# Patient Record
Sex: MALE | Race: OTHER | HISPANIC OR LATINO | URBAN - METROPOLITAN AREA
[De-identification: names, ages, dates, MRNs, and addresses within clinical notes are randomized per-mention and may not be internally consistent; named-entity substitution may affect disease eponyms.]

---

## 2023-12-21 ENCOUNTER — INPATIENT (INPATIENT)
Facility: HOSPITAL | Age: 83
LOS: 14 days | Discharge: NURSING, REHAB OR LONG-TERM CARE | DRG: 444 | End: 2024-01-05
Attending: STUDENT IN AN ORGANIZED HEALTH CARE EDUCATION/TRAINING PROGRAM | Admitting: SURGERY
Payer: COMMERCIAL

## 2023-12-21 VITALS
OXYGEN SATURATION: 94 % | TEMPERATURE: 99 F | HEART RATE: 101 BPM | DIASTOLIC BLOOD PRESSURE: 76 MMHG | SYSTOLIC BLOOD PRESSURE: 146 MMHG | RESPIRATION RATE: 20 BRPM

## 2023-12-21 DIAGNOSIS — K81.9 CHOLECYSTITIS, UNSPECIFIED: ICD-10-CM

## 2023-12-21 LAB
ALBUMIN SERPL ELPH-MCNC: 3.8 G/DL — SIGNIFICANT CHANGE UP (ref 3.3–5.2)
ALBUMIN SERPL ELPH-MCNC: 3.8 G/DL — SIGNIFICANT CHANGE UP (ref 3.3–5.2)
ALP SERPL-CCNC: 120 U/L — SIGNIFICANT CHANGE UP (ref 40–120)
ALP SERPL-CCNC: 120 U/L — SIGNIFICANT CHANGE UP (ref 40–120)
ALT FLD-CCNC: 17 U/L — SIGNIFICANT CHANGE UP
ALT FLD-CCNC: 17 U/L — SIGNIFICANT CHANGE UP
ANION GAP SERPL CALC-SCNC: 13 MMOL/L — SIGNIFICANT CHANGE UP (ref 5–17)
ANION GAP SERPL CALC-SCNC: 13 MMOL/L — SIGNIFICANT CHANGE UP (ref 5–17)
APPEARANCE UR: CLEAR — SIGNIFICANT CHANGE UP
APPEARANCE UR: CLEAR — SIGNIFICANT CHANGE UP
APTT BLD: 29.8 SEC — SIGNIFICANT CHANGE UP (ref 24.5–35.6)
APTT BLD: 29.8 SEC — SIGNIFICANT CHANGE UP (ref 24.5–35.6)
AST SERPL-CCNC: 19 U/L — SIGNIFICANT CHANGE UP
AST SERPL-CCNC: 19 U/L — SIGNIFICANT CHANGE UP
BACTERIA # UR AUTO: NEGATIVE /HPF — SIGNIFICANT CHANGE UP
BACTERIA # UR AUTO: NEGATIVE /HPF — SIGNIFICANT CHANGE UP
BASE EXCESS BLDV CALC-SCNC: 3 MMOL/L — SIGNIFICANT CHANGE UP (ref -2–3)
BASE EXCESS BLDV CALC-SCNC: 3 MMOL/L — SIGNIFICANT CHANGE UP (ref -2–3)
BASOPHILS # BLD AUTO: 0.03 K/UL — SIGNIFICANT CHANGE UP (ref 0–0.2)
BASOPHILS # BLD AUTO: 0.03 K/UL — SIGNIFICANT CHANGE UP (ref 0–0.2)
BASOPHILS NFR BLD AUTO: 0.2 % — SIGNIFICANT CHANGE UP (ref 0–2)
BASOPHILS NFR BLD AUTO: 0.2 % — SIGNIFICANT CHANGE UP (ref 0–2)
BILIRUB SERPL-MCNC: 0.8 MG/DL — SIGNIFICANT CHANGE UP (ref 0.4–2)
BILIRUB SERPL-MCNC: 0.8 MG/DL — SIGNIFICANT CHANGE UP (ref 0.4–2)
BILIRUB UR-MCNC: NEGATIVE — SIGNIFICANT CHANGE UP
BILIRUB UR-MCNC: NEGATIVE — SIGNIFICANT CHANGE UP
BUN SERPL-MCNC: 26.1 MG/DL — HIGH (ref 8–20)
BUN SERPL-MCNC: 26.1 MG/DL — HIGH (ref 8–20)
CA-I SERPL-SCNC: 1.07 MMOL/L — LOW (ref 1.15–1.33)
CA-I SERPL-SCNC: 1.07 MMOL/L — LOW (ref 1.15–1.33)
CALCIUM SERPL-MCNC: 8.8 MG/DL — SIGNIFICANT CHANGE UP (ref 8.4–10.5)
CALCIUM SERPL-MCNC: 8.8 MG/DL — SIGNIFICANT CHANGE UP (ref 8.4–10.5)
CAST: 1 /LPF — SIGNIFICANT CHANGE UP (ref 0–4)
CAST: 1 /LPF — SIGNIFICANT CHANGE UP (ref 0–4)
CHLORIDE BLDV-SCNC: 102 MMOL/L — SIGNIFICANT CHANGE UP (ref 96–108)
CHLORIDE BLDV-SCNC: 102 MMOL/L — SIGNIFICANT CHANGE UP (ref 96–108)
CHLORIDE SERPL-SCNC: 102 MMOL/L — SIGNIFICANT CHANGE UP (ref 96–108)
CHLORIDE SERPL-SCNC: 102 MMOL/L — SIGNIFICANT CHANGE UP (ref 96–108)
CO2 SERPL-SCNC: 24 MMOL/L — SIGNIFICANT CHANGE UP (ref 22–29)
CO2 SERPL-SCNC: 24 MMOL/L — SIGNIFICANT CHANGE UP (ref 22–29)
COLOR SPEC: YELLOW — SIGNIFICANT CHANGE UP
COLOR SPEC: YELLOW — SIGNIFICANT CHANGE UP
CREAT SERPL-MCNC: 2.04 MG/DL — HIGH (ref 0.5–1.3)
CREAT SERPL-MCNC: 2.04 MG/DL — HIGH (ref 0.5–1.3)
DIFF PNL FLD: ABNORMAL
DIFF PNL FLD: ABNORMAL
EGFR: 32 ML/MIN/1.73M2 — LOW
EGFR: 32 ML/MIN/1.73M2 — LOW
EOSINOPHIL # BLD AUTO: 0.01 K/UL — SIGNIFICANT CHANGE UP (ref 0–0.5)
EOSINOPHIL # BLD AUTO: 0.01 K/UL — SIGNIFICANT CHANGE UP (ref 0–0.5)
EOSINOPHIL NFR BLD AUTO: 0.1 % — SIGNIFICANT CHANGE UP (ref 0–6)
EOSINOPHIL NFR BLD AUTO: 0.1 % — SIGNIFICANT CHANGE UP (ref 0–6)
GAS PNL BLDV: 134 MMOL/L — LOW (ref 136–145)
GAS PNL BLDV: 134 MMOL/L — LOW (ref 136–145)
GAS PNL BLDV: SIGNIFICANT CHANGE UP
GLUCOSE BLDC GLUCOMTR-MCNC: 139 MG/DL — HIGH (ref 70–99)
GLUCOSE BLDC GLUCOMTR-MCNC: 139 MG/DL — HIGH (ref 70–99)
GLUCOSE BLDC GLUCOMTR-MCNC: 164 MG/DL — HIGH (ref 70–99)
GLUCOSE BLDC GLUCOMTR-MCNC: 164 MG/DL — HIGH (ref 70–99)
GLUCOSE BLDC GLUCOMTR-MCNC: 166 MG/DL — HIGH (ref 70–99)
GLUCOSE BLDC GLUCOMTR-MCNC: 166 MG/DL — HIGH (ref 70–99)
GLUCOSE BLDV-MCNC: 332 MG/DL — HIGH (ref 70–99)
GLUCOSE BLDV-MCNC: 332 MG/DL — HIGH (ref 70–99)
GLUCOSE SERPL-MCNC: 288 MG/DL — HIGH (ref 70–99)
GLUCOSE SERPL-MCNC: 288 MG/DL — HIGH (ref 70–99)
GLUCOSE UR QL: >=1000 MG/DL
GLUCOSE UR QL: >=1000 MG/DL
HCO3 BLDV-SCNC: 28 MMOL/L — SIGNIFICANT CHANGE UP (ref 22–29)
HCO3 BLDV-SCNC: 28 MMOL/L — SIGNIFICANT CHANGE UP (ref 22–29)
HCT VFR BLD CALC: 39 % — SIGNIFICANT CHANGE UP (ref 39–50)
HCT VFR BLD CALC: 39 % — SIGNIFICANT CHANGE UP (ref 39–50)
HCT VFR BLDA CALC: 38 % — SIGNIFICANT CHANGE UP
HCT VFR BLDA CALC: 38 % — SIGNIFICANT CHANGE UP
HGB BLD CALC-MCNC: 12.7 G/DL — SIGNIFICANT CHANGE UP (ref 12.6–17.4)
HGB BLD CALC-MCNC: 12.7 G/DL — SIGNIFICANT CHANGE UP (ref 12.6–17.4)
HGB BLD-MCNC: 12.8 G/DL — LOW (ref 13–17)
HGB BLD-MCNC: 12.8 G/DL — LOW (ref 13–17)
IMM GRANULOCYTES NFR BLD AUTO: 0.5 % — SIGNIFICANT CHANGE UP (ref 0–0.9)
IMM GRANULOCYTES NFR BLD AUTO: 0.5 % — SIGNIFICANT CHANGE UP (ref 0–0.9)
INR BLD: 1.1 RATIO — SIGNIFICANT CHANGE UP (ref 0.85–1.18)
INR BLD: 1.1 RATIO — SIGNIFICANT CHANGE UP (ref 0.85–1.18)
KETONES UR-MCNC: NEGATIVE MG/DL — SIGNIFICANT CHANGE UP
KETONES UR-MCNC: NEGATIVE MG/DL — SIGNIFICANT CHANGE UP
LACTATE BLDV-MCNC: 1.5 MMOL/L — SIGNIFICANT CHANGE UP (ref 0.5–2)
LACTATE BLDV-MCNC: 1.5 MMOL/L — SIGNIFICANT CHANGE UP (ref 0.5–2)
LACTATE BLDV-MCNC: 2.5 MMOL/L — HIGH (ref 0.5–2)
LACTATE BLDV-MCNC: 2.5 MMOL/L — HIGH (ref 0.5–2)
LEUKOCYTE ESTERASE UR-ACNC: NEGATIVE — SIGNIFICANT CHANGE UP
LEUKOCYTE ESTERASE UR-ACNC: NEGATIVE — SIGNIFICANT CHANGE UP
LIDOCAIN IGE QN: 17 U/L — LOW (ref 22–51)
LIDOCAIN IGE QN: 17 U/L — LOW (ref 22–51)
LYMPHOCYTES # BLD AUTO: 0.81 K/UL — LOW (ref 1–3.3)
LYMPHOCYTES # BLD AUTO: 0.81 K/UL — LOW (ref 1–3.3)
LYMPHOCYTES # BLD AUTO: 4.4 % — LOW (ref 13–44)
LYMPHOCYTES # BLD AUTO: 4.4 % — LOW (ref 13–44)
MCHC RBC-ENTMCNC: 30.5 PG — SIGNIFICANT CHANGE UP (ref 27–34)
MCHC RBC-ENTMCNC: 30.5 PG — SIGNIFICANT CHANGE UP (ref 27–34)
MCHC RBC-ENTMCNC: 32.8 GM/DL — SIGNIFICANT CHANGE UP (ref 32–36)
MCHC RBC-ENTMCNC: 32.8 GM/DL — SIGNIFICANT CHANGE UP (ref 32–36)
MCV RBC AUTO: 92.9 FL — SIGNIFICANT CHANGE UP (ref 80–100)
MCV RBC AUTO: 92.9 FL — SIGNIFICANT CHANGE UP (ref 80–100)
MONOCYTES # BLD AUTO: 0.94 K/UL — HIGH (ref 0–0.9)
MONOCYTES # BLD AUTO: 0.94 K/UL — HIGH (ref 0–0.9)
MONOCYTES NFR BLD AUTO: 5.1 % — SIGNIFICANT CHANGE UP (ref 2–14)
MONOCYTES NFR BLD AUTO: 5.1 % — SIGNIFICANT CHANGE UP (ref 2–14)
NEUTROPHILS # BLD AUTO: 16.43 K/UL — HIGH (ref 1.8–7.4)
NEUTROPHILS # BLD AUTO: 16.43 K/UL — HIGH (ref 1.8–7.4)
NEUTROPHILS NFR BLD AUTO: 89.7 % — HIGH (ref 43–77)
NEUTROPHILS NFR BLD AUTO: 89.7 % — HIGH (ref 43–77)
NITRITE UR-MCNC: NEGATIVE — SIGNIFICANT CHANGE UP
NITRITE UR-MCNC: NEGATIVE — SIGNIFICANT CHANGE UP
PCO2 BLDV: 49 MMHG — SIGNIFICANT CHANGE UP (ref 42–55)
PCO2 BLDV: 49 MMHG — SIGNIFICANT CHANGE UP (ref 42–55)
PH BLDV: 7.37 — SIGNIFICANT CHANGE UP (ref 7.32–7.43)
PH BLDV: 7.37 — SIGNIFICANT CHANGE UP (ref 7.32–7.43)
PH UR: 6.5 — SIGNIFICANT CHANGE UP (ref 5–8)
PH UR: 6.5 — SIGNIFICANT CHANGE UP (ref 5–8)
PLATELET # BLD AUTO: 289 K/UL — SIGNIFICANT CHANGE UP (ref 150–400)
PLATELET # BLD AUTO: 289 K/UL — SIGNIFICANT CHANGE UP (ref 150–400)
PO2 BLDV: 50 MMHG — HIGH (ref 25–45)
PO2 BLDV: 50 MMHG — HIGH (ref 25–45)
POTASSIUM BLDV-SCNC: 4.6 MMOL/L — SIGNIFICANT CHANGE UP (ref 3.5–5.1)
POTASSIUM BLDV-SCNC: 4.6 MMOL/L — SIGNIFICANT CHANGE UP (ref 3.5–5.1)
POTASSIUM SERPL-MCNC: 4.3 MMOL/L — SIGNIFICANT CHANGE UP (ref 3.5–5.3)
POTASSIUM SERPL-MCNC: 4.3 MMOL/L — SIGNIFICANT CHANGE UP (ref 3.5–5.3)
POTASSIUM SERPL-SCNC: 4.3 MMOL/L — SIGNIFICANT CHANGE UP (ref 3.5–5.3)
POTASSIUM SERPL-SCNC: 4.3 MMOL/L — SIGNIFICANT CHANGE UP (ref 3.5–5.3)
PROT SERPL-MCNC: 7.6 G/DL — SIGNIFICANT CHANGE UP (ref 6.6–8.7)
PROT SERPL-MCNC: 7.6 G/DL — SIGNIFICANT CHANGE UP (ref 6.6–8.7)
PROT UR-MCNC: 300 MG/DL
PROT UR-MCNC: 300 MG/DL
PROTHROM AB SERPL-ACNC: 12.2 SEC — SIGNIFICANT CHANGE UP (ref 9.5–13)
PROTHROM AB SERPL-ACNC: 12.2 SEC — SIGNIFICANT CHANGE UP (ref 9.5–13)
RAPID RVP RESULT: SIGNIFICANT CHANGE UP
RAPID RVP RESULT: SIGNIFICANT CHANGE UP
RBC # BLD: 4.2 M/UL — SIGNIFICANT CHANGE UP (ref 4.2–5.8)
RBC # BLD: 4.2 M/UL — SIGNIFICANT CHANGE UP (ref 4.2–5.8)
RBC # FLD: 13 % — SIGNIFICANT CHANGE UP (ref 10.3–14.5)
RBC # FLD: 13 % — SIGNIFICANT CHANGE UP (ref 10.3–14.5)
RBC CASTS # UR COMP ASSIST: 2 /HPF — SIGNIFICANT CHANGE UP (ref 0–4)
RBC CASTS # UR COMP ASSIST: 2 /HPF — SIGNIFICANT CHANGE UP (ref 0–4)
SAO2 % BLDV: 79.7 % — SIGNIFICANT CHANGE UP
SAO2 % BLDV: 79.7 % — SIGNIFICANT CHANGE UP
SARS-COV-2 RNA SPEC QL NAA+PROBE: SIGNIFICANT CHANGE UP
SARS-COV-2 RNA SPEC QL NAA+PROBE: SIGNIFICANT CHANGE UP
SODIUM SERPL-SCNC: 139 MMOL/L — SIGNIFICANT CHANGE UP (ref 135–145)
SODIUM SERPL-SCNC: 139 MMOL/L — SIGNIFICANT CHANGE UP (ref 135–145)
SP GR SPEC: 1.02 — SIGNIFICANT CHANGE UP (ref 1–1.03)
SP GR SPEC: 1.02 — SIGNIFICANT CHANGE UP (ref 1–1.03)
SQUAMOUS # UR AUTO: 0 /HPF — SIGNIFICANT CHANGE UP (ref 0–5)
SQUAMOUS # UR AUTO: 0 /HPF — SIGNIFICANT CHANGE UP (ref 0–5)
TROPONIN T, HIGH SENSITIVITY RESULT: 38 NG/L — SIGNIFICANT CHANGE UP (ref 0–51)
TROPONIN T, HIGH SENSITIVITY RESULT: 38 NG/L — SIGNIFICANT CHANGE UP (ref 0–51)
UROBILINOGEN FLD QL: 1 MG/DL — SIGNIFICANT CHANGE UP (ref 0.2–1)
UROBILINOGEN FLD QL: 1 MG/DL — SIGNIFICANT CHANGE UP (ref 0.2–1)
WBC # BLD: 18.31 K/UL — HIGH (ref 3.8–10.5)
WBC # BLD: 18.31 K/UL — HIGH (ref 3.8–10.5)
WBC # FLD AUTO: 18.31 K/UL — HIGH (ref 3.8–10.5)
WBC # FLD AUTO: 18.31 K/UL — HIGH (ref 3.8–10.5)
WBC UR QL: 0 /HPF — SIGNIFICANT CHANGE UP (ref 0–5)
WBC UR QL: 0 /HPF — SIGNIFICANT CHANGE UP (ref 0–5)

## 2023-12-21 PROCEDURE — 71250 CT THORAX DX C-: CPT | Mod: 26,MA

## 2023-12-21 PROCEDURE — 76705 ECHO EXAM OF ABDOMEN: CPT | Mod: 26

## 2023-12-21 PROCEDURE — 71045 X-RAY EXAM CHEST 1 VIEW: CPT | Mod: 26

## 2023-12-21 PROCEDURE — 71045 X-RAY EXAM CHEST 1 VIEW: CPT | Mod: 26,77

## 2023-12-21 PROCEDURE — 74176 CT ABD & PELVIS W/O CONTRAST: CPT | Mod: 26,ME

## 2023-12-21 PROCEDURE — G1004: CPT

## 2023-12-21 PROCEDURE — 99285 EMERGENCY DEPT VISIT HI MDM: CPT

## 2023-12-21 RX ORDER — SODIUM CHLORIDE 9 MG/ML
1000 INJECTION, SOLUTION INTRAVENOUS
Refills: 0 | Status: DISCONTINUED | OUTPATIENT
Start: 2023-12-21 | End: 2024-01-05

## 2023-12-21 RX ORDER — ALBUMIN HUMAN 25 %
250 VIAL (ML) INTRAVENOUS ONCE
Refills: 0 | Status: COMPLETED | OUTPATIENT
Start: 2023-12-21 | End: 2023-12-22

## 2023-12-21 RX ORDER — TAMSULOSIN HYDROCHLORIDE 0.4 MG/1
0.4 CAPSULE ORAL AT BEDTIME
Refills: 0 | Status: DISCONTINUED | OUTPATIENT
Start: 2023-12-21 | End: 2024-01-05

## 2023-12-21 RX ORDER — SIMVASTATIN 20 MG/1
20 TABLET, FILM COATED ORAL AT BEDTIME
Refills: 0 | Status: DISCONTINUED | OUTPATIENT
Start: 2023-12-21 | End: 2023-12-25

## 2023-12-21 RX ORDER — INSULIN LISPRO 100/ML
VIAL (ML) SUBCUTANEOUS AT BEDTIME
Refills: 0 | Status: DISCONTINUED | OUTPATIENT
Start: 2023-12-21 | End: 2024-01-05

## 2023-12-21 RX ORDER — SODIUM CHLORIDE 9 MG/ML
1000 INJECTION, SOLUTION INTRAVENOUS
Refills: 0 | Status: DISCONTINUED | OUTPATIENT
Start: 2023-12-21 | End: 2023-12-22

## 2023-12-21 RX ORDER — ONDANSETRON 8 MG/1
4 TABLET, FILM COATED ORAL EVERY 6 HOURS
Refills: 0 | Status: DISCONTINUED | OUTPATIENT
Start: 2023-12-21 | End: 2024-01-05

## 2023-12-21 RX ORDER — MORPHINE SULFATE 50 MG/1
2 CAPSULE, EXTENDED RELEASE ORAL EVERY 4 HOURS
Refills: 0 | Status: DISCONTINUED | OUTPATIENT
Start: 2023-12-21 | End: 2023-12-22

## 2023-12-21 RX ORDER — ACETAMINOPHEN 500 MG
650 TABLET ORAL EVERY 6 HOURS
Refills: 0 | Status: DISCONTINUED | OUTPATIENT
Start: 2023-12-21 | End: 2023-12-22

## 2023-12-21 RX ORDER — GLUCAGON INJECTION, SOLUTION 0.5 MG/.1ML
1 INJECTION, SOLUTION SUBCUTANEOUS ONCE
Refills: 0 | Status: DISCONTINUED | OUTPATIENT
Start: 2023-12-21 | End: 2024-01-05

## 2023-12-21 RX ORDER — PIPERACILLIN AND TAZOBACTAM 4; .5 G/20ML; G/20ML
3.38 INJECTION, POWDER, LYOPHILIZED, FOR SOLUTION INTRAVENOUS ONCE
Refills: 0 | Status: COMPLETED | OUTPATIENT
Start: 2023-12-22 | End: 2023-12-22

## 2023-12-21 RX ORDER — INSULIN LISPRO 100/ML
VIAL (ML) SUBCUTANEOUS
Refills: 0 | Status: DISCONTINUED | OUTPATIENT
Start: 2023-12-21 | End: 2024-01-05

## 2023-12-21 RX ORDER — GABAPENTIN 400 MG/1
300 CAPSULE ORAL AT BEDTIME
Refills: 0 | Status: DISCONTINUED | OUTPATIENT
Start: 2023-12-21 | End: 2024-01-05

## 2023-12-21 RX ORDER — DEXTROSE 50 % IN WATER 50 %
15 SYRINGE (ML) INTRAVENOUS ONCE
Refills: 0 | Status: DISCONTINUED | OUTPATIENT
Start: 2023-12-21 | End: 2024-01-05

## 2023-12-21 RX ORDER — SODIUM CHLORIDE 9 MG/ML
1700 INJECTION INTRAMUSCULAR; INTRAVENOUS; SUBCUTANEOUS ONCE
Refills: 0 | Status: COMPLETED | OUTPATIENT
Start: 2023-12-21 | End: 2023-12-21

## 2023-12-21 RX ORDER — SODIUM CHLORIDE 9 MG/ML
1000 INJECTION, SOLUTION INTRAVENOUS ONCE
Refills: 0 | Status: COMPLETED | OUTPATIENT
Start: 2023-12-21 | End: 2023-12-21

## 2023-12-21 RX ORDER — MORPHINE SULFATE 50 MG/1
4 CAPSULE, EXTENDED RELEASE ORAL EVERY 4 HOURS
Refills: 0 | Status: DISCONTINUED | OUTPATIENT
Start: 2023-12-21 | End: 2023-12-22

## 2023-12-21 RX ORDER — DEXTROSE 50 % IN WATER 50 %
25 SYRINGE (ML) INTRAVENOUS ONCE
Refills: 0 | Status: DISCONTINUED | OUTPATIENT
Start: 2023-12-21 | End: 2024-01-05

## 2023-12-21 RX ORDER — ACETAMINOPHEN 500 MG
975 TABLET ORAL ONCE
Refills: 0 | Status: COMPLETED | OUTPATIENT
Start: 2023-12-21 | End: 2023-12-21

## 2023-12-21 RX ORDER — PIPERACILLIN AND TAZOBACTAM 4; .5 G/20ML; G/20ML
3.38 INJECTION, POWDER, LYOPHILIZED, FOR SOLUTION INTRAVENOUS ONCE
Refills: 0 | Status: COMPLETED | OUTPATIENT
Start: 2023-12-21 | End: 2023-12-21

## 2023-12-21 RX ORDER — ASPIRIN/CALCIUM CARB/MAGNESIUM 324 MG
81 TABLET ORAL DAILY
Refills: 0 | Status: DISCONTINUED | OUTPATIENT
Start: 2023-12-21 | End: 2023-12-22

## 2023-12-21 RX ORDER — HEPARIN SODIUM 5000 [USP'U]/ML
5000 INJECTION INTRAVENOUS; SUBCUTANEOUS EVERY 8 HOURS
Refills: 0 | Status: DISCONTINUED | OUTPATIENT
Start: 2023-12-21 | End: 2023-12-22

## 2023-12-21 RX ORDER — MIDODRINE HYDROCHLORIDE 2.5 MG/1
5 TABLET ORAL THREE TIMES A DAY
Refills: 0 | Status: DISCONTINUED | OUTPATIENT
Start: 2023-12-21 | End: 2024-01-05

## 2023-12-21 RX ORDER — ACETAMINOPHEN 500 MG
1000 TABLET ORAL ONCE
Refills: 0 | Status: COMPLETED | OUTPATIENT
Start: 2023-12-21 | End: 2023-12-21

## 2023-12-21 RX ORDER — DEXTROSE 50 % IN WATER 50 %
12.5 SYRINGE (ML) INTRAVENOUS ONCE
Refills: 0 | Status: DISCONTINUED | OUTPATIENT
Start: 2023-12-21 | End: 2024-01-05

## 2023-12-21 RX ORDER — PIPERACILLIN AND TAZOBACTAM 4; .5 G/20ML; G/20ML
3.38 INJECTION, POWDER, LYOPHILIZED, FOR SOLUTION INTRAVENOUS EVERY 8 HOURS
Refills: 0 | Status: DISCONTINUED | OUTPATIENT
Start: 2023-12-21 | End: 2023-12-21

## 2023-12-21 RX ORDER — PANTOPRAZOLE SODIUM 20 MG/1
40 TABLET, DELAYED RELEASE ORAL
Refills: 0 | Status: DISCONTINUED | OUTPATIENT
Start: 2023-12-21 | End: 2024-01-05

## 2023-12-21 RX ORDER — SODIUM CHLORIDE 9 MG/ML
1000 INJECTION INTRAMUSCULAR; INTRAVENOUS; SUBCUTANEOUS ONCE
Refills: 0 | Status: COMPLETED | OUTPATIENT
Start: 2023-12-21 | End: 2023-12-21

## 2023-12-21 RX ORDER — PIPERACILLIN AND TAZOBACTAM 4; .5 G/20ML; G/20ML
3.38 INJECTION, POWDER, LYOPHILIZED, FOR SOLUTION INTRAVENOUS EVERY 8 HOURS
Refills: 0 | Status: DISCONTINUED | OUTPATIENT
Start: 2023-12-22 | End: 2023-12-27

## 2023-12-21 RX ADMIN — SODIUM CHLORIDE 100 MILLILITER(S): 9 INJECTION, SOLUTION INTRAVENOUS at 21:52

## 2023-12-21 RX ADMIN — ONDANSETRON 4 MILLIGRAM(S): 8 TABLET, FILM COATED ORAL at 19:06

## 2023-12-21 RX ADMIN — PIPERACILLIN AND TAZOBACTAM 200 GRAM(S): 4; .5 INJECTION, POWDER, LYOPHILIZED, FOR SOLUTION INTRAVENOUS at 05:57

## 2023-12-21 RX ADMIN — GABAPENTIN 300 MILLIGRAM(S): 400 CAPSULE ORAL at 22:34

## 2023-12-21 RX ADMIN — SIMVASTATIN 20 MILLIGRAM(S): 20 TABLET, FILM COATED ORAL at 23:34

## 2023-12-21 RX ADMIN — Medication 975 MILLIGRAM(S): at 04:27

## 2023-12-21 RX ADMIN — SODIUM CHLORIDE 1700 MILLILITER(S): 9 INJECTION INTRAMUSCULAR; INTRAVENOUS; SUBCUTANEOUS at 04:27

## 2023-12-21 RX ADMIN — TAMSULOSIN HYDROCHLORIDE 0.4 MILLIGRAM(S): 0.4 CAPSULE ORAL at 23:33

## 2023-12-21 RX ADMIN — PIPERACILLIN AND TAZOBACTAM 200 GRAM(S): 4; .5 INJECTION, POWDER, LYOPHILIZED, FOR SOLUTION INTRAVENOUS at 23:32

## 2023-12-21 RX ADMIN — Medication 1000 MILLIGRAM(S): at 23:00

## 2023-12-21 RX ADMIN — SODIUM CHLORIDE 100 MILLILITER(S): 9 INJECTION, SOLUTION INTRAVENOUS at 13:04

## 2023-12-21 RX ADMIN — Medication 400 MILLIGRAM(S): at 22:00

## 2023-12-21 RX ADMIN — HEPARIN SODIUM 5000 UNIT(S): 5000 INJECTION INTRAVENOUS; SUBCUTANEOUS at 23:33

## 2023-12-21 RX ADMIN — MORPHINE SULFATE 2 MILLIGRAM(S): 50 CAPSULE, EXTENDED RELEASE ORAL at 19:05

## 2023-12-21 RX ADMIN — Medication 2: at 19:08

## 2023-12-21 RX ADMIN — SODIUM CHLORIDE 2000 MILLILITER(S): 9 INJECTION, SOLUTION INTRAVENOUS at 22:20

## 2023-12-21 NOTE — H&P ADULT - NSHPLABSRESULTS_GEN_ALL_CORE
LABS                 12.8   18.31  )----------(  289       ( 21 Dec 2023 04:30 )               39.0      139    |  102    |  26.1   ----------------------------<  288        ( 21 Dec 2023 04:30 )  4.3     |  24.0   |  2.04     Ca    8.8        ( 21 Dec 2023 04:30 )    TPro  7.6    /  Alb  3.8    /  TBili  0.8    /  DBili  x      /  AST  19     /  ALT  17     /  AlkPhos  120    ( 21 Dec 2023 04:30 )    LIVER FUNCTIONS - ( 21 Dec 2023 04:30 )  Alb: 3.8 g/dL / Pro: 7.6 g/dL / ALK PHOS: 120 U/L / ALT: 17 U/L / AST: 19 U/L / GGT: x           PT/INR -  12.2 sec / 1.10 ratio   ( 21 Dec 2023 04:30 )       PTT -  29.8 sec   ( 21 Dec 2023 04:30 )  CAPILLARY BLOOD GLUCOSE        Urinalysis Basic - ( 21 Dec 2023 04:45 )    Color: Yellow / Appearance: Clear / S.019 / pH: x  Gluc: x / Ketone: Negative mg/dL  / Bili: Negative / Urobili: 1.0 mg/dL   Blood: x / Protein: 300 mg/dL / Nitrite: Negative   Leuk Esterase: Negative / RBC: 2 /HPF / WBC 0 /HPF   Sq Epi: x / Non Sq Epi: 0 /HPF / Bacteria: Negative /HPF        05:50 - VBG - pH:       | pCO2:       | pO2:       | Lactate: 1.50   04:30 - VBG - pH: 7.370 | pCO2: 49    | pO2: 50    | Lactate: 2.50     --------------------------------------------------------------------------------------------  IMAGING  < from: CT Abdomen and Pelvis No Cont (. @ 08:54) >        < end of copied text >

## 2023-12-21 NOTE — ED PROVIDER NOTE - NSICDXPASTMEDICALHX_GEN_ALL_CORE_FT
PAST MEDICAL HISTORY:  Anxiety     Diabetes     High cholesterol     Hypertension     Prostate disorder     Ulcer

## 2023-12-21 NOTE — H&P ADULT - NSHPPHYSICALEXAM_GEN_ALL_CORE
--------------------------------------------------------------------------------------------    PHYSICAL EXAM:   General: in bed  Neuro: A+Ox2  but per daughter this is baseline  HEENT: EOMI, PERRLA,  Cardio: ok perfused  Resp: Non labored breathing on RA  GI/Abd: Soft, Muprphy positive, mild RUQ tenderness    --------------------------------------------------------------------------------------------

## 2023-12-21 NOTE — ED PROVIDER NOTE - ATTENDING CONTRIBUTION TO CARE
83y M w/ hx DM, HTN, HLD, BPH; presents for chills. Per family, pt started complaining of generalized abdominal/flank/chest discomfort tonight and appeared to be shaky. No known fever. No cough, vomiting, diarrhea, urinary complaints. +Multiple sick contacts at home. On exam, pt appears 83y M w/ hx DM, HTN, HLD, BPH; presents for chills. Per family, pt started complaining of generalized abdominal/flank/chest discomfort tonight and appeared to be shaky. No known fever. No cough, vomiting, diarrhea, urinary complaints. +Multiple sick contacts at home. On exam, pt appears to be rigoring but in no acute distress. Heart tachycardic, lungs CTAB, abd soft with mild diffuse tenderness greatest in periumbilical area. Rectally febrile in the ED. Sepsis workup initiated. Treated with fluids, empiric antibiotics. Check labs, cultures, RVP, urine, CXR, CT abd/pelvis.

## 2023-12-21 NOTE — ED PROVIDER NOTE - PHYSICAL EXAMINATION
General: ill appearing in no acute distress, alert and cooperative  Head: Normocephalic, atraumatic  Eyes: PERRLA, no conjunctival injection, no scleral icterus, EOMI  ENMT: Atraumatic external nose and ears  Neck: Soft and supple, full ROM without pain  Cardiac: tachycardic rate and regular rhythm, no murmurs, peripheral pulses 2+ and symmetric in all extremities, no LE edema.  Resp: Unlabored respiratory effort, lungs CTAB  Abd: Soft, non-tender, non-distended  MSK: Spine midline and non-tender   Skin: Warm and dry  Neuro: AO x 3, moves all extremities symmetrically, Motor strength and sensation grossly intact

## 2023-12-21 NOTE — ED PROVIDER NOTE - CLINICAL SUMMARY MEDICAL DECISION MAKING FREE TEXT BOX
\83-year-old male with bodyaches, chills, abdominal and flank pain.  Afebrile, hemodynamically stable, tachycardic, abdomen soft with generalized tenderness.  Differential includes electrolyte derangement, viral illness, UTI.

## 2023-12-21 NOTE — CHART NOTE - NSCHARTNOTEFT_GEN_A_CORE
Called by [facility] resident that patient was febrile to 103, tachycardic to 100 and teens.  Normotensive.  Antibiotics were continued, a full panel of labs and cultures were sent including a lactate for septic work up.  The patient remained alert and orientated to person and place.  His daughter was at bedside and noted he had issues with remembering the year.  Medicine was contacted to co-manage and risk stratify patient.      Abd: Soft, NT, ND, RUQ tenderness improved from an earlier exam by the consulting team     Plan:     Continue abx, zosyn   F/U septic work up   F/U medicine evaluation   Preop patient in preparation for planned laparoscopic cholecystectomy   Rest of plan pending continued work up   Monitor hemodynamics

## 2023-12-21 NOTE — ED PROVIDER NOTE - OBJECTIVE STATEMENT
83-year-old male with history of HTN, DM presenting with bilateral flank pain, abdominal pain, chest pain with palpitations, shortness of breath with generalized chills and body aches  Since 8 PM. Denies fevers, headache, cough, nausea, vomiting, diarrhea, hematuria, dysuria, dark stools, focal neurologic symptoms.

## 2023-12-21 NOTE — ED PROVIDER NOTE - CARE PLAN
Principal Discharge DX:	Cholecystitis, unspecified  Secondary Diagnosis:	RON (acute kidney injury)   1

## 2023-12-21 NOTE — ED ADULT TRIAGE NOTE - CHIEF COMPLAINT QUOTE
patient states 8pm he began having bilateral flank pain, now radiating to abdomen and chest. denies palpitations, SOB, difficulty breathing. no fevers,  symptoms noted.

## 2023-12-21 NOTE — ED PROVIDER NOTE - PROGRESS NOTE DETAILS
[place]: Patient reassessed after CT scan result - family member at bedside. Patient with RUQ TTP and guarding - shared CT results and US ordered. Surgery consulted.

## 2023-12-21 NOTE — H&P ADULT - ASSESSMENT
ASSESSMENT: Patient is a 83y old male with abdominal pain, acute cholecystitis, however uncontrolled DM, will need medical optimization already on ABx, will plan fo surgery tenttively tomorrow    PLAN:    - Admit to [facility] Dr. Colbert  - NPO/IVF  - pain control  - HOme meds  - ISS  - Fluids  - Medical consul  - DVT ppx  - OOB/ambulate  - strict I/Os  - Plan discussed with Attending, Dr. Colbert

## 2023-12-22 LAB
A1C WITH ESTIMATED AVERAGE GLUCOSE RESULT: 8.2 % — HIGH (ref 4–5.6)
A1C WITH ESTIMATED AVERAGE GLUCOSE RESULT: 8.2 % — HIGH (ref 4–5.6)
ALBUMIN SERPL ELPH-MCNC: 2.5 G/DL — LOW (ref 3.3–5.2)
ALBUMIN SERPL ELPH-MCNC: 2.5 G/DL — LOW (ref 3.3–5.2)
ALBUMIN SERPL ELPH-MCNC: 2.6 G/DL — LOW (ref 3.3–5.2)
ALBUMIN SERPL ELPH-MCNC: 2.6 G/DL — LOW (ref 3.3–5.2)
ALBUMIN SERPL ELPH-MCNC: 3.1 G/DL — LOW (ref 3.3–5.2)
ALBUMIN SERPL ELPH-MCNC: 3.1 G/DL — LOW (ref 3.3–5.2)
ALP SERPL-CCNC: 61 U/L — SIGNIFICANT CHANGE UP (ref 40–120)
ALP SERPL-CCNC: 70 U/L — SIGNIFICANT CHANGE UP (ref 40–120)
ALP SERPL-CCNC: 70 U/L — SIGNIFICANT CHANGE UP (ref 40–120)
ALT FLD-CCNC: 22 U/L — SIGNIFICANT CHANGE UP
ALT FLD-CCNC: 22 U/L — SIGNIFICANT CHANGE UP
ALT FLD-CCNC: 23 U/L — SIGNIFICANT CHANGE UP
ALT FLD-CCNC: 23 U/L — SIGNIFICANT CHANGE UP
ALT FLD-CCNC: 29 U/L — SIGNIFICANT CHANGE UP
ALT FLD-CCNC: 29 U/L — SIGNIFICANT CHANGE UP
ANION GAP SERPL CALC-SCNC: 11 MMOL/L — SIGNIFICANT CHANGE UP (ref 5–17)
ANION GAP SERPL CALC-SCNC: 11 MMOL/L — SIGNIFICANT CHANGE UP (ref 5–17)
ANION GAP SERPL CALC-SCNC: 12 MMOL/L — SIGNIFICANT CHANGE UP (ref 5–17)
ANION GAP SERPL CALC-SCNC: 12 MMOL/L — SIGNIFICANT CHANGE UP (ref 5–17)
ANION GAP SERPL CALC-SCNC: 14 MMOL/L — SIGNIFICANT CHANGE UP (ref 5–17)
ANION GAP SERPL CALC-SCNC: 14 MMOL/L — SIGNIFICANT CHANGE UP (ref 5–17)
APPEARANCE UR: CLEAR — SIGNIFICANT CHANGE UP
APPEARANCE UR: CLEAR — SIGNIFICANT CHANGE UP
AST SERPL-CCNC: 36 U/L — SIGNIFICANT CHANGE UP
AST SERPL-CCNC: 36 U/L — SIGNIFICANT CHANGE UP
AST SERPL-CCNC: 37 U/L — SIGNIFICANT CHANGE UP
AST SERPL-CCNC: 37 U/L — SIGNIFICANT CHANGE UP
AST SERPL-CCNC: 47 U/L — HIGH
AST SERPL-CCNC: 47 U/L — HIGH
BACTERIA # UR AUTO: NEGATIVE /HPF — SIGNIFICANT CHANGE UP
BACTERIA # UR AUTO: NEGATIVE /HPF — SIGNIFICANT CHANGE UP
BASE EXCESS BLDA CALC-SCNC: -4.5 MMOL/L — LOW (ref -2–3)
BASE EXCESS BLDA CALC-SCNC: -4.5 MMOL/L — LOW (ref -2–3)
BASE EXCESS BLDV CALC-SCNC: -2.4 MMOL/L — LOW (ref -2–3)
BASE EXCESS BLDV CALC-SCNC: -2.4 MMOL/L — LOW (ref -2–3)
BASOPHILS # BLD AUTO: 0.04 K/UL — SIGNIFICANT CHANGE UP (ref 0–0.2)
BASOPHILS # BLD AUTO: 0.04 K/UL — SIGNIFICANT CHANGE UP (ref 0–0.2)
BASOPHILS # BLD AUTO: 0.05 K/UL — SIGNIFICANT CHANGE UP (ref 0–0.2)
BASOPHILS # BLD AUTO: 0.05 K/UL — SIGNIFICANT CHANGE UP (ref 0–0.2)
BASOPHILS NFR BLD AUTO: 0.2 % — SIGNIFICANT CHANGE UP (ref 0–2)
BILIRUB DIRECT SERPL-MCNC: 0.4 MG/DL — HIGH (ref 0–0.3)
BILIRUB DIRECT SERPL-MCNC: 0.4 MG/DL — HIGH (ref 0–0.3)
BILIRUB INDIRECT FLD-MCNC: 0.9 MG/DL — SIGNIFICANT CHANGE UP (ref 0.2–1)
BILIRUB INDIRECT FLD-MCNC: 0.9 MG/DL — SIGNIFICANT CHANGE UP (ref 0.2–1)
BILIRUB SERPL-MCNC: 1.2 MG/DL — SIGNIFICANT CHANGE UP (ref 0.4–2)
BILIRUB SERPL-MCNC: 1.2 MG/DL — SIGNIFICANT CHANGE UP (ref 0.4–2)
BILIRUB SERPL-MCNC: 1.4 MG/DL — SIGNIFICANT CHANGE UP (ref 0.4–2)
BILIRUB SERPL-MCNC: 1.4 MG/DL — SIGNIFICANT CHANGE UP (ref 0.4–2)
BILIRUB SERPL-MCNC: 1.6 MG/DL — SIGNIFICANT CHANGE UP (ref 0.4–2)
BILIRUB SERPL-MCNC: 1.6 MG/DL — SIGNIFICANT CHANGE UP (ref 0.4–2)
BILIRUB UR-MCNC: NEGATIVE — SIGNIFICANT CHANGE UP
BILIRUB UR-MCNC: NEGATIVE — SIGNIFICANT CHANGE UP
BLOOD GAS COMMENTS ARTERIAL: SIGNIFICANT CHANGE UP
BLOOD GAS COMMENTS ARTERIAL: SIGNIFICANT CHANGE UP
BUN SERPL-MCNC: 33 MG/DL — HIGH (ref 8–20)
BUN SERPL-MCNC: 33 MG/DL — HIGH (ref 8–20)
BUN SERPL-MCNC: 33.5 MG/DL — HIGH (ref 8–20)
BUN SERPL-MCNC: 33.5 MG/DL — HIGH (ref 8–20)
BUN SERPL-MCNC: 35.1 MG/DL — HIGH (ref 8–20)
BUN SERPL-MCNC: 35.1 MG/DL — HIGH (ref 8–20)
CA-I SERPL-SCNC: 1.12 MMOL/L — LOW (ref 1.15–1.33)
CA-I SERPL-SCNC: 1.12 MMOL/L — LOW (ref 1.15–1.33)
CALCIUM SERPL-MCNC: 7.5 MG/DL — LOW (ref 8.4–10.5)
CALCIUM SERPL-MCNC: 7.5 MG/DL — LOW (ref 8.4–10.5)
CALCIUM SERPL-MCNC: 7.8 MG/DL — LOW (ref 8.4–10.5)
CALCIUM SERPL-MCNC: 7.8 MG/DL — LOW (ref 8.4–10.5)
CALCIUM SERPL-MCNC: 8.5 MG/DL — SIGNIFICANT CHANGE UP (ref 8.4–10.5)
CALCIUM SERPL-MCNC: 8.5 MG/DL — SIGNIFICANT CHANGE UP (ref 8.4–10.5)
CHLORIDE BLDV-SCNC: 105 MMOL/L — SIGNIFICANT CHANGE UP (ref 96–108)
CHLORIDE BLDV-SCNC: 105 MMOL/L — SIGNIFICANT CHANGE UP (ref 96–108)
CHLORIDE SERPL-SCNC: 103 MMOL/L — SIGNIFICANT CHANGE UP (ref 96–108)
CHLORIDE SERPL-SCNC: 103 MMOL/L — SIGNIFICANT CHANGE UP (ref 96–108)
CHLORIDE SERPL-SCNC: 107 MMOL/L — SIGNIFICANT CHANGE UP (ref 96–108)
CO2 SERPL-SCNC: 21 MMOL/L — LOW (ref 22–29)
CO2 SERPL-SCNC: 21 MMOL/L — LOW (ref 22–29)
CO2 SERPL-SCNC: 22 MMOL/L — SIGNIFICANT CHANGE UP (ref 22–29)
CO2 SERPL-SCNC: 22 MMOL/L — SIGNIFICANT CHANGE UP (ref 22–29)
CO2 SERPL-SCNC: 24 MMOL/L — SIGNIFICANT CHANGE UP (ref 22–29)
CO2 SERPL-SCNC: 24 MMOL/L — SIGNIFICANT CHANGE UP (ref 22–29)
COLOR SPEC: YELLOW — SIGNIFICANT CHANGE UP
COLOR SPEC: YELLOW — SIGNIFICANT CHANGE UP
CREAT ?TM UR-MCNC: 149 MG/DL — SIGNIFICANT CHANGE UP
CREAT ?TM UR-MCNC: 149 MG/DL — SIGNIFICANT CHANGE UP
CREAT SERPL-MCNC: 2.72 MG/DL — HIGH (ref 0.5–1.3)
CREAT SERPL-MCNC: 2.72 MG/DL — HIGH (ref 0.5–1.3)
CREAT SERPL-MCNC: 2.77 MG/DL — HIGH (ref 0.5–1.3)
CREAT SERPL-MCNC: 2.77 MG/DL — HIGH (ref 0.5–1.3)
CREAT SERPL-MCNC: 2.81 MG/DL — HIGH (ref 0.5–1.3)
CREAT SERPL-MCNC: 2.81 MG/DL — HIGH (ref 0.5–1.3)
CULTURE RESULTS: SIGNIFICANT CHANGE UP
CULTURE RESULTS: SIGNIFICANT CHANGE UP
DIFF PNL FLD: ABNORMAL
DIFF PNL FLD: ABNORMAL
EGFR: 21 ML/MIN/1.73M2 — LOW
EGFR: 21 ML/MIN/1.73M2 — LOW
EGFR: 22 ML/MIN/1.73M2 — LOW
EOSINOPHIL # BLD AUTO: 0.01 K/UL — SIGNIFICANT CHANGE UP (ref 0–0.5)
EOSINOPHIL # BLD AUTO: 0.01 K/UL — SIGNIFICANT CHANGE UP (ref 0–0.5)
EOSINOPHIL # BLD AUTO: 0.02 K/UL — SIGNIFICANT CHANGE UP (ref 0–0.5)
EOSINOPHIL # BLD AUTO: 0.02 K/UL — SIGNIFICANT CHANGE UP (ref 0–0.5)
EOSINOPHIL NFR BLD AUTO: 0 % — SIGNIFICANT CHANGE UP (ref 0–6)
EOSINOPHIL NFR BLD AUTO: 0 % — SIGNIFICANT CHANGE UP (ref 0–6)
EOSINOPHIL NFR BLD AUTO: 0.1 % — SIGNIFICANT CHANGE UP (ref 0–6)
EOSINOPHIL NFR BLD AUTO: 0.1 % — SIGNIFICANT CHANGE UP (ref 0–6)
ESTIMATED AVERAGE GLUCOSE: 189 MG/DL — HIGH (ref 68–114)
ESTIMATED AVERAGE GLUCOSE: 189 MG/DL — HIGH (ref 68–114)
GAS PNL BLDA: SIGNIFICANT CHANGE UP
GAS PNL BLDV: 137 MMOL/L — SIGNIFICANT CHANGE UP (ref 136–145)
GAS PNL BLDV: 137 MMOL/L — SIGNIFICANT CHANGE UP (ref 136–145)
GAS PNL BLDV: SIGNIFICANT CHANGE UP
GLUCOSE BLDC GLUCOMTR-MCNC: 101 MG/DL — HIGH (ref 70–99)
GLUCOSE BLDC GLUCOMTR-MCNC: 101 MG/DL — HIGH (ref 70–99)
GLUCOSE BLDC GLUCOMTR-MCNC: 113 MG/DL — HIGH (ref 70–99)
GLUCOSE BLDC GLUCOMTR-MCNC: 113 MG/DL — HIGH (ref 70–99)
GLUCOSE BLDC GLUCOMTR-MCNC: 127 MG/DL — HIGH (ref 70–99)
GLUCOSE BLDC GLUCOMTR-MCNC: 127 MG/DL — HIGH (ref 70–99)
GLUCOSE BLDC GLUCOMTR-MCNC: 146 MG/DL — HIGH (ref 70–99)
GLUCOSE BLDC GLUCOMTR-MCNC: 146 MG/DL — HIGH (ref 70–99)
GLUCOSE BLDV-MCNC: 127 MG/DL — HIGH (ref 70–99)
GLUCOSE BLDV-MCNC: 127 MG/DL — HIGH (ref 70–99)
GLUCOSE SERPL-MCNC: 120 MG/DL — HIGH (ref 70–99)
GLUCOSE SERPL-MCNC: 120 MG/DL — HIGH (ref 70–99)
GLUCOSE SERPL-MCNC: 129 MG/DL — HIGH (ref 70–99)
GLUCOSE SERPL-MCNC: 129 MG/DL — HIGH (ref 70–99)
GLUCOSE SERPL-MCNC: 135 MG/DL — HIGH (ref 70–99)
GLUCOSE SERPL-MCNC: 135 MG/DL — HIGH (ref 70–99)
GLUCOSE UR QL: 500 MG/DL
GLUCOSE UR QL: 500 MG/DL
HCO3 BLDA-SCNC: 22 MMOL/L — SIGNIFICANT CHANGE UP (ref 21–28)
HCO3 BLDA-SCNC: 22 MMOL/L — SIGNIFICANT CHANGE UP (ref 21–28)
HCO3 BLDV-SCNC: 25 MMOL/L — SIGNIFICANT CHANGE UP (ref 22–29)
HCO3 BLDV-SCNC: 25 MMOL/L — SIGNIFICANT CHANGE UP (ref 22–29)
HCT VFR BLD CALC: 30.8 % — LOW (ref 39–50)
HCT VFR BLD CALC: 30.8 % — LOW (ref 39–50)
HCT VFR BLD CALC: 31.3 % — LOW (ref 39–50)
HCT VFR BLD CALC: 31.3 % — LOW (ref 39–50)
HCT VFR BLD CALC: 37.3 % — LOW (ref 39–50)
HCT VFR BLD CALC: 37.3 % — LOW (ref 39–50)
HCT VFR BLDA CALC: 34 % — SIGNIFICANT CHANGE UP
HCT VFR BLDA CALC: 34 % — SIGNIFICANT CHANGE UP
HGB BLD CALC-MCNC: 11.2 G/DL — LOW (ref 12.6–17.4)
HGB BLD CALC-MCNC: 11.2 G/DL — LOW (ref 12.6–17.4)
HGB BLD-MCNC: 10.1 G/DL — LOW (ref 13–17)
HGB BLD-MCNC: 10.1 G/DL — LOW (ref 13–17)
HGB BLD-MCNC: 12.2 G/DL — LOW (ref 13–17)
HGB BLD-MCNC: 12.2 G/DL — LOW (ref 13–17)
HGB BLD-MCNC: 9.9 G/DL — LOW (ref 13–17)
HGB BLD-MCNC: 9.9 G/DL — LOW (ref 13–17)
IMM GRANULOCYTES NFR BLD AUTO: 1.5 % — HIGH (ref 0–0.9)
IMM GRANULOCYTES NFR BLD AUTO: 1.5 % — HIGH (ref 0–0.9)
IMM GRANULOCYTES NFR BLD AUTO: 1.7 % — HIGH (ref 0–0.9)
IMM GRANULOCYTES NFR BLD AUTO: 1.7 % — HIGH (ref 0–0.9)
KETONES UR-MCNC: NEGATIVE MG/DL — SIGNIFICANT CHANGE UP
KETONES UR-MCNC: NEGATIVE MG/DL — SIGNIFICANT CHANGE UP
LACTATE BLDV-MCNC: 1.6 MMOL/L — SIGNIFICANT CHANGE UP (ref 0.5–2)
LACTATE BLDV-MCNC: 1.6 MMOL/L — SIGNIFICANT CHANGE UP (ref 0.5–2)
LACTATE SERPL-SCNC: 1.7 MMOL/L — SIGNIFICANT CHANGE UP (ref 0.5–2)
LACTATE SERPL-SCNC: 1.7 MMOL/L — SIGNIFICANT CHANGE UP (ref 0.5–2)
LACTATE SERPL-SCNC: 3.3 MMOL/L — HIGH (ref 0.5–2)
LACTATE SERPL-SCNC: 3.3 MMOL/L — HIGH (ref 0.5–2)
LEUKOCYTE ESTERASE UR-ACNC: NEGATIVE — SIGNIFICANT CHANGE UP
LEUKOCYTE ESTERASE UR-ACNC: NEGATIVE — SIGNIFICANT CHANGE UP
LYMPHOCYTES # BLD AUTO: 1.32 K/UL — SIGNIFICANT CHANGE UP (ref 1–3.3)
LYMPHOCYTES # BLD AUTO: 1.32 K/UL — SIGNIFICANT CHANGE UP (ref 1–3.3)
LYMPHOCYTES # BLD AUTO: 1.34 K/UL — SIGNIFICANT CHANGE UP (ref 1–3.3)
LYMPHOCYTES # BLD AUTO: 1.34 K/UL — SIGNIFICANT CHANGE UP (ref 1–3.3)
LYMPHOCYTES # BLD AUTO: 5 % — LOW (ref 13–44)
LYMPHOCYTES # BLD AUTO: 5 % — LOW (ref 13–44)
LYMPHOCYTES # BLD AUTO: 5.8 % — LOW (ref 13–44)
LYMPHOCYTES # BLD AUTO: 5.8 % — LOW (ref 13–44)
MAGNESIUM SERPL-MCNC: 1.2 MG/DL — LOW (ref 1.6–2.6)
MAGNESIUM SERPL-MCNC: 1.2 MG/DL — LOW (ref 1.6–2.6)
MAGNESIUM SERPL-MCNC: 1.7 MG/DL — SIGNIFICANT CHANGE UP (ref 1.6–2.6)
MAGNESIUM SERPL-MCNC: 1.7 MG/DL — SIGNIFICANT CHANGE UP (ref 1.6–2.6)
MCHC RBC-ENTMCNC: 30.4 PG — SIGNIFICANT CHANGE UP (ref 27–34)
MCHC RBC-ENTMCNC: 30.4 PG — SIGNIFICANT CHANGE UP (ref 27–34)
MCHC RBC-ENTMCNC: 31 PG — SIGNIFICANT CHANGE UP (ref 27–34)
MCHC RBC-ENTMCNC: 31 PG — SIGNIFICANT CHANGE UP (ref 27–34)
MCHC RBC-ENTMCNC: 31.2 PG — SIGNIFICANT CHANGE UP (ref 27–34)
MCHC RBC-ENTMCNC: 31.2 PG — SIGNIFICANT CHANGE UP (ref 27–34)
MCHC RBC-ENTMCNC: 31.6 GM/DL — LOW (ref 32–36)
MCHC RBC-ENTMCNC: 31.6 GM/DL — LOW (ref 32–36)
MCHC RBC-ENTMCNC: 32.7 GM/DL — SIGNIFICANT CHANGE UP (ref 32–36)
MCHC RBC-ENTMCNC: 32.7 GM/DL — SIGNIFICANT CHANGE UP (ref 32–36)
MCHC RBC-ENTMCNC: 32.8 GM/DL — SIGNIFICANT CHANGE UP (ref 32–36)
MCHC RBC-ENTMCNC: 32.8 GM/DL — SIGNIFICANT CHANGE UP (ref 32–36)
MCV RBC AUTO: 94.7 FL — SIGNIFICANT CHANGE UP (ref 80–100)
MCV RBC AUTO: 94.7 FL — SIGNIFICANT CHANGE UP (ref 80–100)
MCV RBC AUTO: 95.1 FL — SIGNIFICANT CHANGE UP (ref 80–100)
MCV RBC AUTO: 95.1 FL — SIGNIFICANT CHANGE UP (ref 80–100)
MCV RBC AUTO: 96 FL — SIGNIFICANT CHANGE UP (ref 80–100)
MCV RBC AUTO: 96 FL — SIGNIFICANT CHANGE UP (ref 80–100)
MONOCYTES # BLD AUTO: 0.23 K/UL — SIGNIFICANT CHANGE UP (ref 0–0.9)
MONOCYTES # BLD AUTO: 0.23 K/UL — SIGNIFICANT CHANGE UP (ref 0–0.9)
MONOCYTES # BLD AUTO: 1.74 K/UL — HIGH (ref 0–0.9)
MONOCYTES # BLD AUTO: 1.74 K/UL — HIGH (ref 0–0.9)
MONOCYTES NFR BLD AUTO: 1 % — LOW (ref 2–14)
MONOCYTES NFR BLD AUTO: 1 % — LOW (ref 2–14)
MONOCYTES NFR BLD AUTO: 6.6 % — SIGNIFICANT CHANGE UP (ref 2–14)
MONOCYTES NFR BLD AUTO: 6.6 % — SIGNIFICANT CHANGE UP (ref 2–14)
NEUTROPHILS # BLD AUTO: 21.28 K/UL — HIGH (ref 1.8–7.4)
NEUTROPHILS # BLD AUTO: 21.28 K/UL — HIGH (ref 1.8–7.4)
NEUTROPHILS # BLD AUTO: 22.97 K/UL — HIGH (ref 1.8–7.4)
NEUTROPHILS # BLD AUTO: 22.97 K/UL — HIGH (ref 1.8–7.4)
NEUTROPHILS NFR BLD AUTO: 86.5 % — HIGH (ref 43–77)
NEUTROPHILS NFR BLD AUTO: 86.5 % — HIGH (ref 43–77)
NEUTROPHILS NFR BLD AUTO: 91.4 % — HIGH (ref 43–77)
NEUTROPHILS NFR BLD AUTO: 91.4 % — HIGH (ref 43–77)
NITRITE UR-MCNC: NEGATIVE — SIGNIFICANT CHANGE UP
NITRITE UR-MCNC: NEGATIVE — SIGNIFICANT CHANGE UP
PCO2 BLDA: 42 MMHG — SIGNIFICANT CHANGE UP (ref 35–48)
PCO2 BLDA: 42 MMHG — SIGNIFICANT CHANGE UP (ref 35–48)
PCO2 BLDV: 55 MMHG — SIGNIFICANT CHANGE UP (ref 42–55)
PCO2 BLDV: 55 MMHG — SIGNIFICANT CHANGE UP (ref 42–55)
PH BLDA: 7.32 — LOW (ref 7.35–7.45)
PH BLDA: 7.32 — LOW (ref 7.35–7.45)
PH BLDV: 7.26 — LOW (ref 7.32–7.43)
PH BLDV: 7.26 — LOW (ref 7.32–7.43)
PH UR: 5.5 — SIGNIFICANT CHANGE UP (ref 5–8)
PH UR: 5.5 — SIGNIFICANT CHANGE UP (ref 5–8)
PHOSPHATE SERPL-MCNC: 2.9 MG/DL — SIGNIFICANT CHANGE UP (ref 2.4–4.7)
PHOSPHATE SERPL-MCNC: 2.9 MG/DL — SIGNIFICANT CHANGE UP (ref 2.4–4.7)
PHOSPHATE SERPL-MCNC: 3.1 MG/DL — SIGNIFICANT CHANGE UP (ref 2.4–4.7)
PHOSPHATE SERPL-MCNC: 3.1 MG/DL — SIGNIFICANT CHANGE UP (ref 2.4–4.7)
PLATELET # BLD AUTO: 202 K/UL — SIGNIFICANT CHANGE UP (ref 150–400)
PLATELET # BLD AUTO: 202 K/UL — SIGNIFICANT CHANGE UP (ref 150–400)
PLATELET # BLD AUTO: 217 K/UL — SIGNIFICANT CHANGE UP (ref 150–400)
PLATELET # BLD AUTO: 217 K/UL — SIGNIFICANT CHANGE UP (ref 150–400)
PLATELET # BLD AUTO: 221 K/UL — SIGNIFICANT CHANGE UP (ref 150–400)
PLATELET # BLD AUTO: 221 K/UL — SIGNIFICANT CHANGE UP (ref 150–400)
PO2 BLDA: 80 MMHG — LOW (ref 83–108)
PO2 BLDA: 80 MMHG — LOW (ref 83–108)
PO2 BLDV: 95 MMHG — HIGH (ref 25–45)
PO2 BLDV: 95 MMHG — HIGH (ref 25–45)
POTASSIUM BLDV-SCNC: 5 MMOL/L — SIGNIFICANT CHANGE UP (ref 3.5–5.1)
POTASSIUM BLDV-SCNC: 5 MMOL/L — SIGNIFICANT CHANGE UP (ref 3.5–5.1)
POTASSIUM SERPL-MCNC: 4.4 MMOL/L — SIGNIFICANT CHANGE UP (ref 3.5–5.3)
POTASSIUM SERPL-MCNC: 4.4 MMOL/L — SIGNIFICANT CHANGE UP (ref 3.5–5.3)
POTASSIUM SERPL-MCNC: 4.7 MMOL/L — SIGNIFICANT CHANGE UP (ref 3.5–5.3)
POTASSIUM SERPL-MCNC: 4.7 MMOL/L — SIGNIFICANT CHANGE UP (ref 3.5–5.3)
POTASSIUM SERPL-MCNC: 4.9 MMOL/L — SIGNIFICANT CHANGE UP (ref 3.5–5.3)
POTASSIUM SERPL-MCNC: 4.9 MMOL/L — SIGNIFICANT CHANGE UP (ref 3.5–5.3)
POTASSIUM SERPL-SCNC: 4.4 MMOL/L — SIGNIFICANT CHANGE UP (ref 3.5–5.3)
POTASSIUM SERPL-SCNC: 4.4 MMOL/L — SIGNIFICANT CHANGE UP (ref 3.5–5.3)
POTASSIUM SERPL-SCNC: 4.7 MMOL/L — SIGNIFICANT CHANGE UP (ref 3.5–5.3)
POTASSIUM SERPL-SCNC: 4.7 MMOL/L — SIGNIFICANT CHANGE UP (ref 3.5–5.3)
POTASSIUM SERPL-SCNC: 4.9 MMOL/L — SIGNIFICANT CHANGE UP (ref 3.5–5.3)
POTASSIUM SERPL-SCNC: 4.9 MMOL/L — SIGNIFICANT CHANGE UP (ref 3.5–5.3)
PROT SERPL-MCNC: 5.4 G/DL — LOW (ref 6.6–8.7)
PROT SERPL-MCNC: 5.4 G/DL — LOW (ref 6.6–8.7)
PROT SERPL-MCNC: 5.6 G/DL — LOW (ref 6.6–8.7)
PROT SERPL-MCNC: 5.6 G/DL — LOW (ref 6.6–8.7)
PROT SERPL-MCNC: 7 G/DL — SIGNIFICANT CHANGE UP (ref 6.6–8.7)
PROT SERPL-MCNC: 7 G/DL — SIGNIFICANT CHANGE UP (ref 6.6–8.7)
PROT UR-MCNC: 300 MG/DL
PROT UR-MCNC: 300 MG/DL
RAPID RVP RESULT: DETECTED
RAPID RVP RESULT: DETECTED
RBC # BLD: 3.24 M/UL — LOW (ref 4.2–5.8)
RBC # BLD: 3.24 M/UL — LOW (ref 4.2–5.8)
RBC # BLD: 3.26 M/UL — LOW (ref 4.2–5.8)
RBC # BLD: 3.26 M/UL — LOW (ref 4.2–5.8)
RBC # BLD: 3.94 M/UL — LOW (ref 4.2–5.8)
RBC # BLD: 3.94 M/UL — LOW (ref 4.2–5.8)
RBC # FLD: 13.3 % — SIGNIFICANT CHANGE UP (ref 10.3–14.5)
RBC # FLD: 13.3 % — SIGNIFICANT CHANGE UP (ref 10.3–14.5)
RBC # FLD: 13.4 % — SIGNIFICANT CHANGE UP (ref 10.3–14.5)
RBC CASTS # UR COMP ASSIST: 0 /HPF — SIGNIFICANT CHANGE UP (ref 0–4)
RBC CASTS # UR COMP ASSIST: 0 /HPF — SIGNIFICANT CHANGE UP (ref 0–4)
SAO2 % BLDA: 95.8 % — SIGNIFICANT CHANGE UP (ref 94–98)
SAO2 % BLDA: 95.8 % — SIGNIFICANT CHANGE UP (ref 94–98)
SAO2 % BLDV: 96.1 % — SIGNIFICANT CHANGE UP
SAO2 % BLDV: 96.1 % — SIGNIFICANT CHANGE UP
SARS-COV-2 RNA SPEC QL NAA+PROBE: DETECTED
SARS-COV-2 RNA SPEC QL NAA+PROBE: DETECTED
SODIUM SERPL-SCNC: 138 MMOL/L — SIGNIFICANT CHANGE UP (ref 135–145)
SODIUM SERPL-SCNC: 138 MMOL/L — SIGNIFICANT CHANGE UP (ref 135–145)
SODIUM SERPL-SCNC: 141 MMOL/L — SIGNIFICANT CHANGE UP (ref 135–145)
SODIUM SERPL-SCNC: 141 MMOL/L — SIGNIFICANT CHANGE UP (ref 135–145)
SODIUM SERPL-SCNC: 142 MMOL/L — SIGNIFICANT CHANGE UP (ref 135–145)
SODIUM SERPL-SCNC: 142 MMOL/L — SIGNIFICANT CHANGE UP (ref 135–145)
SODIUM UR-SCNC: 45 MMOL/L — SIGNIFICANT CHANGE UP
SODIUM UR-SCNC: 45 MMOL/L — SIGNIFICANT CHANGE UP
SP GR SPEC: 1.02 — SIGNIFICANT CHANGE UP (ref 1–1.03)
SP GR SPEC: 1.02 — SIGNIFICANT CHANGE UP (ref 1–1.03)
SPECIMEN SOURCE: SIGNIFICANT CHANGE UP
SPECIMEN SOURCE: SIGNIFICANT CHANGE UP
SQUAMOUS # UR AUTO: 4 /HPF — SIGNIFICANT CHANGE UP (ref 0–5)
SQUAMOUS # UR AUTO: 4 /HPF — SIGNIFICANT CHANGE UP (ref 0–5)
UROBILINOGEN FLD QL: 1 MG/DL — SIGNIFICANT CHANGE UP (ref 0.2–1)
UROBILINOGEN FLD QL: 1 MG/DL — SIGNIFICANT CHANGE UP (ref 0.2–1)
WBC # BLD: 23.22 K/UL — HIGH (ref 3.8–10.5)
WBC # BLD: 23.22 K/UL — HIGH (ref 3.8–10.5)
WBC # BLD: 23.26 K/UL — HIGH (ref 3.8–10.5)
WBC # BLD: 23.26 K/UL — HIGH (ref 3.8–10.5)
WBC # BLD: 26.54 K/UL — HIGH (ref 3.8–10.5)
WBC # BLD: 26.54 K/UL — HIGH (ref 3.8–10.5)
WBC # FLD AUTO: 23.22 K/UL — HIGH (ref 3.8–10.5)
WBC # FLD AUTO: 23.22 K/UL — HIGH (ref 3.8–10.5)
WBC # FLD AUTO: 23.26 K/UL — HIGH (ref 3.8–10.5)
WBC # FLD AUTO: 23.26 K/UL — HIGH (ref 3.8–10.5)
WBC # FLD AUTO: 26.54 K/UL — HIGH (ref 3.8–10.5)
WBC # FLD AUTO: 26.54 K/UL — HIGH (ref 3.8–10.5)
WBC UR QL: 1 /HPF — SIGNIFICANT CHANGE UP (ref 0–5)
WBC UR QL: 1 /HPF — SIGNIFICANT CHANGE UP (ref 0–5)

## 2023-12-22 PROCEDURE — 93970 EXTREMITY STUDY: CPT | Mod: 26

## 2023-12-22 PROCEDURE — 99223 1ST HOSP IP/OBS HIGH 75: CPT | Mod: GC

## 2023-12-22 PROCEDURE — 93010 ELECTROCARDIOGRAM REPORT: CPT

## 2023-12-22 PROCEDURE — 71045 X-RAY EXAM CHEST 1 VIEW: CPT | Mod: 26

## 2023-12-22 PROCEDURE — 99233 SBSQ HOSP IP/OBS HIGH 50: CPT

## 2023-12-22 PROCEDURE — 93306 TTE W/DOPPLER COMPLETE: CPT | Mod: 26

## 2023-12-22 PROCEDURE — 47490 INCISION OF GALLBLADDER: CPT

## 2023-12-22 RX ORDER — MAGNESIUM SULFATE 500 MG/ML
4 VIAL (ML) INJECTION ONCE
Refills: 0 | Status: COMPLETED | OUTPATIENT
Start: 2023-12-22 | End: 2023-12-22

## 2023-12-22 RX ORDER — REMDESIVIR 5 MG/ML
INJECTION INTRAVENOUS
Refills: 0 | Status: DISCONTINUED | OUTPATIENT
Start: 2023-12-22 | End: 2023-12-23

## 2023-12-22 RX ORDER — DEXAMETHASONE 0.5 MG/5ML
6 ELIXIR ORAL EVERY 24 HOURS
Refills: 0 | Status: COMPLETED | OUTPATIENT
Start: 2023-12-22 | End: 2023-12-26

## 2023-12-22 RX ORDER — ACETAMINOPHEN 500 MG
1000 TABLET ORAL ONCE
Refills: 0 | Status: DISCONTINUED | OUTPATIENT
Start: 2023-12-22 | End: 2024-01-04

## 2023-12-22 RX ORDER — ACETAMINOPHEN 500 MG
1000 TABLET ORAL ONCE
Refills: 0 | Status: COMPLETED | OUTPATIENT
Start: 2023-12-22 | End: 2023-12-22

## 2023-12-22 RX ORDER — MAGNESIUM SULFATE 500 MG/ML
2 VIAL (ML) INJECTION ONCE
Refills: 0 | Status: COMPLETED | OUTPATIENT
Start: 2023-12-22 | End: 2023-12-22

## 2023-12-22 RX ORDER — SODIUM CHLORIDE 9 MG/ML
1000 INJECTION, SOLUTION INTRAVENOUS ONCE
Refills: 0 | Status: COMPLETED | OUTPATIENT
Start: 2023-12-22 | End: 2023-12-22

## 2023-12-22 RX ORDER — HEPARIN SODIUM 5000 [USP'U]/ML
5000 INJECTION INTRAVENOUS; SUBCUTANEOUS EVERY 8 HOURS
Refills: 0 | Status: DISCONTINUED | OUTPATIENT
Start: 2023-12-22 | End: 2023-12-23

## 2023-12-22 RX ORDER — CHLORHEXIDINE GLUCONATE 213 G/1000ML
1 SOLUTION TOPICAL
Refills: 0 | Status: DISCONTINUED | OUTPATIENT
Start: 2023-12-22 | End: 2024-01-05

## 2023-12-22 RX ADMIN — PIPERACILLIN AND TAZOBACTAM 25 GRAM(S): 4; .5 INJECTION, POWDER, LYOPHILIZED, FOR SOLUTION INTRAVENOUS at 21:31

## 2023-12-22 RX ADMIN — PIPERACILLIN AND TAZOBACTAM 25 GRAM(S): 4; .5 INJECTION, POWDER, LYOPHILIZED, FOR SOLUTION INTRAVENOUS at 03:36

## 2023-12-22 RX ADMIN — MIDODRINE HYDROCHLORIDE 5 MILLIGRAM(S): 2.5 TABLET ORAL at 06:23

## 2023-12-22 RX ADMIN — SODIUM CHLORIDE 2000 MILLILITER(S): 9 INJECTION, SOLUTION INTRAVENOUS at 03:08

## 2023-12-22 RX ADMIN — Medication 25 GRAM(S): at 09:21

## 2023-12-22 RX ADMIN — CHLORHEXIDINE GLUCONATE 1 APPLICATION(S): 213 SOLUTION TOPICAL at 21:47

## 2023-12-22 RX ADMIN — PIPERACILLIN AND TAZOBACTAM 25 GRAM(S): 4; .5 INJECTION, POWDER, LYOPHILIZED, FOR SOLUTION INTRAVENOUS at 10:00

## 2023-12-22 RX ADMIN — Medication 400 MILLIGRAM(S): at 11:35

## 2023-12-22 RX ADMIN — SODIUM CHLORIDE 1000 MILLILITER(S): 9 INJECTION, SOLUTION INTRAVENOUS at 15:00

## 2023-12-22 RX ADMIN — PANTOPRAZOLE SODIUM 40 MILLIGRAM(S): 20 TABLET, DELAYED RELEASE ORAL at 06:23

## 2023-12-22 RX ADMIN — HEPARIN SODIUM 5000 UNIT(S): 5000 INJECTION INTRAVENOUS; SUBCUTANEOUS at 21:47

## 2023-12-22 RX ADMIN — Medication 6 MILLIGRAM(S): at 21:30

## 2023-12-22 RX ADMIN — Medication 125 MILLILITER(S): at 06:36

## 2023-12-22 NOTE — PROCEDURE NOTE - PROCEDURE FINDINGS AND DETAILS
emergent bedside choley tube placed   cx sent  do not remove.    outpatient tube check 6 weeks.  f/u with surgeon

## 2023-12-22 NOTE — CONSULT NOTE ADULT - ASSESSMENT
83-year-old male with acute cholecystitis, febrile, tachycardic, and hypoxic on MICU assessment.   patient meeting SIRS criteria likely secondary to acute cholecystitis. Now hypoxic with increased work of breathing.    #Acute Hypoxic Respiratory Failure  - Given new oxygen requirements, decision made to place patient on BiPAP.  Despite status patient is able to protect his airway at this time.  Improvement to 95 to 96% O2 sat at 80% FiO2 on BiPAP.  Will keep patient on BiPAP and reassess.    - Low threshold for intubation if hypoxia and/or mental status worsened to the point where patient can no longer protect his airway.  - Not hypercapnic on ABG, improved pH from 7.32 to 7.38 after BiPap  - CXR does not show interstitial opacities or consolidation to suggest pneumonia  - CT Chest non-con ordered as pt's creatinine is elevated at 2.8.  - U/S DVT r/o ordered to assess for PE risk    #Acute Cholecystitis  - Pt to receive ultrasound-guided drain placement by IR at bedside in MICU  - Febrile   83-year-old male with acute cholecystitis, febrile, tachycardic, and hypoxic on MICU assessment.  Patient meeting SIRS criteria likely secondary to acute cholecystitis. Now hypoxic with increased work of breathing.    #Acute Hypoxic Respiratory Failure  - Given new oxygen requirements, decision made to place patient on BiPAP.  Despite status patient is able to protect his airway at this time.  Improvement to 95 to 96% O2 sat at 80% FiO2 on BiPAP.  Will keep patient on BiPAP and reassess.    - Low threshold for intubation if hypoxia and/or mental status worsened to the point where patient can no longer protect his airway.  - Not hypercapnic on ABG, improved pH from 7.32 to 7.38 after BiPap  - CXR does not show interstitial opacities or consolidation to suggest pneumonia  - CT Chest non-con ordered as pt's creatinine is elevated at 2.8.  - U/S DVT r/o ordered to assess for PE risk    #Acute Cholecystitis  - Pt to receive ultrasound-guided drain placement by IR at bedside in MICU  - On Zosyn antibiotics  - Will require f/u with surgery team in six weeks.    #RON on CKD  - Will monitor lytes and BUN/Cr  - Will consult Nephrology pending trends

## 2023-12-22 NOTE — PROGRESS NOTE ADULT - SUBJECTIVE AND OBJECTIVE BOX
Patient seen and examined at bedside. Patient septic overnight, received multiple fluid boluses, blood cultures sent. Patient mentions some RUQ pain but otherwise no complains    Vitals:  Vital Signs Last 24 Hrs  T(C): 39.1 (22 Dec 2023 11:26), Max: 39.5 (21 Dec 2023 21:45)  T(F): 102.3 (22 Dec 2023 11:26), Max: 103.1 (21 Dec 2023 21:45)  HR: 126 (22 Dec 2023 12:23) (92 - 143)  BP: 142/98 (22 Dec 2023 11:26) (116/71 - 154/98)  BP(mean): --  RR: 24 (22 Dec 2023 11:26) (18 - 24)  SpO2: 94% (22 Dec 2023 12:23) (92% - 96%)    Parameters below as of 22 Dec 2023 11:26  Patient On (Oxygen Delivery Method): mask, nonrebreather        Labs:  12-22    138  |  103  |  33.0<H>  ----------------------------<  120<H>  4.9   |  21.0<L>  |  2.77<H>    Ca    8.5      22 Dec 2023 11:23  Phos  3.1     12-22  Mg     1.7     12-22    TPro  7.0  /  Alb  3.1<L>  /  TBili  1.6  /  DBili  x   /  AST  47<H>  /  ALT  29  /  AlkPhos  70  12-22                            12.2   23.26 )-----------( 202      ( 22 Dec 2023 11:23 )             37.3       Exam:  Gen: pt lying in bed, alert, in NAD  Resp: unlabored  CVS: RRR  Abd: soft, NT, ND  Ext: moving all extremities spontaneously, sensation intact, pulses 2+

## 2023-12-22 NOTE — CONSULT NOTE ADULT - SUBJECTIVE AND OBJECTIVE BOX
Patient is a 83y old  Male who presents with a chief complaint of Cholecystitis (22 Dec 2023 14:29)    83-year-old male with past medical history of hypertension, hyperlipidemia, stage III CKD BPH admitted to the hospital under the surgical service for cholecystitis.  Patient has been having chronic right upper quadrant abdominal pain with decreased p.o. intake and generalized weakness.  Initial plan for lap sky, given worsening leukocytosis and renal function plan changed to IR percutaneous cholecystectomy.  Earlier this afternoon rapid response was called for increased work of breathing hypoxia to 85% while on 3 L nasal cannula.  Patient does not have any oxygen requirements at home.  When assessed at bedside patient was awake and protecting his airway, however not responding to voice or answering questions.  Saturations improved to 95% on nonrebreather, temp of 102.3, tachycardic to the 130s to 140s.  EKG showing sinus tachycardia.  MICU consulted for increased work of breathing and hypoxia.      PAST MEDICAL & SURGICAL HISTORY:  Diabetes      Hypertension      Prostate disorder      Anxiety      High cholesterol      Ulcer      No significant past surgical history        Allergies    No Known Allergies    Intolerances      FAMILY HISTORY:  Family history of stomach cancer  Father    Family history of emphysema  Mother        Family history otherwise noncontributory.    Unable to get ROS due to acute illness    ALL OTHER REVIEW OF SYSTEMS EXCEPT PER HPI NEGATIVE.      Medications:  piperacillin/tazobactam IVPB.. 3.375 Gram(s) IV Intermittent every 8 hours    midodrine. 5 milliGRAM(s) Oral three times a day      acetaminophen   IVPB .. 1000 milliGRAM(s) IV Intermittent once  gabapentin 300 milliGRAM(s) Oral at bedtime  morphine  - Injectable 4 milliGRAM(s) IV Push every 4 hours PRN  morphine  - Injectable 2 milliGRAM(s) IV Push every 4 hours PRN  ondansetron Injectable 4 milliGRAM(s) IV Push every 6 hours PRN        pantoprazole    Tablet 40 milliGRAM(s) Oral before breakfast    tamsulosin 0.4 milliGRAM(s) Oral at bedtime    dextrose 50% Injectable 12.5 Gram(s) IV Push once  dextrose 50% Injectable 25 Gram(s) IV Push once  dextrose 50% Injectable 25 Gram(s) IV Push once  dextrose Oral Gel 15 Gram(s) Oral once PRN  glucagon  Injectable 1 milliGRAM(s) IntraMuscular once  insulin lispro (ADMELOG) corrective regimen sliding scale   SubCutaneous three times a day before meals  insulin lispro (ADMELOG) corrective regimen sliding scale   SubCutaneous at bedtime  simvastatin 20 milliGRAM(s) Oral at bedtime    dextrose 5%. 1000 milliLiter(s) IV Continuous <Continuous>  dextrose 5%. 1000 milliLiter(s) IV Continuous <Continuous>  lactated ringers Bolus 1000 milliLiter(s) IV Bolus once  lactated ringers. 1000 milliLiter(s) IV Continuous <Continuous>      chlorhexidine 2% Cloths 1 Application(s) Topical <User Schedule>          ICU Vital Signs Last 24 Hrs  T(C): 37.7 (22 Dec 2023 15:00), Max: 39.5 (21 Dec 2023 21:45)  T(F): 99.9 (22 Dec 2023 15:00), Max: 103.1 (21 Dec 2023 21:45)  HR: 91 (22 Dec 2023 15:00) (91 - 143)  BP: 102/53 (22 Dec 2023 15:00) (102/53 - 154/98)  BP(mean): 67 (22 Dec 2023 15:00) (67 - 79)  ABP: --  ABP(mean): --  RR: 18 (22 Dec 2023 15:00) (18 - 24)  SpO2: 97% (22 Dec 2023 15:00) (92% - 98%)    O2 Parameters below as of 22 Dec 2023 13:00  Patient On (Oxygen Delivery Method): BiPAP/CPAP    O2 Concentration (%): 60      Vital Signs Last 24 Hrs  T(C): 37.7 (22 Dec 2023 15:00), Max: 39.5 (21 Dec 2023 21:45)  T(F): 99.9 (22 Dec 2023 15:00), Max: 103.1 (21 Dec 2023 21:45)  HR: 91 (22 Dec 2023 15:00) (91 - 143)  BP: 102/53 (22 Dec 2023 15:00) (102/53 - 154/98)  BP(mean): 67 (22 Dec 2023 15:00) (67 - 79)  RR: 18 (22 Dec 2023 15:00) (18 - 24)  SpO2: 97% (22 Dec 2023 15:00) (92% - 98%)    Parameters below as of 22 Dec 2023 13:00  Patient On (Oxygen Delivery Method): BiPAP/CPAP    O2 Concentration (%): 60    ABG - ( 22 Dec 2023 13:43 )  pH, Arterial: 7.380 pH, Blood: x     /  pCO2: 39    /  pO2: 117   / HCO3: 23    / Base Excess: -2.0  /  SaO2: 98.3                I&O's Detail    22 Dec 2023 07:01  -  22 Dec 2023 15:18  --------------------------------------------------------  IN:  Total IN: 0 mL    OUT:    Intermittent Catheterization - Urethral (mL): 650 mL  Total OUT: 650 mL    Total NET: -650 mL            LABS:                        12.2   23.26 )-----------( 202      ( 22 Dec 2023 11:23 )             37.3     12-22    138  |  103  |  33.0<H>  ----------------------------<  120<H>  4.9   |  21.0<L>  |  2.77<H>    Ca    8.5      22 Dec 2023 11:23  Phos  3.1     12-22  Mg     1.7     12-22    TPro  7.0  /  Alb  3.1<L>  /  TBili  1.6  /  DBili  x   /  AST  47<H>  /  ALT  29  /  AlkPhos  70  12-22          CAPILLARY BLOOD GLUCOSE      POCT Blood Glucose.: 127 mg/dL (22 Dec 2023 11:16)    PT/INR - ( 21 Dec 2023 04:30 )   PT: 12.2 sec;   INR: 1.10 ratio         PTT - ( 21 Dec 2023 04:30 )  PTT:29.8 sec  Urinalysis Basic - ( 22 Dec 2023 11:23 )    Color: x / Appearance: x / SG: x / pH: x  Gluc: 120 mg/dL / Ketone: x  / Bili: x / Urobili: x   Blood: x / Protein: x / Nitrite: x   Leuk Esterase: x / RBC: x / WBC x   Sq Epi: x / Non Sq Epi: x / Bacteria: x      CULTURES:  Culture Results:   <10,000 CFU/mL Normal Urogenital Allyson (12-21 @ 04:45)  Culture Results:   No growth at 24 hours (12-21 @ 04:30)  Culture Results:   No growth at 24 hours (12-21 @ 04:20)      General: pale, tremulous, increased work of breathing  Head: NC, AT  EENT: EOMI, no scleral icterus  Cardiac: tachycardic but regular, no apparent murmurs, no lower extremity edema  Respiratory: CTABL, accessory muscle usage, tachypneic, protecting airway  Abdomen: soft, ND, NT, nonperitonitic  MSK/Vascular: full ROM, soft compartments, warm extremities  Neuro: awake but not answering questions, sensation to light touch intact

## 2023-12-22 NOTE — CONSULT NOTE ADULT - SUBJECTIVE AND OBJECTIVE BOX
83yoM w/ PMHx of HTN, HLD, DM, BPH, anxiety presents to the ED for epigastric pain. Found to have cholecystitis. Admitted under surgery for surgery in the am.     Upon presentation to the ED, patient HDS. Labs remarkable for wbc 26, hgb 10, BUN/Cr 35/2.81, A1c 8.2, LFTs wnl.  83yoM w/ PMHx of HTN, HLD, DM, BPH, ?CKD presents to the ED for epigastric pain. Found to have cholecystitis. Admitted under XGS for surgery in the am. However, primary team reporting that given worsening leukocytosis overnight, worsening renal function, opting for perc sky w/ IR. Patient seen and examined at bedside. Reports ongoing abd pain, decreased energy, decreased appetite. Patient denies any hx of MI. Currently denies any SOB, CP, no recent respiratory infections however wife just tested positive for covid yesterday, pt tested negative.     Upon presentation to the ED, patient HDS. Labs remarkable for wbc 26, hgb 10, BUN/Cr 35/2.81, A1c 8.2, LFTs wnl.     MEDICATIONS  (STANDING):  dextrose 5%. 1000 milliLiter(s) (100 mL/Hr) IV Continuous <Continuous>  dextrose 5%. 1000 milliLiter(s) (50 mL/Hr) IV Continuous <Continuous>  dextrose 50% Injectable 12.5 Gram(s) IV Push once  dextrose 50% Injectable 25 Gram(s) IV Push once  dextrose 50% Injectable 25 Gram(s) IV Push once  gabapentin 300 milliGRAM(s) Oral at bedtime  glucagon  Injectable 1 milliGRAM(s) IntraMuscular once  insulin lispro (ADMELOG) corrective regimen sliding scale   SubCutaneous three times a day before meals  insulin lispro (ADMELOG) corrective regimen sliding scale   SubCutaneous at bedtime  lactated ringers. 1000 milliLiter(s) (100 mL/Hr) IV Continuous <Continuous>  magnesium sulfate  IVPB 4 Gram(s) IV Intermittent once  midodrine. 5 milliGRAM(s) Oral three times a day  pantoprazole    Tablet 40 milliGRAM(s) Oral before breakfast  piperacillin/tazobactam IVPB.- 3.375 Gram(s) IV Intermittent once  piperacillin/tazobactam IVPB.. 3.375 Gram(s) IV Intermittent every 8 hours  simvastatin 20 milliGRAM(s) Oral at bedtime  tamsulosin 0.4 milliGRAM(s) Oral at bedtime    MEDICATIONS  (PRN):  acetaminophen     Tablet .. 650 milliGRAM(s) Oral every 6 hours PRN Mild Pain (1 - 3)  dextrose Oral Gel 15 Gram(s) Oral once PRN Blood Glucose LESS THAN 70 milliGRAM(s)/deciliter  morphine  - Injectable 4 milliGRAM(s) IV Push every 4 hours PRN Severe Pain (7 - 10)  morphine  - Injectable 2 milliGRAM(s) IV Push every 4 hours PRN Moderate Pain (4 - 6)  ondansetron Injectable 4 milliGRAM(s) IV Push every 6 hours PRN Nausea        I&O's Summary      PHYSICAL EXAM:  Vital Signs Last 24 Hrs  T(C): 37.2 (22 Dec 2023 08:26), Max: 39.5 (21 Dec 2023 21:45)  T(F): 99 (22 Dec 2023 08:26), Max: 103.1 (21 Dec 2023 21:45)  HR: 101 (22 Dec 2023 08:26) (92 - 130)  BP: 143/83 (22 Dec 2023 08:26) (116/71 - 154/98)  BP(mean): --  RR: 18 (22 Dec 2023 08:26) (18 - 22)  SpO2: 92% (22 Dec 2023 08:26) (92% - 96%)    Parameters below as of 22 Dec 2023 08:26  Patient On (Oxygen Delivery Method): room air          CONSTITUTIONAL: mild distress 2/2 pain  HEENT: Normocephalic, Atraumatic. Trachea midline.  RESPIRATORY: L sided crackles   CARDIOVASCULAR: Regular rate and rhythm, No lower extremity edema; Peripheral pulses are 2+ bilaterally  ABDOMEN: Soft, non-distended, nontender to palpation, +BS  MUSCLOSKELETAL: moving all 4 extremities spontaneously  PSYCH: thoughts linear  NEUROLOGY: Alert, Oriented x3    LABS:                        10.1   26.54 )-----------( 221      ( 22 Dec 2023 02:30 )             30.8     12-    142  |  107  |  35.1<H>  ----------------------------<  129<H>  4.7   |  24.0  |  2.81<H>    Ca    7.8<L>      22 Dec 2023 02:30  Phos  2.9     12-  Mg     1.2     12-    TPro  5.6<L>  /  Alb  2.6<L>  /  TBili  1.4  /  DBili  x   /  AST  37  /  ALT  23  /  AlkPhos  61  12    PT/INR - ( 21 Dec 2023 04:30 )   PT: 12.2 sec;   INR: 1.10 ratio         PTT - ( 21 Dec 2023 04:30 )  PTT:29.8 sec      Urinalysis Basic - ( 22 Dec 2023 02:30 )    Color: Yellow / Appearance: Clear / S.021 / pH: x  Gluc: 129 mg/dL / Ketone: Negative mg/dL  / Bili: Negative / Urobili: 1.0 mg/dL   Blood: x / Protein: 300 mg/dL / Nitrite: Negative   Leuk Esterase: Negative / RBC: 0 /HPF / WBC 1 /HPF   Sq Epi: x / Non Sq Epi: 4 /HPF / Bacteria: Negative /HPF        Culture - Urine (collected 21 Dec 2023 04:45)  Source: Clean Catch Clean Catch (Midstream)  Final Report (22 Dec 2023 08:51):    <10,000 CFU/mL Normal Urogenital Allyson    Culture - Blood (collected 21 Dec 2023 04:30)  Source: .Blood Blood-Peripheral  Preliminary Report (22 Dec 2023 09:01):    No growth at 24 hours    Culture - Blood (collected 21 Dec 2023 04:20)  Source: .Blood Blood-Peripheral  Preliminary Report (22 Dec 2023 09:02):    No growth at 24 hours      CAPILLARY BLOOD GLUCOSE      POCT Blood Glucose.: 113 mg/dL (22 Dec 2023 06:42)  POCT Blood Glucose.: 139 mg/dL (21 Dec 2023 23:43)  POCT Blood Glucose.: 166 mg/dL (21 Dec 2023 18:51)  POCT Blood Glucose.: 164 mg/dL (21 Dec 2023 17:42)        RADIOLOGY & ADDITIONAL TESTS:  CT abd   IMPRESSION:  Cholelithiasis and pericholecystic fat stranding suspicious for acute   cholecystitis. Hepatobiliary scan can be performed to better evaluate as   warranted.    RUQ U/S  IMPRESSION:  Borderline gallbladder wall thickening. No stones seen on the ultrasound   study. Recommend further evaluation with nuclear medicine hepatobiliary   imaging.    CXR  Impression:    No acute pulmonary disease.

## 2023-12-22 NOTE — CHART NOTE - NSCHARTNOTEFT_GEN_A_CORE
FeNa calculated at 0.6% consistent with pre-renal azotemia, IVC variability difficulty to assess at bedside.  Will continue fluid resuscitation, monitor urine output closely and hemodynamics.     Will follow up AM labs, awaiting medicine evaluation.

## 2023-12-22 NOTE — RAPID RESPONSE TEAM SUMMARY - NSADDTLFINDINGSRRT_GEN_ALL_CORE
On initial assessment, temp 102.3, tachycardic to 130s-140s, BP 140s/90s on manual, saturations 95%+ on nonrebreather. POC glucose 127. Patient septic appearing, full set of labs sent, ABG, and CXR ordered. Ordered for tylenol, EKG (snus tachycardia) and 1L bolus of LR given. Patient hypomagnesemic on early AM labs, had completed 4g of Mg from earlier, noted to have PVCs on EKG and ordered further magnesium. MICU contacted and accepted the patient, IR contacted and planning for bedside arie swanson in MICU for source control. UA from overnight reviewed, blood cultures from overnight pending results

## 2023-12-22 NOTE — PATIENT PROFILE ADULT - FALL HARM RISK - HARM RISK INTERVENTIONS
Assistance with ambulation/Assistance OOB with selected safe patient handling equipment/Communicate Risk of Fall with Harm to all staff/Reinforce activity limits and safety measures with patient and family/Tailored Fall Risk Interventions/Visual Cue: Yellow wristband and red socks/Bed in lowest position, wheels locked, appropriate side rails in place/Call bell, personal items and telephone in reach/Instruct patient to call for assistance before getting out of bed or chair/Non-slip footwear when patient is out of bed/[place] to call system/Physically safe environment - no spills, clutter or unnecessary equipment/Purposeful Proactive Rounding/Room/bathroom lighting operational, light cord in reach Assistance with ambulation/Assistance OOB with selected safe patient handling equipment/Communicate Risk of Fall with Harm to all staff/Reinforce activity limits and safety measures with patient and family/Tailored Fall Risk Interventions/Visual Cue: Yellow wristband and red socks/Bed in lowest position, wheels locked, appropriate side rails in place/Call bell, personal items and telephone in reach/Instruct patient to call for assistance before getting out of bed or chair/Non-slip footwear when patient is out of bed/[place] to call system/Physically safe environment - no spills, clutter or unnecessary equipment/Purposeful Proactive Rounding/Room/bathroom lighting operational, light cord in reach

## 2023-12-22 NOTE — PROGRESS NOTE ADULT - ASSESSMENT
Patient is a 83y old male with admitted with acute cholecystitis. Patient septic overnight, now planning for IR placement of perc sky tube.    PLAN:    -f/u IR for perc sky  -f/u medicine reccs  -spoke with PMD Dr. Vides [phone], patient with baseline CKD stage 3  -NPO/IVF  -continue antibiotics  -f/u blood cultures  -pain control  -strict Is/Os  -continue home meds  -trend labs, replete electrolytes as needed  -encourage OOB  -incentive spirometry  -DVT ppx: SCDs, HSQ Patient is a 83y old male with admitted with acute cholecystitis. Patient septic overnight, now planning for IR placement of perc sky tube.    PLAN:    -f/u IR for perc sky  -f/u medicine reccs  -spoke with PMD Dr. Vides [phone], patient with baseline CKD stage 3  -NPO/IVF  -continue antibiotics  -f/u blood cultures  -pain control  -strict Is/Os  -continue home meds  -trend labs, replete electrolytes as needed  -encourage OOB  -incentive spirometry  -DVT ppx: SCDs, HSQ

## 2023-12-22 NOTE — CHART NOTE - NSCHARTNOTEFT_GEN_A_CORE
Spoke with pharmacy on renally dosing zosyn due to worsening RON.  They advised that his creatinine clearance did not require renal dosing, will continue to hydrate, nephrology consult in AM to evaluate.  FeNA will be calculated to rule out pre-renal, intra or post-renal Spoke with pharmacy on renally dosing zosyn due to worsening RON.  They advised that his creatinine clearance did not require renal dosing, will continue to hydrate, nephrology consult in AM to evaluate.  FeNA will be calculated to rule out pre-renal, intra or post-renal.  Horta will be placed for strict I and O's, monitoring resuscitation. Spoke with pharmacy on renally dosing zosyn due to worsening RON.  They advised that his creatinine clearance did not require renal dosing, will continue to hydrate, nephrology consult in AM to evaluate.  FeNA will be calculated to rule out pre-renal, intra or post-renal azotemia.  Horta will be placed for strict I and O's, monitoring resuscitation.

## 2023-12-22 NOTE — CONSULT NOTE ADULT - ASSESSMENT
83yoM w/ PMHx of HTN, HLD, DM, BPH, anxiety presents to the ED for epigastric pain. Found to have cholecystitis. Admitted under surgery for surgery in the am.     #cholecystitis   - RCRI 2 points Class III risk 10.1% 30day risk of death, MI, or cardiac arrest  - ARISCAT 39 points intermediate risk 13.3% risk of in-hospital post op pulmonary complications   - Patient is moderate risk for moderate risk procedure     #DM  - A1c 9  - would c/w home glargine w/ sliding scale, change to NPO sliding scale when NPO for surgery  -  hold home hypoglycemic agents  - continue to monitor sugars      #RON  - unclear baseline but Cr worsening to 2.81  - Fena 0.6% pre-renal  - likely in the setting of poor po intake, abd pain  - s/p albumin 1 dose and LR 1L started at 100cc/hr  - recommend rechecking BMP after fluids   - recommend getting RUQ ultrasound, if possible obtain baseline Cr,  u/a with protein   - nephro consult  83yoM w/ PMHx of HTN, HLD, DM, BPH, anxiety presents to the ED for epigastric pain. Found to have cholecystitis. Admitted under surgery for surgery in the am.     #cholecystitis   - RCRI 2 points Class III risk 10.1% 30day risk of death, MI, or cardiac arrest  - ARISCAT 39 points intermediate risk 13.3% risk of in-hospital post op pulmonary complications   - Patient is moderate risk for moderate risk procedure   - recommend optimizing pt sugars (see below) and renal function (see below)  - monitor on telemetry after surgery   - pain control post op per primary team     #DM  - A1c 9  - would c/w home glargine w/ sliding scale, change to NPO sliding scale when NPO for surgery  - hold home hypoglycemic agents  - continue to monitor sugars      #RON  - unclear baseline but Cr worsening to 2.81  - Fena 0.6% pre-renal  - likely in the setting of poor po intake, abd pain  - s/p albumin 1 dose and LR 1L started at 100cc/hr  - recommend rechecking BMP after fluids   - recommend getting RUQ ultrasound, if possible obtain baseline Cr,  u/a with protein   - nephro consult     Medicine will continue to follow. Thanks you for the consult.  83yoM w/ PMHx of HTN, HLD, DM, BPH, anxiety presents to the ED for epigastric pain. Found to have cholecystitis. Admitted under surgery for surgery in the am.     #cholecystitis   - RCRI 2 points Class III risk 10.1% 30day risk of death, MI, or cardiac arrest  - ARISCAT 39 points intermediate risk 13.3% risk of in-hospital post op pulmonary complications   - Patient is moderate risk for moderate risk procedure   - recommend optimizing pt sugars (see below) and renal function (see below)  - monitor on telemetry after surgery   - pain control post op per primary team     #DM  - A1c 9  - would c/w home glargine w/ sliding scale, change to NPO sliding scale when NPO for surgery  - hold home hypoglycemic agents  - continue to monitor sugars      #RON  - unclear baseline but Cr worsening to 2.81  - Fena 0.6% pre-renal  - likely in the setting of poor po intake, abd pain  - s/p albumin 1 dose and LR 1L started at 100cc/hr  - recommend rechecking BMP after fluids   - recommend getting RUQ ultrasound, if possible obtain baseline Cr,  u/a with protein   - wills to monitor UOP    Medicine will continue to follow. Thanks you for the consult.  83yoM w/ PMHx of HTN, HLD, DM, BPH, anxiety presents to the ED for epigastric pain. Found to have cholecystitis. Admitted under surgery for surgery in the am.     #cholecystitis   - EKG showing LAFB, currently CP free, no SOB  - RCRI 2 points Class III risk 10.1% 30day risk of death, MI, or cardiac arrest  - ARISCAT 39 points intermediate risk 13.3% risk of in-hospital post op pulmonary complications   - Patient is moderate risk for moderate risk procedure   - recommend optimizing pt sugars (see below) and renal function (see below)  - monitor on telemetry after surgery   - pain control post op per primary team   - antibiotics per primary team     #DM  - A1c 9  - would c/w home glargine w/ sliding scale, change to NPO sliding scale when NPO for surgery  - hold home hypoglycemic agents  - continue to monitor sugars      #RON v. CKD  - unclear baseline but Cr 2.81, if possible obtain baseline Cr,  u/a with protein so likely some element of CKD from DM   - Fena 0.6% pre-renal  - likely in the setting of poor po intake, abd pain  - s/p albumin 1 dose and LR 1L started at 100cc/hr  - recommend rechecking BMP after fluids   - recommend getting RUQ ultrasound  - wills to monitor UOP    Medicine will continue to follow. Thanks you for the consult.  83yoM w/ PMHx of HTN, HLD, DM, BPH, anxiety presents to the ED for epigastric pain. Found to have cholecystitis. Admitted under surgery for surgery in the am.     #cholecystitis   - EKG showing LAFB, currently CP free, no SOB  - RCRI 2 points Class III risk 10.1% 30day risk of death, MI, or cardiac arrest  - ARISCAT 39 points intermediate risk 13.3% risk of in-hospital post op pulmonary complications   - Patient is moderate risk for moderate risk procedure   - recommend optimizing pt sugars (see below) and renal function (see below)  - monitor on telemetry after surgery/IR  - pain control per primary team   - antibiotics per primary team      #DM  - A1c 9  - would start home glargine (can be half dose while NPO) w/ sliding scale, change to NPO sliding scale when NPO for surgery  - hold home hypoglycemic agents  - continue to monitor sugars    - can c/w gabapentin for neuropathy     #RON v. CKD  - unclear baseline; per wife, pt was supposed to f/u with nephro but has not been able to out outpt nephro; Cr during admission 2.81, if possible please obtain baseline Cr,  u/a with protein so likely some element of CKD from DM   - Fena 0.6% pre-renal  - likely in the setting of poor po intake, abd pain  - s/p albumin 1 dose and LR 1L; would hold off on fluids given L sided crackles on exam   - recommend rechecking BMP after fluids   - recommend getting RUQ ultrasound  - wills to monitor UOP    #HLD  - can c/w statin    #?hypotension  - pt on midodrine bid, would clarify with PCP regarding use  - c/w midodrine bid for now w/ holding parameters    #GERD  - c/w ppi 40mg qd    #BPH  - c/w tamsulosin     Medicine will continue to follow. Thanks you for the consult.

## 2023-12-22 NOTE — PATIENT PROFILE ADULT - FUNCTIONAL ASSESSMENT - BASIC MOBILITY 6.
4-calculated by average/Not able to assess (calculate score using [facility] averaging method)  4-calculated by average/Not able to assess (calculate score using [facility] averaging method)

## 2023-12-23 DIAGNOSIS — I48.91 UNSPECIFIED ATRIAL FIBRILLATION: ICD-10-CM

## 2023-12-23 DIAGNOSIS — K81.0 ACUTE CHOLECYSTITIS: ICD-10-CM

## 2023-12-23 LAB
ALBUMIN SERPL ELPH-MCNC: 2.7 G/DL — LOW (ref 3.3–5.2)
ALBUMIN SERPL ELPH-MCNC: 2.7 G/DL — LOW (ref 3.3–5.2)
ALBUMIN SERPL ELPH-MCNC: 2.9 G/DL — LOW (ref 3.3–5.2)
ALBUMIN SERPL ELPH-MCNC: 2.9 G/DL — LOW (ref 3.3–5.2)
ALP SERPL-CCNC: 53 U/L — SIGNIFICANT CHANGE UP (ref 40–120)
ALP SERPL-CCNC: 53 U/L — SIGNIFICANT CHANGE UP (ref 40–120)
ALP SERPL-CCNC: 59 U/L — SIGNIFICANT CHANGE UP (ref 40–120)
ALP SERPL-CCNC: 59 U/L — SIGNIFICANT CHANGE UP (ref 40–120)
ALT FLD-CCNC: 24 U/L — SIGNIFICANT CHANGE UP
ALT FLD-CCNC: 24 U/L — SIGNIFICANT CHANGE UP
ALT FLD-CCNC: 27 U/L — SIGNIFICANT CHANGE UP
ALT FLD-CCNC: 27 U/L — SIGNIFICANT CHANGE UP
ANION GAP SERPL CALC-SCNC: 11 MMOL/L — SIGNIFICANT CHANGE UP (ref 5–17)
ANION GAP SERPL CALC-SCNC: 11 MMOL/L — SIGNIFICANT CHANGE UP (ref 5–17)
ANION GAP SERPL CALC-SCNC: 16 MMOL/L — SIGNIFICANT CHANGE UP (ref 5–17)
ANION GAP SERPL CALC-SCNC: 16 MMOL/L — SIGNIFICANT CHANGE UP (ref 5–17)
APTT BLD: 33.1 SEC — SIGNIFICANT CHANGE UP (ref 24.5–35.6)
APTT BLD: 33.1 SEC — SIGNIFICANT CHANGE UP (ref 24.5–35.6)
AST SERPL-CCNC: 38 U/L — SIGNIFICANT CHANGE UP
AST SERPL-CCNC: 38 U/L — SIGNIFICANT CHANGE UP
AST SERPL-CCNC: 39 U/L — SIGNIFICANT CHANGE UP
AST SERPL-CCNC: 39 U/L — SIGNIFICANT CHANGE UP
BASOPHILS # BLD AUTO: 0.03 K/UL — SIGNIFICANT CHANGE UP (ref 0–0.2)
BASOPHILS # BLD AUTO: 0.03 K/UL — SIGNIFICANT CHANGE UP (ref 0–0.2)
BASOPHILS NFR BLD AUTO: 0 % — SIGNIFICANT CHANGE UP (ref 0–2)
BASOPHILS NFR BLD AUTO: 0 % — SIGNIFICANT CHANGE UP (ref 0–2)
BILIRUB DIRECT SERPL-MCNC: 0.4 MG/DL — HIGH (ref 0–0.3)
BILIRUB DIRECT SERPL-MCNC: 0.4 MG/DL — HIGH (ref 0–0.3)
BILIRUB INDIRECT FLD-MCNC: 0.5 MG/DL — SIGNIFICANT CHANGE UP (ref 0.2–1)
BILIRUB INDIRECT FLD-MCNC: 0.5 MG/DL — SIGNIFICANT CHANGE UP (ref 0.2–1)
BILIRUB SERPL-MCNC: 0.6 MG/DL — SIGNIFICANT CHANGE UP (ref 0.4–2)
BILIRUB SERPL-MCNC: 0.6 MG/DL — SIGNIFICANT CHANGE UP (ref 0.4–2)
BILIRUB SERPL-MCNC: 0.9 MG/DL — SIGNIFICANT CHANGE UP (ref 0.4–2)
BILIRUB SERPL-MCNC: 0.9 MG/DL — SIGNIFICANT CHANGE UP (ref 0.4–2)
BUN SERPL-MCNC: 33.3 MG/DL — HIGH (ref 8–20)
BUN SERPL-MCNC: 33.3 MG/DL — HIGH (ref 8–20)
BUN SERPL-MCNC: 39.8 MG/DL — HIGH (ref 8–20)
BUN SERPL-MCNC: 39.8 MG/DL — HIGH (ref 8–20)
CALCIUM SERPL-MCNC: 8.3 MG/DL — LOW (ref 8.4–10.5)
CALCIUM SERPL-MCNC: 8.3 MG/DL — LOW (ref 8.4–10.5)
CALCIUM SERPL-MCNC: 8.7 MG/DL — SIGNIFICANT CHANGE UP (ref 8.4–10.5)
CALCIUM SERPL-MCNC: 8.7 MG/DL — SIGNIFICANT CHANGE UP (ref 8.4–10.5)
CHLORIDE SERPL-SCNC: 102 MMOL/L — SIGNIFICANT CHANGE UP (ref 96–108)
CHLORIDE SERPL-SCNC: 102 MMOL/L — SIGNIFICANT CHANGE UP (ref 96–108)
CHLORIDE SERPL-SCNC: 106 MMOL/L — SIGNIFICANT CHANGE UP (ref 96–108)
CHLORIDE SERPL-SCNC: 106 MMOL/L — SIGNIFICANT CHANGE UP (ref 96–108)
CO2 SERPL-SCNC: 20 MMOL/L — LOW (ref 22–29)
CO2 SERPL-SCNC: 20 MMOL/L — LOW (ref 22–29)
CO2 SERPL-SCNC: 23 MMOL/L — SIGNIFICANT CHANGE UP (ref 22–29)
CO2 SERPL-SCNC: 23 MMOL/L — SIGNIFICANT CHANGE UP (ref 22–29)
CREAT SERPL-MCNC: 2.91 MG/DL — HIGH (ref 0.5–1.3)
CRP SERPL-MCNC: 362 MG/L — HIGH
CRP SERPL-MCNC: 362 MG/L — HIGH
CRP SERPL-MCNC: 386 MG/L — HIGH
CRP SERPL-MCNC: 386 MG/L — HIGH
D DIMER BLD IA.RAPID-MCNC: 1041 NG/ML DDU — HIGH
D DIMER BLD IA.RAPID-MCNC: 1041 NG/ML DDU — HIGH
EGFR: 21 ML/MIN/1.73M2 — LOW
EOSINOPHIL # BLD AUTO: 0.01 K/UL — SIGNIFICANT CHANGE UP (ref 0–0.5)
EOSINOPHIL # BLD AUTO: 0.01 K/UL — SIGNIFICANT CHANGE UP (ref 0–0.5)
EOSINOPHIL NFR BLD AUTO: 0 % — SIGNIFICANT CHANGE UP (ref 0–6)
EOSINOPHIL NFR BLD AUTO: 0 % — SIGNIFICANT CHANGE UP (ref 0–6)
GAS PNL BLDA: SIGNIFICANT CHANGE UP
GAS PNL BLDA: SIGNIFICANT CHANGE UP
GLUCOSE BLDC GLUCOMTR-MCNC: 158 MG/DL — HIGH (ref 70–99)
GLUCOSE BLDC GLUCOMTR-MCNC: 158 MG/DL — HIGH (ref 70–99)
GLUCOSE BLDC GLUCOMTR-MCNC: 168 MG/DL — HIGH (ref 70–99)
GLUCOSE BLDC GLUCOMTR-MCNC: 168 MG/DL — HIGH (ref 70–99)
GLUCOSE BLDC GLUCOMTR-MCNC: 171 MG/DL — HIGH (ref 70–99)
GLUCOSE BLDC GLUCOMTR-MCNC: 171 MG/DL — HIGH (ref 70–99)
GLUCOSE BLDC GLUCOMTR-MCNC: 199 MG/DL — HIGH (ref 70–99)
GLUCOSE BLDC GLUCOMTR-MCNC: 199 MG/DL — HIGH (ref 70–99)
GLUCOSE SERPL-MCNC: 172 MG/DL — HIGH (ref 70–99)
GLUCOSE SERPL-MCNC: 172 MG/DL — HIGH (ref 70–99)
GLUCOSE SERPL-MCNC: 189 MG/DL — HIGH (ref 70–99)
GLUCOSE SERPL-MCNC: 189 MG/DL — HIGH (ref 70–99)
GRAM STN FLD: ABNORMAL
GRAM STN FLD: ABNORMAL
HCT VFR BLD CALC: 28.2 % — LOW (ref 39–50)
HCT VFR BLD CALC: 28.2 % — LOW (ref 39–50)
HCT VFR BLD CALC: 32.2 % — LOW (ref 39–50)
HCT VFR BLD CALC: 32.2 % — LOW (ref 39–50)
HCT VFR BLD CALC: 32.8 % — LOW (ref 39–50)
HCT VFR BLD CALC: 32.8 % — LOW (ref 39–50)
HGB BLD-MCNC: 10.8 G/DL — LOW (ref 13–17)
HGB BLD-MCNC: 10.8 G/DL — LOW (ref 13–17)
HGB BLD-MCNC: 9 G/DL — LOW (ref 13–17)
HGB BLD-MCNC: 9 G/DL — LOW (ref 13–17)
HGB BLD-MCNC: 9.9 G/DL — LOW (ref 13–17)
HGB BLD-MCNC: 9.9 G/DL — LOW (ref 13–17)
LACTATE SERPL-SCNC: 2 MMOL/L — SIGNIFICANT CHANGE UP (ref 0.5–2)
LACTATE SERPL-SCNC: 2 MMOL/L — SIGNIFICANT CHANGE UP (ref 0.5–2)
LYMPHOCYTES # BLD AUTO: 0.36 K/UL — LOW (ref 1–3.3)
LYMPHOCYTES # BLD AUTO: 0.36 K/UL — LOW (ref 1–3.3)
LYMPHOCYTES # BLD AUTO: 1.7 % — LOW (ref 13–44)
LYMPHOCYTES # BLD AUTO: 1.7 % — LOW (ref 13–44)
MAGNESIUM SERPL-MCNC: 2.3 MG/DL — SIGNIFICANT CHANGE UP (ref 1.8–2.6)
MAGNESIUM SERPL-MCNC: 2.3 MG/DL — SIGNIFICANT CHANGE UP (ref 1.8–2.6)
MAGNESIUM SERPL-MCNC: 2.4 MG/DL — SIGNIFICANT CHANGE UP (ref 1.6–2.6)
MAGNESIUM SERPL-MCNC: 2.4 MG/DL — SIGNIFICANT CHANGE UP (ref 1.6–2.6)
MCHC RBC-ENTMCNC: 29.1 PG — SIGNIFICANT CHANGE UP (ref 27–34)
MCHC RBC-ENTMCNC: 30.7 GM/DL — LOW (ref 32–36)
MCHC RBC-ENTMCNC: 30.7 GM/DL — LOW (ref 32–36)
MCHC RBC-ENTMCNC: 30.9 PG — SIGNIFICANT CHANGE UP (ref 27–34)
MCHC RBC-ENTMCNC: 30.9 PG — SIGNIFICANT CHANGE UP (ref 27–34)
MCHC RBC-ENTMCNC: 31.9 GM/DL — LOW (ref 32–36)
MCHC RBC-ENTMCNC: 31.9 GM/DL — LOW (ref 32–36)
MCHC RBC-ENTMCNC: 32.9 GM/DL — SIGNIFICANT CHANGE UP (ref 32–36)
MCHC RBC-ENTMCNC: 32.9 GM/DL — SIGNIFICANT CHANGE UP (ref 32–36)
MCV RBC AUTO: 91.3 FL — SIGNIFICANT CHANGE UP (ref 80–100)
MCV RBC AUTO: 91.3 FL — SIGNIFICANT CHANGE UP (ref 80–100)
MCV RBC AUTO: 93.7 FL — SIGNIFICANT CHANGE UP (ref 80–100)
MCV RBC AUTO: 93.7 FL — SIGNIFICANT CHANGE UP (ref 80–100)
MCV RBC AUTO: 94.7 FL — SIGNIFICANT CHANGE UP (ref 80–100)
MCV RBC AUTO: 94.7 FL — SIGNIFICANT CHANGE UP (ref 80–100)
MONOCYTES # BLD AUTO: 0.32 K/UL — SIGNIFICANT CHANGE UP (ref 0–0.9)
MONOCYTES # BLD AUTO: 0.32 K/UL — SIGNIFICANT CHANGE UP (ref 0–0.9)
MONOCYTES NFR BLD AUTO: 2.6 % — SIGNIFICANT CHANGE UP (ref 2–14)
MONOCYTES NFR BLD AUTO: 2.6 % — SIGNIFICANT CHANGE UP (ref 2–14)
MRSA PCR RESULT.: SIGNIFICANT CHANGE UP
MRSA PCR RESULT.: SIGNIFICANT CHANGE UP
NEUTROPHILS # BLD AUTO: 21.23 K/UL — HIGH (ref 1.8–7.4)
NEUTROPHILS # BLD AUTO: 21.23 K/UL — HIGH (ref 1.8–7.4)
NEUTROPHILS NFR BLD AUTO: 88.7 % — HIGH (ref 43–77)
NEUTROPHILS NFR BLD AUTO: 88.7 % — HIGH (ref 43–77)
NT-PROBNP SERPL-SCNC: 1500 PG/ML — HIGH (ref 0–300)
NT-PROBNP SERPL-SCNC: 1500 PG/ML — HIGH (ref 0–300)
PHOSPHATE SERPL-MCNC: 3.5 MG/DL — SIGNIFICANT CHANGE UP (ref 2.4–4.7)
PHOSPHATE SERPL-MCNC: 3.5 MG/DL — SIGNIFICANT CHANGE UP (ref 2.4–4.7)
PHOSPHATE SERPL-MCNC: 3.8 MG/DL — SIGNIFICANT CHANGE UP (ref 2.4–4.7)
PHOSPHATE SERPL-MCNC: 3.8 MG/DL — SIGNIFICANT CHANGE UP (ref 2.4–4.7)
PLATELET # BLD AUTO: 154 K/UL — SIGNIFICANT CHANGE UP (ref 150–400)
PLATELET # BLD AUTO: 169 K/UL — SIGNIFICANT CHANGE UP (ref 150–400)
PLATELET # BLD AUTO: 169 K/UL — SIGNIFICANT CHANGE UP (ref 150–400)
POTASSIUM SERPL-MCNC: 4.7 MMOL/L — SIGNIFICANT CHANGE UP (ref 3.5–5.3)
POTASSIUM SERPL-MCNC: 4.7 MMOL/L — SIGNIFICANT CHANGE UP (ref 3.5–5.3)
POTASSIUM SERPL-MCNC: 5 MMOL/L — SIGNIFICANT CHANGE UP (ref 3.5–5.3)
POTASSIUM SERPL-MCNC: 5 MMOL/L — SIGNIFICANT CHANGE UP (ref 3.5–5.3)
POTASSIUM SERPL-SCNC: 4.7 MMOL/L — SIGNIFICANT CHANGE UP (ref 3.5–5.3)
POTASSIUM SERPL-SCNC: 4.7 MMOL/L — SIGNIFICANT CHANGE UP (ref 3.5–5.3)
POTASSIUM SERPL-SCNC: 5 MMOL/L — SIGNIFICANT CHANGE UP (ref 3.5–5.3)
POTASSIUM SERPL-SCNC: 5 MMOL/L — SIGNIFICANT CHANGE UP (ref 3.5–5.3)
PROCALCITONIN SERPL-MCNC: 29.56 NG/ML — HIGH (ref 0.02–0.1)
PROCALCITONIN SERPL-MCNC: 29.56 NG/ML — HIGH (ref 0.02–0.1)
PROT SERPL-MCNC: 6 G/DL — LOW (ref 6.6–8.7)
PROT SERPL-MCNC: 6 G/DL — LOW (ref 6.6–8.7)
PROT SERPL-MCNC: 6.4 G/DL — LOW (ref 6.6–8.7)
PROT SERPL-MCNC: 6.4 G/DL — LOW (ref 6.6–8.7)
RBC # BLD: 3.09 M/UL — LOW (ref 4.2–5.8)
RBC # BLD: 3.09 M/UL — LOW (ref 4.2–5.8)
RBC # BLD: 3.4 M/UL — LOW (ref 4.2–5.8)
RBC # BLD: 3.4 M/UL — LOW (ref 4.2–5.8)
RBC # BLD: 3.5 M/UL — LOW (ref 4.2–5.8)
RBC # BLD: 3.5 M/UL — LOW (ref 4.2–5.8)
RBC # FLD: 13.6 % — SIGNIFICANT CHANGE UP (ref 10.3–14.5)
RBC # FLD: 13.7 % — SIGNIFICANT CHANGE UP (ref 10.3–14.5)
RBC # FLD: 13.7 % — SIGNIFICANT CHANGE UP (ref 10.3–14.5)
S AUREUS DNA NOSE QL NAA+PROBE: SIGNIFICANT CHANGE UP
S AUREUS DNA NOSE QL NAA+PROBE: SIGNIFICANT CHANGE UP
SODIUM SERPL-SCNC: 138 MMOL/L — SIGNIFICANT CHANGE UP (ref 135–145)
SODIUM SERPL-SCNC: 138 MMOL/L — SIGNIFICANT CHANGE UP (ref 135–145)
SODIUM SERPL-SCNC: 140 MMOL/L — SIGNIFICANT CHANGE UP (ref 135–145)
SODIUM SERPL-SCNC: 140 MMOL/L — SIGNIFICANT CHANGE UP (ref 135–145)
SPECIMEN SOURCE: SIGNIFICANT CHANGE UP
SPECIMEN SOURCE: SIGNIFICANT CHANGE UP
TROPONIN T, HIGH SENSITIVITY RESULT: 60 NG/L — HIGH (ref 0–51)
TROPONIN T, HIGH SENSITIVITY RESULT: 60 NG/L — HIGH (ref 0–51)
TSH SERPL-MCNC: 1.25 UIU/ML — SIGNIFICANT CHANGE UP (ref 0.27–4.2)
TSH SERPL-MCNC: 1.25 UIU/ML — SIGNIFICANT CHANGE UP (ref 0.27–4.2)
WBC # BLD: 18.97 K/UL — HIGH (ref 3.8–10.5)
WBC # BLD: 18.97 K/UL — HIGH (ref 3.8–10.5)
WBC # BLD: 19.3 K/UL — HIGH (ref 3.8–10.5)
WBC # BLD: 19.3 K/UL — HIGH (ref 3.8–10.5)
WBC # BLD: 22.96 K/UL — HIGH (ref 3.8–10.5)
WBC # BLD: 22.96 K/UL — HIGH (ref 3.8–10.5)
WBC # FLD AUTO: 18.97 K/UL — HIGH (ref 3.8–10.5)
WBC # FLD AUTO: 18.97 K/UL — HIGH (ref 3.8–10.5)
WBC # FLD AUTO: 19.3 K/UL — HIGH (ref 3.8–10.5)
WBC # FLD AUTO: 19.3 K/UL — HIGH (ref 3.8–10.5)
WBC # FLD AUTO: 22.96 K/UL — HIGH (ref 3.8–10.5)
WBC # FLD AUTO: 22.96 K/UL — HIGH (ref 3.8–10.5)

## 2023-12-23 PROCEDURE — 71250 CT THORAX DX C-: CPT | Mod: 26

## 2023-12-23 PROCEDURE — 99233 SBSQ HOSP IP/OBS HIGH 50: CPT | Mod: GC

## 2023-12-23 PROCEDURE — 99233 SBSQ HOSP IP/OBS HIGH 50: CPT

## 2023-12-23 PROCEDURE — 70450 CT HEAD/BRAIN W/O DYE: CPT | Mod: 26

## 2023-12-23 PROCEDURE — 93010 ELECTROCARDIOGRAM REPORT: CPT | Mod: 77

## 2023-12-23 PROCEDURE — 99223 1ST HOSP IP/OBS HIGH 75: CPT

## 2023-12-23 PROCEDURE — 93010 ELECTROCARDIOGRAM REPORT: CPT

## 2023-12-23 PROCEDURE — 71045 X-RAY EXAM CHEST 1 VIEW: CPT | Mod: 26

## 2023-12-23 PROCEDURE — 99222 1ST HOSP IP/OBS MODERATE 55: CPT

## 2023-12-23 RX ORDER — DILTIAZEM HCL 120 MG
10 CAPSULE, EXT RELEASE 24 HR ORAL ONCE
Refills: 0 | Status: COMPLETED | OUTPATIENT
Start: 2023-12-23 | End: 2023-12-23

## 2023-12-23 RX ORDER — METOPROLOL TARTRATE 50 MG
25 TABLET ORAL EVERY 8 HOURS
Refills: 0 | Status: DISCONTINUED | OUTPATIENT
Start: 2023-12-23 | End: 2023-12-23

## 2023-12-23 RX ORDER — ALBUMIN HUMAN 25 %
100 VIAL (ML) INTRAVENOUS ONCE
Refills: 0 | Status: COMPLETED | OUTPATIENT
Start: 2023-12-23 | End: 2023-12-23

## 2023-12-23 RX ORDER — METOPROLOL TARTRATE 50 MG
25 TABLET ORAL THREE TIMES A DAY
Refills: 0 | Status: DISCONTINUED | OUTPATIENT
Start: 2023-12-23 | End: 2023-12-23

## 2023-12-23 RX ORDER — DILTIAZEM HCL 120 MG
30 CAPSULE, EXT RELEASE 24 HR ORAL EVERY 8 HOURS
Refills: 0 | Status: DISCONTINUED | OUTPATIENT
Start: 2023-12-23 | End: 2023-12-23

## 2023-12-23 RX ORDER — HEPARIN SODIUM 5000 [USP'U]/ML
3000 INJECTION INTRAVENOUS; SUBCUTANEOUS EVERY 6 HOURS
Refills: 0 | Status: DISCONTINUED | OUTPATIENT
Start: 2023-12-23 | End: 2023-12-25

## 2023-12-23 RX ORDER — REMDESIVIR 5 MG/ML
100 INJECTION INTRAVENOUS EVERY 24 HOURS
Refills: 0 | Status: DISCONTINUED | OUTPATIENT
Start: 2023-12-24 | End: 2023-12-25

## 2023-12-23 RX ORDER — METOPROLOL TARTRATE 50 MG
37.5 TABLET ORAL EVERY 8 HOURS
Refills: 0 | Status: DISCONTINUED | OUTPATIENT
Start: 2023-12-23 | End: 2023-12-25

## 2023-12-23 RX ORDER — REMDESIVIR 5 MG/ML
INJECTION INTRAVENOUS
Refills: 0 | Status: DISCONTINUED | OUTPATIENT
Start: 2023-12-23 | End: 2023-12-25

## 2023-12-23 RX ORDER — HEPARIN SODIUM 5000 [USP'U]/ML
6500 INJECTION INTRAVENOUS; SUBCUTANEOUS EVERY 6 HOURS
Refills: 0 | Status: DISCONTINUED | OUTPATIENT
Start: 2023-12-23 | End: 2023-12-25

## 2023-12-23 RX ORDER — METOPROLOL TARTRATE 50 MG
5 TABLET ORAL ONCE
Refills: 0 | Status: COMPLETED | OUTPATIENT
Start: 2023-12-23 | End: 2023-12-23

## 2023-12-23 RX ORDER — SODIUM CHLORIDE 9 MG/ML
500 INJECTION, SOLUTION INTRAVENOUS ONCE
Refills: 0 | Status: COMPLETED | OUTPATIENT
Start: 2023-12-23 | End: 2023-12-23

## 2023-12-23 RX ORDER — HEPARIN SODIUM 5000 [USP'U]/ML
INJECTION INTRAVENOUS; SUBCUTANEOUS
Qty: 25000 | Refills: 0 | Status: DISCONTINUED | OUTPATIENT
Start: 2023-12-23 | End: 2023-12-25

## 2023-12-23 RX ORDER — REMDESIVIR 5 MG/ML
200 INJECTION INTRAVENOUS EVERY 24 HOURS
Refills: 0 | Status: COMPLETED | OUTPATIENT
Start: 2023-12-23 | End: 2023-12-23

## 2023-12-23 RX ADMIN — REMDESIVIR 200 MILLIGRAM(S): 5 INJECTION INTRAVENOUS at 03:04

## 2023-12-23 RX ADMIN — Medication 37.5 MILLIGRAM(S): at 19:29

## 2023-12-23 RX ADMIN — TAMSULOSIN HYDROCHLORIDE 0.4 MILLIGRAM(S): 0.4 CAPSULE ORAL at 22:24

## 2023-12-23 RX ADMIN — Medication 50 MILLILITER(S): at 02:39

## 2023-12-23 RX ADMIN — Medication 2: at 17:34

## 2023-12-23 RX ADMIN — HEPARIN SODIUM 1400 UNIT(S)/HR: 5000 INJECTION INTRAVENOUS; SUBCUTANEOUS at 17:22

## 2023-12-23 RX ADMIN — Medication 30 MILLIGRAM(S): at 06:44

## 2023-12-23 RX ADMIN — PIPERACILLIN AND TAZOBACTAM 25 GRAM(S): 4; .5 INJECTION, POWDER, LYOPHILIZED, FOR SOLUTION INTRAVENOUS at 17:11

## 2023-12-23 RX ADMIN — Medication 6 MILLIGRAM(S): at 21:21

## 2023-12-23 RX ADMIN — MIDODRINE HYDROCHLORIDE 5 MILLIGRAM(S): 2.5 TABLET ORAL at 12:14

## 2023-12-23 RX ADMIN — MIDODRINE HYDROCHLORIDE 5 MILLIGRAM(S): 2.5 TABLET ORAL at 05:19

## 2023-12-23 RX ADMIN — PIPERACILLIN AND TAZOBACTAM 25 GRAM(S): 4; .5 INJECTION, POWDER, LYOPHILIZED, FOR SOLUTION INTRAVENOUS at 05:18

## 2023-12-23 RX ADMIN — GABAPENTIN 300 MILLIGRAM(S): 400 CAPSULE ORAL at 22:22

## 2023-12-23 RX ADMIN — Medication 2: at 08:08

## 2023-12-23 RX ADMIN — REMDESIVIR 200 MILLIGRAM(S): 5 INJECTION INTRAVENOUS at 23:28

## 2023-12-23 RX ADMIN — MIDODRINE HYDROCHLORIDE 5 MILLIGRAM(S): 2.5 TABLET ORAL at 17:21

## 2023-12-23 RX ADMIN — Medication 2: at 12:36

## 2023-12-23 RX ADMIN — SIMVASTATIN 20 MILLIGRAM(S): 20 TABLET, FILM COATED ORAL at 22:24

## 2023-12-23 RX ADMIN — SODIUM CHLORIDE 250 MILLILITER(S): 9 INJECTION, SOLUTION INTRAVENOUS at 03:06

## 2023-12-23 RX ADMIN — Medication 5 MILLIGRAM(S): at 01:36

## 2023-12-23 RX ADMIN — PIPERACILLIN AND TAZOBACTAM 25 GRAM(S): 4; .5 INJECTION, POWDER, LYOPHILIZED, FOR SOLUTION INTRAVENOUS at 21:21

## 2023-12-23 RX ADMIN — PANTOPRAZOLE SODIUM 40 MILLIGRAM(S): 20 TABLET, DELAYED RELEASE ORAL at 06:44

## 2023-12-23 RX ADMIN — Medication 10 MILLIGRAM(S): at 05:18

## 2023-12-23 RX ADMIN — CHLORHEXIDINE GLUCONATE 1 APPLICATION(S): 213 SOLUTION TOPICAL at 05:23

## 2023-12-23 RX ADMIN — HEPARIN SODIUM 5000 UNIT(S): 5000 INJECTION INTRAVENOUS; SUBCUTANEOUS at 05:18

## 2023-12-23 NOTE — DIETITIAN INITIAL EVALUATION ADULT - PERTINENT MEDS FT
MEDICATIONS  (STANDING):  dexAMETHasone  Injectable 6 milliGRAM(s) IV Push every 24 hours  dextrose 5%. 1000 milliLiter(s) (100 mL/Hr) IV Continuous <Continuous>  glucagon  Injectable 1 milliGRAM(s) IntraMuscular once  insulin lispro (ADMELOG) corrective regimen sliding scale   SubCutaneous three times a day before meals  pantoprazole    Tablet 40 milliGRAM(s) Oral before breakfast  remdesivir  IVPB   IV Intermittent   simvastatin 20 milliGRAM(s) Oral at bedtime    MEDICATIONS  (PRN):  dextrose Oral Gel 15 Gram(s) Oral once PRN Blood Glucose LESS THAN 70 milliGRAM(s)/deciliter  ondansetron Injectable 4 milliGRAM(s) IV Push every 6 hours PRN Nausea

## 2023-12-23 NOTE — PROGRESS NOTE ADULT - SUBJECTIVE AND OBJECTIVE BOX
BRIEF HOSPITAL COURSE  83-year-old male with past medical history of hypertension, hyperlipidemia, stage III CKD BPH admitted to the hospital under the surgical service for cholecystitis.  Patient has been having chronic right upper quadrant abdominal pain with decreased p.o. intake and generalized weakness.  Initial plan for lap sky, given worsening leukocytosis and renal function plan changed to IR percutaneous cholecystectomy.  Earlier this afternoon rapid response was called for increased work of breathing hypoxia to 85% while on 3 L nasal cannula.  Patient does not have any oxygen requirements at home.  When assessed at bedside patient was awake and protecting his airway, however not responding to voice or answering questions.  Saturations improved to 95% on nonrebreather, temp of 102.3, tachycardic to the 130s to 140s.  EKG showing sinus tachycardia.  MICU consulted for increased work of breathing and hypoxia. Pt was started on NIPPV. COVID + on RVP, started on IV Remdesivir  and IV Decadron. WOB improved, pt weaned of NIPPV to HFNC. Emergent bedside sky tube placed by IR, surgery sogned off stating pt is not appropriate surgical candidate for  cholecystectomy    INTERVAL HPI/OVERNIGHT EVENTS: COVID +ve, pt weaned of NIPPV to HFNC. Sky tube placed through IR on 12/22, surgery signed off stating pt is not surgical candidate. Clear liquid diet reinstated. CT head without acute bleed, started on full dose AC. CT chest with mild pulmonary edema.    MEDICATIONS  (STANDING):  acetaminophen   IVPB .. 1000 milliGRAM(s) IV Intermittent once  chlorhexidine 2% Cloths 1 Application(s) Topical <User Schedule>  dexAMETHasone  Injectable 6 milliGRAM(s) IV Push every 24 hours  dextrose 5%. 1000 milliLiter(s) (100 mL/Hr) IV Continuous <Continuous>  dextrose 5%. 1000 milliLiter(s) (50 mL/Hr) IV Continuous <Continuous>  dextrose 50% Injectable 12.5 Gram(s) IV Push once  dextrose 50% Injectable 25 Gram(s) IV Push once  dextrose 50% Injectable 25 Gram(s) IV Push once  diltiazem    Tablet 30 milliGRAM(s) Oral every 8 hours  gabapentin 300 milliGRAM(s) Oral at bedtime  glucagon  Injectable 1 milliGRAM(s) IntraMuscular once  heparin  Infusion.  Unit(s)/Hr (14 mL/Hr) IV Continuous <Continuous>  insulin lispro (ADMELOG) corrective regimen sliding scale   SubCutaneous three times a day before meals  insulin lispro (ADMELOG) corrective regimen sliding scale   SubCutaneous at bedtime  midodrine. 5 milliGRAM(s) Oral three times a day  pantoprazole    Tablet 40 milliGRAM(s) Oral before breakfast  piperacillin/tazobactam IVPB.. 3.375 Gram(s) IV Intermittent every 8 hours  remdesivir  IVPB   IV Intermittent   simvastatin 20 milliGRAM(s) Oral at bedtime  tamsulosin 0.4 milliGRAM(s) Oral at bedtime    MEDICATIONS  (PRN):  dextrose Oral Gel 15 Gram(s) Oral once PRN Blood Glucose LESS THAN 70 milliGRAM(s)/deciliter  heparin   Injectable 3000 Unit(s) IV Push every 6 hours PRN For aPTT between 40 - 57  heparin   Injectable 6500 Unit(s) IV Push every 6 hours PRN For aPTT less than 40  ondansetron Injectable 4 milliGRAM(s) IV Push every 6 hours PRN Nausea      Allergies    No Known Allergies    Intolerances    REVIEW OF SYSTEMS  CONSTITUTIONAL: No fever, weight loss, or fatigue  RESPIRATORY: No cough, wheezing, chills or hemoptysis; No shortness of breath  CARDIOVASCULAR: No chest pain, palpitations, dizziness, or leg swelling  GASTROINTESTINAL: No abdominal or epigastric pain. No nausea, vomiting, or hematemesis; No diarrhea or constipation. No melena or hematochezia.  NEUROLOGICAL: No headaches, memory loss, loss of strength, numbness, or tremors  MUSCULOSKELETAL: No joint pain or swelling; No muscle, back, or extremity pain      Vital Signs Last 24 Hrs  T(C): 36.6 (23 Dec 2023 11:00), Max: 37.7 (22 Dec 2023 15:00)  T(F): 97.9 (23 Dec 2023 11:00), Max: 99.9 (22 Dec 2023 15:00)  HR: 99 (23 Dec 2023 11:00) (79 - 145)  BP: 127/106 (23 Dec 2023 11:00) (91/66 - 127/106)  BP(mean): 114 (23 Dec 2023 11:00) (65 - 114)  RR: 20 (23 Dec 2023 11:00) (15 - 23)  SpO2: 91% (23 Dec 2023 10:00) (89% - 98%)    Parameters below as of 23 Dec 2023 07:00  Patient On (Oxygen Delivery Method): nasal cannula, high flow  O2 Flow (L/min): 30  O2 Concentration (%): 30    PHYSICAL EXAM:  GENERAL: NAD  HEAD:  Atraumatic, Normocephalic  EYES: EOMI, PERRLA, conjunctiva and sclera clear  NECK: Supple, No JVD, Normal thyroid  NERVOUS SYSTEM:  Alert & Oriented X3, No gross focal deficits  CHEST/LUNG: Clear to auscultation bilaterally; No rales, rhonchi, wheezing, or rubs  HEART: Regular rate and rhythm; No murmurs, rubs, or gallops  ABDOMEN: Soft, Nontender, Nondistended; Bowel sounds present  EXTREMITIES:  No clubbing, cyanosis, or edema  SKIN: No rashes or lesions    LABS:                        10.8   19.30 )-----------( 154      ( 23 Dec 2023 12:30 )             32.8     12-23    138  |  102  |  39.8<H>  ----------------------------<  189<H>  4.7   |  20.0<L>  |  2.91<H>    Ca    8.7      23 Dec 2023 12:30  Phos  3.8     12-23  Mg     2.4     12-23    TPro  6.4<L>  /  Alb  2.9<L>  /  TBili  0.6  /  DBili  x   /  AST  38  /  ALT  27  /  AlkPhos  53  12-23      Urinalysis Basic - ( 23 Dec 2023 12:30 )    Color: x / Appearance: x / SG: x / pH: x  Gluc: 189 mg/dL / Ketone: x  / Bili: x / Urobili: x   Blood: x / Protein: x / Nitrite: x   Leuk Esterase: x / RBC: x / WBC x   Sq Epi: x / Non Sq Epi: x / Bacteria: x      CAPILLARY BLOOD GLUCOSE      POCT Blood Glucose.: 171 mg/dL (23 Dec 2023 12:17)  POCT Blood Glucose.: 199 mg/dL (23 Dec 2023 07:50)  POCT Blood Glucose.: 146 mg/dL (22 Dec 2023 21:28)  POCT Blood Glucose.: 101 mg/dL (22 Dec 2023 17:09)   BRIEF HOSPITAL COURSE  83-year-old male with past medical history of hypertension, hyperlipidemia, stage III CKD BPH admitted to the hospital under the surgical service for cholecystitis.  Patient has been having chronic right upper quadrant abdominal pain with decreased p.o. intake and generalized weakness.  Initial plan for lap sky, given worsening leukocytosis and renal function plan changed to IR percutaneous cholecystectomy.  Earlier this afternoon rapid response was called for increased work of breathing hypoxia to 85% while on 3 L nasal cannula.  Patient does not have any oxygen requirements at home.  When assessed at bedside patient was awake and protecting his airway, however not responding to voice or answering questions.  Saturations improved to 95% on nonrebreather, temp of 102.3, tachycardic to the 130s to 140s.  EKG showing sinus tachycardia.  MICU consulted for increased work of breathing and hypoxia. Pt was started on NIPPV. COVID + on RVP, started on IV Remdesivir  and IV Decadron. WOB improved, pt weaned of NIPPV to HFNC. Emergent bedside sky tube placed by IR, surgery sogned off stating pt is not appropriate surgical candidate for  cholecystectomy    INTERVAL HPI/OVERNIGHT EVENTS: COVID +ve, pt weaned of NIPPV to HFNC. Sky tube placed through IR on 12/22, surgery signed off stating pt is not surgical candidate. Clear liquid diet reinstated. CT head without acute bleed, started on full dose AC with heparin. CT chest with mild pulmonary edema.    MEDICATIONS  (STANDING):  acetaminophen   IVPB .. 1000 milliGRAM(s) IV Intermittent once  chlorhexidine 2% Cloths 1 Application(s) Topical <User Schedule>  dexAMETHasone  Injectable 6 milliGRAM(s) IV Push every 24 hours  dextrose 5%. 1000 milliLiter(s) (100 mL/Hr) IV Continuous <Continuous>  dextrose 5%. 1000 milliLiter(s) (50 mL/Hr) IV Continuous <Continuous>  dextrose 50% Injectable 12.5 Gram(s) IV Push once  dextrose 50% Injectable 25 Gram(s) IV Push once  dextrose 50% Injectable 25 Gram(s) IV Push once  diltiazem    Tablet 30 milliGRAM(s) Oral every 8 hours  gabapentin 300 milliGRAM(s) Oral at bedtime  glucagon  Injectable 1 milliGRAM(s) IntraMuscular once  heparin  Infusion.  Unit(s)/Hr (14 mL/Hr) IV Continuous <Continuous>  insulin lispro (ADMELOG) corrective regimen sliding scale   SubCutaneous three times a day before meals  insulin lispro (ADMELOG) corrective regimen sliding scale   SubCutaneous at bedtime  midodrine. 5 milliGRAM(s) Oral three times a day  pantoprazole    Tablet 40 milliGRAM(s) Oral before breakfast  piperacillin/tazobactam IVPB.. 3.375 Gram(s) IV Intermittent every 8 hours  remdesivir  IVPB   IV Intermittent   simvastatin 20 milliGRAM(s) Oral at bedtime  tamsulosin 0.4 milliGRAM(s) Oral at bedtime    MEDICATIONS  (PRN):  dextrose Oral Gel 15 Gram(s) Oral once PRN Blood Glucose LESS THAN 70 milliGRAM(s)/deciliter  heparin   Injectable 3000 Unit(s) IV Push every 6 hours PRN For aPTT between 40 - 57  heparin   Injectable 6500 Unit(s) IV Push every 6 hours PRN For aPTT less than 40  ondansetron Injectable 4 milliGRAM(s) IV Push every 6 hours PRN Nausea      Allergies    No Known Allergies    Intolerances    REVIEW OF SYSTEMS  CONSTITUTIONAL: No fever or chills  RESPIRATORY: No cough, wheezing, or hemoptysis; No shortness of breath  CARDIOVASCULAR: No chest pain, palpitations, dizziness, or leg swelling  GASTROINTESTINAL: No abdominal or epigastric pain. No nausea, vomiting, or hematemesis; No diarrhea or constipation. No melena or hematochezia.  NEUROLOGICAL: No headaches, memory loss, loss of strength, + resting tremors of R. hand  MUSCULOSKELETAL: No joint pain or swelling; No muscle, back, or extremity pain      Vital Signs Last 24 Hrs  T(C): 36.6 (23 Dec 2023 11:00), Max: 37.7 (22 Dec 2023 15:00)  T(F): 97.9 (23 Dec 2023 11:00), Max: 99.9 (22 Dec 2023 15:00)  HR: 99 (23 Dec 2023 11:00) (79 - 145)  BP: 127/106 (23 Dec 2023 11:00) (91/66 - 127/106)  BP(mean): 114 (23 Dec 2023 11:00) (65 - 114)  RR: 20 (23 Dec 2023 11:00) (15 - 23)  SpO2: 91% (23 Dec 2023 10:00) (89% - 98%)    Parameters below as of 23 Dec 2023 07:00  Patient On (Oxygen Delivery Method): nasal cannula, high flow  O2 Flow (L/min): 30  O2 Concentration (%): 30    PHYSICAL EXAM  GENERAL: Comfortable at rest on HFNC  HEAD:  Atraumatic, Normocephalic  EYES: Conjunctiva and sclera clear  NECK: Supple  NERVOUS SYSTEM:  Alert & Oriented X3, No gross focal deficits. R hand resting tremors  CHEST/LUNG: Dullness in B/L lung fields at base. Adequate air movement  HEART: Regular rate and rhythm  ABDOMEN: Soft, Nontender, Nondistended  EXTREMITIES:  No edema  SKIN: No rashes or lesions    LABS:                        10.8   19.30 )-----------( 154      ( 23 Dec 2023 12:30 )             32.8     12-23    138  |  102  |  39.8<H>  ----------------------------<  189<H>  4.7   |  20.0<L>  |  2.91<H>    Ca    8.7      23 Dec 2023 12:30  Phos  3.8     12-23  Mg     2.4     12-23    TPro  6.4<L>  /  Alb  2.9<L>  /  TBili  0.6  /  DBili  x   /  AST  38  /  ALT  27  /  AlkPhos  53  12-23      Urinalysis Basic - ( 23 Dec 2023 12:30 )    Color: x / Appearance: x / SG: x / pH: x  Gluc: 189 mg/dL / Ketone: x  / Bili: x / Urobili: x   Blood: x / Protein: x / Nitrite: x   Leuk Esterase: x / RBC: x / WBC x   Sq Epi: x / Non Sq Epi: x / Bacteria: x      CAPILLARY BLOOD GLUCOSE      POCT Blood Glucose.: 171 mg/dL (23 Dec 2023 12:17)  POCT Blood Glucose.: 199 mg/dL (23 Dec 2023 07:50)  POCT Blood Glucose.: 146 mg/dL (22 Dec 2023 21:28)  POCT Blood Glucose.: 101 mg/dL (22 Dec 2023 17:09)   BRIEF HOSPITAL COURSE  83-year-old male with past medical history of hypertension, hyperlipidemia, stage III CKD BPH admitted to the hospital under the surgical service for cholecystitis.  Patient has been having chronic right upper quadrant abdominal pain with decreased p.o. intake and generalized weakness.  Initial plan for lap sky, given worsening leukocytosis and renal function plan changed to IR percutaneous cholecystectomy.  On 12/22, rapid response was called for increased work of breathing hypoxia to 85% while on 3 L nasal cannula.  Patient does not have any oxygen requirements at home.  When assessed at bedside patient was awake and protecting his airway, however not responding to voice or answering questions.  Saturations improved to 95% on nonrebreather, temp of 102.3, tachycardic to the 130s to 140s.  EKG showing sinus tachycardia.  MICU consulted for increased work of breathing and hypoxia. Pt was started on NIPPV. COVID + on RVP, started on IV Remdesivir  and IV Decadron. WOB improved, pt weaned of NIPPV to HFNC. Emergent bedside sky tube placed by IR, surgery sogned off stating pt is not appropriate surgical candidate for  cholecystectomy    INTERVAL HPI/OVERNIGHT EVENTS: COVID +ve, pt weaned of NIPPV to HFNC. Sky tube placed through IR on 12/22, surgery signed off stating pt is not surgical candidate. Clear liquid diet reinstated. CT head without acute bleed, started on full dose AC with heparin. CT chest with mild pulmonary edema.    MEDICATIONS  (STANDING):  acetaminophen   IVPB .. 1000 milliGRAM(s) IV Intermittent once  chlorhexidine 2% Cloths 1 Application(s) Topical <User Schedule>  dexAMETHasone  Injectable 6 milliGRAM(s) IV Push every 24 hours  dextrose 5%. 1000 milliLiter(s) (100 mL/Hr) IV Continuous <Continuous>  dextrose 5%. 1000 milliLiter(s) (50 mL/Hr) IV Continuous <Continuous>  dextrose 50% Injectable 12.5 Gram(s) IV Push once  dextrose 50% Injectable 25 Gram(s) IV Push once  dextrose 50% Injectable 25 Gram(s) IV Push once  diltiazem    Tablet 30 milliGRAM(s) Oral every 8 hours  gabapentin 300 milliGRAM(s) Oral at bedtime  glucagon  Injectable 1 milliGRAM(s) IntraMuscular once  heparin  Infusion.  Unit(s)/Hr (14 mL/Hr) IV Continuous <Continuous>  insulin lispro (ADMELOG) corrective regimen sliding scale   SubCutaneous three times a day before meals  insulin lispro (ADMELOG) corrective regimen sliding scale   SubCutaneous at bedtime  midodrine. 5 milliGRAM(s) Oral three times a day  pantoprazole    Tablet 40 milliGRAM(s) Oral before breakfast  piperacillin/tazobactam IVPB.. 3.375 Gram(s) IV Intermittent every 8 hours  remdesivir  IVPB   IV Intermittent   simvastatin 20 milliGRAM(s) Oral at bedtime  tamsulosin 0.4 milliGRAM(s) Oral at bedtime    MEDICATIONS  (PRN):  dextrose Oral Gel 15 Gram(s) Oral once PRN Blood Glucose LESS THAN 70 milliGRAM(s)/deciliter  heparin   Injectable 3000 Unit(s) IV Push every 6 hours PRN For aPTT between 40 - 57  heparin   Injectable 6500 Unit(s) IV Push every 6 hours PRN For aPTT less than 40  ondansetron Injectable 4 milliGRAM(s) IV Push every 6 hours PRN Nausea      Allergies    No Known Allergies    Intolerances    REVIEW OF SYSTEMS  CONSTITUTIONAL: No fever or chills  RESPIRATORY: No cough, wheezing, or hemoptysis; No shortness of breath  CARDIOVASCULAR: No chest pain, palpitations, dizziness, or leg swelling  GASTROINTESTINAL: No abdominal or epigastric pain. No nausea, vomiting, or hematemesis; No diarrhea or constipation. No melena or hematochezia.  NEUROLOGICAL: No headaches, memory loss, loss of strength, + resting tremors of R. hand  MUSCULOSKELETAL: No joint pain or swelling; No muscle, back, or extremity pain      Vital Signs Last 24 Hrs  T(C): 36.6 (23 Dec 2023 11:00), Max: 37.7 (22 Dec 2023 15:00)  T(F): 97.9 (23 Dec 2023 11:00), Max: 99.9 (22 Dec 2023 15:00)  HR: 99 (23 Dec 2023 11:00) (79 - 145)  BP: 127/106 (23 Dec 2023 11:00) (91/66 - 127/106)  BP(mean): 114 (23 Dec 2023 11:00) (65 - 114)  RR: 20 (23 Dec 2023 11:00) (15 - 23)  SpO2: 91% (23 Dec 2023 10:00) (89% - 98%)    Parameters below as of 23 Dec 2023 07:00  Patient On (Oxygen Delivery Method): nasal cannula, high flow  O2 Flow (L/min): 30  O2 Concentration (%): 30    PHYSICAL EXAM  GENERAL: Comfortable at rest on HFNC  HEAD:  Atraumatic, Normocephalic  EYES: Conjunctiva and sclera clear  NECK: Supple  NERVOUS SYSTEM:  Alert & Oriented X3, No gross focal deficits. R hand resting tremors  CHEST/LUNG: Dullness in B/L lung fields at base. Adequate air movement  HEART: Regular rate and rhythm  ABDOMEN: Soft, Nontender, Nondistended  EXTREMITIES:  No edema  SKIN: No rashes or lesions    LABS:                        10.8   19.30 )-----------( 154      ( 23 Dec 2023 12:30 )             32.8     12-23    138  |  102  |  39.8<H>  ----------------------------<  189<H>  4.7   |  20.0<L>  |  2.91<H>    Ca    8.7      23 Dec 2023 12:30  Phos  3.8     12-23  Mg     2.4     12-23    TPro  6.4<L>  /  Alb  2.9<L>  /  TBili  0.6  /  DBili  x   /  AST  38  /  ALT  27  /  AlkPhos  53  12-23      Urinalysis Basic - ( 23 Dec 2023 12:30 )    Color: x / Appearance: x / SG: x / pH: x  Gluc: 189 mg/dL / Ketone: x  / Bili: x / Urobili: x   Blood: x / Protein: x / Nitrite: x   Leuk Esterase: x / RBC: x / WBC x   Sq Epi: x / Non Sq Epi: x / Bacteria: x      CAPILLARY BLOOD GLUCOSE      POCT Blood Glucose.: 171 mg/dL (23 Dec 2023 12:17)  POCT Blood Glucose.: 199 mg/dL (23 Dec 2023 07:50)  POCT Blood Glucose.: 146 mg/dL (22 Dec 2023 21:28)  POCT Blood Glucose.: 101 mg/dL (22 Dec 2023 17:09)

## 2023-12-23 NOTE — DIETITIAN INITIAL EVALUATION ADULT - ORAL INTAKE PTA/DIET HISTORY
Pt in ICU covid+ requiring bipap, NPO this AM. Decreased PO intake, abdominal pain and generalized weakness noted on admission. Pt at risk for acute malnutrition, RD to follow up at more feasible time for nutrition interview and appropriate diet education when advanced.

## 2023-12-23 NOTE — PROGRESS NOTE ADULT - SUBJECTIVE AND OBJECTIVE BOX
Wanda Louie M.D.    Patient is a 83y old  Male who presents with a chief complaint of Cholecystitis (23 Dec 2023 14:54)      83-year-old male with past medical history of hypertension, hyperlipidemia, DM, stage III CKD, BPH admitted to the hospital under the surgical service for cholecystitis (fever, abd pain, neasue), s/p IR percutaneous cholecystectomy. Seen by surgery and GI, started on empiric Zosyn. Course c/b acute hypoxic resp failure, +COVID, was admitted for MICU for BIPAP , now weaned to HFNC, also started on Remdesivir and Decadrone. Course further c/b new Afib RVR. Started on heparin drip and metoprolol 25mg TID. Now downgraded to medicine.     At the time of examination, patient repotrs some abd discomfort with heavy cough. No other concerns. Tolerated diet without issues.     MEDICATIONS  (STANDING):  acetaminophen   IVPB .. 1000 milliGRAM(s) IV Intermittent once  chlorhexidine 2% Cloths 1 Application(s) Topical <User Schedule>  dexAMETHasone  Injectable 6 milliGRAM(s) IV Push every 24 hours  dextrose 5%. 1000 milliLiter(s) (100 mL/Hr) IV Continuous <Continuous>  dextrose 5%. 1000 milliLiter(s) (50 mL/Hr) IV Continuous <Continuous>  dextrose 50% Injectable 12.5 Gram(s) IV Push once  dextrose 50% Injectable 25 Gram(s) IV Push once  dextrose 50% Injectable 25 Gram(s) IV Push once  gabapentin 300 milliGRAM(s) Oral at bedtime  glucagon  Injectable 1 milliGRAM(s) IntraMuscular once  heparin  Infusion.  Unit(s)/Hr (14 mL/Hr) IV Continuous <Continuous>  insulin lispro (ADMELOG) corrective regimen sliding scale   SubCutaneous three times a day before meals  insulin lispro (ADMELOG) corrective regimen sliding scale   SubCutaneous at bedtime  metoprolol tartrate 37.5 milliGRAM(s) Oral every 8 hours  midodrine. 5 milliGRAM(s) Oral three times a day  pantoprazole    Tablet 40 milliGRAM(s) Oral before breakfast  piperacillin/tazobactam IVPB.. 3.375 Gram(s) IV Intermittent every 8 hours  remdesivir  IVPB   IV Intermittent   simvastatin 20 milliGRAM(s) Oral at bedtime  tamsulosin 0.4 milliGRAM(s) Oral at bedtime    MEDICATIONS  (PRN):  dextrose Oral Gel 15 Gram(s) Oral once PRN Blood Glucose LESS THAN 70 milliGRAM(s)/deciliter  heparin   Injectable 6500 Unit(s) IV Push every 6 hours PRN For aPTT less than 40  heparin   Injectable 3000 Unit(s) IV Push every 6 hours PRN For aPTT between 40 - 57  ondansetron Injectable 4 milliGRAM(s) IV Push every 6 hours PRN Nausea      I&O's Summary    22 Dec 2023 07:01  -  23 Dec 2023 07:00  --------------------------------------------------------  IN: 1000 mL / OUT: 1210 mL / NET: -210 mL    23 Dec 2023 07:01  -  23 Dec 2023 17:30  --------------------------------------------------------  IN: 110 mL / OUT: 570 mL / NET: -460 mL        PHYSICAL EXAM:  Vital Signs Last 24 Hrs  T(C): 36.8 (23 Dec 2023 17:00), Max: 36.8 (22 Dec 2023 20:00)  T(F): 98.2 (23 Dec 2023 17:00), Max: 98.2 (22 Dec 2023 20:00)  HR: 106 (23 Dec 2023 17:00) (79 - 145)  BP: 141/81 (23 Dec 2023 17:00) (91/66 - 141/81)  BP(mean): 99 (23 Dec 2023 17:00) (72 - 114)  RR: 15 (23 Dec 2023 17:00) (15 - 23)  SpO2: 96% (23 Dec 2023 17:00) (89% - 98%)    Parameters below as of 23 Dec 2023 07:00  Patient On (Oxygen Delivery Method): nasal cannula, high flow  O2 Flow (L/min): 30  O2 Concentration (%): 30    CONSTITUTIONAL: NAD, well-groomed, on HFNC  RESPIRATORY: Normal respiratory effort; Course bs b/l  CARDIOVASCULAR: Tachycardic, irregular irregular, no LE edema  ABDOMEN: Nontender to palpation, hypoactive bowel sounds. +Perc drain draining bile  PSYCH: A+O x3; affect appropriate  NEUROLOGY: CN 2-12 are intact and symmetric; no gross sensory deficits;   SKIN: No rashes; no palpable lesions    LABS:                        10.8   19.30 )-----------( 154      ( 23 Dec 2023 12:30 )             32.8     12-23    138  |  102  |  39.8<H>  ----------------------------<  189<H>  4.7   |  20.0<L>  |  2.91<H>    Ca    8.7      23 Dec 2023 12:30  Phos  3.8     12-23  Mg     2.4     12-23    TPro  6.4<L>  /  Alb  2.9<L>  /  TBili  0.6  /  DBili  x   /  AST  38  /  ALT  27  /  AlkPhos  53  12-23    PTT - ( 23 Dec 2023 13:41 )  PTT:33.1 sec      Urinalysis Basic - ( 23 Dec 2023 12:30 )    Color: x / Appearance: x / SG: x / pH: x  Gluc: 189 mg/dL / Ketone: x  / Bili: x / Urobili: x   Blood: x / Protein: x / Nitrite: x   Leuk Esterase: x / RBC: x / WBC x   Sq Epi: x / Non Sq Epi: x / Bacteria: x        Culture - Body Fluid with Gram Stain (collected 22 Dec 2023 14:40)  Source: Bile Bile Fluid  Gram Stain (23 Dec 2023 01:41):    No polymorphonuclear leukocytes seen    Gram Negative Rods seen    Gram positive cocci in pairs seen    by cytocentrifuge    Culture - Blood (collected 21 Dec 2023 23:05)  Source: .Blood Blood  Preliminary Report (23 Dec 2023 07:02):    No growth at 24 hours    Culture - Blood (collected 21 Dec 2023 22:55)  Source: .Blood Blood  Preliminary Report (23 Dec 2023 07:02):    No growth at 24 hours    Culture - Urine (collected 21 Dec 2023 04:45)  Source: Clean Catch Clean Catch (Midstream)  Final Report (22 Dec 2023 08:51):    <10,000 CFU/mL Normal Urogenital Allyson    Culture - Blood (collected 21 Dec 2023 04:30)  Source: .Blood Blood-Peripheral  Preliminary Report (23 Dec 2023 09:01):    No growth at 48 Hours    Culture - Blood (collected 21 Dec 2023 04:20)  Source: .Blood Blood-Peripheral  Preliminary Report (23 Dec 2023 09:01):    No growth at 48 Hours      CAPILLARY BLOOD GLUCOSE      POCT Blood Glucose.: 168 mg/dL (23 Dec 2023 17:23)  POCT Blood Glucose.: 171 mg/dL (23 Dec 2023 12:17)  POCT Blood Glucose.: 199 mg/dL (23 Dec 2023 07:50)  POCT Blood Glucose.: 146 mg/dL (22 Dec 2023 21:28)      RADIOLOGY & ADDITIONAL TESTS:  Results Reviewed:   Imaging Personally Reviewed:  Electrocardiogram Personally Reviewed:

## 2023-12-23 NOTE — CONSULT NOTE ADULT - NS ATTEND AMEND GEN_ALL_CORE FT
seen with above,    83M Cambodian-speaking history significant for HTN, HLD, CKD3-4, BPH, imbalance with prior falls lives at home with his wife, admitted with abdominal pain found acute cholecystitis course complicated by sepsis, fever with RRT for acute hypoxic respiratory failure, +COVID s/p cholecystostomy tube as not surgical candidate, found with new onset Afib RVR 150s in MICU on HFNC  -currently -110s on low dose diltiazem 30mg but also on midodrine, continue to wean off  -switch to metoprolol 25mg q8hrs for lesser BP effect  -CHADVasc 3- on heparin gtt, but need to assess long term safety of AC use given h/o repeated falls, need PT eval.   -not candidate for rhythm control with STAS/CV as requiring HFNC and likely reactive due to sepsis/infection  -discussed with patient's daughter at the bedside        Markus Mejia DO, [facility]  Faculty Non-Invasive Cardiologist  [phone] seen with above,    83M [place]ian-speaking history significant for HTN, HLD, CKD3-4, BPH, imbalance with prior falls lives at home with his wife, admitted with abdominal pain found acute cholecystitis course complicated by sepsis, fever with RRT for acute hypoxic respiratory failure, +COVID s/p cholecystostomy tube as not surgical candidate, found with new onset Afib RVR 150s in MICU on HFNC  -currently -110s on low dose diltiazem 30mg but also on midodrine, continue to wean off  -switch to metoprolol 25mg q8hrs for lesser BP effect  -CHADVasc 3- on heparin gtt, but need to assess long term safety of AC use given h/o repeated falls, need PT eval.   -not candidate for rhythm control with STAS/CV as requiring HFNC and likely reactive due to sepsis/infection  -discussed with patient's daughter at the bedside        Markus Mejia DO, [facility]  Faculty Non-Invasive Cardiologist  [phone]

## 2023-12-23 NOTE — PROGRESS NOTE ADULT - ASSESSMENT
83-year-old male with acute cholecystitis, febrile, tachycardic, and hypoxic on MICU assessment.  Patient meeting SIRS criteria likely secondary to acute cholecystitis. Hypoxic with increased WOB    #Acute Hypoxic Respiratory Failure  - RVP- COVID +ve  - Started on IV Remdesivir and IV decadron   - Weaned off Bipap to Hiflo 40L  - CT chest non-con 12/22 mild pulmonary edema with B/L pleural effusion  - U/S DVT without DVT  - Low threshold for intubation if hypoxia and/or mental status worsened to the point where patient can no longer protect his airway.  - ABG on 12/23 no acidosis or Co2 retention  - CXR does not show interstitial opacities or consolidation to suggest pneumonia    #New onset Afib RVR  -S/p IV Lopressor 5mg, IV Cardizem 10mg  -Started on PO Cardizem 30mg q8  -Head CT without acute hemorrhage, full AC started  -No ST/T changes on EKG  -TSH, electrolytes WNL  -Monitor BMP, keep Mg>2, K>4  -Cardiology consulted    #Acute Cholecystitis  - Pt received ultrasound-guided cholecystostomy tube placement by IR at bedside on 12/23  - Bile fluid culture with gram negative rods  - On IV Zosyn D3  - Surgery signed off as not appropriate surgical candidate  - Will require f/u with IR in six weeks  - GI signed off, no active management recommendations at this point  - CT abdomen and USG abdomen with acute cholecystitis   - WBC downtrending, LFT unremarkable  - To trend WBC, LFTs    #RON on CKD  - Will monitor lytes and BUN/Cr  - Will consult Nephrology pending trends  83-year-old male with acute cholecystitis, febrile, tachycardic, and hypoxic on MICU assessment.  Patient meeting SIRS criteria likely secondary to acute cholecystitis. Hypoxic with increased WOB    #Acute Hypoxic Respiratory Failure  - RVP- COVID +ve  - Started on IV Remdesivir and IV decadron   - Weaned off Bipap to Hiflo 40L  - CT chest non-con 12/22 mild pulmonary edema with B/L pleural effusion  - U/S DVT without DVT  - Low threshold for intubation if hypoxia and/or mental status worsened to the point where patient can no longer protect his airway.  - ABG on 12/23 no acidosis or Co2 retention  - CXR does not show interstitial opacities or consolidation to suggest pneumonia    #New onset Afib RVR  -S/p IV Lopressor 5mg, IV Cardizem 10mg  -Started on PO Cardizem 30mg q8  -Head CT without acute hemorrhage, full AC with Heaprin started  -Falls 1/2 times a month, candidacy for OP AC deferred to cardiology  -No ST/T changes on EKG  -TSH, electrolytes WNL  -Monitor BMP, keep Mg>2, K>4  -Cardiology consulted    #Acute Cholecystitis  - Pt received ultrasound-guided cholecystostomy tube placement by IR at bedside on 12/23  - Bile fluid culture with gram negative rods  - On IV Zosyn D3  - Surgery signed off as not appropriate surgical candidate  - Will require f/u with IR in six weeks  - GI signed off, no active management recommendations at this point  - CT abdomen and USG abdomen with acute cholecystitis   - WBC downtrending, LFT unremarkable  - To trend WBC, LFTs    #RON on CKD  - Urine output >30cc/hr  - Will monitor lytes and BUN/Cr  - Will consult Nephrology pending trends    Dispo: Downgrade pending Heparin monitoring  83-year-old male with acute cholecystitis, febrile, tachycardic, and hypoxic on MICU assessment.  Patient meeting SIRS criteria likely secondary to acute cholecystitis. Hypoxic with increased WOB    #Acute Hypoxic Respiratory Failure  - RVP- COVID +ve  - Started on IV Remdesivir and IV decadron   - Weaned off Bipap to Hiflo 40L  - CT chest non-con 12/22 mild pulmonary edema with B/L pleural effusion  - U/S DVT without DVT  - Low threshold for intubation if hypoxia and/or mental status worsened to the point where patient can no longer protect his airway.  - ABG on 12/23 no acidosis or Co2 retention  - CXR does not show interstitial opacities or consolidation to suggest pneumonia    #New onset Afib RVR  -S/p IV Lopressor 5mg, IV Cardizem 10mg  -Started on PO Cardizem 30mg q8  -Head CT without acute hemorrhage, full AC with Heaprin started  -Falls 1/2 times a month, candidacy for OP AC deferred to cardiology  -No ST/T changes on EKG  -TSH, electrolytes WNL  -Monitor BMP, keep Mg>2, K>4  -Cardiology consulted    #Acute Cholecystitis  - Pt received ultrasound-guided cholecystostomy tube placement by IR at bedside on 12/23  - Bile fluid culture with gram negative rods  - On IV Zosyn D3  - Surgery signed off as not appropriate surgical candidate  - Will require f/u with IR in six weeks  - GI consulted, reccs appreciated  - CT abdomen and USG abdomen with acute cholecystitis   - WBC downtrending, LFT unremarkable  - To trend WBC, LFTs    #RON on CKD  - Urine output >30cc/hr  - Will monitor lytes and BUN/Cr  - Will consult Nephrology pending trends    Dispo: Downgrade pending Heparin monitoring  83-year-old male with acute cholecystitis, febrile, tachycardic, and hypoxic on MICU assessment.  Patient meeting SIRS criteria likely secondary to acute cholecystitis. Hypoxic with increased WOB    #Acute Hypoxic Respiratory Failure  - RVP- COVID +ve  - Started on IV Remdesivir and IV decadron   - Weaned off Bipap to Hiflo 40L  - CT chest non-con 12/22 mild pulmonary edema with B/L pleural effusion  - U/S DVT without DVT  - Low threshold for intubation if hypoxia and/or mental status worsened to the point where patient can no longer protect his airway.  - ABG on 12/23 no acidosis or Co2 retention  - CXR does not show interstitial opacities or consolidation to suggest pneumonia    #New onset Afib RVR  -S/p IV Lopressor 5mg, IV Cardizem 10mg  -Started on PO Cardizem 30mg q8  -PO Cardizem switched to PO Metoprolol 25mg q8 as per cardiology reccs  -Head CT without acute hemorrhage, full AC with Heaprin started  -Falls 1/2 times a month, candidacy for OP AC deferred to cardiology  -No ST/T changes on EKG  -TSH, electrolytes WNL  -Monitor BMP, keep Mg>2, K>4  -Cardiology consulted, reccs appreciated    #Acute Cholecystitis  - Pt received ultrasound-guided cholecystostomy tube placement by IR at bedside on 12/23  - Bile fluid culture with gram negative rods  - On IV Zosyn D3  - Surgery signed off as not appropriate surgical candidate  - Will require f/u with IR in six weeks  - GI consulted, reccs appreciated  - CT abdomen and USG abdomen with acute cholecystitis   - WBC downtrending, LFT unremarkable  - To trend WBC, LFTs    #RON on CKD  - Urine output >30cc/hr  - Will monitor lytes and BUN/Cr  - Will consult Nephrology pending trends    Dispo: Downgrade to step down  PT eval when off HFNC

## 2023-12-23 NOTE — PROGRESS NOTE ADULT - TIME BILLING
greater than 50% of time spent reviewing labs, notes, orders and radiographs, coordinating care  discussed with nursing, MICU resident, consultants

## 2023-12-23 NOTE — CONSULT NOTE ADULT - ASSESSMENT
82 y/o M with PMH HTN, HLD, CKD3, BPH who originally presented to [facility] ED with abdominal pain and found with acute cholecystitis. Course c/b hypoxia, tachycardia  and TMax 102.3, RRT called and found to be COVID+ on RBO.  Cardiology consulted due to new onset of Afib with RVR. TTE with normal EF and no valvulopathy. Currently rate controlled. Plan to initiate AC however per daughter, he had some balance problems, is supposed to use a walking aide and refuses and has had falls recently. Will need to further discuss risk vs benefit of long term AC.  82 y/o M with PMH HTN, HLD, CKD3, BPH who originally presented to [facility] ED with abdominal pain and found with acute cholecystitis. Course c/b hypoxia, tachycardia  and TMax 102.3, RRT called and found to be COVID+ on RBO.  Cardiology consulted due to new onset of Afib with RVR. TTE with normal EF and no valvulopathy. Currently rate controlled. Plan to initiate AC however per daughter, he had some balance problems, is supposed to use a walking aide and refuses and has had falls recently. Will need to further discuss risk vs benefit of long term AC.

## 2023-12-23 NOTE — DIETITIAN INITIAL EVALUATION ADULT - PERTINENT LABORATORY DATA
chest pain
12-23    140  |  106  |  33.3<H>  ----------------------------<  172<H>  5.0   |  23.0  |  2.91<H>    Ca    8.3<L>      23 Dec 2023 02:07  Phos  3.5     12-23  Mg     2.3     12-23    TPro  6.0<L>  /  Alb  2.7<L>  /  TBili  0.9  /  DBili  0.4<H>  /  AST  39  /  ALT  24  /  AlkPhos  59  12-23  POCT Blood Glucose.: 199 mg/dL (12-23-23 @ 07:50)  A1C with Estimated Average Glucose Result: 8.2 % (12-22-23 @ 02:55)

## 2023-12-23 NOTE — CONSULT NOTE ADULT - SUBJECTIVE AND OBJECTIVE BOX
HISTORY OF PRESENT ILLNESS: This is a 83y old man with a past medical history significant for HTN and CKD who presented with acute cholecystitis. His hospital course was complicated by acute hypoxic respiratory failure requiring ICU care and Hi Flow.   He denies fever, chills, nausea, vomiting or abdominal pain. He is tolerating clear liquids.     Family at bedside.     PAST MEDICAL/SURGICAL HISTORY:  Diabetes    Hypertension    Prostate disorder    Anxiety    High cholesterol    Ulcer    No significant past surgical history      SOCIAL HISTORY:  - TOBACCO: Denies  - ALCOHOL: Denies  - ILLICIT DRUG USE: Denies    FAMILY HISTORY:  No known history of gastrointestinal or liver disease;  Family history of stomach cancer  Father    Family history of emphysema  Mother        HOME MEDICATIONS:  aspirin: 81 milligram(s) orally once a day (18 Mar 2016 14:50)  Basaglar KwikPen 100 units/mL subcutaneous solution: 30 unit(s) subcutaneous once a day (at bedtime) (21 Dec 2023 12:37)  gabapentin 300 mg oral capsule: 1 cap(s) orally once a day (at bedtime) (19 Mar 2016 11:54)  midodrine 5 mg oral tablet: 2 tab(s) orally 3 times a day (21 Dec 2023 12:37)  omeprazole 40 mg oral delayed release capsule: 1 cap(s) orally once a day (19 Mar 2016 11:54)  simvastatin 20 mg oral tablet: 1 tab(s) orally once a day (at bedtime) (19 Mar 2016 11:54)  tamsulosin: 0.4 milligram(s) orally once a day (at bedtime) (18 Mar 2016 14:50)    INPATIENT MEDICATIONS:  MEDICATIONS  (STANDING):  acetaminophen   IVPB .. 1000 milliGRAM(s) IV Intermittent once  chlorhexidine 2% Cloths 1 Application(s) Topical <User Schedule>  dexAMETHasone  Injectable 6 milliGRAM(s) IV Push every 24 hours  dextrose 5%. 1000 milliLiter(s) (100 mL/Hr) IV Continuous <Continuous>  dextrose 5%. 1000 milliLiter(s) (50 mL/Hr) IV Continuous <Continuous>  dextrose 50% Injectable 12.5 Gram(s) IV Push once  dextrose 50% Injectable 25 Gram(s) IV Push once  dextrose 50% Injectable 25 Gram(s) IV Push once  diltiazem    Tablet 30 milliGRAM(s) Oral every 8 hours  gabapentin 300 milliGRAM(s) Oral at bedtime  glucagon  Injectable 1 milliGRAM(s) IntraMuscular once  heparin  Infusion.  Unit(s)/Hr (14 mL/Hr) IV Continuous <Continuous>  insulin lispro (ADMELOG) corrective regimen sliding scale   SubCutaneous three times a day before meals  insulin lispro (ADMELOG) corrective regimen sliding scale   SubCutaneous at bedtime  midodrine. 5 milliGRAM(s) Oral three times a day  pantoprazole    Tablet 40 milliGRAM(s) Oral before breakfast  piperacillin/tazobactam IVPB.. 3.375 Gram(s) IV Intermittent every 8 hours  remdesivir  IVPB   IV Intermittent   simvastatin 20 milliGRAM(s) Oral at bedtime  tamsulosin 0.4 milliGRAM(s) Oral at bedtime    MEDICATIONS  (PRN):  dextrose Oral Gel 15 Gram(s) Oral once PRN Blood Glucose LESS THAN 70 milliGRAM(s)/deciliter  heparin   Injectable 3000 Unit(s) IV Push every 6 hours PRN For aPTT between 40 - 57  heparin   Injectable 6500 Unit(s) IV Push every 6 hours PRN For aPTT less than 40  ondansetron Injectable 4 milliGRAM(s) IV Push every 6 hours PRN Nausea    ALLERGIES:  No Known Allergies    T(C): 36.6 (12-23-23 @ 11:00), Max: 39 (12-22-23 @ 13:00)  HR: 99 (12-23-23 @ 11:00) (79 - 145)  BP: 127/106 (12-23-23 @ 11:00) (91/66 - 127/106)  RR: 20 (12-23-23 @ 11:00) (15 - 23)  SpO2: 91% (12-23-23 @ 10:00) (89% - 98%)    12-22-23 @ 07:01  -  12-23-23 @ 07:00  --------------------------------------------------------  IN: 1000 mL / OUT: 1210 mL / NET: -210 mL    12-23-23 @ 07:01  -  12-23-23 @ 12:30  --------------------------------------------------------  IN: 0 mL / OUT: 240 mL / NET: -240 mL        PHYSICAL EXAM:  Constitutional:  high flow  Eyes: Sclerae anicteric, conjunctivae normal  ENMT: Mucus membranes moist, no oropharyngeal thrush noted  Respiratory: Breathing nonlabored; clear to auscultation  Cardiovascular: tachycardic  Gastrointestinal: Soft, nontender, nondistended, normoactive bowel sounds; RUQ drain in place no rebound tenderness or involuntary guarding  Extremities: No edema  Neurological: Alert and oriented to person, place   Skin: No jaundice  Musculoskeletal: No significant peripheral atrophy  Psychiatric: Affect and mood appropriate      LABS:             9.9    22.96 )-----------( 169      ( 12-23 @ 02:07 )             32.2                12.2   23.26 )-----------( 202      ( 12-22 @ 11:23 )             37.3                10.1   26.54 )-----------( 221      ( 12-22 @ 02:30 )             30.8                9.9    23.22 )-----------( 217      ( 12-21 @ 22:55 )             31.3                12.8   18.31 )-----------( 289      ( 12-21 @ 04:30 )             39.0         12-23    140  |  106  |  33.3<H>  ----------------------------<  172<H>  5.0   |  23.0  |  2.91<H>    Ca    8.3<L>      23 Dec 2023 02:07  Phos  3.5     12-23  Mg     2.3     12-23    TPro  6.0<L>  /  Alb  2.7<L>  /  TBili  0.9  /  DBili  0.4<H>  /  AST  39  /  ALT  24  /  AlkPhos  59  12-23    LIVER FUNCTIONS - ( 23 Dec 2023 02:07 )  Alb: 2.7 g/dL / Pro: 6.0 g/dL / ALK PHOS: 59 U/L / ALT: 24 U/L / AST: 39 U/L / GGT: x             Lipase: 17 U/L (12-21-23 @ 04:30)        Urinalysis Basic - ( 23 Dec 2023 02:07 )    Color: x / Appearance: x / SG: x / pH: x  Gluc: 172 mg/dL / Ketone: x  / Bili: x / Urobili: x   Blood: x / Protein: x / Nitrite: x   Leuk Esterase: x / RBC: x / WBC x   Sq Epi: x / Non Sq Epi: x / Bacteria: x        Culture - Body Fluid with Gram Stain (collected 22 Dec 2023 14:40)  Source: Bile Bile Fluid  Gram Stain (23 Dec 2023 01:41):    No polymorphonuclear leukocytes seen    Gram Negative Rods seen    Gram positive cocci in pairs seen    by cytocentrifuge    Culture - Blood (collected 21 Dec 2023 23:05)  Source: .Blood Blood  Preliminary Report (23 Dec 2023 07:02):    No growth at 24 hours    Culture - Blood (collected 21 Dec 2023 22:55)  Source: .Blood Blood  Preliminary Report (23 Dec 2023 07:02):    No growth at 24 hours    Culture - Urine (collected 21 Dec 2023 04:45)  Source: Clean Catch Clean Catch (Midstream)  Final Report (22 Dec 2023 08:51):    <10,000 CFU/mL Normal Urogenital Allyson    Culture - Blood (collected 21 Dec 2023 04:30)  Source: .Blood Blood-Peripheral  Preliminary Report (23 Dec 2023 09:01):    No growth at 48 Hours    Culture - Blood (collected 21 Dec 2023 04:20)  Source: .Blood Blood-Peripheral  Preliminary Report (23 Dec 2023 09:01):    No growth at 48 Hours      IMAGING: I personally reviewed the CTAP and I agree with the radiologist's interpretation as described below: normal bile ducts, acute sky

## 2023-12-23 NOTE — PROGRESS NOTE ADULT - ASSESSMENT
83-year-old male with past medical history of hypertension, hyperlipidemia, DM, stage III CKD, BPH, admitted for acute sky s/p IR drain placement. Course c/b hypoxic resp failure 2/2 COVID as well as new Afib RVR.     #Acute cholecystitis  CT A/P and RUQ result noted  s/p IR perc drain placemnet  bile culture with GNR and GPC in pairs  c/w Zosyn  BCx NGTD  pain regimen  diet advancement as tolerated  GI following, herlinda recs  trend CMP  Outpatient surgery evaluation for interval cholecystectomy   on home midodrine, unclear etiology    #Acute resp failure with hypoxia suspect 2/2 COVID  Possible component of aspiration, on Zoysn as above  s/p COVID vaccines  started on Remdeivir and decadrone, plan for 5 days  s/p BIPAP, now on HFNC, wean as tolerated  CT chest noted, mild pulm edema noted, PRN lasix   DVT studies negative    #Afib RVR  Tele  TSH normal  heparin drip - per cards, will need further d/w families about AC given pts high fall risk  metoprolol 37.5mg TID, up titrate as needed    #DM  A1C 9  ISS, accu checks, monitor glucose closely while on steroid   can c/w gabapentin for neuropathy     #RON on CKD stage 3  #BPH  SCr up to 2.9, appears to be plateauing   Fena 0.6% pre-renal  likely in the setting of poor po intake, abd pain  hold off additional fluids given edema on CT chest  renally dose meds  avoud nephrotoxic medications  c/w Tamsulosin  Horta for strict in/out, TOV once OOB    #HLD  c/w statin    #GERD  c/w ppi 40mg qd    #Anemia  Hg stable 9-10 range  f/u iron studies, b12 and folate  trend CBC while admitted    DVT ppx: Heparin drip  Diet: CC Full liquid    PT eval

## 2023-12-23 NOTE — PROGRESS NOTE ADULT - SUBJECTIVE AND OBJECTIVE BOX
Patient seen and examined at bedside.     No acute events overnight. Patient states that he has some SOB but doing well on BiPap. Denies fever, chills, nausea, abdominal pain.     Vitals:  Vital Signs Last 24 Hrs  T(C): 36.7 (23 Dec 2023 00:00), Max: 39.1 (22 Dec 2023 11:26)  T(F): 98.1 (23 Dec 2023 00:00), Max: 102.3 (22 Dec 2023 11:26)  HR: 86 (23 Dec 2023 00:00) (79 - 143)  BP: 127/60 (23 Dec 2023 00:00) (99/52 - 143/83)  BP(mean): 79 (23 Dec 2023 00:00) (65 - 87)  RR: 17 (23 Dec 2023 00:00) (15 - 24)  SpO2: 94% (23 Dec 2023 00:00) (92% - 98%)    Parameters below as of 22 Dec 2023 20:00  Patient On (Oxygen Delivery Method): BiPAP/CPAP    O2 Concentration (%): 40    Labs:  12-23    140  |  106  |  33.3<H>  ----------------------------<  172<H>  5.0   |  23.0  |  2.91<H>    Ca    8.3<L>      23 Dec 2023 02:07  Phos  3.5     12-23  Mg     2.3     12-23    TPro  6.0<L>  /  Alb  2.7<L>  /  TBili  0.9  /  DBili  0.4<H>  /  AST  39  /  ALT  24  /  AlkPhos  59  12-23                        9.9    22.96 )-----------( 169      ( 23 Dec 2023 02:07 )             32.2       Exam:  Gen: pt lying in bed, alert, in NAD  Resp: unlabored on BIpap  CVS: RRR  Abd: soft, appropriately tender by drain site, nondistended. Drain showed bilious output.   Ext: moving all extremities spontaneously, sensation intact, pulses 2+

## 2023-12-23 NOTE — CONSULT NOTE ADULT - PROBLEM SELECTOR RECOMMENDATION 9
.  - [id number] 3  - Afib with RVR to 150s, now rate controlled  - rates to 120s acceptable in the setting of sepsis due to choleycystitis/COVID19  - TTE ef 55-60%, no valvulopathy, fibrocalcific AV without stenosis. normal PA pressures  - started on diltiazem, would d/c   - change to metoprolol 25mg PO Q8H with hold parameters, titrate up as BP tolerates  - would d/c midodrine, fluid resuscitate as needed  - has hx of falls and imbalances per daughter. also has noted an essential tremor over past year. Patient is supposed to use mobility aides but refuses to do so. Had fall with rib fractures earlier this year. Will need to further discuss risk vs benefit of DOAC with patient and family.   - heparin GTT to start, eventual transition to Eliquis 2.5 mg PO BID due to age and creatinine .  - [id number] 3  - Afib with RVR to 150s, now rate controlled  - rates to 120s acceptable in the setting of sepsis due to choleycystitis/COVID19  - TTE ef 55-60%, no valvulopathy, fibrocalcific AV without stenosis. normal PA pressures  - started on diltiazem, would d/c   - change to metoprolol 25mg PO Q8H with hold parameters, titrate up as BP tolerates  - would d/c midodrine, fluid resuscitate as needed  - has hx of falls and imbalances per daughter. also has noted an essential tremor over past year. Patient is supposed to use mobility aides but refuses to do so. Had fall with rib fractures earlier this year. Will need to further discuss risk vs benefit of DOAC with patient and family.   - heparin GTT to start, eventual transition to Eliquis 2.5 mg PO BID due to age and creatinine

## 2023-12-23 NOTE — DIETITIAN INITIAL EVALUATION ADULT - OTHER INFO
83-year-old male with acute cholecystitis, febrile, tachycardic, and hypoxic on MICU assessment. Patient meeting SIRS criteria likely secondary to acute cholecystitis. Now hypoxic with increased work of breathing, placed on BiPAP. S/p bedside sky tube 12/22.

## 2023-12-23 NOTE — CONSULT NOTE ADULT - ASSESSMENT
83 year old male with acute cholecystitis and acute respiratory failure with COVID.     Acute Cholecystitis  s/p perc sky bile cultures positive  Continue antibiotics  No role for endoscopic intervention at this time  LAES are normal and imaging does not have choledocholithiasis  Outpatient surgery evaluation for interval cholecystectomy   Discussed plan with daughter at bedside  83 year old male with acute cholecystitis and acute respiratory failure with COVID.     Acute Cholecystitis  s/p perc sky bile cultures positive  Continue antibiotics  No role for endoscopic intervention at this time  LAES are normal and imaging does not have choledocholithiasis  Outpatient surgery evaluation for interval cholecystectomy   Discussed plan with daughter at bedside and ICU team

## 2023-12-23 NOTE — PROGRESS NOTE ADULT - ASSESSMENT
84 y/o male s/p bedside per sky tube by IR on 12/22 was afebrile and hemodynamically stable this morning. Patient was found to be Covid + and was placed on remdesivir.     PLAN  -Continue care per MICU team  -Continue Zosyn and trend leukocytosis.   -pain control  -strict Is/Os, monitor drain output.   -continue home meds  -trend labs, replete electrolytes as needed  -encourage OOB  -incentive spirometry  -DVT ppx: SCDs,  HSQ   82 y/o male s/p bedside per sky tube by IR on 12/22 was afebrile and hemodynamically stable this morning. Patient was found to be Covid + and was placed on remdesivir.     PLAN  -Continue care per MICU team  -Continue Zosyn and trend leukocytosis.   -pain control  -strict Is/Os, monitor drain output.   -continue home meds  -trend labs, replete electrolytes as needed  -encourage OOB  -incentive spirometry  -DVT ppx: SCDs,  HSQ   84 y/o male s/p bedside per sky tube by IR on 12/22 was afebrile and hemodynamically stable this morning. Patient was found to be Covid + and was placed on remdesivir. Drain output overnight was 60.     PLAN  -Continue care per MICU team  -Continue Zosyn and trend leukocytosis.   -pain control  -strict Is/Os, monitor drain output.   -continue home meds  -trend labs, replete electrolytes as needed  -encourage OOB  -incentive spirometry  -DVT ppx: SCDs,  HSQ

## 2023-12-23 NOTE — DIETITIAN INITIAL EVALUATION ADULT - ADD RECOMMEND
Diet advancement when medically feasible (recommend Consistent CHO, DASH/TLC)  Encourage PO intake and assist with meals prn  Monitor BG levels, correct as needed  Diet education when medically feasible

## 2023-12-23 NOTE — PROGRESS NOTE ADULT - ATTENDING COMMENTS
Acute cholecysitis s/p perc drain 12/22, bile culture positive for Gram neg yessenia and gram positive cocci  Acute hypoxic respiratory failure  COVID-19 pneumonia, cannot rule out superimposed bacterial pneumonia  afib rvr   frequent fall, baseline tremors    patient tolerates high flow at 40%/40L   ct chest shows possible pulm edema, however cannot rule out pneumonia   COVID was negative on admission, and possitive yesterday, could potentially explain ct finding  started remdesivir and decadron   if patient has worsening respiratory status, may consider diuretic.  Echo on 12/22 shows normal EF and normal right side pressure  afib rvr, appreciate card consult, started metoprolol.    will start heparin drip also, however will need further GOC/risk benefit discussion regarding continuing AC   continue antibiotics  appreciate surgery, no intervention  appreciate GI, continue current management  will need perc drain flush pre shift or twice daily   PT, OOB to chair, aspiraiton precaution   patient stable to be downgrade to the floor

## 2023-12-23 NOTE — CONSULT NOTE ADULT - SUBJECTIVE AND OBJECTIVE BOX
[facility]                                              CARDIOLOGY AT [address], [place] [address]                                             Telephone: [phone]. Fax:[phone]                                                       CARDIOLOGY CONSULTATION NOTE                                                                                             History obtained by: Patient and medical record  Community Cardiologist: none, PCP Dr. Vides   obtained: Yes [ x] No [  ]  Reason for Consultation: new Afib with RVR  Available out pt records reviewed: Yes [  ] No [  ]    Chief complaint:    Patient is a 83y old  Male who presents with a chief complaint of Cholecystitis (23 Dec 2023 13:30)      HPI:  82 y/o M with PMH HTN, HLD, CKD3, BPH who originally presented to [facility] ED with abdominal pain and found with acute cholecystitis. Course c/b hypoxia, tachycardia  and TMax 102.3, RRT called and found to be COVID+ on RBO. He was started on NIPPV, started on Remdesivir and IV Decadron and now on HFNC. Emergent bedside sky tube placed by IR as patient is not a surgical candidate per surgery team. Cardiology consulted due to new onset of Afib with RVR. TTE with normal EF and no valvulopathy. Currently rate controlled. Patient has never seen a cardiologist before and denies any previous cardiac history. Plan to initiate AC however per daughter, he had some balnce problems, is supposed to use a walking aide and refuses and has had falls recently.  Denies chest pain, back pain, headache, dizziness, SOB, SARKAR, diaphoresis, syncope or N/V.    CARDIAC TESTING   ECHO:  < from: TTE W or WO Ultrasound Enhancing Agent (12.22.23 @ 19:49) >    < from: TTE W or WO Ultrasound Enhancing Agent (12.22.23 @ 19:49) >  TRANSTHORACIC ECHOCARDIOGRAM REPORT  ________________________________________________________________________________                                      _______       Pt. Name:       BERNADINE MONICA Study Date:    12/22/2023  MRN:            [id number]         YOB: 1940  Accession #:    [id number]         Age:           83 years  Account#:       [id number]        Gender:        M  Heart Rate:                       Height:        62.00 in (157.48 cm)  Rhythm:     Weight:        174.00 lb (78.93 kg)  Blood Pressure: 102/53 mmHg       BSA/BMI:       1.80 m² / 31.83 kg/m²  ________________________________________________________________________________________  Referring Physician:    [id number] Janes Valverde  Interpreting Physician: George Ortiz MD  Primary Sonographer:    Sushma Driscoll    CPT:               ECHO TTE WO CON COMP W DOPP - 01530.m  Indication(s):     Dyspnea, unspecified - R06.00  Procedure:         Transthoracic echocardiogram with 2-D, M-mode and complete                     spectral and color flow Doppler.  Ordering Location: [facility]  Study Information: Study done bedside in [facility]. Pt. is + COVID.    _______________________________________________________________________________________     CONCLUSIONS:      1. Left ventricular cavity is normal. Left ventricular systolic function is normal with an ejection fraction visually estimated at 55 to 60 %.   2. Normal left ventricular diastolic function.   3. Normal right ventricular cavity size and systolic function.   4. The left atrium is normal.   5. The right atrium is normal in size.   6. Fibrocalcific aortic valve sclerosis without stenosis.   7. Estimated pulmonary artery systolic pressure is 34 mmHg, normal pulmonary artery pressure.    ________________________________________________________________________________________  FINDINGS:     Left Ventricle:  The left ventricular cavity is normal. Left ventricular systolic function is normal with an ejection fraction visually estimated at 55 to 60%. There is normal left ventricular diastolic function. No left ventricular hypertrophy.     Right Ventricle:  The right ventricular cavity is normal in size and normal systolic function. Tricuspid annular tissue Doppler S' is9.0 cm/s (normal >10 cm/s).     Left Atrium:  The left atrium is normal with an indexed volume of 18.77 ml/m². The right upper pulmonary vein appears to drain into the left atrium. The pulmonary venous flow pattern is normal.     Right Atrium:  The right atrium is normal in size with an indexed volume of 13.38 ml/m² and an indexed area of 6.42 cm²/m².     Interatrial Septum:  The interatrial septum appears intact.     Aortic Valve:  The aortic valve appears trileaflet with normal systolic excursion. There is fibrocalcific aortic valve sclerosis without stenosis. There is no evidence of aortic regurgitation.     Mitral Valve:  Structurally normal mitral valve with normal leaflet excursion. The mitral valve was not well visualized. There is trace mitral regurgitation.     Tricuspid Valve:  Structurally normal tricuspid valve with normal leaflet excursion. The tricuspid valve was not well visualized. There is mild tricuspid regurgitation. Estimated pulmonary artery systolic pressure is 34 mmHg, consistent with normal pulmonary artery pressure.     Pulmonic Valve:  The pulmonic valve was not well visualized. Structurally normal pulmonic valve with normal leaflet excursion. There is trace pulmonic regurgitation.     Pulmonary Artery:  The main pulmonary artery is normal in size, origin, and position.     Aorta:  The aortic root appears normal in size. The aortic root at the sinuses of Valsalva is normal in size, measuring 3.38 cm (indexed 1.88 cm/m²). The ascending aorta diameter is normal in size, measuring 3.21 cm (indexed 1.78 cm/m²).     Pericardium:  No pericardial effusion seen. Pericardial fat pad is seen anterior to the right ventricle.     Systemic Veins:  The inferior vena cava is normal in size measuring 1.30 cm in diameter, (normal <2.1cm) with normal inspiratory collapse (normal >50%) consistent with normal right atrial pressure (~3, range 0-5mmHg).  ____________________________________________________________________    < end of copied text >  STRESS:    CATH:     ELECTROPHYSIOLOGY:     PAST MEDICAL HISTORY  Diabetes  Hypertension  Prostate disorder  Anxiety  High cholesterol  Ulcer    PAST SURGICAL HISTORY  No significant past surgical history    SOCIAL HISTORY:  Denies smoking/alcohol/drugs  CIGARETTES:     ALCOHOL:  DRUGS:    FAMILY HISTORY:  Family history of stomach cancer  Father    Family history of emphysema  Mother      Family History of Cardiovascular Disease:  Yes [  ] No [ x ]  Coronary Artery Disease in first degree relative: Yes [  ] No [ x ]  Sudden Cardiac Death in First degree relative: Yes [  ] No [ x ]    HOME MEDICATIONS:  aspirin: 81 milligram(s) orally once a day (18 Mar 2016 14:50)  Basaglar KwikPen 100 units/mL subcutaneous solution: 30 unit(s) subcutaneous once a day (at bedtime) (21 Dec 2023 12:37)  gabapentin 300 mg oral capsule: 1 cap(s) orally once a day (at bedtime) (19 Mar 2016 11:54)  midodrine 5 mg oral tablet: 2 tab(s) orally 3 times a day (21 Dec 2023 12:37)  omeprazole 40 mg oral delayed release capsule: 1 cap(s) orally once a day (19 Mar 2016 11:54)  simvastatin 20 mg oral tablet: 1 tab(s) orally once a day (at bedtime) (19 Mar 2016 11:54)  tamsulosin: 0.4 milligram(s) orally once a day (at bedtime) (18 Mar 2016 14:50)      CURRENT CARDIAC MEDICATIONS:  diltiazem    Tablet 30 milliGRAM(s) Oral every 8 hours  midodrine. 5 milliGRAM(s) Oral three times a day      CURRENT OTHER MEDICATIONS:  acetaminophen   IVPB .. 1000 milliGRAM(s) IV Intermittent once, Stop order after: 1 Doses  gabapentin 300 milliGRAM(s) Oral at bedtime  ondansetron Injectable 4 milliGRAM(s) IV Push every 6 hours PRN Nausea  pantoprazole    Tablet 40 milliGRAM(s) Oral before breakfast  chlorhexidine 2% Cloths 1 Application(s) Topical <User Schedule>  dexAMETHasone  Injectable 6 milliGRAM(s) IV Push every 24 hours  dextrose 5%. 1000 milliLiter(s) (100 mL/Hr) IV Continuous <Continuous>  dextrose 5%. 1000 milliLiter(s) (50 mL/Hr) IV Continuous <Continuous>  dextrose 50% Injectable 12.5 Gram(s) IV Push once, Stop order after: 1 Doses  dextrose 50% Injectable 25 Gram(s) IV Push once, Stop order after: 1 Doses  dextrose 50% Injectable 25 Gram(s) IV Push once, Stop order after: 1 Doses  dextrose Oral Gel 15 Gram(s) Oral once, Stop order after: 1 Doses PRN Blood Glucose LESS THAN 70 milliGRAM(s)/deciliter  glucagon  Injectable 1 milliGRAM(s) IntraMuscular once, Stop order after: 1 Doses  heparin   Injectable 6500 Unit(s) IV Push every 6 hours PRN For aPTT less than 40  heparin   Injectable 3000 Unit(s) IV Push every 6 hours PRN For aPTT between 40 - 57  heparin  Infusion.  Unit(s)/Hr (14 mL/Hr) IV Continuous <Continuous>  insulin lispro (ADMELOG) corrective regimen sliding scale   SubCutaneous three times a day before meals  insulin lispro (ADMELOG) corrective regimen sliding scale   SubCutaneous at bedtime  piperacillin/tazobactam IVPB.. 3.375 Gram(s) IV Intermittent every 8 hours, Stop order after: 7 Days  remdesivir  IVPB   IV Intermittent   simvastatin 20 milliGRAM(s) Oral at bedtime  tamsulosin 0.4 milliGRAM(s) Oral at bedtime      ALLERGIES:   No Known Allergies      REVIEW OF SYMPTOMS:   CONSTITUTIONAL: No fever, no chills, no weight loss, no weight gain, no fatigue   ENMT:  No vertigo; No sinus or throat pain  NECK: No pain or stiffness  CARDIOVASCULAR: No chest pain, no dyspnea, no syncope/presyncope, no palpitations, no dizziness, no Orthopnea, no Paroxsymal nocturnal dyspnea  RESPIRATORY: +Shortness of breath, + cough, no wheezing  : No dysuria, no hematuria   GI: No dark color stool, no nausea, no diarrhea, no constipation, no abdominal pain, +choley tube  NEURO: No headache, no slurred speech   MUSCULOSKELETAL: No joint pain or swelling; No muscle, back, or extremity pain  PSYCH: No agitation, no anxiety.    ALL OTHER REVIEW OF SYSTEMS ARE NEGATIVE.    VITAL SIGNS:  T(C): 36.6 (12-23-23 @ 13:00), Max: 37.7 (12-22-23 @ 15:00)  T(F): 97.9 (12-23-23 @ 13:00), Max: 99.9 (12-22-23 @ 15:00)  HR: 112 (12-23-23 @ 13:00) (79 - 145)  BP: 123/96 (12-23-23 @ 13:00) (91/66 - 132/85)  RR: 18 (12-23-23 @ 13:00) (15 - 23)  SpO2: 91% (12-23-23 @ 10:00) (89% - 98%)    INTAKE AND OUTPUT:     12-22 @ 07:01  -  12-23 @ 07:00  --------------------------------------------------------  IN: 1000 mL / OUT: 1210 mL / NET: -210 mL    12-23 @ 07:01  -  12-23 @ 14:55  --------------------------------------------------------  IN: 60 mL / OUT: 415 mL / NET: -355 mL        PHYSICAL EXAM:  Constitutional: Comfortable . No acute distress.   HEENT: Atraumatic and normocephalic , neck is supple . no JVD. No carotid bruit.  CNS: A&Ox3. No focal deficits.   Respiratory: CTAB, unlabored   Cardiovascular: RRR normal s1 s2. No murmur. No rubs or gallop.  Gastrointestinal: Soft, non-tender. +Bowel sounds.   Extremities: 2+ Peripheral Pulses, No clubbing, cyanosis, or edema  Psychiatric: Calm . no agitation.   Skin: Warm and dry, no ulcers on extremities     LABS:                            10.8   19.30 )-----------( 154      ( 23 Dec 2023 12:30 )             32.8     12-23    138  |  102  |  39.8<H>  ----------------------------<  189<H>  4.7   |  20.0<L>  |  2.91<H>    Ca    8.7      23 Dec 2023 12:30  Phos  3.8     12-23  Mg     2.4     12-23    TPro  6.4<L>  /  Alb  2.9<L>  /  TBili  0.6  /  DBili  x   /  AST  38  /  ALT  27  /  AlkPhos  53  12-23    PTT - ( 23 Dec 2023 13:41 )  PTT:33.1 sec  Urinalysis Basic - ( 23 Dec 2023 12:30 )    Color: x / Appearance: x / SG: x / pH: x  Gluc: 189 mg/dL / Ketone: x  / Bili: x / Urobili: x   Blood: x / Protein: x / Nitrite: x   Leuk Esterase: x / RBC: x / WBC x   Sq Epi: x / Non Sq Epi: x / Bacteria: x          Thyroid Stimulating Hormone, Serum: 1.25 uIU/mL (12-23-23 @ 03:27)      INTERPRETATION OF TELEMETRY:     ECG: Afib rate controlled  Prior ECG: Yes [  ] No [  ]    RADIOLOGY & ADDITIONAL STUDIES:    X-ray:    CT scan:   < from: CT Chest No Cont (12.23.23 @ 10:49) >  IMPRESSION:    Mild pulmonary edema and small pleural effusions, new since CT chest 2   days prior.    --- End of Report ---    < end of copied text >  MRI:   US:  < from: US Duplex Venous Lower Ext Complete, Bilateral (12.22.23 @ 15:00) >  IMPRESSION:    No acute DVT of the lower extremities.    --- End of Report ---    < end of copied text >                                                [facility]                                              CARDIOLOGY AT [address], [place] [address]                                             Telephone: [phone]. Fax:[phone]                                                       CARDIOLOGY CONSULTATION NOTE                                                                                             History obtained by: Patient and medical record  Community Cardiologist: none, PCP Dr. Vides   obtained: Yes [ x] No [  ]  Reason for Consultation: new Afib with RVR  Available out pt records reviewed: Yes [  ] No [  ]    Chief complaint:    Patient is a 83y old  Male who presents with a chief complaint of Cholecystitis (23 Dec 2023 13:30)      HPI:  84 y/o M with PMH HTN, HLD, CKD3, BPH who originally presented to [facility] ED with abdominal pain and found with acute cholecystitis. Course c/b hypoxia, tachycardia  and TMax 102.3, RRT called and found to be COVID+ on RBO. He was started on NIPPV, started on Remdesivir and IV Decadron and now on HFNC. Emergent bedside sky tube placed by IR as patient is not a surgical candidate per surgery team. Cardiology consulted due to new onset of Afib with RVR. TTE with normal EF and no valvulopathy. Currently rate controlled. Patient has never seen a cardiologist before and denies any previous cardiac history. Plan to initiate AC however per daughter, he had some balnce problems, is supposed to use a walking aide and refuses and has had falls recently.  Denies chest pain, back pain, headache, dizziness, SOB, SARKAR, diaphoresis, syncope or N/V.    CARDIAC TESTING   ECHO:  < from: TTE W or WO Ultrasound Enhancing Agent (12.22.23 @ 19:49) >    < from: TTE W or WO Ultrasound Enhancing Agent (12.22.23 @ 19:49) >  TRANSTHORACIC ECHOCARDIOGRAM REPORT  ________________________________________________________________________________                                      _______       Pt. Name:       BERNADINE MONICA Study Date:    12/22/2023  MRN:            [id number]         YOB: 1940  Accession #:    [id number]         Age:           83 years  Account#:       [id number]        Gender:        M  Heart Rate:                       Height:        62.00 in (157.48 cm)  Rhythm:     Weight:        174.00 lb (78.93 kg)  Blood Pressure: 102/53 mmHg       BSA/BMI:       1.80 m² / 31.83 kg/m²  ________________________________________________________________________________________  Referring Physician:    [id number] Janes Valverde  Interpreting Physician: George Ortiz MD  Primary Sonographer:    Sushma Driscoll    CPT:               ECHO TTE WO CON COMP W DOPP - 14482.m  Indication(s):     Dyspnea, unspecified - R06.00  Procedure:         Transthoracic echocardiogram with 2-D, M-mode and complete                     spectral and color flow Doppler.  Ordering Location: [facility]  Study Information: Study done bedside in [facility]. Pt. is + COVID.    _______________________________________________________________________________________     CONCLUSIONS:      1. Left ventricular cavity is normal. Left ventricular systolic function is normal with an ejection fraction visually estimated at 55 to 60 %.   2. Normal left ventricular diastolic function.   3. Normal right ventricular cavity size and systolic function.   4. The left atrium is normal.   5. The right atrium is normal in size.   6. Fibrocalcific aortic valve sclerosis without stenosis.   7. Estimated pulmonary artery systolic pressure is 34 mmHg, normal pulmonary artery pressure.    ________________________________________________________________________________________  FINDINGS:     Left Ventricle:  The left ventricular cavity is normal. Left ventricular systolic function is normal with an ejection fraction visually estimated at 55 to 60%. There is normal left ventricular diastolic function. No left ventricular hypertrophy.     Right Ventricle:  The right ventricular cavity is normal in size and normal systolic function. Tricuspid annular tissue Doppler S' is9.0 cm/s (normal >10 cm/s).     Left Atrium:  The left atrium is normal with an indexed volume of 18.77 ml/m². The right upper pulmonary vein appears to drain into the left atrium. The pulmonary venous flow pattern is normal.     Right Atrium:  The right atrium is normal in size with an indexed volume of 13.38 ml/m² and an indexed area of 6.42 cm²/m².     Interatrial Septum:  The interatrial septum appears intact.     Aortic Valve:  The aortic valve appears trileaflet with normal systolic excursion. There is fibrocalcific aortic valve sclerosis without stenosis. There is no evidence of aortic regurgitation.     Mitral Valve:  Structurally normal mitral valve with normal leaflet excursion. The mitral valve was not well visualized. There is trace mitral regurgitation.     Tricuspid Valve:  Structurally normal tricuspid valve with normal leaflet excursion. The tricuspid valve was not well visualized. There is mild tricuspid regurgitation. Estimated pulmonary artery systolic pressure is 34 mmHg, consistent with normal pulmonary artery pressure.     Pulmonic Valve:  The pulmonic valve was not well visualized. Structurally normal pulmonic valve with normal leaflet excursion. There is trace pulmonic regurgitation.     Pulmonary Artery:  The main pulmonary artery is normal in size, origin, and position.     Aorta:  The aortic root appears normal in size. The aortic root at the sinuses of Valsalva is normal in size, measuring 3.38 cm (indexed 1.88 cm/m²). The ascending aorta diameter is normal in size, measuring 3.21 cm (indexed 1.78 cm/m²).     Pericardium:  No pericardial effusion seen. Pericardial fat pad is seen anterior to the right ventricle.     Systemic Veins:  The inferior vena cava is normal in size measuring 1.30 cm in diameter, (normal <2.1cm) with normal inspiratory collapse (normal >50%) consistent with normal right atrial pressure (~3, range 0-5mmHg).  ____________________________________________________________________    < end of copied text >  STRESS:    CATH:     ELECTROPHYSIOLOGY:     PAST MEDICAL HISTORY  Diabetes  Hypertension  Prostate disorder  Anxiety  High cholesterol  Ulcer    PAST SURGICAL HISTORY  No significant past surgical history    SOCIAL HISTORY:  Denies smoking/alcohol/drugs  CIGARETTES:     ALCOHOL:  DRUGS:    FAMILY HISTORY:  Family history of stomach cancer  Father    Family history of emphysema  Mother      Family History of Cardiovascular Disease:  Yes [  ] No [ x ]  Coronary Artery Disease in first degree relative: Yes [  ] No [ x ]  Sudden Cardiac Death in First degree relative: Yes [  ] No [ x ]    HOME MEDICATIONS:  aspirin: 81 milligram(s) orally once a day (18 Mar 2016 14:50)  Basaglar KwikPen 100 units/mL subcutaneous solution: 30 unit(s) subcutaneous once a day (at bedtime) (21 Dec 2023 12:37)  gabapentin 300 mg oral capsule: 1 cap(s) orally once a day (at bedtime) (19 Mar 2016 11:54)  midodrine 5 mg oral tablet: 2 tab(s) orally 3 times a day (21 Dec 2023 12:37)  omeprazole 40 mg oral delayed release capsule: 1 cap(s) orally once a day (19 Mar 2016 11:54)  simvastatin 20 mg oral tablet: 1 tab(s) orally once a day (at bedtime) (19 Mar 2016 11:54)  tamsulosin: 0.4 milligram(s) orally once a day (at bedtime) (18 Mar 2016 14:50)      CURRENT CARDIAC MEDICATIONS:  diltiazem    Tablet 30 milliGRAM(s) Oral every 8 hours  midodrine. 5 milliGRAM(s) Oral three times a day      CURRENT OTHER MEDICATIONS:  acetaminophen   IVPB .. 1000 milliGRAM(s) IV Intermittent once, Stop order after: 1 Doses  gabapentin 300 milliGRAM(s) Oral at bedtime  ondansetron Injectable 4 milliGRAM(s) IV Push every 6 hours PRN Nausea  pantoprazole    Tablet 40 milliGRAM(s) Oral before breakfast  chlorhexidine 2% Cloths 1 Application(s) Topical <User Schedule>  dexAMETHasone  Injectable 6 milliGRAM(s) IV Push every 24 hours  dextrose 5%. 1000 milliLiter(s) (100 mL/Hr) IV Continuous <Continuous>  dextrose 5%. 1000 milliLiter(s) (50 mL/Hr) IV Continuous <Continuous>  dextrose 50% Injectable 12.5 Gram(s) IV Push once, Stop order after: 1 Doses  dextrose 50% Injectable 25 Gram(s) IV Push once, Stop order after: 1 Doses  dextrose 50% Injectable 25 Gram(s) IV Push once, Stop order after: 1 Doses  dextrose Oral Gel 15 Gram(s) Oral once, Stop order after: 1 Doses PRN Blood Glucose LESS THAN 70 milliGRAM(s)/deciliter  glucagon  Injectable 1 milliGRAM(s) IntraMuscular once, Stop order after: 1 Doses  heparin   Injectable 6500 Unit(s) IV Push every 6 hours PRN For aPTT less than 40  heparin   Injectable 3000 Unit(s) IV Push every 6 hours PRN For aPTT between 40 - 57  heparin  Infusion.  Unit(s)/Hr (14 mL/Hr) IV Continuous <Continuous>  insulin lispro (ADMELOG) corrective regimen sliding scale   SubCutaneous three times a day before meals  insulin lispro (ADMELOG) corrective regimen sliding scale   SubCutaneous at bedtime  piperacillin/tazobactam IVPB.. 3.375 Gram(s) IV Intermittent every 8 hours, Stop order after: 7 Days  remdesivir  IVPB   IV Intermittent   simvastatin 20 milliGRAM(s) Oral at bedtime  tamsulosin 0.4 milliGRAM(s) Oral at bedtime      ALLERGIES:   No Known Allergies      REVIEW OF SYMPTOMS:   CONSTITUTIONAL: No fever, no chills, no weight loss, no weight gain, no fatigue   ENMT:  No vertigo; No sinus or throat pain  NECK: No pain or stiffness  CARDIOVASCULAR: No chest pain, no dyspnea, no syncope/presyncope, no palpitations, no dizziness, no Orthopnea, no Paroxsymal nocturnal dyspnea  RESPIRATORY: +Shortness of breath, + cough, no wheezing  : No dysuria, no hematuria   GI: No dark color stool, no nausea, no diarrhea, no constipation, no abdominal pain, +choley tube  NEURO: No headache, no slurred speech   MUSCULOSKELETAL: No joint pain or swelling; No muscle, back, or extremity pain  PSYCH: No agitation, no anxiety.    ALL OTHER REVIEW OF SYSTEMS ARE NEGATIVE.    VITAL SIGNS:  T(C): 36.6 (12-23-23 @ 13:00), Max: 37.7 (12-22-23 @ 15:00)  T(F): 97.9 (12-23-23 @ 13:00), Max: 99.9 (12-22-23 @ 15:00)  HR: 112 (12-23-23 @ 13:00) (79 - 145)  BP: 123/96 (12-23-23 @ 13:00) (91/66 - 132/85)  RR: 18 (12-23-23 @ 13:00) (15 - 23)  SpO2: 91% (12-23-23 @ 10:00) (89% - 98%)    INTAKE AND OUTPUT:     12-22 @ 07:01  -  12-23 @ 07:00  --------------------------------------------------------  IN: 1000 mL / OUT: 1210 mL / NET: -210 mL    12-23 @ 07:01  -  12-23 @ 14:55  --------------------------------------------------------  IN: 60 mL / OUT: 415 mL / NET: -355 mL        PHYSICAL EXAM:  Constitutional: Comfortable . No acute distress.   HEENT: Atraumatic and normocephalic , neck is supple . no JVD. No carotid bruit.  CNS: A&Ox3. No focal deficits.   Respiratory: CTAB, unlabored   Cardiovascular: RRR normal s1 s2. No murmur. No rubs or gallop.  Gastrointestinal: Soft, non-tender. +Bowel sounds.   Extremities: 2+ Peripheral Pulses, No clubbing, cyanosis, or edema  Psychiatric: Calm . no agitation.   Skin: Warm and dry, no ulcers on extremities     LABS:                            10.8   19.30 )-----------( 154      ( 23 Dec 2023 12:30 )             32.8     12-23    138  |  102  |  39.8<H>  ----------------------------<  189<H>  4.7   |  20.0<L>  |  2.91<H>    Ca    8.7      23 Dec 2023 12:30  Phos  3.8     12-23  Mg     2.4     12-23    TPro  6.4<L>  /  Alb  2.9<L>  /  TBili  0.6  /  DBili  x   /  AST  38  /  ALT  27  /  AlkPhos  53  12-23    PTT - ( 23 Dec 2023 13:41 )  PTT:33.1 sec  Urinalysis Basic - ( 23 Dec 2023 12:30 )    Color: x / Appearance: x / SG: x / pH: x  Gluc: 189 mg/dL / Ketone: x  / Bili: x / Urobili: x   Blood: x / Protein: x / Nitrite: x   Leuk Esterase: x / RBC: x / WBC x   Sq Epi: x / Non Sq Epi: x / Bacteria: x          Thyroid Stimulating Hormone, Serum: 1.25 uIU/mL (12-23-23 @ 03:27)      INTERPRETATION OF TELEMETRY:     ECG: Afib rate controlled  Prior ECG: Yes [  ] No [  ]    RADIOLOGY & ADDITIONAL STUDIES:    X-ray:    CT scan:   < from: CT Chest No Cont (12.23.23 @ 10:49) >  IMPRESSION:    Mild pulmonary edema and small pleural effusions, new since CT chest 2   days prior.    --- End of Report ---    < end of copied text >  MRI:   US:  < from: US Duplex Venous Lower Ext Complete, Bilateral (12.22.23 @ 15:00) >  IMPRESSION:    No acute DVT of the lower extremities.    --- End of Report ---    < end of copied text >

## 2023-12-24 LAB
-  AMOXICILLIN/CLAVULANIC ACID: SIGNIFICANT CHANGE UP
-  AMOXICILLIN/CLAVULANIC ACID: SIGNIFICANT CHANGE UP
-  AMPICILLIN/SULBACTAM: SIGNIFICANT CHANGE UP
-  AMPICILLIN/SULBACTAM: SIGNIFICANT CHANGE UP
-  AMPICILLIN: SIGNIFICANT CHANGE UP
-  AMPICILLIN: SIGNIFICANT CHANGE UP
-  AZTREONAM: SIGNIFICANT CHANGE UP
-  AZTREONAM: SIGNIFICANT CHANGE UP
-  CEFAZOLIN: SIGNIFICANT CHANGE UP
-  CEFAZOLIN: SIGNIFICANT CHANGE UP
-  CEFEPIME: SIGNIFICANT CHANGE UP
-  CEFEPIME: SIGNIFICANT CHANGE UP
-  CEFOXITIN: SIGNIFICANT CHANGE UP
-  CEFOXITIN: SIGNIFICANT CHANGE UP
-  CEFTRIAXONE: SIGNIFICANT CHANGE UP
-  CEFTRIAXONE: SIGNIFICANT CHANGE UP
-  CIPROFLOXACIN: SIGNIFICANT CHANGE UP
-  CIPROFLOXACIN: SIGNIFICANT CHANGE UP
-  ERTAPENEM: SIGNIFICANT CHANGE UP
-  ERTAPENEM: SIGNIFICANT CHANGE UP
-  GENTAMICIN: SIGNIFICANT CHANGE UP
-  GENTAMICIN: SIGNIFICANT CHANGE UP
-  IMIPENEM: SIGNIFICANT CHANGE UP
-  IMIPENEM: SIGNIFICANT CHANGE UP
-  LEVOFLOXACIN: SIGNIFICANT CHANGE UP
-  LEVOFLOXACIN: SIGNIFICANT CHANGE UP
-  MEROPENEM: SIGNIFICANT CHANGE UP
-  MEROPENEM: SIGNIFICANT CHANGE UP
-  PIPERACILLIN/TAZOBACTAM: SIGNIFICANT CHANGE UP
-  PIPERACILLIN/TAZOBACTAM: SIGNIFICANT CHANGE UP
-  TOBRAMYCIN: SIGNIFICANT CHANGE UP
-  TOBRAMYCIN: SIGNIFICANT CHANGE UP
-  TRIMETHOPRIM/SULFAMETHOXAZOLE: SIGNIFICANT CHANGE UP
-  TRIMETHOPRIM/SULFAMETHOXAZOLE: SIGNIFICANT CHANGE UP
ALBUMIN SERPL ELPH-MCNC: 2.5 G/DL — LOW (ref 3.3–5.2)
ALBUMIN SERPL ELPH-MCNC: 2.5 G/DL — LOW (ref 3.3–5.2)
ALP SERPL-CCNC: 51 U/L — SIGNIFICANT CHANGE UP (ref 40–120)
ALP SERPL-CCNC: 51 U/L — SIGNIFICANT CHANGE UP (ref 40–120)
ALT FLD-CCNC: 22 U/L — SIGNIFICANT CHANGE UP
ALT FLD-CCNC: 22 U/L — SIGNIFICANT CHANGE UP
ANION GAP SERPL CALC-SCNC: 15 MMOL/L — SIGNIFICANT CHANGE UP (ref 5–17)
ANION GAP SERPL CALC-SCNC: 15 MMOL/L — SIGNIFICANT CHANGE UP (ref 5–17)
APTT BLD: 138.2 SEC — CRITICAL HIGH (ref 24.5–35.6)
APTT BLD: 138.2 SEC — CRITICAL HIGH (ref 24.5–35.6)
APTT BLD: 30.4 SEC — SIGNIFICANT CHANGE UP (ref 24.5–35.6)
APTT BLD: 30.4 SEC — SIGNIFICANT CHANGE UP (ref 24.5–35.6)
APTT BLD: >200 SEC — CRITICAL HIGH (ref 24.5–35.6)
APTT BLD: >200 SEC — CRITICAL HIGH (ref 24.5–35.6)
AST SERPL-CCNC: 29 U/L — SIGNIFICANT CHANGE UP
AST SERPL-CCNC: 29 U/L — SIGNIFICANT CHANGE UP
BASOPHILS # BLD AUTO: 0.02 K/UL — SIGNIFICANT CHANGE UP (ref 0–0.2)
BASOPHILS # BLD AUTO: 0.02 K/UL — SIGNIFICANT CHANGE UP (ref 0–0.2)
BASOPHILS NFR BLD AUTO: 0.1 % — SIGNIFICANT CHANGE UP (ref 0–2)
BASOPHILS NFR BLD AUTO: 0.1 % — SIGNIFICANT CHANGE UP (ref 0–2)
BILIRUB SERPL-MCNC: 0.5 MG/DL — SIGNIFICANT CHANGE UP (ref 0.4–2)
BILIRUB SERPL-MCNC: 0.5 MG/DL — SIGNIFICANT CHANGE UP (ref 0.4–2)
BUN SERPL-MCNC: 42.8 MG/DL — HIGH (ref 8–20)
BUN SERPL-MCNC: 42.8 MG/DL — HIGH (ref 8–20)
CALCIUM SERPL-MCNC: 7.8 MG/DL — LOW (ref 8.4–10.5)
CALCIUM SERPL-MCNC: 7.8 MG/DL — LOW (ref 8.4–10.5)
CHLORIDE SERPL-SCNC: 106 MMOL/L — SIGNIFICANT CHANGE UP (ref 96–108)
CHLORIDE SERPL-SCNC: 106 MMOL/L — SIGNIFICANT CHANGE UP (ref 96–108)
CO2 SERPL-SCNC: 19 MMOL/L — LOW (ref 22–29)
CO2 SERPL-SCNC: 19 MMOL/L — LOW (ref 22–29)
CREAT SERPL-MCNC: 2.7 MG/DL — HIGH (ref 0.5–1.3)
CREAT SERPL-MCNC: 2.7 MG/DL — HIGH (ref 0.5–1.3)
CRP SERPL-MCNC: 282 MG/L — HIGH
CRP SERPL-MCNC: 282 MG/L — HIGH
EGFR: 23 ML/MIN/1.73M2 — LOW
EGFR: 23 ML/MIN/1.73M2 — LOW
EOSINOPHIL # BLD AUTO: 0 K/UL — SIGNIFICANT CHANGE UP (ref 0–0.5)
EOSINOPHIL # BLD AUTO: 0 K/UL — SIGNIFICANT CHANGE UP (ref 0–0.5)
EOSINOPHIL NFR BLD AUTO: 0 % — SIGNIFICANT CHANGE UP (ref 0–6)
EOSINOPHIL NFR BLD AUTO: 0 % — SIGNIFICANT CHANGE UP (ref 0–6)
FERRITIN SERPL-MCNC: 680 NG/ML — HIGH (ref 30–400)
FERRITIN SERPL-MCNC: 680 NG/ML — HIGH (ref 30–400)
FOLATE SERPL-MCNC: 9.7 NG/ML — SIGNIFICANT CHANGE UP
FOLATE SERPL-MCNC: 9.7 NG/ML — SIGNIFICANT CHANGE UP
GLUCOSE BLDC GLUCOMTR-MCNC: 173 MG/DL — HIGH (ref 70–99)
GLUCOSE BLDC GLUCOMTR-MCNC: 173 MG/DL — HIGH (ref 70–99)
GLUCOSE BLDC GLUCOMTR-MCNC: 185 MG/DL — HIGH (ref 70–99)
GLUCOSE BLDC GLUCOMTR-MCNC: 185 MG/DL — HIGH (ref 70–99)
GLUCOSE BLDC GLUCOMTR-MCNC: 186 MG/DL — HIGH (ref 70–99)
GLUCOSE BLDC GLUCOMTR-MCNC: 186 MG/DL — HIGH (ref 70–99)
GLUCOSE BLDC GLUCOMTR-MCNC: 247 MG/DL — HIGH (ref 70–99)
GLUCOSE BLDC GLUCOMTR-MCNC: 247 MG/DL — HIGH (ref 70–99)
GLUCOSE SERPL-MCNC: 179 MG/DL — HIGH (ref 70–99)
GLUCOSE SERPL-MCNC: 179 MG/DL — HIGH (ref 70–99)
HCT VFR BLD CALC: 31.5 % — LOW (ref 39–50)
HCT VFR BLD CALC: 31.5 % — LOW (ref 39–50)
HGB BLD-MCNC: 10.3 G/DL — LOW (ref 13–17)
HGB BLD-MCNC: 10.3 G/DL — LOW (ref 13–17)
IMM GRANULOCYTES NFR BLD AUTO: 0.6 % — SIGNIFICANT CHANGE UP (ref 0–0.9)
IMM GRANULOCYTES NFR BLD AUTO: 0.6 % — SIGNIFICANT CHANGE UP (ref 0–0.9)
IRON SATN MFR SERPL: 25 % — SIGNIFICANT CHANGE UP (ref 16–55)
IRON SATN MFR SERPL: 25 % — SIGNIFICANT CHANGE UP (ref 16–55)
IRON SATN MFR SERPL: 33 UG/DL — LOW (ref 59–158)
IRON SATN MFR SERPL: 33 UG/DL — LOW (ref 59–158)
LYMPHOCYTES # BLD AUTO: 0.73 K/UL — LOW (ref 1–3.3)
LYMPHOCYTES # BLD AUTO: 0.73 K/UL — LOW (ref 1–3.3)
LYMPHOCYTES # BLD AUTO: 3.8 % — LOW (ref 13–44)
LYMPHOCYTES # BLD AUTO: 3.8 % — LOW (ref 13–44)
MAGNESIUM SERPL-MCNC: 2.1 MG/DL — SIGNIFICANT CHANGE UP (ref 1.6–2.6)
MAGNESIUM SERPL-MCNC: 2.1 MG/DL — SIGNIFICANT CHANGE UP (ref 1.6–2.6)
MCHC RBC-ENTMCNC: 30.1 PG — SIGNIFICANT CHANGE UP (ref 27–34)
MCHC RBC-ENTMCNC: 30.1 PG — SIGNIFICANT CHANGE UP (ref 27–34)
MCHC RBC-ENTMCNC: 32.7 GM/DL — SIGNIFICANT CHANGE UP (ref 32–36)
MCHC RBC-ENTMCNC: 32.7 GM/DL — SIGNIFICANT CHANGE UP (ref 32–36)
MCV RBC AUTO: 92.1 FL — SIGNIFICANT CHANGE UP (ref 80–100)
MCV RBC AUTO: 92.1 FL — SIGNIFICANT CHANGE UP (ref 80–100)
METHOD TYPE: SIGNIFICANT CHANGE UP
METHOD TYPE: SIGNIFICANT CHANGE UP
MONOCYTES # BLD AUTO: 0.66 K/UL — SIGNIFICANT CHANGE UP (ref 0–0.9)
MONOCYTES # BLD AUTO: 0.66 K/UL — SIGNIFICANT CHANGE UP (ref 0–0.9)
MONOCYTES NFR BLD AUTO: 3.5 % — SIGNIFICANT CHANGE UP (ref 2–14)
MONOCYTES NFR BLD AUTO: 3.5 % — SIGNIFICANT CHANGE UP (ref 2–14)
NEUTROPHILS # BLD AUTO: 17.47 K/UL — HIGH (ref 1.8–7.4)
NEUTROPHILS # BLD AUTO: 17.47 K/UL — HIGH (ref 1.8–7.4)
NEUTROPHILS NFR BLD AUTO: 92 % — HIGH (ref 43–77)
NEUTROPHILS NFR BLD AUTO: 92 % — HIGH (ref 43–77)
PHOSPHATE SERPL-MCNC: 2.7 MG/DL — SIGNIFICANT CHANGE UP (ref 2.4–4.7)
PHOSPHATE SERPL-MCNC: 2.7 MG/DL — SIGNIFICANT CHANGE UP (ref 2.4–4.7)
PLATELET # BLD AUTO: 163 K/UL — SIGNIFICANT CHANGE UP (ref 150–400)
PLATELET # BLD AUTO: 163 K/UL — SIGNIFICANT CHANGE UP (ref 150–400)
POTASSIUM SERPL-MCNC: 4.2 MMOL/L — SIGNIFICANT CHANGE UP (ref 3.5–5.3)
POTASSIUM SERPL-MCNC: 4.2 MMOL/L — SIGNIFICANT CHANGE UP (ref 3.5–5.3)
POTASSIUM SERPL-SCNC: 4.2 MMOL/L — SIGNIFICANT CHANGE UP (ref 3.5–5.3)
POTASSIUM SERPL-SCNC: 4.2 MMOL/L — SIGNIFICANT CHANGE UP (ref 3.5–5.3)
PROT SERPL-MCNC: 6.1 G/DL — LOW (ref 6.6–8.7)
PROT SERPL-MCNC: 6.1 G/DL — LOW (ref 6.6–8.7)
RBC # BLD: 3.42 M/UL — LOW (ref 4.2–5.8)
RBC # BLD: 3.42 M/UL — LOW (ref 4.2–5.8)
RBC # FLD: 13.7 % — SIGNIFICANT CHANGE UP (ref 10.3–14.5)
RBC # FLD: 13.7 % — SIGNIFICANT CHANGE UP (ref 10.3–14.5)
SODIUM SERPL-SCNC: 140 MMOL/L — SIGNIFICANT CHANGE UP (ref 135–145)
SODIUM SERPL-SCNC: 140 MMOL/L — SIGNIFICANT CHANGE UP (ref 135–145)
TIBC SERPL-MCNC: 134 UG/DL — LOW (ref 220–430)
TIBC SERPL-MCNC: 134 UG/DL — LOW (ref 220–430)
TRANSFERRIN SERPL-MCNC: 94 MG/DL — LOW (ref 180–329)
TRANSFERRIN SERPL-MCNC: 94 MG/DL — LOW (ref 180–329)
VIT B12 SERPL-MCNC: 525 PG/ML — SIGNIFICANT CHANGE UP (ref 232–1245)
VIT B12 SERPL-MCNC: 525 PG/ML — SIGNIFICANT CHANGE UP (ref 232–1245)
WBC # BLD: 19 K/UL — HIGH (ref 3.8–10.5)
WBC # BLD: 19 K/UL — HIGH (ref 3.8–10.5)
WBC # FLD AUTO: 19 K/UL — HIGH (ref 3.8–10.5)
WBC # FLD AUTO: 19 K/UL — HIGH (ref 3.8–10.5)

## 2023-12-24 PROCEDURE — 99232 SBSQ HOSP IP/OBS MODERATE 35: CPT

## 2023-12-24 PROCEDURE — 99233 SBSQ HOSP IP/OBS HIGH 50: CPT

## 2023-12-24 RX ADMIN — Medication 2: at 06:32

## 2023-12-24 RX ADMIN — PIPERACILLIN AND TAZOBACTAM 25 GRAM(S): 4; .5 INJECTION, POWDER, LYOPHILIZED, FOR SOLUTION INTRAVENOUS at 13:13

## 2023-12-24 RX ADMIN — SIMVASTATIN 20 MILLIGRAM(S): 20 TABLET, FILM COATED ORAL at 22:14

## 2023-12-24 RX ADMIN — PIPERACILLIN AND TAZOBACTAM 25 GRAM(S): 4; .5 INJECTION, POWDER, LYOPHILIZED, FOR SOLUTION INTRAVENOUS at 22:56

## 2023-12-24 RX ADMIN — Medication 37.5 MILLIGRAM(S): at 13:12

## 2023-12-24 RX ADMIN — Medication 37.5 MILLIGRAM(S): at 22:15

## 2023-12-24 RX ADMIN — MIDODRINE HYDROCHLORIDE 5 MILLIGRAM(S): 2.5 TABLET ORAL at 05:42

## 2023-12-24 RX ADMIN — CHLORHEXIDINE GLUCONATE 1 APPLICATION(S): 213 SOLUTION TOPICAL at 06:32

## 2023-12-24 RX ADMIN — MIDODRINE HYDROCHLORIDE 5 MILLIGRAM(S): 2.5 TABLET ORAL at 17:01

## 2023-12-24 RX ADMIN — PANTOPRAZOLE SODIUM 40 MILLIGRAM(S): 20 TABLET, DELAYED RELEASE ORAL at 05:43

## 2023-12-24 RX ADMIN — TAMSULOSIN HYDROCHLORIDE 0.4 MILLIGRAM(S): 0.4 CAPSULE ORAL at 22:15

## 2023-12-24 RX ADMIN — HEPARIN SODIUM 0 UNIT(S)/HR: 5000 INJECTION INTRAVENOUS; SUBCUTANEOUS at 10:19

## 2023-12-24 RX ADMIN — GABAPENTIN 300 MILLIGRAM(S): 400 CAPSULE ORAL at 22:15

## 2023-12-24 RX ADMIN — HEPARIN SODIUM 1700 UNIT(S)/HR: 5000 INJECTION INTRAVENOUS; SUBCUTANEOUS at 02:58

## 2023-12-24 RX ADMIN — Medication 6 MILLIGRAM(S): at 22:14

## 2023-12-24 RX ADMIN — PIPERACILLIN AND TAZOBACTAM 25 GRAM(S): 4; .5 INJECTION, POWDER, LYOPHILIZED, FOR SOLUTION INTRAVENOUS at 05:42

## 2023-12-24 RX ADMIN — HEPARIN SODIUM 1400 UNIT(S)/HR: 5000 INJECTION INTRAVENOUS; SUBCUTANEOUS at 11:24

## 2023-12-24 RX ADMIN — Medication 4: at 17:00

## 2023-12-24 RX ADMIN — HEPARIN SODIUM 0 UNIT(S)/HR: 5000 INJECTION INTRAVENOUS; SUBCUTANEOUS at 20:18

## 2023-12-24 RX ADMIN — Medication 2: at 11:41

## 2023-12-24 RX ADMIN — Medication 37.5 MILLIGRAM(S): at 05:42

## 2023-12-24 RX ADMIN — HEPARIN SODIUM 1700 UNIT(S)/HR: 5000 INJECTION INTRAVENOUS; SUBCUTANEOUS at 07:37

## 2023-12-24 RX ADMIN — MIDODRINE HYDROCHLORIDE 5 MILLIGRAM(S): 2.5 TABLET ORAL at 11:41

## 2023-12-24 RX ADMIN — HEPARIN SODIUM 1100 UNIT(S)/HR: 5000 INJECTION INTRAVENOUS; SUBCUTANEOUS at 21:36

## 2023-12-24 RX ADMIN — HEPARIN SODIUM 1400 UNIT(S)/HR: 5000 INJECTION INTRAVENOUS; SUBCUTANEOUS at 19:26

## 2023-12-24 RX ADMIN — HEPARIN SODIUM 6500 UNIT(S): 5000 INJECTION INTRAVENOUS; SUBCUTANEOUS at 03:02

## 2023-12-24 NOTE — PROGRESS NOTE ADULT - NS ATTEND AMEND GEN_ALL_CORE FT
seen with above,    83M Turkish-speaking history significant for HTN, HLD, CKD3-4, BPH, imbalance with prior falls lives at home with his wife, admitted with abdominal pain found acute cholecystitis course complicated by sepsis, fever with RRT for acute hypoxic respiratory failure, +COVID s/p cholecystostomy tube as not surgical candidate, found with new onset Afib RVR 150s in MICU on HFNC  -unclear reason on chronic midodrine from home, need to check orthostatic on metoprolol  -continue metoprolol for HR control  -CHADVasc 3- on heparin gtt, but need to assess long term safety of AC use given h/o repeated falls, need further discussion and PT eval.   -not candidate for rhythm control with STAS/CV as requiring HFNC and likely reactive due to sepsis/infection  -discussed with patient's daughter at the bedside yesterday        Markus Mejia DO, [facility]  Faculty Non-Invasive Cardiologist  [phone] seen with above,    83M [place]-speaking history significant for HTN, HLD, CKD3-4, BPH, imbalance with prior falls lives at home with his wife, admitted with abdominal pain found acute cholecystitis course complicated by sepsis, fever with RRT for acute hypoxic respiratory failure, +COVID s/p cholecystostomy tube as not surgical candidate, found with new onset Afib RVR 150s in MICU on HFNC  -unclear reason on chronic midodrine from home, need to check orthostatic on metoprolol  -continue metoprolol for HR control  -CHADVasc 3- on heparin gtt, but need to assess long term safety of AC use given h/o repeated falls, need further discussion and PT eval.   -not candidate for rhythm control with STAS/CV as requiring HFNC and likely reactive due to sepsis/infection  -discussed with patient's daughter at the bedside yesterday        Markus Mejia DO, [facility]  Faculty Non-Invasive Cardiologist  [phone]

## 2023-12-24 NOTE — PROGRESS NOTE ADULT - PROBLEM SELECTOR PLAN 1
.  - [id number] 3  - Afib now mostly rate controlled  - rates to 120s acceptable in the setting of sepsis due to choleycystitis/COVID19  - TTE ef 55-60%, no valvulopathy, fibrocalcific AV without stenosis. normal PA pressures  - started on diltiazem, would d/c   - change to metoprolol 37.5mg PO Q8H with hold parameters, titrate up as BP tolerates  - on midodrine as OP, unclear reason why. can continue   - check orthostatic BP x 1 and chart in vitals  - has hx of falls and imbalances per daughter. Patient is supposed to use mobility aides but refuses to do so. Had fall with rib fractures earlier this year. Will need to further discuss risk vs benefit of DOAC with patient and family. Patient verbalizes understanding of risks of bleed while on DOAC but likely risk greater than benefit as he is noncompliant with mobility aides.   - heparin GTT started. .  - [id number] 3  - Afib now mostly rate controlled  - rates to 120s acceptable in the setting of sepsis due to choleycystitis/COVID19  - TTE ef 55-60%, no valvulopathy, fibrocalcific AV without stenosis. normal PA pressures  - started on diltiazem, would d/c   - change to metoprolol 37.5mg PO Q8H with hold parameters, titrate up as BP tolerates  - on midodrine as OP, unclear reason why. can continue   - check orthostatic BP x 1 and chart in vitals  - has hx of falls and imbalances per daughter. Patient is supposed to use mobility aides but refuses to do so. Had fall with rib fractures earlier this year. Will need to further discuss risk vs benefit of DOAC with patient and family. Patient verbalizes understanding of risks of bleed while on DOAC but likely risk greater than benefit as he is noncompliant with mobility aides.   - heparin GTT started.

## 2023-12-24 NOTE — PROGRESS NOTE ADULT - ASSESSMENT
84 y/o M with PMH HTN, HLD, CKD3, BPH who originally presented to [facility] ED with abdominal pain and found with acute cholecystitis. Course c/b hypoxia, tachycardia  and TMax 102.3, RRT called and found to be COVID+ on RBO.  Cardiology consulted due to new onset of Afib with RVR. TTE with normal EF and no valvulopathy. Currently rate controlled. Plan to initiate AC however per daughter, he had some balance problems, is supposed to use a walking aide and refuses and has had falls recently. Will need to further discuss risk vs benefit of long term AC.  82 y/o M with PMH HTN, HLD, CKD3, BPH who originally presented to [facility] ED with abdominal pain and found with acute cholecystitis. Course c/b hypoxia, tachycardia  and TMax 102.3, RRT called and found to be COVID+ on RBO.  Cardiology consulted due to new onset of Afib with RVR. TTE with normal EF and no valvulopathy. Currently rate controlled. Plan to initiate AC however per daughter, he had some balance problems, is supposed to use a walking aide and refuses and has had falls recently. Will need to further discuss risk vs benefit of long term AC.

## 2023-12-24 NOTE — PROGRESS NOTE ADULT - SUBJECTIVE AND OBJECTIVE BOX
Wanda Louie M.D.    Patient is a 83y old  Male who presents with a chief complaint of Cholecystitis (23 Dec 2023 17:30)      SUBJECTIVE / OVERNIGHT EVENTS: No concerns. Tolerated diet without issues.     Patient denies chest pain, SOB, abd pain, N/V, fever, chills, dysuria or any other complaints. All remainder ROS negative.     MEDICATIONS  (STANDING):  acetaminophen   IVPB .. 1000 milliGRAM(s) IV Intermittent once  chlorhexidine 2% Cloths 1 Application(s) Topical <User Schedule>  dexAMETHasone  Injectable 6 milliGRAM(s) IV Push every 24 hours  dextrose 5%. 1000 milliLiter(s) (100 mL/Hr) IV Continuous <Continuous>  dextrose 5%. 1000 milliLiter(s) (50 mL/Hr) IV Continuous <Continuous>  dextrose 50% Injectable 12.5 Gram(s) IV Push once  dextrose 50% Injectable 25 Gram(s) IV Push once  dextrose 50% Injectable 25 Gram(s) IV Push once  gabapentin 300 milliGRAM(s) Oral at bedtime  glucagon  Injectable 1 milliGRAM(s) IntraMuscular once  heparin  Infusion.  Unit(s)/Hr (14 mL/Hr) IV Continuous <Continuous>  insulin lispro (ADMELOG) corrective regimen sliding scale   SubCutaneous three times a day before meals  insulin lispro (ADMELOG) corrective regimen sliding scale   SubCutaneous at bedtime  metoprolol tartrate 37.5 milliGRAM(s) Oral every 8 hours  midodrine. 5 milliGRAM(s) Oral three times a day  pantoprazole    Tablet 40 milliGRAM(s) Oral before breakfast  piperacillin/tazobactam IVPB.. 3.375 Gram(s) IV Intermittent every 8 hours  remdesivir  IVPB   IV Intermittent   remdesivir  IVPB 100 milliGRAM(s) IV Intermittent every 24 hours  simvastatin 20 milliGRAM(s) Oral at bedtime  tamsulosin 0.4 milliGRAM(s) Oral at bedtime    MEDICATIONS  (PRN):  dextrose Oral Gel 15 Gram(s) Oral once PRN Blood Glucose LESS THAN 70 milliGRAM(s)/deciliter  heparin   Injectable 6500 Unit(s) IV Push every 6 hours PRN For aPTT less than 40  heparin   Injectable 3000 Unit(s) IV Push every 6 hours PRN For aPTT between 40 - 57  ondansetron Injectable 4 milliGRAM(s) IV Push every 6 hours PRN Nausea      I&O's Summary    23 Dec 2023 07:01  -  24 Dec 2023 07:00  --------------------------------------------------------  IN: 432 mL / OUT: 1506 mL / NET: -1074 mL    24 Dec 2023 07:01  -  24 Dec 2023 09:32  --------------------------------------------------------  IN: 17 mL / OUT: 125 mL / NET: -108 mL        PHYSICAL EXAM:  Vital Signs Last 24 Hrs  T(C): 36.4 (24 Dec 2023 08:00), Max: 36.9 (23 Dec 2023 18:00)  T(F): 97.5 (24 Dec 2023 08:00), Max: 98.4 (23 Dec 2023 18:00)  HR: 111 (24 Dec 2023 08:57) (85 - 119)  BP: 138/85 (24 Dec 2023 08:00) (118/91 - 141/81)  BP(mean): 98 (24 Dec 2023 08:00) (83 - 116)  RR: 19 (24 Dec 2023 08:00) (15 - 22)  SpO2: 95% (24 Dec 2023 08:57) (88% - 96%)    Parameters below as of 24 Dec 2023 08:00  Patient On (Oxygen Delivery Method): nasal cannula, high flow  O2 Flow (L/min): 40  O2 Concentration (%): 40    CONSTITUTIONAL: NAD, well-groomed, on HFNC  RESPIRATORY: Normal respiratory effort; Course bs b/l  CARDIOVASCULAR: irregular irregular, no LE edema  ABDOMEN: Nontender to palpation, hypoactive bowel sounds. +Perc drain draining bile  PSYCH: A+O x3; affect appropriate  NEUROLOGY: CN 2-12 are intact and symmetric; no gross sensory deficits;   SKIN: No rashes; no palpable lesions  LABS:                        10.3   19.00 )-----------( 163      ( 24 Dec 2023 01:15 )             31.5     12-24    140  |  106  |  42.8<H>  ----------------------------<  179<H>  4.2   |  19.0<L>  |  2.70<H>    Ca    7.8<L>      24 Dec 2023 01:15  Phos  2.7     12-24  Mg     2.1     12-24    TPro  6.1<L>  /  Alb  2.5<L>  /  TBili  0.5  /  DBili  x   /  AST  29  /  ALT  22  /  AlkPhos  51  12-24    PTT - ( 24 Dec 2023 01:04 )  PTT:30.4 sec      Urinalysis Basic - ( 24 Dec 2023 01:15 )    Color: x / Appearance: x / SG: x / pH: x  Gluc: 179 mg/dL / Ketone: x  / Bili: x / Urobili: x   Blood: x / Protein: x / Nitrite: x   Leuk Esterase: x / RBC: x / WBC x   Sq Epi: x / Non Sq Epi: x / Bacteria: x        Culture - Body Fluid with Gram Stain (collected 22 Dec 2023 14:40)  Source: Bile Bile Fluid  Gram Stain (23 Dec 2023 01:41):    No polymorphonuclear leukocytes seen    Gram Negative Rods seen    Gram positive cocci in pairs seen    by cytocentrifuge  Preliminary Report (23 Dec 2023 19:31):    Moderate Escherichia coli    Few Streptococcus gallolyticus "Susceptibilities not performed"    Culture - Blood (collected 21 Dec 2023 23:05)  Source: .Blood Blood  Preliminary Report (24 Dec 2023 07:02):    No growth at 48 Hours    Culture - Blood (collected 21 Dec 2023 22:55)  Source: .Blood Blood  Preliminary Report (24 Dec 2023 07:02):    No growth at 48 Hours      CAPILLARY BLOOD GLUCOSE      POCT Blood Glucose.: 186 mg/dL (24 Dec 2023 06:13)  POCT Blood Glucose.: 158 mg/dL (23 Dec 2023 21:23)  POCT Blood Glucose.: 168 mg/dL (23 Dec 2023 17:23)  POCT Blood Glucose.: 171 mg/dL (23 Dec 2023 12:17)      RADIOLOGY & ADDITIONAL TESTS:  Results Reviewed:   Imaging Personally Reviewed:  Electrocardiogram Personally Reviewed:

## 2023-12-24 NOTE — PROGRESS NOTE ADULT - SUBJECTIVE AND OBJECTIVE BOX
[facility]                                                         CARDIOLOGY AT [facility]                                                                  [address] [address]                                                         Telephone: [phone]. Fax:[phone]                                                                             PROGRESS NOTE    Reason for follow up: Afib with RVR  Update: remains on HFNC, afib with rates occasionally to 120s      Review of symptoms:   Cardiac:  No chest pain. No dyspnea. No palpitations.  Respiratory: + cough. No dyspnea  Gastrointestinal: No diarrhea. No abdominal pain. No bleeding.   Neuro: No focal neuro complaints.    Vitals:  T(C): 36.4 (12-24-23 @ 08:00), Max: 36.9 (12-23-23 @ 18:00)  HR: 111 (12-24-23 @ 08:57) (85 - 119)  BP: 138/85 (12-24-23 @ 08:00) (118/91 - 141/81)  RR: 19 (12-24-23 @ 08:00) (15 - 22)  SpO2: 95% (12-24-23 @ 08:57) (88% - 96%)  Wt(kg): --  I&O's Summary    23 Dec 2023 07:01  -  24 Dec 2023 07:00  --------------------------------------------------------  IN: 432 mL / OUT: 1506 mL / NET: -1074 mL    24 Dec 2023 07:01  -  24 Dec 2023 09:40  --------------------------------------------------------  IN: 17 mL / OUT: 125 mL / NET: -108 mL      Weight (kg): 79.2 (12-22 @ 05:10)    PHYSICAL EXAM:  Appearance: Comfortable. No acute distress  HEENT:  Atraumatic. Normocephalic.  Normal oral mucosa  Neurologic: A & O x 3, no gross focal deficits.  Cardiovascular: irreg irregular S1 S2, No murmur, no rubs/gallops. No JVD  Respiratory: Lungs clear to auscultation, unlabored   Gastrointestinal:  Soft, Non-tender, + BS  Lower Extremities: 2+ Peripheral Pulses, No clubbing, cyanosis, or edema  Psychiatry: Patient is calm. No agitation.   Skin: warm and dry.    CURRENT CARDIAC MEDICATIONS:  metoprolol tartrate 37.5 milliGRAM(s) Oral every 8 hours  midodrine. 5 milliGRAM(s) Oral three times a day      CURRENT OTHER MEDICATIONS:  piperacillin/tazobactam IVPB.. 3.375 Gram(s) IV Intermittent every 8 hours  remdesivir  IVPB   IV Intermittent   remdesivir  IVPB 100 milliGRAM(s) IV Intermittent every 24 hours  acetaminophen   IVPB .. 1000 milliGRAM(s) IV Intermittent once  gabapentin 300 milliGRAM(s) Oral at bedtime  ondansetron Injectable 4 milliGRAM(s) IV Push every 6 hours PRN Nausea  pantoprazole    Tablet 40 milliGRAM(s) Oral before breakfast  dexAMETHasone  Injectable 6 milliGRAM(s) IV Push every 24 hours  dextrose 50% Injectable 12.5 Gram(s) IV Push once, Stop order after: 1 Doses  dextrose 50% Injectable 25 Gram(s) IV Push once, Stop order after: 1 Doses  dextrose 50% Injectable 25 Gram(s) IV Push once, Stop order after: 1 Doses  dextrose Oral Gel 15 Gram(s) Oral once, Stop order after: 1 Doses PRN Blood Glucose LESS THAN 70 milliGRAM(s)/deciliter  glucagon  Injectable 1 milliGRAM(s) IntraMuscular once, Stop order after: 1 Doses  insulin lispro (ADMELOG) corrective regimen sliding scale   SubCutaneous three times a day before meals  insulin lispro (ADMELOG) corrective regimen sliding scale   SubCutaneous at bedtime  simvastatin 20 milliGRAM(s) Oral at bedtime  chlorhexidine 2% Cloths 1 Application(s) Topical <User Schedule>  dextrose 5%. 1000 milliLiter(s) (100 mL/Hr) IV Continuous <Continuous>  dextrose 5%. 1000 milliLiter(s) (50 mL/Hr) IV Continuous <Continuous>  heparin   Injectable 6500 Unit(s) IV Push every 6 hours PRN For aPTT less than 40  heparin   Injectable 3000 Unit(s) IV Push every 6 hours PRN For aPTT between 40 - 57  heparin  Infusion.  Unit(s)/Hr (14 mL/Hr) IV Continuous <Continuous>  tamsulosin 0.4 milliGRAM(s) Oral at bedtime      LABS:	 	                            10.3   19.00 )-----------( 163      ( 24 Dec 2023 01:15 )             31.5     12-24    140  |  106  |  42.8<H>  ----------------------------<  179<H>  4.2   |  19.0<L>  |  2.70<H>    Ca    7.8<L>      24 Dec 2023 01:15  Phos  2.7     12-24  Mg     2.1     12-24    TPro  6.1<L>  /  Alb  2.5<L>  /  TBili  0.5  /  DBili  x   /  AST  29  /  ALT  22  /  AlkPhos  51  12-24    PT/INR/PTT ( 24 Dec 2023 01:04 )                       :                       :      X            :       30.4                  .        .                   .              .           .       X           .                                       Lipid Profile:   HgA1c:   TSH: Thyroid Stimulating Hormone, Serum: 1.25 uIU/mL      TELEMETRY: Afib 110  ECG:    DIAGNOSTIC TESTING:  [ ] Echocardiogram: < from: TTE W or WO Ultrasound Enhancing Agent (12.22.23 @ 19:49) >  TRANSTHORACIC ECHOCARDIOGRAM REPORT  ________________________________________________________________________________                                      _______       Pt. Name:       BERNADINE ANDERSON Study Date:    12/22/2023  MRN:            [id number]         YOB: 1940  Accession #:    [id number]         Age:           83 years  Account#:       [id number]        Gender:        M  Heart Rate:                       Height:        62.00 in (157.48 cm)  Rhythm:     Weight:        174.00 lb (78.93 kg)  Blood Pressure: 102/53 mmHg       BSA/BMI:       1.80 m² / 31.83 kg/m²  ________________________________________________________________________________________  Referring Physician:    [id number] Janes Valverde  Interpreting Physician: George Ortiz MD  Primary Sonographer:    Sushma Driscoll    CPT:               ECHO TTE WO CON COMP W DOPP - 85669.m  Indication(s):     Dyspnea, unspecified - R06.00  Procedure:         Transthoracic echocardiogram with 2-D, M-mode and complete                     spectral and color flow Doppler.  Ordering Location: [facility]  Study Information: Study done bedside in [facility]. Pt. is + COVID.    _______________________________________________________________________________________     CONCLUSIONS:      1. Left ventricular cavity is normal. Left ventricular systolic function is normal with an ejection fraction visually estimated at 55 to 60 %.   2. Normal left ventricular diastolic function.   3. Normal right ventricular cavity size and systolic function.   4. The left atrium is normal.   5. The right atrium is normal in size.   6. Fibrocalcific aortic valve sclerosis without stenosis.   7. Estimated pulmonary artery systolic pressure is 34 mmHg, normal pulmonary artery pressure.    ________________________________________________________________________________________  FINDINGS:     Left Ventricle:  The left ventricular cavity is normal. Left ventricular systolic function is normal with an ejection fraction visually estimated at 55 to 60%. There is normal left ventricular diastolic function. No left ventricular hypertrophy.     Right Ventricle:  The right ventricular cavity is normal in size and normal systolic function. Tricuspid annular tissue Doppler S' is9.0 cm/s (normal >10 cm/s).     Left Atrium:  The left atrium is normal with an indexed volume of 18.77 ml/m². The right upper pulmonary vein appears to drain into the left atrium. The pulmonary venous flow pattern is normal.     Right Atrium:  The right atrium is normal in size with an indexed volume of 13.38 ml/m² and an indexed area of 6.42 cm²/m².     Interatrial Septum:  The interatrial septum appears intact.     Aortic Valve:  The aortic valve appears trileaflet with normal systolic excursion. There is fibrocalcific aortic valve sclerosis without stenosis. There is no evidence of aortic regurgitation.     Mitral Valve:  Structurally normal mitral valve with normal leaflet excursion. The mitral valve was not well visualized. There is trace mitral regurgitation.     Tricuspid Valve:  Structurally normal tricuspid valve with normal leaflet excursion. The tricuspid valve was not well visualized. There is mild tricuspid regurgitation. Estimated pulmonary artery systolic pressure is 34 mmHg, consistent with normal pulmonary artery pressure.     Pulmonic Valve:  The pulmonic valve was not well visualized. Structurally normal pulmonic valve with normal leaflet excursion. There is trace pulmonic regurgitation.     Pulmonary Artery:  The main pulmonary artery is normal in size, origin, and position.     Aorta:  The aortic root appears normal in size. The aortic root at the sinuses of Valsalva is normal in size, measuring 3.38 cm (indexed 1.88 cm/m²). The ascending aorta diameter is normal in size, measuring 3.21 cm (indexed 1.78 cm/m²).     Pericardium:  No pericardial effusion seen. Pericardial fat pad is seen anterior to the right ventricle.     Systemic Veins:  The inferior vena cava is normal in size measuring 1.30 cm in diameter, (normal <2.1cm) with normal inspiratory collapse (normal >50%) consistent with normal right atrial pressure (~3, range 0-5mmHg).  ____________________________________________________________________    < end of copied text >    [ ]  Catheterization:  [ ] Stress Test:    OTHER: 	                                                                [facility]                                                         CARDIOLOGY AT [facility]                                                                  [address] [address]                                                         Telephone: [phone]. Fax:[phone]                                                                             PROGRESS NOTE    Reason for follow up: Afib with RVR  Update: remains on HFNC, afib with rates occasionally to 120s      Review of symptoms:   Cardiac:  No chest pain. No dyspnea. No palpitations.  Respiratory: + cough. No dyspnea  Gastrointestinal: No diarrhea. No abdominal pain. No bleeding.   Neuro: No focal neuro complaints.    Vitals:  T(C): 36.4 (12-24-23 @ 08:00), Max: 36.9 (12-23-23 @ 18:00)  HR: 111 (12-24-23 @ 08:57) (85 - 119)  BP: 138/85 (12-24-23 @ 08:00) (118/91 - 141/81)  RR: 19 (12-24-23 @ 08:00) (15 - 22)  SpO2: 95% (12-24-23 @ 08:57) (88% - 96%)  Wt(kg): --  I&O's Summary    23 Dec 2023 07:01  -  24 Dec 2023 07:00  --------------------------------------------------------  IN: 432 mL / OUT: 1506 mL / NET: -1074 mL    24 Dec 2023 07:01  -  24 Dec 2023 09:40  --------------------------------------------------------  IN: 17 mL / OUT: 125 mL / NET: -108 mL      Weight (kg): 79.2 (12-22 @ 05:10)    PHYSICAL EXAM:  Appearance: Comfortable. No acute distress  HEENT:  Atraumatic. Normocephalic.  Normal oral mucosa  Neurologic: A & O x 3, no gross focal deficits.  Cardiovascular: irreg irregular S1 S2, No murmur, no rubs/gallops. No JVD  Respiratory: Lungs clear to auscultation, unlabored   Gastrointestinal:  Soft, Non-tender, + BS  Lower Extremities: 2+ Peripheral Pulses, No clubbing, cyanosis, or edema  Psychiatry: Patient is calm. No agitation.   Skin: warm and dry.    CURRENT CARDIAC MEDICATIONS:  metoprolol tartrate 37.5 milliGRAM(s) Oral every 8 hours  midodrine. 5 milliGRAM(s) Oral three times a day      CURRENT OTHER MEDICATIONS:  piperacillin/tazobactam IVPB.. 3.375 Gram(s) IV Intermittent every 8 hours  remdesivir  IVPB   IV Intermittent   remdesivir  IVPB 100 milliGRAM(s) IV Intermittent every 24 hours  acetaminophen   IVPB .. 1000 milliGRAM(s) IV Intermittent once  gabapentin 300 milliGRAM(s) Oral at bedtime  ondansetron Injectable 4 milliGRAM(s) IV Push every 6 hours PRN Nausea  pantoprazole    Tablet 40 milliGRAM(s) Oral before breakfast  dexAMETHasone  Injectable 6 milliGRAM(s) IV Push every 24 hours  dextrose 50% Injectable 12.5 Gram(s) IV Push once, Stop order after: 1 Doses  dextrose 50% Injectable 25 Gram(s) IV Push once, Stop order after: 1 Doses  dextrose 50% Injectable 25 Gram(s) IV Push once, Stop order after: 1 Doses  dextrose Oral Gel 15 Gram(s) Oral once, Stop order after: 1 Doses PRN Blood Glucose LESS THAN 70 milliGRAM(s)/deciliter  glucagon  Injectable 1 milliGRAM(s) IntraMuscular once, Stop order after: 1 Doses  insulin lispro (ADMELOG) corrective regimen sliding scale   SubCutaneous three times a day before meals  insulin lispro (ADMELOG) corrective regimen sliding scale   SubCutaneous at bedtime  simvastatin 20 milliGRAM(s) Oral at bedtime  chlorhexidine 2% Cloths 1 Application(s) Topical <User Schedule>  dextrose 5%. 1000 milliLiter(s) (100 mL/Hr) IV Continuous <Continuous>  dextrose 5%. 1000 milliLiter(s) (50 mL/Hr) IV Continuous <Continuous>  heparin   Injectable 6500 Unit(s) IV Push every 6 hours PRN For aPTT less than 40  heparin   Injectable 3000 Unit(s) IV Push every 6 hours PRN For aPTT between 40 - 57  heparin  Infusion.  Unit(s)/Hr (14 mL/Hr) IV Continuous <Continuous>  tamsulosin 0.4 milliGRAM(s) Oral at bedtime      LABS:	 	                            10.3   19.00 )-----------( 163      ( 24 Dec 2023 01:15 )             31.5     12-24    140  |  106  |  42.8<H>  ----------------------------<  179<H>  4.2   |  19.0<L>  |  2.70<H>    Ca    7.8<L>      24 Dec 2023 01:15  Phos  2.7     12-24  Mg     2.1     12-24    TPro  6.1<L>  /  Alb  2.5<L>  /  TBili  0.5  /  DBili  x   /  AST  29  /  ALT  22  /  AlkPhos  51  12-24    PT/INR/PTT ( 24 Dec 2023 01:04 )                       :                       :      X            :       30.4                  .        .                   .              .           .       X           .                                       Lipid Profile:   HgA1c:   TSH: Thyroid Stimulating Hormone, Serum: 1.25 uIU/mL      TELEMETRY: Afib 110  ECG:    DIAGNOSTIC TESTING:  [ ] Echocardiogram: < from: TTE W or WO Ultrasound Enhancing Agent (12.22.23 @ 19:49) >  TRANSTHORACIC ECHOCARDIOGRAM REPORT  ________________________________________________________________________________                                      _______       Pt. Name:       BERNADINE ANDERSON Study Date:    12/22/2023  MRN:            [id number]         YOB: 1940  Accession #:    [id number]         Age:           83 years  Account#:       [id number]        Gender:        M  Heart Rate:                       Height:        62.00 in (157.48 cm)  Rhythm:     Weight:        174.00 lb (78.93 kg)  Blood Pressure: 102/53 mmHg       BSA/BMI:       1.80 m² / 31.83 kg/m²  ________________________________________________________________________________________  Referring Physician:    [id number] Janes Valverde  Interpreting Physician: George Ortiz MD  Primary Sonographer:    Sushma Driscoll    CPT:               ECHO TTE WO CON COMP W DOPP - 57440.m  Indication(s):     Dyspnea, unspecified - R06.00  Procedure:         Transthoracic echocardiogram with 2-D, M-mode and complete                     spectral and color flow Doppler.  Ordering Location: [facility]  Study Information: Study done bedside in [facility]. Pt. is + COVID.    _______________________________________________________________________________________     CONCLUSIONS:      1. Left ventricular cavity is normal. Left ventricular systolic function is normal with an ejection fraction visually estimated at 55 to 60 %.   2. Normal left ventricular diastolic function.   3. Normal right ventricular cavity size and systolic function.   4. The left atrium is normal.   5. The right atrium is normal in size.   6. Fibrocalcific aortic valve sclerosis without stenosis.   7. Estimated pulmonary artery systolic pressure is 34 mmHg, normal pulmonary artery pressure.    ________________________________________________________________________________________  FINDINGS:     Left Ventricle:  The left ventricular cavity is normal. Left ventricular systolic function is normal with an ejection fraction visually estimated at 55 to 60%. There is normal left ventricular diastolic function. No left ventricular hypertrophy.     Right Ventricle:  The right ventricular cavity is normal in size and normal systolic function. Tricuspid annular tissue Doppler S' is9.0 cm/s (normal >10 cm/s).     Left Atrium:  The left atrium is normal with an indexed volume of 18.77 ml/m². The right upper pulmonary vein appears to drain into the left atrium. The pulmonary venous flow pattern is normal.     Right Atrium:  The right atrium is normal in size with an indexed volume of 13.38 ml/m² and an indexed area of 6.42 cm²/m².     Interatrial Septum:  The interatrial septum appears intact.     Aortic Valve:  The aortic valve appears trileaflet with normal systolic excursion. There is fibrocalcific aortic valve sclerosis without stenosis. There is no evidence of aortic regurgitation.     Mitral Valve:  Structurally normal mitral valve with normal leaflet excursion. The mitral valve was not well visualized. There is trace mitral regurgitation.     Tricuspid Valve:  Structurally normal tricuspid valve with normal leaflet excursion. The tricuspid valve was not well visualized. There is mild tricuspid regurgitation. Estimated pulmonary artery systolic pressure is 34 mmHg, consistent with normal pulmonary artery pressure.     Pulmonic Valve:  The pulmonic valve was not well visualized. Structurally normal pulmonic valve with normal leaflet excursion. There is trace pulmonic regurgitation.     Pulmonary Artery:  The main pulmonary artery is normal in size, origin, and position.     Aorta:  The aortic root appears normal in size. The aortic root at the sinuses of Valsalva is normal in size, measuring 3.38 cm (indexed 1.88 cm/m²). The ascending aorta diameter is normal in size, measuring 3.21 cm (indexed 1.78 cm/m²).     Pericardium:  No pericardial effusion seen. Pericardial fat pad is seen anterior to the right ventricle.     Systemic Veins:  The inferior vena cava is normal in size measuring 1.30 cm in diameter, (normal <2.1cm) with normal inspiratory collapse (normal >50%) consistent with normal right atrial pressure (~3, range 0-5mmHg).  ____________________________________________________________________    < end of copied text >    [ ]  Catheterization:  [ ] Stress Test:    OTHER:

## 2023-12-24 NOTE — PROGRESS NOTE ADULT - ASSESSMENT
83-year-old male with past medical history of hypertension, hyperlipidemia, DM, stage III CKD, BPH, admitted for acute sky s/p IR drain placement. Course c/b hypoxic resp failure 2/2 COVID as well as new Afib RVR.     #Acute cholecystitis  CT A/P and RUQ result noted  s/p IR perc drain placement, monitor output  bile culture with GNR and GPC in pairs  c/w Zosyn  BCx NGTD  pain regimen  diet advancement as tolerated  GI following, herlinda recs  trend CMP  Outpatient surgery evaluation for interval cholecystectomy   on home midodrine, unclear etiology    #Acute resp failure with hypoxia suspect 2/2 COVID  Possible component of aspiration, on Zoysn as above  s/p COVID vaccines  started on Remdeivir and decadron plan for 5 days  s/p BIPAP, now on HFNC, wean as tolerated  CT chest noted, mild pulm edema noted, PRN lasix   DVT studies negative    #Afib RVR  Tele  TSH normal  heparin drip - per cards, they will d/w families about AC given pts high fall risk  metoprolol 37.5mg TID, up titrate as needed    #DM  A1C 9  ISS, accu checks, monitor glucose closely while on steroid   can c/w gabapentin for neuropathy     #RON on CKD stage 3  #BPH  SCr up to 2.9, appears to be plateauing   Fena 0.6% pre-renal  likely in the setting of poor po intake, abd pain  hold off additional fluids given edema on CT chest  renally dose meds  avoud nephrotoxic medications  c/w Tamsulosin  Horta for strict in/out, TOV once OOB    #HLD  c/w statin    #GERD  c/w ppi 40mg qd    #Anemia  Hg stable 9-10 range  iron studies consistent with chronic dx  f/u b12 and folate  trend CBC while admitted    DVT ppx: Heparin drip  Diet: CC     PT eval

## 2023-12-25 LAB
ALBUMIN SERPL ELPH-MCNC: 2.4 G/DL — LOW (ref 3.3–5.2)
ALP SERPL-CCNC: 60 U/L — SIGNIFICANT CHANGE UP (ref 40–120)
ALP SERPL-CCNC: 60 U/L — SIGNIFICANT CHANGE UP (ref 40–120)
ALP SERPL-CCNC: 62 U/L — SIGNIFICANT CHANGE UP (ref 40–120)
ALP SERPL-CCNC: 62 U/L — SIGNIFICANT CHANGE UP (ref 40–120)
ALT FLD-CCNC: 21 U/L — SIGNIFICANT CHANGE UP
ALT FLD-CCNC: 21 U/L — SIGNIFICANT CHANGE UP
ALT FLD-CCNC: 23 U/L — SIGNIFICANT CHANGE UP
ALT FLD-CCNC: 23 U/L — SIGNIFICANT CHANGE UP
ANION GAP SERPL CALC-SCNC: 11 MMOL/L — SIGNIFICANT CHANGE UP (ref 5–17)
ANION GAP SERPL CALC-SCNC: 11 MMOL/L — SIGNIFICANT CHANGE UP (ref 5–17)
ANION GAP SERPL CALC-SCNC: 13 MMOL/L — SIGNIFICANT CHANGE UP (ref 5–17)
ANION GAP SERPL CALC-SCNC: 13 MMOL/L — SIGNIFICANT CHANGE UP (ref 5–17)
ANION GAP SERPL CALC-SCNC: 14 MMOL/L — SIGNIFICANT CHANGE UP (ref 5–17)
ANION GAP SERPL CALC-SCNC: 14 MMOL/L — SIGNIFICANT CHANGE UP (ref 5–17)
APTT BLD: 197.7 SEC — CRITICAL HIGH (ref 24.5–35.6)
APTT BLD: 197.7 SEC — CRITICAL HIGH (ref 24.5–35.6)
AST SERPL-CCNC: 20 U/L — SIGNIFICANT CHANGE UP
AST SERPL-CCNC: 20 U/L — SIGNIFICANT CHANGE UP
AST SERPL-CCNC: 21 U/L — SIGNIFICANT CHANGE UP
AST SERPL-CCNC: 21 U/L — SIGNIFICANT CHANGE UP
BILIRUB SERPL-MCNC: 0.4 MG/DL — SIGNIFICANT CHANGE UP (ref 0.4–2)
BILIRUB SERPL-MCNC: 0.4 MG/DL — SIGNIFICANT CHANGE UP (ref 0.4–2)
BILIRUB SERPL-MCNC: 0.5 MG/DL — SIGNIFICANT CHANGE UP (ref 0.4–2)
BILIRUB SERPL-MCNC: 0.5 MG/DL — SIGNIFICANT CHANGE UP (ref 0.4–2)
BUN SERPL-MCNC: 51 MG/DL — HIGH (ref 8–20)
BUN SERPL-MCNC: 51 MG/DL — HIGH (ref 8–20)
BUN SERPL-MCNC: 63.6 MG/DL — HIGH (ref 8–20)
BUN SERPL-MCNC: 63.6 MG/DL — HIGH (ref 8–20)
BUN SERPL-MCNC: 67.6 MG/DL — HIGH (ref 8–20)
BUN SERPL-MCNC: 67.6 MG/DL — HIGH (ref 8–20)
CALCIUM SERPL-MCNC: 5.5 MG/DL — CRITICAL LOW (ref 8.4–10.5)
CALCIUM SERPL-MCNC: 5.5 MG/DL — CRITICAL LOW (ref 8.4–10.5)
CALCIUM SERPL-MCNC: 7.9 MG/DL — LOW (ref 8.4–10.5)
CALCIUM SERPL-MCNC: 7.9 MG/DL — LOW (ref 8.4–10.5)
CALCIUM SERPL-MCNC: 8 MG/DL — LOW (ref 8.4–10.5)
CALCIUM SERPL-MCNC: 8 MG/DL — LOW (ref 8.4–10.5)
CHLORIDE SERPL-SCNC: 100 MMOL/L — SIGNIFICANT CHANGE UP (ref 96–108)
CHLORIDE SERPL-SCNC: 100 MMOL/L — SIGNIFICANT CHANGE UP (ref 96–108)
CHLORIDE SERPL-SCNC: 102 MMOL/L — SIGNIFICANT CHANGE UP (ref 96–108)
CHLORIDE SERPL-SCNC: 102 MMOL/L — SIGNIFICANT CHANGE UP (ref 96–108)
CHLORIDE SERPL-SCNC: 112 MMOL/L — HIGH (ref 96–108)
CHLORIDE SERPL-SCNC: 112 MMOL/L — HIGH (ref 96–108)
CO2 SERPL-SCNC: 13 MMOL/L — LOW (ref 22–29)
CO2 SERPL-SCNC: 13 MMOL/L — LOW (ref 22–29)
CO2 SERPL-SCNC: 19 MMOL/L — LOW (ref 22–29)
CREAT SERPL-MCNC: 2.47 MG/DL — HIGH (ref 0.5–1.3)
CREAT SERPL-MCNC: 2.47 MG/DL — HIGH (ref 0.5–1.3)
CREAT SERPL-MCNC: 3.56 MG/DL — HIGH (ref 0.5–1.3)
CREAT SERPL-MCNC: 3.56 MG/DL — HIGH (ref 0.5–1.3)
CREAT SERPL-MCNC: 3.7 MG/DL — HIGH (ref 0.5–1.3)
CREAT SERPL-MCNC: 3.7 MG/DL — HIGH (ref 0.5–1.3)
CRP SERPL-MCNC: 138 MG/L — HIGH
CRP SERPL-MCNC: 138 MG/L — HIGH
EGFR: 15 ML/MIN/1.73M2 — LOW
EGFR: 15 ML/MIN/1.73M2 — LOW
EGFR: 16 ML/MIN/1.73M2 — LOW
EGFR: 16 ML/MIN/1.73M2 — LOW
EGFR: 25 ML/MIN/1.73M2 — LOW
EGFR: 25 ML/MIN/1.73M2 — LOW
GAS PNL BLDA: SIGNIFICANT CHANGE UP
GAS PNL BLDA: SIGNIFICANT CHANGE UP
GLUCOSE BLDC GLUCOMTR-MCNC: 282 MG/DL — HIGH (ref 70–99)
GLUCOSE BLDC GLUCOMTR-MCNC: 282 MG/DL — HIGH (ref 70–99)
GLUCOSE BLDC GLUCOMTR-MCNC: 291 MG/DL — HIGH (ref 70–99)
GLUCOSE BLDC GLUCOMTR-MCNC: 291 MG/DL — HIGH (ref 70–99)
GLUCOSE BLDC GLUCOMTR-MCNC: 303 MG/DL — HIGH (ref 70–99)
GLUCOSE BLDC GLUCOMTR-MCNC: 303 MG/DL — HIGH (ref 70–99)
GLUCOSE BLDC GLUCOMTR-MCNC: 309 MG/DL — HIGH (ref 70–99)
GLUCOSE BLDC GLUCOMTR-MCNC: 309 MG/DL — HIGH (ref 70–99)
GLUCOSE SERPL-MCNC: 246 MG/DL — HIGH (ref 70–99)
GLUCOSE SERPL-MCNC: 246 MG/DL — HIGH (ref 70–99)
GLUCOSE SERPL-MCNC: 294 MG/DL — HIGH (ref 70–99)
GLUCOSE SERPL-MCNC: 294 MG/DL — HIGH (ref 70–99)
GLUCOSE SERPL-MCNC: 336 MG/DL — HIGH (ref 70–99)
GLUCOSE SERPL-MCNC: 336 MG/DL — HIGH (ref 70–99)
HCT VFR BLD CALC: 31.8 % — LOW (ref 39–50)
HCT VFR BLD CALC: 31.8 % — LOW (ref 39–50)
HGB BLD-MCNC: 10.2 G/DL — LOW (ref 13–17)
HGB BLD-MCNC: 10.2 G/DL — LOW (ref 13–17)
MCHC RBC-ENTMCNC: 28.9 PG — SIGNIFICANT CHANGE UP (ref 27–34)
MCHC RBC-ENTMCNC: 28.9 PG — SIGNIFICANT CHANGE UP (ref 27–34)
MCHC RBC-ENTMCNC: 32.1 GM/DL — SIGNIFICANT CHANGE UP (ref 32–36)
MCHC RBC-ENTMCNC: 32.1 GM/DL — SIGNIFICANT CHANGE UP (ref 32–36)
MCV RBC AUTO: 90.1 FL — SIGNIFICANT CHANGE UP (ref 80–100)
MCV RBC AUTO: 90.1 FL — SIGNIFICANT CHANGE UP (ref 80–100)
PLATELET # BLD AUTO: 169 K/UL — SIGNIFICANT CHANGE UP (ref 150–400)
PLATELET # BLD AUTO: 169 K/UL — SIGNIFICANT CHANGE UP (ref 150–400)
POTASSIUM SERPL-MCNC: 3.9 MMOL/L — SIGNIFICANT CHANGE UP (ref 3.5–5.3)
POTASSIUM SERPL-MCNC: 3.9 MMOL/L — SIGNIFICANT CHANGE UP (ref 3.5–5.3)
POTASSIUM SERPL-MCNC: 4.8 MMOL/L — SIGNIFICANT CHANGE UP (ref 3.5–5.3)
POTASSIUM SERPL-MCNC: 4.8 MMOL/L — SIGNIFICANT CHANGE UP (ref 3.5–5.3)
POTASSIUM SERPL-MCNC: 4.9 MMOL/L — SIGNIFICANT CHANGE UP (ref 3.5–5.3)
POTASSIUM SERPL-MCNC: 4.9 MMOL/L — SIGNIFICANT CHANGE UP (ref 3.5–5.3)
POTASSIUM SERPL-SCNC: 3.9 MMOL/L — SIGNIFICANT CHANGE UP (ref 3.5–5.3)
POTASSIUM SERPL-SCNC: 3.9 MMOL/L — SIGNIFICANT CHANGE UP (ref 3.5–5.3)
POTASSIUM SERPL-SCNC: 4.8 MMOL/L — SIGNIFICANT CHANGE UP (ref 3.5–5.3)
POTASSIUM SERPL-SCNC: 4.8 MMOL/L — SIGNIFICANT CHANGE UP (ref 3.5–5.3)
POTASSIUM SERPL-SCNC: 4.9 MMOL/L — SIGNIFICANT CHANGE UP (ref 3.5–5.3)
POTASSIUM SERPL-SCNC: 4.9 MMOL/L — SIGNIFICANT CHANGE UP (ref 3.5–5.3)
PROT SERPL-MCNC: 5.8 G/DL — LOW (ref 6.6–8.7)
PROT SERPL-MCNC: 5.8 G/DL — LOW (ref 6.6–8.7)
PROT SERPL-MCNC: 5.9 G/DL — LOW (ref 6.6–8.7)
PROT SERPL-MCNC: 5.9 G/DL — LOW (ref 6.6–8.7)
RBC # BLD: 3.53 M/UL — LOW (ref 4.2–5.8)
RBC # BLD: 3.53 M/UL — LOW (ref 4.2–5.8)
RBC # FLD: 13.8 % — SIGNIFICANT CHANGE UP (ref 10.3–14.5)
RBC # FLD: 13.8 % — SIGNIFICANT CHANGE UP (ref 10.3–14.5)
SODIUM SERPL-SCNC: 133 MMOL/L — LOW (ref 135–145)
SODIUM SERPL-SCNC: 133 MMOL/L — LOW (ref 135–145)
SODIUM SERPL-SCNC: 134 MMOL/L — LOW (ref 135–145)
SODIUM SERPL-SCNC: 134 MMOL/L — LOW (ref 135–145)
SODIUM SERPL-SCNC: 136 MMOL/L — SIGNIFICANT CHANGE UP (ref 135–145)
SODIUM SERPL-SCNC: 136 MMOL/L — SIGNIFICANT CHANGE UP (ref 135–145)
WBC # BLD: 15.29 K/UL — HIGH (ref 3.8–10.5)
WBC # BLD: 15.29 K/UL — HIGH (ref 3.8–10.5)
WBC # FLD AUTO: 15.29 K/UL — HIGH (ref 3.8–10.5)
WBC # FLD AUTO: 15.29 K/UL — HIGH (ref 3.8–10.5)

## 2023-12-25 PROCEDURE — 99233 SBSQ HOSP IP/OBS HIGH 50: CPT

## 2023-12-25 PROCEDURE — 36000 PLACE NEEDLE IN VEIN: CPT | Mod: RT

## 2023-12-25 PROCEDURE — 99234 HOSP IP/OBS SM DT SF/LOW 45: CPT

## 2023-12-25 RX ORDER — METOPROLOL TARTRATE 50 MG
25 TABLET ORAL
Refills: 0 | Status: DISCONTINUED | OUTPATIENT
Start: 2023-12-25 | End: 2024-01-05

## 2023-12-25 RX ORDER — INSULIN LISPRO 100/ML
2 VIAL (ML) SUBCUTANEOUS
Refills: 0 | Status: DISCONTINUED | OUTPATIENT
Start: 2023-12-25 | End: 2023-12-27

## 2023-12-25 RX ORDER — METOPROLOL TARTRATE 50 MG
5 TABLET ORAL ONCE
Refills: 0 | Status: COMPLETED | OUTPATIENT
Start: 2023-12-25 | End: 2023-12-25

## 2023-12-25 RX ORDER — METOPROLOL TARTRATE 50 MG
50 TABLET ORAL THREE TIMES A DAY
Refills: 0 | Status: DISCONTINUED | OUTPATIENT
Start: 2023-12-25 | End: 2023-12-25

## 2023-12-25 RX ORDER — HEPARIN SODIUM 5000 [USP'U]/ML
5000 INJECTION INTRAVENOUS; SUBCUTANEOUS EVERY 8 HOURS
Refills: 0 | Status: DISCONTINUED | OUTPATIENT
Start: 2023-12-25 | End: 2024-01-05

## 2023-12-25 RX ORDER — OLANZAPINE 15 MG/1
5 TABLET, FILM COATED ORAL ONCE
Refills: 0 | Status: DISCONTINUED | OUTPATIENT
Start: 2023-12-25 | End: 2024-01-05

## 2023-12-25 RX ORDER — DRONEDARONE 400 MG/1
400 TABLET, FILM COATED ORAL
Refills: 0 | Status: DISCONTINUED | OUTPATIENT
Start: 2023-12-25 | End: 2024-01-05

## 2023-12-25 RX ORDER — INSULIN GLARGINE 100 [IU]/ML
8 INJECTION, SOLUTION SUBCUTANEOUS AT BEDTIME
Refills: 0 | Status: DISCONTINUED | OUTPATIENT
Start: 2023-12-25 | End: 2023-12-27

## 2023-12-25 RX ADMIN — INSULIN GLARGINE 8 UNIT(S): 100 INJECTION, SOLUTION SUBCUTANEOUS at 23:13

## 2023-12-25 RX ADMIN — TAMSULOSIN HYDROCHLORIDE 0.4 MILLIGRAM(S): 0.4 CAPSULE ORAL at 23:07

## 2023-12-25 RX ADMIN — PIPERACILLIN AND TAZOBACTAM 25 GRAM(S): 4; .5 INJECTION, POWDER, LYOPHILIZED, FOR SOLUTION INTRAVENOUS at 18:58

## 2023-12-25 RX ADMIN — Medication 8: at 06:47

## 2023-12-25 RX ADMIN — GABAPENTIN 300 MILLIGRAM(S): 400 CAPSULE ORAL at 23:07

## 2023-12-25 RX ADMIN — HEPARIN SODIUM 5000 UNIT(S): 5000 INJECTION INTRAVENOUS; SUBCUTANEOUS at 23:06

## 2023-12-25 RX ADMIN — PIPERACILLIN AND TAZOBACTAM 25 GRAM(S): 4; .5 INJECTION, POWDER, LYOPHILIZED, FOR SOLUTION INTRAVENOUS at 10:54

## 2023-12-25 RX ADMIN — MIDODRINE HYDROCHLORIDE 5 MILLIGRAM(S): 2.5 TABLET ORAL at 06:47

## 2023-12-25 RX ADMIN — Medication 25 MILLIGRAM(S): at 17:07

## 2023-12-25 RX ADMIN — DRONEDARONE 400 MILLIGRAM(S): 400 TABLET, FILM COATED ORAL at 17:07

## 2023-12-25 RX ADMIN — MIDODRINE HYDROCHLORIDE 5 MILLIGRAM(S): 2.5 TABLET ORAL at 11:51

## 2023-12-25 RX ADMIN — CHLORHEXIDINE GLUCONATE 1 APPLICATION(S): 213 SOLUTION TOPICAL at 06:00

## 2023-12-25 RX ADMIN — Medication 5 MILLIGRAM(S): at 01:52

## 2023-12-25 RX ADMIN — MIDODRINE HYDROCHLORIDE 5 MILLIGRAM(S): 2.5 TABLET ORAL at 16:29

## 2023-12-25 RX ADMIN — Medication 8: at 11:58

## 2023-12-25 RX ADMIN — Medication 37.5 MILLIGRAM(S): at 06:47

## 2023-12-25 RX ADMIN — Medication 2: at 23:02

## 2023-12-25 RX ADMIN — REMDESIVIR 200 MILLIGRAM(S): 5 INJECTION INTRAVENOUS at 03:07

## 2023-12-25 RX ADMIN — HEPARIN SODIUM 0 UNIT(S)/HR: 5000 INJECTION INTRAVENOUS; SUBCUTANEOUS at 06:48

## 2023-12-25 RX ADMIN — Medication 2 UNIT(S): at 16:32

## 2023-12-25 RX ADMIN — Medication 6 MILLIGRAM(S): at 22:59

## 2023-12-25 RX ADMIN — PANTOPRAZOLE SODIUM 40 MILLIGRAM(S): 20 TABLET, DELAYED RELEASE ORAL at 06:47

## 2023-12-25 RX ADMIN — HEPARIN SODIUM 5000 UNIT(S): 5000 INJECTION INTRAVENOUS; SUBCUTANEOUS at 13:37

## 2023-12-25 RX ADMIN — Medication 2 UNIT(S): at 12:41

## 2023-12-25 RX ADMIN — Medication 6: at 16:28

## 2023-12-25 NOTE — CHART NOTE - NSCHARTNOTEFT_GEN_A_CORE
Contacted by RN due to patient agitatted overnight, likely hospital acquired delirium. Patient admitted on 12/21, he is an 83-year-old male with PMHx of HTN, HLD, DM, stage III CKD, BPH, admitted for acute sky s/p IR drain placement. Course c/b hypoxic resp failure 2/2 COVID as well as new Afib RVR.    RN reports patient ripped out IV's overnight, he also ripped out wills. He continues to try and get up out of bed on his own which is a concern for safety. Patient is agitated and needing redirection.    VS: T 97.9, HR 71, /89, RR 21, SpO2 96% on 5L NC    -zyprexa 5mg IM x1  -Replace wills  -1:1 for safety; d/c when applicable    RN to call with questions/concerns  Will endorse to day team to follow up

## 2023-12-25 NOTE — PROGRESS NOTE ADULT - ASSESSMENT
84 y/o M with PMH HTN, HLD, CKD3, BPH who originally presented to [facility] ED with abdominal pain and found with acute cholecystitis. Course c/b hypoxia, tachycardia  and TMax 102.3, RRT called and found to be COVID+ on RBO.  Cardiology consulted due to new onset of Afib with RVR. TTE with normal EF and no valvulopathy. Currently rate controlled. Plan to initiate AC however per daughter, he had some balance problems, is supposed to use a walking aide and refuses and has had falls recently. Will need to further discuss risk vs benefit of long term AC.  82 y/o M with PMH HTN, HLD, CKD3, BPH who originally presented to [facility] ED with abdominal pain and found with acute cholecystitis. Course c/b hypoxia, tachycardia  and TMax 102.3, RRT called and found to be COVID+ on RBO.  Cardiology consulted due to new onset of Afib with RVR. TTE with normal EF and no valvulopathy. Currently rate controlled. Plan to initiate AC however per daughter, he had some balance problems, is supposed to use a walking aide and refuses and has had falls recently. Will need to further discuss risk vs benefit of long term AC.

## 2023-12-25 NOTE — PROGRESS NOTE ADULT - NS ATTEND AMEND GEN_ALL_CORE FT
seen with above,    83M Liberian-speaking history significant for HTN, HLD, CKD3-4, BPH, imbalance with prior falls lives at home with his wife, admitted with abdominal pain found acute cholecystitis course complicated by sepsis, fever with RRT for acute hypoxic respiratory failure, +COVID s/p cholecystostomy tube as not surgical candidate, found with new onset Afib RVR 150s in MICU was on HFNC    -unclear reason on chronic midodrine from home, need to check orthostatic on metoprolol  -continue metoprolol for HR control  -CHADVasc 3- was on heparin gtt, but given agitation/delirium he is not candidate for long term safety of AC use with also history of repeated falls, discussed with his wife/daughter over the phone today, need to accept risk of stroke, self converted to sinus today, will start Multaq to maintain sinus, avoid Amiodarone given COVID pneumonia/hypoxia  -obtain EKG tomorrow         Markus Mejia DO, [facility]  Faculty Non-Invasive Cardiologist  [phone] seen with above,    83M Georgian-speaking history significant for HTN, HLD, CKD3-4, BPH, imbalance with prior falls lives at home with his wife, admitted with abdominal pain found acute cholecystitis course complicated by sepsis, fever with RRT for acute hypoxic respiratory failure, +COVID s/p cholecystostomy tube as not surgical candidate, found with new onset Afib RVR 150s in MICU was on HFNC    -unclear reason on chronic midodrine from home, need to check orthostatic on metoprolol  -continue metoprolol for HR control  -CHADVasc 3- was on heparin gtt, but given agitation/delirium he is not candidate for long term safety of AC use with also history of repeated falls, discussed with his wife/daughter over the phone today, need to accept risk of stroke, self converted to sinus today, will start Multaq to maintain sinus, avoid Amiodarone given COVID pneumonia/hypoxia  -obtain EKG tomorrow         Markus Mejia DO, [facility]  Faculty Non-Invasive Cardiologist  [phone]

## 2023-12-25 NOTE — PROGRESS NOTE ADULT - PROBLEM SELECTOR PLAN 1
- [id number] 3  - afib RVR overnight converted to NSR this AM.   - start multaq 400mg BID stop simvastatin as there is drug-drug interaction, monitor QTC with EKG q12h   - TTE ef 55-60%, no valvulopathy, fibrocalcific AV without stenosis. normal PA pressures  - continue metoprolol 25mg PO BID   - on midodrine as OP, unclear reason why. can continue   - not orthostatic   - has hx of falls and imbalances per daughter. Patient is supposed to use mobility aides but refuses to do so. Had fall with rib fractures earlier this year. Discussed risk vs benefit of DOAC with patient and family. Patient verbalizes understanding of risks of bleed while on DOAC but likely risk greater than benefit as he is noncompliant with mobility aides.   - As per family and pt, no further anticoagulation - [id number] 3  - afib RVR overnight converted to NSR this AM.   - start multaq 400mg BID stop simvastatin as there is drug-drug interaction, monitor QTC with EKG q12h   - TTE ef 55-60%, no valvulopathy, fibrocalcific AV without stenosis. normal PA pressures  - continue metoprolol 25mg PO BID   - on midodrine as OP, unclear reason why. can continue   - not orthostatic   - has hx of falls and imbalances per daughter. Patient is supposed to use mobility aides but refuses to do so. Had fall with rib fractures earlier this year. Discussed risk vs benefit of DOAC with patient and family. Patient verbalizes understanding of risks of bleed while on DOAC but likely risk greater than benefit as he is noncompliant with mobility aides.   - As per family and pt, no further anticoagulation

## 2023-12-25 NOTE — PROGRESS NOTE ADULT - SUBJECTIVE AND OBJECTIVE BOX
Wanda Louie M.D.    Patient is a 83y old  Male who presents with a chief complaint of Cholecystitis (24 Dec 2023 09:40)      SUBJECTIVE / OVERNIGHT EVENTS: agitated overnight, s/p zyprexa and now on 1:1. ROS unable to be obtained.     MEDICATIONS  (STANDING):  acetaminophen   IVPB .. 1000 milliGRAM(s) IV Intermittent once  chlorhexidine 2% Cloths 1 Application(s) Topical <User Schedule>  dexAMETHasone  Injectable 6 milliGRAM(s) IV Push every 24 hours  dextrose 5%. 1000 milliLiter(s) (100 mL/Hr) IV Continuous <Continuous>  dextrose 5%. 1000 milliLiter(s) (50 mL/Hr) IV Continuous <Continuous>  dextrose 50% Injectable 12.5 Gram(s) IV Push once  dextrose 50% Injectable 25 Gram(s) IV Push once  dextrose 50% Injectable 25 Gram(s) IV Push once  dronedarone 400 milliGRAM(s) Oral two times a day  gabapentin 300 milliGRAM(s) Oral at bedtime  glucagon  Injectable 1 milliGRAM(s) IntraMuscular once  heparin   Injectable 5000 Unit(s) SubCutaneous every 8 hours  insulin lispro (ADMELOG) corrective regimen sliding scale   SubCutaneous three times a day before meals  insulin lispro (ADMELOG) corrective regimen sliding scale   SubCutaneous at bedtime  metoprolol tartrate 25 milliGRAM(s) Oral two times a day  midodrine. 5 milliGRAM(s) Oral three times a day  OLANZapine Injectable 5 milliGRAM(s) IntraMuscular once  pantoprazole    Tablet 40 milliGRAM(s) Oral before breakfast  piperacillin/tazobactam IVPB.. 3.375 Gram(s) IV Intermittent every 8 hours  remdesivir  IVPB 100 milliGRAM(s) IV Intermittent every 24 hours  remdesivir  IVPB   IV Intermittent   simvastatin 20 milliGRAM(s) Oral at bedtime  tamsulosin 0.4 milliGRAM(s) Oral at bedtime    MEDICATIONS  (PRN):  dextrose Oral Gel 15 Gram(s) Oral once PRN Blood Glucose LESS THAN 70 milliGRAM(s)/deciliter  ondansetron Injectable 4 milliGRAM(s) IV Push every 6 hours PRN Nausea      I&O's Summary    24 Dec 2023 07:01  -  25 Dec 2023 07:00  --------------------------------------------------------  IN: 168 mL / OUT: 615 mL / NET: -447 mL    25 Dec 2023 07:01  -  25 Dec 2023 10:43  --------------------------------------------------------  IN: 16 mL / OUT: 0 mL / NET: 16 mL        PHYSICAL EXAM:  Vital Signs Last 24 Hrs  T(C): 36.6 (25 Dec 2023 04:00), Max: 37.1 (24 Dec 2023 16:00)  T(F): 97.9 (25 Dec 2023 04:00), Max: 98.8 (24 Dec 2023 16:00)  HR: 66 (25 Dec 2023 08:00) (66 - 147)  BP: 131/52 (25 Dec 2023 08:00) (83/55 - 146/77)  BP(mean): 77 (25 Dec 2023 08:00) (55 - 97)  RR: 17 (25 Dec 2023 08:00) (17 - 31)  SpO2: 98% (25 Dec 2023 08:00) (88% - 99%)    Parameters below as of 25 Dec 2023 08:00  Patient On (Oxygen Delivery Method): nasal cannula  O2 Flow (L/min): 2    CONSTITUTIONAL: NAD, well-groomed, on NC  RESPIRATORY: Normal respiratory effort; Course bs b/l  CARDIOVASCULAR: irregular irregular, no LE edema  ABDOMEN: Nontender to palpation, hypoactive bowel sounds. +Perc drain draining bile  PSYCH: A+O x1; pleasantly confused  NEUROLOGY: CN 2-12 are intact and symmetric; no gross sensory deficits;   SKIN: No rashes; no palpable lesions    LABS:                        10.2   15.29 )-----------( 169      ( 25 Dec 2023 03:18 )             31.8     12-25    134<L>  |  102  |  63.6<H>  ----------------------------<  294<H>  4.9   |  19.0<L>  |  3.56<H>    Ca    7.9<L>      25 Dec 2023 03:18  Phos  2.7     12-24  Mg     2.1     12-24    TPro  5.9<L>  /  Alb  2.4<L>  /  TBili  0.5  /  DBili  x   /  AST  21  /  ALT  23  /  AlkPhos  60  12-25    PTT - ( 25 Dec 2023 03:20 )  PTT:197.7 sec      Urinalysis Basic - ( 25 Dec 2023 03:18 )    Color: x / Appearance: x / SG: x / pH: x  Gluc: 294 mg/dL / Ketone: x  / Bili: x / Urobili: x   Blood: x / Protein: x / Nitrite: x   Leuk Esterase: x / RBC: x / WBC x   Sq Epi: x / Non Sq Epi: x / Bacteria: x        Culture - Body Fluid with Gram Stain (collected 22 Dec 2023 14:40)  Source: Bile Bile Fluid  Gram Stain (23 Dec 2023 01:41):    No polymorphonuclear leukocytes seen    Gram Negative Rods seen    Gram positive cocci in pairs seen    by cytocentrifuge  Preliminary Report (23 Dec 2023 19:31):    Moderate Escherichia coli    Few Streptococcus gallolyticus "Susceptibilities not performed"  Organism: Escherichia coli (24 Dec 2023 16:06)  Organism: Escherichia coli (24 Dec 2023 16:06)      CAPILLARY BLOOD GLUCOSE      POCT Blood Glucose.: 303 mg/dL (25 Dec 2023 06:04)  POCT Blood Glucose.: 185 mg/dL (24 Dec 2023 21:38)  POCT Blood Glucose.: 247 mg/dL (24 Dec 2023 16:40)  POCT Blood Glucose.: 173 mg/dL (24 Dec 2023 11:34)      RADIOLOGY & ADDITIONAL TESTS:  Results Reviewed:   Imaging Personally Reviewed:  Electrocardiogram Personally Reviewed:

## 2023-12-25 NOTE — PROCEDURE NOTE - NSPROCDETAILS_GEN_ALL_CORE
blood seen on insertion/dressing applied/flushes easily/secured in place
flushes easily/secured in place

## 2023-12-25 NOTE — PROGRESS NOTE ADULT - SUBJECTIVE AND OBJECTIVE BOX
[facility]                                                         CARDIOLOGY AT [place] [address]                                                         Telephone: [phone]. Fax:[phone]                                                                             PROGRESS NOTE     Reason for follow up: Afib, Covid, ARF   Overnight Events: converted from Afib RVR to NSR. Family now refusing AC     Review of symptoms:   Cardiac:  No chest pain. No dyspnea. No palpitations.  Respiratory: no cough. No dyspnea  Gastrointestinal: No diarrhea. No abdominal pain. No bleeding.   Neuro: No focal neuro complaints.    Vitals:  T(C): 36.6 (12-25-23 @ 04:00), Max: 37.1 (12-24-23 @ 16:00)  HR: 65 (12-25-23 @ 10:00) (65 - 147)  BP: 133/67 (12-25-23 @ 10:00) (83/55 - 146/77)  RR: 16 (12-25-23 @ 10:00) (16 - 31)  SpO2: 98% (12-25-23 @ 10:00) (88% - 99%)  Wt(kg): --  I&O's Summary    24 Dec 2023 07:01  -  25 Dec 2023 07:00  --------------------------------------------------------  IN: 168 mL / OUT: 615 mL / NET: -447 mL    25 Dec 2023 07:01  -  25 Dec 2023 10:56  --------------------------------------------------------  IN: 16 mL / OUT: 0 mL / NET: 16 mL      Weight (kg): 79.2 (12-22 @ 05:10)    PHYSICAL EXAM:  Appearance: Comfortable. No acute distress  HEENT:  Atraumatic. Normocephalic.  Normal oral mucosa  Neurologic: A & O x 3, no gross focal deficits.  Cardiovascular: RRR S1 S2, No murmur, no rubs/gallops. No JVD  Respiratory: Lungs clear to auscultation, unlabored   Gastrointestinal:  Soft, Non-tender, + BS  Lower Extremities: 2+ Peripheral Pulses, No clubbing, cyanosis, or edema  Psychiatry: Patient is calm. No agitation.   Skin: warm and dry.    CURRENT CARDIAC MEDICATIONS:  dronedarone 400 milliGRAM(s) Oral two times a day  metoprolol tartrate 25 milliGRAM(s) Oral two times a day  midodrine. 5 milliGRAM(s) Oral three times a day      CURRENT OTHER MEDICATIONS:  piperacillin/tazobactam IVPB.. 3.375 Gram(s) IV Intermittent every 8 hours  remdesivir  IVPB 100 milliGRAM(s) IV Intermittent every 24 hours  remdesivir  IVPB   IV Intermittent   acetaminophen   IVPB .. 1000 milliGRAM(s) IV Intermittent once  gabapentin 300 milliGRAM(s) Oral at bedtime  OLANZapine Injectable 5 milliGRAM(s) IntraMuscular once  ondansetron Injectable 4 milliGRAM(s) IV Push every 6 hours PRN Nausea  pantoprazole    Tablet 40 milliGRAM(s) Oral before breakfast  dexAMETHasone  Injectable 6 milliGRAM(s) IV Push every 24 hours  dextrose 50% Injectable 12.5 Gram(s) IV Push once, Stop order after: 1 Doses  dextrose 50% Injectable 25 Gram(s) IV Push once, Stop order after: 1 Doses  dextrose 50% Injectable 25 Gram(s) IV Push once, Stop order after: 1 Doses  dextrose Oral Gel 15 Gram(s) Oral once, Stop order after: 1 Doses PRN Blood Glucose LESS THAN 70 milliGRAM(s)/deciliter  glucagon  Injectable 1 milliGRAM(s) IntraMuscular once, Stop order after: 1 Doses  insulin lispro (ADMELOG) corrective regimen sliding scale   SubCutaneous three times a day before meals  insulin lispro (ADMELOG) corrective regimen sliding scale   SubCutaneous at bedtime  chlorhexidine 2% Cloths 1 Application(s) Topical <User Schedule>  dextrose 5%. 1000 milliLiter(s) (100 mL/Hr) IV Continuous <Continuous>  dextrose 5%. 1000 milliLiter(s) (50 mL/Hr) IV Continuous <Continuous>  heparin   Injectable 5000 Unit(s) SubCutaneous every 8 hours  tamsulosin 0.4 milliGRAM(s) Oral at bedtime      LABS:	 	                            10.2   15.29 )-----------( 169      ( 25 Dec 2023 03:18 )             31.8     12-25    134<L>  |  102  |  63.6<H>  ----------------------------<  294<H>  4.9   |  19.0<L>  |  3.56<H>    Ca    7.9<L>      25 Dec 2023 03:18  Phos  2.7     12-24  Mg     2.1     12-24    TPro  5.9<L>  /  Alb  2.4<L>  /  TBili  0.5  /  DBili  x   /  AST  21  /  ALT  23  /  AlkPhos  60  12-25    PT/INR/PTT ( 25 Dec 2023 03:20 )                       :                       :      X            :       197.7                 .        .                   .              .           .       X           .                                       Lipid Profile:   HgA1c:   TSH: Thyroid Stimulating Hormone, Serum: 1.25 uIU/mL                                                                    [facility]                                                         CARDIOLOGY AT [place] [address]                                                         Telephone: [phone]. Fax:[phone]                                                                             PROGRESS NOTE     Reason for follow up: Afib, Covid, ARF   Overnight Events: converted from Afib RVR to NSR. Family now refusing AC     Review of symptoms:   Cardiac:  No chest pain. No dyspnea. No palpitations.  Respiratory: no cough. No dyspnea  Gastrointestinal: No diarrhea. No abdominal pain. No bleeding.   Neuro: No focal neuro complaints.    Vitals:  T(C): 36.6 (12-25-23 @ 04:00), Max: 37.1 (12-24-23 @ 16:00)  HR: 65 (12-25-23 @ 10:00) (65 - 147)  BP: 133/67 (12-25-23 @ 10:00) (83/55 - 146/77)  RR: 16 (12-25-23 @ 10:00) (16 - 31)  SpO2: 98% (12-25-23 @ 10:00) (88% - 99%)  Wt(kg): --  I&O's Summary    24 Dec 2023 07:01  -  25 Dec 2023 07:00  --------------------------------------------------------  IN: 168 mL / OUT: 615 mL / NET: -447 mL    25 Dec 2023 07:01  -  25 Dec 2023 10:56  --------------------------------------------------------  IN: 16 mL / OUT: 0 mL / NET: 16 mL      Weight (kg): 79.2 (12-22 @ 05:10)    PHYSICAL EXAM:  Appearance: Comfortable. No acute distress  HEENT:  Atraumatic. Normocephalic.  Normal oral mucosa  Neurologic: A & O x 3, no gross focal deficits.  Cardiovascular: RRR S1 S2, No murmur, no rubs/gallops. No JVD  Respiratory: Lungs clear to auscultation, unlabored   Gastrointestinal:  Soft, Non-tender, + BS  Lower Extremities: 2+ Peripheral Pulses, No clubbing, cyanosis, or edema  Psychiatry: Patient is calm. No agitation.   Skin: warm and dry.    CURRENT CARDIAC MEDICATIONS:  dronedarone 400 milliGRAM(s) Oral two times a day  metoprolol tartrate 25 milliGRAM(s) Oral two times a day  midodrine. 5 milliGRAM(s) Oral three times a day      CURRENT OTHER MEDICATIONS:  piperacillin/tazobactam IVPB.. 3.375 Gram(s) IV Intermittent every 8 hours  remdesivir  IVPB 100 milliGRAM(s) IV Intermittent every 24 hours  remdesivir  IVPB   IV Intermittent   acetaminophen   IVPB .. 1000 milliGRAM(s) IV Intermittent once  gabapentin 300 milliGRAM(s) Oral at bedtime  OLANZapine Injectable 5 milliGRAM(s) IntraMuscular once  ondansetron Injectable 4 milliGRAM(s) IV Push every 6 hours PRN Nausea  pantoprazole    Tablet 40 milliGRAM(s) Oral before breakfast  dexAMETHasone  Injectable 6 milliGRAM(s) IV Push every 24 hours  dextrose 50% Injectable 12.5 Gram(s) IV Push once, Stop order after: 1 Doses  dextrose 50% Injectable 25 Gram(s) IV Push once, Stop order after: 1 Doses  dextrose 50% Injectable 25 Gram(s) IV Push once, Stop order after: 1 Doses  dextrose Oral Gel 15 Gram(s) Oral once, Stop order after: 1 Doses PRN Blood Glucose LESS THAN 70 milliGRAM(s)/deciliter  glucagon  Injectable 1 milliGRAM(s) IntraMuscular once, Stop order after: 1 Doses  insulin lispro (ADMELOG) corrective regimen sliding scale   SubCutaneous three times a day before meals  insulin lispro (ADMELOG) corrective regimen sliding scale   SubCutaneous at bedtime  chlorhexidine 2% Cloths 1 Application(s) Topical <User Schedule>  dextrose 5%. 1000 milliLiter(s) (100 mL/Hr) IV Continuous <Continuous>  dextrose 5%. 1000 milliLiter(s) (50 mL/Hr) IV Continuous <Continuous>  heparin   Injectable 5000 Unit(s) SubCutaneous every 8 hours  tamsulosin 0.4 milliGRAM(s) Oral at bedtime      LABS:	 	                            10.2   15.29 )-----------( 169      ( 25 Dec 2023 03:18 )             31.8     12-25    134<L>  |  102  |  63.6<H>  ----------------------------<  294<H>  4.9   |  19.0<L>  |  3.56<H>    Ca    7.9<L>      25 Dec 2023 03:18  Phos  2.7     12-24  Mg     2.1     12-24    TPro  5.9<L>  /  Alb  2.4<L>  /  TBili  0.5  /  DBili  x   /  AST  21  /  ALT  23  /  AlkPhos  60  12-25    PT/INR/PTT ( 25 Dec 2023 03:20 )                       :                       :      X            :       197.7                 .        .                   .              .           .       X           .                                       Lipid Profile:   HgA1c:   TSH: Thyroid Stimulating Hormone, Serum: 1.25 uIU/mL                                                                    [facility]                                                         CARDIOLOGY AT [place] [address]                                                         Telephone: [phone]. Fax:[phone]                                                                             PROGRESS NOTE     Reason for follow up: Afib, Covid, ARF   Overnight Events: converted from Afib RVR to NSR. Family is aware he will not be safe for long term AC use due to high bleeding risk, agitated overnight pulled out IV and wills-cathedward on 1:1 constant observation, accepting risk of stroke    Review of symptoms:   unable to obtain as sedated with Zyprexa     Vitals:  T(C): 36.6 (12-25-23 @ 04:00), Max: 37.1 (12-24-23 @ 16:00)  HR: 65 (12-25-23 @ 10:00) (65 - 147)  BP: 133/67 (12-25-23 @ 10:00) (83/55 - 146/77)  RR: 16 (12-25-23 @ 10:00) (16 - 31)  SpO2: 98% (12-25-23 @ 10:00) (88% - 99%)  Wt(kg): --  I&O's Summary    24 Dec 2023 07:01  -  25 Dec 2023 07:00  --------------------------------------------------------  IN: 168 mL / OUT: 615 mL / NET: -447 mL    25 Dec 2023 07:01  -  25 Dec 2023 10:56  --------------------------------------------------------  IN: 16 mL / OUT: 0 mL / NET: 16 mL      Weight (kg): 79.2 (12-22 @ 05:10)    PHYSICAL EXAM:  Appearance: Comfortable. No acute distress  HEENT:  Atraumatic. Normocephalic.  Normal oral mucosa  Neurologic: A & O x 3, no gross focal deficits.  Cardiovascular: RRR S1 S2, No murmur, no rubs/gallops. No JVD  Respiratory: Lungs clear to auscultation, unlabored   Gastrointestinal:  Soft, Non-tender, + BS  Lower Extremities: 2+ Peripheral Pulses, No clubbing, cyanosis, or edema  Psychiatry: Patient is calm. No agitation.   Skin: warm and dry.    CURRENT CARDIAC MEDICATIONS:  dronedarone 400 milliGRAM(s) Oral two times a day  metoprolol tartrate 25 milliGRAM(s) Oral two times a day  midodrine. 5 milliGRAM(s) Oral three times a day      CURRENT OTHER MEDICATIONS:  piperacillin/tazobactam IVPB.. 3.375 Gram(s) IV Intermittent every 8 hours  remdesivir  IVPB 100 milliGRAM(s) IV Intermittent every 24 hours  remdesivir  IVPB   IV Intermittent   acetaminophen   IVPB .. 1000 milliGRAM(s) IV Intermittent once  gabapentin 300 milliGRAM(s) Oral at bedtime  OLANZapine Injectable 5 milliGRAM(s) IntraMuscular once  ondansetron Injectable 4 milliGRAM(s) IV Push every 6 hours PRN Nausea  pantoprazole    Tablet 40 milliGRAM(s) Oral before breakfast  dexAMETHasone  Injectable 6 milliGRAM(s) IV Push every 24 hours  dextrose 50% Injectable 12.5 Gram(s) IV Push once, Stop order after: 1 Doses  dextrose 50% Injectable 25 Gram(s) IV Push once, Stop order after: 1 Doses  dextrose 50% Injectable 25 Gram(s) IV Push once, Stop order after: 1 Doses  dextrose Oral Gel 15 Gram(s) Oral once, Stop order after: 1 Doses PRN Blood Glucose LESS THAN 70 milliGRAM(s)/deciliter  glucagon  Injectable 1 milliGRAM(s) IntraMuscular once, Stop order after: 1 Doses  insulin lispro (ADMELOG) corrective regimen sliding scale   SubCutaneous three times a day before meals  insulin lispro (ADMELOG) corrective regimen sliding scale   SubCutaneous at bedtime  chlorhexidine 2% Cloths 1 Application(s) Topical <User Schedule>  dextrose 5%. 1000 milliLiter(s) (100 mL/Hr) IV Continuous <Continuous>  dextrose 5%. 1000 milliLiter(s) (50 mL/Hr) IV Continuous <Continuous>  heparin   Injectable 5000 Unit(s) SubCutaneous every 8 hours  tamsulosin 0.4 milliGRAM(s) Oral at bedtime      LABS:	 	                            10.2   15.29 )-----------( 169      ( 25 Dec 2023 03:18 )             31.8     12-25    134<L>  |  102  |  63.6<H>  ----------------------------<  294<H>  4.9   |  19.0<L>  |  3.56<H>    Ca    7.9<L>      25 Dec 2023 03:18  Phos  2.7     12-24  Mg     2.1     12-24    TPro  5.9<L>  /  Alb  2.4<L>  /  TBili  0.5  /  DBili  x   /  AST  21  /  ALT  23  /  AlkPhos  60  12-25    PT/INR/PTT ( 25 Dec 2023 03:20 )                       :                       :      X            :       197.7                 .        .                   .              .           .       X           .                                       Lipid Profile:   HgA1c:   TSH: Thyroid Stimulating Hormone, Serum: 1.25 uIU/mL                                                                    [facility]                                                         CARDIOLOGY AT [place] [address]                                                         Telephone: [phone]. Fax:[phone]                                                                             PROGRESS NOTE     Reason for follow up: Afib, Covid, ARF   Overnight Events: converted from Afib RVR to NSR. Family is aware he will not be safe for long term AC use due to high bleeding risk, agitated overnight pulled out IV and wills-cathedward on 1:1 constant observation, accepting risk of stroke    Review of symptoms:   unable to obtain as sedated with Zyprexa     Vitals:  T(C): 36.6 (12-25-23 @ 04:00), Max: 37.1 (12-24-23 @ 16:00)  HR: 65 (12-25-23 @ 10:00) (65 - 147)  BP: 133/67 (12-25-23 @ 10:00) (83/55 - 146/77)  RR: 16 (12-25-23 @ 10:00) (16 - 31)  SpO2: 98% (12-25-23 @ 10:00) (88% - 99%)  Wt(kg): --  I&O's Summary    24 Dec 2023 07:01  -  25 Dec 2023 07:00  --------------------------------------------------------  IN: 168 mL / OUT: 615 mL / NET: -447 mL    25 Dec 2023 07:01  -  25 Dec 2023 10:56  --------------------------------------------------------  IN: 16 mL / OUT: 0 mL / NET: 16 mL      Weight (kg): 79.2 (12-22 @ 05:10)    PHYSICAL EXAM:  Appearance: Comfortable. No acute distress  HEENT:  Atraumatic. Normocephalic.  Normal oral mucosa  Neurologic: A & O x 3, no gross focal deficits.  Cardiovascular: RRR S1 S2, No murmur, no rubs/gallops. No JVD  Respiratory: Lungs clear to auscultation, unlabored   Gastrointestinal:  Soft, Non-tender, + BS  Lower Extremities: 2+ Peripheral Pulses, No clubbing, cyanosis, or edema  Psychiatry: Patient is calm. No agitation.   Skin: warm and dry.    CURRENT CARDIAC MEDICATIONS:  dronedarone 400 milliGRAM(s) Oral two times a day  metoprolol tartrate 25 milliGRAM(s) Oral two times a day  midodrine. 5 milliGRAM(s) Oral three times a day      CURRENT OTHER MEDICATIONS:  piperacillin/tazobactam IVPB.. 3.375 Gram(s) IV Intermittent every 8 hours  remdesivir  IVPB 100 milliGRAM(s) IV Intermittent every 24 hours  remdesivir  IVPB   IV Intermittent   acetaminophen   IVPB .. 1000 milliGRAM(s) IV Intermittent once  gabapentin 300 milliGRAM(s) Oral at bedtime  OLANZapine Injectable 5 milliGRAM(s) IntraMuscular once  ondansetron Injectable 4 milliGRAM(s) IV Push every 6 hours PRN Nausea  pantoprazole    Tablet 40 milliGRAM(s) Oral before breakfast  dexAMETHasone  Injectable 6 milliGRAM(s) IV Push every 24 hours  dextrose 50% Injectable 12.5 Gram(s) IV Push once, Stop order after: 1 Doses  dextrose 50% Injectable 25 Gram(s) IV Push once, Stop order after: 1 Doses  dextrose 50% Injectable 25 Gram(s) IV Push once, Stop order after: 1 Doses  dextrose Oral Gel 15 Gram(s) Oral once, Stop order after: 1 Doses PRN Blood Glucose LESS THAN 70 milliGRAM(s)/deciliter  glucagon  Injectable 1 milliGRAM(s) IntraMuscular once, Stop order after: 1 Doses  insulin lispro (ADMELOG) corrective regimen sliding scale   SubCutaneous three times a day before meals  insulin lispro (ADMELOG) corrective regimen sliding scale   SubCutaneous at bedtime  chlorhexidine 2% Cloths 1 Application(s) Topical <User Schedule>  dextrose 5%. 1000 milliLiter(s) (100 mL/Hr) IV Continuous <Continuous>  dextrose 5%. 1000 milliLiter(s) (50 mL/Hr) IV Continuous <Continuous>  heparin   Injectable 5000 Unit(s) SubCutaneous every 8 hours  tamsulosin 0.4 milliGRAM(s) Oral at bedtime      LABS:	 	                            10.2   15.29 )-----------( 169      ( 25 Dec 2023 03:18 )             31.8     12-25    134<L>  |  102  |  63.6<H>  ----------------------------<  294<H>  4.9   |  19.0<L>  |  3.56<H>    Ca    7.9<L>      25 Dec 2023 03:18  Phos  2.7     12-24  Mg     2.1     12-24    TPro  5.9<L>  /  Alb  2.4<L>  /  TBili  0.5  /  DBili  x   /  AST  21  /  ALT  23  /  AlkPhos  60  12-25    PT/INR/PTT ( 25 Dec 2023 03:20 )                       :                       :      X            :       197.7                 .        .                   .              .           .       X           .                                       Lipid Profile:   HgA1c:   TSH: Thyroid Stimulating Hormone, Serum: 1.25 uIU/mL

## 2023-12-25 NOTE — PROGRESS NOTE ADULT - ASSESSMENT
83-year-old male with past medical history of hypertension, hyperlipidemia, DM, stage III CKD, BPH, admitted for acute sky s/p IR drain placement. Course c/b hypoxic resp failure 2/2 COVID as well as new Afib RVR.     #Acute cholecystitis  CT A/P and RUQ result noted  s/p IR perc drain placement, monitor output  bile culture wiht E-coli, with change to Vantin on dc  c/w Zosyn  BCx NGTD  pain regimen  diet advancement as tolerated  GI following, herlinda recs  trend CMP  Outpatient surgery evaluation for interval cholecystectomy   on home midodrine, unclear etiology    #Acute resp failure with hypoxia suspect 2/2 COVID  Possible component of aspiration, on Zoysn as above  s/p COVID vaccines  started on Remdeivir and decadron plan for 5 days  s/p BIPAP, now on HFNC, wean as tolerated  CT chest noted, mild pulm edema noted, PRN lasix   DVT studies negative    #Afib RVR  Tele  TSH normal  cards d/w families, agree to hold AC  Add Multaq  metoprolol BID    #Delirium  on 1:1  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, no acute findings    #DM  A1C 9  ISS, accu checks, monitor glucose closely while on steroid   can c/w gabapentin for neuropathy     #RON on CKD stage 3 - worsening   #BPH  Fena 0.6% pre-renal  likely in the setting of poor po intake, and episode of hypotension   f/u repeat BMP, if continues to worsen, will consult renal  renally dose meds  avoid nephrotoxic medications  c/w Tamsulosin  Horta for strict in/out, TOV once OOB    #HLD  c/w statin    #GERD  c/w ppi 40mg qd    #Anemia  Hg stable 9-10 range  iron studies consistent with chronic dx  b12 and folate normal  trend CBC while admitted    DVT ppx: Heparin SQ  Diet: CC     PT eval     83-year-old male with past medical history of hypertension, hyperlipidemia, DM, stage III CKD, BPH, admitted for acute sky s/p IR drain placement. Course c/b hypoxic resp failure 2/2 COVID as well as new Afib RVR.     #Acute cholecystitis  CT A/P and RUQ result noted  s/p IR perc drain placement, monitor output  bile culture wiht E-coli, with change to Vantin on dc  c/w Zosyn  BCx NGTD  pain regimen  diet advancement as tolerated  GI following, herlinda recs  trend CMP  Outpatient surgery evaluation for interval cholecystectomy   on home midodrine, unclear etiology    #Acute resp failure with hypoxia suspect 2/2 COVID  Possible component of aspiration, on Zoysn as above  s/p COVID vaccines  started on Remdeivir and decadron plan for 5 days  s/p BIPAP, now on HFNC, wean as tolerated  CT chest noted, mild pulm edema noted, PRN lasix   DVT studies negative    #Afib RVR  Tele  TSH normal  cards d/w families, agree to hold AC  Add Multaq  metoprolol BID    #Delirium  on 1:1  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, no acute findings    #DM  A1C 9  ISS, accu checks  start Lantus * QHS + 2U Premeals  can c/w gabapentin for neuropathy     #RON on CKD stage 3 - worsening   #BPH  Fena 0.6% pre-renal  likely in the setting of poor po intake, and episode of hypotension   f/u repeat BMP, if continues to worsen, will consult renal  renally dose meds  avoid nephrotoxic medications  c/w Tamsulosin  Horta for strict in/out, TOV once OOB    #HLD  c/w statin    #GERD  c/w ppi 40mg qd    #Anemia  Hg stable 9-10 range  iron studies consistent with chronic dx  b12 and folate normal  trend CBC while admitted    DVT ppx: Heparin SQ  Diet: CC     PT eval     83-year-old male with past medical history of hypertension, hyperlipidemia, DM, stage III CKD, BPH, admitted for acute ksy s/p IR drain placement. Course c/b hypoxic resp failure 2/2 COVID as well as new Afib RVR.     #Acute cholecystitis  CT A/P and RUQ result noted  s/p IR perc drain placement, monitor output  bile culture wiht E-coli, with change to Vantin on dc  c/w Zosyn  BCx NGTD  pain regimen  diet advancement as tolerated  GI following, herlinda recs  trend CMP  Outpatient surgery evaluation for interval cholecystectomy   on home midodrine, unclear etiology    #Acute resp failure with hypoxia suspect 2/2 COVID  Possible component of aspiration, on Zoysn as above  s/p COVID vaccines  started on Remdeivir and decadron plan for 5 days  s/p BIPAP, now on HFNC, wean as tolerated  CT chest noted, mild pulm edema noted, PRN lasix   DVT studies negative    #Afib RVR  Tele  TSH normal  cards d/w families, agree to hold AC  Add Multaq  metoprolol BID    #Delirium  on 1:1  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, no acute findings    #DM  A1C 9  ISS, accu checks  start Lantus * QHS + 2U Premeals  can c/w gabapentin for neuropathy     #RON on CKD stage 3 - worsening   #BPH  Fena 0.6% pre-renal  likely in the setting of poor po intake, and episode of hypotension   f/u repeat BMP, if continues to worsen, will consult renal  renally dose meds  avoid nephrotoxic medications  c/w Tamsulosin  Horta for strict in/out, TOV once OOB    #HLD  c/w statin    #GERD  c/w ppi 40mg qd    #Anemia  Hg stable 9-10 range  iron studies consistent with chronic dx  b12 and folate normal  trend CBC while admitted    DVT ppx: Heparin SQ  Diet: CC     PT eval     83-year-old male with past medical history of hypertension, hyperlipidemia, DM, stage III CKD, BPH, admitted for acute sky s/p IR drain placement. Course c/b hypoxic resp failure 2/2 COVID as well as new Afib RVR.     #Acute cholecystitis  CT A/P and RUQ result noted  s/p IR perc drain placement, monitor output  bile culture wiht E-coli, with change to Vantin on dc  c/w Zosyn  BCx NGTD  pain regimen  diet advancement as tolerated  GI following, herlinda recs  trend CMP  Outpatient surgery evaluation for interval cholecystectomy   on home midodrine, unclear etiology    #Acute resp failure with hypoxia suspect 2/2 COVID  Possible component of aspiration, on Zoysn as above  s/p COVID vaccines  started decadron plan for 5 days  Remdesivir dc due to worsening renal function   s/p BIPAP, now on HFNC, wean as tolerated  CT chest noted, mild pulm edema noted, PRN lasix   DVT studies negative    #Afib RVR  Tele  TSH normal  cards d/w families, agree to hold AC  Add Multaq  metoprolol BID    #Delirium  on 1:1  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, no acute findings    #DM  A1C 9  ISS, accu checks  start Lantus * QHS + 2U Premeals  can c/w gabapentin for neuropathy     #RON on CKD stage 3 - worsening   #BPH  Fena 0.6% pre-renal  likely in the setting of poor po intake, and episode of hypotension   f/u repeat BMP, if continues to worsen, will consult renal  renally dose meds  avoid nephrotoxic medications  c/w Tamsulosin  Horta for strict in/out, TOV once OOB  dc remdesivir    #HLD  c/w statin    #GERD  c/w ppi 40mg qd    #Anemia  Hg stable 9-10 range  iron studies consistent with chronic dx  b12 and folate normal  trend CBC while admitted    DVT ppx: Heparin SQ  Diet: CC     PT eval

## 2023-12-26 LAB
ALBUMIN SERPL ELPH-MCNC: 2.8 G/DL — LOW (ref 3.3–5.2)
ALBUMIN SERPL ELPH-MCNC: 2.8 G/DL — LOW (ref 3.3–5.2)
ALP SERPL-CCNC: 96 U/L — SIGNIFICANT CHANGE UP (ref 40–120)
ALP SERPL-CCNC: 96 U/L — SIGNIFICANT CHANGE UP (ref 40–120)
ALT FLD-CCNC: 22 U/L — SIGNIFICANT CHANGE UP
ALT FLD-CCNC: 22 U/L — SIGNIFICANT CHANGE UP
ANION GAP SERPL CALC-SCNC: 15 MMOL/L — SIGNIFICANT CHANGE UP (ref 5–17)
ANION GAP SERPL CALC-SCNC: 15 MMOL/L — SIGNIFICANT CHANGE UP (ref 5–17)
AST SERPL-CCNC: 19 U/L — SIGNIFICANT CHANGE UP
AST SERPL-CCNC: 19 U/L — SIGNIFICANT CHANGE UP
BILIRUB SERPL-MCNC: 0.5 MG/DL — SIGNIFICANT CHANGE UP (ref 0.4–2)
BILIRUB SERPL-MCNC: 0.5 MG/DL — SIGNIFICANT CHANGE UP (ref 0.4–2)
BUN SERPL-MCNC: 72.5 MG/DL — HIGH (ref 8–20)
BUN SERPL-MCNC: 72.5 MG/DL — HIGH (ref 8–20)
CALCIUM SERPL-MCNC: 8.1 MG/DL — LOW (ref 8.4–10.5)
CALCIUM SERPL-MCNC: 8.1 MG/DL — LOW (ref 8.4–10.5)
CHLORIDE SERPL-SCNC: 101 MMOL/L — SIGNIFICANT CHANGE UP (ref 96–108)
CHLORIDE SERPL-SCNC: 101 MMOL/L — SIGNIFICANT CHANGE UP (ref 96–108)
CO2 SERPL-SCNC: 20 MMOL/L — LOW (ref 22–29)
CO2 SERPL-SCNC: 20 MMOL/L — LOW (ref 22–29)
CREAT SERPL-MCNC: 3.74 MG/DL — HIGH (ref 0.5–1.3)
CREAT SERPL-MCNC: 3.74 MG/DL — HIGH (ref 0.5–1.3)
CRP SERPL-MCNC: 94 MG/L — HIGH
CRP SERPL-MCNC: 94 MG/L — HIGH
CULTURE RESULTS: SIGNIFICANT CHANGE UP
EGFR: 15 ML/MIN/1.73M2 — LOW
EGFR: 15 ML/MIN/1.73M2 — LOW
GLUCOSE BLDC GLUCOMTR-MCNC: 204 MG/DL — HIGH (ref 70–99)
GLUCOSE BLDC GLUCOMTR-MCNC: 204 MG/DL — HIGH (ref 70–99)
GLUCOSE BLDC GLUCOMTR-MCNC: 262 MG/DL — HIGH (ref 70–99)
GLUCOSE BLDC GLUCOMTR-MCNC: 262 MG/DL — HIGH (ref 70–99)
GLUCOSE BLDC GLUCOMTR-MCNC: 308 MG/DL — HIGH (ref 70–99)
GLUCOSE BLDC GLUCOMTR-MCNC: 308 MG/DL — HIGH (ref 70–99)
GLUCOSE BLDC GLUCOMTR-MCNC: 311 MG/DL — HIGH (ref 70–99)
GLUCOSE BLDC GLUCOMTR-MCNC: 311 MG/DL — HIGH (ref 70–99)
GLUCOSE SERPL-MCNC: 356 MG/DL — HIGH (ref 70–99)
GLUCOSE SERPL-MCNC: 356 MG/DL — HIGH (ref 70–99)
HCT VFR BLD CALC: 29.6 % — LOW (ref 39–50)
HCT VFR BLD CALC: 29.6 % — LOW (ref 39–50)
HGB BLD-MCNC: 10.1 G/DL — LOW (ref 13–17)
HGB BLD-MCNC: 10.1 G/DL — LOW (ref 13–17)
MCHC RBC-ENTMCNC: 30.4 PG — SIGNIFICANT CHANGE UP (ref 27–34)
MCHC RBC-ENTMCNC: 30.4 PG — SIGNIFICANT CHANGE UP (ref 27–34)
MCHC RBC-ENTMCNC: 34.1 GM/DL — SIGNIFICANT CHANGE UP (ref 32–36)
MCHC RBC-ENTMCNC: 34.1 GM/DL — SIGNIFICANT CHANGE UP (ref 32–36)
MCV RBC AUTO: 89.2 FL — SIGNIFICANT CHANGE UP (ref 80–100)
MCV RBC AUTO: 89.2 FL — SIGNIFICANT CHANGE UP (ref 80–100)
PLATELET # BLD AUTO: 204 K/UL — SIGNIFICANT CHANGE UP (ref 150–400)
PLATELET # BLD AUTO: 204 K/UL — SIGNIFICANT CHANGE UP (ref 150–400)
POTASSIUM SERPL-MCNC: 4.4 MMOL/L — SIGNIFICANT CHANGE UP (ref 3.5–5.3)
POTASSIUM SERPL-MCNC: 4.4 MMOL/L — SIGNIFICANT CHANGE UP (ref 3.5–5.3)
POTASSIUM SERPL-SCNC: 4.4 MMOL/L — SIGNIFICANT CHANGE UP (ref 3.5–5.3)
POTASSIUM SERPL-SCNC: 4.4 MMOL/L — SIGNIFICANT CHANGE UP (ref 3.5–5.3)
PROT SERPL-MCNC: 5.8 G/DL — LOW (ref 6.6–8.7)
PROT SERPL-MCNC: 5.8 G/DL — LOW (ref 6.6–8.7)
RBC # BLD: 3.32 M/UL — LOW (ref 4.2–5.8)
RBC # BLD: 3.32 M/UL — LOW (ref 4.2–5.8)
RBC # FLD: 13.8 % — SIGNIFICANT CHANGE UP (ref 10.3–14.5)
RBC # FLD: 13.8 % — SIGNIFICANT CHANGE UP (ref 10.3–14.5)
SODIUM SERPL-SCNC: 136 MMOL/L — SIGNIFICANT CHANGE UP (ref 135–145)
SODIUM SERPL-SCNC: 136 MMOL/L — SIGNIFICANT CHANGE UP (ref 135–145)
SPECIMEN SOURCE: SIGNIFICANT CHANGE UP
WBC # BLD: 9.72 K/UL — SIGNIFICANT CHANGE UP (ref 3.8–10.5)
WBC # BLD: 9.72 K/UL — SIGNIFICANT CHANGE UP (ref 3.8–10.5)
WBC # FLD AUTO: 9.72 K/UL — SIGNIFICANT CHANGE UP (ref 3.8–10.5)
WBC # FLD AUTO: 9.72 K/UL — SIGNIFICANT CHANGE UP (ref 3.8–10.5)

## 2023-12-26 PROCEDURE — 99232 SBSQ HOSP IP/OBS MODERATE 35: CPT

## 2023-12-26 PROCEDURE — 99233 SBSQ HOSP IP/OBS HIGH 50: CPT

## 2023-12-26 PROCEDURE — 70450 CT HEAD/BRAIN W/O DYE: CPT | Mod: 26

## 2023-12-26 PROCEDURE — 93010 ELECTROCARDIOGRAM REPORT: CPT

## 2023-12-26 RX ORDER — QUETIAPINE FUMARATE 200 MG/1
12.5 TABLET, FILM COATED ORAL AT BEDTIME
Refills: 0 | Status: DISCONTINUED | OUTPATIENT
Start: 2023-12-26 | End: 2023-12-29

## 2023-12-26 RX ADMIN — TAMSULOSIN HYDROCHLORIDE 0.4 MILLIGRAM(S): 0.4 CAPSULE ORAL at 22:17

## 2023-12-26 RX ADMIN — Medication 2 UNIT(S): at 09:27

## 2023-12-26 RX ADMIN — Medication 25 MILLIGRAM(S): at 05:55

## 2023-12-26 RX ADMIN — HEPARIN SODIUM 5000 UNIT(S): 5000 INJECTION INTRAVENOUS; SUBCUTANEOUS at 22:15

## 2023-12-26 RX ADMIN — Medication 6 MILLIGRAM(S): at 22:15

## 2023-12-26 RX ADMIN — PANTOPRAZOLE SODIUM 40 MILLIGRAM(S): 20 TABLET, DELAYED RELEASE ORAL at 05:55

## 2023-12-26 RX ADMIN — QUETIAPINE FUMARATE 12.5 MILLIGRAM(S): 200 TABLET, FILM COATED ORAL at 22:17

## 2023-12-26 RX ADMIN — Medication 25 MILLIGRAM(S): at 17:55

## 2023-12-26 RX ADMIN — Medication 2 UNIT(S): at 17:53

## 2023-12-26 RX ADMIN — DRONEDARONE 400 MILLIGRAM(S): 400 TABLET, FILM COATED ORAL at 17:55

## 2023-12-26 RX ADMIN — PIPERACILLIN AND TAZOBACTAM 25 GRAM(S): 4; .5 INJECTION, POWDER, LYOPHILIZED, FOR SOLUTION INTRAVENOUS at 13:36

## 2023-12-26 RX ADMIN — Medication 8: at 13:35

## 2023-12-26 RX ADMIN — PIPERACILLIN AND TAZOBACTAM 25 GRAM(S): 4; .5 INJECTION, POWDER, LYOPHILIZED, FOR SOLUTION INTRAVENOUS at 04:32

## 2023-12-26 RX ADMIN — INSULIN GLARGINE 8 UNIT(S): 100 INJECTION, SOLUTION SUBCUTANEOUS at 22:15

## 2023-12-26 RX ADMIN — DRONEDARONE 400 MILLIGRAM(S): 400 TABLET, FILM COATED ORAL at 05:55

## 2023-12-26 RX ADMIN — MIDODRINE HYDROCHLORIDE 5 MILLIGRAM(S): 2.5 TABLET ORAL at 17:55

## 2023-12-26 RX ADMIN — Medication 6: at 17:52

## 2023-12-26 RX ADMIN — HEPARIN SODIUM 5000 UNIT(S): 5000 INJECTION INTRAVENOUS; SUBCUTANEOUS at 05:55

## 2023-12-26 RX ADMIN — Medication 2 UNIT(S): at 13:35

## 2023-12-26 RX ADMIN — GABAPENTIN 300 MILLIGRAM(S): 400 CAPSULE ORAL at 22:17

## 2023-12-26 RX ADMIN — MIDODRINE HYDROCHLORIDE 5 MILLIGRAM(S): 2.5 TABLET ORAL at 05:55

## 2023-12-26 RX ADMIN — MIDODRINE HYDROCHLORIDE 5 MILLIGRAM(S): 2.5 TABLET ORAL at 13:36

## 2023-12-26 RX ADMIN — CHLORHEXIDINE GLUCONATE 1 APPLICATION(S): 213 SOLUTION TOPICAL at 05:56

## 2023-12-26 RX ADMIN — Medication 8: at 09:27

## 2023-12-26 RX ADMIN — HEPARIN SODIUM 5000 UNIT(S): 5000 INJECTION INTRAVENOUS; SUBCUTANEOUS at 13:36

## 2023-12-26 RX ADMIN — PIPERACILLIN AND TAZOBACTAM 25 GRAM(S): 4; .5 INJECTION, POWDER, LYOPHILIZED, FOR SOLUTION INTRAVENOUS at 22:10

## 2023-12-26 NOTE — CHART NOTE - NSCHARTNOTEFT_GEN_A_CORE
Called by RN around 23:00 for concern for increased lethargy, however patient status has been unchanged since patient received to floor from MICU at 6pm.  Notes from today indicating patient sedated s/p receiving Zyprexa the night before, baseline unclear.   Patient seen and examined, in NAD. Responds to sternal rub and states that he is sleeping, follows some commands but answers all questions with "venti quatro". No focal neuro deficits noted, PERLL.  Vital Signs:  T(F): 97.6   HR: 67  BP: 152/80  RR: 18  SpO2: 96% on room air    ABG and CT head ordered.  Continue to monitor patient, notify PA of any changes in patient status.

## 2023-12-26 NOTE — PROGRESS NOTE ADULT - ASSESSMENT
82 y/o M with PMH HTN, HLD, CKD3, BPH who originally presented to [facility] ED with abdominal pain and found with acute cholecystitis.   Course c/b hypoxia, tachycardia  and TMax 102.3, RRT called and found to be COVID+ on RBO.    Cardiology consulted due to new onset of Afib with RVR. TTE with normal EF and no valvulopathy  . Currently rate controlled.   Plan to initiate AC however per daughter, he had some balance problems, is supposed to use a walking aide and refuses and has had falls recently.   Will need to further discuss risk vs benefit of long term AC.       Problem/Plan - 1:  ·  Problem: Atrial fibrillation, new onset.   ·   [id number] 3  - afib RVR  converted to NSR maintaining RSR rate controlled   EKG without ischemic changes   Hemodynamically stable   - start multaq 400mg BID stop simvastatin as there is drug-drug interaction,  Continue Metoprolol  - TTE ef 55-60%, no valvulopathy, fibrocalcific AV without stenosis. normal PA pressure   - on midodrine as OP, unclear reason why. can continue   - not orthostatic   - has hx of falls and imbalances per daughter. Patient is supposed to use mobility aides but refuses to do so. Had fall with rib fractures earlier this year.   prior Discussion over weekedn , risk vs benefit of DOAC with patient and family.   Patient verbalizes understanding of risks of bleed while on DOAC but likely risk greater than benefit as he is noncompliant with mobility aides.   - As per family and pt, no further anticoagulation.    COVID   -medical and symptomatic management per primary team   d/w DR Collazo 84 y/o M with PMH HTN, HLD, CKD3, BPH who originally presented to [facility] ED with abdominal pain and found with acute cholecystitis.   Course c/b hypoxia, tachycardia  and TMax 102.3, RRT called and found to be COVID+ on RBO.    Cardiology consulted due to new onset of Afib with RVR. TTE with normal EF and no valvulopathy  . Currently rate controlled.   Plan to initiate AC however per daughter, he had some balance problems, is supposed to use a walking aide and refuses and has had falls recently.   Will need to further discuss risk vs benefit of long term AC.       Problem/Plan - 1:  ·  Problem: Atrial fibrillation, new onset.   ·   [id number] 3  - afib RVR  converted to NSR maintaining RSR rate controlled   EKG without ischemic changes   Hemodynamically stable   - start multaq 400mg BID stop simvastatin as there is drug-drug interaction,  Continue Metoprolol  - TTE ef 55-60%, no valvulopathy, fibrocalcific AV without stenosis. normal PA pressure   - on midodrine as OP, unclear reason why. can continue   - not orthostatic   - has hx of falls and imbalances per daughter. Patient is supposed to use mobility aides but refuses to do so. Had fall with rib fractures earlier this year.   prior Discussion over weekedn , risk vs benefit of DOAC with patient and family.   Patient verbalizes understanding of risks of bleed while on DOAC but likely risk greater than benefit as he is noncompliant with mobility aides.   - As per family and pt, no further anticoagulation.    COVID   -medical and symptomatic management per primary team   d/w DR Collazo 82 y/o M with PMH HTN, HLD, CKD3, BPH who originally presented to [facility] ED with abdominal pain and found with acute cholecystitis.   Course c/b hypoxia, tachycardia  and TMax 102.3, RRT called and found to be COVID+ on RBO.    Cardiology consulted due to new onset of Afib with RVR. TTE with normal EF and no valvulopathy  . Currently rate controlled.   Plan to initiate AC however per daughter, he had some balance problems, is supposed to use a walking aide and refuses and has had falls recently.   Will need to further discuss risk vs benefit of long term AC.       Problem/Plan - 1:  ·  Problem: Atrial fibrillation, new onset.   ·   [id number] 3  - afib RVR  converted to NSR maintaining RSR rate controlled   EKG without ischemic changes   Hemodynamically stable   - start multaq 400mg BID stop simvastatin as there is drug-drug interaction,  Continue Metoprolol  - TTE ef 55-60%, no valvulopathy, fibrocalcific AV without stenosis. normal PA pressure   - on midodrine as OP, unclear reason why. can continue   - not orthostatic   - has hx of falls and imbalances per daughter. Patient is supposed to use mobility aides but refuses to do so. Had fall with rib fractures earlier this year.   prior Discussion over weekedn , risk vs benefit of DOAC with patient and family.   Patient verbalizes understanding of risks of bleed while on DOAC but likely risk greater than benefit as he is noncompliant with mobility aides.   - As per family and pt, no further anticoagulation.  pt may follow up in office with Dr Mejia 2-3 weeks     COVID   -medical and symptomatic management per primary team   d/w DR Collazo 84 y/o M with PMH HTN, HLD, CKD3, BPH who originally presented to [facility] ED with abdominal pain and found with acute cholecystitis.   Course c/b hypoxia, tachycardia  and TMax 102.3, RRT called and found to be COVID+ on RBO.    Cardiology consulted due to new onset of Afib with RVR. TTE with normal EF and no valvulopathy  . Currently rate controlled.   Plan to initiate AC however per daughter, he had some balance problems, is supposed to use a walking aide and refuses and has had falls recently.   Will need to further discuss risk vs benefit of long term AC.       Problem/Plan - 1:  ·  Problem: Atrial fibrillation, new onset.   ·   [id number] 3  - afib RVR  converted to NSR maintaining RSR rate controlled   EKG without ischemic changes   Hemodynamically stable   - start multaq 400mg BID stop simvastatin as there is drug-drug interaction,  Continue Metoprolol  - TTE ef 55-60%, no valvulopathy, fibrocalcific AV without stenosis. normal PA pressure   - on midodrine as OP, unclear reason why. can continue   - not orthostatic   - has hx of falls and imbalances per daughter. Patient is supposed to use mobility aides but refuses to do so. Had fall with rib fractures earlier this year.   prior Discussion over weekedn , risk vs benefit of DOAC with patient and family.   Patient verbalizes understanding of risks of bleed while on DOAC but likely risk greater than benefit as he is noncompliant with mobility aides.   - As per family and pt, no further anticoagulation.  pt may follow up in office with Dr Mejia 2-3 weeks     COVID   -medical and symptomatic management per primary team   d/w DR Collazo

## 2023-12-26 NOTE — PROGRESS NOTE ADULT - NS ATTEND AMEND GEN_ALL_CORE FT
Patient seen and examined by me. Seen in presence of: wife. 82 y/o M with PMH HTN, HLD, CKD3, BPH who originally presented to [facility] ED with abdominal pain and found with acute cholecystitis. Course c/b hypoxia, tachycardia  and TMax 102.3, RRT called and found to be COVID+ on RBO.  Cardiology consulted due to new onset of Afib with RVR. TTE with normal EF and no valvulopathy. Currently rate controlled. Plan to initiate AC however per daughter, he had some balance problems, is supposed to use a walking aide and refuses and has had falls recently.         T(C): 36.7 (12-26-23 @ 11:00), Max: 36.8 (12-26-23 @ 07:38)  HR: 77 (12-26-23 @ 11:00) (64 - 77)  BP: 140/76 (12-26-23 @ 11:00) (125/98 - 166/81)  RR: 18 (12-26-23 @ 07:38) (15 - 21)  SpO2: 98% (12-26-23 @ 11:00) (90% - 99%)  Patient alert and awake.  Chest: Bilateral Clear BS  Cardiac: S1 and S2  Abdomen: Soft  Lower extremity: no edema      Patient in sinus rhythm on telemetry. 1st degree AV delay. On multaq and bblocker. Tolerating well.  Patient and family discussion over course of multiple days, they are opting to not initiate on anticoagulation understanding full risks vs. benefits and alternatives along with potential cardioembolic sequale. They accept the risks and again refuse anticoagulation.    Patient is in sinus and as such we will follow peripherally. Please monitor LFT's and qtc interval on regular basis. Patient should follow with Dr. gomez in the office in 1-2 weeks.  do not initiate on simvastatin at this time given drug-drug interaction.    recommend rehab eval given recurrent falls and gait disturbance.    We will follow peripherally.    Patient was seen and evaluated. Available pertinent records, imaging reports and lab results were reviewed by the Heart team and mentioned as appropriate. Plan of care was discussed with the patient and any available family members (with patient consent), with questions answered and treatment plan agreement. We have informed the primary team of our treatment plan. The patient is aware of any side effects of new medications initiated, risks/benefits/alternatives of procedures/imaging planned.    I have discussed my recommendation with the ACP which are outlined above. Thank you for allowing me to participate in the care of this patient. Patient seen and examined by me. Seen in presence of: wife. 82 y/o M with PMH HTN, HLD, CKD3, BPH who originally presented to [facility] ED with abdominal pain and found with acute cholecystitis. Course c/b hypoxia, tachycardia  and TMax 102.3, RRT called and found to be COVID+ on RBO.  Cardiology consulted due to new onset of Afib with RVR. TTE with normal EF and no valvulopathy. Currently rate controlled. Plan to initiate AC however per daughter, he had some balance problems, is supposed to use a walking aide and refuses and has had falls recently.         T(C): 36.7 (12-26-23 @ 11:00), Max: 36.8 (12-26-23 @ 07:38)  HR: 77 (12-26-23 @ 11:00) (64 - 77)  BP: 140/76 (12-26-23 @ 11:00) (125/98 - 166/81)  RR: 18 (12-26-23 @ 07:38) (15 - 21)  SpO2: 98% (12-26-23 @ 11:00) (90% - 99%)  Patient alert and awake.  Chest: Bilateral Clear BS  Cardiac: S1 and S2  Abdomen: Soft  Lower extremity: no edema      Patient in sinus rhythm on telemetry. 1st degree AV delay. On multaq and bblocker. Tolerating well.  Patient and family discussion over course of multiple days, they are opting to not initiate on anticoagulation understanding full risks vs. benefits and alternatives along with potential cardioembolic sequale. They accept the risks and again refuse anticoagulation.    Patient is in sinus and as such we will follow peripherally. Please monitor LFT's and qtc interval on regular basis. Patient should follow with Dr. gomez in the office in 1-2 weeks.  do not initiate on simvastatin at this time given drug-drug interaction.    recommend rehab eval given recurrent falls and gait disturbance.    We will follow peripherally.    Patient was seen and evaluated. Available pertinent records, imaging reports and lab results were reviewed by the Heart team and mentioned as appropriate. Plan of care was discussed with the patient and any available family members (with patient consent), with questions answered and treatment plan agreement. We have informed the primary team of our treatment plan. The patient is aware of any side effects of new medications initiated, risks/benefits/alternatives of procedures/imaging planned.    I have discussed my recommendation with the ACP which are outlined above. Thank you for allowing me to participate in the care of this patient.

## 2023-12-26 NOTE — PHYSICAL THERAPY INITIAL EVALUATION ADULT - ADDITIONAL COMMENTS
Pt alert but confused - following commands but unable to provide detains of previous functional level or social history.

## 2023-12-26 NOTE — PROGRESS NOTE ADULT - SUBJECTIVE AND OBJECTIVE BOX
Wanda Louie M.D.    Patient is a 83y old  Male who presents with a chief complaint of Cholecystitis (26 Dec 2023 10:53)      SUBJECTIVE / OVERNIGHT EVENTS: no event overnight.     Patient denies chest pain, SOB, abd pain, N/V, fever, chills, dysuria or any other complaints. All remainder ROS negative.     MEDICATIONS  (STANDING):  acetaminophen   IVPB .. 1000 milliGRAM(s) IV Intermittent once  chlorhexidine 2% Cloths 1 Application(s) Topical <User Schedule>  dexAMETHasone  Injectable 6 milliGRAM(s) IV Push every 24 hours  dextrose 5%. 1000 milliLiter(s) (100 mL/Hr) IV Continuous <Continuous>  dextrose 5%. 1000 milliLiter(s) (50 mL/Hr) IV Continuous <Continuous>  dextrose 50% Injectable 12.5 Gram(s) IV Push once  dextrose 50% Injectable 25 Gram(s) IV Push once  dextrose 50% Injectable 25 Gram(s) IV Push once  dronedarone 400 milliGRAM(s) Oral two times a day  gabapentin 300 milliGRAM(s) Oral at bedtime  glucagon  Injectable 1 milliGRAM(s) IntraMuscular once  heparin   Injectable 5000 Unit(s) SubCutaneous every 8 hours  insulin glargine Injectable (LANTUS) 8 Unit(s) SubCutaneous at bedtime  insulin lispro (ADMELOG) corrective regimen sliding scale   SubCutaneous three times a day before meals  insulin lispro (ADMELOG) corrective regimen sliding scale   SubCutaneous at bedtime  insulin lispro Injectable (ADMELOG) 2 Unit(s) SubCutaneous three times a day with meals  metoprolol tartrate 25 milliGRAM(s) Oral two times a day  midodrine. 5 milliGRAM(s) Oral three times a day  OLANZapine Injectable 5 milliGRAM(s) IntraMuscular once  pantoprazole    Tablet 40 milliGRAM(s) Oral before breakfast  piperacillin/tazobactam IVPB.. 3.375 Gram(s) IV Intermittent every 8 hours  tamsulosin 0.4 milliGRAM(s) Oral at bedtime    MEDICATIONS  (PRN):  dextrose Oral Gel 15 Gram(s) Oral once PRN Blood Glucose LESS THAN 70 milliGRAM(s)/deciliter  ondansetron Injectable 4 milliGRAM(s) IV Push every 6 hours PRN Nausea      I&O's Summary    25 Dec 2023 07:01  -  26 Dec 2023 07:00  --------------------------------------------------------  IN: 24 mL / OUT: 1150 mL / NET: -1126 mL        PHYSICAL EXAM:  Vital Signs Last 24 Hrs  T(C): 36.7 (26 Dec 2023 11:00), Max: 36.8 (26 Dec 2023 07:38)  T(F): 98 (26 Dec 2023 11:00), Max: 98.2 (26 Dec 2023 07:38)  HR: 77 (26 Dec 2023 11:00) (64 - 77)  BP: 140/76 (26 Dec 2023 11:00) (124/74 - 166/81)  BP(mean): 110 (25 Dec 2023 18:37) (84 - 110)  RR: 18 (26 Dec 2023 07:38) (15 - 21)  SpO2: 98% (26 Dec 2023 11:00) (90% - 99%)    Parameters below as of 26 Dec 2023 08:40  Patient On (Oxygen Delivery Method): room air      CONSTITUTIONAL: NAD, well-groomed, on NC  RESPIRATORY: Normal respiratory effort; Course bs b/l  CARDIOVASCULAR: irregular irregular, no LE edema  ABDOMEN: Nontender to palpation, hypoactive bowel sounds. +Perc drain draining bile  PSYCH: A+O x1; pleasantly confused  NEUROLOGY: CN 2-12 are intact and symmetric; no gross sensory deficits;   SKIN: No rashes; no palpable lesions      LABS:                        10.1   9.72  )-----------( 204      ( 26 Dec 2023 06:59 )             29.6     12-26    136  |  101  |  72.5<H>  ----------------------------<  356<H>  4.4   |  20.0<L>  |  3.74<H>    Ca    8.1<L>      26 Dec 2023 06:59    TPro  5.8<L>  /  Alb  2.8<L>  /  TBili  0.5  /  DBili  x   /  AST  19  /  ALT  22  /  AlkPhos  96  12-26    PTT - ( 25 Dec 2023 03:20 )  PTT:197.7 sec      Urinalysis Basic - ( 26 Dec 2023 06:59 )    Color: x / Appearance: x / SG: x / pH: x  Gluc: 356 mg/dL / Ketone: x  / Bili: x / Urobili: x   Blood: x / Protein: x / Nitrite: x   Leuk Esterase: x / RBC: x / WBC x   Sq Epi: x / Non Sq Epi: x / Bacteria: x        CAPILLARY BLOOD GLUCOSE      POCT Blood Glucose.: 311 mg/dL (26 Dec 2023 08:56)  POCT Blood Glucose.: 282 mg/dL (25 Dec 2023 22:49)  POCT Blood Glucose.: 291 mg/dL (25 Dec 2023 16:15)      RADIOLOGY & ADDITIONAL TESTS:  Results Reviewed:   Imaging Personally Reviewed:  Electrocardiogram Personally Reviewed:

## 2023-12-26 NOTE — PHYSICAL THERAPY INITIAL EVALUATION ADULT - PERTINENT HX OF CURRENT PROBLEM, REHAB EVAL
Pt presents to [facility] with reports of chest and epigastric pain Pt presents to [facility] with reports of chest and epigastric pain

## 2023-12-26 NOTE — PROGRESS NOTE ADULT - SUBJECTIVE AND OBJECTIVE BOX
[facility]                                                         CARDIOLOGY AT [facility]                                                                  [address] [facility], [place] [place] [address]                                                         Telephone: [phone]. Fax:[phone]                                                                             PROGRESS NOTE    Reason for follow up: AF RVR / Covid +  Update: on !: 1 for safety , agitated overnight   awake now not lethargic    denies complaints of chest pain/sob/dizziness/palps       Review of symptoms:   Cardiac:  No chest pain. No dyspnea. No palpitations.  Respiratory: no cough. No dyspnea  Gastrointestinal: No diarrhea. No abdominal pain. No bleeding.   Neuro: No focal neuro complaints.    Vitals:  T(C): 36.8 (12-26-23 @ 07:38), Max: 36.8 (12-26-23 @ 07:38)  HR: 70 (12-26-23 @ 07:38) (64 - 77)  BP: 148/73 (12-26-23 @ 07:38) (105/77 - 166/81)  RR: 18 (12-26-23 @ 07:38) (15 - 21)  SpO2: 92% (12-26-23 @ 07:38) (90% - 99%)  Wt(kg): --  I&O's Summary    25 Dec 2023 07:01  -  26 Dec 2023 07:00  --------------------------------------------------------  IN: 24 mL / OUT: 1150 mL / NET: -1126 mL      Weight (kg): 79.2 (12-22 @ 05:10)    PHYSICAL EXAM:  Appearance:  confused / Comfortable. No acute distress  HEENT:  Atraumatic. Normocephalic.  Normal oral mucosa  Neurologic: A & O x 3, no gross focal deficits.  Cardiovascular: RRR S1 S2,  60's No murmur, no rubs/gallops. No JVD  Respiratory: coarse rhonchi bilat , unlabored   Gastrointestinal:  Soft, Non-tender, + BS  Lower Extremities: 2+ Peripheral Pulses, No clubbing, cyanosis, or edema  Psychiatry: Patient is calm. No agitation.   Skin: warm and dry.    CURRENT CARDIAC MEDICATIONS:  dronedarone 400 milliGRAM(s) Oral two times a day  metoprolol tartrate 25 milliGRAM(s) Oral two times a day  midodrine. 5 milliGRAM(s) Oral three times a day      CURRENT OTHER MEDICATIONS:  piperacillin/tazobactam IVPB.. 3.375 Gram(s) IV Intermittent every 8 hours  acetaminophen   IVPB .. 1000 milliGRAM(s) IV Intermittent once  gabapentin 300 milliGRAM(s) Oral at bedtime  OLANZapine Injectable 5 milliGRAM(s) IntraMuscular once  ondansetron Injectable 4 milliGRAM(s) IV Push every 6 hours PRN Nausea  pantoprazole    Tablet 40 milliGRAM(s) Oral before breakfast  dexAMETHasone  Injectable 6 milliGRAM(s) IV Push every 24 hours  dextrose 50% Injectable 25 Gram(s) IV Push once, Stop order after: 1 Doses  dextrose 50% Injectable 12.5 Gram(s) IV Push once, Stop order after: 1 Doses  dextrose 50% Injectable 25 Gram(s) IV Push once, Stop order after: 1 Doses  dextrose Oral Gel 15 Gram(s) Oral once, Stop order after: 1 Doses PRN Blood Glucose LESS THAN 70 milliGRAM(s)/deciliter  glucagon  Injectable 1 milliGRAM(s) IntraMuscular once, Stop order after: 1 Doses  insulin glargine Injectable (LANTUS) 8 Unit(s) SubCutaneous at bedtime  insulin lispro (ADMELOG) corrective regimen sliding scale   SubCutaneous three times a day before meals  insulin lispro (ADMELOG) corrective regimen sliding scale   SubCutaneous at bedtime  insulin lispro Injectable (ADMELOG) 2 Unit(s) SubCutaneous three times a day with meals  chlorhexidine 2% Cloths 1 Application(s) Topical <User Schedule>  dextrose 5%. 1000 milliLiter(s) (100 mL/Hr) IV Continuous <Continuous>  dextrose 5%. 1000 milliLiter(s) (50 mL/Hr) IV Continuous <Continuous>  heparin   Injectable 5000 Unit(s) SubCutaneous every 8 hours  tamsulosin 0.4 milliGRAM(s) Oral at bedtime      LABS:	 	                            10.1   9.72  )-----------( 204      ( 26 Dec 2023 06:59 )             29.6     12-26    136  |  101  |  72.5<H>  ----------------------------<  356<H>  4.4   |  20.0<L>  |  3.74<H>    Ca    8.1<L>      26 Dec 2023 06:59    TPro  5.8<L>  /  Alb  2.8<L>  /  TBili  0.5  /  DBili  x   /  AST  19  /  ALT  22  /  AlkPhos  96  12-26    PT/INR/PTT ( 25 Dec 2023 03:20 )                       :                       :      X            :       197.7                 .        .                   .              .           .       X           .                                       Lipid Profile:   HgA1c:   TSH: Thyroid Stimulating Hormone, Serum: 1.25 uIU/mL      TELEMETRY: RSR 60's no evetns   ECG: RSR 66 no acute sttw changes     DIAGNOSTIC TESTING  < from: TTE W or WO Ultrasound Enhancing Agent (12.22.23 @ 19:49) >  1. Left ventricular cavity is normal. Left ventricular systolic function is normal with an ejection fraction visually estimated at 55 to 60 %.   2. Normal left ventricular diastolic function.   3. Normal right ventricular cavity size and systolic function.   4. The left atrium is normal.   5. The right atrium is normal in size.   6. Fibrocalcific aortic valve sclerosis without stenosis.   7. Estimated pulmonary artery systolic pressure is 34 mmHg, normal pulmonary artery pressure.      < end of copied text >  < from: CT Head No Cont (12.26.23 @ 00:42) >  No obvious acute intracranial hemorrhage or mass effect. Additional   findings as described. If clinically indicated, short-term follow-up or   MRI may be obtained for further evaluation.    < end of copied text >    < from: CT Chest No Cont (12.23.23 @ 10:49) >  Mild pulmonary edema and small pleural effusions, new since CT chest 2   days prior.    < end of copied text >                                                              [facility]                                                         CARDIOLOGY AT [facility]                                                                  [address] [facility], [place] [place] [address]                                                         Telephone: [phone]. Fax:[phone]                                                                             PROGRESS NOTE    Reason for follow up: AF RVR / Covid +  Update: on !: 1 for safety , agitated overnight   awake now not lethargic    denies complaints of chest pain/sob/dizziness/palps       Review of symptoms:   Cardiac:  No chest pain. No dyspnea. No palpitations.  Respiratory: no cough. No dyspnea  Gastrointestinal: No diarrhea. No abdominal pain. No bleeding.   Neuro: No focal neuro complaints.    Vitals:  T(C): 36.8 (12-26-23 @ 07:38), Max: 36.8 (12-26-23 @ 07:38)  HR: 70 (12-26-23 @ 07:38) (64 - 77)  BP: 148/73 (12-26-23 @ 07:38) (105/77 - 166/81)  RR: 18 (12-26-23 @ 07:38) (15 - 21)  SpO2: 92% (12-26-23 @ 07:38) (90% - 99%)  Wt(kg): --  I&O's Summary    25 Dec 2023 07:01  -  26 Dec 2023 07:00  --------------------------------------------------------  IN: 24 mL / OUT: 1150 mL / NET: -1126 mL      Weight (kg): 79.2 (12-22 @ 05:10)    PHYSICAL EXAM:  Appearance:  confused / Comfortable. No acute distress  HEENT:  Atraumatic. Normocephalic.  Normal oral mucosa  Neurologic: A & O x 3, no gross focal deficits.  Cardiovascular: RRR S1 S2,  60's No murmur, no rubs/gallops. No JVD  Respiratory: coarse rhonchi bilat , unlabored   Gastrointestinal:  Soft, Non-tender, + BS  Lower Extremities: 2+ Peripheral Pulses, No clubbing, cyanosis, or edema  Psychiatry: Patient is calm. No agitation.   Skin: warm and dry.    CURRENT CARDIAC MEDICATIONS:  dronedarone 400 milliGRAM(s) Oral two times a day  metoprolol tartrate 25 milliGRAM(s) Oral two times a day  midodrine. 5 milliGRAM(s) Oral three times a day      CURRENT OTHER MEDICATIONS:  piperacillin/tazobactam IVPB.. 3.375 Gram(s) IV Intermittent every 8 hours  acetaminophen   IVPB .. 1000 milliGRAM(s) IV Intermittent once  gabapentin 300 milliGRAM(s) Oral at bedtime  OLANZapine Injectable 5 milliGRAM(s) IntraMuscular once  ondansetron Injectable 4 milliGRAM(s) IV Push every 6 hours PRN Nausea  pantoprazole    Tablet 40 milliGRAM(s) Oral before breakfast  dexAMETHasone  Injectable 6 milliGRAM(s) IV Push every 24 hours  dextrose 50% Injectable 25 Gram(s) IV Push once, Stop order after: 1 Doses  dextrose 50% Injectable 12.5 Gram(s) IV Push once, Stop order after: 1 Doses  dextrose 50% Injectable 25 Gram(s) IV Push once, Stop order after: 1 Doses  dextrose Oral Gel 15 Gram(s) Oral once, Stop order after: 1 Doses PRN Blood Glucose LESS THAN 70 milliGRAM(s)/deciliter  glucagon  Injectable 1 milliGRAM(s) IntraMuscular once, Stop order after: 1 Doses  insulin glargine Injectable (LANTUS) 8 Unit(s) SubCutaneous at bedtime  insulin lispro (ADMELOG) corrective regimen sliding scale   SubCutaneous three times a day before meals  insulin lispro (ADMELOG) corrective regimen sliding scale   SubCutaneous at bedtime  insulin lispro Injectable (ADMELOG) 2 Unit(s) SubCutaneous three times a day with meals  chlorhexidine 2% Cloths 1 Application(s) Topical <User Schedule>  dextrose 5%. 1000 milliLiter(s) (100 mL/Hr) IV Continuous <Continuous>  dextrose 5%. 1000 milliLiter(s) (50 mL/Hr) IV Continuous <Continuous>  heparin   Injectable 5000 Unit(s) SubCutaneous every 8 hours  tamsulosin 0.4 milliGRAM(s) Oral at bedtime      LABS:	 	                            10.1   9.72  )-----------( 204      ( 26 Dec 2023 06:59 )             29.6     12-26    136  |  101  |  72.5<H>  ----------------------------<  356<H>  4.4   |  20.0<L>  |  3.74<H>    Ca    8.1<L>      26 Dec 2023 06:59    TPro  5.8<L>  /  Alb  2.8<L>  /  TBili  0.5  /  DBili  x   /  AST  19  /  ALT  22  /  AlkPhos  96  12-26    PT/INR/PTT ( 25 Dec 2023 03:20 )                       :                       :      X            :       197.7                 .        .                   .              .           .       X           .                                       Lipid Profile:   HgA1c:   TSH: Thyroid Stimulating Hormone, Serum: 1.25 uIU/mL      TELEMETRY: RSR 60's no evetns   ECG: RSR 66 no acute sttw changes     DIAGNOSTIC TESTING  < from: TTE W or WO Ultrasound Enhancing Agent (12.22.23 @ 19:49) >  1. Left ventricular cavity is normal. Left ventricular systolic function is normal with an ejection fraction visually estimated at 55 to 60 %.   2. Normal left ventricular diastolic function.   3. Normal right ventricular cavity size and systolic function.   4. The left atrium is normal.   5. The right atrium is normal in size.   6. Fibrocalcific aortic valve sclerosis without stenosis.   7. Estimated pulmonary artery systolic pressure is 34 mmHg, normal pulmonary artery pressure.      < end of copied text >  < from: CT Head No Cont (12.26.23 @ 00:42) >  No obvious acute intracranial hemorrhage or mass effect. Additional   findings as described. If clinically indicated, short-term follow-up or   MRI may be obtained for further evaluation.    < end of copied text >    < from: CT Chest No Cont (12.23.23 @ 10:49) >  Mild pulmonary edema and small pleural effusions, new since CT chest 2   days prior.    < end of copied text >

## 2023-12-26 NOTE — PROGRESS NOTE ADULT - ASSESSMENT
83-year-old male with past medical history of hypertension, hyperlipidemia, DM, stage III CKD, BPH, admitted for acute sky s/p IR drain placement. Course c/b hypoxic resp failure 2/2 COVID as well as new Afib RVR.     #Acute cholecystitis  CT A/P and RUQ result noted  s/p IR perc drain placement, monitor output  bile culture with E-coli, with change to Vantin on dc  c/w Zosyn  BCx NGTD  pain regimen  diet advancement as tolerated  GI following, herlinda recs  trend CMP  Outpatient surgery evaluation for interval cholecystectomy   on home midodrine, unclear etiology    #Acute resp failure with hypoxia suspect 2/2 COVID  Possible component of aspiration, on Zoysn as above  s/p COVID vaccines  plan for decadron x5 days  Remdesivir dc due to worsening renal function   s/p BIPAP and HFNC, weaned to RA  CT chest noted, mild pulm edema noted, PRN lasix   DVT studies negative    #Afib RVR  Tele  TSH normal  cards d/w families, agree to hold AC  Add Multaq  metoprolol BID    #Delirium  on 1:1  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, no acute findings  frequent re-orientation  add seroquel 12.5mg QHS    #DM  A1C 9  ISS, accu checks  start Lantus * QHS + 2U Premeals  can c/w gabapentin for neuropathy     #RON on CKD stage 3 - worsening   #BPH  Fena 0.6% pre-renal  likely in the setting of poor po intake, and episode of hypotension   f/u repeat BMP, if continues to worsen, will consult renal  renally dose meds  avoid nephrotoxic medications  c/w Tamsulosin  Horta for strict in/out, TOV once OOB  dc remdesivir    #HLD  c/w statin    #GERD  c/w ppi 40mg qd    #Anemia  Hg stable 9-10 range  iron studies consistent with chronic dx  b12 and folate normal  trend CBC while admitted    DVT ppx: Heparin SQ  Diet: CC     PT eval

## 2023-12-27 LAB
ANION GAP SERPL CALC-SCNC: 14 MMOL/L — SIGNIFICANT CHANGE UP (ref 5–17)
ANION GAP SERPL CALC-SCNC: 14 MMOL/L — SIGNIFICANT CHANGE UP (ref 5–17)
BUN SERPL-MCNC: 79.3 MG/DL — HIGH (ref 8–20)
BUN SERPL-MCNC: 79.3 MG/DL — HIGH (ref 8–20)
CALCIUM SERPL-MCNC: 8 MG/DL — LOW (ref 8.4–10.5)
CALCIUM SERPL-MCNC: 8 MG/DL — LOW (ref 8.4–10.5)
CHLORIDE SERPL-SCNC: 105 MMOL/L — SIGNIFICANT CHANGE UP (ref 96–108)
CHLORIDE SERPL-SCNC: 105 MMOL/L — SIGNIFICANT CHANGE UP (ref 96–108)
CO2 SERPL-SCNC: 18 MMOL/L — LOW (ref 22–29)
CO2 SERPL-SCNC: 18 MMOL/L — LOW (ref 22–29)
CREAT SERPL-MCNC: 4.08 MG/DL — HIGH (ref 0.5–1.3)
CREAT SERPL-MCNC: 4.08 MG/DL — HIGH (ref 0.5–1.3)
CRP SERPL-MCNC: 55 MG/L — HIGH
CRP SERPL-MCNC: 55 MG/L — HIGH
CULTURE RESULTS: ABNORMAL
CULTURE RESULTS: ABNORMAL
CULTURE RESULTS: SIGNIFICANT CHANGE UP
EGFR: 14 ML/MIN/1.73M2 — LOW
EGFR: 14 ML/MIN/1.73M2 — LOW
GLUCOSE BLDC GLUCOMTR-MCNC: 242 MG/DL — HIGH (ref 70–99)
GLUCOSE BLDC GLUCOMTR-MCNC: 242 MG/DL — HIGH (ref 70–99)
GLUCOSE BLDC GLUCOMTR-MCNC: 252 MG/DL — HIGH (ref 70–99)
GLUCOSE BLDC GLUCOMTR-MCNC: 252 MG/DL — HIGH (ref 70–99)
GLUCOSE BLDC GLUCOMTR-MCNC: 326 MG/DL — HIGH (ref 70–99)
GLUCOSE BLDC GLUCOMTR-MCNC: 326 MG/DL — HIGH (ref 70–99)
GLUCOSE BLDC GLUCOMTR-MCNC: 386 MG/DL — HIGH (ref 70–99)
GLUCOSE BLDC GLUCOMTR-MCNC: 386 MG/DL — HIGH (ref 70–99)
GLUCOSE SERPL-MCNC: 319 MG/DL — HIGH (ref 70–99)
GLUCOSE SERPL-MCNC: 319 MG/DL — HIGH (ref 70–99)
HCT VFR BLD CALC: 28 % — LOW (ref 39–50)
HCT VFR BLD CALC: 28 % — LOW (ref 39–50)
HGB BLD-MCNC: 9.5 G/DL — LOW (ref 13–17)
HGB BLD-MCNC: 9.5 G/DL — LOW (ref 13–17)
MAGNESIUM SERPL-MCNC: 2.3 MG/DL — SIGNIFICANT CHANGE UP (ref 1.6–2.6)
MAGNESIUM SERPL-MCNC: 2.3 MG/DL — SIGNIFICANT CHANGE UP (ref 1.6–2.6)
MCHC RBC-ENTMCNC: 30.2 PG — SIGNIFICANT CHANGE UP (ref 27–34)
MCHC RBC-ENTMCNC: 30.2 PG — SIGNIFICANT CHANGE UP (ref 27–34)
MCHC RBC-ENTMCNC: 33.9 GM/DL — SIGNIFICANT CHANGE UP (ref 32–36)
MCHC RBC-ENTMCNC: 33.9 GM/DL — SIGNIFICANT CHANGE UP (ref 32–36)
MCV RBC AUTO: 88.9 FL — SIGNIFICANT CHANGE UP (ref 80–100)
MCV RBC AUTO: 88.9 FL — SIGNIFICANT CHANGE UP (ref 80–100)
ORGANISM # SPEC MICROSCOPIC CNT: ABNORMAL
ORGANISM # SPEC MICROSCOPIC CNT: ABNORMAL
ORGANISM # SPEC MICROSCOPIC CNT: SIGNIFICANT CHANGE UP
ORGANISM # SPEC MICROSCOPIC CNT: SIGNIFICANT CHANGE UP
PHOSPHATE SERPL-MCNC: 4.1 MG/DL — SIGNIFICANT CHANGE UP (ref 2.4–4.7)
PHOSPHATE SERPL-MCNC: 4.1 MG/DL — SIGNIFICANT CHANGE UP (ref 2.4–4.7)
PLATELET # BLD AUTO: 229 K/UL — SIGNIFICANT CHANGE UP (ref 150–400)
PLATELET # BLD AUTO: 229 K/UL — SIGNIFICANT CHANGE UP (ref 150–400)
POTASSIUM SERPL-MCNC: 4.9 MMOL/L — SIGNIFICANT CHANGE UP (ref 3.5–5.3)
POTASSIUM SERPL-MCNC: 4.9 MMOL/L — SIGNIFICANT CHANGE UP (ref 3.5–5.3)
POTASSIUM SERPL-SCNC: 4.9 MMOL/L — SIGNIFICANT CHANGE UP (ref 3.5–5.3)
POTASSIUM SERPL-SCNC: 4.9 MMOL/L — SIGNIFICANT CHANGE UP (ref 3.5–5.3)
RBC # BLD: 3.15 M/UL — LOW (ref 4.2–5.8)
RBC # BLD: 3.15 M/UL — LOW (ref 4.2–5.8)
RBC # FLD: 14.1 % — SIGNIFICANT CHANGE UP (ref 10.3–14.5)
RBC # FLD: 14.1 % — SIGNIFICANT CHANGE UP (ref 10.3–14.5)
SODIUM SERPL-SCNC: 137 MMOL/L — SIGNIFICANT CHANGE UP (ref 135–145)
SODIUM SERPL-SCNC: 137 MMOL/L — SIGNIFICANT CHANGE UP (ref 135–145)
SPECIMEN SOURCE: SIGNIFICANT CHANGE UP
WBC # BLD: 10.86 K/UL — HIGH (ref 3.8–10.5)
WBC # BLD: 10.86 K/UL — HIGH (ref 3.8–10.5)
WBC # FLD AUTO: 10.86 K/UL — HIGH (ref 3.8–10.5)
WBC # FLD AUTO: 10.86 K/UL — HIGH (ref 3.8–10.5)

## 2023-12-27 PROCEDURE — 99233 SBSQ HOSP IP/OBS HIGH 50: CPT

## 2023-12-27 PROCEDURE — 99231 SBSQ HOSP IP/OBS SF/LOW 25: CPT

## 2023-12-27 PROCEDURE — 99223 1ST HOSP IP/OBS HIGH 75: CPT

## 2023-12-27 RX ORDER — SODIUM BICARBONATE 1 MEQ/ML
0.05 SYRINGE (ML) INTRAVENOUS
Qty: 75 | Refills: 0 | Status: DISCONTINUED | OUTPATIENT
Start: 2023-12-27 | End: 2023-12-30

## 2023-12-27 RX ORDER — CEFTRIAXONE 500 MG/1
1000 INJECTION, POWDER, FOR SOLUTION INTRAMUSCULAR; INTRAVENOUS EVERY 24 HOURS
Refills: 0 | Status: COMPLETED | OUTPATIENT
Start: 2023-12-27 | End: 2024-01-02

## 2023-12-27 RX ORDER — INSULIN LISPRO 100/ML
6 VIAL (ML) SUBCUTANEOUS
Refills: 0 | Status: DISCONTINUED | OUTPATIENT
Start: 2023-12-27 | End: 2024-01-05

## 2023-12-27 RX ORDER — INSULIN GLARGINE 100 [IU]/ML
18 INJECTION, SOLUTION SUBCUTANEOUS AT BEDTIME
Refills: 0 | Status: DISCONTINUED | OUTPATIENT
Start: 2023-12-27 | End: 2024-01-05

## 2023-12-27 RX ORDER — CEFTRIAXONE 500 MG/1
1000 INJECTION, POWDER, FOR SOLUTION INTRAMUSCULAR; INTRAVENOUS EVERY 24 HOURS
Refills: 0 | Status: DISCONTINUED | OUTPATIENT
Start: 2023-12-27 | End: 2023-12-27

## 2023-12-27 RX ADMIN — QUETIAPINE FUMARATE 12.5 MILLIGRAM(S): 200 TABLET, FILM COATED ORAL at 21:34

## 2023-12-27 RX ADMIN — Medication 25 MILLIGRAM(S): at 05:36

## 2023-12-27 RX ADMIN — MIDODRINE HYDROCHLORIDE 5 MILLIGRAM(S): 2.5 TABLET ORAL at 05:44

## 2023-12-27 RX ADMIN — CHLORHEXIDINE GLUCONATE 1 APPLICATION(S): 213 SOLUTION TOPICAL at 05:38

## 2023-12-27 RX ADMIN — Medication 2: at 21:35

## 2023-12-27 RX ADMIN — Medication 2 UNIT(S): at 12:29

## 2023-12-27 RX ADMIN — MIDODRINE HYDROCHLORIDE 5 MILLIGRAM(S): 2.5 TABLET ORAL at 17:39

## 2023-12-27 RX ADMIN — Medication 10: at 12:29

## 2023-12-27 RX ADMIN — PANTOPRAZOLE SODIUM 40 MILLIGRAM(S): 20 TABLET, DELAYED RELEASE ORAL at 05:37

## 2023-12-27 RX ADMIN — HEPARIN SODIUM 5000 UNIT(S): 5000 INJECTION INTRAVENOUS; SUBCUTANEOUS at 21:34

## 2023-12-27 RX ADMIN — Medication 50 MEQ/KG/HR: at 17:38

## 2023-12-27 RX ADMIN — Medication 6 UNIT(S): at 18:17

## 2023-12-27 RX ADMIN — TAMSULOSIN HYDROCHLORIDE 0.4 MILLIGRAM(S): 0.4 CAPSULE ORAL at 21:34

## 2023-12-27 RX ADMIN — MIDODRINE HYDROCHLORIDE 5 MILLIGRAM(S): 2.5 TABLET ORAL at 12:30

## 2023-12-27 RX ADMIN — CEFTRIAXONE 1000 MILLIGRAM(S): 500 INJECTION, POWDER, FOR SOLUTION INTRAMUSCULAR; INTRAVENOUS at 14:14

## 2023-12-27 RX ADMIN — HEPARIN SODIUM 5000 UNIT(S): 5000 INJECTION INTRAVENOUS; SUBCUTANEOUS at 05:37

## 2023-12-27 RX ADMIN — Medication 2 UNIT(S): at 09:13

## 2023-12-27 RX ADMIN — DRONEDARONE 400 MILLIGRAM(S): 400 TABLET, FILM COATED ORAL at 17:39

## 2023-12-27 RX ADMIN — GABAPENTIN 300 MILLIGRAM(S): 400 CAPSULE ORAL at 21:34

## 2023-12-27 RX ADMIN — INSULIN GLARGINE 18 UNIT(S): 100 INJECTION, SOLUTION SUBCUTANEOUS at 21:35

## 2023-12-27 RX ADMIN — Medication 8: at 09:12

## 2023-12-27 RX ADMIN — DRONEDARONE 400 MILLIGRAM(S): 400 TABLET, FILM COATED ORAL at 05:37

## 2023-12-27 RX ADMIN — HEPARIN SODIUM 5000 UNIT(S): 5000 INJECTION INTRAVENOUS; SUBCUTANEOUS at 14:14

## 2023-12-27 RX ADMIN — Medication 4: at 18:17

## 2023-12-27 RX ADMIN — PIPERACILLIN AND TAZOBACTAM 25 GRAM(S): 4; .5 INJECTION, POWDER, LYOPHILIZED, FOR SOLUTION INTRAVENOUS at 03:46

## 2023-12-27 NOTE — PROGRESS NOTE ADULT - ASSESSMENT
83-year-old male with past medical history of hypertension, hyperlipidemia, DM, stage III CKD, BPH, admitted for acute sky s/p IR drain placement. Course c/b hypoxic resp failure 2/2 COVID as well as new Afib RVR.     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, no acute findings  frequent re-orientation  add Seroquel 12.5mg QHS    #Acute cholecystitis  CT A/P and RUQ result noted  s/p IR perc drain placement, monitor output  bile culture with E-coli, abx changed to CTX, plan for total 14 days  c/w Zosyn  BCx NGTD  pain regimen  diet advancement as tolerated  GI following, herlinda recs  trend CMP  Outpatient surgery evaluation for interval cholecystectomy   on home midodrine, unclear etiology    #Acute resp failure with hypoxia suspect 2/2 COVID  Possible component of aspiration, s/p Zoysn  s/p COVID vaccines  s/p decadron x5 days  Remdesivir dc due to worsening renal function   s/p BIPAP and HFNC, weaned to RA  CT chest noted, mild pulm edema noted, PRN lasix   DVT studies negative    #Afib RVR  Tele  TSH normal  cards d/w families, agree to hold AC  Add Multaq  metoprolol BID    #DM  A1C 9  ISS, accu checks  start Lantus 18 QHS + 6U Premeals  can c/w gabapentin for neuropathy     #HLD  c/w statin    #GERD  c/w ppi 40mg qd    #Anemia  Hg stable 9-10 range  iron studies consistent with chronic dx  b12 and folate normal  trend CBC while admitted    DVT ppx: Heparin SQ  Diet: CC   Dispo: pending improvement in delirium and renal function    PT eval pending     83-year-old male with past medical history of hypertension, hyperlipidemia, DM, stage III CKD, BPH, admitted for acute sky s/p IR drain placement. Course c/b hypoxic resp failure 2/2 COVID as well as new Afib RVR.     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, no acute findings  frequent re-orientation  add Seroquel 12.5mg QHS    #Acute cholecystitis  CT A/P and RUQ result noted  s/p IR perc drain placement, monitor output  bile culture with E-coli, abx changed to CTX, plan for total 14 days  c/w Zosyn  BCx NGTD  pain regimen  diet advancement as tolerated  GI following, herlinda recs  trend CMP  Outpatient surgery evaluation for interval cholecystectomy   on home midodrine, unclear etiology    #Acute resp failure with hypoxia suspect 2/2 COVID  Possible component of aspiration, s/p Zoysn  s/p COVID vaccines  s/p decadron x5 days  Remdesivir dc due to worsening renal function   s/p BIPAP and HFNC, weaned to RA  CT chest noted, mild pulm edema noted, PRN lasix   DVT studies negative    #Afib RVR  Tele  TSH normal  cards d/w families, agree to hold AC  Add Multaq  metoprolol BID    #DM  A1C 9  ISS, accu checks  start Lantus 18 QHS + 6U Premeals  can c/w gabapentin for neuropathy     #HLD  c/w statin    #GERD  c/w ppi 40mg qd    #Anemia  Hg stable 9-10 range  iron studies consistent with chronic dx  b12 and folate normal  trend CBC while admitted    DVT ppx: Heparin SQ  Diet: CC   Dispo: pending improvement in delirium and renal function    PT eval pending    Attempted to call number of file - no update

## 2023-12-27 NOTE — PROGRESS NOTE ADULT - SUBJECTIVE AND OBJECTIVE BOX
Wanda Louie M.D.    Patient is a 83y old  Male who presents with a chief complaint of Cholecystitis (27 Dec 2023 11:02)      SUBJECTIVE / OVERNIGHT EVENTS: no event overnight. Per RN, slept overnight and is pleasant.     Patient denies chest pain, SOB, abd pain, N/V, fever, chills, dysuria or any other complaints. All remainder ROS negative.     MEDICATIONS  (STANDING):  acetaminophen   IVPB .. 1000 milliGRAM(s) IV Intermittent once  cefTRIAXone Injectable. 1000 milliGRAM(s) IV Push every 24 hours  chlorhexidine 2% Cloths 1 Application(s) Topical <User Schedule>  dextrose 5%. 1000 milliLiter(s) (100 mL/Hr) IV Continuous <Continuous>  dextrose 5%. 1000 milliLiter(s) (50 mL/Hr) IV Continuous <Continuous>  dextrose 50% Injectable 12.5 Gram(s) IV Push once  dextrose 50% Injectable 25 Gram(s) IV Push once  dextrose 50% Injectable 25 Gram(s) IV Push once  dronedarone 400 milliGRAM(s) Oral two times a day  gabapentin 300 milliGRAM(s) Oral at bedtime  glucagon  Injectable 1 milliGRAM(s) IntraMuscular once  heparin   Injectable 5000 Unit(s) SubCutaneous every 8 hours  insulin glargine Injectable (LANTUS) 8 Unit(s) SubCutaneous at bedtime  insulin lispro (ADMELOG) corrective regimen sliding scale   SubCutaneous three times a day before meals  insulin lispro (ADMELOG) corrective regimen sliding scale   SubCutaneous at bedtime  insulin lispro Injectable (ADMELOG) 2 Unit(s) SubCutaneous three times a day with meals  metoprolol tartrate 25 milliGRAM(s) Oral two times a day  midodrine. 5 milliGRAM(s) Oral three times a day  OLANZapine Injectable 5 milliGRAM(s) IntraMuscular once  pantoprazole    Tablet 40 milliGRAM(s) Oral before breakfast  QUEtiapine 12.5 milliGRAM(s) Oral at bedtime  tamsulosin 0.4 milliGRAM(s) Oral at bedtime    MEDICATIONS  (PRN):  dextrose Oral Gel 15 Gram(s) Oral once PRN Blood Glucose LESS THAN 70 milliGRAM(s)/deciliter  ondansetron Injectable 4 milliGRAM(s) IV Push every 6 hours PRN Nausea      I&O's Summary    26 Dec 2023 07:01  -  27 Dec 2023 07:00  --------------------------------------------------------  IN: 440 mL / OUT: 1400 mL / NET: -960 mL    27 Dec 2023 07:01  -  27 Dec 2023 12:40  --------------------------------------------------------  IN: 0 mL / OUT: 700 mL / NET: -700 mL        PHYSICAL EXAM:  Vital Signs Last 24 Hrs  T(C): 36.4 (27 Dec 2023 12:24), Max: 36.7 (26 Dec 2023 20:00)  T(F): 97.6 (27 Dec 2023 12:24), Max: 98 (26 Dec 2023 20:00)  HR: 56 (27 Dec 2023 12:24) (55 - 70)  BP: 130/68 (27 Dec 2023 12:24) (115/63 - 150/77)  BP(mean): --  RR: 18 (27 Dec 2023 12:24) (18 - 18)  SpO2: 92% (27 Dec 2023 12:24) (92% - 95%)    Parameters below as of 27 Dec 2023 08:00  Patient On (Oxygen Delivery Method): room air      CONSTITUTIONAL: NAD, on RA  RESPIRATORY: Normal respiratory effort; Course bs b/l  CARDIOVASCULAR: irregular irregular, no LE edema  ABDOMEN: Nontender to palpation, hypoactive bowel sounds. +Perc drain draining bile  PSYCH: A+O x1; pleasantly confused  NEUROLOGY: CN 2-12 are intact and symmetric; no gross sensory deficits;   SKIN: No rashes; no palpable lesions    LABS:                        9.5    10.86 )-----------( 229      ( 27 Dec 2023 05:15 )             28.0     12-27    137  |  105  |  79.3<H>  ----------------------------<  319<H>  4.9   |  18.0<L>  |  4.08<H>    Ca    8.0<L>      27 Dec 2023 05:15  Phos  4.1     12-27  Mg     2.3     12-27    TPro  5.8<L>  /  Alb  2.8<L>  /  TBili  0.5  /  DBili  x   /  AST  19  /  ALT  22  /  AlkPhos  96  12-26          Urinalysis Basic - ( 27 Dec 2023 05:15 )    Color: x / Appearance: x / SG: x / pH: x  Gluc: 319 mg/dL / Ketone: x  / Bili: x / Urobili: x   Blood: x / Protein: x / Nitrite: x   Leuk Esterase: x / RBC: x / WBC x   Sq Epi: x / Non Sq Epi: x / Bacteria: x        CAPILLARY BLOOD GLUCOSE      POCT Blood Glucose.: 386 mg/dL (27 Dec 2023 12:22)  POCT Blood Glucose.: 326 mg/dL (27 Dec 2023 09:07)  POCT Blood Glucose.: 204 mg/dL (26 Dec 2023 22:12)  POCT Blood Glucose.: 262 mg/dL (26 Dec 2023 17:51)  POCT Blood Glucose.: 308 mg/dL (26 Dec 2023 12:57)      RADIOLOGY & ADDITIONAL TESTS:  Results Reviewed:   Imaging Personally Reviewed:  Electrocardiogram Personally Reviewed:

## 2023-12-27 NOTE — PROGRESS NOTE ADULT - SUBJECTIVE AND OBJECTIVE BOX
Interventional Radiology Follow-Up Note    This is a 83y Male s/p cholecystotomy tube placement on 12/22/23 in Interventional Radiology with Dr. Snider.    S:     Medication:  MEDICATIONS  (STANDING):  acetaminophen   IVPB .. 1000 milliGRAM(s) IV Intermittent once  cefTRIAXone Injectable. 1000 milliGRAM(s) IV Push every 24 hours  chlorhexidine 2% Cloths 1 Application(s) Topical <User Schedule>  dextrose 5%. 1000 milliLiter(s) (100 mL/Hr) IV Continuous <Continuous>  dextrose 5%. 1000 milliLiter(s) (50 mL/Hr) IV Continuous <Continuous>  dextrose 50% Injectable 12.5 Gram(s) IV Push once  dextrose 50% Injectable 25 Gram(s) IV Push once  dextrose 50% Injectable 25 Gram(s) IV Push once  dronedarone 400 milliGRAM(s) Oral two times a day  gabapentin 300 milliGRAM(s) Oral at bedtime  glucagon  Injectable 1 milliGRAM(s) IntraMuscular once  heparin   Injectable 5000 Unit(s) SubCutaneous every 8 hours  insulin glargine Injectable (LANTUS) 8 Unit(s) SubCutaneous at bedtime  insulin lispro (ADMELOG) corrective regimen sliding scale   SubCutaneous three times a day before meals  insulin lispro (ADMELOG) corrective regimen sliding scale   SubCutaneous at bedtime  insulin lispro Injectable (ADMELOG) 2 Unit(s) SubCutaneous three times a day with meals  metoprolol tartrate 25 milliGRAM(s) Oral two times a day  midodrine. 5 milliGRAM(s) Oral three times a day  OLANZapine Injectable 5 milliGRAM(s) IntraMuscular once  pantoprazole    Tablet 40 milliGRAM(s) Oral before breakfast  QUEtiapine 12.5 milliGRAM(s) Oral at bedtime  tamsulosin 0.4 milliGRAM(s) Oral at bedtime    MEDICATIONS  (PRN):  dextrose Oral Gel 15 Gram(s) Oral once PRN Blood Glucose LESS THAN 70 milliGRAM(s)/deciliter  ondansetron Injectable 4 milliGRAM(s) IV Push every 6 hours PRN Nausea      Vitals:  ICU Vital Signs Last 24 Hrs  T(C): 36.3 (27 Dec 2023 08:00), Max: 36.7 (26 Dec 2023 20:00)  T(F): 97.4 (27 Dec 2023 08:00), Max: 98 (26 Dec 2023 20:00)  HR: 55 (27 Dec 2023 08:00) (55 - 70)  BP: 150/77 (27 Dec 2023 08:00) (115/63 - 150/77)  BP(mean): --  ABP: --  ABP(mean): --  RR: 18 (27 Dec 2023 08:00) (18 - 18)  SpO2: 92% (27 Dec 2023 08:00) (92% - 95%)    O2 Parameters below as of 27 Dec 2023 08:00  Patient On (Oxygen Delivery Method): room air    Physical Exam:      I&O's Detail    26 Dec 2023 07:01  -  27 Dec 2023 07:00  --------------------------------------------------------  IN:    Oral Fluid: 440 mL  Total IN: 440 mL    OUT:    Drain (mL): 100 mL    Ureteral Catheter (mL): 1300 mL  Total OUT: 1400 mL    Total NET: -960 mL      27 Dec 2023 07:01  -  27 Dec 2023 11:02  --------------------------------------------------------  IN:  Total IN: 0 mL    OUT:    Ureteral Catheter (mL): 350 mL  Total OUT: 350 mL    Total NET: -350 mL          LABS:                        9.5    10.86 )-----------( 229      ( 27 Dec 2023 05:15 )             28.0     12-27    137  |  105  |  79.3<H>  ----------------------------<  319<H>  4.9   |  18.0<L>  |  4.08<H>    Ca    8.0<L>      27 Dec 2023 05:15  Phos  4.1     12-27  Mg     2.3     12-27    TPro  5.8<L>  /  Alb  2.8<L>  /  TBili  0.5  /  DBili  x   /  AST  19  /  ALT  22  /  AlkPhos  96  12-26      Urinalysis Basic - ( 27 Dec 2023 05:15 )    Color: x / Appearance: x / SG: x / pH: x  Gluc: 319 mg/dL / Ketone: x  / Bili: x / Urobili: x   Blood: x / Protein: x / Nitrite: x   Leuk Esterase: x / RBC: x / WBC x   Sq Epi: x / Non Sq Epi: x / Bacteria: x             Interventional Radiology Follow-Up Note    This is a 83y Male s/p cholecystotomy tube placement on 12/22/23 in Interventional Radiology with Dr. Snider.    Medication:  MEDICATIONS  (STANDING):  acetaminophen   IVPB .. 1000 milliGRAM(s) IV Intermittent once  cefTRIAXone Injectable. 1000 milliGRAM(s) IV Push every 24 hours  chlorhexidine 2% Cloths 1 Application(s) Topical <User Schedule>  dextrose 5%. 1000 milliLiter(s) (100 mL/Hr) IV Continuous <Continuous>  dextrose 5%. 1000 milliLiter(s) (50 mL/Hr) IV Continuous <Continuous>  dextrose 50% Injectable 12.5 Gram(s) IV Push once  dextrose 50% Injectable 25 Gram(s) IV Push once  dextrose 50% Injectable 25 Gram(s) IV Push once  dronedarone 400 milliGRAM(s) Oral two times a day  gabapentin 300 milliGRAM(s) Oral at bedtime  glucagon  Injectable 1 milliGRAM(s) IntraMuscular once  heparin   Injectable 5000 Unit(s) SubCutaneous every 8 hours  insulin glargine Injectable (LANTUS) 8 Unit(s) SubCutaneous at bedtime  insulin lispro (ADMELOG) corrective regimen sliding scale   SubCutaneous three times a day before meals  insulin lispro (ADMELOG) corrective regimen sliding scale   SubCutaneous at bedtime  insulin lispro Injectable (ADMELOG) 2 Unit(s) SubCutaneous three times a day with meals  metoprolol tartrate 25 milliGRAM(s) Oral two times a day  midodrine. 5 milliGRAM(s) Oral three times a day  OLANZapine Injectable 5 milliGRAM(s) IntraMuscular once  pantoprazole    Tablet 40 milliGRAM(s) Oral before breakfast  QUEtiapine 12.5 milliGRAM(s) Oral at bedtime  tamsulosin 0.4 milliGRAM(s) Oral at bedtime    MEDICATIONS  (PRN):  dextrose Oral Gel 15 Gram(s) Oral once PRN Blood Glucose LESS THAN 70 milliGRAM(s)/deciliter  ondansetron Injectable 4 milliGRAM(s) IV Push every 6 hours PRN Nausea      Vitals:  ICU Vital Signs Last 24 Hrs  T(C): 36.3 (27 Dec 2023 08:00), Max: 36.7 (26 Dec 2023 20:00)  T(F): 97.4 (27 Dec 2023 08:00), Max: 98 (26 Dec 2023 20:00)  HR: 55 (27 Dec 2023 08:00) (55 - 70)  BP: 150/77 (27 Dec 2023 08:00) (115/63 - 150/77)  BP(mean): --  ABP: --  ABP(mean): --  RR: 18 (27 Dec 2023 08:00) (18 - 18)  SpO2: 92% (27 Dec 2023 08:00) (92% - 95%)    O2 Parameters below as of 27 Dec 2023 08:00  Patient On (Oxygen Delivery Method): room air    I&O's Detail    26 Dec 2023 07:01  -  27 Dec 2023 07:00  --------------------------------------------------------  IN:    Oral Fluid: 440 mL  Total IN: 440 mL    OUT:    Drain (mL): 100 mL    Ureteral Catheter (mL): 1300 mL  Total OUT: 1400 mL    Total NET: -960 mL      27 Dec 2023 07:01  -  27 Dec 2023 11:02  --------------------------------------------------------  IN:  Total IN: 0 mL    OUT:    Ureteral Catheter (mL): 350 mL  Total OUT: 350 mL    Total NET: -350 mL          LABS:                        9.5    10.86 )-----------( 229      ( 27 Dec 2023 05:15 )             28.0     12-27    137  |  105  |  79.3<H>  ----------------------------<  319<H>  4.9   |  18.0<L>  |  4.08<H>    Ca    8.0<L>      27 Dec 2023 05:15  Phos  4.1     12-27  Mg     2.3     12-27    TPro  5.8<L>  /  Alb  2.8<L>  /  TBili  0.5  /  DBili  x   /  AST  19  /  ALT  22  /  AlkPhos  96  12-26      Urinalysis Basic - ( 27 Dec 2023 05:15 )    Color: x / Appearance: x / SG: x / pH: x  Gluc: 319 mg/dL / Ketone: x  / Bili: x / Urobili: x   Blood: x / Protein: x / Nitrite: x   Leuk Esterase: x / RBC: x / WBC x   Sq Epi: x / Non Sq Epi: x / Bacteria: x

## 2023-12-27 NOTE — PROGRESS NOTE ADULT - ASSESSMENT
Assessment/Plan:  83y Male admitted with Cholecystitis s/p sky tube placement.     acute cholecystitis s/p cholecystotomy   - The tube will need to remain in place for 4-6 weeks to allow for a tract from the skin to the GB to form.  - Continue global management per primary team  - Monitor output/VS/Labs  - Flush drain with 10cc normal saline daily forward only; DO NOT ASPIRATE  - Change dressing q3 days or when dressing is saturated  - Patient to follow with surgery as an outpatient to determine cholecystectomy candidacy  - No immediate IR follow required at this time   -  For outpatient follow-up/questions and scheduling please call [phone]  - Greater than 50% of the encounter was spent counseling and/or coordination of care on drain management and follow up.   - They will benefit from home care services to help with drainage catheter care; they should continue same drainage catheter care as an outpatient.     Please call IR at extension [phone] with any questions, concerns, or issues regarding above.   Assessment/Plan:  83y Male admitted with Cholecystitis s/p sky tube placement.     acute cholecystitis s/p cholecystotomy   - The tube will need to remain in place for 4-6 weeks to allow for a tract from the skin to the GB to form.  - Continue global management per primary team  - Monitor output/VS/Labs  - Flush drain with 10cc normal saline daily forward only; DO NOT ASPIRATE  - Change dressing q3 days or when dressing is saturated  - Patient to follow with surgery as an outpatient to determine cholecystectomy candidacy  - No immediate IR follow required at this time   -  For outpatient follow-up/questions and scheduling please call [phone]  - Greater than 50% of the encounter was spent counseling and/or coordination of care on drain management and follow up.   - They will benefit from home care services to help with drainage catheter care; they should continue same drainage catheter care as an outpatient.     Please call IR at extension [phone] with any questions, concerns, or issues regarding above.

## 2023-12-27 NOTE — CONSULT NOTE ADULT - SUBJECTIVE AND OBJECTIVE BOX
[facility] DIVISION OF KIDNEY DISEASES AND HYPERTENSION -- INITIAL CONSULT NOTE  --------------------------------------------------------------------------------  HPI:    84 y/o M with PMH HTN, HLD, CKD3, BPH who originally presented to [facility] ED with abdominal pain and found with acute cholecystitis. Course c/b hypoxia, tachycardia  and TMax 102.3, RRT called and found to be COVID+ on RBO. He was started on NIPPV, started on Remdesivir and IV Decadron and now on HFNC. Emergent bedside sky tube placed by IR as patient is not a surgical candidate per surgery team Course c/b hypoxic resp failure 2/2 COVID as well as new Afib RVR.  Cardiology consulted due to new onset of Afib with RVR. TTE with normal EF and no valvulopathy.  Nephrology consulted for Jonas on CKD.    PAST HISTORY  --------------------------------------------------------------------------------  PAST MEDICAL & SURGICAL HISTORY:  Diabetes      Hypertension      Prostate disorder      Anxiety      High cholesterol      Ulcer      No significant past surgical history        FAMILY HISTORY:  Family history of stomach cancer  Father    Family history of emphysema  Mother      PAST SOCIAL HISTORY: lives at home    ALLERGIES & MEDICATIONS  --------------------------------------------------------------------------------  Allergies    No Known Allergies    Intolerances      Standing Inpatient Medications  acetaminophen   IVPB .. 1000 milliGRAM(s) IV Intermittent once  cefTRIAXone Injectable. 1000 milliGRAM(s) IV Push every 24 hours  chlorhexidine 2% Cloths 1 Application(s) Topical <User Schedule>  dextrose 5%. 1000 milliLiter(s) IV Continuous <Continuous>  dextrose 5%. 1000 milliLiter(s) IV Continuous <Continuous>  dextrose 50% Injectable 12.5 Gram(s) IV Push once  dextrose 50% Injectable 25 Gram(s) IV Push once  dextrose 50% Injectable 25 Gram(s) IV Push once  dronedarone 400 milliGRAM(s) Oral two times a day  gabapentin 300 milliGRAM(s) Oral at bedtime  glucagon  Injectable 1 milliGRAM(s) IntraMuscular once  heparin   Injectable 5000 Unit(s) SubCutaneous every 8 hours  insulin glargine Injectable (LANTUS) 18 Unit(s) SubCutaneous at bedtime  insulin lispro (ADMELOG) corrective regimen sliding scale   SubCutaneous three times a day before meals  insulin lispro (ADMELOG) corrective regimen sliding scale   SubCutaneous at bedtime  insulin lispro Injectable (ADMELOG) 6 Unit(s) SubCutaneous three times a day with meals  metoprolol tartrate 25 milliGRAM(s) Oral two times a day  midodrine. 5 milliGRAM(s) Oral three times a day  OLANZapine Injectable 5 milliGRAM(s) IntraMuscular once  pantoprazole    Tablet 40 milliGRAM(s) Oral before breakfast  QUEtiapine 12.5 milliGRAM(s) Oral at bedtime  tamsulosin 0.4 milliGRAM(s) Oral at bedtime    PRN Inpatient Medications  dextrose Oral Gel 15 Gram(s) Oral once PRN  ondansetron Injectable 4 milliGRAM(s) IV Push every 6 hours PRN      REVIEW OF SYSTEMS  --------------------------------------------------------------------------------  Gen: No weight changes, fatigue, fevers/chills, weakness  Skin: No rashes  Head/Eyes/Ears/Mouth: No headache; Normal hearing; Normal vision w/o blurriness; No sinus pain/discomfort, sore throat  Respiratory: No dyspnea, cough, wheezing, hemoptysis  CV: No chest pain, PND, orthopnea  GI: No abdominal pain, diarrhea, constipation, nausea, vomiting, melena, hematochezia  : No increased frequency, dysuria, hematuria, nocturia  MSK: No joint pain/swelling; no back pain; no edema  Neuro: No dizziness/lightheadedness, weakness, seizures, numbness, tingling  Heme: No easy bruising or bleeding  Endo: No heat/cold intolerance  Psych: No significant nervousness, anxiety, stress, depression    All other systems were reviewed and are negative, except as noted.    VITALS/PHYSICAL EXAM  --------------------------------------------------------------------------------  T(C): 36.4 (12-27-23 @ 12:24), Max: 36.7 (12-26-23 @ 20:00)  HR: 56 (12-27-23 @ 12:24) (55 - 70)  BP: 130/68 (12-27-23 @ 12:24) (115/63 - 150/77)  RR: 18 (12-27-23 @ 12:24) (18 - 18)  SpO2: 92% (12-27-23 @ 12:24) (92% - 95%)  Wt(kg): --        12-26-23 @ 07:01  -  12-27-23 @ 07:00  --------------------------------------------------------  IN: 440 mL / OUT: 1400 mL / NET: -960 mL    12-27-23 @ 07:01  -  12-27-23 @ 13:31  --------------------------------------------------------  IN: 0 mL / OUT: 700 mL / NET: -700 mL      Physical Exam:  	Gen: NAD, well-appearing  	HEENT: PERRL, supple neck, clear oropharynx  	Pulm: CTA B/L  	CV: RRR, S1S2; no rub  	Back: No spinal or CVA tenderness; no sacral edema  	Abd: +BS, soft, nontender/nondistended  	: No suprapubic tenderness  	UE: Warm, FROM, no clubbing, intact strength; no edema; no asterixis  	LE: Warm, FROM, no clubbing, intact strength; no edema  	Neuro: No focal deficits, intact gait  	Psych: Normal affect and mood  	Skin: Warm, without rashes  	Vascular access:    LABS/STUDIES  --------------------------------------------------------------------------------              9.5    10.86 >-----------<  229      [12-27-23 @ 05:15]              28.0     137  |  105  |  79.3  ----------------------------<  319      [12-27-23 @ 05:15]  4.9   |  18.0  |  4.08        Ca     8.0     [12-27-23 @ 05:15]      Mg     2.3     [12-27-23 @ 05:15]      Phos  4.1     [12-27-23 @ 05:15]    TPro  5.8  /  Alb  2.8  /  TBili  0.5  /  DBili  x   /  AST  19  /  ALT  22  /  AlkPhos  96  [12-26-23 @ 06:59]          Creatinine Trend:  SCr 4.08 [12-27 @ 05:15]  SCr 3.74 [12-26 @ 06:59]  SCr 3.70 [12-25 @ 13:44]  SCr 2.47 [12-25 @ 10:30]  SCr 3.56 [12-25 @ 03:18]    Urinalysis - [12-27-23 @ 05:15]      Color  / Appearance  / SG  / pH       Gluc 319 / Ketone   / Bili  / Urobili        Blood  / Protein  / Leuk Est  / Nitrite       RBC  / WBC  / Hyaline  / Gran  / Sq Epi  / Non Sq Epi  / Bacteria     Urine Creatinine 149      [12-22-23 @ 02:30]  Urine Sodium 45      [12-22-23 @ 02:30]    Iron 33, TIBC 134, %sat 25      [12-24-23 @ 01:15]  Ferritin 680      [12-24-23 @ 01:15]  TSH 1.25      [12-23-23 @ 03:27]       [facility] DIVISION OF KIDNEY DISEASES AND HYPERTENSION -- INITIAL CONSULT NOTE  --------------------------------------------------------------------------------  HPI:    84 y/o M with PMH HTN, HLD, CKD3, BPH who originally presented to [facility] ED with abdominal pain and found with acute cholecystitis. Course c/b hypoxia, tachycardia  and TMax 102.3, RRT called and found to be COVID+ on RBO. He was started on NIPPV, started on Remdesivir and IV Decadron and now on HFNC. Emergent bedside sky tube placed by IR as patient is not a surgical candidate per surgery team Course c/b hypoxic resp failure 2/2 COVID as well as new Afib RVR.  Cardiology consulted due to new onset of Afib with RVR. TTE with normal EF and no valvulopathy.  Nephrology consulted for Jonas on CKD.    PAST HISTORY  --------------------------------------------------------------------------------  PAST MEDICAL & SURGICAL HISTORY:  Diabetes      Hypertension      Prostate disorder      Anxiety      High cholesterol      Ulcer      No significant past surgical history        FAMILY HISTORY:  Family history of stomach cancer  Father    Family history of emphysema  Mother      PAST SOCIAL HISTORY: lives at home    ALLERGIES & MEDICATIONS  --------------------------------------------------------------------------------  Allergies    No Known Allergies    Intolerances      Standing Inpatient Medications  acetaminophen   IVPB .. 1000 milliGRAM(s) IV Intermittent once  cefTRIAXone Injectable. 1000 milliGRAM(s) IV Push every 24 hours  chlorhexidine 2% Cloths 1 Application(s) Topical <User Schedule>  dextrose 5%. 1000 milliLiter(s) IV Continuous <Continuous>  dextrose 5%. 1000 milliLiter(s) IV Continuous <Continuous>  dextrose 50% Injectable 12.5 Gram(s) IV Push once  dextrose 50% Injectable 25 Gram(s) IV Push once  dextrose 50% Injectable 25 Gram(s) IV Push once  dronedarone 400 milliGRAM(s) Oral two times a day  gabapentin 300 milliGRAM(s) Oral at bedtime  glucagon  Injectable 1 milliGRAM(s) IntraMuscular once  heparin   Injectable 5000 Unit(s) SubCutaneous every 8 hours  insulin glargine Injectable (LANTUS) 18 Unit(s) SubCutaneous at bedtime  insulin lispro (ADMELOG) corrective regimen sliding scale   SubCutaneous three times a day before meals  insulin lispro (ADMELOG) corrective regimen sliding scale   SubCutaneous at bedtime  insulin lispro Injectable (ADMELOG) 6 Unit(s) SubCutaneous three times a day with meals  metoprolol tartrate 25 milliGRAM(s) Oral two times a day  midodrine. 5 milliGRAM(s) Oral three times a day  OLANZapine Injectable 5 milliGRAM(s) IntraMuscular once  pantoprazole    Tablet 40 milliGRAM(s) Oral before breakfast  QUEtiapine 12.5 milliGRAM(s) Oral at bedtime  tamsulosin 0.4 milliGRAM(s) Oral at bedtime    PRN Inpatient Medications  dextrose Oral Gel 15 Gram(s) Oral once PRN  ondansetron Injectable 4 milliGRAM(s) IV Push every 6 hours PRN      REVIEW OF SYSTEMS  --------------------------------------------------------------------------------  Gen: No weight changes, fatigue, fevers/chills, weakness  Skin: No rashes  Head/Eyes/Ears/Mouth: No headache; Normal hearing; Normal vision w/o blurriness; No sinus pain/discomfort, sore throat  Respiratory: No dyspnea, cough, wheezing, hemoptysis  CV: No chest pain, PND, orthopnea  GI: No abdominal pain, diarrhea, constipation, nausea, vomiting, melena, hematochezia  : No increased frequency, dysuria, hematuria, nocturia  MSK: No joint pain/swelling; no back pain; no edema  Neuro: No dizziness/lightheadedness, weakness, seizures, numbness, tingling  Heme: No easy bruising or bleeding  Endo: No heat/cold intolerance  Psych: No significant nervousness, anxiety, stress, depression    All other systems were reviewed and are negative, except as noted.    VITALS/PHYSICAL EXAM  --------------------------------------------------------------------------------  T(C): 36.4 (12-27-23 @ 12:24), Max: 36.7 (12-26-23 @ 20:00)  HR: 56 (12-27-23 @ 12:24) (55 - 70)  BP: 130/68 (12-27-23 @ 12:24) (115/63 - 150/77)  RR: 18 (12-27-23 @ 12:24) (18 - 18)  SpO2: 92% (12-27-23 @ 12:24) (92% - 95%)  Wt(kg): --        12-26-23 @ 07:01  -  12-27-23 @ 07:00  --------------------------------------------------------  IN: 440 mL / OUT: 1400 mL / NET: -960 mL    12-27-23 @ 07:01  -  12-27-23 @ 13:31  --------------------------------------------------------  IN: 0 mL / OUT: 700 mL / NET: -700 mL      Physical Exam:  	Gen: NAD, well-appearing  	HEENT: PERRL, supple neck, clear oropharynx  	Pulm: CTA B/L  	CV: RRR, S1S2; no rub  	Back: No spinal or CVA tenderness; no sacral edema  	Abd: +BS, soft, nontender/nondistended  	: No suprapubic tenderness  	UE: Warm, FROM, no clubbing, intact strength; no edema; no asterixis  	LE: Warm, FROM, no clubbing, intact strength; no edema  	Neuro: No focal deficits, intact gait  	Psych: Normal affect and mood  	Skin: Warm, without rashes  	Vascular access:    LABS/STUDIES  --------------------------------------------------------------------------------              9.5    10.86 >-----------<  229      [12-27-23 @ 05:15]              28.0     137  |  105  |  79.3  ----------------------------<  319      [12-27-23 @ 05:15]  4.9   |  18.0  |  4.08        Ca     8.0     [12-27-23 @ 05:15]      Mg     2.3     [12-27-23 @ 05:15]      Phos  4.1     [12-27-23 @ 05:15]    TPro  5.8  /  Alb  2.8  /  TBili  0.5  /  DBili  x   /  AST  19  /  ALT  22  /  AlkPhos  96  [12-26-23 @ 06:59]          Creatinine Trend:  SCr 4.08 [12-27 @ 05:15]  SCr 3.74 [12-26 @ 06:59]  SCr 3.70 [12-25 @ 13:44]  SCr 2.47 [12-25 @ 10:30]  SCr 3.56 [12-25 @ 03:18]    Urinalysis - [12-27-23 @ 05:15]      Color  / Appearance  / SG  / pH       Gluc 319 / Ketone   / Bili  / Urobili        Blood  / Protein  / Leuk Est  / Nitrite       RBC  / WBC  / Hyaline  / Gran  / Sq Epi  / Non Sq Epi  / Bacteria     Urine Creatinine 149      [12-22-23 @ 02:30]  Urine Sodium 45      [12-22-23 @ 02:30]    Iron 33, TIBC 134, %sat 25      [12-24-23 @ 01:15]  Ferritin 680      [12-24-23 @ 01:15]  TSH 1.25      [12-23-23 @ 03:27]       [facility] DIVISION OF KIDNEY DISEASES AND HYPERTENSION -- INITIAL CONSULT NOTE  --------------------------------------------------------------------------------  HPI:    82 y/o M with PMH HTN, HLD, CKD3, BPH who originally presented to [facility] ED with abdominal pain and found with acute cholecystitis. Course c/b hypoxia, tachycardia  and TMax 102.3, RRT called and found to be COVID+ on RBO. He was started on NIPPV, started on Remdesivir and IV Decadron and now on HFNC. Emergent bedside sky tube placed by IR as patient is not a surgical candidate per surgery team Course c/b hypoxic resp failure 2/2 COVID as well as new Afib RVR.  Cardiology consulted due to new onset of Afib with RVR. TTE with normal EF and no valvulopathy.  Nephrology consulted for Jonas on CKD.  Pt seen and examined; eating lunch.  denies any acute complaint    PAST HISTORY  --------------------------------------------------------------------------------  PAST MEDICAL & SURGICAL HISTORY:  Diabetes      Hypertension      Prostate disorder      Anxiety      High cholesterol      Ulcer      No significant past surgical history        FAMILY HISTORY:  Family history of stomach cancer  Father    Family history of emphysema  Mother      PAST SOCIAL HISTORY: lives at home    ALLERGIES & MEDICATIONS  --------------------------------------------------------------------------------  Allergies    No Known Allergies    Intolerances      Standing Inpatient Medications  acetaminophen   IVPB .. 1000 milliGRAM(s) IV Intermittent once  cefTRIAXone Injectable. 1000 milliGRAM(s) IV Push every 24 hours  chlorhexidine 2% Cloths 1 Application(s) Topical <User Schedule>  dextrose 5%. 1000 milliLiter(s) IV Continuous <Continuous>  dextrose 5%. 1000 milliLiter(s) IV Continuous <Continuous>  dextrose 50% Injectable 12.5 Gram(s) IV Push once  dextrose 50% Injectable 25 Gram(s) IV Push once  dextrose 50% Injectable 25 Gram(s) IV Push once  dronedarone 400 milliGRAM(s) Oral two times a day  gabapentin 300 milliGRAM(s) Oral at bedtime  glucagon  Injectable 1 milliGRAM(s) IntraMuscular once  heparin   Injectable 5000 Unit(s) SubCutaneous every 8 hours  insulin glargine Injectable (LANTUS) 18 Unit(s) SubCutaneous at bedtime  insulin lispro (ADMELOG) corrective regimen sliding scale   SubCutaneous three times a day before meals  insulin lispro (ADMELOG) corrective regimen sliding scale   SubCutaneous at bedtime  insulin lispro Injectable (ADMELOG) 6 Unit(s) SubCutaneous three times a day with meals  metoprolol tartrate 25 milliGRAM(s) Oral two times a day  midodrine. 5 milliGRAM(s) Oral three times a day  OLANZapine Injectable 5 milliGRAM(s) IntraMuscular once  pantoprazole    Tablet 40 milliGRAM(s) Oral before breakfast  QUEtiapine 12.5 milliGRAM(s) Oral at bedtime  tamsulosin 0.4 milliGRAM(s) Oral at bedtime    PRN Inpatient Medications  dextrose Oral Gel 15 Gram(s) Oral once PRN  ondansetron Injectable 4 milliGRAM(s) IV Push every 6 hours PRN      REVIEW OF SYSTEMS  --------------------------------------------------------------------------------  Gen: No weight changes, fatigue, fevers/chills, weakness  Skin: No rashes  Head/Eyes/Ears/Mouth: No headache; Normal hearing; Normal vision w/o blurriness; No sinus pain/discomfort, sore throat  Respiratory: No dyspnea, cough, wheezing, hemoptysis  CV: No chest pain, PND, orthopnea  GI: No abdominal pain, diarrhea, constipation, nausea, vomiting, melena, hematochezia  : No increased frequency, dysuria, hematuria, nocturia  MSK: No joint pain/swelling; no back pain; no edema  Neuro: No dizziness/lightheadedness, weakness, seizures, numbness, tingling  Heme: No easy bruising or bleeding  Endo: No heat/cold intolerance  Psych: No significant nervousness, anxiety, stress, depression    All other systems were reviewed and are negative, except as noted.    VITALS/PHYSICAL EXAM  --------------------------------------------------------------------------------  T(C): 36.4 (12-27-23 @ 12:24), Max: 36.7 (12-26-23 @ 20:00)  HR: 56 (12-27-23 @ 12:24) (55 - 70)  BP: 130/68 (12-27-23 @ 12:24) (115/63 - 150/77)  RR: 18 (12-27-23 @ 12:24) (18 - 18)  SpO2: 92% (12-27-23 @ 12:24) (92% - 95%)  Wt(kg): --        12-26-23 @ 07:01  -  12-27-23 @ 07:00  --------------------------------------------------------  IN: 440 mL / OUT: 1400 mL / NET: -960 mL    12-27-23 @ 07:01  -  12-27-23 @ 13:31  --------------------------------------------------------  IN: 0 mL / OUT: 700 mL / NET: -700 mL      Physical Exam:  	Gen: NAD,  	HEENT: supple neck,   	Pulm: CTA B/L  	CV: RRR, S1S2; no rub  	Back: No spinal or CVA tenderness; no sacral edema  	Abd: +BS, soft, nontender/nondistended  	: wills  	UE: Warm, no edema  	LE: Warm, no edema  	Neuro: No focal deficit  	Psych: Normal affect and mood  	Skin: Warm,     LABS/STUDIES  --------------------------------------------------------------------------------              9.5    10.86 >-----------<  229      [12-27-23 @ 05:15]              28.0     137  |  105  |  79.3  ----------------------------<  319      [12-27-23 @ 05:15]  4.9   |  18.0  |  4.08        Ca     8.0     [12-27-23 @ 05:15]      Mg     2.3     [12-27-23 @ 05:15]      Phos  4.1     [12-27-23 @ 05:15]    TPro  5.8  /  Alb  2.8  /  TBili  0.5  /  DBili  x   /  AST  19  /  ALT  22  /  AlkPhos  96  [12-26-23 @ 06:59]          Creatinine Trend:  SCr 4.08 [12-27 @ 05:15]  SCr 3.74 [12-26 @ 06:59]  SCr 3.70 [12-25 @ 13:44]  SCr 2.47 [12-25 @ 10:30]  SCr 3.56 [12-25 @ 03:18]    Urinalysis - [12-27-23 @ 05:15]      Color  / Appearance  / SG  / pH       Gluc 319 / Ketone   / Bili  / Urobili        Blood  / Protein  / Leuk Est  / Nitrite       RBC  / WBC  / Hyaline  / Gran  / Sq Epi  / Non Sq Epi  / Bacteria     Urine Creatinine 149      [12-22-23 @ 02:30]  Urine Sodium 45      [12-22-23 @ 02:30]    Iron 33, TIBC 134, %sat 25      [12-24-23 @ 01:15]  Ferritin 680      [12-24-23 @ 01:15]  TSH 1.25      [12-23-23 @ 03:27]       [facility] DIVISION OF KIDNEY DISEASES AND HYPERTENSION -- INITIAL CONSULT NOTE  --------------------------------------------------------------------------------  HPI:    82 y/o M with PMH HTN, HLD, CKD3, BPH who originally presented to [facility] ED with abdominal pain and found with acute cholecystitis. Course c/b hypoxia, tachycardia  and TMax 102.3, RRT called and found to be COVID+ on RBO. He was started on NIPPV, started on Remdesivir and IV Decadron and now on HFNC. Emergent bedside sky tube placed by IR as patient is not a surgical candidate per surgery team Course c/b hypoxic resp failure 2/2 COVID as well as new Afib RVR.  Cardiology consulted due to new onset of Afib with RVR. TTE with normal EF and no valvulopathy.  Nephrology consulted for Jonas on CKD.  Pt seen and examined; eating lunch.  denies any acute complaint    PAST HISTORY  --------------------------------------------------------------------------------  PAST MEDICAL & SURGICAL HISTORY:  Diabetes      Hypertension      Prostate disorder      Anxiety      High cholesterol      Ulcer      No significant past surgical history        FAMILY HISTORY:  Family history of stomach cancer  Father    Family history of emphysema  Mother      PAST SOCIAL HISTORY: lives at home    ALLERGIES & MEDICATIONS  --------------------------------------------------------------------------------  Allergies    No Known Allergies    Intolerances      Standing Inpatient Medications  acetaminophen   IVPB .. 1000 milliGRAM(s) IV Intermittent once  cefTRIAXone Injectable. 1000 milliGRAM(s) IV Push every 24 hours  chlorhexidine 2% Cloths 1 Application(s) Topical <User Schedule>  dextrose 5%. 1000 milliLiter(s) IV Continuous <Continuous>  dextrose 5%. 1000 milliLiter(s) IV Continuous <Continuous>  dextrose 50% Injectable 12.5 Gram(s) IV Push once  dextrose 50% Injectable 25 Gram(s) IV Push once  dextrose 50% Injectable 25 Gram(s) IV Push once  dronedarone 400 milliGRAM(s) Oral two times a day  gabapentin 300 milliGRAM(s) Oral at bedtime  glucagon  Injectable 1 milliGRAM(s) IntraMuscular once  heparin   Injectable 5000 Unit(s) SubCutaneous every 8 hours  insulin glargine Injectable (LANTUS) 18 Unit(s) SubCutaneous at bedtime  insulin lispro (ADMELOG) corrective regimen sliding scale   SubCutaneous three times a day before meals  insulin lispro (ADMELOG) corrective regimen sliding scale   SubCutaneous at bedtime  insulin lispro Injectable (ADMELOG) 6 Unit(s) SubCutaneous three times a day with meals  metoprolol tartrate 25 milliGRAM(s) Oral two times a day  midodrine. 5 milliGRAM(s) Oral three times a day  OLANZapine Injectable 5 milliGRAM(s) IntraMuscular once  pantoprazole    Tablet 40 milliGRAM(s) Oral before breakfast  QUEtiapine 12.5 milliGRAM(s) Oral at bedtime  tamsulosin 0.4 milliGRAM(s) Oral at bedtime    PRN Inpatient Medications  dextrose Oral Gel 15 Gram(s) Oral once PRN  ondansetron Injectable 4 milliGRAM(s) IV Push every 6 hours PRN      REVIEW OF SYSTEMS  --------------------------------------------------------------------------------  Gen: No weight changes, fatigue, fevers/chills, weakness  Skin: No rashes  Head/Eyes/Ears/Mouth: No headache; Normal hearing; Normal vision w/o blurriness; No sinus pain/discomfort, sore throat  Respiratory: No dyspnea, cough, wheezing, hemoptysis  CV: No chest pain, PND, orthopnea  GI: No abdominal pain, diarrhea, constipation, nausea, vomiting, melena, hematochezia  : No increased frequency, dysuria, hematuria, nocturia  MSK: No joint pain/swelling; no back pain; no edema  Neuro: No dizziness/lightheadedness, weakness, seizures, numbness, tingling  Heme: No easy bruising or bleeding  Endo: No heat/cold intolerance  Psych: No significant nervousness, anxiety, stress, depression    All other systems were reviewed and are negative, except as noted.    VITALS/PHYSICAL EXAM  --------------------------------------------------------------------------------  T(C): 36.4 (12-27-23 @ 12:24), Max: 36.7 (12-26-23 @ 20:00)  HR: 56 (12-27-23 @ 12:24) (55 - 70)  BP: 130/68 (12-27-23 @ 12:24) (115/63 - 150/77)  RR: 18 (12-27-23 @ 12:24) (18 - 18)  SpO2: 92% (12-27-23 @ 12:24) (92% - 95%)  Wt(kg): --        12-26-23 @ 07:01  -  12-27-23 @ 07:00  --------------------------------------------------------  IN: 440 mL / OUT: 1400 mL / NET: -960 mL    12-27-23 @ 07:01  -  12-27-23 @ 13:31  --------------------------------------------------------  IN: 0 mL / OUT: 700 mL / NET: -700 mL      Physical Exam:  	Gen: NAD,  	HEENT: supple neck,   	Pulm: CTA B/L  	CV: RRR, S1S2; no rub  	Back: No spinal or CVA tenderness; no sacral edema  	Abd: +BS, soft, nontender/nondistended  	: wills  	UE: Warm, no edema  	LE: Warm, no edema  	Neuro: No focal deficit  	Psych: Normal affect and mood  	Skin: Warm,     LABS/STUDIES  --------------------------------------------------------------------------------              9.5    10.86 >-----------<  229      [12-27-23 @ 05:15]              28.0     137  |  105  |  79.3  ----------------------------<  319      [12-27-23 @ 05:15]  4.9   |  18.0  |  4.08        Ca     8.0     [12-27-23 @ 05:15]      Mg     2.3     [12-27-23 @ 05:15]      Phos  4.1     [12-27-23 @ 05:15]    TPro  5.8  /  Alb  2.8  /  TBili  0.5  /  DBili  x   /  AST  19  /  ALT  22  /  AlkPhos  96  [12-26-23 @ 06:59]          Creatinine Trend:  SCr 4.08 [12-27 @ 05:15]  SCr 3.74 [12-26 @ 06:59]  SCr 3.70 [12-25 @ 13:44]  SCr 2.47 [12-25 @ 10:30]  SCr 3.56 [12-25 @ 03:18]    Urinalysis - [12-27-23 @ 05:15]      Color  / Appearance  / SG  / pH       Gluc 319 / Ketone   / Bili  / Urobili        Blood  / Protein  / Leuk Est  / Nitrite       RBC  / WBC  / Hyaline  / Gran  / Sq Epi  / Non Sq Epi  / Bacteria     Urine Creatinine 149      [12-22-23 @ 02:30]  Urine Sodium 45      [12-22-23 @ 02:30]    Iron 33, TIBC 134, %sat 25      [12-24-23 @ 01:15]  Ferritin 680      [12-24-23 @ 01:15]  TSH 1.25      [12-23-23 @ 03:27]

## 2023-12-27 NOTE — PROVIDER CONTACT NOTE (CRITICAL VALUE NOTIFICATION) - NS PROVIDER READ BACK TO LAB
PT Name: Randy Yap  MR #: [id number]     DOCUMENTATION CLARIFICATION     CDS/: Virgie Zapien RN               Contact information: [email or web address]  This form is a permanent document in the medical record.     Query Date: August 28, 2023    By submitting this query, we are merely seeking further clarification of documentation.  Please utilize your independent clinical judgment when addressing the question(s) below.    The Medical Record contains the following   Indicators Supporting Clinical Findings Location in Medical Record   x Heart Failure documented Chronic combined systolic and diastolic CHF (congestive heart failure)  Patient is identified as having Combined Systolic and Diastolic heart failure that is Acute on chronic. CHF is currently controlled. Latest ECHO performed and demonstrates- Results  for orders placed during the hospital encounter of 04/17/23 H&P 8/17, PN 8/18             x BNP BNP: 23   Labs 8/17   x EF/Echo Echo:  The left ventricle is moderately enlarged with eccentric hypertrophy and low normal systolic function.  The estimated ejection fraction is 50-55%  Normal left ventricular diastolic function.  Normal right ventricular size.  The aortic valve appears structurally normal. There is normal leaflet mobility.  Mild mitral regurgitation.  Mild tricuspid regurgitation.  Normal central venous pressure (3 mmHg).  The estimated PA systolic pressure is 18 mmHg.  Considerable improvement in systolic function compared to study of 1- Echo 4/17/23  (Previous encounter)                             x Radiology findings CXR:  FINDINGS:  Heart size is normal.  AICD leads are present.  The lung fields are clear. No acute cardiopulmonary infiltrate.  Impression:  Lungs appear clear.   CXR 8/17   x Subjective/Objective Respiratory Conditions Respiratory:  Negative for shortness of breath    Respiratory:  Negative for chest tightness and shortness of breath ED Prov Note 8/17    H&P 
8/17      Recent/Current MI      Heart Transplant, LVAD     x Edema, JVD No edema    Right lower leg: No edema.  Left lower leg: No edema.   ED Prov Note 9/17    Arrhythmia PN 8/18        Ascites     x Diuretics/Meds Lasix 40 mg IV x 1 dose  Spironolactone 12.5 mg PO QD   MAR 8/17  MAR 8/17-8/19    Other Treatment      Other       Heart failure is a clinical diagnosis which includes symptomatic fluid retention, elevated intracardiac pressures, and/or the inability of the heart to deliver adequate blood flow.    Heart Failure with reduced Ejection Fraction (HFrEF) or Systolic Heart Failure (loses ability to contract normally, EF is <40%)    Heart Failure with preserved Ejection Fraction (HFpEF) or Diastolic Heart Failure (stiff ventricles, does not relax properly, EF is >50%)     Heart Failure with Combined Systolic and Diastolic Failure (stiff ventricles, does not relax properly and EF is <50%)    Mid-range or mildly reduced ejection fraction (HFmrEF) is classified as systolic heart failure.  Congestive heart failure with a recovered EF is classified as Diastolic Heart Failure.  Common clues to acute exacerbation:  Rapidly progressive symptoms (w/in 2 weeks of presentation), using IV diuretics, using supplemental O2, pulmonary edema on Xray, new or worsening pleural effusion, +JVD or other signs of volume overload, MI w/in 4 weeks, and/or BNP >500  The clinical guidelines noted are only system guidelines, and do not replace the providers clinical judgment.    Provider, please clarify the heart failure diagnosis associated with the above clinical findings.    [  x ]  Acute on Chronic Combined Systolic and Diastolic Heart Failure - worsening of CHF signs/symptoms in preexisting CHF     [   ]  Chronic Combined Systolic and Diastolic Heart Failure - pre-existing and stable     [   ]  Other (please specify): ___________________________________           Please document in your progress notes daily for the duration of 
treatment until resolved and include in your discharge summary.    References:  American Heart Association editorial staff. (2017, May). Ejection Fraction Heart Failure Measurement. American Heart Association. https://www.heart.org/en/health-topics/heart-failure/diagnosing-heart-failure/ejection-fraction-heart-failure-measurement#:~:text=Ejection%20fraction%20(EF)%20is%20a,pushed%20out%20with%20each%20heartbeat  SADIE Buckley (2020, December 15). Heart failure with preserved ejection fraction: Clinical manifestations and diagnosis. [place]. https://www.CaseRails.Fubles/contents/heart-failure-with-preserved-ejection-fraction-clinical-manifestations-and-diagnosis.  ICD-10-CM/PCS Coding Clinic Third Quarter ICD-10, Effective with discharges: September 8, 2020 [facility] § Heart failure with mid-range or mildly reduced ejection fraction (2020).  ICD-10-CM/PCS Coding Clinic Third Quarter ICD-10, Effective with discharges: September 8, 2020 [facility] § Heart failure with recovered ejection fraction (2020).  Form No. 58854      
yes
yes

## 2023-12-27 NOTE — CONSULT NOTE ADULT - CONSULT REASON
Increased work of breathing, hypoxia
cholecystitis
Jonas on CKD
risk stratification, medical comanagement
new Afib with RVR

## 2023-12-27 NOTE — CONSULT NOTE ADULT - ASSESSMENT
83-year-old male with past medical history of hypertension, hyperlipidemia, DM, stage III CKD, BPH, admitted for acute sky s/p IR drain placement. Course c/b hypoxic resp failure 2/2 COVID as well as new Afib RVR.   Nephrology consulted for Jonas.    Jonas on CKD stage III  SCr 1.9-2.7 in 2016- no labs available for review after that until admission  Likely underlying diabetic nephropathy  SCr 2.1 on presentation- has worsened progressively and 4.08 today  UA with large glucose, 300 protein. small blood  CT AP reviewed; bl renal cysts  Jonas likely hemodynamic ATN in setting of sepsis, hypotension, Afib with RVR  Will give trial of gentle hydration  No indication for RRT      Acute cholecystitis  s/p IR perc drain placement  on Zosyn    Acute resp failure with hypoxia suspect 2/2 COVID  s/p decadron x5 days;Remdesivir dce'd due to worsening renal function  83-year-old male with past medical history of hypertension, hyperlipidemia, DM, stage III CKD, BPH, admitted for acute sky s/p IR drain placement. Course c/b hypoxic resp failure 2/2 COVID as well as new Afib RVR.   Nephrology consulted for Susana.    Susana on CKD stage III  SCr 1.9-2.7 in 2016- no labs available for review after that until admission  Likely underlying diabetic nephropathy  SCr 2.1 on presentation- has worsened progressively and 4.08 today  Pt is sp wills ; good urine output  UA with large glucose, 300 protein. small blood  CT AP reviewed; bl renal cysts  Susana likely hemodynamic ATN in setting of sepsis, hypotension, Afib with RVR  Will give trial of gentle hydration  No indication for RRT    Metabolic acidosis  in setting of Susana  bicarb gtt as above    Acute cholecystitis  s/p IR perc drain placement  on Zosyn    Acute resp failure with hypoxia suspect 2/2 COVID  s/p decadron x5 days;Remdesivir dce'd due to worsening renal function   Mgt as per primary    Dw Dr Louie

## 2023-12-28 LAB
ANION GAP SERPL CALC-SCNC: 14 MMOL/L — SIGNIFICANT CHANGE UP (ref 5–17)
ANION GAP SERPL CALC-SCNC: 14 MMOL/L — SIGNIFICANT CHANGE UP (ref 5–17)
BUN SERPL-MCNC: 75.6 MG/DL — HIGH (ref 8–20)
BUN SERPL-MCNC: 75.6 MG/DL — HIGH (ref 8–20)
CALCIUM SERPL-MCNC: 7.8 MG/DL — LOW (ref 8.4–10.5)
CALCIUM SERPL-MCNC: 7.8 MG/DL — LOW (ref 8.4–10.5)
CHLORIDE SERPL-SCNC: 106 MMOL/L — SIGNIFICANT CHANGE UP (ref 96–108)
CHLORIDE SERPL-SCNC: 106 MMOL/L — SIGNIFICANT CHANGE UP (ref 96–108)
CO2 SERPL-SCNC: 18 MMOL/L — LOW (ref 22–29)
CO2 SERPL-SCNC: 18 MMOL/L — LOW (ref 22–29)
CREAT ?TM UR-MCNC: 64 MG/DL — SIGNIFICANT CHANGE UP
CREAT ?TM UR-MCNC: 64 MG/DL — SIGNIFICANT CHANGE UP
CREAT SERPL-MCNC: 4.27 MG/DL — HIGH (ref 0.5–1.3)
CREAT SERPL-MCNC: 4.27 MG/DL — HIGH (ref 0.5–1.3)
CRP SERPL-MCNC: 37 MG/L — HIGH
CRP SERPL-MCNC: 37 MG/L — HIGH
EGFR: 13 ML/MIN/1.73M2 — LOW
EGFR: 13 ML/MIN/1.73M2 — LOW
GLUCOSE BLDC GLUCOMTR-MCNC: 102 MG/DL — HIGH (ref 70–99)
GLUCOSE BLDC GLUCOMTR-MCNC: 102 MG/DL — HIGH (ref 70–99)
GLUCOSE BLDC GLUCOMTR-MCNC: 104 MG/DL — HIGH (ref 70–99)
GLUCOSE BLDC GLUCOMTR-MCNC: 104 MG/DL — HIGH (ref 70–99)
GLUCOSE BLDC GLUCOMTR-MCNC: 130 MG/DL — HIGH (ref 70–99)
GLUCOSE BLDC GLUCOMTR-MCNC: 130 MG/DL — HIGH (ref 70–99)
GLUCOSE BLDC GLUCOMTR-MCNC: 187 MG/DL — HIGH (ref 70–99)
GLUCOSE BLDC GLUCOMTR-MCNC: 187 MG/DL — HIGH (ref 70–99)
GLUCOSE SERPL-MCNC: 230 MG/DL — HIGH (ref 70–99)
GLUCOSE SERPL-MCNC: 230 MG/DL — HIGH (ref 70–99)
POTASSIUM SERPL-MCNC: 4.2 MMOL/L — SIGNIFICANT CHANGE UP (ref 3.5–5.3)
POTASSIUM SERPL-MCNC: 4.2 MMOL/L — SIGNIFICANT CHANGE UP (ref 3.5–5.3)
POTASSIUM SERPL-SCNC: 4.2 MMOL/L — SIGNIFICANT CHANGE UP (ref 3.5–5.3)
POTASSIUM SERPL-SCNC: 4.2 MMOL/L — SIGNIFICANT CHANGE UP (ref 3.5–5.3)
SODIUM SERPL-SCNC: 138 MMOL/L — SIGNIFICANT CHANGE UP (ref 135–145)
SODIUM SERPL-SCNC: 138 MMOL/L — SIGNIFICANT CHANGE UP (ref 135–145)
SODIUM UR-SCNC: 81 MMOL/L — SIGNIFICANT CHANGE UP
SODIUM UR-SCNC: 81 MMOL/L — SIGNIFICANT CHANGE UP
UUN UR-MCNC: 801 MG/DL — SIGNIFICANT CHANGE UP
UUN UR-MCNC: 801 MG/DL — SIGNIFICANT CHANGE UP

## 2023-12-28 PROCEDURE — 99232 SBSQ HOSP IP/OBS MODERATE 35: CPT

## 2023-12-28 RX ORDER — ERYTHROPOIETIN 10000 [IU]/ML
20000 INJECTION, SOLUTION INTRAVENOUS; SUBCUTANEOUS
Refills: 0 | Status: DISCONTINUED | OUTPATIENT
Start: 2023-12-28 | End: 2024-01-05

## 2023-12-28 RX ORDER — FOLIC ACID 0.8 MG
1 TABLET ORAL DAILY
Refills: 0 | Status: DISCONTINUED | OUTPATIENT
Start: 2023-12-28 | End: 2024-01-05

## 2023-12-28 RX ORDER — SODIUM BICARBONATE 1 MEQ/ML
0.1 SYRINGE (ML) INTRAVENOUS
Qty: 150 | Refills: 0 | Status: DISCONTINUED | OUTPATIENT
Start: 2023-12-28 | End: 2024-01-04

## 2023-12-28 RX ADMIN — Medication 50 MEQ/KG/HR: at 21:41

## 2023-12-28 RX ADMIN — MIDODRINE HYDROCHLORIDE 5 MILLIGRAM(S): 2.5 TABLET ORAL at 14:24

## 2023-12-28 RX ADMIN — INSULIN GLARGINE 18 UNIT(S): 100 INJECTION, SOLUTION SUBCUTANEOUS at 21:34

## 2023-12-28 RX ADMIN — TAMSULOSIN HYDROCHLORIDE 0.4 MILLIGRAM(S): 0.4 CAPSULE ORAL at 21:34

## 2023-12-28 RX ADMIN — Medication 25 MILLIGRAM(S): at 17:52

## 2023-12-28 RX ADMIN — MIDODRINE HYDROCHLORIDE 5 MILLIGRAM(S): 2.5 TABLET ORAL at 05:41

## 2023-12-28 RX ADMIN — CHLORHEXIDINE GLUCONATE 1 APPLICATION(S): 213 SOLUTION TOPICAL at 05:42

## 2023-12-28 RX ADMIN — MIDODRINE HYDROCHLORIDE 5 MILLIGRAM(S): 2.5 TABLET ORAL at 17:52

## 2023-12-28 RX ADMIN — Medication 6 UNIT(S): at 10:04

## 2023-12-28 RX ADMIN — Medication 2: at 10:03

## 2023-12-28 RX ADMIN — HEPARIN SODIUM 5000 UNIT(S): 5000 INJECTION INTRAVENOUS; SUBCUTANEOUS at 14:25

## 2023-12-28 RX ADMIN — HEPARIN SODIUM 5000 UNIT(S): 5000 INJECTION INTRAVENOUS; SUBCUTANEOUS at 21:34

## 2023-12-28 RX ADMIN — HEPARIN SODIUM 5000 UNIT(S): 5000 INJECTION INTRAVENOUS; SUBCUTANEOUS at 05:41

## 2023-12-28 RX ADMIN — PANTOPRAZOLE SODIUM 40 MILLIGRAM(S): 20 TABLET, DELAYED RELEASE ORAL at 05:41

## 2023-12-28 RX ADMIN — Medication 6 UNIT(S): at 17:52

## 2023-12-28 RX ADMIN — GABAPENTIN 300 MILLIGRAM(S): 400 CAPSULE ORAL at 21:34

## 2023-12-28 RX ADMIN — DRONEDARONE 400 MILLIGRAM(S): 400 TABLET, FILM COATED ORAL at 05:41

## 2023-12-28 RX ADMIN — CEFTRIAXONE 1000 MILLIGRAM(S): 500 INJECTION, POWDER, FOR SOLUTION INTRAMUSCULAR; INTRAVENOUS at 14:25

## 2023-12-28 RX ADMIN — QUETIAPINE FUMARATE 12.5 MILLIGRAM(S): 200 TABLET, FILM COATED ORAL at 21:35

## 2023-12-28 RX ADMIN — Medication 6 UNIT(S): at 14:25

## 2023-12-28 RX ADMIN — DRONEDARONE 400 MILLIGRAM(S): 400 TABLET, FILM COATED ORAL at 17:51

## 2023-12-28 RX ADMIN — Medication 0: at 17:52

## 2023-12-28 RX ADMIN — Medication 25 MILLIGRAM(S): at 05:41

## 2023-12-28 NOTE — PROGRESS NOTE ADULT - SUBJECTIVE AND OBJECTIVE BOX
NEPHROLOGY INTERVAL HPI/OVERNIGHT EVENTS:    feels better   Eating   No SOB or CP   + R radha drain   Abx Ceftriaxone   UO 1.8 L with wills     MEDICATIONS  (STANDING):  acetaminophen   IVPB .. 1000 milliGRAM(s) IV Intermittent once  cefTRIAXone Injectable. 1000 milliGRAM(s) IV Push every 24 hours  chlorhexidine 2% Cloths 1 Application(s) Topical <User Schedule>  dextrose 5%. 1000 milliLiter(s) (100 mL/Hr) IV Continuous <Continuous>  dextrose 5%. 1000 milliLiter(s) (50 mL/Hr) IV Continuous <Continuous>  dextrose 50% Injectable 12.5 Gram(s) IV Push once  dextrose 50% Injectable 25 Gram(s) IV Push once  dextrose 50% Injectable 25 Gram(s) IV Push once  dronedarone 400 milliGRAM(s) Oral two times a day  gabapentin 300 milliGRAM(s) Oral at bedtime  glucagon  Injectable 1 milliGRAM(s) IntraMuscular once  heparin   Injectable 5000 Unit(s) SubCutaneous every 8 hours  insulin glargine Injectable (LANTUS) 18 Unit(s) SubCutaneous at bedtime  insulin lispro (ADMELOG) corrective regimen sliding scale   SubCutaneous three times a day before meals  insulin lispro (ADMELOG) corrective regimen sliding scale   SubCutaneous at bedtime  insulin lispro Injectable (ADMELOG) 6 Unit(s) SubCutaneous three times a day with meals  metoprolol tartrate 25 milliGRAM(s) Oral two times a day  midodrine. 5 milliGRAM(s) Oral three times a day  OLANZapine Injectable 5 milliGRAM(s) IntraMuscular once  pantoprazole    Tablet 40 milliGRAM(s) Oral before breakfast  QUEtiapine 12.5 milliGRAM(s) Oral at bedtime  sodium bicarbonate  Infusion 0.047 mEq/kG/Hr (50 mL/Hr) IV Continuous <Continuous>  tamsulosin 0.4 milliGRAM(s) Oral at bedtime    MEDICATIONS  (PRN):  dextrose Oral Gel 15 Gram(s) Oral once PRN Blood Glucose LESS THAN 70 milliGRAM(s)/deciliter  ondansetron Injectable 4 milliGRAM(s) IV Push every 6 hours PRN Nausea      Allergies    No Known Allergies    Intolerances            Vital Signs Last 24 Hrs  T(C): 36.4 (28 Dec 2023 18:06), Max: 36.6 (28 Dec 2023 04:00)  T(F): 97.5 (28 Dec 2023 18:06), Max: 97.8 (28 Dec 2023 04:00)  HR: 61 (28 Dec 2023 18:06) (60 - 67)  BP: 145/83 (28 Dec 2023 18:06) (144/80 - 153/67)  BP(mean): --  RR: 17 (28 Dec 2023 18:06) (17 - 18)  SpO2: 93% (28 Dec 2023 18:06) (93% - 93%)    Parameters below as of 28 Dec 2023 18:06  Patient On (Oxygen Delivery Method): room air      Daily     Daily   I&O's Detail    27 Dec 2023 07:01  -  28 Dec 2023 07:00  --------------------------------------------------------  IN:    Oral Fluid: 240 mL    Sodium Bicarbonate: 825 mL  Total IN: 1065 mL    OUT:    Drain (mL): 80 mL    Ureteral Catheter (mL): 1800 mL  Total OUT: 1880 mL    Total NET: -815 mL      28 Dec 2023 07:01  -  28 Dec 2023 18:58  --------------------------------------------------------  IN:  Total IN: 0 mL    OUT:    Drain (mL): 40 mL    Intermittent Catheterization - Urethral (mL): 350 mL    Ureteral Catheter (mL): 750 mL  Total OUT: 1140 mL    Total NET: -1140 mL        I&O's Summary    27 Dec 2023 07:01  -  28 Dec 2023 07:00  --------------------------------------------------------  IN: 1065 mL / OUT: 1880 mL / NET: -815 mL    28 Dec 2023 07:01  -  28 Dec 2023 18:58  --------------------------------------------------------  IN: 0 mL / OUT: 1140 mL / NET: -1140 mL        PHYSICAL EXAM:    Physical Exam:  	Gen: NAD,  	HEENT: supple neck,   	Pulm: CTA B/L  	CV: RRR, S1S2; no rub  	Back: No spinal or CVA tenderness; no sacral edema  	Abd: +BS, soft, nontender/nondistended = Radha drain   	: wills  	UE: Warm, no edema  	LE: Warm, no edema  LABS:                        9.5    10.86 )-----------( 229      ( 27 Dec 2023 05:15 )             28.0     12-28    138  |  106  |  75.6<H>  ----------------------------<  230<H>  4.2   |  18.0<L>  |  4.27<H>    Ca    7.8<L>      28 Dec 2023 06:18  Phos  4.1     12-27  Mg     2.3     12-27        Urinalysis Basic - ( 28 Dec 2023 06:18 )    Color: x / Appearance: x / SG: x / pH: x  Gluc: 230 mg/dL / Ketone: x  / Bili: x / Urobili: x   Blood: x / Protein: x / Nitrite: x   Leuk Esterase: x / RBC: x / WBC x   Sq Epi: x / Non Sq Epi: x / Bacteria: x          < from: CT Abdomen and Pelvis No Cont (12.21.23 @ 08:54) >    ACC: [id number] EXAM:  CT ABDOMEN AND PELVIS   ORDERED BY: BEBETO HERRMANN     ACC: [id number] EXAM:  CT CHEST   ORDERED BY: JONATHAN MENCHACA     *** ADDENDUM # 1 ***    There is a 3 mm nonobstructing right kidney stone.    There is a 2 mm calcific density at the left ureterovesical junction seen   only on the axial images (3; 265) which is of questionable significance   and could potentially be artifactual. There is no upstream hydronephrosis.    --- End of Report ---    *** END OF ADDENDUM #1 ***      PROCEDURE DATE:  12/21/2023          INTERPRETATION:  CLINICAL INFORMATION: Sepsis, abdominal pain    COMPARISON: CT abdomen/pelvis March 16, 2016    CONTRAST/COMPLICATIONS:  IV Contrast: NONE  Oral Contrast: NONE  Complications: None reported at time of study completion    PROCEDURE:  CT of the Chest, Abdomen and Pelvis was performed.  Sagittal and coronal reformats were performed.    FINDINGS:  CHEST:  LUNGS AND LARGE AIRWAYS: Patent central airways. Bilateral upper lobe   calcified granulomata. Mild bibasilar dependent atelectasis.  PLEURA: No pleural effusion.  VESSELS: Atherosclerotic changes of the aorta and coronary arteries.  HEART: Heart size is normal. No pericardial effusion.  MEDIASTINUM AND MARIANELA: No lymphadenopathy.  CHEST WALL AND LOWER NECK: Within normal limits.    ABDOMEN AND PELVIS:  LIVER: Within normal limits.  BILE DUCTS: Normal caliber.  GALLBLADDER: Mildly distended gallbladder with a stone in the gallbladder   neck. Pericholecystic fat stranding. No pericholecystic fluid collection.  SPLEEN: Within normal limits.  PANCREAS: Within normal limits.  ADRENALS: Within normal limits.  KIDNEYS/URETERS: Bilateral cysts.    BLADDER: Within normal limits.  REPRODUCTIVE ORGANS: Mild prostatomegaly.    BOWEL: Nobowel obstruction. Small hiatal hernia. Appendix is normal.   Colonic diverticulosis without diverticulitis.  PERITONEUM: No ascites. Partially calcified fatty nodule in the anterior   pelvis, possibly a chronically torsed epiploic appendage.  VESSELS: Atherosclerotic changes.  RETROPERITONEUM/LYMPH NODES: No lymphadenopathy.  ABDOMINAL WALL: Within normal limits.  BONES: No aggressive bony lesion. Old bilateral rib fractures.    IMPRESSION:  Cholelithiasis and pericholecystic fat stranding suspicious for acute   cholecystitis. Hepatobiliary scan can be performed to better evaluate as   warranted.        --- End of Report ---    ***Please see the addendum at the top of this report. It may contain   additional important information or changes.****        DMITRIY FLOWERS MD; Attending Radiologist  This document has been electronically signed. Dec 21 2023  9:21AM  1st Addendum: DMITRIY FLOWERS MD; Attending Radiologist [phone]  The first addendum was electronically signed on: Dec 21 2023 12:42PM.    < end of copied text >      RADIOLOGY & ADDITIONAL TESTS:   NEPHROLOGY INTERVAL HPI/OVERNIGHT EVENTS:    feels better   Eating   No SOB or CP   + R radha drain   Abx Ceftriaxone   UO 1.8 L with wills     MEDICATIONS  (STANDING):  acetaminophen   IVPB .. 1000 milliGRAM(s) IV Intermittent once  cefTRIAXone Injectable. 1000 milliGRAM(s) IV Push every 24 hours  chlorhexidine 2% Cloths 1 Application(s) Topical <User Schedule>  dextrose 5%. 1000 milliLiter(s) (100 mL/Hr) IV Continuous <Continuous>  dextrose 5%. 1000 milliLiter(s) (50 mL/Hr) IV Continuous <Continuous>  dextrose 50% Injectable 12.5 Gram(s) IV Push once  dextrose 50% Injectable 25 Gram(s) IV Push once  dextrose 50% Injectable 25 Gram(s) IV Push once  dronedarone 400 milliGRAM(s) Oral two times a day  gabapentin 300 milliGRAM(s) Oral at bedtime  glucagon  Injectable 1 milliGRAM(s) IntraMuscular once  heparin   Injectable 5000 Unit(s) SubCutaneous every 8 hours  insulin glargine Injectable (LANTUS) 18 Unit(s) SubCutaneous at bedtime  insulin lispro (ADMELOG) corrective regimen sliding scale   SubCutaneous three times a day before meals  insulin lispro (ADMELOG) corrective regimen sliding scale   SubCutaneous at bedtime  insulin lispro Injectable (ADMELOG) 6 Unit(s) SubCutaneous three times a day with meals  metoprolol tartrate 25 milliGRAM(s) Oral two times a day  midodrine. 5 milliGRAM(s) Oral three times a day  OLANZapine Injectable 5 milliGRAM(s) IntraMuscular once  pantoprazole    Tablet 40 milliGRAM(s) Oral before breakfast  QUEtiapine 12.5 milliGRAM(s) Oral at bedtime  sodium bicarbonate  Infusion 0.047 mEq/kG/Hr (50 mL/Hr) IV Continuous <Continuous>  tamsulosin 0.4 milliGRAM(s) Oral at bedtime    MEDICATIONS  (PRN):  dextrose Oral Gel 15 Gram(s) Oral once PRN Blood Glucose LESS THAN 70 milliGRAM(s)/deciliter  ondansetron Injectable 4 milliGRAM(s) IV Push every 6 hours PRN Nausea      Allergies    No Known Allergies    Intolerances            Vital Signs Last 24 Hrs  T(C): 36.4 (28 Dec 2023 18:06), Max: 36.6 (28 Dec 2023 04:00)  T(F): 97.5 (28 Dec 2023 18:06), Max: 97.8 (28 Dec 2023 04:00)  HR: 61 (28 Dec 2023 18:06) (60 - 67)  BP: 145/83 (28 Dec 2023 18:06) (144/80 - 153/67)  BP(mean): --  RR: 17 (28 Dec 2023 18:06) (17 - 18)  SpO2: 93% (28 Dec 2023 18:06) (93% - 93%)    Parameters below as of 28 Dec 2023 18:06  Patient On (Oxygen Delivery Method): room air      Daily     Daily   I&O's Detail    27 Dec 2023 07:01  -  28 Dec 2023 07:00  --------------------------------------------------------  IN:    Oral Fluid: 240 mL    Sodium Bicarbonate: 825 mL  Total IN: 1065 mL    OUT:    Drain (mL): 80 mL    Ureteral Catheter (mL): 1800 mL  Total OUT: 1880 mL    Total NET: -815 mL      28 Dec 2023 07:01  -  28 Dec 2023 18:58  --------------------------------------------------------  IN:  Total IN: 0 mL    OUT:    Drain (mL): 40 mL    Intermittent Catheterization - Urethral (mL): 350 mL    Ureteral Catheter (mL): 750 mL  Total OUT: 1140 mL    Total NET: -1140 mL        I&O's Summary    27 Dec 2023 07:01  -  28 Dec 2023 07:00  --------------------------------------------------------  IN: 1065 mL / OUT: 1880 mL / NET: -815 mL    28 Dec 2023 07:01  -  28 Dec 2023 18:58  --------------------------------------------------------  IN: 0 mL / OUT: 1140 mL / NET: -1140 mL        PHYSICAL EXAM:    Physical Exam:  	Gen: NAD,  	HEENT: supple neck,   	Pulm: CTA B/L  	CV: RRR, S1S2; no rub  	Back: No spinal or CVA tenderness; no sacral edema  	Abd: +BS, soft, nontender/nondistended = Radha drain   	: wills  	UE: Warm, no edema  	LE: Warm, no edema  LABS:                        9.5    10.86 )-----------( 229      ( 27 Dec 2023 05:15 )             28.0     12-28    138  |  106  |  75.6<H>  ----------------------------<  230<H>  4.2   |  18.0<L>  |  4.27<H>    Ca    7.8<L>      28 Dec 2023 06:18  Phos  4.1     12-27  Mg     2.3     12-27        Urinalysis Basic - ( 28 Dec 2023 06:18 )    Color: x / Appearance: x / SG: x / pH: x  Gluc: 230 mg/dL / Ketone: x  / Bili: x / Urobili: x   Blood: x / Protein: x / Nitrite: x   Leuk Esterase: x / RBC: x / WBC x   Sq Epi: x / Non Sq Epi: x / Bacteria: x          < from: CT Abdomen and Pelvis No Cont (12.21.23 @ 08:54) >    ACC: [id number] EXAM:  CT ABDOMEN AND PELVIS   ORDERED BY: BEBETO HERRMANN     ACC: [id number] EXAM:  CT CHEST   ORDERED BY: JONATHAN MENCHACA     *** ADDENDUM # 1 ***    There is a 3 mm nonobstructing right kidney stone.    There is a 2 mm calcific density at the left ureterovesical junction seen   only on the axial images (3; 265) which is of questionable significance   and could potentially be artifactual. There is no upstream hydronephrosis.    --- End of Report ---    *** END OF ADDENDUM #1 ***      PROCEDURE DATE:  12/21/2023          INTERPRETATION:  CLINICAL INFORMATION: Sepsis, abdominal pain    COMPARISON: CT abdomen/pelvis March 16, 2016    CONTRAST/COMPLICATIONS:  IV Contrast: NONE  Oral Contrast: NONE  Complications: None reported at time of study completion    PROCEDURE:  CT of the Chest, Abdomen and Pelvis was performed.  Sagittal and coronal reformats were performed.    FINDINGS:  CHEST:  LUNGS AND LARGE AIRWAYS: Patent central airways. Bilateral upper lobe   calcified granulomata. Mild bibasilar dependent atelectasis.  PLEURA: No pleural effusion.  VESSELS: Atherosclerotic changes of the aorta and coronary arteries.  HEART: Heart size is normal. No pericardial effusion.  MEDIASTINUM AND MARIANELA: No lymphadenopathy.  CHEST WALL AND LOWER NECK: Within normal limits.    ABDOMEN AND PELVIS:  LIVER: Within normal limits.  BILE DUCTS: Normal caliber.  GALLBLADDER: Mildly distended gallbladder with a stone in the gallbladder   neck. Pericholecystic fat stranding. No pericholecystic fluid collection.  SPLEEN: Within normal limits.  PANCREAS: Within normal limits.  ADRENALS: Within normal limits.  KIDNEYS/URETERS: Bilateral cysts.    BLADDER: Within normal limits.  REPRODUCTIVE ORGANS: Mild prostatomegaly.    BOWEL: Nobowel obstruction. Small hiatal hernia. Appendix is normal.   Colonic diverticulosis without diverticulitis.  PERITONEUM: No ascites. Partially calcified fatty nodule in the anterior   pelvis, possibly a chronically torsed epiploic appendage.  VESSELS: Atherosclerotic changes.  RETROPERITONEUM/LYMPH NODES: No lymphadenopathy.  ABDOMINAL WALL: Within normal limits.  BONES: No aggressive bony lesion. Old bilateral rib fractures.    IMPRESSION:  Cholelithiasis and pericholecystic fat stranding suspicious for acute   cholecystitis. Hepatobiliary scan can be performed to better evaluate as   warranted.        --- End of Report ---    ***Please see the addendum at the top of this report. It may contain   additional important information or changes.****        DMITRIY FLOWERS MD; Attending Radiologist  This document has been electronically signed. Dec 21 2023  9:21AM  1st Addendum: DMITRIY FLOWERS MD; Attending Radiologist [phone]  The first addendum was electronically signed on: Dec 21 2023 12:42PM.    < end of copied text >      RADIOLOGY & ADDITIONAL TESTS:

## 2023-12-28 NOTE — PROGRESS NOTE ADULT - ASSESSMENT
83-year-old male with past medical history of hypertension, hyperlipidemia, DM, stage III CKD, BPH, admitted for acute sky s/p IR drain placement. Course c/b hypoxic resp failure 2/2 COVID as well as new Afib RVR.     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, no acute findings  frequent re-orientation  add Seroquel 12.5mg QHS    #Acute cholecystitis  CT A/P and RUQ result noted  s/p IR perc drain placement, monitor output, will need drain in for 4-6 weeks for tract to form, outpt IR followup  bile culture with E-coli, abx changed to CTX, plan for total 14 days  c/w Zosyn  BCx NGTD  pain regimen  diet advancement as tolerated  GI following, herlinda recs  trend CMP  Outpatient surgery evaluation for interval cholecystectomy   on home midodrine, unclear etiology    #Acute resp failure with hypoxia suspect 2/2 COVID - resolved  Possible component of aspiration, s/p Zoysn  s/p COVID vaccines  s/p decadron x5 days  Remdesivir dc due to worsening renal function   s/p BIPAP and HFNC, weaned to RA  CT chest noted, mild pulm edema noted, PRN lasix   DVT studies negative    #Afib RVR  Tele  TSH normal  cards d/w families, agree to hold AC  Add Multaq  metoprolol BID    #DM  A1C 9  ISS, accu checks  start Lantus 18 QHS + 6U Premeals  can c/w gabapentin for neuropathy     #HLD  c/w statin    #GERD  c/w ppi 40mg qd    #Anemia  Hg stable 9-10 range  iron studies consistent with chronic dx  b12 and folate normal  trend CBC while admitted    DVT ppx: Heparin SQ  Diet: CC   Dispo: pending improvement in delirium and renal function    PT eval - NITHIN

## 2023-12-28 NOTE — PROGRESS NOTE ADULT - ASSESSMENT
83-year-old male with past medical history of hypertension, hyperlipidemia, DM, stage III CKD, BPH, admitted for acute sky s/p IR drain placement. Course c/b hypoxic resp failure 2/2 COVID as well as new Afib RVR.   Nephrology consulted for Ron.    RON on CKD stage III  SCr 1.9-2.7 in 2016- no labs available for review after that until admission  Likely underlying diabetic nephropathy  Pt is sp wills ; good urine output  UA with large glucose, 300 protein. small blood  CT AP reviewed; bl renal cysts  Ron likely hemodynamic ATN in setting of sepsis, hypotension, Afib with RVR  No pressing need for acute HD at this moment     MA - Continue IV Bicarb infusion     Anemia - Add Epogen and folate     Acute cholecystitis  s/p IR perc drain placement  on Zosyn originally , changed to Ceftriaxone     Acute resp failure with hypoxia suspect 2/2 COVID  s/p decadron x5 days;Remdesivir dce'd due to worsening renal function   Mgt as per primary

## 2023-12-28 NOTE — PROGRESS NOTE ADULT - SUBJECTIVE AND OBJECTIVE BOX
Wanda Louie M.D.    Patient is a 83y old  Male who presents with a chief complaint of Cholecystitis (27 Dec 2023 13:31)      SUBJECTIVE / OVERNIGHT EVENTS: no event overnight.     Patient denies chest pain, SOB, abd pain, N/V, fever, chills, dysuria or any other complaints. All remainder ROS negative.     MEDICATIONS  (STANDING):  acetaminophen   IVPB .. 1000 milliGRAM(s) IV Intermittent once  cefTRIAXone Injectable. 1000 milliGRAM(s) IV Push every 24 hours  chlorhexidine 2% Cloths 1 Application(s) Topical <User Schedule>  dextrose 5%. 1000 milliLiter(s) (100 mL/Hr) IV Continuous <Continuous>  dextrose 5%. 1000 milliLiter(s) (50 mL/Hr) IV Continuous <Continuous>  dextrose 50% Injectable 12.5 Gram(s) IV Push once  dextrose 50% Injectable 25 Gram(s) IV Push once  dextrose 50% Injectable 25 Gram(s) IV Push once  dronedarone 400 milliGRAM(s) Oral two times a day  gabapentin 300 milliGRAM(s) Oral at bedtime  glucagon  Injectable 1 milliGRAM(s) IntraMuscular once  heparin   Injectable 5000 Unit(s) SubCutaneous every 8 hours  insulin glargine Injectable (LANTUS) 18 Unit(s) SubCutaneous at bedtime  insulin lispro (ADMELOG) corrective regimen sliding scale   SubCutaneous three times a day before meals  insulin lispro (ADMELOG) corrective regimen sliding scale   SubCutaneous at bedtime  insulin lispro Injectable (ADMELOG) 6 Unit(s) SubCutaneous three times a day with meals  metoprolol tartrate 25 milliGRAM(s) Oral two times a day  midodrine. 5 milliGRAM(s) Oral three times a day  OLANZapine Injectable 5 milliGRAM(s) IntraMuscular once  pantoprazole    Tablet 40 milliGRAM(s) Oral before breakfast  QUEtiapine 12.5 milliGRAM(s) Oral at bedtime  sodium bicarbonate  Infusion 0.047 mEq/kG/Hr (50 mL/Hr) IV Continuous <Continuous>  tamsulosin 0.4 milliGRAM(s) Oral at bedtime    MEDICATIONS  (PRN):  dextrose Oral Gel 15 Gram(s) Oral once PRN Blood Glucose LESS THAN 70 milliGRAM(s)/deciliter  ondansetron Injectable 4 milliGRAM(s) IV Push every 6 hours PRN Nausea      I&O's Summary    27 Dec 2023 07:01  -  28 Dec 2023 07:00  --------------------------------------------------------  IN: 1065 mL / OUT: 1880 mL / NET: -815 mL    28 Dec 2023 07:01  -  28 Dec 2023 11:41  --------------------------------------------------------  IN: 0 mL / OUT: 350 mL / NET: -350 mL        PHYSICAL EXAM:  Vital Signs Last 24 Hrs  T(C): 36.4 (28 Dec 2023 09:21), Max: 36.6 (28 Dec 2023 04:00)  T(F): 97.5 (28 Dec 2023 09:21), Max: 97.8 (28 Dec 2023 04:00)  HR: 60 (28 Dec 2023 09:21) (52 - 67)  BP: 145/71 (28 Dec 2023 09:21) (130/68 - 153/67)  BP(mean): --  RR: 18 (28 Dec 2023 09:21) (17 - 18)  SpO2: 93% (28 Dec 2023 09:21) (92% - 93%)    Parameters below as of 28 Dec 2023 09:21  Patient On (Oxygen Delivery Method): room air          CONSTITUTIONAL: NAD, on RA  RESPIRATORY: Normal respiratory effort; no increased WOB  CARDIOVASCULAR: NSR; no LE edema  ABDOMEN: Nontender to palpation, hypoactive bowel sounds. +Perc drain draining bile  PSYCH: A+O x2 (hospital, name); pleasantly confused  NEUROLOGY: CN 2-12 are intact and symmetric; no gross sensory deficits;   SKIN: No rashes; no palpable lesions      LABS:                        9.5    10.86 )-----------( 229      ( 27 Dec 2023 05:15 )             28.0     12-28    138  |  106  |  75.6<H>  ----------------------------<  230<H>  4.2   |  18.0<L>  |  4.27<H>    Ca    7.8<L>      28 Dec 2023 06:18  Phos  4.1     12-27  Mg     2.3     12-27            Urinalysis Basic - ( 28 Dec 2023 06:18 )    Color: x / Appearance: x / SG: x / pH: x  Gluc: 230 mg/dL / Ketone: x  / Bili: x / Urobili: x   Blood: x / Protein: x / Nitrite: x   Leuk Esterase: x / RBC: x / WBC x   Sq Epi: x / Non Sq Epi: x / Bacteria: x        CAPILLARY BLOOD GLUCOSE      POCT Blood Glucose.: 187 mg/dL (28 Dec 2023 10:02)  POCT Blood Glucose.: 252 mg/dL (27 Dec 2023 21:33)  POCT Blood Glucose.: 242 mg/dL (27 Dec 2023 18:13)  POCT Blood Glucose.: 386 mg/dL (27 Dec 2023 12:22)      RADIOLOGY & ADDITIONAL TESTS:  Results Reviewed:   Imaging Personally Reviewed:  Electrocardiogram Personally Reviewed:

## 2023-12-29 LAB
ANION GAP SERPL CALC-SCNC: 9 MMOL/L — SIGNIFICANT CHANGE UP (ref 5–17)
ANION GAP SERPL CALC-SCNC: 9 MMOL/L — SIGNIFICANT CHANGE UP (ref 5–17)
BUN SERPL-MCNC: 63.3 MG/DL — HIGH (ref 8–20)
BUN SERPL-MCNC: 63.3 MG/DL — HIGH (ref 8–20)
CALCIUM SERPL-MCNC: 8 MG/DL — LOW (ref 8.4–10.5)
CALCIUM SERPL-MCNC: 8 MG/DL — LOW (ref 8.4–10.5)
CHLORIDE SERPL-SCNC: 109 MMOL/L — HIGH (ref 96–108)
CHLORIDE SERPL-SCNC: 109 MMOL/L — HIGH (ref 96–108)
CO2 SERPL-SCNC: 22 MMOL/L — SIGNIFICANT CHANGE UP (ref 22–29)
CO2 SERPL-SCNC: 22 MMOL/L — SIGNIFICANT CHANGE UP (ref 22–29)
CREAT SERPL-MCNC: 3.28 MG/DL — HIGH (ref 0.5–1.3)
CREAT SERPL-MCNC: 3.28 MG/DL — HIGH (ref 0.5–1.3)
CRP SERPL-MCNC: 34 MG/L — HIGH
CRP SERPL-MCNC: 34 MG/L — HIGH
EGFR: 18 ML/MIN/1.73M2 — LOW
EGFR: 18 ML/MIN/1.73M2 — LOW
GLUCOSE BLDC GLUCOMTR-MCNC: 118 MG/DL — HIGH (ref 70–99)
GLUCOSE BLDC GLUCOMTR-MCNC: 118 MG/DL — HIGH (ref 70–99)
GLUCOSE BLDC GLUCOMTR-MCNC: 139 MG/DL — HIGH (ref 70–99)
GLUCOSE BLDC GLUCOMTR-MCNC: 139 MG/DL — HIGH (ref 70–99)
GLUCOSE BLDC GLUCOMTR-MCNC: 147 MG/DL — HIGH (ref 70–99)
GLUCOSE BLDC GLUCOMTR-MCNC: 147 MG/DL — HIGH (ref 70–99)
GLUCOSE BLDC GLUCOMTR-MCNC: 165 MG/DL — HIGH (ref 70–99)
GLUCOSE BLDC GLUCOMTR-MCNC: 165 MG/DL — HIGH (ref 70–99)
GLUCOSE SERPL-MCNC: 163 MG/DL — HIGH (ref 70–99)
GLUCOSE SERPL-MCNC: 163 MG/DL — HIGH (ref 70–99)
HCT VFR BLD CALC: 31 % — LOW (ref 39–50)
HCT VFR BLD CALC: 31 % — LOW (ref 39–50)
HGB BLD-MCNC: 10.3 G/DL — LOW (ref 13–17)
HGB BLD-MCNC: 10.3 G/DL — LOW (ref 13–17)
MAGNESIUM SERPL-MCNC: 1.9 MG/DL — SIGNIFICANT CHANGE UP (ref 1.6–2.6)
MAGNESIUM SERPL-MCNC: 1.9 MG/DL — SIGNIFICANT CHANGE UP (ref 1.6–2.6)
MCHC RBC-ENTMCNC: 29.3 PG — SIGNIFICANT CHANGE UP (ref 27–34)
MCHC RBC-ENTMCNC: 29.3 PG — SIGNIFICANT CHANGE UP (ref 27–34)
MCHC RBC-ENTMCNC: 33.2 GM/DL — SIGNIFICANT CHANGE UP (ref 32–36)
MCHC RBC-ENTMCNC: 33.2 GM/DL — SIGNIFICANT CHANGE UP (ref 32–36)
MCV RBC AUTO: 88.3 FL — SIGNIFICANT CHANGE UP (ref 80–100)
MCV RBC AUTO: 88.3 FL — SIGNIFICANT CHANGE UP (ref 80–100)
PLATELET # BLD AUTO: 277 K/UL — SIGNIFICANT CHANGE UP (ref 150–400)
PLATELET # BLD AUTO: 277 K/UL — SIGNIFICANT CHANGE UP (ref 150–400)
POTASSIUM SERPL-MCNC: 4.2 MMOL/L — SIGNIFICANT CHANGE UP (ref 3.5–5.3)
POTASSIUM SERPL-MCNC: 4.2 MMOL/L — SIGNIFICANT CHANGE UP (ref 3.5–5.3)
POTASSIUM SERPL-SCNC: 4.2 MMOL/L — SIGNIFICANT CHANGE UP (ref 3.5–5.3)
POTASSIUM SERPL-SCNC: 4.2 MMOL/L — SIGNIFICANT CHANGE UP (ref 3.5–5.3)
RBC # BLD: 3.51 M/UL — LOW (ref 4.2–5.8)
RBC # BLD: 3.51 M/UL — LOW (ref 4.2–5.8)
RBC # FLD: 14.5 % — SIGNIFICANT CHANGE UP (ref 10.3–14.5)
RBC # FLD: 14.5 % — SIGNIFICANT CHANGE UP (ref 10.3–14.5)
SODIUM SERPL-SCNC: 140 MMOL/L — SIGNIFICANT CHANGE UP (ref 135–145)
SODIUM SERPL-SCNC: 140 MMOL/L — SIGNIFICANT CHANGE UP (ref 135–145)
WBC # BLD: 13.35 K/UL — HIGH (ref 3.8–10.5)
WBC # BLD: 13.35 K/UL — HIGH (ref 3.8–10.5)
WBC # FLD AUTO: 13.35 K/UL — HIGH (ref 3.8–10.5)
WBC # FLD AUTO: 13.35 K/UL — HIGH (ref 3.8–10.5)

## 2023-12-29 PROCEDURE — 99232 SBSQ HOSP IP/OBS MODERATE 35: CPT

## 2023-12-29 RX ADMIN — HEPARIN SODIUM 5000 UNIT(S): 5000 INJECTION INTRAVENOUS; SUBCUTANEOUS at 06:48

## 2023-12-29 RX ADMIN — Medication 1 MILLIGRAM(S): at 13:06

## 2023-12-29 RX ADMIN — PANTOPRAZOLE SODIUM 40 MILLIGRAM(S): 20 TABLET, DELAYED RELEASE ORAL at 06:48

## 2023-12-29 RX ADMIN — INSULIN GLARGINE 18 UNIT(S): 100 INJECTION, SOLUTION SUBCUTANEOUS at 21:42

## 2023-12-29 RX ADMIN — MIDODRINE HYDROCHLORIDE 5 MILLIGRAM(S): 2.5 TABLET ORAL at 16:27

## 2023-12-29 RX ADMIN — DRONEDARONE 400 MILLIGRAM(S): 400 TABLET, FILM COATED ORAL at 16:27

## 2023-12-29 RX ADMIN — HEPARIN SODIUM 5000 UNIT(S): 5000 INJECTION INTRAVENOUS; SUBCUTANEOUS at 21:42

## 2023-12-29 RX ADMIN — Medication 6 UNIT(S): at 13:06

## 2023-12-29 RX ADMIN — Medication 25 MILLIGRAM(S): at 06:48

## 2023-12-29 RX ADMIN — TAMSULOSIN HYDROCHLORIDE 0.4 MILLIGRAM(S): 0.4 CAPSULE ORAL at 21:42

## 2023-12-29 RX ADMIN — GABAPENTIN 300 MILLIGRAM(S): 400 CAPSULE ORAL at 21:42

## 2023-12-29 RX ADMIN — CEFTRIAXONE 1000 MILLIGRAM(S): 500 INJECTION, POWDER, FOR SOLUTION INTRAMUSCULAR; INTRAVENOUS at 13:05

## 2023-12-29 RX ADMIN — Medication 6 UNIT(S): at 16:26

## 2023-12-29 RX ADMIN — Medication 25 MILLIGRAM(S): at 16:27

## 2023-12-29 RX ADMIN — Medication 50 MEQ/KG/HR: at 16:25

## 2023-12-29 RX ADMIN — HEPARIN SODIUM 5000 UNIT(S): 5000 INJECTION INTRAVENOUS; SUBCUTANEOUS at 13:05

## 2023-12-29 RX ADMIN — CHLORHEXIDINE GLUCONATE 1 APPLICATION(S): 213 SOLUTION TOPICAL at 06:49

## 2023-12-29 RX ADMIN — MIDODRINE HYDROCHLORIDE 5 MILLIGRAM(S): 2.5 TABLET ORAL at 13:05

## 2023-12-29 RX ADMIN — DRONEDARONE 400 MILLIGRAM(S): 400 TABLET, FILM COATED ORAL at 06:48

## 2023-12-29 RX ADMIN — Medication 6 UNIT(S): at 08:52

## 2023-12-29 NOTE — PROGRESS NOTE ADULT - SUBJECTIVE AND OBJECTIVE BOX
Wanda Luoie M.D.    Patient is a 83y old  Male who presents with a chief complaint of Cholecystitis (28 Dec 2023 18:57)      SUBJECTIVE / OVERNIGHT EVENTS: no concerns.     Patient denies chest pain, SOB, abd pain, N/V, fever, chills, dysuria or any other complaints. All remainder ROS negative.     MEDICATIONS  (STANDING):  acetaminophen   IVPB .. 1000 milliGRAM(s) IV Intermittent once  cefTRIAXone Injectable. 1000 milliGRAM(s) IV Push every 24 hours  chlorhexidine 2% Cloths 1 Application(s) Topical <User Schedule>  dextrose 5%. 1000 milliLiter(s) (100 mL/Hr) IV Continuous <Continuous>  dextrose 5%. 1000 milliLiter(s) (50 mL/Hr) IV Continuous <Continuous>  dextrose 50% Injectable 12.5 Gram(s) IV Push once  dextrose 50% Injectable 25 Gram(s) IV Push once  dextrose 50% Injectable 25 Gram(s) IV Push once  dronedarone 400 milliGRAM(s) Oral two times a day  epoetin kristal-epbx (RETACRIT) Injectable 99393 Unit(s) SubCutaneous every 7 days  folic acid 1 milliGRAM(s) Oral daily  gabapentin 300 milliGRAM(s) Oral at bedtime  glucagon  Injectable 1 milliGRAM(s) IntraMuscular once  heparin   Injectable 5000 Unit(s) SubCutaneous every 8 hours  insulin glargine Injectable (LANTUS) 18 Unit(s) SubCutaneous at bedtime  insulin lispro (ADMELOG) corrective regimen sliding scale   SubCutaneous three times a day before meals  insulin lispro (ADMELOG) corrective regimen sliding scale   SubCutaneous at bedtime  insulin lispro Injectable (ADMELOG) 6 Unit(s) SubCutaneous three times a day with meals  metoprolol tartrate 25 milliGRAM(s) Oral two times a day  midodrine. 5 milliGRAM(s) Oral three times a day  OLANZapine Injectable 5 milliGRAM(s) IntraMuscular once  pantoprazole    Tablet 40 milliGRAM(s) Oral before breakfast  QUEtiapine 12.5 milliGRAM(s) Oral at bedtime  sodium bicarbonate  Infusion 0.095 mEq/kG/Hr (50 mL/Hr) IV Continuous <Continuous>  sodium bicarbonate  Infusion 0.047 mEq/kG/Hr (50 mL/Hr) IV Continuous <Continuous>  tamsulosin 0.4 milliGRAM(s) Oral at bedtime    MEDICATIONS  (PRN):  dextrose Oral Gel 15 Gram(s) Oral once PRN Blood Glucose LESS THAN 70 milliGRAM(s)/deciliter  ondansetron Injectable 4 milliGRAM(s) IV Push every 6 hours PRN Nausea      I&O's Summary    28 Dec 2023 07:01  -  29 Dec 2023 07:00  --------------------------------------------------------  IN: 600 mL / OUT: 2750 mL / NET: -2150 mL        PHYSICAL EXAM:  Vital Signs Last 24 Hrs  T(C): 36.4 (29 Dec 2023 09:22), Max: 36.6 (29 Dec 2023 04:00)  T(F): 97.5 (29 Dec 2023 09:22), Max: 97.9 (29 Dec 2023 04:00)  HR: 62 (29 Dec 2023 09:22) (60 - 71)  BP: 151/86 (29 Dec 2023 09:22) (139/73 - 159/70)  BP(mean): --  RR: 19 (29 Dec 2023 09:22) (17 - 19)  SpO2: 95% (29 Dec 2023 09:22) (92% - 95%)    Parameters below as of 29 Dec 2023 00:00  Patient On (Oxygen Delivery Method): room air      CONSTITUTIONAL: NAD, on RA  RESPIRATORY: Normal respiratory effort; no increased WOB  CARDIOVASCULAR: NSR; no LE edema  ABDOMEN: Nontender to palpation, hypoactive bowel sounds. +Perc drain draining bile  PSYCH: A+O x2 (hospital, name); pleasantly confused  NEUROLOGY: CN 2-12 are intact and symmetric; no gross sensory deficits;   SKIN: No rashes; no palpable lesions    LABS:                        10.3   13.35 )-----------( 277      ( 29 Dec 2023 06:45 )             31.0     12-29    140  |  109<H>  |  63.3<H>  ----------------------------<  163<H>  4.2   |  22.0  |  3.28<H>    Ca    8.0<L>      29 Dec 2023 06:45  Mg     1.9     12-29            Urinalysis Basic - ( 29 Dec 2023 06:45 )    Color: x / Appearance: x / SG: x / pH: x  Gluc: 163 mg/dL / Ketone: x  / Bili: x / Urobili: x   Blood: x / Protein: x / Nitrite: x   Leuk Esterase: x / RBC: x / WBC x   Sq Epi: x / Non Sq Epi: x / Bacteria: x        CAPILLARY BLOOD GLUCOSE      POCT Blood Glucose.: 139 mg/dL (29 Dec 2023 08:51)  POCT Blood Glucose.: 102 mg/dL (28 Dec 2023 21:24)  POCT Blood Glucose.: 104 mg/dL (28 Dec 2023 17:51)  POCT Blood Glucose.: 130 mg/dL (28 Dec 2023 14:24)      RADIOLOGY & ADDITIONAL TESTS:  Results Reviewed:   Imaging Personally Reviewed:  Electrocardiogram Personally Reviewed: Wanda Louie M.D.    Patient is a 83y old  Male who presents with a chief complaint of Cholecystitis (28 Dec 2023 18:57)      SUBJECTIVE / OVERNIGHT EVENTS: no concerns.     Patient denies chest pain, SOB, abd pain, N/V, fever, chills, dysuria or any other complaints. All remainder ROS negative.     MEDICATIONS  (STANDING):  acetaminophen   IVPB .. 1000 milliGRAM(s) IV Intermittent once  cefTRIAXone Injectable. 1000 milliGRAM(s) IV Push every 24 hours  chlorhexidine 2% Cloths 1 Application(s) Topical <User Schedule>  dextrose 5%. 1000 milliLiter(s) (100 mL/Hr) IV Continuous <Continuous>  dextrose 5%. 1000 milliLiter(s) (50 mL/Hr) IV Continuous <Continuous>  dextrose 50% Injectable 12.5 Gram(s) IV Push once  dextrose 50% Injectable 25 Gram(s) IV Push once  dextrose 50% Injectable 25 Gram(s) IV Push once  dronedarone 400 milliGRAM(s) Oral two times a day  epoetin kristal-epbx (RETACRIT) Injectable 53016 Unit(s) SubCutaneous every 7 days  folic acid 1 milliGRAM(s) Oral daily  gabapentin 300 milliGRAM(s) Oral at bedtime  glucagon  Injectable 1 milliGRAM(s) IntraMuscular once  heparin   Injectable 5000 Unit(s) SubCutaneous every 8 hours  insulin glargine Injectable (LANTUS) 18 Unit(s) SubCutaneous at bedtime  insulin lispro (ADMELOG) corrective regimen sliding scale   SubCutaneous three times a day before meals  insulin lispro (ADMELOG) corrective regimen sliding scale   SubCutaneous at bedtime  insulin lispro Injectable (ADMELOG) 6 Unit(s) SubCutaneous three times a day with meals  metoprolol tartrate 25 milliGRAM(s) Oral two times a day  midodrine. 5 milliGRAM(s) Oral three times a day  OLANZapine Injectable 5 milliGRAM(s) IntraMuscular once  pantoprazole    Tablet 40 milliGRAM(s) Oral before breakfast  QUEtiapine 12.5 milliGRAM(s) Oral at bedtime  sodium bicarbonate  Infusion 0.095 mEq/kG/Hr (50 mL/Hr) IV Continuous <Continuous>  sodium bicarbonate  Infusion 0.047 mEq/kG/Hr (50 mL/Hr) IV Continuous <Continuous>  tamsulosin 0.4 milliGRAM(s) Oral at bedtime    MEDICATIONS  (PRN):  dextrose Oral Gel 15 Gram(s) Oral once PRN Blood Glucose LESS THAN 70 milliGRAM(s)/deciliter  ondansetron Injectable 4 milliGRAM(s) IV Push every 6 hours PRN Nausea      I&O's Summary    28 Dec 2023 07:01  -  29 Dec 2023 07:00  --------------------------------------------------------  IN: 600 mL / OUT: 2750 mL / NET: -2150 mL        PHYSICAL EXAM:  Vital Signs Last 24 Hrs  T(C): 36.4 (29 Dec 2023 09:22), Max: 36.6 (29 Dec 2023 04:00)  T(F): 97.5 (29 Dec 2023 09:22), Max: 97.9 (29 Dec 2023 04:00)  HR: 62 (29 Dec 2023 09:22) (60 - 71)  BP: 151/86 (29 Dec 2023 09:22) (139/73 - 159/70)  BP(mean): --  RR: 19 (29 Dec 2023 09:22) (17 - 19)  SpO2: 95% (29 Dec 2023 09:22) (92% - 95%)    Parameters below as of 29 Dec 2023 00:00  Patient On (Oxygen Delivery Method): room air      CONSTITUTIONAL: NAD, on RA  RESPIRATORY: Normal respiratory effort; no increased WOB  CARDIOVASCULAR: NSR; no LE edema  ABDOMEN: Nontender to palpation, hypoactive bowel sounds. +Perc drain draining bile  PSYCH: A+O x2 (hospital, name); pleasantly confused  NEUROLOGY: CN 2-12 are intact and symmetric; no gross sensory deficits;   SKIN: No rashes; no palpable lesions    LABS:                        10.3   13.35 )-----------( 277      ( 29 Dec 2023 06:45 )             31.0     12-29    140  |  109<H>  |  63.3<H>  ----------------------------<  163<H>  4.2   |  22.0  |  3.28<H>    Ca    8.0<L>      29 Dec 2023 06:45  Mg     1.9     12-29            Urinalysis Basic - ( 29 Dec 2023 06:45 )    Color: x / Appearance: x / SG: x / pH: x  Gluc: 163 mg/dL / Ketone: x  / Bili: x / Urobili: x   Blood: x / Protein: x / Nitrite: x   Leuk Esterase: x / RBC: x / WBC x   Sq Epi: x / Non Sq Epi: x / Bacteria: x        CAPILLARY BLOOD GLUCOSE      POCT Blood Glucose.: 139 mg/dL (29 Dec 2023 08:51)  POCT Blood Glucose.: 102 mg/dL (28 Dec 2023 21:24)  POCT Blood Glucose.: 104 mg/dL (28 Dec 2023 17:51)  POCT Blood Glucose.: 130 mg/dL (28 Dec 2023 14:24)      RADIOLOGY & ADDITIONAL TESTS:  Results Reviewed:   Imaging Personally Reviewed:  Electrocardiogram Personally Reviewed:

## 2023-12-29 NOTE — PROGRESS NOTE ADULT - ASSESSMENT
83-year-old male with past medical history of hypertension, hyperlipidemia, DM, stage III CKD, BPH, admitted for acute sky s/p IR drain placement. Course c/b hypoxic resp failure 2/2 COVID as well as new Afib RVR.   Nephrology consulted for Ron.    RON on CKD stage III  Serum Cr 4.2--> 3.2 improving  SCr 1.9-2.7 in 2016- no labs available for review after that until admission  Likely underlying diabetic nephropathy may hav a degree of progression of dz  Pt is sp wills ; good urine output  UA with large glucose, 300 protein. small blood  CT AP reviewed; bl renal cysts  Ron likely hemodynamic ATN in setting of sepsis, hypotension, Afib with RVR  No pressing need for acute HD at this moment     MA - Continue IV Bicarb infusion     Anemia - cont Epogen and folate     Acute cholecystitis  s/p IR perc drain placement  on Zosyn originally , changed to Ceftriaxone     Acute resp failure with hypoxia suspect 2/2 COVID  s/p decadron x5 days;Remdesivir dce'd due to worsening renal function   Mgt as per primary

## 2023-12-29 NOTE — PROGRESS NOTE ADULT - ASSESSMENT
83-year-old male with past medical history of hypertension, hyperlipidemia, DM, stage III CKD, BPH, admitted for acute sky s/p IR drain placement. Course c/b hypoxic resp failure 2/2 COVID as well as new Afib RVR.     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, no acute findings  frequent re-orientation  add Seroquel 12.5mg QHS    #Acute cholecystitis  CT A/P and RUQ result noted  s/p IR perc drain placement, monitor output, will need drain in for 4-6 weeks for tract to form, outpt IR followup  bile culture with E-coli, abx changed to CTX, plan for total 14 days until 1/3  c/w Zosyn  BCx NGTD  pain regimen  diet advancement as tolerated  GI following, herlinda recs  trend CMP  Outpatient surgery evaluation for interval cholecystectomy   on home midodrine, unclear etiology    #Acute resp failure with hypoxia suspect 2/2 COVID - resolved  Possible component of aspiration, s/p Zoysn  s/p COVID vaccines  s/p decadron x5 days  Remdesivir dc due to worsening renal function   s/p BIPAP and HFNC, weaned to RA  CT chest noted, mild pulm edema noted, PRN lasix   DVT studies negative    #Afib RVR  Tele  TSH normal  cards d/w families, agree to hold AC  Add Multaq  metoprolol BID    #DM  A1C 9  ISS, accu checks  start Lantus 18 QHS + 6U Premeals  can c/w gabapentin for neuropathy     #HLD  c/w statin    #GERD  c/w ppi 40mg qd    #Anemia  Hg stable 9-10 range  iron studies consistent with chronic dx  b12 and folate normal  trend CBC while admitted    DVT ppx: Heparin SQ  Diet: CC   Dispo: pending improvement in delirium and TOV, ultimately for NITHIN, likely after new years

## 2023-12-29 NOTE — CHART NOTE - NSCHARTNOTEFT_GEN_A_CORE
Source: Patient [ ]  Family [ ]   other [x ]    Current Diet: Diet, Consistent Carbohydrate/No Snacks (12-24-23 @ 09:30)      Patient reports [ ] nausea  [ ] vomiting [ ] diarrhea [ ] constipation  [ ]chewing problems [ ] swallowing issues  [ ] other:     PO intake:  < 50% [ ]   50-75%  [ x]   %  [ x]  other :    Current Weight:   (12/22) 174.6 lbs  No new weight  No edema documented     Pertinent Medications: MEDICATIONS  (STANDING):  dextrose 5%. 1000 milliLiter(s) (100 mL/Hr) IV Continuous <Continuous>  dextrose 5%. 1000 milliLiter(s) (50 mL/Hr) IV Continuous <Continuous>  folic acid 1 milliGRAM(s) Oral daily  insulin glargine Injectable (LANTUS) 18 Unit(s) SubCutaneous at bedtime  insulin lispro (ADMELOG) corrective regimen sliding scale   SubCutaneous three times a day before meals  insulin lispro (ADMELOG) corrective regimen sliding scale   SubCutaneous at bedtime  insulin lispro Injectable (ADMELOG) 6 Unit(s) SubCutaneous three times a day with meals  pantoprazole    Tablet 40 milliGRAM(s) Oral before breakfast  QUEtiapine 12.5 milliGRAM(s) Oral at bedtime  sodium bicarbonate  Infusion 0.047 mEq/kG/Hr (50 mL/Hr) IV Continuous <Continuous>  sodium bicarbonate  Infusion 0.095 mEq/kG/Hr (50 mL/Hr) IV Continuous <Continuous>    MEDICATIONS  (PRN):  ondansetron Injectable 4 milliGRAM(s) IV Push every 6 hours PRN Nausea    Pertinent Labs: CBC Full  -  ( 29 Dec 2023 06:45 )  WBC Count : 13.35 K/uL  RBC Count : 3.51 M/uL  Hemoglobin : 10.3 g/dL  Hematocrit : 31.0 %    12-29 Na140 mmol/L Glu 163 mg/dL<H> K+ 4.2 mmol/L Cr  3.28 mg/dL<H> BUN 63.3 mg/dL<H> Phos n/a   Alb n/a   PAB n/a       Skin: Intact per documentation  Hollis: 14    Nutrition focused physical exam not conducted at this time- found signs of malnutrition [ ]absent [ ]present    Subcutaneous fat loss: [ ] Orbital fat pads region, [ ]Buccal fat region, [ ]Triceps region,  [ ]Ribs region    Muscle wasting: [ ]Temples region, [ ]Clavicle region, [ ]Shoulder region, [ ]Scapula region, [ ]Interosseous region,  [ ]thigh region, [ ]Calf region    Estimated Needs:   [x ] no change since previous assessment  [ ] recalculated:     Current Nutrition Diagnosis: Pt remains at nutrition risk secondary to increased nutrient needs related to increased physiological demand for nutrient as evidenced by acute sky s/p IR drain placement c/b hypoxic resp failure 2/2 COVID. Pt now on a consistent carbohydrate diet and currently with fair to good po intake. RD to follow up.     Recommendations:   1) Continue diet as tolerated; add DASH/TLC to diet rx.  2) Add Glucerna BID to maximize po intake (220 kcal, 10g protein per serving).  3) Continue folic acid supplementation, add Nephro-Yanet daily.  4) Encourage po intake, monitor diet tolerance, and provide assistance at meals as needed.  5) Obtain daily weights to monitor trends.      Monitoring and Evaluation:   [ x] PO intake [ x] Tolerance to diet prescription [X] Weights  [X] Follow up per protocol [X] Labs: chem 8, mg, phos, H/H Source: Patient [ ]  Family [ ]   other [x ]    Current Diet: Diet, Consistent Carbohydrate/No Snacks (12-24-23 @ 09:30)      Patient reports [ ] nausea  [ ] vomiting [ ] diarrhea [ ] constipation  [ ]chewing problems [ ] swallowing issues  [ ] other:     PO intake:  < 50% [ ]   50-75%  [ x]   %  [ x]  other :    Current Weight:   (12/22) 174.6 lbs  No new weight  No edema documented     Pertinent Medications: MEDICATIONS  (STANDING):  dextrose 5%. 1000 milliLiter(s) (100 mL/Hr) IV Continuous <Continuous>  dextrose 5%. 1000 milliLiter(s) (50 mL/Hr) IV Continuous <Continuous>  folic acid 1 milliGRAM(s) Oral daily  insulin glargine Injectable (LANTUS) 18 Unit(s) SubCutaneous at bedtime  insulin lispro (ADMELOG) corrective regimen sliding scale   SubCutaneous three times a day before meals  insulin lispro (ADMELOG) corrective regimen sliding scale   SubCutaneous at bedtime  insulin lispro Injectable (ADMELOG) 6 Unit(s) SubCutaneous three times a day with meals  pantoprazole    Tablet 40 milliGRAM(s) Oral before breakfast  QUEtiapine 12.5 milliGRAM(s) Oral at bedtime  sodium bicarbonate  Infusion 0.047 mEq/kG/Hr (50 mL/Hr) IV Continuous <Continuous>  sodium bicarbonate  Infusion 0.095 mEq/kG/Hr (50 mL/Hr) IV Continuous <Continuous>    MEDICATIONS  (PRN):  ondansetron Injectable 4 milliGRAM(s) IV Push every 6 hours PRN Nausea    Pertinent Labs: CBC Full  -  ( 29 Dec 2023 06:45 )  WBC Count : 13.35 K/uL  RBC Count : 3.51 M/uL  Hemoglobin : 10.3 g/dL  Hematocrit : 31.0 %    12-29 Na140 mmol/L Glu 163 mg/dL<H> K+ 4.2 mmol/L Cr  3.28 mg/dL<H> BUN 63.3 mg/dL<H> Phos n/a   Alb n/a   PAB n/a       Skin: Intact per documentation  Hollis: 14    Nutrition focused physical exam not conducted at this time- found signs of malnutrition [ ]absent [ ]present    Subcutaneous fat loss: [ ] Orbital fat pads region, [ ]Buccal fat region, [ ]Triceps region,  [ ]Ribs region    Muscle wasting: [ ]Temples region, [ ]Clavicle region, [ ]Shoulder region, [ ]Scapula region, [ ]Interosseous region,  [ ]thigh region, [ ]Calf region    Estimated Needs:   [x ] no change since previous assessment  [ ] recalculated:     Current Nutrition Diagnosis: Pt remains at nutrition risk secondary to increased nutrient needs related to increased physiological demand for nutrient as evidenced by acute sky s/p IR drain placement c/b hypoxic resp failure 2/2 COVID. Pt AxOx1. Now on a consistent carbohydrate diet and currently with fair to good po intake per documentation. RD to follow up.     Recommendations:   1) Continue diet as tolerated; add DASH/TLC to diet rx.  2) Add Glucerna BID to maximize nutrition status (220 kcal, 10g protein per serving).  3) Continue folic acid supplementation, add Nephro-Yanet daily.  4) Encourage po intake, monitor diet tolerance, and provide assistance at meals as needed.  5) Obtain daily weights to monitor trends.      Monitoring and Evaluation:   [ x] PO intake [ x] Tolerance to diet prescription [X] Weights  [X] Follow up per protocol [X] Labs: chem 8, mg, phos, H/H

## 2023-12-29 NOTE — PROGRESS NOTE ADULT - SUBJECTIVE AND OBJECTIVE BOX
NEPHROLOGY INTERVAL HPI/OVERNIGHT EVENTS:    Examined  Feeling better  Denies HA CP no SOB    MEDICATIONS  (STANDING):  acetaminophen   IVPB .. 1000 milliGRAM(s) IV Intermittent once  cefTRIAXone Injectable. 1000 milliGRAM(s) IV Push every 24 hours  chlorhexidine 2% Cloths 1 Application(s) Topical <User Schedule>  dextrose 5%. 1000 milliLiter(s) (100 mL/Hr) IV Continuous <Continuous>  dextrose 5%. 1000 milliLiter(s) (50 mL/Hr) IV Continuous <Continuous>  dextrose 50% Injectable 12.5 Gram(s) IV Push once  dextrose 50% Injectable 25 Gram(s) IV Push once  dextrose 50% Injectable 25 Gram(s) IV Push once  dronedarone 400 milliGRAM(s) Oral two times a day  epoetin kristal-epbx (RETACRIT) Injectable 68366 Unit(s) SubCutaneous every 7 days  folic acid 1 milliGRAM(s) Oral daily  gabapentin 300 milliGRAM(s) Oral at bedtime  glucagon  Injectable 1 milliGRAM(s) IntraMuscular once  heparin   Injectable 5000 Unit(s) SubCutaneous every 8 hours  insulin glargine Injectable (LANTUS) 18 Unit(s) SubCutaneous at bedtime  insulin lispro (ADMELOG) corrective regimen sliding scale   SubCutaneous three times a day before meals  insulin lispro (ADMELOG) corrective regimen sliding scale   SubCutaneous at bedtime  insulin lispro Injectable (ADMELOG) 6 Unit(s) SubCutaneous three times a day with meals  metoprolol tartrate 25 milliGRAM(s) Oral two times a day  midodrine. 5 milliGRAM(s) Oral three times a day  OLANZapine Injectable 5 milliGRAM(s) IntraMuscular once  pantoprazole    Tablet 40 milliGRAM(s) Oral before breakfast  QUEtiapine 12.5 milliGRAM(s) Oral at bedtime  sodium bicarbonate  Infusion 0.095 mEq/kG/Hr (50 mL/Hr) IV Continuous <Continuous>  sodium bicarbonate  Infusion 0.047 mEq/kG/Hr (50 mL/Hr) IV Continuous <Continuous>  tamsulosin 0.4 milliGRAM(s) Oral at bedtime    MEDICATIONS  (PRN):  dextrose Oral Gel 15 Gram(s) Oral once PRN Blood Glucose LESS THAN 70 milliGRAM(s)/deciliter  ondansetron Injectable 4 milliGRAM(s) IV Push every 6 hours PRN Nausea      Allergies    No Known Allergies    Intolerances        Vital Signs Last 24 Hrs  T(C): 36.4 (29 Dec 2023 09:22), Max: 36.6 (29 Dec 2023 04:00)  T(F): 97.5 (29 Dec 2023 09:22), Max: 97.9 (29 Dec 2023 04:00)  HR: 64 (29 Dec 2023 13:00) (61 - 71)  BP: 111/65 (29 Dec 2023 13:00) (111/65 - 159/70)  BP(mean): --  RR: 18 (29 Dec 2023 13:00) (17 - 19)  SpO2: 95% (29 Dec 2023 13:00) (92% - 95%)    Parameters below as of 29 Dec 2023 00:00  Patient On (Oxygen Delivery Method): room air      PHYSICAL EXAM:  Gen: NAD,  	HEENT: supple neck,   	Pulm: CTA B/L  	CV: RRR, S1S2; no rub  	Back: No spinal or CVA tenderness; no sacral edema  	Abd: +BS, soft, nontender/nondistended = Radha drain   	: wills    LABS:                        10.3   13.35 )-----------( 277      ( 29 Dec 2023 06:45 )             31.0     12-29    140  |  109<H>  |  63.3<H>  ----------------------------<  163<H>  4.2   |  22.0  |  3.28<H>    Ca    8.0<L>      29 Dec 2023 06:45  Mg     1.9     12-29        Urinalysis Basic - ( 29 Dec 2023 06:45 )    Color: x / Appearance: x / SG: x / pH: x  Gluc: 163 mg/dL / Ketone: x  / Bili: x / Urobili: x   Blood: x / Protein: x / Nitrite: x   Leuk Esterase: x / RBC: x / WBC x   Sq Epi: x / Non Sq Epi: x / Bacteria: x      Magnesium: 1.9 mg/dL (12-29 @ 06:45)          RADIOLOGY & ADDITIONAL TESTS:   NEPHROLOGY INTERVAL HPI/OVERNIGHT EVENTS:    Examined  Feeling better  Denies HA CP no SOB    MEDICATIONS  (STANDING):  acetaminophen   IVPB .. 1000 milliGRAM(s) IV Intermittent once  cefTRIAXone Injectable. 1000 milliGRAM(s) IV Push every 24 hours  chlorhexidine 2% Cloths 1 Application(s) Topical <User Schedule>  dextrose 5%. 1000 milliLiter(s) (100 mL/Hr) IV Continuous <Continuous>  dextrose 5%. 1000 milliLiter(s) (50 mL/Hr) IV Continuous <Continuous>  dextrose 50% Injectable 12.5 Gram(s) IV Push once  dextrose 50% Injectable 25 Gram(s) IV Push once  dextrose 50% Injectable 25 Gram(s) IV Push once  dronedarone 400 milliGRAM(s) Oral two times a day  epoetin kristal-epbx (RETACRIT) Injectable 74381 Unit(s) SubCutaneous every 7 days  folic acid 1 milliGRAM(s) Oral daily  gabapentin 300 milliGRAM(s) Oral at bedtime  glucagon  Injectable 1 milliGRAM(s) IntraMuscular once  heparin   Injectable 5000 Unit(s) SubCutaneous every 8 hours  insulin glargine Injectable (LANTUS) 18 Unit(s) SubCutaneous at bedtime  insulin lispro (ADMELOG) corrective regimen sliding scale   SubCutaneous three times a day before meals  insulin lispro (ADMELOG) corrective regimen sliding scale   SubCutaneous at bedtime  insulin lispro Injectable (ADMELOG) 6 Unit(s) SubCutaneous three times a day with meals  metoprolol tartrate 25 milliGRAM(s) Oral two times a day  midodrine. 5 milliGRAM(s) Oral three times a day  OLANZapine Injectable 5 milliGRAM(s) IntraMuscular once  pantoprazole    Tablet 40 milliGRAM(s) Oral before breakfast  QUEtiapine 12.5 milliGRAM(s) Oral at bedtime  sodium bicarbonate  Infusion 0.095 mEq/kG/Hr (50 mL/Hr) IV Continuous <Continuous>  sodium bicarbonate  Infusion 0.047 mEq/kG/Hr (50 mL/Hr) IV Continuous <Continuous>  tamsulosin 0.4 milliGRAM(s) Oral at bedtime    MEDICATIONS  (PRN):  dextrose Oral Gel 15 Gram(s) Oral once PRN Blood Glucose LESS THAN 70 milliGRAM(s)/deciliter  ondansetron Injectable 4 milliGRAM(s) IV Push every 6 hours PRN Nausea      Allergies    No Known Allergies    Intolerances        Vital Signs Last 24 Hrs  T(C): 36.4 (29 Dec 2023 09:22), Max: 36.6 (29 Dec 2023 04:00)  T(F): 97.5 (29 Dec 2023 09:22), Max: 97.9 (29 Dec 2023 04:00)  HR: 64 (29 Dec 2023 13:00) (61 - 71)  BP: 111/65 (29 Dec 2023 13:00) (111/65 - 159/70)  BP(mean): --  RR: 18 (29 Dec 2023 13:00) (17 - 19)  SpO2: 95% (29 Dec 2023 13:00) (92% - 95%)    Parameters below as of 29 Dec 2023 00:00  Patient On (Oxygen Delivery Method): room air      PHYSICAL EXAM:  Gen: NAD,  	HEENT: supple neck,   	Pulm: CTA B/L  	CV: RRR, S1S2; no rub  	Back: No spinal or CVA tenderness; no sacral edema  	Abd: +BS, soft, nontender/nondistended = Radha drain   	: wills    LABS:                        10.3   13.35 )-----------( 277      ( 29 Dec 2023 06:45 )             31.0     12-29    140  |  109<H>  |  63.3<H>  ----------------------------<  163<H>  4.2   |  22.0  |  3.28<H>    Ca    8.0<L>      29 Dec 2023 06:45  Mg     1.9     12-29        Urinalysis Basic - ( 29 Dec 2023 06:45 )    Color: x / Appearance: x / SG: x / pH: x  Gluc: 163 mg/dL / Ketone: x  / Bili: x / Urobili: x   Blood: x / Protein: x / Nitrite: x   Leuk Esterase: x / RBC: x / WBC x   Sq Epi: x / Non Sq Epi: x / Bacteria: x      Magnesium: 1.9 mg/dL (12-29 @ 06:45)          RADIOLOGY & ADDITIONAL TESTS:

## 2023-12-30 LAB
ANION GAP SERPL CALC-SCNC: 10 MMOL/L — SIGNIFICANT CHANGE UP (ref 5–17)
ANION GAP SERPL CALC-SCNC: 10 MMOL/L — SIGNIFICANT CHANGE UP (ref 5–17)
BUN SERPL-MCNC: 46.5 MG/DL — HIGH (ref 8–20)
BUN SERPL-MCNC: 46.5 MG/DL — HIGH (ref 8–20)
CALCIUM SERPL-MCNC: 7.9 MG/DL — LOW (ref 8.4–10.5)
CALCIUM SERPL-MCNC: 7.9 MG/DL — LOW (ref 8.4–10.5)
CHLORIDE SERPL-SCNC: 105 MMOL/L — SIGNIFICANT CHANGE UP (ref 96–108)
CHLORIDE SERPL-SCNC: 105 MMOL/L — SIGNIFICANT CHANGE UP (ref 96–108)
CO2 SERPL-SCNC: 25 MMOL/L — SIGNIFICANT CHANGE UP (ref 22–29)
CO2 SERPL-SCNC: 25 MMOL/L — SIGNIFICANT CHANGE UP (ref 22–29)
CREAT SERPL-MCNC: 2.93 MG/DL — HIGH (ref 0.5–1.3)
CREAT SERPL-MCNC: 2.93 MG/DL — HIGH (ref 0.5–1.3)
CRP SERPL-MCNC: 54 MG/L — HIGH
CRP SERPL-MCNC: 54 MG/L — HIGH
EGFR: 20 ML/MIN/1.73M2 — LOW
EGFR: 20 ML/MIN/1.73M2 — LOW
GLUCOSE BLDC GLUCOMTR-MCNC: 116 MG/DL — HIGH (ref 70–99)
GLUCOSE BLDC GLUCOMTR-MCNC: 116 MG/DL — HIGH (ref 70–99)
GLUCOSE BLDC GLUCOMTR-MCNC: 144 MG/DL — HIGH (ref 70–99)
GLUCOSE BLDC GLUCOMTR-MCNC: 144 MG/DL — HIGH (ref 70–99)
GLUCOSE BLDC GLUCOMTR-MCNC: 183 MG/DL — HIGH (ref 70–99)
GLUCOSE BLDC GLUCOMTR-MCNC: 183 MG/DL — HIGH (ref 70–99)
GLUCOSE BLDC GLUCOMTR-MCNC: 188 MG/DL — HIGH (ref 70–99)
GLUCOSE BLDC GLUCOMTR-MCNC: 188 MG/DL — HIGH (ref 70–99)
GLUCOSE SERPL-MCNC: 200 MG/DL — HIGH (ref 70–99)
GLUCOSE SERPL-MCNC: 200 MG/DL — HIGH (ref 70–99)
HCT VFR BLD CALC: 30.2 % — LOW (ref 39–50)
HCT VFR BLD CALC: 30.2 % — LOW (ref 39–50)
HGB BLD-MCNC: 10.3 G/DL — LOW (ref 13–17)
HGB BLD-MCNC: 10.3 G/DL — LOW (ref 13–17)
MCHC RBC-ENTMCNC: 30.7 PG — SIGNIFICANT CHANGE UP (ref 27–34)
MCHC RBC-ENTMCNC: 30.7 PG — SIGNIFICANT CHANGE UP (ref 27–34)
MCHC RBC-ENTMCNC: 34.1 GM/DL — SIGNIFICANT CHANGE UP (ref 32–36)
MCHC RBC-ENTMCNC: 34.1 GM/DL — SIGNIFICANT CHANGE UP (ref 32–36)
MCV RBC AUTO: 90.1 FL — SIGNIFICANT CHANGE UP (ref 80–100)
MCV RBC AUTO: 90.1 FL — SIGNIFICANT CHANGE UP (ref 80–100)
PLATELET # BLD AUTO: 290 K/UL — SIGNIFICANT CHANGE UP (ref 150–400)
PLATELET # BLD AUTO: 290 K/UL — SIGNIFICANT CHANGE UP (ref 150–400)
POTASSIUM SERPL-MCNC: 4.1 MMOL/L — SIGNIFICANT CHANGE UP (ref 3.5–5.3)
POTASSIUM SERPL-MCNC: 4.1 MMOL/L — SIGNIFICANT CHANGE UP (ref 3.5–5.3)
POTASSIUM SERPL-SCNC: 4.1 MMOL/L — SIGNIFICANT CHANGE UP (ref 3.5–5.3)
POTASSIUM SERPL-SCNC: 4.1 MMOL/L — SIGNIFICANT CHANGE UP (ref 3.5–5.3)
RBC # BLD: 3.35 M/UL — LOW (ref 4.2–5.8)
RBC # BLD: 3.35 M/UL — LOW (ref 4.2–5.8)
RBC # FLD: 14.6 % — HIGH (ref 10.3–14.5)
RBC # FLD: 14.6 % — HIGH (ref 10.3–14.5)
SODIUM SERPL-SCNC: 140 MMOL/L — SIGNIFICANT CHANGE UP (ref 135–145)
SODIUM SERPL-SCNC: 140 MMOL/L — SIGNIFICANT CHANGE UP (ref 135–145)
WBC # BLD: 11.39 K/UL — HIGH (ref 3.8–10.5)
WBC # BLD: 11.39 K/UL — HIGH (ref 3.8–10.5)
WBC # FLD AUTO: 11.39 K/UL — HIGH (ref 3.8–10.5)
WBC # FLD AUTO: 11.39 K/UL — HIGH (ref 3.8–10.5)

## 2023-12-30 PROCEDURE — 99233 SBSQ HOSP IP/OBS HIGH 50: CPT

## 2023-12-30 RX ORDER — SODIUM CHLORIDE 9 MG/ML
1000 INJECTION INTRAMUSCULAR; INTRAVENOUS; SUBCUTANEOUS
Refills: 0 | Status: DISCONTINUED | OUTPATIENT
Start: 2023-12-30 | End: 2024-01-05

## 2023-12-30 RX ADMIN — Medication 2: at 09:02

## 2023-12-30 RX ADMIN — INSULIN GLARGINE 18 UNIT(S): 100 INJECTION, SOLUTION SUBCUTANEOUS at 21:16

## 2023-12-30 RX ADMIN — HEPARIN SODIUM 5000 UNIT(S): 5000 INJECTION INTRAVENOUS; SUBCUTANEOUS at 13:22

## 2023-12-30 RX ADMIN — PANTOPRAZOLE SODIUM 40 MILLIGRAM(S): 20 TABLET, DELAYED RELEASE ORAL at 05:39

## 2023-12-30 RX ADMIN — DRONEDARONE 400 MILLIGRAM(S): 400 TABLET, FILM COATED ORAL at 16:25

## 2023-12-30 RX ADMIN — CHLORHEXIDINE GLUCONATE 1 APPLICATION(S): 213 SOLUTION TOPICAL at 05:39

## 2023-12-30 RX ADMIN — Medication 6 UNIT(S): at 11:35

## 2023-12-30 RX ADMIN — Medication 1 MILLIGRAM(S): at 11:35

## 2023-12-30 RX ADMIN — TAMSULOSIN HYDROCHLORIDE 0.4 MILLIGRAM(S): 0.4 CAPSULE ORAL at 21:17

## 2023-12-30 RX ADMIN — CEFTRIAXONE 1000 MILLIGRAM(S): 500 INJECTION, POWDER, FOR SOLUTION INTRAMUSCULAR; INTRAVENOUS at 13:21

## 2023-12-30 RX ADMIN — DRONEDARONE 400 MILLIGRAM(S): 400 TABLET, FILM COATED ORAL at 05:39

## 2023-12-30 RX ADMIN — Medication 25 MILLIGRAM(S): at 05:39

## 2023-12-30 RX ADMIN — Medication 6 UNIT(S): at 16:30

## 2023-12-30 RX ADMIN — GABAPENTIN 300 MILLIGRAM(S): 400 CAPSULE ORAL at 21:17

## 2023-12-30 RX ADMIN — MIDODRINE HYDROCHLORIDE 5 MILLIGRAM(S): 2.5 TABLET ORAL at 16:26

## 2023-12-30 RX ADMIN — HEPARIN SODIUM 5000 UNIT(S): 5000 INJECTION INTRAVENOUS; SUBCUTANEOUS at 05:38

## 2023-12-30 RX ADMIN — Medication 50 MEQ/KG/HR: at 11:35

## 2023-12-30 RX ADMIN — Medication 2: at 16:29

## 2023-12-30 RX ADMIN — Medication 25 MILLIGRAM(S): at 16:25

## 2023-12-30 RX ADMIN — Medication 6 UNIT(S): at 09:03

## 2023-12-30 RX ADMIN — MIDODRINE HYDROCHLORIDE 5 MILLIGRAM(S): 2.5 TABLET ORAL at 11:38

## 2023-12-30 RX ADMIN — SODIUM CHLORIDE 55 MILLILITER(S): 9 INJECTION INTRAMUSCULAR; INTRAVENOUS; SUBCUTANEOUS at 16:52

## 2023-12-30 RX ADMIN — HEPARIN SODIUM 5000 UNIT(S): 5000 INJECTION INTRAVENOUS; SUBCUTANEOUS at 21:17

## 2023-12-30 NOTE — PROGRESS NOTE ADULT - ASSESSMENT
83-year-old male with past medical history of hypertension, hyperlipidemia, DM, stage III CKD, BPH, admitted for acute sky s/p IR drain placement. Course c/b hypoxic resp failure 2/2 COVID as well as new Afib RVR.   Nephrology consulted for Ron.    RON on CKD stage III  Serum Cr 4.2--> 3.2--> 2.9 improving  SCr 1.9-2.7 in 2016- no labs available for review after that until admission  Likely underlying diabetic nephropathy may hav a degree of progression of dz  Pt is sp wills ; good urine output  UA with large glucose, 300 protein. small blood  CT AP reviewed; bl renal cysts  Ron likely hemodynamic ATN in setting of sepsis, hypotension, Afib with RVR  No pressing need for acute HD at this moment     MA - better will change IVf no longer needs bicarb    Anemia - cont Epogen and folate     Acute cholecystitis  s/p IR perc drain placement  on Zosyn originally , changed to Ceftriaxone     Acute resp failure with hypoxia suspect 2/2 COVID  s/p decadron x5 days;Remdesivir dce'd due to worsening renal function     AM labs will follow

## 2023-12-30 NOTE — PROGRESS NOTE ADULT - SUBJECTIVE AND OBJECTIVE BOX
[facility] Division of Hospital Medicine    Chief Complaint: Cholecystitis    INTERVAL HISTORY:  Overnight, no acute events.     Patient seen and examined at bedside this morning. A&Ox1 to self, pleasant, appears in no acute distress. Reports he needs to use bathroom. Unable to engage in ROS given mentation.     MEDICATIONS  (STANDING):  acetaminophen   IVPB .. 1000 milliGRAM(s) IV Intermittent once  cefTRIAXone Injectable. 1000 milliGRAM(s) IV Push every 24 hours  chlorhexidine 2% Cloths 1 Application(s) Topical <User Schedule>  dextrose 5%. 1000 milliLiter(s) (100 mL/Hr) IV Continuous <Continuous>  dextrose 5%. 1000 milliLiter(s) (50 mL/Hr) IV Continuous <Continuous>  dextrose 50% Injectable 12.5 Gram(s) IV Push once  dextrose 50% Injectable 25 Gram(s) IV Push once  dextrose 50% Injectable 25 Gram(s) IV Push once  dronedarone 400 milliGRAM(s) Oral two times a day  epoetin kristal-epbx (RETACRIT) Injectable 84882 Unit(s) SubCutaneous every 7 days  folic acid 1 milliGRAM(s) Oral daily  gabapentin 300 milliGRAM(s) Oral at bedtime  glucagon  Injectable 1 milliGRAM(s) IntraMuscular once  heparin   Injectable 5000 Unit(s) SubCutaneous every 8 hours  insulin glargine Injectable (LANTUS) 18 Unit(s) SubCutaneous at bedtime  insulin lispro (ADMELOG) corrective regimen sliding scale   SubCutaneous three times a day before meals  insulin lispro (ADMELOG) corrective regimen sliding scale   SubCutaneous at bedtime  insulin lispro Injectable (ADMELOG) 6 Unit(s) SubCutaneous three times a day with meals  metoprolol tartrate 25 milliGRAM(s) Oral two times a day  midodrine. 5 milliGRAM(s) Oral three times a day  OLANZapine Injectable 5 milliGRAM(s) IntraMuscular once  pantoprazole    Tablet 40 milliGRAM(s) Oral before breakfast  sodium bicarbonate  Infusion 0.095 mEq/kG/Hr (50 mL/Hr) IV Continuous <Continuous>  sodium bicarbonate  Infusion 0.047 mEq/kG/Hr (50 mL/Hr) IV Continuous <Continuous>  tamsulosin 0.4 milliGRAM(s) Oral at bedtime    MEDICATIONS  (PRN):  dextrose Oral Gel 15 Gram(s) Oral once PRN Blood Glucose LESS THAN 70 milliGRAM(s)/deciliter  ondansetron Injectable 4 milliGRAM(s) IV Push every 6 hours PRN Nausea        I&O's Summary    29 Dec 2023 07:01  -  30 Dec 2023 07:00  --------------------------------------------------------  IN: 1300 mL / OUT: 2430 mL / NET: -1130 mL        PHYSICAL EXAM:  Vital Signs Last 24 Hrs  T(C): 36.6 (30 Dec 2023 09:00), Max: 36.9 (29 Dec 2023 20:45)  T(F): 97.9 (30 Dec 2023 09:00), Max: 98.5 (30 Dec 2023 04:09)  HR: 66 (30 Dec 2023 11:30) (66 - 77)  BP: 124/71 (30 Dec 2023 11:30) (124/71 - 157/78)  BP(mean): --  RR: 18 (30 Dec 2023 11:30) (16 - 18)  SpO2: 96% (30 Dec 2023 11:30) (93% - 96%)    Parameters below as of 30 Dec 2023 11:30  Patient On (Oxygen Delivery Method): room air          CONSTITUTIONAL: No apparent distress, on RA  HEENT: Normocephalic, Atraumatic.   RESPIRATORY:  lungs are clear to auscultation bilaterally, no crackles, rhonchi, wheezes  CARDIOVASCULAR: Regular rate and rhythm, No lower extremity edema  ABDOMEN: Soft, non-distended, +perc sky drain containing bilious material   MUSCLOSKELETAL: moving all 4 extremities spontaneously  NEUROLOGY: Alert, Oriented x1 (name), CN 2-12 grossly intact    LABS:                        10.3   11.39 )-----------( 290      ( 30 Dec 2023 07:15 )             30.2     12-30    140  |  105  |  46.5<H>  ----------------------------<  200<H>  4.1   |  25.0  |  2.93<H>    Ca    7.9<L>      30 Dec 2023 07:15  Mg     1.9     12-29            Urinalysis Basic - ( 30 Dec 2023 07:15 )    Color: x / Appearance: x / SG: x / pH: x  Gluc: 200 mg/dL / Ketone: x  / Bili: x / Urobili: x   Blood: x / Protein: x / Nitrite: x   Leuk Esterase: x / RBC: x / WBC x   Sq Epi: x / Non Sq Epi: x / Bacteria: x        CAPILLARY BLOOD GLUCOSE      POCT Blood Glucose.: 116 mg/dL (30 Dec 2023 11:29)  POCT Blood Glucose.: 183 mg/dL (30 Dec 2023 09:01)  POCT Blood Glucose.: 165 mg/dL (29 Dec 2023 21:40)  POCT Blood Glucose.: 147 mg/dL (29 Dec 2023 16:23)        RADIOLOGY & ADDITIONAL TESTS:  Results Reviewed                                         [facility] Division of Hospital Medicine    Chief Complaint: Cholecystitis    INTERVAL HISTORY:  Overnight, no acute events.     Patient seen and examined at bedside this morning. A&Ox1 to self, pleasant, appears in no acute distress. Reports he needs to use bathroom. Unable to engage in ROS given mentation.     MEDICATIONS  (STANDING):  acetaminophen   IVPB .. 1000 milliGRAM(s) IV Intermittent once  cefTRIAXone Injectable. 1000 milliGRAM(s) IV Push every 24 hours  chlorhexidine 2% Cloths 1 Application(s) Topical <User Schedule>  dextrose 5%. 1000 milliLiter(s) (100 mL/Hr) IV Continuous <Continuous>  dextrose 5%. 1000 milliLiter(s) (50 mL/Hr) IV Continuous <Continuous>  dextrose 50% Injectable 12.5 Gram(s) IV Push once  dextrose 50% Injectable 25 Gram(s) IV Push once  dextrose 50% Injectable 25 Gram(s) IV Push once  dronedarone 400 milliGRAM(s) Oral two times a day  epoetin kristal-epbx (RETACRIT) Injectable 24561 Unit(s) SubCutaneous every 7 days  folic acid 1 milliGRAM(s) Oral daily  gabapentin 300 milliGRAM(s) Oral at bedtime  glucagon  Injectable 1 milliGRAM(s) IntraMuscular once  heparin   Injectable 5000 Unit(s) SubCutaneous every 8 hours  insulin glargine Injectable (LANTUS) 18 Unit(s) SubCutaneous at bedtime  insulin lispro (ADMELOG) corrective regimen sliding scale   SubCutaneous three times a day before meals  insulin lispro (ADMELOG) corrective regimen sliding scale   SubCutaneous at bedtime  insulin lispro Injectable (ADMELOG) 6 Unit(s) SubCutaneous three times a day with meals  metoprolol tartrate 25 milliGRAM(s) Oral two times a day  midodrine. 5 milliGRAM(s) Oral three times a day  OLANZapine Injectable 5 milliGRAM(s) IntraMuscular once  pantoprazole    Tablet 40 milliGRAM(s) Oral before breakfast  sodium bicarbonate  Infusion 0.095 mEq/kG/Hr (50 mL/Hr) IV Continuous <Continuous>  sodium bicarbonate  Infusion 0.047 mEq/kG/Hr (50 mL/Hr) IV Continuous <Continuous>  tamsulosin 0.4 milliGRAM(s) Oral at bedtime    MEDICATIONS  (PRN):  dextrose Oral Gel 15 Gram(s) Oral once PRN Blood Glucose LESS THAN 70 milliGRAM(s)/deciliter  ondansetron Injectable 4 milliGRAM(s) IV Push every 6 hours PRN Nausea        I&O's Summary    29 Dec 2023 07:01  -  30 Dec 2023 07:00  --------------------------------------------------------  IN: 1300 mL / OUT: 2430 mL / NET: -1130 mL        PHYSICAL EXAM:  Vital Signs Last 24 Hrs  T(C): 36.6 (30 Dec 2023 09:00), Max: 36.9 (29 Dec 2023 20:45)  T(F): 97.9 (30 Dec 2023 09:00), Max: 98.5 (30 Dec 2023 04:09)  HR: 66 (30 Dec 2023 11:30) (66 - 77)  BP: 124/71 (30 Dec 2023 11:30) (124/71 - 157/78)  BP(mean): --  RR: 18 (30 Dec 2023 11:30) (16 - 18)  SpO2: 96% (30 Dec 2023 11:30) (93% - 96%)    Parameters below as of 30 Dec 2023 11:30  Patient On (Oxygen Delivery Method): room air          CONSTITUTIONAL: No apparent distress, on RA  HEENT: Normocephalic, Atraumatic.   RESPIRATORY:  lungs are clear to auscultation bilaterally, no crackles, rhonchi, wheezes  CARDIOVASCULAR: Regular rate and rhythm, No lower extremity edema  ABDOMEN: Soft, non-distended, +perc sky drain containing bilious material   MUSCLOSKELETAL: moving all 4 extremities spontaneously  NEUROLOGY: Alert, Oriented x1 (name), CN 2-12 grossly intact    LABS:                        10.3   11.39 )-----------( 290      ( 30 Dec 2023 07:15 )             30.2     12-30    140  |  105  |  46.5<H>  ----------------------------<  200<H>  4.1   |  25.0  |  2.93<H>    Ca    7.9<L>      30 Dec 2023 07:15  Mg     1.9     12-29            Urinalysis Basic - ( 30 Dec 2023 07:15 )    Color: x / Appearance: x / SG: x / pH: x  Gluc: 200 mg/dL / Ketone: x  / Bili: x / Urobili: x   Blood: x / Protein: x / Nitrite: x   Leuk Esterase: x / RBC: x / WBC x   Sq Epi: x / Non Sq Epi: x / Bacteria: x        CAPILLARY BLOOD GLUCOSE      POCT Blood Glucose.: 116 mg/dL (30 Dec 2023 11:29)  POCT Blood Glucose.: 183 mg/dL (30 Dec 2023 09:01)  POCT Blood Glucose.: 165 mg/dL (29 Dec 2023 21:40)  POCT Blood Glucose.: 147 mg/dL (29 Dec 2023 16:23)        RADIOLOGY & ADDITIONAL TESTS:  Results Reviewed

## 2023-12-30 NOTE — PROGRESS NOTE ADULT - ASSESSMENT
83-year-old male with past medical history of hypertension, hyperlipidemia, DM, stage III CKD, BPH, admitted for acute sky s/p IR drain placement. Course c/b hypoxic resp failure 2/2 COVID as well as new Afib RVR.     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, no acute findings  - frequent re-orientation  - c/w Seroquel 12.5mg QHS    #Acute cholecystitis  - CT A/P and RUQ result noted  - s/p IR perc drain placement, monitor output, will need drain in for 4-6 weeks for tract to form, outpt IR followup  - bile culture with E-coli, abx changed to CTX, plan for total 14 days until 1/3  - c/w CTX  - BCx NGTD  - pain regimen  - diet advancement as tolerated  - GI following, herlinda recs  - trend CMP  - Outpatient surgery evaluation for interval cholecystectomy   on home midodrine, unclear etiology    #Acute resp failure with hypoxia suspect 2/2 COVID - resolved  - Possible component of aspiration, s/p Zoysn  - s/p COVID vaccines  - s/p decadron x5 days  - Remdesivir dc due to worsening renal function   - s/p BIPAP and HFNC, weaned to RA  - CT chest noted, mild pulm edema noted, PRN lasix   - DVT studies negative    #Afib RVR  - Tele  - TSH normal  - cards d/w families, agree to hold AC  - Add Multaq  - metoprolol BID    #DM  - A1C 9  - ISS, accu checks  - c/w Lantus 18 QHS + 6U Premeals  - can c/w gabapentin for neuropathy   - monitor and adjust insulin as needed    #HLD  -c/w statin    #GERD  - c/w ppi 40mg qd    #Anemia  - Hg stable 9-10 range  - iron studies consistent with chronic dx  - b12 and folate normal  - trend CBC while admitted    DVT ppx: Heparin SQ  Diet: CC   Dispo: pending improvement in delirium and TOV, ultimately for NITHIN, likely after new years

## 2023-12-30 NOTE — PROGRESS NOTE ADULT - PROVIDER SPECIALTY LIST ADULT
Nephrology This was a shared visit with the ANTON. I reviewed and verified the documentation and independently performed the documented:

## 2023-12-30 NOTE — PROGRESS NOTE ADULT - NS ATTEST RISK PROBLEM GEN_ALL_CORE FT
renal failure, monitor Cr daily.   respiratory failure 2/2 covid, monitor oxygenation  infection, requiring IV abx until 1/3 renal failure, will get BMP to monitor Cr daily. Per Dr. Reyes nephsaud, given improvement in bicarb, changed IVF to NS  respiratory failure 2/2 covid, monitor oxygenation. Will order inflammatory markers to trend   cholecystitis infection, requiring IV abx until 1/3; will get CBC to monitor white count

## 2023-12-30 NOTE — PROGRESS NOTE ADULT - SUBJECTIVE AND OBJECTIVE BOX
NEPHROLOGY INTERVAL HPI/OVERNIGHT EVENTS:    Examined  Feeling better  Denies HA CP no SOB      MEDICATIONS  (STANDING):  acetaminophen   IVPB .. 1000 milliGRAM(s) IV Intermittent once  cefTRIAXone Injectable. 1000 milliGRAM(s) IV Push every 24 hours  chlorhexidine 2% Cloths 1 Application(s) Topical <User Schedule>  dextrose 5%. 1000 milliLiter(s) (100 mL/Hr) IV Continuous <Continuous>  dextrose 5%. 1000 milliLiter(s) (50 mL/Hr) IV Continuous <Continuous>  dextrose 50% Injectable 12.5 Gram(s) IV Push once  dextrose 50% Injectable 25 Gram(s) IV Push once  dextrose 50% Injectable 25 Gram(s) IV Push once  dronedarone 400 milliGRAM(s) Oral two times a day  epoetin kristal-epbx (RETACRIT) Injectable 43693 Unit(s) SubCutaneous every 7 days  folic acid 1 milliGRAM(s) Oral daily  gabapentin 300 milliGRAM(s) Oral at bedtime  glucagon  Injectable 1 milliGRAM(s) IntraMuscular once  heparin   Injectable 5000 Unit(s) SubCutaneous every 8 hours  insulin glargine Injectable (LANTUS) 18 Unit(s) SubCutaneous at bedtime  insulin lispro (ADMELOG) corrective regimen sliding scale   SubCutaneous three times a day before meals  insulin lispro (ADMELOG) corrective regimen sliding scale   SubCutaneous at bedtime  insulin lispro Injectable (ADMELOG) 6 Unit(s) SubCutaneous three times a day with meals  metoprolol tartrate 25 milliGRAM(s) Oral two times a day  midodrine. 5 milliGRAM(s) Oral three times a day  OLANZapine Injectable 5 milliGRAM(s) IntraMuscular once  pantoprazole    Tablet 40 milliGRAM(s) Oral before breakfast  sodium bicarbonate  Infusion 0.047 mEq/kG/Hr (50 mL/Hr) IV Continuous <Continuous>  sodium bicarbonate  Infusion 0.095 mEq/kG/Hr (50 mL/Hr) IV Continuous <Continuous>  tamsulosin 0.4 milliGRAM(s) Oral at bedtime    MEDICATIONS  (PRN):  dextrose Oral Gel 15 Gram(s) Oral once PRN Blood Glucose LESS THAN 70 milliGRAM(s)/deciliter  ondansetron Injectable 4 milliGRAM(s) IV Push every 6 hours PRN Nausea      Allergies    No Known Allergies    Intolerances        Vital Signs Last 24 Hrs  T(C): 36.6 (30 Dec 2023 09:00), Max: 36.9 (29 Dec 2023 20:45)  T(F): 97.9 (30 Dec 2023 09:00), Max: 98.5 (30 Dec 2023 04:09)  HR: 66 (30 Dec 2023 11:30) (66 - 77)  BP: 124/71 (30 Dec 2023 11:30) (124/71 - 157/78)  BP(mean): --  RR: 18 (30 Dec 2023 11:30) (16 - 18)  SpO2: 96% (30 Dec 2023 11:30) (93% - 96%)    Parameters below as of 30 Dec 2023 11:30  Patient On (Oxygen Delivery Method): room air      Daily     Daily     PHYSICAL EXAM:  Gen: NAD,  	HEENT: supple neck,   	Pulm: CTA B/L  	CV: RRR, S1S2; no rub  	Back: No spinal or CVA tenderness; no sacral edema  	Abd: +BS, soft, nontender/nondistended = Radha drain   	: foleyGen: NAD,  	HEENT: supple neck,   	Pulm: CTA B/L  	CV: RRR, S1S2; no rub  	Back: No spinal or CVA tenderness; no sacral edema  	Abd: +BS, soft, nontender/nondistended = Radha drain       LABS:                        10.3   11.39 )-----------( 290      ( 30 Dec 2023 07:15 )             30.2     12-30    140  |  105  |  46.5<H>  ----------------------------<  200<H>  4.1   |  25.0  |  2.93<H>    Ca    7.9<L>      30 Dec 2023 07:15  Mg     1.9     12-29        Urinalysis Basic - ( 30 Dec 2023 07:15 )    Color: x / Appearance: x / SG: x / pH: x  Gluc: 200 mg/dL / Ketone: x  / Bili: x / Urobili: x   Blood: x / Protein: x / Nitrite: x   Leuk Esterase: x / RBC: x / WBC x   Sq Epi: x / Non Sq Epi: x / Bacteria: x              RADIOLOGY & ADDITIONAL TESTS:   NEPHROLOGY INTERVAL HPI/OVERNIGHT EVENTS:    Examined  Feeling better  Denies HA CP no SOB      MEDICATIONS  (STANDING):  acetaminophen   IVPB .. 1000 milliGRAM(s) IV Intermittent once  cefTRIAXone Injectable. 1000 milliGRAM(s) IV Push every 24 hours  chlorhexidine 2% Cloths 1 Application(s) Topical <User Schedule>  dextrose 5%. 1000 milliLiter(s) (100 mL/Hr) IV Continuous <Continuous>  dextrose 5%. 1000 milliLiter(s) (50 mL/Hr) IV Continuous <Continuous>  dextrose 50% Injectable 12.5 Gram(s) IV Push once  dextrose 50% Injectable 25 Gram(s) IV Push once  dextrose 50% Injectable 25 Gram(s) IV Push once  dronedarone 400 milliGRAM(s) Oral two times a day  epoetin kristal-epbx (RETACRIT) Injectable 56609 Unit(s) SubCutaneous every 7 days  folic acid 1 milliGRAM(s) Oral daily  gabapentin 300 milliGRAM(s) Oral at bedtime  glucagon  Injectable 1 milliGRAM(s) IntraMuscular once  heparin   Injectable 5000 Unit(s) SubCutaneous every 8 hours  insulin glargine Injectable (LANTUS) 18 Unit(s) SubCutaneous at bedtime  insulin lispro (ADMELOG) corrective regimen sliding scale   SubCutaneous three times a day before meals  insulin lispro (ADMELOG) corrective regimen sliding scale   SubCutaneous at bedtime  insulin lispro Injectable (ADMELOG) 6 Unit(s) SubCutaneous three times a day with meals  metoprolol tartrate 25 milliGRAM(s) Oral two times a day  midodrine. 5 milliGRAM(s) Oral three times a day  OLANZapine Injectable 5 milliGRAM(s) IntraMuscular once  pantoprazole    Tablet 40 milliGRAM(s) Oral before breakfast  sodium bicarbonate  Infusion 0.047 mEq/kG/Hr (50 mL/Hr) IV Continuous <Continuous>  sodium bicarbonate  Infusion 0.095 mEq/kG/Hr (50 mL/Hr) IV Continuous <Continuous>  tamsulosin 0.4 milliGRAM(s) Oral at bedtime    MEDICATIONS  (PRN):  dextrose Oral Gel 15 Gram(s) Oral once PRN Blood Glucose LESS THAN 70 milliGRAM(s)/deciliter  ondansetron Injectable 4 milliGRAM(s) IV Push every 6 hours PRN Nausea      Allergies    No Known Allergies    Intolerances        Vital Signs Last 24 Hrs  T(C): 36.6 (30 Dec 2023 09:00), Max: 36.9 (29 Dec 2023 20:45)  T(F): 97.9 (30 Dec 2023 09:00), Max: 98.5 (30 Dec 2023 04:09)  HR: 66 (30 Dec 2023 11:30) (66 - 77)  BP: 124/71 (30 Dec 2023 11:30) (124/71 - 157/78)  BP(mean): --  RR: 18 (30 Dec 2023 11:30) (16 - 18)  SpO2: 96% (30 Dec 2023 11:30) (93% - 96%)    Parameters below as of 30 Dec 2023 11:30  Patient On (Oxygen Delivery Method): room air      Daily     Daily     PHYSICAL EXAM:  Gen: NAD,  	HEENT: supple neck,   	Pulm: CTA B/L  	CV: RRR, S1S2; no rub  	Back: No spinal or CVA tenderness; no sacral edema  	Abd: +BS, soft, nontender/nondistended = Radha drain   	: foleyGen: NAD,  	HEENT: supple neck,   	Pulm: CTA B/L  	CV: RRR, S1S2; no rub  	Back: No spinal or CVA tenderness; no sacral edema  	Abd: +BS, soft, nontender/nondistended = Radha drain       LABS:                        10.3   11.39 )-----------( 290      ( 30 Dec 2023 07:15 )             30.2     12-30    140  |  105  |  46.5<H>  ----------------------------<  200<H>  4.1   |  25.0  |  2.93<H>    Ca    7.9<L>      30 Dec 2023 07:15  Mg     1.9     12-29        Urinalysis Basic - ( 30 Dec 2023 07:15 )    Color: x / Appearance: x / SG: x / pH: x  Gluc: 200 mg/dL / Ketone: x  / Bili: x / Urobili: x   Blood: x / Protein: x / Nitrite: x   Leuk Esterase: x / RBC: x / WBC x   Sq Epi: x / Non Sq Epi: x / Bacteria: x              RADIOLOGY & ADDITIONAL TESTS:

## 2023-12-31 LAB
ANION GAP SERPL CALC-SCNC: 9 MMOL/L — SIGNIFICANT CHANGE UP (ref 5–17)
ANION GAP SERPL CALC-SCNC: 9 MMOL/L — SIGNIFICANT CHANGE UP (ref 5–17)
BUN SERPL-MCNC: 31.5 MG/DL — HIGH (ref 8–20)
BUN SERPL-MCNC: 31.5 MG/DL — HIGH (ref 8–20)
CALCIUM SERPL-MCNC: 8 MG/DL — LOW (ref 8.4–10.5)
CALCIUM SERPL-MCNC: 8 MG/DL — LOW (ref 8.4–10.5)
CHLORIDE SERPL-SCNC: 105 MMOL/L — SIGNIFICANT CHANGE UP (ref 96–108)
CHLORIDE SERPL-SCNC: 105 MMOL/L — SIGNIFICANT CHANGE UP (ref 96–108)
CO2 SERPL-SCNC: 23 MMOL/L — SIGNIFICANT CHANGE UP (ref 22–29)
CO2 SERPL-SCNC: 23 MMOL/L — SIGNIFICANT CHANGE UP (ref 22–29)
CREAT SERPL-MCNC: 2.36 MG/DL — HIGH (ref 0.5–1.3)
CREAT SERPL-MCNC: 2.36 MG/DL — HIGH (ref 0.5–1.3)
CRP SERPL-MCNC: 62 MG/L — HIGH
CRP SERPL-MCNC: 62 MG/L — HIGH
EGFR: 26 ML/MIN/1.73M2 — LOW
EGFR: 26 ML/MIN/1.73M2 — LOW
GLUCOSE BLDC GLUCOMTR-MCNC: 126 MG/DL — HIGH (ref 70–99)
GLUCOSE BLDC GLUCOMTR-MCNC: 126 MG/DL — HIGH (ref 70–99)
GLUCOSE BLDC GLUCOMTR-MCNC: 146 MG/DL — HIGH (ref 70–99)
GLUCOSE BLDC GLUCOMTR-MCNC: 146 MG/DL — HIGH (ref 70–99)
GLUCOSE BLDC GLUCOMTR-MCNC: 153 MG/DL — HIGH (ref 70–99)
GLUCOSE SERPL-MCNC: 144 MG/DL — HIGH (ref 70–99)
GLUCOSE SERPL-MCNC: 144 MG/DL — HIGH (ref 70–99)
HCT VFR BLD CALC: 29.4 % — LOW (ref 39–50)
HCT VFR BLD CALC: 29.4 % — LOW (ref 39–50)
HGB BLD-MCNC: 9.8 G/DL — LOW (ref 13–17)
HGB BLD-MCNC: 9.8 G/DL — LOW (ref 13–17)
MCHC RBC-ENTMCNC: 30.1 PG — SIGNIFICANT CHANGE UP (ref 27–34)
MCHC RBC-ENTMCNC: 30.1 PG — SIGNIFICANT CHANGE UP (ref 27–34)
MCHC RBC-ENTMCNC: 33.3 GM/DL — SIGNIFICANT CHANGE UP (ref 32–36)
MCHC RBC-ENTMCNC: 33.3 GM/DL — SIGNIFICANT CHANGE UP (ref 32–36)
MCV RBC AUTO: 90.2 FL — SIGNIFICANT CHANGE UP (ref 80–100)
MCV RBC AUTO: 90.2 FL — SIGNIFICANT CHANGE UP (ref 80–100)
PLATELET # BLD AUTO: 280 K/UL — SIGNIFICANT CHANGE UP (ref 150–400)
PLATELET # BLD AUTO: 280 K/UL — SIGNIFICANT CHANGE UP (ref 150–400)
POTASSIUM SERPL-MCNC: 4.6 MMOL/L — SIGNIFICANT CHANGE UP (ref 3.5–5.3)
POTASSIUM SERPL-MCNC: 4.6 MMOL/L — SIGNIFICANT CHANGE UP (ref 3.5–5.3)
POTASSIUM SERPL-SCNC: 4.6 MMOL/L — SIGNIFICANT CHANGE UP (ref 3.5–5.3)
POTASSIUM SERPL-SCNC: 4.6 MMOL/L — SIGNIFICANT CHANGE UP (ref 3.5–5.3)
RBC # BLD: 3.26 M/UL — LOW (ref 4.2–5.8)
RBC # BLD: 3.26 M/UL — LOW (ref 4.2–5.8)
RBC # FLD: 14.9 % — HIGH (ref 10.3–14.5)
RBC # FLD: 14.9 % — HIGH (ref 10.3–14.5)
SODIUM SERPL-SCNC: 137 MMOL/L — SIGNIFICANT CHANGE UP (ref 135–145)
SODIUM SERPL-SCNC: 137 MMOL/L — SIGNIFICANT CHANGE UP (ref 135–145)
WBC # BLD: 11.9 K/UL — HIGH (ref 3.8–10.5)
WBC # BLD: 11.9 K/UL — HIGH (ref 3.8–10.5)
WBC # FLD AUTO: 11.9 K/UL — HIGH (ref 3.8–10.5)
WBC # FLD AUTO: 11.9 K/UL — HIGH (ref 3.8–10.5)

## 2023-12-31 PROCEDURE — 99233 SBSQ HOSP IP/OBS HIGH 50: CPT

## 2023-12-31 RX ADMIN — GABAPENTIN 300 MILLIGRAM(S): 400 CAPSULE ORAL at 22:32

## 2023-12-31 RX ADMIN — Medication 6 UNIT(S): at 13:55

## 2023-12-31 RX ADMIN — PANTOPRAZOLE SODIUM 40 MILLIGRAM(S): 20 TABLET, DELAYED RELEASE ORAL at 05:50

## 2023-12-31 RX ADMIN — Medication 1 MILLIGRAM(S): at 13:54

## 2023-12-31 RX ADMIN — HEPARIN SODIUM 5000 UNIT(S): 5000 INJECTION INTRAVENOUS; SUBCUTANEOUS at 13:55

## 2023-12-31 RX ADMIN — CHLORHEXIDINE GLUCONATE 1 APPLICATION(S): 213 SOLUTION TOPICAL at 06:52

## 2023-12-31 RX ADMIN — SODIUM CHLORIDE 55 MILLILITER(S): 9 INJECTION INTRAMUSCULAR; INTRAVENOUS; SUBCUTANEOUS at 17:41

## 2023-12-31 RX ADMIN — MIDODRINE HYDROCHLORIDE 5 MILLIGRAM(S): 2.5 TABLET ORAL at 05:50

## 2023-12-31 RX ADMIN — Medication 6 UNIT(S): at 17:41

## 2023-12-31 RX ADMIN — Medication 25 MILLIGRAM(S): at 17:40

## 2023-12-31 RX ADMIN — TAMSULOSIN HYDROCHLORIDE 0.4 MILLIGRAM(S): 0.4 CAPSULE ORAL at 22:32

## 2023-12-31 RX ADMIN — Medication 25 MILLIGRAM(S): at 05:51

## 2023-12-31 RX ADMIN — SODIUM CHLORIDE 55 MILLILITER(S): 9 INJECTION INTRAMUSCULAR; INTRAVENOUS; SUBCUTANEOUS at 10:37

## 2023-12-31 RX ADMIN — HEPARIN SODIUM 5000 UNIT(S): 5000 INJECTION INTRAVENOUS; SUBCUTANEOUS at 05:51

## 2023-12-31 RX ADMIN — DRONEDARONE 400 MILLIGRAM(S): 400 TABLET, FILM COATED ORAL at 17:40

## 2023-12-31 RX ADMIN — Medication 6 UNIT(S): at 10:52

## 2023-12-31 RX ADMIN — DRONEDARONE 400 MILLIGRAM(S): 400 TABLET, FILM COATED ORAL at 05:51

## 2023-12-31 RX ADMIN — INSULIN GLARGINE 18 UNIT(S): 100 INJECTION, SOLUTION SUBCUTANEOUS at 22:34

## 2023-12-31 RX ADMIN — CEFTRIAXONE 1000 MILLIGRAM(S): 500 INJECTION, POWDER, FOR SOLUTION INTRAMUSCULAR; INTRAVENOUS at 13:55

## 2023-12-31 RX ADMIN — Medication 2: at 10:51

## 2023-12-31 RX ADMIN — HEPARIN SODIUM 5000 UNIT(S): 5000 INJECTION INTRAVENOUS; SUBCUTANEOUS at 22:32

## 2023-12-31 NOTE — PROGRESS NOTE ADULT - SUBJECTIVE AND OBJECTIVE BOX
NEPHROLOGY INTERVAL HPI/OVERNIGHT EVENTS:    Examined  Feeling better  Denies HA CP no SOB    MEDICATIONS  (STANDING):  acetaminophen   IVPB .. 1000 milliGRAM(s) IV Intermittent once  cefTRIAXone Injectable. 1000 milliGRAM(s) IV Push every 24 hours  chlorhexidine 2% Cloths 1 Application(s) Topical <User Schedule>  dextrose 5%. 1000 milliLiter(s) (100 mL/Hr) IV Continuous <Continuous>  dextrose 5%. 1000 milliLiter(s) (50 mL/Hr) IV Continuous <Continuous>  dextrose 50% Injectable 12.5 Gram(s) IV Push once  dextrose 50% Injectable 25 Gram(s) IV Push once  dextrose 50% Injectable 25 Gram(s) IV Push once  dronedarone 400 milliGRAM(s) Oral two times a day  epoetin kristal-epbx (RETACRIT) Injectable 59799 Unit(s) SubCutaneous every 7 days  folic acid 1 milliGRAM(s) Oral daily  gabapentin 300 milliGRAM(s) Oral at bedtime  glucagon  Injectable 1 milliGRAM(s) IntraMuscular once  heparin   Injectable 5000 Unit(s) SubCutaneous every 8 hours  insulin glargine Injectable (LANTUS) 18 Unit(s) SubCutaneous at bedtime  insulin lispro (ADMELOG) corrective regimen sliding scale   SubCutaneous three times a day before meals  insulin lispro (ADMELOG) corrective regimen sliding scale   SubCutaneous at bedtime  insulin lispro Injectable (ADMELOG) 6 Unit(s) SubCutaneous three times a day with meals  metoprolol tartrate 25 milliGRAM(s) Oral two times a day  midodrine. 5 milliGRAM(s) Oral three times a day  OLANZapine Injectable 5 milliGRAM(s) IntraMuscular once  pantoprazole    Tablet 40 milliGRAM(s) Oral before breakfast  sodium bicarbonate  Infusion 0.095 mEq/kG/Hr (50 mL/Hr) IV Continuous <Continuous>  sodium chloride 0.9%. 1000 milliLiter(s) (55 mL/Hr) IV Continuous <Continuous>  tamsulosin 0.4 milliGRAM(s) Oral at bedtime    MEDICATIONS  (PRN):  dextrose Oral Gel 15 Gram(s) Oral once PRN Blood Glucose LESS THAN 70 milliGRAM(s)/deciliter  ondansetron Injectable 4 milliGRAM(s) IV Push every 6 hours PRN Nausea      Allergies    No Known Allergies    Intolerances        Vital Signs Last 24 Hrs  T(C): 37 (31 Dec 2023 16:58), Max: 37 (31 Dec 2023 16:58)  T(F): 98.6 (31 Dec 2023 16:58), Max: 98.6 (31 Dec 2023 16:58)  HR: 72 (31 Dec 2023 16:58) (65 - 75)  BP: 145/75 (31 Dec 2023 16:58) (128/72 - 162/69)  BP(mean): --  RR: 18 (31 Dec 2023 16:58) (18 - 20)  SpO2: 96% (31 Dec 2023 16:58) (92% - 96%)    Parameters below as of 31 Dec 2023 16:58  Patient On (Oxygen Delivery Method): room air      Daily     Daily     PHYSICAL EXAM:  Gen: NAD,  	HEENT: supple neck,   	Pulm: CTA B/L  	CV: RRR, S1S2; no rub  	Back: No spinal or CVA tenderness; no sacral edema  	Abd: +BS, soft, nontender/nondistended = Radha drain   	: foleyGen: NAD,  	HEENT: supple neck,   	Pulm: CTA B/L  	CV: RRR, S1S2; no rub  	Back: No spinal or CVA tenderness; no sacral edema      LABS:                        9.8    11.90 )-----------( 280      ( 31 Dec 2023 09:05 )             29.4     12-31    137  |  105  |  31.5<H>  ----------------------------<  144<H>  4.6   |  23.0  |  2.36<H>    Ca    8.0<L>      31 Dec 2023 09:05        Urinalysis Basic - ( 31 Dec 2023 09:05 )    Color: x / Appearance: x / SG: x / pH: x  Gluc: 144 mg/dL / Ketone: x  / Bili: x / Urobili: x   Blood: x / Protein: x / Nitrite: x   Leuk Esterase: x / RBC: x / WBC x   Sq Epi: x / Non Sq Epi: x / Bacteria: x              RADIOLOGY & ADDITIONAL TESTS:   NEPHROLOGY INTERVAL HPI/OVERNIGHT EVENTS:    Examined  Feeling better  Denies HA CP no SOB    MEDICATIONS  (STANDING):  acetaminophen   IVPB .. 1000 milliGRAM(s) IV Intermittent once  cefTRIAXone Injectable. 1000 milliGRAM(s) IV Push every 24 hours  chlorhexidine 2% Cloths 1 Application(s) Topical <User Schedule>  dextrose 5%. 1000 milliLiter(s) (100 mL/Hr) IV Continuous <Continuous>  dextrose 5%. 1000 milliLiter(s) (50 mL/Hr) IV Continuous <Continuous>  dextrose 50% Injectable 12.5 Gram(s) IV Push once  dextrose 50% Injectable 25 Gram(s) IV Push once  dextrose 50% Injectable 25 Gram(s) IV Push once  dronedarone 400 milliGRAM(s) Oral two times a day  epoetin kristal-epbx (RETACRIT) Injectable 61943 Unit(s) SubCutaneous every 7 days  folic acid 1 milliGRAM(s) Oral daily  gabapentin 300 milliGRAM(s) Oral at bedtime  glucagon  Injectable 1 milliGRAM(s) IntraMuscular once  heparin   Injectable 5000 Unit(s) SubCutaneous every 8 hours  insulin glargine Injectable (LANTUS) 18 Unit(s) SubCutaneous at bedtime  insulin lispro (ADMELOG) corrective regimen sliding scale   SubCutaneous three times a day before meals  insulin lispro (ADMELOG) corrective regimen sliding scale   SubCutaneous at bedtime  insulin lispro Injectable (ADMELOG) 6 Unit(s) SubCutaneous three times a day with meals  metoprolol tartrate 25 milliGRAM(s) Oral two times a day  midodrine. 5 milliGRAM(s) Oral three times a day  OLANZapine Injectable 5 milliGRAM(s) IntraMuscular once  pantoprazole    Tablet 40 milliGRAM(s) Oral before breakfast  sodium bicarbonate  Infusion 0.095 mEq/kG/Hr (50 mL/Hr) IV Continuous <Continuous>  sodium chloride 0.9%. 1000 milliLiter(s) (55 mL/Hr) IV Continuous <Continuous>  tamsulosin 0.4 milliGRAM(s) Oral at bedtime    MEDICATIONS  (PRN):  dextrose Oral Gel 15 Gram(s) Oral once PRN Blood Glucose LESS THAN 70 milliGRAM(s)/deciliter  ondansetron Injectable 4 milliGRAM(s) IV Push every 6 hours PRN Nausea      Allergies    No Known Allergies    Intolerances        Vital Signs Last 24 Hrs  T(C): 37 (31 Dec 2023 16:58), Max: 37 (31 Dec 2023 16:58)  T(F): 98.6 (31 Dec 2023 16:58), Max: 98.6 (31 Dec 2023 16:58)  HR: 72 (31 Dec 2023 16:58) (65 - 75)  BP: 145/75 (31 Dec 2023 16:58) (128/72 - 162/69)  BP(mean): --  RR: 18 (31 Dec 2023 16:58) (18 - 20)  SpO2: 96% (31 Dec 2023 16:58) (92% - 96%)    Parameters below as of 31 Dec 2023 16:58  Patient On (Oxygen Delivery Method): room air      Daily     Daily     PHYSICAL EXAM:  Gen: NAD,  	HEENT: supple neck,   	Pulm: CTA B/L  	CV: RRR, S1S2; no rub  	Back: No spinal or CVA tenderness; no sacral edema  	Abd: +BS, soft, nontender/nondistended = Radha drain   	: foleyGen: NAD,  	HEENT: supple neck,   	Pulm: CTA B/L  	CV: RRR, S1S2; no rub  	Back: No spinal or CVA tenderness; no sacral edema      LABS:                        9.8    11.90 )-----------( 280      ( 31 Dec 2023 09:05 )             29.4     12-31    137  |  105  |  31.5<H>  ----------------------------<  144<H>  4.6   |  23.0  |  2.36<H>    Ca    8.0<L>      31 Dec 2023 09:05        Urinalysis Basic - ( 31 Dec 2023 09:05 )    Color: x / Appearance: x / SG: x / pH: x  Gluc: 144 mg/dL / Ketone: x  / Bili: x / Urobili: x   Blood: x / Protein: x / Nitrite: x   Leuk Esterase: x / RBC: x / WBC x   Sq Epi: x / Non Sq Epi: x / Bacteria: x              RADIOLOGY & ADDITIONAL TESTS:

## 2023-12-31 NOTE — PROGRESS NOTE ADULT - NS ATTEST RISK PROBLEM GEN_ALL_CORE FT
Due to: renal failure, will get BMP to monitor Cr daily. Per Dr. Eric santana, changed IVF to NS  respiratory failure 2/2 covid, monitor oxygenation. Will order inflammatory markers to trend.  cholecystitis infection, requiring IV abx until 1/3; will get CBC to monitor white count.

## 2023-12-31 NOTE — PROGRESS NOTE ADULT - ASSESSMENT
83-year-old male with past medical history of hypertension, hyperlipidemia, DM, stage III CKD, BPH, admitted for acute sky s/p IR drain placement. Course c/b hypoxic resp failure 2/2 COVID as well as new Afib RVR.     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, no acute findings  - frequent re-orientation  - c/w Seroquel 12.5mg QHS    #Acute cholecystitis  - CT A/P and RUQ result noted  - s/p IR perc drain placement, monitor output, will need drain in for 4-6 weeks for tract to form, outpt IR followup  - bile culture with E-coli, abx changed to CTX, plan for total 14 days until 1/3  - c/w CTX  - BCx NGTD  - pain regimen  - diet advancement as tolerated  - GI following, herlinda recs  - trend CMP  - Outpatient surgery evaluation for interval cholecystectomy   - on home midodrine, unclear etiology    #Acute resp failure with hypoxia suspect 2/2 COVID - resolved  - Possible component of aspiration, s/p Zoysn  - s/p COVID vaccines  - s/p decadron x5 days  - Remdesivir dc due to worsening renal function   - s/p BIPAP and HFNC, weaned to RA  - CT chest noted, mild pulm edema noted, PRN lasix   - DVT studies negative    #Afib RVR  - Tele  - TSH normal  - cards d/w families, agree to hold AC  - Add Multaq  - metoprolol BID    #DM  - A1C 9  - ISS, accu checks  - c/w Lantus 18 QHS + 6U Premeals  - can c/w gabapentin for neuropathy   - monitor and adjust insulin as needed    #HLD  -c/w statin    #GERD  - c/w ppi 40mg qd    #Anemia  - Hg stable 9-10 range  - iron studies consistent with chronic dx  - b12 and folate normal  - trend CBC while admitted    DVT ppx: Heparin SQ  Diet: CC   Dispo: pending improvement delirium, ultimately for NITHIN, likely after new years

## 2023-12-31 NOTE — PROGRESS NOTE ADULT - ASSESSMENT
83-year-old male with past medical history of hypertension, hyperlipidemia, DM, stage III CKD, BPH, admitted for acute sky s/p IR drain placement. Course c/b hypoxic resp failure 2/2 COVID as well as new Afib RVR.   Nephrology consulted for Ron.    RON on CKD stage III  Serum Cr 4.2--> 3.2--> 2.9 --> 2.3 improving  SCr 1.9-2.7 in 2016- no labs available for review after that until admission  Likely underlying diabetic nephropathy may hav a degree of progression of dz  Pt is sp wills ; good urine output  UA with large glucose, 300 protein. small blood  CT AP reviewed; bl renal cysts  Ron likely hemodynamic ATN in setting of sepsis, hypotension, Afib with RVR  No pressing need for acute HD at this moment     MA - better will change IVf no longer needs bicarb    Anemia - cont Epogen and folate     Acute cholecystitis  s/p IR perc drain placement  on abx    Acute resp failure with hypoxia suspect 2/2 COVID  s/p decadron x5 days;Remdesivir dce'd due to worsening renal function     AM labs will follow

## 2023-12-31 NOTE — PROGRESS NOTE ADULT - SUBJECTIVE AND OBJECTIVE BOX
[facility] Division of Hospital Medicine    Chief Complaint:  Cholecystitis    INTERVAL HISTORY:  Overnight, no acute events.     Patient seen and examined at bedside this morning. Wife also at bedside. A&Ox2 to self, place. Pleasant, appears in no acute distress. Unable to engage in ROS given mentation.     Patient denies chest pain, SOB, abd pain, N/V, fever, chills, dysuria or any other complaints. All remainder ROS negative.     MEDICATIONS  (STANDING):  acetaminophen   IVPB .. 1000 milliGRAM(s) IV Intermittent once  cefTRIAXone Injectable. 1000 milliGRAM(s) IV Push every 24 hours  chlorhexidine 2% Cloths 1 Application(s) Topical <User Schedule>  dextrose 5%. 1000 milliLiter(s) (100 mL/Hr) IV Continuous <Continuous>  dextrose 5%. 1000 milliLiter(s) (50 mL/Hr) IV Continuous <Continuous>  dextrose 50% Injectable 12.5 Gram(s) IV Push once  dextrose 50% Injectable 25 Gram(s) IV Push once  dextrose 50% Injectable 25 Gram(s) IV Push once  dronedarone 400 milliGRAM(s) Oral two times a day  epoetin kristal-epbx (RETACRIT) Injectable 90043 Unit(s) SubCutaneous every 7 days  folic acid 1 milliGRAM(s) Oral daily  gabapentin 300 milliGRAM(s) Oral at bedtime  glucagon  Injectable 1 milliGRAM(s) IntraMuscular once  heparin   Injectable 5000 Unit(s) SubCutaneous every 8 hours  insulin glargine Injectable (LANTUS) 18 Unit(s) SubCutaneous at bedtime  insulin lispro (ADMELOG) corrective regimen sliding scale   SubCutaneous three times a day before meals  insulin lispro (ADMELOG) corrective regimen sliding scale   SubCutaneous at bedtime  insulin lispro Injectable (ADMELOG) 6 Unit(s) SubCutaneous three times a day with meals  metoprolol tartrate 25 milliGRAM(s) Oral two times a day  midodrine. 5 milliGRAM(s) Oral three times a day  OLANZapine Injectable 5 milliGRAM(s) IntraMuscular once  pantoprazole    Tablet 40 milliGRAM(s) Oral before breakfast  sodium bicarbonate  Infusion 0.095 mEq/kG/Hr (50 mL/Hr) IV Continuous <Continuous>  sodium chloride 0.9%. 1000 milliLiter(s) (55 mL/Hr) IV Continuous <Continuous>  tamsulosin 0.4 milliGRAM(s) Oral at bedtime    MEDICATIONS  (PRN):  dextrose Oral Gel 15 Gram(s) Oral once PRN Blood Glucose LESS THAN 70 milliGRAM(s)/deciliter  ondansetron Injectable 4 milliGRAM(s) IV Push every 6 hours PRN Nausea        I&O's Summary    30 Dec 2023 07:01  -  31 Dec 2023 07:00  --------------------------------------------------------  IN: 1680 mL / OUT: 950 mL / NET: 730 mL        PHYSICAL EXAM:  Vital Signs Last 24 Hrs  T(C): 36.4 (31 Dec 2023 08:00), Max: 36.8 (30 Dec 2023 16:14)  T(F): 97.6 (31 Dec 2023 08:00), Max: 98.2 (30 Dec 2023 16:14)  HR: 65 (31 Dec 2023 08:00) (65 - 75)  BP: 152/71 (31 Dec 2023 08:00) (128/72 - 162/69)  BP(mean): --  RR: 18 (31 Dec 2023 08:00) (18 - 18)  SpO2: 93% (31 Dec 2023 08:00) (92% - 96%)    Parameters below as of 31 Dec 2023 08:00  Patient On (Oxygen Delivery Method): room air            CONSTITUTIONAL: No apparent distress, on RA  HEENT: Normocephalic, Atraumatic.   RESPIRATORY:  lungs are clear to auscultation bilaterally, no crackles, rhonchi, wheezes  CARDIOVASCULAR: Regular rate and rhythm, No lower extremity edema  ABDOMEN: Soft, non-distended, +perc sky drain containing bilious material   MUSCLOSKELETAL: moving all 4 extremities spontaneously  NEUROLOGY: Alert, Oriented x1 (name), CN 2-12 grossly intact    LABS:                        9.8    11.90 )-----------( 280      ( 31 Dec 2023 09:05 )             29.4     12-31    137  |  105  |  31.5<H>  ----------------------------<  144<H>  4.6   |  23.0  |  2.36<H>    Ca    8.0<L>      31 Dec 2023 09:05            Urinalysis Basic - ( 31 Dec 2023 09:05 )    Color: x / Appearance: x / SG: x / pH: x  Gluc: 144 mg/dL / Ketone: x  / Bili: x / Urobili: x   Blood: x / Protein: x / Nitrite: x   Leuk Esterase: x / RBC: x / WBC x   Sq Epi: x / Non Sq Epi: x / Bacteria: x        CAPILLARY BLOOD GLUCOSE      POCT Blood Glucose.: 146 mg/dL (31 Dec 2023 13:50)  POCT Blood Glucose.: 153 mg/dL (31 Dec 2023 10:22)  POCT Blood Glucose.: 144 mg/dL (30 Dec 2023 21:10)  POCT Blood Glucose.: 188 mg/dL (30 Dec 2023 16:23)        RADIOLOGY & ADDITIONAL TESTS:  Results Reviewed                                           [facility] Division of Hospital Medicine    Chief Complaint:  Cholecystitis    INTERVAL HISTORY:  Overnight, no acute events.     Patient seen and examined at bedside this morning. Wife also at bedside. A&Ox2 to self, place. Pleasant, appears in no acute distress. Unable to engage in ROS given mentation.     Patient denies chest pain, SOB, abd pain, N/V, fever, chills, dysuria or any other complaints. All remainder ROS negative.     MEDICATIONS  (STANDING):  acetaminophen   IVPB .. 1000 milliGRAM(s) IV Intermittent once  cefTRIAXone Injectable. 1000 milliGRAM(s) IV Push every 24 hours  chlorhexidine 2% Cloths 1 Application(s) Topical <User Schedule>  dextrose 5%. 1000 milliLiter(s) (100 mL/Hr) IV Continuous <Continuous>  dextrose 5%. 1000 milliLiter(s) (50 mL/Hr) IV Continuous <Continuous>  dextrose 50% Injectable 12.5 Gram(s) IV Push once  dextrose 50% Injectable 25 Gram(s) IV Push once  dextrose 50% Injectable 25 Gram(s) IV Push once  dronedarone 400 milliGRAM(s) Oral two times a day  epoetin kristal-epbx (RETACRIT) Injectable 17601 Unit(s) SubCutaneous every 7 days  folic acid 1 milliGRAM(s) Oral daily  gabapentin 300 milliGRAM(s) Oral at bedtime  glucagon  Injectable 1 milliGRAM(s) IntraMuscular once  heparin   Injectable 5000 Unit(s) SubCutaneous every 8 hours  insulin glargine Injectable (LANTUS) 18 Unit(s) SubCutaneous at bedtime  insulin lispro (ADMELOG) corrective regimen sliding scale   SubCutaneous three times a day before meals  insulin lispro (ADMELOG) corrective regimen sliding scale   SubCutaneous at bedtime  insulin lispro Injectable (ADMELOG) 6 Unit(s) SubCutaneous three times a day with meals  metoprolol tartrate 25 milliGRAM(s) Oral two times a day  midodrine. 5 milliGRAM(s) Oral three times a day  OLANZapine Injectable 5 milliGRAM(s) IntraMuscular once  pantoprazole    Tablet 40 milliGRAM(s) Oral before breakfast  sodium bicarbonate  Infusion 0.095 mEq/kG/Hr (50 mL/Hr) IV Continuous <Continuous>  sodium chloride 0.9%. 1000 milliLiter(s) (55 mL/Hr) IV Continuous <Continuous>  tamsulosin 0.4 milliGRAM(s) Oral at bedtime    MEDICATIONS  (PRN):  dextrose Oral Gel 15 Gram(s) Oral once PRN Blood Glucose LESS THAN 70 milliGRAM(s)/deciliter  ondansetron Injectable 4 milliGRAM(s) IV Push every 6 hours PRN Nausea        I&O's Summary    30 Dec 2023 07:01  -  31 Dec 2023 07:00  --------------------------------------------------------  IN: 1680 mL / OUT: 950 mL / NET: 730 mL        PHYSICAL EXAM:  Vital Signs Last 24 Hrs  T(C): 36.4 (31 Dec 2023 08:00), Max: 36.8 (30 Dec 2023 16:14)  T(F): 97.6 (31 Dec 2023 08:00), Max: 98.2 (30 Dec 2023 16:14)  HR: 65 (31 Dec 2023 08:00) (65 - 75)  BP: 152/71 (31 Dec 2023 08:00) (128/72 - 162/69)  BP(mean): --  RR: 18 (31 Dec 2023 08:00) (18 - 18)  SpO2: 93% (31 Dec 2023 08:00) (92% - 96%)    Parameters below as of 31 Dec 2023 08:00  Patient On (Oxygen Delivery Method): room air            CONSTITUTIONAL: No apparent distress, on RA  HEENT: Normocephalic, Atraumatic.   RESPIRATORY:  lungs are clear to auscultation bilaterally, no crackles, rhonchi, wheezes  CARDIOVASCULAR: Regular rate and rhythm, No lower extremity edema  ABDOMEN: Soft, non-distended, +perc sky drain containing bilious material   MUSCLOSKELETAL: moving all 4 extremities spontaneously  NEUROLOGY: Alert, Oriented x1 (name), CN 2-12 grossly intact    LABS:                        9.8    11.90 )-----------( 280      ( 31 Dec 2023 09:05 )             29.4     12-31    137  |  105  |  31.5<H>  ----------------------------<  144<H>  4.6   |  23.0  |  2.36<H>    Ca    8.0<L>      31 Dec 2023 09:05            Urinalysis Basic - ( 31 Dec 2023 09:05 )    Color: x / Appearance: x / SG: x / pH: x  Gluc: 144 mg/dL / Ketone: x  / Bili: x / Urobili: x   Blood: x / Protein: x / Nitrite: x   Leuk Esterase: x / RBC: x / WBC x   Sq Epi: x / Non Sq Epi: x / Bacteria: x        CAPILLARY BLOOD GLUCOSE      POCT Blood Glucose.: 146 mg/dL (31 Dec 2023 13:50)  POCT Blood Glucose.: 153 mg/dL (31 Dec 2023 10:22)  POCT Blood Glucose.: 144 mg/dL (30 Dec 2023 21:10)  POCT Blood Glucose.: 188 mg/dL (30 Dec 2023 16:23)        RADIOLOGY & ADDITIONAL TESTS:  Results Reviewed

## 2024-01-01 ENCOUNTER — INPATIENT (INPATIENT)
Facility: HOSPITAL | Age: 84
LOS: 11 days | Discharge: HOME | DRG: 872 | End: 2024-10-26
Attending: GENERAL ACUTE CARE HOSPITAL | Admitting: INTERNAL MEDICINE
Payer: MEDICARE

## 2024-01-01 VITALS
HEART RATE: 90 BPM | OXYGEN SATURATION: 97 % | WEIGHT: 149.91 LBS | HEIGHT: 65 IN | RESPIRATION RATE: 18 BRPM | DIASTOLIC BLOOD PRESSURE: 79 MMHG | TEMPERATURE: 98 F | SYSTOLIC BLOOD PRESSURE: 128 MMHG

## 2024-01-01 VITALS
DIASTOLIC BLOOD PRESSURE: 75 MMHG | SYSTOLIC BLOOD PRESSURE: 134 MMHG | HEART RATE: 84 BPM | RESPIRATION RATE: 18 BRPM | OXYGEN SATURATION: 94 % | TEMPERATURE: 98 F

## 2024-01-01 DIAGNOSIS — A41.9 SEPSIS, UNSPECIFIED ORGANISM: ICD-10-CM

## 2024-01-01 DIAGNOSIS — Z98.49 CATARACT EXTRACTION STATUS, UNSPECIFIED EYE: Chronic | ICD-10-CM

## 2024-01-01 DIAGNOSIS — J96.01 ACUTE RESPIRATORY FAILURE WITH HYPOXIA: ICD-10-CM

## 2024-01-01 DIAGNOSIS — T85.518A BREAKDOWN (MECHANICAL) OF OTHER GASTROINTESTINAL PROSTHETIC DEVICES, IMPLANTS AND GRAFTS, INITIAL ENCOUNTER: Chronic | ICD-10-CM

## 2024-01-01 DIAGNOSIS — E78.5 HYPERLIPIDEMIA, UNSPECIFIED: ICD-10-CM

## 2024-01-01 DIAGNOSIS — T17.500A UNSPECIFIED FOREIGN BODY IN BRONCHUS CAUSING ASPHYXIATION, INITIAL ENCOUNTER: ICD-10-CM

## 2024-01-01 DIAGNOSIS — J98.11 ATELECTASIS: ICD-10-CM

## 2024-01-01 DIAGNOSIS — I10 ESSENTIAL (PRIMARY) HYPERTENSION: ICD-10-CM

## 2024-01-01 DIAGNOSIS — R79.89 OTHER SPECIFIED ABNORMAL FINDINGS OF BLOOD CHEMISTRY: ICD-10-CM

## 2024-01-01 DIAGNOSIS — R91.8 OTHER NONSPECIFIC ABNORMAL FINDING OF LUNG FIELD: ICD-10-CM

## 2024-01-01 DIAGNOSIS — Z98.890 OTHER SPECIFIED POSTPROCEDURAL STATES: Chronic | ICD-10-CM

## 2024-01-01 LAB
-  AMPICILLIN/SULBACTAM: SIGNIFICANT CHANGE UP
-  AMPICILLIN/SULBACTAM: SIGNIFICANT CHANGE UP
-  AMPICILLIN: SIGNIFICANT CHANGE UP
-  AMPICILLIN: SIGNIFICANT CHANGE UP
-  AZTREONAM: SIGNIFICANT CHANGE UP
-  AZTREONAM: SIGNIFICANT CHANGE UP
-  CEFAZOLIN: SIGNIFICANT CHANGE UP
-  CEFAZOLIN: SIGNIFICANT CHANGE UP
-  CEFEPIME: SIGNIFICANT CHANGE UP
-  CEFEPIME: SIGNIFICANT CHANGE UP
-  CEFTRIAXONE: SIGNIFICANT CHANGE UP
-  CEFTRIAXONE: SIGNIFICANT CHANGE UP
-  CEFUROXIME: SIGNIFICANT CHANGE UP
-  CIPROFLOXACIN: SIGNIFICANT CHANGE UP
-  CIPROFLOXACIN: SIGNIFICANT CHANGE UP
-  CTX-M RESISTANCE MARKER: SIGNIFICANT CHANGE UP
-  CTX-M RESISTANCE MARKER: SIGNIFICANT CHANGE UP
-  ERTAPENEM: SIGNIFICANT CHANGE UP
-  ERTAPENEM: SIGNIFICANT CHANGE UP
-  ESBL: SIGNIFICANT CHANGE UP
-  ESBL: SIGNIFICANT CHANGE UP
-  GENTAMICIN: SIGNIFICANT CHANGE UP
-  GENTAMICIN: SIGNIFICANT CHANGE UP
-  IMIPENEM: SIGNIFICANT CHANGE UP
-  IMIPENEM: SIGNIFICANT CHANGE UP
-  LEVOFLOXACIN: SIGNIFICANT CHANGE UP
-  LEVOFLOXACIN: SIGNIFICANT CHANGE UP
-  MEROPENEM: SIGNIFICANT CHANGE UP
-  MEROPENEM: SIGNIFICANT CHANGE UP
-  NITROFURANTOIN: SIGNIFICANT CHANGE UP
-  PIPERACILLIN/TAZOBACTAM: SIGNIFICANT CHANGE UP
-  PIPERACILLIN/TAZOBACTAM: SIGNIFICANT CHANGE UP
-  TOBRAMYCIN: SIGNIFICANT CHANGE UP
-  TOBRAMYCIN: SIGNIFICANT CHANGE UP
-  TRIMETHOPRIM/SULFAMETHOXAZOLE: SIGNIFICANT CHANGE UP
-  TRIMETHOPRIM/SULFAMETHOXAZOLE: SIGNIFICANT CHANGE UP
A1C WITH ESTIMATED AVERAGE GLUCOSE RESULT: 6.9 % — HIGH (ref 4–5.6)
ACETONE SERPL-MCNC: NEGATIVE — SIGNIFICANT CHANGE UP
ALBUMIN SERPL ELPH-MCNC: 2.4 G/DL — LOW (ref 3.3–5.2)
ALBUMIN SERPL ELPH-MCNC: 2.5 G/DL — LOW (ref 3.3–5.2)
ALBUMIN SERPL ELPH-MCNC: 2.6 G/DL — LOW (ref 3.3–5.2)
ALBUMIN SERPL ELPH-MCNC: 2.8 G/DL — LOW (ref 3.3–5.2)
ALLERGY+IMMUNOLOGY DIAG STUDY NOTE: SIGNIFICANT CHANGE UP
ALLERGY+IMMUNOLOGY DIAG STUDY NOTE: SIGNIFICANT CHANGE UP
ALP SERPL-CCNC: 108 U/L — SIGNIFICANT CHANGE UP (ref 40–120)
ALP SERPL-CCNC: 109 U/L — SIGNIFICANT CHANGE UP (ref 40–120)
ALP SERPL-CCNC: 109 U/L — SIGNIFICANT CHANGE UP (ref 40–120)
ALP SERPL-CCNC: 111 U/L — SIGNIFICANT CHANGE UP (ref 40–120)
ALP SERPL-CCNC: 113 U/L — SIGNIFICANT CHANGE UP (ref 40–120)
ALP SERPL-CCNC: 115 U/L — SIGNIFICANT CHANGE UP (ref 40–120)
ALP SERPL-CCNC: 116 U/L — SIGNIFICANT CHANGE UP (ref 40–120)
ALP SERPL-CCNC: 120 U/L — SIGNIFICANT CHANGE UP (ref 40–120)
ALP SERPL-CCNC: 123 U/L — HIGH (ref 40–120)
ALP SERPL-CCNC: 136 U/L — HIGH (ref 40–120)
ALP SERPL-CCNC: 141 U/L — HIGH (ref 40–120)
ALP SERPL-CCNC: 80 U/L — SIGNIFICANT CHANGE UP (ref 40–120)
ALP SERPL-CCNC: 80 U/L — SIGNIFICANT CHANGE UP (ref 40–120)
ALP SERPL-CCNC: 97 U/L — SIGNIFICANT CHANGE UP (ref 40–120)
ALT FLD-CCNC: 10 U/L — SIGNIFICANT CHANGE UP
ALT FLD-CCNC: 10 U/L — SIGNIFICANT CHANGE UP
ALT FLD-CCNC: 11 U/L — SIGNIFICANT CHANGE UP
ALT FLD-CCNC: 11 U/L — SIGNIFICANT CHANGE UP
ALT FLD-CCNC: 12 U/L — SIGNIFICANT CHANGE UP
ALT FLD-CCNC: 14 U/L — SIGNIFICANT CHANGE UP
ALT FLD-CCNC: 31 U/L — SIGNIFICANT CHANGE UP
ALT FLD-CCNC: 31 U/L — SIGNIFICANT CHANGE UP
ALT FLD-CCNC: 9 U/L — SIGNIFICANT CHANGE UP
AMMONIA BLD-MCNC: 14 UMOL/L — SIGNIFICANT CHANGE UP (ref 11–55)
ANION GAP SERPL CALC-SCNC: 11 MMOL/L — SIGNIFICANT CHANGE UP (ref 5–17)
ANION GAP SERPL CALC-SCNC: 12 MMOL/L — SIGNIFICANT CHANGE UP (ref 5–17)
ANION GAP SERPL CALC-SCNC: 13 MMOL/L — SIGNIFICANT CHANGE UP (ref 5–17)
ANION GAP SERPL CALC-SCNC: 14 MMOL/L — SIGNIFICANT CHANGE UP (ref 5–17)
ANION GAP SERPL CALC-SCNC: 15 MMOL/L — SIGNIFICANT CHANGE UP (ref 5–17)
ANION GAP SERPL CALC-SCNC: 16 MMOL/L — SIGNIFICANT CHANGE UP (ref 5–17)
ANION GAP SERPL CALC-SCNC: 16 MMOL/L — SIGNIFICANT CHANGE UP (ref 5–17)
ANION GAP SERPL CALC-SCNC: 17 MMOL/L — SIGNIFICANT CHANGE UP (ref 5–17)
ANISOCYTOSIS BLD QL: SLIGHT — SIGNIFICANT CHANGE UP
APPEARANCE UR: ABNORMAL
APTT BLD: 28.5 SEC — SIGNIFICANT CHANGE UP (ref 24.5–35.6)
APTT BLD: 33.9 SEC — SIGNIFICANT CHANGE UP (ref 24.5–35.6)
AST SERPL-CCNC: 10 U/L — SIGNIFICANT CHANGE UP
AST SERPL-CCNC: 11 U/L — SIGNIFICANT CHANGE UP
AST SERPL-CCNC: 11 U/L — SIGNIFICANT CHANGE UP
AST SERPL-CCNC: 12 U/L — SIGNIFICANT CHANGE UP
AST SERPL-CCNC: 13 U/L — SIGNIFICANT CHANGE UP
AST SERPL-CCNC: 14 U/L — SIGNIFICANT CHANGE UP
AST SERPL-CCNC: 15 U/L — SIGNIFICANT CHANGE UP
AST SERPL-CCNC: 15 U/L — SIGNIFICANT CHANGE UP
AST SERPL-CCNC: 17 U/L — SIGNIFICANT CHANGE UP
AST SERPL-CCNC: 19 U/L — SIGNIFICANT CHANGE UP
AST SERPL-CCNC: 33 U/L — SIGNIFICANT CHANGE UP
AST SERPL-CCNC: 33 U/L — SIGNIFICANT CHANGE UP
BACTERIA # UR AUTO: ABNORMAL /HPF
BASE EXCESS BLDA CALC-SCNC: 4.1 MMOL/L — HIGH (ref -2–3)
BASE EXCESS BLDA CALC-SCNC: 6.4 MMOL/L — HIGH (ref -2–3)
BASOPHILS # BLD AUTO: 0 K/UL — SIGNIFICANT CHANGE UP (ref 0–0.2)
BASOPHILS # BLD AUTO: 0.01 K/UL — SIGNIFICANT CHANGE UP (ref 0–0.2)
BASOPHILS # BLD AUTO: 0.02 K/UL — SIGNIFICANT CHANGE UP (ref 0–0.2)
BASOPHILS # BLD AUTO: 0.03 K/UL — SIGNIFICANT CHANGE UP (ref 0–0.2)
BASOPHILS # BLD AUTO: 0.03 K/UL — SIGNIFICANT CHANGE UP (ref 0–0.2)
BASOPHILS # BLD AUTO: 0.04 K/UL — SIGNIFICANT CHANGE UP (ref 0–0.2)
BASOPHILS # BLD AUTO: 0.04 K/UL — SIGNIFICANT CHANGE UP (ref 0–0.2)
BASOPHILS # BLD AUTO: 0.11 K/UL — SIGNIFICANT CHANGE UP (ref 0–0.2)
BASOPHILS NFR BLD AUTO: 0 % — SIGNIFICANT CHANGE UP (ref 0–2)
BASOPHILS NFR BLD AUTO: 0.2 % — SIGNIFICANT CHANGE UP (ref 0–2)
BASOPHILS NFR BLD AUTO: 0.3 % — SIGNIFICANT CHANGE UP (ref 0–2)
BASOPHILS NFR BLD AUTO: 0.3 % — SIGNIFICANT CHANGE UP (ref 0–2)
BASOPHILS NFR BLD AUTO: 0.9 % — SIGNIFICANT CHANGE UP (ref 0–2)
BILIRUB SERPL-MCNC: 0.2 MG/DL — LOW (ref 0.4–2)
BILIRUB SERPL-MCNC: 0.3 MG/DL — LOW (ref 0.4–2)
BILIRUB SERPL-MCNC: 0.4 MG/DL — SIGNIFICANT CHANGE UP (ref 0.4–2)
BILIRUB SERPL-MCNC: 0.5 MG/DL — SIGNIFICANT CHANGE UP (ref 0.4–2)
BILIRUB SERPL-MCNC: 0.5 MG/DL — SIGNIFICANT CHANGE UP (ref 0.4–2)
BILIRUB SERPL-MCNC: 0.7 MG/DL — SIGNIFICANT CHANGE UP (ref 0.4–2)
BILIRUB SERPL-MCNC: 0.7 MG/DL — SIGNIFICANT CHANGE UP (ref 0.4–2)
BILIRUB SERPL-MCNC: <0.2 MG/DL — LOW (ref 0.4–2)
BILIRUB UR-MCNC: NEGATIVE — SIGNIFICANT CHANGE UP
BLD GP AB SCN SERPL QL: SIGNIFICANT CHANGE UP
BLD GP AB SCN SERPL QL: SIGNIFICANT CHANGE UP
BLOOD GAS COMMENTS ARTERIAL: SIGNIFICANT CHANGE UP
BUN SERPL-MCNC: 20.9 MG/DL — HIGH (ref 8–20)
BUN SERPL-MCNC: 23 MG/DL — HIGH (ref 8–20)
BUN SERPL-MCNC: 26.9 MG/DL — HIGH (ref 8–20)
BUN SERPL-MCNC: 28.4 MG/DL — HIGH (ref 8–20)
BUN SERPL-MCNC: 28.4 MG/DL — HIGH (ref 8–20)
BUN SERPL-MCNC: 33.2 MG/DL — HIGH (ref 8–20)
BUN SERPL-MCNC: 35.6 MG/DL — HIGH (ref 8–20)
BUN SERPL-MCNC: 40.5 MG/DL — HIGH (ref 8–20)
BUN SERPL-MCNC: 45.1 MG/DL — HIGH (ref 8–20)
BUN SERPL-MCNC: 47.9 MG/DL — HIGH (ref 8–20)
BUN SERPL-MCNC: 48.9 MG/DL — HIGH (ref 8–20)
BUN SERPL-MCNC: 49.2 MG/DL — HIGH (ref 8–20)
BUN SERPL-MCNC: 49.6 MG/DL — HIGH (ref 8–20)
BUN SERPL-MCNC: 50.2 MG/DL — HIGH (ref 8–20)
BUN SERPL-MCNC: 50.4 MG/DL — HIGH (ref 8–20)
BUN SERPL-MCNC: 50.7 MG/DL — HIGH (ref 8–20)
BUN SERPL-MCNC: 52.9 MG/DL — HIGH (ref 8–20)
BURR CELLS BLD QL SMEAR: PRESENT — SIGNIFICANT CHANGE UP
BURR CELLS BLD QL SMEAR: PRESENT — SIGNIFICANT CHANGE UP
CALCIUM SERPL-MCNC: 7.4 MG/DL — LOW (ref 8.4–10.5)
CALCIUM SERPL-MCNC: 7.8 MG/DL — LOW (ref 8.4–10.5)
CALCIUM SERPL-MCNC: 7.9 MG/DL — LOW (ref 8.4–10.5)
CALCIUM SERPL-MCNC: 8 MG/DL — LOW (ref 8.4–10.5)
CALCIUM SERPL-MCNC: 8.1 MG/DL — LOW (ref 8.4–10.5)
CALCIUM SERPL-MCNC: 8.2 MG/DL — LOW (ref 8.4–10.5)
CALCIUM SERPL-MCNC: 8.3 MG/DL — LOW (ref 8.4–10.5)
CALCIUM SERPL-MCNC: 8.3 MG/DL — LOW (ref 8.4–10.5)
CALCIUM SERPL-MCNC: 8.4 MG/DL — SIGNIFICANT CHANGE UP (ref 8.4–10.5)
CALCIUM SERPL-MCNC: 8.5 MG/DL — SIGNIFICANT CHANGE UP (ref 8.4–10.5)
CALCIUM SERPL-MCNC: 8.5 MG/DL — SIGNIFICANT CHANGE UP (ref 8.4–10.5)
CHLORIDE SERPL-SCNC: 100 MMOL/L — SIGNIFICANT CHANGE UP (ref 96–108)
CHLORIDE SERPL-SCNC: 100 MMOL/L — SIGNIFICANT CHANGE UP (ref 96–108)
CHLORIDE SERPL-SCNC: 101 MMOL/L — SIGNIFICANT CHANGE UP (ref 96–108)
CHLORIDE SERPL-SCNC: 103 MMOL/L — SIGNIFICANT CHANGE UP (ref 96–108)
CHLORIDE SERPL-SCNC: 104 MMOL/L — SIGNIFICANT CHANGE UP (ref 96–108)
CHLORIDE SERPL-SCNC: 105 MMOL/L — SIGNIFICANT CHANGE UP (ref 96–108)
CHLORIDE SERPL-SCNC: 105 MMOL/L — SIGNIFICANT CHANGE UP (ref 96–108)
CHLORIDE SERPL-SCNC: 106 MMOL/L — SIGNIFICANT CHANGE UP (ref 96–108)
CHLORIDE SERPL-SCNC: 106 MMOL/L — SIGNIFICANT CHANGE UP (ref 96–108)
CHLORIDE SERPL-SCNC: 109 MMOL/L — HIGH (ref 96–108)
CHLORIDE SERPL-SCNC: 109 MMOL/L — HIGH (ref 96–108)
CHLORIDE SERPL-SCNC: 111 MMOL/L — HIGH (ref 96–108)
CHLORIDE SERPL-SCNC: 111 MMOL/L — HIGH (ref 96–108)
CHLORIDE SERPL-SCNC: 98 MMOL/L — SIGNIFICANT CHANGE UP (ref 96–108)
CHLORIDE SERPL-SCNC: 99 MMOL/L — SIGNIFICANT CHANGE UP (ref 96–108)
CHLORIDE UR-SCNC: <27 MMOL/L — SIGNIFICANT CHANGE UP
CHOLEST SERPL-MCNC: 98 MG/DL — SIGNIFICANT CHANGE UP
CK SERPL-CCNC: 12 U/L — LOW (ref 30–200)
CK SERPL-CCNC: 15 U/L — LOW (ref 30–200)
CK SERPL-CCNC: 8 U/L — LOW (ref 30–200)
CO2 SERPL-SCNC: 16 MMOL/L — LOW (ref 22–29)
CO2 SERPL-SCNC: 18 MMOL/L — LOW (ref 22–29)
CO2 SERPL-SCNC: 20 MMOL/L — LOW (ref 22–29)
CO2 SERPL-SCNC: 21 MMOL/L — LOW (ref 22–29)
CO2 SERPL-SCNC: 23 MMOL/L — SIGNIFICANT CHANGE UP (ref 22–29)
CO2 SERPL-SCNC: 23 MMOL/L — SIGNIFICANT CHANGE UP (ref 22–29)
CO2 SERPL-SCNC: 24 MMOL/L — SIGNIFICANT CHANGE UP (ref 22–29)
CO2 SERPL-SCNC: 24 MMOL/L — SIGNIFICANT CHANGE UP (ref 22–29)
CO2 SERPL-SCNC: 25 MMOL/L — SIGNIFICANT CHANGE UP (ref 22–29)
CO2 SERPL-SCNC: 25 MMOL/L — SIGNIFICANT CHANGE UP (ref 22–29)
CO2 SERPL-SCNC: 26 MMOL/L — SIGNIFICANT CHANGE UP (ref 22–29)
CO2 SERPL-SCNC: 26 MMOL/L — SIGNIFICANT CHANGE UP (ref 22–29)
COLOR SPEC: YELLOW — SIGNIFICANT CHANGE UP
CREAT SERPL-MCNC: 2.19 MG/DL — HIGH (ref 0.5–1.3)
CREAT SERPL-MCNC: 2.3 MG/DL — HIGH (ref 0.5–1.3)
CREAT SERPL-MCNC: 2.36 MG/DL — HIGH (ref 0.5–1.3)
CREAT SERPL-MCNC: 2.37 MG/DL — HIGH (ref 0.5–1.3)
CREAT SERPL-MCNC: 2.57 MG/DL — HIGH (ref 0.5–1.3)
CREAT SERPL-MCNC: 2.73 MG/DL — HIGH (ref 0.5–1.3)
CREAT SERPL-MCNC: 2.73 MG/DL — HIGH (ref 0.5–1.3)
CREAT SERPL-MCNC: 2.81 MG/DL — HIGH (ref 0.5–1.3)
CREAT SERPL-MCNC: 3.28 MG/DL — HIGH (ref 0.5–1.3)
CREAT SERPL-MCNC: 3.32 MG/DL — HIGH (ref 0.5–1.3)
CREAT SERPL-MCNC: 3.34 MG/DL — HIGH (ref 0.5–1.3)
CREAT SERPL-MCNC: 3.52 MG/DL — HIGH (ref 0.5–1.3)
CREAT SERPL-MCNC: 3.66 MG/DL — HIGH (ref 0.5–1.3)
CREAT SERPL-MCNC: 3.68 MG/DL — HIGH (ref 0.5–1.3)
CREAT SERPL-MCNC: 3.69 MG/DL — HIGH (ref 0.5–1.3)
CREAT SERPL-MCNC: 4.01 MG/DL — HIGH (ref 0.5–1.3)
CREAT SERPL-MCNC: 4.24 MG/DL — HIGH (ref 0.5–1.3)
CRP SERPL-MCNC: 57 MG/L — HIGH
CRP SERPL-MCNC: 57 MG/L — HIGH
CULTURE RESULTS: ABNORMAL
CULTURE RESULTS: SIGNIFICANT CHANGE UP
CULTURE RESULTS: SIGNIFICANT CHANGE UP
D DIMER BLD IA.RAPID-MCNC: 1226 NG/ML DDU — HIGH
D DIMER BLD IA.RAPID-MCNC: 539 NG/ML DDU — HIGH
D DIMER BLD IA.RAPID-MCNC: 539 NG/ML DDU — HIGH
DAT C3-SP REAG RBC QL: SIGNIFICANT CHANGE UP
DAT C3-SP REAG RBC QL: SIGNIFICANT CHANGE UP
DAT IGG-SP REAG RBC-IMP: ABNORMAL
DAT IGG-SP REAG RBC-IMP: ABNORMAL
DIFF PNL FLD: ABNORMAL
DIR ANTIGLOB POLYSPECIFIC INTERPRETATION: ABNORMAL
DIR ANTIGLOB POLYSPECIFIC INTERPRETATION: ABNORMAL
E COLI DNA BLD POS QL NAA+NON-PROBE: SIGNIFICANT CHANGE UP
E COLI DNA BLD POS QL NAA+NON-PROBE: SIGNIFICANT CHANGE UP
EGFR: 13 ML/MIN/1.73M2 — LOW
EGFR: 13 ML/MIN/1.73M2 — LOW
EGFR: 14 ML/MIN/1.73M2 — LOW
EGFR: 14 ML/MIN/1.73M2 — LOW
EGFR: 15 ML/MIN/1.73M2 — LOW
EGFR: 16 ML/MIN/1.73M2 — LOW
EGFR: 17 ML/MIN/1.73M2 — LOW
EGFR: 18 ML/MIN/1.73M2 — LOW
EGFR: 18 ML/MIN/1.73M2 — LOW
EGFR: 21 ML/MIN/1.73M2 — LOW
EGFR: 21 ML/MIN/1.73M2 — LOW
EGFR: 22 ML/MIN/1.73M2 — LOW
EGFR: 22 ML/MIN/1.73M2 — LOW
EGFR: 24 ML/MIN/1.73M2 — LOW
EGFR: 24 ML/MIN/1.73M2 — LOW
EGFR: 26 ML/MIN/1.73M2 — LOW
EGFR: 27 ML/MIN/1.73M2 — LOW
EGFR: 27 ML/MIN/1.73M2 — LOW
EGFR: 29 ML/MIN/1.73M2 — LOW
EGFR: 29 ML/MIN/1.73M2 — LOW
ELLIPTOCYTES BLD QL SMEAR: SLIGHT — SIGNIFICANT CHANGE UP
EOSINOPHIL # BLD AUTO: 0 K/UL — SIGNIFICANT CHANGE UP (ref 0–0.5)
EOSINOPHIL # BLD AUTO: 0.01 K/UL — SIGNIFICANT CHANGE UP (ref 0–0.5)
EOSINOPHIL # BLD AUTO: 0.04 K/UL — SIGNIFICANT CHANGE UP (ref 0–0.5)
EOSINOPHIL # BLD AUTO: 0.11 K/UL — SIGNIFICANT CHANGE UP (ref 0–0.5)
EOSINOPHIL # BLD AUTO: 0.28 K/UL — SIGNIFICANT CHANGE UP (ref 0–0.5)
EOSINOPHIL # BLD AUTO: 0.28 K/UL — SIGNIFICANT CHANGE UP (ref 0–0.5)
EOSINOPHIL # BLD AUTO: 0.3 K/UL — SIGNIFICANT CHANGE UP (ref 0–0.5)
EOSINOPHIL NFR BLD AUTO: 0 % — SIGNIFICANT CHANGE UP (ref 0–6)
EOSINOPHIL NFR BLD AUTO: 0.1 % — SIGNIFICANT CHANGE UP (ref 0–6)
EOSINOPHIL NFR BLD AUTO: 0.2 % — SIGNIFICANT CHANGE UP (ref 0–6)
EOSINOPHIL NFR BLD AUTO: 0.9 % — SIGNIFICANT CHANGE UP (ref 0–6)
EOSINOPHIL NFR BLD AUTO: 1.4 % — SIGNIFICANT CHANGE UP (ref 0–6)
EOSINOPHIL NFR BLD AUTO: 2.7 % — SIGNIFICANT CHANGE UP (ref 0–6)
EOSINOPHIL NFR BLD AUTO: 2.9 % — SIGNIFICANT CHANGE UP (ref 0–6)
EOSINOPHIL NFR BLD AUTO: 3.1 % — SIGNIFICANT CHANGE UP (ref 0–6)
EOSINOPHIL NFR BLD AUTO: 3.3 % — SIGNIFICANT CHANGE UP (ref 0–6)
ESTIMATED AVERAGE GLUCOSE: 151 MG/DL — HIGH (ref 68–114)
FERRITIN SERPL-MCNC: 325 NG/ML — SIGNIFICANT CHANGE UP (ref 30–400)
FERRITIN SERPL-MCNC: 573 NG/ML — HIGH (ref 30–400)
FERRITIN SERPL-MCNC: 573 NG/ML — HIGH (ref 30–400)
FERRITIN SERPL-MCNC: 687 NG/ML — HIGH (ref 30–400)
FOLATE SERPL-MCNC: 10.4 NG/ML — SIGNIFICANT CHANGE UP
GAS PNL BLDA: SIGNIFICANT CHANGE UP
GAS PNL BLDV: SIGNIFICANT CHANGE UP
GAS PNL BLDV: SIGNIFICANT CHANGE UP
GIANT PLATELETS BLD QL SMEAR: PRESENT — SIGNIFICANT CHANGE UP
GLUCOSE BLDC GLUCOMTR-MCNC: 101 MG/DL — HIGH (ref 70–99)
GLUCOSE BLDC GLUCOMTR-MCNC: 103 MG/DL — HIGH (ref 70–99)
GLUCOSE BLDC GLUCOMTR-MCNC: 107 MG/DL — HIGH (ref 70–99)
GLUCOSE BLDC GLUCOMTR-MCNC: 107 MG/DL — HIGH (ref 70–99)
GLUCOSE BLDC GLUCOMTR-MCNC: 127 MG/DL — HIGH (ref 70–99)
GLUCOSE BLDC GLUCOMTR-MCNC: 135 MG/DL — HIGH (ref 70–99)
GLUCOSE BLDC GLUCOMTR-MCNC: 135 MG/DL — HIGH (ref 70–99)
GLUCOSE BLDC GLUCOMTR-MCNC: 139 MG/DL — HIGH (ref 70–99)
GLUCOSE BLDC GLUCOMTR-MCNC: 140 MG/DL — HIGH (ref 70–99)
GLUCOSE BLDC GLUCOMTR-MCNC: 141 MG/DL — HIGH (ref 70–99)
GLUCOSE BLDC GLUCOMTR-MCNC: 143 MG/DL — HIGH (ref 70–99)
GLUCOSE BLDC GLUCOMTR-MCNC: 153 MG/DL — HIGH (ref 70–99)
GLUCOSE BLDC GLUCOMTR-MCNC: 156 MG/DL — HIGH (ref 70–99)
GLUCOSE BLDC GLUCOMTR-MCNC: 157 MG/DL — HIGH (ref 70–99)
GLUCOSE BLDC GLUCOMTR-MCNC: 158 MG/DL — HIGH (ref 70–99)
GLUCOSE BLDC GLUCOMTR-MCNC: 159 MG/DL — HIGH (ref 70–99)
GLUCOSE BLDC GLUCOMTR-MCNC: 163 MG/DL — HIGH (ref 70–99)
GLUCOSE BLDC GLUCOMTR-MCNC: 165 MG/DL — HIGH (ref 70–99)
GLUCOSE BLDC GLUCOMTR-MCNC: 165 MG/DL — HIGH (ref 70–99)
GLUCOSE BLDC GLUCOMTR-MCNC: 177 MG/DL — HIGH (ref 70–99)
GLUCOSE BLDC GLUCOMTR-MCNC: 183 MG/DL — HIGH (ref 70–99)
GLUCOSE BLDC GLUCOMTR-MCNC: 203 MG/DL — HIGH (ref 70–99)
GLUCOSE BLDC GLUCOMTR-MCNC: 203 MG/DL — HIGH (ref 70–99)
GLUCOSE BLDC GLUCOMTR-MCNC: 204 MG/DL — HIGH (ref 70–99)
GLUCOSE BLDC GLUCOMTR-MCNC: 206 MG/DL — HIGH (ref 70–99)
GLUCOSE BLDC GLUCOMTR-MCNC: 206 MG/DL — HIGH (ref 70–99)
GLUCOSE BLDC GLUCOMTR-MCNC: 209 MG/DL — HIGH (ref 70–99)
GLUCOSE BLDC GLUCOMTR-MCNC: 213 MG/DL — HIGH (ref 70–99)
GLUCOSE BLDC GLUCOMTR-MCNC: 222 MG/DL — HIGH (ref 70–99)
GLUCOSE BLDC GLUCOMTR-MCNC: 223 MG/DL — HIGH (ref 70–99)
GLUCOSE BLDC GLUCOMTR-MCNC: 224 MG/DL — HIGH (ref 70–99)
GLUCOSE BLDC GLUCOMTR-MCNC: 226 MG/DL — HIGH (ref 70–99)
GLUCOSE BLDC GLUCOMTR-MCNC: 227 MG/DL — HIGH (ref 70–99)
GLUCOSE BLDC GLUCOMTR-MCNC: 230 MG/DL — HIGH (ref 70–99)
GLUCOSE BLDC GLUCOMTR-MCNC: 231 MG/DL — HIGH (ref 70–99)
GLUCOSE BLDC GLUCOMTR-MCNC: 240 MG/DL — HIGH (ref 70–99)
GLUCOSE BLDC GLUCOMTR-MCNC: 241 MG/DL — HIGH (ref 70–99)
GLUCOSE BLDC GLUCOMTR-MCNC: 244 MG/DL — HIGH (ref 70–99)
GLUCOSE BLDC GLUCOMTR-MCNC: 248 MG/DL — HIGH (ref 70–99)
GLUCOSE BLDC GLUCOMTR-MCNC: 257 MG/DL — HIGH (ref 70–99)
GLUCOSE BLDC GLUCOMTR-MCNC: 283 MG/DL — HIGH (ref 70–99)
GLUCOSE BLDC GLUCOMTR-MCNC: 292 MG/DL — HIGH (ref 70–99)
GLUCOSE BLDC GLUCOMTR-MCNC: 310 MG/DL — HIGH (ref 70–99)
GLUCOSE BLDC GLUCOMTR-MCNC: 390 MG/DL — HIGH (ref 70–99)
GLUCOSE BLDC GLUCOMTR-MCNC: 390 MG/DL — HIGH (ref 70–99)
GLUCOSE BLDC GLUCOMTR-MCNC: 415 MG/DL — HIGH (ref 70–99)
GLUCOSE BLDC GLUCOMTR-MCNC: 60 MG/DL — LOW (ref 70–99)
GLUCOSE BLDC GLUCOMTR-MCNC: 72 MG/DL — SIGNIFICANT CHANGE UP (ref 70–99)
GLUCOSE BLDC GLUCOMTR-MCNC: 81 MG/DL — SIGNIFICANT CHANGE UP (ref 70–99)
GLUCOSE BLDC GLUCOMTR-MCNC: 87 MG/DL — SIGNIFICANT CHANGE UP (ref 70–99)
GLUCOSE BLDC GLUCOMTR-MCNC: 96 MG/DL — SIGNIFICANT CHANGE UP (ref 70–99)
GLUCOSE BLDC GLUCOMTR-MCNC: 96 MG/DL — SIGNIFICANT CHANGE UP (ref 70–99)
GLUCOSE BLDC GLUCOMTR-MCNC: 97 MG/DL — SIGNIFICANT CHANGE UP (ref 70–99)
GLUCOSE BLDC GLUCOMTR-MCNC: 97 MG/DL — SIGNIFICANT CHANGE UP (ref 70–99)
GLUCOSE SERPL-MCNC: 133 MG/DL — HIGH (ref 70–99)
GLUCOSE SERPL-MCNC: 140 MG/DL — HIGH (ref 70–99)
GLUCOSE SERPL-MCNC: 140 MG/DL — HIGH (ref 70–99)
GLUCOSE SERPL-MCNC: 149 MG/DL — HIGH (ref 70–99)
GLUCOSE SERPL-MCNC: 170 MG/DL — HIGH (ref 70–99)
GLUCOSE SERPL-MCNC: 182 MG/DL — HIGH (ref 70–99)
GLUCOSE SERPL-MCNC: 187 MG/DL — HIGH (ref 70–99)
GLUCOSE SERPL-MCNC: 204 MG/DL — HIGH (ref 70–99)
GLUCOSE SERPL-MCNC: 223 MG/DL — HIGH (ref 70–99)
GLUCOSE SERPL-MCNC: 245 MG/DL — HIGH (ref 70–99)
GLUCOSE SERPL-MCNC: 255 MG/DL — HIGH (ref 70–99)
GLUCOSE SERPL-MCNC: 257 MG/DL — HIGH (ref 70–99)
GLUCOSE SERPL-MCNC: 285 MG/DL — HIGH (ref 70–99)
GLUCOSE SERPL-MCNC: 323 MG/DL — HIGH (ref 70–99)
GLUCOSE SERPL-MCNC: 62 MG/DL — LOW (ref 70–99)
GLUCOSE SERPL-MCNC: 82 MG/DL — SIGNIFICANT CHANGE UP (ref 70–99)
GLUCOSE SERPL-MCNC: 90 MG/DL — SIGNIFICANT CHANGE UP (ref 70–99)
GLUCOSE UR QL: NEGATIVE MG/DL — SIGNIFICANT CHANGE UP
GRAM STN FLD: ABNORMAL
HCO3 BLDA-SCNC: 28 MMOL/L — SIGNIFICANT CHANGE UP (ref 21–28)
HCO3 BLDA-SCNC: 31 MMOL/L — HIGH (ref 21–28)
HCT VFR BLD CALC: 21.4 % — LOW (ref 39–50)
HCT VFR BLD CALC: 21.5 % — LOW (ref 39–50)
HCT VFR BLD CALC: 22 % — LOW (ref 39–50)
HCT VFR BLD CALC: 22.3 % — LOW (ref 39–50)
HCT VFR BLD CALC: 23.2 % — LOW (ref 39–50)
HCT VFR BLD CALC: 23.3 % — LOW (ref 39–50)
HCT VFR BLD CALC: 23.4 % — LOW (ref 39–50)
HCT VFR BLD CALC: 24.1 % — LOW (ref 39–50)
HCT VFR BLD CALC: 24.7 % — LOW (ref 39–50)
HCT VFR BLD CALC: 25.2 % — LOW (ref 39–50)
HCT VFR BLD CALC: 26 % — LOW (ref 39–50)
HCT VFR BLD CALC: 26.2 % — LOW (ref 39–50)
HCT VFR BLD CALC: 26.3 % — LOW (ref 39–50)
HCT VFR BLD CALC: 26.7 % — LOW (ref 39–50)
HCT VFR BLD CALC: 26.9 % — LOW (ref 39–50)
HCT VFR BLD CALC: 26.9 % — LOW (ref 39–50)
HCT VFR BLD CALC: 30 % — LOW (ref 39–50)
HCT VFR BLD CALC: 30 % — LOW (ref 39–50)
HCT VFR BLD CALC: 31 % — LOW (ref 39–50)
HCT VFR BLD CALC: 31 % — LOW (ref 39–50)
HDLC SERPL-MCNC: 27 MG/DL — LOW
HGB BLD-MCNC: 6.8 G/DL — CRITICAL LOW (ref 13–17)
HGB BLD-MCNC: 6.9 G/DL — CRITICAL LOW (ref 13–17)
HGB BLD-MCNC: 7 G/DL — CRITICAL LOW (ref 13–17)
HGB BLD-MCNC: 7.1 G/DL — LOW (ref 13–17)
HGB BLD-MCNC: 7.5 G/DL — LOW (ref 13–17)
HGB BLD-MCNC: 7.9 G/DL — LOW (ref 13–17)
HGB BLD-MCNC: 8 G/DL — LOW (ref 13–17)
HGB BLD-MCNC: 8.1 G/DL — LOW (ref 13–17)
HGB BLD-MCNC: 8.2 G/DL — LOW (ref 13–17)
HGB BLD-MCNC: 8.2 G/DL — LOW (ref 13–17)
HGB BLD-MCNC: 8.3 G/DL — LOW (ref 13–17)
HGB BLD-MCNC: 8.4 G/DL — LOW (ref 13–17)
HGB BLD-MCNC: 8.4 G/DL — LOW (ref 13–17)
HGB BLD-MCNC: 8.7 G/DL — LOW (ref 13–17)
HGB BLD-MCNC: 9.3 G/DL — LOW (ref 13–17)
HGB BLD-MCNC: 9.6 G/DL — LOW (ref 13–17)
HGB BLD-MCNC: 9.7 G/DL — LOW (ref 13–17)
HGB BLD-MCNC: 9.7 G/DL — LOW (ref 13–17)
HOROWITZ INDEX BLDA+IHG-RTO: SIGNIFICANT CHANGE UP
HOROWITZ INDEX BLDA+IHG-RTO: SIGNIFICANT CHANGE UP
HYPOCHROMIA BLD QL: SLIGHT — SIGNIFICANT CHANGE UP
IMM GRANULOCYTES NFR BLD AUTO: 0.7 % — SIGNIFICANT CHANGE UP (ref 0–0.9)
IMM GRANULOCYTES NFR BLD AUTO: 0.9 % — SIGNIFICANT CHANGE UP (ref 0–0.9)
IMM GRANULOCYTES NFR BLD AUTO: 1.3 % — HIGH (ref 0–0.9)
IMM GRANULOCYTES NFR BLD AUTO: 1.3 % — HIGH (ref 0–0.9)
IMM GRANULOCYTES NFR BLD AUTO: 2 % — HIGH (ref 0–0.9)
IMM GRANULOCYTES NFR BLD AUTO: 2.2 % — HIGH (ref 0–0.9)
IMM GRANULOCYTES NFR BLD AUTO: 2.3 % — HIGH (ref 0–0.9)
IMM GRANULOCYTES NFR BLD AUTO: 3.4 % — HIGH (ref 0–0.9)
INR BLD: 1.04 RATIO — SIGNIFICANT CHANGE UP (ref 0.85–1.16)
INR BLD: 1.08 RATIO — SIGNIFICANT CHANGE UP (ref 0.85–1.16)
IRON SATN MFR SERPL: 59 % — HIGH (ref 16–55)
IRON SATN MFR SERPL: 7 % — LOW (ref 16–55)
IRON SATN MFR SERPL: 89 UG/DL — SIGNIFICANT CHANGE UP (ref 59–158)
IRON SATN MFR SERPL: 9 UG/DL — LOW (ref 59–158)
KETONES UR-MCNC: ABNORMAL MG/DL
LACTATE SERPL-SCNC: 1.7 MMOL/L — SIGNIFICANT CHANGE UP (ref 0.5–2)
LDLC SERPL-MCNC: 47 MG/DL — SIGNIFICANT CHANGE UP
LEUKOCYTE ESTERASE UR-ACNC: ABNORMAL
LIDOCAIN IGE QN: 10 U/L — LOW (ref 22–51)
LIPID PNL WITH DIRECT LDL SERPL: 47 MG/DL — SIGNIFICANT CHANGE UP
LYMPHOCYTES # BLD AUTO: 0 % — LOW (ref 13–44)
LYMPHOCYTES # BLD AUTO: 0 % — LOW (ref 13–44)
LYMPHOCYTES # BLD AUTO: 0 K/UL — LOW (ref 1–3.3)
LYMPHOCYTES # BLD AUTO: 0 K/UL — LOW (ref 1–3.3)
LYMPHOCYTES # BLD AUTO: 0.21 K/UL — LOW (ref 1–3.3)
LYMPHOCYTES # BLD AUTO: 0.25 K/UL — LOW (ref 1–3.3)
LYMPHOCYTES # BLD AUTO: 0.27 K/UL — LOW (ref 1–3.3)
LYMPHOCYTES # BLD AUTO: 0.41 K/UL — LOW (ref 1–3.3)
LYMPHOCYTES # BLD AUTO: 0.41 K/UL — LOW (ref 1–3.3)
LYMPHOCYTES # BLD AUTO: 0.9 % — LOW (ref 13–44)
LYMPHOCYTES # BLD AUTO: 0.95 K/UL — LOW (ref 1–3.3)
LYMPHOCYTES # BLD AUTO: 1.18 K/UL — SIGNIFICANT CHANGE UP (ref 1–3.3)
LYMPHOCYTES # BLD AUTO: 1.19 K/UL — SIGNIFICANT CHANGE UP (ref 1–3.3)
LYMPHOCYTES # BLD AUTO: 1.42 K/UL — SIGNIFICANT CHANGE UP (ref 1–3.3)
LYMPHOCYTES # BLD AUTO: 1.6 % — LOW (ref 13–44)
LYMPHOCYTES # BLD AUTO: 1.69 K/UL — SIGNIFICANT CHANGE UP (ref 1–3.3)
LYMPHOCYTES # BLD AUTO: 10.8 % — LOW (ref 13–44)
LYMPHOCYTES # BLD AUTO: 12.9 % — LOW (ref 13–44)
LYMPHOCYTES # BLD AUTO: 13.9 % — SIGNIFICANT CHANGE UP (ref 13–44)
LYMPHOCYTES # BLD AUTO: 19 % — SIGNIFICANT CHANGE UP (ref 13–44)
LYMPHOCYTES # BLD AUTO: 2.1 % — LOW (ref 13–44)
LYMPHOCYTES # BLD AUTO: 3.5 % — LOW (ref 13–44)
LYMPHOCYTES # BLD AUTO: 3.7 % — LOW (ref 13–44)
LYMPHOCYTES # BLD AUTO: 4.7 % — LOW (ref 13–44)
MACROCYTES BLD QL: SLIGHT — SIGNIFICANT CHANGE UP
MACROCYTES BLD QL: SLIGHT — SIGNIFICANT CHANGE UP
MAGNESIUM SERPL-MCNC: 1.6 MG/DL — SIGNIFICANT CHANGE UP (ref 1.6–2.6)
MAGNESIUM SERPL-MCNC: 1.6 MG/DL — SIGNIFICANT CHANGE UP (ref 1.6–2.6)
MAGNESIUM SERPL-MCNC: 1.7 MG/DL — SIGNIFICANT CHANGE UP (ref 1.6–2.6)
MAGNESIUM SERPL-MCNC: 1.8 MG/DL — SIGNIFICANT CHANGE UP (ref 1.6–2.6)
MAGNESIUM SERPL-MCNC: 1.9 MG/DL — SIGNIFICANT CHANGE UP (ref 1.8–2.6)
MAGNESIUM SERPL-MCNC: 2 MG/DL — SIGNIFICANT CHANGE UP (ref 1.6–2.6)
MAGNESIUM SERPL-MCNC: 2 MG/DL — SIGNIFICANT CHANGE UP (ref 1.6–2.6)
MAGNESIUM SERPL-MCNC: 2.1 MG/DL — SIGNIFICANT CHANGE UP (ref 1.8–2.6)
MAGNESIUM SERPL-MCNC: 2.2 MG/DL — SIGNIFICANT CHANGE UP (ref 1.6–2.6)
MAGNESIUM SERPL-MCNC: 2.2 MG/DL — SIGNIFICANT CHANGE UP (ref 1.6–2.6)
MAGNESIUM SERPL-MCNC: 2.2 MG/DL — SIGNIFICANT CHANGE UP (ref 1.8–2.6)
MANUAL SMEAR VERIFICATION: SIGNIFICANT CHANGE UP
MCHC RBC-ENTMCNC: 27.9 PG — SIGNIFICANT CHANGE UP (ref 27–34)
MCHC RBC-ENTMCNC: 28 PG — SIGNIFICANT CHANGE UP (ref 27–34)
MCHC RBC-ENTMCNC: 28.1 PG — SIGNIFICANT CHANGE UP (ref 27–34)
MCHC RBC-ENTMCNC: 28.2 PG — SIGNIFICANT CHANGE UP (ref 27–34)
MCHC RBC-ENTMCNC: 28.3 PG — SIGNIFICANT CHANGE UP (ref 27–34)
MCHC RBC-ENTMCNC: 28.3 PG — SIGNIFICANT CHANGE UP (ref 27–34)
MCHC RBC-ENTMCNC: 28.5 PG — SIGNIFICANT CHANGE UP (ref 27–34)
MCHC RBC-ENTMCNC: 28.8 PG — SIGNIFICANT CHANGE UP (ref 27–34)
MCHC RBC-ENTMCNC: 28.8 PG — SIGNIFICANT CHANGE UP (ref 27–34)
MCHC RBC-ENTMCNC: 29.2 PG — SIGNIFICANT CHANGE UP (ref 27–34)
MCHC RBC-ENTMCNC: 29.3 PG — SIGNIFICANT CHANGE UP (ref 27–34)
MCHC RBC-ENTMCNC: 29.7 PG — SIGNIFICANT CHANGE UP (ref 27–34)
MCHC RBC-ENTMCNC: 29.7 PG — SIGNIFICANT CHANGE UP (ref 27–34)
MCHC RBC-ENTMCNC: 30.5 GM/DL — LOW (ref 32–36)
MCHC RBC-ENTMCNC: 30.9 GM/DL — LOW (ref 32–36)
MCHC RBC-ENTMCNC: 31 GM/DL — LOW (ref 32–36)
MCHC RBC-ENTMCNC: 31.3 GM/DL — LOW (ref 32–36)
MCHC RBC-ENTMCNC: 31.3 GM/DL — LOW (ref 32–36)
MCHC RBC-ENTMCNC: 31.5 GM/DL — LOW (ref 32–36)
MCHC RBC-ENTMCNC: 31.6 GM/DL — LOW (ref 32–36)
MCHC RBC-ENTMCNC: 31.6 GM/DL — LOW (ref 32–36)
MCHC RBC-ENTMCNC: 31.8 GM/DL — LOW (ref 32–36)
MCHC RBC-ENTMCNC: 31.8 GM/DL — LOW (ref 32–36)
MCHC RBC-ENTMCNC: 32 GM/DL — SIGNIFICANT CHANGE UP (ref 32–36)
MCHC RBC-ENTMCNC: 32.1 GM/DL — SIGNIFICANT CHANGE UP (ref 32–36)
MCHC RBC-ENTMCNC: 32.2 GM/DL — SIGNIFICANT CHANGE UP (ref 32–36)
MCHC RBC-ENTMCNC: 32.2 GM/DL — SIGNIFICANT CHANGE UP (ref 32–36)
MCHC RBC-ENTMCNC: 32.3 GM/DL — SIGNIFICANT CHANGE UP (ref 32–36)
MCHC RBC-ENTMCNC: 32.3 GM/DL — SIGNIFICANT CHANGE UP (ref 32–36)
MCHC RBC-ENTMCNC: 32.4 GM/DL — SIGNIFICANT CHANGE UP (ref 32–36)
MCHC RBC-ENTMCNC: 32.5 GM/DL — SIGNIFICANT CHANGE UP (ref 32–36)
MCHC RBC-ENTMCNC: 32.8 GM/DL — SIGNIFICANT CHANGE UP (ref 32–36)
MCV RBC AUTO: 86 FL — SIGNIFICANT CHANGE UP (ref 80–100)
MCV RBC AUTO: 86.6 FL — SIGNIFICANT CHANGE UP (ref 80–100)
MCV RBC AUTO: 87 FL — SIGNIFICANT CHANGE UP (ref 80–100)
MCV RBC AUTO: 87.3 FL — SIGNIFICANT CHANGE UP (ref 80–100)
MCV RBC AUTO: 87.5 FL — SIGNIFICANT CHANGE UP (ref 80–100)
MCV RBC AUTO: 88 FL — SIGNIFICANT CHANGE UP (ref 80–100)
MCV RBC AUTO: 88.1 FL — SIGNIFICANT CHANGE UP (ref 80–100)
MCV RBC AUTO: 88.3 FL — SIGNIFICANT CHANGE UP (ref 80–100)
MCV RBC AUTO: 88.5 FL — SIGNIFICANT CHANGE UP (ref 80–100)
MCV RBC AUTO: 88.7 FL — SIGNIFICANT CHANGE UP (ref 80–100)
MCV RBC AUTO: 89.1 FL — SIGNIFICANT CHANGE UP (ref 80–100)
MCV RBC AUTO: 89.6 FL — SIGNIFICANT CHANGE UP (ref 80–100)
MCV RBC AUTO: 90.3 FL — SIGNIFICANT CHANGE UP (ref 80–100)
MCV RBC AUTO: 90.5 FL — SIGNIFICANT CHANGE UP (ref 80–100)
MCV RBC AUTO: 90.9 FL — SIGNIFICANT CHANGE UP (ref 80–100)
MCV RBC AUTO: 91.6 FL — SIGNIFICANT CHANGE UP (ref 80–100)
MCV RBC AUTO: 94.4 FL — SIGNIFICANT CHANGE UP (ref 80–100)
MCV RBC AUTO: 94.8 FL — SIGNIFICANT CHANGE UP (ref 80–100)
MCV RBC AUTO: 94.8 FL — SIGNIFICANT CHANGE UP (ref 80–100)
METHOD TYPE: SIGNIFICANT CHANGE UP
MONOCYTES # BLD AUTO: 0 K/UL — SIGNIFICANT CHANGE UP (ref 0–0.9)
MONOCYTES # BLD AUTO: 0.12 K/UL — SIGNIFICANT CHANGE UP (ref 0–0.9)
MONOCYTES # BLD AUTO: 0.25 K/UL — SIGNIFICANT CHANGE UP (ref 0–0.9)
MONOCYTES # BLD AUTO: 0.26 K/UL — SIGNIFICANT CHANGE UP (ref 0–0.9)
MONOCYTES # BLD AUTO: 0.27 K/UL — SIGNIFICANT CHANGE UP (ref 0–0.9)
MONOCYTES # BLD AUTO: 0.41 K/UL — SIGNIFICANT CHANGE UP (ref 0–0.9)
MONOCYTES # BLD AUTO: 0.64 K/UL — SIGNIFICANT CHANGE UP (ref 0–0.9)
MONOCYTES # BLD AUTO: 0.69 K/UL — SIGNIFICANT CHANGE UP (ref 0–0.9)
MONOCYTES # BLD AUTO: 0.7 K/UL — SIGNIFICANT CHANGE UP (ref 0–0.9)
MONOCYTES # BLD AUTO: 0.86 K/UL — SIGNIFICANT CHANGE UP (ref 0–0.9)
MONOCYTES # BLD AUTO: 1.06 K/UL — HIGH (ref 0–0.9)
MONOCYTES # BLD AUTO: 1.17 K/UL — HIGH (ref 0–0.9)
MONOCYTES NFR BLD AUTO: 0 % — LOW (ref 2–14)
MONOCYTES NFR BLD AUTO: 0.9 % — LOW (ref 2–14)
MONOCYTES NFR BLD AUTO: 0.9 % — LOW (ref 2–14)
MONOCYTES NFR BLD AUTO: 2.1 % — SIGNIFICANT CHANGE UP (ref 2–14)
MONOCYTES NFR BLD AUTO: 2.1 % — SIGNIFICANT CHANGE UP (ref 2–14)
MONOCYTES NFR BLD AUTO: 3.5 % — SIGNIFICANT CHANGE UP (ref 2–14)
MONOCYTES NFR BLD AUTO: 4.1 % — SIGNIFICANT CHANGE UP (ref 2–14)
MONOCYTES NFR BLD AUTO: 5.8 % — SIGNIFICANT CHANGE UP (ref 2–14)
MONOCYTES NFR BLD AUTO: 6.3 % — SIGNIFICANT CHANGE UP (ref 2–14)
MONOCYTES NFR BLD AUTO: 7.6 % — SIGNIFICANT CHANGE UP (ref 2–14)
MONOCYTES NFR BLD AUTO: 7.7 % — SIGNIFICANT CHANGE UP (ref 2–14)
MONOCYTES NFR BLD AUTO: 7.8 % — SIGNIFICANT CHANGE UP (ref 2–14)
MRSA PCR RESULT.: SIGNIFICANT CHANGE UP
MYELOCYTES NFR BLD: 0.9 % — HIGH (ref 0–0)
MYELOCYTES NFR BLD: 2.6 % — HIGH (ref 0–0)
NEUTROPHILS # BLD AUTO: 10.23 K/UL — HIGH (ref 1.8–7.4)
NEUTROPHILS # BLD AUTO: 11.18 K/UL — HIGH (ref 1.8–7.4)
NEUTROPHILS # BLD AUTO: 17.58 K/UL — HIGH (ref 1.8–7.4)
NEUTROPHILS # BLD AUTO: 23.44 K/UL — HIGH (ref 1.8–7.4)
NEUTROPHILS # BLD AUTO: 28.86 K/UL — HIGH (ref 1.8–7.4)
NEUTROPHILS # BLD AUTO: 29.15 K/UL — HIGH (ref 1.8–7.4)
NEUTROPHILS # BLD AUTO: 5.16 K/UL — SIGNIFICANT CHANGE UP (ref 1.8–7.4)
NEUTROPHILS # BLD AUTO: 6.15 K/UL — SIGNIFICANT CHANGE UP (ref 1.8–7.4)
NEUTROPHILS # BLD AUTO: 6.86 K/UL — SIGNIFICANT CHANGE UP (ref 1.8–7.4)
NEUTROPHILS # BLD AUTO: 7.61 K/UL — HIGH (ref 1.8–7.4)
NEUTROPHILS # BLD AUTO: 8.38 K/UL — HIGH (ref 1.8–7.4)
NEUTROPHILS # BLD AUTO: 8.68 K/UL — HIGH (ref 1.8–7.4)
NEUTROPHILS NFR BLD AUTO: 69.1 % — SIGNIFICANT CHANGE UP (ref 43–77)
NEUTROPHILS NFR BLD AUTO: 74.4 % — SIGNIFICANT CHANGE UP (ref 43–77)
NEUTROPHILS NFR BLD AUTO: 74.7 % — SIGNIFICANT CHANGE UP (ref 43–77)
NEUTROPHILS NFR BLD AUTO: 76.1 % — SIGNIFICANT CHANGE UP (ref 43–77)
NEUTROPHILS NFR BLD AUTO: 86.8 % — HIGH (ref 43–77)
NEUTROPHILS NFR BLD AUTO: 87.2 % — HIGH (ref 43–77)
NEUTROPHILS NFR BLD AUTO: 90.5 % — HIGH (ref 43–77)
NEUTROPHILS NFR BLD AUTO: 92 % — HIGH (ref 43–77)
NEUTROPHILS NFR BLD AUTO: 94.2 % — HIGH (ref 43–77)
NEUTROPHILS NFR BLD AUTO: 95.6 % — HIGH (ref 43–77)
NEUTROPHILS NFR BLD AUTO: 98.2 % — HIGH (ref 43–77)
NEUTROPHILS NFR BLD AUTO: 99.1 % — HIGH (ref 43–77)
NEUTS BAND # BLD: 1.7 % — SIGNIFICANT CHANGE UP (ref 0–8)
NEUTS BAND NFR BLD: 1.7 % — SIGNIFICANT CHANGE UP (ref 0–8)
NITRITE UR-MCNC: NEGATIVE — SIGNIFICANT CHANGE UP
NONHDLC SERPL-MCNC: 71 MG/DL — SIGNIFICANT CHANGE UP
NRBC # BLD: 1 /100 WBCS — HIGH (ref 0–0)
NRBC BLD-RTO: 1 /100 WBCS — HIGH (ref 0–0)
NT-PROBNP SERPL-SCNC: 3720 PG/ML — HIGH (ref 0–300)
ORGANISM # SPEC MICROSCOPIC CNT: ABNORMAL
ORGANISM # SPEC MICROSCOPIC CNT: SIGNIFICANT CHANGE UP
OSMOLALITY SERPL: 311 MOSMOL/KG — HIGH (ref 280–301)
OSMOLALITY UR: 358 MOSM/KG — SIGNIFICANT CHANGE UP (ref 300–1000)
OVALOCYTES BLD QL SMEAR: SIGNIFICANT CHANGE UP
OVALOCYTES BLD QL SMEAR: SLIGHT — SIGNIFICANT CHANGE UP
OVALOCYTES BLD QL SMEAR: SLIGHT — SIGNIFICANT CHANGE UP
PCO2 BLDA: 37 MMHG — SIGNIFICANT CHANGE UP (ref 35–48)
PCO2 BLDA: 45 MMHG — SIGNIFICANT CHANGE UP (ref 35–48)
PH BLDA: 7.44 — SIGNIFICANT CHANGE UP (ref 7.35–7.45)
PH BLDA: 7.48 — HIGH (ref 7.35–7.45)
PH UR: 5.5 — SIGNIFICANT CHANGE UP (ref 5–8)
PHOSPHATE SERPL-MCNC: 2 MG/DL — LOW (ref 2.4–4.7)
PHOSPHATE SERPL-MCNC: 2.3 MG/DL — LOW (ref 2.4–4.7)
PHOSPHATE SERPL-MCNC: 2.7 MG/DL — SIGNIFICANT CHANGE UP (ref 2.4–4.7)
PHOSPHATE SERPL-MCNC: 2.8 MG/DL — SIGNIFICANT CHANGE UP (ref 2.4–4.7)
PHOSPHATE SERPL-MCNC: 3 MG/DL — SIGNIFICANT CHANGE UP (ref 2.4–4.7)
PHOSPHATE SERPL-MCNC: 3.1 MG/DL — SIGNIFICANT CHANGE UP (ref 2.4–4.7)
PHOSPHATE SERPL-MCNC: 3.1 MG/DL — SIGNIFICANT CHANGE UP (ref 2.4–4.7)
PHOSPHATE SERPL-MCNC: 3.5 MG/DL — SIGNIFICANT CHANGE UP (ref 2.4–4.7)
PHOSPHATE SERPL-MCNC: 3.9 MG/DL — SIGNIFICANT CHANGE UP (ref 2.4–4.7)
PLAT MORPH BLD: NORMAL — SIGNIFICANT CHANGE UP
PLATELET # BLD AUTO: 283 K/UL — SIGNIFICANT CHANGE UP (ref 150–400)
PLATELET # BLD AUTO: 284 K/UL — SIGNIFICANT CHANGE UP (ref 150–400)
PLATELET # BLD AUTO: 295 K/UL — SIGNIFICANT CHANGE UP (ref 150–400)
PLATELET # BLD AUTO: 295 K/UL — SIGNIFICANT CHANGE UP (ref 150–400)
PLATELET # BLD AUTO: 308 K/UL — SIGNIFICANT CHANGE UP (ref 150–400)
PLATELET # BLD AUTO: 309 K/UL — SIGNIFICANT CHANGE UP (ref 150–400)
PLATELET # BLD AUTO: 312 K/UL — SIGNIFICANT CHANGE UP (ref 150–400)
PLATELET # BLD AUTO: 318 K/UL — SIGNIFICANT CHANGE UP (ref 150–400)
PLATELET # BLD AUTO: 332 K/UL — SIGNIFICANT CHANGE UP (ref 150–400)
PLATELET # BLD AUTO: 338 K/UL — SIGNIFICANT CHANGE UP (ref 150–400)
PLATELET # BLD AUTO: 349 K/UL — SIGNIFICANT CHANGE UP (ref 150–400)
PLATELET # BLD AUTO: 353 K/UL — SIGNIFICANT CHANGE UP (ref 150–400)
PLATELET # BLD AUTO: 372 K/UL — SIGNIFICANT CHANGE UP (ref 150–400)
PLATELET # BLD AUTO: 397 K/UL — SIGNIFICANT CHANGE UP (ref 150–400)
PLATELET # BLD AUTO: 425 K/UL — HIGH (ref 150–400)
PLATELET # BLD AUTO: 439 K/UL — HIGH (ref 150–400)
PLATELET # BLD AUTO: 442 K/UL — HIGH (ref 150–400)
PLATELET # BLD AUTO: 452 K/UL — HIGH (ref 150–400)
PLATELET # BLD AUTO: 469 K/UL — HIGH (ref 150–400)
PO2 BLDA: 57 MMHG — LOW (ref 83–108)
PO2 BLDA: 90 MMHG — SIGNIFICANT CHANGE UP (ref 83–108)
POIKILOCYTOSIS BLD QL AUTO: SIGNIFICANT CHANGE UP
POIKILOCYTOSIS BLD QL AUTO: SLIGHT — SIGNIFICANT CHANGE UP
POLYCHROMASIA BLD QL SMEAR: SLIGHT — SIGNIFICANT CHANGE UP
POTASSIUM SERPL-MCNC: 3.8 MMOL/L — SIGNIFICANT CHANGE UP (ref 3.5–5.3)
POTASSIUM SERPL-MCNC: 3.9 MMOL/L — SIGNIFICANT CHANGE UP (ref 3.5–5.3)
POTASSIUM SERPL-MCNC: 3.9 MMOL/L — SIGNIFICANT CHANGE UP (ref 3.5–5.3)
POTASSIUM SERPL-MCNC: 4 MMOL/L — SIGNIFICANT CHANGE UP (ref 3.5–5.3)
POTASSIUM SERPL-MCNC: 4.1 MMOL/L — SIGNIFICANT CHANGE UP (ref 3.5–5.3)
POTASSIUM SERPL-MCNC: 4.2 MMOL/L — SIGNIFICANT CHANGE UP (ref 3.5–5.3)
POTASSIUM SERPL-MCNC: 4.2 MMOL/L — SIGNIFICANT CHANGE UP (ref 3.5–5.3)
POTASSIUM SERPL-MCNC: 4.5 MMOL/L — SIGNIFICANT CHANGE UP (ref 3.5–5.3)
POTASSIUM SERPL-MCNC: 4.6 MMOL/L — SIGNIFICANT CHANGE UP (ref 3.5–5.3)
POTASSIUM SERPL-MCNC: 4.6 MMOL/L — SIGNIFICANT CHANGE UP (ref 3.5–5.3)
POTASSIUM SERPL-MCNC: 4.7 MMOL/L — SIGNIFICANT CHANGE UP (ref 3.5–5.3)
POTASSIUM SERPL-MCNC: 4.8 MMOL/L — SIGNIFICANT CHANGE UP (ref 3.5–5.3)
POTASSIUM SERPL-MCNC: 4.8 MMOL/L — SIGNIFICANT CHANGE UP (ref 3.5–5.3)
POTASSIUM SERPL-MCNC: 4.9 MMOL/L — SIGNIFICANT CHANGE UP (ref 3.5–5.3)
POTASSIUM SERPL-MCNC: 5.2 MMOL/L — SIGNIFICANT CHANGE UP (ref 3.5–5.3)
POTASSIUM SERPL-SCNC: 3.8 MMOL/L — SIGNIFICANT CHANGE UP (ref 3.5–5.3)
POTASSIUM SERPL-SCNC: 3.9 MMOL/L — SIGNIFICANT CHANGE UP (ref 3.5–5.3)
POTASSIUM SERPL-SCNC: 3.9 MMOL/L — SIGNIFICANT CHANGE UP (ref 3.5–5.3)
POTASSIUM SERPL-SCNC: 4 MMOL/L — SIGNIFICANT CHANGE UP (ref 3.5–5.3)
POTASSIUM SERPL-SCNC: 4.1 MMOL/L — SIGNIFICANT CHANGE UP (ref 3.5–5.3)
POTASSIUM SERPL-SCNC: 4.2 MMOL/L — SIGNIFICANT CHANGE UP (ref 3.5–5.3)
POTASSIUM SERPL-SCNC: 4.2 MMOL/L — SIGNIFICANT CHANGE UP (ref 3.5–5.3)
POTASSIUM SERPL-SCNC: 4.5 MMOL/L — SIGNIFICANT CHANGE UP (ref 3.5–5.3)
POTASSIUM SERPL-SCNC: 4.6 MMOL/L — SIGNIFICANT CHANGE UP (ref 3.5–5.3)
POTASSIUM SERPL-SCNC: 4.6 MMOL/L — SIGNIFICANT CHANGE UP (ref 3.5–5.3)
POTASSIUM SERPL-SCNC: 4.7 MMOL/L — SIGNIFICANT CHANGE UP (ref 3.5–5.3)
POTASSIUM SERPL-SCNC: 4.8 MMOL/L — SIGNIFICANT CHANGE UP (ref 3.5–5.3)
POTASSIUM SERPL-SCNC: 4.8 MMOL/L — SIGNIFICANT CHANGE UP (ref 3.5–5.3)
POTASSIUM SERPL-SCNC: 4.9 MMOL/L — SIGNIFICANT CHANGE UP (ref 3.5–5.3)
POTASSIUM SERPL-SCNC: 5.2 MMOL/L — SIGNIFICANT CHANGE UP (ref 3.5–5.3)
PROCALCITONIN SERPL-MCNC: 2.84 NG/ML — HIGH (ref 0.02–0.1)
PROT SERPL-MCNC: 5.4 G/DL — LOW (ref 6.6–8.7)
PROT SERPL-MCNC: 5.6 G/DL — LOW (ref 6.6–8.7)
PROT SERPL-MCNC: 5.7 G/DL — LOW (ref 6.6–8.7)
PROT SERPL-MCNC: 5.7 G/DL — LOW (ref 6.6–8.7)
PROT SERPL-MCNC: 5.8 G/DL — LOW (ref 6.6–8.7)
PROT SERPL-MCNC: 5.8 G/DL — LOW (ref 6.6–8.7)
PROT SERPL-MCNC: 5.9 G/DL — LOW (ref 6.6–8.7)
PROT SERPL-MCNC: 5.9 G/DL — LOW (ref 6.6–8.7)
PROT SERPL-MCNC: 6.1 G/DL — LOW (ref 6.6–8.7)
PROT SERPL-MCNC: 6.8 G/DL — SIGNIFICANT CHANGE UP (ref 6.6–8.7)
PROT UR-MCNC: 300 MG/DL
PROTHROM AB SERPL-ACNC: 12.1 SEC — SIGNIFICANT CHANGE UP (ref 9.9–13.4)
PROTHROM AB SERPL-ACNC: 12.2 SEC — SIGNIFICANT CHANGE UP (ref 9.9–13.4)
RAPID RVP RESULT: SIGNIFICANT CHANGE UP
RBC # BLD: 2.43 M/UL — LOW (ref 4.2–5.8)
RBC # BLD: 2.47 M/UL — LOW (ref 4.2–5.8)
RBC # BLD: 2.47 M/UL — LOW (ref 4.2–5.8)
RBC # BLD: 2.53 M/UL — LOW (ref 4.2–5.8)
RBC # BLD: 2.6 M/UL — LOW (ref 4.2–5.8)
RBC # BLD: 2.66 M/UL — LOW (ref 4.2–5.8)
RBC # BLD: 2.73 M/UL — LOW (ref 4.2–5.8)
RBC # BLD: 2.77 M/UL — LOW (ref 4.2–5.8)
RBC # BLD: 2.85 M/UL — LOW (ref 4.2–5.8)
RBC # BLD: 2.86 M/UL — LOW (ref 4.2–5.8)
RBC # BLD: 2.88 M/UL — LOW (ref 4.2–5.8)
RBC # BLD: 2.93 M/UL — LOW (ref 4.2–5.8)
RBC # BLD: 2.98 M/UL — LOW (ref 4.2–5.8)
RBC # BLD: 3.01 M/UL — LOW (ref 4.2–5.8)
RBC # BLD: 3.08 M/UL — LOW (ref 4.2–5.8)
RBC # BLD: 3.27 M/UL — LOW (ref 4.2–5.8)
RBC # BLD: 3.27 M/UL — LOW (ref 4.2–5.8)
RBC # BLD: 3.3 M/UL — LOW (ref 4.2–5.8)
RBC # BLD: 3.43 M/UL — LOW (ref 4.2–5.8)
RBC # FLD: 15.6 % — HIGH (ref 10.3–14.5)
RBC # FLD: 15.6 % — HIGH (ref 10.3–14.5)
RBC # FLD: 17.9 % — HIGH (ref 10.3–14.5)
RBC # FLD: 18 % — HIGH (ref 10.3–14.5)
RBC # FLD: 18.3 % — HIGH (ref 10.3–14.5)
RBC # FLD: 18.6 % — HIGH (ref 10.3–14.5)
RBC # FLD: 18.8 % — HIGH (ref 10.3–14.5)
RBC # FLD: 18.8 % — HIGH (ref 10.3–14.5)
RBC # FLD: 18.9 % — HIGH (ref 10.3–14.5)
RBC # FLD: 19 % — HIGH (ref 10.3–14.5)
RBC # FLD: 19.1 % — HIGH (ref 10.3–14.5)
RBC # FLD: 19.2 % — HIGH (ref 10.3–14.5)
RBC # FLD: 19.4 % — HIGH (ref 10.3–14.5)
RBC BLD AUTO: ABNORMAL
RBC BLD AUTO: NORMAL — SIGNIFICANT CHANGE UP
RBC CASTS # UR COMP ASSIST: 1 /HPF — SIGNIFICANT CHANGE UP (ref 0–4)
S AUREUS DNA NOSE QL NAA+PROBE: DETECTED
SAO2 % BLDA: 92.8 % — LOW (ref 94–98)
SAO2 % BLDA: 99.8 % — HIGH (ref 94–98)
SARS-COV-2 RNA SPEC QL NAA+PROBE: SIGNIFICANT CHANGE UP
SCHISTOCYTES BLD QL AUTO: SLIGHT — SIGNIFICANT CHANGE UP
SCHISTOCYTES BLD QL AUTO: SLIGHT — SIGNIFICANT CHANGE UP
SMUDGE CELLS # BLD: PRESENT — SIGNIFICANT CHANGE UP
SMUDGE CELLS # BLD: PRESENT — SIGNIFICANT CHANGE UP
SODIUM SERPL-SCNC: 131 MMOL/L — LOW (ref 135–145)
SODIUM SERPL-SCNC: 131 MMOL/L — LOW (ref 135–145)
SODIUM SERPL-SCNC: 132 MMOL/L — LOW (ref 135–145)
SODIUM SERPL-SCNC: 133 MMOL/L — LOW (ref 135–145)
SODIUM SERPL-SCNC: 134 MMOL/L — LOW (ref 135–145)
SODIUM SERPL-SCNC: 135 MMOL/L — SIGNIFICANT CHANGE UP (ref 135–145)
SODIUM SERPL-SCNC: 136 MMOL/L — SIGNIFICANT CHANGE UP (ref 135–145)
SODIUM SERPL-SCNC: 138 MMOL/L — SIGNIFICANT CHANGE UP (ref 135–145)
SODIUM SERPL-SCNC: 138 MMOL/L — SIGNIFICANT CHANGE UP (ref 135–145)
SODIUM SERPL-SCNC: 139 MMOL/L — SIGNIFICANT CHANGE UP (ref 135–145)
SODIUM SERPL-SCNC: 140 MMOL/L — SIGNIFICANT CHANGE UP (ref 135–145)
SODIUM SERPL-SCNC: 142 MMOL/L — SIGNIFICANT CHANGE UP (ref 135–145)
SODIUM SERPL-SCNC: 143 MMOL/L — SIGNIFICANT CHANGE UP (ref 135–145)
SODIUM SERPL-SCNC: 146 MMOL/L — HIGH (ref 135–145)
SODIUM SERPL-SCNC: 146 MMOL/L — HIGH (ref 135–145)
SODIUM SERPL-SCNC: 147 MMOL/L — HIGH (ref 135–145)
SODIUM SERPL-SCNC: 148 MMOL/L — HIGH (ref 135–145)
SODIUM UR-SCNC: 32 MMOL/L — SIGNIFICANT CHANGE UP
SP GR SPEC: 1.02 — SIGNIFICANT CHANGE UP (ref 1–1.03)
SPECIMEN SOURCE: SIGNIFICANT CHANGE UP
SQUAMOUS # UR AUTO: 3 /HPF — SIGNIFICANT CHANGE UP (ref 0–5)
TARGETS BLD QL SMEAR: SLIGHT — SIGNIFICANT CHANGE UP
TIBC SERPL-MCNC: 133 UG/DL — LOW (ref 220–430)
TIBC SERPL-MCNC: 150 UG/DL — LOW (ref 220–430)
TRANSFERRIN SERPL-MCNC: 105 MG/DL — LOW (ref 180–329)
TRANSFERRIN SERPL-MCNC: 93 MG/DL — LOW (ref 180–329)
TRIGL SERPL-MCNC: 120 MG/DL — SIGNIFICANT CHANGE UP
TROPONIN T, HIGH SENSITIVITY RESULT: 460 NG/L — HIGH (ref 0–51)
TROPONIN T, HIGH SENSITIVITY RESULT: 474 NG/L — HIGH (ref 0–51)
TROPONIN T, HIGH SENSITIVITY RESULT: 477 NG/L — HIGH (ref 0–51)
TROPONIN T, HIGH SENSITIVITY RESULT: 528 NG/L — HIGH (ref 0–51)
TROPONIN T, HIGH SENSITIVITY RESULT: 78 NG/L — HIGH (ref 0–51)
TROPONIN T, HIGH SENSITIVITY RESULT: 79 NG/L — HIGH (ref 0–51)
TROPONIN T, HIGH SENSITIVITY RESULT: 86 NG/L — HIGH (ref 0–51)
TSH SERPL-MCNC: 1.61 UIU/ML — SIGNIFICANT CHANGE UP (ref 0.27–4.2)
UROBILINOGEN FLD QL: 1 MG/DL — SIGNIFICANT CHANGE UP (ref 0.2–1)
VARIANT LYMPHS # BLD: 0.9 % — SIGNIFICANT CHANGE UP (ref 0–6)
VARIANT LYMPHS # BLD: 0.9 % — SIGNIFICANT CHANGE UP (ref 0–6)
VARIANT LYMPHS # BLD: 1.8 % — SIGNIFICANT CHANGE UP (ref 0–6)
VARIANT LYMPHS NFR BLD MANUAL: 0.9 % — SIGNIFICANT CHANGE UP (ref 0–6)
VARIANT LYMPHS NFR BLD MANUAL: 0.9 % — SIGNIFICANT CHANGE UP (ref 0–6)
VARIANT LYMPHS NFR BLD MANUAL: 1.8 % — SIGNIFICANT CHANGE UP (ref 0–6)
VIT B12 SERPL-MCNC: 700 PG/ML — SIGNIFICANT CHANGE UP (ref 232–1245)
WBC # BLD: 10.22 K/UL — SIGNIFICANT CHANGE UP (ref 3.8–10.5)
WBC # BLD: 11.01 K/UL — HIGH (ref 3.8–10.5)
WBC # BLD: 11.79 K/UL — HIGH (ref 3.8–10.5)
WBC # BLD: 11.86 K/UL — HIGH (ref 3.8–10.5)
WBC # BLD: 12.46 K/UL — HIGH (ref 3.8–10.5)
WBC # BLD: 13.57 K/UL — HIGH (ref 3.8–10.5)
WBC # BLD: 14.45 K/UL — HIGH (ref 3.8–10.5)
WBC # BLD: 14.45 K/UL — HIGH (ref 3.8–10.5)
WBC # BLD: 20.16 K/UL — HIGH (ref 3.8–10.5)
WBC # BLD: 25.88 K/UL — HIGH (ref 3.8–10.5)
WBC # BLD: 29.39 K/UL — HIGH (ref 3.8–10.5)
WBC # BLD: 29.96 K/UL — HIGH (ref 3.8–10.5)
WBC # BLD: 5.61 K/UL — SIGNIFICANT CHANGE UP (ref 3.8–10.5)
WBC # BLD: 8.76 K/UL — SIGNIFICANT CHANGE UP (ref 3.8–10.5)
WBC # BLD: 8.89 K/UL — SIGNIFICANT CHANGE UP (ref 3.8–10.5)
WBC # BLD: 8.91 K/UL — SIGNIFICANT CHANGE UP (ref 3.8–10.5)
WBC # BLD: 8.95 K/UL — SIGNIFICANT CHANGE UP (ref 3.8–10.5)
WBC # BLD: 9.18 K/UL — SIGNIFICANT CHANGE UP (ref 3.8–10.5)
WBC # BLD: 9.22 K/UL — SIGNIFICANT CHANGE UP (ref 3.8–10.5)
WBC # FLD AUTO: 10.22 K/UL — SIGNIFICANT CHANGE UP (ref 3.8–10.5)
WBC # FLD AUTO: 11.01 K/UL — HIGH (ref 3.8–10.5)
WBC # FLD AUTO: 11.79 K/UL — HIGH (ref 3.8–10.5)
WBC # FLD AUTO: 11.86 K/UL — HIGH (ref 3.8–10.5)
WBC # FLD AUTO: 12.46 K/UL — HIGH (ref 3.8–10.5)
WBC # FLD AUTO: 13.57 K/UL — HIGH (ref 3.8–10.5)
WBC # FLD AUTO: 14.45 K/UL — HIGH (ref 3.8–10.5)
WBC # FLD AUTO: 14.45 K/UL — HIGH (ref 3.8–10.5)
WBC # FLD AUTO: 20.16 K/UL — HIGH (ref 3.8–10.5)
WBC # FLD AUTO: 25.88 K/UL — HIGH (ref 3.8–10.5)
WBC # FLD AUTO: 29.39 K/UL — HIGH (ref 3.8–10.5)
WBC # FLD AUTO: 29.96 K/UL — HIGH (ref 3.8–10.5)
WBC # FLD AUTO: 5.61 K/UL — SIGNIFICANT CHANGE UP (ref 3.8–10.5)
WBC # FLD AUTO: 8.76 K/UL — SIGNIFICANT CHANGE UP (ref 3.8–10.5)
WBC # FLD AUTO: 8.89 K/UL — SIGNIFICANT CHANGE UP (ref 3.8–10.5)
WBC # FLD AUTO: 8.91 K/UL — SIGNIFICANT CHANGE UP (ref 3.8–10.5)
WBC # FLD AUTO: 8.95 K/UL — SIGNIFICANT CHANGE UP (ref 3.8–10.5)
WBC # FLD AUTO: 9.18 K/UL — SIGNIFICANT CHANGE UP (ref 3.8–10.5)
WBC # FLD AUTO: 9.22 K/UL — SIGNIFICANT CHANGE UP (ref 3.8–10.5)
WBC UR QL: >998 /HPF — HIGH (ref 0–5)
YEAST-LIKE CELLS: PRESENT

## 2024-01-01 PROCEDURE — 99233 SBSQ HOSP IP/OBS HIGH 50: CPT | Mod: 25

## 2024-01-01 PROCEDURE — 82947 ASSAY GLUCOSE BLOOD QUANT: CPT

## 2024-01-01 PROCEDURE — 86900 BLOOD TYPING SEROLOGIC ABO: CPT

## 2024-01-01 PROCEDURE — 93306 TTE W/DOPPLER COMPLETE: CPT

## 2024-01-01 PROCEDURE — 83605 ASSAY OF LACTIC ACID: CPT

## 2024-01-01 PROCEDURE — 87186 SC STD MICRODIL/AGAR DIL: CPT

## 2024-01-01 PROCEDURE — 87070 CULTURE OTHR SPECIMN AEROBIC: CPT

## 2024-01-01 PROCEDURE — 99222 1ST HOSP IP/OBS MODERATE 55: CPT

## 2024-01-01 PROCEDURE — 71045 X-RAY EXAM CHEST 1 VIEW: CPT | Mod: 26

## 2024-01-01 PROCEDURE — 82803 BLOOD GASES ANY COMBINATION: CPT

## 2024-01-01 PROCEDURE — 82962 GLUCOSE BLOOD TEST: CPT

## 2024-01-01 PROCEDURE — 99232 SBSQ HOSP IP/OBS MODERATE 35: CPT

## 2024-01-01 PROCEDURE — 84100 ASSAY OF PHOSPHORUS: CPT

## 2024-01-01 PROCEDURE — 99233 SBSQ HOSP IP/OBS HIGH 50: CPT

## 2024-01-01 PROCEDURE — 86901 BLOOD TYPING SEROLOGIC RH(D): CPT

## 2024-01-01 PROCEDURE — 99239 HOSP IP/OBS DSCHRG MGMT >30: CPT

## 2024-01-01 PROCEDURE — 99497 ADVNCD CARE PLAN 30 MIN: CPT

## 2024-01-01 PROCEDURE — 99285 EMERGENCY DEPT VISIT HI MDM: CPT

## 2024-01-01 PROCEDURE — 86922 COMPATIBILITY TEST ANTIGLOB: CPT

## 2024-01-01 PROCEDURE — 86077 PHYS BLOOD BANK SERV XMATCH: CPT

## 2024-01-01 PROCEDURE — 71250 CT THORAX DX C-: CPT | Mod: 26,MC

## 2024-01-01 PROCEDURE — 82728 ASSAY OF FERRITIN: CPT

## 2024-01-01 PROCEDURE — 84145 PROCALCITONIN (PCT): CPT

## 2024-01-01 PROCEDURE — 86870 RBC ANTIBODY IDENTIFICATION: CPT

## 2024-01-01 PROCEDURE — 70551 MRI BRAIN STEM W/O DYE: CPT | Mod: MC

## 2024-01-01 PROCEDURE — 99284 EMERGENCY DEPT VISIT MOD MDM: CPT | Mod: FS

## 2024-01-01 PROCEDURE — 84484 ASSAY OF TROPONIN QUANT: CPT

## 2024-01-01 PROCEDURE — 93010 ELECTROCARDIOGRAM REPORT: CPT

## 2024-01-01 PROCEDURE — 99285 EMERGENCY DEPT VISIT HI MDM: CPT | Mod: 25

## 2024-01-01 PROCEDURE — 87205 SMEAR GRAM STAIN: CPT

## 2024-01-01 PROCEDURE — 83880 ASSAY OF NATRIURETIC PEPTIDE: CPT

## 2024-01-01 PROCEDURE — 71045 X-RAY EXAM CHEST 1 VIEW: CPT | Mod: 26,77

## 2024-01-01 PROCEDURE — 71045 X-RAY EXAM CHEST 1 VIEW: CPT

## 2024-01-01 PROCEDURE — A9567: CPT

## 2024-01-01 PROCEDURE — 96374 THER/PROPH/DIAG INJ IV PUSH: CPT

## 2024-01-01 PROCEDURE — 85018 HEMOGLOBIN: CPT

## 2024-01-01 PROCEDURE — 93306 TTE W/DOPPLER COMPLETE: CPT | Mod: 26

## 2024-01-01 PROCEDURE — 85730 THROMBOPLASTIN TIME PARTIAL: CPT

## 2024-01-01 PROCEDURE — 99221 1ST HOSP IP/OBS SF/LOW 40: CPT

## 2024-01-01 PROCEDURE — 84443 ASSAY THYROID STIM HORMONE: CPT

## 2024-01-01 PROCEDURE — 82435 ASSAY OF BLOOD CHLORIDE: CPT

## 2024-01-01 PROCEDURE — 71045 X-RAY EXAM CHEST 1 VIEW: CPT | Mod: 26,76

## 2024-01-01 PROCEDURE — 99233 SBSQ HOSP IP/OBS HIGH 50: CPT | Mod: FS

## 2024-01-01 PROCEDURE — 87077 CULTURE AEROBIC IDENTIFY: CPT

## 2024-01-01 PROCEDURE — 87641 MR-STAPH DNA AMP PROBE: CPT

## 2024-01-01 PROCEDURE — A9540: CPT

## 2024-01-01 PROCEDURE — 80053 COMPREHEN METABOLIC PANEL: CPT

## 2024-01-01 PROCEDURE — 76775 US EXAM ABDO BACK WALL LIM: CPT

## 2024-01-01 PROCEDURE — 85014 HEMATOCRIT: CPT

## 2024-01-01 PROCEDURE — 86850 RBC ANTIBODY SCREEN: CPT

## 2024-01-01 PROCEDURE — 0225U NFCT DS DNA&RNA 21 SARSCOV2: CPT

## 2024-01-01 PROCEDURE — 82436 ASSAY OF URINE CHLORIDE: CPT

## 2024-01-01 PROCEDURE — 70544 MR ANGIOGRAPHY HEAD W/O DYE: CPT | Mod: 26

## 2024-01-01 PROCEDURE — 76775 US EXAM ABDO BACK WALL LIM: CPT | Mod: 26

## 2024-01-01 PROCEDURE — 71250 CT THORAX DX C-: CPT | Mod: 26

## 2024-01-01 PROCEDURE — 83036 HEMOGLOBIN GLYCOSYLATED A1C: CPT

## 2024-01-01 PROCEDURE — 84295 ASSAY OF SERUM SODIUM: CPT

## 2024-01-01 PROCEDURE — 83930 ASSAY OF BLOOD OSMOLALITY: CPT

## 2024-01-01 PROCEDURE — 85379 FIBRIN DEGRADATION QUANT: CPT

## 2024-01-01 PROCEDURE — 82746 ASSAY OF FOLIC ACID SERUM: CPT

## 2024-01-01 PROCEDURE — 85025 COMPLETE CBC W/AUTO DIFF WBC: CPT

## 2024-01-01 PROCEDURE — 99223 1ST HOSP IP/OBS HIGH 75: CPT | Mod: FS

## 2024-01-01 PROCEDURE — 83735 ASSAY OF MAGNESIUM: CPT

## 2024-01-01 PROCEDURE — 82009 KETONE BODYS QUAL: CPT

## 2024-01-01 PROCEDURE — 80061 LIPID PANEL: CPT

## 2024-01-01 PROCEDURE — T1013: CPT

## 2024-01-01 PROCEDURE — 87040 BLOOD CULTURE FOR BACTERIA: CPT

## 2024-01-01 PROCEDURE — 70450 CT HEAD/BRAIN W/O DYE: CPT | Mod: MC

## 2024-01-01 PROCEDURE — 74176 CT ABD & PELVIS W/O CONTRAST: CPT | Mod: 26,MC

## 2024-01-01 PROCEDURE — 36430 TRANSFUSION BLD/BLD COMPNT: CPT

## 2024-01-01 PROCEDURE — 99231 SBSQ HOSP IP/OBS SF/LOW 25: CPT

## 2024-01-01 PROCEDURE — 84300 ASSAY OF URINE SODIUM: CPT

## 2024-01-01 PROCEDURE — 97163 PT EVAL HIGH COMPLEX 45 MIN: CPT

## 2024-01-01 PROCEDURE — 87150 DNA/RNA AMPLIFIED PROBE: CPT

## 2024-01-01 PROCEDURE — 36415 COLL VENOUS BLD VENIPUNCTURE: CPT

## 2024-01-01 PROCEDURE — 86880 COOMBS TEST DIRECT: CPT

## 2024-01-01 PROCEDURE — 70450 CT HEAD/BRAIN W/O DYE: CPT | Mod: 26

## 2024-01-01 PROCEDURE — 97530 THERAPEUTIC ACTIVITIES: CPT

## 2024-01-01 PROCEDURE — 99223 1ST HOSP IP/OBS HIGH 75: CPT

## 2024-01-01 PROCEDURE — 82140 ASSAY OF AMMONIA: CPT

## 2024-01-01 PROCEDURE — 83550 IRON BINDING TEST: CPT

## 2024-01-01 PROCEDURE — 74176 CT ABD & PELVIS W/O CONTRAST: CPT | Mod: 26

## 2024-01-01 PROCEDURE — 70450 CT HEAD/BRAIN W/O DYE: CPT | Mod: 26,MC

## 2024-01-01 PROCEDURE — 87086 URINE CULTURE/COLONY COUNT: CPT

## 2024-01-01 PROCEDURE — 85610 PROTHROMBIN TIME: CPT

## 2024-01-01 PROCEDURE — 70544 MR ANGIOGRAPHY HEAD W/O DYE: CPT | Mod: MC

## 2024-01-01 PROCEDURE — 81001 URINALYSIS AUTO W/SCOPE: CPT

## 2024-01-01 PROCEDURE — 80048 BASIC METABOLIC PNL TOTAL CA: CPT

## 2024-01-01 PROCEDURE — 92526 ORAL FUNCTION THERAPY: CPT

## 2024-01-01 PROCEDURE — P9016: CPT

## 2024-01-01 PROCEDURE — 78582 LUNG VENTILAT&PERFUS IMAGING: CPT | Mod: MC

## 2024-01-01 PROCEDURE — 85027 COMPLETE CBC AUTOMATED: CPT

## 2024-01-01 PROCEDURE — 70547 MR ANGIOGRAPHY NECK W/O DYE: CPT | Mod: 26

## 2024-01-01 PROCEDURE — 82330 ASSAY OF CALCIUM: CPT

## 2024-01-01 PROCEDURE — 70551 MRI BRAIN STEM W/O DYE: CPT | Mod: 26

## 2024-01-01 PROCEDURE — 86902 BLOOD TYPE ANTIGEN DONOR EA: CPT

## 2024-01-01 PROCEDURE — 70547 MR ANGIOGRAPHY NECK W/O DYE: CPT | Mod: MC

## 2024-01-01 PROCEDURE — 93005 ELECTROCARDIOGRAM TRACING: CPT

## 2024-01-01 PROCEDURE — 83935 ASSAY OF URINE OSMOLALITY: CPT

## 2024-01-01 PROCEDURE — 94640 AIRWAY INHALATION TREATMENT: CPT

## 2024-01-01 PROCEDURE — 78582 LUNG VENTILAT&PERFUS IMAGING: CPT | Mod: 26

## 2024-01-01 PROCEDURE — 83690 ASSAY OF LIPASE: CPT

## 2024-01-01 PROCEDURE — 74176 CT ABD & PELVIS W/O CONTRAST: CPT | Mod: MC

## 2024-01-01 PROCEDURE — 83540 ASSAY OF IRON: CPT

## 2024-01-01 PROCEDURE — 82607 VITAMIN B-12: CPT

## 2024-01-01 PROCEDURE — 71250 CT THORAX DX C-: CPT | Mod: MC

## 2024-01-01 PROCEDURE — 82550 ASSAY OF CK (CPK): CPT

## 2024-01-01 PROCEDURE — 36600 WITHDRAWAL OF ARTERIAL BLOOD: CPT

## 2024-01-01 PROCEDURE — 94760 N-INVAS EAR/PLS OXIMETRY 1: CPT

## 2024-01-01 PROCEDURE — 84466 ASSAY OF TRANSFERRIN: CPT

## 2024-01-01 PROCEDURE — 87640 STAPH A DNA AMP PROBE: CPT

## 2024-01-01 PROCEDURE — 84132 ASSAY OF SERUM POTASSIUM: CPT

## 2024-01-01 RX ORDER — AMLODIPINE BESYLATE 10 MG/1
5 TABLET ORAL EVERY 24 HOURS
Refills: 0 | Status: DISCONTINUED | OUTPATIENT
Start: 2024-01-01 | End: 2024-01-01

## 2024-01-01 RX ORDER — VANCOMYCIN HCL IN 5 % DEXTROSE 1.5G/250ML
1250 PLASTIC BAG, INJECTION (ML) INTRAVENOUS ONCE
Refills: 0 | Status: COMPLETED | OUTPATIENT
Start: 2024-01-01 | End: 2024-01-01

## 2024-01-01 RX ORDER — INSULIN LISPRO 100 U/ML
2 INJECTION, SOLUTION INTRAVENOUS; SUBCUTANEOUS
Refills: 0 | Status: DISCONTINUED | OUTPATIENT
Start: 2024-01-01 | End: 2024-01-01

## 2024-01-01 RX ORDER — SODIUM CHLORIDE 9 G/1000ML
500 INJECTION, SOLUTION INTRAVENOUS
Refills: 0 | Status: COMPLETED | OUTPATIENT
Start: 2024-01-01 | End: 2024-01-01

## 2024-01-01 RX ORDER — EPOETIN ALFA 10000 [IU]/ML
20000 SOLUTION INTRAVENOUS; SUBCUTANEOUS
Refills: 0 | Status: DISCONTINUED | OUTPATIENT
Start: 2024-01-01 | End: 2024-01-01

## 2024-01-01 RX ORDER — METOPROLOL SUCCINATE 50 MG/1
25 TABLET, EXTENDED RELEASE ORAL
Refills: 0 | Status: DISCONTINUED | OUTPATIENT
Start: 2024-01-01 | End: 2024-01-01

## 2024-01-01 RX ORDER — TAMSULOSIN HYDROCHLORIDE 0.4 MG/1
0.8 CAPSULE ORAL AT BEDTIME
Refills: 0 | Status: DISCONTINUED | OUTPATIENT
Start: 2024-01-01 | End: 2024-01-01

## 2024-01-01 RX ORDER — METOPROLOL SUCCINATE 50 MG/1
1 TABLET, EXTENDED RELEASE ORAL
Qty: 0 | Refills: 0 | DISCHARGE
Start: 2024-01-01

## 2024-01-01 RX ORDER — DEXTROSE 50 % IN WATER 50 %
25 SYRINGE (ML) INTRAVENOUS ONCE
Refills: 0 | Status: DISCONTINUED | OUTPATIENT
Start: 2024-01-01 | End: 2024-01-01

## 2024-01-01 RX ORDER — ACETAMINOPHEN 500 MG/5ML
1000 LIQUID (ML) ORAL ONCE
Refills: 0 | Status: COMPLETED | OUTPATIENT
Start: 2024-01-01 | End: 2024-01-01

## 2024-01-01 RX ORDER — MIDODRINE HYDROCHLORIDE 5 MG/1
5 TABLET ORAL THREE TIMES A DAY
Refills: 0 | Status: DISCONTINUED | OUTPATIENT
Start: 2024-01-01 | End: 2024-01-01

## 2024-01-01 RX ORDER — IRON SUCROSE 20 MG/ML
100 INJECTION, SOLUTION INTRAVENOUS EVERY 24 HOURS
Refills: 0 | Status: COMPLETED | OUTPATIENT
Start: 2024-01-01 | End: 2024-01-01

## 2024-01-01 RX ORDER — INSULIN LISPRO 100 U/ML
4 INJECTION, SOLUTION INTRAVENOUS; SUBCUTANEOUS
Refills: 0 | Status: DISCONTINUED | OUTPATIENT
Start: 2024-01-01 | End: 2024-01-01

## 2024-01-01 RX ORDER — CEFEPIME 2 G/20ML
2000 INJECTION, POWDER, FOR SOLUTION INTRAVENOUS EVERY 24 HOURS
Refills: 0 | Status: DISCONTINUED | OUTPATIENT
Start: 2024-01-01 | End: 2024-01-01

## 2024-01-01 RX ORDER — GLUCAGON 3 MG/1
1 POWDER NASAL ONCE
Refills: 0 | Status: DISCONTINUED | OUTPATIENT
Start: 2024-01-01 | End: 2024-01-01

## 2024-01-01 RX ORDER — ERTAPENEM SODIUM 1 G/1
500 INJECTION, POWDER, LYOPHILIZED, FOR SOLUTION INTRAMUSCULAR; INTRAVENOUS EVERY 24 HOURS
Refills: 0 | Status: COMPLETED | OUTPATIENT
Start: 2024-01-01 | End: 2024-01-01

## 2024-01-01 RX ORDER — ONDANSETRON HCL/PF 4 MG/2 ML
4 VIAL (ML) INJECTION EVERY 8 HOURS
Refills: 0 | Status: DISCONTINUED | OUTPATIENT
Start: 2024-01-01 | End: 2024-01-01

## 2024-01-01 RX ORDER — SODIUM CHLORIDE 9 G/1000ML
1000 INJECTION, SOLUTION INTRAVENOUS
Refills: 0 | Status: DISCONTINUED | OUTPATIENT
Start: 2024-01-01 | End: 2024-01-01

## 2024-01-01 RX ORDER — ALBUTEROL SULFATE 2.5 MG/3ML
2.5 VIAL, NEBULIZER (ML) INHALATION EVERY 6 HOURS
Refills: 0 | Status: DISCONTINUED | OUTPATIENT
Start: 2024-01-01 | End: 2024-01-01

## 2024-01-01 RX ORDER — VANCOMYCIN HCL IN 5 % DEXTROSE 1.5G/250ML
1000 PLASTIC BAG, INJECTION (ML) INTRAVENOUS EVERY 24 HOURS
Refills: 0 | Status: DISCONTINUED | OUTPATIENT
Start: 2024-01-01 | End: 2024-01-01

## 2024-01-01 RX ORDER — MAGNESIUM SULFATE 500 MG/ML
2 SYRINGE (ML) INJECTION ONCE
Refills: 0 | Status: COMPLETED | OUTPATIENT
Start: 2024-01-01 | End: 2024-01-01

## 2024-01-01 RX ORDER — DEXTROSE 50 % IN WATER 50 %
12.5 SYRINGE (ML) INTRAVENOUS ONCE
Refills: 0 | Status: DISCONTINUED | OUTPATIENT
Start: 2024-01-01 | End: 2024-01-01

## 2024-01-01 RX ORDER — IPRATROPIUM BROMIDE AND ALBUTEROL SULFATE .5; 2.5 MG/3ML; MG/3ML
3 SOLUTION RESPIRATORY (INHALATION) EVERY 6 HOURS
Refills: 0 | Status: DISCONTINUED | OUTPATIENT
Start: 2024-01-01 | End: 2024-01-01

## 2024-01-01 RX ORDER — SUCRALFATE 1 G
1 TABLET ORAL
Refills: 0 | Status: DISCONTINUED | OUTPATIENT
Start: 2024-01-01 | End: 2024-01-01

## 2024-01-01 RX ORDER — BISACODYL 5 MG
10 TABLET, DELAYED RELEASE (ENTERIC COATED) ORAL DAILY
Refills: 0 | Status: DISCONTINUED | OUTPATIENT
Start: 2024-01-01 | End: 2024-01-01

## 2024-01-01 RX ORDER — OLANZAPINE 15 MG/1
5 TABLET, FILM COATED ORAL ONCE
Refills: 0 | Status: COMPLETED | OUTPATIENT
Start: 2024-01-01 | End: 2024-01-01

## 2024-01-01 RX ORDER — ACETYLCYSTEINE 200 MG/ML
4 INHALANT RESPIRATORY (INHALATION) EVERY 6 HOURS
Refills: 0 | Status: DISCONTINUED | OUTPATIENT
Start: 2024-01-01 | End: 2024-01-01

## 2024-01-01 RX ORDER — HEPARIN SODIUM 1000 [USP'U]/ML
5000 INJECTION INTRAVENOUS; SUBCUTANEOUS EVERY 12 HOURS
Refills: 0 | Status: DISCONTINUED | OUTPATIENT
Start: 2024-01-01 | End: 2024-01-01

## 2024-01-01 RX ORDER — INSULIN GLARGINE-YFGN 100 [IU]/ML
8 INJECTION, SOLUTION SUBCUTANEOUS AT BEDTIME
Refills: 0 | Status: DISCONTINUED | OUTPATIENT
Start: 2024-01-01 | End: 2024-01-01

## 2024-01-01 RX ORDER — GABAPENTIN 400 MG/1
1 CAPSULE ORAL
Refills: 0 | DISCHARGE

## 2024-01-01 RX ORDER — SODIUM BICARBONATE 1 MEQ/ML
650 SYRINGE (ML) INTRAVENOUS
Refills: 0 | Status: DISCONTINUED | OUTPATIENT
Start: 2024-01-01 | End: 2024-01-05

## 2024-01-01 RX ORDER — INSULIN GLARGINE-YFGN 100 [IU]/ML
10 INJECTION, SOLUTION SUBCUTANEOUS AT BEDTIME
Refills: 0 | Status: DISCONTINUED | OUTPATIENT
Start: 2024-01-01 | End: 2024-01-01

## 2024-01-01 RX ORDER — AMLODIPINE BESYLATE 10 MG/1
1 TABLET ORAL
Qty: 30 | Refills: 0
Start: 2024-01-01 | End: 2024-01-01

## 2024-01-01 RX ORDER — POTASSIUM PHOSPHATE, MONOBASIC POTASSIUM PHOSPHATE, DIBASIC INJECTION, 236; 224 MG/ML; MG/ML
30 SOLUTION, CONCENTRATE INTRAVENOUS ONCE
Refills: 0 | Status: COMPLETED | OUTPATIENT
Start: 2024-01-01 | End: 2024-01-01

## 2024-01-01 RX ORDER — GABAPENTIN 400 MG/1
100 CAPSULE ORAL THREE TIMES A DAY
Refills: 0 | Status: DISCONTINUED | OUTPATIENT
Start: 2024-01-01 | End: 2024-01-01

## 2024-01-01 RX ORDER — INSULIN LISPRO 100 U/ML
INJECTION, SOLUTION INTRAVENOUS; SUBCUTANEOUS
Refills: 0 | Status: DISCONTINUED | OUTPATIENT
Start: 2024-01-01 | End: 2024-01-01

## 2024-01-01 RX ORDER — MELATONIN 5 MG
3 TABLET ORAL AT BEDTIME
Refills: 0 | Status: DISCONTINUED | OUTPATIENT
Start: 2024-01-01 | End: 2024-01-01

## 2024-01-01 RX ORDER — ERTAPENEM SODIUM 1 G/1
INJECTION, POWDER, LYOPHILIZED, FOR SOLUTION INTRAMUSCULAR; INTRAVENOUS
Refills: 0 | Status: COMPLETED | OUTPATIENT
Start: 2024-01-01 | End: 2024-01-01

## 2024-01-01 RX ORDER — METHYLPREDNISOLONE ACETATE 80 MG/ML
40 INJECTION, SUSPENSION INTRA-ARTICULAR; INTRALESIONAL; INTRAMUSCULAR; SOFT TISSUE ONCE
Refills: 0 | Status: COMPLETED | OUTPATIENT
Start: 2024-01-01 | End: 2024-01-01

## 2024-01-01 RX ORDER — ERTAPENEM SODIUM 1 G/1
500 INJECTION, POWDER, LYOPHILIZED, FOR SOLUTION INTRAMUSCULAR; INTRAVENOUS ONCE
Refills: 0 | Status: COMPLETED | OUTPATIENT
Start: 2024-01-01 | End: 2024-01-01

## 2024-01-01 RX ORDER — SODIUM CHLORIDE 9 G/1000ML
500 INJECTION, SOLUTION INTRAVENOUS ONCE
Refills: 0 | Status: COMPLETED | OUTPATIENT
Start: 2024-01-01 | End: 2024-01-01

## 2024-01-01 RX ORDER — SODIUM BICARBONATE 1 MEQ/ML
0.08 SYRINGE (ML) INTRAVENOUS
Qty: 75 | Refills: 0 | Status: DISCONTINUED | OUTPATIENT
Start: 2024-01-01 | End: 2024-01-01

## 2024-01-01 RX ORDER — CEFTRIAXONE 500 MG/1
1000 INJECTION, POWDER, FOR SOLUTION INTRAMUSCULAR; INTRAVENOUS ONCE
Refills: 0 | Status: COMPLETED | OUTPATIENT
Start: 2024-01-01 | End: 2024-01-01

## 2024-01-01 RX ORDER — INSULIN GLARGINE-YFGN 100 [IU]/ML
6 INJECTION, SOLUTION SUBCUTANEOUS AT BEDTIME
Refills: 0 | Status: DISCONTINUED | OUTPATIENT
Start: 2024-01-01 | End: 2024-01-01

## 2024-01-01 RX ORDER — CEFEPIME 2 G/20ML
INJECTION, POWDER, FOR SOLUTION INTRAVENOUS
Refills: 0 | Status: DISCONTINUED | OUTPATIENT
Start: 2024-01-01 | End: 2024-01-01

## 2024-01-01 RX ORDER — DEXTROSE 50 % IN WATER 50 %
15 SYRINGE (ML) INTRAVENOUS ONCE
Refills: 0 | Status: DISCONTINUED | OUTPATIENT
Start: 2024-01-01 | End: 2024-01-01

## 2024-01-01 RX ORDER — SODIUM BICARBONATE 1 MEQ/ML
650 SYRINGE (ML) INTRAVENOUS THREE TIMES A DAY
Refills: 0 | Status: DISCONTINUED | OUTPATIENT
Start: 2024-01-01 | End: 2024-01-01

## 2024-01-01 RX ORDER — HEPARIN SODIUM 1000 [USP'U]/ML
5000 INJECTION INTRAVENOUS; SUBCUTANEOUS EVERY 8 HOURS
Refills: 0 | Status: DISCONTINUED | OUTPATIENT
Start: 2024-01-01 | End: 2024-01-01

## 2024-01-01 RX ORDER — MAGNESIUM, ALUMINUM HYDROXIDE 200-200 MG
30 TABLET,CHEWABLE ORAL EVERY 4 HOURS
Refills: 0 | Status: DISCONTINUED | OUTPATIENT
Start: 2024-01-01 | End: 2024-01-01

## 2024-01-01 RX ORDER — CEFTRIAXONE 500 MG/1
1000 INJECTION, POWDER, FOR SOLUTION INTRAMUSCULAR; INTRAVENOUS ONCE
Refills: 0 | Status: DISCONTINUED | OUTPATIENT
Start: 2024-01-01 | End: 2024-01-01

## 2024-01-01 RX ORDER — GLIMEPIRIDE 4 MG/1
1 TABLET ORAL
Refills: 0 | DISCHARGE

## 2024-01-01 RX ORDER — VANCOMYCIN HCL IN 5 % DEXTROSE 1.5G/250ML
PLASTIC BAG, INJECTION (ML) INTRAVENOUS
Refills: 0 | Status: DISCONTINUED | OUTPATIENT
Start: 2024-01-01 | End: 2024-01-01

## 2024-01-01 RX ORDER — SODIUM CHLORIDE 9 G/1000ML
1000 INJECTION, SOLUTION INTRAVENOUS
Refills: 0 | Status: COMPLETED | OUTPATIENT
Start: 2024-01-01 | End: 2024-01-01

## 2024-01-01 RX ORDER — ACETYLCYSTEINE 200 MG/ML
4 INHALANT RESPIRATORY (INHALATION) DAILY
Refills: 0 | Status: DISCONTINUED | OUTPATIENT
Start: 2024-01-01 | End: 2024-01-01

## 2024-01-01 RX ORDER — INSULIN GLARGINE-YFGN 100 [IU]/ML
12 INJECTION, SOLUTION SUBCUTANEOUS AT BEDTIME
Refills: 0 | Status: DISCONTINUED | OUTPATIENT
Start: 2024-01-01 | End: 2024-01-01

## 2024-01-01 RX ORDER — QUETIAPINE FUMARATE 200 MG/1
12.5 TABLET, FILM COATED ORAL AT BEDTIME
Refills: 0 | Status: DISCONTINUED | OUTPATIENT
Start: 2024-01-01 | End: 2024-01-05

## 2024-01-01 RX ORDER — CEFEPIME 2 G/20ML
2000 INJECTION, POWDER, FOR SOLUTION INTRAVENOUS ONCE
Refills: 0 | Status: COMPLETED | OUTPATIENT
Start: 2024-01-01 | End: 2024-01-01

## 2024-01-01 RX ORDER — ACETYLCYSTEINE 200 MG/ML
4 INHALANT RESPIRATORY (INHALATION) EVERY 6 HOURS
Refills: 0 | Status: COMPLETED | OUTPATIENT
Start: 2024-01-01 | End: 2024-01-01

## 2024-01-01 RX ORDER — ACETAMINOPHEN 500 MG/5ML
650 LIQUID (ML) ORAL EVERY 6 HOURS
Refills: 0 | Status: DISCONTINUED | OUTPATIENT
Start: 2024-01-01 | End: 2024-01-01

## 2024-01-01 RX ADMIN — HEPARIN SODIUM 5000 UNIT(S): 1000 INJECTION INTRAVENOUS; SUBCUTANEOUS at 13:19

## 2024-01-01 RX ADMIN — HEPARIN SODIUM 5000 UNIT(S): 5000 INJECTION INTRAVENOUS; SUBCUTANEOUS at 22:10

## 2024-01-01 RX ADMIN — HEPARIN SODIUM 5000 UNIT(S): 1000 INJECTION INTRAVENOUS; SUBCUTANEOUS at 05:38

## 2024-01-01 RX ADMIN — METOPROLOL SUCCINATE 25 MILLIGRAM(S): 50 TABLET, EXTENDED RELEASE ORAL at 05:26

## 2024-01-01 RX ADMIN — Medication 1 GRAM(S): at 17:21

## 2024-01-01 RX ADMIN — METOPROLOL SUCCINATE 25 MILLIGRAM(S): 50 TABLET, EXTENDED RELEASE ORAL at 18:00

## 2024-01-01 RX ADMIN — INSULIN LISPRO 2: 100 INJECTION, SOLUTION INTRAVENOUS; SUBCUTANEOUS at 13:24

## 2024-01-01 RX ADMIN — ACETYLCYSTEINE 4 MILLILITER(S): 200 INHALANT RESPIRATORY (INHALATION) at 04:53

## 2024-01-01 RX ADMIN — HEPARIN SODIUM 5000 UNIT(S): 5000 INJECTION INTRAVENOUS; SUBCUTANEOUS at 06:04

## 2024-01-01 RX ADMIN — Medication 650 MILLIGRAM(S): at 05:01

## 2024-01-01 RX ADMIN — METHYLPREDNISOLONE ACETATE 40 MILLIGRAM(S): 80 INJECTION, SUSPENSION INTRA-ARTICULAR; INTRALESIONAL; INTRAMUSCULAR; SOFT TISSUE at 18:30

## 2024-01-01 RX ADMIN — ERTAPENEM SODIUM 100 MILLIGRAM(S): 1 INJECTION, POWDER, LYOPHILIZED, FOR SOLUTION INTRAMUSCULAR; INTRAVENOUS at 07:38

## 2024-01-01 RX ADMIN — GABAPENTIN 100 MILLIGRAM(S): 400 CAPSULE ORAL at 13:19

## 2024-01-01 RX ADMIN — Medication 650 MILLIGRAM(S): at 13:15

## 2024-01-01 RX ADMIN — AMLODIPINE BESYLATE 5 MILLIGRAM(S): 10 TABLET ORAL at 05:38

## 2024-01-01 RX ADMIN — Medication 40 MILLIGRAM(S): at 06:05

## 2024-01-01 RX ADMIN — IRON SUCROSE 100 MILLIGRAM(S): 20 INJECTION, SOLUTION INTRAVENOUS at 13:52

## 2024-01-01 RX ADMIN — IPRATROPIUM BROMIDE AND ALBUTEROL SULFATE 3 MILLILITER(S): .5; 2.5 SOLUTION RESPIRATORY (INHALATION) at 18:18

## 2024-01-01 RX ADMIN — INSULIN LISPRO 4: 100 INJECTION, SOLUTION INTRAVENOUS; SUBCUTANEOUS at 11:57

## 2024-01-01 RX ADMIN — TAMSULOSIN HYDROCHLORIDE 0.8 MILLIGRAM(S): 0.4 CAPSULE ORAL at 21:36

## 2024-01-01 RX ADMIN — ERTAPENEM SODIUM 100 MILLIGRAM(S): 1 INJECTION, POWDER, LYOPHILIZED, FOR SOLUTION INTRAMUSCULAR; INTRAVENOUS at 05:19

## 2024-01-01 RX ADMIN — Medication 2.5 MILLIGRAM(S): at 14:31

## 2024-01-01 RX ADMIN — HEPARIN SODIUM 5000 UNIT(S): 1000 INJECTION INTRAVENOUS; SUBCUTANEOUS at 05:26

## 2024-01-01 RX ADMIN — AMLODIPINE BESYLATE 5 MILLIGRAM(S): 10 TABLET ORAL at 05:23

## 2024-01-01 RX ADMIN — Medication 1 APPLICATION(S): at 13:16

## 2024-01-01 RX ADMIN — METOPROLOL SUCCINATE 25 MILLIGRAM(S): 50 TABLET, EXTENDED RELEASE ORAL at 06:00

## 2024-01-01 RX ADMIN — METOPROLOL SUCCINATE 25 MILLIGRAM(S): 50 TABLET, EXTENDED RELEASE ORAL at 18:17

## 2024-01-01 RX ADMIN — Medication 40 MILLIGRAM(S): at 12:14

## 2024-01-01 RX ADMIN — INSULIN LISPRO 4 UNIT(S): 100 INJECTION, SOLUTION INTRAVENOUS; SUBCUTANEOUS at 12:42

## 2024-01-01 RX ADMIN — Medication 1 MILLIGRAM(S): at 14:32

## 2024-01-01 RX ADMIN — Medication 1 GRAM(S): at 06:05

## 2024-01-01 RX ADMIN — IPRATROPIUM BROMIDE AND ALBUTEROL SULFATE 3 MILLILITER(S): .5; 2.5 SOLUTION RESPIRATORY (INHALATION) at 20:55

## 2024-01-01 RX ADMIN — Medication 2.5 MILLIGRAM(S): at 08:45

## 2024-01-01 RX ADMIN — Medication 1 APPLICATION(S): at 12:02

## 2024-01-01 RX ADMIN — METOPROLOL SUCCINATE 25 MILLIGRAM(S): 50 TABLET, EXTENDED RELEASE ORAL at 18:29

## 2024-01-01 RX ADMIN — IPRATROPIUM BROMIDE AND ALBUTEROL SULFATE 3 MILLILITER(S): .5; 2.5 SOLUTION RESPIRATORY (INHALATION) at 04:06

## 2024-01-01 RX ADMIN — Medication 5 MILLIGRAM(S): at 05:19

## 2024-01-01 RX ADMIN — Medication 650 MILLIGRAM(S): at 07:37

## 2024-01-01 RX ADMIN — ACETYLCYSTEINE 4 MILLILITER(S): 200 INHALANT RESPIRATORY (INHALATION) at 15:15

## 2024-01-01 RX ADMIN — IPRATROPIUM BROMIDE AND ALBUTEROL SULFATE 3 MILLILITER(S): .5; 2.5 SOLUTION RESPIRATORY (INHALATION) at 08:05

## 2024-01-01 RX ADMIN — Medication 2.5 MILLIGRAM(S): at 21:23

## 2024-01-01 RX ADMIN — Medication 1 GRAM(S): at 17:59

## 2024-01-01 RX ADMIN — INSULIN LISPRO 2: 100 INJECTION, SOLUTION INTRAVENOUS; SUBCUTANEOUS at 17:21

## 2024-01-01 RX ADMIN — HEPARIN SODIUM 5000 UNIT(S): 1000 INJECTION INTRAVENOUS; SUBCUTANEOUS at 21:18

## 2024-01-01 RX ADMIN — HEPARIN SODIUM 5000 UNIT(S): 1000 INJECTION INTRAVENOUS; SUBCUTANEOUS at 13:30

## 2024-01-01 RX ADMIN — Medication 1 GRAM(S): at 17:43

## 2024-01-01 RX ADMIN — MIDODRINE HYDROCHLORIDE 5 MILLIGRAM(S): 5 TABLET ORAL at 12:46

## 2024-01-01 RX ADMIN — MIDODRINE HYDROCHLORIDE 5 MILLIGRAM(S): 5 TABLET ORAL at 09:47

## 2024-01-01 RX ADMIN — Medication 1 APPLICATION(S): at 11:07

## 2024-01-01 RX ADMIN — Medication 4 MILLILITER(S): at 21:21

## 2024-01-01 RX ADMIN — Medication 4 MILLILITER(S): at 22:30

## 2024-01-01 RX ADMIN — SODIUM CHLORIDE 50 MILLILITER(S): 9 INJECTION, SOLUTION INTRAVENOUS at 21:50

## 2024-01-01 RX ADMIN — HEPARIN SODIUM 5000 UNIT(S): 1000 INJECTION INTRAVENOUS; SUBCUTANEOUS at 21:59

## 2024-01-01 RX ADMIN — Medication 1 GRAM(S): at 12:46

## 2024-01-01 RX ADMIN — Medication 25 MILLIGRAM(S): at 06:05

## 2024-01-01 RX ADMIN — Medication 2.5 MILLIGRAM(S): at 22:30

## 2024-01-01 RX ADMIN — ACETYLCYSTEINE 4 MILLILITER(S): 200 INHALANT RESPIRATORY (INHALATION) at 08:29

## 2024-01-01 RX ADMIN — Medication 650 MILLIGRAM(S): at 18:29

## 2024-01-01 RX ADMIN — Medication 1 GRAM(S): at 05:02

## 2024-01-01 RX ADMIN — Medication 2.5 MILLIGRAM(S): at 08:48

## 2024-01-01 RX ADMIN — Medication 166.67 MILLIGRAM(S): at 10:57

## 2024-01-01 RX ADMIN — HEPARIN SODIUM 5000 UNIT(S): 1000 INJECTION INTRAVENOUS; SUBCUTANEOUS at 21:36

## 2024-01-01 RX ADMIN — Medication 40 MILLIGRAM(S): at 12:49

## 2024-01-01 RX ADMIN — IPRATROPIUM BROMIDE AND ALBUTEROL SULFATE 3 MILLILITER(S): .5; 2.5 SOLUTION RESPIRATORY (INHALATION) at 13:17

## 2024-01-01 RX ADMIN — HEPARIN SODIUM 5000 UNIT(S): 1000 INJECTION INTRAVENOUS; SUBCUTANEOUS at 13:32

## 2024-01-01 RX ADMIN — GABAPENTIN 100 MILLIGRAM(S): 400 CAPSULE ORAL at 13:13

## 2024-01-01 RX ADMIN — ACETYLCYSTEINE 4 MILLILITER(S): 200 INHALANT RESPIRATORY (INHALATION) at 08:44

## 2024-01-01 RX ADMIN — AMLODIPINE BESYLATE 5 MILLIGRAM(S): 10 TABLET ORAL at 05:26

## 2024-01-01 RX ADMIN — Medication 4 MILLILITER(S): at 09:01

## 2024-01-01 RX ADMIN — INSULIN LISPRO 4: 100 INJECTION, SOLUTION INTRAVENOUS; SUBCUTANEOUS at 16:57

## 2024-01-01 RX ADMIN — SODIUM CHLORIDE 60 MILLILITER(S): 9 INJECTION, SOLUTION INTRAVENOUS at 18:08

## 2024-01-01 RX ADMIN — GABAPENTIN 100 MILLIGRAM(S): 400 CAPSULE ORAL at 13:32

## 2024-01-01 RX ADMIN — TAMSULOSIN HYDROCHLORIDE 0.8 MILLIGRAM(S): 0.4 CAPSULE ORAL at 22:01

## 2024-01-01 RX ADMIN — IRON SUCROSE 100 MILLIGRAM(S): 20 INJECTION, SOLUTION INTRAVENOUS at 13:50

## 2024-01-01 RX ADMIN — QUETIAPINE FUMARATE 12.5 MILLIGRAM(S): 200 TABLET, FILM COATED ORAL at 22:13

## 2024-01-01 RX ADMIN — Medication 1 GRAM(S): at 05:23

## 2024-01-01 RX ADMIN — Medication 1 APPLICATION(S): at 13:48

## 2024-01-01 RX ADMIN — HEPARIN SODIUM 5000 UNIT(S): 1000 INJECTION INTRAVENOUS; SUBCUTANEOUS at 05:53

## 2024-01-01 RX ADMIN — Medication 650 MILLIGRAM(S): at 12:46

## 2024-01-01 RX ADMIN — HEPARIN SODIUM 5000 UNIT(S): 1000 INJECTION INTRAVENOUS; SUBCUTANEOUS at 13:01

## 2024-01-01 RX ADMIN — INSULIN LISPRO 4 UNIT(S): 100 INJECTION, SOLUTION INTRAVENOUS; SUBCUTANEOUS at 17:26

## 2024-01-01 RX ADMIN — Medication 4 MILLILITER(S): at 20:31

## 2024-01-01 RX ADMIN — Medication 650 MILLIGRAM(S): at 22:41

## 2024-01-01 RX ADMIN — Medication 2.5 MILLIGRAM(S): at 15:15

## 2024-01-01 RX ADMIN — Medication 650 MILLIGRAM(S): at 19:45

## 2024-01-01 RX ADMIN — ERTAPENEM SODIUM 100 MILLIGRAM(S): 1 INJECTION, POWDER, LYOPHILIZED, FOR SOLUTION INTRAMUSCULAR; INTRAVENOUS at 03:46

## 2024-01-01 RX ADMIN — HEPARIN SODIUM 5000 UNIT(S): 1000 INJECTION INTRAVENOUS; SUBCUTANEOUS at 22:11

## 2024-01-01 RX ADMIN — Medication 1 GRAM(S): at 05:38

## 2024-01-01 RX ADMIN — AMLODIPINE BESYLATE 5 MILLIGRAM(S): 10 TABLET ORAL at 06:00

## 2024-01-01 RX ADMIN — INSULIN GLARGINE-YFGN 6 UNIT(S): 100 INJECTION, SOLUTION SUBCUTANEOUS at 21:36

## 2024-01-01 RX ADMIN — Medication 650 MILLIGRAM(S): at 13:32

## 2024-01-01 RX ADMIN — IPRATROPIUM BROMIDE AND ALBUTEROL SULFATE 3 MILLILITER(S): .5; 2.5 SOLUTION RESPIRATORY (INHALATION) at 14:53

## 2024-01-01 RX ADMIN — Medication 650 MILLIGRAM(S): at 11:06

## 2024-01-01 RX ADMIN — CEFTRIAXONE 1000 MILLIGRAM(S): 500 INJECTION, POWDER, FOR SOLUTION INTRAMUSCULAR; INTRAVENOUS at 06:23

## 2024-01-01 RX ADMIN — Medication 2.5 MILLIGRAM(S): at 20:30

## 2024-01-01 RX ADMIN — ACETYLCYSTEINE 4 MILLILITER(S): 200 INHALANT RESPIRATORY (INHALATION) at 14:00

## 2024-01-01 RX ADMIN — Medication 40 MILLIGRAM(S): at 05:01

## 2024-01-01 RX ADMIN — INSULIN LISPRO 6: 100 INJECTION, SOLUTION INTRAVENOUS; SUBCUTANEOUS at 17:14

## 2024-01-01 RX ADMIN — Medication 650 MILLIGRAM(S): at 14:21

## 2024-01-01 RX ADMIN — Medication 2.5 MILLIGRAM(S): at 08:29

## 2024-01-01 RX ADMIN — Medication 1 GRAM(S): at 06:00

## 2024-01-01 RX ADMIN — Medication 1 APPLICATION(S): at 12:50

## 2024-01-01 RX ADMIN — SODIUM CHLORIDE 100 MILLILITER(S): 9 INJECTION, SOLUTION INTRAVENOUS at 03:46

## 2024-01-01 RX ADMIN — Medication 650 MILLIGRAM(S): at 22:01

## 2024-01-01 RX ADMIN — CHLORHEXIDINE GLUCONATE 1 APPLICATION(S): 213 SOLUTION TOPICAL at 06:16

## 2024-01-01 RX ADMIN — METOPROLOL SUCCINATE 25 MILLIGRAM(S): 50 TABLET, EXTENDED RELEASE ORAL at 17:21

## 2024-01-01 RX ADMIN — Medication 1 GRAM(S): at 12:15

## 2024-01-01 RX ADMIN — MIDODRINE HYDROCHLORIDE 5 MILLIGRAM(S): 5 TABLET ORAL at 09:42

## 2024-01-01 RX ADMIN — SODIUM CHLORIDE 100 MILLILITER(S): 9 INJECTION, SOLUTION INTRAVENOUS at 17:27

## 2024-01-01 RX ADMIN — HEPARIN SODIUM 5000 UNIT(S): 1000 INJECTION INTRAVENOUS; SUBCUTANEOUS at 22:01

## 2024-01-01 RX ADMIN — INSULIN LISPRO 4 UNIT(S): 100 INJECTION, SOLUTION INTRAVENOUS; SUBCUTANEOUS at 09:39

## 2024-01-01 RX ADMIN — Medication 4 MILLILITER(S): at 14:32

## 2024-01-01 RX ADMIN — Medication 25 GRAM(S): at 12:14

## 2024-01-01 RX ADMIN — Medication 650 MILLIGRAM(S): at 21:38

## 2024-01-01 RX ADMIN — Medication 2100 MILLILITER(S): at 06:22

## 2024-01-01 RX ADMIN — METOPROLOL SUCCINATE 25 MILLIGRAM(S): 50 TABLET, EXTENDED RELEASE ORAL at 09:47

## 2024-01-01 RX ADMIN — INSULIN LISPRO 4: 100 INJECTION, SOLUTION INTRAVENOUS; SUBCUTANEOUS at 11:16

## 2024-01-01 RX ADMIN — GABAPENTIN 100 MILLIGRAM(S): 400 CAPSULE ORAL at 07:38

## 2024-01-01 RX ADMIN — INSULIN LISPRO 2: 100 INJECTION, SOLUTION INTRAVENOUS; SUBCUTANEOUS at 10:19

## 2024-01-01 RX ADMIN — Medication 1 APPLICATION(S): at 11:45

## 2024-01-01 RX ADMIN — Medication 1 GRAM(S): at 12:44

## 2024-01-01 RX ADMIN — SODIUM CHLORIDE 1000 MILLILITER(S): 9 INJECTION, SOLUTION INTRAVENOUS at 13:40

## 2024-01-01 RX ADMIN — SODIUM CHLORIDE 70 MILLILITER(S): 9 INJECTION, SOLUTION INTRAVENOUS at 22:14

## 2024-01-01 RX ADMIN — Medication 25 MILLIGRAM(S): at 18:34

## 2024-01-01 RX ADMIN — METOPROLOL SUCCINATE 25 MILLIGRAM(S): 50 TABLET, EXTENDED RELEASE ORAL at 05:23

## 2024-01-01 RX ADMIN — Medication 40 MILLIGRAM(S): at 07:37

## 2024-01-01 RX ADMIN — POTASSIUM PHOSPHATE, MONOBASIC POTASSIUM PHOSPHATE, DIBASIC INJECTION, 83.33 MILLIMOLE(S): 236; 224 SOLUTION, CONCENTRATE INTRAVENOUS at 08:40

## 2024-01-01 RX ADMIN — ERTAPENEM SODIUM 100 MILLIGRAM(S): 1 INJECTION, POWDER, LYOPHILIZED, FOR SOLUTION INTRAMUSCULAR; INTRAVENOUS at 05:38

## 2024-01-01 RX ADMIN — Medication 4 MILLILITER(S): at 08:45

## 2024-01-01 RX ADMIN — TAMSULOSIN HYDROCHLORIDE 0.8 MILLIGRAM(S): 0.4 CAPSULE ORAL at 21:59

## 2024-01-01 RX ADMIN — Medication 10 MILLIGRAM(S): at 12:54

## 2024-01-01 RX ADMIN — Medication 650 MILLIGRAM(S): at 22:13

## 2024-01-01 RX ADMIN — Medication 1 GRAM(S): at 23:37

## 2024-01-01 RX ADMIN — Medication 4 MILLILITER(S): at 14:54

## 2024-01-01 RX ADMIN — IPRATROPIUM BROMIDE AND ALBUTEROL SULFATE 3 MILLILITER(S): .5; 2.5 SOLUTION RESPIRATORY (INHALATION) at 14:25

## 2024-01-01 RX ADMIN — SODIUM CHLORIDE 75 MILLILITER(S): 9 INJECTION, SOLUTION INTRAVENOUS at 18:04

## 2024-01-01 RX ADMIN — Medication 650 MILLIGRAM(S): at 05:26

## 2024-01-01 RX ADMIN — ACETYLCYSTEINE 4 MILLILITER(S): 200 INHALANT RESPIRATORY (INHALATION) at 08:05

## 2024-01-01 RX ADMIN — Medication 40 MILLIGRAM(S): at 10:42

## 2024-01-01 RX ADMIN — AMLODIPINE BESYLATE 5 MILLIGRAM(S): 10 TABLET ORAL at 05:01

## 2024-01-01 RX ADMIN — IPRATROPIUM BROMIDE AND ALBUTEROL SULFATE 3 MILLILITER(S): .5; 2.5 SOLUTION RESPIRATORY (INHALATION) at 14:59

## 2024-01-01 RX ADMIN — Medication 1 APPLICATION(S): at 13:49

## 2024-01-01 RX ADMIN — Medication 2.5 MILLIGRAM(S): at 08:13

## 2024-01-01 RX ADMIN — HEPARIN SODIUM 5000 UNIT(S): 1000 INJECTION INTRAVENOUS; SUBCUTANEOUS at 05:24

## 2024-01-01 RX ADMIN — HEPARIN SODIUM 5000 UNIT(S): 1000 INJECTION INTRAVENOUS; SUBCUTANEOUS at 13:14

## 2024-01-01 RX ADMIN — Medication 1 GRAM(S): at 11:44

## 2024-01-01 RX ADMIN — Medication 40 MILLIGRAM(S): at 13:14

## 2024-01-01 RX ADMIN — TAMSULOSIN HYDROCHLORIDE 0.4 MILLIGRAM(S): 0.4 CAPSULE ORAL at 22:13

## 2024-01-01 RX ADMIN — Medication 1 GRAM(S): at 00:35

## 2024-01-01 RX ADMIN — DRONEDARONE 400 MILLIGRAM(S): 400 TABLET, FILM COATED ORAL at 06:05

## 2024-01-01 RX ADMIN — Medication 650 MILLIGRAM(S): at 13:24

## 2024-01-01 RX ADMIN — METOPROLOL SUCCINATE 25 MILLIGRAM(S): 50 TABLET, EXTENDED RELEASE ORAL at 06:01

## 2024-01-01 RX ADMIN — TAMSULOSIN HYDROCHLORIDE 0.8 MILLIGRAM(S): 0.4 CAPSULE ORAL at 22:13

## 2024-01-01 RX ADMIN — METOPROLOL SUCCINATE 25 MILLIGRAM(S): 50 TABLET, EXTENDED RELEASE ORAL at 09:44

## 2024-01-01 RX ADMIN — Medication 4 MILLIGRAM(S): at 11:13

## 2024-01-01 RX ADMIN — INSULIN LISPRO 6: 100 INJECTION, SOLUTION INTRAVENOUS; SUBCUTANEOUS at 12:41

## 2024-01-01 RX ADMIN — Medication 40 MILLIGRAM(S): at 05:27

## 2024-01-01 RX ADMIN — INSULIN LISPRO 4: 100 INJECTION, SOLUTION INTRAVENOUS; SUBCUTANEOUS at 18:25

## 2024-01-01 RX ADMIN — METOPROLOL SUCCINATE 25 MILLIGRAM(S): 50 TABLET, EXTENDED RELEASE ORAL at 10:42

## 2024-01-01 RX ADMIN — INSULIN LISPRO 4: 100 INJECTION, SOLUTION INTRAVENOUS; SUBCUTANEOUS at 09:54

## 2024-01-01 RX ADMIN — Medication 1 APPLICATION(S): at 12:09

## 2024-01-01 RX ADMIN — Medication 40 MILLIGRAM(S): at 05:14

## 2024-01-01 RX ADMIN — SODIUM CHLORIDE 100 MILLILITER(S): 9 INJECTION, SOLUTION INTRAVENOUS at 11:36

## 2024-01-01 RX ADMIN — HEPARIN SODIUM 5000 UNIT(S): 1000 INJECTION INTRAVENOUS; SUBCUTANEOUS at 21:25

## 2024-01-01 RX ADMIN — Medication 1000 MILLIGRAM(S): at 02:42

## 2024-01-01 RX ADMIN — INSULIN LISPRO 2: 100 INJECTION, SOLUTION INTRAVENOUS; SUBCUTANEOUS at 08:06

## 2024-01-01 RX ADMIN — INSULIN GLARGINE-YFGN 12 UNIT(S): 100 INJECTION, SOLUTION SUBCUTANEOUS at 21:35

## 2024-01-01 RX ADMIN — Medication 2.5 MILLIGRAM(S): at 09:03

## 2024-01-01 RX ADMIN — Medication 70 MEQ/KG/HR: at 05:24

## 2024-01-01 RX ADMIN — Medication 4 MILLIGRAM(S): at 13:13

## 2024-01-01 RX ADMIN — Medication 40 MILLIGRAM(S): at 05:23

## 2024-01-01 RX ADMIN — TAMSULOSIN HYDROCHLORIDE 0.8 MILLIGRAM(S): 0.4 CAPSULE ORAL at 21:25

## 2024-01-01 RX ADMIN — GABAPENTIN 100 MILLIGRAM(S): 400 CAPSULE ORAL at 05:14

## 2024-01-01 RX ADMIN — Medication 1 GRAM(S): at 18:17

## 2024-01-01 RX ADMIN — Medication 4 MILLILITER(S): at 20:48

## 2024-01-01 RX ADMIN — Medication 6 UNIT(S): at 09:13

## 2024-01-01 RX ADMIN — Medication 1 GRAM(S): at 05:26

## 2024-01-01 RX ADMIN — INSULIN LISPRO 10: 100 INJECTION, SOLUTION INTRAVENOUS; SUBCUTANEOUS at 12:36

## 2024-01-01 RX ADMIN — ACETYLCYSTEINE 4 MILLILITER(S): 200 INHALANT RESPIRATORY (INHALATION) at 08:57

## 2024-01-01 RX ADMIN — Medication 1 GRAM(S): at 17:16

## 2024-01-01 RX ADMIN — INSULIN LISPRO 8: 100 INJECTION, SOLUTION INTRAVENOUS; SUBCUTANEOUS at 17:59

## 2024-01-01 RX ADMIN — INSULIN GLARGINE-YFGN 8 UNIT(S): 100 INJECTION, SOLUTION SUBCUTANEOUS at 22:28

## 2024-01-01 RX ADMIN — TAMSULOSIN HYDROCHLORIDE 0.8 MILLIGRAM(S): 0.4 CAPSULE ORAL at 22:41

## 2024-01-01 RX ADMIN — HEPARIN SODIUM 5000 UNIT(S): 1000 INJECTION INTRAVENOUS; SUBCUTANEOUS at 12:55

## 2024-01-01 RX ADMIN — IPRATROPIUM BROMIDE AND ALBUTEROL SULFATE 3 MILLILITER(S): .5; 2.5 SOLUTION RESPIRATORY (INHALATION) at 09:15

## 2024-01-01 RX ADMIN — Medication 4 MILLILITER(S): at 08:12

## 2024-01-01 RX ADMIN — GABAPENTIN 100 MILLIGRAM(S): 400 CAPSULE ORAL at 21:36

## 2024-01-01 RX ADMIN — ACETYLCYSTEINE 4 MILLILITER(S): 200 INHALANT RESPIRATORY (INHALATION) at 14:05

## 2024-01-01 RX ADMIN — Medication 2.5 MILLIGRAM(S): at 03:25

## 2024-01-01 RX ADMIN — METOPROLOL SUCCINATE 25 MILLIGRAM(S): 50 TABLET, EXTENDED RELEASE ORAL at 17:44

## 2024-01-01 RX ADMIN — INSULIN LISPRO 4: 100 INJECTION, SOLUTION INTRAVENOUS; SUBCUTANEOUS at 12:15

## 2024-01-01 RX ADMIN — Medication 1 GRAM(S): at 11:58

## 2024-01-01 RX ADMIN — Medication 1 APPLICATION(S): at 12:01

## 2024-01-01 RX ADMIN — METOPROLOL SUCCINATE 25 MILLIGRAM(S): 50 TABLET, EXTENDED RELEASE ORAL at 17:58

## 2024-01-01 RX ADMIN — ERTAPENEM SODIUM 100 MILLIGRAM(S): 1 INJECTION, POWDER, LYOPHILIZED, FOR SOLUTION INTRAMUSCULAR; INTRAVENOUS at 06:00

## 2024-01-01 RX ADMIN — Medication 6 UNIT(S): at 18:31

## 2024-01-01 RX ADMIN — Medication 1 APPLICATION(S): at 12:55

## 2024-01-01 RX ADMIN — ACETYLCYSTEINE 4 MILLILITER(S): 200 INHALANT RESPIRATORY (INHALATION) at 20:30

## 2024-01-01 RX ADMIN — ERTAPENEM SODIUM 100 MILLIGRAM(S): 1 INJECTION, POWDER, LYOPHILIZED, FOR SOLUTION INTRAMUSCULAR; INTRAVENOUS at 04:44

## 2024-01-01 RX ADMIN — SODIUM CHLORIDE 75 MILLILITER(S): 9 INJECTION, SOLUTION INTRAVENOUS at 22:01

## 2024-01-01 RX ADMIN — METOPROLOL SUCCINATE 25 MILLIGRAM(S): 50 TABLET, EXTENDED RELEASE ORAL at 17:42

## 2024-01-01 RX ADMIN — HEPARIN SODIUM 5000 UNIT(S): 1000 INJECTION INTRAVENOUS; SUBCUTANEOUS at 22:12

## 2024-01-01 RX ADMIN — INSULIN GLARGINE 18 UNIT(S): 100 INJECTION, SOLUTION SUBCUTANEOUS at 22:10

## 2024-01-01 RX ADMIN — Medication 40 MILLIGRAM(S): at 06:02

## 2024-01-01 RX ADMIN — Medication 1 GRAM(S): at 13:15

## 2024-01-01 RX ADMIN — MIDODRINE HYDROCHLORIDE 5 MILLIGRAM(S): 5 TABLET ORAL at 18:29

## 2024-01-01 RX ADMIN — INSULIN LISPRO 4: 100 INJECTION, SOLUTION INTRAVENOUS; SUBCUTANEOUS at 17:47

## 2024-01-01 RX ADMIN — Medication 25 GRAM(S): at 10:41

## 2024-01-01 RX ADMIN — HEPARIN SODIUM 5000 UNIT(S): 1000 INJECTION INTRAVENOUS; SUBCUTANEOUS at 06:01

## 2024-01-01 RX ADMIN — Medication 2.5 MILLIGRAM(S): at 04:53

## 2024-01-01 RX ADMIN — ACETYLCYSTEINE 4 MILLILITER(S): 200 INHALANT RESPIRATORY (INHALATION) at 22:28

## 2024-01-01 RX ADMIN — INSULIN LISPRO 4: 100 INJECTION, SOLUTION INTRAVENOUS; SUBCUTANEOUS at 13:22

## 2024-01-01 RX ADMIN — HEPARIN SODIUM 5000 UNIT(S): 1000 INJECTION INTRAVENOUS; SUBCUTANEOUS at 21:40

## 2024-01-01 RX ADMIN — INSULIN LISPRO 4: 100 INJECTION, SOLUTION INTRAVENOUS; SUBCUTANEOUS at 18:23

## 2024-01-01 RX ADMIN — INSULIN LISPRO 4: 100 INJECTION, SOLUTION INTRAVENOUS; SUBCUTANEOUS at 08:22

## 2024-01-01 RX ADMIN — PANTOPRAZOLE SODIUM 40 MILLIGRAM(S): 20 TABLET, DELAYED RELEASE ORAL at 06:04

## 2024-01-01 RX ADMIN — SODIUM CHLORIDE 50 MILLILITER(S): 9 INJECTION, SOLUTION INTRAVENOUS at 14:22

## 2024-01-01 RX ADMIN — SODIUM CHLORIDE 75 MILLILITER(S): 9 INJECTION, SOLUTION INTRAVENOUS at 08:40

## 2024-01-01 RX ADMIN — ERTAPENEM SODIUM 100 MILLIGRAM(S): 1 INJECTION, POWDER, LYOPHILIZED, FOR SOLUTION INTRAMUSCULAR; INTRAVENOUS at 05:51

## 2024-01-01 RX ADMIN — SODIUM CHLORIDE 75 MILLILITER(S): 9 INJECTION, SOLUTION INTRAVENOUS at 00:36

## 2024-01-01 RX ADMIN — ERTAPENEM SODIUM 100 MILLIGRAM(S): 1 INJECTION, POWDER, LYOPHILIZED, FOR SOLUTION INTRAMUSCULAR; INTRAVENOUS at 06:45

## 2024-01-01 RX ADMIN — HEPARIN SODIUM 5000 UNIT(S): 1000 INJECTION INTRAVENOUS; SUBCUTANEOUS at 11:44

## 2024-01-01 RX ADMIN — Medication 1 GRAM(S): at 00:24

## 2024-01-01 RX ADMIN — Medication 400 MILLIGRAM(S): at 13:40

## 2024-01-01 RX ADMIN — INSULIN LISPRO 4: 100 INJECTION, SOLUTION INTRAVENOUS; SUBCUTANEOUS at 09:32

## 2024-01-01 RX ADMIN — Medication 1 GRAM(S): at 12:14

## 2024-01-01 RX ADMIN — ACETYLCYSTEINE 4 MILLILITER(S): 200 INHALANT RESPIRATORY (INHALATION) at 21:00

## 2024-01-01 RX ADMIN — Medication 650 MILLIGRAM(S): at 13:30

## 2024-01-01 RX ADMIN — SODIUM CHLORIDE 100 MILLILITER(S): 9 INJECTION, SOLUTION INTRAVENOUS at 10:41

## 2024-01-01 RX ADMIN — Medication 1 GRAM(S): at 17:42

## 2024-01-01 RX ADMIN — Medication 650 MILLIGRAM(S): at 13:40

## 2024-01-01 RX ADMIN — IPRATROPIUM BROMIDE AND ALBUTEROL SULFATE 3 MILLILITER(S): .5; 2.5 SOLUTION RESPIRATORY (INHALATION) at 08:57

## 2024-01-01 RX ADMIN — SODIUM CHLORIDE 55 MILLILITER(S): 9 INJECTION INTRAMUSCULAR; INTRAVENOUS; SUBCUTANEOUS at 04:51

## 2024-01-01 RX ADMIN — Medication 2.5 MILLIGRAM(S): at 20:48

## 2024-01-01 RX ADMIN — ACETYLCYSTEINE 4 MILLILITER(S): 200 INHALANT RESPIRATORY (INHALATION) at 08:48

## 2024-01-01 RX ADMIN — INSULIN LISPRO 4: 100 INJECTION, SOLUTION INTRAVENOUS; SUBCUTANEOUS at 08:08

## 2024-01-01 RX ADMIN — HEPARIN SODIUM 5000 UNIT(S): 1000 INJECTION INTRAVENOUS; SUBCUTANEOUS at 13:25

## 2024-01-01 RX ADMIN — Medication 1 GRAM(S): at 17:31

## 2024-01-01 RX ADMIN — Medication 2.5 MILLIGRAM(S): at 14:00

## 2024-01-01 RX ADMIN — Medication 1 GRAM(S): at 01:09

## 2024-01-01 RX ADMIN — Medication 2.5 MILLIGRAM(S): at 21:00

## 2024-01-01 RX ADMIN — Medication 1 GRAM(S): at 18:29

## 2024-01-01 RX ADMIN — INSULIN LISPRO 2: 100 INJECTION, SOLUTION INTRAVENOUS; SUBCUTANEOUS at 17:26

## 2024-01-01 RX ADMIN — HEPARIN SODIUM 5000 UNIT(S): 1000 INJECTION INTRAVENOUS; SUBCUTANEOUS at 13:45

## 2024-01-01 RX ADMIN — MIDODRINE HYDROCHLORIDE 5 MILLIGRAM(S): 2.5 TABLET ORAL at 14:32

## 2024-01-01 RX ADMIN — Medication 3 MILLIGRAM(S): at 21:39

## 2024-01-01 RX ADMIN — Medication 650 MILLIGRAM(S): at 05:38

## 2024-01-01 RX ADMIN — Medication 650 MILLIGRAM(S): at 06:01

## 2024-01-01 RX ADMIN — CEFEPIME 2000 MILLIGRAM(S): 2 INJECTION, POWDER, FOR SOLUTION INTRAVENOUS at 10:37

## 2024-01-01 RX ADMIN — IPRATROPIUM BROMIDE AND ALBUTEROL SULFATE 3 MILLILITER(S): .5; 2.5 SOLUTION RESPIRATORY (INHALATION) at 21:25

## 2024-01-01 RX ADMIN — INSULIN GLARGINE-YFGN 12 UNIT(S): 100 INJECTION, SOLUTION SUBCUTANEOUS at 22:01

## 2024-01-01 RX ADMIN — Medication 650 MILLIGRAM(S): at 13:49

## 2024-01-01 RX ADMIN — Medication 1 APPLICATION(S): at 12:15

## 2024-01-01 RX ADMIN — TAMSULOSIN HYDROCHLORIDE 0.8 MILLIGRAM(S): 0.4 CAPSULE ORAL at 22:02

## 2024-01-01 RX ADMIN — ERTAPENEM SODIUM 100 MILLIGRAM(S): 1 INJECTION, POWDER, LYOPHILIZED, FOR SOLUTION INTRAMUSCULAR; INTRAVENOUS at 06:04

## 2024-01-01 RX ADMIN — HEPARIN SODIUM 5000 UNIT(S): 1000 INJECTION INTRAVENOUS; SUBCUTANEOUS at 13:13

## 2024-01-01 RX ADMIN — TAMSULOSIN HYDROCHLORIDE 0.8 MILLIGRAM(S): 0.4 CAPSULE ORAL at 21:40

## 2024-01-01 RX ADMIN — HEPARIN SODIUM 5000 UNIT(S): 1000 INJECTION INTRAVENOUS; SUBCUTANEOUS at 05:19

## 2024-01-01 RX ADMIN — Medication 1 APPLICATION(S): at 12:20

## 2024-01-01 RX ADMIN — GABAPENTIN 100 MILLIGRAM(S): 400 CAPSULE ORAL at 22:02

## 2024-01-01 RX ADMIN — HEPARIN SODIUM 5000 UNIT(S): 1000 INJECTION INTRAVENOUS; SUBCUTANEOUS at 05:14

## 2024-01-01 RX ADMIN — Medication 650 MILLIGRAM(S): at 22:02

## 2024-01-01 RX ADMIN — METOPROLOL SUCCINATE 25 MILLIGRAM(S): 50 TABLET, EXTENDED RELEASE ORAL at 17:31

## 2024-01-01 RX ADMIN — Medication 2.5 MILLIGRAM(S): at 04:02

## 2024-01-01 RX ADMIN — HEPARIN SODIUM 5000 UNIT(S): 1000 INJECTION INTRAVENOUS; SUBCUTANEOUS at 13:49

## 2024-01-01 RX ADMIN — GABAPENTIN 100 MILLIGRAM(S): 400 CAPSULE ORAL at 21:59

## 2024-01-01 RX ADMIN — METOPROLOL SUCCINATE 25 MILLIGRAM(S): 50 TABLET, EXTENDED RELEASE ORAL at 17:16

## 2024-01-01 RX ADMIN — METOPROLOL SUCCINATE 25 MILLIGRAM(S): 50 TABLET, EXTENDED RELEASE ORAL at 05:01

## 2024-01-01 RX ADMIN — HEPARIN SODIUM 5000 UNIT(S): 1000 INJECTION INTRAVENOUS; SUBCUTANEOUS at 06:00

## 2024-01-01 RX ADMIN — INSULIN GLARGINE-YFGN 6 UNIT(S): 100 INJECTION, SOLUTION SUBCUTANEOUS at 22:11

## 2024-01-01 RX ADMIN — HEPARIN SODIUM 5000 UNIT(S): 1000 INJECTION INTRAVENOUS; SUBCUTANEOUS at 05:01

## 2024-01-01 RX ADMIN — HEPARIN SODIUM 5000 UNIT(S): 5000 INJECTION INTRAVENOUS; SUBCUTANEOUS at 14:34

## 2024-01-01 RX ADMIN — Medication 650 MILLIGRAM(S): at 06:00

## 2024-01-01 RX ADMIN — Medication 650 MILLIGRAM(S): at 20:35

## 2024-01-01 RX ADMIN — Medication 1 GRAM(S): at 17:27

## 2024-01-01 RX ADMIN — INSULIN LISPRO 10: 100 INJECTION, SOLUTION INTRAVENOUS; SUBCUTANEOUS at 10:34

## 2024-01-01 RX ADMIN — IRON SUCROSE 100 MILLIGRAM(S): 20 INJECTION, SOLUTION INTRAVENOUS at 14:59

## 2024-01-01 RX ADMIN — CEFTRIAXONE 1000 MILLIGRAM(S): 500 INJECTION, POWDER, FOR SOLUTION INTRAMUSCULAR; INTRAVENOUS at 14:34

## 2024-01-01 RX ADMIN — GABAPENTIN 300 MILLIGRAM(S): 400 CAPSULE ORAL at 21:58

## 2024-01-01 RX ADMIN — Medication 650 MILLIGRAM(S): at 21:40

## 2024-01-01 RX ADMIN — Medication 40 MILLIGRAM(S): at 13:45

## 2024-01-01 RX ADMIN — Medication 1 GRAM(S): at 05:14

## 2024-01-01 RX ADMIN — HEPARIN SODIUM 5000 UNIT(S): 1000 INJECTION INTRAVENOUS; SUBCUTANEOUS at 07:38

## 2024-01-01 RX ADMIN — Medication 400 MILLIGRAM(S): at 04:31

## 2024-01-01 RX ADMIN — HEPARIN SODIUM 5000 UNIT(S): 1000 INJECTION INTRAVENOUS; SUBCUTANEOUS at 22:40

## 2024-01-01 RX ADMIN — Medication 70 MEQ/KG/HR: at 13:46

## 2024-01-01 RX ADMIN — Medication 1 GRAM(S): at 12:01

## 2024-01-01 RX ADMIN — OLANZAPINE 5 MILLIGRAM(S): 15 TABLET, FILM COATED ORAL at 05:46

## 2024-01-01 RX ADMIN — Medication 40 MILLIGRAM(S): at 05:39

## 2024-01-01 RX ADMIN — Medication 4 MILLILITER(S): at 21:01

## 2024-01-01 RX ADMIN — METOPROLOL SUCCINATE 25 MILLIGRAM(S): 50 TABLET, EXTENDED RELEASE ORAL at 05:38

## 2024-01-01 RX ADMIN — GABAPENTIN 100 MILLIGRAM(S): 400 CAPSULE ORAL at 06:00

## 2024-01-01 RX ADMIN — Medication 4 MILLILITER(S): at 21:26

## 2024-01-01 RX ADMIN — Medication 400 MILLIGRAM(S): at 02:13

## 2024-01-01 RX ADMIN — Medication 1 GRAM(S): at 12:09

## 2024-01-01 NOTE — PROGRESS NOTE ADULT - SUBJECTIVE AND OBJECTIVE BOX
[facility] DIVISION OF KIDNEY DISEASES AND HYPERTENSION -- FOLLOW UP NOTE  --------------------------------------------------------------------------------  Chief Complaint:  Jonas    24 hour events/subjective:  no acute event noted        PAST HISTORY  --------------------------------------------------------------------------------  No significant changes to PMH, PSH, FHx, SHx, unless otherwise noted    ALLERGIES & MEDICATIONS  --------------------------------------------------------------------------------  Allergies    No Known Allergies    Intolerances      Standing Inpatient Medications  acetaminophen   IVPB .. 1000 milliGRAM(s) IV Intermittent once  cefTRIAXone Injectable. 1000 milliGRAM(s) IV Push every 24 hours  chlorhexidine 2% Cloths 1 Application(s) Topical <User Schedule>  dextrose 5%. 1000 milliLiter(s) IV Continuous <Continuous>  dextrose 5%. 1000 milliLiter(s) IV Continuous <Continuous>  dextrose 50% Injectable 12.5 Gram(s) IV Push once  dextrose 50% Injectable 25 Gram(s) IV Push once  dextrose 50% Injectable 25 Gram(s) IV Push once  dronedarone 400 milliGRAM(s) Oral two times a day  epoetin kristal-epbx (RETACRIT) Injectable 57016 Unit(s) SubCutaneous every 7 days  folic acid 1 milliGRAM(s) Oral daily  gabapentin 300 milliGRAM(s) Oral at bedtime  glucagon  Injectable 1 milliGRAM(s) IntraMuscular once  heparin   Injectable 5000 Unit(s) SubCutaneous every 8 hours  insulin glargine Injectable (LANTUS) 18 Unit(s) SubCutaneous at bedtime  insulin lispro (ADMELOG) corrective regimen sliding scale   SubCutaneous three times a day before meals  insulin lispro (ADMELOG) corrective regimen sliding scale   SubCutaneous at bedtime  insulin lispro Injectable (ADMELOG) 6 Unit(s) SubCutaneous three times a day with meals  metoprolol tartrate 25 milliGRAM(s) Oral two times a day  midodrine. 5 milliGRAM(s) Oral three times a day  OLANZapine Injectable 5 milliGRAM(s) IntraMuscular once  pantoprazole    Tablet 40 milliGRAM(s) Oral before breakfast  QUEtiapine 12.5 milliGRAM(s) Oral at bedtime  sodium bicarbonate  Infusion 0.095 mEq/kG/Hr IV Continuous <Continuous>  sodium chloride 0.9%. 1000 milliLiter(s) IV Continuous <Continuous>  tamsulosin 0.4 milliGRAM(s) Oral at bedtime    PRN Inpatient Medications  dextrose Oral Gel 15 Gram(s) Oral once PRN  ondansetron Injectable 4 milliGRAM(s) IV Push every 6 hours PRN      REVIEW OF SYSTEMS  --------------------------------------------------------------------------------  Gen: No weight changes, fatigue, fevers/chills, weakness  Skin: No rashes  Head/Eyes/Ears/Mouth: No headache; Normal hearing; Normal vision w/o blurriness; No sinus pain/discomfort, sore throat  Respiratory: No dyspnea, cough, wheezing, hemoptysis  CV: No chest pain, PND, orthopnea  GI: No abdominal pain, diarrhea, constipation, nausea, vomiting, melena, hematochezia  : No increased frequency, dysuria, hematuria, nocturia  MSK: No joint pain/swelling; no back pain; no edema  Neuro: No dizziness/lightheadedness, weakness, seizures, numbness, tingling  Heme: No easy bruising or bleeding  Endo: No heat/cold intolerance  Psych: No significant nervousness, anxiety, stress, depression    All other systems were reviewed and are negative, except as noted.    VITALS/PHYSICAL EXAM  --------------------------------------------------------------------------------  T(C): 36.4 (01-01-24 @ 17:12), Max: 36.8 (01-01-24 @ 04:46)  HR: 68 (01-01-24 @ 17:12) (68 - 84)  BP: 155/76 (01-01-24 @ 17:12) (149/59 - 166/78)  RR: 18 (01-01-24 @ 17:12) (18 - 18)  SpO2: 92% (01-01-24 @ 17:12) (91% - 95%)  Wt(kg): --        12-31-23 @ 07:01  -  01-01-24 @ 07:00  --------------------------------------------------------  IN: 645 mL / OUT: 1310 mL / NET: -665 mL      Physical Exam:  	Gen: NAD  	HEENT: supple neck  	Pulm: CTA B/L  	CV: RRR, S1S2; no rub  	Back: no sacral edema  	Abd: +BS, soft, nontender/nondistended  	: No suprapubic tenderness  	UE: Warm, no edema  	LE: Warm,  no edema  	Neuro: No focal deficit  	Psych: Normal affect and mood  	Skin: Warm  LABS/STUDIES  --------------------------------------------------------------------------------              9.7    14.45 >-----------<  295      [01-01-24 @ 08:33]              31.0     138  |  104  |  28.4  ----------------------------<  140      [01-01-24 @ 08:33]  4.8   |  18.0  |  2.73        Ca     8.5     [01-01-24 @ 08:33]    TPro  6.1  /  Alb  2.8  /  TBili  0.7  /  DBili  x   /  AST  33  /  ALT  31  /  AlkPhos  80  [01-01-24 @ 08:33]          Creatinine Trend:  SCr 2.73 [01-01 @ 08:33]  SCr 2.36 [12-31 @ 09:05]  SCr 2.93 [12-30 @ 07:15]  SCr 3.28 [12-29 @ 06:45]  SCr 4.27 [12-28 @ 06:18]    Urinalysis - [01-01-24 @ 08:33]      Color  / Appearance  / SG  / pH       Gluc 140 / Ketone   / Bili  / Urobili        Blood  / Protein  / Leuk Est  / Nitrite       RBC  / WBC  / Hyaline  / Gran  / Sq Epi  / Non Sq Epi  / Bacteria     Urine Creatinine 64      [12-27-23 @ 05:45]  Urine Sodium 81      [12-27-23 @ 05:45]  Urine Urea Nitrogen 801      [12-27-23 @ 05:45]    Iron 33, TIBC 134, %sat 25      [12-24-23 @ 01:15]  Ferritin 573      [01-01-24 @ 08:33]  TSH 1.25      [12-23-23 @ 03:27]       [facility] DIVISION OF KIDNEY DISEASES AND HYPERTENSION -- FOLLOW UP NOTE  --------------------------------------------------------------------------------  Chief Complaint:  Jonas    24 hour events/subjective:  no acute event noted        PAST HISTORY  --------------------------------------------------------------------------------  No significant changes to PMH, PSH, FHx, SHx, unless otherwise noted    ALLERGIES & MEDICATIONS  --------------------------------------------------------------------------------  Allergies    No Known Allergies    Intolerances      Standing Inpatient Medications  acetaminophen   IVPB .. 1000 milliGRAM(s) IV Intermittent once  cefTRIAXone Injectable. 1000 milliGRAM(s) IV Push every 24 hours  chlorhexidine 2% Cloths 1 Application(s) Topical <User Schedule>  dextrose 5%. 1000 milliLiter(s) IV Continuous <Continuous>  dextrose 5%. 1000 milliLiter(s) IV Continuous <Continuous>  dextrose 50% Injectable 12.5 Gram(s) IV Push once  dextrose 50% Injectable 25 Gram(s) IV Push once  dextrose 50% Injectable 25 Gram(s) IV Push once  dronedarone 400 milliGRAM(s) Oral two times a day  epoetin kristal-epbx (RETACRIT) Injectable 85731 Unit(s) SubCutaneous every 7 days  folic acid 1 milliGRAM(s) Oral daily  gabapentin 300 milliGRAM(s) Oral at bedtime  glucagon  Injectable 1 milliGRAM(s) IntraMuscular once  heparin   Injectable 5000 Unit(s) SubCutaneous every 8 hours  insulin glargine Injectable (LANTUS) 18 Unit(s) SubCutaneous at bedtime  insulin lispro (ADMELOG) corrective regimen sliding scale   SubCutaneous three times a day before meals  insulin lispro (ADMELOG) corrective regimen sliding scale   SubCutaneous at bedtime  insulin lispro Injectable (ADMELOG) 6 Unit(s) SubCutaneous three times a day with meals  metoprolol tartrate 25 milliGRAM(s) Oral two times a day  midodrine. 5 milliGRAM(s) Oral three times a day  OLANZapine Injectable 5 milliGRAM(s) IntraMuscular once  pantoprazole    Tablet 40 milliGRAM(s) Oral before breakfast  QUEtiapine 12.5 milliGRAM(s) Oral at bedtime  sodium bicarbonate  Infusion 0.095 mEq/kG/Hr IV Continuous <Continuous>  sodium chloride 0.9%. 1000 milliLiter(s) IV Continuous <Continuous>  tamsulosin 0.4 milliGRAM(s) Oral at bedtime    PRN Inpatient Medications  dextrose Oral Gel 15 Gram(s) Oral once PRN  ondansetron Injectable 4 milliGRAM(s) IV Push every 6 hours PRN      REVIEW OF SYSTEMS  --------------------------------------------------------------------------------  Gen: No weight changes, fatigue, fevers/chills, weakness  Skin: No rashes  Head/Eyes/Ears/Mouth: No headache; Normal hearing; Normal vision w/o blurriness; No sinus pain/discomfort, sore throat  Respiratory: No dyspnea, cough, wheezing, hemoptysis  CV: No chest pain, PND, orthopnea  GI: No abdominal pain, diarrhea, constipation, nausea, vomiting, melena, hematochezia  : No increased frequency, dysuria, hematuria, nocturia  MSK: No joint pain/swelling; no back pain; no edema  Neuro: No dizziness/lightheadedness, weakness, seizures, numbness, tingling  Heme: No easy bruising or bleeding  Endo: No heat/cold intolerance  Psych: No significant nervousness, anxiety, stress, depression    All other systems were reviewed and are negative, except as noted.    VITALS/PHYSICAL EXAM  --------------------------------------------------------------------------------  T(C): 36.4 (01-01-24 @ 17:12), Max: 36.8 (01-01-24 @ 04:46)  HR: 68 (01-01-24 @ 17:12) (68 - 84)  BP: 155/76 (01-01-24 @ 17:12) (149/59 - 166/78)  RR: 18 (01-01-24 @ 17:12) (18 - 18)  SpO2: 92% (01-01-24 @ 17:12) (91% - 95%)  Wt(kg): --        12-31-23 @ 07:01  -  01-01-24 @ 07:00  --------------------------------------------------------  IN: 645 mL / OUT: 1310 mL / NET: -665 mL      Physical Exam:  	Gen: NAD  	HEENT: supple neck  	Pulm: CTA B/L  	CV: RRR, S1S2; no rub  	Back: no sacral edema  	Abd: +BS, soft, nontender/nondistended  	: No suprapubic tenderness  	UE: Warm, no edema  	LE: Warm,  no edema  	Neuro: No focal deficit  	Psych: Normal affect and mood  	Skin: Warm  LABS/STUDIES  --------------------------------------------------------------------------------              9.7    14.45 >-----------<  295      [01-01-24 @ 08:33]              31.0     138  |  104  |  28.4  ----------------------------<  140      [01-01-24 @ 08:33]  4.8   |  18.0  |  2.73        Ca     8.5     [01-01-24 @ 08:33]    TPro  6.1  /  Alb  2.8  /  TBili  0.7  /  DBili  x   /  AST  33  /  ALT  31  /  AlkPhos  80  [01-01-24 @ 08:33]          Creatinine Trend:  SCr 2.73 [01-01 @ 08:33]  SCr 2.36 [12-31 @ 09:05]  SCr 2.93 [12-30 @ 07:15]  SCr 3.28 [12-29 @ 06:45]  SCr 4.27 [12-28 @ 06:18]    Urinalysis - [01-01-24 @ 08:33]      Color  / Appearance  / SG  / pH       Gluc 140 / Ketone   / Bili  / Urobili        Blood  / Protein  / Leuk Est  / Nitrite       RBC  / WBC  / Hyaline  / Gran  / Sq Epi  / Non Sq Epi  / Bacteria     Urine Creatinine 64      [12-27-23 @ 05:45]  Urine Sodium 81      [12-27-23 @ 05:45]  Urine Urea Nitrogen 801      [12-27-23 @ 05:45]    Iron 33, TIBC 134, %sat 25      [12-24-23 @ 01:15]  Ferritin 573      [01-01-24 @ 08:33]  TSH 1.25      [12-23-23 @ 03:27]       [facility] DIVISION OF KIDNEY DISEASES AND HYPERTENSION -- FOLLOW UP NOTE  --------------------------------------------------------------------------------  Chief Complaint:  Jonas    24 hour events/subjective:  Pt aggressive this Am- was given Zyprexa        PAST HISTORY  --------------------------------------------------------------------------------  No significant changes to PMH, PSH, FHx, SHx, unless otherwise noted    ALLERGIES & MEDICATIONS  --------------------------------------------------------------------------------  Allergies    No Known Allergies    Intolerances      Standing Inpatient Medications  acetaminophen   IVPB .. 1000 milliGRAM(s) IV Intermittent once  cefTRIAXone Injectable. 1000 milliGRAM(s) IV Push every 24 hours  chlorhexidine 2% Cloths 1 Application(s) Topical <User Schedule>  dextrose 5%. 1000 milliLiter(s) IV Continuous <Continuous>  dextrose 5%. 1000 milliLiter(s) IV Continuous <Continuous>  dextrose 50% Injectable 12.5 Gram(s) IV Push once  dextrose 50% Injectable 25 Gram(s) IV Push once  dextrose 50% Injectable 25 Gram(s) IV Push once  dronedarone 400 milliGRAM(s) Oral two times a day  epoetin kristal-epbx (RETACRIT) Injectable 11204 Unit(s) SubCutaneous every 7 days  folic acid 1 milliGRAM(s) Oral daily  gabapentin 300 milliGRAM(s) Oral at bedtime  glucagon  Injectable 1 milliGRAM(s) IntraMuscular once  heparin   Injectable 5000 Unit(s) SubCutaneous every 8 hours  insulin glargine Injectable (LANTUS) 18 Unit(s) SubCutaneous at bedtime  insulin lispro (ADMELOG) corrective regimen sliding scale   SubCutaneous three times a day before meals  insulin lispro (ADMELOG) corrective regimen sliding scale   SubCutaneous at bedtime  insulin lispro Injectable (ADMELOG) 6 Unit(s) SubCutaneous three times a day with meals  metoprolol tartrate 25 milliGRAM(s) Oral two times a day  midodrine. 5 milliGRAM(s) Oral three times a day  OLANZapine Injectable 5 milliGRAM(s) IntraMuscular once  pantoprazole    Tablet 40 milliGRAM(s) Oral before breakfast  QUEtiapine 12.5 milliGRAM(s) Oral at bedtime  sodium bicarbonate  Infusion 0.095 mEq/kG/Hr IV Continuous <Continuous>  sodium chloride 0.9%. 1000 milliLiter(s) IV Continuous <Continuous>  tamsulosin 0.4 milliGRAM(s) Oral at bedtime    PRN Inpatient Medications  dextrose Oral Gel 15 Gram(s) Oral once PRN  ondansetron Injectable 4 milliGRAM(s) IV Push every 6 hours PRN      REVIEW OF SYSTEMS  --------------------------------------------------------------------------------  unable to obtain    VITALS/PHYSICAL EXAM  --------------------------------------------------------------------------------  T(C): 36.4 (01-01-24 @ 17:12), Max: 36.8 (01-01-24 @ 04:46)  HR: 68 (01-01-24 @ 17:12) (68 - 84)  BP: 155/76 (01-01-24 @ 17:12) (149/59 - 166/78)  RR: 18 (01-01-24 @ 17:12) (18 - 18)  SpO2: 92% (01-01-24 @ 17:12) (91% - 95%)  Wt(kg): --        12-31-23 @ 07:01  -  01-01-24 @ 07:00  --------------------------------------------------------  IN: 645 mL / OUT: 1310 mL / NET: -665 mL      Physical Exam:  	Gen: resting  	HEENT: supple neck  	Pulm: CTA B/L  	CV: RRR, S1S2; no rub  	Back: no sacral edema  	Abd: +BS, soft, nontender/nondistended  	: No suprapubic tenderness  	UE: Warm, no edema  	LE: Warm,  no edema  	Skin: Warm  LABS/STUDIES  --------------------------------------------------------------------------------              9.7    14.45 >-----------<  295      [01-01-24 @ 08:33]              31.0     138  |  104  |  28.4  ----------------------------<  140      [01-01-24 @ 08:33]  4.8   |  18.0  |  2.73        Ca     8.5     [01-01-24 @ 08:33]    TPro  6.1  /  Alb  2.8  /  TBili  0.7  /  DBili  x   /  AST  33  /  ALT  31  /  AlkPhos  80  [01-01-24 @ 08:33]          Creatinine Trend:  SCr 2.73 [01-01 @ 08:33]  SCr 2.36 [12-31 @ 09:05]  SCr 2.93 [12-30 @ 07:15]  SCr 3.28 [12-29 @ 06:45]  SCr 4.27 [12-28 @ 06:18]    Urinalysis - [01-01-24 @ 08:33]      Color  / Appearance  / SG  / pH       Gluc 140 / Ketone   / Bili  / Urobili        Blood  / Protein  / Leuk Est  / Nitrite       RBC  / WBC  / Hyaline  / Gran  / Sq Epi  / Non Sq Epi  / Bacteria     Urine Creatinine 64      [12-27-23 @ 05:45]  Urine Sodium 81      [12-27-23 @ 05:45]  Urine Urea Nitrogen 801      [12-27-23 @ 05:45]    Iron 33, TIBC 134, %sat 25      [12-24-23 @ 01:15]  Ferritin 573      [01-01-24 @ 08:33]  TSH 1.25      [12-23-23 @ 03:27]       [facility] DIVISION OF KIDNEY DISEASES AND HYPERTENSION -- FOLLOW UP NOTE  --------------------------------------------------------------------------------  Chief Complaint:  Jonas    24 hour events/subjective:  Pt aggressive this Am- was given Zyprexa        PAST HISTORY  --------------------------------------------------------------------------------  No significant changes to PMH, PSH, FHx, SHx, unless otherwise noted    ALLERGIES & MEDICATIONS  --------------------------------------------------------------------------------  Allergies    No Known Allergies    Intolerances      Standing Inpatient Medications  acetaminophen   IVPB .. 1000 milliGRAM(s) IV Intermittent once  cefTRIAXone Injectable. 1000 milliGRAM(s) IV Push every 24 hours  chlorhexidine 2% Cloths 1 Application(s) Topical <User Schedule>  dextrose 5%. 1000 milliLiter(s) IV Continuous <Continuous>  dextrose 5%. 1000 milliLiter(s) IV Continuous <Continuous>  dextrose 50% Injectable 12.5 Gram(s) IV Push once  dextrose 50% Injectable 25 Gram(s) IV Push once  dextrose 50% Injectable 25 Gram(s) IV Push once  dronedarone 400 milliGRAM(s) Oral two times a day  epoetin kristal-epbx (RETACRIT) Injectable 27377 Unit(s) SubCutaneous every 7 days  folic acid 1 milliGRAM(s) Oral daily  gabapentin 300 milliGRAM(s) Oral at bedtime  glucagon  Injectable 1 milliGRAM(s) IntraMuscular once  heparin   Injectable 5000 Unit(s) SubCutaneous every 8 hours  insulin glargine Injectable (LANTUS) 18 Unit(s) SubCutaneous at bedtime  insulin lispro (ADMELOG) corrective regimen sliding scale   SubCutaneous three times a day before meals  insulin lispro (ADMELOG) corrective regimen sliding scale   SubCutaneous at bedtime  insulin lispro Injectable (ADMELOG) 6 Unit(s) SubCutaneous three times a day with meals  metoprolol tartrate 25 milliGRAM(s) Oral two times a day  midodrine. 5 milliGRAM(s) Oral three times a day  OLANZapine Injectable 5 milliGRAM(s) IntraMuscular once  pantoprazole    Tablet 40 milliGRAM(s) Oral before breakfast  QUEtiapine 12.5 milliGRAM(s) Oral at bedtime  sodium bicarbonate  Infusion 0.095 mEq/kG/Hr IV Continuous <Continuous>  sodium chloride 0.9%. 1000 milliLiter(s) IV Continuous <Continuous>  tamsulosin 0.4 milliGRAM(s) Oral at bedtime    PRN Inpatient Medications  dextrose Oral Gel 15 Gram(s) Oral once PRN  ondansetron Injectable 4 milliGRAM(s) IV Push every 6 hours PRN      REVIEW OF SYSTEMS  --------------------------------------------------------------------------------  unable to obtain    VITALS/PHYSICAL EXAM  --------------------------------------------------------------------------------  T(C): 36.4 (01-01-24 @ 17:12), Max: 36.8 (01-01-24 @ 04:46)  HR: 68 (01-01-24 @ 17:12) (68 - 84)  BP: 155/76 (01-01-24 @ 17:12) (149/59 - 166/78)  RR: 18 (01-01-24 @ 17:12) (18 - 18)  SpO2: 92% (01-01-24 @ 17:12) (91% - 95%)  Wt(kg): --        12-31-23 @ 07:01  -  01-01-24 @ 07:00  --------------------------------------------------------  IN: 645 mL / OUT: 1310 mL / NET: -665 mL      Physical Exam:  	Gen: resting  	HEENT: supple neck  	Pulm: CTA B/L  	CV: RRR, S1S2; no rub  	Back: no sacral edema  	Abd: +BS, soft, nontender/nondistended  	: No suprapubic tenderness  	UE: Warm, no edema  	LE: Warm,  no edema  	Skin: Warm  LABS/STUDIES  --------------------------------------------------------------------------------              9.7    14.45 >-----------<  295      [01-01-24 @ 08:33]              31.0     138  |  104  |  28.4  ----------------------------<  140      [01-01-24 @ 08:33]  4.8   |  18.0  |  2.73        Ca     8.5     [01-01-24 @ 08:33]    TPro  6.1  /  Alb  2.8  /  TBili  0.7  /  DBili  x   /  AST  33  /  ALT  31  /  AlkPhos  80  [01-01-24 @ 08:33]          Creatinine Trend:  SCr 2.73 [01-01 @ 08:33]  SCr 2.36 [12-31 @ 09:05]  SCr 2.93 [12-30 @ 07:15]  SCr 3.28 [12-29 @ 06:45]  SCr 4.27 [12-28 @ 06:18]    Urinalysis - [01-01-24 @ 08:33]      Color  / Appearance  / SG  / pH       Gluc 140 / Ketone   / Bili  / Urobili        Blood  / Protein  / Leuk Est  / Nitrite       RBC  / WBC  / Hyaline  / Gran  / Sq Epi  / Non Sq Epi  / Bacteria     Urine Creatinine 64      [12-27-23 @ 05:45]  Urine Sodium 81      [12-27-23 @ 05:45]  Urine Urea Nitrogen 801      [12-27-23 @ 05:45]    Iron 33, TIBC 134, %sat 25      [12-24-23 @ 01:15]  Ferritin 573      [01-01-24 @ 08:33]  TSH 1.25      [12-23-23 @ 03:27]

## 2024-01-01 NOTE — PROGRESS NOTE ADULT - SUBJECTIVE AND OBJECTIVE BOX
[facility] Division of Hospital Medicine    Chief Complaint: Cholecystitis    INTERVAL HISTORY:  Overnight, no acute events.     Patient seen and examined at bedside this morning. Wife also at bedside. A&Ox0 today, very agitated. Unable to engage in ROS given mentation.     MEDICATIONS  (STANDING):  acetaminophen   IVPB .. 1000 milliGRAM(s) IV Intermittent once  cefTRIAXone Injectable. 1000 milliGRAM(s) IV Push every 24 hours  chlorhexidine 2% Cloths 1 Application(s) Topical <User Schedule>  dextrose 5%. 1000 milliLiter(s) (100 mL/Hr) IV Continuous <Continuous>  dextrose 5%. 1000 milliLiter(s) (50 mL/Hr) IV Continuous <Continuous>  dextrose 50% Injectable 12.5 Gram(s) IV Push once  dextrose 50% Injectable 25 Gram(s) IV Push once  dextrose 50% Injectable 25 Gram(s) IV Push once  dronedarone 400 milliGRAM(s) Oral two times a day  epoetin kristal-epbx (RETACRIT) Injectable 76504 Unit(s) SubCutaneous every 7 days  folic acid 1 milliGRAM(s) Oral daily  gabapentin 300 milliGRAM(s) Oral at bedtime  glucagon  Injectable 1 milliGRAM(s) IntraMuscular once  heparin   Injectable 5000 Unit(s) SubCutaneous every 8 hours  insulin glargine Injectable (LANTUS) 18 Unit(s) SubCutaneous at bedtime  insulin lispro (ADMELOG) corrective regimen sliding scale   SubCutaneous three times a day before meals  insulin lispro (ADMELOG) corrective regimen sliding scale   SubCutaneous at bedtime  insulin lispro Injectable (ADMELOG) 6 Unit(s) SubCutaneous three times a day with meals  metoprolol tartrate 25 milliGRAM(s) Oral two times a day  midodrine. 5 milliGRAM(s) Oral three times a day  OLANZapine Injectable 5 milliGRAM(s) IntraMuscular once  pantoprazole    Tablet 40 milliGRAM(s) Oral before breakfast  sodium bicarbonate  Infusion 0.095 mEq/kG/Hr (50 mL/Hr) IV Continuous <Continuous>  sodium chloride 0.9%. 1000 milliLiter(s) (55 mL/Hr) IV Continuous <Continuous>  tamsulosin 0.4 milliGRAM(s) Oral at bedtime    MEDICATIONS  (PRN):  dextrose Oral Gel 15 Gram(s) Oral once PRN Blood Glucose LESS THAN 70 milliGRAM(s)/deciliter  ondansetron Injectable 4 milliGRAM(s) IV Push every 6 hours PRN Nausea        I&O's Summary    31 Dec 2023 07:01  -  01 Jan 2024 07:00  --------------------------------------------------------  IN: 645 mL / OUT: 1310 mL / NET: -665 mL        PHYSICAL EXAM:  Vital Signs Last 24 Hrs  T(C): 36.4 (01 Jan 2024 17:12), Max: 36.8 (01 Jan 2024 04:46)  T(F): 97.6 (01 Jan 2024 17:12), Max: 98.3 (01 Jan 2024 04:46)  HR: 68 (01 Jan 2024 17:12) (68 - 84)  BP: 155/76 (01 Jan 2024 17:12) (149/59 - 166/78)  BP(mean): --  RR: 18 (01 Jan 2024 17:12) (18 - 18)  SpO2: 92% (01 Jan 2024 17:12) (91% - 95%)    Parameters below as of 01 Jan 2024 17:12  Patient On (Oxygen Delivery Method): room air    CONSTITUTIONAL: No apparent distress, on RA  HEENT: Normocephalic, Atraumatic.   RESPIRATORY:  lungs are clear to auscultation bilaterally, no crackles, rhonchi, wheezes  CARDIOVASCULAR: Regular rate and rhythm, No lower extremity edema  ABDOMEN: Soft, non-distended, +perc sky drain containing bilious material   MUSCLOSKELETAL: moving all 4 extremities spontaneously  NEUROLOGY: Alert, Oriented x1 (name), CN 2-12 grossly intact  LABS:                        9.7    14.45 )-----------( 295      ( 01 Jan 2024 08:33 )             31.0     01-01    138  |  104  |  28.4<H>  ----------------------------<  140<H>  4.8   |  18.0<L>  |  2.73<H>    Ca    8.5      01 Jan 2024 08:33    TPro  6.1<L>  /  Alb  2.8<L>  /  TBili  0.7  /  DBili  x   /  AST  33  /  ALT  31  /  AlkPhos  80  01-01          Urinalysis Basic - ( 01 Jan 2024 08:33 )    Color: x / Appearance: x / SG: x / pH: x  Gluc: 140 mg/dL / Ketone: x  / Bili: x / Urobili: x   Blood: x / Protein: x / Nitrite: x   Leuk Esterase: x / RBC: x / WBC x   Sq Epi: x / Non Sq Epi: x / Bacteria: x        CAPILLARY BLOOD GLUCOSE      POCT Blood Glucose.: 97 mg/dL (01 Jan 2024 14:23)  POCT Blood Glucose.: 141 mg/dL (01 Jan 2024 09:11)  POCT Blood Glucose.: 153 mg/dL (31 Dec 2023 22:31)        RADIOLOGY & ADDITIONAL TESTS:  Results Reviewed                                           [facility] Division of Hospital Medicine    Chief Complaint: Cholecystitis    INTERVAL HISTORY:  Overnight, no acute events.     Patient seen and examined at bedside this morning. Wife also at bedside. A&Ox0 today, very agitated. Unable to engage in ROS given mentation.     MEDICATIONS  (STANDING):  acetaminophen   IVPB .. 1000 milliGRAM(s) IV Intermittent once  cefTRIAXone Injectable. 1000 milliGRAM(s) IV Push every 24 hours  chlorhexidine 2% Cloths 1 Application(s) Topical <User Schedule>  dextrose 5%. 1000 milliLiter(s) (100 mL/Hr) IV Continuous <Continuous>  dextrose 5%. 1000 milliLiter(s) (50 mL/Hr) IV Continuous <Continuous>  dextrose 50% Injectable 12.5 Gram(s) IV Push once  dextrose 50% Injectable 25 Gram(s) IV Push once  dextrose 50% Injectable 25 Gram(s) IV Push once  dronedarone 400 milliGRAM(s) Oral two times a day  epoetin kristal-epbx (RETACRIT) Injectable 08805 Unit(s) SubCutaneous every 7 days  folic acid 1 milliGRAM(s) Oral daily  gabapentin 300 milliGRAM(s) Oral at bedtime  glucagon  Injectable 1 milliGRAM(s) IntraMuscular once  heparin   Injectable 5000 Unit(s) SubCutaneous every 8 hours  insulin glargine Injectable (LANTUS) 18 Unit(s) SubCutaneous at bedtime  insulin lispro (ADMELOG) corrective regimen sliding scale   SubCutaneous three times a day before meals  insulin lispro (ADMELOG) corrective regimen sliding scale   SubCutaneous at bedtime  insulin lispro Injectable (ADMELOG) 6 Unit(s) SubCutaneous three times a day with meals  metoprolol tartrate 25 milliGRAM(s) Oral two times a day  midodrine. 5 milliGRAM(s) Oral three times a day  OLANZapine Injectable 5 milliGRAM(s) IntraMuscular once  pantoprazole    Tablet 40 milliGRAM(s) Oral before breakfast  sodium bicarbonate  Infusion 0.095 mEq/kG/Hr (50 mL/Hr) IV Continuous <Continuous>  sodium chloride 0.9%. 1000 milliLiter(s) (55 mL/Hr) IV Continuous <Continuous>  tamsulosin 0.4 milliGRAM(s) Oral at bedtime    MEDICATIONS  (PRN):  dextrose Oral Gel 15 Gram(s) Oral once PRN Blood Glucose LESS THAN 70 milliGRAM(s)/deciliter  ondansetron Injectable 4 milliGRAM(s) IV Push every 6 hours PRN Nausea        I&O's Summary    31 Dec 2023 07:01  -  01 Jan 2024 07:00  --------------------------------------------------------  IN: 645 mL / OUT: 1310 mL / NET: -665 mL        PHYSICAL EXAM:  Vital Signs Last 24 Hrs  T(C): 36.4 (01 Jan 2024 17:12), Max: 36.8 (01 Jan 2024 04:46)  T(F): 97.6 (01 Jan 2024 17:12), Max: 98.3 (01 Jan 2024 04:46)  HR: 68 (01 Jan 2024 17:12) (68 - 84)  BP: 155/76 (01 Jan 2024 17:12) (149/59 - 166/78)  BP(mean): --  RR: 18 (01 Jan 2024 17:12) (18 - 18)  SpO2: 92% (01 Jan 2024 17:12) (91% - 95%)    Parameters below as of 01 Jan 2024 17:12  Patient On (Oxygen Delivery Method): room air    CONSTITUTIONAL: No apparent distress, on RA  HEENT: Normocephalic, Atraumatic.   RESPIRATORY:  lungs are clear to auscultation bilaterally, no crackles, rhonchi, wheezes  CARDIOVASCULAR: Regular rate and rhythm, No lower extremity edema  ABDOMEN: Soft, non-distended, +perc sky drain containing bilious material   MUSCLOSKELETAL: moving all 4 extremities spontaneously  NEUROLOGY: Alert, Oriented x1 (name), CN 2-12 grossly intact  LABS:                        9.7    14.45 )-----------( 295      ( 01 Jan 2024 08:33 )             31.0     01-01    138  |  104  |  28.4<H>  ----------------------------<  140<H>  4.8   |  18.0<L>  |  2.73<H>    Ca    8.5      01 Jan 2024 08:33    TPro  6.1<L>  /  Alb  2.8<L>  /  TBili  0.7  /  DBili  x   /  AST  33  /  ALT  31  /  AlkPhos  80  01-01          Urinalysis Basic - ( 01 Jan 2024 08:33 )    Color: x / Appearance: x / SG: x / pH: x  Gluc: 140 mg/dL / Ketone: x  / Bili: x / Urobili: x   Blood: x / Protein: x / Nitrite: x   Leuk Esterase: x / RBC: x / WBC x   Sq Epi: x / Non Sq Epi: x / Bacteria: x        CAPILLARY BLOOD GLUCOSE      POCT Blood Glucose.: 97 mg/dL (01 Jan 2024 14:23)  POCT Blood Glucose.: 141 mg/dL (01 Jan 2024 09:11)  POCT Blood Glucose.: 153 mg/dL (31 Dec 2023 22:31)        RADIOLOGY & ADDITIONAL TESTS:  Results Reviewed

## 2024-01-01 NOTE — PROGRESS NOTE ADULT - ASSESSMENT
83-year-old male with past medical history of hypertension, hyperlipidemia, DM, stage III CKD, BPH, admitted for acute sky s/p IR drain placement. Course c/b hypoxic resp failure 2/2 COVID as well as new Afib RVR.   Nephrology consulted for Ron.    RON on CKD stage III  Serum Cr 4.2 on presentation  SCr 1.9-2.7 in 2016- no labs available for review after that until admission  Likely underlying diabetic nephropathy   Pt is sp wills ; good urine output  UA with large glucose, 300 protein. small blood  CT AP reviewed; bl renal cysts  Ron likely hemodynamic ATN in setting of sepsis, hypotension, Afib with RVR  Scr now elevated/ stable ~ 2.3-2.7  ( (likely at baseline now)    Metabolic acidosis  start po sodium bicarb    Anemia - cont Epogen and folate     Acute cholecystitis  s/p IR perc drain placement  on abx    Acute resp failure with hypoxia suspect 2/2 COVID  s/p decadron x5 days;Remdesivir dce'd due to worsening renal function     Will follow

## 2024-01-01 NOTE — PROGRESS NOTE ADULT - ASSESSMENT
83-year-old male with past medical history of hypertension, hyperlipidemia, DM, stage III CKD, BPH, admitted for acute sky s/p IR drain placement. Course c/b hypoxic resp failure 2/2 COVID as well as new Afib RVR.     #RON on CKD stage 3  #BPH  - Fena 0.6% pre-renal  - likely in the setting of poor po intake, and episode of hypotension   - Baseline ~ 2-2.5; Cr 2.7 today   - renal following, herlinda recs  - renally dose meds  - avoid nephrotoxic medications  - c/w Tamsulosin  - strict in/out    #Delirium  - off 1:1  - 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, no acute findings  - frequent re-orientation  - c/w Seroquel 12.5mg QHS  - zyprexa prn    #Acute cholecystitis  - CT A/P and RUQ result noted  - s/p IR perc drain placement, monitor output, will need drain in for 4-6 weeks for tract to form, outpt IR followup  - bile culture with E-coli, abx changed to CTX, plan for total 14 days until 1/3  - c/w CTX  - BCx NGTD  - pain regimen  - diet advancement as tolerated  - GI following, herlinda recs  - trend CMP  - Outpatient surgery evaluation for interval cholecystectomy   - on home midodrine, unclear etiology    #Acute resp failure with hypoxia suspect 2/2 COVID - resolved  - Possible component of aspiration, s/p Zoysn  - s/p COVID vaccines  - s/p decadron x5 days  - Remdesivir dc due to worsening renal function   - s/p BIPAP and HFNC, weaned to RA  - CT chest noted, mild pulm edema noted, PRN lasix   - DVT studies negative    #Afib RVR  - Tele  - TSH normal  - cards d/w families, agree to hold AC  - Add Multaq  - metoprolol BID    #DM  - A1C 9  - ISS, accu checks  - c/w Lantus 18 QHS + 6U Premeals  - can c/w gabapentin for neuropathy   - monitor and adjust insulin as needed    #HLD  -c/w statin    #GERD  - c/w ppi 40mg qd    #Anemia  - Hg stable 9-10 range  - iron studies consistent with chronic dx  - b12 and folate normal  - trend CBC while admitted    DVT ppx: Heparin SQ  Diet: CC   Dispo: pending improvement delirium, ultimately for NITHIN, likely after new years

## 2024-01-02 LAB
ALBUMIN SERPL ELPH-MCNC: 2.9 G/DL — LOW (ref 3.3–5.2)
ALBUMIN SERPL ELPH-MCNC: 2.9 G/DL — LOW (ref 3.3–5.2)
ALP SERPL-CCNC: 76 U/L — SIGNIFICANT CHANGE UP (ref 40–120)
ALP SERPL-CCNC: 76 U/L — SIGNIFICANT CHANGE UP (ref 40–120)
ALT FLD-CCNC: 25 U/L — SIGNIFICANT CHANGE UP
ALT FLD-CCNC: 25 U/L — SIGNIFICANT CHANGE UP
ANION GAP SERPL CALC-SCNC: 10 MMOL/L — SIGNIFICANT CHANGE UP (ref 5–17)
APTT BLD: 34.8 SEC — SIGNIFICANT CHANGE UP (ref 24.5–35.6)
APTT BLD: 34.8 SEC — SIGNIFICANT CHANGE UP (ref 24.5–35.6)
AST SERPL-CCNC: 22 U/L — SIGNIFICANT CHANGE UP
AST SERPL-CCNC: 22 U/L — SIGNIFICANT CHANGE UP
BASOPHILS # BLD AUTO: 0.02 K/UL — SIGNIFICANT CHANGE UP (ref 0–0.2)
BASOPHILS # BLD AUTO: 0.02 K/UL — SIGNIFICANT CHANGE UP (ref 0–0.2)
BASOPHILS NFR BLD AUTO: 0.1 % — SIGNIFICANT CHANGE UP (ref 0–2)
BASOPHILS NFR BLD AUTO: 0.1 % — SIGNIFICANT CHANGE UP (ref 0–2)
BILIRUB DIRECT SERPL-MCNC: 0.2 MG/DL — SIGNIFICANT CHANGE UP (ref 0–0.3)
BILIRUB DIRECT SERPL-MCNC: 0.2 MG/DL — SIGNIFICANT CHANGE UP (ref 0–0.3)
BILIRUB INDIRECT FLD-MCNC: 0.6 MG/DL — SIGNIFICANT CHANGE UP (ref 0.2–1)
BILIRUB INDIRECT FLD-MCNC: 0.6 MG/DL — SIGNIFICANT CHANGE UP (ref 0.2–1)
BILIRUB SERPL-MCNC: 0.7 MG/DL — SIGNIFICANT CHANGE UP (ref 0.4–2)
BILIRUB SERPL-MCNC: 0.7 MG/DL — SIGNIFICANT CHANGE UP (ref 0.4–2)
BUN SERPL-MCNC: 29.4 MG/DL — HIGH (ref 8–20)
BUN SERPL-MCNC: 29.4 MG/DL — HIGH (ref 8–20)
BUN SERPL-MCNC: 29.6 MG/DL — HIGH (ref 8–20)
BUN SERPL-MCNC: 29.6 MG/DL — HIGH (ref 8–20)
CALCIUM SERPL-MCNC: 8.3 MG/DL — LOW (ref 8.4–10.5)
CALCIUM SERPL-MCNC: 8.3 MG/DL — LOW (ref 8.4–10.5)
CALCIUM SERPL-MCNC: 8.4 MG/DL — SIGNIFICANT CHANGE UP (ref 8.4–10.5)
CALCIUM SERPL-MCNC: 8.4 MG/DL — SIGNIFICANT CHANGE UP (ref 8.4–10.5)
CHLORIDE SERPL-SCNC: 105 MMOL/L — SIGNIFICANT CHANGE UP (ref 96–108)
CHLORIDE SERPL-SCNC: 105 MMOL/L — SIGNIFICANT CHANGE UP (ref 96–108)
CHLORIDE SERPL-SCNC: 107 MMOL/L — SIGNIFICANT CHANGE UP (ref 96–108)
CHLORIDE SERPL-SCNC: 107 MMOL/L — SIGNIFICANT CHANGE UP (ref 96–108)
CO2 SERPL-SCNC: 21 MMOL/L — LOW (ref 22–29)
CO2 SERPL-SCNC: 21 MMOL/L — LOW (ref 22–29)
CO2 SERPL-SCNC: 22 MMOL/L — SIGNIFICANT CHANGE UP (ref 22–29)
CO2 SERPL-SCNC: 22 MMOL/L — SIGNIFICANT CHANGE UP (ref 22–29)
CREAT SERPL-MCNC: 2.62 MG/DL — HIGH (ref 0.5–1.3)
CREAT SERPL-MCNC: 2.62 MG/DL — HIGH (ref 0.5–1.3)
CREAT SERPL-MCNC: 2.77 MG/DL — HIGH (ref 0.5–1.3)
CREAT SERPL-MCNC: 2.77 MG/DL — HIGH (ref 0.5–1.3)
CRP SERPL-MCNC: 53 MG/L — HIGH
CRP SERPL-MCNC: 53 MG/L — HIGH
EGFR: 22 ML/MIN/1.73M2 — LOW
EGFR: 22 ML/MIN/1.73M2 — LOW
EGFR: 23 ML/MIN/1.73M2 — LOW
EGFR: 23 ML/MIN/1.73M2 — LOW
EOSINOPHIL # BLD AUTO: 0.23 K/UL — SIGNIFICANT CHANGE UP (ref 0–0.5)
EOSINOPHIL # BLD AUTO: 0.23 K/UL — SIGNIFICANT CHANGE UP (ref 0–0.5)
EOSINOPHIL NFR BLD AUTO: 1.7 % — SIGNIFICANT CHANGE UP (ref 0–6)
EOSINOPHIL NFR BLD AUTO: 1.7 % — SIGNIFICANT CHANGE UP (ref 0–6)
GAS PNL BLDA: SIGNIFICANT CHANGE UP
GAS PNL BLDA: SIGNIFICANT CHANGE UP
GLUCOSE BLDC GLUCOMTR-MCNC: 113 MG/DL — HIGH (ref 70–99)
GLUCOSE BLDC GLUCOMTR-MCNC: 113 MG/DL — HIGH (ref 70–99)
GLUCOSE BLDC GLUCOMTR-MCNC: 141 MG/DL — HIGH (ref 70–99)
GLUCOSE BLDC GLUCOMTR-MCNC: 141 MG/DL — HIGH (ref 70–99)
GLUCOSE BLDC GLUCOMTR-MCNC: 75 MG/DL — SIGNIFICANT CHANGE UP (ref 70–99)
GLUCOSE BLDC GLUCOMTR-MCNC: 75 MG/DL — SIGNIFICANT CHANGE UP (ref 70–99)
GLUCOSE BLDC GLUCOMTR-MCNC: 91 MG/DL — SIGNIFICANT CHANGE UP (ref 70–99)
GLUCOSE BLDC GLUCOMTR-MCNC: 91 MG/DL — SIGNIFICANT CHANGE UP (ref 70–99)
GLUCOSE SERPL-MCNC: 139 MG/DL — HIGH (ref 70–99)
GLUCOSE SERPL-MCNC: 139 MG/DL — HIGH (ref 70–99)
GLUCOSE SERPL-MCNC: 144 MG/DL — HIGH (ref 70–99)
GLUCOSE SERPL-MCNC: 144 MG/DL — HIGH (ref 70–99)
HCT VFR BLD CALC: 27.8 % — LOW (ref 39–50)
HCT VFR BLD CALC: 27.8 % — LOW (ref 39–50)
HCT VFR BLD CALC: 28.6 % — LOW (ref 39–50)
HCT VFR BLD CALC: 28.6 % — LOW (ref 39–50)
HGB BLD-MCNC: 8.9 G/DL — LOW (ref 13–17)
HGB BLD-MCNC: 8.9 G/DL — LOW (ref 13–17)
HGB BLD-MCNC: 9.2 G/DL — LOW (ref 13–17)
HGB BLD-MCNC: 9.2 G/DL — LOW (ref 13–17)
IMM GRANULOCYTES NFR BLD AUTO: 1.2 % — HIGH (ref 0–0.9)
IMM GRANULOCYTES NFR BLD AUTO: 1.2 % — HIGH (ref 0–0.9)
INR BLD: 1.04 RATIO — SIGNIFICANT CHANGE UP (ref 0.85–1.18)
INR BLD: 1.04 RATIO — SIGNIFICANT CHANGE UP (ref 0.85–1.18)
LACTATE SERPL-SCNC: 0.8 MMOL/L — SIGNIFICANT CHANGE UP (ref 0.5–2)
LACTATE SERPL-SCNC: 0.8 MMOL/L — SIGNIFICANT CHANGE UP (ref 0.5–2)
LYMPHOCYTES # BLD AUTO: 1.29 K/UL — SIGNIFICANT CHANGE UP (ref 1–3.3)
LYMPHOCYTES # BLD AUTO: 1.29 K/UL — SIGNIFICANT CHANGE UP (ref 1–3.3)
LYMPHOCYTES # BLD AUTO: 9.6 % — LOW (ref 13–44)
LYMPHOCYTES # BLD AUTO: 9.6 % — LOW (ref 13–44)
MAGNESIUM SERPL-MCNC: 1.8 MG/DL — SIGNIFICANT CHANGE UP (ref 1.6–2.6)
MAGNESIUM SERPL-MCNC: 1.8 MG/DL — SIGNIFICANT CHANGE UP (ref 1.6–2.6)
MCHC RBC-ENTMCNC: 29.3 PG — SIGNIFICANT CHANGE UP (ref 27–34)
MCHC RBC-ENTMCNC: 29.3 PG — SIGNIFICANT CHANGE UP (ref 27–34)
MCHC RBC-ENTMCNC: 29.4 PG — SIGNIFICANT CHANGE UP (ref 27–34)
MCHC RBC-ENTMCNC: 29.4 PG — SIGNIFICANT CHANGE UP (ref 27–34)
MCHC RBC-ENTMCNC: 32 GM/DL — SIGNIFICANT CHANGE UP (ref 32–36)
MCHC RBC-ENTMCNC: 32 GM/DL — SIGNIFICANT CHANGE UP (ref 32–36)
MCHC RBC-ENTMCNC: 32.2 GM/DL — SIGNIFICANT CHANGE UP (ref 32–36)
MCHC RBC-ENTMCNC: 32.2 GM/DL — SIGNIFICANT CHANGE UP (ref 32–36)
MCV RBC AUTO: 91.1 FL — SIGNIFICANT CHANGE UP (ref 80–100)
MCV RBC AUTO: 91.1 FL — SIGNIFICANT CHANGE UP (ref 80–100)
MCV RBC AUTO: 91.7 FL — SIGNIFICANT CHANGE UP (ref 80–100)
MCV RBC AUTO: 91.7 FL — SIGNIFICANT CHANGE UP (ref 80–100)
MONOCYTES # BLD AUTO: 1.09 K/UL — HIGH (ref 0–0.9)
MONOCYTES # BLD AUTO: 1.09 K/UL — HIGH (ref 0–0.9)
MONOCYTES NFR BLD AUTO: 8.1 % — SIGNIFICANT CHANGE UP (ref 2–14)
MONOCYTES NFR BLD AUTO: 8.1 % — SIGNIFICANT CHANGE UP (ref 2–14)
NEUTROPHILS # BLD AUTO: 10.66 K/UL — HIGH (ref 1.8–7.4)
NEUTROPHILS # BLD AUTO: 10.66 K/UL — HIGH (ref 1.8–7.4)
NEUTROPHILS NFR BLD AUTO: 79.3 % — HIGH (ref 43–77)
NEUTROPHILS NFR BLD AUTO: 79.3 % — HIGH (ref 43–77)
PHOSPHATE SERPL-MCNC: 2.7 MG/DL — SIGNIFICANT CHANGE UP (ref 2.4–4.7)
PHOSPHATE SERPL-MCNC: 2.7 MG/DL — SIGNIFICANT CHANGE UP (ref 2.4–4.7)
PLATELET # BLD AUTO: 300 K/UL — SIGNIFICANT CHANGE UP (ref 150–400)
POTASSIUM SERPL-MCNC: 5.1 MMOL/L — SIGNIFICANT CHANGE UP (ref 3.5–5.3)
POTASSIUM SERPL-MCNC: 5.1 MMOL/L — SIGNIFICANT CHANGE UP (ref 3.5–5.3)
POTASSIUM SERPL-MCNC: 5.6 MMOL/L — HIGH (ref 3.5–5.3)
POTASSIUM SERPL-MCNC: 5.6 MMOL/L — HIGH (ref 3.5–5.3)
POTASSIUM SERPL-SCNC: 5.1 MMOL/L — SIGNIFICANT CHANGE UP (ref 3.5–5.3)
POTASSIUM SERPL-SCNC: 5.1 MMOL/L — SIGNIFICANT CHANGE UP (ref 3.5–5.3)
POTASSIUM SERPL-SCNC: 5.6 MMOL/L — HIGH (ref 3.5–5.3)
POTASSIUM SERPL-SCNC: 5.6 MMOL/L — HIGH (ref 3.5–5.3)
PROCALCITONIN SERPL-MCNC: 0.28 NG/ML — HIGH (ref 0.02–0.1)
PROCALCITONIN SERPL-MCNC: 0.28 NG/ML — HIGH (ref 0.02–0.1)
PROCALCITONIN SERPL-MCNC: 0.29 NG/ML — HIGH (ref 0.02–0.1)
PROCALCITONIN SERPL-MCNC: 0.29 NG/ML — HIGH (ref 0.02–0.1)
PROT SERPL-MCNC: 5.9 G/DL — LOW (ref 6.6–8.7)
PROT SERPL-MCNC: 5.9 G/DL — LOW (ref 6.6–8.7)
PROTHROM AB SERPL-ACNC: 11.5 SEC — SIGNIFICANT CHANGE UP (ref 9.5–13)
PROTHROM AB SERPL-ACNC: 11.5 SEC — SIGNIFICANT CHANGE UP (ref 9.5–13)
RBC # BLD: 3.03 M/UL — LOW (ref 4.2–5.8)
RBC # BLD: 3.03 M/UL — LOW (ref 4.2–5.8)
RBC # BLD: 3.14 M/UL — LOW (ref 4.2–5.8)
RBC # BLD: 3.14 M/UL — LOW (ref 4.2–5.8)
RBC # FLD: 15.5 % — HIGH (ref 10.3–14.5)
RBC # FLD: 15.5 % — HIGH (ref 10.3–14.5)
RBC # FLD: 15.6 % — HIGH (ref 10.3–14.5)
RBC # FLD: 15.6 % — HIGH (ref 10.3–14.5)
SODIUM SERPL-SCNC: 136 MMOL/L — SIGNIFICANT CHANGE UP (ref 135–145)
SODIUM SERPL-SCNC: 136 MMOL/L — SIGNIFICANT CHANGE UP (ref 135–145)
SODIUM SERPL-SCNC: 139 MMOL/L — SIGNIFICANT CHANGE UP (ref 135–145)
SODIUM SERPL-SCNC: 139 MMOL/L — SIGNIFICANT CHANGE UP (ref 135–145)
WBC # BLD: 13.45 K/UL — HIGH (ref 3.8–10.5)
WBC # BLD: 13.45 K/UL — HIGH (ref 3.8–10.5)
WBC # BLD: 14.41 K/UL — HIGH (ref 3.8–10.5)
WBC # BLD: 14.41 K/UL — HIGH (ref 3.8–10.5)
WBC # FLD AUTO: 13.45 K/UL — HIGH (ref 3.8–10.5)
WBC # FLD AUTO: 13.45 K/UL — HIGH (ref 3.8–10.5)
WBC # FLD AUTO: 14.41 K/UL — HIGH (ref 3.8–10.5)
WBC # FLD AUTO: 14.41 K/UL — HIGH (ref 3.8–10.5)

## 2024-01-02 PROCEDURE — 99233 SBSQ HOSP IP/OBS HIGH 50: CPT

## 2024-01-02 PROCEDURE — 71045 X-RAY EXAM CHEST 1 VIEW: CPT | Mod: 26

## 2024-01-02 RX ORDER — SODIUM CHLORIDE 9 MG/ML
1000 INJECTION INTRAMUSCULAR; INTRAVENOUS; SUBCUTANEOUS ONCE
Refills: 0 | Status: COMPLETED | OUTPATIENT
Start: 2024-01-02 | End: 2024-01-02

## 2024-01-02 RX ORDER — VANCOMYCIN HCL 1 G
1000 VIAL (EA) INTRAVENOUS EVERY 12 HOURS
Refills: 0 | Status: DISCONTINUED | OUTPATIENT
Start: 2024-01-02 | End: 2024-01-02

## 2024-01-02 RX ORDER — VANCOMYCIN HCL 1 G
500 VIAL (EA) INTRAVENOUS EVERY 24 HOURS
Refills: 0 | Status: DISCONTINUED | OUTPATIENT
Start: 2024-01-02 | End: 2024-01-03

## 2024-01-02 RX ORDER — ACETAMINOPHEN 500 MG
1000 TABLET ORAL ONCE
Refills: 0 | Status: COMPLETED | OUTPATIENT
Start: 2024-01-02 | End: 2024-01-02

## 2024-01-02 RX ORDER — SODIUM CHLORIDE 9 MG/ML
1000 INJECTION, SOLUTION INTRAVENOUS
Refills: 0 | Status: DISCONTINUED | OUTPATIENT
Start: 2024-01-02 | End: 2024-01-05

## 2024-01-02 RX ADMIN — MIDODRINE HYDROCHLORIDE 5 MILLIGRAM(S): 2.5 TABLET ORAL at 06:10

## 2024-01-02 RX ADMIN — Medication 25 MILLIGRAM(S): at 06:05

## 2024-01-02 RX ADMIN — HEPARIN SODIUM 5000 UNIT(S): 5000 INJECTION INTRAVENOUS; SUBCUTANEOUS at 06:13

## 2024-01-02 RX ADMIN — Medication 100 MILLIGRAM(S): at 09:17

## 2024-01-02 RX ADMIN — SODIUM CHLORIDE 1000 MILLILITER(S): 9 INJECTION INTRAMUSCULAR; INTRAVENOUS; SUBCUTANEOUS at 09:14

## 2024-01-02 RX ADMIN — Medication 650 MILLIGRAM(S): at 06:10

## 2024-01-02 RX ADMIN — Medication 400 MILLIGRAM(S): at 09:14

## 2024-01-02 RX ADMIN — CHLORHEXIDINE GLUCONATE 1 APPLICATION(S): 213 SOLUTION TOPICAL at 06:16

## 2024-01-02 RX ADMIN — SODIUM CHLORIDE 50 MILLILITER(S): 9 INJECTION, SOLUTION INTRAVENOUS at 17:03

## 2024-01-02 RX ADMIN — HEPARIN SODIUM 5000 UNIT(S): 5000 INJECTION INTRAVENOUS; SUBCUTANEOUS at 23:46

## 2024-01-02 RX ADMIN — HEPARIN SODIUM 5000 UNIT(S): 5000 INJECTION INTRAVENOUS; SUBCUTANEOUS at 12:13

## 2024-01-02 RX ADMIN — PANTOPRAZOLE SODIUM 40 MILLIGRAM(S): 20 TABLET, DELAYED RELEASE ORAL at 06:09

## 2024-01-02 RX ADMIN — DRONEDARONE 400 MILLIGRAM(S): 400 TABLET, FILM COATED ORAL at 06:11

## 2024-01-02 RX ADMIN — CEFTRIAXONE 1000 MILLIGRAM(S): 500 INJECTION, POWDER, FOR SOLUTION INTRAMUSCULAR; INTRAVENOUS at 12:13

## 2024-01-02 RX ADMIN — Medication 1000 MILLIGRAM(S): at 10:00

## 2024-01-02 RX ADMIN — SODIUM CHLORIDE 55 MILLILITER(S): 9 INJECTION INTRAMUSCULAR; INTRAVENOUS; SUBCUTANEOUS at 09:51

## 2024-01-02 NOTE — RAPID RESPONSE TEAM SUMMARY - NSSITUATIONBACKGROUNDRRT_GEN_ALL_CORE
83-year-old male with past medical history of hypertension, hyperlipidemia, DM, stage III CKD, BPH, admitted for acute sky s/p IR drain placement. Course c/b hypoxic respiratory failure 2/2 COVID as well as new Afib RVR.     RRT called for hypoxia to low 80%. O2 increased to 6 L, maintaining O2 sats in the 90s. Seen and assessed at bedside. Patient baseline confused, per MD at baseline.     VS: T 100.5 F, /63, HR 80, RR 22, SpO2 94%, Glu 141    PE:  General: Laying in bed, arousable, NAD  Respiratory: Slightly tachypneic but nonlabored, +rhonchi, no accessory muscle use  Cardiac: Clear S1/S2, brisk cap refill  Abdomen: Soft, nondistended, nontender to palpation, no peritoneal signs.  Neuro: A&Ox1 (per MD mental status waxes/wanes and this is baseline), pupils equal and reactive to light b/l  Skin: Warm, dry, well perfused  .
Patient is an 83yoM admitted overnight with cholecystitis. Septic overnight, planned for IR placement of perc sky tube today. RR  called due to desaturation to 85% while on 3L NC. Patient denies complaints however appears tremulous and mildly altered.

## 2024-01-02 NOTE — PROGRESS NOTE ADULT - SUBJECTIVE AND OBJECTIVE BOX
[facility] Division of Hospital Medicine    Chief Complaint:  sky    INTERVAL HISTORY:  Overnight, no acute events.     Patient seen and examined at bedside this morning after RRT called for hypoxia and lethargy. A&Ox0 today, coughing clear thick sputum. Unable to engage in ROS given mentation.       MEDICATIONS  (STANDING):  acetaminophen   IVPB .. 1000 milliGRAM(s) IV Intermittent once  cefTRIAXone Injectable. 1000 milliGRAM(s) IV Push every 24 hours  chlorhexidine 2% Cloths 1 Application(s) Topical <User Schedule>  dextrose 5%. 1000 milliLiter(s) (100 mL/Hr) IV Continuous <Continuous>  dextrose 5%. 1000 milliLiter(s) (50 mL/Hr) IV Continuous <Continuous>  dextrose 50% Injectable 12.5 Gram(s) IV Push once  dextrose 50% Injectable 25 Gram(s) IV Push once  dextrose 50% Injectable 25 Gram(s) IV Push once  dronedarone 400 milliGRAM(s) Oral two times a day  epoetin kristal-epbx (RETACRIT) Injectable 79412 Unit(s) SubCutaneous every 7 days  folic acid 1 milliGRAM(s) Oral daily  gabapentin 300 milliGRAM(s) Oral at bedtime  glucagon  Injectable 1 milliGRAM(s) IntraMuscular once  heparin   Injectable 5000 Unit(s) SubCutaneous every 8 hours  insulin glargine Injectable (LANTUS) 18 Unit(s) SubCutaneous at bedtime  insulin lispro (ADMELOG) corrective regimen sliding scale   SubCutaneous three times a day before meals  insulin lispro (ADMELOG) corrective regimen sliding scale   SubCutaneous at bedtime  insulin lispro Injectable (ADMELOG) 6 Unit(s) SubCutaneous three times a day with meals  metoprolol tartrate 25 milliGRAM(s) Oral two times a day  midodrine. 5 milliGRAM(s) Oral three times a day  OLANZapine Injectable 5 milliGRAM(s) IntraMuscular once  pantoprazole    Tablet 40 milliGRAM(s) Oral before breakfast  QUEtiapine 12.5 milliGRAM(s) Oral at bedtime  sodium bicarbonate 650 milliGRAM(s) Oral two times a day  sodium bicarbonate  Infusion 0.095 mEq/kG/Hr (50 mL/Hr) IV Continuous <Continuous>  sodium chloride 0.9%. 1000 milliLiter(s) (55 mL/Hr) IV Continuous <Continuous>  tamsulosin 0.4 milliGRAM(s) Oral at bedtime  vancomycin  IVPB 500 milliGRAM(s) IV Intermittent every 24 hours    MEDICATIONS  (PRN):  dextrose Oral Gel 15 Gram(s) Oral once PRN Blood Glucose LESS THAN 70 milliGRAM(s)/deciliter  ondansetron Injectable 4 milliGRAM(s) IV Push every 6 hours PRN Nausea        I&O's Summary    01 Jan 2024 07:01  -  02 Jan 2024 07:00  --------------------------------------------------------  IN: 55 mL / OUT: 560 mL / NET: -505 mL    02 Jan 2024 07:01  -  02 Jan 2024 10:54  --------------------------------------------------------  IN: 1164 mL / OUT: 120 mL / NET: 1044 mL        PHYSICAL EXAM:  Vital Signs Last 24 Hrs  T(C): 37.7 (02 Jan 2024 10:24), Max: 38.1 (02 Jan 2024 08:02)  T(F): 99.9 (02 Jan 2024 10:24), Max: 100.5 (02 Jan 2024 08:02)  HR: 71 (02 Jan 2024 10:24) (68 - 95)  BP: 114/66 (02 Jan 2024 10:24) (114/66 - 155/76)  BP(mean): 83 (02 Jan 2024 04:15) (83 - 83)  RR: 24 (02 Jan 2024 08:15) (18 - 26)  SpO2: 93% (02 Jan 2024 10:24) (81% - 93%)    Parameters below as of 02 Jan 2024 10:24  Patient On (Oxygen Delivery Method): nasal cannula  O2 Flow (L/min): 6    CONSTITUTIONAL: No apparent distress, on RA  HEENT: Normocephalic, Atraumatic.   RESPIRATORY:  lungs are clear to auscultation bilaterally, no crackles, rhonchi, wheezes  CARDIOVASCULAR: Regular rate and rhythm, No lower extremity edema  ABDOMEN: Soft, non-distended, +perc sky drain containing bilious material   MUSCLOSKELETAL: moving all 4 extremities spontaneously  NEUROLOGY: Alert, Oriented x1 (name), CN 2-12 grossly intact    LABS:                        9.2    13.45 )-----------( 300      ( 02 Jan 2024 08:00 )             28.6     01-02    136  |  105  |  29.4<H>  ----------------------------<  144<H>  5.6<H>   |  21.0<L>  |  2.62<H>    Ca    8.3<L>      02 Jan 2024 08:00  Phos  2.7     01-02  Mg     1.8     01-02    TPro  5.9<L>  /  Alb  2.9<L>  /  TBili  0.7  /  DBili  0.2  /  AST  22  /  ALT  25  /  AlkPhos  76  01-02    PT/INR - ( 02 Jan 2024 08:00 )   PT: 11.5 sec;   INR: 1.04 ratio         PTT - ( 02 Jan 2024 08:00 )  PTT:34.8 sec      Urinalysis Basic - ( 02 Jan 2024 08:00 )    Color: x / Appearance: x / SG: x / pH: x  Gluc: 144 mg/dL / Ketone: x  / Bili: x / Urobili: x   Blood: x / Protein: x / Nitrite: x   Leuk Esterase: x / RBC: x / WBC x   Sq Epi: x / Non Sq Epi: x / Bacteria: x        CAPILLARY BLOOD GLUCOSE      POCT Blood Glucose.: 141 mg/dL (02 Jan 2024 07:53)  POCT Blood Glucose.: 143 mg/dL (01 Jan 2024 21:55)  POCT Blood Glucose.: 135 mg/dL (01 Jan 2024 18:29)  POCT Blood Glucose.: 97 mg/dL (01 Jan 2024 14:23)        RADIOLOGY & ADDITIONAL TESTS:  Results Reviewed  CXR appears stable                                            [facility] Division of Hospital Medicine    Chief Complaint:  sky    INTERVAL HISTORY:  Overnight, no acute events.     Patient seen and examined at bedside this morning after RRT called for hypoxia and lethargy. A&Ox0 today, coughing clear thick sputum. Unable to engage in ROS given mentation.       MEDICATIONS  (STANDING):  acetaminophen   IVPB .. 1000 milliGRAM(s) IV Intermittent once  cefTRIAXone Injectable. 1000 milliGRAM(s) IV Push every 24 hours  chlorhexidine 2% Cloths 1 Application(s) Topical <User Schedule>  dextrose 5%. 1000 milliLiter(s) (100 mL/Hr) IV Continuous <Continuous>  dextrose 5%. 1000 milliLiter(s) (50 mL/Hr) IV Continuous <Continuous>  dextrose 50% Injectable 12.5 Gram(s) IV Push once  dextrose 50% Injectable 25 Gram(s) IV Push once  dextrose 50% Injectable 25 Gram(s) IV Push once  dronedarone 400 milliGRAM(s) Oral two times a day  epoetin kristal-epbx (RETACRIT) Injectable 14523 Unit(s) SubCutaneous every 7 days  folic acid 1 milliGRAM(s) Oral daily  gabapentin 300 milliGRAM(s) Oral at bedtime  glucagon  Injectable 1 milliGRAM(s) IntraMuscular once  heparin   Injectable 5000 Unit(s) SubCutaneous every 8 hours  insulin glargine Injectable (LANTUS) 18 Unit(s) SubCutaneous at bedtime  insulin lispro (ADMELOG) corrective regimen sliding scale   SubCutaneous three times a day before meals  insulin lispro (ADMELOG) corrective regimen sliding scale   SubCutaneous at bedtime  insulin lispro Injectable (ADMELOG) 6 Unit(s) SubCutaneous three times a day with meals  metoprolol tartrate 25 milliGRAM(s) Oral two times a day  midodrine. 5 milliGRAM(s) Oral three times a day  OLANZapine Injectable 5 milliGRAM(s) IntraMuscular once  pantoprazole    Tablet 40 milliGRAM(s) Oral before breakfast  QUEtiapine 12.5 milliGRAM(s) Oral at bedtime  sodium bicarbonate 650 milliGRAM(s) Oral two times a day  sodium bicarbonate  Infusion 0.095 mEq/kG/Hr (50 mL/Hr) IV Continuous <Continuous>  sodium chloride 0.9%. 1000 milliLiter(s) (55 mL/Hr) IV Continuous <Continuous>  tamsulosin 0.4 milliGRAM(s) Oral at bedtime  vancomycin  IVPB 500 milliGRAM(s) IV Intermittent every 24 hours    MEDICATIONS  (PRN):  dextrose Oral Gel 15 Gram(s) Oral once PRN Blood Glucose LESS THAN 70 milliGRAM(s)/deciliter  ondansetron Injectable 4 milliGRAM(s) IV Push every 6 hours PRN Nausea        I&O's Summary    01 Jan 2024 07:01  -  02 Jan 2024 07:00  --------------------------------------------------------  IN: 55 mL / OUT: 560 mL / NET: -505 mL    02 Jan 2024 07:01  -  02 Jan 2024 10:54  --------------------------------------------------------  IN: 1164 mL / OUT: 120 mL / NET: 1044 mL        PHYSICAL EXAM:  Vital Signs Last 24 Hrs  T(C): 37.7 (02 Jan 2024 10:24), Max: 38.1 (02 Jan 2024 08:02)  T(F): 99.9 (02 Jan 2024 10:24), Max: 100.5 (02 Jan 2024 08:02)  HR: 71 (02 Jan 2024 10:24) (68 - 95)  BP: 114/66 (02 Jan 2024 10:24) (114/66 - 155/76)  BP(mean): 83 (02 Jan 2024 04:15) (83 - 83)  RR: 24 (02 Jan 2024 08:15) (18 - 26)  SpO2: 93% (02 Jan 2024 10:24) (81% - 93%)    Parameters below as of 02 Jan 2024 10:24  Patient On (Oxygen Delivery Method): nasal cannula  O2 Flow (L/min): 6    CONSTITUTIONAL: No apparent distress, on RA  HEENT: Normocephalic, Atraumatic.   RESPIRATORY:  lungs are clear to auscultation bilaterally, no crackles, rhonchi, wheezes  CARDIOVASCULAR: Regular rate and rhythm, No lower extremity edema  ABDOMEN: Soft, non-distended, +perc sky drain containing bilious material   MUSCLOSKELETAL: moving all 4 extremities spontaneously  NEUROLOGY: Alert, Oriented x1 (name), CN 2-12 grossly intact    LABS:                        9.2    13.45 )-----------( 300      ( 02 Jan 2024 08:00 )             28.6     01-02    136  |  105  |  29.4<H>  ----------------------------<  144<H>  5.6<H>   |  21.0<L>  |  2.62<H>    Ca    8.3<L>      02 Jan 2024 08:00  Phos  2.7     01-02  Mg     1.8     01-02    TPro  5.9<L>  /  Alb  2.9<L>  /  TBili  0.7  /  DBili  0.2  /  AST  22  /  ALT  25  /  AlkPhos  76  01-02    PT/INR - ( 02 Jan 2024 08:00 )   PT: 11.5 sec;   INR: 1.04 ratio         PTT - ( 02 Jan 2024 08:00 )  PTT:34.8 sec      Urinalysis Basic - ( 02 Jan 2024 08:00 )    Color: x / Appearance: x / SG: x / pH: x  Gluc: 144 mg/dL / Ketone: x  / Bili: x / Urobili: x   Blood: x / Protein: x / Nitrite: x   Leuk Esterase: x / RBC: x / WBC x   Sq Epi: x / Non Sq Epi: x / Bacteria: x        CAPILLARY BLOOD GLUCOSE      POCT Blood Glucose.: 141 mg/dL (02 Jan 2024 07:53)  POCT Blood Glucose.: 143 mg/dL (01 Jan 2024 21:55)  POCT Blood Glucose.: 135 mg/dL (01 Jan 2024 18:29)  POCT Blood Glucose.: 97 mg/dL (01 Jan 2024 14:23)        RADIOLOGY & ADDITIONAL TESTS:  Results Reviewed  CXR appears stable

## 2024-01-02 NOTE — CHART NOTE - NSCHARTNOTEFT_GEN_A_CORE
RAPID RESPONSE FOLLOW UP NOTE    RRT called @ 0753 for hypoxia. Seen at bedside for 2 hour follow up. Patient is alert but does not respond to questions. Wife is at bedside and reports he is at his baseline. Patient is without any other acute complaints. O2 titrated down to 4 L which was well tolerated.    VS: T 99.9 F rectal, HR 71, /66, RR 20, SpO2 97% on 4 L    PE:  General: Sitting comfortably in bed, NAD  Respiratory: Nonlabored, + rhonchi, no accessory muscle use  Cardiac: Clear S1/S2, brisk cap refill  Abdomen: Soft, nondistended, nontender to palpation. + BS. No guarding or rebound tenderness  Neuro: Alert but not oriented, tracks provider/wife in room, pupils equal and reactive to light b/l    A/P: 83y old Male s/p RRT called for hypoxia, patient admitted with COVID PNA.   - Labs reviewed, leukocytosis stable, procal downtrending.   - CXR with opacification which does not appear significantly worsened on my read.   - Aggressive chest PT.  - C/w empiric antibiotics pending cultures.  - Continue to wean O2 as tolerated.  - RN to continue to monitor, to alert provider w/ any change in patient status.     DISPOSITION:  - Maintain current level of care

## 2024-01-02 NOTE — PROGRESS NOTE ADULT - ASSESSMENT
83-year-old male with past medical history of hypertension, hyperlipidemia, DM, stage III CKD, BPH, admitted for acute sky s/p IR drain placement. Course c/b hypoxic resp failure 2/2 COVID as well as new Afib RVR.     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, no acute findings  - frequent re-orientation  - c/w Seroquel 12.5mg QHS  - zyprexa prn    #Acute cholecystitis  - CT A/P and RUQ result noted  - s/p IR perc drain placement, monitor output, will need drain in for 4-6 weeks for tract to form, outpt IR followup  - bile culture with E-coli, abx changed to CTX, plan for total 14 days until 1/3  - c/w CTX  - BCx NGTD  - pain regimen  - diet advancement as tolerated  - GI following, herlinda recs  - trend CMP  - Outpatient surgery evaluation for interval cholecystectomy   - on home midodrine, unclear etiology    #Acute resp failure with hypoxia suspect 2/2 COVID - resolved  - Possible component of aspiration, s/p Zoysn  - s/p COVID vaccines  - s/p decadron x5 days  - Remdesivir dc due to worsening renal function   - s/p BIPAP and HFNC, weaned to RA  - CT chest noted, mild pulm edema noted, PRN lasix   - DVT studies negative    #Afib RVR  - Tele  - TSH normal  - cards d/w families, agree to hold AC  - Add Multaq  - metoprolol BID    #DM  - A1C 9  - ISS, accu checks  - c/w Lantus 18 QHS + 6U Premeals  - can c/w gabapentin for neuropathy   - monitor and adjust insulin as needed    #HLD  -c/w statin    #GERD  - c/w ppi 40mg qd    #Anemia  - Hg stable 9-10 range  - iron studies consistent with chronic dx  - b12 and folate normal  - trend CBC while admitted    DVT ppx: Heparin SQ  Diet: CC   Dispo: medically acute

## 2024-01-03 LAB
ANION GAP SERPL CALC-SCNC: 10 MMOL/L — SIGNIFICANT CHANGE UP (ref 5–17)
ANION GAP SERPL CALC-SCNC: 10 MMOL/L — SIGNIFICANT CHANGE UP (ref 5–17)
BUN SERPL-MCNC: 22.4 MG/DL — HIGH (ref 8–20)
BUN SERPL-MCNC: 22.4 MG/DL — HIGH (ref 8–20)
CALCIUM SERPL-MCNC: 8.3 MG/DL — LOW (ref 8.4–10.5)
CALCIUM SERPL-MCNC: 8.3 MG/DL — LOW (ref 8.4–10.5)
CHLORIDE SERPL-SCNC: 111 MMOL/L — HIGH (ref 96–108)
CHLORIDE SERPL-SCNC: 111 MMOL/L — HIGH (ref 96–108)
CO2 SERPL-SCNC: 20 MMOL/L — LOW (ref 22–29)
CO2 SERPL-SCNC: 20 MMOL/L — LOW (ref 22–29)
CREAT SERPL-MCNC: 2.51 MG/DL — HIGH (ref 0.5–1.3)
CREAT SERPL-MCNC: 2.51 MG/DL — HIGH (ref 0.5–1.3)
CRP SERPL-MCNC: 51 MG/L — HIGH
CRP SERPL-MCNC: 51 MG/L — HIGH
EGFR: 25 ML/MIN/1.73M2 — LOW
EGFR: 25 ML/MIN/1.73M2 — LOW
GLUCOSE BLDC GLUCOMTR-MCNC: 102 MG/DL — HIGH (ref 70–99)
GLUCOSE BLDC GLUCOMTR-MCNC: 102 MG/DL — HIGH (ref 70–99)
GLUCOSE BLDC GLUCOMTR-MCNC: 110 MG/DL — HIGH (ref 70–99)
GLUCOSE BLDC GLUCOMTR-MCNC: 110 MG/DL — HIGH (ref 70–99)
GLUCOSE BLDC GLUCOMTR-MCNC: 114 MG/DL — HIGH (ref 70–99)
GLUCOSE BLDC GLUCOMTR-MCNC: 114 MG/DL — HIGH (ref 70–99)
GLUCOSE BLDC GLUCOMTR-MCNC: 155 MG/DL — HIGH (ref 70–99)
GLUCOSE BLDC GLUCOMTR-MCNC: 155 MG/DL — HIGH (ref 70–99)
GLUCOSE BLDC GLUCOMTR-MCNC: 164 MG/DL — HIGH (ref 70–99)
GLUCOSE BLDC GLUCOMTR-MCNC: 164 MG/DL — HIGH (ref 70–99)
GLUCOSE SERPL-MCNC: 109 MG/DL — HIGH (ref 70–99)
GLUCOSE SERPL-MCNC: 109 MG/DL — HIGH (ref 70–99)
HCT VFR BLD CALC: 27.1 % — LOW (ref 39–50)
HCT VFR BLD CALC: 27.1 % — LOW (ref 39–50)
HGB BLD-MCNC: 8.6 G/DL — LOW (ref 13–17)
HGB BLD-MCNC: 8.6 G/DL — LOW (ref 13–17)
MCHC RBC-ENTMCNC: 29.5 PG — SIGNIFICANT CHANGE UP (ref 27–34)
MCHC RBC-ENTMCNC: 29.5 PG — SIGNIFICANT CHANGE UP (ref 27–34)
MCHC RBC-ENTMCNC: 31.7 GM/DL — LOW (ref 32–36)
MCHC RBC-ENTMCNC: 31.7 GM/DL — LOW (ref 32–36)
MCV RBC AUTO: 92.8 FL — SIGNIFICANT CHANGE UP (ref 80–100)
MCV RBC AUTO: 92.8 FL — SIGNIFICANT CHANGE UP (ref 80–100)
PLATELET # BLD AUTO: 269 K/UL — SIGNIFICANT CHANGE UP (ref 150–400)
PLATELET # BLD AUTO: 269 K/UL — SIGNIFICANT CHANGE UP (ref 150–400)
POTASSIUM SERPL-MCNC: 4.5 MMOL/L — SIGNIFICANT CHANGE UP (ref 3.5–5.3)
POTASSIUM SERPL-MCNC: 4.5 MMOL/L — SIGNIFICANT CHANGE UP (ref 3.5–5.3)
POTASSIUM SERPL-SCNC: 4.5 MMOL/L — SIGNIFICANT CHANGE UP (ref 3.5–5.3)
POTASSIUM SERPL-SCNC: 4.5 MMOL/L — SIGNIFICANT CHANGE UP (ref 3.5–5.3)
RBC # BLD: 2.92 M/UL — LOW (ref 4.2–5.8)
RBC # BLD: 2.92 M/UL — LOW (ref 4.2–5.8)
RBC # FLD: 15.7 % — HIGH (ref 10.3–14.5)
RBC # FLD: 15.7 % — HIGH (ref 10.3–14.5)
SODIUM SERPL-SCNC: 141 MMOL/L — SIGNIFICANT CHANGE UP (ref 135–145)
SODIUM SERPL-SCNC: 141 MMOL/L — SIGNIFICANT CHANGE UP (ref 135–145)
WBC # BLD: 11.55 K/UL — HIGH (ref 3.8–10.5)
WBC # BLD: 11.55 K/UL — HIGH (ref 3.8–10.5)
WBC # FLD AUTO: 11.55 K/UL — HIGH (ref 3.8–10.5)
WBC # FLD AUTO: 11.55 K/UL — HIGH (ref 3.8–10.5)

## 2024-01-03 PROCEDURE — 99233 SBSQ HOSP IP/OBS HIGH 50: CPT

## 2024-01-03 RX ADMIN — MIDODRINE HYDROCHLORIDE 5 MILLIGRAM(S): 2.5 TABLET ORAL at 12:38

## 2024-01-03 RX ADMIN — GABAPENTIN 300 MILLIGRAM(S): 400 CAPSULE ORAL at 23:12

## 2024-01-03 RX ADMIN — Medication 1 MILLIGRAM(S): at 12:38

## 2024-01-03 RX ADMIN — SODIUM CHLORIDE 55 MILLILITER(S): 9 INJECTION INTRAMUSCULAR; INTRAVENOUS; SUBCUTANEOUS at 23:14

## 2024-01-03 RX ADMIN — QUETIAPINE FUMARATE 12.5 MILLIGRAM(S): 200 TABLET, FILM COATED ORAL at 23:11

## 2024-01-03 RX ADMIN — MIDODRINE HYDROCHLORIDE 5 MILLIGRAM(S): 2.5 TABLET ORAL at 17:56

## 2024-01-03 RX ADMIN — HEPARIN SODIUM 5000 UNIT(S): 5000 INJECTION INTRAVENOUS; SUBCUTANEOUS at 06:14

## 2024-01-03 RX ADMIN — HEPARIN SODIUM 5000 UNIT(S): 5000 INJECTION INTRAVENOUS; SUBCUTANEOUS at 13:47

## 2024-01-03 RX ADMIN — Medication 6 UNIT(S): at 12:39

## 2024-01-03 RX ADMIN — HEPARIN SODIUM 5000 UNIT(S): 5000 INJECTION INTRAVENOUS; SUBCUTANEOUS at 23:11

## 2024-01-03 RX ADMIN — Medication 650 MILLIGRAM(S): at 17:55

## 2024-01-03 RX ADMIN — Medication 100 MILLIGRAM(S): at 23:12

## 2024-01-03 RX ADMIN — DRONEDARONE 400 MILLIGRAM(S): 400 TABLET, FILM COATED ORAL at 17:56

## 2024-01-03 RX ADMIN — CHLORHEXIDINE GLUCONATE 1 APPLICATION(S): 213 SOLUTION TOPICAL at 06:15

## 2024-01-03 RX ADMIN — TAMSULOSIN HYDROCHLORIDE 0.4 MILLIGRAM(S): 0.4 CAPSULE ORAL at 23:11

## 2024-01-03 RX ADMIN — INSULIN GLARGINE 18 UNIT(S): 100 INJECTION, SOLUTION SUBCUTANEOUS at 23:12

## 2024-01-03 RX ADMIN — Medication 6 UNIT(S): at 17:55

## 2024-01-03 RX ADMIN — Medication 25 MILLIGRAM(S): at 17:56

## 2024-01-03 RX ADMIN — Medication 2: at 12:39

## 2024-01-03 NOTE — SWALLOW BEDSIDE ASSESSMENT ADULT - COMMENTS
As per MD note: "83-year-old male with past medical history of hypertension, hyperlipidemia, DM, stage III CKD, BPH, admitted for acute sky s/p IR drain placement. Course c/b hypoxic resp failure 2/2 COVID as well as new Afib RVR."  Pt s/p RRT called for hypoxia

## 2024-01-03 NOTE — PROGRESS NOTE ADULT - SUBJECTIVE AND OBJECTIVE BOX
[facility] Division of Hospital Medicine    Chief Complaint:  Cholecystitis    INTERVAL HISTORY:  Overnight, no acute events.     Patient seen and examined at bedside. Less agitated, more alert, smiling. Passed SLP. Denies any acute complaints.     MEDICATIONS  (STANDING):  acetaminophen   IVPB .. 1000 milliGRAM(s) IV Intermittent once  chlorhexidine 2% Cloths 1 Application(s) Topical <User Schedule>  dextrose 5% + sodium chloride 0.9%. 1000 milliLiter(s) (50 mL/Hr) IV Continuous <Continuous>  dextrose 5%. 1000 milliLiter(s) (50 mL/Hr) IV Continuous <Continuous>  dextrose 5%. 1000 milliLiter(s) (100 mL/Hr) IV Continuous <Continuous>  dextrose 50% Injectable 25 Gram(s) IV Push once  dextrose 50% Injectable 12.5 Gram(s) IV Push once  dextrose 50% Injectable 25 Gram(s) IV Push once  dronedarone 400 milliGRAM(s) Oral two times a day  epoetin kristal-epbx (RETACRIT) Injectable 46736 Unit(s) SubCutaneous every 7 days  folic acid 1 milliGRAM(s) Oral daily  gabapentin 300 milliGRAM(s) Oral at bedtime  glucagon  Injectable 1 milliGRAM(s) IntraMuscular once  heparin   Injectable 5000 Unit(s) SubCutaneous every 8 hours  insulin glargine Injectable (LANTUS) 18 Unit(s) SubCutaneous at bedtime  insulin lispro (ADMELOG) corrective regimen sliding scale   SubCutaneous three times a day before meals  insulin lispro (ADMELOG) corrective regimen sliding scale   SubCutaneous at bedtime  insulin lispro Injectable (ADMELOG) 6 Unit(s) SubCutaneous three times a day with meals  metoprolol tartrate 25 milliGRAM(s) Oral two times a day  midodrine. 5 milliGRAM(s) Oral three times a day  OLANZapine Injectable 5 milliGRAM(s) IntraMuscular once  pantoprazole    Tablet 40 milliGRAM(s) Oral before breakfast  QUEtiapine 12.5 milliGRAM(s) Oral at bedtime  sodium bicarbonate 650 milliGRAM(s) Oral two times a day  sodium bicarbonate  Infusion 0.095 mEq/kG/Hr (50 mL/Hr) IV Continuous <Continuous>  sodium chloride 0.9%. 1000 milliLiter(s) (55 mL/Hr) IV Continuous <Continuous>  tamsulosin 0.4 milliGRAM(s) Oral at bedtime    MEDICATIONS  (PRN):  dextrose Oral Gel 15 Gram(s) Oral once PRN Blood Glucose LESS THAN 70 milliGRAM(s)/deciliter  ondansetron Injectable 4 milliGRAM(s) IV Push every 6 hours PRN Nausea        I&O's Summary    02 Jan 2024 07:01  -  03 Jan 2024 07:00  --------------------------------------------------------  IN: 1749 mL / OUT: 920 mL / NET: 829 mL        PHYSICAL EXAM:  Vital Signs Last 24 Hrs  T(C): 36.7 (03 Jan 2024 07:35), Max: 37.2 (02 Jan 2024 21:00)  T(F): 98.1 (03 Jan 2024 07:35), Max: 98.9 (02 Jan 2024 21:00)  HR: 78 (03 Jan 2024 08:00) (69 - 79)  BP: 147/60 (03 Jan 2024 08:00) (140/68 - 182/83)  BP(mean): --  RR: 18 (03 Jan 2024 07:35) (18 - 18)  SpO2: 93% (03 Jan 2024 07:35) (91% - 97%)    Parameters below as of 03 Jan 2024 04:00  Patient On (Oxygen Delivery Method): nasal cannula  O2 Flow (L/min): 2      CONSTITUTIONAL: No apparent distress, on RA  HEENT: Normocephalic, Atraumatic.   RESPIRATORY:  lungs are clear to auscultation bilaterally, no crackles, rhonchi, wheezes  CARDIOVASCULAR: Regular rate and rhythm, No lower extremity edema  ABDOMEN: Soft, non-distended, +perc sky drain containing bilious material   MUSCLOSKELETAL: moving all 4 extremities spontaneously  NEUROLOGY: Alert, Oriented x1 (name), CN 2-12 grossly intact    LABS:                        8.6    11.55 )-----------( 269      ( 03 Jan 2024 04:53 )             27.1     01-03    141  |  111<H>  |  22.4<H>  ----------------------------<  109<H>  4.5   |  20.0<L>  |  2.51<H>    Ca    8.3<L>      03 Jan 2024 04:53  Phos  2.7     01-02  Mg     1.8     01-02    TPro  5.9<L>  /  Alb  2.9<L>  /  TBili  0.7  /  DBili  0.2  /  AST  22  /  ALT  25  /  AlkPhos  76  01-02    PT/INR - ( 02 Jan 2024 08:00 )   PT: 11.5 sec;   INR: 1.04 ratio         PTT - ( 02 Jan 2024 08:00 )  PTT:34.8 sec      Urinalysis Basic - ( 03 Jan 2024 04:53 )    Color: x / Appearance: x / SG: x / pH: x  Gluc: 109 mg/dL / Ketone: x  / Bili: x / Urobili: x   Blood: x / Protein: x / Nitrite: x   Leuk Esterase: x / RBC: x / WBC x   Sq Epi: x / Non Sq Epi: x / Bacteria: x        CAPILLARY BLOOD GLUCOSE      POCT Blood Glucose.: 164 mg/dL (03 Jan 2024 12:17)  POCT Blood Glucose.: 102 mg/dL (03 Jan 2024 08:08)  POCT Blood Glucose.: 114 mg/dL (03 Jan 2024 04:50)  POCT Blood Glucose.: 91 mg/dL (02 Jan 2024 23:42)  POCT Blood Glucose.: 75 mg/dL (02 Jan 2024 16:51)        RADIOLOGY & ADDITIONAL TESTS:  Results Reviewed                                           [facility] Division of Hospital Medicine    Chief Complaint:  Cholecystitis    INTERVAL HISTORY:  Overnight, no acute events.     Patient seen and examined at bedside. Less agitated, more alert, smiling. Passed SLP. Denies any acute complaints.     MEDICATIONS  (STANDING):  acetaminophen   IVPB .. 1000 milliGRAM(s) IV Intermittent once  chlorhexidine 2% Cloths 1 Application(s) Topical <User Schedule>  dextrose 5% + sodium chloride 0.9%. 1000 milliLiter(s) (50 mL/Hr) IV Continuous <Continuous>  dextrose 5%. 1000 milliLiter(s) (50 mL/Hr) IV Continuous <Continuous>  dextrose 5%. 1000 milliLiter(s) (100 mL/Hr) IV Continuous <Continuous>  dextrose 50% Injectable 25 Gram(s) IV Push once  dextrose 50% Injectable 12.5 Gram(s) IV Push once  dextrose 50% Injectable 25 Gram(s) IV Push once  dronedarone 400 milliGRAM(s) Oral two times a day  epoetin kristal-epbx (RETACRIT) Injectable 52263 Unit(s) SubCutaneous every 7 days  folic acid 1 milliGRAM(s) Oral daily  gabapentin 300 milliGRAM(s) Oral at bedtime  glucagon  Injectable 1 milliGRAM(s) IntraMuscular once  heparin   Injectable 5000 Unit(s) SubCutaneous every 8 hours  insulin glargine Injectable (LANTUS) 18 Unit(s) SubCutaneous at bedtime  insulin lispro (ADMELOG) corrective regimen sliding scale   SubCutaneous three times a day before meals  insulin lispro (ADMELOG) corrective regimen sliding scale   SubCutaneous at bedtime  insulin lispro Injectable (ADMELOG) 6 Unit(s) SubCutaneous three times a day with meals  metoprolol tartrate 25 milliGRAM(s) Oral two times a day  midodrine. 5 milliGRAM(s) Oral three times a day  OLANZapine Injectable 5 milliGRAM(s) IntraMuscular once  pantoprazole    Tablet 40 milliGRAM(s) Oral before breakfast  QUEtiapine 12.5 milliGRAM(s) Oral at bedtime  sodium bicarbonate 650 milliGRAM(s) Oral two times a day  sodium bicarbonate  Infusion 0.095 mEq/kG/Hr (50 mL/Hr) IV Continuous <Continuous>  sodium chloride 0.9%. 1000 milliLiter(s) (55 mL/Hr) IV Continuous <Continuous>  tamsulosin 0.4 milliGRAM(s) Oral at bedtime    MEDICATIONS  (PRN):  dextrose Oral Gel 15 Gram(s) Oral once PRN Blood Glucose LESS THAN 70 milliGRAM(s)/deciliter  ondansetron Injectable 4 milliGRAM(s) IV Push every 6 hours PRN Nausea        I&O's Summary    02 Jan 2024 07:01  -  03 Jan 2024 07:00  --------------------------------------------------------  IN: 1749 mL / OUT: 920 mL / NET: 829 mL        PHYSICAL EXAM:  Vital Signs Last 24 Hrs  T(C): 36.7 (03 Jan 2024 07:35), Max: 37.2 (02 Jan 2024 21:00)  T(F): 98.1 (03 Jan 2024 07:35), Max: 98.9 (02 Jan 2024 21:00)  HR: 78 (03 Jan 2024 08:00) (69 - 79)  BP: 147/60 (03 Jan 2024 08:00) (140/68 - 182/83)  BP(mean): --  RR: 18 (03 Jan 2024 07:35) (18 - 18)  SpO2: 93% (03 Jan 2024 07:35) (91% - 97%)    Parameters below as of 03 Jan 2024 04:00  Patient On (Oxygen Delivery Method): nasal cannula  O2 Flow (L/min): 2      CONSTITUTIONAL: No apparent distress, on RA  HEENT: Normocephalic, Atraumatic.   RESPIRATORY:  lungs are clear to auscultation bilaterally, no crackles, rhonchi, wheezes  CARDIOVASCULAR: Regular rate and rhythm, No lower extremity edema  ABDOMEN: Soft, non-distended, +perc sky drain containing bilious material   MUSCLOSKELETAL: moving all 4 extremities spontaneously  NEUROLOGY: Alert, Oriented x1 (name), CN 2-12 grossly intact    LABS:                        8.6    11.55 )-----------( 269      ( 03 Jan 2024 04:53 )             27.1     01-03    141  |  111<H>  |  22.4<H>  ----------------------------<  109<H>  4.5   |  20.0<L>  |  2.51<H>    Ca    8.3<L>      03 Jan 2024 04:53  Phos  2.7     01-02  Mg     1.8     01-02    TPro  5.9<L>  /  Alb  2.9<L>  /  TBili  0.7  /  DBili  0.2  /  AST  22  /  ALT  25  /  AlkPhos  76  01-02    PT/INR - ( 02 Jan 2024 08:00 )   PT: 11.5 sec;   INR: 1.04 ratio         PTT - ( 02 Jan 2024 08:00 )  PTT:34.8 sec      Urinalysis Basic - ( 03 Jan 2024 04:53 )    Color: x / Appearance: x / SG: x / pH: x  Gluc: 109 mg/dL / Ketone: x  / Bili: x / Urobili: x   Blood: x / Protein: x / Nitrite: x   Leuk Esterase: x / RBC: x / WBC x   Sq Epi: x / Non Sq Epi: x / Bacteria: x        CAPILLARY BLOOD GLUCOSE      POCT Blood Glucose.: 164 mg/dL (03 Jan 2024 12:17)  POCT Blood Glucose.: 102 mg/dL (03 Jan 2024 08:08)  POCT Blood Glucose.: 114 mg/dL (03 Jan 2024 04:50)  POCT Blood Glucose.: 91 mg/dL (02 Jan 2024 23:42)  POCT Blood Glucose.: 75 mg/dL (02 Jan 2024 16:51)        RADIOLOGY & ADDITIONAL TESTS:  Results Reviewed

## 2024-01-03 NOTE — PROGRESS NOTE ADULT - ASSESSMENT
83-year-old male with past medical history of hypertension, hyperlipidemia, DM, stage III CKD, BPH, admitted for acute sky s/p IR drain placement. Course c/b hypoxic resp failure 2/2 COVID as well as new Afib RVR.     #RON on CKD stage 3  #BPH  - Fena 0.6% pre-renal  - likely in the setting of poor po intake, and episode of hypotension   - Baseline ~ 2-2.5; Cr 2.5 today   - renal following, herlinda recs  - renally dose meds  - avoid nephrotoxic medications  - c/w Tamsulosin  - strict in/out    #Delirium  - off 1:1  - 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, no acute findings  - frequent re-orientation  - c/w Seroquel 12.5mg QHS  - zyprexa prn    #Acute cholecystitis  - CT A/P and RUQ result noted  - s/p IR perc drain placement, monitor output, will need drain in for 4-6 weeks for tract to form, outpt IR followup  - bile culture with E-coli, abx changed to CTX, plan for total 14 days until 1/3  - c/w CTX  - BCx NGTD  - pain regimen  - diet advancement as tolerated  - GI following, herlinda recs  - trend CMP  - Outpatient surgery evaluation for interval cholecystectomy   - on home midodrine, unclear etiology    #Acute resp failure with hypoxia suspect 2/2 COVID   - Possible component of aspiration, s/p Zoysn, on 2LNC  - s/p COVID vaccines  - s/p decadron x5 days  - Remdesivir dc due to worsening renal function   - s/p BIPAP and HFNC, weaned to RA  - CT chest noted, mild pulm edema noted, PRN lasix   - DVT studies negative    #Afib RVR  - Tele  - TSH normal  - cards d/w families, agree to hold AC  - Add Multaq  - metoprolol BID    #DM  - A1C 9  - ISS, accu checks  - c/w Lantus 18 QHS + 6U Premeals  - can c/w gabapentin for neuropathy   - monitor and adjust insulin as needed    #HLD  -c/w statin    #GERD  - c/w ppi 40mg qd    #Anemia  - Hg stable 9-10 range  - iron studies consistent with chronic dx  - b12 and folate normal  - trend CBC while admitted    DVT ppx: Heparin SQ  Diet: CC   Dispo: medically stable for NITHIN

## 2024-01-03 NOTE — SWALLOW BEDSIDE ASSESSMENT ADULT - SLP GENERAL OBSERVATIONS
Pt received & seen seated upright in bed, awake/alert, reduced cognition, 02 via NC (sats 96%), spouse present & declined interpretation services & requested to translate into [place] as needed, 0/10 pain pre/post Pt received & seen seated upright in bed, awake/alert, reduced cognition, 02 via NC (sats 96%), spouse present & declined interpretation services & requested to translate into [place] as needed, 0/10 pain pre/post

## 2024-01-03 NOTE — SWALLOW BEDSIDE ASSESSMENT ADULT - SWALLOW EVAL: DIAGNOSIS
Oral & pharyngeal stage of swallow clinically judged to be WFL across administered PO trials with NO overt s/s aspiration noted post intake

## 2024-01-04 LAB
ANION GAP SERPL CALC-SCNC: 10 MMOL/L — SIGNIFICANT CHANGE UP (ref 5–17)
ANION GAP SERPL CALC-SCNC: 10 MMOL/L — SIGNIFICANT CHANGE UP (ref 5–17)
BUN SERPL-MCNC: 25.8 MG/DL — HIGH (ref 8–20)
BUN SERPL-MCNC: 25.8 MG/DL — HIGH (ref 8–20)
CALCIUM SERPL-MCNC: 8.5 MG/DL — SIGNIFICANT CHANGE UP (ref 8.4–10.5)
CALCIUM SERPL-MCNC: 8.5 MG/DL — SIGNIFICANT CHANGE UP (ref 8.4–10.5)
CHLORIDE SERPL-SCNC: 109 MMOL/L — HIGH (ref 96–108)
CHLORIDE SERPL-SCNC: 109 MMOL/L — HIGH (ref 96–108)
CO2 SERPL-SCNC: 20 MMOL/L — LOW (ref 22–29)
CO2 SERPL-SCNC: 20 MMOL/L — LOW (ref 22–29)
CREAT SERPL-MCNC: 2.58 MG/DL — HIGH (ref 0.5–1.3)
CREAT SERPL-MCNC: 2.58 MG/DL — HIGH (ref 0.5–1.3)
CRP SERPL-MCNC: 50 MG/L — HIGH
CRP SERPL-MCNC: 50 MG/L — HIGH
EGFR: 24 ML/MIN/1.73M2 — LOW
EGFR: 24 ML/MIN/1.73M2 — LOW
GAS PNL BLDA: SIGNIFICANT CHANGE UP
GAS PNL BLDA: SIGNIFICANT CHANGE UP
GLUCOSE BLDC GLUCOMTR-MCNC: 105 MG/DL — HIGH (ref 70–99)
GLUCOSE BLDC GLUCOMTR-MCNC: 105 MG/DL — HIGH (ref 70–99)
GLUCOSE BLDC GLUCOMTR-MCNC: 116 MG/DL — HIGH (ref 70–99)
GLUCOSE BLDC GLUCOMTR-MCNC: 116 MG/DL — HIGH (ref 70–99)
GLUCOSE BLDC GLUCOMTR-MCNC: 120 MG/DL — HIGH (ref 70–99)
GLUCOSE BLDC GLUCOMTR-MCNC: 120 MG/DL — HIGH (ref 70–99)
GLUCOSE BLDC GLUCOMTR-MCNC: 133 MG/DL — HIGH (ref 70–99)
GLUCOSE BLDC GLUCOMTR-MCNC: 133 MG/DL — HIGH (ref 70–99)
GLUCOSE BLDC GLUCOMTR-MCNC: 192 MG/DL — HIGH (ref 70–99)
GLUCOSE BLDC GLUCOMTR-MCNC: 192 MG/DL — HIGH (ref 70–99)
GLUCOSE SERPL-MCNC: 115 MG/DL — HIGH (ref 70–99)
GLUCOSE SERPL-MCNC: 115 MG/DL — HIGH (ref 70–99)
HCT VFR BLD CALC: 27.6 % — LOW (ref 39–50)
HCT VFR BLD CALC: 27.6 % — LOW (ref 39–50)
HGB BLD-MCNC: 8.7 G/DL — LOW (ref 13–17)
HGB BLD-MCNC: 8.7 G/DL — LOW (ref 13–17)
MCHC RBC-ENTMCNC: 29.5 PG — SIGNIFICANT CHANGE UP (ref 27–34)
MCHC RBC-ENTMCNC: 29.5 PG — SIGNIFICANT CHANGE UP (ref 27–34)
MCHC RBC-ENTMCNC: 31.5 GM/DL — LOW (ref 32–36)
MCHC RBC-ENTMCNC: 31.5 GM/DL — LOW (ref 32–36)
MCV RBC AUTO: 93.6 FL — SIGNIFICANT CHANGE UP (ref 80–100)
MCV RBC AUTO: 93.6 FL — SIGNIFICANT CHANGE UP (ref 80–100)
PLATELET # BLD AUTO: 274 K/UL — SIGNIFICANT CHANGE UP (ref 150–400)
PLATELET # BLD AUTO: 274 K/UL — SIGNIFICANT CHANGE UP (ref 150–400)
POTASSIUM SERPL-MCNC: 4.7 MMOL/L — SIGNIFICANT CHANGE UP (ref 3.5–5.3)
POTASSIUM SERPL-MCNC: 4.7 MMOL/L — SIGNIFICANT CHANGE UP (ref 3.5–5.3)
POTASSIUM SERPL-SCNC: 4.7 MMOL/L — SIGNIFICANT CHANGE UP (ref 3.5–5.3)
POTASSIUM SERPL-SCNC: 4.7 MMOL/L — SIGNIFICANT CHANGE UP (ref 3.5–5.3)
RBC # BLD: 2.95 M/UL — LOW (ref 4.2–5.8)
RBC # BLD: 2.95 M/UL — LOW (ref 4.2–5.8)
RBC # FLD: 15.6 % — HIGH (ref 10.3–14.5)
RBC # FLD: 15.6 % — HIGH (ref 10.3–14.5)
SODIUM SERPL-SCNC: 139 MMOL/L — SIGNIFICANT CHANGE UP (ref 135–145)
SODIUM SERPL-SCNC: 139 MMOL/L — SIGNIFICANT CHANGE UP (ref 135–145)
WBC # BLD: 10.64 K/UL — HIGH (ref 3.8–10.5)
WBC # BLD: 10.64 K/UL — HIGH (ref 3.8–10.5)
WBC # FLD AUTO: 10.64 K/UL — HIGH (ref 3.8–10.5)
WBC # FLD AUTO: 10.64 K/UL — HIGH (ref 3.8–10.5)

## 2024-01-04 PROCEDURE — 71250 CT THORAX DX C-: CPT | Mod: 26

## 2024-01-04 PROCEDURE — 70450 CT HEAD/BRAIN W/O DYE: CPT | Mod: 26

## 2024-01-04 PROCEDURE — 74176 CT ABD & PELVIS W/O CONTRAST: CPT | Mod: 26

## 2024-01-04 PROCEDURE — 99233 SBSQ HOSP IP/OBS HIGH 50: CPT

## 2024-01-04 RX ORDER — PIPERACILLIN AND TAZOBACTAM 4; .5 G/20ML; G/20ML
3.38 INJECTION, POWDER, LYOPHILIZED, FOR SOLUTION INTRAVENOUS ONCE
Refills: 0 | Status: COMPLETED | OUTPATIENT
Start: 2024-01-04 | End: 2024-01-04

## 2024-01-04 RX ORDER — FUROSEMIDE 40 MG
20 TABLET ORAL ONCE
Refills: 0 | Status: COMPLETED | OUTPATIENT
Start: 2024-01-04 | End: 2024-01-04

## 2024-01-04 RX ORDER — PIPERACILLIN AND TAZOBACTAM 4; .5 G/20ML; G/20ML
3.38 INJECTION, POWDER, LYOPHILIZED, FOR SOLUTION INTRAVENOUS ONCE
Refills: 0 | Status: COMPLETED | OUTPATIENT
Start: 2024-01-05 | End: 2024-01-05

## 2024-01-04 RX ORDER — PIPERACILLIN AND TAZOBACTAM 4; .5 G/20ML; G/20ML
3.38 INJECTION, POWDER, LYOPHILIZED, FOR SOLUTION INTRAVENOUS EVERY 8 HOURS
Refills: 0 | Status: DISCONTINUED | OUTPATIENT
Start: 2024-01-05 | End: 2024-01-05

## 2024-01-04 RX ADMIN — HEPARIN SODIUM 5000 UNIT(S): 5000 INJECTION INTRAVENOUS; SUBCUTANEOUS at 22:32

## 2024-01-04 RX ADMIN — QUETIAPINE FUMARATE 12.5 MILLIGRAM(S): 200 TABLET, FILM COATED ORAL at 22:31

## 2024-01-04 RX ADMIN — TAMSULOSIN HYDROCHLORIDE 0.4 MILLIGRAM(S): 0.4 CAPSULE ORAL at 22:29

## 2024-01-04 RX ADMIN — Medication 25 MILLIGRAM(S): at 18:27

## 2024-01-04 RX ADMIN — Medication 650 MILLIGRAM(S): at 18:27

## 2024-01-04 RX ADMIN — PIPERACILLIN AND TAZOBACTAM 25 GRAM(S): 4; .5 INJECTION, POWDER, LYOPHILIZED, FOR SOLUTION INTRAVENOUS at 18:26

## 2024-01-04 RX ADMIN — HEPARIN SODIUM 5000 UNIT(S): 5000 INJECTION INTRAVENOUS; SUBCUTANEOUS at 13:42

## 2024-01-04 RX ADMIN — GABAPENTIN 300 MILLIGRAM(S): 400 CAPSULE ORAL at 22:32

## 2024-01-04 RX ADMIN — Medication 1 MILLIGRAM(S): at 13:41

## 2024-01-04 RX ADMIN — DRONEDARONE 400 MILLIGRAM(S): 400 TABLET, FILM COATED ORAL at 18:26

## 2024-01-04 RX ADMIN — INSULIN GLARGINE 18 UNIT(S): 100 INJECTION, SOLUTION SUBCUTANEOUS at 22:55

## 2024-01-04 RX ADMIN — Medication 6 UNIT(S): at 13:41

## 2024-01-04 RX ADMIN — PIPERACILLIN AND TAZOBACTAM 200 GRAM(S): 4; .5 INJECTION, POWDER, LYOPHILIZED, FOR SOLUTION INTRAVENOUS at 16:56

## 2024-01-04 RX ADMIN — MIDODRINE HYDROCHLORIDE 5 MILLIGRAM(S): 2.5 TABLET ORAL at 18:27

## 2024-01-04 NOTE — PROGRESS NOTE ADULT - SUBJECTIVE AND OBJECTIVE BOX
[facility] Division of Hospital Medicine    Chief Complaint: Cholecystitis    INTERVAL HISTORY:  Overnight, no acute events.     Patient seen and examined at bedside this morning. Patient very sleepy, arousable, yells when aroused. Telling his wife in Hebrew that his eyes hurt. Wife fed pt at bedside a little. Encouraged wife to keep him awake so he is able to sleep at night instead of sleeping during the day. Unable to engage in ROS 2/2 mentation.     MEDICATIONS  (STANDING):  chlorhexidine 2% Cloths 1 Application(s) Topical <User Schedule>  dextrose 5% + sodium chloride 0.9%. 1000 milliLiter(s) (50 mL/Hr) IV Continuous <Continuous>  dextrose 5%. 1000 milliLiter(s) (50 mL/Hr) IV Continuous <Continuous>  dextrose 5%. 1000 milliLiter(s) (100 mL/Hr) IV Continuous <Continuous>  dextrose 50% Injectable 25 Gram(s) IV Push once  dextrose 50% Injectable 12.5 Gram(s) IV Push once  dextrose 50% Injectable 25 Gram(s) IV Push once  dronedarone 400 milliGRAM(s) Oral two times a day  epoetin kristal-epbx (RETACRIT) Injectable 48787 Unit(s) SubCutaneous every 7 days  folic acid 1 milliGRAM(s) Oral daily  gabapentin 300 milliGRAM(s) Oral at bedtime  glucagon  Injectable 1 milliGRAM(s) IntraMuscular once  heparin   Injectable 5000 Unit(s) SubCutaneous every 8 hours  insulin glargine Injectable (LANTUS) 18 Unit(s) SubCutaneous at bedtime  insulin lispro (ADMELOG) corrective regimen sliding scale   SubCutaneous three times a day before meals  insulin lispro (ADMELOG) corrective regimen sliding scale   SubCutaneous at bedtime  insulin lispro Injectable (ADMELOG) 6 Unit(s) SubCutaneous three times a day with meals  metoprolol tartrate 25 milliGRAM(s) Oral two times a day  midodrine. 5 milliGRAM(s) Oral three times a day  OLANZapine Injectable 5 milliGRAM(s) IntraMuscular once  pantoprazole    Tablet 40 milliGRAM(s) Oral before breakfast  piperacillin/tazobactam IVPB. 3.375 Gram(s) IV Intermittent once  piperacillin/tazobactam IVPB.- 3.375 Gram(s) IV Intermittent once  QUEtiapine 12.5 milliGRAM(s) Oral at bedtime  sodium bicarbonate 650 milliGRAM(s) Oral two times a day  sodium chloride 0.9%. 1000 milliLiter(s) (55 mL/Hr) IV Continuous <Continuous>  tamsulosin 0.4 milliGRAM(s) Oral at bedtime    MEDICATIONS  (PRN):  dextrose Oral Gel 15 Gram(s) Oral once PRN Blood Glucose LESS THAN 70 milliGRAM(s)/deciliter  guaiFENesin Oral Liquid (Sugar-Free) 100 milliGRAM(s) Oral every 6 hours PRN Cough  ondansetron Injectable 4 milliGRAM(s) IV Push every 6 hours PRN Nausea        I&O's Summary    03 Jan 2024 07:01  -  04 Jan 2024 07:00  --------------------------------------------------------  IN: 0 mL / OUT: 1815 mL / NET: -1815 mL    04 Jan 2024 07:01  -  04 Jan 2024 15:10  --------------------------------------------------------  IN: 275 mL / OUT: 0 mL / NET: 275 mL        PHYSICAL EXAM:  Vital Signs Last 24 Hrs  T(C): 36.8 (04 Jan 2024 13:02), Max: 37.1 (03 Jan 2024 19:00)  T(F): 98.3 (04 Jan 2024 13:02), Max: 98.8 (03 Jan 2024 19:00)  HR: 71 (04 Jan 2024 13:02) (64 - 80)  BP: 168/91 (04 Jan 2024 13:02) (132/63 - 181/78)  BP(mean): --  RR: 18 (04 Jan 2024 13:02) (18 - 18)  SpO2: 96% (04 Jan 2024 13:02) (91% - 96%)    Parameters below as of 04 Jan 2024 13:02  Patient On (Oxygen Delivery Method): nasal cannula  O2 Flow (L/min): 3    CONSTITUTIONAL: No apparent distress, on 4LNC  HEENT: Normocephalic, Atraumatic.   RESPIRATORY:  lungs are clear to auscultation bilaterally, no crackles, rhonchi, wheezes  CARDIOVASCULAR: Regular rate and rhythm, No lower extremity edema  ABDOMEN: Soft, non-distended, +perc sky drain containing bilious material   MUSCLOSKELETAL: moving all 4 extremities spontaneously  NEUROLOGY: Alert, Oriented x1 (name), CN 2-12 grossly intact    LABS:                        8.7    10.64 )-----------( 274      ( 04 Jan 2024 07:20 )             27.6     01-04    139  |  109<H>  |  25.8<H>  ----------------------------<  115<H>  4.7   |  20.0<L>  |  2.58<H>    Ca    8.5      04 Jan 2024 07:20            Urinalysis Basic - ( 04 Jan 2024 07:20 )    Color: x / Appearance: x / SG: x / pH: x  Gluc: 115 mg/dL / Ketone: x  / Bili: x / Urobili: x   Blood: x / Protein: x / Nitrite: x   Leuk Esterase: x / RBC: x / WBC x   Sq Epi: x / Non Sq Epi: x / Bacteria: x        Culture - Blood (collected 02 Jan 2024 09:05)  Source: .Blood Blood-Peripheral  Preliminary Report (03 Jan 2024 17:02):    No growth at 24 hours    Culture - Blood (collected 02 Jan 2024 08:57)  Source: .Blood Blood-Peripheral  Preliminary Report (03 Jan 2024 17:02):    No growth at 24 hours      CAPILLARY BLOOD GLUCOSE      POCT Blood Glucose.: 133 mg/dL (04 Jan 2024 13:33)  POCT Blood Glucose.: 120 mg/dL (04 Jan 2024 09:26)  POCT Blood Glucose.: 116 mg/dL (04 Jan 2024 07:01)  POCT Blood Glucose.: 155 mg/dL (03 Jan 2024 23:06)  POCT Blood Glucose.: 110 mg/dL (03 Jan 2024 17:49)        RADIOLOGY & ADDITIONAL TESTS:  Results Reviewed                                         [facility] Division of Hospital Medicine    Chief Complaint: Cholecystitis    INTERVAL HISTORY:  Overnight, no acute events.     Patient seen and examined at bedside this morning. Patient very sleepy, arousable, yells when aroused. Telling his wife in [place] that his eyes hurt. Wife fed pt at bedside a little. Encouraged wife to keep him awake so he is able to sleep at night instead of sleeping during the day. Unable to engage in ROS 2/2 mentation.     MEDICATIONS  (STANDING):  chlorhexidine 2% Cloths 1 Application(s) Topical <User Schedule>  dextrose 5% + sodium chloride 0.9%. 1000 milliLiter(s) (50 mL/Hr) IV Continuous <Continuous>  dextrose 5%. 1000 milliLiter(s) (50 mL/Hr) IV Continuous <Continuous>  dextrose 5%. 1000 milliLiter(s) (100 mL/Hr) IV Continuous <Continuous>  dextrose 50% Injectable 25 Gram(s) IV Push once  dextrose 50% Injectable 12.5 Gram(s) IV Push once  dextrose 50% Injectable 25 Gram(s) IV Push once  dronedarone 400 milliGRAM(s) Oral two times a day  epoetin kristal-epbx (RETACRIT) Injectable 39935 Unit(s) SubCutaneous every 7 days  folic acid 1 milliGRAM(s) Oral daily  gabapentin 300 milliGRAM(s) Oral at bedtime  glucagon  Injectable 1 milliGRAM(s) IntraMuscular once  heparin   Injectable 5000 Unit(s) SubCutaneous every 8 hours  insulin glargine Injectable (LANTUS) 18 Unit(s) SubCutaneous at bedtime  insulin lispro (ADMELOG) corrective regimen sliding scale   SubCutaneous three times a day before meals  insulin lispro (ADMELOG) corrective regimen sliding scale   SubCutaneous at bedtime  insulin lispro Injectable (ADMELOG) 6 Unit(s) SubCutaneous three times a day with meals  metoprolol tartrate 25 milliGRAM(s) Oral two times a day  midodrine. 5 milliGRAM(s) Oral three times a day  OLANZapine Injectable 5 milliGRAM(s) IntraMuscular once  pantoprazole    Tablet 40 milliGRAM(s) Oral before breakfast  piperacillin/tazobactam IVPB. 3.375 Gram(s) IV Intermittent once  piperacillin/tazobactam IVPB.- 3.375 Gram(s) IV Intermittent once  QUEtiapine 12.5 milliGRAM(s) Oral at bedtime  sodium bicarbonate 650 milliGRAM(s) Oral two times a day  sodium chloride 0.9%. 1000 milliLiter(s) (55 mL/Hr) IV Continuous <Continuous>  tamsulosin 0.4 milliGRAM(s) Oral at bedtime    MEDICATIONS  (PRN):  dextrose Oral Gel 15 Gram(s) Oral once PRN Blood Glucose LESS THAN 70 milliGRAM(s)/deciliter  guaiFENesin Oral Liquid (Sugar-Free) 100 milliGRAM(s) Oral every 6 hours PRN Cough  ondansetron Injectable 4 milliGRAM(s) IV Push every 6 hours PRN Nausea        I&O's Summary    03 Jan 2024 07:01  -  04 Jan 2024 07:00  --------------------------------------------------------  IN: 0 mL / OUT: 1815 mL / NET: -1815 mL    04 Jan 2024 07:01  -  04 Jan 2024 15:10  --------------------------------------------------------  IN: 275 mL / OUT: 0 mL / NET: 275 mL        PHYSICAL EXAM:  Vital Signs Last 24 Hrs  T(C): 36.8 (04 Jan 2024 13:02), Max: 37.1 (03 Jan 2024 19:00)  T(F): 98.3 (04 Jan 2024 13:02), Max: 98.8 (03 Jan 2024 19:00)  HR: 71 (04 Jan 2024 13:02) (64 - 80)  BP: 168/91 (04 Jan 2024 13:02) (132/63 - 181/78)  BP(mean): --  RR: 18 (04 Jan 2024 13:02) (18 - 18)  SpO2: 96% (04 Jan 2024 13:02) (91% - 96%)    Parameters below as of 04 Jan 2024 13:02  Patient On (Oxygen Delivery Method): nasal cannula  O2 Flow (L/min): 3    CONSTITUTIONAL: No apparent distress, on 4LNC  HEENT: Normocephalic, Atraumatic.   RESPIRATORY:  lungs are clear to auscultation bilaterally, no crackles, rhonchi, wheezes  CARDIOVASCULAR: Regular rate and rhythm, No lower extremity edema  ABDOMEN: Soft, non-distended, +perc sky drain containing bilious material   MUSCLOSKELETAL: moving all 4 extremities spontaneously  NEUROLOGY: Alert, Oriented x1 (name), CN 2-12 grossly intact    LABS:                        8.7    10.64 )-----------( 274      ( 04 Jan 2024 07:20 )             27.6     01-04    139  |  109<H>  |  25.8<H>  ----------------------------<  115<H>  4.7   |  20.0<L>  |  2.58<H>    Ca    8.5      04 Jan 2024 07:20            Urinalysis Basic - ( 04 Jan 2024 07:20 )    Color: x / Appearance: x / SG: x / pH: x  Gluc: 115 mg/dL / Ketone: x  / Bili: x / Urobili: x   Blood: x / Protein: x / Nitrite: x   Leuk Esterase: x / RBC: x / WBC x   Sq Epi: x / Non Sq Epi: x / Bacteria: x        Culture - Blood (collected 02 Jan 2024 09:05)  Source: .Blood Blood-Peripheral  Preliminary Report (03 Jan 2024 17:02):    No growth at 24 hours    Culture - Blood (collected 02 Jan 2024 08:57)  Source: .Blood Blood-Peripheral  Preliminary Report (03 Jan 2024 17:02):    No growth at 24 hours      CAPILLARY BLOOD GLUCOSE      POCT Blood Glucose.: 133 mg/dL (04 Jan 2024 13:33)  POCT Blood Glucose.: 120 mg/dL (04 Jan 2024 09:26)  POCT Blood Glucose.: 116 mg/dL (04 Jan 2024 07:01)  POCT Blood Glucose.: 155 mg/dL (03 Jan 2024 23:06)  POCT Blood Glucose.: 110 mg/dL (03 Jan 2024 17:49)        RADIOLOGY & ADDITIONAL TESTS:  Results Reviewed                                         [facility] Division of Hospital Medicine    Chief Complaint: Cholecystitis    INTERVAL HISTORY:  Overnight, no acute events.     Patient seen and examined at bedside this morning. Patient very sleepy, arousable, yells when aroused. Telling his wife in [place] that his eyes hurt. Wife fed pt at bedside a little. Encouraged wife to keep him awake so he is able to sleep at night instead of sleeping during the day. Unable to engage in ROS 2/2 mentation.     Given lethergy, ABG order which does shows hypoxia, not retaining Co2. CT head/C/A/P ordered. CTH no evidence of bleed/infarct. CT C/A/P showing small b/l effusion, mucus, no abd pathology.     MEDICATIONS  (STANDING):  chlorhexidine 2% Cloths 1 Application(s) Topical <User Schedule>  dextrose 5% + sodium chloride 0.9%. 1000 milliLiter(s) (50 mL/Hr) IV Continuous <Continuous>  dextrose 5%. 1000 milliLiter(s) (50 mL/Hr) IV Continuous <Continuous>  dextrose 5%. 1000 milliLiter(s) (100 mL/Hr) IV Continuous <Continuous>  dextrose 50% Injectable 25 Gram(s) IV Push once  dextrose 50% Injectable 12.5 Gram(s) IV Push once  dextrose 50% Injectable 25 Gram(s) IV Push once  dronedarone 400 milliGRAM(s) Oral two times a day  epoetin kristal-epbx (RETACRIT) Injectable 83844 Unit(s) SubCutaneous every 7 days  folic acid 1 milliGRAM(s) Oral daily  gabapentin 300 milliGRAM(s) Oral at bedtime  glucagon  Injectable 1 milliGRAM(s) IntraMuscular once  heparin   Injectable 5000 Unit(s) SubCutaneous every 8 hours  insulin glargine Injectable (LANTUS) 18 Unit(s) SubCutaneous at bedtime  insulin lispro (ADMELOG) corrective regimen sliding scale   SubCutaneous three times a day before meals  insulin lispro (ADMELOG) corrective regimen sliding scale   SubCutaneous at bedtime  insulin lispro Injectable (ADMELOG) 6 Unit(s) SubCutaneous three times a day with meals  metoprolol tartrate 25 milliGRAM(s) Oral two times a day  midodrine. 5 milliGRAM(s) Oral three times a day  OLANZapine Injectable 5 milliGRAM(s) IntraMuscular once  pantoprazole    Tablet 40 milliGRAM(s) Oral before breakfast  piperacillin/tazobactam IVPB. 3.375 Gram(s) IV Intermittent once  piperacillin/tazobactam IVPB.- 3.375 Gram(s) IV Intermittent once  QUEtiapine 12.5 milliGRAM(s) Oral at bedtime  sodium bicarbonate 650 milliGRAM(s) Oral two times a day  sodium chloride 0.9%. 1000 milliLiter(s) (55 mL/Hr) IV Continuous <Continuous>  tamsulosin 0.4 milliGRAM(s) Oral at bedtime    MEDICATIONS  (PRN):  dextrose Oral Gel 15 Gram(s) Oral once PRN Blood Glucose LESS THAN 70 milliGRAM(s)/deciliter  guaiFENesin Oral Liquid (Sugar-Free) 100 milliGRAM(s) Oral every 6 hours PRN Cough  ondansetron Injectable 4 milliGRAM(s) IV Push every 6 hours PRN Nausea        I&O's Summary    03 Jan 2024 07:01  -  04 Jan 2024 07:00  --------------------------------------------------------  IN: 0 mL / OUT: 1815 mL / NET: -1815 mL    04 Jan 2024 07:01  -  04 Jan 2024 15:10  --------------------------------------------------------  IN: 275 mL / OUT: 0 mL / NET: 275 mL        PHYSICAL EXAM:  Vital Signs Last 24 Hrs  T(C): 36.8 (04 Jan 2024 13:02), Max: 37.1 (03 Jan 2024 19:00)  T(F): 98.3 (04 Jan 2024 13:02), Max: 98.8 (03 Jan 2024 19:00)  HR: 71 (04 Jan 2024 13:02) (64 - 80)  BP: 168/91 (04 Jan 2024 13:02) (132/63 - 181/78)  BP(mean): --  RR: 18 (04 Jan 2024 13:02) (18 - 18)  SpO2: 96% (04 Jan 2024 13:02) (91% - 96%)    Parameters below as of 04 Jan 2024 13:02  Patient On (Oxygen Delivery Method): nasal cannula  O2 Flow (L/min): 3    CONSTITUTIONAL: sleepy but arousable, on 4LNC  HEENT: Normocephalic, Atraumatic.   RESPIRATORY:  lungs are clear to auscultation bilaterally, no crackles, rhonchi, wheezes  CARDIOVASCULAR: Regular rate and rhythm, No lower extremity edema  ABDOMEN: Soft, non-distended, +perc sky drain containing bilious material   MUSCLOSKELETAL: moving all 4 extremities spontaneously  NEUROLOGY: Alert, Oriented x1 (name), CN 2-12 grossly intact    LABS:                        8.7    10.64 )-----------( 274      ( 04 Jan 2024 07:20 )             27.6     01-04    139  |  109<H>  |  25.8<H>  ----------------------------<  115<H>  4.7   |  20.0<L>  |  2.58<H>    Ca    8.5      04 Jan 2024 07:20            Urinalysis Basic - ( 04 Jan 2024 07:20 )    Color: x / Appearance: x / SG: x / pH: x  Gluc: 115 mg/dL / Ketone: x  / Bili: x / Urobili: x   Blood: x / Protein: x / Nitrite: x   Leuk Esterase: x / RBC: x / WBC x   Sq Epi: x / Non Sq Epi: x / Bacteria: x        Culture - Blood (collected 02 Jan 2024 09:05)  Source: .Blood Blood-Peripheral  Preliminary Report (03 Jan 2024 17:02):    No growth at 24 hours    Culture - Blood (collected 02 Jan 2024 08:57)  Source: .Blood Blood-Peripheral  Preliminary Report (03 Jan 2024 17:02):    No growth at 24 hours      CAPILLARY BLOOD GLUCOSE      POCT Blood Glucose.: 133 mg/dL (04 Jan 2024 13:33)  POCT Blood Glucose.: 120 mg/dL (04 Jan 2024 09:26)  POCT Blood Glucose.: 116 mg/dL (04 Jan 2024 07:01)  POCT Blood Glucose.: 155 mg/dL (03 Jan 2024 23:06)  POCT Blood Glucose.: 110 mg/dL (03 Jan 2024 17:49)        RADIOLOGY & ADDITIONAL TESTS:  Results Reviewed                                         [facility] Division of Hospital Medicine    Chief Complaint: Cholecystitis    INTERVAL HISTORY:  Overnight, no acute events.     Patient seen and examined at bedside this morning. Patient very sleepy, arousable, yells when aroused. Telling his wife in Persian that his eyes hurt. Wife fed pt at bedside a little. Encouraged wife to keep him awake so he is able to sleep at night instead of sleeping during the day. Unable to engage in ROS 2/2 mentation.     Given lethergy, ABG order which does shows hypoxia, not retaining Co2. CT head/C/A/P ordered. CTH no evidence of bleed/infarct. CT C/A/P showing small b/l effusion, mucus, no abd pathology.     MEDICATIONS  (STANDING):  chlorhexidine 2% Cloths 1 Application(s) Topical <User Schedule>  dextrose 5% + sodium chloride 0.9%. 1000 milliLiter(s) (50 mL/Hr) IV Continuous <Continuous>  dextrose 5%. 1000 milliLiter(s) (50 mL/Hr) IV Continuous <Continuous>  dextrose 5%. 1000 milliLiter(s) (100 mL/Hr) IV Continuous <Continuous>  dextrose 50% Injectable 25 Gram(s) IV Push once  dextrose 50% Injectable 12.5 Gram(s) IV Push once  dextrose 50% Injectable 25 Gram(s) IV Push once  dronedarone 400 milliGRAM(s) Oral two times a day  epoetin kristal-epbx (RETACRIT) Injectable 39488 Unit(s) SubCutaneous every 7 days  folic acid 1 milliGRAM(s) Oral daily  gabapentin 300 milliGRAM(s) Oral at bedtime  glucagon  Injectable 1 milliGRAM(s) IntraMuscular once  heparin   Injectable 5000 Unit(s) SubCutaneous every 8 hours  insulin glargine Injectable (LANTUS) 18 Unit(s) SubCutaneous at bedtime  insulin lispro (ADMELOG) corrective regimen sliding scale   SubCutaneous three times a day before meals  insulin lispro (ADMELOG) corrective regimen sliding scale   SubCutaneous at bedtime  insulin lispro Injectable (ADMELOG) 6 Unit(s) SubCutaneous three times a day with meals  metoprolol tartrate 25 milliGRAM(s) Oral two times a day  midodrine. 5 milliGRAM(s) Oral three times a day  OLANZapine Injectable 5 milliGRAM(s) IntraMuscular once  pantoprazole    Tablet 40 milliGRAM(s) Oral before breakfast  piperacillin/tazobactam IVPB. 3.375 Gram(s) IV Intermittent once  piperacillin/tazobactam IVPB.- 3.375 Gram(s) IV Intermittent once  QUEtiapine 12.5 milliGRAM(s) Oral at bedtime  sodium bicarbonate 650 milliGRAM(s) Oral two times a day  sodium chloride 0.9%. 1000 milliLiter(s) (55 mL/Hr) IV Continuous <Continuous>  tamsulosin 0.4 milliGRAM(s) Oral at bedtime    MEDICATIONS  (PRN):  dextrose Oral Gel 15 Gram(s) Oral once PRN Blood Glucose LESS THAN 70 milliGRAM(s)/deciliter  guaiFENesin Oral Liquid (Sugar-Free) 100 milliGRAM(s) Oral every 6 hours PRN Cough  ondansetron Injectable 4 milliGRAM(s) IV Push every 6 hours PRN Nausea        I&O's Summary    03 Jan 2024 07:01  -  04 Jan 2024 07:00  --------------------------------------------------------  IN: 0 mL / OUT: 1815 mL / NET: -1815 mL    04 Jan 2024 07:01  -  04 Jan 2024 15:10  --------------------------------------------------------  IN: 275 mL / OUT: 0 mL / NET: 275 mL        PHYSICAL EXAM:  Vital Signs Last 24 Hrs  T(C): 36.8 (04 Jan 2024 13:02), Max: 37.1 (03 Jan 2024 19:00)  T(F): 98.3 (04 Jan 2024 13:02), Max: 98.8 (03 Jan 2024 19:00)  HR: 71 (04 Jan 2024 13:02) (64 - 80)  BP: 168/91 (04 Jan 2024 13:02) (132/63 - 181/78)  BP(mean): --  RR: 18 (04 Jan 2024 13:02) (18 - 18)  SpO2: 96% (04 Jan 2024 13:02) (91% - 96%)    Parameters below as of 04 Jan 2024 13:02  Patient On (Oxygen Delivery Method): nasal cannula  O2 Flow (L/min): 3    CONSTITUTIONAL: sleepy but arousable, on 4LNC  HEENT: Normocephalic, Atraumatic.   RESPIRATORY:  lungs are clear to auscultation bilaterally, no crackles, rhonchi, wheezes  CARDIOVASCULAR: Regular rate and rhythm, No lower extremity edema  ABDOMEN: Soft, non-distended, +perc sky drain containing bilious material   MUSCLOSKELETAL: moving all 4 extremities spontaneously  NEUROLOGY: Alert, Oriented x1 (name), CN 2-12 grossly intact    LABS:                        8.7    10.64 )-----------( 274      ( 04 Jan 2024 07:20 )             27.6     01-04    139  |  109<H>  |  25.8<H>  ----------------------------<  115<H>  4.7   |  20.0<L>  |  2.58<H>    Ca    8.5      04 Jan 2024 07:20            Urinalysis Basic - ( 04 Jan 2024 07:20 )    Color: x / Appearance: x / SG: x / pH: x  Gluc: 115 mg/dL / Ketone: x  / Bili: x / Urobili: x   Blood: x / Protein: x / Nitrite: x   Leuk Esterase: x / RBC: x / WBC x   Sq Epi: x / Non Sq Epi: x / Bacteria: x        Culture - Blood (collected 02 Jan 2024 09:05)  Source: .Blood Blood-Peripheral  Preliminary Report (03 Jan 2024 17:02):    No growth at 24 hours    Culture - Blood (collected 02 Jan 2024 08:57)  Source: .Blood Blood-Peripheral  Preliminary Report (03 Jan 2024 17:02):    No growth at 24 hours      CAPILLARY BLOOD GLUCOSE      POCT Blood Glucose.: 133 mg/dL (04 Jan 2024 13:33)  POCT Blood Glucose.: 120 mg/dL (04 Jan 2024 09:26)  POCT Blood Glucose.: 116 mg/dL (04 Jan 2024 07:01)  POCT Blood Glucose.: 155 mg/dL (03 Jan 2024 23:06)  POCT Blood Glucose.: 110 mg/dL (03 Jan 2024 17:49)        RADIOLOGY & ADDITIONAL TESTS:  Results Reviewed

## 2024-01-04 NOTE — PROGRESS NOTE ADULT - REASON FOR ADMISSION
Cholecystitis

## 2024-01-04 NOTE — PROGRESS NOTE ADULT - ASSESSMENT
83-year-old male with past medical history of hypertension, hyperlipidemia, DM, stage III CKD, BPH, admitted for acute sky s/p IR drain placement. Course c/b hypoxic resp failure 2/2 COVID as well as new Afib RVR.     #RON on CKD stage 3  #BPH  - Fena 0.6% pre-renal  - likely in the setting of poor po intake, and episode of hypotension   - Baseline ~ 2-2.5; Cr 2.58 today   - renal following, herlinda recs  - renally dose meds  - avoid nephrotoxic medications  - c/w Tamsulosin  - strict in/out    #Delirium  - off 1:1  - 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, no acute findings  - frequent re-orientation  - c/w Seroquel 12.5mg QHS  - zyprexa prn    #Acute cholecystitis  - CT A/P and RUQ result noted  - s/p IR perc drain placement, monitor output, will need drain in for 4-6 weeks for tract to form, outpt IR followup  - bile culture with E-coli, abx changed to CTX, plan for total 14 days until 1/3  - c/w CTX  - BCx NGTD  - pain regimen  - diet advancement as tolerated  - GI following, herlinda recs  - trend CMP  - Outpatient surgery evaluation for interval cholecystectomy   - on home midodrine, unclear etiology    #Acute resp failure with hypoxia suspect 2/2 COVID   - Possible component of aspiration, on NC  - s/p COVID vaccines  - s/p decadron x5 days  - Remdesivir dc due to worsening renal function   - s/p BIPAP and HFNC, weaned to NC  - CT chest noted  - DVT studies negative    #Afib RVR  - Tele  - TSH normal  - cards d/w families, agree to hold AC  - Add Multaq  - metoprolol BID    #DM  - A1C 9  - ISS, accu checks  - c/w Lantus 18 QHS + 6U Premeals  - can c/w gabapentin for neuropathy   - monitor and adjust insulin as needed    #HLD  -c/w statin    #GERD  - c/w ppi 40mg qd    #Anemia  - Hg stable 9-10 range  - iron studies consistent with chronic dx  - b12 and folate normal  - trend CBC while admitted    DVT ppx: Heparin SQ  Diet: CC   Dispo: medically stable for NITHIN   83-year-old male with past medical history of hypertension, hyperlipidemia, DM, stage III CKD, BPH, admitted for acute sky s/p IR drain placement. Course c/b hypoxic resp failure 2/2 COVID as well as new Afib RVR.     #RON on CKD stage 3  #BPH  - Fena 0.6% pre-renal  - likely in the setting of poor po intake, and episode of hypotension   - Baseline ~ 2-2.5; Cr 2.58 today   - renal following, herlinda recs  - renally dose meds  - avoid nephrotoxic medications  - c/w Tamsulosin  - strict in/out    #Delirium  - off 1:1  - 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, no acute findings  - frequent re-orientation  - c/w Seroquel 12.5mg QHS  - zyprexa prn    #Acute cholecystitis  - CT A/P and RUQ result noted  - s/p IR perc drain placement, monitor output, will need drain in for 4-6 weeks for tract to form, outpt IR followup  - bile culture with E-coli, abx changed to CTX, plan for total 14 days until 1/3  - c/w CTX  - BCx NGTD  - pain regimen  - diet advancement as tolerated  - GI following, herlinda recs  - trend CMP  - Outpatient surgery evaluation for interval cholecystectomy   - on home midodrine, unclear etiology    #Acute resp failure with hypoxia suspect 2/2 COVID   - Possible component of aspiration, on NC  - s/p COVID vaccines  - s/p decadron x5 days  - Remdesivir dc due to worsening renal function   - s/p BIPAP and HFNC, weaned to NC  - CT chest noted, given IV lasix x1 and placed on zosyn for ?PNA, will continue for 5 days from 1/3  - DVT studies negative    #Afib RVR  - Tele  - TSH normal  - cards d/w families, agree to hold AC  - Add Multaq  - metoprolol BID    #DM  - A1C 9  - ISS, accu checks  - c/w Lantus 18 QHS + 6U Premeals  - can c/w gabapentin for neuropathy   - monitor and adjust insulin as needed    #HLD  -c/w statin    #GERD  - c/w ppi 40mg qd    #Anemia  - Hg stable 9-10 range  - iron studies consistent with chronic dx  - b12 and folate normal  - trend CBC while admitted, currently 8.7    DVT ppx: Heparin SQ  Diet: CC   Dispo: if mentation improves, NITHIN

## 2024-01-05 ENCOUNTER — TRANSCRIPTION ENCOUNTER (OUTPATIENT)
Age: 84
End: 2024-01-05

## 2024-01-05 VITALS
DIASTOLIC BLOOD PRESSURE: 77 MMHG | SYSTOLIC BLOOD PRESSURE: 138 MMHG | HEART RATE: 67 BPM | TEMPERATURE: 98 F | RESPIRATION RATE: 18 BRPM | OXYGEN SATURATION: 94 %

## 2024-01-05 LAB
ANION GAP SERPL CALC-SCNC: 9 MMOL/L — SIGNIFICANT CHANGE UP (ref 5–17)
ANION GAP SERPL CALC-SCNC: 9 MMOL/L — SIGNIFICANT CHANGE UP (ref 5–17)
BUN SERPL-MCNC: 26.7 MG/DL — HIGH (ref 8–20)
BUN SERPL-MCNC: 26.7 MG/DL — HIGH (ref 8–20)
CALCIUM SERPL-MCNC: 8.3 MG/DL — LOW (ref 8.4–10.5)
CALCIUM SERPL-MCNC: 8.3 MG/DL — LOW (ref 8.4–10.5)
CHLORIDE SERPL-SCNC: 112 MMOL/L — HIGH (ref 96–108)
CHLORIDE SERPL-SCNC: 112 MMOL/L — HIGH (ref 96–108)
CO2 SERPL-SCNC: 21 MMOL/L — LOW (ref 22–29)
CO2 SERPL-SCNC: 21 MMOL/L — LOW (ref 22–29)
CREAT SERPL-MCNC: 2.39 MG/DL — HIGH (ref 0.5–1.3)
CREAT SERPL-MCNC: 2.39 MG/DL — HIGH (ref 0.5–1.3)
CRP SERPL-MCNC: 46 MG/L — HIGH
CRP SERPL-MCNC: 46 MG/L — HIGH
EGFR: 26 ML/MIN/1.73M2 — LOW
EGFR: 26 ML/MIN/1.73M2 — LOW
GLUCOSE BLDC GLUCOMTR-MCNC: 102 MG/DL — HIGH (ref 70–99)
GLUCOSE BLDC GLUCOMTR-MCNC: 102 MG/DL — HIGH (ref 70–99)
GLUCOSE BLDC GLUCOMTR-MCNC: 147 MG/DL — HIGH (ref 70–99)
GLUCOSE BLDC GLUCOMTR-MCNC: 147 MG/DL — HIGH (ref 70–99)
GLUCOSE BLDC GLUCOMTR-MCNC: 95 MG/DL — SIGNIFICANT CHANGE UP (ref 70–99)
GLUCOSE BLDC GLUCOMTR-MCNC: 95 MG/DL — SIGNIFICANT CHANGE UP (ref 70–99)
GLUCOSE BLDC GLUCOMTR-MCNC: 99 MG/DL — SIGNIFICANT CHANGE UP (ref 70–99)
GLUCOSE BLDC GLUCOMTR-MCNC: 99 MG/DL — SIGNIFICANT CHANGE UP (ref 70–99)
GLUCOSE SERPL-MCNC: 81 MG/DL — SIGNIFICANT CHANGE UP (ref 70–99)
GLUCOSE SERPL-MCNC: 81 MG/DL — SIGNIFICANT CHANGE UP (ref 70–99)
HCT VFR BLD CALC: 26.4 % — LOW (ref 39–50)
HCT VFR BLD CALC: 26.4 % — LOW (ref 39–50)
HGB BLD-MCNC: 8.2 G/DL — LOW (ref 13–17)
HGB BLD-MCNC: 8.2 G/DL — LOW (ref 13–17)
MAGNESIUM SERPL-MCNC: 1.7 MG/DL — LOW (ref 1.8–2.6)
MAGNESIUM SERPL-MCNC: 1.7 MG/DL — LOW (ref 1.8–2.6)
MCHC RBC-ENTMCNC: 29.4 PG — SIGNIFICANT CHANGE UP (ref 27–34)
MCHC RBC-ENTMCNC: 29.4 PG — SIGNIFICANT CHANGE UP (ref 27–34)
MCHC RBC-ENTMCNC: 31.1 GM/DL — LOW (ref 32–36)
MCHC RBC-ENTMCNC: 31.1 GM/DL — LOW (ref 32–36)
MCV RBC AUTO: 94.6 FL — SIGNIFICANT CHANGE UP (ref 80–100)
MCV RBC AUTO: 94.6 FL — SIGNIFICANT CHANGE UP (ref 80–100)
PLATELET # BLD AUTO: 273 K/UL — SIGNIFICANT CHANGE UP (ref 150–400)
PLATELET # BLD AUTO: 273 K/UL — SIGNIFICANT CHANGE UP (ref 150–400)
POTASSIUM SERPL-MCNC: 4.8 MMOL/L — SIGNIFICANT CHANGE UP (ref 3.5–5.3)
POTASSIUM SERPL-MCNC: 4.8 MMOL/L — SIGNIFICANT CHANGE UP (ref 3.5–5.3)
POTASSIUM SERPL-SCNC: 4.8 MMOL/L — SIGNIFICANT CHANGE UP (ref 3.5–5.3)
POTASSIUM SERPL-SCNC: 4.8 MMOL/L — SIGNIFICANT CHANGE UP (ref 3.5–5.3)
RBC # BLD: 2.79 M/UL — LOW (ref 4.2–5.8)
RBC # BLD: 2.79 M/UL — LOW (ref 4.2–5.8)
RBC # FLD: 15.8 % — HIGH (ref 10.3–14.5)
RBC # FLD: 15.8 % — HIGH (ref 10.3–14.5)
SODIUM SERPL-SCNC: 141 MMOL/L — SIGNIFICANT CHANGE UP (ref 135–145)
SODIUM SERPL-SCNC: 141 MMOL/L — SIGNIFICANT CHANGE UP (ref 135–145)
WBC # BLD: 9.1 K/UL — SIGNIFICANT CHANGE UP (ref 3.8–10.5)
WBC # BLD: 9.1 K/UL — SIGNIFICANT CHANGE UP (ref 3.8–10.5)
WBC # FLD AUTO: 9.1 K/UL — SIGNIFICANT CHANGE UP (ref 3.8–10.5)
WBC # FLD AUTO: 9.1 K/UL — SIGNIFICANT CHANGE UP (ref 3.8–10.5)

## 2024-01-05 PROCEDURE — 82607 VITAMIN B-12: CPT

## 2024-01-05 PROCEDURE — 82330 ASSAY OF CALCIUM: CPT

## 2024-01-05 PROCEDURE — 87205 SMEAR GRAM STAIN: CPT

## 2024-01-05 PROCEDURE — 85610 PROTHROMBIN TIME: CPT

## 2024-01-05 PROCEDURE — 99239 HOSP IP/OBS DSCHRG MGMT >30: CPT

## 2024-01-05 PROCEDURE — 84540 ASSAY OF URINE/UREA-N: CPT

## 2024-01-05 PROCEDURE — 84295 ASSAY OF SERUM SODIUM: CPT

## 2024-01-05 PROCEDURE — 97116 GAIT TRAINING THERAPY: CPT

## 2024-01-05 PROCEDURE — 85027 COMPLETE CBC AUTOMATED: CPT

## 2024-01-05 PROCEDURE — G1004: CPT

## 2024-01-05 PROCEDURE — 85014 HEMATOCRIT: CPT

## 2024-01-05 PROCEDURE — 81001 URINALYSIS AUTO W/SCOPE: CPT

## 2024-01-05 PROCEDURE — 97530 THERAPEUTIC ACTIVITIES: CPT

## 2024-01-05 PROCEDURE — C1769: CPT

## 2024-01-05 PROCEDURE — 82803 BLOOD GASES ANY COMBINATION: CPT

## 2024-01-05 PROCEDURE — 83735 ASSAY OF MAGNESIUM: CPT

## 2024-01-05 PROCEDURE — 83880 ASSAY OF NATRIURETIC PEPTIDE: CPT

## 2024-01-05 PROCEDURE — 80048 BASIC METABOLIC PNL TOTAL CA: CPT

## 2024-01-05 PROCEDURE — 87641 MR-STAPH DNA AMP PROBE: CPT

## 2024-01-05 PROCEDURE — 84100 ASSAY OF PHOSPHORUS: CPT

## 2024-01-05 PROCEDURE — 82570 ASSAY OF URINE CREATININE: CPT

## 2024-01-05 PROCEDURE — P9047: CPT

## 2024-01-05 PROCEDURE — 84484 ASSAY OF TROPONIN QUANT: CPT

## 2024-01-05 PROCEDURE — 0225U NFCT DS DNA&RNA 21 SARSCOV2: CPT

## 2024-01-05 PROCEDURE — 96374 THER/PROPH/DIAG INJ IV PUSH: CPT

## 2024-01-05 PROCEDURE — 82746 ASSAY OF FOLIC ACID SERUM: CPT

## 2024-01-05 PROCEDURE — 36415 COLL VENOUS BLD VENIPUNCTURE: CPT

## 2024-01-05 PROCEDURE — 87070 CULTURE OTHR SPECIMN AEROBIC: CPT

## 2024-01-05 PROCEDURE — 84145 PROCALCITONIN (PCT): CPT

## 2024-01-05 PROCEDURE — 83690 ASSAY OF LIPASE: CPT

## 2024-01-05 PROCEDURE — 94760 N-INVAS EAR/PLS OXIMETRY 1: CPT

## 2024-01-05 PROCEDURE — 76942 ECHO GUIDE FOR BIOPSY: CPT

## 2024-01-05 PROCEDURE — 70450 CT HEAD/BRAIN W/O DYE: CPT

## 2024-01-05 PROCEDURE — 82947 ASSAY GLUCOSE BLOOD QUANT: CPT

## 2024-01-05 PROCEDURE — 93005 ELECTROCARDIOGRAM TRACING: CPT

## 2024-01-05 PROCEDURE — P9045: CPT

## 2024-01-05 PROCEDURE — 82435 ASSAY OF BLOOD CHLORIDE: CPT

## 2024-01-05 PROCEDURE — 93970 EXTREMITY STUDY: CPT

## 2024-01-05 PROCEDURE — 82728 ASSAY OF FERRITIN: CPT

## 2024-01-05 PROCEDURE — C1729: CPT

## 2024-01-05 PROCEDURE — 82248 BILIRUBIN DIRECT: CPT

## 2024-01-05 PROCEDURE — 80053 COMPREHEN METABOLIC PANEL: CPT

## 2024-01-05 PROCEDURE — 83540 ASSAY OF IRON: CPT

## 2024-01-05 PROCEDURE — 83550 IRON BINDING TEST: CPT

## 2024-01-05 PROCEDURE — 87186 SC STD MICRODIL/AGAR DIL: CPT

## 2024-01-05 PROCEDURE — 76705 ECHO EXAM OF ABDOMEN: CPT

## 2024-01-05 PROCEDURE — 83036 HEMOGLOBIN GLYCOSYLATED A1C: CPT

## 2024-01-05 PROCEDURE — 84466 ASSAY OF TRANSFERRIN: CPT

## 2024-01-05 PROCEDURE — 86140 C-REACTIVE PROTEIN: CPT

## 2024-01-05 PROCEDURE — 80076 HEPATIC FUNCTION PANEL: CPT

## 2024-01-05 PROCEDURE — 99285 EMERGENCY DEPT VISIT HI MDM: CPT | Mod: 25

## 2024-01-05 PROCEDURE — 93306 TTE W/DOPPLER COMPLETE: CPT

## 2024-01-05 PROCEDURE — 83605 ASSAY OF LACTIC ACID: CPT

## 2024-01-05 PROCEDURE — 74176 CT ABD & PELVIS W/O CONTRAST: CPT

## 2024-01-05 PROCEDURE — 71250 CT THORAX DX C-: CPT

## 2024-01-05 PROCEDURE — 94660 CPAP INITIATION&MGMT: CPT

## 2024-01-05 PROCEDURE — 36600 WITHDRAWAL OF ARTERIAL BLOOD: CPT

## 2024-01-05 PROCEDURE — 71045 X-RAY EXAM CHEST 1 VIEW: CPT

## 2024-01-05 PROCEDURE — 87040 BLOOD CULTURE FOR BACTERIA: CPT

## 2024-01-05 PROCEDURE — 87640 STAPH A DNA AMP PROBE: CPT

## 2024-01-05 PROCEDURE — 84300 ASSAY OF URINE SODIUM: CPT

## 2024-01-05 PROCEDURE — 85018 HEMOGLOBIN: CPT

## 2024-01-05 PROCEDURE — 85025 COMPLETE CBC W/AUTO DIFF WBC: CPT

## 2024-01-05 PROCEDURE — 85730 THROMBOPLASTIN TIME PARTIAL: CPT

## 2024-01-05 PROCEDURE — 85379 FIBRIN DEGRADATION QUANT: CPT

## 2024-01-05 PROCEDURE — 87077 CULTURE AEROBIC IDENTIFY: CPT

## 2024-01-05 PROCEDURE — 84132 ASSAY OF SERUM POTASSIUM: CPT

## 2024-01-05 PROCEDURE — 82962 GLUCOSE BLOOD TEST: CPT

## 2024-01-05 PROCEDURE — 87075 CULTR BACTERIA EXCEPT BLOOD: CPT

## 2024-01-05 PROCEDURE — 84443 ASSAY THYROID STIM HORMONE: CPT

## 2024-01-05 PROCEDURE — 87086 URINE CULTURE/COLONY COUNT: CPT

## 2024-01-05 RX ORDER — METOPROLOL TARTRATE 50 MG
1 TABLET ORAL
Qty: 0 | Refills: 0 | DISCHARGE
Start: 2024-01-05

## 2024-01-05 RX ORDER — ERYTHROPOIETIN 10000 [IU]/ML
20000 INJECTION, SOLUTION INTRAVENOUS; SUBCUTANEOUS
Refills: 0 | Status: DISCONTINUED | OUTPATIENT
Start: 2024-01-05 | End: 2024-01-05

## 2024-01-05 RX ORDER — FOLIC ACID 0.8 MG
1 TABLET ORAL
Qty: 0 | Refills: 0 | DISCHARGE
Start: 2024-01-05

## 2024-01-05 RX ORDER — SODIUM BICARBONATE 1 MEQ/ML
1 SYRINGE (ML) INTRAVENOUS
Qty: 0 | Refills: 0 | DISCHARGE
Start: 2024-01-05

## 2024-01-05 RX ORDER — DRONEDARONE 400 MG/1
1 TABLET, FILM COATED ORAL
Qty: 0 | Refills: 0 | DISCHARGE
Start: 2024-01-05

## 2024-01-05 RX ORDER — INSULIN LISPRO 100/ML
4 VIAL (ML) SUBCUTANEOUS
Qty: 0 | Refills: 0 | DISCHARGE
Start: 2024-01-05

## 2024-01-05 RX ADMIN — MIDODRINE HYDROCHLORIDE 5 MILLIGRAM(S): 2.5 TABLET ORAL at 09:30

## 2024-01-05 RX ADMIN — HEPARIN SODIUM 5000 UNIT(S): 5000 INJECTION INTRAVENOUS; SUBCUTANEOUS at 14:43

## 2024-01-05 RX ADMIN — Medication 650 MILLIGRAM(S): at 09:24

## 2024-01-05 RX ADMIN — ERYTHROPOIETIN 20000 UNIT(S): 10000 INJECTION, SOLUTION INTRAVENOUS; SUBCUTANEOUS at 14:45

## 2024-01-05 RX ADMIN — PIPERACILLIN AND TAZOBACTAM 25 GRAM(S): 4; .5 INJECTION, POWDER, LYOPHILIZED, FOR SOLUTION INTRAVENOUS at 06:02

## 2024-01-05 RX ADMIN — Medication 25 MILLIGRAM(S): at 09:25

## 2024-01-05 RX ADMIN — CHLORHEXIDINE GLUCONATE 1 APPLICATION(S): 213 SOLUTION TOPICAL at 09:30

## 2024-01-05 RX ADMIN — Medication 1 MILLIGRAM(S): at 11:31

## 2024-01-05 RX ADMIN — HEPARIN SODIUM 5000 UNIT(S): 5000 INJECTION INTRAVENOUS; SUBCUTANEOUS at 09:26

## 2024-01-05 RX ADMIN — PIPERACILLIN AND TAZOBACTAM 25 GRAM(S): 4; .5 INJECTION, POWDER, LYOPHILIZED, FOR SOLUTION INTRAVENOUS at 14:47

## 2024-01-05 RX ADMIN — DRONEDARONE 400 MILLIGRAM(S): 400 TABLET, FILM COATED ORAL at 09:25

## 2024-01-05 RX ADMIN — MIDODRINE HYDROCHLORIDE 5 MILLIGRAM(S): 2.5 TABLET ORAL at 11:30

## 2024-01-05 RX ADMIN — PANTOPRAZOLE SODIUM 40 MILLIGRAM(S): 20 TABLET, DELAYED RELEASE ORAL at 09:24

## 2024-01-05 RX ADMIN — PIPERACILLIN AND TAZOBACTAM 25 GRAM(S): 4; .5 INJECTION, POWDER, LYOPHILIZED, FOR SOLUTION INTRAVENOUS at 00:12

## 2024-01-05 NOTE — CHART NOTE - NSCHARTNOTEFT_GEN_A_CORE
Source: Patient [ ]  Family [ ]   other [ x]    Current Diet: Diet, Consistent Carbohydrate/No Snacks (01-03-24 @ 11:58)    Patient reports [ ] nausea  [ ] vomiting [ ] diarrhea [ ] constipation  [ ]chewing problems [ ] swallowing issues  [ ] other:     PO intake:  < 50% [ ]   50-75%  [x ]   %  [ ]  other :    Current Weight:   (12/22) 174.6 lbs  No new weight  Noted with 1+ right arm edema, continue to trend    Pertinent Medications: MEDICATIONS  (STANDING):  dextrose 5% + sodium chloride 0.9%. 1000 milliLiter(s) (50 mL/Hr) IV Continuous <Continuous>  dextrose 5%. 1000 milliLiter(s) (100 mL/Hr) IV Continuous <Continuous>  dextrose 5%. 1000 milliLiter(s) (50 mL/Hr) IV Continuous <Continuous>  folic acid 1 milliGRAM(s) Oral daily  gabapentin 300 milliGRAM(s) Oral at bedtime  insulin glargine Injectable (LANTUS) 18 Unit(s) SubCutaneous at bedtime  insulin lispro (ADMELOG) corrective regimen sliding scale   SubCutaneous three times a day before meals  insulin lispro (ADMELOG) corrective regimen sliding scale   SubCutaneous at bedtime  insulin lispro Injectable (ADMELOG) 6 Unit(s) SubCutaneous three times a day with meals  pantoprazole    Tablet 40 milliGRAM(s) Oral before breakfast  piperacillin/tazobactam IVPB.. 3.375 Gram(s) IV Intermittent every 8 hours  sodium bicarbonate 650 milliGRAM(s) Oral two times a day  sodium chloride 0.9%. 1000 milliLiter(s) (55 mL/Hr) IV Continuous <Continuous>    MEDICATIONS  (PRN):  ondansetron Injectable 4 milliGRAM(s) IV Push every 6 hours PRN Nausea    Pertinent Labs: CBC Full  -  ( 05 Jan 2024 05:18 )  WBC Count : 9.10 K/uL  RBC Count : 2.79 M/uL  Hemoglobin : 8.2 g/dL  Hematocrit : 26.4 %    01-05 Na141 mmol/L Glu 81 mg/dL K+ 4.8 mmol/L Cr  2.39 mg/dL<H> BUN 26.7 mg/dL<H> Phos n/a   Alb n/a   PAB n/a       Skin: Intact per documentation  Hollis: 17    Nutrition focused physical exam not conducted at this time- found signs of malnutrition [ ]absent [ ]present    Subcutaneous fat loss: [ ] Orbital fat pads region, [ ]Buccal fat region, [ ]Triceps region,  [ ]Ribs region    Muscle wasting: [ ]Temples region, [ ]Clavicle region, [ ]Shoulder region, [ ]Scapula region, [ ]Interosseous region,  [ ]thigh region, [ ]Calf region    Estimated Needs:   [x ] no change since previous assessment  [ ] recalculated:     Current Nutrition Diagnosis: Pt remains at nutrition risk secondary to increased nutrient needs related to increased physiological demand for nutrient as evidenced by acute sky s/p IR drain placement c/b hypoxic resp failure 2/2 COVID. Pt with fair po intake.     Recommendations:   1) Continue diet as tolerated; add DASH/TLC to diet rx.  2) Add Glucerna BID to maximize nutrition status (220 kcal, 10g protein per serving).  3) Continue folic acid supplementation, add MVI daily.  4) Encourage po intake, monitor diet tolerance, and provide assistance at meals as needed.  5) Obtain daily weights to monitor trends.      Monitoring and Evaluation:   [ x] PO intake [ x] Tolerance to diet prescription [X] Weights  [X] Follow up per protocol [X] Labs: chem 8, mg, phos, H/H.

## 2024-01-05 NOTE — DISCHARGE NOTE PROVIDER - NPI NUMBER (FOR SYSADMIN USE ONLY) :
[[id number]],[[id number]] [[id number]],[[id number]]

## 2024-01-05 NOTE — DISCHARGE NOTE PROVIDER - NSDCCPCAREPLAN_GEN_ALL_CORE_FT
PRINCIPAL DISCHARGE DIAGNOSIS  Diagnosis: Cholecystitis, unspecified  Assessment and Plan of Treatment: - CT A/P and RUQ result noted  - s/p IR perc drain placement, monitor output, will need drain in for 4 to 6 weeks for tract to form, outpt IR followup  - finished IV antibiotics 1/3  - BCx NGTD  - pt to follow up with IR in 4-6weeks and with outpt surgery for elective surgery      SECONDARY DISCHARGE DIAGNOSES  Diagnosis: Atrial fibrillation, new onset  Assessment and Plan of Treatment: - pt on multaq and metoprolol  - cards d/w families, agree to hold AC  - converted to NSR on telemetry    Diagnosis: RON (acute kidney injury)  Assessment and Plan of Treatment: - likely from hypotension and poor po intake  - c/w tamsulosin   - Cr improved to 2.3; base 2-2.5  - continue with po hydration    Diagnosis: Diabetes mellitus  Assessment and Plan of Treatment: - A1C 9  - ISS, accu checks  - c/w Lantus + Premeals insulin  - can c/w gabapentin for neuropathy    Diagnosis: Hyperlipidemia  Assessment and Plan of Treatment: - c/w statin    Diagnosis: Delirium  Assessment and Plan of Treatment: may have underlying dementia  CTH done 2x during hospitalization, no acute findings  discontinued seroquel due lethergy    Diagnosis: Acute respiratory failure with hypoxia  Assessment and Plan of Treatment: - Possible component of aspiration, c/w augmentin for total 5 days, end date 1/8  - s/p COVID vaccines  - s/p decadron x5 days and remdesivir   - s/p BIPAP and HFNC, weaned to NC  - DVT studies negative    Diagnosis: Anemia of chronic disease  Assessment and Plan of Treatment: B12 and folate normal  iron studies showing ACD  no evidence of GIB  CT C/A/P done 1/3 which did not show any evidence of bleed

## 2024-01-05 NOTE — CHART NOTE - NSCHARTNOTEFT_GEN_A_CORE
Notified by RN the patient is lethargic.    Patient resting in bed, NAD, VSS. Arouses to voice says "hello" then falls back asleep quickly. Patient is able to follow commands.    No focal or lateralizing neuro deficits noted. No respiratory distress.   Of note the patient had a similar episode yesterday morning. Full work up including ABG and CT head were unrevealing.   Patient is on seroquel QHS and received a dose last night.  -Seroquel discontinued  -RN to notify of acute changes

## 2024-01-05 NOTE — DISCHARGE NOTE PROVIDER - ATTENDING DISCHARGE PHYSICAL EXAMINATION:
CONSTITUTIONAL: sleepy but arousable, on 3LNC  HEENT: Normocephalic, Atraumatic.   RESPIRATORY:  lungs are clear to auscultation bilaterally, no crackles, rhonchi, wheezes  CARDIOVASCULAR: Regular rate and rhythm, No lower extremity edema  ABDOMEN: Soft, non-distended, +perc sky drain containing bilious material   MUSCLOSKELETAL: moving all 4 extremities spontaneously  NEUROLOGY: Alert, Oriented x1 (name), CN 2-12 grossly intact

## 2024-01-05 NOTE — DISCHARGE NOTE PROVIDER - PROVIDER TOKENS
PROVIDER:[TOKEN:[[id number]]],PROVIDER:[TOKEN:[[id number]]],PROVIDER:[TOKEN:[[id number]]],PROVIDER:[TOKEN:[[id number]]] PROVIDER:[TOKEN:[[id number]]],PROVIDER:[TOKEN:[[id number]]],PROVIDER:[TOKEN:[[id number]]],PROVIDER:[TOKEN:[[id number]]]

## 2024-01-05 NOTE — DISCHARGE NOTE NURSING/CASE MANAGEMENT/SOCIAL WORK - NSDCFUADDAPPT_GEN_ALL_CORE_FT
Discharge to [facility] [place] [address] [place] [address]  Phone: [phone]   Discharge to [facility] at [place] [address] [place] [address]  Phone: [phone]

## 2024-01-05 NOTE — DISCHARGE NOTE PROVIDER - NSDCFUADDAPPT_GEN_ALL_CORE_FT
Discharge to [facility] at [place] [address] [place] [address]  Phone: [phone]   Discharge to [facility] at [place] [address] [place] [address]  Phone: [phone]

## 2024-01-05 NOTE — DISCHARGE NOTE PROVIDER - CARE PROVIDER_API CALL
Jessica Snider  Interventional Radiology and Diagnostic Radiology  [address] [place] [address]  Phone: [phone]  Fax: [phone]  Follow Up Time:     Markus Mejia  Cardiovascular Disease  [address], [address]  [place] [address]  Phone: [phone]  Fax: [phone]  Follow Up Time:     Xu Colbert  Vascular Surgery  [address] [place] [address], [address]  [place] [address]  Phone: [phone]  Fax: [phone]  Follow Up Time:     Jaiden Gamino  Nephrology  [address] [place] [address]  Phone: [phone]  Fax: [phone]  Follow Up Time:    Jessica Snider  Interventional Radiology and Diagnostic Radiology  [address] [place] [address]  Phone: [phone]  Fax: [phone]  Follow Up Time:     Markus Mejia  Cardiovascular Disease  [address], [address]  [place] [address]  Phone: [phone]  Fax: [phone]  Follow Up Time:     Xu Colbert  Vascular Surgery  [address] [place] [address], [address]  [place] [address]  Phone: [phone]  Fax: [phone]  Follow Up Time:     Jaiden Gamino  Nephrology  [address] [place] [address]  Phone: [phone]  Fax: [phone]  Follow Up Time:

## 2024-01-05 NOTE — DISCHARGE NOTE PROVIDER - NSFOLLOWUPCLINICS_GEN_ALL_ED_FT
[facility] - Primary Care  Primary Care  [address] [place] [address]  Phone: [phone]  Fax:      [facility] - Primary Care  Primary Care  [address] [place] [address]  Phone: [phone]  Fax:

## 2024-01-05 NOTE — DISCHARGE NOTE PROVIDER - HOSPITAL COURSE
83-year-old male with past medical history of hypertension, hyperlipidemia, DM, stage III CKD, BPH, admitted for acute sky s/p IR drain placement. Course c/b hypoxic resp failure 2/2 COVID, RON, A fib RVR and delirium. Regarding acute sky: pt had per sky with IR. Will need drain in for 4-6 weeks for tract to form, outpt IR followup. Patient will need outpatient surgery evaluation for interval cholecystectomy. Pt was treated with IV zosyn until for acute sky. Regarding A fib rvr, pt on metoprolol, multaq, holding off on AC per discussion with family and cards given recurrent falls/unsteady on feet. Patient has been in normal sinus on telemetry. Patient also had RON which was likely 2/2 hypotension and low po intake in setting of acute cholecystitis/n/v. Patient's baseline around 2-2.5, Cr Cr 2.38. Regarding delirium, pt had CTH done two times during admission which were both negative. He also had ABG done and repeat labs which were alll within normal. Patient's seroquel was discontinues due to lethargy. Regarding acute resp failure with hypoxia 2/2 COVID, possible component of aspiration. Patient was treated with dexatheramone and a short course of remdesivir. Patient initially on bipap but weaned to HFNC, now on 2-3LNC satting mid 90s. Patient to c/w abx for possible aspiration PNA until 1/9.     Patient medically stable for discharge. Patient being discharged to rehab. Patient to f/u with PCP, nephrology, interventional radiology, surgery, cardiology on discharge.

## 2024-01-05 NOTE — DISCHARGE NOTE NURSING/CASE MANAGEMENT/SOCIAL WORK - PATIENT PORTAL LINK FT
You can access the FollowMyHealth Patient Portal offered by [facility] by registering at the following website: [email or web address]. By joining [employer]’s FollowMyHealth portal, you will also be able to view your health information using other applications (apps) compatible with our system. You can access the FollowMyHealth Patient Portal offered by [facility] by registering at the following website: [email or web address]. By joining [employer]’s FollowMyHealth portal, you will also be able to view your health information using other applications (apps) compatible with our system.

## 2024-01-05 NOTE — CHART NOTE - NSCHARTNOTESELECT_GEN_ALL_CORE
Event Note
RRT 2 hour Follow Up/Event Note
Event Note
Nutrition Services
Nutrition Services
Off Service Note

## 2024-01-05 NOTE — DISCHARGE NOTE NURSING/CASE MANAGEMENT/SOCIAL WORK - NSDCPEFALRISK_GEN_ALL_CORE
For information on Fall & Injury Prevention, visit: [email or web address] OR  [email or web address] OR  https://www.cdc.gov/steadi/patient.html For information on Fall & Injury Prevention, visit: [email or web address]/[email or web address] OR  [email or web address]/[email or web address]-injury OR  https://www.cdc.gov/steadi/patient.html

## 2024-01-05 NOTE — DISCHARGE NOTE PROVIDER - NSDCMRMEDTOKEN_GEN_ALL_CORE_FT
aspirin: 81 milligram(s) orally once a day  Basaglar KwikPen 100 units/mL subcutaneous solution: 18 unit(s) subcutaneous once a day (at bedtime)  dronedarone 400 mg oral tablet: 1 tab(s) orally 2 times a day  folic acid 1 mg oral tablet: 1 tab(s) orally once a day  gabapentin 300 mg oral capsule: 1 cap(s) orally once a day (at bedtime)  guaiFENesin 100 mg/5 mL oral liquid: 5 milliliter(s) orally every 6 hours As needed Cough  insulin lispro 100 units/mL injectable solution: 4 international unit(s) subcutaneous 3 times a day (before meals) hold if FS &lt;100  metoprolol tartrate 25 mg oral tablet: 1 tab(s) orally 2 times a day  midodrine 5 mg oral tablet: 2 tab(s) orally 3 times a day  omeprazole 40 mg oral delayed release capsule: 1 cap(s) orally once a day  simvastatin 20 mg oral tablet: 1 tab(s) orally once a day (at bedtime)  sodium bicarbonate 650 mg oral tablet: 1 tab(s) orally 2 times a day  tamsulosin: 0.4 milligram(s) orally once a day (at bedtime)

## 2024-01-06 ENCOUNTER — EMERGENCY (EMERGENCY)
Facility: HOSPITAL | Age: 84
LOS: 1 days | Discharge: HOME | End: 2024-01-06
Attending: EMERGENCY MEDICINE
Payer: COMMERCIAL

## 2024-01-06 VITALS
HEART RATE: 86 BPM | TEMPERATURE: 98 F | SYSTOLIC BLOOD PRESSURE: 164 MMHG | HEIGHT: 62 IN | WEIGHT: 175.05 LBS | OXYGEN SATURATION: 100 % | DIASTOLIC BLOOD PRESSURE: 80 MMHG | RESPIRATION RATE: 20 BRPM

## 2024-01-06 LAB
ALBUMIN SERPL ELPH-MCNC: 2.8 G/DL — LOW (ref 3.3–5.2)
ALBUMIN SERPL ELPH-MCNC: 2.8 G/DL — LOW (ref 3.3–5.2)
ALP SERPL-CCNC: 87 U/L — SIGNIFICANT CHANGE UP (ref 40–120)
ALP SERPL-CCNC: 87 U/L — SIGNIFICANT CHANGE UP (ref 40–120)
ALT FLD-CCNC: 26 U/L — SIGNIFICANT CHANGE UP
ALT FLD-CCNC: 26 U/L — SIGNIFICANT CHANGE UP
AMMONIA BLD-MCNC: 24 UMOL/L — SIGNIFICANT CHANGE UP (ref 11–55)
AMMONIA BLD-MCNC: 24 UMOL/L — SIGNIFICANT CHANGE UP (ref 11–55)
ANION GAP SERPL CALC-SCNC: 9 MMOL/L — SIGNIFICANT CHANGE UP (ref 5–17)
ANION GAP SERPL CALC-SCNC: 9 MMOL/L — SIGNIFICANT CHANGE UP (ref 5–17)
APTT BLD: 28 SEC — SIGNIFICANT CHANGE UP (ref 24.5–35.6)
APTT BLD: 28 SEC — SIGNIFICANT CHANGE UP (ref 24.5–35.6)
AST SERPL-CCNC: 18 U/L — SIGNIFICANT CHANGE UP
AST SERPL-CCNC: 18 U/L — SIGNIFICANT CHANGE UP
BASE EXCESS BLDV CALC-SCNC: 2.1 MMOL/L — SIGNIFICANT CHANGE UP (ref -2–3)
BASE EXCESS BLDV CALC-SCNC: 2.1 MMOL/L — SIGNIFICANT CHANGE UP (ref -2–3)
BASOPHILS # BLD AUTO: 0.03 K/UL — SIGNIFICANT CHANGE UP (ref 0–0.2)
BASOPHILS # BLD AUTO: 0.03 K/UL — SIGNIFICANT CHANGE UP (ref 0–0.2)
BASOPHILS NFR BLD AUTO: 0.3 % — SIGNIFICANT CHANGE UP (ref 0–2)
BASOPHILS NFR BLD AUTO: 0.3 % — SIGNIFICANT CHANGE UP (ref 0–2)
BILIRUB SERPL-MCNC: 0.5 MG/DL — SIGNIFICANT CHANGE UP (ref 0.4–2)
BILIRUB SERPL-MCNC: 0.5 MG/DL — SIGNIFICANT CHANGE UP (ref 0.4–2)
BLD GP AB SCN SERPL QL: SIGNIFICANT CHANGE UP
BLD GP AB SCN SERPL QL: SIGNIFICANT CHANGE UP
BUN SERPL-MCNC: 31 MG/DL — HIGH (ref 8–20)
BUN SERPL-MCNC: 31 MG/DL — HIGH (ref 8–20)
CA-I SERPL-SCNC: 1.24 MMOL/L — SIGNIFICANT CHANGE UP (ref 1.15–1.33)
CA-I SERPL-SCNC: 1.24 MMOL/L — SIGNIFICANT CHANGE UP (ref 1.15–1.33)
CALCIUM SERPL-MCNC: 9 MG/DL — SIGNIFICANT CHANGE UP (ref 8.4–10.5)
CALCIUM SERPL-MCNC: 9 MG/DL — SIGNIFICANT CHANGE UP (ref 8.4–10.5)
CHLORIDE BLDV-SCNC: 105 MMOL/L — SIGNIFICANT CHANGE UP (ref 96–108)
CHLORIDE BLDV-SCNC: 105 MMOL/L — SIGNIFICANT CHANGE UP (ref 96–108)
CHLORIDE SERPL-SCNC: 102 MMOL/L — SIGNIFICANT CHANGE UP (ref 96–108)
CHLORIDE SERPL-SCNC: 102 MMOL/L — SIGNIFICANT CHANGE UP (ref 96–108)
CO2 SERPL-SCNC: 26 MMOL/L — SIGNIFICANT CHANGE UP (ref 22–29)
CO2 SERPL-SCNC: 26 MMOL/L — SIGNIFICANT CHANGE UP (ref 22–29)
CREAT SERPL-MCNC: 2.2 MG/DL — HIGH (ref 0.5–1.3)
CREAT SERPL-MCNC: 2.2 MG/DL — HIGH (ref 0.5–1.3)
EGFR: 29 ML/MIN/1.73M2 — LOW
EGFR: 29 ML/MIN/1.73M2 — LOW
EOSINOPHIL # BLD AUTO: 0.1 K/UL — SIGNIFICANT CHANGE UP (ref 0–0.5)
EOSINOPHIL # BLD AUTO: 0.1 K/UL — SIGNIFICANT CHANGE UP (ref 0–0.5)
EOSINOPHIL NFR BLD AUTO: 1 % — SIGNIFICANT CHANGE UP (ref 0–6)
EOSINOPHIL NFR BLD AUTO: 1 % — SIGNIFICANT CHANGE UP (ref 0–6)
FLUAV AG NPH QL: SIGNIFICANT CHANGE UP
FLUAV AG NPH QL: SIGNIFICANT CHANGE UP
FLUBV AG NPH QL: SIGNIFICANT CHANGE UP
FLUBV AG NPH QL: SIGNIFICANT CHANGE UP
GAS PNL BLDV: 137 MMOL/L — SIGNIFICANT CHANGE UP (ref 136–145)
GAS PNL BLDV: 137 MMOL/L — SIGNIFICANT CHANGE UP (ref 136–145)
GAS PNL BLDV: SIGNIFICANT CHANGE UP
GAS PNL BLDV: SIGNIFICANT CHANGE UP
GLUCOSE BLDV-MCNC: 241 MG/DL — HIGH (ref 70–99)
GLUCOSE BLDV-MCNC: 241 MG/DL — HIGH (ref 70–99)
GLUCOSE SERPL-MCNC: 230 MG/DL — HIGH (ref 70–99)
GLUCOSE SERPL-MCNC: 230 MG/DL — HIGH (ref 70–99)
HCO3 BLDV-SCNC: 27 MMOL/L — SIGNIFICANT CHANGE UP (ref 22–29)
HCO3 BLDV-SCNC: 27 MMOL/L — SIGNIFICANT CHANGE UP (ref 22–29)
HCT VFR BLD CALC: 29.2 % — LOW (ref 39–50)
HCT VFR BLD CALC: 29.2 % — LOW (ref 39–50)
HCT VFR BLDA CALC: 29 % — SIGNIFICANT CHANGE UP
HCT VFR BLDA CALC: 29 % — SIGNIFICANT CHANGE UP
HGB BLD CALC-MCNC: 9.7 G/DL — LOW (ref 12.6–17.4)
HGB BLD CALC-MCNC: 9.7 G/DL — LOW (ref 12.6–17.4)
HGB BLD-MCNC: 9.7 G/DL — LOW (ref 13–17)
HGB BLD-MCNC: 9.7 G/DL — LOW (ref 13–17)
IMM GRANULOCYTES NFR BLD AUTO: 0.6 % — SIGNIFICANT CHANGE UP (ref 0–0.9)
IMM GRANULOCYTES NFR BLD AUTO: 0.6 % — SIGNIFICANT CHANGE UP (ref 0–0.9)
INR BLD: 1.07 RATIO — SIGNIFICANT CHANGE UP (ref 0.85–1.18)
INR BLD: 1.07 RATIO — SIGNIFICANT CHANGE UP (ref 0.85–1.18)
LACTATE BLDV-MCNC: 1.1 MMOL/L — SIGNIFICANT CHANGE UP (ref 0.5–2)
LACTATE BLDV-MCNC: 1.1 MMOL/L — SIGNIFICANT CHANGE UP (ref 0.5–2)
LIDOCAIN IGE QN: 34 U/L — SIGNIFICANT CHANGE UP (ref 22–51)
LIDOCAIN IGE QN: 34 U/L — SIGNIFICANT CHANGE UP (ref 22–51)
LYMPHOCYTES # BLD AUTO: 1.25 K/UL — SIGNIFICANT CHANGE UP (ref 1–3.3)
LYMPHOCYTES # BLD AUTO: 1.25 K/UL — SIGNIFICANT CHANGE UP (ref 1–3.3)
LYMPHOCYTES # BLD AUTO: 12.4 % — LOW (ref 13–44)
LYMPHOCYTES # BLD AUTO: 12.4 % — LOW (ref 13–44)
MCHC RBC-ENTMCNC: 30.6 PG — SIGNIFICANT CHANGE UP (ref 27–34)
MCHC RBC-ENTMCNC: 30.6 PG — SIGNIFICANT CHANGE UP (ref 27–34)
MCHC RBC-ENTMCNC: 33.2 GM/DL — SIGNIFICANT CHANGE UP (ref 32–36)
MCHC RBC-ENTMCNC: 33.2 GM/DL — SIGNIFICANT CHANGE UP (ref 32–36)
MCV RBC AUTO: 92.1 FL — SIGNIFICANT CHANGE UP (ref 80–100)
MCV RBC AUTO: 92.1 FL — SIGNIFICANT CHANGE UP (ref 80–100)
MONOCYTES # BLD AUTO: 0.93 K/UL — HIGH (ref 0–0.9)
MONOCYTES # BLD AUTO: 0.93 K/UL — HIGH (ref 0–0.9)
MONOCYTES NFR BLD AUTO: 9.2 % — SIGNIFICANT CHANGE UP (ref 2–14)
MONOCYTES NFR BLD AUTO: 9.2 % — SIGNIFICANT CHANGE UP (ref 2–14)
NEUTROPHILS # BLD AUTO: 7.7 K/UL — HIGH (ref 1.8–7.4)
NEUTROPHILS # BLD AUTO: 7.7 K/UL — HIGH (ref 1.8–7.4)
NEUTROPHILS NFR BLD AUTO: 76.5 % — SIGNIFICANT CHANGE UP (ref 43–77)
NEUTROPHILS NFR BLD AUTO: 76.5 % — SIGNIFICANT CHANGE UP (ref 43–77)
PCO2 BLDV: 41 MMHG — LOW (ref 42–55)
PCO2 BLDV: 41 MMHG — LOW (ref 42–55)
PH BLDV: 7.42 — SIGNIFICANT CHANGE UP (ref 7.32–7.43)
PH BLDV: 7.42 — SIGNIFICANT CHANGE UP (ref 7.32–7.43)
PLATELET # BLD AUTO: 327 K/UL — SIGNIFICANT CHANGE UP (ref 150–400)
PLATELET # BLD AUTO: 327 K/UL — SIGNIFICANT CHANGE UP (ref 150–400)
PO2 BLDV: 152 MMHG — HIGH (ref 25–45)
PO2 BLDV: 152 MMHG — HIGH (ref 25–45)
POTASSIUM BLDV-SCNC: 5 MMOL/L — SIGNIFICANT CHANGE UP (ref 3.5–5.1)
POTASSIUM BLDV-SCNC: 5 MMOL/L — SIGNIFICANT CHANGE UP (ref 3.5–5.1)
POTASSIUM SERPL-MCNC: 4.7 MMOL/L — SIGNIFICANT CHANGE UP (ref 3.5–5.3)
POTASSIUM SERPL-MCNC: 4.7 MMOL/L — SIGNIFICANT CHANGE UP (ref 3.5–5.3)
POTASSIUM SERPL-SCNC: 4.7 MMOL/L — SIGNIFICANT CHANGE UP (ref 3.5–5.3)
POTASSIUM SERPL-SCNC: 4.7 MMOL/L — SIGNIFICANT CHANGE UP (ref 3.5–5.3)
PROT SERPL-MCNC: 6.6 G/DL — SIGNIFICANT CHANGE UP (ref 6.6–8.7)
PROT SERPL-MCNC: 6.6 G/DL — SIGNIFICANT CHANGE UP (ref 6.6–8.7)
PROTHROM AB SERPL-ACNC: 11.9 SEC — SIGNIFICANT CHANGE UP (ref 9.5–13)
PROTHROM AB SERPL-ACNC: 11.9 SEC — SIGNIFICANT CHANGE UP (ref 9.5–13)
RBC # BLD: 3.17 M/UL — LOW (ref 4.2–5.8)
RBC # BLD: 3.17 M/UL — LOW (ref 4.2–5.8)
RBC # FLD: 15.8 % — HIGH (ref 10.3–14.5)
RBC # FLD: 15.8 % — HIGH (ref 10.3–14.5)
RSV RNA NPH QL NAA+NON-PROBE: SIGNIFICANT CHANGE UP
RSV RNA NPH QL NAA+NON-PROBE: SIGNIFICANT CHANGE UP
SAO2 % BLDV: 98.3 % — SIGNIFICANT CHANGE UP
SAO2 % BLDV: 98.3 % — SIGNIFICANT CHANGE UP
SARS-COV-2 RNA SPEC QL NAA+PROBE: DETECTED
SARS-COV-2 RNA SPEC QL NAA+PROBE: DETECTED
SODIUM SERPL-SCNC: 137 MMOL/L — SIGNIFICANT CHANGE UP (ref 135–145)
SODIUM SERPL-SCNC: 137 MMOL/L — SIGNIFICANT CHANGE UP (ref 135–145)
WBC # BLD: 10.07 K/UL — SIGNIFICANT CHANGE UP (ref 3.8–10.5)
WBC # BLD: 10.07 K/UL — SIGNIFICANT CHANGE UP (ref 3.8–10.5)
WBC # FLD AUTO: 10.07 K/UL — SIGNIFICANT CHANGE UP (ref 3.8–10.5)
WBC # FLD AUTO: 10.07 K/UL — SIGNIFICANT CHANGE UP (ref 3.8–10.5)

## 2024-01-06 PROCEDURE — 70450 CT HEAD/BRAIN W/O DYE: CPT | Mod: 26,MA

## 2024-01-06 PROCEDURE — 93010 ELECTROCARDIOGRAM REPORT: CPT

## 2024-01-06 PROCEDURE — 99284 EMERGENCY DEPT VISIT MOD MDM: CPT

## 2024-01-06 PROCEDURE — 74176 CT ABD & PELVIS W/O CONTRAST: CPT | Mod: 26,MA

## 2024-01-06 RX ORDER — KETOROLAC TROMETHAMINE 30 MG/ML
15 SYRINGE (ML) INJECTION ONCE
Refills: 0 | Status: DISCONTINUED | OUTPATIENT
Start: 2024-01-06 | End: 2024-01-06

## 2024-01-06 RX ORDER — ONDANSETRON 8 MG/1
4 TABLET, FILM COATED ORAL ONCE
Refills: 0 | Status: COMPLETED | OUTPATIENT
Start: 2024-01-06 | End: 2024-01-06

## 2024-01-06 RX ADMIN — Medication 15 MILLIGRAM(S): at 18:19

## 2024-01-06 RX ADMIN — ONDANSETRON 4 MILLIGRAM(S): 8 TABLET, FILM COATED ORAL at 18:19

## 2024-01-06 NOTE — ED ADULT NURSE NOTE - NSFALLHARMRISKINTERV_ED_ALL_ED
Communicate risk of Fall with Harm to all staff, patient, and family/Provide visual cue: red socks, yellow wristband, yellow gown, etc/Reinforce activity limits and safety measures with patient and family/Bed in lowest position, wheels locked, appropriate side rails in place/Call bell, personal items and telephone in reach/Instruct patient to call for assistance before getting out of bed/chair/stretcher/Non-slip footwear applied when patient is off stretcher/[place] to call system/Physically safe environment - no spills, clutter or unnecessary equipment/Purposeful Proactive Rounding/Room/bathroom lighting operational, light cord in reach Communicate risk of Fall with Harm to all staff, patient, and family/Provide visual cue: red socks, yellow wristband, yellow gown, etc/Reinforce activity limits and safety measures with patient and family/Bed in lowest position, wheels locked, appropriate side rails in place/Call bell, personal items and telephone in reach/Instruct patient to call for assistance before getting out of bed/chair/stretcher/Non-slip footwear applied when patient is off stretcher/[place] to call system/Physically safe environment - no spills, clutter or unnecessary equipment/Purposeful Proactive Rounding/Room/bathroom lighting operational, light cord in reach

## 2024-01-06 NOTE — ED PROVIDER NOTE - ATTENDING CONTRIBUTION TO CARE
83 yoM; with PMH significant for DM, HTN, HLD, CKD stage III, recent admission with discharge 1 day ago for acute sky s/p perc sky with IR drain placement with stay complicated by hypoxic respiratory failure secondary to COVID/RON/A-fib with RVR/delirium; now presenting with increasing abdominal pain with nausea and vomiting.  Patient reports abdominal pain–epigastric, associated with burning sensation associated nausea and vomiting x 3 episodes–NBNB.  Denies diarrhea.  Denies fever.  Complaining of chills.  Denies dysuria.  General:     NAD  Head:     NC/AT, EOMI, oral mucosa moist  Neck:     trachea midline  Lungs:     CTA b/l, no w/r/r  CVS:     S1S2, RRR, no m/g/r  Abd:     +BS, s/nd, no organomegaly. ttp @ epigastrium > RUQ. biliary drain with bilious fluid output  Ext:    2+ radial and pedal pulses, no c/c/e  Neuro: AAOx2, no sensory/motor deficits  A/P:  83yoM p/w worsening abd pain, vomiting, with confusion  -labs, ct, ivf, re-eval

## 2024-01-06 NOTE — ED PROVIDER NOTE - OBJECTIVE STATEMENT
BERNADINE ANDERSON is a 82yo Male with PMH DM, HTN, HLD stage III CKD who presents c/o abdominal pain, nausea and vomiting today. PT recently admitted for acute sky which was managed w/ IR drain placement. Hospital course was complicated by hypoxic resp failure 2/2 COVID, RON, A fib RVR and delirium. Pt was discharged to SNF yesterday with plan for IR follow up for management of drain and interval cholecystectomy. PT denies any symptoms of cp/sob, fevers, chills, urinary symptoms, weakness, confusion. PT is not a reliable historian.

## 2024-01-06 NOTE — ED ADULT NURSE NOTE - OBJECTIVE STATEMENT
pt reports burning upper abdominal pain, nausea. vomiting. has gallbladder drain in place. recently discharged s/p lap sky here at [facility]. a and o to self and place (unknown baseline). breathing even and unlabored on 2l o2 via nc (baseline since discharge). Epidermal Closure Graft Donor Site (Optional): simple interrupted pt reports burning upper abdominal pain, nausea. vomiting. has gallbladder drain in place. recently discharged s/p lap sky here at [facility]. a and o to self and place (unknown baseline). breathing even and unlabored on 2l o2 via nc (baseline since discharge).

## 2024-01-06 NOTE — ED PROVIDER NOTE - CLINICAL SUMMARY MEDICAL DECISION MAKING FREE TEXT BOX
ASSESSMENT:   BERNADINE ANDERSON is a 82yo M who presented with n/v/ abdominal pain x 1 day after being discharged w/ percutaneous cholecystectomy drain yesterday. Vitals stable. Physical exam w/ right sided abdominal tencerenss. Surgical site w/o erythema, edema, discharge.     PLAN: Pain control. Screen for infection w/ labs, swab. Check chemistry. CT imaging given recent surgery. ASSESSMENT:   BERNADINE ANDERSON is a 84yo M who presented with n/v/ abdominal pain x 1 day after being discharged w/ percutaneous cholecystectomy drain yesterday. Vitals stable. Physical exam w/ right sided abdominal tencerenss. Surgical site w/o erythema, edema, discharge.     PLAN: Pain control. Screen for infection w/ labs, swab. Check chemistry. CT imaging given recent surgery.

## 2024-01-06 NOTE — ED PROVIDER NOTE - NS ED ROS FT
Review of Systems  SKIN: warm, dry w/ no rash or bleeding  RESPIRATORY: no cough or SOB  CARDIAC: no chest pain & no palpitations  GI: +ABDOMINAL PAIN, NAUSEA, VOMITING  MUSCULOSKELETAL:  no joint pain, swelling or redness

## 2024-01-06 NOTE — ED PROVIDER NOTE - PHYSICAL EXAMINATION
Gen: ILL APPEARING, TREMORS  Head: NC/AT  Neck: trachea midline  Resp:  No distress, lungs clear bilaterally.   Abd: soft, nd +TTP IN RIGHT UPPER AND LOWER QUADRANT.   Ext: no deformities  Neuro:  +CONFUSED  Skin:  Warm and dry as visualized

## 2024-01-06 NOTE — ED ADULT NURSE REASSESSMENT NOTE - NS ED NURSE REASSESS COMMENT FT1
assumed care of pt at 1930 from PAXTON Damico. Pt awake and resting in bed with family at bedside, pt in no acute distress, no resp distress. Resps even and unlabored. Pt c/o abdominal pain. MD made aware and meds to be given. Safety maintained.
Unknown

## 2024-01-06 NOTE — ED ADULT NURSE NOTE - COVID-19 ORDERING FACILITY
Subjective:      Patient ID: Aylin Greco is a 77 y.o. male. Coronary Artery Disease  Pertinent negatives include no chest pain, chest tightness, dizziness, leg swelling, palpitations or shortness of breath. His past medical history is significant for CHF. Hypertension  Pertinent negatives include no chest pain, palpitations or shortness of breath. Shortness of Breath  Pertinent negatives include no chest pain or leg swelling. His past medical history is significant for CAD. Congestive Heart Failure  Pertinent negatives include no chest pain, fatigue, palpitations or shortness of breath. His past medical history is significant for CAD. Here for follow up afib/sss/pacer/htn/cad/PTCA/CHF. Feeling good. Exercising without problem. Works out with  2x / wk. No pnd or orthopnea. No edema. Wt stable. BP better at home except when having shoulder pain. No chest pain. No sob. Rhythm good. No syncope. Past Medical History:   Diagnosis Date    Aortic stenosis 2015 Feb 2019 mean gradient 35mg Hg    Atrial fibrillation ([facility] 07/2012    Saw cardiologist for palps. Event recorder showed AF about 5% of the time. Warfarin started. Became persistent in March 2019 when admitted with pneumonia.  CAD (coronary artery disease), native coronary artery Dec 18, 2007    Presented with palpitation to ER. Slight inc. in S. troponin. Cor. angio showed 90% L. circ. Stent placed. (Dr Chanel Manual.  Diabetes mellitus ([facility] 1992    HbA1C in Jennifer '15 was 6.9 on metformin and glipizide.  Diastolic congestive heart failure ([facility] 08/2006    presented with wheeze, [id number]. cardiac cath to r/o ischemia. Diffuse CAD no critical stenoses. LVEF 55% LVEDP 31.    Essential hypertension since 1980    Difficult to control with need for minoxidil in addition to ARB,CCB,BB and diuretic.  Gout     Podagra intermittently over the years. Uric acid 9.4 in 2011.  Allopurinol started (with colchicine for 3 months)    Hypothyroid March 2011    Total thyroidectomy for indeterminate nodule. Proved benign.  Left carotid bruit Oct 2014    Discovered at routine exam. Carotid Doppler showed bilateral stenosis <50%    Obstructive sleep apnea 1997    as of 2019 still using CPAP    Peripheral neuropathy     2/2 DM.  Pneumonia 09/5870    complicated by hypotension and overdiuresis leading to REYNALDO. S.Cr 1.7 on 3/9/19. Improved to 1.3 on 3/11/19    Sick sinus syndrome (HCC) 02/16/2017    PIP placed because of 7 sec pauses on sotalol. Past Surgical History:   Procedure Laterality Date    CHOLECYSTECTOMY, OPEN  45    CORONARY ANGIOPLASTY  12/17/07    has stent per pt    DIAGNOSTIC CARDIAC CATH LAB PROCEDURE  12/17/07    PACEMAKER INSERTION  02/2017    Sick sinus sundrome with 7 sec pauses on sotalol.  THYROIDECTOMY  2011    Benign    TOTAL KNEE ARTHROPLASTY Right 3/12/14     Social History     Socioeconomic History    Marital status: Single     Spouse name: Not on file    Number of children: Not on file    Years of education: Not on file    Highest education level: Not on file   Occupational History    Not on file   Tobacco Use    Smoking status: Never Smoker    Smokeless tobacco: Never Used   Substance and Sexual Activity    Alcohol use: No    Drug use: No    Sexual activity: Not Currently     Comment: Single   Other Topics Concern    Not on file   Social History Narrative    Not on file     Social Determinants of Health     Financial Resource Strain:     Difficulty of Paying Living Expenses: Not on file   Food Insecurity:     Worried About Running Out of Food in the Last Year: Not on file    Julio Cesar of Food in the Last Year: Not on file   Transportation Needs:     Lack of Transportation (Medical): Not on file    Lack of Transportation (Non-Medical):  Not on file   Physical Activity:     Days of Exercise per Week: Not on file    Minutes of Exercise per Session: Not on file   Stress:     Feeling of Stress : Not on file   Social Connections:     Frequency of Communication with Friends and Family: Not on file    Frequency of Social Gatherings with Friends and Family: Not on file    Attends Oriental orthodox Services: Not on file    Active Member of Clubs or Organizations: Not on file    Attends Club or Organization Meetings: Not on file    Marital Status: Not on file   Intimate Partner Violence:     Fear of Current or Ex-Partner: Not on file    Emotionally Abused: Not on file    Physically Abused: Not on file    Sexually Abused: Not on file   Housing Stability:     Unable to Pay for Housing in the Last Year: Not on file    Number of [place] in the Last Year: Not on file    Unstable Housing in the Last Year: Not on file       FH reviewed, denies FH cardiac issues. Vitals:    07/06/22 1354   BP: 118/70   Pulse: 76           Wt 170      Review of Systems   Constitutional: Negative for activity change and fatigue. Respiratory: Negative for apnea, cough, choking, chest tightness and shortness of breath. Cardiovascular: Negative for chest pain, palpitations and leg swelling. No PND or orthopnea. No tachycardia. Gastrointestinal: Negative for abdominal distention. Musculoskeletal: Negative for myalgias. Neurological: Negative for dizziness, syncope and light-headedness. Psychiatric/Behavioral: Negative for agitation, behavioral problems and confusion. All other systems reviewed negative as done. Objective:   Physical Exam  Constitutional:       General: He is not in acute distress. Appearance: He is well-developed. HENT:      Head: Normocephalic and atraumatic. Eyes:      General:         Right eye: No discharge. Left eye: No discharge. Conjunctiva/sclera: Conjunctivae normal.   Neck:      Vascular: No JVD. Cardiovascular:      Rate and Rhythm: Normal rate and regular rhythm.       Pulses:           Radial pulses are 2+ on the right side and 2+ on the left side. Heart sounds: S1 normal and S2 normal. Murmur heard. No gallop. Comments: 2/6 syst M  Pulmonary:      Effort: Pulmonary effort is normal.      Breath sounds: Normal breath sounds. No wheezing or rales. Abdominal:      General: Bowel sounds are normal.      Palpations: Abdomen is soft. Tenderness: There is no abdominal tenderness. Musculoskeletal:         General: Normal range of motion. Cervical back: Normal range of motion and neck supple. Skin:     General: Skin is warm and dry. Neurological:      Mental Status: He is alert and oriented to person, place, and time. Psychiatric:         Behavior: Behavior normal.         Thought Content: Thought content normal.         Assessment:       Diagnosis Orders   1. Essential hypertension     2. CAD in native artery     3. History of PTCA     4. SSS (sick sinus syndrome) ([address]     5. Pacemaker     6. Chronic diastolic congestive heart failure ([address]     7. Nonrheumatic aortic valve stenosis     8. Paroxysmal atrial fibrillation (HCC)             Plan:      CV stable. Rhythm stable. BP very good. EP following pacemaker/afib. Rhythm stable. Exercising 2x per wik with . Wt stable. Will continue sotalol for afib. On AC, no bleeding issues. Continue on Xarelto 15 mg qd. Will continue Aldactone. Recommended diet, exercise and wt loss. Reviewed previous records and testing including myoview 7/20 and echo 3/22. No changes. Continue to monitor. Follow up 3 months. [facility]

## 2024-01-06 NOTE — ED ADULT TRIAGE NOTE - CHIEF COMPLAINT QUOTE
" I felt nausea and vomiting today and my abdomen hurts" as per EMS pt from [facility] had one episode of coffee ground emesis. pt had drain to right lower back, pt on home oxygen no paperwork from NH " I felt nausea and vomiting today and my abdomen hurts" as [facility] had one episode of coffee ground emesis. pt had drain to right lower back, pt on home oxygen no paperwork from NH

## 2024-01-06 NOTE — ED ADULT NURSE NOTE - CHIEF COMPLAINT QUOTE
" I felt nausea and vomiting today and my abdomen hurts" as per EMS pt from [facility] had one episode of coffee ground emesis. pt had drain to right lower back, pt on home oxygen no paperwork from NH " I felt nausea and vomiting today and my abdomen hurts" as [facility] EMS [facility] had one episode of coffee ground emesis. pt had drain to right lower back, pt on home oxygen no paperwork from NH

## 2024-01-06 NOTE — ED PROVIDER NOTE - PATIENT PORTAL LINK FT
You can access the FollowMyHealth Patient Portal offered by [facility] by registering at the following website: [email or web address]. By joining [employer]’s FollowMyHealth portal, you will also be able to view your health information using other applications (apps) compatible with our system. You can access the FollowMyHealth Patient Portal offered by [facility] by registering at the following website: [email or web address]. By joining [employer]s FollowMyHealth portal, you will also be able to view your health information using other applications (apps) compatible with our system.

## 2024-01-06 NOTE — ED PROVIDER NOTE - PROGRESS NOTE DETAILS
Anson: received sign out; rectal exam with brown stool; awaiting ct imaging. comfortable in bed Anson: reassessed, in bed, comfortable, minimal abd tenderness; normal vitals; no actionable findings on labs or imaging. Song: Called facility to discuss pending return of patient. Discussed unremarkable CT ab pelv w decompressed sky and cholecystostomy tube appropriately placed wo additional significant findings. Facility concerned about they're reports pf coffee ground emesis. Discussed that pt's hgb is at baseline without change from previous. Called family to discuss pt return to facility. Family requesting call when pt returning to facility. Told family that pt ambulances usually take 3 hours to be scheduled and pt will be returning in the very early morning.

## 2024-01-06 NOTE — ED ADULT NURSE NOTE - CAS EDN DISCHARGE INTERVENTIONS
I have reviewed discharge instructions with the patient. The patient verbalized understanding.
IV discontinued, cath removed intact

## 2024-01-07 ENCOUNTER — INPATIENT (INPATIENT)
Facility: HOSPITAL | Age: 84
LOS: 6 days | Discharge: NURSING, REHAB OR LONG-TERM CARE | DRG: 369 | End: 2024-01-14
Attending: STUDENT IN AN ORGANIZED HEALTH CARE EDUCATION/TRAINING PROGRAM | Admitting: HOSPITALIST
Payer: COMMERCIAL

## 2024-01-07 VITALS
HEART RATE: 112 BPM | RESPIRATION RATE: 22 BRPM | SYSTOLIC BLOOD PRESSURE: 124 MMHG | HEIGHT: 62 IN | DIASTOLIC BLOOD PRESSURE: 60 MMHG | OXYGEN SATURATION: 93 % | WEIGHT: 169.98 LBS

## 2024-01-07 VITALS
SYSTOLIC BLOOD PRESSURE: 115 MMHG | OXYGEN SATURATION: 94 % | TEMPERATURE: 98 F | DIASTOLIC BLOOD PRESSURE: 57 MMHG | RESPIRATION RATE: 20 BRPM | HEART RATE: 111 BPM

## 2024-01-07 DIAGNOSIS — K92.2 GASTROINTESTINAL HEMORRHAGE, UNSPECIFIED: ICD-10-CM

## 2024-01-07 LAB
ALBUMIN SERPL ELPH-MCNC: 2.6 G/DL — LOW (ref 3.3–5.2)
ALBUMIN SERPL ELPH-MCNC: 2.6 G/DL — LOW (ref 3.3–5.2)
ALP SERPL-CCNC: 70 U/L — SIGNIFICANT CHANGE UP (ref 40–120)
ALP SERPL-CCNC: 70 U/L — SIGNIFICANT CHANGE UP (ref 40–120)
ALT FLD-CCNC: 21 U/L — SIGNIFICANT CHANGE UP
ALT FLD-CCNC: 21 U/L — SIGNIFICANT CHANGE UP
ANION GAP SERPL CALC-SCNC: 11 MMOL/L — SIGNIFICANT CHANGE UP (ref 5–17)
ANION GAP SERPL CALC-SCNC: 11 MMOL/L — SIGNIFICANT CHANGE UP (ref 5–17)
ANION GAP SERPL CALC-SCNC: 8 MMOL/L — SIGNIFICANT CHANGE UP (ref 5–17)
ANION GAP SERPL CALC-SCNC: 8 MMOL/L — SIGNIFICANT CHANGE UP (ref 5–17)
ANION GAP SERPL CALC-SCNC: 9 MMOL/L — SIGNIFICANT CHANGE UP (ref 5–17)
ANION GAP SERPL CALC-SCNC: 9 MMOL/L — SIGNIFICANT CHANGE UP (ref 5–17)
APPEARANCE UR: CLEAR — SIGNIFICANT CHANGE UP
APPEARANCE UR: CLEAR — SIGNIFICANT CHANGE UP
APTT BLD: 28.2 SEC — SIGNIFICANT CHANGE UP (ref 24.5–35.6)
APTT BLD: 28.2 SEC — SIGNIFICANT CHANGE UP (ref 24.5–35.6)
AST SERPL-CCNC: 14 U/L — SIGNIFICANT CHANGE UP
AST SERPL-CCNC: 14 U/L — SIGNIFICANT CHANGE UP
BACTERIA # UR AUTO: NEGATIVE /HPF — SIGNIFICANT CHANGE UP
BACTERIA # UR AUTO: NEGATIVE /HPF — SIGNIFICANT CHANGE UP
BASE EXCESS BLDV CALC-SCNC: -0.3 MMOL/L — SIGNIFICANT CHANGE UP (ref -2–3)
BASE EXCESS BLDV CALC-SCNC: -0.3 MMOL/L — SIGNIFICANT CHANGE UP (ref -2–3)
BASOPHILS # BLD AUTO: 0.06 K/UL — SIGNIFICANT CHANGE UP (ref 0–0.2)
BASOPHILS # BLD AUTO: 0.06 K/UL — SIGNIFICANT CHANGE UP (ref 0–0.2)
BASOPHILS NFR BLD AUTO: 0.4 % — SIGNIFICANT CHANGE UP (ref 0–2)
BASOPHILS NFR BLD AUTO: 0.4 % — SIGNIFICANT CHANGE UP (ref 0–2)
BILIRUB SERPL-MCNC: 0.6 MG/DL — SIGNIFICANT CHANGE UP (ref 0.4–2)
BILIRUB SERPL-MCNC: 0.6 MG/DL — SIGNIFICANT CHANGE UP (ref 0.4–2)
BILIRUB UR-MCNC: NEGATIVE — SIGNIFICANT CHANGE UP
BILIRUB UR-MCNC: NEGATIVE — SIGNIFICANT CHANGE UP
BLD GP AB SCN SERPL QL: SIGNIFICANT CHANGE UP
BLD GP AB SCN SERPL QL: SIGNIFICANT CHANGE UP
BUN SERPL-MCNC: 55.2 MG/DL — HIGH (ref 8–20)
BUN SERPL-MCNC: 55.2 MG/DL — HIGH (ref 8–20)
BUN SERPL-MCNC: 55.9 MG/DL — HIGH (ref 8–20)
BUN SERPL-MCNC: 55.9 MG/DL — HIGH (ref 8–20)
BUN SERPL-MCNC: 56.2 MG/DL — HIGH (ref 8–20)
BUN SERPL-MCNC: 56.2 MG/DL — HIGH (ref 8–20)
CA-I SERPL-SCNC: 1.2 MMOL/L — SIGNIFICANT CHANGE UP (ref 1.15–1.33)
CA-I SERPL-SCNC: 1.2 MMOL/L — SIGNIFICANT CHANGE UP (ref 1.15–1.33)
CALCIUM SERPL-MCNC: 8.5 MG/DL — SIGNIFICANT CHANGE UP (ref 8.4–10.5)
CALCIUM SERPL-MCNC: 8.5 MG/DL — SIGNIFICANT CHANGE UP (ref 8.4–10.5)
CALCIUM SERPL-MCNC: 9 MG/DL — SIGNIFICANT CHANGE UP (ref 8.4–10.5)
CAST: 1 /LPF — SIGNIFICANT CHANGE UP (ref 0–4)
CAST: 1 /LPF — SIGNIFICANT CHANGE UP (ref 0–4)
CHLORIDE BLDV-SCNC: 105 MMOL/L — SIGNIFICANT CHANGE UP (ref 96–108)
CHLORIDE BLDV-SCNC: 105 MMOL/L — SIGNIFICANT CHANGE UP (ref 96–108)
CHLORIDE SERPL-SCNC: 103 MMOL/L — SIGNIFICANT CHANGE UP (ref 96–108)
CHLORIDE SERPL-SCNC: 103 MMOL/L — SIGNIFICANT CHANGE UP (ref 96–108)
CHLORIDE SERPL-SCNC: 104 MMOL/L — SIGNIFICANT CHANGE UP (ref 96–108)
CO2 SERPL-SCNC: 23 MMOL/L — SIGNIFICANT CHANGE UP (ref 22–29)
CO2 SERPL-SCNC: 23 MMOL/L — SIGNIFICANT CHANGE UP (ref 22–29)
CO2 SERPL-SCNC: 24 MMOL/L — SIGNIFICANT CHANGE UP (ref 22–29)
CO2 SERPL-SCNC: 24 MMOL/L — SIGNIFICANT CHANGE UP (ref 22–29)
CO2 SERPL-SCNC: 25 MMOL/L — SIGNIFICANT CHANGE UP (ref 22–29)
CO2 SERPL-SCNC: 25 MMOL/L — SIGNIFICANT CHANGE UP (ref 22–29)
COLOR SPEC: YELLOW — SIGNIFICANT CHANGE UP
COLOR SPEC: YELLOW — SIGNIFICANT CHANGE UP
CREAT SERPL-MCNC: 2.34 MG/DL — HIGH (ref 0.5–1.3)
CREAT SERPL-MCNC: 2.34 MG/DL — HIGH (ref 0.5–1.3)
CREAT SERPL-MCNC: 2.4 MG/DL — HIGH (ref 0.5–1.3)
CREAT SERPL-MCNC: 2.4 MG/DL — HIGH (ref 0.5–1.3)
CREAT SERPL-MCNC: 2.44 MG/DL — HIGH (ref 0.5–1.3)
CREAT SERPL-MCNC: 2.44 MG/DL — HIGH (ref 0.5–1.3)
CULTURE RESULTS: SIGNIFICANT CHANGE UP
DIFF PNL FLD: NEGATIVE — SIGNIFICANT CHANGE UP
DIFF PNL FLD: NEGATIVE — SIGNIFICANT CHANGE UP
EGFR: 25 ML/MIN/1.73M2 — LOW
EGFR: 25 ML/MIN/1.73M2 — LOW
EGFR: 26 ML/MIN/1.73M2 — LOW
EGFR: 26 ML/MIN/1.73M2 — LOW
EGFR: 27 ML/MIN/1.73M2 — LOW
EGFR: 27 ML/MIN/1.73M2 — LOW
EOSINOPHIL # BLD AUTO: 0.01 K/UL — SIGNIFICANT CHANGE UP (ref 0–0.5)
EOSINOPHIL # BLD AUTO: 0.01 K/UL — SIGNIFICANT CHANGE UP (ref 0–0.5)
EOSINOPHIL NFR BLD AUTO: 0.1 % — SIGNIFICANT CHANGE UP (ref 0–6)
EOSINOPHIL NFR BLD AUTO: 0.1 % — SIGNIFICANT CHANGE UP (ref 0–6)
GAS PNL BLDV: 134 MMOL/L — LOW (ref 136–145)
GAS PNL BLDV: 134 MMOL/L — LOW (ref 136–145)
GAS PNL BLDV: SIGNIFICANT CHANGE UP
GAS PNL BLDV: SIGNIFICANT CHANGE UP
GLUCOSE BLDC GLUCOMTR-MCNC: 338 MG/DL — HIGH (ref 70–99)
GLUCOSE BLDC GLUCOMTR-MCNC: 338 MG/DL — HIGH (ref 70–99)
GLUCOSE BLDC GLUCOMTR-MCNC: 381 MG/DL — HIGH (ref 70–99)
GLUCOSE BLDC GLUCOMTR-MCNC: 381 MG/DL — HIGH (ref 70–99)
GLUCOSE BLDC GLUCOMTR-MCNC: 419 MG/DL — HIGH (ref 70–99)
GLUCOSE BLDC GLUCOMTR-MCNC: 419 MG/DL — HIGH (ref 70–99)
GLUCOSE BLDV-MCNC: 447 MG/DL — HIGH (ref 70–99)
GLUCOSE BLDV-MCNC: 447 MG/DL — HIGH (ref 70–99)
GLUCOSE SERPL-MCNC: 392 MG/DL — HIGH (ref 70–99)
GLUCOSE SERPL-MCNC: 392 MG/DL — HIGH (ref 70–99)
GLUCOSE SERPL-MCNC: 399 MG/DL — HIGH (ref 70–99)
GLUCOSE SERPL-MCNC: 399 MG/DL — HIGH (ref 70–99)
GLUCOSE SERPL-MCNC: 405 MG/DL — HIGH (ref 70–99)
GLUCOSE SERPL-MCNC: 405 MG/DL — HIGH (ref 70–99)
GLUCOSE UR QL: 500 MG/DL
GLUCOSE UR QL: 500 MG/DL
HCO3 BLDV-SCNC: 26 MMOL/L — SIGNIFICANT CHANGE UP (ref 22–29)
HCO3 BLDV-SCNC: 26 MMOL/L — SIGNIFICANT CHANGE UP (ref 22–29)
HCT VFR BLD CALC: 21.9 % — LOW (ref 39–50)
HCT VFR BLD CALC: 21.9 % — LOW (ref 39–50)
HCT VFR BLD CALC: 25.6 % — LOW (ref 39–50)
HCT VFR BLD CALC: 25.6 % — LOW (ref 39–50)
HCT VFR BLDA CALC: 26 % — SIGNIFICANT CHANGE UP
HCT VFR BLDA CALC: 26 % — SIGNIFICANT CHANGE UP
HGB BLD CALC-MCNC: 8.5 G/DL — LOW (ref 12.6–17.4)
HGB BLD CALC-MCNC: 8.5 G/DL — LOW (ref 12.6–17.4)
HGB BLD-MCNC: 7.1 G/DL — LOW (ref 13–17)
HGB BLD-MCNC: 7.1 G/DL — LOW (ref 13–17)
HGB BLD-MCNC: 8 G/DL — LOW (ref 13–17)
HGB BLD-MCNC: 8 G/DL — LOW (ref 13–17)
IMM GRANULOCYTES NFR BLD AUTO: 0.7 % — SIGNIFICANT CHANGE UP (ref 0–0.9)
IMM GRANULOCYTES NFR BLD AUTO: 0.7 % — SIGNIFICANT CHANGE UP (ref 0–0.9)
INR BLD: 1.09 RATIO — SIGNIFICANT CHANGE UP (ref 0.85–1.18)
INR BLD: 1.09 RATIO — SIGNIFICANT CHANGE UP (ref 0.85–1.18)
KETONES UR-MCNC: NEGATIVE MG/DL — SIGNIFICANT CHANGE UP
KETONES UR-MCNC: NEGATIVE MG/DL — SIGNIFICANT CHANGE UP
LACTATE BLDV-MCNC: 2.5 MMOL/L — HIGH (ref 0.5–2)
LACTATE BLDV-MCNC: 2.5 MMOL/L — HIGH (ref 0.5–2)
LEUKOCYTE ESTERASE UR-ACNC: NEGATIVE — SIGNIFICANT CHANGE UP
LEUKOCYTE ESTERASE UR-ACNC: NEGATIVE — SIGNIFICANT CHANGE UP
LYMPHOCYTES # BLD AUTO: 0.81 K/UL — LOW (ref 1–3.3)
LYMPHOCYTES # BLD AUTO: 0.81 K/UL — LOW (ref 1–3.3)
LYMPHOCYTES # BLD AUTO: 5 % — LOW (ref 13–44)
LYMPHOCYTES # BLD AUTO: 5 % — LOW (ref 13–44)
MCHC RBC-ENTMCNC: 29.4 PG — SIGNIFICANT CHANGE UP (ref 27–34)
MCHC RBC-ENTMCNC: 29.4 PG — SIGNIFICANT CHANGE UP (ref 27–34)
MCHC RBC-ENTMCNC: 31.3 GM/DL — LOW (ref 32–36)
MCHC RBC-ENTMCNC: 31.3 GM/DL — LOW (ref 32–36)
MCV RBC AUTO: 94.1 FL — SIGNIFICANT CHANGE UP (ref 80–100)
MCV RBC AUTO: 94.1 FL — SIGNIFICANT CHANGE UP (ref 80–100)
MONOCYTES # BLD AUTO: 0.75 K/UL — SIGNIFICANT CHANGE UP (ref 0–0.9)
MONOCYTES # BLD AUTO: 0.75 K/UL — SIGNIFICANT CHANGE UP (ref 0–0.9)
MONOCYTES NFR BLD AUTO: 4.6 % — SIGNIFICANT CHANGE UP (ref 2–14)
MONOCYTES NFR BLD AUTO: 4.6 % — SIGNIFICANT CHANGE UP (ref 2–14)
NEUTROPHILS # BLD AUTO: 14.41 K/UL — HIGH (ref 1.8–7.4)
NEUTROPHILS # BLD AUTO: 14.41 K/UL — HIGH (ref 1.8–7.4)
NEUTROPHILS NFR BLD AUTO: 89.2 % — HIGH (ref 43–77)
NEUTROPHILS NFR BLD AUTO: 89.2 % — HIGH (ref 43–77)
NITRITE UR-MCNC: NEGATIVE — SIGNIFICANT CHANGE UP
NITRITE UR-MCNC: NEGATIVE — SIGNIFICANT CHANGE UP
PCO2 BLDV: 50 MMHG — SIGNIFICANT CHANGE UP (ref 42–55)
PCO2 BLDV: 50 MMHG — SIGNIFICANT CHANGE UP (ref 42–55)
PH BLDV: 7.32 — SIGNIFICANT CHANGE UP (ref 7.32–7.43)
PH BLDV: 7.32 — SIGNIFICANT CHANGE UP (ref 7.32–7.43)
PH UR: 6 — SIGNIFICANT CHANGE UP (ref 5–8)
PH UR: 6 — SIGNIFICANT CHANGE UP (ref 5–8)
PLATELET # BLD AUTO: 334 K/UL — SIGNIFICANT CHANGE UP (ref 150–400)
PLATELET # BLD AUTO: 334 K/UL — SIGNIFICANT CHANGE UP (ref 150–400)
PO2 BLDV: 118 MMHG — HIGH (ref 25–45)
PO2 BLDV: 118 MMHG — HIGH (ref 25–45)
POTASSIUM BLDV-SCNC: 6.9 MMOL/L — CRITICAL HIGH (ref 3.5–5.1)
POTASSIUM BLDV-SCNC: 6.9 MMOL/L — CRITICAL HIGH (ref 3.5–5.1)
POTASSIUM SERPL-MCNC: 5.5 MMOL/L — HIGH (ref 3.5–5.3)
POTASSIUM SERPL-MCNC: 5.5 MMOL/L — HIGH (ref 3.5–5.3)
POTASSIUM SERPL-MCNC: 5.7 MMOL/L — HIGH (ref 3.5–5.3)
POTASSIUM SERPL-MCNC: 5.7 MMOL/L — HIGH (ref 3.5–5.3)
POTASSIUM SERPL-MCNC: 6.9 MMOL/L — CRITICAL HIGH (ref 3.5–5.3)
POTASSIUM SERPL-MCNC: 6.9 MMOL/L — CRITICAL HIGH (ref 3.5–5.3)
POTASSIUM SERPL-SCNC: 5.5 MMOL/L — HIGH (ref 3.5–5.3)
POTASSIUM SERPL-SCNC: 5.5 MMOL/L — HIGH (ref 3.5–5.3)
POTASSIUM SERPL-SCNC: 5.7 MMOL/L — HIGH (ref 3.5–5.3)
POTASSIUM SERPL-SCNC: 5.7 MMOL/L — HIGH (ref 3.5–5.3)
POTASSIUM SERPL-SCNC: 6.9 MMOL/L — CRITICAL HIGH (ref 3.5–5.3)
POTASSIUM SERPL-SCNC: 6.9 MMOL/L — CRITICAL HIGH (ref 3.5–5.3)
PROT SERPL-MCNC: 5.6 G/DL — LOW (ref 6.6–8.7)
PROT SERPL-MCNC: 5.6 G/DL — LOW (ref 6.6–8.7)
PROT UR-MCNC: 100 MG/DL
PROT UR-MCNC: 100 MG/DL
PROTHROM AB SERPL-ACNC: 12.1 SEC — SIGNIFICANT CHANGE UP (ref 9.5–13)
PROTHROM AB SERPL-ACNC: 12.1 SEC — SIGNIFICANT CHANGE UP (ref 9.5–13)
RBC # BLD: 2.72 M/UL — LOW (ref 4.2–5.8)
RBC # BLD: 2.72 M/UL — LOW (ref 4.2–5.8)
RBC # FLD: 15.8 % — HIGH (ref 10.3–14.5)
RBC # FLD: 15.8 % — HIGH (ref 10.3–14.5)
RBC CASTS # UR COMP ASSIST: 1 /HPF — SIGNIFICANT CHANGE UP (ref 0–4)
RBC CASTS # UR COMP ASSIST: 1 /HPF — SIGNIFICANT CHANGE UP (ref 0–4)
SAO2 % BLDV: 98.4 % — SIGNIFICANT CHANGE UP
SAO2 % BLDV: 98.4 % — SIGNIFICANT CHANGE UP
SODIUM SERPL-SCNC: 135 MMOL/L — SIGNIFICANT CHANGE UP (ref 135–145)
SODIUM SERPL-SCNC: 135 MMOL/L — SIGNIFICANT CHANGE UP (ref 135–145)
SODIUM SERPL-SCNC: 137 MMOL/L — SIGNIFICANT CHANGE UP (ref 135–145)
SODIUM SERPL-SCNC: 137 MMOL/L — SIGNIFICANT CHANGE UP (ref 135–145)
SODIUM SERPL-SCNC: 138 MMOL/L — SIGNIFICANT CHANGE UP (ref 135–145)
SODIUM SERPL-SCNC: 138 MMOL/L — SIGNIFICANT CHANGE UP (ref 135–145)
SP GR SPEC: 1.02 — SIGNIFICANT CHANGE UP (ref 1–1.03)
SP GR SPEC: 1.02 — SIGNIFICANT CHANGE UP (ref 1–1.03)
SPECIMEN SOURCE: SIGNIFICANT CHANGE UP
SQUAMOUS # UR AUTO: 0 /HPF — SIGNIFICANT CHANGE UP (ref 0–5)
SQUAMOUS # UR AUTO: 0 /HPF — SIGNIFICANT CHANGE UP (ref 0–5)
UROBILINOGEN FLD QL: 1 MG/DL — SIGNIFICANT CHANGE UP (ref 0.2–1)
UROBILINOGEN FLD QL: 1 MG/DL — SIGNIFICANT CHANGE UP (ref 0.2–1)
WBC # BLD: 16.15 K/UL — HIGH (ref 3.8–10.5)
WBC # BLD: 16.15 K/UL — HIGH (ref 3.8–10.5)
WBC # FLD AUTO: 16.15 K/UL — HIGH (ref 3.8–10.5)
WBC # FLD AUTO: 16.15 K/UL — HIGH (ref 3.8–10.5)
WBC UR QL: 1 /HPF — SIGNIFICANT CHANGE UP (ref 0–5)
WBC UR QL: 1 /HPF — SIGNIFICANT CHANGE UP (ref 0–5)

## 2024-01-07 PROCEDURE — 85018 HEMOGLOBIN: CPT

## 2024-01-07 PROCEDURE — 93010 ELECTROCARDIOGRAM REPORT: CPT

## 2024-01-07 PROCEDURE — 36430 TRANSFUSION BLD/BLD COMPNT: CPT

## 2024-01-07 PROCEDURE — 85730 THROMBOPLASTIN TIME PARTIAL: CPT

## 2024-01-07 PROCEDURE — 82140 ASSAY OF AMMONIA: CPT

## 2024-01-07 PROCEDURE — 87086 URINE CULTURE/COLONY COUNT: CPT

## 2024-01-07 PROCEDURE — 83605 ASSAY OF LACTIC ACID: CPT

## 2024-01-07 PROCEDURE — 82330 ASSAY OF CALCIUM: CPT

## 2024-01-07 PROCEDURE — 80053 COMPREHEN METABOLIC PANEL: CPT

## 2024-01-07 PROCEDURE — 99223 1ST HOSP IP/OBS HIGH 75: CPT

## 2024-01-07 PROCEDURE — 71045 X-RAY EXAM CHEST 1 VIEW: CPT | Mod: 26

## 2024-01-07 PROCEDURE — 86900 BLOOD TYPING SEROLOGIC ABO: CPT

## 2024-01-07 PROCEDURE — 74176 CT ABD & PELVIS W/O CONTRAST: CPT | Mod: MA

## 2024-01-07 PROCEDURE — 99285 EMERGENCY DEPT VISIT HI MDM: CPT | Mod: 25

## 2024-01-07 PROCEDURE — 82435 ASSAY OF BLOOD CHLORIDE: CPT

## 2024-01-07 PROCEDURE — 96375 TX/PRO/DX INJ NEW DRUG ADDON: CPT

## 2024-01-07 PROCEDURE — 83690 ASSAY OF LIPASE: CPT

## 2024-01-07 PROCEDURE — 85610 PROTHROMBIN TIME: CPT

## 2024-01-07 PROCEDURE — 99285 EMERGENCY DEPT VISIT HI MDM: CPT

## 2024-01-07 PROCEDURE — 70450 CT HEAD/BRAIN W/O DYE: CPT | Mod: MA

## 2024-01-07 PROCEDURE — 36415 COLL VENOUS BLD VENIPUNCTURE: CPT

## 2024-01-07 PROCEDURE — 81001 URINALYSIS AUTO W/SCOPE: CPT

## 2024-01-07 PROCEDURE — T1013: CPT

## 2024-01-07 PROCEDURE — 84132 ASSAY OF SERUM POTASSIUM: CPT

## 2024-01-07 PROCEDURE — 86901 BLOOD TYPING SEROLOGIC RH(D): CPT

## 2024-01-07 PROCEDURE — 86850 RBC ANTIBODY SCREEN: CPT

## 2024-01-07 PROCEDURE — 84295 ASSAY OF SERUM SODIUM: CPT

## 2024-01-07 PROCEDURE — 82803 BLOOD GASES ANY COMBINATION: CPT

## 2024-01-07 PROCEDURE — 85014 HEMATOCRIT: CPT

## 2024-01-07 PROCEDURE — 96374 THER/PROPH/DIAG INJ IV PUSH: CPT

## 2024-01-07 PROCEDURE — 93005 ELECTROCARDIOGRAM TRACING: CPT

## 2024-01-07 PROCEDURE — 82947 ASSAY GLUCOSE BLOOD QUANT: CPT

## 2024-01-07 PROCEDURE — 87637 SARSCOV2&INF A&B&RSV AMP PRB: CPT

## 2024-01-07 PROCEDURE — 85025 COMPLETE CBC W/AUTO DIFF WBC: CPT

## 2024-01-07 RX ORDER — ACETAMINOPHEN 500 MG
650 TABLET ORAL EVERY 6 HOURS
Refills: 0 | Status: DISCONTINUED | OUTPATIENT
Start: 2024-01-07 | End: 2024-01-14

## 2024-01-07 RX ORDER — FOLIC ACID 0.8 MG
1 TABLET ORAL DAILY
Refills: 0 | Status: DISCONTINUED | OUTPATIENT
Start: 2024-01-07 | End: 2024-01-14

## 2024-01-07 RX ORDER — ONDANSETRON 8 MG/1
4 TABLET, FILM COATED ORAL ONCE
Refills: 0 | Status: COMPLETED | OUTPATIENT
Start: 2024-01-07 | End: 2024-01-07

## 2024-01-07 RX ORDER — GABAPENTIN 400 MG/1
300 CAPSULE ORAL AT BEDTIME
Refills: 0 | Status: DISCONTINUED | OUTPATIENT
Start: 2024-01-07 | End: 2024-01-14

## 2024-01-07 RX ORDER — SIMVASTATIN 20 MG/1
20 TABLET, FILM COATED ORAL AT BEDTIME
Refills: 0 | Status: DISCONTINUED | OUTPATIENT
Start: 2024-01-07 | End: 2024-01-14

## 2024-01-07 RX ORDER — INSULIN HUMAN 100 [IU]/ML
5 INJECTION, SOLUTION SUBCUTANEOUS ONCE
Refills: 0 | Status: COMPLETED | OUTPATIENT
Start: 2024-01-07 | End: 2024-01-07

## 2024-01-07 RX ORDER — CEFTRIAXONE 500 MG/1
1000 INJECTION, POWDER, FOR SOLUTION INTRAMUSCULAR; INTRAVENOUS ONCE
Refills: 0 | Status: DISCONTINUED | OUTPATIENT
Start: 2024-01-07 | End: 2024-01-07

## 2024-01-07 RX ORDER — DRONEDARONE 400 MG/1
400 TABLET, FILM COATED ORAL
Refills: 0 | Status: DISCONTINUED | OUTPATIENT
Start: 2024-01-07 | End: 2024-01-14

## 2024-01-07 RX ORDER — DEXTROSE 50 % IN WATER 50 %
12.5 SYRINGE (ML) INTRAVENOUS ONCE
Refills: 0 | Status: DISCONTINUED | OUTPATIENT
Start: 2024-01-07 | End: 2024-01-14

## 2024-01-07 RX ORDER — INSULIN LISPRO 100/ML
6 VIAL (ML) SUBCUTANEOUS ONCE
Refills: 0 | Status: COMPLETED | OUTPATIENT
Start: 2024-01-07 | End: 2024-01-07

## 2024-01-07 RX ORDER — DEXTROSE 50 % IN WATER 50 %
50 SYRINGE (ML) INTRAVENOUS ONCE
Refills: 0 | Status: COMPLETED | OUTPATIENT
Start: 2024-01-07 | End: 2024-01-07

## 2024-01-07 RX ORDER — TAMSULOSIN HYDROCHLORIDE 0.4 MG/1
0.4 CAPSULE ORAL AT BEDTIME
Refills: 0 | Status: DISCONTINUED | OUTPATIENT
Start: 2024-01-07 | End: 2024-01-14

## 2024-01-07 RX ORDER — METOPROLOL TARTRATE 50 MG
25 TABLET ORAL
Refills: 0 | Status: DISCONTINUED | OUTPATIENT
Start: 2024-01-07 | End: 2024-01-14

## 2024-01-07 RX ORDER — PROCHLORPERAZINE MALEATE 5 MG
5 TABLET ORAL EVERY 6 HOURS
Refills: 0 | Status: DISCONTINUED | OUTPATIENT
Start: 2024-01-07 | End: 2024-01-14

## 2024-01-07 RX ORDER — SODIUM ZIRCONIUM CYCLOSILICATE 10 G/10G
10 POWDER, FOR SUSPENSION ORAL ONCE
Refills: 0 | Status: COMPLETED | OUTPATIENT
Start: 2024-01-07 | End: 2024-01-07

## 2024-01-07 RX ORDER — SODIUM CHLORIDE 9 MG/ML
500 INJECTION INTRAMUSCULAR; INTRAVENOUS; SUBCUTANEOUS ONCE
Refills: 0 | Status: COMPLETED | OUTPATIENT
Start: 2024-01-07 | End: 2024-01-07

## 2024-01-07 RX ORDER — CALCIUM GLUCONATE 100 MG/ML
1 VIAL (ML) INTRAVENOUS ONCE
Refills: 0 | Status: COMPLETED | OUTPATIENT
Start: 2024-01-07 | End: 2024-01-07

## 2024-01-07 RX ORDER — INSULIN GLARGINE 100 [IU]/ML
8 INJECTION, SOLUTION SUBCUTANEOUS AT BEDTIME
Refills: 0 | Status: DISCONTINUED | OUTPATIENT
Start: 2024-01-07 | End: 2024-01-07

## 2024-01-07 RX ORDER — SODIUM CHLORIDE 9 MG/ML
1000 INJECTION INTRAMUSCULAR; INTRAVENOUS; SUBCUTANEOUS
Refills: 0 | Status: DISCONTINUED | OUTPATIENT
Start: 2024-01-07 | End: 2024-01-09

## 2024-01-07 RX ORDER — SODIUM CHLORIDE 9 MG/ML
1000 INJECTION, SOLUTION INTRAVENOUS ONCE
Refills: 0 | Status: COMPLETED | OUTPATIENT
Start: 2024-01-07 | End: 2024-01-07

## 2024-01-07 RX ORDER — OXYCODONE HYDROCHLORIDE 5 MG/1
5 TABLET ORAL ONCE
Refills: 0 | Status: DISCONTINUED | OUTPATIENT
Start: 2024-01-07 | End: 2024-01-07

## 2024-01-07 RX ORDER — MIDODRINE HYDROCHLORIDE 2.5 MG/1
10 TABLET ORAL THREE TIMES A DAY
Refills: 0 | Status: DISCONTINUED | OUTPATIENT
Start: 2024-01-07 | End: 2024-01-14

## 2024-01-07 RX ORDER — LANOLIN ALCOHOL/MO/W.PET/CERES
3 CREAM (GRAM) TOPICAL AT BEDTIME
Refills: 0 | Status: DISCONTINUED | OUTPATIENT
Start: 2024-01-07 | End: 2024-01-14

## 2024-01-07 RX ORDER — PANTOPRAZOLE SODIUM 20 MG/1
80 TABLET, DELAYED RELEASE ORAL ONCE
Refills: 0 | Status: COMPLETED | OUTPATIENT
Start: 2024-01-07 | End: 2024-01-07

## 2024-01-07 RX ORDER — SODIUM BICARBONATE 1 MEQ/ML
650 SYRINGE (ML) INTRAVENOUS
Refills: 0 | Status: DISCONTINUED | OUTPATIENT
Start: 2024-01-07 | End: 2024-01-14

## 2024-01-07 RX ORDER — SODIUM CHLORIDE 9 MG/ML
1000 INJECTION, SOLUTION INTRAVENOUS
Refills: 0 | Status: DISCONTINUED | OUTPATIENT
Start: 2024-01-07 | End: 2024-01-14

## 2024-01-07 RX ORDER — DEXTROSE 50 % IN WATER 50 %
25 SYRINGE (ML) INTRAVENOUS ONCE
Refills: 0 | Status: DISCONTINUED | OUTPATIENT
Start: 2024-01-07 | End: 2024-01-14

## 2024-01-07 RX ORDER — DEXTROSE 50 % IN WATER 50 %
15 SYRINGE (ML) INTRAVENOUS ONCE
Refills: 0 | Status: DISCONTINUED | OUTPATIENT
Start: 2024-01-07 | End: 2024-01-14

## 2024-01-07 RX ORDER — ACETAMINOPHEN 500 MG
1000 TABLET ORAL ONCE
Refills: 0 | Status: COMPLETED | OUTPATIENT
Start: 2024-01-07 | End: 2024-01-07

## 2024-01-07 RX ORDER — PANTOPRAZOLE SODIUM 20 MG/1
40 TABLET, DELAYED RELEASE ORAL EVERY 12 HOURS
Refills: 0 | Status: DISCONTINUED | OUTPATIENT
Start: 2024-01-07 | End: 2024-01-14

## 2024-01-07 RX ORDER — ONDANSETRON 8 MG/1
4 TABLET, FILM COATED ORAL EVERY 8 HOURS
Refills: 0 | Status: DISCONTINUED | OUTPATIENT
Start: 2024-01-07 | End: 2024-01-07

## 2024-01-07 RX ORDER — CALCIUM CHLORIDE
500 POWDER (GRAM) MISCELLANEOUS ONCE
Refills: 0 | Status: DISCONTINUED | OUTPATIENT
Start: 2024-01-07 | End: 2024-01-07

## 2024-01-07 RX ORDER — CEFTRIAXONE 500 MG/1
1000 INJECTION, POWDER, FOR SOLUTION INTRAMUSCULAR; INTRAVENOUS ONCE
Refills: 0 | Status: COMPLETED | OUTPATIENT
Start: 2024-01-07 | End: 2024-01-07

## 2024-01-07 RX ORDER — INSULIN LISPRO 100/ML
VIAL (ML) SUBCUTANEOUS EVERY 6 HOURS
Refills: 0 | Status: DISCONTINUED | OUTPATIENT
Start: 2024-01-07 | End: 2024-01-12

## 2024-01-07 RX ORDER — INSULIN GLARGINE 100 [IU]/ML
10 INJECTION, SOLUTION SUBCUTANEOUS AT BEDTIME
Refills: 0 | Status: DISCONTINUED | OUTPATIENT
Start: 2024-01-07 | End: 2024-01-14

## 2024-01-07 RX ORDER — GLUCAGON INJECTION, SOLUTION 0.5 MG/.1ML
1 INJECTION, SOLUTION SUBCUTANEOUS ONCE
Refills: 0 | Status: DISCONTINUED | OUTPATIENT
Start: 2024-01-07 | End: 2024-01-14

## 2024-01-07 RX ADMIN — Medication 4: at 17:29

## 2024-01-07 RX ADMIN — Medication 50 MILLILITER(S): at 09:37

## 2024-01-07 RX ADMIN — Medication 1 MILLIGRAM(S): at 17:40

## 2024-01-07 RX ADMIN — SODIUM CHLORIDE 75 MILLILITER(S): 9 INJECTION INTRAMUSCULAR; INTRAVENOUS; SUBCUTANEOUS at 18:08

## 2024-01-07 RX ADMIN — Medication 400 MILLIGRAM(S): at 08:28

## 2024-01-07 RX ADMIN — Medication 50 MILLILITER(S): at 17:42

## 2024-01-07 RX ADMIN — GABAPENTIN 300 MILLIGRAM(S): 400 CAPSULE ORAL at 21:24

## 2024-01-07 RX ADMIN — Medication 100 GRAM(S): at 09:37

## 2024-01-07 RX ADMIN — MIDODRINE HYDROCHLORIDE 10 MILLIGRAM(S): 2.5 TABLET ORAL at 17:41

## 2024-01-07 RX ADMIN — Medication 100 GRAM(S): at 10:56

## 2024-01-07 RX ADMIN — ONDANSETRON 4 MILLIGRAM(S): 8 TABLET, FILM COATED ORAL at 08:27

## 2024-01-07 RX ADMIN — Medication 6 UNIT(S): at 18:53

## 2024-01-07 RX ADMIN — INSULIN HUMAN 5 UNIT(S): 100 INJECTION, SOLUTION SUBCUTANEOUS at 09:37

## 2024-01-07 RX ADMIN — OXYCODONE HYDROCHLORIDE 5 MILLIGRAM(S): 5 TABLET ORAL at 02:14

## 2024-01-07 RX ADMIN — PANTOPRAZOLE SODIUM 80 MILLIGRAM(S): 20 TABLET, DELAYED RELEASE ORAL at 08:27

## 2024-01-07 RX ADMIN — TAMSULOSIN HYDROCHLORIDE 0.4 MILLIGRAM(S): 0.4 CAPSULE ORAL at 21:24

## 2024-01-07 RX ADMIN — SODIUM CHLORIDE 1000 MILLILITER(S): 9 INJECTION, SOLUTION INTRAVENOUS at 08:28

## 2024-01-07 RX ADMIN — PANTOPRAZOLE SODIUM 40 MILLIGRAM(S): 20 TABLET, DELAYED RELEASE ORAL at 17:41

## 2024-01-07 RX ADMIN — Medication 650 MILLIGRAM(S): at 17:41

## 2024-01-07 RX ADMIN — SODIUM ZIRCONIUM CYCLOSILICATE 10 GRAM(S): 10 POWDER, FOR SUSPENSION ORAL at 17:39

## 2024-01-07 RX ADMIN — DRONEDARONE 400 MILLIGRAM(S): 400 TABLET, FILM COATED ORAL at 17:41

## 2024-01-07 RX ADMIN — Medication 25 MILLIGRAM(S): at 17:41

## 2024-01-07 RX ADMIN — CEFTRIAXONE 1000 MILLIGRAM(S): 500 INJECTION, POWDER, FOR SOLUTION INTRAMUSCULAR; INTRAVENOUS at 08:28

## 2024-01-07 RX ADMIN — SODIUM CHLORIDE 75 MILLILITER(S): 9 INJECTION INTRAMUSCULAR; INTRAVENOUS; SUBCUTANEOUS at 21:23

## 2024-01-07 RX ADMIN — SIMVASTATIN 20 MILLIGRAM(S): 20 TABLET, FILM COATED ORAL at 21:24

## 2024-01-07 RX ADMIN — SODIUM CHLORIDE 500 MILLILITER(S): 9 INJECTION INTRAMUSCULAR; INTRAVENOUS; SUBCUTANEOUS at 02:14

## 2024-01-07 RX ADMIN — INSULIN HUMAN 5 UNIT(S): 100 INJECTION, SOLUTION SUBCUTANEOUS at 17:42

## 2024-01-07 NOTE — H&P ADULT - HISTORY OF PRESENT ILLNESS
82 yo M with PMH of DM, HTN, CKD stage III, who presented from rehab after episode of coffee ground emesis. He was recently admitted in 12/2023 for acute cholecystitis and had perc sky placed by IR on 12/22/23. Hospital course was complicated by hypoxic respiratory failure secondary to COVID, RON, Afib with RVR, and delirium. He was discharged to SNF on 1/5/2024,  planned for outpatient IR follow up and interval cholecystectomy. He presented back to ED on 1/6/24 due to complaints of abdominal pain, nausea and vomiting. CT AP at that time showed new markedly dilated, fluid filled esophagus. He was discharged back to rehab, but then presented back to ED today (1/7/24) after large volume coffee ground emesis. Family at bedside reports he has a history of stomach ulcers but no recent endoscopy. At baseline patient is alert and oriented by2. Currently patient knows his name and that he is in the hospital but does not know the year. He denies abdominal pain and nausea at this time.     He was seen by GI who rec NPO and IV PPI. Will need eventual EGD.  84 yo M with PMH of DM, HTN, CKD stage III, who presented from rehab after episode of coffee ground emesis. He was recently admitted in 12/2023 for acute cholecystitis and had perc sky placed by IR on 12/22/23. Hospital course was complicated by hypoxic respiratory failure secondary to COVID, RON, Afib with RVR, and delirium. He was discharged to SNF on 1/5/2024,  planned for outpatient IR follow up and interval cholecystectomy. He presented back to ED on 1/6/24 due to complaints of abdominal pain, nausea and vomiting. CT AP at that time showed new markedly dilated, fluid filled esophagus. He was discharged back to rehab, but then presented back to ED today (1/7/24) after large volume coffee ground emesis. Family at bedside reports he has a history of stomach ulcers but no recent endoscopy. At baseline patient is alert and oriented by2. Currently patient knows his name and that he is in the hospital but does not know the year. He denies abdominal pain and nausea at this time.     He was seen by GI who rec NPO and IV PPI. Will need eventual EGD.

## 2024-01-07 NOTE — ED PROVIDER NOTE - PHYSICAL EXAMINATION
Gen: +DARK BROWN VOMIT ON FACE, HANDS, GLOVES.   Head: NC/AT  Neck: trachea midline  Resp:  No distress, lungs clear bilaterally.   Abd: soft, nd +DIFFUSELY TTP  Ext: no deformities  Neuro:  +CONFUSED  Skin:  Warm and dry as visualized Gen: +DARK BROWN VOMIT ON FACE, HANDS, GLOVES.   Head: NC/AT  Neck: trachea midline  Resp:  No distress, lungs clear bilaterally.   Abd: soft, nd +DIFFUSELY TTP +DRAIN w/ THIN DARK FLUID FROM RIGHT FLANK  Ext: no deformities  Neuro:  +CONFUSED  Skin:  Warm and dry as visualized

## 2024-01-07 NOTE — ED PROVIDER NOTE - NS ED ROS FT
Review of Systems  SKIN: warm, dry w/ no rash or bleeding  RESPIRATORY: no cough or SOB  CARDIAC: no chest pain & no palpitations  GI: +ABDOMINAL PAIN, NAUSEA, VOMITING BLOOD  MUSCULOSKELETAL:  no joint pain, swelling or redness  NEURO: CONFUSION

## 2024-01-07 NOTE — ED ADULT NURSE NOTE - OBJECTIVE STATEMENT
Pt received in critical care.  Pt with a large amount of coffee ground emesis on sheets and gown.  Pt denies pain.  Pt a&ox1.  Pt was discharged from [facility] overnight.  Pt has gal bladder drain in RUQ. Pt received in critical care.  Pt with a large amount of coffee ground emesis on sheets and gown.  Pt denies pain.  Pt a&ox1.  Pt was discharged from [facility] overnight.  Pt has gal bladder drain in RUQ. Pt received in critical care.  Pt with a large amount of coffee ground emesis on sheets and gown.  Pt denies pain.  Pt a&ox1.  Follows commands.  Pt was discharged from [facility] overnight.  Pt has gal bladder drain in RUQ. Pt received in critical care.  Pt with a large amount of coffee ground emesis on sheets and gown.  Pt denies pain.  Pt a&ox1.  Follows commands.  Pt was discharged from [facility] overnight.  Pt has gal bladder drain in RUQ.

## 2024-01-07 NOTE — ED PROCEDURE NOTE - ATTENDING CONTRIBUTION TO CARE
I have participated in and supervised all key portions of the above procedures and agree with the above documentation.
I have participated in and supervised all key portions of the above procedures and agree with the above documentation.

## 2024-01-07 NOTE — ED ADULT TRIAGE NOTE - NURSING HOMES
[facility]

## 2024-01-07 NOTE — H&P ADULT - NSHPPHYSICALEXAM_GEN_ALL_CORE
PHYSICAL EXAM:  Constitutional: No acute distress, alert and oriented by 2  HEENT: AT/NC, EOMI, PERRLA, Normal conjunctiva, no pharyngeal erythema, moist oral mucosa  Respiratory: CTA BL, equal breath sounds, no crackles or wheezing  Cardiovascular: RRR, no edema  Gastrointestinal: soft, Non-tender, Non-distended + Bowel sounds, no rebound or guarding  Genitourinary: no wills  Musculoskeletal: No joint edema  Neurological: CN 2-12 grossly intact, no focal deficits, moves all extremites  Skin: warm, dry and intact  Psychiatric: normal mood and affect PHYSICAL EXAM:  Constitutional: No acute distress, alert and oriented by 2  HEENT: AT/NC, EOMI, PERRLA, Normal conjunctiva, no pharyngeal erythema, moist oral mucosa  Respiratory: CTA BL, equal breath sounds, no crackles or wheezing  Cardiovascular: RRR, no edema  Gastrointestinal: soft, Non-tender, Non-distended + Bowel sounds, no rebound or guarding, + perc tube  Genitourinary: no wills  Musculoskeletal: No joint edema  Neurological: CN 2-12 grossly intact, no focal deficits, moves all extremites  Skin: warm, dry and intact  Psychiatric: normal mood and affect

## 2024-01-07 NOTE — ED PROVIDER NOTE - ATTENDING CONTRIBUTION TO CARE
83 yoM; with PMH significant for DM, HTN, HLD, CKD stage III, recent admission with discharge 2 days ago for acute sky s/p perc sky with IR drain placement with stay complicated by hypoxic respiratory failure secondary to COVID/RON/A-fib with RVR/delirium; now presenting with increasing abdominal pain with nausea and vomiting.  Patient reports abdominal pain–epigastric, associated with burning sensation associated nausea and vomiting x 3 episodes–NBNB last night and then again with dark emesis upon return to facility this morning.  Denies diarrhea.  Denies fever.  Complaining of chills.  Denies dysuria.  General:     malaised  Head:     NC/AT, EOMI, oral mucosa moist  Neck:     trachea midline  Lungs:     CTA b/l, no w/r/r  CVS:   tachycardic, no m/g/r  Abd:     +BS, s/nd, no organomegaly. ttp @ epigastrium > RUQ. biliary drain with bilious fluid output  Ext:    2+ radial and pedal pulses, no c/c/e  Neuro: AAOx2, no sensory/motor deficits  A/P:  83yoM p/w worsening abd pain, vomiting, with confusion and now with dark emesis.   -protonix, gi consult, abx, and likely admission. 83 yoM; with PMH significant for DM, HTN, HLD, CKD stage III, recent admission with discharge 2 days ago for acute sky s/p perc sky with IR drain placement with stay complicated by hypoxic respiratory failure secondary to COVID/RON/A-fib with RVR/delirium; now presenting with increasing abdominal pain with nausea and vomiting.  Patient reports abdominal pain–epigastric, associated with burning sensation associated nausea and vomiting x 3 episodes–NBNB last night and then again with dark emesis upon return to facility this morning.  Denies diarrhea.  Denies fever.  Complaining of chills.  Denies dysuria.  General:     malaised  Head:     NC/AT, EOMI, oral mucosa moist  Neck:     trachea midline  Lungs:     CTA b/l, no w/r/r  CVS:   tachycardic, no m/g/r  Abd:     +BS, s/nd, no organomegaly. ttp @ epigastrium > RUQ. biliary drain with bilious fluid output  Ext:    2+ radial and pedal pulses, no c/c/e  Neuro: AAOx2, no sensory/motor deficits  A/P:  83yoM p/w worsening abd pain, vomiting, with confusion and now with dark emesis.   -protonix, gi consult, abx, and likely admission.    Upon my evaluation, this patient had a high probability of imminent or life-threatening deterioration due to _UGIB__ , which required my direct attention, intervention, and personal management.    I have personally provided __40_ minutes of critical care time exclusive of time spent on separately billable procedures.  Time includes review of laboratory data, radiology results, discussion with consultants, and monitoring for potential decompensation.  Interventions were performed as documented.

## 2024-01-07 NOTE — ED PROVIDER NOTE - OBJECTIVE STATEMENT
BERNADINE ANDERSON is a 82yo Male with PMH DM, HTN, HLD stage III CKD who presents after episode of dark brown emesis. PT recently admitted for acute sky which was managed w/ IR drain placement. Hospital course was complicated by hypoxic resp failure 2/2 COVID, RON, A fib RVR and delirium. Pt was discharged to SNF two days ago with plan for IR follow up for management of drain and interval cholecystectomy. He returned to the ED yesterday c/o abdominal pain and vomiting. Work up at that time including CT imaging was negative. PT was observed in the ED and had no vomiting. He was discharged this moring back to SNF but upon arrival PT had large volume coffee ground emesis noted by EMS, SNF staff prompting return to ED. On arrival Pt noted to have dried emesis around mouth, hands on clothes concerning for digested blood. Pt still confused on evaluation today and c/o abdominal pain and nausea. BERNADINE ANDERSNO is a 82yo Male with PMH DM, HTN, HLD stage III CKD who presents after episode of dark brown emesis. PT recently admitted for acute sky which was managed w/ IR drain placement. Hospital course was complicated by hypoxic resp failure 2/2 COVID, RON, A fib RVR and delirium. Pt was discharged to SNF two days ago with plan for IR follow up for management of drain and interval cholecystectomy. He returned to the ED yesterday c/o abdominal pain and vomiting. Work up at that time including CT imaging was negative. PT was observed in the ED and had no vomiting. He was discharged this moring back to SNF but upon arrival PT had large volume coffee ground emesis noted by EMS, SNF staff prompting return to ED. On arrival Pt noted to have dried emesis around mouth, hands on clothes concerning for digested blood. Pt still confused on evaluation today and c/o abdominal pain and nausea. BERNADINE ANDERSON is a 82yo Male with PMH DM, HTN, HLD stage III CKD who presents after episode of dark brown emesis. PT recently admitted for acute sky which was managed w/ IR drain placement. Hospital course was complicated by hypoxic resp failure 2/2 COVID, RON, A fib RVR and delirium. Pt was discharged to SNF two days ago with plan for IR follow up for management of drain and interval cholecystectomy. He returned to the ED yesterday c/o abdominal pain and vomiting. Work up at that time including CT imaging was negative. PT was observed in the ED and had no vomiting. He was discharged this morning back to SNF but upon arrival PT had large volume coffee ground emesis noted by EMS, SNF staff prompting return to ED. On arrival Pt noted to have dried emesis around mouth, hands on clothes concerning for digested blood. Pt still confused on evaluation today and c/o abdominal pain and nausea. BERNADINE ANDERSON is a 84yo Male with PMH DM, HTN, HLD stage III CKD who presents after episode of dark brown emesis. PT recently admitted for acute sky which was managed w/ IR drain placement. Hospital course was complicated by hypoxic resp failure 2/2 COVID, RON, A fib RVR and delirium. Pt was discharged to SNF two days ago with plan for IR follow up for management of drain and interval cholecystectomy. He returned to the ED yesterday c/o abdominal pain and vomiting. Work up at that time including CT imaging was negative. PT was observed in the ED and had no vomiting. He was discharged this morning back to SNF but upon arrival PT had large volume coffee ground emesis noted by EMS, SNF staff prompting return to ED. On arrival Pt noted to have dried emesis around mouth, hands on clothes concerning for digested blood. Pt still confused on evaluation today and c/o abdominal pain and nausea.

## 2024-01-07 NOTE — ED ADULT NURSE NOTE - CHIEF COMPLAINT QUOTE
pt recently DC from [facility], began vomiting black on arrival to [place]. pt with dementia at baseline, pale and tachycardic on arrival. pt recently DC from [facility], began vomiting black on arrival to [place]. pt with dementia at baseline, pale and tachycardic on arrival.

## 2024-01-07 NOTE — ED ADULT NURSE REASSESSMENT NOTE - NS ED NURSE REASSESS COMMENT FT1
assumed care of pt at 1141 form [facility] PAXTON [place]. pt brought to B12L, placed on CM. pt in NAD. repeat BNP sent as ordered. b/l IV's intact, flushing without difficulty  pt a&ox1, bed locked in lowest position, safety maintained.

## 2024-01-07 NOTE — ED ADULT TRIAGE NOTE - CHIEF COMPLAINT QUOTE
pt recently DC from [facility], began vomiting black on arrival to [place]. pt with dementia at baseline, pale and tachycardic on arrival. pt recently DC from [facility], began vomiting black on arrival to [place]. pt with dementia at baseline, pale and tachycardic on arrival.

## 2024-01-07 NOTE — H&P ADULT - ASSESSMENT
84 yo M with PMH of DM, HTN, CKD stage III, who presented from rehab after episode of coffee ground emesis. He was recently admitted in 12/2023 for acute cholecystitis and had perc sky placed by IR on 12/22/23. Hospital course was complicated by hypoxic respiratory failure secondary to COVID, RON, Afib with RVR, and delirium. He was discharged to SNF on 1/5/2024,  planned for outpatient IR follow up and interval cholecystectomy. He presented back to ED on 1/6/24 due to complaints of abdominal pain, nausea and vomiting. CT AP at that time showed new markedly dilated, fluid filled esophagus. He was discharged back to rehab, but then presented back to ED today (1/7/24) after large volume coffee ground emesis.    Coffee ground emesis concern for Upper GI bleed  hgb 8.0  patient was on 5 days of decadron when he had covid  - hold asa  - PPI IV BID  - NPO  - IVFS  - active type and screen  - trend h&h  - GI consult will need EGD.   - Family at bedside agreeable to EGD and willing to consent     Hyperkalemia  K on arrival 6.9  - s/p insuline and kalli gluconate  - repeat 5,5  - monitor K    Leukocytosis  CXR small effusion vs atelectasis  UA unremarkable  - will send blood cx    CKD stage 3  BPH  - Baseline ~ 2-2.5  - renally dose meds  - avoid nephrotoxic medications  - c/w Tamsulosin  - cont sodium bicarb BID      Recent Acute cholecystitis s/p IR perc drain placemen  - will need drain in for 4-6 weeks for tract to form, outpt IR followup  - completed abx   - on home midodrine, unclear etiology    Afib   - Tele  -  cont Multaq  - metoprolol BID  - hold asa    DM  - A1C 9  - accu checks q6 hr while npo  - dec  Lantus 8 QHS while npo  - hold pre meal  - q 6hr ISS while npo   - can c/w gabapentin for neuropathy   - monitor and adjust insulin as needed    HLD  -c/w statin    dvt ppx: SCDs  Plan discussed with Wife and Daughter at bedside 84 yo M with PMH of DM, HTN, CKD stage III, who presented from rehab after episode of coffee ground emesis. He was recently admitted in 12/2023 for acute cholecystitis and had perc sky placed by IR on 12/22/23. Hospital course was complicated by hypoxic respiratory failure secondary to COVID, RON, Afib with RVR, and delirium. He was discharged to SNF on 1/5/2024,  planned for outpatient IR follow up and interval cholecystectomy. He presented back to ED on 1/6/24 due to complaints of abdominal pain, nausea and vomiting. CT AP at that time showed new markedly dilated, fluid filled esophagus. He was discharged back to rehab, but then presented back to ED today (1/7/24) after large volume coffee ground emesis.    Coffee ground emesis concern for Upper GI bleed  Acute blood loss anemia  hgb 8.0  patient was on 5 days of decadron when he had covid  - hold asa  - PPI IV BID  - NPO  - IVFS  - active type and screen  - trend h&h  - GI consult will need EGD.   - Family at bedside agreeable to EGD and willing to consent     Hyperkalemia  K on arrival 6.9  - s/p insuline and kalli gluconate  - repeat 5,5  - monitor K    Leukocytosis  CXR small effusion vs atelectasis  UA unremarkable  - will send blood cx    CKD stage 3  BPH  - Baseline ~ 2-2.5  - renally dose meds  - avoid nephrotoxic medications  - c/w Tamsulosin  - cont sodium bicarb BID      Recent Acute cholecystitis s/p IR perc drain placemen  - will need drain in for 4-6 weeks for tract to form, outpt IR followup  - completed abx   - on home midodrine, unclear etiology    Afib   - Tele  -  cont Multaq  - metoprolol BID  - hold asa    DM  - A1C 9  - accu checks q6 hr while npo  - dec  Lantus 8 QHS while npo  - hold pre meal  - q 6hr ISS while npo   - can c/w gabapentin for neuropathy   - monitor and adjust insulin as needed    HLD  -c/w statin    dvt ppx: SCDs  Plan discussed with Wife and Daughter at bedside

## 2024-01-07 NOTE — CHART NOTE - NSCHARTNOTEFT_GEN_A_CORE
repeat labs reviewed    hemoglobin now 7.1, will transfuse 1 unit rbc, consent obtained from wife. will recheck h/h 2 hours post transfusion  potassium increased to 5.7 will given insulin d50 and Lokelma. will repeat K 2 hours post insulin.   ekg also ordered

## 2024-01-07 NOTE — ED ADULT NURSE NOTE - NSFALLRISKINTERV_ED_ALL_ED
Assistance OOB with selected safe patient handling equipment if applicable/Assistance with ambulation/Communicate fall risk and risk factors to all staff, patient, and family/Monitor gait and stability/Monitor for mental status changes and reorient to person, place, and time, as needed/Provide visual cue: yellow wristband, yellow gown, etc/Reinforce activity limits and safety measures with patient and family/Toileting schedule using arm’s reach rule for commode and bathroom/Use of alarms - bed, stretcher, chair and/or video monitoring/Call bell, personal items and telephone in reach/Instruct patient to call for assistance before getting out of bed/chair/stretcher/Non-slip footwear applied when patient is off stretcher/[place] to call system/Physically safe environment - no spills, clutter or unnecessary equipment/Purposeful Proactive Rounding/Room/bathroom lighting operational, light cord in reach Assistance OOB with selected safe patient handling equipment if applicable/Assistance with ambulation/Communicate fall risk and risk factors to all staff, patient, and family/Monitor gait and stability/Monitor for mental status changes and reorient to person, place, and time, as needed/Provide visual cue: yellow wristband, yellow gown, etc/Reinforce activity limits and safety measures with patient and family/Toileting schedule using arm’s reach rule for commode and bathroom/Use of alarms - bed, stretcher, chair and/or video monitoring/Call bell, personal items and telephone in reach/Instruct patient to call for assistance before getting out of bed/chair/stretcher/Non-slip footwear applied when patient is off stretcher/[place] to call system/Physically safe environment - no spills, clutter or unnecessary equipment/Purposeful Proactive Rounding/Room/bathroom lighting operational, light cord in reach

## 2024-01-07 NOTE — CONSULT NOTE ADULT - SUBJECTIVE AND OBJECTIVE BOX
Chief Complaint:  Patient is a 83y old  Male who presents with a chief complaint of     HPI:  82 yo M with PMH of DM, HTN, CKD stage III, who presented from rehab after episode of coffee ground emesis. He was recently admitted in 12/2023 for acute cholecystitis and had perc sky placed by IR on 12/22/23. Hospital course was complicated by hypoxic respiratory failure secondary to COVID, RON, Afib with RVR, and delirium. He was discharged to SNF two days ago and was planned for outpatient IR follow up and interval cholecystectomy. He presented back to ED on 1/6/24 due to complaints of abdominal pain, nausea and vomiting. CT AP at that time showed new markedly dilated, fluid filled esophagus. He was discharged back to rehab, but then presented back to ED today (1/7/24) after large volume coffee ground emesis. At time of GI evaluation, patient is confused and sleepy, unable to provide history, but denies abdominal pain. He is hemodynamically stable with overall stable Hgb (8.0 > 9.7 > 8.2 over the past 3 days).     PAST MEDICAL & SURGICAL HISTORY:  Diabetes      Hypertension      Prostate disorder      Anxiety      High cholesterol      Ulcer      No significant past surgical history          REVIEW OF SYSTEMS:   General: Negative  HEENT: Negative  CV: Negative  Respiratory: Negative  GI: See HPI  : Negative  MSK: Negative  Hematologic: Negative  Skin: Negative    MEDICATIONS:   MEDICATIONS  (STANDING):    MEDICATIONS  (PRN):          DIET:  Diet, NPO (01-07-24 @ 07:44) [Active]          ALLERGIES:   Allergies    No Known Allergies    Intolerances          VITAL SIGNS:   Vital Signs Last 24 Hrs  T(C): 37.2 (07 Jan 2024 08:19), Max: 37.5 (06 Jan 2024 20:18)  T(F): 98.9 (07 Jan 2024 08:19), Max: 99.5 (06 Jan 2024 20:18)  HR: 92 (07 Jan 2024 10:58) (86 - 112)  BP: 135/76 (07 Jan 2024 10:58) (115/57 - 164/80)  BP(mean): --  RR: 16 (07 Jan 2024 10:58) (16 - 22)  SpO2: 100% (07 Jan 2024 10:58) (93% - 100%)    Parameters below as of 07 Jan 2024 10:58  Patient On (Oxygen Delivery Method): nasal cannula  O2 Flow (L/min): 2    I&O's Summary      PHYSICAL EXAM:   GENERAL:  Dried coffee ground material on hands and face, NAD  HEENT:  NC/AT, conjunctiva clear, sclera anicteric  CHEST:  No increased effort  HEART:  Regular rate  ABDOMEN:  Soft, non-tender, non-distended, normoactive bowel sounds, no rebound or guarding  EXTREMITIES: No edema  SKIN:  Warm, dry  NEURO:  Confused, sleepy    LABS:                        8.0    16.15 )-----------( 334      ( 07 Jan 2024 08:05 )             25.6     Hemoglobin: 8.0 g/dL (01-07-24 @ 08:05)  Hemoglobin: 9.7 g/dL (01-06-24 @ 18:28)  Hemoglobin: 8.2 g/dL (01-05-24 @ 05:18)    01-07    138  |  104  |  55.9<H>  ----------------------------<  405<H>  5.5<H>   |  25.0  |  2.34<H>    Ca    9.0      07 Jan 2024 11:45    TPro  5.6<L>  /  Alb  2.6<L>  /  TBili  0.6  /  DBili  x   /  AST  14  /  ALT  21  /  AlkPhos  70  01-07    LIVER FUNCTIONS - ( 07 Jan 2024 08:00 )  Alb: 2.6 g/dL / Pro: 5.6 g/dL / ALK PHOS: 70 U/L / ALT: 21 U/L / AST: 14 U/L / GGT: x             PT/INR - ( 07 Jan 2024 08:05 )   PT: 12.1 sec;   INR: 1.09 ratio         PTT - ( 07 Jan 2024 08:05 )  PTT:28.2 sec    Lipase: 34 U/L (01-06-24 @ 18:28)          Ammonia, Serum: 24 umol/L (01-06-24 @ 19:10)                            RADIOLOGY & ADDITIONAL STUDIES:      ACC: [id number] EXAM:  CT ABDOMEN AND PELVIS   ORDERED BY: JULIANNA FELDMAN     PROCEDURE DATE:  01/06/2024          INTERPRETATION:  CLINICAL INFORMATION: Abdominal pain status post   percutaneous cholecystostomy    COMPARISON: 1/4/2024    CONTRAST/COMPLICATIONS:  IV Contrast: NONE  Oral Contrast: NONE  Complications: None reported at time of study completion    PROCEDURE:  CT of the Abdomen and Pelvis was performed.  Sagittal and coronal reformats were performed.    FINDINGS:  LOWER CHEST: Small bilateral pleural effusions with associated   atelectasis. Coronary artery calcifications.    LIVER: Within normal limits.  BILE DUCTS: Normal caliber.  GALLBLADDER: Decompressed gallbladder with surrounding inflammatory   changes and indwelling percutaneous cholecystostomy tube, not   substantially changed  SPLEEN: Within normal limits.  PANCREAS: Within normal limits.  ADRENALS: Within normal limits.  KIDNEYS/URETERS: No hydronephrosis. Right renal cysts.    BLADDER: Within normal limits.  REPRODUCTIVE ORGANS: Prostate within normal limits.    BOWEL: No bowel obstruction. Markedly dilated and fluid-filled lower   esophagus, new compared to prior. Duodenal diverticulum  PERITONEUM: No ascites.  VESSELS: Atherosclerotic changes.  RETROPERITONEUM/LYMPH NODES: No lymphadenopathy.  ABDOMINAL WALL: Fat-containing right inguinal hernia.  BONES: No acute osseous abnormality. Old right-sided rib fractures.   Diffuse osteopenia.    IMPRESSION:  No acute interval change.    Decompressed gallbladder with surrounding inflammatory changes and   indwelling percutaneous cholecystostomy tube, not substantially changed        --- End of Report ---            TANVIR MOE MD; Attending Radiologist  This document has been electronically signed. Jan 6 2024 11:41PM  01-06-24 @ 22:56       Chief Complaint:  Patient is a 83y old  Male who presents with a chief complaint of     HPI:  82 yo M with PMH of DM, HTN, CKD stage III, who presented from rehab after episode of coffee ground emesis. He was recently admitted in 12/2023 for acute cholecystitis and had perc sky placed by IR on 12/22/23. Hospital course was complicated by hypoxic respiratory failure secondary to COVID, RON, Afib with RVR, and delirium. He was discharged to SNF two days ago and was planned for outpatient IR follow up and interval cholecystectomy. He presented back to ED on 1/6/24 due to complaints of abdominal pain, nausea and vomiting. CT AP at that time showed new markedly dilated, fluid filled esophagus. He was discharged back to rehab, but then presented back to ED today (1/7/24) after large volume coffee ground emesis. At time of GI evaluation, patient is confused and sleepy, unable to provide history, but denies abdominal pain. He is hemodynamically stable with overall stable Hgb (8.0 > 9.7 > 8.2 over the past 3 days).     PAST MEDICAL & SURGICAL HISTORY:  Diabetes      Hypertension      Prostate disorder      Anxiety      High cholesterol      Ulcer      No significant past surgical history          REVIEW OF SYSTEMS:   General: Negative  HEENT: Negative  CV: Negative  Respiratory: Negative  GI: See HPI  : Negative  MSK: Negative  Hematologic: Negative  Skin: Negative    MEDICATIONS:   MEDICATIONS  (STANDING):    MEDICATIONS  (PRN):          DIET:  Diet, NPO (01-07-24 @ 07:44) [Active]          ALLERGIES:   Allergies    No Known Allergies    Intolerances          VITAL SIGNS:   Vital Signs Last 24 Hrs  T(C): 37.2 (07 Jan 2024 08:19), Max: 37.5 (06 Jan 2024 20:18)  T(F): 98.9 (07 Jan 2024 08:19), Max: 99.5 (06 Jan 2024 20:18)  HR: 92 (07 Jan 2024 10:58) (86 - 112)  BP: 135/76 (07 Jan 2024 10:58) (115/57 - 164/80)  BP(mean): --  RR: 16 (07 Jan 2024 10:58) (16 - 22)  SpO2: 100% (07 Jan 2024 10:58) (93% - 100%)    Parameters below as of 07 Jan 2024 10:58  Patient On (Oxygen Delivery Method): nasal cannula  O2 Flow (L/min): 2    I&O's Summary      PHYSICAL EXAM:   GENERAL:  Dried coffee ground material on hands and face, NAD  HEENT:  NC/AT, conjunctiva clear, sclera anicteric  CHEST:  No increased effort  HEART:  Regular rate  ABDOMEN:  Soft, non-tender, non-distended, normoactive bowel sounds, no rebound or guarding  EXTREMITIES: No edema  SKIN:  Warm, dry  NEURO:  Confused, sleepy    LABS:                        8.0    16.15 )-----------( 334      ( 07 Jan 2024 08:05 )             25.6     Hemoglobin: 8.0 g/dL (01-07-24 @ 08:05)  Hemoglobin: 9.7 g/dL (01-06-24 @ 18:28)  Hemoglobin: 8.2 g/dL (01-05-24 @ 05:18)    01-07    138  |  104  |  55.9<H>  ----------------------------<  405<H>  5.5<H>   |  25.0  |  2.34<H>    Ca    9.0      07 Jan 2024 11:45    TPro  5.6<L>  /  Alb  2.6<L>  /  TBili  0.6  /  DBili  x   /  AST  14  /  ALT  21  /  AlkPhos  70  01-07    LIVER FUNCTIONS - ( 07 Jan 2024 08:00 )  Alb: 2.6 g/dL / Pro: 5.6 g/dL / ALK PHOS: 70 U/L / ALT: 21 U/L / AST: 14 U/L / GGT: x             PT/INR - ( 07 Jan 2024 08:05 )   PT: 12.1 sec;   INR: 1.09 ratio         PTT - ( 07 Jan 2024 08:05 )  PTT:28.2 sec    Lipase: 34 U/L (01-06-24 @ 18:28)          Ammonia, Serum: 24 umol/L (01-06-24 @ 19:10)                            RADIOLOGY & ADDITIONAL STUDIES:      ACC: [id number] EXAM:  CT ABDOMEN AND PELVIS   ORDERED BY: JULIANNA FELDMAN     PROCEDURE DATE:  01/06/2024          INTERPRETATION:  CLINICAL INFORMATION: Abdominal pain status post   percutaneous cholecystostomy    COMPARISON: 1/4/2024    CONTRAST/COMPLICATIONS:  IV Contrast: NONE  Oral Contrast: NONE  Complications: None reported at time of study completion    PROCEDURE:  CT of the Abdomen and Pelvis was performed.  Sagittal and coronal reformats were performed.    FINDINGS:  LOWER CHEST: Small bilateral pleural effusions with associated   atelectasis. Coronary artery calcifications.    LIVER: Within normal limits.  BILE DUCTS: Normal caliber.  GALLBLADDER: Decompressed gallbladder with surrounding inflammatory   changes and indwelling percutaneous cholecystostomy tube, not   substantially changed  SPLEEN: Within normal limits.  PANCREAS: Within normal limits.  ADRENALS: Within normal limits.  KIDNEYS/URETERS: No hydronephrosis. Right renal cysts.    BLADDER: Within normal limits.  REPRODUCTIVE ORGANS: Prostate within normal limits.    BOWEL: No bowel obstruction. Markedly dilated and fluid-filled lower   esophagus, new compared to prior. Duodenal diverticulum  PERITONEUM: No ascites.  VESSELS: Atherosclerotic changes.  RETROPERITONEUM/LYMPH NODES: No lymphadenopathy.  ABDOMINAL WALL: Fat-containing right inguinal hernia.  BONES: No acute osseous abnormality. Old right-sided rib fractures.   Diffuse osteopenia.    IMPRESSION:  No acute interval change.    Decompressed gallbladder with surrounding inflammatory changes and   indwelling percutaneous cholecystostomy tube, not substantially changed        --- End of Report ---            TANVIR MOE MD; Attending Radiologist  This document has been electronically signed. Jan 6 2024 11:41PM  01-06-24 @ 22:56

## 2024-01-07 NOTE — ED ADULT NURSE REASSESSMENT NOTE - NS ED NURSE REASSESS COMMENT FT1
Report given to PAXTON Diaz. Pt moved to B12L. Pt placed on cardiac monitor. Aox1. Respirations even and unlabored, in no apparent distress.  Plan, abnormal labs, history of present illness, pending labs/tests explained, opportunity to answer questions provided.

## 2024-01-07 NOTE — ED PROVIDER NOTE - CLINICAL SUMMARY MEDICAL DECISION MAKING FREE TEXT BOX
ASSESSMENT:   BERNADINE ANDERSON is a 84yo M who presented with coffee ground emesis. Tachy on arrival but normal blood pressure and no respiratory distress. Physical exam notable for dark brown emesis to hands, clothes, face a/w generalized abdominal TTP. Lungs clear.     PLAN: Zofran and iv fluids. Will give abx ppx as pt likely to have endoscopy. Omeprazole for UGIB. Treat pain. Will send blood cultures, lactate. ASSESSMENT:   BERNADINE ANDERSON is a 82yo M who presented with coffee ground emesis. Tachy on arrival but normal blood pressure and no respiratory distress. Physical exam notable for dark brown emesis to hands, clothes, face a/w generalized abdominal TTP. Lungs clear.     PLAN: Zofran and iv fluids. Will give abx ppx as pt likely to have endoscopy. Omeprazole for UGIB. Treat pain. Will send blood cultures, lactate.

## 2024-01-08 DIAGNOSIS — K92.0 HEMATEMESIS: ICD-10-CM

## 2024-01-08 LAB
ALBUMIN SERPL ELPH-MCNC: 2.4 G/DL — LOW (ref 3.3–5.2)
ALBUMIN SERPL ELPH-MCNC: 2.4 G/DL — LOW (ref 3.3–5.2)
ALP SERPL-CCNC: 51 U/L — SIGNIFICANT CHANGE UP (ref 40–120)
ALP SERPL-CCNC: 51 U/L — SIGNIFICANT CHANGE UP (ref 40–120)
ALT FLD-CCNC: 16 U/L — SIGNIFICANT CHANGE UP
ALT FLD-CCNC: 16 U/L — SIGNIFICANT CHANGE UP
ANION GAP SERPL CALC-SCNC: 10 MMOL/L — SIGNIFICANT CHANGE UP (ref 5–17)
ANION GAP SERPL CALC-SCNC: 10 MMOL/L — SIGNIFICANT CHANGE UP (ref 5–17)
ANION GAP SERPL CALC-SCNC: 8 MMOL/L — SIGNIFICANT CHANGE UP (ref 5–17)
ANION GAP SERPL CALC-SCNC: 8 MMOL/L — SIGNIFICANT CHANGE UP (ref 5–17)
AST SERPL-CCNC: 10 U/L — SIGNIFICANT CHANGE UP
AST SERPL-CCNC: 10 U/L — SIGNIFICANT CHANGE UP
BASOPHILS # BLD AUTO: 0.04 K/UL — SIGNIFICANT CHANGE UP (ref 0–0.2)
BASOPHILS # BLD AUTO: 0.04 K/UL — SIGNIFICANT CHANGE UP (ref 0–0.2)
BASOPHILS NFR BLD AUTO: 0.4 % — SIGNIFICANT CHANGE UP (ref 0–2)
BASOPHILS NFR BLD AUTO: 0.4 % — SIGNIFICANT CHANGE UP (ref 0–2)
BILIRUB SERPL-MCNC: 0.5 MG/DL — SIGNIFICANT CHANGE UP (ref 0.4–2)
BILIRUB SERPL-MCNC: 0.5 MG/DL — SIGNIFICANT CHANGE UP (ref 0.4–2)
BUN SERPL-MCNC: 49.4 MG/DL — HIGH (ref 8–20)
BUN SERPL-MCNC: 49.4 MG/DL — HIGH (ref 8–20)
BUN SERPL-MCNC: 52.1 MG/DL — HIGH (ref 8–20)
BUN SERPL-MCNC: 52.1 MG/DL — HIGH (ref 8–20)
CALCIUM SERPL-MCNC: 8.7 MG/DL — SIGNIFICANT CHANGE UP (ref 8.4–10.5)
CALCIUM SERPL-MCNC: 8.7 MG/DL — SIGNIFICANT CHANGE UP (ref 8.4–10.5)
CALCIUM SERPL-MCNC: 8.8 MG/DL — SIGNIFICANT CHANGE UP (ref 8.4–10.5)
CALCIUM SERPL-MCNC: 8.8 MG/DL — SIGNIFICANT CHANGE UP (ref 8.4–10.5)
CHLORIDE SERPL-SCNC: 108 MMOL/L — SIGNIFICANT CHANGE UP (ref 96–108)
CHLORIDE SERPL-SCNC: 108 MMOL/L — SIGNIFICANT CHANGE UP (ref 96–108)
CHLORIDE SERPL-SCNC: 109 MMOL/L — HIGH (ref 96–108)
CHLORIDE SERPL-SCNC: 109 MMOL/L — HIGH (ref 96–108)
CO2 SERPL-SCNC: 24 MMOL/L — SIGNIFICANT CHANGE UP (ref 22–29)
CO2 SERPL-SCNC: 24 MMOL/L — SIGNIFICANT CHANGE UP (ref 22–29)
CO2 SERPL-SCNC: 26 MMOL/L — SIGNIFICANT CHANGE UP (ref 22–29)
CO2 SERPL-SCNC: 26 MMOL/L — SIGNIFICANT CHANGE UP (ref 22–29)
CREAT SERPL-MCNC: 2.26 MG/DL — HIGH (ref 0.5–1.3)
CREAT SERPL-MCNC: 2.26 MG/DL — HIGH (ref 0.5–1.3)
CREAT SERPL-MCNC: 2.43 MG/DL — HIGH (ref 0.5–1.3)
CREAT SERPL-MCNC: 2.43 MG/DL — HIGH (ref 0.5–1.3)
CULTURE RESULTS: SIGNIFICANT CHANGE UP
CULTURE RESULTS: SIGNIFICANT CHANGE UP
EGFR: 25 ML/MIN/1.73M2 — LOW
EGFR: 25 ML/MIN/1.73M2 — LOW
EGFR: 28 ML/MIN/1.73M2 — LOW
EGFR: 28 ML/MIN/1.73M2 — LOW
EOSINOPHIL # BLD AUTO: 0.19 K/UL — SIGNIFICANT CHANGE UP (ref 0–0.5)
EOSINOPHIL # BLD AUTO: 0.19 K/UL — SIGNIFICANT CHANGE UP (ref 0–0.5)
EOSINOPHIL NFR BLD AUTO: 1.7 % — SIGNIFICANT CHANGE UP (ref 0–6)
EOSINOPHIL NFR BLD AUTO: 1.7 % — SIGNIFICANT CHANGE UP (ref 0–6)
GLUCOSE BLDC GLUCOMTR-MCNC: 118 MG/DL — HIGH (ref 70–99)
GLUCOSE BLDC GLUCOMTR-MCNC: 118 MG/DL — HIGH (ref 70–99)
GLUCOSE BLDC GLUCOMTR-MCNC: 136 MG/DL — HIGH (ref 70–99)
GLUCOSE BLDC GLUCOMTR-MCNC: 136 MG/DL — HIGH (ref 70–99)
GLUCOSE BLDC GLUCOMTR-MCNC: 139 MG/DL — HIGH (ref 70–99)
GLUCOSE BLDC GLUCOMTR-MCNC: 139 MG/DL — HIGH (ref 70–99)
GLUCOSE BLDC GLUCOMTR-MCNC: 170 MG/DL — HIGH (ref 70–99)
GLUCOSE BLDC GLUCOMTR-MCNC: 170 MG/DL — HIGH (ref 70–99)
GLUCOSE BLDC GLUCOMTR-MCNC: 185 MG/DL — HIGH (ref 70–99)
GLUCOSE BLDC GLUCOMTR-MCNC: 185 MG/DL — HIGH (ref 70–99)
GLUCOSE SERPL-MCNC: 148 MG/DL — HIGH (ref 70–99)
GLUCOSE SERPL-MCNC: 148 MG/DL — HIGH (ref 70–99)
GLUCOSE SERPL-MCNC: 160 MG/DL — HIGH (ref 70–99)
GLUCOSE SERPL-MCNC: 160 MG/DL — HIGH (ref 70–99)
HCT VFR BLD CALC: 22.9 % — LOW (ref 39–50)
HCT VFR BLD CALC: 22.9 % — LOW (ref 39–50)
HCT VFR BLD CALC: 23.9 % — LOW (ref 39–50)
HCT VFR BLD CALC: 23.9 % — LOW (ref 39–50)
HGB BLD-MCNC: 7.4 G/DL — LOW (ref 13–17)
HGB BLD-MCNC: 7.4 G/DL — LOW (ref 13–17)
HGB BLD-MCNC: 7.6 G/DL — LOW (ref 13–17)
HGB BLD-MCNC: 7.6 G/DL — LOW (ref 13–17)
IMM GRANULOCYTES NFR BLD AUTO: 0.5 % — SIGNIFICANT CHANGE UP (ref 0–0.9)
IMM GRANULOCYTES NFR BLD AUTO: 0.5 % — SIGNIFICANT CHANGE UP (ref 0–0.9)
LYMPHOCYTES # BLD AUTO: 1.18 K/UL — SIGNIFICANT CHANGE UP (ref 1–3.3)
LYMPHOCYTES # BLD AUTO: 1.18 K/UL — SIGNIFICANT CHANGE UP (ref 1–3.3)
LYMPHOCYTES # BLD AUTO: 10.8 % — LOW (ref 13–44)
LYMPHOCYTES # BLD AUTO: 10.8 % — LOW (ref 13–44)
MCHC RBC-ENTMCNC: 29.6 PG — SIGNIFICANT CHANGE UP (ref 27–34)
MCHC RBC-ENTMCNC: 29.6 PG — SIGNIFICANT CHANGE UP (ref 27–34)
MCHC RBC-ENTMCNC: 29.8 PG — SIGNIFICANT CHANGE UP (ref 27–34)
MCHC RBC-ENTMCNC: 29.8 PG — SIGNIFICANT CHANGE UP (ref 27–34)
MCHC RBC-ENTMCNC: 31.8 GM/DL — LOW (ref 32–36)
MCHC RBC-ENTMCNC: 31.8 GM/DL — LOW (ref 32–36)
MCHC RBC-ENTMCNC: 32.3 GM/DL — SIGNIFICANT CHANGE UP (ref 32–36)
MCHC RBC-ENTMCNC: 32.3 GM/DL — SIGNIFICANT CHANGE UP (ref 32–36)
MCV RBC AUTO: 92.3 FL — SIGNIFICANT CHANGE UP (ref 80–100)
MCV RBC AUTO: 92.3 FL — SIGNIFICANT CHANGE UP (ref 80–100)
MCV RBC AUTO: 93 FL — SIGNIFICANT CHANGE UP (ref 80–100)
MCV RBC AUTO: 93 FL — SIGNIFICANT CHANGE UP (ref 80–100)
MONOCYTES # BLD AUTO: 0.92 K/UL — HIGH (ref 0–0.9)
MONOCYTES # BLD AUTO: 0.92 K/UL — HIGH (ref 0–0.9)
MONOCYTES NFR BLD AUTO: 8.4 % — SIGNIFICANT CHANGE UP (ref 2–14)
MONOCYTES NFR BLD AUTO: 8.4 % — SIGNIFICANT CHANGE UP (ref 2–14)
NEUTROPHILS # BLD AUTO: 8.59 K/UL — HIGH (ref 1.8–7.4)
NEUTROPHILS # BLD AUTO: 8.59 K/UL — HIGH (ref 1.8–7.4)
NEUTROPHILS NFR BLD AUTO: 78.2 % — HIGH (ref 43–77)
NEUTROPHILS NFR BLD AUTO: 78.2 % — HIGH (ref 43–77)
PLATELET # BLD AUTO: 235 K/UL — SIGNIFICANT CHANGE UP (ref 150–400)
PLATELET # BLD AUTO: 235 K/UL — SIGNIFICANT CHANGE UP (ref 150–400)
PLATELET # BLD AUTO: 242 K/UL — SIGNIFICANT CHANGE UP (ref 150–400)
PLATELET # BLD AUTO: 242 K/UL — SIGNIFICANT CHANGE UP (ref 150–400)
POTASSIUM SERPL-MCNC: 4.8 MMOL/L — SIGNIFICANT CHANGE UP (ref 3.5–5.3)
POTASSIUM SERPL-SCNC: 4.8 MMOL/L — SIGNIFICANT CHANGE UP (ref 3.5–5.3)
PROT SERPL-MCNC: 5.1 G/DL — LOW (ref 6.6–8.7)
PROT SERPL-MCNC: 5.1 G/DL — LOW (ref 6.6–8.7)
RBC # BLD: 2.48 M/UL — LOW (ref 4.2–5.8)
RBC # BLD: 2.48 M/UL — LOW (ref 4.2–5.8)
RBC # BLD: 2.57 M/UL — LOW (ref 4.2–5.8)
RBC # BLD: 2.57 M/UL — LOW (ref 4.2–5.8)
RBC # FLD: 15.6 % — HIGH (ref 10.3–14.5)
RBC # FLD: 15.6 % — HIGH (ref 10.3–14.5)
RBC # FLD: 15.8 % — HIGH (ref 10.3–14.5)
RBC # FLD: 15.8 % — HIGH (ref 10.3–14.5)
SODIUM SERPL-SCNC: 142 MMOL/L — SIGNIFICANT CHANGE UP (ref 135–145)
SODIUM SERPL-SCNC: 142 MMOL/L — SIGNIFICANT CHANGE UP (ref 135–145)
SODIUM SERPL-SCNC: 143 MMOL/L — SIGNIFICANT CHANGE UP (ref 135–145)
SODIUM SERPL-SCNC: 143 MMOL/L — SIGNIFICANT CHANGE UP (ref 135–145)
SPECIMEN SOURCE: SIGNIFICANT CHANGE UP
SPECIMEN SOURCE: SIGNIFICANT CHANGE UP
WBC # BLD: 10.68 K/UL — HIGH (ref 3.8–10.5)
WBC # BLD: 10.68 K/UL — HIGH (ref 3.8–10.5)
WBC # BLD: 10.97 K/UL — HIGH (ref 3.8–10.5)
WBC # BLD: 10.97 K/UL — HIGH (ref 3.8–10.5)
WBC # FLD AUTO: 10.68 K/UL — HIGH (ref 3.8–10.5)
WBC # FLD AUTO: 10.68 K/UL — HIGH (ref 3.8–10.5)
WBC # FLD AUTO: 10.97 K/UL — HIGH (ref 3.8–10.5)
WBC # FLD AUTO: 10.97 K/UL — HIGH (ref 3.8–10.5)

## 2024-01-08 PROCEDURE — 99232 SBSQ HOSP IP/OBS MODERATE 35: CPT

## 2024-01-08 PROCEDURE — 99233 SBSQ HOSP IP/OBS HIGH 50: CPT

## 2024-01-08 RX ADMIN — Medication 1: at 05:32

## 2024-01-08 RX ADMIN — Medication 25 MILLIGRAM(S): at 18:03

## 2024-01-08 RX ADMIN — Medication 1: at 11:29

## 2024-01-08 RX ADMIN — MIDODRINE HYDROCHLORIDE 10 MILLIGRAM(S): 2.5 TABLET ORAL at 11:23

## 2024-01-08 RX ADMIN — Medication 650 MILLIGRAM(S): at 18:02

## 2024-01-08 RX ADMIN — MIDODRINE HYDROCHLORIDE 10 MILLIGRAM(S): 2.5 TABLET ORAL at 05:26

## 2024-01-08 RX ADMIN — GABAPENTIN 300 MILLIGRAM(S): 400 CAPSULE ORAL at 21:52

## 2024-01-08 RX ADMIN — INSULIN GLARGINE 10 UNIT(S): 100 INJECTION, SOLUTION SUBCUTANEOUS at 21:51

## 2024-01-08 RX ADMIN — Medication 100 MILLIGRAM(S): at 02:03

## 2024-01-08 RX ADMIN — MIDODRINE HYDROCHLORIDE 10 MILLIGRAM(S): 2.5 TABLET ORAL at 18:03

## 2024-01-08 RX ADMIN — DRONEDARONE 400 MILLIGRAM(S): 400 TABLET, FILM COATED ORAL at 05:27

## 2024-01-08 RX ADMIN — Medication 650 MILLIGRAM(S): at 05:26

## 2024-01-08 RX ADMIN — TAMSULOSIN HYDROCHLORIDE 0.4 MILLIGRAM(S): 0.4 CAPSULE ORAL at 21:52

## 2024-01-08 RX ADMIN — DRONEDARONE 400 MILLIGRAM(S): 400 TABLET, FILM COATED ORAL at 18:04

## 2024-01-08 RX ADMIN — Medication 1 MILLIGRAM(S): at 11:23

## 2024-01-08 RX ADMIN — PANTOPRAZOLE SODIUM 40 MILLIGRAM(S): 20 TABLET, DELAYED RELEASE ORAL at 05:25

## 2024-01-08 RX ADMIN — PANTOPRAZOLE SODIUM 40 MILLIGRAM(S): 20 TABLET, DELAYED RELEASE ORAL at 18:02

## 2024-01-08 RX ADMIN — INSULIN GLARGINE 10 UNIT(S): 100 INJECTION, SOLUTION SUBCUTANEOUS at 02:04

## 2024-01-08 RX ADMIN — Medication 25 MILLIGRAM(S): at 05:30

## 2024-01-08 RX ADMIN — SIMVASTATIN 20 MILLIGRAM(S): 20 TABLET, FILM COATED ORAL at 21:52

## 2024-01-08 NOTE — PROGRESS NOTE ADULT - SUBJECTIVE AND OBJECTIVE BOX
Chief Complaint:  Patient is a 84y old  Male who presents with a chief complaint of GI bleed (07 Jan 2024 13:57)      Interval HPI/ 24 hr events: Patient seen and examined at bedside, resting comfortably in no acute distress on supplemental oxygen. As per RN patient is confused at baseline. No documented bowel movement overnight. Patient with a perc sky in place. No reports from RN nausea, vomiting, diarrhea, abdominal pain, hematemesis, hematochezia, melena. Hgb today 7.4.      REVIEW OF SYSTEMS:   Unable to obtain due to cognition    MEDICATIONS:   MEDICATIONS  (STANDING):  dextrose 5%. 1000 milliLiter(s) (50 mL/Hr) IV Continuous <Continuous>  dextrose 5%. 1000 milliLiter(s) (100 mL/Hr) IV Continuous <Continuous>  dextrose 50% Injectable 25 Gram(s) IV Push once  dextrose 50% Injectable 12.5 Gram(s) IV Push once  dextrose 50% Injectable 25 Gram(s) IV Push once  dronedarone 400 milliGRAM(s) Oral two times a day  folic acid 1 milliGRAM(s) Oral daily  gabapentin 300 milliGRAM(s) Oral at bedtime  glucagon  Injectable 1 milliGRAM(s) IntraMuscular once  insulin glargine Injectable (LANTUS) 10 Unit(s) SubCutaneous at bedtime  insulin lispro (ADMELOG) corrective regimen sliding scale   SubCutaneous every 6 hours  metoprolol tartrate 25 milliGRAM(s) Oral two times a day  midodrine. 10 milliGRAM(s) Oral three times a day  pantoprazole  Injectable 40 milliGRAM(s) IV Push every 12 hours  simvastatin 20 milliGRAM(s) Oral at bedtime  sodium bicarbonate 650 milliGRAM(s) Oral two times a day  sodium chloride 0.9%. 1000 milliLiter(s) (75 mL/Hr) IV Continuous <Continuous>  tamsulosin 0.4 milliGRAM(s) Oral at bedtime    MEDICATIONS  (PRN):  acetaminophen     Tablet .. 650 milliGRAM(s) Oral every 6 hours PRN Temp greater or equal to 38C (100.4F), Mild Pain (1 - 3)  aluminum hydroxide/magnesium hydroxide/simethicone Suspension 30 milliLiter(s) Oral every 4 hours PRN Dyspepsia  dextrose Oral Gel 15 Gram(s) Oral once PRN Blood Glucose LESS THAN 70 milliGRAM(s)/deciliter  melatonin 3 milliGRAM(s) Oral at bedtime PRN Insomnia  prochlorperazine   Injectable 5 milliGRAM(s) IV Push every 6 hours PRN prn nausea & vomiting      Packed Red Cells Order:  1 Unit  Indication: Anemia due to Active Hemorrhage - Medical Conditions e.  Infuse Unit : 3.5 Hours (01-07-24 @ 16:21)        DIET:  Diet, NPO:   Except Medications  With Ice Chips/Sips of Water (01-07-24 @ 16:36) [Active]      ALLERGIES:   Allergies    No Known Allergies    Intolerances        VITAL SIGNS:   Vital Signs Last 24 Hrs  T(C): 36.6 (08 Jan 2024 07:48), Max: 37 (08 Jan 2024 04:18)  T(F): 97.8 (08 Jan 2024 07:48), Max: 98.6 (08 Jan 2024 04:18)  HR: 66 (08 Jan 2024 07:48) (66 - 104)  BP: 102/42 (08 Jan 2024 07:48) (102/42 - 168/73)  BP(mean): 77 (08 Jan 2024 04:18) (77 - 102)  RR: 20 (08 Jan 2024 07:48) (16 - 20)  SpO2: 96% (08 Jan 2024 04:18) (96% - 100%)    Parameters below as of 08 Jan 2024 07:48  Patient On (Oxygen Delivery Method): nasal cannula  O2 Flow (L/min): 2    I&O's Summary    07 Jan 2024 07:01  -  08 Jan 2024 07:00  --------------------------------------------------------  IN: 0 mL / OUT: 60 mL / NET: -60 mL        PHYSICAL EXAM:   GENERAL:  No acute distress  HEENT:  NC/AT, conjunctiva clear, sclera anicteric  CHEST:  No increased effort  HEART:  Regular rate  ABDOMEN:  Soft, non-tender, non-distended, normoactive bowel sounds, no rebound or guarding  EXTREMITIES: No edema  SKIN:  Warm, dry  NEURO:  Calm, cooperative    LABS:                        7.4    10.97 )-----------( 235      ( 08 Jan 2024 02:31 )             22.9     Hemoglobin: 7.4 g/dL (01-08-24 @ 02:31)  Hemoglobin: 7.6 g/dL (01-07-24 @ 23:26)  Hemoglobin: 7.1 g/dL (01-07-24 @ 13:58)  Hemoglobin: 8.0 g/dL (01-07-24 @ 08:05)  Hemoglobin: 9.7 g/dL (01-06-24 @ 18:28)    01-08    143  |  109<H>  |  49.4<H>  ----------------------------<  148<H>  4.8   |  24.0  |  2.43<H>    Ca    8.7      08 Jan 2024 02:31    TPro  5.1<L>  /  Alb  2.4<L>  /  TBili  0.5  /  DBili  x   /  AST  10  /  ALT  16  /  AlkPhos  51  01-08    LIVER FUNCTIONS - ( 08 Jan 2024 02:31 )  Alb: 2.4 g/dL / Pro: 5.1 g/dL / ALK PHOS: 51 U/L / ALT: 16 U/L / AST: 10 U/L / GGT: x             PT/INR - ( 07 Jan 2024 08:05 )   PT: 12.1 sec;   INR: 1.09 ratio         PTT - ( 07 Jan 2024 08:05 )  PTT:28.2 sec    Culture - Urine (collected 06 Jan 2024 23:56)  Source: Clean Catch Clean Catch (Midstream)  Final Report (08 Jan 2024 07:21):    <10,000 CFU/mL Normal Urogenital Allyson      RADIOLOGY & ADDITIONAL STUDIES:      ACC: [id number] EXAM:  CT ABDOMEN AND PELVIS   ORDERED BY: JULIANNA FELDMAN     PROCEDURE DATE:  01/06/2024          INTERPRETATION:  CLINICAL INFORMATION: Abdominal pain status post   percutaneous cholecystostomy    COMPARISON: 1/4/2024    CONTRAST/COMPLICATIONS:  IV Contrast: NONE  Oral Contrast: NONE  Complications: None reported at time of study completion    PROCEDURE:  CT of the Abdomen and Pelvis was performed.  Sagittal and coronal reformats were performed.    FINDINGS:  LOWER CHEST: Small bilateral pleural effusions with associated   atelectasis. Coronary artery calcifications.    LIVER: Within normal limits.  BILE DUCTS: Normal caliber.  GALLBLADDER: Decompressed gallbladder with surrounding inflammatory   changes and indwelling percutaneous cholecystostomy tube, not   substantially changed  SPLEEN: Within normal limits.  PANCREAS: Within normal limits.  ADRENALS: Within normal limits.  KIDNEYS/URETERS: No hydronephrosis. Right renal cysts.    BLADDER: Within normal limits.  REPRODUCTIVE ORGANS: Prostate within normal limits.    BOWEL: No bowel obstruction. Markedly dilated and fluid-filled lower   esophagus, new compared to prior. Duodenal diverticulum  PERITONEUM: No ascites.  VESSELS: Atherosclerotic changes.  RETROPERITONEUM/LYMPH NODES: No lymphadenopathy.  ABDOMINAL WALL: Fat-containing right inguinal hernia.  BONES: No acute osseous abnormality. Old right-sided rib fractures.   Diffuse osteopenia.    IMPRESSION:  No acute interval change.    Decompressed gallbladder with surrounding inflammatory changes and   indwelling percutaneous cholecystostomy tube, not substantially changed        --- End of Report ---            TANVIR MOE MD; Attending Radiologist  This document has been electronically signed. Jan 6 2024 11:41PM  01-06-24 @ 22:56       Chief Complaint:  Patient is a 84y old  Male who presents with a chief complaint of GI bleed (07 Jan 2024 13:57)      Interval HPI/ 24 hr events: Patient seen and examined at bedside, resting comfortably in no acute distress on supplemental oxygen. As per RN patient is confused at baseline. No documented bowel movement overnight. Patient with a perc sky in place. No reports from RN nausea, vomiting, diarrhea, abdominal pain, hematemesis, hematochezia, melena. Hgb today 7.4.      REVIEW OF SYSTEMS:   Unable to obtain due to cognition    MEDICATIONS:   MEDICATIONS  (STANDING):  dextrose 5%. 1000 milliLiter(s) (50 mL/Hr) IV Continuous <Continuous>  dextrose 5%. 1000 milliLiter(s) (100 mL/Hr) IV Continuous <Continuous>  dextrose 50% Injectable 25 Gram(s) IV Push once  dextrose 50% Injectable 12.5 Gram(s) IV Push once  dextrose 50% Injectable 25 Gram(s) IV Push once  dronedarone 400 milliGRAM(s) Oral two times a day  folic acid 1 milliGRAM(s) Oral daily  gabapentin 300 milliGRAM(s) Oral at bedtime  glucagon  Injectable 1 milliGRAM(s) IntraMuscular once  insulin glargine Injectable (LANTUS) 10 Unit(s) SubCutaneous at bedtime  insulin lispro (ADMELOG) corrective regimen sliding scale   SubCutaneous every 6 hours  metoprolol tartrate 25 milliGRAM(s) Oral two times a day  midodrine. 10 milliGRAM(s) Oral three times a day  pantoprazole  Injectable 40 milliGRAM(s) IV Push every 12 hours  simvastatin 20 milliGRAM(s) Oral at bedtime  sodium bicarbonate 650 milliGRAM(s) Oral two times a day  sodium chloride 0.9%. 1000 milliLiter(s) (75 mL/Hr) IV Continuous <Continuous>  tamsulosin 0.4 milliGRAM(s) Oral at bedtime    MEDICATIONS  (PRN):  acetaminophen     Tablet .. 650 milliGRAM(s) Oral every 6 hours PRN Temp greater or equal to 38C (100.4F), Mild Pain (1 - 3)  aluminum hydroxide/magnesium hydroxide/simethicone Suspension 30 milliLiter(s) Oral every 4 hours PRN Dyspepsia  dextrose Oral Gel 15 Gram(s) Oral once PRN Blood Glucose LESS THAN 70 milliGRAM(s)/deciliter  melatonin 3 milliGRAM(s) Oral at bedtime PRN Insomnia  prochlorperazine   Injectable 5 milliGRAM(s) IV Push every 6 hours PRN prn nausea & vomiting      Packed Red Cells Order:  1 Unit  Indication: Anemia due to Active Hemorrhage - Medical Conditions e.  Infuse Unit : 3.5 Hours (01-07-24 @ 16:21)        DIET:  Diet, NPO:   Except Medications  With Ice Chips/Sips of Water (01-07-24 @ 16:36) [Active]      ALLERGIES:   Allergies    No Known Allergies    Intolerances        VITAL SIGNS:   Vital Signs Last 24 Hrs  T(C): 36.6 (08 Jan 2024 07:48), Max: 37 (08 Jan 2024 04:18)  T(F): 97.8 (08 Jan 2024 07:48), Max: 98.6 (08 Jan 2024 04:18)  HR: 66 (08 Jan 2024 07:48) (66 - 104)  BP: 102/42 (08 Jan 2024 07:48) (102/42 - 168/73)  BP(mean): 77 (08 Jan 2024 04:18) (77 - 102)  RR: 20 (08 Jan 2024 07:48) (16 - 20)  SpO2: 96% (08 Jan 2024 04:18) (96% - 100%)    Parameters below as of 08 Jan 2024 07:48  Patient On (Oxygen Delivery Method): nasal cannula  O2 Flow (L/min): 2    I&O's Summary    07 Jan 2024 07:01  -  08 Jan 2024 07:00  --------------------------------------------------------  IN: 0 mL / OUT: 60 mL / NET: -60 mL        PHYSICAL EXAM:   GENERAL:  No acute distress  HEENT:  NC/AT, conjunctiva clear, sclera anicteric  CHEST:  No increased effort  HEART:  Regular rate  ABDOMEN:  Soft, non-tender, non-distended, normoactive bowel sounds, no rebound or guarding  EXTREMITIES: No edema  SKIN:  Warm, dry  NEURO:  Calm, cooperative    LABS:                        7.4    10.97 )-----------( 235      ( 08 Jan 2024 02:31 )             22.9     Hemoglobin: 7.4 g/dL (01-08-24 @ 02:31)  Hemoglobin: 7.6 g/dL (01-07-24 @ 23:26)  Hemoglobin: 7.1 g/dL (01-07-24 @ 13:58)  Hemoglobin: 8.0 g/dL (01-07-24 @ 08:05)  Hemoglobin: 9.7 g/dL (01-06-24 @ 18:28)    01-08    143  |  109<H>  |  49.4<H>  ----------------------------<  148<H>  4.8   |  24.0  |  2.43<H>    Ca    8.7      08 Jan 2024 02:31    TPro  5.1<L>  /  Alb  2.4<L>  /  TBili  0.5  /  DBili  x   /  AST  10  /  ALT  16  /  AlkPhos  51  01-08    LIVER FUNCTIONS - ( 08 Jan 2024 02:31 )  Alb: 2.4 g/dL / Pro: 5.1 g/dL / ALK PHOS: 51 U/L / ALT: 16 U/L / AST: 10 U/L / GGT: x             PT/INR - ( 07 Jan 2024 08:05 )   PT: 12.1 sec;   INR: 1.09 ratio         PTT - ( 07 Jan 2024 08:05 )  PTT:28.2 sec    Culture - Urine (collected 06 Jan 2024 23:56)  Source: Clean Catch Clean Catch (Midstream)  Final Report (08 Jan 2024 07:21):    <10,000 CFU/mL Normal Urogenital Allyson      RADIOLOGY & ADDITIONAL STUDIES:      ACC: [id number] EXAM:  CT ABDOMEN AND PELVIS   ORDERED BY: JULIANNA FELDMAN     PROCEDURE DATE:  01/06/2024          INTERPRETATION:  CLINICAL INFORMATION: Abdominal pain status post   percutaneous cholecystostomy    COMPARISON: 1/4/2024    CONTRAST/COMPLICATIONS:  IV Contrast: NONE  Oral Contrast: NONE  Complications: None reported at time of study completion    PROCEDURE:  CT of the Abdomen and Pelvis was performed.  Sagittal and coronal reformats were performed.    FINDINGS:  LOWER CHEST: Small bilateral pleural effusions with associated   atelectasis. Coronary artery calcifications.    LIVER: Within normal limits.  BILE DUCTS: Normal caliber.  GALLBLADDER: Decompressed gallbladder with surrounding inflammatory   changes and indwelling percutaneous cholecystostomy tube, not   substantially changed  SPLEEN: Within normal limits.  PANCREAS: Within normal limits.  ADRENALS: Within normal limits.  KIDNEYS/URETERS: No hydronephrosis. Right renal cysts.    BLADDER: Within normal limits.  REPRODUCTIVE ORGANS: Prostate within normal limits.    BOWEL: No bowel obstruction. Markedly dilated and fluid-filled lower   esophagus, new compared to prior. Duodenal diverticulum  PERITONEUM: No ascites.  VESSELS: Atherosclerotic changes.  RETROPERITONEUM/LYMPH NODES: No lymphadenopathy.  ABDOMINAL WALL: Fat-containing right inguinal hernia.  BONES: No acute osseous abnormality. Old right-sided rib fractures.   Diffuse osteopenia.    IMPRESSION:  No acute interval change.    Decompressed gallbladder with surrounding inflammatory changes and   indwelling percutaneous cholecystostomy tube, not substantially changed        --- End of Report ---            TANVIR MOE MD; Attending Radiologist  This document has been electronically signed. Jan 6 2024 11:41PM  01-06-24 @ 22:56

## 2024-01-08 NOTE — PROGRESS NOTE ADULT - SUBJECTIVE AND OBJECTIVE BOX
Patient is a 84y old  Male who presents with a chief complaint of GI bleed (08 Jan 2024 10:16)    INTERVAL HPI/OVERNIGHT EVENTS: No acute events overnight. HD stable.     MEDICATIONS  (STANDING):  dextrose 5%. 1000 milliLiter(s) (100 mL/Hr) IV Continuous <Continuous>  dextrose 5%. 1000 milliLiter(s) (50 mL/Hr) IV Continuous <Continuous>  dextrose 50% Injectable 25 Gram(s) IV Push once  dextrose 50% Injectable 12.5 Gram(s) IV Push once  dextrose 50% Injectable 25 Gram(s) IV Push once  dronedarone 400 milliGRAM(s) Oral two times a day  folic acid 1 milliGRAM(s) Oral daily  gabapentin 300 milliGRAM(s) Oral at bedtime  glucagon  Injectable 1 milliGRAM(s) IntraMuscular once  insulin glargine Injectable (LANTUS) 10 Unit(s) SubCutaneous at bedtime  insulin lispro (ADMELOG) corrective regimen sliding scale   SubCutaneous every 6 hours  metoprolol tartrate 25 milliGRAM(s) Oral two times a day  midodrine. 10 milliGRAM(s) Oral three times a day  pantoprazole  Injectable 40 milliGRAM(s) IV Push every 12 hours  simvastatin 20 milliGRAM(s) Oral at bedtime  sodium bicarbonate 650 milliGRAM(s) Oral two times a day  sodium chloride 0.9%. 1000 milliLiter(s) (75 mL/Hr) IV Continuous <Continuous>  tamsulosin 0.4 milliGRAM(s) Oral at bedtime    MEDICATIONS  (PRN):  acetaminophen     Tablet .. 650 milliGRAM(s) Oral every 6 hours PRN Temp greater or equal to 38C (100.4F), Mild Pain (1 - 3)  aluminum hydroxide/magnesium hydroxide/simethicone Suspension 30 milliLiter(s) Oral every 4 hours PRN Dyspepsia  dextrose Oral Gel 15 Gram(s) Oral once PRN Blood Glucose LESS THAN 70 milliGRAM(s)/deciliter  melatonin 3 milliGRAM(s) Oral at bedtime PRN Insomnia  prochlorperazine   Injectable 5 milliGRAM(s) IV Push every 6 hours PRN prn nausea & vomiting      Allergies    No Known Allergies    Intolerances        REVIEW OF SYSTEMS: all negative with exception of above    Vital Signs Last 24 Hrs  T(C): 36.9 (08 Jan 2024 13:30), Max: 37 (08 Jan 2024 04:18)  T(F): 98.4 (08 Jan 2024 13:30), Max: 98.6 (08 Jan 2024 04:18)  HR: 73 (08 Jan 2024 13:30) (66 - 104)  BP: 113/53 (08 Jan 2024 13:30) (102/42 - 168/73)  BP(mean): 77 (08 Jan 2024 04:18) (77 - 102)  RR: 18 (08 Jan 2024 13:30) (18 - 20)  SpO2: 92% (08 Jan 2024 13:30) (91% - 99%)    Parameters below as of 08 Jan 2024 13:30  Patient On (Oxygen Delivery Method): nasal cannula  O2 Flow (L/min): 2    PHYSICAL EXAM:  GENERAL: NAD, well-groomed  NERVOUS SYSTEM:  Alert & Oriented X3, Good concentration; Motor Strength 5/5 B/L upper and lower extremities; DTRs 2+ intact and symmetric  CHEST/LUNG: Clear to percussion bilaterally; No rales, rhonchi, wheezing, or rubs  HEART: Regular rate and rhythm; No murmurs, rubs, or gallops  ABDOMEN: Soft, Nontender, Nondistended; Bowel sounds present  EXTREMITIES:  2+ Peripheral Pulses, No clubbing, cyanosis, or edema    LABS:                        7.4    10.97 )-----------( 235      ( 08 Jan 2024 02:31 )             22.9     01-08    143  |  109<H>  |  49.4<H>  ----------------------------<  148<H>  4.8   |  24.0  |  2.43<H>    Ca    8.7      08 Jan 2024 02:31    TPro  5.1<L>  /  Alb  2.4<L>  /  TBili  0.5  /  DBili  x   /  AST  10  /  ALT  16  /  AlkPhos  51  01-08    PT/INR - ( 07 Jan 2024 08:05 )   PT: 12.1 sec;   INR: 1.09 ratio         PTT - ( 07 Jan 2024 08:05 )  PTT:28.2 sec  Urinalysis Basic - ( 08 Jan 2024 02:31 )    Color: x / Appearance: x / SG: x / pH: x  Gluc: 148 mg/dL / Ketone: x  / Bili: x / Urobili: x   Blood: x / Protein: x / Nitrite: x   Leuk Esterase: x / RBC: x / WBC x   Sq Epi: x / Non Sq Epi: x / Bacteria: x      CAPILLARY BLOOD GLUCOSE      POCT Blood Glucose.: 170 mg/dL (08 Jan 2024 11:27)  POCT Blood Glucose.: 185 mg/dL (08 Jan 2024 05:23)  POCT Blood Glucose.: 139 mg/dL (08 Jan 2024 00:51)  POCT Blood Glucose.: 419 mg/dL (07 Jan 2024 18:19)  POCT Blood Glucose.: 338 mg/dL (07 Jan 2024 17:26)  POCT Blood Glucose.: 381 mg/dL (07 Jan 2024 13:40)      RADIOLOGY & ADDITIONAL TESTS:    Imaging Personally Reviewed:  [ ] YES  [ ] NO    Consultant(s) Notes Reviewed:  [ ] YES  [ ] NO    Care Discussed with Consultants/Other Providers [ ] YES  [ ] NO

## 2024-01-08 NOTE — PROGRESS NOTE ADULT - PROBLEM SELECTOR PLAN 1
Pt with stable hemoglobin. No further nausea and vomiting  Ct with fluid filled esophagus  eventual EGD

## 2024-01-08 NOTE — PROGRESS NOTE ADULT - ASSESSMENT
84 yo M with PMH of DM, HTN, CKD stage III, who presented from rehab after episode of coffee ground emesis. He was recently admitted in 12/2023 for acute cholecystitis and had perc sky placed by IR on 12/22/23. Hospital course was complicated by hypoxic respiratory failure secondary to COVID, RON, Afib with RVR, and delirium. He was discharged to SNF on 1/5/2024,  planned for outpatient IR follow up and interval cholecystectomy. He presented back to ED on 1/6/24 due to complaints of abdominal pain, nausea and vomiting. CT AP at that time showed new markedly dilated, fluid filled esophagus. He was discharged back to rehab, but then presented back to ED today (1/7/24) after large volume coffee ground emesis.    Coffee ground emesis concern for Upper GI bleed  Acute blood loss anemia  hgb 8.0  patient was on 5 days of decadron when he had covid  - hold asa  - PPI IV BID  - NPO  - IVFS  - active type and screen  - trend h&h  - GI consult- will need EGD. Will transfuse 1U PRBC    Leukocytosis  CXR small effusion vs atelectasis  UA unremarkable  - F/u blood cx    CKD stage 3  BPH  - Baseline ~ 2-2.5  - renally dose meds  - avoid nephrotoxic medications  - c/w Tamsulosin  - cont sodium bicarb BID    Recent Acute cholecystitis s/p IR perc drain placemen  - will need drain in for 4-6 weeks for tract to form, outpt IR followup  - completed abx   - on home midodrine, unclear etiology    Afib   - Tele  -  cont Multaq  - metoprolol BID  - hold asa    DM  - A1C 9  - accu checks q6 hr while npo  - dec  Lantus 8 QHS while npo  - hold pre meal  - q 6hr ISS while npo   - can c/w gabapentin for neuropathy   - monitor and adjust insulin as needed    HLD  -c/w statin    dvt ppx: SCDs

## 2024-01-08 NOTE — CHART NOTE - NSCHARTNOTEFT_GEN_A_CORE
H/H 7.6/23.9  No signs of bleeding or coffee ground emesis at this time  VSS B/P 116/66 P 76  CBC in am

## 2024-01-08 NOTE — PROGRESS NOTE ADULT - NS ATTEST RISK PROBLEM GEN_ALL_CORE FT
Pt with reports of coffee ground emesis. No further reports of Coffee-ground emesis as per nursing.  Hemoglobin stable 7.4  CBC ordered for tomorrow  Patient has dementia unable to answer questions      Note reviewed from PA overnight–no signs of bleeding.    CT scan images reviewed–dilated esophagus with some fluid.

## 2024-01-08 NOTE — PROGRESS NOTE ADULT - ASSESSMENT
84 yo M with PMH of DM, HTN, CKD stage III, who presented from rehab after episode of coffee ground emesis.    #coffee ground emesis  #GIB  - 1/6/2024: CT A/P: no bowel obstruction. Markedly dilated and fluid-filled lower esophagus, new compared to prior. Duodenal diverticulum.  - Trend CBC, transfuse as needed. Monitor for signs of bleeding. Avoid NSAIDs  - Keep NPO  - IV PPI BID for GI mucosal protection   - PRN antiemetic for nausea/vomiting  - Non urgent EGD,  timing to be determined based on hospital course. Patient will need family member for consent    _________________________________________________________________  Assessment and recommendations are final when note is signed by the attending physician.

## 2024-01-09 DIAGNOSIS — Z01.810 ENCOUNTER FOR PREPROCEDURAL CARDIOVASCULAR EXAMINATION: ICD-10-CM

## 2024-01-09 LAB
ALBUMIN SERPL ELPH-MCNC: 2.5 G/DL — LOW (ref 3.3–5.2)
ALBUMIN SERPL ELPH-MCNC: 2.5 G/DL — LOW (ref 3.3–5.2)
ALP SERPL-CCNC: 52 U/L — SIGNIFICANT CHANGE UP (ref 40–120)
ALP SERPL-CCNC: 52 U/L — SIGNIFICANT CHANGE UP (ref 40–120)
ALT FLD-CCNC: 16 U/L — SIGNIFICANT CHANGE UP
ALT FLD-CCNC: 16 U/L — SIGNIFICANT CHANGE UP
ANION GAP SERPL CALC-SCNC: 9 MMOL/L — SIGNIFICANT CHANGE UP (ref 5–17)
ANION GAP SERPL CALC-SCNC: 9 MMOL/L — SIGNIFICANT CHANGE UP (ref 5–17)
AST SERPL-CCNC: 14 U/L — SIGNIFICANT CHANGE UP
AST SERPL-CCNC: 14 U/L — SIGNIFICANT CHANGE UP
BILIRUB SERPL-MCNC: 0.4 MG/DL — SIGNIFICANT CHANGE UP (ref 0.4–2)
BILIRUB SERPL-MCNC: 0.4 MG/DL — SIGNIFICANT CHANGE UP (ref 0.4–2)
BUN SERPL-MCNC: 40.7 MG/DL — HIGH (ref 8–20)
BUN SERPL-MCNC: 40.7 MG/DL — HIGH (ref 8–20)
CALCIUM SERPL-MCNC: 8.4 MG/DL — SIGNIFICANT CHANGE UP (ref 8.4–10.5)
CALCIUM SERPL-MCNC: 8.4 MG/DL — SIGNIFICANT CHANGE UP (ref 8.4–10.5)
CHLORIDE SERPL-SCNC: 110 MMOL/L — HIGH (ref 96–108)
CHLORIDE SERPL-SCNC: 110 MMOL/L — HIGH (ref 96–108)
CO2 SERPL-SCNC: 25 MMOL/L — SIGNIFICANT CHANGE UP (ref 22–29)
CO2 SERPL-SCNC: 25 MMOL/L — SIGNIFICANT CHANGE UP (ref 22–29)
CREAT SERPL-MCNC: 2.22 MG/DL — HIGH (ref 0.5–1.3)
CREAT SERPL-MCNC: 2.22 MG/DL — HIGH (ref 0.5–1.3)
EGFR: 28 ML/MIN/1.73M2 — LOW
EGFR: 28 ML/MIN/1.73M2 — LOW
GLUCOSE BLDC GLUCOMTR-MCNC: 115 MG/DL — HIGH (ref 70–99)
GLUCOSE BLDC GLUCOMTR-MCNC: 115 MG/DL — HIGH (ref 70–99)
GLUCOSE BLDC GLUCOMTR-MCNC: 129 MG/DL — HIGH (ref 70–99)
GLUCOSE BLDC GLUCOMTR-MCNC: 129 MG/DL — HIGH (ref 70–99)
GLUCOSE BLDC GLUCOMTR-MCNC: 152 MG/DL — HIGH (ref 70–99)
GLUCOSE BLDC GLUCOMTR-MCNC: 152 MG/DL — HIGH (ref 70–99)
GLUCOSE BLDC GLUCOMTR-MCNC: 194 MG/DL — HIGH (ref 70–99)
GLUCOSE BLDC GLUCOMTR-MCNC: 194 MG/DL — HIGH (ref 70–99)
GLUCOSE SERPL-MCNC: 120 MG/DL — HIGH (ref 70–99)
GLUCOSE SERPL-MCNC: 120 MG/DL — HIGH (ref 70–99)
HCT VFR BLD CALC: 27.4 % — LOW (ref 39–50)
HCT VFR BLD CALC: 27.4 % — LOW (ref 39–50)
HGB BLD-MCNC: 8.9 G/DL — LOW (ref 13–17)
HGB BLD-MCNC: 8.9 G/DL — LOW (ref 13–17)
MCHC RBC-ENTMCNC: 30 PG — SIGNIFICANT CHANGE UP (ref 27–34)
MCHC RBC-ENTMCNC: 30 PG — SIGNIFICANT CHANGE UP (ref 27–34)
MCHC RBC-ENTMCNC: 32.5 GM/DL — SIGNIFICANT CHANGE UP (ref 32–36)
MCHC RBC-ENTMCNC: 32.5 GM/DL — SIGNIFICANT CHANGE UP (ref 32–36)
MCV RBC AUTO: 92.3 FL — SIGNIFICANT CHANGE UP (ref 80–100)
MCV RBC AUTO: 92.3 FL — SIGNIFICANT CHANGE UP (ref 80–100)
PLATELET # BLD AUTO: 224 K/UL — SIGNIFICANT CHANGE UP (ref 150–400)
PLATELET # BLD AUTO: 224 K/UL — SIGNIFICANT CHANGE UP (ref 150–400)
POTASSIUM SERPL-MCNC: 4.5 MMOL/L — SIGNIFICANT CHANGE UP (ref 3.5–5.3)
POTASSIUM SERPL-MCNC: 4.5 MMOL/L — SIGNIFICANT CHANGE UP (ref 3.5–5.3)
POTASSIUM SERPL-SCNC: 4.5 MMOL/L — SIGNIFICANT CHANGE UP (ref 3.5–5.3)
POTASSIUM SERPL-SCNC: 4.5 MMOL/L — SIGNIFICANT CHANGE UP (ref 3.5–5.3)
PROT SERPL-MCNC: 5 G/DL — LOW (ref 6.6–8.7)
PROT SERPL-MCNC: 5 G/DL — LOW (ref 6.6–8.7)
RBC # BLD: 2.97 M/UL — LOW (ref 4.2–5.8)
RBC # BLD: 2.97 M/UL — LOW (ref 4.2–5.8)
RBC # FLD: 16.2 % — HIGH (ref 10.3–14.5)
RBC # FLD: 16.2 % — HIGH (ref 10.3–14.5)
SODIUM SERPL-SCNC: 144 MMOL/L — SIGNIFICANT CHANGE UP (ref 135–145)
SODIUM SERPL-SCNC: 144 MMOL/L — SIGNIFICANT CHANGE UP (ref 135–145)
WBC # BLD: 8.01 K/UL — SIGNIFICANT CHANGE UP (ref 3.8–10.5)
WBC # BLD: 8.01 K/UL — SIGNIFICANT CHANGE UP (ref 3.8–10.5)
WBC # FLD AUTO: 8.01 K/UL — SIGNIFICANT CHANGE UP (ref 3.8–10.5)
WBC # FLD AUTO: 8.01 K/UL — SIGNIFICANT CHANGE UP (ref 3.8–10.5)

## 2024-01-09 PROCEDURE — 99233 SBSQ HOSP IP/OBS HIGH 50: CPT

## 2024-01-09 PROCEDURE — 99232 SBSQ HOSP IP/OBS MODERATE 35: CPT

## 2024-01-09 PROCEDURE — 93010 ELECTROCARDIOGRAM REPORT: CPT

## 2024-01-09 RX ADMIN — MIDODRINE HYDROCHLORIDE 10 MILLIGRAM(S): 2.5 TABLET ORAL at 05:57

## 2024-01-09 RX ADMIN — PANTOPRAZOLE SODIUM 40 MILLIGRAM(S): 20 TABLET, DELAYED RELEASE ORAL at 17:06

## 2024-01-09 RX ADMIN — Medication 1 MILLIGRAM(S): at 17:06

## 2024-01-09 RX ADMIN — PANTOPRAZOLE SODIUM 40 MILLIGRAM(S): 20 TABLET, DELAYED RELEASE ORAL at 05:57

## 2024-01-09 RX ADMIN — Medication 650 MILLIGRAM(S): at 05:56

## 2024-01-09 RX ADMIN — TAMSULOSIN HYDROCHLORIDE 0.4 MILLIGRAM(S): 0.4 CAPSULE ORAL at 21:46

## 2024-01-09 RX ADMIN — INSULIN GLARGINE 10 UNIT(S): 100 INJECTION, SOLUTION SUBCUTANEOUS at 21:46

## 2024-01-09 RX ADMIN — Medication 1: at 17:05

## 2024-01-09 RX ADMIN — GABAPENTIN 300 MILLIGRAM(S): 400 CAPSULE ORAL at 21:46

## 2024-01-09 RX ADMIN — Medication 25 MILLIGRAM(S): at 05:57

## 2024-01-09 RX ADMIN — DRONEDARONE 400 MILLIGRAM(S): 400 TABLET, FILM COATED ORAL at 17:05

## 2024-01-09 RX ADMIN — SIMVASTATIN 20 MILLIGRAM(S): 20 TABLET, FILM COATED ORAL at 21:46

## 2024-01-09 RX ADMIN — Medication 650 MILLIGRAM(S): at 17:06

## 2024-01-09 RX ADMIN — DRONEDARONE 400 MILLIGRAM(S): 400 TABLET, FILM COATED ORAL at 05:57

## 2024-01-09 RX ADMIN — Medication 25 MILLIGRAM(S): at 17:06

## 2024-01-09 NOTE — CONSULT NOTE ADULT - SUBJECTIVE AND OBJECTIVE BOX
[facility]                                              CARDIOLOGY AT [place] [address]                                             Telephone: [phone]. Fax:[phone]                                                       CARDIOLOGY CONSULTATION NOTE                                                                                             History obtained by: Patient and medical record   Community Cardiologist: Roberto    obtained: Yes [ x ] No [  ]  Reason for Consultation: Pre-operative clearance  Available out pt records reviewed: Yes [ x ] No [  ]    Chief complaint:    Patient is a 84y old  Male who presents with a chief complaint of GI bleed (09 Jan 2024 13:13)      HPI:  84 yo M with PMH of DM, HTN, CKD stage III, who presented from rehab after episode of coffee ground emesis. He was recently admitted in 12/2023 for acute cholecystitis and had perc sky placed by IR on 12/22/23. Hospital course was complicated by hypoxic respiratory failure secondary to COVID, RON, Afib with RVR, and delirium. He was discharged to SNF on 1/5/2024,  planned for outpatient IR follow up and interval cholecystectomy. He presented back to ED on 1/6/24 due to complaints of abdominal pain, nausea and vomiting. CT AP at that time showed new markedly dilated, fluid filled esophagus. He was discharged back to rehab, but then presented back to ED today (1/7/24) after large volume coffee ground emesis. Family at bedside reports he has a history of stomach ulcers but no recent endoscopy. At baseline patient is alert and oriented by2. Currently patient knows his name and that he is in the hospital but does not know the year. He denies abdominal pain and nausea at this time.     He was seen by GI who rec NPO and IV PPI. Will need eventual EGD.  (07 Jan 2024 13:57)      PAST MEDICAL HISTORY  Diabetes    Hypertension    Prostate disorder    Anxiety    High cholesterol    Ulcer        PAST SURGICAL HISTORY  No significant past surgical history        SUBSTANCE USE HISTORY  Denies current and previous substance use [  ]   CIGARETTES -   ALCOHOL -   DRUGS -     FAMILY HISTORY:  Family history of stomach cancer  Father    Family history of emphysema  Mother        CARDIAC SPECIFIC FAMILY HX   No KNOWN family history of Cardiovascular disease, CAD, or sudden death in first degree relatives unless specified below  Family History of Cardiovascular Disease:  [  ]   Coronary Artery Disease in first degree relative:  [  ]   Sudden Cardiac Death in First degree relative: [  ]    HOME MEDICATIONS:  aspirin: 81 milligram(s) orally once a day (18 Mar 2016 14:50)  Basaglar KwikPen 100 units/mL subcutaneous solution: 18 unit(s) subcutaneous once a day (at bedtime) (05 Jan 2024 14:42)  dronedarone 400 mg oral tablet: 1 tab(s) orally 2 times a day (05 Jan 2024 14:16)  folic acid 1 mg oral tablet: 1 tab(s) orally once a day (05 Jan 2024 14:16)  gabapentin 300 mg oral capsule: 1 cap(s) orally once a day (at bedtime) (19 Mar 2016 11:54)  guaiFENesin 100 mg/5 mL oral liquid: 5 milliliter(s) orally every 6 hours As needed Cough (05 Jan 2024 14:16)  insulin lispro 100 units/mL injectable solution: 4 international unit(s) subcutaneous 3 times a day (before meals) hold if FS &lt;100 (05 Jan 2024 14:42)  metoprolol tartrate 25 mg oral tablet: 1 tab(s) orally 2 times a day (05 Jan 2024 14:16)  midodrine 5 mg oral tablet: 2 tab(s) orally 3 times a day (21 Dec 2023 12:37)  omeprazole 40 mg oral delayed release capsule: 1 cap(s) orally once a day (19 Mar 2016 11:54)  simvastatin 20 mg oral tablet: 1 tab(s) orally once a day (at bedtime) (19 Mar 2016 11:54)  sodium bicarbonate 650 mg oral tablet: 1 tab(s) orally 2 times a day (05 Jan 2024 14:16)  tamsulosin: 0.4 milligram(s) orally once a day (at bedtime) (18 Mar 2016 14:50)      CURRENT CARDIAC MEDICATIONS:  dronedarone 400 milliGRAM(s) Oral two times a day  metoprolol tartrate 25 milliGRAM(s) Oral two times a day  midodrine. 10 milliGRAM(s) Oral three times a day      CURRENT OTHER MEDICATIONS:  acetaminophen     Tablet .. 650 milliGRAM(s) Oral every 6 hours PRN Temp greater or equal to 38C (100.4F), Mild Pain (1 - 3)  gabapentin 300 milliGRAM(s) Oral at bedtime  melatonin 3 milliGRAM(s) Oral at bedtime PRN Insomnia  prochlorperazine   Injectable 5 milliGRAM(s) IV Push every 6 hours PRN prn nausea & vomiting  aluminum hydroxide/magnesium hydroxide/simethicone Suspension 30 milliLiter(s) Oral every 4 hours PRN Dyspepsia  pantoprazole  Injectable 40 milliGRAM(s) IV Push every 12 hours  dextrose 5%. 1000 milliLiter(s) (50 mL/Hr) IV Continuous <Continuous>  dextrose 5%. 1000 milliLiter(s) (100 mL/Hr) IV Continuous <Continuous>  dextrose 50% Injectable 25 Gram(s) IV Push once, Stop order after: 1 Doses  dextrose 50% Injectable 12.5 Gram(s) IV Push once, Stop order after: 1 Doses  dextrose 50% Injectable 25 Gram(s) IV Push once, Stop order after: 1 Doses  dextrose Oral Gel 15 Gram(s) Oral once, Stop order after: 1 Doses PRN Blood Glucose LESS THAN 70 milliGRAM(s)/deciliter  folic acid 1 milliGRAM(s) Oral daily  glucagon  Injectable 1 milliGRAM(s) IntraMuscular once, Stop order after: 1 Doses  insulin glargine Injectable (LANTUS) 10 Unit(s) SubCutaneous at bedtime  insulin lispro (ADMELOG) corrective regimen sliding scale   SubCutaneous every 6 hours  simvastatin 20 milliGRAM(s) Oral at bedtime  sodium bicarbonate 650 milliGRAM(s) Oral two times a day  tamsulosin 0.4 milliGRAM(s) Oral at bedtime      ALLERGIES:   No Known Allergies      VITAL SIGNS:  T(C): 36.4 (01-09-24 @ 08:06), Max: 36.8 (01-08-24 @ 16:00)  T(F): 97.6 (01-09-24 @ 08:06), Max: 98.2 (01-08-24 @ 16:00)  HR: 63 (01-09-24 @ 11:26) (58 - 67)  BP: 157/72 (01-09-24 @ 11:26) (131/71 - 158/81)  RR: 18 (01-09-24 @ 11:26) (18 - 18)  SpO2: 92% (01-09-24 @ 11:26) (92% - 99%)    INTAKE AND OUTPUT:     01-08 @ 07:01  -  01-09 @ 07:00  --------------------------------------------------------  IN: 0 mL / OUT: 50 mL / NET: -50 mL        LABS:                            8.9    8.01  )-----------( 224      ( 09 Jan 2024 02:28 )             27.4     01-09    144  |  110<H>  |  40.7<H>  ----------------------------<  120<H>  4.5   |  25.0  |  2.22<H>    Ca    8.4      09 Jan 2024 02:28    TPro  5.0<L>  /  Alb  2.5<L>  /  TBili  0.4  /  DBili  x   /  AST  14  /  ALT  16  /  AlkPhos  52  01-09      Urinalysis Basic - ( 09 Jan 2024 02:28 )    Color: x / Appearance: x / SG: x / pH: x  Gluc: 120 mg/dL / Ketone: x  / Bili: x / Urobili: x   Blood: x / Protein: x / Nitrite: x   Leuk Esterase: x / RBC: x / WBC x   Sq Epi: x / Non Sq Epi: x / Bacteria: x      RADIOLOGY IMAGING:                                                           [facility]                                              CARDIOLOGY AT [place] [address]                                             Telephone: [phone]. Fax:[phone]                                                       CARDIOLOGY CONSULTATION NOTE                                                                                             History obtained by: Patient and medical record   Community Cardiologist: Roberto    obtained: Yes [ x ] No [  ]  Reason for Consultation: Pre-operative clearance  Available out pt records reviewed: Yes [ x ] No [  ]    Chief complaint:    Patient is a 84y old  Male who presents with a chief complaint of GI bleed (09 Jan 2024 13:13)      HPI:  84 yo M with PMH of DM, HTN, CKD stage III, who presented from rehab after episode of coffee ground emesis. He was recently admitted in 12/2023 for acute cholecystitis and had perc sky placed by IR on 12/22/23. Hospital course was complicated by hypoxic respiratory failure secondary to COVID, RON, Afib with RVR, and delirium. He was discharged to SNF on 1/5/2024,  planned for outpatient IR follow up and interval cholecystectomy. He presented back to ED on 1/6/24 due to complaints of abdominal pain, nausea and vomiting. CT AP at that time showed new markedly dilated, fluid filled esophagus. He was discharged back to rehab, but then presented back to ED today (1/7/24) after large volume coffee ground emesis. Family at bedside reports he has a history of stomach ulcers but no recent endoscopy. At baseline patient is alert and oriented by2. Currently patient knows his name and that he is in the hospital but does not know the year. He denies abdominal pain and nausea at this time.     He was seen by GI who rec NPO and IV PPI. Will need eventual EGD.  (07 Jan 2024 13:57)      PAST MEDICAL HISTORY  Diabetes    Hypertension    Prostate disorder    Anxiety    High cholesterol    Ulcer        PAST SURGICAL HISTORY  No significant past surgical history        SUBSTANCE USE HISTORY  Denies current and previous substance use [  ]   CIGARETTES -   ALCOHOL -   DRUGS -     FAMILY HISTORY:  Family history of stomach cancer  Father    Family history of emphysema  Mother        CARDIAC SPECIFIC FAMILY HX   No KNOWN family history of Cardiovascular disease, CAD, or sudden death in first degree relatives unless specified below  Family History of Cardiovascular Disease:  [  ]   Coronary Artery Disease in first degree relative:  [  ]   Sudden Cardiac Death in First degree relative: [  ]    HOME MEDICATIONS:  aspirin: 81 milligram(s) orally once a day (18 Mar 2016 14:50)  Basaglar KwikPen 100 units/mL subcutaneous solution: 18 unit(s) subcutaneous once a day (at bedtime) (05 Jan 2024 14:42)  dronedarone 400 mg oral tablet: 1 tab(s) orally 2 times a day (05 Jan 2024 14:16)  folic acid 1 mg oral tablet: 1 tab(s) orally once a day (05 Jan 2024 14:16)  gabapentin 300 mg oral capsule: 1 cap(s) orally once a day (at bedtime) (19 Mar 2016 11:54)  guaiFENesin 100 mg/5 mL oral liquid: 5 milliliter(s) orally every 6 hours As needed Cough (05 Jan 2024 14:16)  insulin lispro 100 units/mL injectable solution: 4 international unit(s) subcutaneous 3 times a day (before meals) hold if FS &lt;100 (05 Jan 2024 14:42)  metoprolol tartrate 25 mg oral tablet: 1 tab(s) orally 2 times a day (05 Jan 2024 14:16)  midodrine 5 mg oral tablet: 2 tab(s) orally 3 times a day (21 Dec 2023 12:37)  omeprazole 40 mg oral delayed release capsule: 1 cap(s) orally once a day (19 Mar 2016 11:54)  simvastatin 20 mg oral tablet: 1 tab(s) orally once a day (at bedtime) (19 Mar 2016 11:54)  sodium bicarbonate 650 mg oral tablet: 1 tab(s) orally 2 times a day (05 Jan 2024 14:16)  tamsulosin: 0.4 milligram(s) orally once a day (at bedtime) (18 Mar 2016 14:50)      CURRENT CARDIAC MEDICATIONS:  dronedarone 400 milliGRAM(s) Oral two times a day  metoprolol tartrate 25 milliGRAM(s) Oral two times a day  midodrine. 10 milliGRAM(s) Oral three times a day      CURRENT OTHER MEDICATIONS:  acetaminophen     Tablet .. 650 milliGRAM(s) Oral every 6 hours PRN Temp greater or equal to 38C (100.4F), Mild Pain (1 - 3)  gabapentin 300 milliGRAM(s) Oral at bedtime  melatonin 3 milliGRAM(s) Oral at bedtime PRN Insomnia  prochlorperazine   Injectable 5 milliGRAM(s) IV Push every 6 hours PRN prn nausea & vomiting  aluminum hydroxide/magnesium hydroxide/simethicone Suspension 30 milliLiter(s) Oral every 4 hours PRN Dyspepsia  pantoprazole  Injectable 40 milliGRAM(s) IV Push every 12 hours  dextrose 5%. 1000 milliLiter(s) (50 mL/Hr) IV Continuous <Continuous>  dextrose 5%. 1000 milliLiter(s) (100 mL/Hr) IV Continuous <Continuous>  dextrose 50% Injectable 25 Gram(s) IV Push once, Stop order after: 1 Doses  dextrose 50% Injectable 12.5 Gram(s) IV Push once, Stop order after: 1 Doses  dextrose 50% Injectable 25 Gram(s) IV Push once, Stop order after: 1 Doses  dextrose Oral Gel 15 Gram(s) Oral once, Stop order after: 1 Doses PRN Blood Glucose LESS THAN 70 milliGRAM(s)/deciliter  folic acid 1 milliGRAM(s) Oral daily  glucagon  Injectable 1 milliGRAM(s) IntraMuscular once, Stop order after: 1 Doses  insulin glargine Injectable (LANTUS) 10 Unit(s) SubCutaneous at bedtime  insulin lispro (ADMELOG) corrective regimen sliding scale   SubCutaneous every 6 hours  simvastatin 20 milliGRAM(s) Oral at bedtime  sodium bicarbonate 650 milliGRAM(s) Oral two times a day  tamsulosin 0.4 milliGRAM(s) Oral at bedtime      ALLERGIES:   No Known Allergies      VITAL SIGNS:  T(C): 36.4 (01-09-24 @ 08:06), Max: 36.8 (01-08-24 @ 16:00)  T(F): 97.6 (01-09-24 @ 08:06), Max: 98.2 (01-08-24 @ 16:00)  HR: 63 (01-09-24 @ 11:26) (58 - 67)  BP: 157/72 (01-09-24 @ 11:26) (131/71 - 158/81)  RR: 18 (01-09-24 @ 11:26) (18 - 18)  SpO2: 92% (01-09-24 @ 11:26) (92% - 99%)    INTAKE AND OUTPUT:     01-08 @ 07:01  -  01-09 @ 07:00  --------------------------------------------------------  IN: 0 mL / OUT: 50 mL / NET: -50 mL        LABS:                            8.9    8.01  )-----------( 224      ( 09 Jan 2024 02:28 )             27.4     01-09    144  |  110<H>  |  40.7<H>  ----------------------------<  120<H>  4.5   |  25.0  |  2.22<H>    Ca    8.4      09 Jan 2024 02:28    TPro  5.0<L>  /  Alb  2.5<L>  /  TBili  0.4  /  DBili  x   /  AST  14  /  ALT  16  /  AlkPhos  52  01-09      Urinalysis Basic - ( 09 Jan 2024 02:28 )    Color: x / Appearance: x / SG: x / pH: x  Gluc: 120 mg/dL / Ketone: x  / Bili: x / Urobili: x   Blood: x / Protein: x / Nitrite: x   Leuk Esterase: x / RBC: x / WBC x   Sq Epi: x / Non Sq Epi: x / Bacteria: x      RADIOLOGY IMAGING:

## 2024-01-09 NOTE — CONSULT NOTE ADULT - PROBLEM SELECTOR RECOMMENDATION 9
- pt w/ GIB for EGD   - last echo 55-60% normal RV, normal PASP, no gross valvular abnormalities   - RCRI Estimated Risk of Adverse Outcome with Non-cardiac Surgery Very Low Risk  - Estimated Rate of Myocardial Infarction, Pulmonary Edema, Ventricular Fibrillation, Cardiac Arrest, or Complete Heart Block 0.4 %  - risk profile is adjusted to elevated risk due to age and comorbidities   - There are no absolute cardiac contraindications to EGD   - GI may proceed with all GI procedures including EGD with the understanding of the above risk profile  - no further inpatient cardiology workup or recommendations  - will sign off

## 2024-01-09 NOTE — CONSULT NOTE ADULT - NS ATTEND AMEND GEN_ALL_CORE FT
82 yo M with PMH of DM, HTN, CKD stage III, who presented from rehab after episode of coffee ground emesis. He was recently admitted in 12/2023 for acute cholecystitis and had perc sky placed by IR on 12/22/23. Hospital course was complicated by hypoxic respiratory failure secondary to COVID, RON, Afib with RVR, and delirium. He was discharged to SNF on 1/5/2024,  planned for outpatient IR follow up and interval cholecystectomy. He presented back to ED on 1/6/24 due to complaints of abdominal pain, nausea and vomiting. CT AP at that time showed new markedly dilated, fluid filled esophagus. He was discharged back to rehab, but then presented back to ED today (1/7/24) after large volume coffee ground emesis. Family at bedside reports he has a history of stomach ulcers but no recent endoscopy. At baseline patient is alert and oriented by2. Currently patient knows his name and that he is in the hospital but does not know the year. He denies abdominal pain and nausea at this time.    Preoperative non-cardiac cardiac risk stratification  GI bleed      Low risk procedure  Non-modifiable cardiac risk factors  RCRI as documented above    Remains in sinus rhythm on ekg, no cardiac symptoms.    There is no cardiovascular contraindication to the proposed endoscopic procedure.  The patient is obviously at increased risk given underlying cardiovascular disease, but this risk is not prohibitive .  No additional diagnostic testing is indicated at this time.  It will be essential that the patient continue bblocker medications in the perioperative interval including the day of the surgery.  I would recommend observing the patient on a monitored bed for at least the first 24 hours postoperatively during which time serial ECGs should be obtained. Avoid hypotension and hypoxemia.    we will follow peripherally 84 yo M with PMH of DM, HTN, CKD stage III, who presented from rehab after episode of coffee ground emesis. He was recently admitted in 12/2023 for acute cholecystitis and had perc sky placed by IR on 12/22/23. Hospital course was complicated by hypoxic respiratory failure secondary to COVID, RON, Afib with RVR, and delirium. He was discharged to SNF on 1/5/2024,  planned for outpatient IR follow up and interval cholecystectomy. He presented back to ED on 1/6/24 due to complaints of abdominal pain, nausea and vomiting. CT AP at that time showed new markedly dilated, fluid filled esophagus. He was discharged back to rehab, but then presented back to ED today (1/7/24) after large volume coffee ground emesis. Family at bedside reports he has a history of stomach ulcers but no recent endoscopy. At baseline patient is alert and oriented by2. Currently patient knows his name and that he is in the hospital but does not know the year. He denies abdominal pain and nausea at this time.    Preoperative non-cardiac cardiac risk stratification  GI bleed      Low risk procedure  Non-modifiable cardiac risk factors  RCRI as documented above    Remains in sinus rhythm on ekg, no cardiac symptoms.    There is no cardiovascular contraindication to the proposed endoscopic procedure.  The patient is obviously at increased risk given underlying cardiovascular disease, but this risk is not prohibitive .  No additional diagnostic testing is indicated at this time.  It will be essential that the patient continue bblocker medications in the perioperative interval including the day of the surgery.  I would recommend observing the patient on a monitored bed for at least the first 24 hours postoperatively during which time serial ECGs should be obtained. Avoid hypotension and hypoxemia.    we will follow peripherally

## 2024-01-09 NOTE — PROGRESS NOTE ADULT - SUBJECTIVE AND OBJECTIVE BOX
Chief Complaint:  Patient is a 84y old  Male who presents with a chief complaint of GI bleed (08 Jan 2024 13:28)    Interval HPI/ 24 hr events: Patient seen and examined at bedside, resting comfortably in no acute distress on supplemental oxygen. Patient alert to name only today. No documented bowel movement overnight. Patient with a perc sky in place. No further reports of coffee ground emesis or bleeding. Patient's wife at bedside, reports patient had an EGD/colonoscopy > 10 years with a possible bleeding ulcer. Hgb today 8.9, s/p 1 unit PRBC yesterday.      REVIEW OF SYSTEMS:   Unable to obtain due to cognition    MEDICATIONS:   MEDICATIONS  (STANDING):  dextrose 5%. 1000 milliLiter(s) (50 mL/Hr) IV Continuous <Continuous>  dextrose 5%. 1000 milliLiter(s) (100 mL/Hr) IV Continuous <Continuous>  dextrose 50% Injectable 25 Gram(s) IV Push once  dextrose 50% Injectable 12.5 Gram(s) IV Push once  dextrose 50% Injectable 25 Gram(s) IV Push once  dronedarone 400 milliGRAM(s) Oral two times a day  folic acid 1 milliGRAM(s) Oral daily  gabapentin 300 milliGRAM(s) Oral at bedtime  glucagon  Injectable 1 milliGRAM(s) IntraMuscular once  insulin glargine Injectable (LANTUS) 10 Unit(s) SubCutaneous at bedtime  insulin lispro (ADMELOG) corrective regimen sliding scale   SubCutaneous every 6 hours  metoprolol tartrate 25 milliGRAM(s) Oral two times a day  midodrine. 10 milliGRAM(s) Oral three times a day  pantoprazole  Injectable 40 milliGRAM(s) IV Push every 12 hours  simvastatin 20 milliGRAM(s) Oral at bedtime  sodium bicarbonate 650 milliGRAM(s) Oral two times a day  sodium chloride 0.9%. 1000 milliLiter(s) (75 mL/Hr) IV Continuous <Continuous>  tamsulosin 0.4 milliGRAM(s) Oral at bedtime    MEDICATIONS  (PRN):  acetaminophen     Tablet .. 650 milliGRAM(s) Oral every 6 hours PRN Temp greater or equal to 38C (100.4F), Mild Pain (1 - 3)  aluminum hydroxide/magnesium hydroxide/simethicone Suspension 30 milliLiter(s) Oral every 4 hours PRN Dyspepsia  dextrose Oral Gel 15 Gram(s) Oral once PRN Blood Glucose LESS THAN 70 milliGRAM(s)/deciliter  melatonin 3 milliGRAM(s) Oral at bedtime PRN Insomnia  prochlorperazine   Injectable 5 milliGRAM(s) IV Push every 6 hours PRN prn nausea & vomiting      Packed Red Cells Order:  1 Unit  Indication: Hgb <8 gm/dL -Stable Cardiovascular Disease or Acute Co  Infuse Unit : 3 Hours (01-08-24 @ 10:24)  Packed Red Cells Order:  1 Unit  Indication: Anemia due to Active Hemorrhage - Medical Conditions e.  Infuse Unit : 3.5 Hours (01-07-24 @ 16:21)      DIET:  Diet, NPO after Midnight:      NPO Start Date: 09-Jan-2024,   NPO Start Time: 23:59  Except Medications (01-09-24 @ 11:48) [Active]  Diet, Soft and Bite Sized:   Consistent Carbohydrate Evening Snack (CSTCHOSN)  DASH/TLC Sodium & Cholesterol Restricted (DASH) (01-09-24 @ 11:48) [Active]        ALLERGIES:   Allergies    No Known Allergies    Intolerances      VITAL SIGNS:   Vital Signs Last 24 Hrs  T(C): 36.4 (09 Jan 2024 08:06), Max: 36.9 (08 Jan 2024 13:30)  T(F): 97.6 (09 Jan 2024 08:06), Max: 98.4 (08 Jan 2024 13:30)  HR: 63 (09 Jan 2024 11:26) (58 - 73)  BP: 157/72 (09 Jan 2024 11:26) (113/53 - 158/81)  BP(mean): --  RR: 18 (09 Jan 2024 11:26) (18 - 18)  SpO2: 92% (09 Jan 2024 11:26) (92% - 99%)    Parameters below as of 09 Jan 2024 11:26  Patient On (Oxygen Delivery Method): nasal cannula  O2 Flow (L/min): 2    I&O's Summary    08 Jan 2024 07:01  -  09 Jan 2024 07:00  --------------------------------------------------------  IN: 0 mL / OUT: 50 mL / NET: -50 mL        PHYSICAL EXAM:   GENERAL:  No acute distress  HEENT:  NC/AT, conjunctiva clear, sclera anicteric  CHEST:  No increased effort  HEART:  Regular rate  ABDOMEN:  Soft, non-tender, non-distended, normoactive bowel sounds, no rebound or guarding  EXTREMITIES: No edema  SKIN:  Warm, dry  NEURO:  Calm, cooperative    LABS:                        8.9    8.01  )-----------( 224      ( 09 Jan 2024 02:28 )             27.4     Hemoglobin: 8.9 g/dL (01-09-24 @ 02:28)  Hemoglobin: 7.4 g/dL (01-08-24 @ 02:31)  Hemoglobin: 7.6 g/dL (01-07-24 @ 23:26)  Hemoglobin: 7.1 g/dL (01-07-24 @ 13:58)  Hemoglobin: 8.0 g/dL (01-07-24 @ 08:05)  Hemoglobin: 9.7 g/dL (01-06-24 @ 18:28)    01-09    144  |  110<H>  |  40.7<H>  ----------------------------<  120<H>  4.5   |  25.0  |  2.22<H>    Ca    8.4      09 Jan 2024 02:28    TPro  5.0<L>  /  Alb  2.5<L>  /  TBili  0.4  /  DBili  x   /  AST  14  /  ALT  16  /  AlkPhos  52  01-09    LIVER FUNCTIONS - ( 09 Jan 2024 02:28 )  Alb: 2.5 g/dL / Pro: 5.0 g/dL / ALK PHOS: 52 U/L / ALT: 16 U/L / AST: 14 U/L / GGT: x           Culture - Blood (collected 07 Jan 2024 08:05)  Source: .Blood Blood-Peripheral  Preliminary Report (09 Jan 2024 02:03):    No growth at 24 hours    Culture - Blood (collected 07 Jan 2024 07:55)  Source: .Blood Blood-Peripheral  Preliminary Report (09 Jan 2024 02:03):    No growth at 24 hours    Culture - Urine (collected 06 Jan 2024 23:56)  Source: Clean Catch Clean Catch (Midstream)  Final Report (08 Jan 2024 07:21):    <10,000 CFU/mL Normal Urogenital Allyson    RADIOLOGY & ADDITIONAL STUDIES:      ACC: [id number] EXAM:  CT ABDOMEN AND PELVIS   ORDERED BY: JULIANNA FELDMAN     PROCEDURE DATE:  01/06/2024          INTERPRETATION:  CLINICAL INFORMATION: Abdominal pain status post   percutaneous cholecystostomy    COMPARISON: 1/4/2024    CONTRAST/COMPLICATIONS:  IV Contrast: NONE  Oral Contrast: NONE  Complications: None reported at time of study completion    PROCEDURE:  CT of the Abdomen and Pelvis was performed.  Sagittal and coronal reformats were performed.    FINDINGS:  LOWER CHEST: Small bilateral pleural effusions with associated   atelectasis. Coronary artery calcifications.    LIVER: Within normal limits.  BILE DUCTS: Normal caliber.  GALLBLADDER: Decompressed gallbladder with surrounding inflammatory   changes and indwelling percutaneous cholecystostomy tube, not   substantially changed  SPLEEN: Within normal limits.  PANCREAS: Within normal limits.  ADRENALS: Within normal limits.  KIDNEYS/URETERS: No hydronephrosis. Right renal cysts.    BLADDER: Within normal limits.  REPRODUCTIVE ORGANS: Prostate within normal limits.    BOWEL: No bowel obstruction. Markedly dilated and fluid-filled lower   esophagus, new compared to prior. Duodenal diverticulum  PERITONEUM: No ascites.  VESSELS: Atherosclerotic changes.  RETROPERITONEUM/LYMPH NODES: No lymphadenopathy.  ABDOMINAL WALL: Fat-containing right inguinal hernia.  BONES: No acute osseous abnormality. Old right-sided rib fractures.   Diffuse osteopenia.    IMPRESSION:  No acute interval change.    Decompressed gallbladder with surrounding inflammatory changes and   indwelling percutaneous cholecystostomy tube, not substantially changed    --- End of Report ---      TANVIR MOE MD; Attending Radiologist  This document has been electronically signed. Jan 6 2024 11:41PM  01-06-24 @ 22:56       Chief Complaint:  Patient is a 84y old  Male who presents with a chief complaint of GI bleed (08 Jan 2024 13:28)    Interval HPI/ 24 hr events: Patient seen and examined at bedside, resting comfortably in no acute distress on supplemental oxygen. Patient alert to name only today. No documented bowel movement overnight. Patient with a perc sky in place. No further reports of coffee ground emesis or bleeding. Patient's wife at bedside, reports patient had an EGD/colonoscopy > 10 years with a possible bleeding ulcer. Hgb today 8.9, s/p 1 unit PRBC yesterday.      REVIEW OF SYSTEMS:   Unable to obtain due to cognition    MEDICATIONS:   MEDICATIONS  (STANDING):  dextrose 5%. 1000 milliLiter(s) (50 mL/Hr) IV Continuous <Continuous>  dextrose 5%. 1000 milliLiter(s) (100 mL/Hr) IV Continuous <Continuous>  dextrose 50% Injectable 25 Gram(s) IV Push once  dextrose 50% Injectable 12.5 Gram(s) IV Push once  dextrose 50% Injectable 25 Gram(s) IV Push once  dronedarone 400 milliGRAM(s) Oral two times a day  folic acid 1 milliGRAM(s) Oral daily  gabapentin 300 milliGRAM(s) Oral at bedtime  glucagon  Injectable 1 milliGRAM(s) IntraMuscular once  insulin glargine Injectable (LANTUS) 10 Unit(s) SubCutaneous at bedtime  insulin lispro (ADMELOG) corrective regimen sliding scale   SubCutaneous every 6 hours  metoprolol tartrate 25 milliGRAM(s) Oral two times a day  midodrine. 10 milliGRAM(s) Oral three times a day  pantoprazole  Injectable 40 milliGRAM(s) IV Push every 12 hours  simvastatin 20 milliGRAM(s) Oral at bedtime  sodium bicarbonate 650 milliGRAM(s) Oral two times a day  sodium chloride 0.9%. 1000 milliLiter(s) (75 mL/Hr) IV Continuous <Continuous>  tamsulosin 0.4 milliGRAM(s) Oral at bedtime    MEDICATIONS  (PRN):  acetaminophen     Tablet .. 650 milliGRAM(s) Oral every 6 hours PRN Temp greater or equal to 38C (100.4F), Mild Pain (1 - 3)  aluminum hydroxide/magnesium hydroxide/simethicone Suspension 30 milliLiter(s) Oral every 4 hours PRN Dyspepsia  dextrose Oral Gel 15 Gram(s) Oral once PRN Blood Glucose LESS THAN 70 milliGRAM(s)/deciliter  melatonin 3 milliGRAM(s) Oral at bedtime PRN Insomnia  prochlorperazine   Injectable 5 milliGRAM(s) IV Push every 6 hours PRN prn nausea & vomiting      Packed Red Cells Order:  1 Unit  Indication: Hgb <8 gm/dL -Stable Cardiovascular Disease or Acute Co  Infuse Unit : 3 Hours (01-08-24 @ 10:24)  Packed Red Cells Order:  1 Unit  Indication: Anemia due to Active Hemorrhage - Medical Conditions e.  Infuse Unit : 3.5 Hours (01-07-24 @ 16:21)      DIET:  Diet, NPO after Midnight:      NPO Start Date: 09-Jan-2024,   NPO Start Time: 23:59  Except Medications (01-09-24 @ 11:48) [Active]  Diet, Soft and Bite Sized:   Consistent Carbohydrate Evening Snack (CSTCHOSN)  DASH/TLC Sodium & Cholesterol Restricted (DASH) (01-09-24 @ 11:48) [Active]        ALLERGIES:   Allergies    No Known Allergies    Intolerances      VITAL SIGNS:   Vital Signs Last 24 Hrs  T(C): 36.4 (09 Jan 2024 08:06), Max: 36.9 (08 Jan 2024 13:30)  T(F): 97.6 (09 Jan 2024 08:06), Max: 98.4 (08 Jan 2024 13:30)  HR: 63 (09 Jan 2024 11:26) (58 - 73)  BP: 157/72 (09 Jan 2024 11:26) (113/53 - 158/81)  BP(mean): --  RR: 18 (09 Jan 2024 11:26) (18 - 18)  SpO2: 92% (09 Jan 2024 11:26) (92% - 99%)    Parameters below as of 09 Jan 2024 11:26  Patient On (Oxygen Delivery Method): nasal cannula  O2 Flow (L/min): 2    I&O's Summary    08 Jan 2024 07:01  -  09 Jan 2024 07:00  --------------------------------------------------------  IN: 0 mL / OUT: 50 mL / NET: -50 mL        PHYSICAL EXAM:   GENERAL:  No acute distress  HEENT:  NC/AT, conjunctiva clear, sclera anicteric  CHEST:  No increased effort  HEART:  Regular rate  ABDOMEN:  Soft, non-tender, non-distended, normoactive bowel sounds, no rebound or guarding  EXTREMITIES: No edema  SKIN:  Warm, dry  NEURO:  Calm, cooperative    LABS:                        8.9    8.01  )-----------( 224      ( 09 Jan 2024 02:28 )             27.4     Hemoglobin: 8.9 g/dL (01-09-24 @ 02:28)  Hemoglobin: 7.4 g/dL (01-08-24 @ 02:31)  Hemoglobin: 7.6 g/dL (01-07-24 @ 23:26)  Hemoglobin: 7.1 g/dL (01-07-24 @ 13:58)  Hemoglobin: 8.0 g/dL (01-07-24 @ 08:05)  Hemoglobin: 9.7 g/dL (01-06-24 @ 18:28)    01-09    144  |  110<H>  |  40.7<H>  ----------------------------<  120<H>  4.5   |  25.0  |  2.22<H>    Ca    8.4      09 Jan 2024 02:28    TPro  5.0<L>  /  Alb  2.5<L>  /  TBili  0.4  /  DBili  x   /  AST  14  /  ALT  16  /  AlkPhos  52  01-09    LIVER FUNCTIONS - ( 09 Jan 2024 02:28 )  Alb: 2.5 g/dL / Pro: 5.0 g/dL / ALK PHOS: 52 U/L / ALT: 16 U/L / AST: 14 U/L / GGT: x           Culture - Blood (collected 07 Jan 2024 08:05)  Source: .Blood Blood-Peripheral  Preliminary Report (09 Jan 2024 02:03):    No growth at 24 hours    Culture - Blood (collected 07 Jan 2024 07:55)  Source: .Blood Blood-Peripheral  Preliminary Report (09 Jan 2024 02:03):    No growth at 24 hours    Culture - Urine (collected 06 Jan 2024 23:56)  Source: Clean Catch Clean Catch (Midstream)  Final Report (08 Jan 2024 07:21):    <10,000 CFU/mL Normal Urogenital Allyson    RADIOLOGY & ADDITIONAL STUDIES:      ACC: [id number] EXAM:  CT ABDOMEN AND PELVIS   ORDERED BY: JULIANNA FELDMAN     PROCEDURE DATE:  01/06/2024          INTERPRETATION:  CLINICAL INFORMATION: Abdominal pain status post   percutaneous cholecystostomy    COMPARISON: 1/4/2024    CONTRAST/COMPLICATIONS:  IV Contrast: NONE  Oral Contrast: NONE  Complications: None reported at time of study completion    PROCEDURE:  CT of the Abdomen and Pelvis was performed.  Sagittal and coronal reformats were performed.    FINDINGS:  LOWER CHEST: Small bilateral pleural effusions with associated   atelectasis. Coronary artery calcifications.    LIVER: Within normal limits.  BILE DUCTS: Normal caliber.  GALLBLADDER: Decompressed gallbladder with surrounding inflammatory   changes and indwelling percutaneous cholecystostomy tube, not   substantially changed  SPLEEN: Within normal limits.  PANCREAS: Within normal limits.  ADRENALS: Within normal limits.  KIDNEYS/URETERS: No hydronephrosis. Right renal cysts.    BLADDER: Within normal limits.  REPRODUCTIVE ORGANS: Prostate within normal limits.    BOWEL: No bowel obstruction. Markedly dilated and fluid-filled lower   esophagus, new compared to prior. Duodenal diverticulum  PERITONEUM: No ascites.  VESSELS: Atherosclerotic changes.  RETROPERITONEUM/LYMPH NODES: No lymphadenopathy.  ABDOMINAL WALL: Fat-containing right inguinal hernia.  BONES: No acute osseous abnormality. Old right-sided rib fractures.   Diffuse osteopenia.    IMPRESSION:  No acute interval change.    Decompressed gallbladder with surrounding inflammatory changes and   indwelling percutaneous cholecystostomy tube, not substantially changed    --- End of Report ---      TANVIR MOE MD; Attending Radiologist  This document has been electronically signed. Jan 6 2024 11:41PM  01-06-24 @ 22:56

## 2024-01-09 NOTE — PROGRESS NOTE ADULT - ASSESSMENT
84 yo M with PMH of DM, HTN, CKD stage III, who presented from rehab after episode of coffee ground emesis. He was recently admitted in 12/2023 for acute cholecystitis and had perc sky placed by IR on 12/22/23. Hospital course was complicated by hypoxic respiratory failure secondary to COVID, RON, Afib with RVR, and delirium. He was discharged to SNF on 1/5/2024,  planned for outpatient IR follow up and interval cholecystectomy. He presented back to ED on 1/6/24 due to complaints of abdominal pain, nausea and vomiting. CT AP at that time showed new markedly dilated, fluid filled esophagus. He was discharged back to rehab, but then presented back to ED today (1/7/24) after large volume coffee ground emesis.    Coffee ground emesis concern for Upper GI bleed  Acute blood loss anemia  hgb 8.0  patient was on 5 days of decadron when he had covid  - hold asa  - PPI IV BID  - active type and screen  - trend h&h  - GI consult- Plan for EGD tomorrow  - [facility] Cards c/s placed for clearance    Leukocytosis  CXR small effusion vs atelectasis  UA unremarkable  - F/u blood cx- NGTD    CKD stage 3  BPH  - Baseline ~ 2-2.5  - renally dose meds  - avoid nephrotoxic medications  - c/w Tamsulosin  - cont sodium bicarb BID    Recent Acute cholecystitis s/p IR perc drain placemen  - will need drain in for 4-6 weeks for tract to form, outpt IR followup  - completed abx   - on home midodrine, unclear etiology    Afib   - Tele  -  cont Multaq  - metoprolol BID  - hold asa  - Cards c/s placed for clearance    DM  - A1C 9  - accu checks q6 hr while npo  - dec  Lantus 8 QHS while npo  - hold pre meal  - q 6hr ISS while npo   - can c/w gabapentin for neuropathy   - monitor and adjust insulin as needed    HLD  -c/w statin    Dispo- medically active pending EGD.   dvt ppx: SCDs 84 yo M with PMH of DM, HTN, CKD stage III, who presented from rehab after episode of coffee ground emesis. He was recently admitted in 12/2023 for acute cholecystitis and had perc sky placed by IR on 12/22/23. Hospital course was complicated by hypoxic respiratory failure secondary to COVID, RON, Afib with RVR, and delirium. He was discharged to SNF on 1/5/2024,  planned for outpatient IR follow up and interval cholecystectomy. He presented back to ED on 1/6/24 due to complaints of abdominal pain, nausea and vomiting. CT AP at that time showed new markedly dilated, fluid filled esophagus. He was discharged back to rehab, but then presented back to ED today (1/7/24) after large volume coffee ground emesis.    Coffee ground emesis concern for Upper GI bleed  Acute blood loss anemia  hgb 8.0  patient was on 5 days of decadron when he had covid  - hold asa  - PPI IV BID  - active type and screen  - trend h&h, Hg stable s/p transfusion  - GI consult- Plan for EGD tomorrow  - [facility] Cards c/s placed for clearance    Leukocytosis  CXR small effusion vs atelectasis  UA unremarkable  - F/u blood cx- NGTD    CKD stage 3  BPH  - Baseline ~ 2-2.5  - renally dose meds  - avoid nephrotoxic medications  - c/w Tamsulosin  - cont sodium bicarb BID    Recent Acute cholecystitis s/p IR perc drain placemen  - will need drain in for 4-6 weeks for tract to form, outpt IR followup  - completed abx   - on home midodrine, unclear etiology    Afib   - Tele  -  cont Multaq  - metoprolol BID  - hold asa  - Cards c/s placed for clearance    DM  - A1C 9  - accu checks q6 hr while npo  - dec  Lantus 8 QHS while npo  - hold pre meal  - q 6hr ISS while npo   - can c/w gabapentin for neuropathy   - monitor and adjust insulin as needed    HLD  -c/w statin    Dispo- medically active pending EGD.   dvt ppx: SCDs 82 yo M with PMH of DM, HTN, CKD stage III, who presented from rehab after episode of coffee ground emesis. He was recently admitted in 12/2023 for acute cholecystitis and had perc sky placed by IR on 12/22/23. Hospital course was complicated by hypoxic respiratory failure secondary to COVID, RON, Afib with RVR, and delirium. He was discharged to SNF on 1/5/2024,  planned for outpatient IR follow up and interval cholecystectomy. He presented back to ED on 1/6/24 due to complaints of abdominal pain, nausea and vomiting. CT AP at that time showed new markedly dilated, fluid filled esophagus. He was discharged back to rehab, but then presented back to ED today (1/7/24) after large volume coffee ground emesis.    Coffee ground emesis concern for Upper GI bleed  Acute blood loss anemia  hgb 8.0  patient was on 5 days of decadron when he had covid  - hold asa  - PPI IV BID  - active type and screen  - trend h&h, Hg stable s/p transfusion  - GI consult- Plan for EGD tomorrow  - [facility] Cards c/s placed for clearance    Leukocytosis  CXR small effusion vs atelectasis  UA unremarkable  - F/u blood cx- NGTD    CKD stage 3  BPH  - Baseline ~ 2-2.5  - renally dose meds  - avoid nephrotoxic medications  - c/w Tamsulosin  - cont sodium bicarb BID    Recent Acute cholecystitis s/p IR perc drain placemen  - will need drain in for 4-6 weeks for tract to form, outpt IR followup  - completed abx   - on home midodrine, unclear etiology    Afib   - Tele  -  cont Multaq  - metoprolol BID  - hold asa  - Cards c/s placed for clearance    DM  - A1C 9  - accu checks q6 hr while npo  - dec  Lantus 8 QHS while npo  - hold pre meal  - q 6hr ISS while npo   - can c/w gabapentin for neuropathy   - monitor and adjust insulin as needed    HLD  -c/w statin    Dispo- medically active pending EGD.   dvt ppx: SCDs 82 yo M with PMH of DM, HTN, CKD stage III, who presented from rehab after episode of coffee ground emesis. He was recently admitted in 12/2023 for acute cholecystitis and had perc sky placed by IR on 12/22/23. Hospital course was complicated by hypoxic respiratory failure secondary to COVID, RON, Afib with RVR, and delirium. He was discharged to SNF on 1/5/2024,  planned for outpatient IR follow up and interval cholecystectomy. He presented back to ED on 1/6/24 due to complaints of abdominal pain, nausea and vomiting. CT AP at that time showed new markedly dilated, fluid filled esophagus. He was discharged back to rehab, but then presented back to ED today (1/7/24) after large volume coffee ground emesis.    Coffee ground emesis concern for Upper GI bleed  Acute blood loss anemia  hgb 8.0  patient was on 5 days of decadron when he had covid  - hold asa  - PPI IV BID  - active type and screen  - trend h&h, Hg stable s/p transfusion  - GI consult- Plan for EGD tomorrow  - [facility] Cards c/s placed for clearance    Leukocytosis  CXR small effusion vs atelectasis  UA unremarkable  - F/u blood cx- NGTD    CKD stage 3  BPH  - Baseline ~ 2-2.5  - renally dose meds  - avoid nephrotoxic medications  - c/w Tamsulosin  - cont sodium bicarb BID    Recent Acute cholecystitis s/p IR perc drain placemen  - will need drain in for 4-6 weeks for tract to form, outpt IR followup  - completed abx   - on home midodrine, unclear etiology    Afib   - Tele  -  cont Multaq  - metoprolol BID  - hold asa  - Cards c/s placed for clearance    DM  - A1C 9  - accu checks q6 hr while npo  - dec  Lantus 8 QHS while npo  - hold pre meal  - q 6hr ISS while npo   - can c/w gabapentin for neuropathy   - monitor and adjust insulin as needed    HLD  -c/w statin    Dispo- medically active pending EGD.   dvt ppx: SCDs    Updated patient's daughter on 1/9. 84 yo M with PMH of DM, HTN, CKD stage III, who presented from rehab after episode of coffee ground emesis. He was recently admitted in 12/2023 for acute cholecystitis and had perc sky placed by IR on 12/22/23. Hospital course was complicated by hypoxic respiratory failure secondary to COVID, RON, Afib with RVR, and delirium. He was discharged to SNF on 1/5/2024,  planned for outpatient IR follow up and interval cholecystectomy. He presented back to ED on 1/6/24 due to complaints of abdominal pain, nausea and vomiting. CT AP at that time showed new markedly dilated, fluid filled esophagus. He was discharged back to rehab, but then presented back to ED today (1/7/24) after large volume coffee ground emesis.    Coffee ground emesis concern for Upper GI bleed  Acute blood loss anemia  hgb 8.0  patient was on 5 days of decadron when he had covid  - hold asa  - PPI IV BID  - active type and screen  - trend h&h, Hg stable s/p transfusion  - GI consult- Plan for EGD tomorrow  - [facility] Cards c/s placed for clearance    Leukocytosis  CXR small effusion vs atelectasis  UA unremarkable  - F/u blood cx- NGTD    CKD stage 3  BPH  - Baseline ~ 2-2.5  - renally dose meds  - avoid nephrotoxic medications  - c/w Tamsulosin  - cont sodium bicarb BID    Recent Acute cholecystitis s/p IR perc drain placemen  - will need drain in for 4-6 weeks for tract to form, outpt IR followup  - completed abx   - on home midodrine, unclear etiology    Afib   - Tele  -  cont Multaq  - metoprolol BID  - hold asa  - Cards c/s placed for clearance    DM  - A1C 9  - accu checks q6 hr while npo  - dec  Lantus 8 QHS while npo  - hold pre meal  - q 6hr ISS while npo   - can c/w gabapentin for neuropathy   - monitor and adjust insulin as needed    HLD  -c/w statin    Dispo- medically active pending EGD.   dvt ppx: SCDs    Updated patient's daughter on 1/9.

## 2024-01-09 NOTE — CONSULT NOTE ADULT - ASSESSMENT
84 yo M with PMH of DM, HTN, CKD stage III, who presented from rehab after episode of coffee ground emesis. He was recently admitted in 12/2023 for acute cholecystitis and had perc sky placed by IR on 12/22/23. Hospital course was complicated by hypoxic respiratory failure secondary to COVID, RON, Afib with RVR, and delirium. He was discharged to SNF two days ago and was planned for outpatient IR follow up and interval cholecystectomy. He presented back to ED on 1/6/24 due to complaints of abdominal pain, nausea and vomiting. CT AP at that time showed new markedly dilated fluid filled esophagus. He was discharged back to rehab, but then presented back to ED today (1/7/24) after large volume coffee ground emesis. At time of GI evaluation, patient is confused and sleepy, unable to provide history, but denies abdominal pain.    # Coffee ground emesis  # Dilated esophagus on CT  CT AP: No bowel obstruction. Markedly dilated and fluid-filled lower esophagus, new compared to prior. Duodenal diverticulum.  Coffee ground emesis possibly related to esophagitis  Hgb stable. Hemodynamically stable.     - Keep NPO for now due to active vomiting  - IV PPI BID  - Eventual EGD, non-urgent, timing to be determined based on clinical course. Patient will need family member for consent.   - Trend Hgb and transfuse as needed  - Zofran prn
82 yo M with PMH of DM, HTN, CKD stage III, who presented from rehab after episode of coffee ground emesis. He was recently admitted in 12/2023 for acute cholecystitis and had perc sky placed by IR on 12/22/23. Hospital course was complicated by hypoxic respiratory failure secondary to COVID, RON, Afib with RVR, and delirium. He was discharged to SNF on 1/5/2024,  planned for outpatient IR follow up and interval cholecystectomy. He presented back to ED on 1/6/24 due to complaints of abdominal pain, nausea and vomiting. CT AP at that time showed new markedly dilated, fluid filled esophagus. He was discharged back to rehab, but then presented back to ED today (1/7/24) after large volume coffee ground emesis. Family at bedside reports he has a history of stomach ulcers but no recent endoscopy. At baseline patient is alert and oriented by2. Currently patient knows his name and that he is in the hospital but does not know the year. He denies abdominal pain and nausea at this time.

## 2024-01-09 NOTE — PROGRESS NOTE ADULT - ASSESSMENT
This is an 84 yo M with PMH of DM, HTN, CKD stage III, who presented from rehab after episode of coffee ground emesis.    #coffee ground emesis  #GIB  - 1/6/2024: CT A/P: no bowel obstruction. Markedly dilated and fluid-filled lower esophagus, new compared to prior. Duodenal diverticulum.  - Trend CBC, transfuse as needed. Monitor for signs of bleeding. Avoid NSAIDs  - No overt signs of bleeding at this time  - IV PPI BID for GI mucosal protection   - PRN antiemetic for nausea/vomiting  - Maintain aspiration precautions, keep head of bed elevated  - Due to patient's advanced age and multiple comorbidities, non urgent EGD can be scheduled outpatient when patient has clinically improved. Patient will need family member for consent  - May benefit from motility studies if improvement in mentation    _________________________________________________________________  Assessment and recommendations are final when note is signed by the attending physician.  This is an 84 yo M with PMH of DM, HTN, CKD stage III, who presented from rehab after episode of coffee ground emesis.    #coffee ground emesis  #GIB  - 1/6/2024: CT A/P: no bowel obstruction. Markedly dilated and fluid-filled lower esophagus, new compared to prior. Duodenal diverticulum.  - Trend CBC, transfuse as needed. Monitor for signs of bleeding. Avoid NSAIDs  - No overt signs of bleeding at this time  - IV PPI BID for GI mucosal protection   - PRN antiemetic for nausea/vomiting  - Maintain aspiration precautions, keep head of bed elevated  - Non urgent EGD, timing to be determined based on hospital course. Patient will need family member for consent  - May benefit from motility studies if improvement in mentation    _________________________________________________________________  Assessment and recommendations are final when note is signed by the attending physician.  This is an 84 yo M with PMH of DM, HTN, CKD stage III, who presented from rehab after episode of coffee ground emesis.    #coffee ground emesis  #GIB  - 1/6/2024: CT A/P: no bowel obstruction. Markedly dilated and fluid-filled lower esophagus, new compared to prior. Duodenal diverticulum.  - Trend CBC, transfuse as needed. Monitor for signs of bleeding. Avoid NSAIDs  - No overt signs of bleeding at this time  - IV PPI BID for GI mucosal protection   - PRN antiemetic for nausea/vomiting  - Maintain aspiration precautions, keep head of bed elevated  - Patient cleared by cardiology for EGD tomorrow 1/10/2024. Patient will need family member for consent  - May benefit from motility studies if improvement in mentation    _________________________________________________________________  Assessment and recommendations are final when note is signed by the attending physician.  This is an 82 yo M with PMH of DM, HTN, CKD stage III, who presented from rehab after episode of coffee ground emesis.    #coffee ground emesis  #GIB  - 1/6/2024: CT A/P: no bowel obstruction. Markedly dilated and fluid-filled lower esophagus, new compared to prior. Duodenal diverticulum.  - Trend CBC, transfuse as needed. Monitor for signs of bleeding. Avoid NSAIDs  - No overt signs of bleeding at this time  - IV PPI BID for GI mucosal protection   - PRN antiemetic for nausea/vomiting  - Maintain aspiration precautions, keep head of bed elevated  - Patient cleared by cardiology for EGD tomorrow 1/10/2024. Patient will need family member for consent  - May benefit from motility studies if improvement in mentation    _________________________________________________________________  Assessment and recommendations are final when note is signed by the attending physician.

## 2024-01-09 NOTE — PROGRESS NOTE ADULT - SUBJECTIVE AND OBJECTIVE BOX
Patient is a 84y old  Male who presents with a chief complaint of GI bleed (09 Jan 2024 12:24)      INTERVAL HPI/OVERNIGHT EVENTS:    MEDICATIONS  (STANDING):  dextrose 5%. 1000 milliLiter(s) (100 mL/Hr) IV Continuous <Continuous>  dextrose 5%. 1000 milliLiter(s) (50 mL/Hr) IV Continuous <Continuous>  dextrose 50% Injectable 25 Gram(s) IV Push once  dextrose 50% Injectable 12.5 Gram(s) IV Push once  dextrose 50% Injectable 25 Gram(s) IV Push once  dronedarone 400 milliGRAM(s) Oral two times a day  folic acid 1 milliGRAM(s) Oral daily  gabapentin 300 milliGRAM(s) Oral at bedtime  glucagon  Injectable 1 milliGRAM(s) IntraMuscular once  insulin glargine Injectable (LANTUS) 10 Unit(s) SubCutaneous at bedtime  insulin lispro (ADMELOG) corrective regimen sliding scale   SubCutaneous every 6 hours  metoprolol tartrate 25 milliGRAM(s) Oral two times a day  midodrine. 10 milliGRAM(s) Oral three times a day  pantoprazole  Injectable 40 milliGRAM(s) IV Push every 12 hours  simvastatin 20 milliGRAM(s) Oral at bedtime  sodium bicarbonate 650 milliGRAM(s) Oral two times a day  sodium chloride 0.9%. 1000 milliLiter(s) (75 mL/Hr) IV Continuous <Continuous>  tamsulosin 0.4 milliGRAM(s) Oral at bedtime    MEDICATIONS  (PRN):  acetaminophen     Tablet .. 650 milliGRAM(s) Oral every 6 hours PRN Temp greater or equal to 38C (100.4F), Mild Pain (1 - 3)  aluminum hydroxide/magnesium hydroxide/simethicone Suspension 30 milliLiter(s) Oral every 4 hours PRN Dyspepsia  dextrose Oral Gel 15 Gram(s) Oral once PRN Blood Glucose LESS THAN 70 milliGRAM(s)/deciliter  melatonin 3 milliGRAM(s) Oral at bedtime PRN Insomnia  prochlorperazine   Injectable 5 milliGRAM(s) IV Push every 6 hours PRN prn nausea & vomiting      Allergies    No Known Allergies    Intolerances        REVIEW OF SYSTEMS: all negative with exception of above    Vital Signs Last 24 Hrs  T(C): 36.4 (09 Jan 2024 08:06), Max: 36.9 (08 Jan 2024 13:30)  T(F): 97.6 (09 Jan 2024 08:06), Max: 98.4 (08 Jan 2024 13:30)  HR: 63 (09 Jan 2024 11:26) (58 - 73)  BP: 157/72 (09 Jan 2024 11:26) (113/53 - 158/81)  BP(mean): --  RR: 18 (09 Jan 2024 11:26) (18 - 18)  SpO2: 92% (09 Jan 2024 11:26) (92% - 99%)    Parameters below as of 09 Jan 2024 11:26  Patient On (Oxygen Delivery Method): nasal cannula  O2 Flow (L/min): 2      PHYSICAL EXAM:  GENERAL: NAD, well-groomed, well-developed  HEAD:  Atraumatic, Normocephalic  EYES: EOMI, PERRLA, conjunctiva and sclera clear  ENMT: No tonsillar erythema, exudates, or enlargement; Moist mucous membranes, Good dentition, No lesions  NECK: Supple, No JVD, Normal thyroid  NERVOUS SYSTEM:  Alert & Oriented X3, Good concentration; Motor Strength 5/5 B/L upper and lower extremities; DTRs 2+ intact and symmetric  CHEST/LUNG: Clear to percussion bilaterally; No rales, rhonchi, wheezing, or rubs  HEART: Regular rate and rhythm; No murmurs, rubs, or gallops  ABDOMEN: Soft, Nontender, Nondistended; Bowel sounds present  EXTREMITIES:  2+ Peripheral Pulses, No clubbing, cyanosis, or edema  LYMPH: No lymphadenopathy noted  SKIN: No rashes or lesions    LABS:                        8.9    8.01  )-----------( 224      ( 09 Jan 2024 02:28 )             27.4     01-09    144  |  110<H>  |  40.7<H>  ----------------------------<  120<H>  4.5   |  25.0  |  2.22<H>    Ca    8.4      09 Jan 2024 02:28    TPro  5.0<L>  /  Alb  2.5<L>  /  TBili  0.4  /  DBili  x   /  AST  14  /  ALT  16  /  AlkPhos  52  01-09      Urinalysis Basic - ( 09 Jan 2024 02:28 )    Color: x / Appearance: x / SG: x / pH: x  Gluc: 120 mg/dL / Ketone: x  / Bili: x / Urobili: x   Blood: x / Protein: x / Nitrite: x   Leuk Esterase: x / RBC: x / WBC x   Sq Epi: x / Non Sq Epi: x / Bacteria: x      CAPILLARY BLOOD GLUCOSE      POCT Blood Glucose.: 129 mg/dL (09 Jan 2024 12:11)  POCT Blood Glucose.: 115 mg/dL (09 Jan 2024 06:02)  POCT Blood Glucose.: 118 mg/dL (08 Jan 2024 21:50)  POCT Blood Glucose.: 136 mg/dL (08 Jan 2024 17:55)      RADIOLOGY & ADDITIONAL TESTS:    Imaging Personally Reviewed:  [ ] YES  [ ] NO    Consultant(s) Notes Reviewed:  [ ] YES  [ ] NO    Care Discussed with Consultants/Other Providers [ ] YES  [ ] NO Patient is a 84y old  Male who presents with a chief complaint of GI bleed (09 Jan 2024 12:24)    INTERVAL HPI/OVERNIGHT EVENTS: No acute events overnight. Tele notable for sinus kenneth to 40s overnight. Asymptomatic.     MEDICATIONS  (STANDING):  dextrose 5%. 1000 milliLiter(s) (100 mL/Hr) IV Continuous <Continuous>  dextrose 5%. 1000 milliLiter(s) (50 mL/Hr) IV Continuous <Continuous>  dextrose 50% Injectable 25 Gram(s) IV Push once  dextrose 50% Injectable 12.5 Gram(s) IV Push once  dextrose 50% Injectable 25 Gram(s) IV Push once  dronedarone 400 milliGRAM(s) Oral two times a day  folic acid 1 milliGRAM(s) Oral daily  gabapentin 300 milliGRAM(s) Oral at bedtime  glucagon  Injectable 1 milliGRAM(s) IntraMuscular once  insulin glargine Injectable (LANTUS) 10 Unit(s) SubCutaneous at bedtime  insulin lispro (ADMELOG) corrective regimen sliding scale   SubCutaneous every 6 hours  metoprolol tartrate 25 milliGRAM(s) Oral two times a day  midodrine. 10 milliGRAM(s) Oral three times a day  pantoprazole  Injectable 40 milliGRAM(s) IV Push every 12 hours  simvastatin 20 milliGRAM(s) Oral at bedtime  sodium bicarbonate 650 milliGRAM(s) Oral two times a day  sodium chloride 0.9%. 1000 milliLiter(s) (75 mL/Hr) IV Continuous <Continuous>  tamsulosin 0.4 milliGRAM(s) Oral at bedtime    MEDICATIONS  (PRN):  acetaminophen     Tablet .. 650 milliGRAM(s) Oral every 6 hours PRN Temp greater or equal to 38C (100.4F), Mild Pain (1 - 3)  aluminum hydroxide/magnesium hydroxide/simethicone Suspension 30 milliLiter(s) Oral every 4 hours PRN Dyspepsia  dextrose Oral Gel 15 Gram(s) Oral once PRN Blood Glucose LESS THAN 70 milliGRAM(s)/deciliter  melatonin 3 milliGRAM(s) Oral at bedtime PRN Insomnia  prochlorperazine   Injectable 5 milliGRAM(s) IV Push every 6 hours PRN prn nausea & vomiting      Allergies    No Known Allergies    Intolerances        REVIEW OF SYSTEMS: all negative with exception of above    Vital Signs Last 24 Hrs  T(C): 36.4 (09 Jan 2024 08:06), Max: 36.9 (08 Jan 2024 13:30)  T(F): 97.6 (09 Jan 2024 08:06), Max: 98.4 (08 Jan 2024 13:30)  HR: 63 (09 Jan 2024 11:26) (58 - 73)  BP: 157/72 (09 Jan 2024 11:26) (113/53 - 158/81)  BP(mean): --  RR: 18 (09 Jan 2024 11:26) (18 - 18)  SpO2: 92% (09 Jan 2024 11:26) (92% - 99%)    Parameters below as of 09 Jan 2024 11:26  Patient On (Oxygen Delivery Method): nasal cannula  O2 Flow (L/min): 2      PHYSICAL EXAM:  GENERAL: NAD, well-groomed, well-developed  HEAD:  Atraumatic, Normocephalic  EYES: EOMI, PERRLA, conjunctiva and sclera clear  ENMT: No tonsillar erythema, exudates, or enlargement; Moist mucous membranes, Good dentition, No lesions  NECK: Supple, No JVD, Normal thyroid  NERVOUS SYSTEM:  Alert & Oriented X3, Good concentration; Motor Strength 5/5 B/L upper and lower extremities; DTRs 2+ intact and symmetric  CHEST/LUNG: Clear to percussion bilaterally; No rales, rhonchi, wheezing, or rubs  HEART: Regular rate and rhythm; No murmurs, rubs, or gallops  ABDOMEN: Soft, Nontender, Nondistended; Bowel sounds present  EXTREMITIES:  2+ Peripheral Pulses, No clubbing, cyanosis, or edema  LYMPH: No lymphadenopathy noted  SKIN: No rashes or lesions    LABS:                        8.9    8.01  )-----------( 224      ( 09 Jan 2024 02:28 )             27.4     01-09    144  |  110<H>  |  40.7<H>  ----------------------------<  120<H>  4.5   |  25.0  |  2.22<H>    Ca    8.4      09 Jan 2024 02:28    TPro  5.0<L>  /  Alb  2.5<L>  /  TBili  0.4  /  DBili  x   /  AST  14  /  ALT  16  /  AlkPhos  52  01-09      Urinalysis Basic - ( 09 Jan 2024 02:28 )    Color: x / Appearance: x / SG: x / pH: x  Gluc: 120 mg/dL / Ketone: x  / Bili: x / Urobili: x   Blood: x / Protein: x / Nitrite: x   Leuk Esterase: x / RBC: x / WBC x   Sq Epi: x / Non Sq Epi: x / Bacteria: x      CAPILLARY BLOOD GLUCOSE      POCT Blood Glucose.: 129 mg/dL (09 Jan 2024 12:11)  POCT Blood Glucose.: 115 mg/dL (09 Jan 2024 06:02)  POCT Blood Glucose.: 118 mg/dL (08 Jan 2024 21:50)  POCT Blood Glucose.: 136 mg/dL (08 Jan 2024 17:55)      RADIOLOGY & ADDITIONAL TESTS:    Imaging Personally Reviewed:  [ ] YES  [ ] NO    Consultant(s) Notes Reviewed:  [ ] YES  [ ] NO    Care Discussed with Consultants/Other Providers [ ] YES  [ ] NO Patient is a 84y old  Male who presents with a chief complaint of GI bleed (09 Jan 2024 12:24)    INTERVAL HPI/OVERNIGHT EVENTS: No acute events overnight. Tele notable for sinus kenneth to 40s overnight. Asymptomatic.     MEDICATIONS  (STANDING):  dextrose 5%. 1000 milliLiter(s) (100 mL/Hr) IV Continuous <Continuous>  dextrose 5%. 1000 milliLiter(s) (50 mL/Hr) IV Continuous <Continuous>  dextrose 50% Injectable 25 Gram(s) IV Push once  dextrose 50% Injectable 12.5 Gram(s) IV Push once  dextrose 50% Injectable 25 Gram(s) IV Push once  dronedarone 400 milliGRAM(s) Oral two times a day  folic acid 1 milliGRAM(s) Oral daily  gabapentin 300 milliGRAM(s) Oral at bedtime  glucagon  Injectable 1 milliGRAM(s) IntraMuscular once  insulin glargine Injectable (LANTUS) 10 Unit(s) SubCutaneous at bedtime  insulin lispro (ADMELOG) corrective regimen sliding scale   SubCutaneous every 6 hours  metoprolol tartrate 25 milliGRAM(s) Oral two times a day  midodrine. 10 milliGRAM(s) Oral three times a day  pantoprazole  Injectable 40 milliGRAM(s) IV Push every 12 hours  simvastatin 20 milliGRAM(s) Oral at bedtime  sodium bicarbonate 650 milliGRAM(s) Oral two times a day  sodium chloride 0.9%. 1000 milliLiter(s) (75 mL/Hr) IV Continuous <Continuous>  tamsulosin 0.4 milliGRAM(s) Oral at bedtime    MEDICATIONS  (PRN):  acetaminophen     Tablet .. 650 milliGRAM(s) Oral every 6 hours PRN Temp greater or equal to 38C (100.4F), Mild Pain (1 - 3)  aluminum hydroxide/magnesium hydroxide/simethicone Suspension 30 milliLiter(s) Oral every 4 hours PRN Dyspepsia  dextrose Oral Gel 15 Gram(s) Oral once PRN Blood Glucose LESS THAN 70 milliGRAM(s)/deciliter  melatonin 3 milliGRAM(s) Oral at bedtime PRN Insomnia  prochlorperazine   Injectable 5 milliGRAM(s) IV Push every 6 hours PRN prn nausea & vomiting      Allergies    No Known Allergies    Intolerances        REVIEW OF SYSTEMS: all negative with exception of above    Vital Signs Last 24 Hrs  T(C): 36.4 (09 Jan 2024 08:06), Max: 36.9 (08 Jan 2024 13:30)  T(F): 97.6 (09 Jan 2024 08:06), Max: 98.4 (08 Jan 2024 13:30)  HR: 63 (09 Jan 2024 11:26) (58 - 73)  BP: 157/72 (09 Jan 2024 11:26) (113/53 - 158/81)  BP(mean): --  RR: 18 (09 Jan 2024 11:26) (18 - 18)  SpO2: 92% (09 Jan 2024 11:26) (92% - 99%)    Parameters below as of 09 Jan 2024 11:26  Patient On (Oxygen Delivery Method): nasal cannula  O2 Flow (L/min): 2    PHYSICAL EXAM:  GENERAL: NAD, well-groomed  NERVOUS SYSTEM:  Alert & Oriented X3, Good concentration; Motor Strength 5/5 B/L upper and lower extremities; DTRs 2+ intact and symmetric  CHEST/LUNG: Clear to percussion bilaterally; No rales, rhonchi, wheezing, or rubs  HEART: Regular rate and rhythm; No murmurs, rubs, or gallops  ABDOMEN: Soft, Nontender, Nondistended; Bowel sounds present  EXTREMITIES:  2+ Peripheral Pulses, No clubbing, cyanosis, or edema    LABS:                        8.9    8.01  )-----------( 224      ( 09 Jan 2024 02:28 )             27.4     01-09    144  |  110<H>  |  40.7<H>  ----------------------------<  120<H>  4.5   |  25.0  |  2.22<H>    Ca    8.4      09 Jan 2024 02:28    TPro  5.0<L>  /  Alb  2.5<L>  /  TBili  0.4  /  DBili  x   /  AST  14  /  ALT  16  /  AlkPhos  52  01-09      Urinalysis Basic - ( 09 Jan 2024 02:28 )    Color: x / Appearance: x / SG: x / pH: x  Gluc: 120 mg/dL / Ketone: x  / Bili: x / Urobili: x   Blood: x / Protein: x / Nitrite: x   Leuk Esterase: x / RBC: x / WBC x   Sq Epi: x / Non Sq Epi: x / Bacteria: x      CAPILLARY BLOOD GLUCOSE      POCT Blood Glucose.: 129 mg/dL (09 Jan 2024 12:11)  POCT Blood Glucose.: 115 mg/dL (09 Jan 2024 06:02)  POCT Blood Glucose.: 118 mg/dL (08 Jan 2024 21:50)  POCT Blood Glucose.: 136 mg/dL (08 Jan 2024 17:55)      RADIOLOGY & ADDITIONAL TESTS:    Imaging Personally Reviewed:  [ ] YES  [ ] NO    Consultant(s) Notes Reviewed:  [ ] YES  [ ] NO    Care Discussed with Consultants/Other Providers [ ] YES  [ ] NO

## 2024-01-10 ENCOUNTER — RESULT REVIEW (OUTPATIENT)
Age: 84
End: 2024-01-10

## 2024-01-10 ENCOUNTER — TRANSCRIPTION ENCOUNTER (OUTPATIENT)
Age: 84
End: 2024-01-10

## 2024-01-10 DIAGNOSIS — R41.0 DISORIENTATION, UNSPECIFIED: ICD-10-CM

## 2024-01-10 DIAGNOSIS — E11.22 TYPE 2 DIABETES MELLITUS WITH DIABETIC CHRONIC KIDNEY DISEASE: ICD-10-CM

## 2024-01-10 DIAGNOSIS — N18.30 CHRONIC KIDNEY DISEASE, STAGE 3 UNSPECIFIED: ICD-10-CM

## 2024-01-10 DIAGNOSIS — R10.11 RIGHT UPPER QUADRANT PAIN: ICD-10-CM

## 2024-01-10 DIAGNOSIS — R11.2 NAUSEA WITH VOMITING, UNSPECIFIED: ICD-10-CM

## 2024-01-10 DIAGNOSIS — I48.91 UNSPECIFIED ATRIAL FIBRILLATION: ICD-10-CM

## 2024-01-10 DIAGNOSIS — R10.31 RIGHT LOWER QUADRANT PAIN: ICD-10-CM

## 2024-01-10 DIAGNOSIS — U07.1 COVID-19: ICD-10-CM

## 2024-01-10 DIAGNOSIS — E78.5 HYPERLIPIDEMIA, UNSPECIFIED: ICD-10-CM

## 2024-01-10 DIAGNOSIS — I12.9 HYPERTENSIVE CHRONIC KIDNEY DISEASE WITH STAGE 1 THROUGH STAGE 4 CHRONIC KIDNEY DISEASE, OR UNSPECIFIED CHRONIC KIDNEY DISEASE: ICD-10-CM

## 2024-01-10 DIAGNOSIS — R10.13 EPIGASTRIC PAIN: ICD-10-CM

## 2024-01-10 LAB
ALBUMIN SERPL ELPH-MCNC: 2.6 G/DL — LOW (ref 3.3–5.2)
ALBUMIN SERPL ELPH-MCNC: 2.6 G/DL — LOW (ref 3.3–5.2)
ALP SERPL-CCNC: 70 U/L — SIGNIFICANT CHANGE UP (ref 40–120)
ALP SERPL-CCNC: 70 U/L — SIGNIFICANT CHANGE UP (ref 40–120)
ALT FLD-CCNC: 17 U/L — SIGNIFICANT CHANGE UP
ALT FLD-CCNC: 17 U/L — SIGNIFICANT CHANGE UP
ANION GAP SERPL CALC-SCNC: 7 MMOL/L — SIGNIFICANT CHANGE UP (ref 5–17)
ANION GAP SERPL CALC-SCNC: 7 MMOL/L — SIGNIFICANT CHANGE UP (ref 5–17)
AST SERPL-CCNC: 15 U/L — SIGNIFICANT CHANGE UP
AST SERPL-CCNC: 15 U/L — SIGNIFICANT CHANGE UP
BILIRUB SERPL-MCNC: 0.4 MG/DL — SIGNIFICANT CHANGE UP (ref 0.4–2)
BILIRUB SERPL-MCNC: 0.4 MG/DL — SIGNIFICANT CHANGE UP (ref 0.4–2)
BLD GP AB SCN SERPL QL: SIGNIFICANT CHANGE UP
BLD GP AB SCN SERPL QL: SIGNIFICANT CHANGE UP
BUN SERPL-MCNC: 35 MG/DL — HIGH (ref 8–20)
BUN SERPL-MCNC: 35 MG/DL — HIGH (ref 8–20)
CALCIUM SERPL-MCNC: 8.3 MG/DL — LOW (ref 8.4–10.5)
CALCIUM SERPL-MCNC: 8.3 MG/DL — LOW (ref 8.4–10.5)
CHLORIDE SERPL-SCNC: 109 MMOL/L — HIGH (ref 96–108)
CHLORIDE SERPL-SCNC: 109 MMOL/L — HIGH (ref 96–108)
CO2 SERPL-SCNC: 25 MMOL/L — SIGNIFICANT CHANGE UP (ref 22–29)
CO2 SERPL-SCNC: 25 MMOL/L — SIGNIFICANT CHANGE UP (ref 22–29)
CREAT SERPL-MCNC: 2.29 MG/DL — HIGH (ref 0.5–1.3)
CREAT SERPL-MCNC: 2.29 MG/DL — HIGH (ref 0.5–1.3)
EGFR: 27 ML/MIN/1.73M2 — LOW
EGFR: 27 ML/MIN/1.73M2 — LOW
GLUCOSE BLDC GLUCOMTR-MCNC: 110 MG/DL — HIGH (ref 70–99)
GLUCOSE BLDC GLUCOMTR-MCNC: 110 MG/DL — HIGH (ref 70–99)
GLUCOSE BLDC GLUCOMTR-MCNC: 125 MG/DL — HIGH (ref 70–99)
GLUCOSE BLDC GLUCOMTR-MCNC: 125 MG/DL — HIGH (ref 70–99)
GLUCOSE BLDC GLUCOMTR-MCNC: 157 MG/DL — HIGH (ref 70–99)
GLUCOSE BLDC GLUCOMTR-MCNC: 157 MG/DL — HIGH (ref 70–99)
GLUCOSE BLDC GLUCOMTR-MCNC: 159 MG/DL — HIGH (ref 70–99)
GLUCOSE BLDC GLUCOMTR-MCNC: 159 MG/DL — HIGH (ref 70–99)
GLUCOSE BLDC GLUCOMTR-MCNC: 184 MG/DL — HIGH (ref 70–99)
GLUCOSE BLDC GLUCOMTR-MCNC: 184 MG/DL — HIGH (ref 70–99)
GLUCOSE SERPL-MCNC: 128 MG/DL — HIGH (ref 70–99)
GLUCOSE SERPL-MCNC: 128 MG/DL — HIGH (ref 70–99)
HCT VFR BLD CALC: 26.6 % — LOW (ref 39–50)
HCT VFR BLD CALC: 26.6 % — LOW (ref 39–50)
HGB BLD-MCNC: 8.3 G/DL — LOW (ref 13–17)
HGB BLD-MCNC: 8.3 G/DL — LOW (ref 13–17)
INR BLD: 1.04 RATIO — SIGNIFICANT CHANGE UP (ref 0.85–1.18)
INR BLD: 1.04 RATIO — SIGNIFICANT CHANGE UP (ref 0.85–1.18)
MCHC RBC-ENTMCNC: 29.1 PG — SIGNIFICANT CHANGE UP (ref 27–34)
MCHC RBC-ENTMCNC: 29.1 PG — SIGNIFICANT CHANGE UP (ref 27–34)
MCHC RBC-ENTMCNC: 31.2 GM/DL — LOW (ref 32–36)
MCHC RBC-ENTMCNC: 31.2 GM/DL — LOW (ref 32–36)
MCV RBC AUTO: 93.3 FL — SIGNIFICANT CHANGE UP (ref 80–100)
MCV RBC AUTO: 93.3 FL — SIGNIFICANT CHANGE UP (ref 80–100)
PLATELET # BLD AUTO: 231 K/UL — SIGNIFICANT CHANGE UP (ref 150–400)
PLATELET # BLD AUTO: 231 K/UL — SIGNIFICANT CHANGE UP (ref 150–400)
POTASSIUM SERPL-MCNC: 4.5 MMOL/L — SIGNIFICANT CHANGE UP (ref 3.5–5.3)
POTASSIUM SERPL-MCNC: 4.5 MMOL/L — SIGNIFICANT CHANGE UP (ref 3.5–5.3)
POTASSIUM SERPL-SCNC: 4.5 MMOL/L — SIGNIFICANT CHANGE UP (ref 3.5–5.3)
POTASSIUM SERPL-SCNC: 4.5 MMOL/L — SIGNIFICANT CHANGE UP (ref 3.5–5.3)
PROT SERPL-MCNC: 5.5 G/DL — LOW (ref 6.6–8.7)
PROT SERPL-MCNC: 5.5 G/DL — LOW (ref 6.6–8.7)
PROTHROM AB SERPL-ACNC: 11.5 SEC — SIGNIFICANT CHANGE UP (ref 9.5–13)
PROTHROM AB SERPL-ACNC: 11.5 SEC — SIGNIFICANT CHANGE UP (ref 9.5–13)
RBC # BLD: 2.85 M/UL — LOW (ref 4.2–5.8)
RBC # BLD: 2.85 M/UL — LOW (ref 4.2–5.8)
RBC # FLD: 15.6 % — HIGH (ref 10.3–14.5)
RBC # FLD: 15.6 % — HIGH (ref 10.3–14.5)
SODIUM SERPL-SCNC: 141 MMOL/L — SIGNIFICANT CHANGE UP (ref 135–145)
SODIUM SERPL-SCNC: 141 MMOL/L — SIGNIFICANT CHANGE UP (ref 135–145)
WBC # BLD: 7.55 K/UL — SIGNIFICANT CHANGE UP (ref 3.8–10.5)
WBC # BLD: 7.55 K/UL — SIGNIFICANT CHANGE UP (ref 3.8–10.5)
WBC # FLD AUTO: 7.55 K/UL — SIGNIFICANT CHANGE UP (ref 3.8–10.5)
WBC # FLD AUTO: 7.55 K/UL — SIGNIFICANT CHANGE UP (ref 3.8–10.5)

## 2024-01-10 PROCEDURE — 88305 TISSUE EXAM BY PATHOLOGIST: CPT | Mod: 26

## 2024-01-10 PROCEDURE — 43239 EGD BIOPSY SINGLE/MULTIPLE: CPT

## 2024-01-10 PROCEDURE — 99233 SBSQ HOSP IP/OBS HIGH 50: CPT

## 2024-01-10 PROCEDURE — 88342 IMHCHEM/IMCYTCHM 1ST ANTB: CPT | Mod: 26

## 2024-01-10 RX ADMIN — Medication 1 MILLIGRAM(S): at 17:54

## 2024-01-10 RX ADMIN — Medication 650 MILLIGRAM(S): at 17:54

## 2024-01-10 RX ADMIN — PANTOPRAZOLE SODIUM 40 MILLIGRAM(S): 20 TABLET, DELAYED RELEASE ORAL at 06:06

## 2024-01-10 RX ADMIN — DRONEDARONE 400 MILLIGRAM(S): 400 TABLET, FILM COATED ORAL at 17:54

## 2024-01-10 RX ADMIN — SIMVASTATIN 20 MILLIGRAM(S): 20 TABLET, FILM COATED ORAL at 21:28

## 2024-01-10 RX ADMIN — PANTOPRAZOLE SODIUM 40 MILLIGRAM(S): 20 TABLET, DELAYED RELEASE ORAL at 17:55

## 2024-01-10 RX ADMIN — DRONEDARONE 400 MILLIGRAM(S): 400 TABLET, FILM COATED ORAL at 06:06

## 2024-01-10 RX ADMIN — Medication 1: at 17:53

## 2024-01-10 RX ADMIN — Medication 650 MILLIGRAM(S): at 06:07

## 2024-01-10 RX ADMIN — Medication 1: at 00:09

## 2024-01-10 RX ADMIN — GABAPENTIN 300 MILLIGRAM(S): 400 CAPSULE ORAL at 21:28

## 2024-01-10 RX ADMIN — TAMSULOSIN HYDROCHLORIDE 0.4 MILLIGRAM(S): 0.4 CAPSULE ORAL at 21:28

## 2024-01-10 RX ADMIN — Medication 25 MILLIGRAM(S): at 17:54

## 2024-01-10 RX ADMIN — Medication 25 MILLIGRAM(S): at 06:07

## 2024-01-10 RX ADMIN — INSULIN GLARGINE 10 UNIT(S): 100 INJECTION, SOLUTION SUBCUTANEOUS at 21:28

## 2024-01-10 NOTE — PATIENT PROFILE ADULT - FALL HARM RISK - HARM RISK INTERVENTIONS
Assistance with ambulation/Assistance OOB with selected safe patient handling equipment/Communicate Risk of Fall with Harm to all staff/Discuss with provider need for PT consult/Monitor gait and stability/Provide patient with walking aids - walker, cane, crutches/Reinforce activity limits and safety measures with patient and family/Tailored Fall Risk Interventions/Visual Cue: Yellow wristband and red socks/Bed in lowest position, wheels locked, appropriate side rails in place/Call bell, personal items and telephone in reach/Instruct patient to call for assistance before getting out of bed or chair/Non-slip footwear when patient is out of bed/[place] to call system/Physically safe environment - no spills, clutter or unnecessary equipment/Purposeful Proactive Rounding/Room/bathroom lighting operational, light cord in reach Assistance with ambulation/Assistance OOB with selected safe patient handling equipment/Communicate Risk of Fall with Harm to all staff/Discuss with provider need for PT consult/Monitor gait and stability/Provide patient with walking aids - walker, cane, crutches/Reinforce activity limits and safety measures with patient and family/Tailored Fall Risk Interventions/Visual Cue: Yellow wristband and red socks/Bed in lowest position, wheels locked, appropriate side rails in place/Call bell, personal items and telephone in reach/Instruct patient to call for assistance before getting out of bed or chair/Non-slip footwear when patient is out of bed/[place] to call system/Physically safe environment - no spills, clutter or unnecessary equipment/Purposeful Proactive Rounding/Room/bathroom lighting operational, light cord in reach

## 2024-01-10 NOTE — PROGRESS NOTE ADULT - SUBJECTIVE AND OBJECTIVE BOX
BERNADINE ANDERSON    [id number]    84y      Male    INTERVAL HPI/OVERNIGHT EVENTS:  patient being seen for anemia. patient seen at bedside with daughters post egd.     REVIEW OF SYSTEMS:    CONSTITUTIONAL: No fever, weight loss, or fatigue  RESPIRATORY: No cough, wheezing, hemoptysis; No shortness of breath  CARDIOVASCULAR: No chest pain, palpitations  GASTROINTESTINAL: No abdominal or epigastric pain. No nausea, vomiting  NEUROLOGICAL: No headaches, memory loss, loss of strength.  MISCELLANEOUS:      Vital Signs Last 24 Hrs  T(C): 36.7 (10 Jose C 2024 15:43), Max: 36.9 (10 Jose C 2024 00:01)  T(F): 98 (10 Jose C 2024 15:43), Max: 98.5 (10 Jose C 2024 00:01)  HR: 94 (10 Jose C 2024 15:43) (68 - 94)  BP: 146/64 (10 Jose C 2024 15:43) (130/76 - 155/69)  BP(mean): --  RR: 19 (10 Jose C 2024 15:43) (18 - 22)  SpO2: 94% (10 Jose C 2024 15:43) (94% - 96%)    Parameters below as of 10 Jose C 2024 15:43  Patient On (Oxygen Delivery Method): nasal cannula        PHYSICAL EXAM:    GENERAL: NAD, well-groomed  NERVOUS SYSTEM:  awake, but mildly sleepy appearing  CHEST/LUNG: Clear to percussion bilaterally; No rales, rhonchi, wheezing, or rubs  HEART: Regular rate and rhythm; No murmurs, rubs, or gallops  ABDOMEN: Soft, Nontender, Nondistended; Bowel sounds present  EXTREMITIES:  2+ Peripheral Pulses, No clubbing, cyanosis, or edema      LABS:                        8.3    7.55  )-----------( 231      ( 10 Jose C 2024 04:17 )             26.6     01-10    141  |  109<H>  |  35.0<H>  ----------------------------<  128<H>  4.5   |  25.0  |  2.29<H>    Ca    8.3<L>      10 Jose C 2024 04:17    TPro  5.5<L>  /  Alb  2.6<L>  /  TBili  0.4  /  DBili  x   /  AST  15  /  ALT  17  /  AlkPhos  70  01-10    PT/INR - ( 10 Jose C 2024 04:17 )   PT: 11.5 sec;   INR: 1.04 ratio           Urinalysis Basic - ( 10 Jose C 2024 04:17 )    Color: x / Appearance: x / SG: x / pH: x  Gluc: 128 mg/dL / Ketone: x  / Bili: x / Urobili: x   Blood: x / Protein: x / Nitrite: x   Leuk Esterase: x / RBC: x / WBC x   Sq Epi: x / Non Sq Epi: x / Bacteria: x      MEDICATIONS  (STANDING):  dextrose 5%. 1000 milliLiter(s) (50 mL/Hr) IV Continuous <Continuous>  dextrose 5%. 1000 milliLiter(s) (100 mL/Hr) IV Continuous <Continuous>  dextrose 50% Injectable 25 Gram(s) IV Push once  dextrose 50% Injectable 12.5 Gram(s) IV Push once  dextrose 50% Injectable 25 Gram(s) IV Push once  dronedarone 400 milliGRAM(s) Oral two times a day  folic acid 1 milliGRAM(s) Oral daily  gabapentin 300 milliGRAM(s) Oral at bedtime  glucagon  Injectable 1 milliGRAM(s) IntraMuscular once  insulin glargine Injectable (LANTUS) 10 Unit(s) SubCutaneous at bedtime  insulin lispro (ADMELOG) corrective regimen sliding scale   SubCutaneous every 6 hours  metoprolol tartrate 25 milliGRAM(s) Oral two times a day  midodrine. 10 milliGRAM(s) Oral three times a day  pantoprazole  Injectable 40 milliGRAM(s) IV Push every 12 hours  simvastatin 20 milliGRAM(s) Oral at bedtime  sodium bicarbonate 650 milliGRAM(s) Oral two times a day  tamsulosin 0.4 milliGRAM(s) Oral at bedtime    MEDICATIONS  (PRN):  acetaminophen     Tablet .. 650 milliGRAM(s) Oral every 6 hours PRN Temp greater or equal to 38C (100.4F), Mild Pain (1 - 3)  aluminum hydroxide/magnesium hydroxide/simethicone Suspension 30 milliLiter(s) Oral every 4 hours PRN Dyspepsia  dextrose Oral Gel 15 Gram(s) Oral once PRN Blood Glucose LESS THAN 70 milliGRAM(s)/deciliter  melatonin 3 milliGRAM(s) Oral at bedtime PRN Insomnia  prochlorperazine   Injectable 5 milliGRAM(s) IV Push every 6 hours PRN prn nausea & vomiting      RADIOLOGY & ADDITIONAL TESTS:   BERNADINE ANDERSON    [id number]    84y      Male    INTERVAL HPI/OVERNIGHT EVENTS:  patient being seen for anemia. patient seen at bedside with daughters post egd.     REVIEW OF SYSTEMS:    CONSTITUTIONAL: No fever, weight loss, or fatigue  RESPIRATORY: No cough, wheezing, hemoptysis; No shortness of breath  CARDIOVASCULAR: No chest pain, palpitations  GASTROINTESTINAL: No abdominal or epigastric pain. No nausea, vomiting  NEUROLOGICAL: No headaches, memory loss, loss of strength.  MISCELLANEOUS:      Vital Signs Last 24 Hrs  T(C): 36.7 (10 Jose C 2024 15:43), Max: 36.9 (10 Jose C 2024 00:01)  T(F): 98 (10 Jose C 2024 15:43), Max: 98.5 (10 Jose C 2024 00:01)  HR: 94 (10 Jose C 2024 15:43) (68 - 94)  BP: 146/64 (10 Jose C 2024 15:43) (130/76 - 155/69)  BP(mean): --  RR: 19 (10 Jose C 2024 15:43) (18 - 22)  SpO2: 94% (10 Jose C 2024 15:43) (94% - 96%)    Parameters below as of 10 Jose C 2024 15:43  Patient On (Oxygen Delivery Method): nasal cannula        PHYSICAL EXAM:    GENERAL: NAD, well-groomed  NERVOUS SYSTEM:  awake, but mildly sleepy appearing  CHEST/LUNG: Clear to percussion bilaterally; No rales, rhonchi, wheezing, or rubs  HEART: Regular rate and rhythm; No murmurs, rubs, or gallops  ABDOMEN: Soft, Nontender, Nondistended; Bowel sounds present  EXTREMITIES:  2+ Peripheral Pulses, No clubbing, cyanosis, or edema      LABS:                        8.3    7.55  )-----------( 231      ( 10 Jose C 2024 04:17 )             26.6     01-10    141  |  109<H>  |  35.0<H>  ----------------------------<  128<H>  4.5   |  25.0  |  2.29<H>    Ca    8.3<L>      10 Jose C 2024 04:17    TPro  5.5<L>  /  Alb  2.6<L>  /  TBili  0.4  /  DBili  x   /  AST  15  /  ALT  17  /  AlkPhos  70  01-10    PT/INR - ( 10 Jose C 2024 04:17 )   PT: 11.5 sec;   INR: 1.04 ratio           Urinalysis Basic - ( 10 Jose C 2024 04:17 )    Color: x / Appearance: x / SG: x / pH: x  Gluc: 128 mg/dL / Ketone: x  / Bili: x / Urobili: x   Blood: x / Protein: x / Nitrite: x   Leuk Esterase: x / RBC: x / WBC x   Sq Epi: x / Non Sq Epi: x / Bacteria: x      MEDICATIONS  (STANDING):  dextrose 5%. 1000 milliLiter(s) (50 mL/Hr) IV Continuous <Continuous>  dextrose 5%. 1000 milliLiter(s) (100 mL/Hr) IV Continuous <Continuous>  dextrose 50% Injectable 25 Gram(s) IV Push once  dextrose 50% Injectable 12.5 Gram(s) IV Push once  dextrose 50% Injectable 25 Gram(s) IV Push once  dronedarone 400 milliGRAM(s) Oral two times a day  folic acid 1 milliGRAM(s) Oral daily  gabapentin 300 milliGRAM(s) Oral at bedtime  glucagon  Injectable 1 milliGRAM(s) IntraMuscular once  insulin glargine Injectable (LANTUS) 10 Unit(s) SubCutaneous at bedtime  insulin lispro (ADMELOG) corrective regimen sliding scale   SubCutaneous every 6 hours  metoprolol tartrate 25 milliGRAM(s) Oral two times a day  midodrine. 10 milliGRAM(s) Oral three times a day  pantoprazole  Injectable 40 milliGRAM(s) IV Push every 12 hours  simvastatin 20 milliGRAM(s) Oral at bedtime  sodium bicarbonate 650 milliGRAM(s) Oral two times a day  tamsulosin 0.4 milliGRAM(s) Oral at bedtime    MEDICATIONS  (PRN):  acetaminophen     Tablet .. 650 milliGRAM(s) Oral every 6 hours PRN Temp greater or equal to 38C (100.4F), Mild Pain (1 - 3)  aluminum hydroxide/magnesium hydroxide/simethicone Suspension 30 milliLiter(s) Oral every 4 hours PRN Dyspepsia  dextrose Oral Gel 15 Gram(s) Oral once PRN Blood Glucose LESS THAN 70 milliGRAM(s)/deciliter  melatonin 3 milliGRAM(s) Oral at bedtime PRN Insomnia  prochlorperazine   Injectable 5 milliGRAM(s) IV Push every 6 hours PRN prn nausea & vomiting      RADIOLOGY & ADDITIONAL TESTS:

## 2024-01-10 NOTE — PROGRESS NOTE ADULT - ASSESSMENT
83 yo M with PMH of DM, HTN, CKD stage III, who presented from rehab after episode of coffee ground emesis. He was recently admitted in 12/2023 for acute cholecystitis and had perc sky placed by IR on 12/22/23. Hospital course was complicated by hypoxic respiratory failure secondary to COVID, RON, Afib with RVR, and delirium. He was discharged to SNF on 1/5/2024,  planned for outpatient IR follow up and interval cholecystectomy. He presented back to ED on 1/6/24 due to complaints of abdominal pain, nausea and vomiting. CT AP at that time showed new markedly dilated, fluid filled esophagus. He was discharged back to rehab, but then presented back to ED on 1/7/24) after large volume coffee ground emesis.    Coffee ground emesis concern for Upper GI bleed  s/p egd today    GI and cardio following    Leukocytosis  CXR small effusion vs atelectasis  UA unremarkable  - F/u blood cx- NGTD    CKD stage 3  BPH  - Baseline ~ 2-2.5  - renally dose meds  - avoid nephrotoxic medications  - c/w Tamsulosin  - cont sodium bicarb BID    Recent Acute cholecystitis s/p IR perc drain placemen  - will need drain in for 4-6 weeks for tract to form, outpt IR followup  - completed abx   - on home midodrine,     Afib   - Tele  -  cont Multaq  - metoprolol BID  - hold asa    DM  - A1C 9  lantus and ssi   - can c/w gabapentin for neuropathy     HLD  -c/w statin    spoke to daughters  dispo - pt consult  monitor hgb and cr  dc in 1-2 days home vs [facility]   85 yo M with PMH of DM, HTN, CKD stage III, who presented from rehab after episode of coffee ground emesis. He was recently admitted in 12/2023 for acute cholecystitis and had perc sky placed by IR on 12/22/23. Hospital course was complicated by hypoxic respiratory failure secondary to COVID, RON, Afib with RVR, and delirium. He was discharged to SNF on 1/5/2024,  planned for outpatient IR follow up and interval cholecystectomy. He presented back to ED on 1/6/24 due to complaints of abdominal pain, nausea and vomiting. CT AP at that time showed new markedly dilated, fluid filled esophagus. He was discharged back to rehab, but then presented back to ED on 1/7/24) after large volume coffee ground emesis.    Coffee ground emesis concern for Upper GI bleed  s/p egd today    GI and cardio following    Leukocytosis  CXR small effusion vs atelectasis  UA unremarkable  - F/u blood cx- NGTD    CKD stage 3  BPH  - Baseline ~ 2-2.5  - renally dose meds  - avoid nephrotoxic medications  - c/w Tamsulosin  - cont sodium bicarb BID    Recent Acute cholecystitis s/p IR perc drain placemen  - will need drain in for 4-6 weeks for tract to form, outpt IR followup  - completed abx   - on home midodrine,     Afib   - Tele  -  cont Multaq  - metoprolol BID  - hold asa    DM  - A1C 9  lantus and ssi   - can c/w gabapentin for neuropathy     HLD  -c/w statin    spoke to daughters  dispo - pt consult  monitor hgb and cr  dc in 1-2 days home vs [facility]

## 2024-01-11 LAB
ALBUMIN SERPL ELPH-MCNC: 2.5 G/DL — LOW (ref 3.3–5.2)
ALBUMIN SERPL ELPH-MCNC: 2.5 G/DL — LOW (ref 3.3–5.2)
ALP SERPL-CCNC: 70 U/L — SIGNIFICANT CHANGE UP (ref 40–120)
ALP SERPL-CCNC: 70 U/L — SIGNIFICANT CHANGE UP (ref 40–120)
ALT FLD-CCNC: 16 U/L — SIGNIFICANT CHANGE UP
ALT FLD-CCNC: 16 U/L — SIGNIFICANT CHANGE UP
ANION GAP SERPL CALC-SCNC: 10 MMOL/L — SIGNIFICANT CHANGE UP (ref 5–17)
ANION GAP SERPL CALC-SCNC: 10 MMOL/L — SIGNIFICANT CHANGE UP (ref 5–17)
AST SERPL-CCNC: 16 U/L — SIGNIFICANT CHANGE UP
AST SERPL-CCNC: 16 U/L — SIGNIFICANT CHANGE UP
BASOPHILS # BLD AUTO: 0.02 K/UL — SIGNIFICANT CHANGE UP (ref 0–0.2)
BASOPHILS # BLD AUTO: 0.02 K/UL — SIGNIFICANT CHANGE UP (ref 0–0.2)
BASOPHILS NFR BLD AUTO: 0.5 % — SIGNIFICANT CHANGE UP (ref 0–2)
BASOPHILS NFR BLD AUTO: 0.5 % — SIGNIFICANT CHANGE UP (ref 0–2)
BILIRUB SERPL-MCNC: 0.3 MG/DL — LOW (ref 0.4–2)
BILIRUB SERPL-MCNC: 0.3 MG/DL — LOW (ref 0.4–2)
BUN SERPL-MCNC: 30 MG/DL — HIGH (ref 8–20)
BUN SERPL-MCNC: 30 MG/DL — HIGH (ref 8–20)
CALCIUM SERPL-MCNC: 7.9 MG/DL — LOW (ref 8.4–10.5)
CALCIUM SERPL-MCNC: 7.9 MG/DL — LOW (ref 8.4–10.5)
CHLORIDE SERPL-SCNC: 107 MMOL/L — SIGNIFICANT CHANGE UP (ref 96–108)
CHLORIDE SERPL-SCNC: 107 MMOL/L — SIGNIFICANT CHANGE UP (ref 96–108)
CO2 SERPL-SCNC: 23 MMOL/L — SIGNIFICANT CHANGE UP (ref 22–29)
CO2 SERPL-SCNC: 23 MMOL/L — SIGNIFICANT CHANGE UP (ref 22–29)
CREAT SERPL-MCNC: 2.42 MG/DL — HIGH (ref 0.5–1.3)
CREAT SERPL-MCNC: 2.42 MG/DL — HIGH (ref 0.5–1.3)
EGFR: 26 ML/MIN/1.73M2 — LOW
EGFR: 26 ML/MIN/1.73M2 — LOW
EOSINOPHIL # BLD AUTO: 0.15 K/UL — SIGNIFICANT CHANGE UP (ref 0–0.5)
EOSINOPHIL # BLD AUTO: 0.15 K/UL — SIGNIFICANT CHANGE UP (ref 0–0.5)
EOSINOPHIL NFR BLD AUTO: 3.4 % — SIGNIFICANT CHANGE UP (ref 0–6)
EOSINOPHIL NFR BLD AUTO: 3.4 % — SIGNIFICANT CHANGE UP (ref 0–6)
GLUCOSE BLDC GLUCOMTR-MCNC: 152 MG/DL — HIGH (ref 70–99)
GLUCOSE BLDC GLUCOMTR-MCNC: 152 MG/DL — HIGH (ref 70–99)
GLUCOSE BLDC GLUCOMTR-MCNC: 153 MG/DL — HIGH (ref 70–99)
GLUCOSE BLDC GLUCOMTR-MCNC: 153 MG/DL — HIGH (ref 70–99)
GLUCOSE BLDC GLUCOMTR-MCNC: 183 MG/DL — HIGH (ref 70–99)
GLUCOSE BLDC GLUCOMTR-MCNC: 183 MG/DL — HIGH (ref 70–99)
GLUCOSE BLDC GLUCOMTR-MCNC: 95 MG/DL — SIGNIFICANT CHANGE UP (ref 70–99)
GLUCOSE BLDC GLUCOMTR-MCNC: 95 MG/DL — SIGNIFICANT CHANGE UP (ref 70–99)
GLUCOSE SERPL-MCNC: 141 MG/DL — HIGH (ref 70–99)
GLUCOSE SERPL-MCNC: 141 MG/DL — HIGH (ref 70–99)
HCT VFR BLD CALC: 26 % — LOW (ref 39–50)
HCT VFR BLD CALC: 26 % — LOW (ref 39–50)
HGB BLD-MCNC: 8.1 G/DL — LOW (ref 13–17)
HGB BLD-MCNC: 8.1 G/DL — LOW (ref 13–17)
IMM GRANULOCYTES NFR BLD AUTO: 0.7 % — SIGNIFICANT CHANGE UP (ref 0–0.9)
IMM GRANULOCYTES NFR BLD AUTO: 0.7 % — SIGNIFICANT CHANGE UP (ref 0–0.9)
LYMPHOCYTES # BLD AUTO: 0.95 K/UL — LOW (ref 1–3.3)
LYMPHOCYTES # BLD AUTO: 0.95 K/UL — LOW (ref 1–3.3)
LYMPHOCYTES # BLD AUTO: 21.5 % — SIGNIFICANT CHANGE UP (ref 13–44)
LYMPHOCYTES # BLD AUTO: 21.5 % — SIGNIFICANT CHANGE UP (ref 13–44)
MAGNESIUM SERPL-MCNC: 1.8 MG/DL — SIGNIFICANT CHANGE UP (ref 1.6–2.6)
MAGNESIUM SERPL-MCNC: 1.8 MG/DL — SIGNIFICANT CHANGE UP (ref 1.6–2.6)
MCHC RBC-ENTMCNC: 29 PG — SIGNIFICANT CHANGE UP (ref 27–34)
MCHC RBC-ENTMCNC: 29 PG — SIGNIFICANT CHANGE UP (ref 27–34)
MCHC RBC-ENTMCNC: 31.2 GM/DL — LOW (ref 32–36)
MCHC RBC-ENTMCNC: 31.2 GM/DL — LOW (ref 32–36)
MCV RBC AUTO: 93.2 FL — SIGNIFICANT CHANGE UP (ref 80–100)
MCV RBC AUTO: 93.2 FL — SIGNIFICANT CHANGE UP (ref 80–100)
MONOCYTES # BLD AUTO: 0.57 K/UL — SIGNIFICANT CHANGE UP (ref 0–0.9)
MONOCYTES # BLD AUTO: 0.57 K/UL — SIGNIFICANT CHANGE UP (ref 0–0.9)
MONOCYTES NFR BLD AUTO: 12.9 % — SIGNIFICANT CHANGE UP (ref 2–14)
MONOCYTES NFR BLD AUTO: 12.9 % — SIGNIFICANT CHANGE UP (ref 2–14)
NEUTROPHILS # BLD AUTO: 2.7 K/UL — SIGNIFICANT CHANGE UP (ref 1.8–7.4)
NEUTROPHILS # BLD AUTO: 2.7 K/UL — SIGNIFICANT CHANGE UP (ref 1.8–7.4)
NEUTROPHILS NFR BLD AUTO: 61 % — SIGNIFICANT CHANGE UP (ref 43–77)
NEUTROPHILS NFR BLD AUTO: 61 % — SIGNIFICANT CHANGE UP (ref 43–77)
PLATELET # BLD AUTO: 220 K/UL — SIGNIFICANT CHANGE UP (ref 150–400)
PLATELET # BLD AUTO: 220 K/UL — SIGNIFICANT CHANGE UP (ref 150–400)
POTASSIUM SERPL-MCNC: 4.3 MMOL/L — SIGNIFICANT CHANGE UP (ref 3.5–5.3)
POTASSIUM SERPL-MCNC: 4.3 MMOL/L — SIGNIFICANT CHANGE UP (ref 3.5–5.3)
POTASSIUM SERPL-SCNC: 4.3 MMOL/L — SIGNIFICANT CHANGE UP (ref 3.5–5.3)
POTASSIUM SERPL-SCNC: 4.3 MMOL/L — SIGNIFICANT CHANGE UP (ref 3.5–5.3)
PROT SERPL-MCNC: 5.1 G/DL — LOW (ref 6.6–8.7)
PROT SERPL-MCNC: 5.1 G/DL — LOW (ref 6.6–8.7)
RBC # BLD: 2.79 M/UL — LOW (ref 4.2–5.8)
RBC # BLD: 2.79 M/UL — LOW (ref 4.2–5.8)
RBC # FLD: 15.5 % — HIGH (ref 10.3–14.5)
RBC # FLD: 15.5 % — HIGH (ref 10.3–14.5)
SODIUM SERPL-SCNC: 140 MMOL/L — SIGNIFICANT CHANGE UP (ref 135–145)
SODIUM SERPL-SCNC: 140 MMOL/L — SIGNIFICANT CHANGE UP (ref 135–145)
WBC # BLD: 4.42 K/UL — SIGNIFICANT CHANGE UP (ref 3.8–10.5)
WBC # BLD: 4.42 K/UL — SIGNIFICANT CHANGE UP (ref 3.8–10.5)
WBC # FLD AUTO: 4.42 K/UL — SIGNIFICANT CHANGE UP (ref 3.8–10.5)
WBC # FLD AUTO: 4.42 K/UL — SIGNIFICANT CHANGE UP (ref 3.8–10.5)

## 2024-01-11 PROCEDURE — 99232 SBSQ HOSP IP/OBS MODERATE 35: CPT

## 2024-01-11 RX ORDER — OXYCODONE HYDROCHLORIDE 5 MG/1
5 TABLET ORAL EVERY 6 HOURS
Refills: 0 | Status: DISCONTINUED | OUTPATIENT
Start: 2024-01-11 | End: 2024-01-11

## 2024-01-11 RX ORDER — MAGNESIUM SULFATE 500 MG/ML
1 VIAL (ML) INJECTION ONCE
Refills: 0 | Status: COMPLETED | OUTPATIENT
Start: 2024-01-11 | End: 2024-01-11

## 2024-01-11 RX ADMIN — Medication 650 MILLIGRAM(S): at 05:48

## 2024-01-11 RX ADMIN — PANTOPRAZOLE SODIUM 40 MILLIGRAM(S): 20 TABLET, DELAYED RELEASE ORAL at 17:31

## 2024-01-11 RX ADMIN — TAMSULOSIN HYDROCHLORIDE 0.4 MILLIGRAM(S): 0.4 CAPSULE ORAL at 23:28

## 2024-01-11 RX ADMIN — Medication 1: at 17:32

## 2024-01-11 RX ADMIN — Medication 25 MILLIGRAM(S): at 17:31

## 2024-01-11 RX ADMIN — SIMVASTATIN 20 MILLIGRAM(S): 20 TABLET, FILM COATED ORAL at 23:29

## 2024-01-11 RX ADMIN — Medication 100 GRAM(S): at 12:59

## 2024-01-11 RX ADMIN — DRONEDARONE 400 MILLIGRAM(S): 400 TABLET, FILM COATED ORAL at 05:47

## 2024-01-11 RX ADMIN — MIDODRINE HYDROCHLORIDE 10 MILLIGRAM(S): 2.5 TABLET ORAL at 05:47

## 2024-01-11 RX ADMIN — GABAPENTIN 300 MILLIGRAM(S): 400 CAPSULE ORAL at 23:28

## 2024-01-11 RX ADMIN — INSULIN GLARGINE 10 UNIT(S): 100 INJECTION, SOLUTION SUBCUTANEOUS at 23:28

## 2024-01-11 RX ADMIN — DRONEDARONE 400 MILLIGRAM(S): 400 TABLET, FILM COATED ORAL at 17:31

## 2024-01-11 RX ADMIN — Medication 650 MILLIGRAM(S): at 17:31

## 2024-01-11 RX ADMIN — Medication 1000 MILLIGRAM(S): at 09:40

## 2024-01-11 RX ADMIN — Medication 1: at 12:58

## 2024-01-11 RX ADMIN — Medication 1 MILLIGRAM(S): at 13:00

## 2024-01-11 RX ADMIN — Medication 25 MILLIGRAM(S): at 05:47

## 2024-01-11 RX ADMIN — PANTOPRAZOLE SODIUM 40 MILLIGRAM(S): 20 TABLET, DELAYED RELEASE ORAL at 05:48

## 2024-01-11 NOTE — PROGRESS NOTE ADULT - SUBJECTIVE AND OBJECTIVE BOX
Chief Complaint:  Patient is a 84y old  Male who presents with a chief complaint of GI bleed (10 Jose C 2024 18:34)      HPI/ 24 hr events: Patient seen and examined at bedside, his wife is present. She notes he is doing well. He was able to eat his eggs for breakfast but did not like other food on tray. Denies nausea, vomiting, diarrhea, abdominal pain. Vitals are overall stable. EGD yesterday shows normal duodenum, esophageal hiatal hernia, two sessile polyps (5mm) in cardia (bx), esophagitis.        REVIEW OF SYSTEMS:   General: Negative  HEENT: Negative  CV: Negative  Respiratory: Negative  GI: See HPI  : Negative  MSK: Negative  Hematologic: Negative  Skin: Negative    MEDICATIONS:   MEDICATIONS  (STANDING):  dextrose 5%. 1000 milliLiter(s) (100 mL/Hr) IV Continuous <Continuous>  dextrose 5%. 1000 milliLiter(s) (50 mL/Hr) IV Continuous <Continuous>  dextrose 50% Injectable 25 Gram(s) IV Push once  dextrose 50% Injectable 12.5 Gram(s) IV Push once  dextrose 50% Injectable 25 Gram(s) IV Push once  dronedarone 400 milliGRAM(s) Oral two times a day  folic acid 1 milliGRAM(s) Oral daily  gabapentin 300 milliGRAM(s) Oral at bedtime  glucagon  Injectable 1 milliGRAM(s) IntraMuscular once  insulin glargine Injectable (LANTUS) 10 Unit(s) SubCutaneous at bedtime  insulin lispro (ADMELOG) corrective regimen sliding scale   SubCutaneous every 6 hours  magnesium sulfate  IVPB 1 Gram(s) IV Intermittent once  metoprolol tartrate 25 milliGRAM(s) Oral two times a day  midodrine. 10 milliGRAM(s) Oral three times a day  pantoprazole  Injectable 40 milliGRAM(s) IV Push every 12 hours  simvastatin 20 milliGRAM(s) Oral at bedtime  sodium bicarbonate 650 milliGRAM(s) Oral two times a day  tamsulosin 0.4 milliGRAM(s) Oral at bedtime    MEDICATIONS  (PRN):  acetaminophen     Tablet .. 650 milliGRAM(s) Oral every 6 hours PRN Temp greater or equal to 38C (100.4F), Mild Pain (1 - 3)  aluminum hydroxide/magnesium hydroxide/simethicone Suspension 30 milliLiter(s) Oral every 4 hours PRN Dyspepsia  dextrose Oral Gel 15 Gram(s) Oral once PRN Blood Glucose LESS THAN 70 milliGRAM(s)/deciliter  melatonin 3 milliGRAM(s) Oral at bedtime PRN Insomnia  prochlorperazine   Injectable 5 milliGRAM(s) IV Push every 6 hours PRN prn nausea & vomiting        DIET:  Diet, Soft and Bite Sized:   Consistent Carbohydrate Evening Snack (CSTCHOSN)  DASH/TLC Sodium & Cholesterol Restricted (DASH) (01-09-24 @ 11:48) [Active]          ALLERGIES:   Allergies    No Known Allergies    Intolerances        VITAL SIGNS:   Vital Signs Last 24 Hrs  T(C): 37.1 (11 Jan 2024 04:19), Max: 37.1 (10 Jose C 2024 23:58)  T(F): 98.8 (11 Jan 2024 04:19), Max: 98.8 (10 Jose C 2024 23:58)  HR: 75 (11 Jan 2024 04:19) (74 - 94)  BP: 145/76 (11 Jan 2024 04:19) (127/64 - 146/64)  BP(mean): --  RR: 19 (11 Jan 2024 04:19) (19 - 19)  SpO2: 95% (11 Jan 2024 04:19) (94% - 96%)    Parameters below as of 11 Jan 2024 04:19  Patient On (Oxygen Delivery Method): nasal cannula  O2 Flow (L/min): 2    I&O's Summary    10 Jose C 2024 07:01  -  11 Jan 2024 07:00  --------------------------------------------------------  IN: 0 mL / OUT: 80 mL / NET: -80 mL        PHYSICAL EXAM:   GENERAL:  No acute distress  HEENT:  NC/AT, conjunctiva clear, sclera anicteric  CHEST:  No increased effort  HEART:  Regular rate  ABDOMEN:  Soft, non-tender, non-distended, no rebound or guarding  SKIN:  Warm, dry  NEURO:  Calm, cooperative    LABS:                        8.1    4.42  )-----------( 220      ( 11 Jan 2024 02:26 )             26.0     Hemoglobin: 8.1 g/dL (01-11-24 @ 02:26)  Hemoglobin: 8.3 g/dL (01-10-24 @ 04:17)  Hemoglobin: 8.9 g/dL (01-09-24 @ 02:28)    01-11    140  |  107  |  30.0<H>  ----------------------------<  141<H>  4.3   |  23.0  |  2.42<H>    Ca    7.9<L>      11 Jan 2024 02:26  Mg     1.8     01-11    TPro  5.1<L>  /  Alb  2.5<L>  /  TBili  0.3<L>  /  DBili  x   /  AST  16  /  ALT  16  /  AlkPhos  70  01-11    LIVER FUNCTIONS - ( 11 Jan 2024 02:26 )  Alb: 2.5 g/dL / Pro: 5.1 g/dL / ALK PHOS: 70 U/L / ALT: 16 U/L / AST: 16 U/L / GGT: x             PT/INR - ( 10 Jose C 2024 04:17 )   PT: 11.5 sec;   INR: 1.04 ratio                                                 RADIOLOGY & ADDITIONAL STUDIES:      EGD Report    Date: 1/10/2024 9:45 AM        Limitations/Complications:    There were no apparent limitations or complications        Findings:    Esophagus Lumen A medium size hiatal hernia was seen, the esophagogastric    junction(EGJ) was noted at 35 cm, with hiatal narrowing at 38 cm from the    incisors. Retroflexion view in the stomach confirmed the size and morphology of    the hernia.    Mucosa Grade A esophagitis was seen in the esophagus, compatible with    non-erosive esophagitis.    Stomach Protruding lesions Two sessile polyps ranging in size from 5 mm to 5 mm    were found in the cardia. Bx of both 5 mm polyp in cardia.Multiple cold forceps    biopsies were performed for histology.    Duodenum Normal duodenum.        Impressions:    Normal duodenum.    Esophageal hiatal hernia.    Polyps (5 mm to 5 mm) in the cardia. (Biopsy).    Grade A esophagitis compatible with non-erosive esophagitis.        Plan:    Await pathology results.      Mai Roberts MD, DO        Version 1, Electronically signed on 1/10/2024 10:19:55 AM by Mai Roberts MD,    DO

## 2024-01-11 NOTE — PROGRESS NOTE ADULT - NS ATTEND AMEND GEN_ALL_CORE FT
Mr. Figueroa is a 83 year old gentleman who presented with coffee ground emesis noted on CT abdomen with marked dilation and fluid filled esophagus new compared to prior evaluations. No overt stigmata of bleeding identified. Hemoglobin remains stable. In terms of dilated esophagus, appears chronic, likely the result of retained contents within hiatal hernia seen on EGD. Would continue to maintain aspiration precautions indefinitely. Possible outpatient work up for dysmotility given concerns for chronic motility issues, will discuss with patient and wife at that time. Interpreted images including endoscopy report and updated labs, Discussed plan of care with patient and his wife at bedside in ED this morning. All questions answered and concerns addressed. In the interim would continue supportive care and maintain aspiration precautions.     Dysphagia   Dilated, fluid filled esophagus s/p EGD   Coffee ground emesis (resolved), no further evidence of ongoing hemorrhage    Plan:   Agree with plan as outlined above.   Outpatient motility work up pending patient / family preference.   Can transition to PO PPI daily given tolerating PO.   Would continue to maintain aspiration precautions indefinitely.   Can follow up outpatient for further work up.   Final biopsy results from EGD pending.  Please do not hesitate to contact GI service for any additional questions or concerns.
Pt with reports of coffee ground emesis. No further reports of Coffee-ground emesis as per nursing.  Hemoglobin stable 7.4  CBC ordered for tomorrow  Patient has dementia unable to answer questions      Note reviewed from PA overnight–no signs of bleeding.    CT scan images reviewed–dilated esophagus with some fluid.    Agree with PPI twice daily  Eventual EGD
Mr. Figueroa is a 83 year old gentleman who presented with coffee ground emesis noted on CT abdomen with marked dilation and fluid filled esophagus new compared to prior evaluations. No overt stigmata of bleeding identified. Hemoglobin remains stable. In terms of dilated esophagus, appears chronic. Would continue to maintain aspiration precautions with plans for interval EGD for retained contents, will require intubation for procedure, Wife will consent. Cardiology to offer pre-procedure risk stratification. Possible outpatient work up for dysmotility given concerns for chronic motility issues. Interpreted images and updated labs, Discussed plan of care with patient and his wife at bedside in ED this afternoon. All questions answered and concerns addressed. In the interim would continue supportive care and maintain aspiration precautions.     Dysphagia   Dilated, fluid filled esophagus   Coffee ground emesis (resolved)    Plan:   Agree with plan as outlined above.   Will plan for interval EGD with clearance of retained contents (Wed or Thurs).   Likely outpatient motility work up subsequently.   Can transition to IV PPI BID for now while NPO.   Aspiration precautions.   We will continue to follow along with you.

## 2024-01-11 NOTE — PROGRESS NOTE ADULT - ASSESSMENT
85 yo M with PMH of DM, HTN, CKD stage III, who presented from rehab after episode of coffee ground emesis. He was recently admitted in 12/2023 for acute cholecystitis and had perc sky placed by IR on 12/22/23. Hospital course was complicated by hypoxic respiratory failure secondary to COVID, RON, Afib with RVR, and delirium. He was discharged to SNF on 1/5/2024,  planned for outpatient IR follow up and interval cholecystectomy. He presented back to ED on 1/6/24 due to complaints of abdominal pain, nausea and vomiting. CT AP at that time showed new markedly dilated, fluid filled esophagus. He was discharged back to rehab, but then presented back to ED on 1/7/24) after large volume coffee ground emesis.    Coffee ground emesis concern for Upper GI bleed  s/p egd on 1/10  - polyps and esophagitis  - ppi   GI and cardio following    Leukocytosis  CXR small effusion vs atelectasis  UA unremarkable  - F/u blood cx- NGTD    CKD stage 3  BPH  - Baseline ~ 2-2.5  - renally dose meds  - avoid nephrotoxic medications  - c/w Tamsulosin  - cont sodium bicarb BID    Recent Acute cholecystitis s/p IR perc drain placemen  - will need drain in for 4-6 weeks for tract to form, outpt IR followup  - completed abx   - on home midodrine,     Afib   - Tele  -  cont Multaq  - metoprolol BID  - hold asa    DM  - A1C 9  lantus and ssi   - can c/w gabapentin for neuropathy     HLD  -c/w statin    dispo - pt consult  monitor hgb and cr  dc in 1-2 days home vs [facility]     83 yo M with PMH of DM, HTN, CKD stage III, who presented from rehab after episode of coffee ground emesis. He was recently admitted in 12/2023 for acute cholecystitis and had perc sky placed by IR on 12/22/23. Hospital course was complicated by hypoxic respiratory failure secondary to COVID, RON, Afib with RVR, and delirium. He was discharged to SNF on 1/5/2024,  planned for outpatient IR follow up and interval cholecystectomy. He presented back to ED on 1/6/24 due to complaints of abdominal pain, nausea and vomiting. CT AP at that time showed new markedly dilated, fluid filled esophagus. He was discharged back to rehab, but then presented back to ED on 1/7/24) after large volume coffee ground emesis.    Coffee ground emesis concern for Upper GI bleed  s/p egd on 1/10  - polyps and esophagitis  - ppi   GI and cardio following    Leukocytosis  CXR small effusion vs atelectasis  UA unremarkable  - F/u blood cx- NGTD    CKD stage 3  BPH  - Baseline ~ 2-2.5  - renally dose meds  - avoid nephrotoxic medications  - c/w Tamsulosin  - cont sodium bicarb BID    Recent Acute cholecystitis s/p IR perc drain placemen  - will need drain in for 4-6 weeks for tract to form, outpt IR followup  - completed abx   - on home midodrine,     Afib   - Tele  -  cont Multaq  - metoprolol BID  - hold asa    DM  - A1C 9  lantus and ssi   - can c/w gabapentin for neuropathy     HLD  -c/w statin    dispo - pt consult  monitor hgb and cr  dc in 1-2 days home vs [facility]

## 2024-01-11 NOTE — PROGRESS NOTE ADULT - SUBJECTIVE AND OBJECTIVE BOX
BERNADINE ANDERSON    [id number]    84y      Male    INTERVAL HPI/OVERNIGHT EVENTS:  patient being seen for coffee ground emesis. Patient       REVIEW OF SYSTEMS:    CONSTITUTIONAL: No fever, weight loss, or fatigue  RESPIRATORY: No cough, wheezing, hemoptysis; No shortness of breath  CARDIOVASCULAR: No chest pain, palpitations  GASTROINTESTINAL: No abdominal or epigastric pain. No nausea, vomiting  NEUROLOGICAL: No headaches, memory loss, loss of strength.  MISCELLANEOUS:      Vital Signs Last 24 Hrs  T(C): 37.5 (11 Jan 2024 12:26), Max: 37.5 (11 Jan 2024 12:26)  T(F): 99.5 (11 Jan 2024 12:26), Max: 99.5 (11 Jan 2024 12:26)  HR: 73 (11 Jan 2024 12:26) (73 - 94)  BP: 147/83 (11 Jan 2024 12:26) (127/64 - 147/83)  BP(mean): --  RR: 19 (11 Jan 2024 12:26) (19 - 19)  SpO2: 95% (11 Jan 2024 12:26) (94% - 96%)    Parameters below as of 11 Jan 2024 12:26  Patient On (Oxygen Delivery Method): nasal cannula  O2 Flow (L/min): 2      PHYSICAL EXAM:    GENERAL: NAD, well-groomed  HEENT: PERRL, +EOMI  NECK: soft, Supple, No JVD,   CHEST/LUNG: Clear to auscultation bilaterally; No wheezing  HEART: S1S2+, Regular rate and rhythm; No murmurs, rubs, or gallops  ABDOMEN: Soft, Nontender, Nondistended; Bowel sounds present  EXTREMITIES:  2+ Peripheral Pulses, No clubbing, cyanosis, or edema  SKIN: No rashes or lesions  NEURO: AAOX3, no focal deficits, no motor r sensory loss  PSYCH: normal mood      LABS:                        8.1    4.42  )-----------( 220      ( 11 Jan 2024 02:26 )             26.0     01-11    140  |  107  |  30.0<H>  ----------------------------<  141<H>  4.3   |  23.0  |  2.42<H>    Ca    7.9<L>      11 Jan 2024 02:26  Mg     1.8     01-11    TPro  5.1<L>  /  Alb  2.5<L>  /  TBili  0.3<L>  /  DBili  x   /  AST  16  /  ALT  16  /  AlkPhos  70  01-11    PT/INR - ( 10 Jose C 2024 04:17 )   PT: 11.5 sec;   INR: 1.04 ratio           Urinalysis Basic - ( 11 Jan 2024 02:26 )    Color: x / Appearance: x / SG: x / pH: x  Gluc: 141 mg/dL / Ketone: x  / Bili: x / Urobili: x   Blood: x / Protein: x / Nitrite: x   Leuk Esterase: x / RBC: x / WBC x   Sq Epi: x / Non Sq Epi: x / Bacteria: x          MEDICATIONS  (STANDING):  dextrose 5%. 1000 milliLiter(s) (50 mL/Hr) IV Continuous <Continuous>  dextrose 5%. 1000 milliLiter(s) (100 mL/Hr) IV Continuous <Continuous>  dextrose 50% Injectable 25 Gram(s) IV Push once  dextrose 50% Injectable 12.5 Gram(s) IV Push once  dextrose 50% Injectable 25 Gram(s) IV Push once  dronedarone 400 milliGRAM(s) Oral two times a day  folic acid 1 milliGRAM(s) Oral daily  gabapentin 300 milliGRAM(s) Oral at bedtime  glucagon  Injectable 1 milliGRAM(s) IntraMuscular once  insulin glargine Injectable (LANTUS) 10 Unit(s) SubCutaneous at bedtime  insulin lispro (ADMELOG) corrective regimen sliding scale   SubCutaneous every 6 hours  magnesium sulfate  IVPB 1 Gram(s) IV Intermittent once  metoprolol tartrate 25 milliGRAM(s) Oral two times a day  midodrine. 10 milliGRAM(s) Oral three times a day  pantoprazole  Injectable 40 milliGRAM(s) IV Push every 12 hours  simvastatin 20 milliGRAM(s) Oral at bedtime  sodium bicarbonate 650 milliGRAM(s) Oral two times a day  tamsulosin 0.4 milliGRAM(s) Oral at bedtime    MEDICATIONS  (PRN):  acetaminophen     Tablet .. 650 milliGRAM(s) Oral every 6 hours PRN Temp greater or equal to 38C (100.4F), Mild Pain (1 - 3)  aluminum hydroxide/magnesium hydroxide/simethicone Suspension 30 milliLiter(s) Oral every 4 hours PRN Dyspepsia  dextrose Oral Gel 15 Gram(s) Oral once PRN Blood Glucose LESS THAN 70 milliGRAM(s)/deciliter  melatonin 3 milliGRAM(s) Oral at bedtime PRN Insomnia  prochlorperazine   Injectable 5 milliGRAM(s) IV Push every 6 hours PRN prn nausea & vomiting      RADIOLOGY & ADDITIONAL TESTS:   BERNADINE ANDERSON    [id number]    84y      Male    INTERVAL HPI/OVERNIGHT EVENTS:  patient being seen for coffee ground emesis. Patient       REVIEW OF SYSTEMS:    CONSTITUTIONAL: No fever, weight loss, or fatigue  RESPIRATORY: No cough, wheezing, hemoptysis; No shortness of breath  CARDIOVASCULAR: No chest pain, palpitations  GASTROINTESTINAL: No abdominal or epigastric pain. No nausea, vomiting  NEUROLOGICAL: No headaches, memory loss, loss of strength.  MISCELLANEOUS:      Vital Signs Last 24 Hrs  T(C): 37.5 (11 Jan 2024 12:26), Max: 37.5 (11 Jan 2024 12:26)  T(F): 99.5 (11 Jan 2024 12:26), Max: 99.5 (11 Jan 2024 12:26)  HR: 73 (11 Jan 2024 12:26) (73 - 94)  BP: 147/83 (11 Jan 2024 12:26) (127/64 - 147/83)  BP(mean): --  RR: 19 (11 Jan 2024 12:26) (19 - 19)  SpO2: 95% (11 Jan 2024 12:26) (94% - 96%)    Parameters below as of 11 Jan 2024 12:26  Patient On (Oxygen Delivery Method): nasal cannula  O2 Flow (L/min): 2      PHYSICAL EXAM:    GENERAL: NAD, well-groomed  HEENT: PERRL, +EOMI  NECK: soft, Supple, No JVD,   CHEST/LUNG: Clear to auscultation bilaterally; No wheezing  HEART: S1S2+, Regular rate and rhythm; No murmurs, rubs, or gallops  ABDOMEN: Soft, Nontender, Nondistended; Bowel sounds present  EXTREMITIES:  2+ Peripheral Pulses, No clubbing, cyanosis, or edema  SKIN: No rashes or lesions  NEURO: AAOX3, no focal deficits, no motor r sensory loss  PSYCH: normal mood      LABS:                        8.1    4.42  )-----------( 220      ( 11 Jan 2024 02:26 )             26.0     01-11    140  |  107  |  30.0<H>  ----------------------------<  141<H>  4.3   |  23.0  |  2.42<H>    Ca    7.9<L>      11 Jan 2024 02:26  Mg     1.8     01-11    TPro  5.1<L>  /  Alb  2.5<L>  /  TBili  0.3<L>  /  DBili  x   /  AST  16  /  ALT  16  /  AlkPhos  70  01-11    PT/INR - ( 10 Jose C 2024 04:17 )   PT: 11.5 sec;   INR: 1.04 ratio           Urinalysis Basic - ( 11 Jan 2024 02:26 )    Color: x / Appearance: x / SG: x / pH: x  Gluc: 141 mg/dL / Ketone: x  / Bili: x / Urobili: x   Blood: x / Protein: x / Nitrite: x   Leuk Esterase: x / RBC: x / WBC x   Sq Epi: x / Non Sq Epi: x / Bacteria: x          MEDICATIONS  (STANDING):  dextrose 5%. 1000 milliLiter(s) (50 mL/Hr) IV Continuous <Continuous>  dextrose 5%. 1000 milliLiter(s) (100 mL/Hr) IV Continuous <Continuous>  dextrose 50% Injectable 25 Gram(s) IV Push once  dextrose 50% Injectable 12.5 Gram(s) IV Push once  dextrose 50% Injectable 25 Gram(s) IV Push once  dronedarone 400 milliGRAM(s) Oral two times a day  folic acid 1 milliGRAM(s) Oral daily  gabapentin 300 milliGRAM(s) Oral at bedtime  glucagon  Injectable 1 milliGRAM(s) IntraMuscular once  insulin glargine Injectable (LANTUS) 10 Unit(s) SubCutaneous at bedtime  insulin lispro (ADMELOG) corrective regimen sliding scale   SubCutaneous every 6 hours  magnesium sulfate  IVPB 1 Gram(s) IV Intermittent once  metoprolol tartrate 25 milliGRAM(s) Oral two times a day  midodrine. 10 milliGRAM(s) Oral three times a day  pantoprazole  Injectable 40 milliGRAM(s) IV Push every 12 hours  simvastatin 20 milliGRAM(s) Oral at bedtime  sodium bicarbonate 650 milliGRAM(s) Oral two times a day  tamsulosin 0.4 milliGRAM(s) Oral at bedtime    MEDICATIONS  (PRN):  acetaminophen     Tablet .. 650 milliGRAM(s) Oral every 6 hours PRN Temp greater or equal to 38C (100.4F), Mild Pain (1 - 3)  aluminum hydroxide/magnesium hydroxide/simethicone Suspension 30 milliLiter(s) Oral every 4 hours PRN Dyspepsia  dextrose Oral Gel 15 Gram(s) Oral once PRN Blood Glucose LESS THAN 70 milliGRAM(s)/deciliter  melatonin 3 milliGRAM(s) Oral at bedtime PRN Insomnia  prochlorperazine   Injectable 5 milliGRAM(s) IV Push every 6 hours PRN prn nausea & vomiting      RADIOLOGY & ADDITIONAL TESTS:   BERNADINE ANDERSON    [id number]    84y      Male    INTERVAL HPI/OVERNIGHT EVENTS:  patient being seen for coffee ground emesis. Patient seen at bedside and is in nad    patient is s.p egd on 1/10      REVIEW OF SYSTEMS:    CONSTITUTIONAL: No fever, weight loss, or fatigue  RESPIRATORY: No cough, wheezing, hemoptysis; No shortness of breath  CARDIOVASCULAR: No chest pain, palpitations  GASTROINTESTINAL: No abdominal or epigastric pain. No nausea, vomiting  NEUROLOGICAL: No headaches, memory loss, loss of strength.  MISCELLANEOUS:      Vital Signs Last 24 Hrs  T(C): 37.5 (11 Jan 2024 12:26), Max: 37.5 (11 Jan 2024 12:26)  T(F): 99.5 (11 Jan 2024 12:26), Max: 99.5 (11 Jan 2024 12:26)  HR: 73 (11 Jan 2024 12:26) (73 - 94)  BP: 147/83 (11 Jan 2024 12:26) (127/64 - 147/83)  BP(mean): --  RR: 19 (11 Jan 2024 12:26) (19 - 19)  SpO2: 95% (11 Jan 2024 12:26) (94% - 96%)    Parameters below as of 11 Jan 2024 12:26  Patient On (Oxygen Delivery Method): nasal cannula  O2 Flow (L/min): 2      PHYSICAL EXAM:    GENERAL: NAD, well-groomed  NERVOUS SYSTEM:  awake, but mildly sleepy appearing  CHEST/LUNG: Clear to percussion bilaterally; No rales, rhonchi, wheezing, or rubs  HEART: Regular rate and rhythm; No murmurs, rubs, or gallops  ABDOMEN: Soft, Nontender, Nondistended; Bowel sounds present, with a drain   EXTREMITIES:  2+ Peripheral Pulses, No clubbing, cyanosis, or edema        LABS:                        8.1    4.42  )-----------( 220      ( 11 Jan 2024 02:26 )             26.0     01-11    140  |  107  |  30.0<H>  ----------------------------<  141<H>  4.3   |  23.0  |  2.42<H>    Ca    7.9<L>      11 Jan 2024 02:26  Mg     1.8     01-11    TPro  5.1<L>  /  Alb  2.5<L>  /  TBili  0.3<L>  /  DBili  x   /  AST  16  /  ALT  16  /  AlkPhos  70  01-11    PT/INR - ( 10 Jose C 2024 04:17 )   PT: 11.5 sec;   INR: 1.04 ratio           Urinalysis Basic - ( 11 Jan 2024 02:26 )    Color: x / Appearance: x / SG: x / pH: x  Gluc: 141 mg/dL / Ketone: x  / Bili: x / Urobili: x   Blood: x / Protein: x / Nitrite: x   Leuk Esterase: x / RBC: x / WBC x   Sq Epi: x / Non Sq Epi: x / Bacteria: x          MEDICATIONS  (STANDING):  dextrose 5%. 1000 milliLiter(s) (50 mL/Hr) IV Continuous <Continuous>  dextrose 5%. 1000 milliLiter(s) (100 mL/Hr) IV Continuous <Continuous>  dextrose 50% Injectable 25 Gram(s) IV Push once  dextrose 50% Injectable 12.5 Gram(s) IV Push once  dextrose 50% Injectable 25 Gram(s) IV Push once  dronedarone 400 milliGRAM(s) Oral two times a day  folic acid 1 milliGRAM(s) Oral daily  gabapentin 300 milliGRAM(s) Oral at bedtime  glucagon  Injectable 1 milliGRAM(s) IntraMuscular once  insulin glargine Injectable (LANTUS) 10 Unit(s) SubCutaneous at bedtime  insulin lispro (ADMELOG) corrective regimen sliding scale   SubCutaneous every 6 hours  magnesium sulfate  IVPB 1 Gram(s) IV Intermittent once  metoprolol tartrate 25 milliGRAM(s) Oral two times a day  midodrine. 10 milliGRAM(s) Oral three times a day  pantoprazole  Injectable 40 milliGRAM(s) IV Push every 12 hours  simvastatin 20 milliGRAM(s) Oral at bedtime  sodium bicarbonate 650 milliGRAM(s) Oral two times a day  tamsulosin 0.4 milliGRAM(s) Oral at bedtime    MEDICATIONS  (PRN):  acetaminophen     Tablet .. 650 milliGRAM(s) Oral every 6 hours PRN Temp greater or equal to 38C (100.4F), Mild Pain (1 - 3)  aluminum hydroxide/magnesium hydroxide/simethicone Suspension 30 milliLiter(s) Oral every 4 hours PRN Dyspepsia  dextrose Oral Gel 15 Gram(s) Oral once PRN Blood Glucose LESS THAN 70 milliGRAM(s)/deciliter  melatonin 3 milliGRAM(s) Oral at bedtime PRN Insomnia  prochlorperazine   Injectable 5 milliGRAM(s) IV Push every 6 hours PRN prn nausea & vomiting      RADIOLOGY & ADDITIONAL TESTS:   BERNADINE ANDERSON    [id number]    84y      Male    INTERVAL HPI/OVERNIGHT EVENTS:  patient being seen for coffee ground emesis. Patient seen at bedside and is in nad    patient is s.p egd on 1/10      REVIEW OF SYSTEMS:    CONSTITUTIONAL: No fever, weight loss, or fatigue  RESPIRATORY: No cough, wheezing, hemoptysis; No shortness of breath  CARDIOVASCULAR: No chest pain, palpitations  GASTROINTESTINAL: No abdominal or epigastric pain. No nausea, vomiting  NEUROLOGICAL: No headaches, memory loss, loss of strength.  MISCELLANEOUS:      Vital Signs Last 24 Hrs  T(C): 37.5 (11 Jan 2024 12:26), Max: 37.5 (11 Jan 2024 12:26)  T(F): 99.5 (11 Jan 2024 12:26), Max: 99.5 (11 Jan 2024 12:26)  HR: 73 (11 Jan 2024 12:26) (73 - 94)  BP: 147/83 (11 Jan 2024 12:26) (127/64 - 147/83)  BP(mean): --  RR: 19 (11 Jan 2024 12:26) (19 - 19)  SpO2: 95% (11 Jan 2024 12:26) (94% - 96%)    Parameters below as of 11 Jan 2024 12:26  Patient On (Oxygen Delivery Method): nasal cannula  O2 Flow (L/min): 2      PHYSICAL EXAM:    GENERAL: NAD, well-groomed  NERVOUS SYSTEM:  awake, but mildly sleepy appearing  CHEST/LUNG: Clear to percussion bilaterally; No rales, rhonchi, wheezing, or rubs  HEART: Regular rate and rhythm; No murmurs, rubs, or gallops  ABDOMEN: Soft, Nontender, Nondistended; Bowel sounds present, with a drain   EXTREMITIES:  2+ Peripheral Pulses, No clubbing, cyanosis, or edema        LABS:                        8.1    4.42  )-----------( 220      ( 11 Jan 2024 02:26 )             26.0     01-11    140  |  107  |  30.0<H>  ----------------------------<  141<H>  4.3   |  23.0  |  2.42<H>    Ca    7.9<L>      11 Jan 2024 02:26  Mg     1.8     01-11    TPro  5.1<L>  /  Alb  2.5<L>  /  TBili  0.3<L>  /  DBili  x   /  AST  16  /  ALT  16  /  AlkPhos  70  01-11    PT/INR - ( 10 Jose C 2024 04:17 )   PT: 11.5 sec;   INR: 1.04 ratio           Urinalysis Basic - ( 11 Jan 2024 02:26 )    Color: x / Appearance: x / SG: x / pH: x  Gluc: 141 mg/dL / Ketone: x  / Bili: x / Urobili: x   Blood: x / Protein: x / Nitrite: x   Leuk Esterase: x / RBC: x / WBC x   Sq Epi: x / Non Sq Epi: x / Bacteria: x          MEDICATIONS  (STANDING):  dextrose 5%. 1000 milliLiter(s) (50 mL/Hr) IV Continuous <Continuous>  dextrose 5%. 1000 milliLiter(s) (100 mL/Hr) IV Continuous <Continuous>  dextrose 50% Injectable 25 Gram(s) IV Push once  dextrose 50% Injectable 12.5 Gram(s) IV Push once  dextrose 50% Injectable 25 Gram(s) IV Push once  dronedarone 400 milliGRAM(s) Oral two times a day  folic acid 1 milliGRAM(s) Oral daily  gabapentin 300 milliGRAM(s) Oral at bedtime  glucagon  Injectable 1 milliGRAM(s) IntraMuscular once  insulin glargine Injectable (LANTUS) 10 Unit(s) SubCutaneous at bedtime  insulin lispro (ADMELOG) corrective regimen sliding scale   SubCutaneous every 6 hours  magnesium sulfate  IVPB 1 Gram(s) IV Intermittent once  metoprolol tartrate 25 milliGRAM(s) Oral two times a day  midodrine. 10 milliGRAM(s) Oral three times a day  pantoprazole  Injectable 40 milliGRAM(s) IV Push every 12 hours  simvastatin 20 milliGRAM(s) Oral at bedtime  sodium bicarbonate 650 milliGRAM(s) Oral two times a day  tamsulosin 0.4 milliGRAM(s) Oral at bedtime    MEDICATIONS  (PRN):  acetaminophen     Tablet .. 650 milliGRAM(s) Oral every 6 hours PRN Temp greater or equal to 38C (100.4F), Mild Pain (1 - 3)  aluminum hydroxide/magnesium hydroxide/simethicone Suspension 30 milliLiter(s) Oral every 4 hours PRN Dyspepsia  dextrose Oral Gel 15 Gram(s) Oral once PRN Blood Glucose LESS THAN 70 milliGRAM(s)/deciliter  melatonin 3 milliGRAM(s) Oral at bedtime PRN Insomnia  prochlorperazine   Injectable 5 milliGRAM(s) IV Push every 6 hours PRN prn nausea & vomiting      RADIOLOGY & ADDITIONAL TESTS:

## 2024-01-12 LAB
ALBUMIN SERPL ELPH-MCNC: 2.5 G/DL — LOW (ref 3.3–5.2)
ALBUMIN SERPL ELPH-MCNC: 2.5 G/DL — LOW (ref 3.3–5.2)
ALBUMIN SERPL ELPH-MCNC: 2.6 G/DL — LOW (ref 3.3–5.2)
ALP SERPL-CCNC: 93 U/L — SIGNIFICANT CHANGE UP (ref 40–120)
ALP SERPL-CCNC: 93 U/L — SIGNIFICANT CHANGE UP (ref 40–120)
ALP SERPL-CCNC: 95 U/L — SIGNIFICANT CHANGE UP (ref 40–120)
ALP SERPL-CCNC: 95 U/L — SIGNIFICANT CHANGE UP (ref 40–120)
ALP SERPL-CCNC: 97 U/L — SIGNIFICANT CHANGE UP (ref 40–120)
ALP SERPL-CCNC: 97 U/L — SIGNIFICANT CHANGE UP (ref 40–120)
ALT FLD-CCNC: 19 U/L — SIGNIFICANT CHANGE UP
ALT FLD-CCNC: 19 U/L — SIGNIFICANT CHANGE UP
ALT FLD-CCNC: 23 U/L — SIGNIFICANT CHANGE UP
ANION GAP SERPL CALC-SCNC: 11 MMOL/L — SIGNIFICANT CHANGE UP (ref 5–17)
ANION GAP SERPL CALC-SCNC: 11 MMOL/L — SIGNIFICANT CHANGE UP (ref 5–17)
ANION GAP SERPL CALC-SCNC: 12 MMOL/L — SIGNIFICANT CHANGE UP (ref 5–17)
ANION GAP SERPL CALC-SCNC: 12 MMOL/L — SIGNIFICANT CHANGE UP (ref 5–17)
ANION GAP SERPL CALC-SCNC: 8 MMOL/L — SIGNIFICANT CHANGE UP (ref 5–17)
ANION GAP SERPL CALC-SCNC: 8 MMOL/L — SIGNIFICANT CHANGE UP (ref 5–17)
AST SERPL-CCNC: 22 U/L — SIGNIFICANT CHANGE UP
AST SERPL-CCNC: 22 U/L — SIGNIFICANT CHANGE UP
AST SERPL-CCNC: 31 U/L — SIGNIFICANT CHANGE UP
AST SERPL-CCNC: 31 U/L — SIGNIFICANT CHANGE UP
AST SERPL-CCNC: 32 U/L — SIGNIFICANT CHANGE UP
AST SERPL-CCNC: 32 U/L — SIGNIFICANT CHANGE UP
BASOPHILS # BLD AUTO: 0.01 K/UL — SIGNIFICANT CHANGE UP (ref 0–0.2)
BASOPHILS # BLD AUTO: 0.01 K/UL — SIGNIFICANT CHANGE UP (ref 0–0.2)
BASOPHILS # BLD AUTO: 0.02 K/UL — SIGNIFICANT CHANGE UP (ref 0–0.2)
BASOPHILS # BLD AUTO: 0.02 K/UL — SIGNIFICANT CHANGE UP (ref 0–0.2)
BASOPHILS NFR BLD AUTO: 0.3 % — SIGNIFICANT CHANGE UP (ref 0–2)
BASOPHILS NFR BLD AUTO: 0.3 % — SIGNIFICANT CHANGE UP (ref 0–2)
BASOPHILS NFR BLD AUTO: 0.5 % — SIGNIFICANT CHANGE UP (ref 0–2)
BASOPHILS NFR BLD AUTO: 0.5 % — SIGNIFICANT CHANGE UP (ref 0–2)
BILIRUB DIRECT SERPL-MCNC: 0.1 MG/DL — SIGNIFICANT CHANGE UP (ref 0–0.3)
BILIRUB DIRECT SERPL-MCNC: 0.1 MG/DL — SIGNIFICANT CHANGE UP (ref 0–0.3)
BILIRUB INDIRECT FLD-MCNC: 0.2 MG/DL — SIGNIFICANT CHANGE UP (ref 0.2–1)
BILIRUB INDIRECT FLD-MCNC: 0.2 MG/DL — SIGNIFICANT CHANGE UP (ref 0.2–1)
BILIRUB SERPL-MCNC: 0.3 MG/DL — LOW (ref 0.4–2)
BUN SERPL-MCNC: 21.9 MG/DL — HIGH (ref 8–20)
BUN SERPL-MCNC: 21.9 MG/DL — HIGH (ref 8–20)
BUN SERPL-MCNC: 22.3 MG/DL — HIGH (ref 8–20)
BUN SERPL-MCNC: 22.3 MG/DL — HIGH (ref 8–20)
BUN SERPL-MCNC: 23.3 MG/DL — HIGH (ref 8–20)
BUN SERPL-MCNC: 23.3 MG/DL — HIGH (ref 8–20)
CALCIUM SERPL-MCNC: 7.9 MG/DL — LOW (ref 8.4–10.5)
CALCIUM SERPL-MCNC: 7.9 MG/DL — LOW (ref 8.4–10.5)
CALCIUM SERPL-MCNC: 8.1 MG/DL — LOW (ref 8.4–10.5)
CALCIUM SERPL-MCNC: 8.1 MG/DL — LOW (ref 8.4–10.5)
CALCIUM SERPL-MCNC: 8.3 MG/DL — LOW (ref 8.4–10.5)
CALCIUM SERPL-MCNC: 8.3 MG/DL — LOW (ref 8.4–10.5)
CHLORIDE SERPL-SCNC: 102 MMOL/L — SIGNIFICANT CHANGE UP (ref 96–108)
CHLORIDE SERPL-SCNC: 102 MMOL/L — SIGNIFICANT CHANGE UP (ref 96–108)
CHLORIDE SERPL-SCNC: 103 MMOL/L — SIGNIFICANT CHANGE UP (ref 96–108)
CHLORIDE SERPL-SCNC: 103 MMOL/L — SIGNIFICANT CHANGE UP (ref 96–108)
CHLORIDE SERPL-SCNC: 106 MMOL/L — SIGNIFICANT CHANGE UP (ref 96–108)
CHLORIDE SERPL-SCNC: 106 MMOL/L — SIGNIFICANT CHANGE UP (ref 96–108)
CK SERPL-CCNC: 44 U/L — SIGNIFICANT CHANGE UP (ref 30–200)
CK SERPL-CCNC: 44 U/L — SIGNIFICANT CHANGE UP (ref 30–200)
CO2 SERPL-SCNC: 22 MMOL/L — SIGNIFICANT CHANGE UP (ref 22–29)
CO2 SERPL-SCNC: 25 MMOL/L — SIGNIFICANT CHANGE UP (ref 22–29)
CO2 SERPL-SCNC: 25 MMOL/L — SIGNIFICANT CHANGE UP (ref 22–29)
CREAT SERPL-MCNC: 2.03 MG/DL — HIGH (ref 0.5–1.3)
CREAT SERPL-MCNC: 2.03 MG/DL — HIGH (ref 0.5–1.3)
CREAT SERPL-MCNC: 2.06 MG/DL — HIGH (ref 0.5–1.3)
CREAT SERPL-MCNC: 2.06 MG/DL — HIGH (ref 0.5–1.3)
CREAT SERPL-MCNC: 2.11 MG/DL — HIGH (ref 0.5–1.3)
CREAT SERPL-MCNC: 2.11 MG/DL — HIGH (ref 0.5–1.3)
EGFR: 30 ML/MIN/1.73M2 — LOW
EGFR: 30 ML/MIN/1.73M2 — LOW
EGFR: 31 ML/MIN/1.73M2 — LOW
EGFR: 31 ML/MIN/1.73M2 — LOW
EGFR: 32 ML/MIN/1.73M2 — LOW
EGFR: 32 ML/MIN/1.73M2 — LOW
EOSINOPHIL # BLD AUTO: 0.11 K/UL — SIGNIFICANT CHANGE UP (ref 0–0.5)
EOSINOPHIL # BLD AUTO: 0.11 K/UL — SIGNIFICANT CHANGE UP (ref 0–0.5)
EOSINOPHIL # BLD AUTO: 0.12 K/UL — SIGNIFICANT CHANGE UP (ref 0–0.5)
EOSINOPHIL # BLD AUTO: 0.12 K/UL — SIGNIFICANT CHANGE UP (ref 0–0.5)
EOSINOPHIL NFR BLD AUTO: 2.6 % — SIGNIFICANT CHANGE UP (ref 0–6)
EOSINOPHIL NFR BLD AUTO: 2.6 % — SIGNIFICANT CHANGE UP (ref 0–6)
EOSINOPHIL NFR BLD AUTO: 3.3 % — SIGNIFICANT CHANGE UP (ref 0–6)
EOSINOPHIL NFR BLD AUTO: 3.3 % — SIGNIFICANT CHANGE UP (ref 0–6)
GAS PNL BLDA: SIGNIFICANT CHANGE UP
GAS PNL BLDA: SIGNIFICANT CHANGE UP
GLUCOSE BLDC GLUCOMTR-MCNC: 131 MG/DL — HIGH (ref 70–99)
GLUCOSE BLDC GLUCOMTR-MCNC: 131 MG/DL — HIGH (ref 70–99)
GLUCOSE BLDC GLUCOMTR-MCNC: 145 MG/DL — HIGH (ref 70–99)
GLUCOSE BLDC GLUCOMTR-MCNC: 145 MG/DL — HIGH (ref 70–99)
GLUCOSE BLDC GLUCOMTR-MCNC: 170 MG/DL — HIGH (ref 70–99)
GLUCOSE BLDC GLUCOMTR-MCNC: 170 MG/DL — HIGH (ref 70–99)
GLUCOSE BLDC GLUCOMTR-MCNC: 185 MG/DL — HIGH (ref 70–99)
GLUCOSE BLDC GLUCOMTR-MCNC: 185 MG/DL — HIGH (ref 70–99)
GLUCOSE BLDC GLUCOMTR-MCNC: 208 MG/DL — HIGH (ref 70–99)
GLUCOSE BLDC GLUCOMTR-MCNC: 208 MG/DL — HIGH (ref 70–99)
GLUCOSE SERPL-MCNC: 116 MG/DL — HIGH (ref 70–99)
GLUCOSE SERPL-MCNC: 116 MG/DL — HIGH (ref 70–99)
GLUCOSE SERPL-MCNC: 167 MG/DL — HIGH (ref 70–99)
GLUCOSE SERPL-MCNC: 167 MG/DL — HIGH (ref 70–99)
GLUCOSE SERPL-MCNC: 171 MG/DL — HIGH (ref 70–99)
GLUCOSE SERPL-MCNC: 171 MG/DL — HIGH (ref 70–99)
HCT VFR BLD CALC: 26.1 % — LOW (ref 39–50)
HCT VFR BLD CALC: 26.1 % — LOW (ref 39–50)
HCT VFR BLD CALC: 28.2 % — LOW (ref 39–50)
HCT VFR BLD CALC: 28.2 % — LOW (ref 39–50)
HGB BLD-MCNC: 8.5 G/DL — LOW (ref 13–17)
HGB BLD-MCNC: 8.5 G/DL — LOW (ref 13–17)
HGB BLD-MCNC: 9.3 G/DL — LOW (ref 13–17)
HGB BLD-MCNC: 9.3 G/DL — LOW (ref 13–17)
IMM GRANULOCYTES NFR BLD AUTO: 0.5 % — SIGNIFICANT CHANGE UP (ref 0–0.9)
INR BLD: 0.94 RATIO — SIGNIFICANT CHANGE UP (ref 0.85–1.18)
INR BLD: 0.94 RATIO — SIGNIFICANT CHANGE UP (ref 0.85–1.18)
LACTATE SERPL-SCNC: 0.7 MMOL/L — SIGNIFICANT CHANGE UP (ref 0.5–2)
LACTATE SERPL-SCNC: 0.7 MMOL/L — SIGNIFICANT CHANGE UP (ref 0.5–2)
LYMPHOCYTES # BLD AUTO: 0.88 K/UL — LOW (ref 1–3.3)
LYMPHOCYTES # BLD AUTO: 0.88 K/UL — LOW (ref 1–3.3)
LYMPHOCYTES # BLD AUTO: 1.12 K/UL — SIGNIFICANT CHANGE UP (ref 1–3.3)
LYMPHOCYTES # BLD AUTO: 1.12 K/UL — SIGNIFICANT CHANGE UP (ref 1–3.3)
LYMPHOCYTES # BLD AUTO: 24 % — SIGNIFICANT CHANGE UP (ref 13–44)
LYMPHOCYTES # BLD AUTO: 24 % — SIGNIFICANT CHANGE UP (ref 13–44)
LYMPHOCYTES # BLD AUTO: 26.9 % — SIGNIFICANT CHANGE UP (ref 13–44)
LYMPHOCYTES # BLD AUTO: 26.9 % — SIGNIFICANT CHANGE UP (ref 13–44)
MAGNESIUM SERPL-MCNC: 1.9 MG/DL — SIGNIFICANT CHANGE UP (ref 1.6–2.6)
MAGNESIUM SERPL-MCNC: 1.9 MG/DL — SIGNIFICANT CHANGE UP (ref 1.6–2.6)
MCHC RBC-ENTMCNC: 30 PG — SIGNIFICANT CHANGE UP (ref 27–34)
MCHC RBC-ENTMCNC: 30 PG — SIGNIFICANT CHANGE UP (ref 27–34)
MCHC RBC-ENTMCNC: 30.3 PG — SIGNIFICANT CHANGE UP (ref 27–34)
MCHC RBC-ENTMCNC: 30.3 PG — SIGNIFICANT CHANGE UP (ref 27–34)
MCHC RBC-ENTMCNC: 32.6 GM/DL — SIGNIFICANT CHANGE UP (ref 32–36)
MCHC RBC-ENTMCNC: 32.6 GM/DL — SIGNIFICANT CHANGE UP (ref 32–36)
MCHC RBC-ENTMCNC: 33 GM/DL — SIGNIFICANT CHANGE UP (ref 32–36)
MCHC RBC-ENTMCNC: 33 GM/DL — SIGNIFICANT CHANGE UP (ref 32–36)
MCV RBC AUTO: 91.9 FL — SIGNIFICANT CHANGE UP (ref 80–100)
MCV RBC AUTO: 91.9 FL — SIGNIFICANT CHANGE UP (ref 80–100)
MCV RBC AUTO: 92.2 FL — SIGNIFICANT CHANGE UP (ref 80–100)
MCV RBC AUTO: 92.2 FL — SIGNIFICANT CHANGE UP (ref 80–100)
MONOCYTES # BLD AUTO: 0.61 K/UL — SIGNIFICANT CHANGE UP (ref 0–0.9)
MONOCYTES # BLD AUTO: 0.61 K/UL — SIGNIFICANT CHANGE UP (ref 0–0.9)
MONOCYTES # BLD AUTO: 0.62 K/UL — SIGNIFICANT CHANGE UP (ref 0–0.9)
MONOCYTES # BLD AUTO: 0.62 K/UL — SIGNIFICANT CHANGE UP (ref 0–0.9)
MONOCYTES NFR BLD AUTO: 14.9 % — HIGH (ref 2–14)
MONOCYTES NFR BLD AUTO: 14.9 % — HIGH (ref 2–14)
MONOCYTES NFR BLD AUTO: 16.6 % — HIGH (ref 2–14)
MONOCYTES NFR BLD AUTO: 16.6 % — HIGH (ref 2–14)
NEUTROPHILS # BLD AUTO: 2.03 K/UL — SIGNIFICANT CHANGE UP (ref 1.8–7.4)
NEUTROPHILS # BLD AUTO: 2.03 K/UL — SIGNIFICANT CHANGE UP (ref 1.8–7.4)
NEUTROPHILS # BLD AUTO: 2.28 K/UL — SIGNIFICANT CHANGE UP (ref 1.8–7.4)
NEUTROPHILS # BLD AUTO: 2.28 K/UL — SIGNIFICANT CHANGE UP (ref 1.8–7.4)
NEUTROPHILS NFR BLD AUTO: 54.6 % — SIGNIFICANT CHANGE UP (ref 43–77)
NEUTROPHILS NFR BLD AUTO: 54.6 % — SIGNIFICANT CHANGE UP (ref 43–77)
NEUTROPHILS NFR BLD AUTO: 55.3 % — SIGNIFICANT CHANGE UP (ref 43–77)
NEUTROPHILS NFR BLD AUTO: 55.3 % — SIGNIFICANT CHANGE UP (ref 43–77)
PLATELET # BLD AUTO: 211 K/UL — SIGNIFICANT CHANGE UP (ref 150–400)
PLATELET # BLD AUTO: 211 K/UL — SIGNIFICANT CHANGE UP (ref 150–400)
PLATELET # BLD AUTO: 221 K/UL — SIGNIFICANT CHANGE UP (ref 150–400)
PLATELET # BLD AUTO: 221 K/UL — SIGNIFICANT CHANGE UP (ref 150–400)
POTASSIUM SERPL-MCNC: 4.1 MMOL/L — SIGNIFICANT CHANGE UP (ref 3.5–5.3)
POTASSIUM SERPL-MCNC: 4.1 MMOL/L — SIGNIFICANT CHANGE UP (ref 3.5–5.3)
POTASSIUM SERPL-MCNC: 4.6 MMOL/L — SIGNIFICANT CHANGE UP (ref 3.5–5.3)
POTASSIUM SERPL-SCNC: 4.1 MMOL/L — SIGNIFICANT CHANGE UP (ref 3.5–5.3)
POTASSIUM SERPL-SCNC: 4.1 MMOL/L — SIGNIFICANT CHANGE UP (ref 3.5–5.3)
POTASSIUM SERPL-SCNC: 4.6 MMOL/L — SIGNIFICANT CHANGE UP (ref 3.5–5.3)
PROT SERPL-MCNC: 5.2 G/DL — LOW (ref 6.6–8.7)
PROT SERPL-MCNC: 5.2 G/DL — LOW (ref 6.6–8.7)
PROT SERPL-MCNC: 5.7 G/DL — LOW (ref 6.6–8.7)
PROT SERPL-MCNC: 5.7 G/DL — LOW (ref 6.6–8.7)
PROT SERPL-MCNC: 5.8 G/DL — LOW (ref 6.6–8.7)
PROT SERPL-MCNC: 5.8 G/DL — LOW (ref 6.6–8.7)
PROTHROM AB SERPL-ACNC: 10.4 SEC — SIGNIFICANT CHANGE UP (ref 9.5–13)
PROTHROM AB SERPL-ACNC: 10.4 SEC — SIGNIFICANT CHANGE UP (ref 9.5–13)
RBC # BLD: 2.83 M/UL — LOW (ref 4.2–5.8)
RBC # BLD: 2.83 M/UL — LOW (ref 4.2–5.8)
RBC # BLD: 3.07 M/UL — LOW (ref 4.2–5.8)
RBC # BLD: 3.07 M/UL — LOW (ref 4.2–5.8)
RBC # FLD: 15.3 % — HIGH (ref 10.3–14.5)
SODIUM SERPL-SCNC: 136 MMOL/L — SIGNIFICANT CHANGE UP (ref 135–145)
SODIUM SERPL-SCNC: 139 MMOL/L — SIGNIFICANT CHANGE UP (ref 135–145)
SODIUM SERPL-SCNC: 139 MMOL/L — SIGNIFICANT CHANGE UP (ref 135–145)
TROPONIN T, HIGH SENSITIVITY RESULT: 57 NG/L — HIGH (ref 0–51)
TROPONIN T, HIGH SENSITIVITY RESULT: 57 NG/L — HIGH (ref 0–51)
WBC # BLD: 3.67 K/UL — LOW (ref 3.8–10.5)
WBC # BLD: 3.67 K/UL — LOW (ref 3.8–10.5)
WBC # BLD: 4.17 K/UL — SIGNIFICANT CHANGE UP (ref 3.8–10.5)
WBC # BLD: 4.17 K/UL — SIGNIFICANT CHANGE UP (ref 3.8–10.5)
WBC # FLD AUTO: 3.67 K/UL — LOW (ref 3.8–10.5)
WBC # FLD AUTO: 3.67 K/UL — LOW (ref 3.8–10.5)
WBC # FLD AUTO: 4.17 K/UL — SIGNIFICANT CHANGE UP (ref 3.8–10.5)
WBC # FLD AUTO: 4.17 K/UL — SIGNIFICANT CHANGE UP (ref 3.8–10.5)

## 2024-01-12 PROCEDURE — 99232 SBSQ HOSP IP/OBS MODERATE 35: CPT

## 2024-01-12 PROCEDURE — 71045 X-RAY EXAM CHEST 1 VIEW: CPT | Mod: 26

## 2024-01-12 PROCEDURE — 93010 ELECTROCARDIOGRAM REPORT: CPT

## 2024-01-12 PROCEDURE — 70450 CT HEAD/BRAIN W/O DYE: CPT | Mod: 26

## 2024-01-12 RX ORDER — ALBUTEROL 90 UG/1
2 AEROSOL, METERED ORAL EVERY 6 HOURS
Refills: 0 | Status: DISCONTINUED | OUTPATIENT
Start: 2024-01-12 | End: 2024-01-12

## 2024-01-12 RX ORDER — ALBUTEROL 90 UG/1
2 AEROSOL, METERED ORAL
Refills: 0 | Status: DISCONTINUED | OUTPATIENT
Start: 2024-01-12 | End: 2024-01-14

## 2024-01-12 RX ORDER — IPRATROPIUM/ALBUTEROL SULFATE 18-103MCG
3 AEROSOL WITH ADAPTER (GRAM) INHALATION ONCE
Refills: 0 | Status: COMPLETED | OUTPATIENT
Start: 2024-01-12 | End: 2024-01-12

## 2024-01-12 RX ORDER — POLYETHYLENE GLYCOL 3350 17 G/17G
17 POWDER, FOR SOLUTION ORAL AT BEDTIME
Refills: 0 | Status: DISCONTINUED | OUTPATIENT
Start: 2024-01-12 | End: 2024-01-14

## 2024-01-12 RX ORDER — SENNA PLUS 8.6 MG/1
2 TABLET ORAL AT BEDTIME
Refills: 0 | Status: DISCONTINUED | OUTPATIENT
Start: 2024-01-12 | End: 2024-01-14

## 2024-01-12 RX ORDER — GLYCERIN ADULT
1 SUPPOSITORY, RECTAL RECTAL DAILY
Refills: 0 | Status: DISCONTINUED | OUTPATIENT
Start: 2024-01-12 | End: 2024-01-13

## 2024-01-12 RX ORDER — IPRATROPIUM/ALBUTEROL SULFATE 18-103MCG
3 AEROSOL WITH ADAPTER (GRAM) INHALATION EVERY 6 HOURS
Refills: 0 | Status: DISCONTINUED | OUTPATIENT
Start: 2024-01-12 | End: 2024-01-14

## 2024-01-12 RX ADMIN — INSULIN GLARGINE 10 UNIT(S): 100 INJECTION, SOLUTION SUBCUTANEOUS at 23:07

## 2024-01-12 RX ADMIN — MIDODRINE HYDROCHLORIDE 10 MILLIGRAM(S): 2.5 TABLET ORAL at 05:30

## 2024-01-12 RX ADMIN — Medication 3 MILLILITER(S): at 21:41

## 2024-01-12 RX ADMIN — Medication 650 MILLIGRAM(S): at 05:30

## 2024-01-12 RX ADMIN — Medication 3 MILLILITER(S): at 14:49

## 2024-01-12 RX ADMIN — Medication 25 MILLIGRAM(S): at 05:30

## 2024-01-12 RX ADMIN — Medication 1: at 01:33

## 2024-01-12 RX ADMIN — DRONEDARONE 400 MILLIGRAM(S): 400 TABLET, FILM COATED ORAL at 05:30

## 2024-01-12 RX ADMIN — PANTOPRAZOLE SODIUM 40 MILLIGRAM(S): 20 TABLET, DELAYED RELEASE ORAL at 05:30

## 2024-01-12 NOTE — PHYSICAL THERAPY INITIAL EVALUATION ADULT - ACTIVE RANGE OF MOTION EXAMINATION, REHAB EVAL
no
bilateral upper extremity Active ROM was WFL (within functional limits)/bilateral  lower extremity Active ROM was WFL (within functional limits)

## 2024-01-12 NOTE — RAPID RESPONSE TEAM SUMMARY - NSADDTLFINDINGSRRT_GEN_ALL_CORE
Blood glucose 208  Vital signs all within normal limits  Patient was A&Ox0 at time of exam, but following commands intermittently  Right hand twitching noted

## 2024-01-12 NOTE — PHYSICAL THERAPY INITIAL EVALUATION ADULT - PERTINENT HX OF CURRENT PROBLEM, REHAB EVAL
83 yo M with PMH of DM, HTN, CKD stage III, who presented from rehab after episode of coffee ground emesis. He was recently admitted in 12/2023 for acute cholecystitis and had perc sky placed by IR on 12/22/23. Hospital course was complicated by hypoxic respiratory failure secondary to COVID, RON, Afib with RVR, and delirium. He was discharged to SNF on 1/5/2024,  planned for outpatient IR follow up and interval cholecystectomy. He presented back to ED on 1/6/24 due to complaints of abdominal pain, nausea and vomiting. CT AP at that time showed new markedly dilated, fluid filled esophagus. He was discharged back to rehab, but then presented back to ED on 1/7/24) after large volume coffee ground emesis.

## 2024-01-12 NOTE — PROGRESS NOTE ADULT - ATTENDING COMMENTS
85 yo M with PMH of DM, HTN, CKD stage III, who presented from rehab after episode of coffee ground emesis. He was recently admitted in 12/2023 for acute cholecystitis and had perc sky placed by IR on 12/22/23. Hospital course was complicated by hypoxic respiratory failure secondary to COVID, RON, Afib with RVR, and delirium. He was discharged to SNF on 1/5/2024,  planned for outpatient IR follow up and interval cholecystectomy. He presented back to ED on 1/6/24 due to complaints of abdominal pain, nausea and vomiting. CT AP at that time showed new markedly dilated, fluid filled esophagus. He was discharged back to rehab, but then presented back to ED on 1/7/24) after large volume coffee ground emesis.    S/p EGD with esophagitis and gastric polyps   Planned for [facility]IN  Needs Auth 83 yo M with PMH of DM, HTN, CKD stage III, who presented from rehab after episode of coffee ground emesis. He was recently admitted in 12/2023 for acute cholecystitis and had perc sky placed by IR on 12/22/23. Hospital course was complicated by hypoxic respiratory failure secondary to COVID, RON, Afib with RVR, and delirium. He was discharged to SNF on 1/5/2024,  planned for outpatient IR follow up and interval cholecystectomy. He presented back to ED on 1/6/24 due to complaints of abdominal pain, nausea and vomiting. CT AP at that time showed new markedly dilated, fluid filled esophagus. He was discharged back to rehab, but then presented back to ED on 1/7/24) after large volume coffee ground emesis.    S/p EGD with esophagitis and gastric polyps   Planned for NITHIN  Needs Auth

## 2024-01-12 NOTE — PHYSICAL THERAPY INITIAL EVALUATION ADULT - GENERAL OBSERVATIONS, REHAB EVAL
pt awake in bed on 1L/min o2 via nc, +[place] cath, +sky tube. , chanel(#[id number]), in use throughout. wife, antonio, present pt awake in bed on 1L/min o2 via nc, +[place] cath, +sky tube. , chanel(#[id number]), in use throughout. wife, antonio, present

## 2024-01-12 NOTE — RAPID RESPONSE TEAM SUMMARY - NSSITUATIONBACKGROUNDRRT_GEN_ALL_CORE
84M with PMHx of DM, HTN, CKD stage III, who presented from rehab after episode of coffee ground emesis. He was recently admitted in 12/2023 for acute cholecystitis and had perc sky placed by IR on 12/22/23. Hospital course was complicated by hypoxic respiratory failure secondary to COVID, RON, Afib with RVR, and delirium. He was discharged to SNF on 1/5/2024,  planned for outpatient IR follow up and interval cholecystectomy. He presented back to ED on 1/6/24 due to complaints of abdominal pain, nausea and vomiting. CT AP at that time showed new markedly dilated, fluid filled esophagus. He was discharged back to rehab, but then presented back to ED on 1/7/24 after large volume coffee ground emesis. Rapid response was called on 1/12/2024 as patient was not responding to vigorous sternal rub. Previously during the day, patient was sleepy but arousable and oriented x1-2.

## 2024-01-12 NOTE — PROGRESS NOTE ADULT - SUBJECTIVE AND OBJECTIVE BOX
SUBJECTIVE:    Chief Complaint: Patient is a 84y old  Male who presents with a chief complaint of GI bleed (11 Jan 2024 12:49)      INTERVAL HPI/OVERNIGHT EVENTS: Patient seen and examined at bedside at AM. No clinically acute event overnight. Denies any chest pain, palpitations, SOB, leg edema, N/V/D, or any other complaints.     MEDICATIONS  (STANDING):  dextrose 5%. 1000 milliLiter(s) (50 mL/Hr) IV Continuous <Continuous>  dextrose 5%. 1000 milliLiter(s) (100 mL/Hr) IV Continuous <Continuous>  dextrose 50% Injectable 25 Gram(s) IV Push once  dextrose 50% Injectable 25 Gram(s) IV Push once  dextrose 50% Injectable 12.5 Gram(s) IV Push once  dronedarone 400 milliGRAM(s) Oral two times a day  folic acid 1 milliGRAM(s) Oral daily  gabapentin 300 milliGRAM(s) Oral at bedtime  glucagon  Injectable 1 milliGRAM(s) IntraMuscular once  insulin glargine Injectable (LANTUS) 10 Unit(s) SubCutaneous at bedtime  insulin lispro (ADMELOG) corrective regimen sliding scale   SubCutaneous every 6 hours  metoprolol tartrate 25 milliGRAM(s) Oral two times a day  midodrine. 10 milliGRAM(s) Oral three times a day  pantoprazole  Injectable 40 milliGRAM(s) IV Push every 12 hours  polyethylene glycol 3350 17 Gram(s) Oral at bedtime  senna 2 Tablet(s) Oral at bedtime  simvastatin 20 milliGRAM(s) Oral at bedtime  sodium bicarbonate 650 milliGRAM(s) Oral two times a day  tamsulosin 0.4 milliGRAM(s) Oral at bedtime    MEDICATIONS  (PRN):  acetaminophen     Tablet .. 650 milliGRAM(s) Oral every 6 hours PRN Temp greater or equal to 38C (100.4F), Mild Pain (1 - 3)  aluminum hydroxide/magnesium hydroxide/simethicone Suspension 30 milliLiter(s) Oral every 4 hours PRN Dyspepsia  dextrose Oral Gel 15 Gram(s) Oral once PRN Blood Glucose LESS THAN 70 milliGRAM(s)/deciliter  melatonin 3 milliGRAM(s) Oral at bedtime PRN Insomnia  prochlorperazine   Injectable 5 milliGRAM(s) IV Push every 6 hours PRN prn nausea & vomiting      Allergies    No Known Allergies    Intolerances        REVIEW OF SYSTEMS:  All other review of systems negative, except as noted in HPI    OBJECTIVE:    Vital Signs Last 24 Hrs  T(C): 36.5 (12 Jan 2024 11:18), Max: 38.2 (12 Jan 2024 04:14)  T(F): 97.7 (12 Jan 2024 11:18), Max: 100.7 (12 Jan 2024 04:14)  HR: 62 (12 Jan 2024 11:18) (62 - 73)  BP: 108/77 (12 Jan 2024 11:18) (108/77 - 150/72)  BP(mean): 84 (11 Jan 2024 20:40) (84 - 89)  RR: 18 (12 Jan 2024 11:18) (18 - 19)  SpO2: 91% (12 Jan 2024 11:18) (91% - 95%)    Parameters below as of 12 Jan 2024 11:18  Patient On (Oxygen Delivery Method): nasal cannula  O2 Flow (L/min): 2      PHYSICAL EXAM:  GENERAL: NAD, lying in bed   HEAD:  Atraumatic, Normocephalic  EYES: EOMI, PERRLA, conjunctiva and sclera clear  ENT: Moist mucous membranes  NECK: Supple, No JVD  CHEST/LUNG: Clear to auscultation bilaterally; No rales, rhonchi, wheezing, or rubs. Unlabored respirations  HEART: Regular rate and rhythm; No murmurs, rubs, or gallops  ABDOMEN: BSx4; Soft, nontender, mild distention, per sky draining bile, site dry & clean   EXTREMITIES:  2+ Peripheral Pulses, brisk capillary refill. No clubbing, cyanosis, or edema  NERVOUS SYSTEM:  A&Ox3, no focal deficits   SKIN: No rashes or lesions    Lab/ Imaging:    LABS:                        8.5    3.67  )-----------( 211      ( 12 Jan 2024 05:46 )             26.1     01-12    139  |  106  |  23.3<H>  ----------------------------<  116<H>  4.1   |  25.0  |  2.11<H>    Ca    7.9<L>      12 Jan 2024 05:46  Mg     1.9     01-12    TPro  5.2<L>  /  Alb  2.6<L>  /  TBili  0.3<L>  /  DBili  x   /  AST  22  /  ALT  19  /  AlkPhos  93  01-12      Urinalysis Basic - ( 12 Jan 2024 05:46 )    Color: x / Appearance: x / SG: x / pH: x  Gluc: 116 mg/dL / Ketone: x  / Bili: x / Urobili: x   Blood: x / Protein: x / Nitrite: x   Leuk Esterase: x / RBC: x / WBC x   Sq Epi: x / Non Sq Epi: x / Bacteria: x      CAPILLARY BLOOD GLUCOSE  POCT Blood Glucose.: 131 mg/dL (12 Jan 2024 05:29)  POCT Blood Glucose.: 170 mg/dL (12 Jan 2024 01:29)  POCT Blood Glucose.: 183 mg/dL (11 Jan 2024 23:27)  POCT Blood Glucose.: 152 mg/dL (11 Jan 2024 17:01)  POCT Blood Glucose.: 153 mg/dL (11 Jan 2024 12:03)

## 2024-01-12 NOTE — PHYSICAL THERAPY INITIAL EVALUATION ADULT - ADDITIONAL COMMENTS
per [facility] assessment, pt came from [facility]. pt lives with his wife and daughter in a house with 3 steps to enter. has cane, RW, brody, isabella, shower chair. per [facility] assessment, pt came from [facility]. pt lives with his wife and daughter in a house with 3 steps to enter. has cane, RW, brody, isabella, shower chair.

## 2024-01-12 NOTE — RAPID RESPONSE TEAM SUMMARY - NSOTHERINTERVENTIONSRRT_GEN_ALL_CORE
CBC  CMP  Lactate  VBG with lytes  CXR  CT head without contrast - nonacute. No change from 1/6/2024.   CT Brain Perfusion Maps  Neurology consulted  EEG ordered due to concern for seizure

## 2024-01-12 NOTE — PHYSICAL THERAPY INITIAL EVALUATION ADULT - GROSSLY INTACT, SENSORY
formal testing limited by cognition; pt responds to tactile stim all extremities/Left UE/Right UE/Left LE/Right LE/Grossly Intact

## 2024-01-12 NOTE — CHART NOTE - NSCHARTNOTEFT_GEN_A_CORE
EEG preliminary read (not final) on the initial recording hour(s) = x 2    No seizures recorded.  Mild slowing noted, nonspecific.  Final report to follow tomorrow morning after completion of study.    [facility] EEG Reading Room Ph#: [phone]  Epilepsy Answering Service after 5PM and before 8:30AM: Ph#: [phone] EEG preliminary read (not final) on the initial recording hour(s) = x 2    No seizures recorded.  Mild slowing noted, nonspecific.  Final report to follow tomorrow morning after completion of study.    [facility] EEG Reading Room Ph#: [phone]  Epilepsy Answering Service after 5PM and before 8:30AM: Ph#: [phone]

## 2024-01-12 NOTE — CHART NOTE - NSCHARTNOTEFT_GEN_A_CORE
RAPID RESPONSE FOLLOW UP NOTE    Patient seen and examined at bedside. RR called @18:30 for AMS . Pt reassessed around 20:43. Daughters at bedside. Patient with improvement in mental status, following commands, verbally responsive.  Denies headaches, dizziness, chest pain, palpitations, abdominal pain, n/v/d or any other complaints.    Vital Signs Last 24 Hrs  T(C): 36.9 (12 Jan 2024 17:11), Max: 38.2 (12 Jan 2024 04:14)  T(F): 98.4 (12 Jan 2024 17:11), Max: 100.7 (12 Jan 2024 04:14)  HR: 70 (12 Jan 2024 17:11) (62 - 72)  BP: 124/68 (12 Jan 2024 17:11) (108/77 - 150/72)  BP(mean): --  RR: 18 (12 Jan 2024 17:11) (18 - 18)  SpO2: 92% (12 Jan 2024 17:11) (91% - 97%)    Parameters below as of 12 Jan 2024 17:11  Patient On (Oxygen Delivery Method): nasal cannula  O2 Flow (L/min): 2    ROS:  CONSTITUTIONAL: No weakness, fevers or chills  EYES/ENT: No visual changes;  No vertigo or throat pain   NECK: No pain or stiffness  RESPIRATORY: No cough, wheezing, hemoptysis; No shortness of breath  CARDIOVASCULAR: No chest pain or palpitations  GASTROINTESTINAL: +constipation.  GENITOURINARY: No dysuria, frequency or hematuria  NEUROLOGICAL:  No focal deficits  All other review of systems is negative unless indicated above.    PHYSICAL EXAM:  PHYSICAL EXAM:  GENERAL: Pt lying in bed comfortably in NAD  HEAD:  Atraumatic   EYES: EOMI, PERRL, conjunctiva and sclera clear  ENT: Moist mucous membranes  CHEST/LUNG: Clear to auscultation bilaterally; No rales, rhonchi or wheezing. Unlabored respirations  HEART: S1, S2   ABDOMEN: Bowel sounds present; Soft, Nontender, Nondistended. No guarding or rigidity    EXTREMITIES:  2+ Peripheral Pulses, brisk capillary refill. No clubbing, cyanosis, or edema   NERVOUS SYSTEM:  Alert & Oriented X3, speech clear. Answers questions appropriately. Full and equal strength B/L upper and lower extremities. No deficits   MSK: FROM x 4 extremities   SKIN: No rashes or lesions      Neurology: A&Ox3, nonfocal, CN II-XII grossly intact, sensation intact, no gait abnormalities   Extremities: No clubbing, cyanosis, edema; + peripheral pulses, + peripheral pulses, FROM  Capillary refill<2 sec  MSK: Normal ROM, no joint erythema or warmth, no joint swelling   Skin: warm, dry, normal color, no rash or abnormal lesions      LABS:                        9.3    4.17  )-----------( 221      ( 12 Jan 2024 19:50 )             28.2       01-12    136  |  103  |  22.3<H>  ----------------------------<  171<H>  4.6   |  22.0  |  2.03<H>    Ca    8.1<L>      12 Jan 2024 19:50  Mg     1.9     01-12    TPro  5.8<L>  /  Alb  2.6<L>  /  TBili  0.3<L>  /  DBili  0.1  /  AST  32  /  ALT  23  /  AlkPhos  97  01-12    LIVER FUNCTIONS - ( 12 Jan 2024 19:50 )  Alb: 2.6 g/dL / Pro: 5.8 g/dL / ALK PHOS: 97 U/L / ALT: 23 U/L / AST: 32 U/L / GGT: x           CAPILLARY BLOOD GLUCOSE  POCT Blood Glucose.: 208 mg/dL (12 Jan 2024 18:08)  POCT Blood Glucose.: 185 mg/dL (12 Jan 2024 13:19)  POCT Blood Glucose.: 131 mg/dL (12 Jan 2024 05:29)  POCT Blood Glucose.: 170 mg/dL (12 Jan 2024 01:29)  POCT Blood Glucose.: 183 mg/dL (11 Jan 2024 23:27)      Urinalysis Basic - ( 12 Jan 2024 19:50 )    Color: x / Appearance: x / SG: x / pH: x  Gluc: 171 mg/dL / Ketone: x  / Bili: x / Urobili: x   Blood: x / Protein: x / Nitrite: x   Leuk Esterase: x / RBC: x / WBC x   Sq Epi: x / Non Sq Epi: x / Bacteria: x  ABG - ( 12 Jan 2024 14:50 )    pH, Arterial: 7.380 pH, Blood: x     /  pCO2: 44    /  pO2: 91    / HCO3: 26    / Base Excess: 0.9   /  SaO2: 98.3      I&O's Summary    12 Jan 2024 07:01  -  12 Jan 2024 20:49  --------------------------------------------------------  IN: 0 mL / OUT: 1250 mL / NET: -1250 mL    IMAGING:    < from: CT Brain Stroke Protocol (01.12.24 @ 18:38) >    ACC: [id number] EXAM:  CT BRAIN STROKE PROTOCOL   ORDERED BY: NOLVIA RIDLEY     PROCEDURE DATE:  01/12/2024      INTERPRETATION:  CLINICAL HISTORY:  Nonresponsive. Stroke code. Presented   for coffee-ground emesis.    COMPARISON: CT head 1/6/2024    IMPRESSION:  Stable exam. No acute intracranial hemorrhage, vasogenic edema,   extra-axial collection or hydrocephalus. Chronic lacunar infarcts and   moderate severity chronic microvascular changes.      ASSESSMENT/ PLAN:  85 yo M with PMH of DM, HTN, CKD stage III, who presented from rehab after episode of coffee ground emesis. He was recently admitted in 12/2023 for acute cholecystitis and had perc sky placed by IR on 12/22/23. Hospital course was complicated by hypoxic respiratory failure secondary to COVID, RON, Afib with RVR, and delirium. He was discharged to SNF on 1/5/2024,  planned for outpatient IR follow up and interval cholecystectomy. He presented back to ED on 1/6/24 due to complaints of abdominal pain, nausea and vomiting. CT AP at that time showed new markedly dilated, fluid filled esophagus. He was discharged back to rehab, but then presented back to ED on 1/7/24) after large volume coffee ground emesis s/p Rapid Response for AMS .     Plan:  - c/w trend CBC  - Made NPO  - Neurology consulted  - c/w EEG  - f/u VBG  - f/u EEG results  - f/u CXR official read  - f/u neurology recs    DISPOSITION:  - Placed on Telemetry  - Continue to monitor RAPID RESPONSE FOLLOW UP NOTE    Patient seen and examined at bedside. RR called @18:30 for AMS . Pt reassessed around 20:43. Daughters at bedside. Patient with improvement in mental status, following commands, verbally responsive.  Denies headaches, dizziness, chest pain, palpitations, abdominal pain, n/v/d or any other complaints.    Vital Signs Last 24 Hrs  T(C): 36.9 (12 Jan 2024 17:11), Max: 38.2 (12 Jan 2024 04:14)  T(F): 98.4 (12 Jan 2024 17:11), Max: 100.7 (12 Jan 2024 04:14)  HR: 70 (12 Jan 2024 17:11) (62 - 72)  BP: 124/68 (12 Jan 2024 17:11) (108/77 - 150/72)  BP(mean): --  RR: 18 (12 Jan 2024 17:11) (18 - 18)  SpO2: 92% (12 Jan 2024 17:11) (91% - 97%)    Parameters below as of 12 Jan 2024 17:11  Patient On (Oxygen Delivery Method): nasal cannula  O2 Flow (L/min): 2    ROS:  CONSTITUTIONAL: No weakness, fevers or chills  EYES/ENT: No visual changes;  No vertigo or throat pain   NECK: No pain or stiffness  RESPIRATORY: No cough, wheezing, hemoptysis; No shortness of breath  CARDIOVASCULAR: No chest pain or palpitations  GASTROINTESTINAL: +constipation.  GENITOURINARY: No dysuria, frequency or hematuria  NEUROLOGICAL:  No focal deficits  All other review of systems is negative unless indicated above.    PHYSICAL EXAM:  PHYSICAL EXAM:  GENERAL: Pt lying in bed comfortably in NAD  HEAD:  Atraumatic   EYES: EOMI, PERRL, conjunctiva and sclera clear  ENT: Moist mucous membranes  CHEST/LUNG: Clear to auscultation bilaterally; No rales, rhonchi or wheezing. Unlabored respirations  HEART: S1, S2   ABDOMEN: Bowel sounds present; Soft, Nontender, Nondistended. No guarding or rigidity    EXTREMITIES:  2+ Peripheral Pulses, brisk capillary refill. No clubbing, cyanosis, or edema   NERVOUS SYSTEM:  Alert & Oriented X3, speech clear. Answers questions appropriately. Full and equal strength B/L upper and lower extremities. No deficits   MSK: FROM x 4 extremities   SKIN: No rashes or lesions      Neurology: A&Ox3, nonfocal, CN II-XII grossly intact, sensation intact, no gait abnormalities   Extremities: No clubbing, cyanosis, edema; + peripheral pulses, + peripheral pulses, FROM  Capillary refill<2 sec  MSK: Normal ROM, no joint erythema or warmth, no joint swelling   Skin: warm, dry, normal color, no rash or abnormal lesions      LABS:                        9.3    4.17  )-----------( 221      ( 12 Jan 2024 19:50 )             28.2       01-12    136  |  103  |  22.3<H>  ----------------------------<  171<H>  4.6   |  22.0  |  2.03<H>    Ca    8.1<L>      12 Jan 2024 19:50  Mg     1.9     01-12    TPro  5.8<L>  /  Alb  2.6<L>  /  TBili  0.3<L>  /  DBili  0.1  /  AST  32  /  ALT  23  /  AlkPhos  97  01-12    LIVER FUNCTIONS - ( 12 Jan 2024 19:50 )  Alb: 2.6 g/dL / Pro: 5.8 g/dL / ALK PHOS: 97 U/L / ALT: 23 U/L / AST: 32 U/L / GGT: x           CAPILLARY BLOOD GLUCOSE  POCT Blood Glucose.: 208 mg/dL (12 Jan 2024 18:08)  POCT Blood Glucose.: 185 mg/dL (12 Jan 2024 13:19)  POCT Blood Glucose.: 131 mg/dL (12 Jan 2024 05:29)  POCT Blood Glucose.: 170 mg/dL (12 Jan 2024 01:29)  POCT Blood Glucose.: 183 mg/dL (11 Jan 2024 23:27)      Urinalysis Basic - ( 12 Jan 2024 19:50 )    Color: x / Appearance: x / SG: x / pH: x  Gluc: 171 mg/dL / Ketone: x  / Bili: x / Urobili: x   Blood: x / Protein: x / Nitrite: x   Leuk Esterase: x / RBC: x / WBC x   Sq Epi: x / Non Sq Epi: x / Bacteria: x  ABG - ( 12 Jan 2024 14:50 )    pH, Arterial: 7.380 pH, Blood: x     /  pCO2: 44    /  pO2: 91    / HCO3: 26    / Base Excess: 0.9   /  SaO2: 98.3      I&O's Summary    12 Jan 2024 07:01  -  12 Jan 2024 20:49  --------------------------------------------------------  IN: 0 mL / OUT: 1250 mL / NET: -1250 mL    IMAGING:    < from: CT Brain Stroke Protocol (01.12.24 @ 18:38) >    ACC: [id number] EXAM:  CT BRAIN STROKE PROTOCOL   ORDERED BY: NOLVIA RIDLEY     PROCEDURE DATE:  01/12/2024      INTERPRETATION:  CLINICAL HISTORY:  Nonresponsive. Stroke code. Presented   for coffee-ground emesis.    COMPARISON: CT head 1/6/2024    IMPRESSION:  Stable exam. No acute intracranial hemorrhage, vasogenic edema,   extra-axial collection or hydrocephalus. Chronic lacunar infarcts and   moderate severity chronic microvascular changes.      ASSESSMENT/ PLAN:  85 yo M with PMH of DM, HTN, CKD stage III, who presented from rehab after episode of coffee ground emesis. He was recently admitted in 12/2023 for acute cholecystitis and had perc sky placed by IR on 12/22/23. Hospital course was complicated by hypoxic respiratory failure secondary to COVID, RON, Afib with RVR, and delirium. He was discharged to SNF on 1/5/2024,  planned for outpatient IR follow up and interval cholecystectomy. He presented back to ED on 1/6/24 due to complaints of abdominal pain, nausea and vomiting. CT AP at that time showed new markedly dilated, fluid filled esophagus. He was discharged back to rehab, but then presented back to ED on 1/7/24) after large volume coffee ground emesis s/p Rapid Response for AMS .     Plan:  - c/w trend CBC  - Made NPO  - Neurology consulted  - c/w EEG  - f/u VBG  - f/u EEG results  - f/u CXR official read  - f/u neurology recs    DISPOSITION:  - Placed on Telemetry  - Continue to monitor

## 2024-01-12 NOTE — PROGRESS NOTE ADULT - ASSESSMENT
85 yo M with PMH of DM, HTN, CKD stage III, who presented from rehab after episode of coffee ground emesis. He was recently admitted in 12/2023 for acute cholecystitis and had perc sky placed by IR on 12/22/23. Hospital course was complicated by hypoxic respiratory failure secondary to COVID, RON, Afib with RVR, and delirium. He was discharged to SNF on 1/5/2024,  planned for outpatient IR follow up and interval cholecystectomy. He presented back to ED on 1/6/24 due to complaints of abdominal pain, nausea and vomiting. CT AP at that time showed new markedly dilated, fluid filled esophagus. He was discharged back to rehab, but then presented back to ED on 1/7/24) after large volume coffee ground emesis.    #Coffee ground emesis concern for Upper GI bleed  s/p egd on 1/10  - polyps and esophagitis  - c/w ppi  - S/p EGD  - Outpatient motility work up with GI  - GI notes appreciated    #Leukocytosis (resolved)  CXR small effusion vs atelectasis  UA unremarkable  -Bcx- Negative    #CKD stage 3  #BPH  - Baseline ~ 2-2.5  - renally dose meds  - avoid nephrotoxic medications  - c/w Tamsulosin  - cont sodium bicarb BID    #Recent Acute cholecystitis s/p IR perc drain placemen  - will need drain in for 4-6 weeks for tract to form, O/P IR followup  - completed abx   - on home midodrine,     #Afib   - Tele  -  cont Multaq  - metoprolol BID  - hold asa    #DM  - A1C 9  lantus and ssi   - can c/w gabapentin for neuropathy     #HLD  -c/w statin    dispo - pt consult  monitor hgb and cr  dc in 1-2 days home vs [facility]     83 yo M with PMH of DM, HTN, CKD stage III, who presented from rehab after episode of coffee ground emesis. He was recently admitted in 12/2023 for acute cholecystitis and had perc sky placed by IR on 12/22/23. Hospital course was complicated by hypoxic respiratory failure secondary to COVID, RON, Afib with RVR, and delirium. He was discharged to SNF on 1/5/2024,  planned for outpatient IR follow up and interval cholecystectomy. He presented back to ED on 1/6/24 due to complaints of abdominal pain, nausea and vomiting. CT AP at that time showed new markedly dilated, fluid filled esophagus. He was discharged back to rehab, but then presented back to ED on 1/7/24) after large volume coffee ground emesis.    #Coffee ground emesis concern for Upper GI bleed  s/p egd on 1/10  - polyps and esophagitis  - c/w ppi  - S/p EGD  - Outpatient motility work up with GI  - GI notes appreciated    #Leukocytosis (resolved)  CXR small effusion vs atelectasis  UA unremarkable  -Bcx- Negative    #CKD stage 3  #BPH  - Baseline ~ 2-2.5  - renally dose meds  - avoid nephrotoxic medications  - c/w Tamsulosin  - cont sodium bicarb BID    #Recent Acute cholecystitis s/p IR perc drain placemen  - will need drain in for 4-6 weeks for tract to form, O/P IR followup  - completed abx   - on home midodrine,   Needs IR Appt between 1/22 and 2/5     #Afib   - Tele  -  cont Multaq  - metoprolol BID  - hold asa    #DM  - A1C 9  lantus and ssi   - can c/w gabapentin for neuropathy     #HLD  -c/w statin    dispo - pt consult  monitor hgb and cr  dc in 1-2 days home vs [facility]     85 yo M with PMH of DM, HTN, CKD stage III, who presented from rehab after episode of coffee ground emesis. He was recently admitted in 12/2023 for acute cholecystitis and had perc sky placed by IR on 12/22/23. Hospital course was complicated by hypoxic respiratory failure secondary to COVID, RON, Afib with RVR, and delirium. He was discharged to SNF on 1/5/2024,  planned for outpatient IR follow up and interval cholecystectomy. He presented back to ED on 1/6/24 due to complaints of abdominal pain, nausea and vomiting. CT AP at that time showed new markedly dilated, fluid filled esophagus. He was discharged back to rehab, but then presented back to ED on 1/7/24) after large volume coffee ground emesis.    #Coffee ground emesis concern for Upper GI bleed  s/p egd on 1/10  - polyps and esophagitis  - c/w ppi  - S/p EGD  - Outpatient motility work up with GI  - GI notes appreciated    #Leukocytosis (resolved)  CXR small effusion vs atelectasis  UA unremarkable  -Bcx- Negative    #CKD stage 3  #BPH  - Baseline ~ 2-2.5  - renally dose meds  - avoid nephrotoxic medications  - c/w Tamsulosin  - cont sodium bicarb BID    #Recent Acute cholecystitis s/p IR perc drain placemen  - will need drain in for 4-6 weeks for tract to form, O/P IR followup  - completed abx   - on home midodrine,   Needs IR Appt between 1/22 and 2/5     #Afib   - Tele  -  cont Multaq  - metoprolol BID  - hold asa    #DM  - A1C 9  lantus and ssi   - can c/w gabapentin for neuropathy     #HLD  -c/w statin    dispo - pt consult  monitor hgb and cr  dc in 1-2 days home vs [facility]

## 2024-01-13 LAB
ALBUMIN SERPL ELPH-MCNC: 2.7 G/DL — LOW (ref 3.3–5.2)
ALBUMIN SERPL ELPH-MCNC: 2.7 G/DL — LOW (ref 3.3–5.2)
ALP SERPL-CCNC: 77 U/L — SIGNIFICANT CHANGE UP (ref 40–120)
ALP SERPL-CCNC: 77 U/L — SIGNIFICANT CHANGE UP (ref 40–120)
ALT FLD-CCNC: 22 U/L — SIGNIFICANT CHANGE UP
ALT FLD-CCNC: 22 U/L — SIGNIFICANT CHANGE UP
ANION GAP SERPL CALC-SCNC: 11 MMOL/L — SIGNIFICANT CHANGE UP (ref 5–17)
ANION GAP SERPL CALC-SCNC: 11 MMOL/L — SIGNIFICANT CHANGE UP (ref 5–17)
AST SERPL-CCNC: 26 U/L — SIGNIFICANT CHANGE UP
AST SERPL-CCNC: 26 U/L — SIGNIFICANT CHANGE UP
BASOPHILS # BLD AUTO: 0.01 K/UL — SIGNIFICANT CHANGE UP (ref 0–0.2)
BASOPHILS # BLD AUTO: 0.01 K/UL — SIGNIFICANT CHANGE UP (ref 0–0.2)
BASOPHILS NFR BLD AUTO: 0.2 % — SIGNIFICANT CHANGE UP (ref 0–2)
BASOPHILS NFR BLD AUTO: 0.2 % — SIGNIFICANT CHANGE UP (ref 0–2)
BILIRUB SERPL-MCNC: 0.3 MG/DL — LOW (ref 0.4–2)
BILIRUB SERPL-MCNC: 0.3 MG/DL — LOW (ref 0.4–2)
BUN SERPL-MCNC: 20.3 MG/DL — HIGH (ref 8–20)
BUN SERPL-MCNC: 20.3 MG/DL — HIGH (ref 8–20)
CALCIUM SERPL-MCNC: 8.1 MG/DL — LOW (ref 8.4–10.5)
CALCIUM SERPL-MCNC: 8.1 MG/DL — LOW (ref 8.4–10.5)
CHLORIDE SERPL-SCNC: 103 MMOL/L — SIGNIFICANT CHANGE UP (ref 96–108)
CHLORIDE SERPL-SCNC: 103 MMOL/L — SIGNIFICANT CHANGE UP (ref 96–108)
CO2 SERPL-SCNC: 24 MMOL/L — SIGNIFICANT CHANGE UP (ref 22–29)
CO2 SERPL-SCNC: 24 MMOL/L — SIGNIFICANT CHANGE UP (ref 22–29)
CREAT SERPL-MCNC: 2.17 MG/DL — HIGH (ref 0.5–1.3)
CREAT SERPL-MCNC: 2.17 MG/DL — HIGH (ref 0.5–1.3)
CULTURE RESULTS: SIGNIFICANT CHANGE UP
EGFR: 29 ML/MIN/1.73M2 — LOW
EGFR: 29 ML/MIN/1.73M2 — LOW
EOSINOPHIL # BLD AUTO: 0.09 K/UL — SIGNIFICANT CHANGE UP (ref 0–0.5)
EOSINOPHIL # BLD AUTO: 0.09 K/UL — SIGNIFICANT CHANGE UP (ref 0–0.5)
EOSINOPHIL NFR BLD AUTO: 2 % — SIGNIFICANT CHANGE UP (ref 0–6)
EOSINOPHIL NFR BLD AUTO: 2 % — SIGNIFICANT CHANGE UP (ref 0–6)
GLUCOSE BLDC GLUCOMTR-MCNC: 114 MG/DL — HIGH (ref 70–99)
GLUCOSE BLDC GLUCOMTR-MCNC: 114 MG/DL — HIGH (ref 70–99)
GLUCOSE BLDC GLUCOMTR-MCNC: 126 MG/DL — HIGH (ref 70–99)
GLUCOSE BLDC GLUCOMTR-MCNC: 126 MG/DL — HIGH (ref 70–99)
GLUCOSE BLDC GLUCOMTR-MCNC: 288 MG/DL — HIGH (ref 70–99)
GLUCOSE BLDC GLUCOMTR-MCNC: 288 MG/DL — HIGH (ref 70–99)
GLUCOSE SERPL-MCNC: 122 MG/DL — HIGH (ref 70–99)
GLUCOSE SERPL-MCNC: 122 MG/DL — HIGH (ref 70–99)
HCT VFR BLD CALC: 26.7 % — LOW (ref 39–50)
HCT VFR BLD CALC: 26.7 % — LOW (ref 39–50)
HGB BLD-MCNC: 8.8 G/DL — LOW (ref 13–17)
HGB BLD-MCNC: 8.8 G/DL — LOW (ref 13–17)
IMM GRANULOCYTES NFR BLD AUTO: 0.4 % — SIGNIFICANT CHANGE UP (ref 0–0.9)
IMM GRANULOCYTES NFR BLD AUTO: 0.4 % — SIGNIFICANT CHANGE UP (ref 0–0.9)
LYMPHOCYTES # BLD AUTO: 0.96 K/UL — LOW (ref 1–3.3)
LYMPHOCYTES # BLD AUTO: 0.96 K/UL — LOW (ref 1–3.3)
LYMPHOCYTES # BLD AUTO: 21.2 % — SIGNIFICANT CHANGE UP (ref 13–44)
LYMPHOCYTES # BLD AUTO: 21.2 % — SIGNIFICANT CHANGE UP (ref 13–44)
MAGNESIUM SERPL-MCNC: 1.9 MG/DL — SIGNIFICANT CHANGE UP (ref 1.6–2.6)
MAGNESIUM SERPL-MCNC: 1.9 MG/DL — SIGNIFICANT CHANGE UP (ref 1.6–2.6)
MCHC RBC-ENTMCNC: 30.1 PG — SIGNIFICANT CHANGE UP (ref 27–34)
MCHC RBC-ENTMCNC: 30.1 PG — SIGNIFICANT CHANGE UP (ref 27–34)
MCHC RBC-ENTMCNC: 33 GM/DL — SIGNIFICANT CHANGE UP (ref 32–36)
MCHC RBC-ENTMCNC: 33 GM/DL — SIGNIFICANT CHANGE UP (ref 32–36)
MCV RBC AUTO: 91.4 FL — SIGNIFICANT CHANGE UP (ref 80–100)
MCV RBC AUTO: 91.4 FL — SIGNIFICANT CHANGE UP (ref 80–100)
MONOCYTES # BLD AUTO: 0.63 K/UL — SIGNIFICANT CHANGE UP (ref 0–0.9)
MONOCYTES # BLD AUTO: 0.63 K/UL — SIGNIFICANT CHANGE UP (ref 0–0.9)
MONOCYTES NFR BLD AUTO: 13.9 % — SIGNIFICANT CHANGE UP (ref 2–14)
MONOCYTES NFR BLD AUTO: 13.9 % — SIGNIFICANT CHANGE UP (ref 2–14)
NEUTROPHILS # BLD AUTO: 2.81 K/UL — SIGNIFICANT CHANGE UP (ref 1.8–7.4)
NEUTROPHILS # BLD AUTO: 2.81 K/UL — SIGNIFICANT CHANGE UP (ref 1.8–7.4)
NEUTROPHILS NFR BLD AUTO: 62.3 % — SIGNIFICANT CHANGE UP (ref 43–77)
NEUTROPHILS NFR BLD AUTO: 62.3 % — SIGNIFICANT CHANGE UP (ref 43–77)
PHOSPHATE SERPL-MCNC: 3.2 MG/DL — SIGNIFICANT CHANGE UP (ref 2.4–4.7)
PHOSPHATE SERPL-MCNC: 3.2 MG/DL — SIGNIFICANT CHANGE UP (ref 2.4–4.7)
PLATELET # BLD AUTO: 213 K/UL — SIGNIFICANT CHANGE UP (ref 150–400)
PLATELET # BLD AUTO: 213 K/UL — SIGNIFICANT CHANGE UP (ref 150–400)
POTASSIUM SERPL-MCNC: 3.8 MMOL/L — SIGNIFICANT CHANGE UP (ref 3.5–5.3)
POTASSIUM SERPL-MCNC: 3.8 MMOL/L — SIGNIFICANT CHANGE UP (ref 3.5–5.3)
POTASSIUM SERPL-SCNC: 3.8 MMOL/L — SIGNIFICANT CHANGE UP (ref 3.5–5.3)
POTASSIUM SERPL-SCNC: 3.8 MMOL/L — SIGNIFICANT CHANGE UP (ref 3.5–5.3)
PROT SERPL-MCNC: 5.4 G/DL — LOW (ref 6.6–8.7)
PROT SERPL-MCNC: 5.4 G/DL — LOW (ref 6.6–8.7)
RBC # BLD: 2.92 M/UL — LOW (ref 4.2–5.8)
RBC # BLD: 2.92 M/UL — LOW (ref 4.2–5.8)
RBC # FLD: 15.2 % — HIGH (ref 10.3–14.5)
RBC # FLD: 15.2 % — HIGH (ref 10.3–14.5)
SODIUM SERPL-SCNC: 138 MMOL/L — SIGNIFICANT CHANGE UP (ref 135–145)
SODIUM SERPL-SCNC: 138 MMOL/L — SIGNIFICANT CHANGE UP (ref 135–145)
SPECIMEN SOURCE: SIGNIFICANT CHANGE UP
TROPONIN T, HIGH SENSITIVITY RESULT: 57 NG/L — HIGH (ref 0–51)
TROPONIN T, HIGH SENSITIVITY RESULT: 57 NG/L — HIGH (ref 0–51)
WBC # BLD: 4.52 K/UL — SIGNIFICANT CHANGE UP (ref 3.8–10.5)
WBC # BLD: 4.52 K/UL — SIGNIFICANT CHANGE UP (ref 3.8–10.5)
WBC # FLD AUTO: 4.52 K/UL — SIGNIFICANT CHANGE UP (ref 3.8–10.5)
WBC # FLD AUTO: 4.52 K/UL — SIGNIFICANT CHANGE UP (ref 3.8–10.5)

## 2024-01-13 PROCEDURE — 99232 SBSQ HOSP IP/OBS MODERATE 35: CPT

## 2024-01-13 RX ORDER — GLYCERIN ADULT
1 SUPPOSITORY, RECTAL RECTAL DAILY
Refills: 0 | Status: DISCONTINUED | OUTPATIENT
Start: 2024-01-14 | End: 2024-01-14

## 2024-01-13 RX ORDER — MAGNESIUM HYDROXIDE 400 MG/1
30 TABLET, CHEWABLE ORAL
Refills: 0 | Status: DISCONTINUED | OUTPATIENT
Start: 2024-01-13 | End: 2024-01-14

## 2024-01-13 RX ORDER — INSULIN LISPRO 100/ML
VIAL (ML) SUBCUTANEOUS
Refills: 0 | Status: DISCONTINUED | OUTPATIENT
Start: 2024-01-13 | End: 2024-01-14

## 2024-01-13 RX ORDER — LACTULOSE 10 G/15ML
10 SOLUTION ORAL THREE TIMES A DAY
Refills: 0 | Status: DISCONTINUED | OUTPATIENT
Start: 2024-01-13 | End: 2024-01-14

## 2024-01-13 RX ORDER — GLYCERIN ADULT
1 SUPPOSITORY, RECTAL RECTAL ONCE
Refills: 0 | Status: COMPLETED | OUTPATIENT
Start: 2024-01-13 | End: 2024-01-13

## 2024-01-13 RX ADMIN — TAMSULOSIN HYDROCHLORIDE 0.4 MILLIGRAM(S): 0.4 CAPSULE ORAL at 22:39

## 2024-01-13 RX ADMIN — PANTOPRAZOLE SODIUM 40 MILLIGRAM(S): 20 TABLET, DELAYED RELEASE ORAL at 18:55

## 2024-01-13 RX ADMIN — Medication 3: at 22:37

## 2024-01-13 RX ADMIN — Medication 3 MILLILITER(S): at 14:46

## 2024-01-13 RX ADMIN — GABAPENTIN 300 MILLIGRAM(S): 400 CAPSULE ORAL at 22:38

## 2024-01-13 RX ADMIN — Medication 650 MILLIGRAM(S): at 18:59

## 2024-01-13 RX ADMIN — PANTOPRAZOLE SODIUM 40 MILLIGRAM(S): 20 TABLET, DELAYED RELEASE ORAL at 06:59

## 2024-01-13 RX ADMIN — Medication 1 SUPPOSITORY(S): at 02:18

## 2024-01-13 RX ADMIN — Medication 25 MILLIGRAM(S): at 19:00

## 2024-01-13 RX ADMIN — LACTULOSE 10 GRAM(S): 10 SOLUTION ORAL at 22:37

## 2024-01-13 RX ADMIN — MAGNESIUM HYDROXIDE 30 MILLILITER(S): 400 TABLET, CHEWABLE ORAL at 18:55

## 2024-01-13 RX ADMIN — POLYETHYLENE GLYCOL 3350 17 GRAM(S): 17 POWDER, FOR SOLUTION ORAL at 22:38

## 2024-01-13 RX ADMIN — Medication 3 MILLILITER(S): at 20:35

## 2024-01-13 RX ADMIN — Medication 3 MILLILITER(S): at 08:22

## 2024-01-13 RX ADMIN — LACTULOSE 10 GRAM(S): 10 SOLUTION ORAL at 18:54

## 2024-01-13 RX ADMIN — SIMVASTATIN 20 MILLIGRAM(S): 20 TABLET, FILM COATED ORAL at 22:39

## 2024-01-13 RX ADMIN — Medication 3 MILLILITER(S): at 04:11

## 2024-01-13 RX ADMIN — SENNA PLUS 2 TABLET(S): 8.6 TABLET ORAL at 22:38

## 2024-01-13 RX ADMIN — Medication 650 MILLIGRAM(S): at 06:30

## 2024-01-13 RX ADMIN — INSULIN GLARGINE 10 UNIT(S): 100 INJECTION, SOLUTION SUBCUTANEOUS at 22:37

## 2024-01-13 RX ADMIN — Medication 1 MILLIGRAM(S): at 19:02

## 2024-01-13 RX ADMIN — DRONEDARONE 400 MILLIGRAM(S): 400 TABLET, FILM COATED ORAL at 18:55

## 2024-01-13 NOTE — PROGRESS NOTE ADULT - SUBJECTIVE AND OBJECTIVE BOX
BERNADINE ANDERSON    [id number]    84y      Male    INTERVAL HPI/OVERNIGHT EVENTS:  patient being seen for emesis and anemia. Patient was a rapid response for ams on 1/12 and eeg and ct head ordered and neuro consulted    patient seen at bedside with wife and is still on video eeg.  patient back to baseline as per wife.     REVIEW OF SYSTEMS:    CONSTITUTIONAL: No fever, weight loss, or fatigue  RESPIRATORY: No cough, wheezing, hemoptysis; No shortness of breath  CARDIOVASCULAR: No chest pain, palpitations  GASTROINTESTINAL: No abdominal or epigastric pain. No nausea, vomiting  NEUROLOGICAL: No headaches, memory loss, loss of strength.  MISCELLANEOUS:      Vital Signs Last 24 Hrs  T(C): 36.6 (13 Jan 2024 04:21), Max: 36.9 (12 Jan 2024 17:11)  T(F): 97.9 (13 Jan 2024 04:21), Max: 98.4 (12 Jan 2024 17:11)  HR: 84 (13 Jan 2024 08:00) (65 - 84)  BP: 129/79 (13 Jan 2024 04:21) (124/68 - 129/79)  BP(mean): --  RR: 20 (13 Jan 2024 04:21) (18 - 20)  SpO2: 95% (13 Jan 2024 04:21) (92% - 97%)    Parameters below as of 12 Jan 2024 17:11  Patient On (Oxygen Delivery Method): nasal cannula  O2 Flow (L/min): 2      PHYSICAL EXAM:    GENERAL: NAD, lying in bed , eeg leads  HEAD:  Atraumatic, Normocephalic  EYES: EOMI, PERRLA, conjunctiva and sclera clear  ENT: Moist mucous membranes  NECK: Supple, No JVD  CHEST/LUNG: Clear to auscultation bilaterally; No rales, rhonchi, wheezing, or rubs. Unlabored respirations  HEART: Regular rate and rhythm; No murmurs, rubs, or gallops  ABDOMEN: BSx4; Soft, nontender, mild distention, per sky draining bile, site dry & clean   EXTREMITIES:  2+ Peripheral Pulses, brisk capillary refill. No clubbing, cyanosis, or edema  NERVOUS SYSTEM:  A&Ox2, no focal deficits   SKIN: No rashes or lesions    LABS:                        8.8    4.52  )-----------( 213      ( 13 Jan 2024 07:05 )             26.7     01-13    138  |  103  |  20.3<H>  ----------------------------<  122<H>  3.8   |  24.0  |  2.17<H>    Ca    8.1<L>      13 Jan 2024 07:05  Phos  3.2     01-13  Mg     1.9     01-13    TPro  5.4<L>  /  Alb  2.7<L>  /  TBili  0.3<L>  /  DBili  x   /  AST  26  /  ALT  22  /  AlkPhos  77  01-13    PT/INR - ( 12 Jan 2024 23:16 )   PT: 10.4 sec;   INR: 0.94 ratio           Urinalysis Basic - ( 13 Jan 2024 07:05 )    Color: x / Appearance: x / SG: x / pH: x  Gluc: 122 mg/dL / Ketone: x  / Bili: x / Urobili: x   Blood: x / Protein: x / Nitrite: x   Leuk Esterase: x / RBC: x / WBC x   Sq Epi: x / Non Sq Epi: x / Bacteria: x          MEDICATIONS  (STANDING):  albuterol    90 MICROgram(s) HFA Inhaler 2 Puff(s) Inhalation two times a day  albuterol/ipratropium for Nebulization 3 milliLiter(s) Nebulizer every 6 hours  dextrose 5%. 1000 milliLiter(s) (50 mL/Hr) IV Continuous <Continuous>  dextrose 5%. 1000 milliLiter(s) (100 mL/Hr) IV Continuous <Continuous>  dextrose 50% Injectable 25 Gram(s) IV Push once  dextrose 50% Injectable 12.5 Gram(s) IV Push once  dextrose 50% Injectable 25 Gram(s) IV Push once  dronedarone 400 milliGRAM(s) Oral two times a day  folic acid 1 milliGRAM(s) Oral daily  gabapentin 300 milliGRAM(s) Oral at bedtime  glucagon  Injectable 1 milliGRAM(s) IntraMuscular once  insulin glargine Injectable (LANTUS) 10 Unit(s) SubCutaneous at bedtime  lactulose Syrup 10 Gram(s) Oral three times a day  magnesium hydroxide Suspension 30 milliLiter(s) Oral two times a day  metoprolol tartrate 25 milliGRAM(s) Oral two times a day  midodrine. 10 milliGRAM(s) Oral three times a day  pantoprazole  Injectable 40 milliGRAM(s) IV Push every 12 hours  polyethylene glycol 3350 17 Gram(s) Oral at bedtime  senna 2 Tablet(s) Oral at bedtime  simvastatin 20 milliGRAM(s) Oral at bedtime  sodium bicarbonate 650 milliGRAM(s) Oral two times a day  tamsulosin 0.4 milliGRAM(s) Oral at bedtime    MEDICATIONS  (PRN):  acetaminophen     Tablet .. 650 milliGRAM(s) Oral every 6 hours PRN Temp greater or equal to 38C (100.4F), Mild Pain (1 - 3)  aluminum hydroxide/magnesium hydroxide/simethicone Suspension 30 milliLiter(s) Oral every 4 hours PRN Dyspepsia  bisacodyl Suppository 10 milliGRAM(s) Rectal daily PRN Constipation  dextrose Oral Gel 15 Gram(s) Oral once PRN Blood Glucose LESS THAN 70 milliGRAM(s)/deciliter  melatonin 3 milliGRAM(s) Oral at bedtime PRN Insomnia  prochlorperazine   Injectable 5 milliGRAM(s) IV Push every 6 hours PRN prn nausea & vomiting      RADIOLOGY & ADDITIONAL TESTS:   BERNADINE ANDERSON    [id number]    84y      Male    INTERVAL HPI/OVERNIGHT EVENTS:  patient being seen for emesis and anemia. Patient was a rapid response for ams on 1/12 and eeg and ct head ordered and neuro consulted    patient seen at bedside with wife and is still on video eeg.  patient back to baseline as per wife.     REVIEW OF SYSTEMS:    CONSTITUTIONAL: No fever, weight loss, or fatigue  RESPIRATORY: No cough, wheezing, hemoptysis; No shortness of breath  CARDIOVASCULAR: No chest pain, palpitations  GASTROINTESTINAL: No abdominal or epigastric pain. No nausea, vomiting  NEUROLOGICAL: No headaches, memory loss, loss of strength.  MISCELLANEOUS:      Vital Signs Last 24 Hrs  T(C): 36.6 (13 Jan 2024 04:21), Max: 36.9 (12 Jan 2024 17:11)  T(F): 97.9 (13 Jan 2024 04:21), Max: 98.4 (12 Jan 2024 17:11)  HR: 84 (13 Jan 2024 08:00) (65 - 84)  BP: 129/79 (13 Jan 2024 04:21) (124/68 - 129/79)  BP(mean): --  RR: 20 (13 Jan 2024 04:21) (18 - 20)  SpO2: 95% (13 Jan 2024 04:21) (92% - 97%)    Parameters below as of 12 Jan 2024 17:11  Patient On (Oxygen Delivery Method): nasal cannula  O2 Flow (L/min): 2      PHYSICAL EXAM:    GENERAL: NAD, lying in bed , eeg leads  HEAD:  Atraumatic, Normocephalic  EYES: EOMI, PERRLA, conjunctiva and sclera clear  ENT: Moist mucous membranes  NECK: Supple, No JVD  CHEST/LUNG: Clear to auscultation bilaterally; No rales, rhonchi, wheezing, or rubs. Unlabored respirations  HEART: Regular rate and rhythm; No murmurs, rubs, or gallops  ABDOMEN: BSx4; Soft, nontender, mild distention, per sky draining bile, site dry & clean   EXTREMITIES:  2+ Peripheral Pulses, brisk capillary refill. No clubbing, cyanosis, or edema  NERVOUS SYSTEM:  A&Ox2, no focal deficits   SKIN: No rashes or lesions    LABS:                        8.8    4.52  )-----------( 213      ( 13 Jan 2024 07:05 )             26.7     01-13    138  |  103  |  20.3<H>  ----------------------------<  122<H>  3.8   |  24.0  |  2.17<H>    Ca    8.1<L>      13 Jan 2024 07:05  Phos  3.2     01-13  Mg     1.9     01-13    TPro  5.4<L>  /  Alb  2.7<L>  /  TBili  0.3<L>  /  DBili  x   /  AST  26  /  ALT  22  /  AlkPhos  77  01-13    PT/INR - ( 12 Jan 2024 23:16 )   PT: 10.4 sec;   INR: 0.94 ratio           Urinalysis Basic - ( 13 Jan 2024 07:05 )    Color: x / Appearance: x / SG: x / pH: x  Gluc: 122 mg/dL / Ketone: x  / Bili: x / Urobili: x   Blood: x / Protein: x / Nitrite: x   Leuk Esterase: x / RBC: x / WBC x   Sq Epi: x / Non Sq Epi: x / Bacteria: x          MEDICATIONS  (STANDING):  albuterol    90 MICROgram(s) HFA Inhaler 2 Puff(s) Inhalation two times a day  albuterol/ipratropium for Nebulization 3 milliLiter(s) Nebulizer every 6 hours  dextrose 5%. 1000 milliLiter(s) (50 mL/Hr) IV Continuous <Continuous>  dextrose 5%. 1000 milliLiter(s) (100 mL/Hr) IV Continuous <Continuous>  dextrose 50% Injectable 25 Gram(s) IV Push once  dextrose 50% Injectable 12.5 Gram(s) IV Push once  dextrose 50% Injectable 25 Gram(s) IV Push once  dronedarone 400 milliGRAM(s) Oral two times a day  folic acid 1 milliGRAM(s) Oral daily  gabapentin 300 milliGRAM(s) Oral at bedtime  glucagon  Injectable 1 milliGRAM(s) IntraMuscular once  insulin glargine Injectable (LANTUS) 10 Unit(s) SubCutaneous at bedtime  lactulose Syrup 10 Gram(s) Oral three times a day  magnesium hydroxide Suspension 30 milliLiter(s) Oral two times a day  metoprolol tartrate 25 milliGRAM(s) Oral two times a day  midodrine. 10 milliGRAM(s) Oral three times a day  pantoprazole  Injectable 40 milliGRAM(s) IV Push every 12 hours  polyethylene glycol 3350 17 Gram(s) Oral at bedtime  senna 2 Tablet(s) Oral at bedtime  simvastatin 20 milliGRAM(s) Oral at bedtime  sodium bicarbonate 650 milliGRAM(s) Oral two times a day  tamsulosin 0.4 milliGRAM(s) Oral at bedtime    MEDICATIONS  (PRN):  acetaminophen     Tablet .. 650 milliGRAM(s) Oral every 6 hours PRN Temp greater or equal to 38C (100.4F), Mild Pain (1 - 3)  aluminum hydroxide/magnesium hydroxide/simethicone Suspension 30 milliLiter(s) Oral every 4 hours PRN Dyspepsia  bisacodyl Suppository 10 milliGRAM(s) Rectal daily PRN Constipation  dextrose Oral Gel 15 Gram(s) Oral once PRN Blood Glucose LESS THAN 70 milliGRAM(s)/deciliter  melatonin 3 milliGRAM(s) Oral at bedtime PRN Insomnia  prochlorperazine   Injectable 5 milliGRAM(s) IV Push every 6 hours PRN prn nausea & vomiting      RADIOLOGY & ADDITIONAL TESTS:

## 2024-01-13 NOTE — PROGRESS NOTE ADULT - ASSESSMENT
85 yo M with PMH of DM, HTN, CKD stage III, who presented from rehab after episode of coffee ground emesis. He was recently admitted in 12/2023 for acute cholecystitis and had perc sky placed by IR on 12/22/23. Hospital course was complicated by hypoxic respiratory failure secondary to COVID, RON, Afib with RVR, and delirium. He was discharged to SNF on 1/5/2024,  planned for outpatient IR follow up and interval cholecystectomy. He presented back to ED on 1/6/24 due to complaints of abdominal pain, nausea and vomiting. CT AP at that time showed new markedly dilated, fluid filled esophagus. He was discharged back to rehab, but then presented back to ED on 1/7/24) after large volume coffee ground emesis. Patient is s.p egd and hospital course complicated by ams on 1/12 and eeg and neuro consult ordered    #Coffee ground emesis concern for Upper GI bleed  s/p egd on 1/10  - polyps and esophagitis  - c/w ppi  - Outpatient motility work up with GI  - GI notes appreciated    #ams - ct head negative  eeg report appreciated  - neuro consulted  - back at baseline     #Leukocytosis (resolved)  CXR small effusion vs atelectasis  UA unremarkable  -Bcx- Negative    #CKD stage 3  #BPH  - Baseline ~ 2-2.5  - renally dose meds  - avoid nephrotoxic medications  - c/w Tamsulosin  - cont sodium bicarb BID    #Recent Acute cholecystitis s/p IR perc drain placemen  - will need drain in for 4-6 weeks for tract to form, O/P IR followup  - completed abx   - on home midodrine,   Needs IR Appt between 1/22 and 2/5     #Afib   - Tele  -  cont Multaq  - metoprolol BID  - hold asa    #DM  - A1C 9  lantus and ssi   - can c/w gabapentin for neuropathy     #HLD  -c/w statin    dispo - pt consult  monitor hgb and cr  jasmin needs chelsie tuesday    83 yo M with PMH of DM, HTN, CKD stage III, who presented from rehab after episode of coffee ground emesis. He was recently admitted in 12/2023 for acute cholecystitis and had perc sky placed by IR on 12/22/23. Hospital course was complicated by hypoxic respiratory failure secondary to COVID, RON, Afib with RVR, and delirium. He was discharged to SNF on 1/5/2024,  planned for outpatient IR follow up and interval cholecystectomy. He presented back to ED on 1/6/24 due to complaints of abdominal pain, nausea and vomiting. CT AP at that time showed new markedly dilated, fluid filled esophagus. He was discharged back to rehab, but then presented back to ED on 1/7/24) after large volume coffee ground emesis. Patient is s.p egd and hospital course complicated by ams on 1/12 and eeg and neuro consult ordered    #Coffee ground emesis concern for Upper GI bleed  s/p egd on 1/10  - polyps and esophagitis  - c/w ppi  - Outpatient motility work up with GI  - GI notes appreciated    #ams - ct head negative  eeg report appreciated  - neuro consulted  - back at baseline     #Leukocytosis (resolved)  CXR small effusion vs atelectasis  UA unremarkable  -Bcx- Negative    #CKD stage 3  #BPH  - Baseline ~ 2-2.5  - renally dose meds  - avoid nephrotoxic medications  - c/w Tamsulosin  - cont sodium bicarb BID    #Recent Acute cholecystitis s/p IR perc drain placemen  - will need drain in for 4-6 weeks for tract to form, O/P IR followup  - completed abx   - on home midodrine,   Needs IR Appt between 1/22 and 2/5     #Afib   - Tele  -  cont Multaq  - metoprolol BID  - hold asa    #DM  - A1C 9  lantus and ssi   - can c/w gabapentin for neuropathy     #HLD  -c/w statin    dispo - pt consult  monitor hgb and cr  jasmin needs chelsie tuesday

## 2024-01-13 NOTE — EEG REPORT - NS EEG TEXT BOX
BERNADINE ANDERSON [id number]     Study Date: 		10-12-24 1857 to 01-13-24 0800  Duration in hours:  13 h    --------------------------------------------------------------------------------------------------  History:  CC/ HPI Patient is a 84y old  Male who presents with a chief complaint of GI bleed (12 Jan 2024 11:50)    MEDICATIONS  (STANDING):  albuterol    90 MICROgram(s) HFA Inhaler 2 Puff(s) Inhalation two times a day  albuterol/ipratropium for Nebulization 3 milliLiter(s) Nebulizer every 6 hours  dextrose 5%. 1000 milliLiter(s) (100 mL/Hr) IV Continuous <Continuous>  dextrose 5%. 1000 milliLiter(s) (50 mL/Hr) IV Continuous <Continuous>  dextrose 50% Injectable 25 Gram(s) IV Push once  dextrose 50% Injectable 12.5 Gram(s) IV Push once  dextrose 50% Injectable 25 Gram(s) IV Push once  dronedarone 400 milliGRAM(s) Oral two times a day  folic acid 1 milliGRAM(s) Oral daily  gabapentin 300 milliGRAM(s) Oral at bedtime  glucagon  Injectable 1 milliGRAM(s) IntraMuscular once  insulin glargine Injectable (LANTUS) 10 Unit(s) SubCutaneous at bedtime  metoprolol tartrate 25 milliGRAM(s) Oral two times a day  midodrine. 10 milliGRAM(s) Oral three times a day  pantoprazole  Injectable 40 milliGRAM(s) IV Push every 12 hours  polyethylene glycol 3350 17 Gram(s) Oral at bedtime  senna 2 Tablet(s) Oral at bedtime  simvastatin 20 milliGRAM(s) Oral at bedtime  sodium bicarbonate 650 milliGRAM(s) Oral two times a day  tamsulosin 0.4 milliGRAM(s) Oral at bedtime    --------------------------------------------------------------------------------------------------  Study Interpretation:    [Abbreviation Key:  PDR=alpha rhythm/posterior dominant rhythm. A-P=anterior posterior.  Amplitude: ‘very low’:<20; ‘low’:20-49; ‘medium’:; ‘high’:>150uV.  Persistence for periodic/rhythmic patterns (% of epoch) ‘rare’:<1%; ‘occasional’:1-10%; ‘frequent’:10-50%; ‘abundant’:50-90%; ‘continuous’:>90%.  Persistence for sporadic discharges: ‘rare’:<1/hr; ‘occasional’:1/min-1/hr; ‘frequent’:>1/min; ‘abundant’:>1/10 sec.  RPP=rhythmic and periodic patterns; GRDA=generalized rhythmic delta activity; FIRDA=frontal intermittent GRDA; LRDA=lateralized rhythmic delta activity; TIRDA=temporal intermittent rhythmic delta activity;  LPD=PLED=lateralized periodic discharges; GPD=generalized periodic discharges; BIPDs =bilateral independent periodic discharges; Mf=multifocal; SIRPDs=stimulus induced rhythmic, periodic, or ictal appearing discharges; BIRDs=brief potentially ictal rhythmic discharges >4 Hz, lasting .5-10s; PFA (paroxysmal bursts >13 Hz or =8 Hz <10s).  Modifiers: +F=with fast component; +S=with spike component; +R=with rhythmic component.  S-B=burst suppression pattern.  Max=maximal. N1-drowsy; N2-stage II sleep; N3-slow wave sleep. SSS/BETS=small sharp spikes/benign epileptiform transients of sleep. HV=hyperventilation; PS=photic stimulation]    FINDINGS:      Background:  Continuity: continuous  Symmetry: symmetric  PDR: 6 Hz activity, with amplitude to 40 uV, that attenuated to eye opening.    Reactivity: present  Voltage: normal (between 20-150uV)  Anterior Posterior Gradient: Absent  Other background findings: none  Breach: absent    Background Slowing:  Generalized slowing: diffuse irregular delta and theta activity.  Focal slowing: none was present.    State Changes:   -Drowsiness noted with increased slowing and attenuation of fast activity  -N2 sleep transients were not recorded.    Sporadic Epileptiform Discharges:    None    Rhythmic and Periodic Patterns (RPPs):  FRDA    Electrographic and Electroclinical seizures:  None    Other Clinical Events:  None    Activation Procedures:   -Hyperventilation was not performed.    -Photic stimulation was not performed.     Artifacts:  Intermittent myogenic and movement artifacts were noted.    ECG:  The heart rate on single channel ECG was unobtainable due to artifact.     EEG Classification / Summary:  Abnormal EEG study  Generalized background slowing    -----------------------------------------------------------------------------------------------------    Clinical Impression:  1. Mild to moderate diffuse/multi-focal cerebral dysfunction, not specific as to etiology.    There were no epileptiform abnormalities recorded.      This is a preliminary fellow impression only pending attending review    Ronald Roberson MD - Epilepsy Fellow      BERNADINE ANDERSON [id number]     Study Date: 		10-12-24 1857 to 01-13-24 0800  Duration in hours:  13 h    --------------------------------------------------------------------------------------------------  History:  CC/ HPI Patient is a 84y old  Male who presents with a chief complaint of GI bleed (12 Jan 2024 11:50)    MEDICATIONS  (STANDING):  albuterol    90 MICROgram(s) HFA Inhaler 2 Puff(s) Inhalation two times a day  albuterol/ipratropium for Nebulization 3 milliLiter(s) Nebulizer every 6 hours  dextrose 5%. 1000 milliLiter(s) (100 mL/Hr) IV Continuous <Continuous>  dextrose 5%. 1000 milliLiter(s) (50 mL/Hr) IV Continuous <Continuous>  dextrose 50% Injectable 25 Gram(s) IV Push once  dextrose 50% Injectable 12.5 Gram(s) IV Push once  dextrose 50% Injectable 25 Gram(s) IV Push once  dronedarone 400 milliGRAM(s) Oral two times a day  folic acid 1 milliGRAM(s) Oral daily  gabapentin 300 milliGRAM(s) Oral at bedtime  glucagon  Injectable 1 milliGRAM(s) IntraMuscular once  insulin glargine Injectable (LANTUS) 10 Unit(s) SubCutaneous at bedtime  metoprolol tartrate 25 milliGRAM(s) Oral two times a day  midodrine. 10 milliGRAM(s) Oral three times a day  pantoprazole  Injectable 40 milliGRAM(s) IV Push every 12 hours  polyethylene glycol 3350 17 Gram(s) Oral at bedtime  senna 2 Tablet(s) Oral at bedtime  simvastatin 20 milliGRAM(s) Oral at bedtime  sodium bicarbonate 650 milliGRAM(s) Oral two times a day  tamsulosin 0.4 milliGRAM(s) Oral at bedtime    --------------------------------------------------------------------------------------------------  Study Interpretation:    [Abbreviation Key:  PDR=alpha rhythm/posterior dominant rhythm. A-P=anterior posterior.  Amplitude: ‘very low’:<20; ‘low’:20-49; ‘medium’:; ‘high’:>150uV.  Persistence for periodic/rhythmic patterns (% of epoch) ‘rare’:<1%; ‘occasional’:1-10%; ‘frequent’:10-50%; ‘abundant’:50-90%; ‘continuous’:>90%.  Persistence for sporadic discharges: ‘rare’:<1/hr; ‘occasional’:1/min-1/hr; ‘frequent’:>1/min; ‘abundant’:>1/10 sec.  RPP=rhythmic and periodic patterns; GRDA=generalized rhythmic delta activity; FIRDA=frontal intermittent GRDA; LRDA=lateralized rhythmic delta activity; TIRDA=temporal intermittent rhythmic delta activity;  LPD=PLED=lateralized periodic discharges; GPD=generalized periodic discharges; BIPDs =bilateral independent periodic discharges; Mf=multifocal; SIRPDs=stimulus induced rhythmic, periodic, or ictal appearing discharges; BIRDs=brief potentially ictal rhythmic discharges >4 Hz, lasting .5-10s; PFA (paroxysmal bursts >13 Hz or =8 Hz <10s).  Modifiers: +F=with fast component; +S=with spike component; +R=with rhythmic component.  S-B=burst suppression pattern.  Max=maximal. N1-drowsy; N2-stage II sleep; N3-slow wave sleep. SSS/BETS=small sharp spikes/benign epileptiform transients of sleep. HV=hyperventilation; PS=photic stimulation]    FINDINGS:      Background:  Continuity: continuous  Symmetry: symmetric  PDR: 6 Hz activity, with amplitude to 40 uV, that attenuated to eye opening.    Reactivity: present  Voltage: normal (between 20-150uV)  Anterior Posterior Gradient: Absent  Other background findings: none  Breach: absent    Background Slowing:  Generalized slowing: diffuse irregular delta and theta activity.  Focal slowing: none was present.    State Changes:   -Drowsiness noted with increased slowing and attenuation of fast activity  -N2 sleep transients were not recorded.    Sporadic Epileptiform Discharges:    None    Rhythmic and Periodic Patterns (RPPs):  FRDA    Electrographic and Electroclinical seizures:  None    Other Clinical Events:  None    Activation Procedures:   -Hyperventilation was not performed.    -Photic stimulation was not performed.     Artifacts:  Intermittent myogenic and movement artifacts were noted.    ECG:  The heart rate on single channel ECG was unobtainable due to artifact.     EEG Classification / Summary:  Abnormal EEG study  Generalized background slowing    -----------------------------------------------------------------------------------------------------    Clinical Impression:  1. Mild to moderate diffuse/multi-focal cerebral dysfunction, not specific as to etiology.    There were no epileptiform abnormalities recorded.      This is a preliminary fellow impression only pending attending review    Ronald Roberson MD - Epilepsy Fellow      BERNADINE ANDERSON [id number]     Study Date: 		10-12-24 1857 to 01-13-24 0800  Duration in hours:  13 h    --------------------------------------------------------------------------------------------------  History:  CC/ HPI Patient is a 84y old  Male who presents with a chief complaint of GI bleed (12 Jan 2024 11:50)    MEDICATIONS  (STANDING):  albuterol    90 MICROgram(s) HFA Inhaler 2 Puff(s) Inhalation two times a day  albuterol/ipratropium for Nebulization 3 milliLiter(s) Nebulizer every 6 hours  dextrose 5%. 1000 milliLiter(s) (100 mL/Hr) IV Continuous <Continuous>  dextrose 5%. 1000 milliLiter(s) (50 mL/Hr) IV Continuous <Continuous>  dextrose 50% Injectable 25 Gram(s) IV Push once  dextrose 50% Injectable 12.5 Gram(s) IV Push once  dextrose 50% Injectable 25 Gram(s) IV Push once  dronedarone 400 milliGRAM(s) Oral two times a day  folic acid 1 milliGRAM(s) Oral daily  gabapentin 300 milliGRAM(s) Oral at bedtime  glucagon  Injectable 1 milliGRAM(s) IntraMuscular once  insulin glargine Injectable (LANTUS) 10 Unit(s) SubCutaneous at bedtime  metoprolol tartrate 25 milliGRAM(s) Oral two times a day  midodrine. 10 milliGRAM(s) Oral three times a day  pantoprazole  Injectable 40 milliGRAM(s) IV Push every 12 hours  polyethylene glycol 3350 17 Gram(s) Oral at bedtime  senna 2 Tablet(s) Oral at bedtime  simvastatin 20 milliGRAM(s) Oral at bedtime  sodium bicarbonate 650 milliGRAM(s) Oral two times a day  tamsulosin 0.4 milliGRAM(s) Oral at bedtime    --------------------------------------------------------------------------------------------------  Study Interpretation:    [Abbreviation Key:  PDR=alpha rhythm/posterior dominant rhythm. A-P=anterior posterior.  Amplitude: ‘very low’:<20; ‘low’:20-49; ‘medium’:; ‘high’:>150uV.  Persistence for periodic/rhythmic patterns (% of epoch) ‘rare’:<1%; ‘occasional’:1-10%; ‘frequent’:10-50%; ‘abundant’:50-90%; ‘continuous’:>90%.  Persistence for sporadic discharges: ‘rare’:<1/hr; ‘occasional’:1/min-1/hr; ‘frequent’:>1/min; ‘abundant’:>1/10 sec.  RPP=rhythmic and periodic patterns; GRDA=generalized rhythmic delta activity; FIRDA=frontal intermittent GRDA; LRDA=lateralized rhythmic delta activity; TIRDA=temporal intermittent rhythmic delta activity;  LPD=PLED=lateralized periodic discharges; GPD=generalized periodic discharges; BIPDs =bilateral independent periodic discharges; Mf=multifocal; SIRPDs=stimulus induced rhythmic, periodic, or ictal appearing discharges; BIRDs=brief potentially ictal rhythmic discharges >4 Hz, lasting .5-10s; PFA (paroxysmal bursts >13 Hz or =8 Hz <10s).  Modifiers: +F=with fast component; +S=with spike component; +R=with rhythmic component.  S-B=burst suppression pattern.  Max=maximal. N1-drowsy; N2-stage II sleep; N3-slow wave sleep. SSS/BETS=small sharp spikes/benign epileptiform transients of sleep. HV=hyperventilation; PS=photic stimulation]    FINDINGS:      Background:  Continuity: continuous  Symmetry: symmetric  PDR: 6 Hz activity, with amplitude to 40 uV, that attenuated to eye opening.    Reactivity: present  Voltage: normal (between 20-150uV)  Anterior Posterior Gradient: Absent  Other background findings: none  Breach: absent    Background Slowing:  Generalized slowing: diffuse irregular delta and theta activity.  Focal slowing: none was present.    State Changes:   -Drowsiness noted with increased slowing and attenuation of fast activity  -N2 sleep transients were not recorded.    Sporadic Epileptiform Discharges:    None    Rhythmic and Periodic Patterns (RPPs):  FRDA    Electrographic and Electroclinical seizures:  None    Other Clinical Events:  None    Activation Procedures:   -Hyperventilation was not performed.    -Photic stimulation was not performed.     Artifacts:  Intermittent myogenic and movement artifacts were noted.    ECG:  The heart rate on single channel ECG was unobtainable due to artifact.     EEG Classification / Summary:  Abnormal EEG study  Generalized background slowing    -----------------------------------------------------------------------------------------------------    Clinical Impression:  Mild to moderate diffuse/multi-focal cerebral dysfunction, not specific as to etiology.  There were no epileptiform abnormalities recorded.      No seizures     In absence of additional clinical concerns, recommend consideration for discontinuation of current EEG study with reconnection in future if warranted.        Ronald Roberson MD - Epilepsy Fellow      BERNADINE ANDERSON [id number]     Study Date: 		10-12-24 1857 to 01-13-24 0800  Duration in hours:  13 h    --------------------------------------------------------------------------------------------------  History:  CC/ HPI Patient is a 84y old  Male who presents with a chief complaint of GI bleed (12 Jan 2024 11:50)    MEDICATIONS  (STANDING):  albuterol    90 MICROgram(s) HFA Inhaler 2 Puff(s) Inhalation two times a day  albuterol/ipratropium for Nebulization 3 milliLiter(s) Nebulizer every 6 hours  dextrose 5%. 1000 milliLiter(s) (100 mL/Hr) IV Continuous <Continuous>  dextrose 5%. 1000 milliLiter(s) (50 mL/Hr) IV Continuous <Continuous>  dextrose 50% Injectable 25 Gram(s) IV Push once  dextrose 50% Injectable 12.5 Gram(s) IV Push once  dextrose 50% Injectable 25 Gram(s) IV Push once  dronedarone 400 milliGRAM(s) Oral two times a day  folic acid 1 milliGRAM(s) Oral daily  gabapentin 300 milliGRAM(s) Oral at bedtime  glucagon  Injectable 1 milliGRAM(s) IntraMuscular once  insulin glargine Injectable (LANTUS) 10 Unit(s) SubCutaneous at bedtime  metoprolol tartrate 25 milliGRAM(s) Oral two times a day  midodrine. 10 milliGRAM(s) Oral three times a day  pantoprazole  Injectable 40 milliGRAM(s) IV Push every 12 hours  polyethylene glycol 3350 17 Gram(s) Oral at bedtime  senna 2 Tablet(s) Oral at bedtime  simvastatin 20 milliGRAM(s) Oral at bedtime  sodium bicarbonate 650 milliGRAM(s) Oral two times a day  tamsulosin 0.4 milliGRAM(s) Oral at bedtime    --------------------------------------------------------------------------------------------------  Study Interpretation:    [Abbreviation Key:  PDR=alpha rhythm/posterior dominant rhythm. A-P=anterior posterior.  Amplitude: ‘very low’:<20; ‘low’:20-49; ‘medium’:; ‘high’:>150uV.  Persistence for periodic/rhythmic patterns (% of epoch) ‘rare’:<1%; ‘occasional’:1-10%; ‘frequent’:10-50%; ‘abundant’:50-90%; ‘continuous’:>90%.  Persistence for sporadic discharges: ‘rare’:<1/hr; ‘occasional’:1/min-1/hr; ‘frequent’:>1/min; ‘abundant’:>1/10 sec.  RPP=rhythmic and periodic patterns; GRDA=generalized rhythmic delta activity; FIRDA=frontal intermittent GRDA; LRDA=lateralized rhythmic delta activity; TIRDA=temporal intermittent rhythmic delta activity;  LPD=PLED=lateralized periodic discharges; GPD=generalized periodic discharges; BIPDs =bilateral independent periodic discharges; Mf=multifocal; SIRPDs=stimulus induced rhythmic, periodic, or ictal appearing discharges; BIRDs=brief potentially ictal rhythmic discharges >4 Hz, lasting .5-10s; PFA (paroxysmal bursts >13 Hz or =8 Hz <10s).  Modifiers: +F=with fast component; +S=with spike component; +R=with rhythmic component.  S-B=burst suppression pattern.  Max=maximal. N1-drowsy; N2-stage II sleep; N3-slow wave sleep. SSS/BETS=small sharp spikes/benign epileptiform transients of sleep. HV=hyperventilation; PS=photic stimulation]    FINDINGS:      Background:  Continuity: continuous  Symmetry: symmetric  PDR: 6 Hz activity, with amplitude to 40 uV, that attenuated to eye opening.    Reactivity: present  Voltage: normal (between 20-150uV)  Anterior Posterior Gradient: Absent  Other background findings: none  Breach: absent    Background Slowing:  Generalized slowing: diffuse irregular delta and theta activity.  Focal slowing: none was present.    State Changes:   -Drowsiness noted with increased slowing and attenuation of fast activity  -N2 sleep transients were not recorded.    Sporadic Epileptiform Discharges:    None    Rhythmic and Periodic Patterns (RPPs):  FRDA    Electrographic and Electroclinical seizures:  None    Other Clinical Events:  None    Activation Procedures:   -Hyperventilation was not performed.    -Photic stimulation was not performed.     Artifacts:  Intermittent myogenic and movement artifacts were noted.    ECG:  The heart rate on single channel ECG was unobtainable due to artifact.     EEG Classification / Summary:  Abnormal EEG study  Generalized background slowing    -----------------------------------------------------------------------------------------------------    Clinical Impression:  Mild to moderate diffuse/multi-focal cerebral dysfunction, not specific as to etiology.  There were no epileptiform abnormalities recorded.      No seizures     In absence of additional clinical concerns, recommend consideration for discontinuation of current EEG study with reconnection in future if warranted.        Ronald Robesron MD - Epilepsy Fellow      BERNADINE ANDERSON [facility]-[id number]     Study Date: 		10-12-24 1857 to 01-13-24 1400  Duration in hours:  19 h    --------------------------------------------------------------------------------------------------  History:  CC/ HPI Patient is a 84y old  Male who presents with a chief complaint of GI bleed (12 Jan 2024 11:50)    MEDICATIONS  (STANDING):  albuterol    90 MICROgram(s) HFA Inhaler 2 Puff(s) Inhalation two times a day  albuterol/ipratropium for Nebulization 3 milliLiter(s) Nebulizer every 6 hours  dextrose 5%. 1000 milliLiter(s) (100 mL/Hr) IV Continuous <Continuous>  dextrose 5%. 1000 milliLiter(s) (50 mL/Hr) IV Continuous <Continuous>  dextrose 50% Injectable 25 Gram(s) IV Push once  dextrose 50% Injectable 12.5 Gram(s) IV Push once  dextrose 50% Injectable 25 Gram(s) IV Push once  dronedarone 400 milliGRAM(s) Oral two times a day  folic acid 1 milliGRAM(s) Oral daily  gabapentin 300 milliGRAM(s) Oral at bedtime  glucagon  Injectable 1 milliGRAM(s) IntraMuscular once  insulin glargine Injectable (LANTUS) 10 Unit(s) SubCutaneous at bedtime  metoprolol tartrate 25 milliGRAM(s) Oral two times a day  midodrine. 10 milliGRAM(s) Oral three times a day  pantoprazole  Injectable 40 milliGRAM(s) IV Push every 12 hours  polyethylene glycol 3350 17 Gram(s) Oral at bedtime  senna 2 Tablet(s) Oral at bedtime  simvastatin 20 milliGRAM(s) Oral at bedtime  sodium bicarbonate 650 milliGRAM(s) Oral two times a day  tamsulosin 0.4 milliGRAM(s) Oral at bedtime    --------------------------------------------------------------------------------------------------  Study Interpretation:    [Abbreviation Key:  PDR=alpha rhythm/posterior dominant rhythm. A-P=anterior posterior.  Amplitude: ‘very low’:<20; ‘low’:20-49; ‘medium’:; ‘high’:>150uV.  Persistence for periodic/rhythmic patterns (% of epoch) ‘rare’:<1%; ‘occasional’:1-10%; ‘frequent’:10-50%; ‘abundant’:50-90%; ‘continuous’:>90%.  Persistence for sporadic discharges: ‘rare’:<1/hr; ‘occasional’:1/min-1/hr; ‘frequent’:>1/min; ‘abundant’:>1/10 sec.  RPP=rhythmic and periodic patterns; GRDA=generalized rhythmic delta activity; FIRDA=frontal intermittent GRDA; LRDA=lateralized rhythmic delta activity; TIRDA=temporal intermittent rhythmic delta activity;  LPD=PLED=lateralized periodic discharges; GPD=generalized periodic discharges; BIPDs =bilateral independent periodic discharges; Mf=multifocal; SIRPDs=stimulus induced rhythmic, periodic, or ictal appearing discharges; BIRDs=brief potentially ictal rhythmic discharges >4 Hz, lasting .5-10s; PFA (paroxysmal bursts >13 Hz or =8 Hz <10s).  Modifiers: +F=with fast component; +S=with spike component; +R=with rhythmic component.  S-B=burst suppression pattern.  Max=maximal. N1-drowsy; N2-stage II sleep; N3-slow wave sleep. SSS/BETS=small sharp spikes/benign epileptiform transients of sleep. HV=hyperventilation; PS=photic stimulation]    FINDINGS:      Background:  Continuity: continuous  Symmetry: symmetric  PDR: 6 Hz activity, with amplitude to 40 uV, that attenuated to eye opening.    Reactivity: present  Voltage: normal (between 20-150uV)  Anterior Posterior Gradient: Absent  Other background findings: none  Breach: absent    Background Slowing:  Generalized slowing: diffuse irregular delta and theta activity.  Focal slowing: none was present.    State Changes:   -Drowsiness noted with increased slowing and attenuation of fast activity  -N2 sleep transients were not recorded.    Sporadic Epileptiform Discharges:    None    Rhythmic and Periodic Patterns (RPPs):  FRDA    Electrographic and Electroclinical seizures:  None    Other Clinical Events:  None    Activation Procedures:   -Hyperventilation was not performed.    -Photic stimulation was not performed.     Artifacts:  Intermittent myogenic and movement artifacts were noted.    ECG:  The heart rate on single channel ECG was unobtainable due to artifact.     EEG Classification / Summary:  Abnormal EEG study  Generalized background slowing    -----------------------------------------------------------------------------------------------------    Clinical Impression:  Mild to moderate diffuse/multi-focal cerebral dysfunction, not specific as to etiology.  There were no epileptiform abnormalities recorded.      No seizures     In absence of additional clinical concerns, recommend consideration for discontinuation of current EEG study with reconnection in future if warranted.        Ronald Roberson MD - Epilepsy Fellow      BERNADINE ANDERSON [facility]-[id number]     Study Date: 		10-12-24 1857 to 01-13-24 1400  Duration in hours:  19 h    --------------------------------------------------------------------------------------------------  History:  CC/ HPI Patient is a 84y old  Male who presents with a chief complaint of GI bleed (12 Jan 2024 11:50)    MEDICATIONS  (STANDING):  albuterol    90 MICROgram(s) HFA Inhaler 2 Puff(s) Inhalation two times a day  albuterol/ipratropium for Nebulization 3 milliLiter(s) Nebulizer every 6 hours  dextrose 5%. 1000 milliLiter(s) (100 mL/Hr) IV Continuous <Continuous>  dextrose 5%. 1000 milliLiter(s) (50 mL/Hr) IV Continuous <Continuous>  dextrose 50% Injectable 25 Gram(s) IV Push once  dextrose 50% Injectable 12.5 Gram(s) IV Push once  dextrose 50% Injectable 25 Gram(s) IV Push once  dronedarone 400 milliGRAM(s) Oral two times a day  folic acid 1 milliGRAM(s) Oral daily  gabapentin 300 milliGRAM(s) Oral at bedtime  glucagon  Injectable 1 milliGRAM(s) IntraMuscular once  insulin glargine Injectable (LANTUS) 10 Unit(s) SubCutaneous at bedtime  metoprolol tartrate 25 milliGRAM(s) Oral two times a day  midodrine. 10 milliGRAM(s) Oral three times a day  pantoprazole  Injectable 40 milliGRAM(s) IV Push every 12 hours  polyethylene glycol 3350 17 Gram(s) Oral at bedtime  senna 2 Tablet(s) Oral at bedtime  simvastatin 20 milliGRAM(s) Oral at bedtime  sodium bicarbonate 650 milliGRAM(s) Oral two times a day  tamsulosin 0.4 milliGRAM(s) Oral at bedtime    --------------------------------------------------------------------------------------------------  Study Interpretation:    [Abbreviation Key:  PDR=alpha rhythm/posterior dominant rhythm. A-P=anterior posterior.  Amplitude: ‘very low’:<20; ‘low’:20-49; ‘medium’:; ‘high’:>150uV.  Persistence for periodic/rhythmic patterns (% of epoch) ‘rare’:<1%; ‘occasional’:1-10%; ‘frequent’:10-50%; ‘abundant’:50-90%; ‘continuous’:>90%.  Persistence for sporadic discharges: ‘rare’:<1/hr; ‘occasional’:1/min-1/hr; ‘frequent’:>1/min; ‘abundant’:>1/10 sec.  RPP=rhythmic and periodic patterns; GRDA=generalized rhythmic delta activity; FIRDA=frontal intermittent GRDA; LRDA=lateralized rhythmic delta activity; TIRDA=temporal intermittent rhythmic delta activity;  LPD=PLED=lateralized periodic discharges; GPD=generalized periodic discharges; BIPDs =bilateral independent periodic discharges; Mf=multifocal; SIRPDs=stimulus induced rhythmic, periodic, or ictal appearing discharges; BIRDs=brief potentially ictal rhythmic discharges >4 Hz, lasting .5-10s; PFA (paroxysmal bursts >13 Hz or =8 Hz <10s).  Modifiers: +F=with fast component; +S=with spike component; +R=with rhythmic component.  S-B=burst suppression pattern.  Max=maximal. N1-drowsy; N2-stage II sleep; N3-slow wave sleep. SSS/BETS=small sharp spikes/benign epileptiform transients of sleep. HV=hyperventilation; PS=photic stimulation]    FINDINGS:      Background:  Continuity: continuous  Symmetry: symmetric  PDR: 6 Hz activity, with amplitude to 40 uV, that attenuated to eye opening.    Reactivity: present  Voltage: normal (between 20-150uV)  Anterior Posterior Gradient: Absent  Other background findings: none  Breach: absent    Background Slowing:  Generalized slowing: diffuse irregular delta and theta activity.  Focal slowing: none was present.    State Changes:   -Drowsiness noted with increased slowing and attenuation of fast activity  -N2 sleep transients were not recorded.    Sporadic Epileptiform Discharges:    None    Rhythmic and Periodic Patterns (RPPs):  FRDA    Electrographic and Electroclinical seizures:  None    Other Clinical Events:  None    Activation Procedures:   -Hyperventilation was not performed.    -Photic stimulation was not performed.     Artifacts:  Intermittent myogenic and movement artifacts were noted.    ECG:  The heart rate on single channel ECG was unobtainable due to artifact.     EEG Classification / Summary:  Abnormal EEG study  Generalized background slowing    -----------------------------------------------------------------------------------------------------    Clinical Impression:  Mild to moderate diffuse/multi-focal cerebral dysfunction, not specific as to etiology.  There were no epileptiform abnormalities recorded.      No seizures     In absence of additional clinical concerns, recommend consideration for discontinuation of current EEG study with reconnection in future if warranted.        Ronald Roberson MD - Epilepsy Fellow      BERNADINE ANDERSON [facility]-[id number]     Study Date: 		10-12-24 1857 to 01-13-24 1400  Duration in hours:  19 h    --------------------------------------------------------------------------------------------------  History:  CC/ HPI Patient is a 84y old  Male who presents with a chief complaint of GI bleed (12 Jan 2024 11:50)    MEDICATIONS  (STANDING):  albuterol    90 MICROgram(s) HFA Inhaler 2 Puff(s) Inhalation two times a day  albuterol/ipratropium for Nebulization 3 milliLiter(s) Nebulizer every 6 hours  dronedarone 400 milliGRAM(s) Oral two times a day  folic acid 1 milliGRAM(s) Oral daily  gabapentin 300 milliGRAM(s) Oral at bedtime  glucagon  Injectable 1 milliGRAM(s) IntraMuscular once  insulin glargine Injectable (LANTUS) 10 Unit(s) SubCutaneous at bedtime  metoprolol tartrate 25 milliGRAM(s) Oral two times a day  midodrine. 10 milliGRAM(s) Oral three times a day  pantoprazole  Injectable 40 milliGRAM(s) IV Push every 12 hours  polyethylene glycol 3350 17 Gram(s) Oral at bedtime  senna 2 Tablet(s) Oral at bedtime  simvastatin 20 milliGRAM(s) Oral at bedtime  sodium bicarbonate 650 milliGRAM(s) Oral two times a day  tamsulosin 0.4 milliGRAM(s) Oral at bedtime    --------------------------------------------------------------------------------------------------  Study Interpretation:    [Abbreviation Key:  PDR=alpha rhythm/posterior dominant rhythm. A-P=anterior posterior.  Amplitude: ‘very low’:<20; ‘low’:20-49; ‘medium’:; ‘high’:>150uV.  Persistence for periodic/rhythmic patterns (% of epoch) ‘rare’:<1%; ‘occasional’:1-10%; ‘frequent’:10-50%; ‘abundant’:50-90%; ‘continuous’:>90%.  Persistence for sporadic discharges: ‘rare’:<1/hr; ‘occasional’:1/min-1/hr; ‘frequent’:>1/min; ‘abundant’:>1/10 sec.  RPP=rhythmic and periodic patterns; GRDA=generalized rhythmic delta activity; FIRDA=frontal intermittent GRDA; LRDA=lateralized rhythmic delta activity; TIRDA=temporal intermittent rhythmic delta activity;  LPD=PLED=lateralized periodic discharges; GPD=generalized periodic discharges; BIPDs =bilateral independent periodic discharges; Mf=multifocal; SIRPDs=stimulus induced rhythmic, periodic, or ictal appearing discharges; BIRDs=brief potentially ictal rhythmic discharges >4 Hz, lasting .5-10s; PFA (paroxysmal bursts >13 Hz or =8 Hz <10s).  Modifiers: +F=with fast component; +S=with spike component; +R=with rhythmic component.  S-B=burst suppression pattern.  Max=maximal. N1-drowsy; N2-stage II sleep; N3-slow wave sleep. SSS/BETS=small sharp spikes/benign epileptiform transients of sleep. HV=hyperventilation; PS=photic stimulation]    FINDINGS:      Background:  Continuity: continuous  Symmetry: symmetric  PDR: 6 Hz activity, with amplitude to 40 uV, that attenuated to eye opening.    Reactivity: present  Voltage: normal (between 20-150uV)  Anterior Posterior Gradient: Absent  Other background findings: none  Breach: absent    Background Slowing:  Generalized slowing: diffuse irregular delta and theta activity.  Focal slowing: none was present.    State Changes:   -Drowsiness noted with increased slowing and attenuation of fast activity  -N2 sleep transients were not recorded.    Sporadic Epileptiform Discharges:    None    Rhythmic and Periodic Patterns (RPPs):  FRDA    Electrographic and Electroclinical seizures:  None    Other Clinical Events:  None    Activation Procedures:   -Hyperventilation was not performed.    -Photic stimulation was not performed.     Artifacts:  Intermittent myogenic and movement artifacts were noted.    ECG:  The heart rate on single channel ECG was unobtainable due to artifact.     EEG Classification / Summary:  Abnormal EEG study  Generalized background slowing    -----------------------------------------------------------------------------------------------------    Clinical Impression:  Mild to moderate diffuse/multi-focal cerebral dysfunction, not specific as to etiology.  There were no epileptiform abnormalities recorded.      In absence of additional clinical concerns, recommend consideration for discontinuation of current EEG study with reconnection in future if warranted.        Ronald Roberson MD - Epilepsy Fellow      BERNADINE ANDERSON [facility]-[id number]     Study Date: 		10-12-24 1857 to 01-13-24 1400  Duration in hours:  19 h    --------------------------------------------------------------------------------------------------  History:  CC/ HPI Patient is a 84y old  Male who presents with a chief complaint of GI bleed (12 Jan 2024 11:50)    MEDICATIONS  (STANDING):  albuterol    90 MICROgram(s) HFA Inhaler 2 Puff(s) Inhalation two times a day  albuterol/ipratropium for Nebulization 3 milliLiter(s) Nebulizer every 6 hours  dronedarone 400 milliGRAM(s) Oral two times a day  folic acid 1 milliGRAM(s) Oral daily  gabapentin 300 milliGRAM(s) Oral at bedtime  glucagon  Injectable 1 milliGRAM(s) IntraMuscular once  insulin glargine Injectable (LANTUS) 10 Unit(s) SubCutaneous at bedtime  metoprolol tartrate 25 milliGRAM(s) Oral two times a day  midodrine. 10 milliGRAM(s) Oral three times a day  pantoprazole  Injectable 40 milliGRAM(s) IV Push every 12 hours  polyethylene glycol 3350 17 Gram(s) Oral at bedtime  senna 2 Tablet(s) Oral at bedtime  simvastatin 20 milliGRAM(s) Oral at bedtime  sodium bicarbonate 650 milliGRAM(s) Oral two times a day  tamsulosin 0.4 milliGRAM(s) Oral at bedtime    --------------------------------------------------------------------------------------------------  Study Interpretation:    [Abbreviation Key:  PDR=alpha rhythm/posterior dominant rhythm. A-P=anterior posterior.  Amplitude: ‘very low’:<20; ‘low’:20-49; ‘medium’:; ‘high’:>150uV.  Persistence for periodic/rhythmic patterns (% of epoch) ‘rare’:<1%; ‘occasional’:1-10%; ‘frequent’:10-50%; ‘abundant’:50-90%; ‘continuous’:>90%.  Persistence for sporadic discharges: ‘rare’:<1/hr; ‘occasional’:1/min-1/hr; ‘frequent’:>1/min; ‘abundant’:>1/10 sec.  RPP=rhythmic and periodic patterns; GRDA=generalized rhythmic delta activity; FIRDA=frontal intermittent GRDA; LRDA=lateralized rhythmic delta activity; TIRDA=temporal intermittent rhythmic delta activity;  LPD=PLED=lateralized periodic discharges; GPD=generalized periodic discharges; BIPDs =bilateral independent periodic discharges; Mf=multifocal; SIRPDs=stimulus induced rhythmic, periodic, or ictal appearing discharges; BIRDs=brief potentially ictal rhythmic discharges >4 Hz, lasting .5-10s; PFA (paroxysmal bursts >13 Hz or =8 Hz <10s).  Modifiers: +F=with fast component; +S=with spike component; +R=with rhythmic component.  S-B=burst suppression pattern.  Max=maximal. N1-drowsy; N2-stage II sleep; N3-slow wave sleep. SSS/BETS=small sharp spikes/benign epileptiform transients of sleep. HV=hyperventilation; PS=photic stimulation]    FINDINGS:      Background:  Continuity: continuous  Symmetry: symmetric  PDR: 6 Hz activity, with amplitude to 40 uV, that attenuated to eye opening.    Reactivity: present  Voltage: normal (between 20-150uV)  Anterior Posterior Gradient: Absent  Other background findings: none  Breach: absent    Background Slowing:  Generalized slowing: diffuse irregular delta and theta activity.  Focal slowing: none was present.    State Changes:   -Drowsiness noted with increased slowing and attenuation of fast activity  -N2 sleep transients were not recorded.    Sporadic Epileptiform Discharges:    None    Rhythmic and Periodic Patterns (RPPs):  FRDA    Electrographic and Electroclinical seizures:  None    Other Clinical Events:  None    Activation Procedures:   -Hyperventilation was not performed.    -Photic stimulation was not performed.     Artifacts:  Intermittent myogenic and movement artifacts were noted.    ECG:  The heart rate on single channel ECG was unobtainable due to artifact.     EEG Classification / Summary:  Abnormal EEG study  Generalized background slowing    -----------------------------------------------------------------------------------------------------    Clinical Impression:  Mild to moderate diffuse/multi-focal cerebral dysfunction, not specific as to etiology.  There were no epileptiform abnormalities recorded.      In absence of additional clinical concerns, recommend consideration for discontinuation of current EEG study with reconnection in future if warranted.        Ronald Roberson MD - Epilepsy Fellow

## 2024-01-14 ENCOUNTER — TRANSCRIPTION ENCOUNTER (OUTPATIENT)
Age: 84
End: 2024-01-14

## 2024-01-14 VITALS
DIASTOLIC BLOOD PRESSURE: 70 MMHG | RESPIRATION RATE: 18 BRPM | OXYGEN SATURATION: 95 % | SYSTOLIC BLOOD PRESSURE: 116 MMHG | TEMPERATURE: 99 F | HEART RATE: 75 BPM

## 2024-01-14 LAB
ALBUMIN SERPL ELPH-MCNC: 2.6 G/DL — LOW (ref 3.3–5.2)
ALBUMIN SERPL ELPH-MCNC: 2.6 G/DL — LOW (ref 3.3–5.2)
ALP SERPL-CCNC: 88 U/L — SIGNIFICANT CHANGE UP (ref 40–120)
ALP SERPL-CCNC: 88 U/L — SIGNIFICANT CHANGE UP (ref 40–120)
ALT FLD-CCNC: 23 U/L — SIGNIFICANT CHANGE UP
ALT FLD-CCNC: 23 U/L — SIGNIFICANT CHANGE UP
ANION GAP SERPL CALC-SCNC: 11 MMOL/L — SIGNIFICANT CHANGE UP (ref 5–17)
ANION GAP SERPL CALC-SCNC: 11 MMOL/L — SIGNIFICANT CHANGE UP (ref 5–17)
AST SERPL-CCNC: 27 U/L — SIGNIFICANT CHANGE UP
AST SERPL-CCNC: 27 U/L — SIGNIFICANT CHANGE UP
BASOPHILS # BLD AUTO: 0.01 K/UL — SIGNIFICANT CHANGE UP (ref 0–0.2)
BASOPHILS # BLD AUTO: 0.01 K/UL — SIGNIFICANT CHANGE UP (ref 0–0.2)
BASOPHILS NFR BLD AUTO: 0.2 % — SIGNIFICANT CHANGE UP (ref 0–2)
BASOPHILS NFR BLD AUTO: 0.2 % — SIGNIFICANT CHANGE UP (ref 0–2)
BILIRUB SERPL-MCNC: 0.3 MG/DL — LOW (ref 0.4–2)
BILIRUB SERPL-MCNC: 0.3 MG/DL — LOW (ref 0.4–2)
BLD GP AB SCN SERPL QL: SIGNIFICANT CHANGE UP
BLD GP AB SCN SERPL QL: SIGNIFICANT CHANGE UP
BUN SERPL-MCNC: 15.3 MG/DL — SIGNIFICANT CHANGE UP (ref 8–20)
BUN SERPL-MCNC: 15.3 MG/DL — SIGNIFICANT CHANGE UP (ref 8–20)
CALCIUM SERPL-MCNC: 8.2 MG/DL — LOW (ref 8.4–10.5)
CALCIUM SERPL-MCNC: 8.2 MG/DL — LOW (ref 8.4–10.5)
CHLORIDE SERPL-SCNC: 104 MMOL/L — SIGNIFICANT CHANGE UP (ref 96–108)
CHLORIDE SERPL-SCNC: 104 MMOL/L — SIGNIFICANT CHANGE UP (ref 96–108)
CO2 SERPL-SCNC: 24 MMOL/L — SIGNIFICANT CHANGE UP (ref 22–29)
CO2 SERPL-SCNC: 24 MMOL/L — SIGNIFICANT CHANGE UP (ref 22–29)
CREAT SERPL-MCNC: 2.05 MG/DL — HIGH (ref 0.5–1.3)
CREAT SERPL-MCNC: 2.05 MG/DL — HIGH (ref 0.5–1.3)
EGFR: 31 ML/MIN/1.73M2 — LOW
EGFR: 31 ML/MIN/1.73M2 — LOW
EOSINOPHIL # BLD AUTO: 0.2 K/UL — SIGNIFICANT CHANGE UP (ref 0–0.5)
EOSINOPHIL # BLD AUTO: 0.2 K/UL — SIGNIFICANT CHANGE UP (ref 0–0.5)
EOSINOPHIL NFR BLD AUTO: 4.1 % — SIGNIFICANT CHANGE UP (ref 0–6)
EOSINOPHIL NFR BLD AUTO: 4.1 % — SIGNIFICANT CHANGE UP (ref 0–6)
GLUCOSE BLDC GLUCOMTR-MCNC: 169 MG/DL — HIGH (ref 70–99)
GLUCOSE BLDC GLUCOMTR-MCNC: 169 MG/DL — HIGH (ref 70–99)
GLUCOSE BLDC GLUCOMTR-MCNC: 178 MG/DL — HIGH (ref 70–99)
GLUCOSE BLDC GLUCOMTR-MCNC: 178 MG/DL — HIGH (ref 70–99)
GLUCOSE SERPL-MCNC: 146 MG/DL — HIGH (ref 70–99)
GLUCOSE SERPL-MCNC: 146 MG/DL — HIGH (ref 70–99)
HCT VFR BLD CALC: 27.2 % — LOW (ref 39–50)
HCT VFR BLD CALC: 27.2 % — LOW (ref 39–50)
HGB BLD-MCNC: 8.6 G/DL — LOW (ref 13–17)
HGB BLD-MCNC: 8.6 G/DL — LOW (ref 13–17)
IMM GRANULOCYTES NFR BLD AUTO: 0.4 % — SIGNIFICANT CHANGE UP (ref 0–0.9)
IMM GRANULOCYTES NFR BLD AUTO: 0.4 % — SIGNIFICANT CHANGE UP (ref 0–0.9)
LYMPHOCYTES # BLD AUTO: 1.08 K/UL — SIGNIFICANT CHANGE UP (ref 1–3.3)
LYMPHOCYTES # BLD AUTO: 1.08 K/UL — SIGNIFICANT CHANGE UP (ref 1–3.3)
LYMPHOCYTES # BLD AUTO: 21.9 % — SIGNIFICANT CHANGE UP (ref 13–44)
LYMPHOCYTES # BLD AUTO: 21.9 % — SIGNIFICANT CHANGE UP (ref 13–44)
MAGNESIUM SERPL-MCNC: 1.9 MG/DL — SIGNIFICANT CHANGE UP (ref 1.6–2.6)
MAGNESIUM SERPL-MCNC: 1.9 MG/DL — SIGNIFICANT CHANGE UP (ref 1.6–2.6)
MCHC RBC-ENTMCNC: 29.3 PG — SIGNIFICANT CHANGE UP (ref 27–34)
MCHC RBC-ENTMCNC: 29.3 PG — SIGNIFICANT CHANGE UP (ref 27–34)
MCHC RBC-ENTMCNC: 31.6 GM/DL — LOW (ref 32–36)
MCHC RBC-ENTMCNC: 31.6 GM/DL — LOW (ref 32–36)
MCV RBC AUTO: 92.5 FL — SIGNIFICANT CHANGE UP (ref 80–100)
MCV RBC AUTO: 92.5 FL — SIGNIFICANT CHANGE UP (ref 80–100)
MONOCYTES # BLD AUTO: 0.63 K/UL — SIGNIFICANT CHANGE UP (ref 0–0.9)
MONOCYTES # BLD AUTO: 0.63 K/UL — SIGNIFICANT CHANGE UP (ref 0–0.9)
MONOCYTES NFR BLD AUTO: 12.8 % — SIGNIFICANT CHANGE UP (ref 2–14)
MONOCYTES NFR BLD AUTO: 12.8 % — SIGNIFICANT CHANGE UP (ref 2–14)
NEUTROPHILS # BLD AUTO: 2.99 K/UL — SIGNIFICANT CHANGE UP (ref 1.8–7.4)
NEUTROPHILS # BLD AUTO: 2.99 K/UL — SIGNIFICANT CHANGE UP (ref 1.8–7.4)
NEUTROPHILS NFR BLD AUTO: 60.6 % — SIGNIFICANT CHANGE UP (ref 43–77)
NEUTROPHILS NFR BLD AUTO: 60.6 % — SIGNIFICANT CHANGE UP (ref 43–77)
PLATELET # BLD AUTO: 247 K/UL — SIGNIFICANT CHANGE UP (ref 150–400)
PLATELET # BLD AUTO: 247 K/UL — SIGNIFICANT CHANGE UP (ref 150–400)
POTASSIUM SERPL-MCNC: 4.6 MMOL/L — SIGNIFICANT CHANGE UP (ref 3.5–5.3)
POTASSIUM SERPL-MCNC: 4.6 MMOL/L — SIGNIFICANT CHANGE UP (ref 3.5–5.3)
POTASSIUM SERPL-SCNC: 4.6 MMOL/L — SIGNIFICANT CHANGE UP (ref 3.5–5.3)
POTASSIUM SERPL-SCNC: 4.6 MMOL/L — SIGNIFICANT CHANGE UP (ref 3.5–5.3)
PROT SERPL-MCNC: 5.5 G/DL — LOW (ref 6.6–8.7)
PROT SERPL-MCNC: 5.5 G/DL — LOW (ref 6.6–8.7)
RBC # BLD: 2.94 M/UL — LOW (ref 4.2–5.8)
RBC # BLD: 2.94 M/UL — LOW (ref 4.2–5.8)
RBC # FLD: 15.2 % — HIGH (ref 10.3–14.5)
RBC # FLD: 15.2 % — HIGH (ref 10.3–14.5)
SARS-COV-2 RNA SPEC QL NAA+PROBE: SIGNIFICANT CHANGE UP
SARS-COV-2 RNA SPEC QL NAA+PROBE: SIGNIFICANT CHANGE UP
SODIUM SERPL-SCNC: 139 MMOL/L — SIGNIFICANT CHANGE UP (ref 135–145)
SODIUM SERPL-SCNC: 139 MMOL/L — SIGNIFICANT CHANGE UP (ref 135–145)
WBC # BLD: 4.93 K/UL — SIGNIFICANT CHANGE UP (ref 3.8–10.5)
WBC # BLD: 4.93 K/UL — SIGNIFICANT CHANGE UP (ref 3.8–10.5)
WBC # FLD AUTO: 4.93 K/UL — SIGNIFICANT CHANGE UP (ref 3.8–10.5)
WBC # FLD AUTO: 4.93 K/UL — SIGNIFICANT CHANGE UP (ref 3.8–10.5)

## 2024-01-14 PROCEDURE — P9016: CPT

## 2024-01-14 PROCEDURE — 82330 ASSAY OF CALCIUM: CPT

## 2024-01-14 PROCEDURE — 86901 BLOOD TYPING SEROLOGIC RH(D): CPT

## 2024-01-14 PROCEDURE — 96376 TX/PRO/DX INJ SAME DRUG ADON: CPT

## 2024-01-14 PROCEDURE — 87040 BLOOD CULTURE FOR BACTERIA: CPT

## 2024-01-14 PROCEDURE — 86900 BLOOD TYPING SEROLOGIC ABO: CPT

## 2024-01-14 PROCEDURE — 94640 AIRWAY INHALATION TREATMENT: CPT

## 2024-01-14 PROCEDURE — 99239 HOSP IP/OBS DSCHRG MGMT >30: CPT | Mod: GC

## 2024-01-14 PROCEDURE — 95714 VEEG EA 12-26 HR UNMNTR: CPT

## 2024-01-14 PROCEDURE — 80076 HEPATIC FUNCTION PANEL: CPT

## 2024-01-14 PROCEDURE — 82550 ASSAY OF CK (CPK): CPT

## 2024-01-14 PROCEDURE — 93005 ELECTROCARDIOGRAM TRACING: CPT

## 2024-01-14 PROCEDURE — 95705 EEG W/O VID 2-12 HR UNMNTR: CPT

## 2024-01-14 PROCEDURE — 88305 TISSUE EXAM BY PATHOLOGIST: CPT

## 2024-01-14 PROCEDURE — 36430 TRANSFUSION BLD/BLD COMPNT: CPT

## 2024-01-14 PROCEDURE — 95700 EEG CONT REC W/VID EEG TECH: CPT

## 2024-01-14 PROCEDURE — 82803 BLOOD GASES ANY COMBINATION: CPT

## 2024-01-14 PROCEDURE — 96375 TX/PRO/DX INJ NEW DRUG ADDON: CPT

## 2024-01-14 PROCEDURE — 36600 WITHDRAWAL OF ARTERIAL BLOOD: CPT

## 2024-01-14 PROCEDURE — 85027 COMPLETE CBC AUTOMATED: CPT

## 2024-01-14 PROCEDURE — 80053 COMPREHEN METABOLIC PANEL: CPT

## 2024-01-14 PROCEDURE — 82947 ASSAY GLUCOSE BLOOD QUANT: CPT

## 2024-01-14 PROCEDURE — 84295 ASSAY OF SERUM SODIUM: CPT

## 2024-01-14 PROCEDURE — 87635 SARS-COV-2 COVID-19 AMP PRB: CPT

## 2024-01-14 PROCEDURE — 88342 IMHCHEM/IMCYTCHM 1ST ANTB: CPT

## 2024-01-14 PROCEDURE — 83605 ASSAY OF LACTIC ACID: CPT

## 2024-01-14 PROCEDURE — 70450 CT HEAD/BRAIN W/O DYE: CPT

## 2024-01-14 PROCEDURE — 84132 ASSAY OF SERUM POTASSIUM: CPT

## 2024-01-14 PROCEDURE — 84484 ASSAY OF TROPONIN QUANT: CPT

## 2024-01-14 PROCEDURE — 85014 HEMATOCRIT: CPT

## 2024-01-14 PROCEDURE — 84100 ASSAY OF PHOSPHORUS: CPT

## 2024-01-14 PROCEDURE — 97163 PT EVAL HIGH COMPLEX 45 MIN: CPT

## 2024-01-14 PROCEDURE — 85610 PROTHROMBIN TIME: CPT

## 2024-01-14 PROCEDURE — 86850 RBC ANTIBODY SCREEN: CPT

## 2024-01-14 PROCEDURE — 99291 CRITICAL CARE FIRST HOUR: CPT

## 2024-01-14 PROCEDURE — 36415 COLL VENOUS BLD VENIPUNCTURE: CPT

## 2024-01-14 PROCEDURE — 36410 VNPNXR 3YR/> PHY/QHP DX/THER: CPT

## 2024-01-14 PROCEDURE — 95813 EEG EXTND MNTR 61-119 MIN: CPT

## 2024-01-14 PROCEDURE — 96374 THER/PROPH/DIAG INJ IV PUSH: CPT

## 2024-01-14 PROCEDURE — 80048 BASIC METABOLIC PNL TOTAL CA: CPT

## 2024-01-14 PROCEDURE — 82435 ASSAY OF BLOOD CHLORIDE: CPT

## 2024-01-14 PROCEDURE — 71045 X-RAY EXAM CHEST 1 VIEW: CPT

## 2024-01-14 PROCEDURE — 82962 GLUCOSE BLOOD TEST: CPT

## 2024-01-14 PROCEDURE — 85025 COMPLETE CBC W/AUTO DIFF WBC: CPT

## 2024-01-14 PROCEDURE — 86923 COMPATIBILITY TEST ELECTRIC: CPT

## 2024-01-14 PROCEDURE — 85730 THROMBOPLASTIN TIME PARTIAL: CPT

## 2024-01-14 PROCEDURE — 83735 ASSAY OF MAGNESIUM: CPT

## 2024-01-14 PROCEDURE — 85018 HEMOGLOBIN: CPT

## 2024-01-14 RX ORDER — INSULIN GLARGINE 100 [IU]/ML
18 INJECTION, SOLUTION SUBCUTANEOUS
Qty: 0 | Refills: 0 | DISCHARGE

## 2024-01-14 RX ORDER — DRONEDARONE 400 MG/1
1 TABLET, FILM COATED ORAL
Qty: 0 | Refills: 0 | DISCHARGE
Start: 2024-01-14

## 2024-01-14 RX ORDER — SENNA PLUS 8.6 MG/1
2 TABLET ORAL
Qty: 0 | Refills: 0 | DISCHARGE
Start: 2024-01-14

## 2024-01-14 RX ORDER — POLYETHYLENE GLYCOL 3350 17 G/17G
17 POWDER, FOR SOLUTION ORAL
Qty: 0 | Refills: 0 | DISCHARGE
Start: 2024-01-14

## 2024-01-14 RX ORDER — METOPROLOL TARTRATE 50 MG
1 TABLET ORAL
Refills: 0 | DISCHARGE
Start: 2024-01-14

## 2024-01-14 RX ORDER — INSULIN GLARGINE 100 [IU]/ML
10 INJECTION, SOLUTION SUBCUTANEOUS
Qty: 0 | Refills: 0 | DISCHARGE
Start: 2024-01-14

## 2024-01-14 RX ORDER — GABAPENTIN 400 MG/1
1 CAPSULE ORAL
Qty: 0 | Refills: 0 | DISCHARGE
Start: 2024-01-14

## 2024-01-14 RX ORDER — SODIUM BICARBONATE 1 MEQ/ML
1 SYRINGE (ML) INTRAVENOUS
Qty: 0 | Refills: 0 | DISCHARGE
Start: 2024-01-14

## 2024-01-14 RX ORDER — TAMSULOSIN HYDROCHLORIDE 0.4 MG/1
1 CAPSULE ORAL
Qty: 0 | Refills: 0 | DISCHARGE
Start: 2024-01-14

## 2024-01-14 RX ORDER — MIDODRINE HYDROCHLORIDE 2.5 MG/1
2 TABLET ORAL
Refills: 0 | DISCHARGE

## 2024-01-14 RX ORDER — MIDODRINE HYDROCHLORIDE 2.5 MG/1
1 TABLET ORAL
Qty: 0 | Refills: 0 | DISCHARGE
Start: 2024-01-14

## 2024-01-14 RX ADMIN — MAGNESIUM HYDROXIDE 30 MILLILITER(S): 400 TABLET, CHEWABLE ORAL at 05:24

## 2024-01-14 RX ADMIN — Medication 1 MILLIGRAM(S): at 12:29

## 2024-01-14 RX ADMIN — Medication 1: at 08:33

## 2024-01-14 RX ADMIN — LACTULOSE 10 GRAM(S): 10 SOLUTION ORAL at 05:24

## 2024-01-14 RX ADMIN — Medication 3 MILLILITER(S): at 08:20

## 2024-01-14 RX ADMIN — Medication 25 MILLIGRAM(S): at 05:24

## 2024-01-14 RX ADMIN — Medication 650 MILLIGRAM(S): at 05:24

## 2024-01-14 RX ADMIN — Medication 1: at 12:29

## 2024-01-14 RX ADMIN — Medication 10 MILLIGRAM(S): at 04:46

## 2024-01-14 RX ADMIN — PANTOPRAZOLE SODIUM 40 MILLIGRAM(S): 20 TABLET, DELAYED RELEASE ORAL at 05:24

## 2024-01-14 RX ADMIN — DRONEDARONE 400 MILLIGRAM(S): 400 TABLET, FILM COATED ORAL at 05:24

## 2024-01-14 NOTE — DISCHARGE NOTE PROVIDER - ATTENDING DISCHARGE PHYSICAL EXAMINATION:
Vital Signs Last 24 Hrs  T(C): 36.5 (14 Jan 2024 05:24), Max: 36.8 (13 Jan 2024 21:00)  T(F): 97.7 (14 Jan 2024 05:24), Max: 98.2 (13 Jan 2024 21:00)  HR: 79 (14 Jan 2024 05:24) (79 - 96)  BP: 128/69 (14 Jan 2024 05:24) (128/69 - 166/68)  BP(mean): --  RR: 18 (14 Jan 2024 05:24) (18 - 18)  SpO2: 94% (14 Jan 2024 05:24) (94% - 95%)    PHYSICAL EXAM:  GENERAL: NAD, lying in bed   HEAD:  Atraumatic, Normocephalic  EYES: EOMI, PERRLA, conjunctiva and sclera clear  ENT: Moist mucous membranes  NECK: Supple, No JVD  CHEST/LUNG: Clear to auscultation bilaterally; No rales, rhonchi, wheezing, or rubs. Unlabored respirations  HEART: Regular rate and rhythm; No murmurs, rubs, or gallops  ABDOMEN: BSx4; Soft, nontender, mild distention, per sky draining bile, site dry & clean   EXTREMITIES:  2+ Peripheral Pulses, brisk capillary refill. No clubbing, cyanosis, or edema  NERVOUS SYSTEM:  A&Ox3, no focal deficits   SKIN: No rashes

## 2024-01-14 NOTE — DISCHARGE NOTE PROVIDER - HOSPITAL COURSE
84-year-old male with a past medical history of diabetes mellitus, hypertension , and chronic kidney disease  stage III  from a rehabilitation facility presented with an episode of coffee ground emesis. He had a recent hospital admission in December 2023 for acute cholecystitis, during which a percutaneous cholecystostomy was placed by interventional radiology on December 22, 2023. His hospital stay was complicated by hypoxic respiratory failure secondary to COVID-19, acute kidney injury on CKD, atrial fibrillation with rapid ventricular response, and delirium. He was discharged to a skilled nursing facility on January 5, 2024, with plans for outpatient IR follow-up and an interval cholecystectomy. However, he presented back to the emergency department on January 6, 2024, due to complaints of abdominal pain, nausea, and vomiting. A CT of the abdomen &pelvis  at that time showed a new markedly dilated, fluid-filled esophagus.  His hospital course was further complicated by altered mental status  on January 12, leading to an electroencephalogram and neurology consultation.    The coffee ground emesis raised concerns for an upper gastrointestinal bleed. He underwent an esophagogastroduodenoscopy (EGD) on January 10, which revealed polyps and esophagitis. He is to  an outpatient motility work-up with gastroenterology.  Regarding his recent acute cholecystitis and percutaneous drain placement, he had completed his course of antibiotics and needed an IR appointment to remove the drain, scheduled between January 22 and February 5. He is now deemed medically stable for discharge to a Sub-acute rehabilitation facility.

## 2024-01-14 NOTE — PROGRESS NOTE ADULT - SUBJECTIVE AND OBJECTIVE BOX
SUBJECTIVE:    Chief Complaint: Patient is a 84y old  Male who presents with a chief complaint of GI bleed (13 Jan 2024 13:17)      INTERVAL HPI/OVERNIGHT EVENTS: Patient seen and examined at bedside at AM. No clinically acute event overnight. Denies any chest pain, palpitations, SOB, leg edema, N/V/D, or any other complaints.     MEDICATIONS  (STANDING):  albuterol    90 MICROgram(s) HFA Inhaler 2 Puff(s) Inhalation two times a day  albuterol/ipratropium for Nebulization 3 milliLiter(s) Nebulizer every 6 hours  dextrose 5%. 1000 milliLiter(s) (100 mL/Hr) IV Continuous <Continuous>  dextrose 5%. 1000 milliLiter(s) (50 mL/Hr) IV Continuous <Continuous>  dextrose 50% Injectable 25 Gram(s) IV Push once  dextrose 50% Injectable 12.5 Gram(s) IV Push once  dextrose 50% Injectable 25 Gram(s) IV Push once  dronedarone 400 milliGRAM(s) Oral two times a day  folic acid 1 milliGRAM(s) Oral daily  gabapentin 300 milliGRAM(s) Oral at bedtime  glucagon  Injectable 1 milliGRAM(s) IntraMuscular once  glycerin Suppository - Adult 1 Suppository(s) Rectal daily  insulin glargine Injectable (LANTUS) 10 Unit(s) SubCutaneous at bedtime  insulin lispro (ADMELOG) corrective regimen sliding scale   SubCutaneous three times a day before meals  lactulose Syrup 10 Gram(s) Oral three times a day  magnesium hydroxide Suspension 30 milliLiter(s) Oral two times a day  metoprolol tartrate 25 milliGRAM(s) Oral two times a day  midodrine. 10 milliGRAM(s) Oral three times a day  pantoprazole  Injectable 40 milliGRAM(s) IV Push every 12 hours  polyethylene glycol 3350 17 Gram(s) Oral at bedtime  senna 2 Tablet(s) Oral at bedtime  simvastatin 20 milliGRAM(s) Oral at bedtime  sodium bicarbonate 650 milliGRAM(s) Oral two times a day  tamsulosin 0.4 milliGRAM(s) Oral at bedtime    MEDICATIONS  (PRN):  acetaminophen     Tablet .. 650 milliGRAM(s) Oral every 6 hours PRN Temp greater or equal to 38C (100.4F), Mild Pain (1 - 3)  aluminum hydroxide/magnesium hydroxide/simethicone Suspension 30 milliLiter(s) Oral every 4 hours PRN Dyspepsia  bisacodyl Suppository 10 milliGRAM(s) Rectal daily PRN Constipation  dextrose Oral Gel 15 Gram(s) Oral once PRN Blood Glucose LESS THAN 70 milliGRAM(s)/deciliter  melatonin 3 milliGRAM(s) Oral at bedtime PRN Insomnia  prochlorperazine   Injectable 5 milliGRAM(s) IV Push every 6 hours PRN prn nausea & vomiting      Allergies    No Known Allergies    Intolerances        REVIEW OF SYSTEMS:  All other review of systems negative, except as noted in HPI    OBJECTIVE:    Vital Signs Last 24 Hrs  T(C): 36.5 (14 Jan 2024 05:24), Max: 36.8 (13 Jan 2024 21:00)  T(F): 97.7 (14 Jan 2024 05:24), Max: 98.2 (13 Jan 2024 21:00)  HR: 79 (14 Jan 2024 05:24) (79 - 96)  BP: 128/69 (14 Jan 2024 05:24) (128/69 - 166/68)  BP(mean): --  RR: 18 (14 Jan 2024 05:24) (18 - 18)  SpO2: 94% (14 Jan 2024 05:24) (94% - 95%)    Parameters below as of 14 Jan 2024 05:24  Patient On (Oxygen Delivery Method): nasal cannula  O2 Flow (L/min): 2      PHYSICAL EXAM:  GENERAL: NAD, lying in bed   HEAD:  Atraumatic, Normocephalic  EYES: EOMI, PERRLA, conjunctiva and sclera clear  ENT: Moist mucous membranes  NECK: Supple, No JVD  CHEST/LUNG: Clear to auscultation bilaterally; No rales, rhonchi, wheezing, or rubs. Unlabored respirations  HEART: Regular rate and rhythm; No murmurs, rubs, or gallops  ABDOMEN: BSx4; Soft, nontender, mild distention, per sky draining bile, site dry & clean   EXTREMITIES:  2+ Peripheral Pulses, brisk capillary refill. No clubbing, cyanosis, or edema  NERVOUS SYSTEM:  A&Ox3, no focal deficits   SKIN: No rashes     Lab/ Imaging:    LABS:                        8.6    4.93  )-----------( 247      ( 14 Jan 2024 05:43 )             27.2     01-14    139  |  104  |  15.3  ----------------------------<  146<H>  4.6   |  24.0  |  2.05<H>    Ca    8.2<L>      14 Jan 2024 05:43  Phos  3.2     01-13  Mg     1.9     01-14    TPro  5.5<L>  /  Alb  2.6<L>  /  TBili  0.3<L>  /  DBili  x   /  AST  27  /  ALT  23  /  AlkPhos  88  01-14    PT/INR - ( 12 Jan 2024 23:16 )   PT: 10.4 sec;   INR: 0.94 ratio           Urinalysis Basic - ( 14 Jan 2024 05:43 )    Color: x / Appearance: x / SG: x / pH: x  Gluc: 146 mg/dL / Ketone: x  / Bili: x / Urobili: x   Blood: x / Protein: x / Nitrite: x   Leuk Esterase: x / RBC: x / WBC x   Sq Epi: x / Non Sq Epi: x / Bacteria: x      CAPILLARY BLOOD GLUCOSE      POCT Blood Glucose.: 178 mg/dL (14 Jan 2024 08:32)  POCT Blood Glucose.: 288 mg/dL (13 Jan 2024 22:25)  POCT Blood Glucose.: 126 mg/dL (13 Jan 2024 12:40)

## 2024-01-14 NOTE — DISCHARGE NOTE NURSING/CASE MANAGEMENT/SOCIAL WORK - NSDCPEFALRISK_GEN_ALL_CORE
For information on Fall & Injury Prevention, visit: [email or web address] OR  [email or web address] OR  https://www.cdc.gov/steadi/patient.html For information on Fall & Injury Prevention, visit: [email or web address] OR  [email or web address] OR  https://www.cdc.gov/steadi/patient.html

## 2024-01-14 NOTE — PROGRESS NOTE ADULT - ASSESSMENT
85 yo M with PMH of DM, HTN, CKD stage III, who presented from rehab after episode of coffee ground emesis. He was recently admitted in 12/2023 for acute cholecystitis and had perc sky placed by IR on 12/22/23. Hospital course was complicated by hypoxic respiratory failure secondary to COVID, RON, Afib with RVR, and delirium. He was discharged to SNF on 1/5/2024,  planned for outpatient IR follow up and interval cholecystectomy. He presented back to ED on 1/6/24 due to complaints of abdominal pain, nausea and vomiting. CT AP at that time showed new markedly dilated, fluid filled esophagus. He was discharged back to rehab, but then presented back to ED on 1/7/24) after large volume coffee ground emesis. Patient is s.p egd and hospital course complicated by ams on 1/12 and eeg and neuro consult ordered    #Coffee ground emesis concern for Upper GI bleed  - s/p egd on 1/10  - polyps and esophagitis  - c/w ppi  - Outpatient motility work up with GI  - GI notes appreciated    #AMS  - ct head negative  - EEG: No seizure activity noted  - neuro consulted  - back at baseline     #Leukocytosis (resolved)  CXR small effusion vs atelectasis  UA unremarkable  -Bcx- Negative    #CKD stage 3  #BPH  - Baseline ~ 2-2.5  - renally dose meds  - avoid nephrotoxic medications  - c/w Tamsulosin  - cont sodium bicarb BID    #Recent Acute cholecystitis s/p IR perc drain placemen  - will need drain in for 4-6 weeks for tract to form, O/P IR followup  - completed abx   - on home midodrine,   - Needs IR Appt between 1/22 and 2/5     #Afib   - Tele  -  cont Multaq  - metoprolol BID  - hold asa    #DM  - A1C 9  lantus and ssi   - can c/w gabapentin for neuropathy     #HLD  -c/w statin    dispo - jasmin needs chelsie tuesday (pending auth)   83 yo M with PMH of DM, HTN, CKD stage III, who presented from rehab after episode of coffee ground emesis. He was recently admitted in 12/2023 for acute cholecystitis and had perc sky placed by IR on 12/22/23. Hospital course was complicated by hypoxic respiratory failure secondary to COVID, RON, Afib with RVR, and delirium. He was discharged to SNF on 1/5/2024,  planned for outpatient IR follow up and interval cholecystectomy. He presented back to ED on 1/6/24 due to complaints of abdominal pain, nausea and vomiting. CT AP at that time showed new markedly dilated, fluid filled esophagus. He was discharged back to rehab, but then presented back to ED on 1/7/24) after large volume coffee ground emesis. Patient is s.p egd and hospital course complicated by ams on 1/12 and eeg and neuro consult ordered    #Coffee ground emesis concern for Upper GI bleed  - s/p egd on 1/10  - polyps and esophagitis  - c/w ppi  - Outpatient motility work up with GI  - GI notes appreciated    #AMS  - ct head negative  - EEG: No seizure activity noted  - neuro consulted  - back at baseline     #Leukocytosis (resolved)  CXR small effusion vs atelectasis  UA unremarkable  -Bcx- Negative    #CKD stage 3  #BPH  - Baseline ~ 2-2.5  - renally dose meds  - avoid nephrotoxic medications  - c/w Tamsulosin  - cont sodium bicarb BID    #Recent Acute cholecystitis s/p IR perc drain placemen  - will need drain in for 4-6 weeks for tract to form, O/P IR followup  - completed abx   - on home midodrine,   - Needs IR Appt between 1/22 and 2/5     #Afib   - Tele  -  cont Multaq  - metoprolol BID  - hold asa    #DM  - A1C 9  lantus and ssi   - can c/w gabapentin for neuropathy     #HLD  -c/w statin    dispo - jasmin needs chelsie tuesday (pending auth)

## 2024-01-14 NOTE — DISCHARGE NOTE PROVIDER - CARE PROVIDERS DIRECT ADDRESSES
,DirectAddress_Unknown,[email or web address] ,DirectAddress_Unknown,[email or web address]

## 2024-01-14 NOTE — DISCHARGE NOTE PROVIDER - NSDCMRMEDTOKEN_GEN_ALL_CORE_FT
dronedarone 400 mg oral tablet: 1 tab(s) orally 2 times a day  folic acid 1 mg oral tablet: 1 tab(s) orally once a day  gabapentin 300 mg oral capsule: 1 cap(s) orally once a day (at bedtime)  insulin glargine 100 units/mL subcutaneous solution: 10 unit(s) subcutaneous once a day (at bedtime)  insulin lispro 100 units/mL injectable solution: 4 international unit(s) subcutaneous 3 times a day (before meals) hold if FS &lt;100  metoprolol tartrate 25 mg oral tablet: 1 tab(s) orally 2 times a day  midodrine 10 mg oral tablet: 1 tab(s) orally 3 times a day  omeprazole 40 mg oral delayed release capsule: 1 cap(s) orally once a day  polyethylene glycol 3350 oral powder for reconstitution: 17 gram(s) orally once a day (at bedtime)  senna leaf extract oral tablet: 2 tab(s) orally once a day (at bedtime)  simvastatin 20 mg oral tablet: 1 tab(s) orally once a day (at bedtime)  sodium bicarbonate 650 mg oral tablet: 1 tab(s) orally 2 times a day  tamsulosin 0.4 mg oral capsule: 1 cap(s) orally once a day (at bedtime)

## 2024-01-14 NOTE — PROGRESS NOTE ADULT - PROVIDER SPECIALTY LIST ADULT
Gastroenterology
Hospitalist
Internal Medicine
Gastroenterology
Hospitalist
Internal Medicine
Gastroenterology

## 2024-01-14 NOTE — DISCHARGE NOTE NURSING/CASE MANAGEMENT/SOCIAL WORK - PATIENT PORTAL LINK FT
You can access the FollowMyHealth Patient Portal offered by [facility] by registering at the following website: [email or web address]. By joining [employer]’s FollowMyHealth portal, you will also be able to view your health information using other applications (apps) compatible with our system. You can access the FollowMyHealth Patient Portal offered by [facility] by registering at the following website: [email or web address]. By joining [employer]’s FollowMyHealth portal, you will also be able to view your health information using other applications (apps) compatible with our system.

## 2024-01-14 NOTE — DISCHARGE NOTE PROVIDER - NSDCCPCAREPLAN_GEN_ALL_CORE_FT
PRINCIPAL DISCHARGE DIAGNOSIS  Diagnosis: Upper GI bleed  Assessment and Plan of Treatment: - Upper GI Endoscopy: Dilaetd esophagus. Esophagitis & polyps. No active bleed  - Follow-up with GI for motility work-up      SECONDARY DISCHARGE DIAGNOSES  Diagnosis: Altered mental state  Assessment and Plan of Treatment: - reoslved, now bnack to bseline  - EEG: No seizure activity  - CT Head: No acute infarcts or bleed    Diagnosis: Acute renal failure superimposed on stage 3 chronic kidney disease, unspecified acute renal failure type, unspecified whether stage 3a or 3b CKD  Assessment and Plan of Treatment: Continue Tamsulosin    Diagnosis: BPH (benign prostatic hyperplasia)  Assessment and Plan of Treatment: Continue Tamsulosin    Diagnosis: Acute cholecystitis  Assessment and Plan of Treatment: Percutaneos Gallbladder drain placed on 12/22/2023  - Follow-up with Interventional Radiology to remove drain between 01/22/2024 and 02/05/2024    Diagnosis: Paroxysmal atrial fibrillation  Assessment and Plan of Treatment: Follow-up with Cardiology    Diagnosis: Uncontrolled diabetes mellitus with hyperglycemia  Assessment and Plan of Treatment: Continue Lantus & premeal Lisro     PRINCIPAL DISCHARGE DIAGNOSIS  Diagnosis: Upper GI bleed  Assessment and Plan of Treatment: - Upper GI Endoscopy: Dilaetd esophagus. Esophagitis & polyps. No active bleed  - Follow-up with GI for motility work-up      SECONDARY DISCHARGE DIAGNOSES  Diagnosis: Altered mental state  Assessment and Plan of Treatment: - resollved, now back to baseline  - EEG: No seizure activity  - CT Head: No acute infarcts or bleed    Diagnosis: Acute renal failure superimposed on stage 3 chronic kidney disease, unspecified acute renal failure type, unspecified whether stage 3a or 3b CKD  Assessment and Plan of Treatment: Continue Tamsulosin    Diagnosis: BPH (benign prostatic hyperplasia)  Assessment and Plan of Treatment: Continue Tamsulosin    Diagnosis: Acute cholecystitis  Assessment and Plan of Treatment: Percutaneos Gallbladder drain placed on 12/22/2023  - Follow-up with Interventional Radiology to remove drain between 01/22/2024 and 02/05/2024    Diagnosis: Paroxysmal atrial fibrillation  Assessment and Plan of Treatment: Follow-up with Cardiology    Diagnosis: Uncontrolled diabetes mellitus with hyperglycemia  Assessment and Plan of Treatment: Continue Lantus & premeal Lisro

## 2024-01-14 NOTE — DISCHARGE NOTE PROVIDER - PROVIDER TOKENS
FREE:[LAST:[Primary Care Doctor],PHONE:[(   )    -],FAX:[(   )    -]],PROVIDER:[TOKEN:[[id number]]] FREE:[LAST:[Primary Care Doctor],PHONE:[(   )    -],FAX:[(   )    -]],PROVIDER:[TOKEN:[[id number]]]

## 2024-01-14 NOTE — DISCHARGE NOTE PROVIDER - CARE PROVIDER_API CALL
Primary Care Doctor,   Phone: (   )    -  Fax: (   )    -  Follow Up Time:     Jessica Snider  Interventional Radiology and Diagnostic Radiology  [address] [place] [id number]  Phone: [phone]  Fax: [phone]  Follow Up Time:    Primary Care Doctor,   Phone: (   )    -  Fax: (   )    -  Follow Up Time:     Jessica Snider  Interventional Radiology and Diagnostic Radiology  [address] [place] [id number]  Phone: [phone]  Fax: [phone]  Follow Up Time:

## 2024-01-28 ENCOUNTER — INPATIENT (INPATIENT)
Facility: HOSPITAL | Age: 84
LOS: 3 days | Discharge: HOME HEALTH | DRG: 445 | End: 2024-02-01
Attending: STUDENT IN AN ORGANIZED HEALTH CARE EDUCATION/TRAINING PROGRAM | Admitting: FAMILY MEDICINE
Payer: COMMERCIAL

## 2024-01-28 VITALS
HEIGHT: 62 IN | RESPIRATION RATE: 16 BRPM | OXYGEN SATURATION: 93 % | HEART RATE: 75 BPM | DIASTOLIC BLOOD PRESSURE: 67 MMHG | SYSTOLIC BLOOD PRESSURE: 121 MMHG | TEMPERATURE: 98 F | WEIGHT: 184.97 LBS

## 2024-01-28 PROCEDURE — 99285 EMERGENCY DEPT VISIT HI MDM: CPT

## 2024-01-28 NOTE — ED PROVIDER NOTE - ATTENDING CONTRIBUTION TO CARE
82 yo M with PMH of DM, HTN, CKD III presenting from [facility] and rehab for AMS described as slower to answer questions.   I, Alba Orozco, personally saw the patient with the resident, and completed the key components of the history and physical exam. I then discussed the management plan with the resident. 83yo male with a past medical history of diabetes mellitus, hypertension , and chronic kidney disease  stage III presents from a rehabilitation facility III presenting from [facility] and rehab for AMS described as slower to answer questions.     I, Alba Orozco, personally saw the patient with the resident, and completed the key components of the history and physical exam. I then discussed the management plan with the resident. 83yo male with a past medical history of diabetes mellitus, hypertension, chronic kidney disease stage III, recent covid infection with respiraotry failure a couple of weeks ago, and percutaneous cholecystostomy was placed by interventional radiology on December 22, 2023 here at [facility] presents from a rehabilitation facility III presenting from [facility] and rehab for AMS described as slower to answer questions.     I, Alba Orozco, personally saw the patient with the resident, and completed the key components of the history and physical exam. I then discussed the management plan with the resident.

## 2024-01-28 NOTE — ED ADULT TRIAGE NOTE - CHIEF COMPLAINT QUOTE
pt KEILA La with AMS.  pt A&O x1.  pt altered mentation @ baseline per ems.  drain noted to R quadrant.

## 2024-01-28 NOTE — ED PROVIDER NOTE - NS ED ROS FT
General: Denies fever, chills. lethargy  Resp: Denies coughing, SOB  Cardiovascular: Denies CP  GI: Denies nausea, vomiting, abdominal pain, diarrhea, constipation, blood in stool  : Denies hematuria  MSK: Denies back pain

## 2024-01-28 NOTE — ED PROVIDER NOTE - CLINICAL SUMMARY MEDICAL DECISION MAKING FREE TEXT BOX
82 yo M with PMH of DM, HTN, CKD III presenting from [facility] rehab for AMS and lethargy. Per [facility] employee, patient has been acting more altered the last 2 days with increased lethargy. He is normally only oriented to person and has baseline confusion. This morning he was noted to be hypotensive which improved with fluid bolus. Lab work done earlier today also showed WBC count of 25. [facility] employe denies recent fever, cough, complaints of pain, N/V/D. Patient was admitted a month ago for cholecystitis and had a percutaneous cholecystostomy placed by IR at that time. He is scheduled to have it removed before 2/5/24. Patient only oriented to person, has no complaints, does not know why he is in the ED. 82 yo M with PMH of DM, HTN, CKD III presenting from [facility] rehab for AMS and lethargy. Per family member at bedside, patient is mentating at baseline. Patient is warm to the touch but otherwise well appearing, febrile, RLQ drain in place with small amount of erythema. Patient with leukocytosis and transaminitis on lab work. Will likely need admit depending on CT results. 82 yo M with PMH of DM, HTN, CKD III presenting from [facility] rehab for AMS and lethargy. Per family member at bedside, patient is mentating at baseline. Patient is warm to the touch but otherwise well appearing, febrile, RLQ drain in place with small amount of erythema. Patient with leukocytosis and transaminitis on lab work. CT with persistent inflammatory changes around the gallbladder, drain in place. admit pending surgery consult 84 yo M with PMH of DM, HTN, CKD III presenting from [facility] rehab for AMS and lethargy. Per family member at bedside, patient is mentating at baseline. Patient is warm to the touch but otherwise well appearing, febrile, RLQ drain in place with small amount of erythema. Patient with leukocytosis and transaminitis on lab work. CT with persistent inflammatory changes around the gallbladder, drain in place. Will admit pending surgery consult

## 2024-01-28 NOTE — ED PROVIDER NOTE - OBJECTIVE STATEMENT
84 yo M with PMH of DM, HTN, CKD III presenting from [facility] rehab for AMS and lethargy. Per [facility] employee, patient has been acting more altered the last 2 days with increased lethargy. He is normally only oriented to person and has baseline confusion. This morning he was noted to be hypotensive which improved with fluid bolus. Lab work done earlier today also showed WBC count of 25. [facility] employe denies recent fever, cough, complaints of pain, N/V/D. Patient was admitted a month ago for cholecystitis and had a percutaneous cholecystostomy placed by IR at that time. He is scheduled to have it removed before 2/5/24. Patient only oriented to person, has no complaints, does not know why he is in the ED.

## 2024-01-28 NOTE — ED PROVIDER NOTE - PHYSICAL EXAMINATION
General: Awake, alert, lying in bed in NAD. warm to the touch  HEENT: Normocephalic, atraumatic. No scleral icterus or conjunctival injection. EOMI. dry mucous membranes. Oropharynx clear.   Neck:. Soft and supple.  Cardiac: RRR, Peripheral pulses 2+ and symmetric. No LE edema.  Resp: Lungs CTAB. No accessory muscle use  Abd: Soft, non-tender, non-distended. No guarding, rebound, or rigidity. RLQ drain in place with mild surrounding erythema, no drainage noted.   Back: Spine midline and non-tender.   Skin: No rashes, abrasions, or lacerations.  Neuro: AO x 1. Moves all extremities symmetrically. Motor strength and sensation grossly intact.  Psych: Appropriate mood and affect

## 2024-01-29 DIAGNOSIS — K81.9 CHOLECYSTITIS, UNSPECIFIED: ICD-10-CM

## 2024-01-29 DIAGNOSIS — Z98.890 OTHER SPECIFIED POSTPROCEDURAL STATES: Chronic | ICD-10-CM

## 2024-01-29 LAB
ALBUMIN SERPL ELPH-MCNC: 2.9 G/DL — LOW (ref 3.3–5.2)
ALBUMIN SERPL ELPH-MCNC: 3 G/DL — LOW (ref 3.3–5.2)
ALP SERPL-CCNC: 186 U/L — HIGH (ref 40–120)
ALP SERPL-CCNC: 202 U/L — HIGH (ref 40–120)
ALT FLD-CCNC: 208 U/L — HIGH
ALT FLD-CCNC: 241 U/L — HIGH
AMMONIA BLD-MCNC: 21 UMOL/L — SIGNIFICANT CHANGE UP (ref 11–55)
ANION GAP SERPL CALC-SCNC: 11 MMOL/L — SIGNIFICANT CHANGE UP (ref 5–17)
ANION GAP SERPL CALC-SCNC: 12 MMOL/L — SIGNIFICANT CHANGE UP (ref 5–17)
APPEARANCE UR: CLEAR — SIGNIFICANT CHANGE UP
APTT BLD: 29 SEC — SIGNIFICANT CHANGE UP (ref 24.5–35.6)
AST SERPL-CCNC: 169 U/L — HIGH
AST SERPL-CCNC: 213 U/L — HIGH
BACTERIA # UR AUTO: NEGATIVE /HPF — SIGNIFICANT CHANGE UP
BASE EXCESS BLDV CALC-SCNC: -3.7 MMOL/L — LOW (ref -2–3)
BASOPHILS # BLD AUTO: 0.03 K/UL — SIGNIFICANT CHANGE UP (ref 0–0.2)
BASOPHILS NFR BLD AUTO: 0.1 % — SIGNIFICANT CHANGE UP (ref 0–2)
BILIRUB SERPL-MCNC: 2.1 MG/DL — HIGH (ref 0.4–2)
BILIRUB SERPL-MCNC: 2.1 MG/DL — HIGH (ref 0.4–2)
BILIRUB UR-MCNC: NEGATIVE — SIGNIFICANT CHANGE UP
BUN SERPL-MCNC: 28.9 MG/DL — HIGH (ref 8–20)
BUN SERPL-MCNC: 29.3 MG/DL — HIGH (ref 8–20)
CA-I SERPL-SCNC: 1.11 MMOL/L — LOW (ref 1.15–1.33)
CALCIUM SERPL-MCNC: 7.7 MG/DL — LOW (ref 8.4–10.5)
CALCIUM SERPL-MCNC: 8.1 MG/DL — LOW (ref 8.4–10.5)
CAST: 0 /LPF — SIGNIFICANT CHANGE UP (ref 0–4)
CHLORIDE BLDV-SCNC: 104 MMOL/L — SIGNIFICANT CHANGE UP (ref 96–108)
CHLORIDE SERPL-SCNC: 100 MMOL/L — SIGNIFICANT CHANGE UP (ref 96–108)
CHLORIDE SERPL-SCNC: 102 MMOL/L — SIGNIFICANT CHANGE UP (ref 96–108)
CO2 SERPL-SCNC: 21 MMOL/L — LOW (ref 22–29)
CO2 SERPL-SCNC: 22 MMOL/L — SIGNIFICANT CHANGE UP (ref 22–29)
COLOR SPEC: YELLOW — SIGNIFICANT CHANGE UP
CREAT SERPL-MCNC: 2.67 MG/DL — HIGH (ref 0.5–1.3)
CREAT SERPL-MCNC: 2.71 MG/DL — HIGH (ref 0.5–1.3)
DIFF PNL FLD: NEGATIVE — SIGNIFICANT CHANGE UP
EGFR: 22 ML/MIN/1.73M2 — LOW
EGFR: 23 ML/MIN/1.73M2 — LOW
EOSINOPHIL # BLD AUTO: 0.06 K/UL — SIGNIFICANT CHANGE UP (ref 0–0.5)
EOSINOPHIL NFR BLD AUTO: 0.3 % — SIGNIFICANT CHANGE UP (ref 0–6)
GAS PNL BLDV: 134 MMOL/L — LOW (ref 136–145)
GAS PNL BLDV: SIGNIFICANT CHANGE UP
GAS PNL BLDV: SIGNIFICANT CHANGE UP
GLUCOSE BLDC GLUCOMTR-MCNC: 116 MG/DL — HIGH (ref 70–99)
GLUCOSE BLDC GLUCOMTR-MCNC: 179 MG/DL — HIGH (ref 70–99)
GLUCOSE BLDC GLUCOMTR-MCNC: 191 MG/DL — HIGH (ref 70–99)
GLUCOSE BLDC GLUCOMTR-MCNC: 224 MG/DL — HIGH (ref 70–99)
GLUCOSE BLDV-MCNC: 248 MG/DL — HIGH (ref 70–99)
GLUCOSE SERPL-MCNC: 242 MG/DL — HIGH (ref 70–99)
GLUCOSE SERPL-MCNC: 275 MG/DL — HIGH (ref 70–99)
GLUCOSE UR QL: 500 MG/DL
HCO3 BLDV-SCNC: 22 MMOL/L — SIGNIFICANT CHANGE UP (ref 22–29)
HCT VFR BLD CALC: 29.7 % — LOW (ref 39–50)
HCT VFR BLDA CALC: 29 % — SIGNIFICANT CHANGE UP
HGB BLD CALC-MCNC: 9.8 G/DL — LOW (ref 12.6–17.4)
HGB BLD-MCNC: 9.7 G/DL — LOW (ref 13–17)
HYPOCHROMIA BLD QL: SLIGHT — SIGNIFICANT CHANGE UP
IMM GRANULOCYTES NFR BLD AUTO: 0.5 % — SIGNIFICANT CHANGE UP (ref 0–0.9)
INR BLD: 1.1 RATIO — SIGNIFICANT CHANGE UP (ref 0.85–1.18)
KETONES UR-MCNC: NEGATIVE MG/DL — SIGNIFICANT CHANGE UP
LACTATE BLDV-MCNC: 0.9 MMOL/L — SIGNIFICANT CHANGE UP (ref 0.5–2)
LACTATE BLDV-MCNC: 1.8 MMOL/L — SIGNIFICANT CHANGE UP (ref 0.5–2)
LEUKOCYTE ESTERASE UR-ACNC: NEGATIVE — SIGNIFICANT CHANGE UP
LIDOCAIN IGE QN: 25 U/L — SIGNIFICANT CHANGE UP (ref 22–51)
LYMPHOCYTES # BLD AUTO: 1.15 K/UL — SIGNIFICANT CHANGE UP (ref 1–3.3)
LYMPHOCYTES # BLD AUTO: 5.6 % — LOW (ref 13–44)
MAGNESIUM SERPL-MCNC: 1.8 MG/DL — SIGNIFICANT CHANGE UP (ref 1.6–2.6)
MANUAL SMEAR VERIFICATION: SIGNIFICANT CHANGE UP
MCHC RBC-ENTMCNC: 30 PG — SIGNIFICANT CHANGE UP (ref 27–34)
MCHC RBC-ENTMCNC: 32.7 GM/DL — SIGNIFICANT CHANGE UP (ref 32–36)
MCV RBC AUTO: 92 FL — SIGNIFICANT CHANGE UP (ref 80–100)
MONOCYTES # BLD AUTO: 1.65 K/UL — HIGH (ref 0–0.9)
MONOCYTES NFR BLD AUTO: 8.1 % — SIGNIFICANT CHANGE UP (ref 2–14)
NEUTROPHILS # BLD AUTO: 17.37 K/UL — HIGH (ref 1.8–7.4)
NEUTROPHILS NFR BLD AUTO: 85.4 % — HIGH (ref 43–77)
NITRITE UR-MCNC: NEGATIVE — SIGNIFICANT CHANGE UP
OVALOCYTES BLD QL SMEAR: SLIGHT — SIGNIFICANT CHANGE UP
PCO2 BLDV: 41 MMHG — LOW (ref 42–55)
PH BLDV: 7.34 — SIGNIFICANT CHANGE UP (ref 7.32–7.43)
PH UR: 6 — SIGNIFICANT CHANGE UP (ref 5–8)
PLAT MORPH BLD: NORMAL — SIGNIFICANT CHANGE UP
PLATELET # BLD AUTO: 240 K/UL — SIGNIFICANT CHANGE UP (ref 150–400)
PO2 BLDV: 144 MMHG — HIGH (ref 25–45)
POIKILOCYTOSIS BLD QL AUTO: SLIGHT — SIGNIFICANT CHANGE UP
POLYCHROMASIA BLD QL SMEAR: SIGNIFICANT CHANGE UP
POTASSIUM BLDV-SCNC: 6.1 MMOL/L — HIGH (ref 3.5–5.1)
POTASSIUM SERPL-MCNC: 4.8 MMOL/L — SIGNIFICANT CHANGE UP (ref 3.5–5.3)
POTASSIUM SERPL-MCNC: 5.7 MMOL/L — HIGH (ref 3.5–5.3)
POTASSIUM SERPL-SCNC: 4.8 MMOL/L — SIGNIFICANT CHANGE UP (ref 3.5–5.3)
POTASSIUM SERPL-SCNC: 5.7 MMOL/L — HIGH (ref 3.5–5.3)
PROT SERPL-MCNC: 5.6 G/DL — LOW (ref 6.6–8.7)
PROT SERPL-MCNC: 6.3 G/DL — LOW (ref 6.6–8.7)
PROT UR-MCNC: 100 MG/DL
PROTHROM AB SERPL-ACNC: 12.2 SEC — SIGNIFICANT CHANGE UP (ref 9.5–13)
RAPID RVP RESULT: SIGNIFICANT CHANGE UP
RBC # BLD: 3.23 M/UL — LOW (ref 4.2–5.8)
RBC # FLD: 15.2 % — HIGH (ref 10.3–14.5)
RBC BLD AUTO: ABNORMAL
RBC CASTS # UR COMP ASSIST: 0 /HPF — SIGNIFICANT CHANGE UP (ref 0–4)
SAO2 % BLDV: 99.5 % — SIGNIFICANT CHANGE UP
SARS-COV-2 RNA SPEC QL NAA+PROBE: SIGNIFICANT CHANGE UP
SODIUM SERPL-SCNC: 133 MMOL/L — LOW (ref 135–145)
SODIUM SERPL-SCNC: 134 MMOL/L — LOW (ref 135–145)
SP GR SPEC: 1.02 — SIGNIFICANT CHANGE UP (ref 1–1.03)
SQUAMOUS # UR AUTO: 1 /HPF — SIGNIFICANT CHANGE UP (ref 0–5)
UROBILINOGEN FLD QL: 1 MG/DL — SIGNIFICANT CHANGE UP (ref 0.2–1)
WBC # BLD: 20.37 K/UL — HIGH (ref 3.8–10.5)
WBC # FLD AUTO: 20.37 K/UL — HIGH (ref 3.8–10.5)
WBC UR QL: 0 /HPF — SIGNIFICANT CHANGE UP (ref 0–5)

## 2024-01-29 PROCEDURE — 74176 CT ABD & PELVIS W/O CONTRAST: CPT | Mod: 26,MA

## 2024-01-29 PROCEDURE — 71045 X-RAY EXAM CHEST 1 VIEW: CPT | Mod: 26

## 2024-01-29 PROCEDURE — 99222 1ST HOSP IP/OBS MODERATE 55: CPT

## 2024-01-29 PROCEDURE — 99497 ADVNCD CARE PLAN 30 MIN: CPT | Mod: 25

## 2024-01-29 PROCEDURE — 47531 INJECTION FOR CHOLANGIOGRAM: CPT

## 2024-01-29 PROCEDURE — 93010 ELECTROCARDIOGRAM REPORT: CPT

## 2024-01-29 PROCEDURE — 99223 1ST HOSP IP/OBS HIGH 75: CPT

## 2024-01-29 RX ORDER — FERROUS SULFATE 325(65) MG
325 TABLET ORAL DAILY
Refills: 0 | Status: DISCONTINUED | OUTPATIENT
Start: 2024-01-29 | End: 2024-02-01

## 2024-01-29 RX ORDER — SODIUM CHLORIDE 9 MG/ML
1000 INJECTION, SOLUTION INTRAVENOUS
Refills: 0 | Status: DISCONTINUED | OUTPATIENT
Start: 2024-01-29 | End: 2024-02-01

## 2024-01-29 RX ORDER — INSULIN LISPRO 100/ML
VIAL (ML) SUBCUTANEOUS AT BEDTIME
Refills: 0 | Status: DISCONTINUED | OUTPATIENT
Start: 2024-01-29 | End: 2024-02-01

## 2024-01-29 RX ORDER — POLYETHYLENE GLYCOL 3350 17 G/17G
17 POWDER, FOR SOLUTION ORAL DAILY
Refills: 0 | Status: DISCONTINUED | OUTPATIENT
Start: 2024-01-29 | End: 2024-02-01

## 2024-01-29 RX ORDER — SODIUM CHLORIDE 9 MG/ML
1000 INJECTION INTRAMUSCULAR; INTRAVENOUS; SUBCUTANEOUS
Refills: 0 | Status: DISCONTINUED | OUTPATIENT
Start: 2024-01-29 | End: 2024-02-01

## 2024-01-29 RX ORDER — IOHEXOL 300 MG/ML
30 INJECTION, SOLUTION INTRAVENOUS ONCE
Refills: 0 | Status: COMPLETED | OUTPATIENT
Start: 2024-01-29 | End: 2024-01-29

## 2024-01-29 RX ORDER — INSULIN LISPRO 100/ML
VIAL (ML) SUBCUTANEOUS
Refills: 0 | Status: DISCONTINUED | OUTPATIENT
Start: 2024-01-29 | End: 2024-02-01

## 2024-01-29 RX ORDER — TAMSULOSIN HYDROCHLORIDE 0.4 MG/1
0.4 CAPSULE ORAL AT BEDTIME
Refills: 0 | Status: DISCONTINUED | OUTPATIENT
Start: 2024-01-29 | End: 2024-02-01

## 2024-01-29 RX ORDER — INSULIN GLARGINE 100 [IU]/ML
10 INJECTION, SOLUTION SUBCUTANEOUS AT BEDTIME
Refills: 0 | Status: DISCONTINUED | OUTPATIENT
Start: 2024-01-29 | End: 2024-02-01

## 2024-01-29 RX ORDER — ONDANSETRON 8 MG/1
4 TABLET, FILM COATED ORAL EVERY 6 HOURS
Refills: 0 | Status: DISCONTINUED | OUTPATIENT
Start: 2024-01-29 | End: 2024-02-01

## 2024-01-29 RX ORDER — METOPROLOL TARTRATE 50 MG
25 TABLET ORAL
Refills: 0 | Status: DISCONTINUED | OUTPATIENT
Start: 2024-01-29 | End: 2024-02-01

## 2024-01-29 RX ORDER — PANTOPRAZOLE SODIUM 20 MG/1
40 TABLET, DELAYED RELEASE ORAL DAILY
Refills: 0 | Status: DISCONTINUED | OUTPATIENT
Start: 2024-01-29 | End: 2024-02-01

## 2024-01-29 RX ORDER — ACETAMINOPHEN 500 MG
650 TABLET ORAL EVERY 6 HOURS
Refills: 0 | Status: DISCONTINUED | OUTPATIENT
Start: 2024-01-29 | End: 2024-02-01

## 2024-01-29 RX ORDER — DEXTROSE 50 % IN WATER 50 %
15 SYRINGE (ML) INTRAVENOUS ONCE
Refills: 0 | Status: DISCONTINUED | OUTPATIENT
Start: 2024-01-29 | End: 2024-02-01

## 2024-01-29 RX ORDER — PIPERACILLIN AND TAZOBACTAM 4; .5 G/20ML; G/20ML
3.38 INJECTION, POWDER, LYOPHILIZED, FOR SOLUTION INTRAVENOUS ONCE
Refills: 0 | Status: COMPLETED | OUTPATIENT
Start: 2024-01-29 | End: 2024-01-29

## 2024-01-29 RX ORDER — GABAPENTIN 400 MG/1
300 CAPSULE ORAL AT BEDTIME
Refills: 0 | Status: DISCONTINUED | OUTPATIENT
Start: 2024-01-29 | End: 2024-02-01

## 2024-01-29 RX ORDER — DEXTROSE 50 % IN WATER 50 %
25 SYRINGE (ML) INTRAVENOUS ONCE
Refills: 0 | Status: DISCONTINUED | OUTPATIENT
Start: 2024-01-29 | End: 2024-02-01

## 2024-01-29 RX ORDER — HEPARIN SODIUM 5000 [USP'U]/ML
5000 INJECTION INTRAVENOUS; SUBCUTANEOUS EVERY 8 HOURS
Refills: 0 | Status: DISCONTINUED | OUTPATIENT
Start: 2024-01-29 | End: 2024-01-29

## 2024-01-29 RX ORDER — FOLIC ACID 0.8 MG
1 TABLET ORAL DAILY
Refills: 0 | Status: DISCONTINUED | OUTPATIENT
Start: 2024-01-29 | End: 2024-02-01

## 2024-01-29 RX ORDER — PIPERACILLIN AND TAZOBACTAM 4; .5 G/20ML; G/20ML
3.38 INJECTION, POWDER, LYOPHILIZED, FOR SOLUTION INTRAVENOUS EVERY 8 HOURS
Refills: 0 | Status: DISCONTINUED | OUTPATIENT
Start: 2024-01-29 | End: 2024-02-01

## 2024-01-29 RX ORDER — SODIUM BICARBONATE 1 MEQ/ML
650 SYRINGE (ML) INTRAVENOUS
Refills: 0 | Status: DISCONTINUED | OUTPATIENT
Start: 2024-01-29 | End: 2024-02-01

## 2024-01-29 RX ORDER — DRONEDARONE 400 MG/1
400 TABLET, FILM COATED ORAL
Refills: 0 | Status: DISCONTINUED | OUTPATIENT
Start: 2024-01-29 | End: 2024-02-01

## 2024-01-29 RX ORDER — SODIUM CHLORIDE 9 MG/ML
2600 INJECTION INTRAMUSCULAR; INTRAVENOUS; SUBCUTANEOUS ONCE
Refills: 0 | Status: COMPLETED | OUTPATIENT
Start: 2024-01-29 | End: 2024-01-29

## 2024-01-29 RX ORDER — GLUCAGON INJECTION, SOLUTION 0.5 MG/.1ML
1 INJECTION, SOLUTION SUBCUTANEOUS ONCE
Refills: 0 | Status: DISCONTINUED | OUTPATIENT
Start: 2024-01-29 | End: 2024-02-01

## 2024-01-29 RX ORDER — DEXTROSE 50 % IN WATER 50 %
12.5 SYRINGE (ML) INTRAVENOUS ONCE
Refills: 0 | Status: DISCONTINUED | OUTPATIENT
Start: 2024-01-29 | End: 2024-02-01

## 2024-01-29 RX ADMIN — TAMSULOSIN HYDROCHLORIDE 0.4 MILLIGRAM(S): 0.4 CAPSULE ORAL at 22:25

## 2024-01-29 RX ADMIN — DRONEDARONE 400 MILLIGRAM(S): 400 TABLET, FILM COATED ORAL at 18:20

## 2024-01-29 RX ADMIN — PIPERACILLIN AND TAZOBACTAM 25 GRAM(S): 4; .5 INJECTION, POWDER, LYOPHILIZED, FOR SOLUTION INTRAVENOUS at 22:25

## 2024-01-29 RX ADMIN — Medication 1 MILLIGRAM(S): at 11:27

## 2024-01-29 RX ADMIN — SODIUM CHLORIDE 75 MILLILITER(S): 9 INJECTION INTRAMUSCULAR; INTRAVENOUS; SUBCUTANEOUS at 10:57

## 2024-01-29 RX ADMIN — PIPERACILLIN AND TAZOBACTAM 200 GRAM(S): 4; .5 INJECTION, POWDER, LYOPHILIZED, FOR SOLUTION INTRAVENOUS at 02:07

## 2024-01-29 RX ADMIN — Medication 325 MILLIGRAM(S): at 11:26

## 2024-01-29 RX ADMIN — IOHEXOL 30 MILLILITER(S): 300 INJECTION, SOLUTION INTRAVENOUS at 01:27

## 2024-01-29 RX ADMIN — INSULIN GLARGINE 10 UNIT(S): 100 INJECTION, SOLUTION SUBCUTANEOUS at 22:29

## 2024-01-29 RX ADMIN — Medication 650 MILLIGRAM(S): at 18:15

## 2024-01-29 RX ADMIN — Medication 25 MILLIGRAM(S): at 18:15

## 2024-01-29 RX ADMIN — GABAPENTIN 300 MILLIGRAM(S): 400 CAPSULE ORAL at 22:25

## 2024-01-29 RX ADMIN — Medication 1: at 11:55

## 2024-01-29 RX ADMIN — PANTOPRAZOLE SODIUM 40 MILLIGRAM(S): 20 TABLET, DELAYED RELEASE ORAL at 11:27

## 2024-01-29 RX ADMIN — SODIUM CHLORIDE 2600 MILLILITER(S): 9 INJECTION INTRAMUSCULAR; INTRAVENOUS; SUBCUTANEOUS at 03:50

## 2024-01-29 RX ADMIN — PIPERACILLIN AND TAZOBACTAM 25 GRAM(S): 4; .5 INJECTION, POWDER, LYOPHILIZED, FOR SOLUTION INTRAVENOUS at 10:56

## 2024-01-29 RX ADMIN — PIPERACILLIN AND TAZOBACTAM 3.38 GRAM(S): 4; .5 INJECTION, POWDER, LYOPHILIZED, FOR SOLUTION INTRAVENOUS at 02:37

## 2024-01-29 NOTE — ED ADULT NURSE REASSESSMENT NOTE - NSFALLRISKINTERV_ED_ALL_ED

## 2024-01-29 NOTE — CONSULT NOTE ADULT - ASSESSMENT
82 yo M with PMH of DM, HTN, CKD III, history of percutaneous cholecystostomy 12/23 for cholecystitis presenting from [facility] rehab for AMS and lethargy. GI consulted for elevated liver enzymes. Exam benign. imaging significant for decompressed gallbladder with some surrounding inflammation. WBC elevated at 20k,    Bili 2.1. Low concern for choledocholithiases at this time. IR tube appears to be draining though would obtain tube study for confirmation. Would continue abx at this time given possibility of cholecystitis. Keep NPO. Gentle fluid administration. Further recommendations regarding utility of ERCP pending testing.     #elevated LFT  #history of percutaneous cholecystostomy

## 2024-01-29 NOTE — PROCEDURE NOTE - PROCEDURE FINDINGS AND DETAILS
The patient was brought to the IR dept for evaluation of sky tube. per wife, tube has been draining. The existing drainage bag was removed,  image was obtained, and drain was injected with contrast which showed, filling defects (likely stones) without obstruction of biliary ducts. The catheter was not occluded.   given these findings, the drain was secured to his skin with a new suture and reattached to gravity bag drainage.

## 2024-01-29 NOTE — H&P ADULT - NSHPLABSRESULTS_GEN_ALL_CORE
LABS:                        9.7    20.37 )-----------( 240      ( 29 Jan 2024 01:05 )             29.7     01-29    134<L>  |  102  |  29.3<H>  ----------------------------<  275<H>  4.8   |  21.0<L>  |  2.67<H>    Ca    7.7<L>      29 Jan 2024 02:18  Mg     1.8     01-29    TPro  5.6<L>  /  Alb  2.9<L>  /  TBili  2.1<H>  /  DBili  x   /  AST  169<H>  /  ALT  208<H>  /  AlkPhos  186<H>  01-29    PT/INR - ( 29 Jan 2024 01:05 )   PT: 12.2 sec;   INR: 1.10 ratio         PTT - ( 29 Jan 2024 01:05 )  PTT:29.0 sec    CT Abdomen and Pelvis w/ Oral Cont (01.29.24 @ 06:17)     No significant interval change in appearance of decompressed gallbladder with surrounding inflammatory changes and indwelling percutaneous cholecystostomy tube.    Continued asymmetric prominence the left posterolateral abdominal wall, slightly progressed compared to the prior study. Recommend clinical correlation to assess for developing wall hematoma.

## 2024-01-29 NOTE — H&P ADULT - NSHPPHYSICALEXAM_GEN_ALL_CORE
Vital Signs   T(C): 36.8 (29 Jan 2024 07:25), Max: 37.4 (29 Jan 2024 00:36)  T(F): 98.3 (29 Jan 2024 07:25), Max: 99.3 (29 Jan 2024 00:36)  HR: 75 (29 Jan 2024 07:25) (75 - 77)  BP: 128/58 (29 Jan 2024 07:25) (120/71 - 128/58)  RR: 18 (29 Jan 2024 07:25) (16 - 18)  SpO2: 95% (29 Jan 2024 07:25) (93% - 96%)  Parameters below as of 29 Jan 2024 07:25  Patient On (Oxygen Delivery Method): room air  General: Elderly male sitting in bed comfortably. No acute distress  HEENT: EOMI. Clear conjunctivae. Moist mucus membrane  Neck: Supple.   Chest: Good air entry. No wheezing, rales or rhonchi.   Heart: S1 & S2 with RRR.  Abdomen: Obese. Soft. Non-tender. + BS. Cholecystostomy tube in place with some erythema around tube site. No tenderness, warmth or drainage.   Ext: No pedal edema. No calf tenderness   Neuro: Active and alert. No focal deficit. No speech disorder.  Skin: Warm and Dry  Psychiatry: Normal mood and affect

## 2024-01-29 NOTE — CONSULT NOTE ADULT - ATTENDING COMMENTS
Patient seen and examined in the emergency room.  Patient with history of cholecystitis with cholecystostomy tube in place arrived with altered mental status and elevated LFTs.  On review of imaging, unlikely a biliary stone.  Recommended a Cholecystostomy study.  Start antibiotics and IV fluids.  GI will follow-up.  Discussed with attending hospitalist Dr. Amaya and the family at the bedside.
Patient seen and examined with resident at bedside. Abd soft, NTTP, ND, perc sky tube in place, draining bile. Imaging and labs reviewed, tube draining appropriately, GB decompressed. Rec GI consult to eval for more proximal biliary problem. Agree with medical admission, no indication for surgical interventions. please call with questions.

## 2024-01-29 NOTE — ED ADULT NURSE NOTE - NSFALLRISKASMTTYPE_ED_ALL_ED
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          
Initial (On Arrival)

## 2024-01-29 NOTE — ED ADULT NURSE NOTE - HISTORY OF COVID-19 VACCINATION
Problem: Risk for Impaired Skin Integrity  Goal: Tissue integrity - skin and mucous membranes  Description  Structural intactness and normal physiological function of skin and  mucous membranes.   Outcome: Ongoing  Intervention: TURN PATIENT  Note:   Turned pt Q2 hrs and prn Vaccine status unknown

## 2024-01-29 NOTE — ED ADULT NURSE REASSESSMENT NOTE - NS ED NURSE REASSESS COMMENT FT1
MD previously at bedside. patient placed on continuous cardiac monitor/pulse ox. patient breathing unlabored and even. patient and daughter updated on plan of care and understands with verbal feedback.
patient resting in stretcher without complain. patient breathing is unlabored and equal. vitals recorded and entered into EMR. daughter at bedside updated on plan of care and pending scan. patient safety maintained.
Pt endorsed from previous shift, Sleeping at this time. Daughter at bedside.   TBA  Condom cath in place and draining, Radha tube in place, minimal drainage in bag.

## 2024-01-29 NOTE — H&P ADULT - NSICDXPASTSURGICALHX_GEN_ALL_CORE_FT
PAST SURGICAL HISTORY:  No significant past surgical history      PAST SURGICAL HISTORY:  History of endoscopy

## 2024-01-29 NOTE — H&P ADULT - NSHPSOCIALHISTORY_GEN_ALL_CORE
Lives with wife but currently in [facility].   Former smoker - quit > 20 years ago.  Drinks alcohol socially.  No illicit drug use.

## 2024-01-29 NOTE — ED ADULT NURSE NOTE - OBJECTIVE STATEMENT
patient presents to ED from rehab center due to increased altered mental status. daughter at bedside stating patient was at baseline mental status, a/ox3 at rehab when she left him at 7pm last night. patient daughter states patient had COVID last month and has been intermittently declining since. patient daughter states father has had a decrease in appetite. patient communicating with daughter at regular baseline. patient daughter states patient was in rehab after COVID. patient a/o to name/ but does not know why in hospital. patient currently has cholecystostomy drain. patient presents to ED from rehab center due to increased altered mental status. daughter at bedside stating patient was at baseline mental status, a/ox3 at rehab when she left him at 7pm last night. patient daughter states patient had COVID last month and has been intermittently declining since. patient daughter states father has had a decrease in appetite. patient communicating with daughter at regular baseline. patient daughter states patient was in rehab after COVID. patient a/o to name/ but does not know why in hospital. patient currently has cholecystostomy drain. patient skin tone appears jaundiced.

## 2024-01-29 NOTE — CONSULT NOTE ADULT - SUBJECTIVE AND OBJECTIVE BOX
Patient is a 84y old  Male who presents with a chief complaint of Confusion (29 Jan 2024 08:11)      HPI: 82 yo M with PMH of DM, HTN, CKD III presenting from [facility] rehab for AMS and lethargy. GI consulted for elevated liver enzymes. As per wife at bedside, patient has waxing and waning mental status and has been more fatigued this week. At the rehab he had hypotension which improved with fluid bolus. Patient was admitted a month ago for cholecystitis and had a percutaneous cholecystostomy placed by IR at that time. atient denies any current abdominal pain, jaundice, fevers, chills, nausea, vomiting, sob, cp, LE swelling, melena or hematochezia.     Previous EGD 1/10/24 with esophagitis, HH, and gastritis with polyps seen in fundus.           REVIEW OF SYSTEMS:  Constitutional: No fever, weight loss or fatigue  ENMT:  No difficulty hearing, tinnitus, vertigo; No sinus or throat pain  Respiratory: No cough, wheezing, chills or hemoptysis  Cardiovascular: No chest pain, palpitations, dizziness or leg swelling  Gastrointestinal: No abdominal or epigastric pain. No nausea, vomiting or hematemesis; No diarrhea or constipation. No melena or hematochezia.  Skin: No itching, burning, rashes or lesions   Musculoskeletal: No joint pain or swelling; No muscle, back or extremity pain    PAST MEDICAL & SURGICAL HISTORY:  Diabetes      Hypertension      Prostate disorder      Anxiety      High cholesterol      Ulcer      No significant past surgical history          FAMILY HISTORY:  Family history of stomach cancer  Father    Family history of emphysema  Mother        SOCIAL HISTORY:  Smoking Status: [ ] Current, [ ] Former, [X] er  Pack Years:    MEDICATIONS:  MEDICATIONS  (STANDING):  dextrose 5%. 1000 milliLiter(s) (100 mL/Hr) IV Continuous <Continuous>  dextrose 5%. 1000 milliLiter(s) (50 mL/Hr) IV Continuous <Continuous>  dextrose 50% Injectable 12.5 Gram(s) IV Push once  dextrose 50% Injectable 25 Gram(s) IV Push once  dextrose 50% Injectable 25 Gram(s) IV Push once  glucagon  Injectable 1 milliGRAM(s) IntraMuscular once  insulin lispro (ADMELOG) corrective regimen sliding scale   SubCutaneous three times a day before meals  insulin lispro (ADMELOG) corrective regimen sliding scale   SubCutaneous at bedtime  piperacillin/tazobactam IVPB.. 3.375 Gram(s) IV Intermittent every 8 hours  sodium chloride 0.9%. 1000 milliLiter(s) (75 mL/Hr) IV Continuous <Continuous>    MEDICATIONS  (PRN):  acetaminophen     Tablet .. 650 milliGRAM(s) Oral every 6 hours PRN Temp greater or equal to 38C (100.4F), Mild Pain (1 - 3)  dextrose Oral Gel 15 Gram(s) Oral once PRN Blood Glucose LESS THAN 70 milliGRAM(s)/deciliter  ondansetron Injectable 4 milliGRAM(s) IV Push every 6 hours PRN Nausea and/or Vomiting      Allergies    No Known Allergies    Intolerances        Vital Signs Last 24 Hrs  T(C): 36.8 (29 Jan 2024 07:25), Max: 37.4 (29 Jan 2024 00:36)  T(F): 98.3 (29 Jan 2024 07:25), Max: 99.3 (29 Jan 2024 00:36)  HR: 75 (29 Jan 2024 07:25) (75 - 77)  BP: 128/58 (29 Jan 2024 07:25) (120/71 - 128/58)  BP(mean): --  RR: 18 (29 Jan 2024 07:25) (16 - 18)  SpO2: 95% (29 Jan 2024 07:25) (93% - 96%)    Parameters below as of 29 Jan 2024 07:25  Patient On (Oxygen Delivery Method): room air            PHYSICAL EXAM:    General: Well developed; well nourished; in no acute distress  HEENT: MMM, conjunctiva and sclera clear  Gastrointestinal: Soft, non-tender non-distended; Normal bowel sounds; No rebound or guarding  Extremities: Normal range of motion, No clubbing, cyanosis or edema  Neurological: Alert and oriented x3  Skin: Warm and dry. No obvious rash      LABS:                        9.7    20.37 )-----------( 240      ( 29 Jan 2024 01:05 )             29.7     01-29    134<L>  |  102  |  29.3<H>  ----------------------------<  275<H>  4.8   |  21.0<L>  |  2.67<H>    Ca    7.7<L>      29 Jan 2024 02:18  Mg     1.8     01-29    TPro  5.6<L>  /  Alb  2.9<L>  /  TBili  2.1<H>  /  DBili  x   /  AST  169<H>  /  ALT  208<H>  /  AlkPhos  186<H>  01-29          RADIOLOGY & ADDITIONAL STUDIES:   
HPI: 84 yo M with PMH of DM, HTN, CKD III presenting from [facility] rehab for AMS and lethargy. Per [facility] employee, patient has been acting more altered the last 2 days with increased lethargy. He is normally only oriented to person and has baseline confusion. This morning he was noted to be hypotensive which improved with fluid bolus. Lab work done earlier today also showed WBC count of 25. [facility] employe denies recent fever, cough, complaints of pain, N/V/D. Patient was admitted a month ago for cholecystitis and had a percutaneous cholecystostomy placed by IR at that time. He is scheduled to have it removed before 2/5/24.    Patient was seen at the bedside and evaluated. He is oriented to person and place. Denies abdominal pain, n/v or other acute complaints at this time.     Vital Signs Last 24 Hrs  T(C): 36.8 (29 Jan 2024 07:25), Max: 37.4 (29 Jan 2024 00:36)  T(F): 98.3 (29 Jan 2024 07:25), Max: 99.3 (29 Jan 2024 00:36)  HR: 75 (29 Jan 2024 07:25) (75 - 77)  BP: 128/58 (29 Jan 2024 07:25) (120/71 - 128/58)  BP(mean): --  RR: 18 (29 Jan 2024 07:25) (16 - 18)  SpO2: 95% (29 Jan 2024 07:25) (93% - 96%)    Parameters below as of 29 Jan 2024 07:25  Patient On (Oxygen Delivery Method): room air                         9.7    20.37 )-----------( 240      ( 29 Jan 2024 01:05 )             29.7   01-29    134<L>  |  102  |  29.3<H>  ----------------------------<  275<H>  4.8   |  21.0<L>  |  2.67<H>    Ca    7.7<L>      29 Jan 2024 02:18  Mg     1.8     01-29    TPro  5.6<L>  /  Alb  2.9<L>  /  TBili  2.1<H>  /  DBili  x   /  AST  169<H>  /  ALT  208<H>  /  AlkPhos  186<H>  01-29    < from: CT Abdomen and Pelvis w/ Oral Cont (01.29.24 @ 06:17) >  IMPRESSION:    No significant interval change in appearance of decompressed gallbladder   with surrounding inflammatory changes and indwelling percutaneous   cholecystostomy tube.    Continued asymmetric prominence the left posterolateral abdominal wall,   slightly progressed compared to the prior study. Recommend clinical   correlation to assess for developing wall hematoma.    < end of copied text >      LOS: 1d    VITALS:   T(C): 36.8 (01-29-24 @ 07:25), Max: 37.4 (01-29-24 @ 00:36)  HR: 75 (01-29-24 @ 07:25) (75 - 77)  BP: 128/58 (01-29-24 @ 07:25) (120/71 - 128/58)  RR: 18 (01-29-24 @ 07:25) (16 - 18)  SpO2: 95% (01-29-24 @ 07:25) (93% - 96%)    GENERAL: NAD, lying in bed comfortably  HEAD:  Atraumatic, Normocephalic  ENT: Moist mucous membranes  NECK: Supple, No JVD  CHEST/LUNG: Clear to auscultation bilaterally; No rales, rhonchi, wheezing, or rubs. Unlabored respirations  HEART: Regular rate and rhythm; No murmurs, rubs, or gallops  ABDOMEN: BSx4; Soft, nontender, nondistended, PCT tube in place draining bile. No overlying evidence of infection.   EXTREMITIES:  2+ Peripheral Pulses, brisk capillary refill. No clubbing, cyanosis, or edema  NERVOUS SYSTEM:  A&Ox2, no focal deficits   SKIN: No rashes or lesions

## 2024-01-29 NOTE — H&P ADULT - CONVERSATION DETAILS
Discussed Advance Directives with patient's wife -- Jailene Figueroa. She was explained about resuscitation and she wants him to be revived in case of emergency.

## 2024-01-29 NOTE — PROCEDURE NOTE - NSINFORMCONSENT_GEN_A_CORE
via wife/Benefits, risks, and possible complications of procedure explained to patient/caregiver who verbalized understanding and gave written consent.

## 2024-01-29 NOTE — CONSULT NOTE ADULT - ASSESSMENT
Assessment: Patient is a 84 yo M with PMH of DM, HTN, CKD III presenting from [facility] rehab for AMS and lethargy. General surgery consulted due to inflammatory changes around the gallbladder seen on CT.    Plan:  -No acute surgical intervention at this time  -Recommend medicine and GI evaluation  -No abdominal pain to suggest acute sky and gb is already decompressed  -Recommend daily flushing of tube  -IR consult for drain study if patient still here when tube is due to be removed  -To see with attending   Assessment: Patient is a 84 yo M with PMH of DM, HTN, CKD III presenting from [facility] rehab for AMS and lethargy. General surgery consulted due to inflammatory changes around the gallbladder seen on CT.    Plan:  -No acute surgical intervention at this time  -Recommend medicine and GI evaluation  -No abdominal pain to suggest acute sky and gb is already decompressed  -Recommend daily flushing of tube  -IR consult for drain study if patient still here when tube is due to be removed  -Discussed with attending surgeon Dr. San  Assessment: Patient is a 84 yo M with PMH of DM, HTN, CKD III presenting from [facility] rehab for AMS and lethargy. General surgery consulted due to inflammatory changes around the gallbladder seen on CT.    Plan:  -No acute surgical intervention at this time  -Recommend medicine and GI evaluation  -No abdominal pain to suggest acute sky and gb is already decompressed  -Recommend daily flushing of tube  -IR consult for drain study if patient still here when tube is due to be removed  -Discussed and seen with attending surgeon Dr. San

## 2024-01-29 NOTE — H&P ADULT - ASSESSMENT
INCOMPLETE 84 year old male with history of Acute Cholecystitis s/p Cholecystomy Tube (12/22/23), COVID-19, PAF not on AC due to GIB + Fall Risk, Esophagitis, Anemia, DM 2, HTN, HLD, CKD III/IV, BPH and Cognitive Impairment brought by EMS from Rehab ([place]) for evaluation. As per wife, patient has not been feeling well for the last 3 days. He has been very sleepy & lethargic with decreased oral intake. He fell off chair 3 days ago due to weakness -- did not hit his head or injured any part of his body. He has been complaining of RUQ pain intermittently. He is currently awake and alert -- not completely oriented which is his baseline. No active complaints at this time.  In ER, he was found to have Leukocytosis with Elevated LFTs. CT showed no significant interval change in appearance of decompressed gallbladder with surrounding inflammatory changes and indwelling percutaneous cholecystostomy tube. Seen by Surgery and no intervention was recommended. Seen by GI who recommended IVF, Antibiotics and Tube Study. He was given 2600 NS Bolus and a dose of Zosyn.     1) Acute Cholangitis   - Follow blood cultures  - Tube study by IR  - IVF  - Continue Zosyn  - Monitor LFTs  - GI / Surgery recs noted     2) Asymmetric Prominence of  Left Posterolateral Abdominal Wall   - Monitor for suspected developing hematoma     3) Esophagitis   - Continue Protonix     4) CKD III/IV / Metabolic Acidosis   - Stable   - Continue Sodium Bicarb  - Avoid nephrotoxic medications     5) BPH  - Continue Flomax    6) PAF  - Not on AC due to GIB and Fall Risk  - Continue Multaq and Metoprolol    7) HLD  - Hold Simvastatin due to elevated LFTs  - Low Fat Diet    8) DM 2  - A1c 8.2 on 12/22/23  - Accu checks and ISS  - Continue Lantus 10 units at bedtime    9) Anemia  - Stable  - Continue Ferrous Sulfate     Full Code.     DVT Prophylaxis -- Venodyne    Dispo: Likely NITHIN once stable. PT Eval.    Updated patient's wife at bedside.

## 2024-01-29 NOTE — H&P ADULT - HISTORY OF PRESENT ILLNESS
INCOMPLETE 84 year old male with history of Acute Cholecystitis s/p Cholecystomy Tube (12/22/23), COVID-19, PAF not on AC due to GIB + Fall Risk, HTN, HLD, CKD 3, DM2 and BPH    INCOMPLETE 84 year old male with history of Acute Cholecystitis s/p Cholecystomy Tube (12/22/23), COVID-19, PAF not on AC due to GIB + Fall Risk, Esophagitis, DM 2, HTN, HLD, CKD III/IV, BPH and Cognitive Impairment brought by EMS from Rehab ([place]) for evaluation. As per wife, patient has not been feeling well for the last 3 days. He has been very sleepy & lethargic with decreased oral intake. He fell off chair 3 days ago due to weakness -- did not hit his head or injured any part of his body. He has been complaining of RUQ pain intermittently. He is currently awake and alert -- not completely oriented which is his baseline. No active complaints at this time.  In ER, he was found to have Leukocytosis with Elevated LFTs. CT showed no significant interval change in appearance of decompressed gallbladder with surrounding inflammatory changes and indwelling percutaneous cholecystostomy tube. Seen by Surgery and no intervention was recommended. Seen by GI who recommended IVF, Antibiotics and Tube Study. He was given 2600 NS Bolus and a dose of Zosyn.

## 2024-01-29 NOTE — PATIENT PROFILE ADULT - FALL HARM RISK - HARM RISK INTERVENTIONS

## 2024-01-29 NOTE — ED ADULT NURSE NOTE - NSFALLRISKINTERV_ED_ALL_ED
Assistance OOB with selected safe patient handling equipment if applicable/Assistance with ambulation/Communicate fall risk and risk factors to all staff, patient, and family/Monitor gait and stability/Monitor for mental status changes and reorient to person, place, and time, as needed/Provide patient with walking aids/Provide visual cue: yellow wristband, yellow gown, etc/Reinforce activity limits and safety measures with patient and family/Toileting schedule using arm’s reach rule for commode and bathroom/Use of alarms - bed, stretcher, chair and/or video monitoring/Call bell, personal items and telephone in reach/Instruct patient to call for assistance before getting out of bed/chair/stretcher/Non-slip footwear applied when patient is off stretcher/[place] to call system/Physically safe environment - no spills, clutter or unnecessary equipment/Purposeful Proactive Rounding/Room/bathroom lighting operational, light cord in reach

## 2024-01-29 NOTE — ED PROCEDURE NOTE - PROCEDURE ADDITIONAL DETAILS
Educational bedside u/s performed. Patient informed study was for educational purposes only, verbalized consent.

## 2024-01-30 LAB
ALBUMIN SERPL ELPH-MCNC: 2.6 G/DL — LOW (ref 3.3–5.2)
ALP SERPL-CCNC: 145 U/L — HIGH (ref 40–120)
ALT FLD-CCNC: 122 U/L — HIGH
ANION GAP SERPL CALC-SCNC: 12 MMOL/L — SIGNIFICANT CHANGE UP (ref 5–17)
ANISOCYTOSIS BLD QL: SLIGHT — SIGNIFICANT CHANGE UP
AST SERPL-CCNC: 64 U/L — HIGH
BASOPHILS # BLD AUTO: 0 K/UL — SIGNIFICANT CHANGE UP (ref 0–0.2)
BASOPHILS NFR BLD AUTO: 0 % — SIGNIFICANT CHANGE UP (ref 0–2)
BILIRUB SERPL-MCNC: 1.6 MG/DL — SIGNIFICANT CHANGE UP (ref 0.4–2)
BUN SERPL-MCNC: 23.7 MG/DL — HIGH (ref 8–20)
CALCIUM SERPL-MCNC: 7.5 MG/DL — LOW (ref 8.4–10.5)
CHLORIDE SERPL-SCNC: 105 MMOL/L — SIGNIFICANT CHANGE UP (ref 96–108)
CO2 SERPL-SCNC: 19 MMOL/L — LOW (ref 22–29)
CREAT SERPL-MCNC: 2.34 MG/DL — HIGH (ref 0.5–1.3)
CULTURE RESULTS: SIGNIFICANT CHANGE UP
EGFR: 27 ML/MIN/1.73M2 — LOW
EOSINOPHIL # BLD AUTO: 0 K/UL — SIGNIFICANT CHANGE UP (ref 0–0.5)
EOSINOPHIL NFR BLD AUTO: 0 % — SIGNIFICANT CHANGE UP (ref 0–6)
GIANT PLATELETS BLD QL SMEAR: PRESENT — SIGNIFICANT CHANGE UP
GLUCOSE BLDC GLUCOMTR-MCNC: 119 MG/DL — HIGH (ref 70–99)
GLUCOSE BLDC GLUCOMTR-MCNC: 139 MG/DL — HIGH (ref 70–99)
GLUCOSE BLDC GLUCOMTR-MCNC: 239 MG/DL — HIGH (ref 70–99)
GLUCOSE BLDC GLUCOMTR-MCNC: 353 MG/DL — HIGH (ref 70–99)
GLUCOSE SERPL-MCNC: 189 MG/DL — HIGH (ref 70–99)
HCT VFR BLD CALC: 25.7 % — LOW (ref 39–50)
HGB BLD-MCNC: 8 G/DL — LOW (ref 13–17)
LYMPHOCYTES # BLD AUTO: 0.54 K/UL — LOW (ref 1–3.3)
LYMPHOCYTES # BLD AUTO: 5.2 % — LOW (ref 13–44)
MACROCYTES BLD QL: SLIGHT — SIGNIFICANT CHANGE UP
MAGNESIUM SERPL-MCNC: 1.6 MG/DL — SIGNIFICANT CHANGE UP (ref 1.6–2.6)
MANUAL SMEAR VERIFICATION: SIGNIFICANT CHANGE UP
MCHC RBC-ENTMCNC: 29.1 PG — SIGNIFICANT CHANGE UP (ref 27–34)
MCHC RBC-ENTMCNC: 31.1 GM/DL — LOW (ref 32–36)
MCV RBC AUTO: 93.5 FL — SIGNIFICANT CHANGE UP (ref 80–100)
MONOCYTES # BLD AUTO: 0.18 K/UL — SIGNIFICANT CHANGE UP (ref 0–0.9)
MONOCYTES NFR BLD AUTO: 1.7 % — LOW (ref 2–14)
NEUTROPHILS # BLD AUTO: 9.51 K/UL — HIGH (ref 1.8–7.4)
NEUTROPHILS NFR BLD AUTO: 91.3 % — HIGH (ref 43–77)
NEUTS BAND # BLD: 0.9 % — SIGNIFICANT CHANGE UP (ref 0–8)
OVALOCYTES BLD QL SMEAR: SLIGHT — SIGNIFICANT CHANGE UP
PLAT MORPH BLD: NORMAL — SIGNIFICANT CHANGE UP
PLATELET # BLD AUTO: 183 K/UL — SIGNIFICANT CHANGE UP (ref 150–400)
POIKILOCYTOSIS BLD QL AUTO: SLIGHT — SIGNIFICANT CHANGE UP
POLYCHROMASIA BLD QL SMEAR: SLIGHT — SIGNIFICANT CHANGE UP
POTASSIUM SERPL-MCNC: 4.2 MMOL/L — SIGNIFICANT CHANGE UP (ref 3.5–5.3)
POTASSIUM SERPL-SCNC: 4.2 MMOL/L — SIGNIFICANT CHANGE UP (ref 3.5–5.3)
PROT SERPL-MCNC: 5.2 G/DL — LOW (ref 6.6–8.7)
RBC # BLD: 2.75 M/UL — LOW (ref 4.2–5.8)
RBC # FLD: 15.4 % — HIGH (ref 10.3–14.5)
RBC BLD AUTO: ABNORMAL
SODIUM SERPL-SCNC: 136 MMOL/L — SIGNIFICANT CHANGE UP (ref 135–145)
SPECIMEN SOURCE: SIGNIFICANT CHANGE UP
VARIANT LYMPHS # BLD: 0.9 % — SIGNIFICANT CHANGE UP (ref 0–6)
WBC # BLD: 10.31 K/UL — SIGNIFICANT CHANGE UP (ref 3.8–10.5)
WBC # FLD AUTO: 10.31 K/UL — SIGNIFICANT CHANGE UP (ref 3.8–10.5)

## 2024-01-30 PROCEDURE — 99231 SBSQ HOSP IP/OBS SF/LOW 25: CPT

## 2024-01-30 PROCEDURE — 99233 SBSQ HOSP IP/OBS HIGH 50: CPT

## 2024-01-30 RX ADMIN — Medication 650 MILLIGRAM(S): at 05:27

## 2024-01-30 RX ADMIN — PIPERACILLIN AND TAZOBACTAM 25 GRAM(S): 4; .5 INJECTION, POWDER, LYOPHILIZED, FOR SOLUTION INTRAVENOUS at 22:13

## 2024-01-30 RX ADMIN — INSULIN GLARGINE 10 UNIT(S): 100 INJECTION, SOLUTION SUBCUTANEOUS at 22:13

## 2024-01-30 RX ADMIN — GABAPENTIN 300 MILLIGRAM(S): 400 CAPSULE ORAL at 22:14

## 2024-01-30 RX ADMIN — Medication 1 MILLIGRAM(S): at 12:31

## 2024-01-30 RX ADMIN — PIPERACILLIN AND TAZOBACTAM 25 GRAM(S): 4; .5 INJECTION, POWDER, LYOPHILIZED, FOR SOLUTION INTRAVENOUS at 15:10

## 2024-01-30 RX ADMIN — DRONEDARONE 400 MILLIGRAM(S): 400 TABLET, FILM COATED ORAL at 05:27

## 2024-01-30 RX ADMIN — Medication 25 MILLIGRAM(S): at 05:27

## 2024-01-30 RX ADMIN — Medication 325 MILLIGRAM(S): at 12:31

## 2024-01-30 RX ADMIN — Medication 25 MILLIGRAM(S): at 18:49

## 2024-01-30 RX ADMIN — PANTOPRAZOLE SODIUM 40 MILLIGRAM(S): 20 TABLET, DELAYED RELEASE ORAL at 12:31

## 2024-01-30 RX ADMIN — Medication 5: at 17:43

## 2024-01-30 RX ADMIN — Medication 650 MILLIGRAM(S): at 18:49

## 2024-01-30 RX ADMIN — DRONEDARONE 400 MILLIGRAM(S): 400 TABLET, FILM COATED ORAL at 18:49

## 2024-01-30 RX ADMIN — TAMSULOSIN HYDROCHLORIDE 0.4 MILLIGRAM(S): 0.4 CAPSULE ORAL at 22:13

## 2024-01-30 RX ADMIN — Medication 2: at 12:23

## 2024-01-30 RX ADMIN — PIPERACILLIN AND TAZOBACTAM 25 GRAM(S): 4; .5 INJECTION, POWDER, LYOPHILIZED, FOR SOLUTION INTRAVENOUS at 05:28

## 2024-01-30 NOTE — PHYSICAL THERAPY INITIAL EVALUATION ADULT - PERTINENT HX OF CURRENT PROBLEM, REHAB EVAL
84 year old male with history of Acute Cholecystitis s/p Cholecystomy Tube (12/22/23), COVID-19, PAF not on AC due to GIB + Fall Risk, Esophagitis, Anemia, DM 2, HTN, HLD, CKD III/IV, BPH and Cognitive Impairment brought by EMS from Rehab ([place]) for evaluation. In ER, he was found to have Leukocytosis with Elevated LFTs. CT showed no significant interval change in appearance of decompressed gallbladder with surrounding inflammatory changes and indwelling percutaneous cholecystostomy tube. Transaminitis, Cholelithiasis, Possible Cholangitis.

## 2024-01-30 NOTE — PHYSICAL THERAPY INITIAL EVALUATION ADULT - GENERAL OBSERVATIONS, REHAB EVAL
Pt received in bed +IV +monitor +texas cath +cholecystostomy on room air with spouse at bedside, pleasant and cooperative. Pt Romanian speaking with preference for spouse at bedside to translate.

## 2024-01-30 NOTE — PROGRESS NOTE ADULT - SUBJECTIVE AND OBJECTIVE BOX
Hospitalist Daily Progress Note    Chief Complaint:  Patient is a 84y old  Male who presents with a chief complaint of Not feeling well (30 Jan 2024 10:18)      SUBJECTIVE / OVERNIGHT EVENTS:  Patient was seen and examined at bedside. States to be feeling better.   Patient denies chest pain, SOB, abd pain, N/V, fever, chills, dysuria or any other complaints. All remainder ROS negative.     MEDICATIONS  (STANDING):  dextrose 5%. 1000 milliLiter(s) (100 mL/Hr) IV Continuous <Continuous>  dextrose 5%. 1000 milliLiter(s) (50 mL/Hr) IV Continuous <Continuous>  dextrose 50% Injectable 12.5 Gram(s) IV Push once  dextrose 50% Injectable 25 Gram(s) IV Push once  dextrose 50% Injectable 25 Gram(s) IV Push once  dronedarone 400 milliGRAM(s) Oral two times a day  ferrous    sulfate 325 milliGRAM(s) Oral daily  folic acid 1 milliGRAM(s) Oral daily  gabapentin 300 milliGRAM(s) Oral at bedtime  glucagon  Injectable 1 milliGRAM(s) IntraMuscular once  insulin glargine Injectable (LANTUS) 10 Unit(s) SubCutaneous at bedtime  insulin lispro (ADMELOG) corrective regimen sliding scale   SubCutaneous three times a day before meals  insulin lispro (ADMELOG) corrective regimen sliding scale   SubCutaneous at bedtime  metoprolol tartrate 25 milliGRAM(s) Oral two times a day  pantoprazole    Tablet 40 milliGRAM(s) Oral daily  piperacillin/tazobactam IVPB.. 3.375 Gram(s) IV Intermittent every 8 hours  sodium bicarbonate 650 milliGRAM(s) Oral two times a day  sodium chloride 0.9%. 1000 milliLiter(s) (75 mL/Hr) IV Continuous <Continuous>  tamsulosin 0.4 milliGRAM(s) Oral at bedtime    MEDICATIONS  (PRN):  acetaminophen     Tablet .. 650 milliGRAM(s) Oral every 6 hours PRN Temp greater or equal to 38C (100.4F), Mild Pain (1 - 3)  dextrose Oral Gel 15 Gram(s) Oral once PRN Blood Glucose LESS THAN 70 milliGRAM(s)/deciliter  ondansetron Injectable 4 milliGRAM(s) IV Push every 6 hours PRN Nausea and/or Vomiting  polyethylene glycol 3350 17 Gram(s) Oral daily PRN Constipation        I&O's Summary      PHYSICAL EXAM:  Vital Signs Last 24 Hrs  T(C): 36.7 (30 Jan 2024 11:02), Max: 37.4 (30 Jan 2024 00:19)  T(F): 98 (30 Jan 2024 11:02), Max: 99.4 (30 Jan 2024 00:19)  HR: 60 (30 Jan 2024 11:02) (60 - 72)  BP: 126/63 (30 Jan 2024 11:02) (126/63 - 157/71)  BP(mean): --  RR: 18 (30 Jan 2024 11:02) (18 - 18)  SpO2: 95% (30 Jan 2024 11:02) (93% - 98%)    Parameters below as of 30 Jan 2024 11:02  Patient On (Oxygen Delivery Method): room air          Constitutional: NAD, Resting  ENT: Supple, No JVD  Lungs: CTA B/L, Non-labored breathing  Cardio: RRR, S1/S2, No murmur  Abdomen: Soft, Nontender, Nondistended; Bowel sounds present, sky drain in place  Extremities: No calf tenderness, No pitting edema  Musculoskeletal:   No joint swelling  Psych: Calm, cooperative affect appropriate  Neuro: Awake and alert, oriented to name and location, unaware of date  Skin: No rashes; no palpable lesions    LABS:                        8.0    10.31 )-----------( 183      ( 30 Jan 2024 03:02 )             25.7     01-30    136  |  105  |  23.7<H>  ----------------------------<  189<H>  4.2   |  19.0<L>  |  2.34<H>    Ca    7.5<L>      30 Jan 2024 03:02  Mg     1.6     01-30    TPro  5.2<L>  /  Alb  2.6<L>  /  TBili  1.6  /  DBili  x   /  AST  64<H>  /  ALT  122<H>  /  AlkPhos  145<H>  01-30    PT/INR - ( 29 Jan 2024 01:05 )   PT: 12.2 sec;   INR: 1.10 ratio         PTT - ( 29 Jan 2024 01:05 )  PTT:29.0 sec      Urinalysis Basic - ( 30 Jan 2024 03:02 )    Color: x / Appearance: x / SG: x / pH: x  Gluc: 189 mg/dL / Ketone: x  / Bili: x / Urobili: x   Blood: x / Protein: x / Nitrite: x   Leuk Esterase: x / RBC: x / WBC x   Sq Epi: x / Non Sq Epi: x / Bacteria: x        Culture - Urine (collected 29 Jan 2024 05:54)  Source: Clean Catch Clean Catch (Midstream)  Final Report (30 Jan 2024 10:38):    <10,000 CFU/mL Normal Urogenital Allyson    Culture - Blood (collected 29 Jan 2024 01:20)  Source: .Blood Blood-Venous  Preliminary Report (30 Jan 2024 09:01):    No growth at 24 hours    Culture - Blood (collected 29 Jan 2024 01:13)  Source: .Blood Blood-Venous  Preliminary Report (30 Jan 2024 09:01):    No growth at 24 hours      CAPILLARY BLOOD GLUCOSE      POCT Blood Glucose.: 239 mg/dL (30 Jan 2024 12:22)  POCT Blood Glucose.: 139 mg/dL (30 Jan 2024 09:11)  POCT Blood Glucose.: 224 mg/dL (29 Jan 2024 22:19)  POCT Blood Glucose.: 116 mg/dL (29 Jan 2024 18:05)        RADIOLOGY REVIEWED

## 2024-01-30 NOTE — PHYSICAL THERAPY INITIAL EVALUATION ADULT - ADDITIONAL COMMENTS
As per spouse: [facility] Pt amb with RW and has assist for functional mobility and ADLs. Pt has support of spouse and family.

## 2024-01-30 NOTE — PROGRESS NOTE ADULT - NS ATTEND AMEND GEN_ALL_CORE FT
84 yo M with PMH of DM, HTN, CKD III, history of percutaneous cholecystostomy 12/23 for cholecystitis presenting from [facility] rehab for AMS and lethargy. GI consulted for elevated liver enzymes.  IR input appreciated, GI needs clarification - the stones are located where? CBD? cystic duct?   The patient currently might be a candidate for ERCP - per notes, in much better shape than when the perc sky was placed - we can assess whether we can do this once we have a better understanding of the stones' location   Agree with zosyn, might be reasonable to convert to PO antibiotics at this point  On clears, would advance today to regular and see if can tolerate - low fat 82 yo M with PMH of DM, HTN, CKD III, history of percutaneous cholecystostomy 12/23 for cholecystitis presenting from [facility] rehab for AMS and lethargy. GI consulted for elevated liver enzymes.  IR input appreciated, stones in GB only, not obstructing   Agree with zosyn, might be reasonable to convert to PO antibiotics at this point  On clears, would advance today to regular and see if can tolerate - low fat  Would suggest surgery consult for lap sky - perhaps he can tolerate this procedure now?   No GI intervention needed at this time, CBD patent; please reconsult as needed.

## 2024-01-30 NOTE — PHYSICAL THERAPY INITIAL EVALUATION ADULT - ASSISTIVE DEVICE FOR TRANSFER: GAIT, REHAB EVAL
Cellulitis of left hand  Antibiotic Keflex x 7 days  Watch the redness and let clinic know if it is worsening outside the line    Rash of neck  May use over counter topical hydrocortisone to neck  Moisturize daily with sensitive skin lotion  Avoid disposable wipes on skin other than toilet care  For cleansing body- use washrag with dove soap and water    Constipation, unspecified constipation type  Take miralax 1 cap daily- may increase to 1 cap twice daily if needed- mix in cool liquid  Take colace  Take nightlly senna   Increase fluids and fiber in diet  
rolling walker

## 2024-01-30 NOTE — PROGRESS NOTE ADULT - SUBJECTIVE AND OBJECTIVE BOX
INTERVAL HPI/OVERNIGHT EVENTS/SUBJECTIVE:  82 yo M with PMH of DM, HTN, CKD III presenting from [facility] rehab for AMS and lethargy. Patient had elevated t bili and LFT's yesterday, has since down trended and t bili normalized. Pt denies abdominal pain. IR drain study performed yesterday showed patent cbd, filling defect with stones in gallbladder.     ICU Vital Signs Last 24 Hrs  T(C): 36.7 (30 Jan 2024 11:02), Max: 37.4 (30 Jan 2024 00:19)  T(F): 98 (30 Jan 2024 11:02), Max: 99.4 (30 Jan 2024 00:19)  HR: 60 (30 Jan 2024 11:02) (60 - 72)  BP: 126/63 (30 Jan 2024 11:02) (126/63 - 157/71)  BP(mean): --  ABP: --  ABP(mean): --  RR: 18 (30 Jan 2024 11:02) (18 - 18)  SpO2: 95% (30 Jan 2024 11:02) (93% - 98%)    O2 Parameters below as of 30 Jan 2024 11:02  Patient On (Oxygen Delivery Method): room air      MEDICATIONS  (STANDING):  dextrose 5%. 1000 milliLiter(s) (50 mL/Hr) IV Continuous <Continuous>  dextrose 5%. 1000 milliLiter(s) (100 mL/Hr) IV Continuous <Continuous>  dextrose 50% Injectable 25 Gram(s) IV Push once  dextrose 50% Injectable 12.5 Gram(s) IV Push once  dextrose 50% Injectable 25 Gram(s) IV Push once  dronedarone 400 milliGRAM(s) Oral two times a day  ferrous    sulfate 325 milliGRAM(s) Oral daily  folic acid 1 milliGRAM(s) Oral daily  gabapentin 300 milliGRAM(s) Oral at bedtime  glucagon  Injectable 1 milliGRAM(s) IntraMuscular once  insulin glargine Injectable (LANTUS) 10 Unit(s) SubCutaneous at bedtime  insulin lispro (ADMELOG) corrective regimen sliding scale   SubCutaneous three times a day before meals  insulin lispro (ADMELOG) corrective regimen sliding scale   SubCutaneous at bedtime  metoprolol tartrate 25 milliGRAM(s) Oral two times a day  pantoprazole    Tablet 40 milliGRAM(s) Oral daily  piperacillin/tazobactam IVPB.. 3.375 Gram(s) IV Intermittent every 8 hours  sodium bicarbonate 650 milliGRAM(s) Oral two times a day  sodium chloride 0.9%. 1000 milliLiter(s) (75 mL/Hr) IV Continuous <Continuous>  tamsulosin 0.4 milliGRAM(s) Oral at bedtime    MEDICATIONS  (PRN):  acetaminophen     Tablet .. 650 milliGRAM(s) Oral every 6 hours PRN Temp greater or equal to 38C (100.4F), Mild Pain (1 - 3)  dextrose Oral Gel 15 Gram(s) Oral once PRN Blood Glucose LESS THAN 70 milliGRAM(s)/deciliter  ondansetron Injectable 4 milliGRAM(s) IV Push every 6 hours PRN Nausea and/or Vomiting  polyethylene glycol 3350 17 Gram(s) Oral daily PRN Constipation    MISC:     PHYSICAL EXAM:    Gen: NAD, laying comfortably  Neurological - non-focal  Pulmonary: non-labored   Cardiovascular: rrr  Gastrointestinal: soft, ntnd, RUQ drain with bilious fluid   Genitourinary: urine clear yellow  Extremities: KELLER    LABS:  CBC Full  -  ( 30 Jan 2024 03:02 )  WBC Count : 10.31 K/uL  RBC Count : 2.75 M/uL  Hemoglobin : 8.0 g/dL  Hematocrit : 25.7 %  Platelet Count - Automated : 183 K/uL  Mean Cell Volume : 93.5 fl  Mean Cell Hemoglobin : 29.1 pg  Mean Cell Hemoglobin Concentration : 31.1 gm/dL  Auto Neutrophil # : 9.51 K/uL  Auto Lymphocyte # : 0.54 K/uL  Auto Monocyte # : 0.18 K/uL  Auto Eosinophil # : 0.00 K/uL  Auto Basophil # : 0.00 K/uL  Auto Neutrophil % : 91.3 %  Auto Lymphocyte % : 5.2 %  Auto Monocyte % : 1.7 %  Auto Eosinophil % : 0.0 %  Auto Basophil % : 0.0 %    01-30    136  |  105  |  23.7<H>  ----------------------------<  189<H>  4.2   |  19.0<L>  |  2.34<H>    Ca    7.5<L>      30 Jan 2024 03:02  Mg     1.6     01-30    TPro  5.2<L>  /  Alb  2.6<L>  /  TBili  1.6  /  DBili  x   /  AST  64<H>  /  ALT  122<H>  /  AlkPhos  145<H>  01-30    PT/INR - ( 29 Jan 2024 01:05 )   PT: 12.2 sec;   INR: 1.10 ratio         PTT - ( 29 Jan 2024 01:05 )  PTT:29.0 sec  Urinalysis Basic - ( 30 Jan 2024 03:02 )    Color: x / Appearance: x / SG: x / pH: x  Gluc: 189 mg/dL / Ketone: x  / Bili: x / Urobili: x   Blood: x / Protein: x / Nitrite: x   Leuk Esterase: x / RBC: x / WBC x   Sq Epi: x / Non Sq Epi: x / Bacteria: x      RECENT CULTURES:  01-29 Clean Catch Clean Catch (Midstream) XXXX XXXX   <10,000 CFU/mL Normal Urogenital Allyson    01-29 .Blood Blood-Venous XXXX XXXX   No growth at 24 hours    01-29 .Blood Blood-Venous XXXX XXXX   No growth at 24 hours        LIVER FUNCTIONS - ( 30 Jan 2024 03:02 )  Alb: 2.6 g/dL / Pro: 5.2 g/dL / ALK PHOS: 145 U/L / ALT: 122 U/L / AST: 64 U/L / GGT: x           ASSESSMENT/PLAN:  82 yo M with PMH of DM, HTN, CKD III, history of percutaneous cholecystostomy 12/23 for cholecystitis presenting from [facility] rehab for AMS and lethargy.   t bili has normalized and LFT's downtrending. Surgery reconsulted for cholecystectomy. s/p cholecystostomy study yesterday, shows filling defects (likely stones) without obstruction of biliary ducts.  -possible cholecystectomy pending family discussion at end of 6 week period   -will discuss with attending    INTERVAL HPI/OVERNIGHT EVENTS/SUBJECTIVE:  82 yo M with PMH of DM, HTN, CKD III presenting from [facility] rehab for AMS and lethargy. Patient had elevated t bili and LFT's yesterday, has since down trended and t bili normalized. Pt denies abdominal pain. IR drain study performed yesterday showed patent cbd, filling defect with stones in gallbladder.     ICU Vital Signs Last 24 Hrs  T(C): 36.7 (30 Jan 2024 11:02), Max: 37.4 (30 Jan 2024 00:19)  T(F): 98 (30 Jan 2024 11:02), Max: 99.4 (30 Jan 2024 00:19)  HR: 60 (30 Jan 2024 11:02) (60 - 72)  BP: 126/63 (30 Jan 2024 11:02) (126/63 - 157/71)  BP(mean): --  ABP: --  ABP(mean): --  RR: 18 (30 Jan 2024 11:02) (18 - 18)  SpO2: 95% (30 Jan 2024 11:02) (93% - 98%)    O2 Parameters below as of 30 Jan 2024 11:02  Patient On (Oxygen Delivery Method): room air      MEDICATIONS  (STANDING):  dextrose 5%. 1000 milliLiter(s) (50 mL/Hr) IV Continuous <Continuous>  dextrose 5%. 1000 milliLiter(s) (100 mL/Hr) IV Continuous <Continuous>  dextrose 50% Injectable 25 Gram(s) IV Push once  dextrose 50% Injectable 12.5 Gram(s) IV Push once  dextrose 50% Injectable 25 Gram(s) IV Push once  dronedarone 400 milliGRAM(s) Oral two times a day  ferrous    sulfate 325 milliGRAM(s) Oral daily  folic acid 1 milliGRAM(s) Oral daily  gabapentin 300 milliGRAM(s) Oral at bedtime  glucagon  Injectable 1 milliGRAM(s) IntraMuscular once  insulin glargine Injectable (LANTUS) 10 Unit(s) SubCutaneous at bedtime  insulin lispro (ADMELOG) corrective regimen sliding scale   SubCutaneous three times a day before meals  insulin lispro (ADMELOG) corrective regimen sliding scale   SubCutaneous at bedtime  metoprolol tartrate 25 milliGRAM(s) Oral two times a day  pantoprazole    Tablet 40 milliGRAM(s) Oral daily  piperacillin/tazobactam IVPB.. 3.375 Gram(s) IV Intermittent every 8 hours  sodium bicarbonate 650 milliGRAM(s) Oral two times a day  sodium chloride 0.9%. 1000 milliLiter(s) (75 mL/Hr) IV Continuous <Continuous>  tamsulosin 0.4 milliGRAM(s) Oral at bedtime    MEDICATIONS  (PRN):  acetaminophen     Tablet .. 650 milliGRAM(s) Oral every 6 hours PRN Temp greater or equal to 38C (100.4F), Mild Pain (1 - 3)  dextrose Oral Gel 15 Gram(s) Oral once PRN Blood Glucose LESS THAN 70 milliGRAM(s)/deciliter  ondansetron Injectable 4 milliGRAM(s) IV Push every 6 hours PRN Nausea and/or Vomiting  polyethylene glycol 3350 17 Gram(s) Oral daily PRN Constipation    MISC:     PHYSICAL EXAM:    Gen: NAD, laying comfortably  Neurological - non-focal  Pulmonary: non-labored   Cardiovascular: rrr  Gastrointestinal: soft, ntnd, RUQ drain with bilious fluid   Genitourinary: urine clear yellow  Extremities: KELLER    LABS:  CBC Full  -  ( 30 Jan 2024 03:02 )  WBC Count : 10.31 K/uL  RBC Count : 2.75 M/uL  Hemoglobin : 8.0 g/dL  Hematocrit : 25.7 %  Platelet Count - Automated : 183 K/uL  Mean Cell Volume : 93.5 fl  Mean Cell Hemoglobin : 29.1 pg  Mean Cell Hemoglobin Concentration : 31.1 gm/dL  Auto Neutrophil # : 9.51 K/uL  Auto Lymphocyte # : 0.54 K/uL  Auto Monocyte # : 0.18 K/uL  Auto Eosinophil # : 0.00 K/uL  Auto Basophil # : 0.00 K/uL  Auto Neutrophil % : 91.3 %  Auto Lymphocyte % : 5.2 %  Auto Monocyte % : 1.7 %  Auto Eosinophil % : 0.0 %  Auto Basophil % : 0.0 %    01-30    136  |  105  |  23.7<H>  ----------------------------<  189<H>  4.2   |  19.0<L>  |  2.34<H>    Ca    7.5<L>      30 Jan 2024 03:02  Mg     1.6     01-30    TPro  5.2<L>  /  Alb  2.6<L>  /  TBili  1.6  /  DBili  x   /  AST  64<H>  /  ALT  122<H>  /  AlkPhos  145<H>  01-30    PT/INR - ( 29 Jan 2024 01:05 )   PT: 12.2 sec;   INR: 1.10 ratio         PTT - ( 29 Jan 2024 01:05 )  PTT:29.0 sec  Urinalysis Basic - ( 30 Jan 2024 03:02 )    Color: x / Appearance: x / SG: x / pH: x  Gluc: 189 mg/dL / Ketone: x  / Bili: x / Urobili: x   Blood: x / Protein: x / Nitrite: x   Leuk Esterase: x / RBC: x / WBC x   Sq Epi: x / Non Sq Epi: x / Bacteria: x      RECENT CULTURES:  01-29 Clean Catch Clean Catch (Midstream) XXXX XXXX   <10,000 CFU/mL Normal Urogenital Allyson    01-29 .Blood Blood-Venous XXXX XXXX   No growth at 24 hours    01-29 .Blood Blood-Venous XXXX XXXX   No growth at 24 hours        LIVER FUNCTIONS - ( 30 Jan 2024 03:02 )  Alb: 2.6 g/dL / Pro: 5.2 g/dL / ALK PHOS: 145 U/L / ALT: 122 U/L / AST: 64 U/L / GGT: x           ASSESSMENT/PLAN:  82 yo M with PMH of DM, HTN, CKD III, history of percutaneous cholecystostomy 12/23 for cholecystitis presenting from [facility] rehab for AMS and lethargy.   t bili has normalized and LFT's downtrending. Surgery reconsulted for cholecystectomy. s/p cholecystostomy study yesterday, shows filling defects (likely stones) without obstruction of biliary ducts.  -Plan pending discussion with family after cholecystostomy tube has been in for 6 weeks.

## 2024-01-30 NOTE — PROGRESS NOTE ADULT - NS ATTEND AMEND GEN_ALL_CORE FT
Patient examined by me in ED. S/p cholecystostomy tube placement approximately 5 weeks ago. Presents with abdominal pain and worsening altered mental status. Tube study reviewed, shows cholelithiasis with patent cystic duct suggesting that acute cholecystitis has resolved. I discussed the plan for care with the patient's wife at the bedside. The tube has to stay in for a full 6 weeks at which time there should be a decision regarding tube removal with expectant management vs cholecystectomy. I explained that given patient's age and comorbidities he is at increased ashley-operative risk for cholecystectomy. The decision for surgery will largely depend on the patient's overall clinical status at that time. Would not recommend operating at this present time when he is presenting with worsening mental status, leukocytosis and acidosis which are unlikely of biliary origin. Recommend continued infectious workup. ACS to follow up next week if he remains inpatient otherwise f/up as outpatient to discuss further plan of care.

## 2024-01-30 NOTE — PROGRESS NOTE ADULT - SUBJECTIVE AND OBJECTIVE BOX
Chief Complaint: This is a 84y old man patient being seen in follow-up consultation for elevated liver chemistry.     Interval HPI / 24H events:  Patient seen and examined this morning, reports no complaints. S/p cholecystostomy study yesterday, shows filling defects (likely stones) without obstruction of biliary ducts. Liver chemistry improving, T bili normalized, AST/ ALT tending down. Patient denies nausea, vomiting, abdominal pain, fever, chills.     Review of Systems:  . Constitutional: No fever, chills  . HEENT: Negative  · Respiratory and Thorax: No shortness of breath, no cough  · Cardiovascular: No chest pain, palpitation, no dizziness  · Gastrointestinal: see above  · Genitourinary: No hematuria  · Musculoskeletal: Negative  · Neurological: negative  · Psychiatric: no agitation, no anxiety      PAST MEDICAL/SURGICAL HISTORY:  Diabetes    Hypertension    Prostate disorder    Anxiety    High cholesterol    Ulcer    History of endoscopy      MEDICATIONS  (STANDING):  dextrose 5%. 1000 milliLiter(s) (100 mL/Hr) IV Continuous <Continuous>  dextrose 5%. 1000 milliLiter(s) (50 mL/Hr) IV Continuous <Continuous>  dextrose 50% Injectable 12.5 Gram(s) IV Push once  dextrose 50% Injectable 25 Gram(s) IV Push once  dextrose 50% Injectable 25 Gram(s) IV Push once  dronedarone 400 milliGRAM(s) Oral two times a day  ferrous    sulfate 325 milliGRAM(s) Oral daily  folic acid 1 milliGRAM(s) Oral daily  gabapentin 300 milliGRAM(s) Oral at bedtime  glucagon  Injectable 1 milliGRAM(s) IntraMuscular once  insulin glargine Injectable (LANTUS) 10 Unit(s) SubCutaneous at bedtime  insulin lispro (ADMELOG) corrective regimen sliding scale   SubCutaneous at bedtime  insulin lispro (ADMELOG) corrective regimen sliding scale   SubCutaneous three times a day before meals  metoprolol tartrate 25 milliGRAM(s) Oral two times a day  pantoprazole    Tablet 40 milliGRAM(s) Oral daily  piperacillin/tazobactam IVPB.. 3.375 Gram(s) IV Intermittent every 8 hours  sodium bicarbonate 650 milliGRAM(s) Oral two times a day  sodium chloride 0.9%. 1000 milliLiter(s) (75 mL/Hr) IV Continuous <Continuous>  tamsulosin 0.4 milliGRAM(s) Oral at bedtime    MEDICATIONS  (PRN):  acetaminophen     Tablet .. 650 milliGRAM(s) Oral every 6 hours PRN Temp greater or equal to 38C (100.4F), Mild Pain (1 - 3)  dextrose Oral Gel 15 Gram(s) Oral once PRN Blood Glucose LESS THAN 70 milliGRAM(s)/deciliter  ondansetron Injectable 4 milliGRAM(s) IV Push every 6 hours PRN Nausea and/or Vomiting  polyethylene glycol 3350 17 Gram(s) Oral daily PRN Constipation    No Known Allergies    T(C): 36.7 (01-30-24 @ 07:26), Max: 37.4 (01-30-24 @ 00:19)  HR: 66 (01-30-24 @ 07:26) (64 - 72)  BP: 143/76 (01-30-24 @ 07:26) (126/67 - 157/71)  RR: 18 (01-30-24 @ 07:26) (18 - 18)  SpO2: 98% (01-30-24 @ 07:26) (93% - 98%)      PHYSICAL EXAM:  Constitutional: No acute distress  Neuro: Awake alert, oriented  HEENT: anicteric sclerae  CV: regular rate, regular rhythm  Pulm/chest: lung sounds diminished bilaterally, no accessory muscle use noted  Abd: soft, nontender, nondistended +bowel sounds. No rigidity, rebound tenderness, or guarding. Cholecystostomy with bilious output.   Ext: no edema  Skin: warm, no jaundice   Psych: calm, cooperative      LABS:               8.0    10.31 )-----------( 183      ( 01-30 @ 03:02 )             25.7                9.7    20.37 )-----------( 240      ( 01-29 @ 01:05 )             29.7       01-30    136  |  105  |  23.7<H>  ----------------------------<  189<H>  4.2   |  19.0<L>  |  2.34<H>    Ca    7.5<L>      30 Jan 2024 03:02  Mg     1.6     01-30    TPro  5.2<L>  /  Alb  2.6<L>  /  TBili  1.6  /  DBili  x   /  AST  64<H>  /  ALT  122<H>  /  AlkPhos  145<H>  01-30    LIVER FUNCTIONS - ( 30 Jan 2024 03:02 )  Alb: 2.6 g/dL / Pro: 5.2 g/dL / ALK PHOS: 145 U/L / ALT: 122 U/L / AST: 64 U/L / GGT: x             Lipase: 25 U/L (01-29-24 @ 01:05)    PT/INR - ( 29 Jan 2024 01:05 )   PT: 12.2 sec;   INR: 1.10 ratio         PTT - ( 29 Jan 2024 01:05 )  PTT:29.0 sec      Culture - Blood (collected 29 Jan 2024 01:20)  Source: .Blood Blood-Venous  Preliminary Report (30 Jan 2024 09:01):    No growth at 24 hours    Culture - Blood (collected 29 Jan 2024 01:13)  Source: .Blood Blood-Venous  Preliminary Report (30 Jan 2024 09:01):    No growth at 24 hours        < from: CT Abdomen and Pelvis w/ Oral Cont (01.29.24 @ 06:17) >  COMPARISON: CT abdomen pelvis 1/6/2024.    PROCEDURE:  CT of the Abdomen and Pelvis was performed without intravenous contrast.  Intravenous contrast: None.  Oral contrast: None.  Sagittal and coronal reformats were performed.    FINDINGS:Absence of intravenous contrast limitsevaluation of vasculature   and solid organs.    LOWER CHEST: Mild bibasilar dependent atelectasis. Small hiatal hernia.   Valvular and coronary artery calcifications.    LIVER: Within normal limits.  BILE DUCTS: Normal caliber.  GALLBLADDER: No significant interval change in appearance of decompressed   gallbladder with surrounding inflammatory changes and indwelling   percutaneous cholecystostomy tube.  SPLEEN: Within normal limits.  PANCREAS: Within normal limits.  ADRENALS: Within normal limits.  KIDNEYS/URETERS: Trace nonspecific bilateral perinephric stranding   without hydronephrosis. Right renal cyst. Punctate nonobstructive right   renal calcification.    BLADDER: Within normal limits.  REPRODUCTIVE ORGANS: No prostatomegaly.    BOWEL: No bowel obstruction. Normal appendix. Diverticulosis coli.  PERITONEUM: No ascites.  VESSELS:  Atherosclerotic changes.  RETROPERITONEUM: No lymphadenopathy.  ABDOMINAL WALL: Continued asymmetric prominence the left posterolateral   abdominal wall, slightly progressed compared to the prior study. Fat   containing inguinal hernia.  BONES: Degenerative changes and diffuse osteopenia.    IMPRESSION:    No significant interval change in appearance of decompressed gallbladder   with surrounding inflammatory changes and indwelling percutaneous   cholecystostomy tube.    Continued asymmetric prominence the left posterolateral abdominal wall,   slightly progressed compared to the prior study. Recommend clinical   correlation to assess for developing wall hematoma.    Additional findings as mentioned above.    These critical results were discussed via telephone at 1/29/2024 6:35 AM   by Dr. Foss of radiology with Dr. Sims.    < end of copied text >   Chief Complaint: This is a 84y old man patient being seen in follow-up consultation for elevated liver chemistry.     Interval HPI / 24H events:  Patient seen and examined this morning, reports no complaints. S/p cholecystostomy study yesterday, shows filling defects (likely stones) without obstruction of biliary ducts. Liver chemistry improving, T bili normalized, AST/ ALT tending down. Patient denies nausea, vomiting, abdominal pain, fever, chills.     Review of Systems:  . Constitutional: No fever, chills  . HEENT: Negative  · Respiratory and Thorax: No shortness of breath, no cough  · Cardiovascular: No chest pain, palpitation, no dizziness  · Gastrointestinal: see above  · Genitourinary: No hematuria  · Musculoskeletal: Negative  · Neurological: negative  · Psychiatric: no agitation, no anxiety      PAST MEDICAL/SURGICAL HISTORY:  Diabetes    Hypertension    Prostate disorder    Anxiety    High cholesterol    Ulcer    History of endoscopy      MEDICATIONS  (STANDING):  dextrose 5%. 1000 milliLiter(s) (100 mL/Hr) IV Continuous <Continuous>  dextrose 5%. 1000 milliLiter(s) (50 mL/Hr) IV Continuous <Continuous>  dextrose 50% Injectable 12.5 Gram(s) IV Push once  dextrose 50% Injectable 25 Gram(s) IV Push once  dextrose 50% Injectable 25 Gram(s) IV Push once  dronedarone 400 milliGRAM(s) Oral two times a day  ferrous    sulfate 325 milliGRAM(s) Oral daily  folic acid 1 milliGRAM(s) Oral daily  gabapentin 300 milliGRAM(s) Oral at bedtime  glucagon  Injectable 1 milliGRAM(s) IntraMuscular once  insulin glargine Injectable (LANTUS) 10 Unit(s) SubCutaneous at bedtime  insulin lispro (ADMELOG) corrective regimen sliding scale   SubCutaneous at bedtime  insulin lispro (ADMELOG) corrective regimen sliding scale   SubCutaneous three times a day before meals  metoprolol tartrate 25 milliGRAM(s) Oral two times a day  pantoprazole    Tablet 40 milliGRAM(s) Oral daily  piperacillin/tazobactam IVPB.. 3.375 Gram(s) IV Intermittent every 8 hours  sodium bicarbonate 650 milliGRAM(s) Oral two times a day  sodium chloride 0.9%. 1000 milliLiter(s) (75 mL/Hr) IV Continuous <Continuous>  tamsulosin 0.4 milliGRAM(s) Oral at bedtime    MEDICATIONS  (PRN):  acetaminophen     Tablet .. 650 milliGRAM(s) Oral every 6 hours PRN Temp greater or equal to 38C (100.4F), Mild Pain (1 - 3)  dextrose Oral Gel 15 Gram(s) Oral once PRN Blood Glucose LESS THAN 70 milliGRAM(s)/deciliter  ondansetron Injectable 4 milliGRAM(s) IV Push every 6 hours PRN Nausea and/or Vomiting  polyethylene glycol 3350 17 Gram(s) Oral daily PRN Constipation    No Known Allergies    T(C): 36.7 (01-30-24 @ 07:26), Max: 37.4 (01-30-24 @ 00:19)  HR: 66 (01-30-24 @ 07:26) (64 - 72)  BP: 143/76 (01-30-24 @ 07:26) (126/67 - 157/71)  RR: 18 (01-30-24 @ 07:26) (18 - 18)  SpO2: 98% (01-30-24 @ 07:26) (93% - 98%)      PHYSICAL EXAM:  Constitutional: No acute distress  Neuro: Awake alert, oriented  HEENT: anicteric sclerae  CV: regular rate, regular rhythm  Pulm/chest: lung sounds diminished bilaterally, no accessory muscle use noted  Abd: soft, nontender, nondistended +bowel sounds. No rigidity, rebound tenderness, or guarding. Cholecystostomy with bilious output (dark brown, transparent, no blood not purrulent)  Ext: no edema  Skin: warm, no jaundice   Psych: calm, cooperative      LABS:               8.0    10.31 )-----------( 183      ( 01-30 @ 03:02 )             25.7                9.7    20.37 )-----------( 240      ( 01-29 @ 01:05 )             29.7       01-30    136  |  105  |  23.7<H>  ----------------------------<  189<H>  4.2   |  19.0<L>  |  2.34<H>    Ca    7.5<L>      30 Jan 2024 03:02  Mg     1.6     01-30    TPro  5.2<L>  /  Alb  2.6<L>  /  TBili  1.6  /  DBili  x   /  AST  64<H>  /  ALT  122<H>  /  AlkPhos  145<H>  01-30    LIVER FUNCTIONS - ( 30 Jan 2024 03:02 )  Alb: 2.6 g/dL / Pro: 5.2 g/dL / ALK PHOS: 145 U/L / ALT: 122 U/L / AST: 64 U/L / GGT: x             Lipase: 25 U/L (01-29-24 @ 01:05)    PT/INR - ( 29 Jan 2024 01:05 )   PT: 12.2 sec;   INR: 1.10 ratio         PTT - ( 29 Jan 2024 01:05 )  PTT:29.0 sec      Culture - Blood (collected 29 Jan 2024 01:20)  Source: .Blood Blood-Venous  Preliminary Report (30 Jan 2024 09:01):    No growth at 24 hours    Culture - Blood (collected 29 Jan 2024 01:13)  Source: .Blood Blood-Venous  Preliminary Report (30 Jan 2024 09:01):    No growth at 24 hours        < from: CT Abdomen and Pelvis w/ Oral Cont (01.29.24 @ 06:17) >  COMPARISON: CT abdomen pelvis 1/6/2024.    PROCEDURE:  CT of the Abdomen and Pelvis was performed without intravenous contrast.  Intravenous contrast: None.  Oral contrast: None.  Sagittal and coronal reformats were performed.    FINDINGS:Absence of intravenous contrast limitsevaluation of vasculature   and solid organs.    LOWER CHEST: Mild bibasilar dependent atelectasis. Small hiatal hernia.   Valvular and coronary artery calcifications.    LIVER: Within normal limits.  BILE DUCTS: Normal caliber.  GALLBLADDER: No significant interval change in appearance of decompressed   gallbladder with surrounding inflammatory changes and indwelling   percutaneous cholecystostomy tube.  SPLEEN: Within normal limits.  PANCREAS: Within normal limits.  ADRENALS: Within normal limits.  KIDNEYS/URETERS: Trace nonspecific bilateral perinephric stranding   without hydronephrosis. Right renal cyst. Punctate nonobstructive right   renal calcification.    BLADDER: Within normal limits.  REPRODUCTIVE ORGANS: No prostatomegaly.    BOWEL: No bowel obstruction. Normal appendix. Diverticulosis coli.  PERITONEUM: No ascites.  VESSELS:  Atherosclerotic changes.  RETROPERITONEUM: No lymphadenopathy.  ABDOMINAL WALL: Continued asymmetric prominence the left posterolateral   abdominal wall, slightly progressed compared to the prior study. Fat   containing inguinal hernia.  BONES: Degenerative changes and diffuse osteopenia.    IMPRESSION:    No significant interval change in appearance of decompressed gallbladder   with surrounding inflammatory changes and indwelling percutaneous   cholecystostomy tube.    Continued asymmetric prominence the left posterolateral abdominal wall,   slightly progressed compared to the prior study. Recommend clinical   correlation to assess for developing wall hematoma.    Additional findings as mentioned above.    These critical results were discussed via telephone at 1/29/2024 6:35 AM   by Dr. Foss of radiology with Dr. Sims.    < end of copied text >

## 2024-01-30 NOTE — PHYSICAL THERAPY INITIAL EVALUATION ADULT - PATIENT/FAMILY AGREES WITH PLAN
pantoprazole 40 mg oral delayed release tablet: 1 tab(s) orally once a day (before a meal) yes docusate sodium 100 mg oral capsule: 1 cap(s) orally once a day  pantoprazole 40 mg oral delayed release tablet: 1 tab(s) orally once a day (before a meal)

## 2024-01-31 ENCOUNTER — TRANSCRIPTION ENCOUNTER (OUTPATIENT)
Age: 84
End: 2024-01-31

## 2024-01-31 LAB
ALBUMIN SERPL ELPH-MCNC: 2.8 G/DL — LOW (ref 3.3–5.2)
ALP SERPL-CCNC: 125 U/L — HIGH (ref 40–120)
ALT FLD-CCNC: 77 U/L — HIGH
ANION GAP SERPL CALC-SCNC: 11 MMOL/L — SIGNIFICANT CHANGE UP (ref 5–17)
AST SERPL-CCNC: 30 U/L — SIGNIFICANT CHANGE UP
BASOPHILS # BLD AUTO: 0.03 K/UL — SIGNIFICANT CHANGE UP (ref 0–0.2)
BASOPHILS NFR BLD AUTO: 0.4 % — SIGNIFICANT CHANGE UP (ref 0–2)
BILIRUB DIRECT SERPL-MCNC: 0.5 MG/DL — HIGH (ref 0–0.3)
BILIRUB INDIRECT FLD-MCNC: 0.7 MG/DL — SIGNIFICANT CHANGE UP (ref 0.2–1)
BILIRUB SERPL-MCNC: 1.2 MG/DL — SIGNIFICANT CHANGE UP (ref 0.4–2)
BUN SERPL-MCNC: 22.9 MG/DL — HIGH (ref 8–20)
CALCIUM SERPL-MCNC: 8.2 MG/DL — LOW (ref 8.4–10.5)
CHLORIDE SERPL-SCNC: 109 MMOL/L — HIGH (ref 96–108)
CO2 SERPL-SCNC: 19 MMOL/L — LOW (ref 22–29)
CREAT SERPL-MCNC: 2.55 MG/DL — HIGH (ref 0.5–1.3)
EGFR: 24 ML/MIN/1.73M2 — LOW
EOSINOPHIL # BLD AUTO: 0.23 K/UL — SIGNIFICANT CHANGE UP (ref 0–0.5)
EOSINOPHIL NFR BLD AUTO: 3.4 % — SIGNIFICANT CHANGE UP (ref 0–6)
GLUCOSE BLDC GLUCOMTR-MCNC: 130 MG/DL — HIGH (ref 70–99)
GLUCOSE BLDC GLUCOMTR-MCNC: 140 MG/DL — HIGH (ref 70–99)
GLUCOSE BLDC GLUCOMTR-MCNC: 176 MG/DL — HIGH (ref 70–99)
GLUCOSE BLDC GLUCOMTR-MCNC: 90 MG/DL — SIGNIFICANT CHANGE UP (ref 70–99)
GLUCOSE SERPL-MCNC: 85 MG/DL — SIGNIFICANT CHANGE UP (ref 70–99)
HCT VFR BLD CALC: 28.2 % — LOW (ref 39–50)
HGB BLD-MCNC: 8.9 G/DL — LOW (ref 13–17)
IMM GRANULOCYTES NFR BLD AUTO: 1.3 % — HIGH (ref 0–0.9)
LYMPHOCYTES # BLD AUTO: 1.01 K/UL — SIGNIFICANT CHANGE UP (ref 1–3.3)
LYMPHOCYTES # BLD AUTO: 15.1 % — SIGNIFICANT CHANGE UP (ref 13–44)
MCHC RBC-ENTMCNC: 29.5 PG — SIGNIFICANT CHANGE UP (ref 27–34)
MCHC RBC-ENTMCNC: 31.6 GM/DL — LOW (ref 32–36)
MCV RBC AUTO: 93.4 FL — SIGNIFICANT CHANGE UP (ref 80–100)
MONOCYTES # BLD AUTO: 0.8 K/UL — SIGNIFICANT CHANGE UP (ref 0–0.9)
MONOCYTES NFR BLD AUTO: 12 % — SIGNIFICANT CHANGE UP (ref 2–14)
NEUTROPHILS # BLD AUTO: 4.53 K/UL — SIGNIFICANT CHANGE UP (ref 1.8–7.4)
NEUTROPHILS NFR BLD AUTO: 67.8 % — SIGNIFICANT CHANGE UP (ref 43–77)
PLATELET # BLD AUTO: 196 K/UL — SIGNIFICANT CHANGE UP (ref 150–400)
POTASSIUM SERPL-MCNC: 4.3 MMOL/L — SIGNIFICANT CHANGE UP (ref 3.5–5.3)
POTASSIUM SERPL-SCNC: 4.3 MMOL/L — SIGNIFICANT CHANGE UP (ref 3.5–5.3)
PROT SERPL-MCNC: 5.4 G/DL — LOW (ref 6.6–8.7)
RBC # BLD: 3.02 M/UL — LOW (ref 4.2–5.8)
RBC # FLD: 15.1 % — HIGH (ref 10.3–14.5)
SODIUM SERPL-SCNC: 139 MMOL/L — SIGNIFICANT CHANGE UP (ref 135–145)
WBC # BLD: 6.69 K/UL — SIGNIFICANT CHANGE UP (ref 3.8–10.5)
WBC # FLD AUTO: 6.69 K/UL — SIGNIFICANT CHANGE UP (ref 3.8–10.5)

## 2024-01-31 PROCEDURE — 43255 EGD CONTROL BLEEDING ANY: CPT

## 2024-01-31 PROCEDURE — 99239 HOSP IP/OBS DSCHRG MGMT >30: CPT

## 2024-01-31 PROCEDURE — 99232 SBSQ HOSP IP/OBS MODERATE 35: CPT

## 2024-01-31 RX ORDER — GABAPENTIN 400 MG/1
1 CAPSULE ORAL
Qty: 30 | Refills: 0
Start: 2024-01-31 | End: 2024-02-29

## 2024-01-31 RX ORDER — METOPROLOL TARTRATE 50 MG
1 TABLET ORAL
Qty: 60 | Refills: 0
Start: 2024-01-31 | End: 2024-02-29

## 2024-01-31 RX ORDER — TAMSULOSIN HYDROCHLORIDE 0.4 MG/1
1 CAPSULE ORAL
Qty: 30 | Refills: 0
Start: 2024-01-31 | End: 2024-02-29

## 2024-01-31 RX ORDER — DRONEDARONE 400 MG/1
1 TABLET, FILM COATED ORAL
Qty: 60 | Refills: 0
Start: 2024-01-31 | End: 2024-02-29

## 2024-01-31 RX ORDER — SODIUM BICARBONATE 1 MEQ/ML
1 SYRINGE (ML) INTRAVENOUS
Qty: 60 | Refills: 0
Start: 2024-01-31 | End: 2024-02-29

## 2024-01-31 RX ADMIN — PIPERACILLIN AND TAZOBACTAM 25 GRAM(S): 4; .5 INJECTION, POWDER, LYOPHILIZED, FOR SOLUTION INTRAVENOUS at 05:04

## 2024-01-31 RX ADMIN — GABAPENTIN 300 MILLIGRAM(S): 400 CAPSULE ORAL at 21:57

## 2024-01-31 RX ADMIN — Medication 25 MILLIGRAM(S): at 05:04

## 2024-01-31 RX ADMIN — Medication 650 MILLIGRAM(S): at 18:17

## 2024-01-31 RX ADMIN — PANTOPRAZOLE SODIUM 40 MILLIGRAM(S): 20 TABLET, DELAYED RELEASE ORAL at 12:31

## 2024-01-31 RX ADMIN — DRONEDARONE 400 MILLIGRAM(S): 400 TABLET, FILM COATED ORAL at 05:04

## 2024-01-31 RX ADMIN — PIPERACILLIN AND TAZOBACTAM 25 GRAM(S): 4; .5 INJECTION, POWDER, LYOPHILIZED, FOR SOLUTION INTRAVENOUS at 17:14

## 2024-01-31 RX ADMIN — DRONEDARONE 400 MILLIGRAM(S): 400 TABLET, FILM COATED ORAL at 18:17

## 2024-01-31 RX ADMIN — TAMSULOSIN HYDROCHLORIDE 0.4 MILLIGRAM(S): 0.4 CAPSULE ORAL at 21:56

## 2024-01-31 RX ADMIN — INSULIN GLARGINE 10 UNIT(S): 100 INJECTION, SOLUTION SUBCUTANEOUS at 21:56

## 2024-01-31 RX ADMIN — PIPERACILLIN AND TAZOBACTAM 25 GRAM(S): 4; .5 INJECTION, POWDER, LYOPHILIZED, FOR SOLUTION INTRAVENOUS at 21:57

## 2024-01-31 RX ADMIN — Medication 1: at 18:16

## 2024-01-31 RX ADMIN — Medication 25 MILLIGRAM(S): at 18:17

## 2024-01-31 RX ADMIN — Medication 1 MILLIGRAM(S): at 12:31

## 2024-01-31 RX ADMIN — Medication 325 MILLIGRAM(S): at 12:32

## 2024-01-31 RX ADMIN — Medication 650 MILLIGRAM(S): at 05:04

## 2024-01-31 NOTE — DISCHARGE NOTE PROVIDER - PROVIDER TOKENS
FREE:[LAST:[PCP],PHONE:[(   )    -],FAX:[(   )    -],FOLLOWUP:[1-3 days]],PROVIDER:[TOKEN:[[id number]],FOLLOWUP:[1-3 days]] PROVIDER:[TOKEN:[[id number]],FOLLOWUP:[1-3 days]],FREE:[LAST:[PCP],PHONE:[(   )    -],FAX:[(   )    -],FOLLOWUP:[1-3 days]],PROVIDER:[TOKEN:[[id number]],FOLLOWUP:[1-3 days]],PROVIDER:[TOKEN:[[id number]],FOLLOWUP:[1 week]]

## 2024-01-31 NOTE — DISCHARGE NOTE PROVIDER - ATTENDING DISCHARGE PHYSICAL EXAMINATION:
Constitutional: NAD, Resting  ENT: Supple, No JVD  Lungs: CTA B/L, Non-labored breathing  Cardio: RRR, S1/S2, No murmur  Abdomen: Soft, Nontender, Nondistended; Bowel sounds present, sky drain in place  Extremities: No calf tenderness, No pitting edema  Musculoskeletal:   No joint swelling  Psych: Calm, cooperative affect appropriate  Neuro: Awake and alert, oriented to name and location, unaware of date  Skin: No rashes; no palpable lesions

## 2024-01-31 NOTE — PROGRESS NOTE ADULT - ASSESSMENT
84 year old male with history of Acute Cholecystitis s/p Cholecystomy Tube (12/22/23), COVID-19, PAF not on AC due to GIB + Fall Risk, Esophagitis, Anemia, DM 2, HTN, HLD, CKD III/IV, BPH and Cognitive Impairment brought by EMS from Rehab ([facility]) for evaluation. As per wife, patient has not been feeling well for the last 3 days. He has been very sleepy & lethargic with decreased oral intake. He fell off chair 3 days ago due to weakness -- did not hit his head or injured any part of his body. He has been complaining of RUQ pain intermittently. He is currently awake and alert -- not completely oriented which is his baseline. No active complaints at this time.  In ER, he was found to have Leukocytosis with Elevated LFTs. CT showed no significant interval change in appearance of decompressed gallbladder with surrounding inflammatory changes and indwelling percutaneous cholecystostomy tube. Seen by Surgery and no intervention was recommended. Seen by GI who recommended IVF, Antibiotics and Tube Study. He was given 2600 NS Bolus and a dose of Zosyn.     #Transaminitis  #Cholelithiasis  #Possible Cholangitis   - Follow blood cultures  - Tube study by IR shows gallstones but no obstruction  - IVF  - Continue Zosyn  - Trend LFTS  - GI recs appreciated   - Surgery recs appreciated - Follow up outpatient   - ?Spyglass procedure recommended by IR for GI as outpatient    #Asymmetric Prominence of  Left Posterolateral Abdominal Wall   - Monitor for suspected developing hematoma     #Esophagitis   - Continue Protonix     #CKD III/IV / Metabolic Acidosis   - Stable   - Continue Sodium Bicarb  - Avoid nephrotoxic medications     #BPH  - Continue Flomax    #PAF  - Not on AC due to GIB and Fall Risk  - Continue Multaq and Metoprolol    # HLD  - Hold Simvastatin due to elevated LFTs  - Low Fat Diet    # DM 2  - A1c 8.2 on 12/22/23  - Accu checks and ISS  - Continue Lantus 10 units at bedtime    # Anemia  - Stable  - Continue Ferrous Sulfate     Full Code.     DVT Prophylaxis -- Venodyne    Dispo: Stable for dc home.     Updated patient's wife at bedside    
84 year old male with history of Acute Cholecystitis s/p Cholecystomy Tube (12/22/23), COVID-19, PAF not on AC due to GIB + Fall Risk, Esophagitis, Anemia, DM 2, HTN, HLD, CKD III/IV, BPH and Cognitive Impairment brought by EMS from Rehab ([facility]) for evaluation. As per wife, patient has not been feeling well for the last 3 days. He has been very sleepy & lethargic with decreased oral intake. He fell off chair 3 days ago due to weakness -- did not hit his head or injured any part of his body. He has been complaining of RUQ pain intermittently. He is currently awake and alert -- not completely oriented which is his baseline. No active complaints at this time.  In ER, he was found to have Leukocytosis with Elevated LFTs. CT showed no significant interval change in appearance of decompressed gallbladder with surrounding inflammatory changes and indwelling percutaneous cholecystostomy tube. Seen by Surgery and no intervention was recommended. Seen by GI who recommended IVF, Antibiotics and Tube Study. He was given 2600 NS Bolus and a dose of Zosyn.     #Transaminitis  #Cholelithiasis  #Possible Cholangitis   - Follow blood cultures  - Tube study by IR shows gallstones but no obstruction  - IVF  - Continue Zosyn  - Trend LFTS  - GI recs appreciated - Recommends Surgery eval for Cholecystectomy   - Surgery reconsulted for Lap sky eval  - ?Spyglass procedure recommended by IR for GI as outpatient    #Asymmetric Prominence of  Left Posterolateral Abdominal Wall   - Monitor for suspected developing hematoma     #Esophagitis   - Continue Protonix     #CKD III/IV / Metabolic Acidosis   - Stable   - Continue Sodium Bicarb  - Avoid nephrotoxic medications     #BPH  - Continue Flomax    #PAF  - Not on AC due to GIB and Fall Risk  - Continue Multaq and Metoprolol    # HLD  - Hold Simvastatin due to elevated LFTs  - Low Fat Diet    # DM 2  - A1c 8.2 on 12/22/23  - Accu checks and ISS  - Continue Lantus 10 units at bedtime    # Anemia  - Stable  - Continue Ferrous Sulfate     Full Code.     DVT Prophylaxis -- Venodyne    Dispo: Likely [facility] once stable. PT Eval.    Updated patient's wife at bedside    
84 yo M with PMH of DM, HTN, CKD III, history of percutaneous cholecystostomy 12/23 for cholecystitis presenting from [facility] rehab for AMS and lethargy. GI consulted for elevated liver enzymes.    #elevated LFT  #history of percutaneous cholecystostomy (12/22/2023)    S/p cholecystostomy study yesterday, shows filling defects (likely stones) without obstruction of biliary ducts. Recommending ? spyglass procedure   Liver chemistry improving, T bili normalized, AST/ ALT tending down.  Resume diet as tolerated   monitor liver chemistry

## 2024-01-31 NOTE — PROGRESS NOTE ADULT - SUBJECTIVE AND OBJECTIVE BOX
Late entry - Patient was planned for DC on 1/31 but did not leave.   Hospitalist Daily Progress Note    Chief Complaint:  Patient is a 84y old  Male who presents with a chief complaint of Not feeling well (31 Jan 2024 10:39)      SUBJECTIVE / OVERNIGHT EVENTS:  Patient was seen and examined at bedside. Feels well.   Patient denies chest pain, SOB, abd pain, N/V, fever, chills, dysuria or any other complaints. All remainder ROS negative.     MEDICATIONS  (STANDING):  dextrose 5%. 1000 milliLiter(s) (50 mL/Hr) IV Continuous <Continuous>  dextrose 5%. 1000 milliLiter(s) (100 mL/Hr) IV Continuous <Continuous>  dextrose 50% Injectable 12.5 Gram(s) IV Push once  dextrose 50% Injectable 25 Gram(s) IV Push once  dextrose 50% Injectable 25 Gram(s) IV Push once  dronedarone 400 milliGRAM(s) Oral two times a day  ferrous    sulfate 325 milliGRAM(s) Oral daily  folic acid 1 milliGRAM(s) Oral daily  gabapentin 300 milliGRAM(s) Oral at bedtime  glucagon  Injectable 1 milliGRAM(s) IntraMuscular once  insulin glargine Injectable (LANTUS) 10 Unit(s) SubCutaneous at bedtime  insulin lispro (ADMELOG) corrective regimen sliding scale   SubCutaneous three times a day before meals  insulin lispro (ADMELOG) corrective regimen sliding scale   SubCutaneous at bedtime  metoprolol tartrate 25 milliGRAM(s) Oral two times a day  pantoprazole    Tablet 40 milliGRAM(s) Oral daily  piperacillin/tazobactam IVPB.. 3.375 Gram(s) IV Intermittent every 8 hours  sodium bicarbonate 650 milliGRAM(s) Oral two times a day  sodium chloride 0.9%. 1000 milliLiter(s) (75 mL/Hr) IV Continuous <Continuous>  tamsulosin 0.4 milliGRAM(s) Oral at bedtime    MEDICATIONS  (PRN):  acetaminophen     Tablet .. 650 milliGRAM(s) Oral every 6 hours PRN Temp greater or equal to 38C (100.4F), Mild Pain (1 - 3)  dextrose Oral Gel 15 Gram(s) Oral once PRN Blood Glucose LESS THAN 70 milliGRAM(s)/deciliter  ondansetron Injectable 4 milliGRAM(s) IV Push every 6 hours PRN Nausea and/or Vomiting  polyethylene glycol 3350 17 Gram(s) Oral daily PRN Constipation        I&O's Summary    31 Jan 2024 07:01  -  01 Feb 2024 07:00  --------------------------------------------------------  IN: 120 mL / OUT: 1200 mL / NET: -1080 mL        PHYSICAL EXAM:  Vital Signs Last 24 Hrs  T(C): 36.4 (01 Feb 2024 07:48), Max: 36.8 (31 Jan 2024 19:45)  T(F): 97.5 (01 Feb 2024 07:48), Max: 98.2 (31 Jan 2024 19:45)  HR: 56 (01 Feb 2024 07:48) (56 - 61)  BP: 158/- (01 Feb 2024 07:48) (134/70 - 159/78)  BP(mean): --  RR: 18 (01 Feb 2024 07:48) (16 - 18)  SpO2: 97% (01 Feb 2024 07:48) (95% - 97%)    Parameters below as of 01 Feb 2024 07:48  Patient On (Oxygen Delivery Method): room air          Constitutional: NAD, Resting  ENT: Supple, No JVD  Lungs: CTA B/L, Non-labored breathing  Cardio: RRR, S1/S2, No murmur  Abdomen: Soft, Nontender, Nondistended; Bowel sounds present, sky tube  Extremities: No calf tenderness, No pitting edema  Musculoskeletal:   No joint swelling  Psych: Calm, cooperative affect appropriate  Neuro: Awake and alert, oriented 2-3  Skin: No rashes; no palpable lesions    LABS:                        8.9    6.69  )-----------( 196      ( 31 Jan 2024 07:21 )             28.2     01-31    139  |  109<H>  |  22.9<H>  ----------------------------<  85  4.3   |  19.0<L>  |  2.55<H>    Ca    8.2<L>      31 Jan 2024 07:21    TPro  5.4<L>  /  Alb  2.8<L>  /  TBili  1.2  /  DBili  0.5<H>  /  AST  30  /  ALT  77<H>  /  AlkPhos  125<H>  01-31          Urinalysis Basic - ( 31 Jan 2024 07:21 )    Color: x / Appearance: x / SG: x / pH: x  Gluc: 85 mg/dL / Ketone: x  / Bili: x / Urobili: x   Blood: x / Protein: x / Nitrite: x   Leuk Esterase: x / RBC: x / WBC x   Sq Epi: x / Non Sq Epi: x / Bacteria: x        CAPILLARY BLOOD GLUCOSE      POCT Blood Glucose.: 97 mg/dL (01 Feb 2024 08:16)  POCT Blood Glucose.: 140 mg/dL (31 Jan 2024 21:54)  POCT Blood Glucose.: 176 mg/dL (31 Jan 2024 17:42)  POCT Blood Glucose.: 130 mg/dL (31 Jan 2024 12:16)  POCT Blood Glucose.: 90 mg/dL (31 Jan 2024 09:35)        RADIOLOGY REVIEWED

## 2024-01-31 NOTE — DISCHARGE NOTE PROVIDER - HOSPITAL COURSE
84 year old male with a PMHx of Acute Cholecystitis s/p Cholecystomy Tube (12/22/23), COVID-19, PAF not on AC due to GIB + Fall Risk, Esophagitis, Anemia, DM 2, HTN, HLD, CKD III/IV, BPH and Cognitive Impairment, who presented to [facility] ED for lethargy, RUQ pain, and fall. In the ED, labs significant for leukocytosis and elevated LFTs. CT unchanged from previous. Patient admitted for management of transaminitis, cholelithiasis, and concern for cholangitis. Surgery was consulted, no acute intervention was recommended. GI consulted, patient was continued on IVF and IV ABX. BCx returned no growth to date. Patient underwent tube study by IR, which showed gallstones and was negative for obstruction. Surgery was re-consulted for CCY evaluation, Spyglass procedure recommended by IR for GI as outpatient. LFTs monitored, and downtrended. Patient noted to have asymmetric Prominence of  Left Posterolateral Abdominal Wall. Monitored for hematoma, hgb remained stable with no acute bleeding. Patient evaluated by PT, who recommend home PT vs. home with assist. 84 year old male with a PMHx of Acute Cholecystitis s/p Cholecystomy Tube (12/22/23), COVID-19, PAF not on AC due to GIB + Fall Risk, Esophagitis, Anemia, DM 2, HTN, HLD, CKD III/IV, BPH and Cognitive Impairment, who presented to [facility] ED for lethargy, RUQ pain, and fall. In the ED, labs significant for leukocytosis and elevated LFTs. CT unchanged from previous. Patient admitted for management of transaminitis, cholelithiasis, and concern for cholangitis. Surgery was consulted, no acute intervention was recommended. GI consulted, patient was continued on IVF and IV ABX. BCx returned no growth to date. Patient underwent tube study by IR, which showed gallstones and was negative for obstruction. Surgery was re-consulted for CCY evaluation, Spyglass procedure recommended by IR for GI as outpatient. LFTs monitored, and downtrended. Patient noted to have asymmetric Prominence of  Left Posterolateral Abdominal Wall. Monitored for hematoma, hgb remained stable with no acute bleeding. Patient evaluated by PT, who recommend home PT vs. home with assist. Medically stable for DC home.      84 year old male with a PMHx of Acute Cholecystitis s/p Cholecystomy Tube (12/22/23), COVID-19, PAF not on AC due to GIB + Fall Risk, Esophagitis, Anemia, DM 2, HTN, HLD, CKD III/IV, BPH and Cognitive Impairment, who presented to [facility] ED for lethargy, RUQ pain, and fall. In the ED, labs significant for leukocytosis and elevated LFTs. CT unchanged from previous. Patient admitted for management of transaminitis, cholelithiasis, and concern for cholangitis. Surgery was consulted, no acute intervention was recommended. GI consulted, patient was continued on IVF and IV ABX. BCx returned no growth to date. Patient underwent tube study by IR, which showed gallstones and was negative for obstruction. Surgery was re-consulted for CCY evaluation, Spyglass procedure recommended by IR for GI as outpatient. LFTs monitored, and downtrended. Patient noted to have asymmetric Prominence of  Left Posterolateral Abdominal Wall. Monitored for hematoma, hgb remained stable with no acute bleeding. Patient evaluated by PT, who recommend home PT vs. home with assist. Medically stable for DC home.     Radha Tube Care:   - Flush drain with 10cc normal saline daily forward only; DO NOT ASPIRATE  - Change dressing q3 days or when dressing is saturated

## 2024-01-31 NOTE — DISCHARGE NOTE PROVIDER - NSDCCPCAREPLAN_GEN_ALL_CORE_FT
PRINCIPAL DISCHARGE DIAGNOSIS  Diagnosis: Cholecystitis  Assessment and Plan of Treatment: - Noted to have elevated LFTs. Surgery consulted, no acute surgical intervention required. GI consulted, underwent tube study which was negative for obstruction but demonstrated gallstones.   - Treated with IV Antibiotics.   - To follow up outpatient with GI for possible spyglass procedure.      SECONDARY DISCHARGE DIAGNOSES  Diagnosis: Transaminitis  Assessment and Plan of Treatment: - Treated as noted above.    Diagnosis: Stage 3 chronic kidney disease  Assessment and Plan of Treatment: - Continue medications as directed and follow up with your PCP.    Diagnosis: Chronic atrial fibrillation  Assessment and Plan of Treatment: - Continue medications as directed and follow up with your PCP and cardiology.    Diagnosis: Diabetes  Assessment and Plan of Treatment: - Glucose monitored closely and corrected with sliding scale as needed.   - Continue with home medications and glucose checks as directed.   - Follow up with your PCP for monitoring and medication optimizaiton.   - Follow up for regular visits with podiatry, optometry, and nephrology for monitoring.   - Continue with diabetic friendly, carbohydrate consistent diet.     PRINCIPAL DISCHARGE DIAGNOSIS  Diagnosis: Cholecystitis  Assessment and Plan of Treatment: - Noted to have elevated LFTs. Surgery consulted, no acute surgical intervention required. GI consulted, underwent tube study which was negative for obstruction but demonstrated gallstones.   - Treated with IV Antibiotics.   - To follow up outpatient with GI/IR for possible spyglass procedure.  Flush cholecystomy tube once a day with a 10 cc flush. Change dressing around tube every 3 days and then as needed.      SECONDARY DISCHARGE DIAGNOSES  Diagnosis: Stage 3 chronic kidney disease  Assessment and Plan of Treatment: - Continue medications as directed and follow up with your PCP.    Diagnosis: Chronic atrial fibrillation  Assessment and Plan of Treatment: - Continue medications as directed and follow up with your PCP and cardiology.    Diagnosis: Transaminitis  Assessment and Plan of Treatment: - Treated as noted above.    Diagnosis: Diabetes  Assessment and Plan of Treatment: - Glucose monitored closely and corrected with sliding scale as needed.   - Continue with home medications and glucose checks as directed.   - Follow up with your PCP for monitoring and medication optimizaiton.   - Follow up for regular visits with podiatry, optometry, and nephrology for monitoring.   - Continue with diabetic friendly, carbohydrate consistent diet.

## 2024-01-31 NOTE — DISCHARGE NOTE NURSING/CASE MANAGEMENT/SOCIAL WORK - PATIENT PORTAL LINK FT
You can access the FollowMyHealth Patient Portal offered by [facility] by registering at the following website: [email or web address]. By joining [facility]’s FollowMyHealth portal, you will also be able to view your health information using other applications (apps) compatible with our system.

## 2024-01-31 NOTE — DISCHARGE NOTE PROVIDER - NSDCFUADDAPPT_GEN_ALL_CORE_FT
Radha Tube Care:   - Flush drain with 10cc normal saline daily forward only; DO NOT ASPIRATE  - Change dressing q3 days or when dressing is saturated

## 2024-01-31 NOTE — DISCHARGE NOTE NURSING/CASE MANAGEMENT/SOCIAL WORK - NSDCFUADDAPPT_GEN_ALL_CORE_FT
Home Care Services to begin with [facility] upon approval. Please contact above number for additional information.   *** [facility] BEDSIDE RN TO GIVE RODRICK TUBE CARE SUPPLIES FOR HOME CARE UPON DISCHARGE (1 week supply minimum).

## 2024-01-31 NOTE — DISCHARGE NOTE NURSING/CASE MANAGEMENT/SOCIAL WORK - NSPROMEDSBROUGHTTOHOSP_GEN_A_NUR
Anesthesia PreOp Note    HPI:     Emilie Goode is a 51year old female who presents for preoperative consultation requested by: Jose Mitchell MD    Date of Surgery: 12/4/2019    Procedure(s):  RENAL ARTERY BYPASS  Indication: nutcracker phenomenon o COLONOSCOPY  4/13   • EGD  12/14, 2007   • KNEE SURGERY      2006       Clindamycin Phosphate 1 % External Solution, APPLY TO SKIN D UTD, Disp: , Rfl: 3, Past Week at Unknown time  RESTASIS 0.05 % Ophthalmic Emulsion, INSTILL 1 DROP INTO BOTH EYES BID., Desirae Ayala since quittin.3      Smokeless tobacco: Never Used    Substance and Sexual Activity      Alcohol use:  Yes        Alcohol/week: 0.8 standard drinks        Types: 1 Glasses of wine per week      Drug use: No      Sexual activity: Not on file    Lifestyle Resp:  18   Temp:  98.3 °F (36.8 °C)   TempSrc:  Oral   SpO2:  98%   Weight: 55.8 kg (123 lb) 55.3 kg (122 lb)   Height: 1.676 m (5' 6\")         Anesthesia Evaluation     Patient summary reviewed and Nursing notes reviewed    History of anesthetic compl no

## 2024-01-31 NOTE — DISCHARGE NOTE PROVIDER - NSDCMRMEDTOKEN_GEN_ALL_CORE_FT
dronedarone 400 mg oral tablet: 1 tab(s) orally 2 times a day  ferrous sulfate 325 mg (65 mg elemental iron) oral tablet: 1 tab(s) orally once a day  folic acid 1 mg oral tablet: 1 tab(s) orally once a day  gabapentin 300 mg oral capsule: 1 cap(s) orally once a day (at bedtime)  insulin glargine 100 units/mL subcutaneous solution: 10 unit(s) subcutaneous once a day (at bedtime)  insulin lispro 100 units/mL injectable solution: 4 international unit(s) subcutaneous 3 times a day (before meals) hold if FS &lt;100  metoprolol tartrate 25 mg oral tablet: 1 tab(s) orally every 8 hours  midodrine 10 mg oral tablet: 1 tab(s) orally 3 times a day  omeprazole 40 mg oral delayed release capsule: 1 cap(s) orally once a day  polyethylene glycol 3350 oral powder for reconstitution: 17 gram(s) orally once a day (at bedtime)  senna leaf extract oral tablet: 2 tab(s) orally once a day (at bedtime)  simvastatin 20 mg oral tablet: 1 tab(s) orally once a day (at bedtime)  sodium bicarbonate 650 mg oral tablet: 1 tab(s) orally 2 times a day  tamsulosin 0.4 mg oral capsule: 1 cap(s) orally once a day (at bedtime)   amoxicillin-clavulanate 500 mg-125 mg oral tablet: 1 tab(s) orally 2 times a day  dronedarone 400 mg oral tablet: 1 tab(s) orally 2 times a day  ferrous sulfate 325 mg (65 mg elemental iron) oral tablet: 1 tab(s) orally once a day  folic acid 1 mg oral tablet: 1 tab(s) orally once a day  gabapentin 300 mg oral capsule: 1 cap(s) orally once a day (at bedtime)  insulin glargine 100 units/mL subcutaneous solution: 10 unit(s) subcutaneous once a day (at bedtime)  insulin lispro 100 units/mL injectable solution: 4 international unit(s) subcutaneous 3 times a day (before meals) hold if FS &lt;100  metoprolol tartrate 25 mg oral tablet: 1 tab(s) orally 2 times a day  omeprazole 40 mg oral delayed release capsule: 1 cap(s) orally once a day  polyethylene glycol 3350 oral powder for reconstitution: 17 gram(s) orally once a day (at bedtime)  senna leaf extract oral tablet: 2 tab(s) orally once a day (at bedtime)  simvastatin 20 mg oral tablet: 1 tab(s) orally once a day (at bedtime)  sodium bicarbonate 650 mg oral tablet: 1 tab(s) orally 2 times a day  tamsulosin 0.4 mg oral capsule: 1 cap(s) orally once a day (at bedtime)

## 2024-01-31 NOTE — DISCHARGE NOTE PROVIDER - CARE PROVIDER_API CALL
PCP,   Phone: (   )    -  Fax: (   )    -  Follow Up Time: 1-3 days    Maldonado Villegas  Gastroenterology  [address], [facility] [address]  [place] [address]  Phone: [phone]  Fax: [phone]  Follow Up Time: 1-3 days   Maldonado Villegas  Gastroenterology  [address]  [place] [address]  Phone: [phone]  Fax: [phone]  Follow Up Time: 1-3 days    PCP,   Phone: (   )    -  Fax: (   )    -  Follow Up Time: 1-3 days    Jessica Snider  Interventional Radiology and Diagnostic Radiology  [address] [place] [address]  Phone: [phone]  Fax: [phone]  Follow Up Time: 1-3 days    Nicki San  Surgery  [address] [place] [address]  Phone: [phone]  Fax: [phone]  Follow Up Time: 1 week

## 2024-01-31 NOTE — DISCHARGE NOTE PROVIDER - CARE PROVIDERS DIRECT ADDRESSES
,DirectAddress_Unknown,[email or web address],DirectAddress_Unknown,[email or web address]

## 2024-02-01 VITALS
TEMPERATURE: 98 F | DIASTOLIC BLOOD PRESSURE: 74 MMHG | RESPIRATION RATE: 18 BRPM | HEART RATE: 98 BPM | OXYGEN SATURATION: 98 % | SYSTOLIC BLOOD PRESSURE: 156 MMHG

## 2024-02-01 LAB — GLUCOSE BLDC GLUCOMTR-MCNC: 97 MG/DL — SIGNIFICANT CHANGE UP (ref 70–99)

## 2024-02-01 PROCEDURE — 85025 COMPLETE CBC W/AUTO DIFF WBC: CPT

## 2024-02-01 PROCEDURE — 85018 HEMOGLOBIN: CPT

## 2024-02-01 PROCEDURE — 87086 URINE CULTURE/COLONY COUNT: CPT

## 2024-02-01 PROCEDURE — 36415 COLL VENOUS BLD VENIPUNCTURE: CPT

## 2024-02-01 PROCEDURE — 82962 GLUCOSE BLOOD TEST: CPT

## 2024-02-01 PROCEDURE — 93005 ELECTROCARDIOGRAM TRACING: CPT

## 2024-02-01 PROCEDURE — 74176 CT ABD & PELVIS W/O CONTRAST: CPT | Mod: MA

## 2024-02-01 PROCEDURE — 85730 THROMBOPLASTIN TIME PARTIAL: CPT

## 2024-02-01 PROCEDURE — 76080 X-RAY EXAM OF FISTULA: CPT

## 2024-02-01 PROCEDURE — 83735 ASSAY OF MAGNESIUM: CPT

## 2024-02-01 PROCEDURE — 80048 BASIC METABOLIC PNL TOTAL CA: CPT

## 2024-02-01 PROCEDURE — 99239 HOSP IP/OBS DSCHRG MGMT >30: CPT

## 2024-02-01 PROCEDURE — 85014 HEMATOCRIT: CPT

## 2024-02-01 PROCEDURE — 80076 HEPATIC FUNCTION PANEL: CPT

## 2024-02-01 PROCEDURE — 82140 ASSAY OF AMMONIA: CPT

## 2024-02-01 PROCEDURE — 80053 COMPREHEN METABOLIC PANEL: CPT

## 2024-02-01 PROCEDURE — 71045 X-RAY EXAM CHEST 1 VIEW: CPT

## 2024-02-01 PROCEDURE — 82947 ASSAY GLUCOSE BLOOD QUANT: CPT

## 2024-02-01 PROCEDURE — 81001 URINALYSIS AUTO W/SCOPE: CPT

## 2024-02-01 PROCEDURE — 84295 ASSAY OF SERUM SODIUM: CPT

## 2024-02-01 PROCEDURE — 87040 BLOOD CULTURE FOR BACTERIA: CPT

## 2024-02-01 PROCEDURE — 84132 ASSAY OF SERUM POTASSIUM: CPT

## 2024-02-01 PROCEDURE — 96365 THER/PROPH/DIAG IV INF INIT: CPT

## 2024-02-01 PROCEDURE — 99285 EMERGENCY DEPT VISIT HI MDM: CPT

## 2024-02-01 PROCEDURE — 85610 PROTHROMBIN TIME: CPT

## 2024-02-01 PROCEDURE — 0225U NFCT DS DNA&RNA 21 SARSCOV2: CPT

## 2024-02-01 PROCEDURE — 82330 ASSAY OF CALCIUM: CPT

## 2024-02-01 PROCEDURE — 83605 ASSAY OF LACTIC ACID: CPT

## 2024-02-01 PROCEDURE — 82803 BLOOD GASES ANY COMBINATION: CPT

## 2024-02-01 PROCEDURE — 82435 ASSAY OF BLOOD CHLORIDE: CPT

## 2024-02-01 PROCEDURE — 83690 ASSAY OF LIPASE: CPT

## 2024-02-01 RX ADMIN — Medication 650 MILLIGRAM(S): at 05:23

## 2024-02-01 RX ADMIN — DRONEDARONE 400 MILLIGRAM(S): 400 TABLET, FILM COATED ORAL at 05:23

## 2024-02-01 RX ADMIN — Medication 25 MILLIGRAM(S): at 05:23

## 2024-02-01 RX ADMIN — PIPERACILLIN AND TAZOBACTAM 25 GRAM(S): 4; .5 INJECTION, POWDER, LYOPHILIZED, FOR SOLUTION INTRAVENOUS at 05:22

## 2024-02-03 LAB
CULTURE RESULTS: SIGNIFICANT CHANGE UP
CULTURE RESULTS: SIGNIFICANT CHANGE UP
SPECIMEN SOURCE: SIGNIFICANT CHANGE UP
SPECIMEN SOURCE: SIGNIFICANT CHANGE UP

## 2024-02-08 ENCOUNTER — APPOINTMENT (OUTPATIENT)
Dept: TRAUMA SURGERY | Facility: CLINIC | Age: 84
End: 2024-02-08
Payer: MEDICARE

## 2024-02-08 VITALS
RESPIRATION RATE: 16 BRPM | WEIGHT: 150 LBS | HEART RATE: 60 BPM | SYSTOLIC BLOOD PRESSURE: 89 MMHG | DIASTOLIC BLOOD PRESSURE: 58 MMHG | HEIGHT: 60.5 IN | OXYGEN SATURATION: 97 % | TEMPERATURE: 97.9 F | BODY MASS INDEX: 28.69 KG/M2

## 2024-02-08 DIAGNOSIS — Z87.448 PERSONAL HISTORY OF OTHER DISEASES OF URINARY SYSTEM: ICD-10-CM

## 2024-02-08 DIAGNOSIS — K81.2 ACUTE CHOLECYSTITIS WITH CHRONIC CHOLECYSTITIS: ICD-10-CM

## 2024-02-08 DIAGNOSIS — Z86.79 PERSONAL HISTORY OF OTHER DISEASES OF THE CIRCULATORY SYSTEM: ICD-10-CM

## 2024-02-08 DIAGNOSIS — Z86.39 PERSONAL HISTORY OF OTHER ENDOCRINE, NUTRITIONAL AND METABOLIC DISEASE: ICD-10-CM

## 2024-02-08 PROCEDURE — 99212 OFFICE O/P EST SF 10 MIN: CPT

## 2024-02-08 RX ORDER — FOLIC ACID 1 MG/1
1 TABLET ORAL
Refills: 0 | Status: ACTIVE | COMMUNITY

## 2024-02-08 RX ORDER — SIMVASTATIN 20 MG/5ML
20 SUSPENSION ORAL
Refills: 0 | Status: ACTIVE | COMMUNITY

## 2024-02-08 RX ORDER — TAMSULOSIN HYDROCHLORIDE 0.4 MG/1
0.4 CAPSULE ORAL
Refills: 0 | Status: ACTIVE | COMMUNITY

## 2024-02-08 RX ORDER — METOPROLOL TARTRATE 25 MG/1
25 TABLET, FILM COATED ORAL
Refills: 0 | Status: ACTIVE | COMMUNITY

## 2024-02-08 RX ORDER — INSULIN GLARGINE-YFGN 100 [IU]/ML
100 INJECTION, SOLUTION SUBCUTANEOUS
Refills: 0 | Status: ACTIVE | COMMUNITY

## 2024-02-08 RX ORDER — OMEPRAZOLE 20 MG/1
20 TABLET, DELAYED RELEASE ORAL
Refills: 0 | Status: ACTIVE | COMMUNITY

## 2024-02-08 RX ORDER — INSULIN LISPRO 100 [IU]/ML
100 INJECTION, SOLUTION INTRAVENOUS; SUBCUTANEOUS
Refills: 0 | Status: ACTIVE | COMMUNITY

## 2024-02-08 RX ORDER — SODIUM BICARBONATE 650 MG/1
650 TABLET ORAL
Refills: 0 | Status: ACTIVE | COMMUNITY

## 2024-02-08 RX ORDER — GABAPENTIN 300 MG/1
300 CAPSULE ORAL
Refills: 0 | Status: ACTIVE | COMMUNITY

## 2024-02-08 RX ORDER — POLYETHYLENE GLYCOL 3350 17 G/17G
POWDER, FOR SOLUTION ORAL
Refills: 0 | Status: ACTIVE | COMMUNITY

## 2024-02-08 RX ORDER — FERROUS FUMARATE 324(106)MG
324 (106 FE) TABLET ORAL
Refills: 0 | Status: ACTIVE | COMMUNITY

## 2024-02-08 NOTE — PLAN
Patient notified via [employer].
[FreeTextEntry1] : Acute cholecystitis had percutaneous cholecystostomy.  at home, no fevers no chills, drainage is patent. here to discuss next step: discuss that on previous admission cholangiogram evidence stones in GB and patent cystic duct, at his age, removal of cholecystostomy tube is accompanied by an elevated recurrence rate. discussed that cholecystectomy is an option, he will require medical and cardiology risk stratification. Patient to discuss with family and PCP about the two options. RTO in 2 weeks all questions answered

## 2024-02-25 ENCOUNTER — INPATIENT (INPATIENT)
Facility: HOSPITAL | Age: 84
LOS: 24 days | Discharge: NURSING, REHAB OR LONG-TERM CARE | DRG: 871 | End: 2024-03-21
Attending: INTERNAL MEDICINE | Admitting: HOSPITALIST
Payer: COMMERCIAL

## 2024-02-25 VITALS
RESPIRATION RATE: 20 BRPM | OXYGEN SATURATION: 99 % | SYSTOLIC BLOOD PRESSURE: 99 MMHG | HEIGHT: 62 IN | DIASTOLIC BLOOD PRESSURE: 57 MMHG | WEIGHT: 153.88 LBS | HEART RATE: 64 BPM | TEMPERATURE: 98 F

## 2024-02-25 DIAGNOSIS — K92.2 GASTROINTESTINAL HEMORRHAGE, UNSPECIFIED: ICD-10-CM

## 2024-02-25 DIAGNOSIS — Z98.890 OTHER SPECIFIED POSTPROCEDURAL STATES: Chronic | ICD-10-CM

## 2024-02-25 LAB
ALBUMIN SERPL ELPH-MCNC: 3.4 G/DL — SIGNIFICANT CHANGE UP (ref 3.3–5.2)
ALLERGY+IMMUNOLOGY DIAG STUDY NOTE: SIGNIFICANT CHANGE UP
ALP SERPL-CCNC: 144 U/L — HIGH (ref 40–120)
ALT FLD-CCNC: 24 U/L — SIGNIFICANT CHANGE UP
ANION GAP SERPL CALC-SCNC: 12 MMOL/L — SIGNIFICANT CHANGE UP (ref 5–17)
AST SERPL-CCNC: 35 U/L — SIGNIFICANT CHANGE UP
BASE EXCESS BLDV CALC-SCNC: -1.5 MMOL/L — SIGNIFICANT CHANGE UP (ref -2–3)
BASOPHILS # BLD AUTO: 0.03 K/UL — SIGNIFICANT CHANGE UP (ref 0–0.2)
BASOPHILS NFR BLD AUTO: 0.3 % — SIGNIFICANT CHANGE UP (ref 0–2)
BILIRUB SERPL-MCNC: 0.3 MG/DL — LOW (ref 0.4–2)
BLD GP AB SCN SERPL QL: SIGNIFICANT CHANGE UP
BUN SERPL-MCNC: 29.5 MG/DL — HIGH (ref 8–20)
CA-I SERPL-SCNC: 1.15 MMOL/L — SIGNIFICANT CHANGE UP (ref 1.15–1.33)
CALCIUM SERPL-MCNC: 8.8 MG/DL — SIGNIFICANT CHANGE UP (ref 8.4–10.5)
CHLORIDE BLDV-SCNC: 104 MMOL/L — SIGNIFICANT CHANGE UP (ref 96–108)
CHLORIDE SERPL-SCNC: 102 MMOL/L — SIGNIFICANT CHANGE UP (ref 96–108)
CO2 SERPL-SCNC: 23 MMOL/L — SIGNIFICANT CHANGE UP (ref 22–29)
CREAT SERPL-MCNC: 2.52 MG/DL — HIGH (ref 0.5–1.3)
DAT C3-SP REAG RBC QL: SIGNIFICANT CHANGE UP
DAT IGG-SP REAG RBC-IMP: ABNORMAL
DIR ANTIGLOB POLYSPECIFIC INTERPRETATION: ABNORMAL
EGFR: 24 ML/MIN/1.73M2 — LOW
EOSINOPHIL # BLD AUTO: 0.22 K/UL — SIGNIFICANT CHANGE UP (ref 0–0.5)
EOSINOPHIL NFR BLD AUTO: 2.2 % — SIGNIFICANT CHANGE UP (ref 0–6)
GAS PNL BLDV: 134 MMOL/L — LOW (ref 136–145)
GAS PNL BLDV: SIGNIFICANT CHANGE UP
GAS PNL BLDV: SIGNIFICANT CHANGE UP
GLUCOSE BLDV-MCNC: 319 MG/DL — HIGH (ref 70–99)
GLUCOSE SERPL-MCNC: 221 MG/DL — HIGH (ref 70–99)
HCO3 BLDV-SCNC: 26 MMOL/L — SIGNIFICANT CHANGE UP (ref 22–29)
HCT VFR BLD CALC: 31.5 % — LOW (ref 39–50)
HCT VFR BLD CALC: 35.8 % — LOW (ref 39–50)
HCT VFR BLDA CALC: 36 % — SIGNIFICANT CHANGE UP
HGB BLD CALC-MCNC: 11.9 G/DL — LOW (ref 12.6–17.4)
HGB BLD-MCNC: 11.3 G/DL — LOW (ref 13–17)
HGB BLD-MCNC: 9.9 G/DL — LOW (ref 13–17)
IMM GRANULOCYTES NFR BLD AUTO: 0.7 % — SIGNIFICANT CHANGE UP (ref 0–0.9)
LACTATE BLDV-MCNC: 2.1 MMOL/L — HIGH (ref 0.5–2)
LIDOCAIN IGE QN: 18 U/L — LOW (ref 22–51)
LYMPHOCYTES # BLD AUTO: 2.61 K/UL — SIGNIFICANT CHANGE UP (ref 1–3.3)
LYMPHOCYTES # BLD AUTO: 26.4 % — SIGNIFICANT CHANGE UP (ref 13–44)
MCHC RBC-ENTMCNC: 28.9 PG — SIGNIFICANT CHANGE UP (ref 27–34)
MCHC RBC-ENTMCNC: 29.4 PG — SIGNIFICANT CHANGE UP (ref 27–34)
MCHC RBC-ENTMCNC: 31.4 GM/DL — LOW (ref 32–36)
MCHC RBC-ENTMCNC: 31.6 GM/DL — LOW (ref 32–36)
MCV RBC AUTO: 91.6 FL — SIGNIFICANT CHANGE UP (ref 80–100)
MCV RBC AUTO: 93.5 FL — SIGNIFICANT CHANGE UP (ref 80–100)
MONOCYTES # BLD AUTO: 0.82 K/UL — SIGNIFICANT CHANGE UP (ref 0–0.9)
MONOCYTES NFR BLD AUTO: 8.3 % — SIGNIFICANT CHANGE UP (ref 2–14)
NEUTROPHILS # BLD AUTO: 6.14 K/UL — SIGNIFICANT CHANGE UP (ref 1.8–7.4)
NEUTROPHILS NFR BLD AUTO: 62.1 % — SIGNIFICANT CHANGE UP (ref 43–77)
PCO2 BLDV: 57 MMHG — HIGH (ref 42–55)
PH BLDV: 7.26 — LOW (ref 7.32–7.43)
PLATELET # BLD AUTO: 251 K/UL — SIGNIFICANT CHANGE UP (ref 150–400)
PLATELET # BLD AUTO: 273 K/UL — SIGNIFICANT CHANGE UP (ref 150–400)
PO2 BLDV: 58 MMHG — HIGH (ref 25–45)
POTASSIUM BLDV-SCNC: 4.9 MMOL/L — SIGNIFICANT CHANGE UP (ref 3.5–5.1)
POTASSIUM SERPL-MCNC: 5.5 MMOL/L — HIGH (ref 3.5–5.3)
POTASSIUM SERPL-SCNC: 5.5 MMOL/L — HIGH (ref 3.5–5.3)
PROT SERPL-MCNC: 6.9 G/DL — SIGNIFICANT CHANGE UP (ref 6.6–8.7)
RBC # BLD: 3.37 M/UL — LOW (ref 4.2–5.8)
RBC # BLD: 3.91 M/UL — LOW (ref 4.2–5.8)
RBC # FLD: 15.1 % — HIGH (ref 10.3–14.5)
RBC # FLD: 15.2 % — HIGH (ref 10.3–14.5)
SAO2 % BLDV: 85.5 % — SIGNIFICANT CHANGE UP
SODIUM SERPL-SCNC: 136 MMOL/L — SIGNIFICANT CHANGE UP (ref 135–145)
WBC # BLD: 15.42 K/UL — HIGH (ref 3.8–10.5)
WBC # BLD: 9.89 K/UL — SIGNIFICANT CHANGE UP (ref 3.8–10.5)
WBC # FLD AUTO: 15.42 K/UL — HIGH (ref 3.8–10.5)
WBC # FLD AUTO: 9.89 K/UL — SIGNIFICANT CHANGE UP (ref 3.8–10.5)

## 2024-02-25 PROCEDURE — 99285 EMERGENCY DEPT VISIT HI MDM: CPT

## 2024-02-25 PROCEDURE — 99221 1ST HOSP IP/OBS SF/LOW 40: CPT

## 2024-02-25 PROCEDURE — 86077 PHYS BLOOD BANK SERV XMATCH: CPT

## 2024-02-25 PROCEDURE — 99497 ADVNCD CARE PLAN 30 MIN: CPT | Mod: 25

## 2024-02-25 PROCEDURE — 74178 CT ABD&PLV WO CNTR FLWD CNTR: CPT | Mod: 26,MC

## 2024-02-25 PROCEDURE — 71045 X-RAY EXAM CHEST 1 VIEW: CPT | Mod: 26

## 2024-02-25 PROCEDURE — 99223 1ST HOSP IP/OBS HIGH 75: CPT

## 2024-02-25 RX ORDER — MORPHINE SULFATE 50 MG/1
4 CAPSULE, EXTENDED RELEASE ORAL ONCE
Refills: 0 | Status: DISCONTINUED | OUTPATIENT
Start: 2024-02-25 | End: 2024-02-25

## 2024-02-25 RX ORDER — METOPROLOL TARTRATE 50 MG
25 TABLET ORAL
Refills: 0 | Status: DISCONTINUED | OUTPATIENT
Start: 2024-02-25 | End: 2024-02-26

## 2024-02-25 RX ORDER — ONDANSETRON 8 MG/1
4 TABLET, FILM COATED ORAL ONCE
Refills: 0 | Status: COMPLETED | OUTPATIENT
Start: 2024-02-25 | End: 2024-02-25

## 2024-02-25 RX ORDER — MORPHINE SULFATE 50 MG/1
8 CAPSULE, EXTENDED RELEASE ORAL ONCE
Refills: 0 | Status: DISCONTINUED | OUTPATIENT
Start: 2024-02-25 | End: 2024-02-25

## 2024-02-25 RX ORDER — DRONEDARONE 400 MG/1
400 TABLET, FILM COATED ORAL
Refills: 0 | Status: DISCONTINUED | OUTPATIENT
Start: 2024-02-25 | End: 2024-02-26

## 2024-02-25 RX ORDER — SIMVASTATIN 20 MG/1
20 TABLET, FILM COATED ORAL AT BEDTIME
Refills: 0 | Status: DISCONTINUED | OUTPATIENT
Start: 2024-02-25 | End: 2024-02-26

## 2024-02-25 RX ORDER — FERROUS SULFATE 325(65) MG
325 TABLET ORAL DAILY
Refills: 0 | Status: DISCONTINUED | OUTPATIENT
Start: 2024-02-25 | End: 2024-02-26

## 2024-02-25 RX ORDER — SODIUM BICARBONATE 1 MEQ/ML
650 SYRINGE (ML) INTRAVENOUS
Refills: 0 | Status: DISCONTINUED | OUTPATIENT
Start: 2024-02-25 | End: 2024-02-26

## 2024-02-25 RX ORDER — PANTOPRAZOLE SODIUM 20 MG/1
8 TABLET, DELAYED RELEASE ORAL
Qty: 80 | Refills: 0 | Status: DISCONTINUED | OUTPATIENT
Start: 2024-02-25 | End: 2024-02-26

## 2024-02-25 RX ORDER — SENNA PLUS 8.6 MG/1
2 TABLET ORAL AT BEDTIME
Refills: 0 | Status: DISCONTINUED | OUTPATIENT
Start: 2024-02-25 | End: 2024-02-26

## 2024-02-25 RX ORDER — PIPERACILLIN AND TAZOBACTAM 4; .5 G/20ML; G/20ML
3.38 INJECTION, POWDER, LYOPHILIZED, FOR SOLUTION INTRAVENOUS EVERY 8 HOURS
Refills: 0 | Status: DISCONTINUED | OUTPATIENT
Start: 2024-02-25 | End: 2024-02-26

## 2024-02-25 RX ORDER — PANTOPRAZOLE SODIUM 20 MG/1
80 TABLET, DELAYED RELEASE ORAL ONCE
Refills: 0 | Status: COMPLETED | OUTPATIENT
Start: 2024-02-25 | End: 2024-02-25

## 2024-02-25 RX ORDER — SODIUM CHLORIDE 9 MG/ML
1000 INJECTION, SOLUTION INTRAVENOUS ONCE
Refills: 0 | Status: COMPLETED | OUTPATIENT
Start: 2024-02-25 | End: 2024-02-25

## 2024-02-25 RX ORDER — LANOLIN ALCOHOL/MO/W.PET/CERES
3 CREAM (GRAM) TOPICAL AT BEDTIME
Refills: 0 | Status: DISCONTINUED | OUTPATIENT
Start: 2024-02-25 | End: 2024-02-26

## 2024-02-25 RX ORDER — ONDANSETRON 8 MG/1
4 TABLET, FILM COATED ORAL EVERY 8 HOURS
Refills: 0 | Status: DISCONTINUED | OUTPATIENT
Start: 2024-02-25 | End: 2024-03-21

## 2024-02-25 RX ORDER — ACETAMINOPHEN 500 MG
650 TABLET ORAL EVERY 6 HOURS
Refills: 0 | Status: DISCONTINUED | OUTPATIENT
Start: 2024-02-25 | End: 2024-02-26

## 2024-02-25 RX ORDER — GABAPENTIN 400 MG/1
300 CAPSULE ORAL AT BEDTIME
Refills: 0 | Status: DISCONTINUED | OUTPATIENT
Start: 2024-02-25 | End: 2024-02-26

## 2024-02-25 RX ORDER — TAMSULOSIN HYDROCHLORIDE 0.4 MG/1
0.4 CAPSULE ORAL AT BEDTIME
Refills: 0 | Status: DISCONTINUED | OUTPATIENT
Start: 2024-02-25 | End: 2024-02-26

## 2024-02-25 RX ORDER — PIPERACILLIN AND TAZOBACTAM 4; .5 G/20ML; G/20ML
3.38 INJECTION, POWDER, LYOPHILIZED, FOR SOLUTION INTRAVENOUS ONCE
Refills: 0 | Status: COMPLETED | OUTPATIENT
Start: 2024-02-25 | End: 2024-02-25

## 2024-02-25 RX ADMIN — ONDANSETRON 4 MILLIGRAM(S): 8 TABLET, FILM COATED ORAL at 15:55

## 2024-02-25 RX ADMIN — MORPHINE SULFATE 8 MILLIGRAM(S): 50 CAPSULE, EXTENDED RELEASE ORAL at 18:45

## 2024-02-25 RX ADMIN — PANTOPRAZOLE SODIUM 80 MILLIGRAM(S): 20 TABLET, DELAYED RELEASE ORAL at 18:45

## 2024-02-25 RX ADMIN — PIPERACILLIN AND TAZOBACTAM 200 GRAM(S): 4; .5 INJECTION, POWDER, LYOPHILIZED, FOR SOLUTION INTRAVENOUS at 20:02

## 2024-02-25 RX ADMIN — ONDANSETRON 4 MILLIGRAM(S): 8 TABLET, FILM COATED ORAL at 18:45

## 2024-02-25 RX ADMIN — SODIUM CHLORIDE 1000 MILLILITER(S): 9 INJECTION, SOLUTION INTRAVENOUS at 19:11

## 2024-02-25 RX ADMIN — MORPHINE SULFATE 4 MILLIGRAM(S): 50 CAPSULE, EXTENDED RELEASE ORAL at 15:56

## 2024-02-25 RX ADMIN — PANTOPRAZOLE SODIUM 10 MG/HR: 20 TABLET, DELAYED RELEASE ORAL at 18:45

## 2024-02-25 RX ADMIN — MORPHINE SULFATE 8 MILLIGRAM(S): 50 CAPSULE, EXTENDED RELEASE ORAL at 20:07

## 2024-02-25 RX ADMIN — MORPHINE SULFATE 4 MILLIGRAM(S): 50 CAPSULE, EXTENDED RELEASE ORAL at 22:06

## 2024-02-25 NOTE — ED ADULT NURSE REASSESSMENT NOTE - NS ED NURSE REASSESS COMMENT FT1
pt received in CC, pt vss, a&ox3 In NAD @ this time, brought over after an episode of coffee ground emesis. pt c/o nausea, denies sob, cp or ha @ this time. POC discussed w/ patient and patients wife. will continue to reassess,

## 2024-02-25 NOTE — H&P ADULT - NSHPPHYSICALEXAM_GEN_ALL_CORE
Vital Signs Last 24 Hrs  T(C): 36.8 (25 Feb 2024 23:38), Max: 37.1 (25 Feb 2024 18:30)  T(F): 98.3 (25 Feb 2024 23:38), Max: 98.8 (25 Feb 2024 18:30)  HR: 98 (25 Feb 2024 23:38) (63 - 98)  BP: 133/65 (25 Feb 2024 23:38) (99/57 - 179/88)  BP(mean): --  RR: 18 (25 Feb 2024 23:38) (18 - 20)  SpO2: 96% (25 Feb 2024 23:38) (93% - 99%)    Parameters below as of 25 Feb 2024 23:38  Patient On (Oxygen Delivery Method): mask, nonrebreather  O2 Flow (L/min): 5

## 2024-02-25 NOTE — ED PROVIDER NOTE - OBJECTIVE STATEMENT
Patient is an 85yo M with PMHx of Acute Cholecystitis s/p Cholecystomy Tube (12/22/23), COVID-19, PAF not on AC due to GIB + Fall Risk, Esophagitis, Anemia, DM 2, HTN, HLD, CKD III/IV, BPH and Cognitive Impairment who presents to the ED complaining of sky tube complication. Daughter states that she was attempting to flush the tube today like she normally does but it all came out at the skin. Sky tube has been continuing to output since it was last emptied last night. Denies fevers, chills, abdominal pain, nausea, vomiting, diarrhea, constipation. Patient was seen by Dr. Buenrostro in the office two weeks ago.

## 2024-02-25 NOTE — ED PROVIDER NOTE - CLINICAL SUMMARY MEDICAL DECISION MAKING FREE TEXT BOX
Patient is an 85yo M with PMHx of Acute Cholecystitis s/p Cholecystomy Tube (12/22/23), COVID-19, PAF not on AC due to GIB + Fall Risk, Esophagitis, Anemia, DM 2, HTN, HLD, CKD III/IV, BPH and Cognitive Impairment who presents to the ED complaining of sky tube complication. Surgery consulted, will get CT and basic labs. May require IR replacement.

## 2024-02-25 NOTE — ED PROVIDER NOTE - PROGRESS NOTE DETAILS
[place]: Patient vomited large about of coffee ground emesis, smells like melena - very pale, holding abdomen. Patient urgently taken to CT scan, vitals WNL at this time. Due to the critical nature of the patient, CT angio GI bleed protocol medically necessary - GFR noted - will hydrate and monitor GFR.

## 2024-02-25 NOTE — H&P ADULT - ASSESSMENT
83 y/o male with PMH of Acute Cholecystitis s/p Cholecystomy Tube (12/22/23), COVID-19, PAF not on AC due to GIB + Fall Risk, Esophagitis, Anemia, DM 2, HTN, HLD, CKD III/IV, BPH and Cognitive Impairment  came to the ED complaining of leakage around sky tube. As per wife at bed side, family noticed fluid coming out on the skin when they attempted to flush the sky tube. Wife said patient has been doing well since discharged otherwise. No fever, chills, abdominal pain, change in bowel/urinary habit, nausea, vomiting noted.   In the ED, patient was seen by surgery team, no acute surgical intervention. As per ED, patient vomited large amount of coffee ground emesis, looked very pale, holding abdomen; CT angio abdomen done: no acute GI bleed. Repeat CBC: Hb: 11.3---->9.3. Also, he desaturated concerning for aspiration PNA, antibiotic and oxygen therapy.  85 y/o male with PMH of Acute Cholecystitis s/p Cholecystomy Tube (12/22/23), COVID-19, PAF not on AC due to GIB + Fall Risk, Esophagitis, Anemia, DM 2, HTN, HLD, CKD III/IV, BPH and Cognitive Impairment  came to the ED complaining of leakage around sky tube. As per wife at bed side, family noticed fluid coming out on the skin when they attempted to flush the sky tube. Wife said patient has been doing well since discharged otherwise. In the ED, patient was seen by surgery team, no acute surgical intervention. As per ED, patient vomited large amount of coffee ground emesis, looked very pale, holding abdomen; CT angio abdomen done: no acute GI bleed. Repeat CBC: Hb: 11.3---->9.3. Also, he desaturated concerning for aspiration PNA, antibiotic and oxygen therapy.     Medication reconciliation done as per prior discharge note and Dr. first; please clarify with pharmacy in AM and adjust medications as needed.     Hematemesis   Admit to telemetry   Witnessed in the ED   CT abdomen done: no active GI bleed   Hb stable: 11.3---> 9.3  Type and screen done   PPI started   GI consulted   Monitor CBC, transfuse Hb to keep > 7     Acute respiratory failure with hypoxia likely due to aspiration pna  Oxygen therapy as needed  Zosyn given in the ED, will continue   Aspiration precaution     Hx of acute cholecystitis   S/p cholecystomy tube   Surgery consulted  As per surgery, patient might be taken to OR for cholecystectomy when medically optimized     PAF   Not on AC due to hx of GI bleed   Continue Multaq 400mg bid   Metoprolol 25mg bid with holding parameters   Rate controlled     CKD-3   Stable   Monitor renal function     HTN/HLD   Continue Metoprolol 25mg bid with holding parameters   Simvastatin 20mg     DM-2   Insulin glargine 10 units HS, will give 5 ;units while NPO, adjust as needed   Insulin Aspart 4 units tid on hold as patient is NPO       BPH   Tamsulosin 0.4mg     Supportive   DVT prophylaxis: CSD   Diet: NPO pending GI eval     Plan of care discussed with patient, wife at bed side and ER nurses.

## 2024-02-25 NOTE — H&P ADULT - MENTAL STATUS
awake, alert and oriented to self and place (could not tell me the date as per wife, this is baseline)

## 2024-02-25 NOTE — CONSULT NOTE ADULT - SUBJECTIVE AND OBJECTIVE BOX
Subjective: Called to evaluate perc sky tube because daughter was unable to flush tube which prompted her fathers presentation.  He was seen by the chief resident, denied abdominal pain, tube was flushed without issue.      We were recontacted because the patient began to have coffee ground emesis.    ICU Vital Signs Last 24 Hrs  T(C): 37.1 (25 Feb 2024 18:30), Max: 37.1 (25 Feb 2024 18:30)  T(F): 98.8 (25 Feb 2024 18:30), Max: 98.8 (25 Feb 2024 18:30)  HR: 74 (25 Feb 2024 18:30) (63 - 74)  BP: 179/88 (25 Feb 2024 18:30) (99/57 - 179/88)  BP(mean): --  ABP: --  ABP(mean): --  RR: 18 (25 Feb 2024 18:30) (18 - 20)  SpO2: 97% (25 Feb 2024 18:30) (97% - 99%)    O2 Parameters below as of 25 Feb 2024 18:30  Patient On (Oxygen Delivery Method): nasal cannula  O2 Flow (L/min): 2      Abd: Soft, NT, ND, RUQ perc sky tube in place, bilious drainage     .  LABS:                         9.9    15.42 )-----------( 251      ( 25 Feb 2024 17:58 )             31.5     02-25    136  |  102  |  29.5<H>  ----------------------------<  221<H>  5.5<H>   |  23.0  |  2.52<H>    Ca    8.8      25 Feb 2024 15:20    TPro  6.9  /  Alb  3.4  /  TBili  0.3<L>  /  DBili  x   /  AST  35  /  ALT  24  /  AlkPhos  144<H>  02-25      Urinalysis Basic - ( 25 Feb 2024 15:20 )    Color: x / Appearance: x / SG: x / pH: x  Gluc: 221 mg/dL / Ketone: x  / Bili: x / Urobili: x   Blood: x / Protein: x / Nitrite: x   Leuk Esterase: x / RBC: x / WBC x   Sq Epi: x / Non Sq Epi: x / Bacteria: x            RADIOLOGY, EKG & ADDITIONAL TESTS: Reviewed.    FINDINGS:  LOWER CHEST: Old right rib fractures.    LIVER: Within normal limits.  BILE DUCTS: Normal caliber.  GALLBLADDER: Cholecystostomy tube is present within the gallbladder,   which contains stones and intraluminal air. Mild inflammation surrounding   the gallbladder.  SPLEEN: Within normal limits.  PANCREAS: Within normal limits.  ADRENALS: Within normal limits.  KIDNEYS/URETERS:Within normal limits.    BLADDER: Within normal limits.  REPRODUCTIVE ORGANS: Prostate within normal limits.    BOWEL: Small hiatal hernia. No bowel obstruction. Colonic diverticulosis.   No contrast extravasation to suggest site of active bleed.  PERITONEUM: No ascites.  VESSELS: Atherosclerotic changes.  RETROPERITONEUM/LYMPH NODES: No lymphadenopathy.  ABDOMINAL WALL: Within normal limits.  BONES: Degenerative changes.    IMPRESSION:  No contrast extravasation to suggest site of active bleed.

## 2024-02-25 NOTE — ED PROVIDER NOTE - PHYSICAL EXAMINATION
Gen: AAOx3, NAD, well nourished  HEENT: Normocephalic atraumatic. EOMI. No scleral icterus. Moist mucus membranes.  CV: RRR. Audible S1 and S2. No murmurs. 2+ radial and PT pulses   Pulm: Clear to auscultation bilaterally. No wheezes, rales, or rhonchi. No accessory muscle use or respiratory distress.  Abdomen: soft, normoactive BS, non distended, nontender, no rebound, no guarding. +Cholecystotomy tube RUQ with mild surrounding erythema. Light green bilious fluid in bag.  Musculoskeletal:  Moving all extremities equally. No gross deformity. No tenderness to palpation.  Skin: No rashes or lesions. Warm.  Neurologic: No focal neurological deficits. CN II-XII grossly intact.  : no CVA tenderness  Psych: Appropriate mood and affect. Cooperative.

## 2024-02-25 NOTE — CONSULT NOTE ADULT - ASSESSMENT
Assessment and Plan:     Recommending tube study by IR   GI evaluation and medicine eval for possible upper GI bleed with drop in H and H   No acute surgical intervention at this time, CT findings similar to previous study in January   D/w attending on call.

## 2024-02-25 NOTE — H&P ADULT - HISTORY OF PRESENT ILLNESS
Patient seen and examined before midnight.     85 y/o male with PMH of Acute Cholecystitis s/p Cholecystomy Tube (12/22/23), COVID-19, PAF not on AC due to GIB + Fall Risk, Esophagitis, Anemia, DM 2, HTN, HLD, CKD III/IV, BPH and Cognitive Impairment  came to the ED complaining of leakage around sky tube. As per wife at bed side, family noticed fluid coming out on the skin when they attempted to flush the sky tube. Wife said patient has been doing well since discharged otherwise. No fever, chills, abdominal pain, change in bowel/urinary habit, nausea, vomiting noted.   In the ED, patient was seen by surgery team, no acute surgical intervention. As per ED, patient vomited large amount of coffee ground emesis, looked very pale, holding abdomen; CT angio abdomen done: no acute GI bleed. Repeat CBC: Hb: 11.3---->9.3. Also, he desaturated concerning for aspiration PNA, antibiotic and oxygen therapy.

## 2024-02-25 NOTE — ED PROVIDER NOTE - ATTENDING CONTRIBUTION TO CARE
I personally saw the patient with the resident, and completed the key components of the history and physical exam. I then discussed the management plan with the resident.    84-year-old male with a past medical history of acute cholecystitis status post cholecystostomy tube 12/22/2023, PAF not on AC due to GI bleed and fall risk presents for leaking cystostomy tube.  Per granddaughter, patient had normal output overnight, when they attempted to flush the tube earlier, there was leakage of a significant amount of saline around the tube.      NAD, well-appearing, ABD, soft, nontender, nondistended, cholecystostomy tube with mild surrounding erythema, when flushed there is some leakage of saline from around the tube.      Will check labs, imaging.  Patient evaluated by surgery, it is now flushing without leakage.

## 2024-02-25 NOTE — H&P ADULT - TIME BILLING
chart, labs and imaging reviewed. Physical examination, medication reconciliation and documentation. Discussion with patient, family and ER nurses at bed side.

## 2024-02-26 ENCOUNTER — APPOINTMENT (OUTPATIENT)
Dept: CARDIOLOGY | Facility: CLINIC | Age: 84
End: 2024-02-26

## 2024-02-26 LAB
ALBUMIN SERPL ELPH-MCNC: 2.8 G/DL — LOW (ref 3.3–5.2)
ALBUMIN SERPL ELPH-MCNC: 3 G/DL — LOW (ref 3.3–5.2)
ALLERGY+IMMUNOLOGY DIAG STUDY NOTE: SIGNIFICANT CHANGE UP
ALP SERPL-CCNC: 69 U/L — SIGNIFICANT CHANGE UP (ref 40–120)
ALP SERPL-CCNC: 77 U/L — SIGNIFICANT CHANGE UP (ref 40–120)
ALT FLD-CCNC: 18 U/L — SIGNIFICANT CHANGE UP
ALT FLD-CCNC: 19 U/L — SIGNIFICANT CHANGE UP
ANION GAP SERPL CALC-SCNC: 13 MMOL/L — SIGNIFICANT CHANGE UP (ref 5–17)
ANION GAP SERPL CALC-SCNC: 14 MMOL/L — SIGNIFICANT CHANGE UP (ref 5–17)
ANION GAP SERPL CALC-SCNC: 16 MMOL/L — SIGNIFICANT CHANGE UP (ref 5–17)
ANION GAP SERPL CALC-SCNC: 17 MMOL/L — SIGNIFICANT CHANGE UP (ref 5–17)
ANISOCYTOSIS BLD QL: SLIGHT — SIGNIFICANT CHANGE UP
APPEARANCE UR: CLEAR — SIGNIFICANT CHANGE UP
APTT BLD: 19.6 SEC — LOW (ref 24.5–35.6)
AST SERPL-CCNC: 20 U/L — SIGNIFICANT CHANGE UP
AST SERPL-CCNC: 27 U/L — SIGNIFICANT CHANGE UP
BACTERIA # UR AUTO: NEGATIVE /HPF — SIGNIFICANT CHANGE UP
BASE EXCESS BLDV CALC-SCNC: -0.3 MMOL/L — SIGNIFICANT CHANGE UP (ref -2–3)
BASO STIPL BLD QL SMEAR: PRESENT — SIGNIFICANT CHANGE UP
BASOPHILS # BLD AUTO: 0 K/UL — SIGNIFICANT CHANGE UP (ref 0–0.2)
BASOPHILS # BLD AUTO: 0 K/UL — SIGNIFICANT CHANGE UP (ref 0–0.2)
BASOPHILS # BLD AUTO: 0.04 K/UL — SIGNIFICANT CHANGE UP (ref 0–0.2)
BASOPHILS NFR BLD AUTO: 0 % — SIGNIFICANT CHANGE UP (ref 0–2)
BASOPHILS NFR BLD AUTO: 0 % — SIGNIFICANT CHANGE UP (ref 0–2)
BASOPHILS NFR BLD AUTO: 0.2 % — SIGNIFICANT CHANGE UP (ref 0–2)
BILIRUB SERPL-MCNC: 1.1 MG/DL — SIGNIFICANT CHANGE UP (ref 0.4–2)
BILIRUB SERPL-MCNC: 1.1 MG/DL — SIGNIFICANT CHANGE UP (ref 0.4–2)
BILIRUB UR-MCNC: NEGATIVE — SIGNIFICANT CHANGE UP
BUN SERPL-MCNC: 34.1 MG/DL — HIGH (ref 8–20)
BUN SERPL-MCNC: 40.2 MG/DL — HIGH (ref 8–20)
BUN SERPL-MCNC: 43.8 MG/DL — HIGH (ref 8–20)
BUN SERPL-MCNC: 45.8 MG/DL — HIGH (ref 8–20)
CA-I SERPL-SCNC: 1.14 MMOL/L — LOW (ref 1.15–1.33)
CALCIUM SERPL-MCNC: 7.9 MG/DL — LOW (ref 8.4–10.5)
CALCIUM SERPL-MCNC: 8 MG/DL — LOW (ref 8.4–10.5)
CALCIUM SERPL-MCNC: 8.1 MG/DL — LOW (ref 8.4–10.5)
CALCIUM SERPL-MCNC: 8.3 MG/DL — LOW (ref 8.4–10.5)
CAST: 1 /LPF — SIGNIFICANT CHANGE UP (ref 0–4)
CHLORIDE BLDV-SCNC: 99 MMOL/L — SIGNIFICANT CHANGE UP (ref 96–108)
CHLORIDE SERPL-SCNC: 101 MMOL/L — SIGNIFICANT CHANGE UP (ref 96–108)
CHLORIDE SERPL-SCNC: 101 MMOL/L — SIGNIFICANT CHANGE UP (ref 96–108)
CHLORIDE SERPL-SCNC: 97 MMOL/L — SIGNIFICANT CHANGE UP (ref 96–108)
CHLORIDE SERPL-SCNC: 97 MMOL/L — SIGNIFICANT CHANGE UP (ref 96–108)
CO2 SERPL-SCNC: 18 MMOL/L — LOW (ref 22–29)
CO2 SERPL-SCNC: 18 MMOL/L — LOW (ref 22–29)
CO2 SERPL-SCNC: 22 MMOL/L — SIGNIFICANT CHANGE UP (ref 22–29)
CO2 SERPL-SCNC: 24 MMOL/L — SIGNIFICANT CHANGE UP (ref 22–29)
COLOR SPEC: YELLOW — SIGNIFICANT CHANGE UP
CREAT SERPL-MCNC: 2.82 MG/DL — HIGH (ref 0.5–1.3)
CREAT SERPL-MCNC: 3.49 MG/DL — HIGH (ref 0.5–1.3)
CREAT SERPL-MCNC: 3.98 MG/DL — HIGH (ref 0.5–1.3)
CREAT SERPL-MCNC: 4.47 MG/DL — HIGH (ref 0.5–1.3)
DIFF PNL FLD: NEGATIVE — SIGNIFICANT CHANGE UP
EGFR: 12 ML/MIN/1.73M2 — LOW
EGFR: 14 ML/MIN/1.73M2 — LOW
EGFR: 16 ML/MIN/1.73M2 — LOW
EGFR: 21 ML/MIN/1.73M2 — LOW
EOSINOPHIL # BLD AUTO: 0 K/UL — SIGNIFICANT CHANGE UP (ref 0–0.5)
EOSINOPHIL # BLD AUTO: 0 K/UL — SIGNIFICANT CHANGE UP (ref 0–0.5)
EOSINOPHIL # BLD AUTO: 0.02 K/UL — SIGNIFICANT CHANGE UP (ref 0–0.5)
EOSINOPHIL NFR BLD AUTO: 0 % — SIGNIFICANT CHANGE UP (ref 0–6)
EOSINOPHIL NFR BLD AUTO: 0 % — SIGNIFICANT CHANGE UP (ref 0–6)
EOSINOPHIL NFR BLD AUTO: 0.1 % — SIGNIFICANT CHANGE UP (ref 0–6)
GAS PNL BLDA: SIGNIFICANT CHANGE UP
GAS PNL BLDA: SIGNIFICANT CHANGE UP
GAS PNL BLDV: 131 MMOL/L — LOW (ref 136–145)
GAS PNL BLDV: SIGNIFICANT CHANGE UP
GIANT PLATELETS BLD QL SMEAR: PRESENT — SIGNIFICANT CHANGE UP
GLUCOSE BLDC GLUCOMTR-MCNC: 168 MG/DL — HIGH (ref 70–99)
GLUCOSE BLDC GLUCOMTR-MCNC: 191 MG/DL — HIGH (ref 70–99)
GLUCOSE BLDC GLUCOMTR-MCNC: 209 MG/DL — HIGH (ref 70–99)
GLUCOSE BLDC GLUCOMTR-MCNC: 243 MG/DL — HIGH (ref 70–99)
GLUCOSE BLDC GLUCOMTR-MCNC: 338 MG/DL — HIGH (ref 70–99)
GLUCOSE BLDC GLUCOMTR-MCNC: 343 MG/DL — HIGH (ref 70–99)
GLUCOSE BLDC GLUCOMTR-MCNC: 350 MG/DL — HIGH (ref 70–99)
GLUCOSE BLDC GLUCOMTR-MCNC: 350 MG/DL — HIGH (ref 70–99)
GLUCOSE BLDC GLUCOMTR-MCNC: 362 MG/DL — HIGH (ref 70–99)
GLUCOSE BLDC GLUCOMTR-MCNC: 367 MG/DL — HIGH (ref 70–99)
GLUCOSE BLDC GLUCOMTR-MCNC: 397 MG/DL — HIGH (ref 70–99)
GLUCOSE BLDV-MCNC: 195 MG/DL — HIGH (ref 70–99)
GLUCOSE SERPL-MCNC: 190 MG/DL — HIGH (ref 70–99)
GLUCOSE SERPL-MCNC: 358 MG/DL — HIGH (ref 70–99)
GLUCOSE SERPL-MCNC: 380 MG/DL — HIGH (ref 70–99)
GLUCOSE SERPL-MCNC: 392 MG/DL — HIGH (ref 70–99)
GLUCOSE UR QL: 250 MG/DL
HCO3 BLDV-SCNC: 27 MMOL/L — SIGNIFICANT CHANGE UP (ref 22–29)
HCT VFR BLD CALC: 28.6 % — LOW (ref 39–50)
HCT VFR BLD CALC: 30.6 % — LOW (ref 39–50)
HCT VFR BLD CALC: 32.8 % — LOW (ref 39–50)
HCT VFR BLD CALC: 35.7 % — LOW (ref 39–50)
HCT VFR BLDA CALC: 29 % — SIGNIFICANT CHANGE UP
HGB BLD CALC-MCNC: 9.6 G/DL — LOW (ref 12.6–17.4)
HGB BLD-MCNC: 10.3 G/DL — LOW (ref 13–17)
HGB BLD-MCNC: 11.4 G/DL — LOW (ref 13–17)
HGB BLD-MCNC: 9.4 G/DL — LOW (ref 13–17)
HGB BLD-MCNC: 9.7 G/DL — LOW (ref 13–17)
IMM GRANULOCYTES NFR BLD AUTO: 0.5 % — SIGNIFICANT CHANGE UP (ref 0–0.9)
INR BLD: 1.05 RATIO — SIGNIFICANT CHANGE UP (ref 0.85–1.18)
KETONES UR-MCNC: NEGATIVE MG/DL — SIGNIFICANT CHANGE UP
LACTATE BLDV-MCNC: 2.4 MMOL/L — HIGH (ref 0.5–2)
LACTATE SERPL-SCNC: 2.4 MMOL/L — HIGH (ref 0.5–2)
LACTATE SERPL-SCNC: 5.2 MMOL/L — CRITICAL HIGH (ref 0.5–2)
LEUKOCYTE ESTERASE UR-ACNC: NEGATIVE — SIGNIFICANT CHANGE UP
LYMPHOCYTES # BLD AUTO: 0.7 K/UL — LOW (ref 1–3.3)
LYMPHOCYTES # BLD AUTO: 1.25 K/UL — SIGNIFICANT CHANGE UP (ref 1–3.3)
LYMPHOCYTES # BLD AUTO: 12.3 % — LOW (ref 13–44)
LYMPHOCYTES # BLD AUTO: 2.62 K/UL — SIGNIFICANT CHANGE UP (ref 1–3.3)
LYMPHOCYTES # BLD AUTO: 3.4 % — LOW (ref 13–44)
LYMPHOCYTES # BLD AUTO: 6.5 % — LOW (ref 13–44)
MACROCYTES BLD QL: SLIGHT — SIGNIFICANT CHANGE UP
MAGNESIUM SERPL-MCNC: 1.5 MG/DL — LOW (ref 1.6–2.6)
MAGNESIUM SERPL-MCNC: 1.5 MG/DL — LOW (ref 1.6–2.6)
MAGNESIUM SERPL-MCNC: 2.4 MG/DL — SIGNIFICANT CHANGE UP (ref 1.8–2.6)
MANUAL SMEAR VERIFICATION: SIGNIFICANT CHANGE UP
MANUAL SMEAR VERIFICATION: SIGNIFICANT CHANGE UP
MCHC RBC-ENTMCNC: 29.8 PG — SIGNIFICANT CHANGE UP (ref 27–34)
MCHC RBC-ENTMCNC: 30 PG — SIGNIFICANT CHANGE UP (ref 27–34)
MCHC RBC-ENTMCNC: 30.2 PG — SIGNIFICANT CHANGE UP (ref 27–34)
MCHC RBC-ENTMCNC: 30.7 PG — SIGNIFICANT CHANGE UP (ref 27–34)
MCHC RBC-ENTMCNC: 31.4 GM/DL — LOW (ref 32–36)
MCHC RBC-ENTMCNC: 31.7 GM/DL — LOW (ref 32–36)
MCHC RBC-ENTMCNC: 31.9 GM/DL — LOW (ref 32–36)
MCHC RBC-ENTMCNC: 32.9 GM/DL — SIGNIFICANT CHANGE UP (ref 32–36)
MCV RBC AUTO: 93.5 FL — SIGNIFICANT CHANGE UP (ref 80–100)
MCV RBC AUTO: 93.9 FL — SIGNIFICANT CHANGE UP (ref 80–100)
MCV RBC AUTO: 93.9 FL — SIGNIFICANT CHANGE UP (ref 80–100)
MCV RBC AUTO: 96.2 FL — SIGNIFICANT CHANGE UP (ref 80–100)
METAMYELOCYTES # FLD: 1.7 % — HIGH (ref 0–0)
MONOCYTES # BLD AUTO: 0.19 K/UL — SIGNIFICANT CHANGE UP (ref 0–0.9)
MONOCYTES # BLD AUTO: 0.54 K/UL — SIGNIFICANT CHANGE UP (ref 0–0.9)
MONOCYTES # BLD AUTO: 0.77 K/UL — SIGNIFICANT CHANGE UP (ref 0–0.9)
MONOCYTES NFR BLD AUTO: 0.9 % — LOW (ref 2–14)
MONOCYTES NFR BLD AUTO: 2.6 % — SIGNIFICANT CHANGE UP (ref 2–14)
MONOCYTES NFR BLD AUTO: 4 % — SIGNIFICANT CHANGE UP (ref 2–14)
MRSA PCR RESULT.: DETECTED
NEUTROPHILS # BLD AUTO: 17.18 K/UL — HIGH (ref 1.8–7.4)
NEUTROPHILS # BLD AUTO: 18.29 K/UL — HIGH (ref 1.8–7.4)
NEUTROPHILS # BLD AUTO: 19 K/UL — HIGH (ref 1.8–7.4)
NEUTROPHILS NFR BLD AUTO: 83.3 % — HIGH (ref 43–77)
NEUTROPHILS NFR BLD AUTO: 88 % — HIGH (ref 43–77)
NEUTROPHILS NFR BLD AUTO: 88.7 % — HIGH (ref 43–77)
NEUTS BAND # BLD: 2.6 % — SIGNIFICANT CHANGE UP (ref 0–8)
NEUTS BAND # BLD: 4.3 % — SIGNIFICANT CHANGE UP (ref 0–8)
NITRITE UR-MCNC: NEGATIVE — SIGNIFICANT CHANGE UP
NT-PROBNP SERPL-SCNC: 1980 PG/ML — HIGH (ref 0–300)
PCO2 BLDV: 58 MMHG — HIGH (ref 42–55)
PH BLDV: 7.27 — LOW (ref 7.32–7.43)
PH UR: 7 — SIGNIFICANT CHANGE UP (ref 5–8)
PHOSPHATE SERPL-MCNC: 2.4 MG/DL — SIGNIFICANT CHANGE UP (ref 2.4–4.7)
PHOSPHATE SERPL-MCNC: 2.6 MG/DL — SIGNIFICANT CHANGE UP (ref 2.4–4.7)
PHOSPHATE SERPL-MCNC: 3.3 MG/DL — SIGNIFICANT CHANGE UP (ref 2.4–4.7)
PLAT MORPH BLD: NORMAL — SIGNIFICANT CHANGE UP
PLAT MORPH BLD: NORMAL — SIGNIFICANT CHANGE UP
PLATELET # BLD AUTO: 221 K/UL — SIGNIFICANT CHANGE UP (ref 150–400)
PLATELET # BLD AUTO: 229 K/UL — SIGNIFICANT CHANGE UP (ref 150–400)
PLATELET # BLD AUTO: 245 K/UL — SIGNIFICANT CHANGE UP (ref 150–400)
PLATELET # BLD AUTO: 256 K/UL — SIGNIFICANT CHANGE UP (ref 150–400)
PO2 BLDV: 63 MMHG — HIGH (ref 25–45)
POLYCHROMASIA BLD QL SMEAR: SLIGHT — SIGNIFICANT CHANGE UP
POLYCHROMASIA BLD QL SMEAR: SLIGHT — SIGNIFICANT CHANGE UP
POTASSIUM BLDV-SCNC: 5.5 MMOL/L — HIGH (ref 3.5–5.1)
POTASSIUM SERPL-MCNC: 5 MMOL/L — SIGNIFICANT CHANGE UP (ref 3.5–5.3)
POTASSIUM SERPL-MCNC: 5.3 MMOL/L — SIGNIFICANT CHANGE UP (ref 3.5–5.3)
POTASSIUM SERPL-MCNC: 6 MMOL/L — HIGH (ref 3.5–5.3)
POTASSIUM SERPL-MCNC: 6.1 MMOL/L — CRITICAL HIGH (ref 3.5–5.3)
POTASSIUM SERPL-SCNC: 5 MMOL/L — SIGNIFICANT CHANGE UP (ref 3.5–5.3)
POTASSIUM SERPL-SCNC: 5.3 MMOL/L — SIGNIFICANT CHANGE UP (ref 3.5–5.3)
POTASSIUM SERPL-SCNC: 6 MMOL/L — HIGH (ref 3.5–5.3)
POTASSIUM SERPL-SCNC: 6.1 MMOL/L — CRITICAL HIGH (ref 3.5–5.3)
PROCALCITONIN SERPL-MCNC: 18.92 NG/ML — HIGH (ref 0.02–0.1)
PROT SERPL-MCNC: 5.8 G/DL — LOW (ref 6.6–8.7)
PROT SERPL-MCNC: 6 G/DL — LOW (ref 6.6–8.7)
PROT UR-MCNC: 100 MG/DL
PROTHROM AB SERPL-ACNC: 11.6 SEC — SIGNIFICANT CHANGE UP (ref 9.5–13)
RBC # BLD: 3.06 M/UL — LOW (ref 4.2–5.8)
RBC # BLD: 3.26 M/UL — LOW (ref 4.2–5.8)
RBC # BLD: 3.41 M/UL — LOW (ref 4.2–5.8)
RBC # BLD: 3.8 M/UL — LOW (ref 4.2–5.8)
RBC # FLD: 15.3 % — HIGH (ref 10.3–14.5)
RBC # FLD: 15.4 % — HIGH (ref 10.3–14.5)
RBC BLD AUTO: ABNORMAL
RBC BLD AUTO: ABNORMAL
RBC CASTS # UR COMP ASSIST: 1 /HPF — SIGNIFICANT CHANGE UP (ref 0–4)
S AUREUS DNA NOSE QL NAA+PROBE: DETECTED
SAO2 % BLDV: 92.3 % — SIGNIFICANT CHANGE UP
SODIUM SERPL-SCNC: 133 MMOL/L — LOW (ref 135–145)
SODIUM SERPL-SCNC: 134 MMOL/L — LOW (ref 135–145)
SODIUM SERPL-SCNC: 134 MMOL/L — LOW (ref 135–145)
SODIUM SERPL-SCNC: 136 MMOL/L — SIGNIFICANT CHANGE UP (ref 135–145)
SP GR SPEC: >1.03 — HIGH (ref 1–1.03)
SQUAMOUS # UR AUTO: 1 /HPF — SIGNIFICANT CHANGE UP (ref 0–5)
UROBILINOGEN FLD QL: 1 MG/DL — SIGNIFICANT CHANGE UP (ref 0.2–1)
VARIANT LYMPHS # BLD: 0.9 % — SIGNIFICANT CHANGE UP (ref 0–6)
WBC # BLD: 19.36 K/UL — HIGH (ref 3.8–10.5)
WBC # BLD: 20.58 K/UL — HIGH (ref 3.8–10.5)
WBC # BLD: 21.29 K/UL — HIGH (ref 3.8–10.5)
WBC # BLD: 22.88 K/UL — HIGH (ref 3.8–10.5)
WBC # FLD AUTO: 19.36 K/UL — HIGH (ref 3.8–10.5)
WBC # FLD AUTO: 20.58 K/UL — HIGH (ref 3.8–10.5)
WBC # FLD AUTO: 21.29 K/UL — HIGH (ref 3.8–10.5)
WBC # FLD AUTO: 22.88 K/UL — HIGH (ref 3.8–10.5)
WBC UR QL: 2 /HPF — SIGNIFICANT CHANGE UP (ref 0–5)

## 2024-02-26 PROCEDURE — 99231 SBSQ HOSP IP/OBS SF/LOW 25: CPT

## 2024-02-26 PROCEDURE — 71045 X-RAY EXAM CHEST 1 VIEW: CPT | Mod: 26,77

## 2024-02-26 PROCEDURE — 93971 EXTREMITY STUDY: CPT | Mod: 26,RT

## 2024-02-26 PROCEDURE — 99291 CRITICAL CARE FIRST HOUR: CPT

## 2024-02-26 PROCEDURE — 71045 X-RAY EXAM CHEST 1 VIEW: CPT | Mod: 26

## 2024-02-26 PROCEDURE — 70450 CT HEAD/BRAIN W/O DYE: CPT | Mod: 26

## 2024-02-26 PROCEDURE — 71045 X-RAY EXAM CHEST 1 VIEW: CPT | Mod: 26,77,76

## 2024-02-26 PROCEDURE — 99223 1ST HOSP IP/OBS HIGH 75: CPT

## 2024-02-26 RX ORDER — INSULIN HUMAN 100 [IU]/ML
4 INJECTION, SOLUTION SUBCUTANEOUS
Qty: 50 | Refills: 0 | Status: DISCONTINUED | OUTPATIENT
Start: 2024-02-26 | End: 2024-02-27

## 2024-02-26 RX ORDER — LANOLIN ALCOHOL/MO/W.PET/CERES
3 CREAM (GRAM) TOPICAL AT BEDTIME
Refills: 0 | Status: DISCONTINUED | OUTPATIENT
Start: 2024-02-26 | End: 2024-03-21

## 2024-02-26 RX ORDER — MIDODRINE HYDROCHLORIDE 2.5 MG/1
10 TABLET ORAL EVERY 8 HOURS
Refills: 0 | Status: DISCONTINUED | OUTPATIENT
Start: 2024-02-26 | End: 2024-02-26

## 2024-02-26 RX ORDER — MUPIROCIN 20 MG/G
1 OINTMENT TOPICAL
Refills: 0 | Status: COMPLETED | OUTPATIENT
Start: 2024-02-26 | End: 2024-03-01

## 2024-02-26 RX ORDER — ACETAMINOPHEN 500 MG
650 TABLET ORAL EVERY 6 HOURS
Refills: 0 | Status: DISCONTINUED | OUTPATIENT
Start: 2024-02-26 | End: 2024-02-27

## 2024-02-26 RX ORDER — FERROUS SULFATE 325(65) MG
325 TABLET ORAL DAILY
Refills: 0 | Status: DISCONTINUED | OUTPATIENT
Start: 2024-02-26 | End: 2024-03-21

## 2024-02-26 RX ORDER — MAGNESIUM SULFATE 500 MG/ML
2 VIAL (ML) INJECTION ONCE
Refills: 0 | Status: COMPLETED | OUTPATIENT
Start: 2024-02-26 | End: 2024-02-26

## 2024-02-26 RX ORDER — DEXTROSE 50 % IN WATER 50 %
15 SYRINGE (ML) INTRAVENOUS ONCE
Refills: 0 | Status: DISCONTINUED | OUTPATIENT
Start: 2024-02-26 | End: 2024-03-21

## 2024-02-26 RX ORDER — INSULIN HUMAN 100 [IU]/ML
10 INJECTION, SOLUTION SUBCUTANEOUS ONCE
Refills: 0 | Status: COMPLETED | OUTPATIENT
Start: 2024-02-26 | End: 2024-02-26

## 2024-02-26 RX ORDER — DEXTROSE 50 % IN WATER 50 %
25 SYRINGE (ML) INTRAVENOUS ONCE
Refills: 0 | Status: DISCONTINUED | OUTPATIENT
Start: 2024-02-26 | End: 2024-03-21

## 2024-02-26 RX ORDER — SODIUM CHLORIDE 9 MG/ML
1000 INJECTION, SOLUTION INTRAVENOUS
Refills: 0 | Status: DISCONTINUED | OUTPATIENT
Start: 2024-02-26 | End: 2024-03-21

## 2024-02-26 RX ORDER — SIMVASTATIN 20 MG/1
20 TABLET, FILM COATED ORAL AT BEDTIME
Refills: 0 | Status: DISCONTINUED | OUTPATIENT
Start: 2024-02-26 | End: 2024-02-27

## 2024-02-26 RX ORDER — GLUCAGON INJECTION, SOLUTION 0.5 MG/.1ML
1 INJECTION, SOLUTION SUBCUTANEOUS ONCE
Refills: 0 | Status: DISCONTINUED | OUTPATIENT
Start: 2024-02-26 | End: 2024-03-05

## 2024-02-26 RX ORDER — INSULIN GLARGINE 100 [IU]/ML
5 INJECTION, SOLUTION SUBCUTANEOUS AT BEDTIME
Refills: 0 | Status: DISCONTINUED | OUTPATIENT
Start: 2024-02-26 | End: 2024-02-26

## 2024-02-26 RX ORDER — CHLORHEXIDINE GLUCONATE 213 G/1000ML
1 SOLUTION TOPICAL DAILY
Refills: 0 | Status: DISCONTINUED | OUTPATIENT
Start: 2024-02-26 | End: 2024-02-28

## 2024-02-26 RX ORDER — BUMETANIDE 0.25 MG/ML
2 INJECTION INTRAMUSCULAR; INTRAVENOUS ONCE
Refills: 0 | Status: COMPLETED | OUTPATIENT
Start: 2024-02-26 | End: 2024-02-26

## 2024-02-26 RX ORDER — GABAPENTIN 400 MG/1
300 CAPSULE ORAL AT BEDTIME
Refills: 0 | Status: DISCONTINUED | OUTPATIENT
Start: 2024-02-26 | End: 2024-02-27

## 2024-02-26 RX ORDER — MIDODRINE HYDROCHLORIDE 2.5 MG/1
10 TABLET ORAL EVERY 8 HOURS
Refills: 0 | Status: DISCONTINUED | OUTPATIENT
Start: 2024-02-26 | End: 2024-02-27

## 2024-02-26 RX ORDER — INSULIN LISPRO 100/ML
VIAL (ML) SUBCUTANEOUS AT BEDTIME
Refills: 0 | Status: DISCONTINUED | OUTPATIENT
Start: 2024-02-26 | End: 2024-02-26

## 2024-02-26 RX ORDER — SODIUM BICARBONATE 1 MEQ/ML
0.11 SYRINGE (ML) INTRAVENOUS
Qty: 150 | Refills: 0 | Status: DISCONTINUED | OUTPATIENT
Start: 2024-02-26 | End: 2024-02-28

## 2024-02-26 RX ORDER — NOREPINEPHRINE BITARTRATE/D5W 8 MG/250ML
0.05 PLASTIC BAG, INJECTION (ML) INTRAVENOUS
Qty: 8 | Refills: 0 | Status: DISCONTINUED | OUTPATIENT
Start: 2024-02-26 | End: 2024-02-28

## 2024-02-26 RX ORDER — INSULIN LISPRO 100/ML
VIAL (ML) SUBCUTANEOUS
Refills: 0 | Status: DISCONTINUED | OUTPATIENT
Start: 2024-02-26 | End: 2024-02-26

## 2024-02-26 RX ORDER — PIPERACILLIN AND TAZOBACTAM 4; .5 G/20ML; G/20ML
3.38 INJECTION, POWDER, LYOPHILIZED, FOR SOLUTION INTRAVENOUS EVERY 12 HOURS
Refills: 0 | Status: COMPLETED | OUTPATIENT
Start: 2024-02-26 | End: 2024-03-04

## 2024-02-26 RX ORDER — DEXTROSE 50 % IN WATER 50 %
12.5 SYRINGE (ML) INTRAVENOUS ONCE
Refills: 0 | Status: DISCONTINUED | OUTPATIENT
Start: 2024-02-26 | End: 2024-03-21

## 2024-02-26 RX ORDER — PANTOPRAZOLE SODIUM 20 MG/1
40 TABLET, DELAYED RELEASE ORAL EVERY 12 HOURS
Refills: 0 | Status: DISCONTINUED | OUTPATIENT
Start: 2024-02-26 | End: 2024-03-19

## 2024-02-26 RX ORDER — SENNA PLUS 8.6 MG/1
2 TABLET ORAL AT BEDTIME
Refills: 0 | Status: DISCONTINUED | OUTPATIENT
Start: 2024-02-26 | End: 2024-03-21

## 2024-02-26 RX ORDER — ALBUMIN HUMAN 25 %
50 VIAL (ML) INTRAVENOUS EVERY 6 HOURS
Refills: 0 | Status: COMPLETED | OUTPATIENT
Start: 2024-02-26 | End: 2024-02-27

## 2024-02-26 RX ORDER — DEXTROSE 50 % IN WATER 50 %
50 SYRINGE (ML) INTRAVENOUS ONCE
Refills: 0 | Status: COMPLETED | OUTPATIENT
Start: 2024-02-26 | End: 2024-02-26

## 2024-02-26 RX ORDER — DRONEDARONE 400 MG/1
400 TABLET, FILM COATED ORAL
Refills: 0 | Status: DISCONTINUED | OUTPATIENT
Start: 2024-02-26 | End: 2024-03-01

## 2024-02-26 RX ORDER — VANCOMYCIN HCL 1 G
1500 VIAL (EA) INTRAVENOUS ONCE
Refills: 0 | Status: COMPLETED | OUTPATIENT
Start: 2024-02-26 | End: 2024-02-26

## 2024-02-26 RX ORDER — SODIUM CHLORIDE 9 MG/ML
1000 INJECTION, SOLUTION INTRAVENOUS ONCE
Refills: 0 | Status: COMPLETED | OUTPATIENT
Start: 2024-02-26 | End: 2024-02-26

## 2024-02-26 RX ADMIN — Medication 4: at 13:26

## 2024-02-26 RX ADMIN — SODIUM CHLORIDE 1000 MILLILITER(S): 9 INJECTION, SOLUTION INTRAVENOUS at 10:33

## 2024-02-26 RX ADMIN — PANTOPRAZOLE SODIUM 10 MG/HR: 20 TABLET, DELAYED RELEASE ORAL at 13:23

## 2024-02-26 RX ADMIN — Medication 650 MILLIGRAM(S): at 05:35

## 2024-02-26 RX ADMIN — MIDODRINE HYDROCHLORIDE 10 MILLIGRAM(S): 2.5 TABLET ORAL at 14:43

## 2024-02-26 RX ADMIN — Medication 300 MILLIGRAM(S): at 13:23

## 2024-02-26 RX ADMIN — Medication 75 MEQ/KG/HR: at 13:23

## 2024-02-26 RX ADMIN — Medication 25 GRAM(S): at 18:52

## 2024-02-26 RX ADMIN — Medication 650 MILLIGRAM(S): at 07:30

## 2024-02-26 RX ADMIN — DRONEDARONE 400 MILLIGRAM(S): 400 TABLET, FILM COATED ORAL at 05:36

## 2024-02-26 RX ADMIN — Medication 325 MILLIGRAM(S): at 13:33

## 2024-02-26 RX ADMIN — MUPIROCIN 1 APPLICATION(S): 20 OINTMENT TOPICAL at 17:58

## 2024-02-26 RX ADMIN — DRONEDARONE 400 MILLIGRAM(S): 400 TABLET, FILM COATED ORAL at 18:23

## 2024-02-26 RX ADMIN — Medication 4: at 16:57

## 2024-02-26 RX ADMIN — MIDODRINE HYDROCHLORIDE 10 MILLIGRAM(S): 2.5 TABLET ORAL at 21:50

## 2024-02-26 RX ADMIN — CHLORHEXIDINE GLUCONATE 1 APPLICATION(S): 213 SOLUTION TOPICAL at 13:23

## 2024-02-26 RX ADMIN — PIPERACILLIN AND TAZOBACTAM 25 GRAM(S): 4; .5 INJECTION, POWDER, LYOPHILIZED, FOR SOLUTION INTRAVENOUS at 05:36

## 2024-02-26 RX ADMIN — BUMETANIDE 2 MILLIGRAM(S): 0.25 INJECTION INTRAMUSCULAR; INTRAVENOUS at 22:46

## 2024-02-26 RX ADMIN — PANTOPRAZOLE SODIUM 10 MG/HR: 20 TABLET, DELAYED RELEASE ORAL at 06:45

## 2024-02-26 RX ADMIN — PIPERACILLIN AND TAZOBACTAM 25 GRAM(S): 4; .5 INJECTION, POWDER, LYOPHILIZED, FOR SOLUTION INTRAVENOUS at 17:44

## 2024-02-26 RX ADMIN — GABAPENTIN 300 MILLIGRAM(S): 400 CAPSULE ORAL at 21:51

## 2024-02-26 RX ADMIN — INSULIN HUMAN 4 UNIT(S)/HR: 100 INJECTION, SOLUTION SUBCUTANEOUS at 18:23

## 2024-02-26 RX ADMIN — Medication 6.54 MICROGRAM(S)/KG/MIN: at 13:22

## 2024-02-26 RX ADMIN — Medication 6.54 MICROGRAM(S)/KG/MIN: at 10:34

## 2024-02-26 RX ADMIN — Medication 50 MILLILITER(S): at 09:13

## 2024-02-26 RX ADMIN — Medication 650 MILLIGRAM(S): at 05:38

## 2024-02-26 RX ADMIN — Medication 50 MILLILITER(S): at 23:04

## 2024-02-26 RX ADMIN — SENNA PLUS 2 TABLET(S): 8.6 TABLET ORAL at 21:51

## 2024-02-26 RX ADMIN — INSULIN HUMAN 10 UNIT(S): 100 INJECTION, SOLUTION SUBCUTANEOUS at 09:11

## 2024-02-26 RX ADMIN — SIMVASTATIN 20 MILLIGRAM(S): 20 TABLET, FILM COATED ORAL at 21:51

## 2024-02-26 NOTE — PATIENT PROFILE ADULT - FALL HARM RISK - HARM RISK INTERVENTIONS

## 2024-02-26 NOTE — PROGRESS NOTE ADULT - ASSESSMENT
85 yo m pmhx acute sky s/p sky tube (12/22/23), COVID 19, PAF no AC (GIB/FALL), esophagitis, anemia, DM2, HTN, HLD, CKD3-4, BPH, cognitive impairment presented with leakage around sky tube admitted with distributive shock, RON,hyperglycemica, aspiration pna and hypoxic respiratory failure.      NEURO: hob >30 degrees, aspiration precautions.  gabapentin for pain control, at highest recommended dose for current renal function.  close ms monitoring, decrease dose/discontinue as needed  CV: distributive shock requiring vasopressor therapy, actively titrating levophed for MAP >65, midodrine as adjunctive therapy.  continue Dronedarone  RESP: hypoxic respiratory failure, HFNC actively titrating settings for spo2 >92%, chest pt, ngt suctioning as tolerated  RENAL: RON on CKD with MA, avoid nephrotoxic meds, renally dose meds, trend urine output, bun/cr and electrolytes. replace lytes as needed. sodium bicarb gtt,  wills, strict I&Os  GI: NPO except meds   ENDO: hyperglycemia on insulin gtt, poct q1hr  ID: [id number] for coverage, cx pending   HEME:   DISPO: full code.  family updated at bedside by Dr. Velez.      Critical Care time: 40 mins assessing presenting problems of acute illness that poses high probability of life threatening deterioration or end organ damage/dysfunction.  Medical decision making including Initiating plan of care, reviewing data, reviewing radiology, discussing with multidisciplinary team, non inclusive of procedures, discussing goals of care with patient/family    DATE OF DOCUMENTATION EQUIVALENT TO DATE OF SERVICES RENDERED

## 2024-02-26 NOTE — CONSULT NOTE ADULT - ASSESSMENT
83 y/o male with PMH of Acute Cholecystitis s/p Cholecystomy Tube (12/22/23), COVID-19, PAF not on AC due to GIB + Fall Risk, Esophagitis, Anemia, DM 2, HTN, HLD, CKD III/IV, BPH and Cognitive Impairment  came to the ED complaining of leakage around sky tube. GI consulted after 1 episode of coffee ground emesis in ED.     Coffee ground emesis:   Reported 1 episode of coffee ground emesis in ED. His baseline hemoglobin was 11.3 gm on arrival dropped to 9.9 in 2 hours, however his repeated lab reports this morning with hemoglobin of 11.4 with no transfusions. His baseline hemoglobin was 8.6 to 9.5 last month.  No episodes prior to admission. Possibility of emesis with retained bilious cannot be excluded as patient admitted after ?cholecystostomy leak/ block, now draining bilious output after flushing the tube by surgical team. Hemoglobin 11.4 gm this morning, (baseline 8.6 to 9.5 last month).No further episodes of coffee ground emesis.  His last EGD (01/10/2024) c/w grade A esophagitis compatible with non-erosive esophagitis and gastric polyp.   Continue to trend CBC, transfuse form hemoglobin 8 or higher  Continue Protonix, can be switched to 40 mg IV BID  Avoid use of NSAIDs  No plan for any endoscopic evaluation at this time unless there is evidence of overt GI bleed/ hemodynamic instability requiring transfusions.  Further care per primary team

## 2024-02-26 NOTE — PROGRESS NOTE ADULT - SUBJECTIVE AND OBJECTIVE BOX
Patient is a 84y old  Male who presents with a chief complaint of     Patient seen and examined at bedside. patient seen and eval. RRT called today for hypotension and resp failure.  , see rrt note. micu team at bedside. given ivf , levo started , upgraded to micu     ALLERGIES:  No Known Allergies    MEDICATIONS  (STANDING):  dextrose 5%. 1000 milliLiter(s) (50 mL/Hr) IV Continuous <Continuous>  dextrose 5%. 1000 milliLiter(s) (100 mL/Hr) IV Continuous <Continuous>  dextrose 50% Injectable 12.5 Gram(s) IV Push once  dextrose 50% Injectable 25 Gram(s) IV Push once  dextrose 50% Injectable 25 Gram(s) IV Push once  dronedarone 400 milliGRAM(s) Oral two times a day  ferrous    sulfate 325 milliGRAM(s) Oral daily  gabapentin 300 milliGRAM(s) Oral at bedtime  glucagon  Injectable 1 milliGRAM(s) IntraMuscular once  insulin glargine Injectable (LANTUS) 5 Unit(s) SubCutaneous at bedtime  insulin lispro (ADMELOG) corrective regimen sliding scale   SubCutaneous at bedtime  insulin lispro (ADMELOG) corrective regimen sliding scale   SubCutaneous three times a day before meals  metoprolol tartrate 25 milliGRAM(s) Oral two times a day  norepinephrine Infusion 0.05 MICROgram(s)/kG/Min (6.54 mL/Hr) IV Continuous <Continuous>  pantoprazole Infusion 8 mG/Hr (10 mL/Hr) IV Continuous <Continuous>  piperacillin/tazobactam IVPB.. 3.375 Gram(s) IV Intermittent every 8 hours  senna 2 Tablet(s) Oral at bedtime  simvastatin 20 milliGRAM(s) Oral at bedtime  sodium bicarbonate  Infusion 0.161 mEq/kG/Hr (75 mL/Hr) IV Continuous <Continuous>  tamsulosin 0.4 milliGRAM(s) Oral at bedtime  vancomycin  IVPB 1500 milliGRAM(s) IV Intermittent once    MEDICATIONS  (PRN):  acetaminophen     Tablet .. 650 milliGRAM(s) Oral every 6 hours PRN Temp greater or equal to 38C (100.4F), Mild Pain (1 - 3)  aluminum hydroxide/magnesium hydroxide/simethicone Suspension 30 milliLiter(s) Oral every 4 hours PRN Dyspepsia  dextrose Oral Gel 15 Gram(s) Oral once PRN Blood Glucose LESS THAN 70 milliGRAM(s)/deciliter  melatonin 3 milliGRAM(s) Oral at bedtime PRN Insomnia  ondansetron Injectable 4 milliGRAM(s) IV Push every 8 hours PRN Nausea and/or Vomiting    Vital Signs Last 24 Hrs  T(F): 98.1 (26 Feb 2024 07:42), Max: 99 (26 Feb 2024 04:43)  HR: 90 (26 Feb 2024 07:42) (63 - 98)  BP: 96/63 (26 Feb 2024 07:42) (96/63 - 179/88)  RR: 19 (26 Feb 2024 07:42) (18 - 20)  SpO2: 93% (26 Feb 2024 07:42) (93% - 99%)  I&O's Summary    PHYSICAL EXAM:  General: NAD, A/O x 0  ENT: MMM, no thrush  Neck: Supple, No JVD  Lungs: audible upper airway sounds , diminished at bedside   Cardio: RRR, S1/S2, No murmur  Abdomen: Soft, Nontender, Nondistended; Bowel sounds present, per cut sky tube present   Extremities: No calf tenderness, No pitting edema    LABS:                        10.3   21.29 )-----------( 245      ( 26 Feb 2024 10:51 )             32.8     02-26    136  |  101  |  34.1  ----------------------------<  392  6.0   |  18.0  |  2.82    Ca    8.3      26 Feb 2024 03:15    TPro  6.9  /  Alb  3.4  /  TBili  0.3  /  DBili  x   /  AST  35  /  ALT  24  /  AlkPhos  144  02-25      Lipase: 18 U/L (02-25-24 @ 20:34)        Lactate, Blood: 2.4 mmol/L (02-26 @ 01:14)              20:34 - VBG - pH: 7.260 | pCO2: 57    | pO2: 58    | Lactate: 2.10     ABG - ( 26 Feb 2024 10:28 )  pH, Arterial: 7.290 pH, Blood: x     /  pCO2: 45    /  pO2: 73    / HCO3: 22    / Base Excess: -5.0  /  SaO2: 96.0        POCT Blood Glucose.: 397 mg/dL (26 Feb 2024 10:17)  POCT Blood Glucose.: 362 mg/dL (26 Feb 2024 09:04)  POCT Blood Glucose.: 367 mg/dL (26 Feb 2024 06:07)      Urinalysis Basic - ( 26 Feb 2024 03:15 )    Color: x / Appearance: x / SG: x / pH: x  Gluc: 392 mg/dL / Ketone: x  / Bili: x / Urobili: x   Blood: x / Protein: x / Nitrite: x   Leuk Esterase: x / RBC: x / WBC x   Sq Epi: x / Non Sq Epi: x / Bacteria: x          RADIOLOGY & ADDITIONAL TESTS:    Care Discussed with Consultants/Other Providers:

## 2024-02-26 NOTE — RAPID RESPONSE TEAM SUMMARY - NSADDTLFINDINGSRRT_GEN_ALL_CORE
Pt lethargic  on non rebreather  Neuro: Open eyes to verbal stimuli,  minimal verbal response    Jaundice   CV: regular rate, regular rhythm  Pulm/chest: rhonchi bilaterally, on non rebreather  Abdomen; Abd: soft, nontender, Cholecystostomy tube with bilious output   Vital Signs Last 24 Hrs  T(C): 36.7 (26 Feb 2024 07:42), Max: 37.2 (26 Feb 2024 04:43)  T(F): 98.1 (26 Feb 2024 07:42), Max: 99 (26 Feb 2024 04:43)  HR: 90 (26 Feb 2024 07:42) (63 - 98)  BP: 96/63 (26 Feb 2024 07:42) (96/63 - 179/88)  BP(mean): --  RR: 19 (26 Feb 2024 07:42) (18 - 20)  SpO2: 93% (26 Feb 2024 07:42) (93% - 99%)    Parameters below as of 26 Feb 2024 07:42  Patient On (Oxygen Delivery Method): mask, nonrebreather  O2 Flow (L/min): 11        After pt administer levophed BP

## 2024-02-26 NOTE — CONSULT NOTE ADULT - SUBJECTIVE AND OBJECTIVE BOX
Patient is a 84y old  Male who presents with a chief complaint of     BRIEF HOSPITAL COURSE:   84M PMH recent COVID-19, acute cholecystitis s/p cholecystostomy tube (12/22/23), AFib not on A/C 2/2 GIB and fall risk, anemia, CKD, cognitive impairment who presented to [facility] ED 02/25/24 with fluid coming from skin from per sky tube insertion site. Seen by surgery without indication for surgery. In the ED had an episode of coffee ground emesis, had CTA without acute GI bleed. RRT called for hypotension, respiratory failure, started on pressors, ABG 7.29/45/73/22 with lactate of 4.0, and upgraded to ICU.      PAST MEDICAL & SURGICAL HISTORY:  Diabetes      Hypertension      Prostate disorder      Anxiety      High cholesterol      Ulcer      History of endoscopy        Allergies    No Known Allergies    Intolerances      FAMILY HISTORY:  Family history of stomach cancer  Father    Family history of emphysema  Mother        Family history otherwise noncontributory.    Social History: Unable to obtain    Review of Systems:  Unable to obtain given AMS    Medications:  piperacillin/tazobactam IVPB.. 3.375 Gram(s) IV Intermittent every 8 hours  vancomycin  IVPB 1500 milliGRAM(s) IV Intermittent once    dronedarone 400 milliGRAM(s) Oral two times a day  norepinephrine Infusion 0.05 MICROgram(s)/kG/Min IV Continuous <Continuous>      acetaminophen     Tablet .. 650 milliGRAM(s) Oral every 6 hours PRN  gabapentin 300 milliGRAM(s) Oral at bedtime  melatonin 3 milliGRAM(s) Oral at bedtime PRN  ondansetron Injectable 4 milliGRAM(s) IV Push every 8 hours PRN        aluminum hydroxide/magnesium hydroxide/simethicone Suspension 30 milliLiter(s) Oral every 4 hours PRN  pantoprazole Infusion 8 mG/Hr IV Continuous <Continuous>  senna 2 Tablet(s) Oral at bedtime    tamsulosin 0.4 milliGRAM(s) Oral at bedtime    dextrose 50% Injectable 12.5 Gram(s) IV Push once  dextrose 50% Injectable 25 Gram(s) IV Push once  dextrose 50% Injectable 25 Gram(s) IV Push once  dextrose Oral Gel 15 Gram(s) Oral once PRN  glucagon  Injectable 1 milliGRAM(s) IntraMuscular once  insulin glargine Injectable (LANTUS) 5 Unit(s) SubCutaneous at bedtime  insulin lispro (ADMELOG) corrective regimen sliding scale   SubCutaneous at bedtime  insulin lispro (ADMELOG) corrective regimen sliding scale   SubCutaneous three times a day before meals  simvastatin 20 milliGRAM(s) Oral at bedtime    dextrose 5%. 1000 milliLiter(s) IV Continuous <Continuous>  dextrose 5%. 1000 milliLiter(s) IV Continuous <Continuous>  ferrous    sulfate 325 milliGRAM(s) Oral daily  sodium bicarbonate  Infusion 0.161 mEq/kG/Hr IV Continuous <Continuous>                ICU Vital Signs Last 24 Hrs  T(C): 36.7 (26 Feb 2024 07:42), Max: 37.2 (26 Feb 2024 04:43)  T(F): 98.1 (26 Feb 2024 07:42), Max: 99 (26 Feb 2024 04:43)  HR: 90 (26 Feb 2024 07:42) (63 - 98)  BP: 96/63 (26 Feb 2024 07:42) (96/63 - 179/88)  BP(mean): --  ABP: --  ABP(mean): --  RR: 19 (26 Feb 2024 07:42) (18 - 20)  SpO2: 93% (26 Feb 2024 07:42) (93% - 99%)    O2 Parameters below as of 26 Feb 2024 07:42  Patient On (Oxygen Delivery Method): mask, nonrebreather  O2 Flow (L/min): 11        Vital Signs Last 24 Hrs  T(C): 36.7 (26 Feb 2024 07:42), Max: 37.2 (26 Feb 2024 04:43)  T(F): 98.1 (26 Feb 2024 07:42), Max: 99 (26 Feb 2024 04:43)  HR: 90 (26 Feb 2024 07:42) (63 - 98)  BP: 96/63 (26 Feb 2024 07:42) (96/63 - 179/88)  BP(mean): --  RR: 19 (26 Feb 2024 07:42) (18 - 20)  SpO2: 93% (26 Feb 2024 07:42) (93% - 99%)    Parameters below as of 26 Feb 2024 07:42  Patient On (Oxygen Delivery Method): mask, nonrebreather  O2 Flow (L/min): 11      ABG - ( 26 Feb 2024 10:28 )  pH, Arterial: 7.290 pH, Blood: x     /  pCO2: 45    /  pO2: 73    / HCO3: 22    / Base Excess: -5.0  /  SaO2: 96.0                I&O's Detail        LABS:                        10.3   21.29 )-----------( 245      ( 26 Feb 2024 10:51 )             32.8     02-26    134<L>  |  101  |  40.2<H>  ----------------------------<  380<H>  6.1<HH>   |  18.0<L>  |  3.49<H>    Ca    7.9<L>      26 Feb 2024 10:51  Phos  3.3     02-26  Mg     1.5     02-26    TPro  5.8<L>  /  Alb  2.8<L>  /  TBili  1.1  /  DBili  x   /  AST  20  /  ALT  18  /  AlkPhos  77  02-26          CAPILLARY BLOOD GLUCOSE      POCT Blood Glucose.: 350 mg/dL (26 Feb 2024 11:58)    PT/INR - ( 26 Feb 2024 10:51 )   PT: 11.6 sec;   INR: 1.05 ratio         PTT - ( 26 Feb 2024 10:51 )  PTT:19.6 sec  Urinalysis Basic - ( 26 Feb 2024 10:51 )    Color: x / Appearance: x / SG: x / pH: x  Gluc: 380 mg/dL / Ketone: x  / Bili: x / Urobili: x   Blood: x / Protein: x / Nitrite: x   Leuk Esterase: x / RBC: x / WBC x   Sq Epi: x / Non Sq Epi: x / Bacteria: x      CULTURES:      Physical Examination:  GENERAL: In NAD, somnolent but mentation improved with improvement in SBP  HEENT: NC/AT  PULM: Not in respiratory distress  CVS: +S1, S2  EXTREMITIES: No pedal edema B/L  SKIN: No open wounds  NEURO: Grossly non-focal    DEVICES:     RADIOLOGY:   < from: CT Abdomen and Pelvis w/wo IV Cont (02.25.24 @ 18:25) >  IMPRESSION:  No contrast extravasation to suggest site of active bleed.    --- End of Report ---            SANDRA CAMPBELL MD; Attending Radiologist  This document has been electronically signed. Feb 25 2024  6:42PM    < end of copied text >      < from: CT Head No Cont (02.26.24 @ 11:15) >  IMPRESSION:    1.  No evidence of acute intracranial hemorrhage or midline shift.  2.  Chronic ischemic changes as discussed above. If there is continued   concern for acute neurologic compromise, recommend MRI of the brain for   further evaluation.    --- End of Report ---            RIDDHI CHEN MD; Attending Radiologist  This document has been electronically signed. Feb 26 2024 11:32AM    < end of copied text >

## 2024-02-26 NOTE — PROGRESS NOTE ADULT - ASSESSMENT
83 y/o male with PMH of Acute Cholecystitis s/p Cholecystomy Tube (23), COVID-19, PAF not on AC due to GIB + Fall Risk, Esophagitis, Anemia, DM 2, HTN, HLD, CKD III/IV, BPH and Cognitive Impairment  came to the ED complaining of leakage around sky tube. As per wife at bed side, family noticed fluid coming out on the skin when they attempted to flush the sky tube. Wife said patient has been doing well since discharged otherwise. In the ED, patient was seen by surgery team, no acute surgical intervention. As per ED, patient vomited large amount of coffee ground emesis, looked very pale, holding abdomen; CT angio abdomen done: no acute GI bleed. Repeat CBC: Hb: 11.3---->9.3. Also, he desaturated concerning for aspiration PNA, antibiotic and oxygen therapy.   RRT called today for hypotension and increased o2 requirement / resp failure.  , see rrt note. micu team at bedside. given ivf , levo started , upgraded to micu. goc discussed with wife and  daughter stacie on phone , wishes patient to be full code  [phone]      Acute respiratory failure with hypoxia likely due to aspiration pna  -hx of recent covid   -Oxygen therapy as needed  -Aspiration precaution   - c/w zosyn   - micu consulted     hypotension / likely septic shock   - has per cut sky drain , evaluated by sx team/ concern for sepsis   - c/w zosyn   - given 1 dose vanc   - f/u bcxs   - drain  as per sx team at bedside   - s/p ivf LR bolus during rrt   - started on levo for pressor support     Hematemesis   -Witnessed in the ED , no further episodes   -CT abdomen done: no active GI bleed   -Hb stable: 11.3---> 9.3  - c/w ppis   - GI consulted, no plan fo regd at this time    -Monitor CBC, transfuse Hb to keep > 7     PAF   -Not on AC due to hx of GI bleed   -Continue Multaq 400mg bid   - hold metoprolol du eto hypotension , resume when bp allows     CKD-3 with mild amarilis / hyperkalemia / metabolic acidosis   - iv dextrose/ insulin stat given this am   - started on iv bicarb   - monitor     HTN/HLD   - hold bb   - c/w Simvastatin 20mg     DM-2   -Insulin glargine 10 units HS, will give 5 ;units while NPO, adjust as needed   -Insulin Aspart 4 units tid on hold as patient is NPO       BPH   -Tamsulosin 0.4mg     Supportive   -DVT prophylaxis: CSD   -Diet: NPO pending GI eval     code; full code     Plan of care discussed with patient's wife , daughter stacie on phone [phone], micu team , bedside rn . patient upgraded to micu

## 2024-02-26 NOTE — CONSULT NOTE ADULT - SUBJECTIVE AND OBJECTIVE BOX
HISTORY OF PRESENT ILLNESS: 83 y/o male with PMH of Acute Cholecystitis s/p Cholecystomy Tube (12/22/23), COVID-19, PAF not on AC due to GIB + Fall Risk, Esophagitis, Anemia, DM 2, HTN, HLD, CKD III/IV, BPH and Cognitive Impairment  came to the ED complaining of leakage around sky tube. As per wife at bed side, family noticed fluid coming out on the skin when they attempted to flush the sky tube. Wife said patient has been doing well since discharged otherwise. No fever, chills, abdominal pain, change in bowel/urinary habit, nausea, vomiting noted.   In the ED, patient was seen by surgery team, no acute surgical intervention. As per ED, patient vomited large amount of coffee ground emesis, looked very pale, holding abdomen; CT angio abdomen done: no acute GI bleed. Repeat CBC: Hb: 11.3---->9.3. Also, he desaturated concerning for aspiration PNA, antibiotic and oxygen therapy. (February -25- 2024).    GI consulted after 1 episode of coffee ground emesis in ED. His baseline hemoglobin was 11.3 gm on arrival dropped to 9.9 however his repeated lab reports this morning with hemoglobin of 11.4 with no transfusions. His baseline hemoglobin was 8.6 to 9.5 last month. His last EGD (01/10/2024) c/w grade A esophagitis compatible with non-erosive esophagitis and gastric polyp.     Review of Systems:  Limited, patient with minimal verbal response    PAST MEDICAL/SURGICAL HISTORY:  Diabetes    Hypertension    Prostate disorder    Anxiety    High cholesterol    Ulcer    History of endoscopy      SOCIAL HISTORY:  Limited    FAMILY HISTORY:  No known history of gastrointestinal or liver disease;  Family history of stomach cancer  Father    Family history of emphysema  Mother        HOME MEDICATIONS:  ferrous sulfate 325 mg (65 mg elemental iron) oral tablet: 1 tab(s) orally once a day (29 Jan 2024 10:13)  folic acid 1 mg oral tablet: 1 tab(s) orally once a day (05 Jan 2024 14:16)  insulin glargine 100 units/mL subcutaneous solution: 10 unit(s) subcutaneous once a day (at bedtime) (14 Jan 2024 09:47)  insulin lispro 100 units/mL injectable solution: 4 international unit(s) subcutaneous 3 times a day (before meals) hold if FS &lt;100 (05 Jan 2024 14:42)  omeprazole 40 mg oral delayed release capsule: 1 cap(s) orally once a day (19 Mar 2016 11:54)  polyethylene glycol 3350 oral powder for reconstitution: 17 gram(s) orally once a day (at bedtime) (14 Jan 2024 10:48)  senna leaf extract oral tablet: 2 tab(s) orally once a day (at bedtime) (14 Jan 2024 10:48)  simvastatin 20 mg oral tablet: 1 tab(s) orally once a day (at bedtime) (19 Mar 2016 11:54)    INPATIENT MEDICATIONS:  MEDICATIONS  (STANDING):  dextrose 5%. 1000 milliLiter(s) (50 mL/Hr) IV Continuous <Continuous>  dextrose 5%. 1000 milliLiter(s) (100 mL/Hr) IV Continuous <Continuous>  dextrose 50% Injectable 12.5 Gram(s) IV Push once  dextrose 50% Injectable 25 Gram(s) IV Push once  dextrose 50% Injectable 25 Gram(s) IV Push once  dronedarone 400 milliGRAM(s) Oral two times a day  ferrous    sulfate 325 milliGRAM(s) Oral daily  gabapentin 300 milliGRAM(s) Oral at bedtime  glucagon  Injectable 1 milliGRAM(s) IntraMuscular once  insulin glargine Injectable (LANTUS) 5 Unit(s) SubCutaneous at bedtime  insulin lispro (ADMELOG) corrective regimen sliding scale   SubCutaneous at bedtime  insulin lispro (ADMELOG) corrective regimen sliding scale   SubCutaneous three times a day before meals  metoprolol tartrate 25 milliGRAM(s) Oral two times a day  pantoprazole Infusion 8 mG/Hr (10 mL/Hr) IV Continuous <Continuous>  piperacillin/tazobactam IVPB.. 3.375 Gram(s) IV Intermittent every 8 hours  senna 2 Tablet(s) Oral at bedtime  simvastatin 20 milliGRAM(s) Oral at bedtime  sodium bicarbonate 650 milliGRAM(s) Oral two times a day  tamsulosin 0.4 milliGRAM(s) Oral at bedtime    MEDICATIONS  (PRN):  acetaminophen     Tablet .. 650 milliGRAM(s) Oral every 6 hours PRN Temp greater or equal to 38C (100.4F), Mild Pain (1 - 3)  aluminum hydroxide/magnesium hydroxide/simethicone Suspension 30 milliLiter(s) Oral every 4 hours PRN Dyspepsia  dextrose Oral Gel 15 Gram(s) Oral once PRN Blood Glucose LESS THAN 70 milliGRAM(s)/deciliter  melatonin 3 milliGRAM(s) Oral at bedtime PRN Insomnia  ondansetron Injectable 4 milliGRAM(s) IV Push every 8 hours PRN Nausea and/or Vomiting    ALLERGIES:  No Known Allergies    T(C): 37.2 (02-26-24 @ 04:43), Max: 37.2 (02-26-24 @ 04:43)  HR: 96 (02-26-24 @ 04:43) (63 - 98)  BP: 107/74 (02-26-24 @ 04:43) (99/57 - 179/88)  RR: 18 (02-26-24 @ 04:43) (18 - 20)  SpO2: 98% (02-26-24 @ 04:43) (93% - 99%)      PHYSICAL EXAM:  Constitutional: On non rebreather  Neuro: Open eyes to verbal stimuli,  minimal verbal response    HEENT: anicteric sclerae  CV: regular rate, regular rhythm  Pulm/chest: rhonchi bilaterally,on non rebreather  Abd: soft, nontender, nondistended +bowel sounds. No rigidity, rebound tenderness, or guarding. Cholecystostomy tube with bilious output  Ext: no edema  Skin: no jaundice       LABS:             11.4   22.88 )-----------( 256      ( 02-26 @ 03:15 )             35.7                9.9    15.42 )-----------( 251      ( 02-25 @ 17:58 )             31.5                11.3   9.89  )-----------( 273      ( 02-25 @ 15:20 )             35.8         02-26    136  |  101  |  34.1<H>  ----------------------------<  392<H>  6.0<H>   |  18.0<L>  |  2.82<H>    Ca    8.3<L>      26 Feb 2024 03:15    TPro  6.9  /  Alb  3.4  /  TBili  0.3<L>  /  DBili  x   /  AST  35  /  ALT  24  /  AlkPhos  144<H>  02-25    LIVER FUNCTIONS - ( 25 Feb 2024 15:20 )  Alb: 3.4 g/dL / Pro: 6.9 g/dL / ALK PHOS: 144 U/L / ALT: 24 U/L / AST: 35 U/L / GGT: x             Lipase: 18 U/L (02-25-24 @ 20:34)    Urinalysis Basic - ( 26 Feb 2024 03:15 )    Color: x / Appearance: x / SG: x / pH: x  Gluc: 392 mg/dL / Ketone: x  / Bili: x / Urobili: x   Blood: x / Protein: x / Nitrite: x   Leuk Esterase: x / RBC: x / WBC x   Sq Epi: x / Non Sq Epi: x / Bacteria: x        < from: EGD (01.10.24 @ 09:45) >        There were no apparent limitations or complications        Findings:        Esophagus Lumen A medium size hiatal hernia was seen, the esophagogastric    junction(EGJ) was noted at 35 cm, with hiatal narrowing at 38 cm from the    incisors. Retroflexion view in the stomach confirmed the size and morphology of    the hernia.        Mucosa Grade A esophagitis was seen in the esophagus, compatible with    non-erosive esophagitis.        Stomach Protruding lesions Two sessile polyps ranging in size from 5 mm to 5 mm    were found in the cardia. Bx of both 5 mm polyp in cardia.Multiple cold forceps    biopsies were performed for histology.        Duodenum Normal duodenum.        Impressions:        Normal duodenum.        Esophageal hiatal hernia.        Polyps (5 mm to 5 mm) in the cardia. (Biopsy).      Grade A esophagitis compatible with non-erosive esophagitis.        Plan:      Await pathology results.    < end of copied text >      < from: CT Abdomen and Pelvis w/wo IV Cont (02.25.24 @ 18:25) >  COMPARISON: January 29, 2024.    CONTRAST/COMPLICATIONS:  IV Contrast: Omnipaque 350  90 cc administered   10 cc discarded  Oral Contrast: NONE  Complications: None reported at time of study completion    PROCEDURE:  CT of the Abdomen and Pelvis was performed.  Precontrast, Arterial and Delayed phases were performed.  Sagittal and coronal reformats were performed.    FINDINGS:  LOWER CHEST: Old right rib fractures.    LIVER: Within normal limits.  BILE DUCTS: Normal caliber.  GALLBLADDER: Cholecystostomy tube is present within the gallbladder,   which contains stones and intraluminal air. Mild inflammation surrounding   the gallbladder.  SPLEEN: Within normal limits.  PANCREAS: Within normal limits.  ADRENALS: Within normal limits.  KIDNEYS/URETERS:Within normal limits.    BLADDER: Within normal limits.  REPRODUCTIVE ORGANS: Prostate within normal limits.    BOWEL: Small hiatal hernia. No bowel obstruction. Colonic diverticulosis.   No contrast extravasation to suggest site of active bleed.  PERITONEUM: No ascites.  VESSELS: Atherosclerotic changes.  RETROPERITONEUM/LYMPH NODES: No lymphadenopathy.  ABDOMINAL WALL: Within normal limits.  BONES: Degenerative changes.    IMPRESSION:  No contrast extravasation to suggest site of active bleed.      < end of copied text >

## 2024-02-26 NOTE — GOALS OF CARE CONVERSATION - ADVANCED CARE PLANNING - CONVERSATION DETAILS
Advance care directive discussed with patient's wife using . Wife said she will like everything done as needed, no restriction in the care of patient. Patient is FULL CODE.

## 2024-02-26 NOTE — PROGRESS NOTE ADULT - NS ATTEND AMEND GEN_ALL_CORE FT
I have seen and examined the patient during rounds form [id number] hrs  events noted    RRT ongoing during our evaluation  Hypotensive, toxic appearing  dyspneic on supplemental O2  abd soft, distended,   perc cholecystostomy tube in place with dark bile drainage.    A/P  Hd instability, no over signs of bleeding, H/H lateral  I favor bacteremia from perch sky tube manipulation, currently drainaging.  supportive care, redraw blood cultures. cont zosyn  we shall follow

## 2024-02-26 NOTE — RAPID RESPONSE TEAM SUMMARY - NSSITUATIONBACKGROUNDRRT_GEN_ALL_CORE
85 y/o male with PMH of Acute Cholecystitis s/p Cholecystomy Tube (12/22/23), COVID-19, PAF not on AC due to GIB + Fall Risk, Esophagitis, Anemia, DM 2, HTN, HLD, CKD III/IV, BPH and Cognitive Impairment  came to the ED complaining of leakage around sky tube.  GI consulted after 1 episode of coffee ground emesis in ED.  Pt admitted to telemetry.  Rapid was called for hypotension 80/60 with AMS.  Dr. Daigle at bedside, ICU consult called pt requiring IV pressers for blood pressure support.

## 2024-02-26 NOTE — CONSULT NOTE ADULT - NS ATTEND AMEND GEN_ALL_CORE FT
Mr. iFgueroa is a 84 year old gentleman who presented for leaking cholecystostomy tube, reported coffee ground emesis for which GI consulted. No further evidence of recurrent hematemesis, suspect may be related to slow oozing versus non-bloody emesis. Hemoglobin remains stable without necessity of transfusion. Interpreted all images, recent endoscopy reports and updated labs in detail. Discussed plan of care with patient, family and covering provider. No urgent or emergent endoscopic intervention planned at this time. Would continue to monitor closely for evidence suggestive of clinically significant GI hemorrhage. Patient critically ill and not optimized for non-emergent procedure (high risk). Would continue care per MICU. Continue PPI IV BID. We will continue to follow along with you.

## 2024-02-26 NOTE — PROGRESS NOTE ADULT - SUBJECTIVE AND OBJECTIVE BOX
Patient is a 84y old  Male who presents with a chief complaint of     BRIEF HOSPITAL COURSE:   85 yo m pmhx acute sky s/p sky tube (12/22/23), COVID 19, PAF no AC (GIB/FALL), esophagitis, anemia, DM2, HTN, HLD, CKD3-4, BPH, cognitive impairment presented with leakage around sky tube admitted with distributive shock, ORN, aspiration pna and hypoxic respiratory failure.      Events last 24 hours:   patient with weak cough/sputum excretion, requiring more oxygen.  patient ngt suctioned with improvement in gurgling breath sounds, placed on HFNC for additional support.  patient with minimal to absent urine output despite ivf challenge to day.  ordered for albumin/lasix to help promote diuresis.  renal function declining, no indication for emergent HD at this time.  nephrology consult placed for am.     PAST MEDICAL & SURGICAL HISTORY:  Diabetes  Hypertension  Prostate disorder  Anxiety  High cholesterol  Ulcer  History of endoscopy      Allergies  No Known Allergies        FAMILY HISTORY:  Family history of stomach cancer  Father    Family history of emphysema  Mother        Social History:   from home      Review of Systems:  generalized discomfort       Physical Examination:    General: pleasant elderly male, lying in bed    HEENT: nc in place    PULM: coarse b/l R>>L, upper airway turbulent airflow    CVS: rrr    ABD: Soft, nondistended, nontender, +bs    EXT: + edema, nontender    SKIN: Warm     NEURO: lethargic, interactive      Medications:  piperacillin/tazobactam IVPB.. 3.375 Gram(s) IV Intermittent every 12 hours  dronedarone 400 milliGRAM(s) Oral two times a day  midodrine 10 milliGRAM(s) Oral every 8 hours  norepinephrine Infusion 0.05 MICROgram(s)/kG/Min IV Continuous <Continuous>    acetaminophen     Tablet .. 650 milliGRAM(s) Oral every 6 hours PRN  gabapentin 300 milliGRAM(s) Oral at bedtime  melatonin 3 milliGRAM(s) Oral at bedtime PRN  ondansetron Injectable 4 milliGRAM(s) IV Push every 8 hours PRN    aluminum hydroxide/magnesium hydroxide/simethicone Suspension 30 milliLiter(s) Oral every 4 hours PRN  pantoprazole  Injectable 40 milliGRAM(s) IV Push every 12 hours  senna 2 Tablet(s) Oral at bedtime      dextrose 50% Injectable 12.5 Gram(s) IV Push once  dextrose 50% Injectable 25 Gram(s) IV Push once  dextrose 50% Injectable 25 Gram(s) IV Push once  dextrose Oral Gel 15 Gram(s) Oral once PRN  glucagon  Injectable 1 milliGRAM(s) IntraMuscular once  insulin regular Infusion 4 Unit(s)/Hr IV Continuous <Continuous>  simvastatin 20 milliGRAM(s) Oral at bedtime    albumin human 25% IVPB 50 milliLiter(s) IV Intermittent every 6 hours  dextrose 5%. 1000 milliLiter(s) IV Continuous <Continuous>  dextrose 5%. 1000 milliLiter(s) IV Continuous <Continuous>  ferrous    sulfate 325 milliGRAM(s) Oral daily  sodium bicarbonate  Infusion 0.161 mEq/kG/Hr IV Continuous <Continuous>      chlorhexidine 2% Cloths 1 Application(s) Topical daily  mupirocin 2% Nasal 1 Application(s) Both Nostrils two times a day      ICU Vital Signs Last 24 Hrs  T(C): 37.5 (27 Feb 2024 01:00), Max: 37.5 (27 Feb 2024 00:00)  T(F): 99.5 (27 Feb 2024 01:00), Max: 99.5 (27 Feb 2024 00:00)  HR: 85 (27 Feb 2024 01:00) (85 - 97)  BP: 114/101 (27 Feb 2024 01:00) (90/42 - 151/58)  BP(mean): 107 (27 Feb 2024 01:00) (53 - 116)  ABP: --  ABP(mean): --  RR: 14 (27 Feb 2024 01:00) (13 - 37)  SpO2: 97% (27 Feb 2024 01:00) (93% - 100%)    O2 Parameters below as of 27 Feb 2024 00:00  Patient On (Oxygen Delivery Method): nasal cannula, high flow  O2 Flow (L/min): 40  O2 Concentration (%): 50      Vital Signs Last 24 Hrs  T(C): 37.5 (27 Feb 2024 01:00), Max: 37.5 (27 Feb 2024 00:00)  T(F): 99.5 (27 Feb 2024 01:00), Max: 99.5 (27 Feb 2024 00:00)  HR: 85 (27 Feb 2024 01:00) (85 - 97)  BP: 114/101 (27 Feb 2024 01:00) (90/42 - 151/58)  BP(mean): 107 (27 Feb 2024 01:00) (53 - 116)  RR: 14 (27 Feb 2024 01:00) (13 - 37)  SpO2: 97% (27 Feb 2024 01:00) (93% - 100%)    Parameters below as of 27 Feb 2024 00:00  Patient On (Oxygen Delivery Method): nasal cannula, high flow  O2 Flow (L/min): 40  O2 Concentration (%): 50    ABG - ( 26 Feb 2024 17:00 )  pH, Arterial: 7.320 pH, Blood: x     /  pCO2: 47    /  pO2: 76    / HCO3: 24    / Base Excess: -1.9  /  SaO2: 98.7        I&O's Detail    26 Feb 2024 07:01  -  27 Feb 2024 01:27  --------------------------------------------------------  IN:    Albumin 25%  -  50 mL: 50 mL    Enteral Tube Flush: 50 mL    Insulin: 16 mL    IV PiggyBack: 500 mL    Norepinephrine: 98.4 mL    Pantoprazole: 70 mL    Sodium Bicarbonate: 975 mL  Total IN: 1759.4 mL    OUT:    Drain (mL): 125 mL    Indwelling Catheter - Urethral (mL): 635 mL    Nasogastric/Oral tube (mL): 50 mL  Total OUT: 810 mL  Total NET: 949.4 mL      LABS:                        9.4    19.36 )-----------( 221      ( 26 Feb 2024 22:00 )             28.6     02-26    134<L>  |  97  |  45.8<H>  ----------------------------<  190<H>  5.3   |  24.0  |  4.47<H>    Ca    8.0<L>      26 Feb 2024 22:00  Phos  2.6     02-26  Mg     2.4     02-26    TPro  6.0<L>  /  Alb  3.0<L>  /  TBili  1.1  /  DBili  x   /  AST  27  /  ALT  19  /  AlkPhos  69  02-26      CAPILLARY BLOOD GLUCOSE  POCT Blood Glucose.: 155 mg/dL (27 Feb 2024 01:00)      PT/INR - ( 26 Feb 2024 10:51 )   PT: 11.6 sec;   INR: 1.05 ratio    PTT - ( 26 Feb 2024 10:51 )  PTT:19.6 sec      Urinalysis Basic - ( 26 Feb 2024 22:00 )  Color: x / Appearance: x / SG: x / pH: x  Gluc: 190 mg/dL / Ketone: x  / Bili: x / Urobili: x   Blood: x / Protein: x / Nitrite: x   Leuk Esterase: x / RBC: x / WBC x   Sq Epi: x / Non Sq Epi: x / Bacteria: x      CULTURES:  pending      RADIOLOGY:   < from: US Duplex Venous Upper Ext Ltd, Right (02.26.24 @ 15:40) >    ACC: [id number] EXAM:  US DPLX UPR EXT VEINS LTD RT   ORDERED BY: JALEN MACKAY     PROCEDURE DATE:  02/26/2024          INTERPRETATION:  CLINICAL INFORMATION: RIGHT upper extremity swelling    COMPARISON: None available.    TECHNIQUE: Duplex sonography of the RIGHT UPPER extremity veins with   color and spectral Doppler, with and without compression.    FINDINGS:    The right internal jugular, subclavian, axillary and brachial veins are   patent and compressible where applicable.  The basilic vein (superficial   vein) is patent and without thrombus.  The cephalic vein (superficial   vein) is patent and without thrombus.    Doppler examination shows normal spontaneous and phasic flow.    IMPRESSION:  No evidence of right upper extremity deep venous thrombosis.      --- End of Report ---    ANGELICA PETERS MD; Attending Radiologist  This document has been electronically signed. Feb 26 2024  5:15PM    < end of copied text >    < from: CT Head No Cont (02.26.24 @ 11:15) >    ACC: [id number] EXAM:  CT BRAIN   ORDERED BY: KE SMITH     PROCEDURE DATE:  02/26/2024      INTERPRETATION:  EXAM: CT HEAD WITHOUT INTRAVENOUS CONTRAST    HISTORY: Altered mental status    TECHNIQUE: Multiple axial images were obtained from the skull base to the   vertex. Sagittal and coronal reformatted images were obtained from the   axial data set. The images were reviewed in brain and bone windows.    COMPARISON: CT of the head January 12, 2024    FINDINGS:    No acute intracranial hemorrhage. Areas of decreased attenuation   throughout the deep and periventricular white matter, compatible with   chronic small vessel disease. 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 Parenchymal   volume loss resulting in a mild ex vacuo dilatation appearance of the   bilateral ventricles. The extra-axial spaces and basal cisterns are   within normal limits. No midline shift or mass effect present.    The cranial cervical junction is within normal limits. The sella is not   expanded. No depressed calvarial fracture. Mild mucosal thickening in the   ethmoid air cells. Bilateral mastoid effusions. The visualized orbits are   within normal limits.    IMPRESSION:    1.  No evidence of acute intracranial hemorrhage or midline shift.  2.  Chronic ischemic changes as discussed above. If there is continued   concern for acute neurologic compromise, recommend MRI of the brain for   further evaluation.    --- End of Report ---    RIDDHI CHEN MD; Attending Radiologist  This document has been electronically signed. Feb 26 2024 11:32AM    < end of copied text >

## 2024-02-26 NOTE — CONSULT NOTE ADULT - ASSESSMENT
84M PMH recent COVID-19, acute cholecystitis s/p cholecystostomy tube (12/22/23), AFib not on A/C 2/2 GIB and fall risk, anemia, CKD, cognitive impairment who presented to [facility] ED 02/25/24 with fluid coming from skin from per sky tube insertion site. Seen by surgery without indication for surgery. In the ED had an episode of coffee ground emesis, had CTA without acute GI bleed. RRT called for hypotension, respiratory failure, started on pressors, ABG 7.29/45/73/22 with lactate of 4.0, and upgraded to ICU. 84M PMH recent COVID-19, acute cholecystitis s/p cholecystostomy tube (12/22/23), AFib not on A/C 2/2 GIB and fall risk, anemia, CKD, cognitive impairment who presented to [facility] ED 02/25/24 with fluid coming from skin from per sky tube insertion site. Seen by surgery without indication for surgery. In the ED had an episode of coffee ground emesis, had CTA without acute GI bleed. RRT called for hypotension, respiratory failure, started on pressors, ABG 7.29/45/73/22 with lactate of 4.0, and upgraded to ICU.    Impression/Plan:    Sepsis - with shock  Likely 2/2 instrumentation of cholecystostomy tube  Leukocytosis  - Broadening ABx to Vanc/Zosyn  - F/u culture data, BCx have been sent  - Trend CBC, monitor for fevers  - Is on Levophed, titrate MAP >65  - Starting Midodrine 10mg Q8H  - Surgery on board - tentative plan for cholecystectomy 2/28/24    Acute renal failure  - Likely 2/2 septic shock  - Monitor UOP closely  - Renally dose all medications    Acute anemia - improved  CTA without active bleed  - Trend CBC  - Monitor for s/s bleeding    Diet - NPO  PPx - Hold chemical VTE PPx for now  Lines/Tubes - PIV; if pressor requirements worsen, will need CVC  Code Status - Full  Dispo - Admit to MICU    Discussed with Dr. Pilo Valverde M.D.  , Pulmonary & Critical Care Medicine  [facility]  [facility] at [place]  [address]  [place] [address]  T: [phone]  F: [phone]

## 2024-02-26 NOTE — PROGRESS NOTE ADULT - SUBJECTIVE AND OBJECTIVE BOX
Subjective: patient evaluated and examined at the bedside. Rapid response called at time of evaluation. Unable to obtain ROS d/t AMS. Pt found to be on NRB.     MEDICATIONS  (STANDING):  chlorhexidine 2% Cloths 1 Application(s) Topical daily  dextrose 5%. 1000 milliLiter(s) (50 mL/Hr) IV Continuous <Continuous>  dextrose 5%. 1000 milliLiter(s) (100 mL/Hr) IV Continuous <Continuous>  dextrose 50% Injectable 12.5 Gram(s) IV Push once  dextrose 50% Injectable 25 Gram(s) IV Push once  dextrose 50% Injectable 25 Gram(s) IV Push once  dronedarone 400 milliGRAM(s) Oral two times a day  ferrous    sulfate 325 milliGRAM(s) Oral daily  gabapentin 300 milliGRAM(s) Oral at bedtime  glucagon  Injectable 1 milliGRAM(s) IntraMuscular once  insulin glargine Injectable (LANTUS) 5 Unit(s) SubCutaneous at bedtime  insulin lispro (ADMELOG) corrective regimen sliding scale   SubCutaneous at bedtime  insulin lispro (ADMELOG) corrective regimen sliding scale   SubCutaneous three times a day before meals  norepinephrine Infusion 0.05 MICROgram(s)/kG/Min (6.54 mL/Hr) IV Continuous <Continuous>  pantoprazole Infusion 8 mG/Hr (10 mL/Hr) IV Continuous <Continuous>  piperacillin/tazobactam IVPB.. 3.375 Gram(s) IV Intermittent every 8 hours  senna 2 Tablet(s) Oral at bedtime  simvastatin 20 milliGRAM(s) Oral at bedtime  sodium bicarbonate  Infusion 0.161 mEq/kG/Hr (75 mL/Hr) IV Continuous <Continuous>  tamsulosin 0.4 milliGRAM(s) Oral at bedtime  vancomycin  IVPB 1500 milliGRAM(s) IV Intermittent once    MEDICATIONS  (PRN):  acetaminophen     Tablet .. 650 milliGRAM(s) Oral every 6 hours PRN Temp greater or equal to 38C (100.4F), Mild Pain (1 - 3)  aluminum hydroxide/magnesium hydroxide/simethicone Suspension 30 milliLiter(s) Oral every 4 hours PRN Dyspepsia  dextrose Oral Gel 15 Gram(s) Oral once PRN Blood Glucose LESS THAN 70 milliGRAM(s)/deciliter  melatonin 3 milliGRAM(s) Oral at bedtime PRN Insomnia  ondansetron Injectable 4 milliGRAM(s) IV Push every 8 hours PRN Nausea and/or Vomiting      Vital Signs Last 24 Hrs  T(C): 37.4 (26 Feb 2024 11:41), Max: 37.4 (26 Feb 2024 11:41)  T(F): 99.3 (26 Feb 2024 11:41), Max: 99.3 (26 Feb 2024 11:41)  HR: 94 (26 Feb 2024 11:41) (63 - 98)  BP: 124/67 (26 Feb 2024 11:41) (96/63 - 179/88)  BP(mean): 81 (26 Feb 2024 11:41) (81 - 81)  RR: 17 (26 Feb 2024 11:41) (17 - 20)  SpO2: 100% (26 Feb 2024 11:41) (93% - 100%)    Parameters below as of 26 Feb 2024 11:41  Patient On (Oxygen Delivery Method): mask, nonrebreather        Physical Exam: limited d/t rapid response interventions     Respiratory: tachypneic, labored respirations on NRB  Gastrointestinal: Abdomen non-distended   Neurological: unable to obtain GCS, in acute distress       LABS:                        10.3   21.29 )-----------( 245      ( 26 Feb 2024 10:51 )             32.8     02-26    134<L>  |  101  |  40.2<H>  ----------------------------<  380<H>  6.1<HH>   |  18.0<L>  |  3.49<H>    Ca    7.9<L>      26 Feb 2024 10:51  Phos  3.3     02-26  Mg     1.5     02-26    TPro  5.8<L>  /  Alb  2.8<L>  /  TBili  1.1  /  DBili  x   /  AST  20  /  ALT  18  /  AlkPhos  77  02-26    PT/INR - ( 26 Feb 2024 10:51 )   PT: 11.6 sec;   INR: 1.05 ratio         PTT - ( 26 Feb 2024 10:51 )  PTT:19.6 sec  Urinalysis Basic - ( 26 Feb 2024 10:51 )    Color: x / Appearance: x / SG: x / pH: x  Gluc: 380 mg/dL / Ketone: x  / Bili: x / Urobili: x   Blood: x / Protein: x / Nitrite: x   Leuk Esterase: x / RBC: x / WBC x   Sq Epi: x / Non Sq Epi: x / Bacteria: x    A: 84M s/p cholecystomy tube 12/22/23 admitted for perc sky tube evaluation flushed at bedside yesterday. Pt with coffee ground emesis with drop in hgb 9.9 from 11, now 10.3 and leukocytosis 22.88)21.29. CT a/p negative for active GIB. Rapid response called this AM, patient transferred to MICU for critical care management of hypotension and respiratory distress now on pressors.      Plan:   - no acute surgical intervention at this time, surgical plan pending medical optimization   - cont trend H/H and wbc   - monitor fever curve   - rest of care for primary team

## 2024-02-27 ENCOUNTER — APPOINTMENT (OUTPATIENT)
Dept: TRAUMA SURGERY | Facility: CLINIC | Age: 84
End: 2024-02-27

## 2024-02-27 LAB
ALBUMIN SERPL ELPH-MCNC: 2.9 G/DL — LOW (ref 3.3–5.2)
ALBUMIN SERPL ELPH-MCNC: 3.8 G/DL — SIGNIFICANT CHANGE UP (ref 3.3–5.2)
ALP SERPL-CCNC: 46 U/L — SIGNIFICANT CHANGE UP (ref 40–120)
ALP SERPL-CCNC: 50 U/L — SIGNIFICANT CHANGE UP (ref 40–120)
ALT FLD-CCNC: 13 U/L — SIGNIFICANT CHANGE UP
ALT FLD-CCNC: 15 U/L — SIGNIFICANT CHANGE UP
ANION GAP SERPL CALC-SCNC: 12 MMOL/L — SIGNIFICANT CHANGE UP (ref 5–17)
ANION GAP SERPL CALC-SCNC: 17 MMOL/L — SIGNIFICANT CHANGE UP (ref 5–17)
AST SERPL-CCNC: 20 U/L — SIGNIFICANT CHANGE UP
AST SERPL-CCNC: 23 U/L — SIGNIFICANT CHANGE UP
BASE EXCESS BLDV CALC-SCNC: 1 MMOL/L — SIGNIFICANT CHANGE UP (ref -2–3)
BASOPHILS # BLD AUTO: 0.02 K/UL — SIGNIFICANT CHANGE UP (ref 0–0.2)
BASOPHILS NFR BLD AUTO: 0.1 % — SIGNIFICANT CHANGE UP (ref 0–2)
BILIRUB SERPL-MCNC: 1.5 MG/DL — SIGNIFICANT CHANGE UP (ref 0.4–2)
BILIRUB SERPL-MCNC: 2.1 MG/DL — HIGH (ref 0.4–2)
BUN SERPL-MCNC: 19.9 MG/DL — SIGNIFICANT CHANGE UP (ref 8–20)
BUN SERPL-MCNC: 48.9 MG/DL — HIGH (ref 8–20)
CA-I SERPL-SCNC: 1.08 MMOL/L — LOW (ref 1.15–1.33)
CALCIUM SERPL-MCNC: 7.9 MG/DL — LOW (ref 8.4–10.5)
CALCIUM SERPL-MCNC: 8.4 MG/DL — SIGNIFICANT CHANGE UP (ref 8.4–10.5)
CHLORIDE BLDV-SCNC: 98 MMOL/L — SIGNIFICANT CHANGE UP (ref 96–108)
CHLORIDE SERPL-SCNC: 94 MMOL/L — LOW (ref 96–108)
CHLORIDE SERPL-SCNC: 94 MMOL/L — LOW (ref 96–108)
CO2 SERPL-SCNC: 26 MMOL/L — SIGNIFICANT CHANGE UP (ref 22–29)
CO2 SERPL-SCNC: 27 MMOL/L — SIGNIFICANT CHANGE UP (ref 22–29)
CREAT SERPL-MCNC: 2.72 MG/DL — HIGH (ref 0.5–1.3)
CREAT SERPL-MCNC: 5.11 MG/DL — HIGH (ref 0.5–1.3)
EGFR: 10 ML/MIN/1.73M2 — LOW
EGFR: 22 ML/MIN/1.73M2 — LOW
EOSINOPHIL # BLD AUTO: 0 K/UL — SIGNIFICANT CHANGE UP (ref 0–0.5)
EOSINOPHIL NFR BLD AUTO: 0 % — SIGNIFICANT CHANGE UP (ref 0–6)
GAS PNL BLDA: SIGNIFICANT CHANGE UP
GAS PNL BLDV: 131 MMOL/L — LOW (ref 136–145)
GAS PNL BLDV: SIGNIFICANT CHANGE UP
GAS PNL BLDV: SIGNIFICANT CHANGE UP
GLUCOSE BLDC GLUCOMTR-MCNC: 132 MG/DL — HIGH (ref 70–99)
GLUCOSE BLDC GLUCOMTR-MCNC: 133 MG/DL — HIGH (ref 70–99)
GLUCOSE BLDC GLUCOMTR-MCNC: 142 MG/DL — HIGH (ref 70–99)
GLUCOSE BLDC GLUCOMTR-MCNC: 144 MG/DL — HIGH (ref 70–99)
GLUCOSE BLDC GLUCOMTR-MCNC: 146 MG/DL — HIGH (ref 70–99)
GLUCOSE BLDC GLUCOMTR-MCNC: 146 MG/DL — HIGH (ref 70–99)
GLUCOSE BLDC GLUCOMTR-MCNC: 154 MG/DL — HIGH (ref 70–99)
GLUCOSE BLDC GLUCOMTR-MCNC: 155 MG/DL — HIGH (ref 70–99)
GLUCOSE BLDC GLUCOMTR-MCNC: 156 MG/DL — HIGH (ref 70–99)
GLUCOSE BLDC GLUCOMTR-MCNC: 160 MG/DL — HIGH (ref 70–99)
GLUCOSE BLDC GLUCOMTR-MCNC: 165 MG/DL — HIGH (ref 70–99)
GLUCOSE BLDC GLUCOMTR-MCNC: 166 MG/DL — HIGH (ref 70–99)
GLUCOSE BLDC GLUCOMTR-MCNC: 168 MG/DL — HIGH (ref 70–99)
GLUCOSE BLDC GLUCOMTR-MCNC: 171 MG/DL — HIGH (ref 70–99)
GLUCOSE BLDC GLUCOMTR-MCNC: 214 MG/DL — HIGH (ref 70–99)
GLUCOSE BLDV-MCNC: 141 MG/DL — HIGH (ref 70–99)
GLUCOSE SERPL-MCNC: 146 MG/DL — HIGH (ref 70–99)
GLUCOSE SERPL-MCNC: 162 MG/DL — HIGH (ref 70–99)
HCO3 BLDV-SCNC: 27 MMOL/L — SIGNIFICANT CHANGE UP (ref 22–29)
HCT VFR BLD CALC: 26.2 % — LOW (ref 39–50)
HCT VFR BLD CALC: 27 % — LOW (ref 39–50)
HCT VFR BLDA CALC: 26 % — SIGNIFICANT CHANGE UP
HGB BLD CALC-MCNC: 8.8 G/DL — LOW (ref 12.6–17.4)
HGB BLD-MCNC: 8.5 G/DL — LOW (ref 13–17)
HGB BLD-MCNC: 8.9 G/DL — LOW (ref 13–17)
IMM GRANULOCYTES NFR BLD AUTO: 0.9 % — SIGNIFICANT CHANGE UP (ref 0–0.9)
LACTATE BLDV-MCNC: 1.4 MMOL/L — SIGNIFICANT CHANGE UP (ref 0.5–2)
LYMPHOCYTES # BLD AUTO: 0.72 K/UL — LOW (ref 1–3.3)
LYMPHOCYTES # BLD AUTO: 4.8 % — LOW (ref 13–44)
MAGNESIUM SERPL-MCNC: 1.8 MG/DL — SIGNIFICANT CHANGE UP (ref 1.6–2.6)
MAGNESIUM SERPL-MCNC: 1.9 MG/DL — SIGNIFICANT CHANGE UP (ref 1.6–2.6)
MCHC RBC-ENTMCNC: 29.7 PG — SIGNIFICANT CHANGE UP (ref 27–34)
MCHC RBC-ENTMCNC: 30.1 PG — SIGNIFICANT CHANGE UP (ref 27–34)
MCHC RBC-ENTMCNC: 32.4 GM/DL — SIGNIFICANT CHANGE UP (ref 32–36)
MCHC RBC-ENTMCNC: 33 GM/DL — SIGNIFICANT CHANGE UP (ref 32–36)
MCV RBC AUTO: 91.2 FL — SIGNIFICANT CHANGE UP (ref 80–100)
MCV RBC AUTO: 91.6 FL — SIGNIFICANT CHANGE UP (ref 80–100)
MONOCYTES # BLD AUTO: 0.6 K/UL — SIGNIFICANT CHANGE UP (ref 0–0.9)
MONOCYTES NFR BLD AUTO: 4 % — SIGNIFICANT CHANGE UP (ref 2–14)
NEUTROPHILS # BLD AUTO: 13.54 K/UL — HIGH (ref 1.8–7.4)
NEUTROPHILS NFR BLD AUTO: 90.2 % — HIGH (ref 43–77)
PCO2 BLDV: 51 MMHG — SIGNIFICANT CHANGE UP (ref 42–55)
PH BLDV: 7.33 — SIGNIFICANT CHANGE UP (ref 7.32–7.43)
PHOSPHATE SERPL-MCNC: 2.7 MG/DL — SIGNIFICANT CHANGE UP (ref 2.4–4.7)
PHOSPHATE SERPL-MCNC: 2.8 MG/DL — SIGNIFICANT CHANGE UP (ref 2.4–4.7)
PLATELET # BLD AUTO: 166 K/UL — SIGNIFICANT CHANGE UP (ref 150–400)
PLATELET # BLD AUTO: 203 K/UL — SIGNIFICANT CHANGE UP (ref 150–400)
PO2 BLDV: 55 MMHG — HIGH (ref 25–45)
POTASSIUM BLDV-SCNC: 5.1 MMOL/L — SIGNIFICANT CHANGE UP (ref 3.5–5.1)
POTASSIUM SERPL-MCNC: 4.3 MMOL/L — SIGNIFICANT CHANGE UP (ref 3.5–5.3)
POTASSIUM SERPL-MCNC: 5.2 MMOL/L — SIGNIFICANT CHANGE UP (ref 3.5–5.3)
POTASSIUM SERPL-SCNC: 4.3 MMOL/L — SIGNIFICANT CHANGE UP (ref 3.5–5.3)
POTASSIUM SERPL-SCNC: 5.2 MMOL/L — SIGNIFICANT CHANGE UP (ref 3.5–5.3)
PROT SERPL-MCNC: 5.8 G/DL — LOW (ref 6.6–8.7)
PROT SERPL-MCNC: 6.2 G/DL — LOW (ref 6.6–8.7)
RBC # BLD: 2.86 M/UL — LOW (ref 4.2–5.8)
RBC # BLD: 2.96 M/UL — LOW (ref 4.2–5.8)
RBC # FLD: 15.3 % — HIGH (ref 10.3–14.5)
RBC # FLD: 15.4 % — HIGH (ref 10.3–14.5)
SAO2 % BLDV: 84.8 % — SIGNIFICANT CHANGE UP
SODIUM SERPL-SCNC: 132 MMOL/L — LOW (ref 135–145)
SODIUM SERPL-SCNC: 138 MMOL/L — SIGNIFICANT CHANGE UP (ref 135–145)
WBC # BLD: 15.02 K/UL — HIGH (ref 3.8–10.5)
WBC # BLD: 16.51 K/UL — HIGH (ref 3.8–10.5)
WBC # FLD AUTO: 15.02 K/UL — HIGH (ref 3.8–10.5)
WBC # FLD AUTO: 16.51 K/UL — HIGH (ref 3.8–10.5)

## 2024-02-27 PROCEDURE — 71045 X-RAY EXAM CHEST 1 VIEW: CPT | Mod: 26

## 2024-02-27 PROCEDURE — 99233 SBSQ HOSP IP/OBS HIGH 50: CPT

## 2024-02-27 PROCEDURE — 71045 X-RAY EXAM CHEST 1 VIEW: CPT | Mod: 26,77

## 2024-02-27 PROCEDURE — 99291 CRITICAL CARE FIRST HOUR: CPT | Mod: GC

## 2024-02-27 PROCEDURE — 93010 ELECTROCARDIOGRAM REPORT: CPT

## 2024-02-27 PROCEDURE — 99232 SBSQ HOSP IP/OBS MODERATE 35: CPT

## 2024-02-27 RX ORDER — CHLORHEXIDINE GLUCONATE 213 G/1000ML
1 SOLUTION TOPICAL
Refills: 0 | Status: DISCONTINUED | OUTPATIENT
Start: 2024-02-27 | End: 2024-02-27

## 2024-02-27 RX ORDER — SIMVASTATIN 20 MG/1
20 TABLET, FILM COATED ORAL AT BEDTIME
Refills: 0 | Status: DISCONTINUED | OUTPATIENT
Start: 2024-02-27 | End: 2024-03-01

## 2024-02-27 RX ORDER — FERROUS SULFATE 325(65) MG
1 TABLET ORAL
Refills: 0 | DISCHARGE

## 2024-02-27 RX ORDER — ACETAMINOPHEN 500 MG
650 TABLET ORAL EVERY 6 HOURS
Refills: 0 | Status: DISCONTINUED | OUTPATIENT
Start: 2024-02-27 | End: 2024-03-01

## 2024-02-27 RX ORDER — SODIUM CHLORIDE 9 MG/ML
10 INJECTION INTRAMUSCULAR; INTRAVENOUS; SUBCUTANEOUS
Refills: 0 | Status: DISCONTINUED | OUTPATIENT
Start: 2024-02-27 | End: 2024-03-21

## 2024-02-27 RX ORDER — BUMETANIDE 0.25 MG/ML
2 INJECTION INTRAMUSCULAR; INTRAVENOUS ONCE
Refills: 0 | Status: COMPLETED | OUTPATIENT
Start: 2024-02-27 | End: 2024-02-27

## 2024-02-27 RX ORDER — MIDODRINE HYDROCHLORIDE 2.5 MG/1
15 TABLET ORAL EVERY 8 HOURS
Refills: 0 | Status: DISCONTINUED | OUTPATIENT
Start: 2024-02-27 | End: 2024-02-28

## 2024-02-27 RX ORDER — ERYTHROPOIETIN 10000 [IU]/ML
10000 INJECTION, SOLUTION INTRAVENOUS; SUBCUTANEOUS ONCE
Refills: 0 | Status: COMPLETED | OUTPATIENT
Start: 2024-02-27 | End: 2024-02-27

## 2024-02-27 RX ORDER — FENTANYL CITRATE 50 UG/ML
50 INJECTION INTRAVENOUS ONCE
Refills: 0 | Status: DISCONTINUED | OUTPATIENT
Start: 2024-02-27 | End: 2024-02-27

## 2024-02-27 RX ORDER — ALBUMIN HUMAN 25 %
100 VIAL (ML) INTRAVENOUS ONCE
Refills: 0 | Status: COMPLETED | OUTPATIENT
Start: 2024-02-27 | End: 2024-02-27

## 2024-02-27 RX ORDER — GABAPENTIN 400 MG/1
300 CAPSULE ORAL AT BEDTIME
Refills: 0 | Status: DISCONTINUED | OUTPATIENT
Start: 2024-02-27 | End: 2024-03-01

## 2024-02-27 RX ORDER — HYDROCORTISONE 20 MG
50 TABLET ORAL EVERY 6 HOURS
Refills: 0 | Status: DISCONTINUED | OUTPATIENT
Start: 2024-02-27 | End: 2024-02-29

## 2024-02-27 RX ORDER — IPRATROPIUM/ALBUTEROL SULFATE 18-103MCG
3 AEROSOL WITH ADAPTER (GRAM) INHALATION ONCE
Refills: 0 | Status: DISCONTINUED | OUTPATIENT
Start: 2024-02-27 | End: 2024-02-27

## 2024-02-27 RX ORDER — INSULIN LISPRO 100/ML
VIAL (ML) SUBCUTANEOUS EVERY 6 HOURS
Refills: 0 | Status: DISCONTINUED | OUTPATIENT
Start: 2024-02-27 | End: 2024-03-01

## 2024-02-27 RX ORDER — ERYTHROPOIETIN 10000 [IU]/ML
10000 INJECTION, SOLUTION INTRAVENOUS; SUBCUTANEOUS ONCE
Refills: 0 | Status: DISCONTINUED | OUTPATIENT
Start: 2024-02-27 | End: 2024-02-27

## 2024-02-27 RX ADMIN — Medication 50 MILLILITER(S): at 17:56

## 2024-02-27 RX ADMIN — Medication 50 MILLILITER(S): at 15:13

## 2024-02-27 RX ADMIN — FENTANYL CITRATE 50 MICROGRAM(S): 50 INJECTION INTRAVENOUS at 11:55

## 2024-02-27 RX ADMIN — Medication 325 MILLIGRAM(S): at 12:11

## 2024-02-27 RX ADMIN — FENTANYL CITRATE 50 MICROGRAM(S): 50 INJECTION INTRAVENOUS at 11:25

## 2024-02-27 RX ADMIN — ONDANSETRON 4 MILLIGRAM(S): 8 TABLET, FILM COATED ORAL at 20:51

## 2024-02-27 RX ADMIN — DRONEDARONE 400 MILLIGRAM(S): 400 TABLET, FILM COATED ORAL at 17:55

## 2024-02-27 RX ADMIN — PANTOPRAZOLE SODIUM 40 MILLIGRAM(S): 20 TABLET, DELAYED RELEASE ORAL at 17:54

## 2024-02-27 RX ADMIN — Medication 50 MILLIGRAM(S): at 22:17

## 2024-02-27 RX ADMIN — Medication 50 MILLIGRAM(S): at 17:54

## 2024-02-27 RX ADMIN — MIDODRINE HYDROCHLORIDE 15 MILLIGRAM(S): 2.5 TABLET ORAL at 20:26

## 2024-02-27 RX ADMIN — SENNA PLUS 2 TABLET(S): 8.6 TABLET ORAL at 20:25

## 2024-02-27 RX ADMIN — Medication 50 MILLILITER(S): at 12:01

## 2024-02-27 RX ADMIN — MUPIROCIN 1 APPLICATION(S): 20 OINTMENT TOPICAL at 17:55

## 2024-02-27 RX ADMIN — Medication 3 MILLIGRAM(S): at 20:25

## 2024-02-27 RX ADMIN — PANTOPRAZOLE SODIUM 40 MILLIGRAM(S): 20 TABLET, DELAYED RELEASE ORAL at 05:29

## 2024-02-27 RX ADMIN — Medication 1 MILLIGRAM(S): at 22:17

## 2024-02-27 RX ADMIN — MUPIROCIN 1 APPLICATION(S): 20 OINTMENT TOPICAL at 05:36

## 2024-02-27 RX ADMIN — DRONEDARONE 400 MILLIGRAM(S): 400 TABLET, FILM COATED ORAL at 05:35

## 2024-02-27 RX ADMIN — Medication 650 MILLIGRAM(S): at 06:03

## 2024-02-27 RX ADMIN — MIDODRINE HYDROCHLORIDE 15 MILLIGRAM(S): 2.5 TABLET ORAL at 13:43

## 2024-02-27 RX ADMIN — SIMVASTATIN 20 MILLIGRAM(S): 20 TABLET, FILM COATED ORAL at 20:25

## 2024-02-27 RX ADMIN — GABAPENTIN 300 MILLIGRAM(S): 400 CAPSULE ORAL at 20:25

## 2024-02-27 RX ADMIN — PIPERACILLIN AND TAZOBACTAM 25 GRAM(S): 4; .5 INJECTION, POWDER, LYOPHILIZED, FOR SOLUTION INTRAVENOUS at 05:33

## 2024-02-27 RX ADMIN — PIPERACILLIN AND TAZOBACTAM 25 GRAM(S): 4; .5 INJECTION, POWDER, LYOPHILIZED, FOR SOLUTION INTRAVENOUS at 17:56

## 2024-02-27 RX ADMIN — Medication 75 MEQ/KG/HR: at 05:29

## 2024-02-27 RX ADMIN — MIDODRINE HYDROCHLORIDE 10 MILLIGRAM(S): 2.5 TABLET ORAL at 05:35

## 2024-02-27 RX ADMIN — BUMETANIDE 2 MILLIGRAM(S): 0.25 INJECTION INTRAMUSCULAR; INTRAVENOUS at 08:41

## 2024-02-27 RX ADMIN — Medication 650 MILLIGRAM(S): at 07:07

## 2024-02-27 RX ADMIN — ERYTHROPOIETIN 10000 UNIT(S): 10000 INJECTION, SOLUTION INTRAVENOUS; SUBCUTANEOUS at 12:11

## 2024-02-27 RX ADMIN — Medication 50 MILLILITER(S): at 05:32

## 2024-02-27 RX ADMIN — CHLORHEXIDINE GLUCONATE 1 APPLICATION(S): 213 SOLUTION TOPICAL at 12:10

## 2024-02-27 RX ADMIN — Medication 2: at 22:21

## 2024-02-27 RX ADMIN — Medication 50 MILLIGRAM(S): at 12:00

## 2024-02-27 RX ADMIN — Medication 50 MEQ/KG/HR: at 06:10

## 2024-02-27 NOTE — DIETITIAN INITIAL EVALUATION ADULT - OTHER INFO
BRIEF HOSPITAL COURSE: 85 yo m pmhx acute sky s/p sky tube (12/22/23), COVID 19, PAF no AC (GIB/FALL), esophagitis, anemia, DM2, HTN, HLD, CKD3-4, BPH, cognitive impairment presented to the ED with leakage of saline around sky tube upon flushing. In the ED, patient had large coffee ground emesis and desaturation, CTA abdomen without acute GI bleed. Admitted to MICU with distributive shock, RON, aspiration pna and hypoxic respiratory failure. Patient anuric with declining renal function, nephrology consulted. Plan to start HD today. Currently on HFNC. Remains on vasopressor support.

## 2024-02-27 NOTE — DIETITIAN INITIAL EVALUATION ADULT - NSFNSGIIOFT_GEN_A_CORE
02-26-24 @ 07:01  -  02-27-24 @ 07:00  --------------------------------------------------------  OUT:    Nasogastric/Oral tube (mL): 50 mL  Total OUT: 50 mL    Total NET: -50 mL

## 2024-02-27 NOTE — PROGRESS NOTE ADULT - ASSESSMENT
85 yo m pmhx acute sky s/p sky tube (12/22/23), COVID 19, PAF no AC (GIB/FALL), esophagitis, anemia, DM2, HTN, HLD, CKD3-4, BPH, cognitive impairment presented with leakage around sky tube admitted with distributive shock, RON,hyperglycemica, aspiration pna and hypoxic respiratory failure.      NEURO: hob >30 degrees, aspiration precautions.  gabapentin for pain control, at highest recommended dose for current renal function.  Close ms monitoring, decrease dose/discontinue as needed  CV: distributive shock requiring vasopressor therapy, actively titrating levophed for MAP >65, midodrine as adjunctive therapy. Continue Dronedarone.   RESP: hypoxic respiratory failure, HFNC actively titrating settings for spo2 >92%, chest pt, ngt suctioning as tolerated  RENAL: RON on CKD with MA, avoid nephrotoxic meds, renally dose meds, trend urine output, bun/cr and electrolytes. replace lytes as needed, sodium bicarb gtt,  wills, strict I&Os  GI: NPO except meds   ENDO: hyperglycemia on insulin gtt, poct q1hr  ID: [email or web address] for coverage, cx pending   HEME:   DISPO: full code.  family updated at bedside by Dr. Velez.     85 yo m pmhx acute sky s/p sky tube (12/22/23), COVID 19, PAF no AC (GIB/FALL), esophagitis, anemia, DM2, HTN, HLD, CKD3-4, BPH, cognitive impairment presented with leakage around sky tube admitted with distributive shock, RON,hyperglycemica, aspiration pna and hypoxic respiratory failure.      NEURO: hob >30 degrees, aspiration precautions.  gabapentin for pain control, at highest recommended dose for current renal function.  Close ms monitoring, decrease dose/discontinue as needed  CV: distributive shock requiring vasopressor therapy, actively titrating levophed for MAP >65, midodrine as adjunctive therapy. Continue Dronedarone.   RESP: hypoxic respiratory failure, HFNC actively titrating settings for spo2 >92%, chest pt, ngt suctioning as tolerated  RENAL: RON on CKD with MA, avoid nephrotoxic meds, renally dose meds, trend urine output, bun/cr and electrolytes. replace lytes as needed, sodium bicarb gtt,  wills, strict I&Os  GI: NPO except meds   ENDO: hyperglycemia on insulin gtt, poct q1hr  ID: [email or web address] for coverage, cx pending

## 2024-02-27 NOTE — CONSULT NOTE ADULT - SUBJECTIVE AND OBJECTIVE BOX
Patient is a 84y old  Male who presents with a chief complaint of  Acute  renal failure.    HPI:  Patient seen and examined.   83 y/o male with PMH of Acute Cholecystitis s/p Cholecystomy Tube (23), COVID-19, PAF not on AC due to GIB + Fall Risk, Esophagitis, Anemia, DM 2, HTN, HLD, CKD III/IV, BPH and Cognitive Impairment  came to the ED complaining of leakage around sky tube. As per wife at bed side, family noticed fluid coming out on the skin when they attempted to flush the sky tube. Wife said patient has been doing well since discharged otherwise. No fever, chills, abdominal pain, change in bowel/urinary habit, nausea, vomiting noted.   In the ED, patient was seen by surgery team, no acute surgical intervention. As per ED, patient vomited large amount of coffee ground emesis, looked very pale, holding abdomen; CT angio abdomen done: no acute GI bleed. Repeat CBC: Hb: 11.3---->9.3. Also, he desaturated concerning for aspiration PNA, antibiotic and oxygen therapy.  (2024 22:52). Hx DM 2, Hypertension , no Hx  renal  stones.      PAST MEDICAL & SURGICAL HISTORY:  Diabetes      Hypertension      Prostate disorder      Anxiety      High cholesterol      Ulcer      History of endoscopy        FAMILY HISTORY:  Family history of stomach cancer  Father    Family history of emphysema  Mother    NC    Social History:Non smoker    MEDICATIONS  (STANDING):  albumin human 25% IVPB 50 milliLiter(s) IV Intermittent every 6 hours  buMETAnide Injectable 2 milliGRAM(s) IV Push once  chlorhexidine 2% Cloths 1 Application(s) Topical daily  dextrose 5%. 1000 milliLiter(s) (50 mL/Hr) IV Continuous <Continuous>  dextrose 5%. 1000 milliLiter(s) (100 mL/Hr) IV Continuous <Continuous>  dextrose 50% Injectable 12.5 Gram(s) IV Push once  dextrose 50% Injectable 25 Gram(s) IV Push once  dextrose 50% Injectable 25 Gram(s) IV Push once  dronedarone 400 milliGRAM(s) Oral two times a day  ferrous    sulfate 325 milliGRAM(s) Oral daily  gabapentin 300 milliGRAM(s) Oral at bedtime  glucagon  Injectable 1 milliGRAM(s) IntraMuscular once  insulin regular Infusion 4 Unit(s)/Hr (4 mL/Hr) IV Continuous <Continuous>  midodrine 10 milliGRAM(s) Oral every 8 hours  mupirocin 2% Nasal 1 Application(s) Both Nostrils two times a day  norepinephrine Infusion 0.05 MICROgram(s)/kG/Min (6.54 mL/Hr) IV Continuous <Continuous>  pantoprazole  Injectable 40 milliGRAM(s) IV Push every 12 hours  piperacillin/tazobactam IVPB.. 3.375 Gram(s) IV Intermittent every 12 hours  senna 2 Tablet(s) Oral at bedtime  simvastatin 20 milliGRAM(s) Oral at bedtime  sodium bicarbonate  Infusion 0.107 mEq/kG/Hr (50 mL/Hr) IV Continuous <Continuous>    MEDICATIONS  (PRN):  acetaminophen     Tablet .. 650 milliGRAM(s) Oral every 6 hours PRN Temp greater or equal to 38C (100.4F), Mild Pain (1 - 3)  aluminum hydroxide/magnesium hydroxide/simethicone Suspension 30 milliLiter(s) Oral every 4 hours PRN Dyspepsia  dextrose Oral Gel 15 Gram(s) Oral once PRN Blood Glucose LESS THAN 70 milliGRAM(s)/deciliter  melatonin 3 milliGRAM(s) Oral at bedtime PRN Insomnia  ondansetron Injectable 4 milliGRAM(s) IV Push every 8 hours PRN Nausea and/or Vomiting   Meds reviewed    Allergies    No Known Allergies        REVIEW OF SYSTEMS:    As above.    ALLERY AND IMMUNOLOGIC: No hives or eczema      Vital Signs Last 24 Hrs  T(C): 37.8 (2024 07:15), Max: 38.1 (2024 06:15)  T(F): 100 (2024 07:15), Max: 100.6 (2024 06:15)  HR: 92 (2024 07:15) (85 - 101)  BP: 81/57 (2024 07:15) (81/57 - 151/58)  BP(mean): 65 (2024 07:15) (53 - 125)  RR: 18 (2024 07:15) (13 - 37)  SpO2: 94% (2024 07:15) (91% - 100%)    Parameters below as of 2024 04:00  Patient On (Oxygen Delivery Method): nasal cannula, high flow    Daily Height in cm: 167.64 (2024 12:00)    Daily Weight in k.8 (2024 03:15)    PHYSICAL EXAM:    GENERAL: appears acutely and chronically ill, daughter  at bedside  HEAD:  Atraumatic, Normocephalic  EYES: EOMI, PERRLA, conjunctiva and sclera clear  ENMT: No tonsillar erythema, exudates, or enlargement; Moist mucous membranes, Good dentition, No lesions  NECK: Supple, neck  veins wnl  NERVOUS SYSTEM:  Alert & Oriented , Slow  mentation;  CHEST/LUNG: High  flow  nasal 02, rhonchi  HEART: Regular rate and rhythm; No rub; monitor  noted  ABDOMEN: Soft, right  upper abdominal  drain  with yellow  fluid  EXTREMITIES:  Arm  edema, leg with muscle  wasting  LYMPH: No lymphadenopathy noted  SKIN: No rashes or lesions, pale  : I&O noted    LABS:                        8.9    16.51 )-----------( 203      ( 2024 04:10 )             27.0         132<L>  |  94<L>  |  48.9<H>  ----------------------------<  162<H>  5.2   |  26.0  |  5.11<H>    Ca    7.9<L>      2024 04:10  Phos  2.7       Mg     1.9         TPro  5.8<L>  /  Alb  2.9<L>  /  TBili  1.5  /  DBili  x   /  AST  23  /  ALT  15  /  AlkPhos  50      PT/INR - ( 2024 10:51 )   PT: 11.6 sec;   INR: 1.05 ratio         PTT - ( 2024 10:51 )  PTT:19.6 sec  Urinalysis Basic - ( 2024 04:10 )    Color: x / Appearance: x / SG: x / pH: x  Gluc: 162 mg/dL / Ketone: x  / Bili: x / Urobili: x   Blood: x / Protein: x / Nitrite: x   Leuk Esterase: x / RBC: x / WBC x   Sq Epi: x / Non Sq Epi: x / Bacteria: x      Magnesium: 1.9 mg/dL ( @ 04:10)  Phosphorus: 2.7 mg/dL ( @ 04:10)  Magnesium: 2.4 mg/dL ( @ 22:00)  Phosphorus: 2.6 mg/dL ( @ 22:00)  Magnesium: 1.5 mg/dL ( @ 17:05)  Phosphorus: 2.4 mg/dL ( @ 17:05)  Magnesium: 1.5 mg/dL ( @ 10:51)  Phosphorus: 3.3 mg/dL ( @ 10:51)    ABG - ( 2024 05:45 )  pH, Arterial: 7.380 pH, Blood: x     /  pCO2: 48    /  pO2: 75    / HCO3: 28    / Base Excess: 3.3   /  SaO2: 97.9                  RADIOLOGY & ADDITIONAL TESTS:

## 2024-02-27 NOTE — DIETITIAN INITIAL EVALUATION ADULT - PERTINENT LABORATORY DATA
02-27    132<L>  |  94<L>  |  48.9<H>  ----------------------------<  162<H>  5.2   |  26.0  |  5.11<H>    Ca    7.9<L>      27 Feb 2024 04:10  Phos  2.7     02-27  Mg     1.9     02-27    TPro  5.8<L>  /  Alb  2.9<L>  /  TBili  1.5  /  DBili  x   /  AST  23  /  ALT  15  /  AlkPhos  50  02-27  POCT Blood Glucose.: 142 mg/dL (02-27-24 @ 12:24)  A1C with Estimated Average Glucose Result: 8.2 % (12-22-23 @ 02:55)

## 2024-02-27 NOTE — PROGRESS NOTE ADULT - NS ATTEND AMEND GEN_ALL_CORE FT
85 y/o male with PMH of Acute Cholecystitis s/p Cholecystomy Tube (12/22/23), COVID-19, PAF not on AC due to GIB + Fall Risk, Esophagitis, Anemia, DM 2, HTN, HLD, CKD III/IV, BPH and Cognitive Impairment  came to the ED complaining of leakage around sky tube. GI consulted after 1 episode of coffee ground emesis in ED.   Worsening Cr, might need HD -- this might also be worsening the anemia  Hb downtrend noted, but ICU reports no overt evidence of GIB  Pt's pressor requirements improving  Tube draining transparent bile, approx 100 cc yesterday per RN  no evidence of infection in that fluid  CT with tube in good position, stones in gallbladder, no stones in duct - this matches nl LFTs  no plans for GI procedure at this time, please reconsult as needed

## 2024-02-27 NOTE — CONSULT NOTE ADULT - ASSESSMENT
A) DM 2 with low Bp, CRF, and  ARF  post  iv  dye.    P) Dialysis  discussed   with  daughter at  bedside,

## 2024-02-27 NOTE — PROGRESS NOTE ADULT - SUBJECTIVE AND OBJECTIVE BOX
Chief Complaint:  Patient is a 84y old  Male who presents with a chief complaint of leakage around sky tube    HPI/ 24 hr events: Patient seen and examined at bedside. Patient on pressors and high flow nasal canula. NGT in place, no blood visualized. No reports of further bleeding. Perc sky tube with 145 cc last 24 hours. Pending HD today. Leukocytosis improving. Hgb 8.9.      REVIEW OF SYSTEMS:   General: Negative  HEENT: Negative  CV: Negative  Respiratory: Negative  GI: See HPI  : Negative  MSK: Negative  Hematologic: Negative  Skin: Negative    MEDICATIONS:   MEDICATIONS  (STANDING):  albumin human 25% IVPB 50 milliLiter(s) IV Intermittent every 6 hours  albumin human 25% IVPB 100 milliLiter(s) IV Intermittent once  chlorhexidine 2% Cloths 1 Application(s) Topical daily  dextrose 5%. 1000 milliLiter(s) (50 mL/Hr) IV Continuous <Continuous>  dextrose 5%. 1000 milliLiter(s) (100 mL/Hr) IV Continuous <Continuous>  dextrose 50% Injectable 12.5 Gram(s) IV Push once  dextrose 50% Injectable 25 Gram(s) IV Push once  dextrose 50% Injectable 25 Gram(s) IV Push once  dronedarone 400 milliGRAM(s) Oral two times a day  epoetin kristal-epbx (RETACRIT) Injectable 77542 Unit(s) IV Push once  ferrous    sulfate 325 milliGRAM(s) Oral daily  gabapentin 300 milliGRAM(s) Oral at bedtime  glucagon  Injectable 1 milliGRAM(s) IntraMuscular once  hydrocortisone sodium succinate Injectable 50 milliGRAM(s) IV Push every 6 hours  insulin regular Infusion 4 Unit(s)/Hr (4 mL/Hr) IV Continuous <Continuous>  midodrine 15 milliGRAM(s) Oral every 8 hours  mupirocin 2% Nasal 1 Application(s) Both Nostrils two times a day  norepinephrine Infusion 0.05 MICROgram(s)/kG/Min (6.54 mL/Hr) IV Continuous <Continuous>  pantoprazole  Injectable 40 milliGRAM(s) IV Push every 12 hours  piperacillin/tazobactam IVPB.. 3.375 Gram(s) IV Intermittent every 12 hours  senna 2 Tablet(s) Oral at bedtime  simvastatin 20 milliGRAM(s) Oral at bedtime  sodium bicarbonate  Infusion 0.107 mEq/kG/Hr (50 mL/Hr) IV Continuous <Continuous>    MEDICATIONS  (PRN):  acetaminophen     Tablet .. 650 milliGRAM(s) Oral every 6 hours PRN Temp greater or equal to 38C (100.4F), Mild Pain (1 - 3)  aluminum hydroxide/magnesium hydroxide/simethicone Suspension 30 milliLiter(s) Oral every 4 hours PRN Dyspepsia  dextrose Oral Gel 15 Gram(s) Oral once PRN Blood Glucose LESS THAN 70 milliGRAM(s)/deciliter  melatonin 3 milliGRAM(s) Oral at bedtime PRN Insomnia  ondansetron Injectable 4 milliGRAM(s) IV Push every 8 hours PRN Nausea and/or Vomiting            DIET:  Diet, NPO:   Except Medications  With Ice Chips/Sips of Water (02-26-24 @ 01:10) [Active]          ALLERGIES:   Allergies    No Known Allergies    Intolerances        VITAL SIGNS:   Vital Signs Last 24 Hrs  T(C): 37.3 (27 Feb 2024 10:00), Max: 38.1 (27 Feb 2024 06:15)  T(F): 99.1 (27 Feb 2024 10:00), Max: 100.6 (27 Feb 2024 06:15)  HR: 82 (27 Feb 2024 10:00) (82 - 101)  BP: 98/55 (27 Feb 2024 09:45) (81/57 - 151/58)  BP(mean): 68 (27 Feb 2024 09:45) (53 - 125)  RR: 14 (27 Feb 2024 10:00) (13 - 37)  SpO2: 91% (27 Feb 2024 10:00) (91% - 100%)    Parameters below as of 27 Feb 2024 08:00  Patient On (Oxygen Delivery Method): nasal cannula, high flow      I&O's Summary    26 Feb 2024 07:01  -  27 Feb 2024 07:00  --------------------------------------------------------  IN: 2366.9 mL / OUT: 850 mL / NET: 1516.9 mL    27 Feb 2024 07:01  -  27 Feb 2024 10:38  --------------------------------------------------------  IN: 166.5 mL / OUT: 5 mL / NET: 161.5 mL        PHYSICAL EXAM:   GENERAL:  No acute distress  HEENT:  NC/AT, conjunctiva clear, sclera anicteric, NGT in place  CHEST:  high flow nasal canula  HEART:  Regular rate  ABDOMEN:  Soft, non-tender, non-distended, normoactive bowel sounds, no rebound or guarding  EXTREMITIES: No edema  SKIN:  Warm, dry  NEURO:  Calm, cooperative    LABS:                        8.9    16.51 )-----------( 203      ( 27 Feb 2024 04:10 )             27.0     Hemoglobin: 8.9 g/dL (02-27-24 @ 04:10)  Hemoglobin: 9.4 g/dL (02-26-24 @ 22:00)  Hemoglobin: 9.7 g/dL (02-26-24 @ 17:05)  Hemoglobin: 10.3 g/dL (02-26-24 @ 10:51)  Hemoglobin: 11.4 g/dL (02-26-24 @ 03:15)  Hemoglobin: 9.9 g/dL (02-25-24 @ 17:58)  Hemoglobin: 11.3 g/dL (02-25-24 @ 15:20)    02-27    132<L>  |  94<L>  |  48.9<H>  ----------------------------<  162<H>  5.2   |  26.0  |  5.11<H>    Ca    7.9<L>      27 Feb 2024 04:10  Phos  2.7     02-27  Mg     1.9     02-27    TPro  5.8<L>  /  Alb  2.9<L>  /  TBili  1.5  /  DBili  x   /  AST  23  /  ALT  15  /  AlkPhos  50  02-27    LIVER FUNCTIONS - ( 27 Feb 2024 04:10 )  Alb: 2.9 g/dL / Pro: 5.8 g/dL / ALK PHOS: 50 U/L / ALT: 15 U/L / AST: 23 U/L / GGT: x             PT/INR - ( 26 Feb 2024 10:51 )   PT: 11.6 sec;   INR: 1.05 ratio         PTT - ( 26 Feb 2024 10:51 )  PTT:19.6 sec    Culture - Blood (collected 25 Feb 2024 19:33)  Source: .Blood Blood-Peripheral  Preliminary Report (27 Feb 2024 03:01):    No growth at 24 hours    Culture - Blood (collected 25 Feb 2024 19:23)  Source: .Blood Blood-Peripheral  Preliminary Report (27 Feb 2024 03:01):    No growth at 24 hours      RADIOLOGY & ADDITIONAL STUDIES:      ACC: [id number] EXAM:  CT ABDOMEN AND PELVIS WAW IC   ORDERED BY: JONATHAN MENCHACA     PROCEDURE DATE:  02/25/2024          INTERPRETATION:  CLINICAL INFORMATION: Nausea. Pain.    ADDITIONAL CLINICAL INFORMATION: Other, Non-specified    COMPARISON: January 29, 2024.    CONTRAST/COMPLICATIONS:  IV Contrast: Omnipaque 350  90 cc administered   10 cc discarded  Oral Contrast: NONE  Complications: None reported at time of study completion    PROCEDURE:  CT of the Abdomen and Pelvis was performed.  Precontrast, Arterial and Delayed phases were performed.  Sagittal and coronal reformats were performed.    FINDINGS:  LOWER CHEST: Old right rib fractures.    LIVER: Within normal limits.  BILE DUCTS: Normal caliber.  GALLBLADDER: Cholecystostomy tube is present within the gallbladder,   which contains stones and intraluminal air. Mild inflammation surrounding   the gallbladder.  SPLEEN: Within normal limits.  PANCREAS: Within normal limits.  ADRENALS: Within normal limits.  KIDNEYS/URETERS:Within normal limits.    BLADDER: Within normal limits.  REPRODUCTIVE ORGANS: Prostate within normal limits.    BOWEL: Small hiatal hernia. No bowel obstruction. Colonic diverticulosis.   No contrast extravasation to suggest site of active bleed.  PERITONEUM: No ascites.  VESSELS: Atherosclerotic changes.  RETROPERITONEUM/LYMPH NODES: No lymphadenopathy.  ABDOMINAL WALL: Within normal limits.  BONES: Degenerative changes.    IMPRESSION:  No contrast extravasation to suggest site of active bleed.    --- End of Report ---    SANDRA CAMPBELL MD; Attending Radiologist  This document has been electronically signed. Feb 25 2024  6:42PM  02-25-24 @ 18:25       Chief Complaint:  Patient is a 84y old  Male who presents with a chief complaint of leakage around sky tube    HPI/ 24 hr events: Patient seen and examined at bedside. Patient on pressors and high flow nasal canula. NGT in place, no blood visualized. No reports of further bleeding. Perc sky tube with 145 cc last 24 hours. Pending HD today. Leukocytosis improving. Hgb 8.9.      REVIEW OF SYSTEMS:   General: Negative  HEENT: Negative  CV: Negative  Respiratory: Negative  GI: See HPI  : Negative  MSK: Negative  Hematologic: Negative  Skin: Negative    MEDICATIONS:   MEDICATIONS  (STANDING):  albumin human 25% IVPB 50 milliLiter(s) IV Intermittent every 6 hours  albumin human 25% IVPB 100 milliLiter(s) IV Intermittent once  chlorhexidine 2% Cloths 1 Application(s) Topical daily  dextrose 5%. 1000 milliLiter(s) (50 mL/Hr) IV Continuous <Continuous>  dextrose 5%. 1000 milliLiter(s) (100 mL/Hr) IV Continuous <Continuous>  dextrose 50% Injectable 12.5 Gram(s) IV Push once  dextrose 50% Injectable 25 Gram(s) IV Push once  dextrose 50% Injectable 25 Gram(s) IV Push once  dronedarone 400 milliGRAM(s) Oral two times a day  epoetin kristal-epbx (RETACRIT) Injectable 31967 Unit(s) IV Push once  ferrous    sulfate 325 milliGRAM(s) Oral daily  gabapentin 300 milliGRAM(s) Oral at bedtime  glucagon  Injectable 1 milliGRAM(s) IntraMuscular once  hydrocortisone sodium succinate Injectable 50 milliGRAM(s) IV Push every 6 hours  insulin regular Infusion 4 Unit(s)/Hr (4 mL/Hr) IV Continuous <Continuous>  midodrine 15 milliGRAM(s) Oral every 8 hours  mupirocin 2% Nasal 1 Application(s) Both Nostrils two times a day  norepinephrine Infusion 0.05 MICROgram(s)/kG/Min (6.54 mL/Hr) IV Continuous <Continuous>  pantoprazole  Injectable 40 milliGRAM(s) IV Push every 12 hours  piperacillin/tazobactam IVPB.. 3.375 Gram(s) IV Intermittent every 12 hours  senna 2 Tablet(s) Oral at bedtime  simvastatin 20 milliGRAM(s) Oral at bedtime  sodium bicarbonate  Infusion 0.107 mEq/kG/Hr (50 mL/Hr) IV Continuous <Continuous>    MEDICATIONS  (PRN):  acetaminophen     Tablet .. 650 milliGRAM(s) Oral every 6 hours PRN Temp greater or equal to 38C (100.4F), Mild Pain (1 - 3)  aluminum hydroxide/magnesium hydroxide/simethicone Suspension 30 milliLiter(s) Oral every 4 hours PRN Dyspepsia  dextrose Oral Gel 15 Gram(s) Oral once PRN Blood Glucose LESS THAN 70 milliGRAM(s)/deciliter  melatonin 3 milliGRAM(s) Oral at bedtime PRN Insomnia  ondansetron Injectable 4 milliGRAM(s) IV Push every 8 hours PRN Nausea and/or Vomiting            DIET:  Diet, NPO:   Except Medications  With Ice Chips/Sips of Water (02-26-24 @ 01:10) [Active]          ALLERGIES:   Allergies    No Known Allergies    Intolerances        VITAL SIGNS:   Vital Signs Last 24 Hrs  T(C): 37.3 (27 Feb 2024 10:00), Max: 38.1 (27 Feb 2024 06:15)  T(F): 99.1 (27 Feb 2024 10:00), Max: 100.6 (27 Feb 2024 06:15)  HR: 82 (27 Feb 2024 10:00) (82 - 101)  BP: 98/55 (27 Feb 2024 09:45) (81/57 - 151/58)  BP(mean): 68 (27 Feb 2024 09:45) (53 - 125)  RR: 14 (27 Feb 2024 10:00) (13 - 37)  SpO2: 91% (27 Feb 2024 10:00) (91% - 100%)    Parameters below as of 27 Feb 2024 08:00  Patient On (Oxygen Delivery Method): nasal cannula, high flow      I&O's Summary    26 Feb 2024 07:01  -  27 Feb 2024 07:00  --------------------------------------------------------  IN: 2366.9 mL / OUT: 850 mL / NET: 1516.9 mL    27 Feb 2024 07:01  -  27 Feb 2024 10:38  --------------------------------------------------------  IN: 166.5 mL / OUT: 5 mL / NET: 161.5 mL        PHYSICAL EXAM:   GENERAL:  No acute distress  HEENT:  NC/AT, conjunctiva clear, sclera anicteric, NGT in place  CHEST:  high flow nasal canula  HEART:  Regular rate  ABDOMEN:  Soft, non-tender, non-distended, normoactive bowel sounds, no rebound or guarding  EXTREMITIES: No edema  SKIN:  Warm, dry  NEURO:  Calm, cooperative    LABS:                        8.9    16.51 )-----------( 203      ( 27 Feb 2024 04:10 )             27.0     Hemoglobin: 8.9 g/dL (02-27-24 @ 04:10)  Hemoglobin: 9.4 g/dL (02-26-24 @ 22:00)  Hemoglobin: 9.7 g/dL (02-26-24 @ 17:05)  Hemoglobin: 10.3 g/dL (02-26-24 @ 10:51)  Hemoglobin: 11.4 g/dL (02-26-24 @ 03:15)  Hemoglobin: 9.9 g/dL (02-25-24 @ 17:58)  Hemoglobin: 11.3 g/dL (02-25-24 @ 15:20)    02-27    132<L>  |  94<L>  |  48.9<H>  ----------------------------<  162<H>  5.2   |  26.0  |  5.11<H>    Ca    7.9<L>      27 Feb 2024 04:10  Phos  2.7     02-27  Mg     1.9     02-27    TPro  5.8<L>  /  Alb  2.9<L>  /  TBili  1.5  /  DBili  x   /  AST  23  /  ALT  15  /  AlkPhos  50  02-27    LIVER FUNCTIONS - ( 27 Feb 2024 04:10 )  Alb: 2.9 g/dL / Pro: 5.8 g/dL / ALK PHOS: 50 U/L / ALT: 15 U/L / AST: 23 U/L / GGT: x             PT/INR - ( 26 Feb 2024 10:51 )   PT: 11.6 sec;   INR: 1.05 ratio         PTT - ( 26 Feb 2024 10:51 )  PTT:19.6 sec    Culture - Blood (collected 25 Feb 2024 19:33)  Source: .Blood Blood-Peripheral  Preliminary Report (27 Feb 2024 03:01):    No growth at 24 hours    Culture - Blood (collected 25 Feb 2024 19:23)  Source: .Blood Blood-Peripheral  Preliminary Report (27 Feb 2024 03:01):    No growth at 24 hours      RADIOLOGY & ADDITIONAL STUDIES:      ACC: [id number] EXAM:  CT ABDOMEN AND PELVIS WAW IC   ORDERED BY: JONATHAN MENCHACA     PROCEDURE DATE:  02/25/2024          INTERPRETATION:  CLINICAL INFORMATION: Nausea. Pain.    ADDITIONAL CLINICAL INFORMATION: Other, Non-specified    COMPARISON: January 29, 2024.    CONTRAST/COMPLICATIONS:  IV Contrast: Omnipaque 350  90 cc administered   10 cc discarded  Oral Contrast: NONE  Complications: None reported at time of study completion    PROCEDURE:  CT of the Abdomen and Pelvis was performed.  Precontrast, Arterial and Delayed phases were performed.  Sagittal and coronal reformats were performed.    FINDINGS:  LOWER CHEST: Old right rib fractures.    LIVER: Within normal limits.  BILE DUCTS: Normal caliber.  GALLBLADDER: Cholecystostomy tube is present within the gallbladder,   which contains stones and intraluminal air. Mild inflammation surrounding   the gallbladder.  SPLEEN: Within normal limits.  PANCREAS: Within normal limits.  ADRENALS: Within normal limits.  KIDNEYS/URETERS:Within normal limits.    BLADDER: Within normal limits.  REPRODUCTIVE ORGANS: Prostate within normal limits.    BOWEL: Small hiatal hernia. No bowel obstruction. Colonic diverticulosis.   No contrast extravasation to suggest site of active bleed.  PERITONEUM: No ascites.  VESSELS: Atherosclerotic changes.  RETROPERITONEUM/LYMPH NODES: No lymphadenopathy.  ABDOMINAL WALL: Within normal limits.  BONES: Degenerative changes.    IMPRESSION:  No contrast extravasation to suggest site of active bleed.    --- End of Report ---    SANDRA CAMPBELL MD; Attending Radiologist  This document has been electronically signed. Feb 25 2024  6:42PM  02-25-24 @ 18:25

## 2024-02-27 NOTE — PROGRESS NOTE ADULT - ASSESSMENT
A: 84M s/p cholecystomy tube 12/22/23 admitted for perc sky tube evaluation flushed at bedside yesterday. Pt with coffee ground emesis with drop in hgb 9.9 from 11, now 10.3 and leukocytosis 22.88)21.29. CT a/p negative for active GIB. Rapid response called, patient transferred to MICU for critical care management of hypotension and respiratory distress now on pressors.      Plan:   - Surgical plan pending medical optimization  - cont trend H/H and Wbc   - monitor fever curve   - rest of care for primary team

## 2024-02-27 NOTE — DIETITIAN INITIAL EVALUATION ADULT - ADD RECOMMEND
Diet advancement when medically feasible; if concern for swallowing difficulty, consider SLP swallow eval  Monitor I/Os, NGT output, BM trends  Monitor electrolytes and BG levels, correct prn  Trend weights

## 2024-02-27 NOTE — PROGRESS NOTE ADULT - SUBJECTIVE AND OBJECTIVE BOX
INTERVAL HPI/OVERNIGHT EVENTS:    Patient evaluated at bedside. No acute distress. No acute events overnight.    MEDICATIONS  (STANDING):  albumin human 25% IVPB 50 milliLiter(s) IV Intermittent every 6 hours  chlorhexidine 2% Cloths 1 Application(s) Topical daily  dextrose 5%. 1000 milliLiter(s) (100 mL/Hr) IV Continuous <Continuous>  dextrose 5%. 1000 milliLiter(s) (50 mL/Hr) IV Continuous <Continuous>  dextrose 50% Injectable 25 Gram(s) IV Push once  dextrose 50% Injectable 12.5 Gram(s) IV Push once  dextrose 50% Injectable 25 Gram(s) IV Push once  dronedarone 400 milliGRAM(s) Oral two times a day  ferrous    sulfate 325 milliGRAM(s) Oral daily  gabapentin 300 milliGRAM(s) Oral at bedtime  glucagon  Injectable 1 milliGRAM(s) IntraMuscular once  insulin regular Infusion 4 Unit(s)/Hr (4 mL/Hr) IV Continuous <Continuous>  midodrine 10 milliGRAM(s) Oral every 8 hours  mupirocin 2% Nasal 1 Application(s) Both Nostrils two times a day  norepinephrine Infusion 0.05 MICROgram(s)/kG/Min (6.54 mL/Hr) IV Continuous <Continuous>  pantoprazole  Injectable 40 milliGRAM(s) IV Push every 12 hours  piperacillin/tazobactam IVPB.. 3.375 Gram(s) IV Intermittent every 12 hours  senna 2 Tablet(s) Oral at bedtime  simvastatin 20 milliGRAM(s) Oral at bedtime  sodium bicarbonate  Infusion 0.107 mEq/kG/Hr (50 mL/Hr) IV Continuous <Continuous>    MEDICATIONS  (PRN):  acetaminophen     Tablet .. 650 milliGRAM(s) Oral every 6 hours PRN Temp greater or equal to 38C (100.4F), Mild Pain (1 - 3)  aluminum hydroxide/magnesium hydroxide/simethicone Suspension 30 milliLiter(s) Oral every 4 hours PRN Dyspepsia  dextrose Oral Gel 15 Gram(s) Oral once PRN Blood Glucose LESS THAN 70 milliGRAM(s)/deciliter  melatonin 3 milliGRAM(s) Oral at bedtime PRN Insomnia  ondansetron Injectable 4 milliGRAM(s) IV Push every 8 hours PRN Nausea and/or Vomiting      Vital Signs Last 24 Hrs  T(C): 38 (27 Feb 2024 06:30), Max: 38.1 (27 Feb 2024 06:15)  T(F): 100.4 (27 Feb 2024 06:30), Max: 100.6 (27 Feb 2024 06:15)  HR: 94 (27 Feb 2024 06:30) (85 - 101)  BP: 88/42 (27 Feb 2024 06:30) (88/42 - 151/58)  BP(mean): 56 (27 Feb 2024 06:30) (53 - 125)  RR: 18 (27 Feb 2024 06:30) (13 - 37)  SpO2: 97% (27 Feb 2024 06:30) (91% - 100%)    Parameters below as of 27 Feb 2024 04:00  Patient On (Oxygen Delivery Method): nasal cannula, high flow        Constitutional: Guarded, on levophed 0.02  Respiratory: Tachypnic, labored breathing on high flow nasal cannula   Cardiovascular: Tachycardia   Gastrointestinal: Soft, non-tender, perc sky tube in place.        I&O's Detail    26 Feb 2024 07:01  -  27 Feb 2024 06:36  --------------------------------------------------------  IN:    Albumin 25%  -  50 mL: 100 mL    Enteral Tube Flush: 100 mL    Insulin: 22 mL    IV PiggyBack: 500 mL    Norepinephrine: 120.9 mL    Pantoprazole: 70 mL    Sodium Bicarbonate: 50 mL    Sodium Bicarbonate: 1350 mL  Total IN: 2312.9 mL    OUT:    Drain (mL): 145 mL    Indwelling Catheter - Urethral (mL): 655 mL    Nasogastric/Oral tube (mL): 50 mL  Total OUT: 850 mL    Total NET: 1462.9 mL          LABS:                        8.9    16.51 )-----------( 203      ( 27 Feb 2024 04:10 )             27.0     02-27    132<L>  |  94<L>  |  48.9<H>  ----------------------------<  162<H>  5.2   |  26.0  |  5.11<H>    Ca    7.9<L>      27 Feb 2024 04:10  Phos  2.7     02-27  Mg     1.9     02-27    TPro  5.8<L>  /  Alb  2.9<L>  /  TBili  1.5  /  DBili  x   /  AST  23  /  ALT  15  /  AlkPhos  50  02-27    PT/INR - ( 26 Feb 2024 10:51 )   PT: 11.6 sec;   INR: 1.05 ratio         PTT - ( 26 Feb 2024 10:51 )  PTT:19.6 sec  Urinalysis Basic - ( 27 Feb 2024 04:10 )    Color: x / Appearance: x / SG: x / pH: x  Gluc: 162 mg/dL / Ketone: x  / Bili: x / Urobili: x   Blood: x / Protein: x / Nitrite: x   Leuk Esterase: x / RBC: x / WBC x   Sq Epi: x / Non Sq Epi: x / Bacteria: x        RADIOLOGY & ADDITIONAL STUDIES:

## 2024-02-27 NOTE — PROGRESS NOTE ADULT - ASSESSMENT
83 y/o male with PMH of Acute Cholecystitis s/p Cholecystomy Tube (12/22/23), COVID-19, PAF not on AC due to GIB + Fall Risk, Esophagitis, Anemia, DM 2, HTN, HLD, CKD III/IV, BPH and Cognitive Impairment  came to the ED complaining of leakage around sky tube. GI consulted after 1 episode of coffee ground emesis in ED.       1/2024 EGD: Esophageal hiatal hernia. Polyps (5 mm to 5 mm) in the cardia. (Biopsy).  Grade A esophagitis compatible with non-erosive esophagitis.      #Coffee ground emesis  - 2/25/24 CT A/P: no active GIB, Cholecystostomy tube is present within the gallbladder, which contains stones and intraluminal air.  - Hemoglobin 8.9, No further episodes of coffee ground emesis  - Trend CBC, transfuse as needed for Hgb <8. Monitor for signs of bleeding.   - Avoid NSAIDs  - PPI IVP BID  - No plan for any endoscopic evaluation at this time unless there is evidence of overt GI bleed/ hemodynamic instability requiring transfusions.  - Further care per primary team  _________________________________________________________________  Assessment and recommendations are final when note is signed by the attending physician.

## 2024-02-27 NOTE — DIETITIAN INITIAL EVALUATION ADULT - ORAL INTAKE PTA/DIET HISTORY
MST triggered for unsure weight loss. Chart reviewed. Pt noted to be a&ox1-2. BMI 28.8. Last admission weight 175 lbs (12/23/23), this admission weight 178 lbs (2/26). Continue to monitor weight trends. Pt had episode of coffee ground emesis in ED, was made NPO and NGT placed to suction. No output documented for today. Per notes, plan to start HD today. RD to follow up as feasible, remains available.

## 2024-02-27 NOTE — DIETITIAN INITIAL EVALUATION ADULT - PERTINENT MEDS FT
MEDICATIONS  (STANDING):  albumin human 25% IVPB 50 milliLiter(s) IV Intermittent every 6 hours  dextrose 5%. 1000 milliLiter(s) (50 mL/Hr) IV Continuous <Continuous>  dronedarone 400 milliGRAM(s) Oral two times a day  ferrous    sulfate 325 milliGRAM(s) Oral daily  glucagon  Injectable 1 milliGRAM(s) IntraMuscular once  hydrocortisone sodium succinate Injectable 50 milliGRAM(s) IV Push every 6 hours  norepinephrine Infusion 0.05 MICROgram(s)/kG/Min (6.54 mL/Hr) IV Continuous <Continuous>  pantoprazole  Injectable 40 milliGRAM(s) IV Push every 12 hours  piperacillin/tazobactam IVPB.. 3.375 Gram(s) IV Intermittent every 12 hours  senna 2 Tablet(s) Oral at bedtime  simvastatin 20 milliGRAM(s) Oral at bedtime  sodium bicarbonate  Infusion 0.107 mEq/kG/Hr (50 mL/Hr) IV Continuous <Continuous>    MEDICATIONS  (PRN):  aluminum hydroxide/magnesium hydroxide/simethicone Suspension 30 milliLiter(s) Oral every 4 hours PRN Dyspepsia  dextrose Oral Gel 15 Gram(s) Oral once PRN Blood Glucose LESS THAN 70 milliGRAM(s)/deciliter  melatonin 3 milliGRAM(s) Oral at bedtime PRN Insomnia  ondansetron Injectable 4 milliGRAM(s) IV Push every 8 hours PRN Nausea and/or Vomiting

## 2024-02-28 LAB
ALBUMIN SERPL ELPH-MCNC: 3.4 G/DL — SIGNIFICANT CHANGE UP (ref 3.3–5.2)
ALP SERPL-CCNC: 45 U/L — SIGNIFICANT CHANGE UP (ref 40–120)
ALT FLD-CCNC: 11 U/L — SIGNIFICANT CHANGE UP
ANION GAP SERPL CALC-SCNC: 13 MMOL/L — SIGNIFICANT CHANGE UP (ref 5–17)
APTT BLD: 29 SEC — SIGNIFICANT CHANGE UP (ref 24.5–35.6)
AST SERPL-CCNC: 22 U/L — SIGNIFICANT CHANGE UP
BASOPHILS # BLD AUTO: 0.01 K/UL — SIGNIFICANT CHANGE UP (ref 0–0.2)
BASOPHILS NFR BLD AUTO: 0.1 % — SIGNIFICANT CHANGE UP (ref 0–2)
BILIRUB SERPL-MCNC: 1.8 MG/DL — SIGNIFICANT CHANGE UP (ref 0.4–2)
BLD GP AB SCN SERPL QL: SIGNIFICANT CHANGE UP
BUN SERPL-MCNC: 26.7 MG/DL — HIGH (ref 8–20)
CALCIUM SERPL-MCNC: 8.2 MG/DL — LOW (ref 8.4–10.5)
CHLORIDE SERPL-SCNC: 95 MMOL/L — LOW (ref 96–108)
CO2 SERPL-SCNC: 30 MMOL/L — HIGH (ref 22–29)
CREAT SERPL-MCNC: 3.66 MG/DL — HIGH (ref 0.5–1.3)
EGFR: 16 ML/MIN/1.73M2 — LOW
EOSINOPHIL # BLD AUTO: 0 K/UL — SIGNIFICANT CHANGE UP (ref 0–0.5)
EOSINOPHIL NFR BLD AUTO: 0 % — SIGNIFICANT CHANGE UP (ref 0–6)
GAS PNL BLDA: SIGNIFICANT CHANGE UP
GLUCOSE BLDC GLUCOMTR-MCNC: 145 MG/DL — HIGH (ref 70–99)
GLUCOSE BLDC GLUCOMTR-MCNC: 219 MG/DL — HIGH (ref 70–99)
GLUCOSE BLDC GLUCOMTR-MCNC: 287 MG/DL — HIGH (ref 70–99)
GLUCOSE BLDC GLUCOMTR-MCNC: 300 MG/DL — HIGH (ref 70–99)
GLUCOSE SERPL-MCNC: 254 MG/DL — HIGH (ref 70–99)
HBV CORE AB SER-ACNC: SIGNIFICANT CHANGE UP
HBV SURFACE AB SER-ACNC: <3 MIU/ML — LOW
HBV SURFACE AG SER-ACNC: SIGNIFICANT CHANGE UP
HCT VFR BLD CALC: 23.9 % — LOW (ref 39–50)
HCV AB S/CO SERPL IA: 0.08 S/CO — SIGNIFICANT CHANGE UP (ref 0–0.99)
HCV AB SERPL-IMP: SIGNIFICANT CHANGE UP
HGB BLD-MCNC: 8 G/DL — LOW (ref 13–17)
IMM GRANULOCYTES NFR BLD AUTO: 1.5 % — HIGH (ref 0–0.9)
INR BLD: 1.25 RATIO — HIGH (ref 0.85–1.18)
LYMPHOCYTES # BLD AUTO: 0.51 K/UL — LOW (ref 1–3.3)
LYMPHOCYTES # BLD AUTO: 3.5 % — LOW (ref 13–44)
MAGNESIUM SERPL-MCNC: 1.8 MG/DL — SIGNIFICANT CHANGE UP (ref 1.8–2.6)
MCHC RBC-ENTMCNC: 30.3 PG — SIGNIFICANT CHANGE UP (ref 27–34)
MCHC RBC-ENTMCNC: 33.5 GM/DL — SIGNIFICANT CHANGE UP (ref 32–36)
MCV RBC AUTO: 90.5 FL — SIGNIFICANT CHANGE UP (ref 80–100)
MONOCYTES # BLD AUTO: 0.44 K/UL — SIGNIFICANT CHANGE UP (ref 0–0.9)
MONOCYTES NFR BLD AUTO: 3 % — SIGNIFICANT CHANGE UP (ref 2–14)
NEUTROPHILS # BLD AUTO: 13.25 K/UL — HIGH (ref 1.8–7.4)
NEUTROPHILS NFR BLD AUTO: 91.9 % — HIGH (ref 43–77)
PHOSPHATE SERPL-MCNC: 3.6 MG/DL — SIGNIFICANT CHANGE UP (ref 2.4–4.7)
PLATELET # BLD AUTO: 143 K/UL — LOW (ref 150–400)
POTASSIUM SERPL-MCNC: 4.4 MMOL/L — SIGNIFICANT CHANGE UP (ref 3.5–5.3)
POTASSIUM SERPL-SCNC: 4.4 MMOL/L — SIGNIFICANT CHANGE UP (ref 3.5–5.3)
PROT SERPL-MCNC: 5.8 G/DL — LOW (ref 6.6–8.7)
PROTHROM AB SERPL-ACNC: 13.8 SEC — HIGH (ref 9.5–13)
RBC # BLD: 2.64 M/UL — LOW (ref 4.2–5.8)
RBC # FLD: 15.3 % — HIGH (ref 10.3–14.5)
SODIUM SERPL-SCNC: 138 MMOL/L — SIGNIFICANT CHANGE UP (ref 135–145)
WBC # BLD: 14.43 K/UL — HIGH (ref 3.8–10.5)
WBC # FLD AUTO: 14.43 K/UL — HIGH (ref 3.8–10.5)

## 2024-02-28 PROCEDURE — 99233 SBSQ HOSP IP/OBS HIGH 50: CPT | Mod: GC

## 2024-02-28 PROCEDURE — 99232 SBSQ HOSP IP/OBS MODERATE 35: CPT

## 2024-02-28 PROCEDURE — 99223 1ST HOSP IP/OBS HIGH 75: CPT

## 2024-02-28 RX ORDER — ALBUMIN HUMAN 25 %
50 VIAL (ML) INTRAVENOUS ONCE
Refills: 0 | Status: COMPLETED | OUTPATIENT
Start: 2024-02-28 | End: 2024-02-28

## 2024-02-28 RX ORDER — ERYTHROPOIETIN 10000 [IU]/ML
10000 INJECTION, SOLUTION INTRAVENOUS; SUBCUTANEOUS
Refills: 0 | Status: DISCONTINUED | OUTPATIENT
Start: 2024-02-28 | End: 2024-03-21

## 2024-02-28 RX ORDER — DIGOXIN 250 MCG
500 TABLET ORAL ONCE
Refills: 0 | Status: DISCONTINUED | OUTPATIENT
Start: 2024-02-28 | End: 2024-02-28

## 2024-02-28 RX ORDER — HEPARIN SODIUM 5000 [USP'U]/ML
5000 INJECTION INTRAVENOUS; SUBCUTANEOUS EVERY 8 HOURS
Refills: 0 | Status: DISCONTINUED | OUTPATIENT
Start: 2024-02-28 | End: 2024-03-07

## 2024-02-28 RX ORDER — DIGOXIN 250 MCG
250 TABLET ORAL ONCE
Refills: 0 | Status: COMPLETED | OUTPATIENT
Start: 2024-02-28 | End: 2024-02-28

## 2024-02-28 RX ORDER — INSULIN GLARGINE 100 [IU]/ML
10 INJECTION, SOLUTION SUBCUTANEOUS EVERY MORNING
Refills: 0 | Status: DISCONTINUED | OUTPATIENT
Start: 2024-02-28 | End: 2024-03-07

## 2024-02-28 RX ORDER — MAGNESIUM SULFATE 500 MG/ML
1 VIAL (ML) INJECTION ONCE
Refills: 0 | Status: COMPLETED | OUTPATIENT
Start: 2024-02-28 | End: 2024-02-28

## 2024-02-28 RX ADMIN — Medication 50 MILLILITER(S): at 12:22

## 2024-02-28 RX ADMIN — MUPIROCIN 1 APPLICATION(S): 20 OINTMENT TOPICAL at 17:12

## 2024-02-28 RX ADMIN — Medication 3: at 05:33

## 2024-02-28 RX ADMIN — Medication 2: at 23:40

## 2024-02-28 RX ADMIN — Medication 50 MILLIGRAM(S): at 11:15

## 2024-02-28 RX ADMIN — Medication 50 MILLIGRAM(S): at 05:33

## 2024-02-28 RX ADMIN — Medication 1 MILLIGRAM(S): at 03:22

## 2024-02-28 RX ADMIN — Medication 50 MILLIGRAM(S): at 23:39

## 2024-02-28 RX ADMIN — DRONEDARONE 400 MILLIGRAM(S): 400 TABLET, FILM COATED ORAL at 17:12

## 2024-02-28 RX ADMIN — ERYTHROPOIETIN 10000 UNIT(S): 10000 INJECTION, SOLUTION INTRAVENOUS; SUBCUTANEOUS at 13:32

## 2024-02-28 RX ADMIN — Medication 325 MILLIGRAM(S): at 11:15

## 2024-02-28 RX ADMIN — Medication 3 MILLIGRAM(S): at 22:15

## 2024-02-28 RX ADMIN — PANTOPRAZOLE SODIUM 40 MILLIGRAM(S): 20 TABLET, DELAYED RELEASE ORAL at 05:33

## 2024-02-28 RX ADMIN — Medication 250 MICROGRAM(S): at 09:29

## 2024-02-28 RX ADMIN — SENNA PLUS 2 TABLET(S): 8.6 TABLET ORAL at 22:15

## 2024-02-28 RX ADMIN — GABAPENTIN 300 MILLIGRAM(S): 400 CAPSULE ORAL at 22:15

## 2024-02-28 RX ADMIN — PIPERACILLIN AND TAZOBACTAM 25 GRAM(S): 4; .5 INJECTION, POWDER, LYOPHILIZED, FOR SOLUTION INTRAVENOUS at 17:11

## 2024-02-28 RX ADMIN — Medication 100 GRAM(S): at 09:30

## 2024-02-28 RX ADMIN — CHLORHEXIDINE GLUCONATE 1 APPLICATION(S): 213 SOLUTION TOPICAL at 11:15

## 2024-02-28 RX ADMIN — MUPIROCIN 1 APPLICATION(S): 20 OINTMENT TOPICAL at 05:34

## 2024-02-28 RX ADMIN — Medication 3: at 11:14

## 2024-02-28 RX ADMIN — HEPARIN SODIUM 5000 UNIT(S): 5000 INJECTION INTRAVENOUS; SUBCUTANEOUS at 22:15

## 2024-02-28 RX ADMIN — PANTOPRAZOLE SODIUM 40 MILLIGRAM(S): 20 TABLET, DELAYED RELEASE ORAL at 17:12

## 2024-02-28 RX ADMIN — Medication 50 MILLIGRAM(S): at 17:12

## 2024-02-28 RX ADMIN — PIPERACILLIN AND TAZOBACTAM 25 GRAM(S): 4; .5 INJECTION, POWDER, LYOPHILIZED, FOR SOLUTION INTRAVENOUS at 05:37

## 2024-02-28 RX ADMIN — INSULIN GLARGINE 10 UNIT(S): 100 INJECTION, SOLUTION SUBCUTANEOUS at 10:49

## 2024-02-28 RX ADMIN — SIMVASTATIN 20 MILLIGRAM(S): 20 TABLET, FILM COATED ORAL at 22:15

## 2024-02-28 NOTE — CONSULT NOTE ADULT - ASSESSMENT
85 yo m pmhx acute sky s/p sky tube (12/22/23), COVID 19, PAF no AC (GIB/FALL), esophagitis, anemia, DM2, HTN, HLD, CKD3-4, BPH, cognitive impairment presented with leakage around sky tube admitted with distributive shock, GI bleed, RON, aspiration pna and hypoxic respiratory failure.      Started HD 02/27 for worsening renal function. Off vasopressors and sedation since 2/27. Currently on HFNC. Pt required ativan for acute agitation. Pt with PAF with HR up to 120's. Pt needs medical optimization, especially given O2 needs, before any possible cholecystectomy.    # Hypotension  Distributive shock  Off of pressors and still on midodrine   On zosyn    # Hypoxic Respiratory Failure  on HFNC  attempting to wean per primary team    RON  requiring HD    # coffee ground emesis  GI following, CT a/p not showing active bleed and no further episodes  continue to monitor  on ppi    # Cholecystitis  sky tube in place. Pt needs medical optimization, especially given O2 needs, before any possible cholecystectomy.  Plan per primary and surgery    # Encounter for palliative care  see GOC discussion above  Surrogate is Wife  Full Code   83 yo m pmhx acute sky s/p sky tube (12/22/23), COVID 19, PAF no AC (GIB/FALL), esophagitis, anemia, DM2, HTN, HLD, CKD3-4, BPH, cognitive impairment presented with leakage around sky tube admitted with distributive shock, GI bleed, RON, aspiration pna and hypoxic respiratory failure.      # Hypotension  Distributive shock  Off of pressors and still on midodrine   On zosyn    # Hypoxic Respiratory Failure  on HFNC  attempting to wean per primary team  monitor O2    RON  requiring HD  avoid nephrotoxic agents    # Cholecystitis  sky tube in place.  cont care per MICU  Eventual plan for surgery    # Encounter for palliative care  Palliative Care consulted to assist with goals of care  Patient is  lives with wife and daughter. Daughter reports patient generally independent at home.  Surrogate is wife  see GOC discussion above. Family wishes continued interventions.   PC will  follow. Continued GOC discussions  Full Code   85 yo m pmhx acute sky s/p sky tube (12/22/23), COVID 19, PAF no AC (GIB/FALL), esophagitis, anemia, DM2, HTN, HLD, CKD3-4, BPH, cognitive impairment presented with leakage around sky tube admitted with distributive shock, GI bleed, RON, aspiration pna and hypoxic respiratory failure.      # Hypotension  Distributive shock  Off of pressors   on midodrine   On zosyn    # Hypoxic Respiratory Failure  on HFNC  attempting to wean per primary team  monitor O2    RON  requiring HD  avoid nephrotoxic agents    # Cholecystitis  sky tube in place.  cont care per MICU  Eventual plan for surgery    # Encounter for palliative care  Palliative Care consulted to assist with goals of care  Patient is  lives with wife and daughter. Daughter reports patient generally independent at home.  Surrogate is wife  see GOC discussion above. Family wishes continued interventions.   PC will  follow. Continued GOC discussions  Full Code

## 2024-02-28 NOTE — CONSULT NOTE ADULT - SUBJECTIVE AND OBJECTIVE BOX
HPI:    83 y/o male with PMH of Acute Cholecystitis s/p Cholecystomy Tube (12/22/23), COVID-19, PAF not on AC due to GIB + Fall Risk, Esophagitis, Anemia, DM 2, HTN, HLD, CKD III/IV, BPH and Cognitive Impairment  came to the ED complaining of leakage around sky tube. As per wife at bed side, family noticed fluid coming out on the skin when they attempted to flush the sky tube. Wife said patient has been doing well since discharged otherwise. No fever, chills, abdominal pain, change in bowel/urinary habit, nausea, vomiting noted.   In the ED, patient was seen by surgery team, no acute surgical intervention. As per ED, patient vomited large amount of coffee ground emesis, looked very pale, holding abdomen; CT angio abdomen done: no acute GI bleed. Repeat CBC: Hb: 11.3---->9.3. Also, he desaturated concerning for aspiration PNA, antibiotic and oxygen therapy.  (25 Feb 2024 22:52)    85 yo m pmhx acute sky s/p sky tube (12/22/23), COVID 19, PAF no AC (GIB/FALL), esophagitis, anemia, DM2, HTN, HLD, CKD3-4, BPH, cognitive impairment presented to the ED with leakage of saline around sky tube upon flushing. In the ED, patient had large coffee ground emesis and desaturation, CTA abdomen without acute GI bleed. Admitted to MICU with distributive shock, RON, aspiration pna and hypoxic respiratory failure. Started HD 02/27 for worsening renal function. Off vasopressors and sedation since 2/27. Currently on HFNC. Pt required ativan for acute agitation. Pt with PAF with HR up to 120's.    Pt needs medical optimization, especially given O2 needs, before any possible cholecystectomy.      PERTINENT PMH REVIEWED: Yes    PAST MEDICAL & SURGICAL HISTORY:  Diabetes      Hypertension      Prostate disorder      Anxiety      High cholesterol      Ulcer      History of endoscopy          SOCIAL HISTORY:                      Substance history: No cigarette, alcohol or illicit drug use.                    Admitted from:  home                     Voodoo/spirituality: 7th day W[employer]                    Cultural concerns: none    Baseline ADLs (prior to admission):   Dependent                        Surrogate: wife    FAMILY HISTORY:  Family history of stomach cancer  Father    Family history of emphysema  Mother        Allergies    No Known Allergies        http://npcrc.org/files/[email or web address]    Present Symptoms:   Dyspnea:  unable to assess  Nausea/Vomiting:  No  Anxiety:  Unable to assess  Depression: Unable  Fatigue: Unable  Loss of appetite: NPO  Constipation:  Not reported    Pain:  No            Location            Character            Duration            Factors            Severity            Effect    Pain AD Score:  [email or web address] (press ctrl + left click to view)    Review of Systems: Reviewed    All other ROS negative  Unable  Limited  to obtain due to poor mentation historian      MEDICATIONS  (STANDING):  chlorhexidine 2% Cloths 1 Application(s) Topical daily  dextrose 5%. 1000 milliLiter(s) (50 mL/Hr) IV Continuous <Continuous>  dextrose 5%. 1000 milliLiter(s) (100 mL/Hr) IV Continuous <Continuous>  dextrose 50% Injectable 12.5 Gram(s) IV Push once  dextrose 50% Injectable 25 Gram(s) IV Push once  dextrose 50% Injectable 25 Gram(s) IV Push once  dronedarone 400 milliGRAM(s) Oral two times a day  epoetin kristal-epbx (RETACRIT) Injectable 32823 Unit(s) IV Push <User Schedule>  ferrous    sulfate 325 milliGRAM(s) Oral daily  gabapentin 300 milliGRAM(s) Oral at bedtime  glucagon  Injectable 1 milliGRAM(s) IntraMuscular once  hydrocortisone sodium succinate Injectable 50 milliGRAM(s) IV Push every 6 hours  insulin glargine Injectable (LANTUS) 10 Unit(s) SubCutaneous every morning  insulin lispro (ADMELOG) corrective regimen sliding scale   SubCutaneous every 6 hours  midodrine 15 milliGRAM(s) Oral every 8 hours  mupirocin 2% Nasal 1 Application(s) Both Nostrils two times a day  norepinephrine Infusion 0.05 MICROgram(s)/kG/Min (6.54 mL/Hr) IV Continuous <Continuous>  pantoprazole  Injectable 40 milliGRAM(s) IV Push every 12 hours  piperacillin/tazobactam IVPB.. 3.375 Gram(s) IV Intermittent every 12 hours  senna 2 Tablet(s) Oral at bedtime  simvastatin 20 milliGRAM(s) Oral at bedtime    MEDICATIONS  (PRN):  acetaminophen     Tablet .. 650 milliGRAM(s) Oral every 6 hours PRN Temp greater or equal to 38C (100.4F), Mild Pain (1 - 3)  aluminum hydroxide/magnesium hydroxide/simethicone Suspension 30 milliLiter(s) Oral every 4 hours PRN Dyspepsia  dextrose Oral Gel 15 Gram(s) Oral once PRN Blood Glucose LESS THAN 70 milliGRAM(s)/deciliter  melatonin 3 milliGRAM(s) Oral at bedtime PRN Insomnia  ondansetron Injectable 4 milliGRAM(s) IV Push every 8 hours PRN Nausea and/or Vomiting  sodium chloride 0.9% lock flush 10 milliLiter(s) IV Push every 1 hour PRN Pre/post blood products, medications, blood draw, and to maintain line patency      PHYSICAL EXAM:    Vital Signs Last 24 Hrs  T(C): 37 (28 Feb 2024 13:00), Max: 37.4 (27 Feb 2024 21:00)  T(F): 98.6 (28 Feb 2024 13:00), Max: 99.3 (27 Feb 2024 21:00)  HR: 108 (28 Feb 2024 13:01) (82 - 131)  BP: 118/81 (28 Feb 2024 13:00) (90/58 - 149/90)  BP(mean): 92 (28 Feb 2024 13:00) (67 - 114)  RR: 23 (28 Feb 2024 13:01) (12 - 27)  SpO2: 96% (28 Feb 2024 13:01) (89% - 99%)    Parameters below as of 28 Feb 2024 13:01  Patient On (Oxygen Delivery Method): nasal cannula, high flow  O2 Flow (L/min): 40  O2 Concentration (%): 50    Karnofsky    10 %  General: appears frail, aox0, patient speaks but frequently not saying coherent english/ Northern [place] words  HEENT: NCAT   ( x   ) NGT  Lungs: HFNC, no acute respiratory distress  CV: tachycardic  GI: sky tube present, soft, unable to assess tenderness  MSK: bedbound  Neuro: aox0, patient speaks but frequently not saying coherent english/ [place] words  Skin: warm dry    LABS:                        8.0    14.43 )-----------( 143      ( 28 Feb 2024 02:45 )             23.9     02-28    138  |  95<L>  |  26.7<H>  ----------------------------<  254<H>  4.4   |  30.0<H>  |  3.66<H>    Ca    8.2<L>      28 Feb 2024 02:45  Phos  3.6     02-28  Mg     1.8     02-28    TPro  5.8<L>  /  Alb  3.4  /  TBili  1.8  /  DBili  x   /  AST  22  /  ALT  11  /  AlkPhos  45  02-28    PT/INR - ( 28 Feb 2024 08:21 )   PT: 13.8 sec;   INR: 1.25 ratio         PTT - ( 28 Feb 2024 08:21 )  PTT:29.0 sec  Urinalysis Basic - ( 28 Feb 2024 02:45 )    Color: x / Appearance: x / SG: x / pH: x  Gluc: 254 mg/dL / Ketone: x  / Bili: x / Urobili: x   Blood: x / Protein: x / Nitrite: x   Leuk Esterase: x / RBC: x / WBC x   Sq Epi: x / Non Sq Epi: x / Bacteria: x      I&O's Summary    27 Feb 2024 07:01  -  28 Feb 2024 07:00  --------------------------------------------------------  IN: 1761 mL / OUT: 2105 mL / NET: -344 mL    28 Feb 2024 07:01  -  28 Feb 2024 14:56  --------------------------------------------------------  IN: 275 mL / OUT: 500 mL / NET: -225 mL        RADIOLOGY & ADDITIONAL STUDIES:  Imaging reviewed     cxr    PROCEDURE DATE:  02/27/2024      INTERPRETATION:  AP chest on February 27, 2024 at 12:39 PM.    Heart enlargement and NG tube remain.    Scattered central infiltrates are again noted similar to February 26.    Present film shows a large right jugular line inserted with tip at the   cavoatrial junction.    IMPRESSION: Large right jugular line inserted.    Persistent scattered bilateral infiltrates.    < from: CT Head No Cont (02.26.24 @ 11:15) >    IMPRESSION:    1.  No evidence of acute intracranial hemorrhage or midline shift.  2.  Chronic ischemic changes as discussed above. If there is continued   concern for acute neurologic compromise, recommend MRI of the brain for   further evaluation.    --- End of Report ---      < from: CT Abdomen and Pelvis w/wo IV Cont (02.25.24 @ 18:25) >  FINDINGS:  LOWER CHEST: Old right rib fractures.    LIVER: Within normal limits.  BILE DUCTS: Normal caliber.  GALLBLADDER: Cholecystostomy tube is present within the gallbladder,   which contains stones and intraluminal air. Mild inflammation surrounding   the gallbladder.  SPLEEN: Within normal limits.  PANCREAS: Within normal limits.  ADRENALS: Within normal limits.  KIDNEYS/URETERS:Within normal limits.    BLADDER: Within normal limits.  REPRODUCTIVE ORGANS: Prostate within normal limits.    BOWEL: Small hiatal hernia. No bowel obstruction. Colonic diverticulosis.   No contrast extravasation to suggest site of active bleed.  PERITONEUM: No ascites.  VESSELS: Atherosclerotic changes.  RETROPERITONEUM/LYMPH NODES: No lymphadenopathy.  ABDOMINAL WALL: Within normal limits.  BONES: Degenerative changes.    IMPRESSION:  No contrast extravasation to suggest site of active bleed.    --- End of Report ---            SANDRA CAMPBELL MD; Attending Radiologist  This document has been electronically signed. Feb 25 2024  6:42PM      ADVANCE DIRECTIVES/TREATMENT PREFERENCES:  Currently Full code, all aggressive measures desired              HPI:    83 y/o male with PMH of Acute Cholecystitis s/p Cholecystomy Tube (12/22/23), COVID-19, PAF not on AC due to GIB + Fall Risk, Esophagitis, Anemia, DM 2, HTN, HLD, CKD III/IV, BPH and Cognitive Impairment  came to the ED complaining of leakage around sky tube. As per wife at bed side, family noticed fluid coming out on the skin when they attempted to flush the sky tube. Wife said patient has been doing well since discharged otherwise. No fever, chills, abdominal pain, change in bowel/urinary habit, nausea, vomiting noted.   In the ED, patient was seen by surgery team, no acute surgical intervention. As per ED, patient vomited large amount of coffee ground emesis, looked very pale, holding abdomen; CT angio abdomen done: no acute GI bleed. Repeat CBC: Hb: 11.3---->9.3. Also, he desaturated concerning for aspiration PNA, antibiotic and oxygen therapy.  (25 Feb 2024 22:52)    85 yo m pmhx acute sky s/p sky tube (12/22/23), COVID 19, PAF no AC (GIB/FALL), esophagitis, anemia, DM2, HTN, HLD, CKD3-4, BPH, cognitive impairment presented to the ED with leakage of saline around sky tube upon flushing. In the ED, patient had large coffee ground emesis and desaturation, CTA abdomen without acute GI bleed. Admitted to MICU with distributive shock, RON, aspiration pna and hypoxic respiratory failure. Started HD 02/27 for worsening renal function. Off vasopressors and sedation since 2/27. Currently on HFNC. Pt required ativan for acute agitation. Pt with PAF with HR up to 120's.    Pt needs medical optimization, especially given O2 needs, before any possible cholecystectomy.      PERTINENT PMH REVIEWED: Yes    PAST MEDICAL & SURGICAL HISTORY:  Diabetes      Hypertension      Prostate disorder      Anxiety      High cholesterol      Ulcer      History of endoscopy          SOCIAL HISTORY:                      Substance history: No cigarette, alcohol or illicit drug use.                    Admitted from:  home                     Holiness/spirituality: 7th day Yazidi                    Cultural concerns: none    Baseline ADLs (prior to admission):   Dependent                        Surrogate: wife    FAMILY HISTORY:  Family history of stomach cancer  Father    Family history of emphysema  Mother        Allergies    No Known Allergies        http://npcrc.org/files/[email or web address]    Present Symptoms:   Dyspnea: no  Nausea/Vomiting:  No  Anxiety:  no  Depression: Unable  Fatigue: Unable  Loss of appetite: NPO  Constipation:  Not reported    Pain:  No            Location            Character            Duration            Factors            Severity            Effect    Pain AD Score:  [email or web address] (press ctrl + left click to view)    Review of Systems: Reviewed    All other ROS negative  Unable  Limited  to obtain due to poor mentation historian      MEDICATIONS  (STANDING):  chlorhexidine 2% Cloths 1 Application(s) Topical daily  dextrose 5%. 1000 milliLiter(s) (50 mL/Hr) IV Continuous <Continuous>  dextrose 5%. 1000 milliLiter(s) (100 mL/Hr) IV Continuous <Continuous>  dextrose 50% Injectable 12.5 Gram(s) IV Push once  dextrose 50% Injectable 25 Gram(s) IV Push once  dextrose 50% Injectable 25 Gram(s) IV Push once  dronedarone 400 milliGRAM(s) Oral two times a day  epoetin kristal-epbx (RETACRIT) Injectable 40388 Unit(s) IV Push <User Schedule>  ferrous    sulfate 325 milliGRAM(s) Oral daily  gabapentin 300 milliGRAM(s) Oral at bedtime  glucagon  Injectable 1 milliGRAM(s) IntraMuscular once  hydrocortisone sodium succinate Injectable 50 milliGRAM(s) IV Push every 6 hours  insulin glargine Injectable (LANTUS) 10 Unit(s) SubCutaneous every morning  insulin lispro (ADMELOG) corrective regimen sliding scale   SubCutaneous every 6 hours  midodrine 15 milliGRAM(s) Oral every 8 hours  mupirocin 2% Nasal 1 Application(s) Both Nostrils two times a day  norepinephrine Infusion 0.05 MICROgram(s)/kG/Min (6.54 mL/Hr) IV Continuous <Continuous>  pantoprazole  Injectable 40 milliGRAM(s) IV Push every 12 hours  piperacillin/tazobactam IVPB.. 3.375 Gram(s) IV Intermittent every 12 hours  senna 2 Tablet(s) Oral at bedtime  simvastatin 20 milliGRAM(s) Oral at bedtime    MEDICATIONS  (PRN):  acetaminophen     Tablet .. 650 milliGRAM(s) Oral every 6 hours PRN Temp greater or equal to 38C (100.4F), Mild Pain (1 - 3)  aluminum hydroxide/magnesium hydroxide/simethicone Suspension 30 milliLiter(s) Oral every 4 hours PRN Dyspepsia  dextrose Oral Gel 15 Gram(s) Oral once PRN Blood Glucose LESS THAN 70 milliGRAM(s)/deciliter  melatonin 3 milliGRAM(s) Oral at bedtime PRN Insomnia  ondansetron Injectable 4 milliGRAM(s) IV Push every 8 hours PRN Nausea and/or Vomiting  sodium chloride 0.9% lock flush 10 milliLiter(s) IV Push every 1 hour PRN Pre/post blood products, medications, blood draw, and to maintain line patency      PHYSICAL EXAM:    Vital Signs Last 24 Hrs  T(C): 37 (28 Feb 2024 13:00), Max: 37.4 (27 Feb 2024 21:00)  T(F): 98.6 (28 Feb 2024 13:00), Max: 99.3 (27 Feb 2024 21:00)  HR: 108 (28 Feb 2024 13:01) (82 - 131)  BP: 118/81 (28 Feb 2024 13:00) (90/58 - 149/90)  BP(mean): 92 (28 Feb 2024 13:00) (67 - 114)  RR: 23 (28 Feb 2024 13:01) (12 - 27)  SpO2: 96% (28 Feb 2024 13:01) (89% - 99%)    Parameters below as of 28 Feb 2024 13:01  Patient On (Oxygen Delivery Method): nasal cannula, high flow  O2 Flow (L/min): 40  O2 Concentration (%): 50    Karnofsky  20 %  General: appears frail, aox0, patient speaks but frequently not saying coherent english/ Arabic words  HEENT: NCAT   ( x   ) NGT  Lungs: comfortable  CV: tachycardic  GI: soft NTND  MSK: bedbound  Neuro: awake confused  Skin: warm dry    LABS:                        8.0    14.43 )-----------( 143      ( 28 Feb 2024 02:45 )             23.9     02-28    138  |  95<L>  |  26.7<H>  ----------------------------<  254<H>  4.4   |  30.0<H>  |  3.66<H>    Ca    8.2<L>      28 Feb 2024 02:45  Phos  3.6     02-28  Mg     1.8     02-28    TPro  5.8<L>  /  Alb  3.4  /  TBili  1.8  /  DBili  x   /  AST  22  /  ALT  11  /  AlkPhos  45  02-28    PT/INR - ( 28 Feb 2024 08:21 )   PT: 13.8 sec;   INR: 1.25 ratio         PTT - ( 28 Feb 2024 08:21 )  PTT:29.0 sec  Urinalysis Basic - ( 28 Feb 2024 02:45 )    Color: x / Appearance: x / SG: x / pH: x  Gluc: 254 mg/dL / Ketone: x  / Bili: x / Urobili: x   Blood: x / Protein: x / Nitrite: x   Leuk Esterase: x / RBC: x / WBC x   Sq Epi: x / Non Sq Epi: x / Bacteria: x      I&O's Summary    27 Feb 2024 07:01  -  28 Feb 2024 07:00  --------------------------------------------------------  IN: 1761 mL / OUT: 2105 mL / NET: -344 mL    28 Feb 2024 07:01  -  28 Feb 2024 14:56  --------------------------------------------------------  IN: 275 mL / OUT: 500 mL / NET: -225 mL        RADIOLOGY & ADDITIONAL STUDIES:  Imaging reviewed     cxr    PROCEDURE DATE:  02/27/2024      INTERPRETATION:  AP chest on February 27, 2024 at 12:39 PM.    Heart enlargement and NG tube remain.    Scattered central infiltrates are again noted similar to February 26.    Present film shows a large right jugular line inserted with tip at the   cavoatrial junction.    IMPRESSION: Large right jugular line inserted.    Persistent scattered bilateral infiltrates.    < from: CT Head No Cont (02.26.24 @ 11:15) >    IMPRESSION:    1.  No evidence of acute intracranial hemorrhage or midline shift.  2.  Chronic ischemic changes as discussed above. If there is continued   concern for acute neurologic compromise, recommend MRI of the brain for   further evaluation.    --- End of Report ---      < from: CT Abdomen and Pelvis w/wo IV Cont (02.25.24 @ 18:25) >  FINDINGS:  LOWER CHEST: Old right rib fractures.    LIVER: Within normal limits.  BILE DUCTS: Normal caliber.  GALLBLADDER: Cholecystostomy tube is present within the gallbladder,   which contains stones and intraluminal air. Mild inflammation surrounding   the gallbladder.  SPLEEN: Within normal limits.  PANCREAS: Within normal limits.  ADRENALS: Within normal limits.  KIDNEYS/URETERS:Within normal limits.    BLADDER: Within normal limits.  REPRODUCTIVE ORGANS: Prostate within normal limits.    BOWEL: Small hiatal hernia. No bowel obstruction. Colonic diverticulosis.   No contrast extravasation to suggest site of active bleed.  PERITONEUM: No ascites.  VESSELS: Atherosclerotic changes.  RETROPERITONEUM/LYMPH NODES: No lymphadenopathy.  ABDOMINAL WALL: Within normal limits.  BONES: Degenerative changes.    IMPRESSION:  No contrast extravasation to suggest site of active bleed.    --- End of Report ---            SANDRA CAMPBELL MD; Attending Radiologist  This document has been electronically signed. Feb 25 2024  6:42PM      ADVANCE DIRECTIVES/TREATMENT PREFERENCES:  Currently Full code, all aggressive measures desired

## 2024-02-28 NOTE — CONSULT NOTE ADULT - CONVERSATION DETAILS
Had conversation with daughter and patient by bedside. Introduced the role of palliative care. His daughter and mother had a recent discussion regarding goals of care and family is in agreement that they would like everything done and are hoping for a recovery. Per the daughter, "he has always been a fighter."  Daughter confirmed that patient is full code. Had conversation with daughter with  patient by bedside. Patient does not have capacity to participate 2/2 confusion.   Introduced the role of palliative care. Daughter states she knows mother and family would want  everything done, including CPR  and are hoping for a recovery. Per the daughter, "he has always been a fighter." Had conversation with daughter with  patient by bedside. Patient does not have capacity to participate 2/2 confusion.   Introduced the role of palliative care. Daughter states she knows mother and family would want  everything done, including CPR and are hoping for a recovery. Per the daughter, "he has always been a fighter."

## 2024-02-28 NOTE — PROGRESS NOTE ADULT - SUBJECTIVE AND OBJECTIVE BOX
NEPHROLOGY INTERVAL HPI/OVERNIGHT EVENTS:  pt essentially the same  still requiring supplemental O2  tolerated initial HD  family member was at bedside    MEDICATIONS  (STANDING):  chlorhexidine 2% Cloths 1 Application(s) Topical daily  dextrose 5%. 1000 milliLiter(s) (50 mL/Hr) IV Continuous <Continuous>  dextrose 5%. 1000 milliLiter(s) (100 mL/Hr) IV Continuous <Continuous>  dextrose 50% Injectable 12.5 Gram(s) IV Push once  dextrose 50% Injectable 25 Gram(s) IV Push once  dextrose 50% Injectable 25 Gram(s) IV Push once  dronedarone 400 milliGRAM(s) Oral two times a day  ferrous    sulfate 325 milliGRAM(s) Oral daily  gabapentin 300 milliGRAM(s) Oral at bedtime  glucagon  Injectable 1 milliGRAM(s) IntraMuscular once  hydrocortisone sodium succinate Injectable 50 milliGRAM(s) IV Push every 6 hours  insulin glargine Injectable (LANTUS) 10 Unit(s) SubCutaneous every morning  insulin lispro (ADMELOG) corrective regimen sliding scale   SubCutaneous every 6 hours  midodrine 15 milliGRAM(s) Oral every 8 hours  mupirocin 2% Nasal 1 Application(s) Both Nostrils two times a day  norepinephrine Infusion 0.05 MICROgram(s)/kG/Min (6.54 mL/Hr) IV Continuous <Continuous>  pantoprazole  Injectable 40 milliGRAM(s) IV Push every 12 hours  piperacillin/tazobactam IVPB.. 3.375 Gram(s) IV Intermittent every 12 hours  senna 2 Tablet(s) Oral at bedtime  simvastatin 20 milliGRAM(s) Oral at bedtime    MEDICATIONS  (PRN):  acetaminophen     Tablet .. 650 milliGRAM(s) Oral every 6 hours PRN Temp greater or equal to 38C (100.4F), Mild Pain (1 - 3)  aluminum hydroxide/magnesium hydroxide/simethicone Suspension 30 milliLiter(s) Oral every 4 hours PRN Dyspepsia  dextrose Oral Gel 15 Gram(s) Oral once PRN Blood Glucose LESS THAN 70 milliGRAM(s)/deciliter  melatonin 3 milliGRAM(s) Oral at bedtime PRN Insomnia  ondansetron Injectable 4 milliGRAM(s) IV Push every 8 hours PRN Nausea and/or Vomiting  sodium chloride 0.9% lock flush 10 milliLiter(s) IV Push every 1 hour PRN Pre/post blood products, medications, blood draw, and to maintain line patency      Allergies    No Known Allergies        Vital Signs Last 24 Hrs  T(C): 37.4 (28 Feb 2024 10:00), Max: 37.4 (27 Feb 2024 21:00)  T(F): 99.3 (28 Feb 2024 10:00), Max: 99.3 (27 Feb 2024 21:00)  HR: 112 (28 Feb 2024 10:00) (75 - 131)  BP: 114/100 (28 Feb 2024 10:00) (90/58 - 149/90)  BP(mean): 106 (28 Feb 2024 10:00) (62 - 114)  RR: 22 (28 Feb 2024 10:00) (11 - 27)  SpO2: 92% (28 Feb 2024 10:00) (89% - 100%)    Parameters below as of 28 Feb 2024 09:59  Patient On (Oxygen Delivery Method): nasal cannula, high flow  O2 Flow (L/min): 40  O2 Concentration (%): 100    PHYSICAL EXAM:  GENERAL: Weak, debilitated  HEENMT: No periorbital edema  NECK: Supple, no JVD  NERVOUS SYSTEM:  Awake, slow to respond  CHEST/LUNG: Diminished BS  HEART: Regular rate and rhythm; No rub  ABDOMEN: Soft, +BS  EXTREMITIES: + dependent edema  SKIN: No rashes    LABS:                        8.0    14.43 )-----------( 143      ( 28 Feb 2024 02:45 )             23.9     02-28    138  |  95<L>  |  26.7<H>  ----------------------------<  254<H>  4.4   |  30.0<H>  |  3.66<H>    Ca    8.2<L>      28 Feb 2024 02:45  Phos  3.6     02-28  Mg     1.8     02-28    TPro  5.8<L>  /  Alb  3.4  /  TBili  1.8  /  DBili  x   /  AST  22  /  ALT  11  /  AlkPhos  45  02-28    PT/INR - ( 28 Feb 2024 08:21 )   PT: 13.8 sec;   INR: 1.25 ratio         PTT - ( 28 Feb 2024 08:21 )  PTT:29.0 sec  Urinalysis Basic - ( 28 Feb 2024 02:45 )    Color: x / Appearance: x / SG: x / pH: x  Gluc: 254 mg/dL / Ketone: x  / Bili: x / Urobili: x   Blood: x / Protein: x / Nitrite: x   Leuk Esterase: x / RBC: x / WBC x   Sq Epi: x / Non Sq Epi: x / Bacteria: x      Magnesium: 1.8 mg/dL (02-28 @ 02:45)  Phosphorus: 3.6 mg/dL (02-28 @ 02:45)  Magnesium: 1.8 mg/dL (02-27 @ 18:54)  Phosphorus: 2.8 mg/dL (02-27 @ 18:54)      RADIOLOGY & ADDITIONAL TESTS:  < from: Xray Chest 1 View- PORTABLE-Urgent (Xray Chest 1 View- PORTABLE-Urgent .) (02.27.24 @ 13:15) >  ACC: [id number] EXAM:  XR CHEST PORTABLE URGENT 1V   ORDERED BY: JALEN MACKAY     PROCEDURE DATE:  02/27/2024          INTERPRETATION:  AP chest on February 27, 2024 at 12:39 PM.    Heart enlargement and NG tube remain.    Scattered central infiltrates are again noted similar to February 26.    Present film shows a large right jugular line inserted with tip at the   cavoatrial junction.    IMPRESSION: Large right jugular line inserted.    Persistent scattered bilateral infiltrates.    < end of copied text >      < from: CT Abdomen and Pelvis w/wo IV Cont (02.25.24 @ 18:25) >    ACC: [id number] EXAM:  CT ABDOMEN AND PELVIS WAW IC   ORDERED BY: JONATHAN MENCHACA     PROCEDURE DATE:  02/25/2024          INTERPRETATION:  CLINICAL INFORMATION: Nausea. Pain.    ADDITIONAL CLINICAL INFORMATION: Other, Non-specified    COMPARISON: January 29, 2024.    CONTRAST/COMPLICATIONS:  IV Contrast: Omnipaque 350  90 cc administered   10 cc discarded  Oral Contrast: NONE  Complications: None reported at time of study completion    PROCEDURE:  CT of the Abdomen and Pelvis was performed.  Precontrast, Arterial and Delayed phases were performed.  Sagittal and coronal reformats were performed.    FINDINGS:  LOWER CHEST: Old right rib fractures.    LIVER: Within normal limits.  BILE DUCTS: Normal caliber.  GALLBLADDER: Cholecystostomy tube is present within the gallbladder,   which contains stones and intraluminal air. Mild inflammation surrounding   the gallbladder.  SPLEEN: Within normal limits.  PANCREAS: Within normal limits.  ADRENALS: Within normal limits.  KIDNEYS/URETERS:Within normal limits.    BLADDER: Within normal limits.  REPRODUCTIVE ORGANS: Prostate within normal limits.    BOWEL: Small hiatal hernia. No bowel obstruction. Colonic diverticulosis.   No contrast extravasation to suggest site of active bleed.  PERITONEUM: No ascites.  VESSELS: Atherosclerotic changes.  RETROPERITONEUM/LYMPH NODES: No lymphadenopathy.  ABDOMINAL WALL: Within normal limits.  BONES: Degenerative changes.    IMPRESSION:  No contrast extravasation to suggest site of active bleed.    < end of copied text >

## 2024-02-28 NOTE — PROGRESS NOTE ADULT - ASSESSMENT
A: 84M s/p cholecystomy tube 12/22/23 admitted for perc sky tube evaluation flushed at bedside yesterday. Pt with coffee ground emesis with drop in hgb 9.9 from 11, now 10.3 and leukocytosis 22.88)21.29. CT a/p negative for active GIB. Rapid response called, patient transferred to MICU for critical care management of hypotension and respiratory distress, was on vasopressors, now off     Plan:   - Surgical plan pending medical optimization  - cont to monitor labs   - monitor fever curve   - rest of care for primary team  A: 84M s/p cholecystomy tube 12/22/23 admitted for perc sky tube evaluation flushed at bedside yesterday. Pt with coffee ground emesis with drop in hgb 9.9 from 11, now 10.3 and leukocytosis 22.88)21.29. CT a/p negative for active GIB. Rapid response called, patient transferred to MICU for critical care management of hypotension and respiratory distress, was on vasopressors, now off     Plan:   - Surgical plan pending medical optimization, patient not ready for surgery at this time, will continue to follow  - Recommend abdominal XR to evaluate   - Continue to monitor labs   - monitor fever curve   - rest of care for primary team

## 2024-02-28 NOTE — PROGRESS NOTE ADULT - SUBJECTIVE AND OBJECTIVE BOX
Patient is a 84y old  Male who presents with a chief complaint of Gastrointestinal hemorrhage     (27 Feb 2024 13:27)      BRIEF HOSPITAL COURSE: 85 yo m pmhx acute sky s/p sky tube (12/22/23), COVID 19, PAF no AC (GIB/FALL), esophagitis, anemia, DM2, HTN, HLD, CKD3-4, BPH, cognitive impairment presented to the ED with leakage of saline around sky tube upon flushing. In the ED, patient had large coffee ground emesis and desaturation, CTA abdomen without acute GI bleed. Admitted to MICU with distributive shock, RON, aspiration pna and hypoxic respiratory failure. Started HD 02/27 for worsening renal function.     Events last 24 hours: Off vasopressors since yesterday. Currently on HFNC. Pt NPO except meds with single episode of emesis of meds overnight.     PAST MEDICAL & SURGICAL HISTORY:  Diabetes      Hypertension      Prostate disorder      Anxiety      High cholesterol      Ulcer      History of endoscopy            Medications:  piperacillin/tazobactam IVPB.. 3.375 Gram(s) IV Intermittent every 12 hours    dronedarone 400 milliGRAM(s) Oral two times a day  midodrine 15 milliGRAM(s) Oral every 8 hours  norepinephrine Infusion 0.05 MICROgram(s)/kG/Min IV Continuous <Continuous>      acetaminophen     Tablet .. 650 milliGRAM(s) Oral every 6 hours PRN  gabapentin 300 milliGRAM(s) Oral at bedtime  melatonin 3 milliGRAM(s) Oral at bedtime PRN  ondansetron Injectable 4 milliGRAM(s) IV Push every 8 hours PRN        aluminum hydroxide/magnesium hydroxide/simethicone Suspension 30 milliLiter(s) Oral every 4 hours PRN  pantoprazole  Injectable 40 milliGRAM(s) IV Push every 12 hours  senna 2 Tablet(s) Oral at bedtime      dextrose 50% Injectable 12.5 Gram(s) IV Push once  dextrose 50% Injectable 25 Gram(s) IV Push once  dextrose 50% Injectable 25 Gram(s) IV Push once  dextrose Oral Gel 15 Gram(s) Oral once PRN  glucagon  Injectable 1 milliGRAM(s) IntraMuscular once  hydrocortisone sodium succinate Injectable 50 milliGRAM(s) IV Push every 6 hours  insulin lispro (ADMELOG) corrective regimen sliding scale   SubCutaneous every 6 hours  simvastatin 20 milliGRAM(s) Oral at bedtime    dextrose 5%. 1000 milliLiter(s) IV Continuous <Continuous>  dextrose 5%. 1000 milliLiter(s) IV Continuous <Continuous>  ferrous    sulfate 325 milliGRAM(s) Oral daily  sodium bicarbonate  Infusion 0.107 mEq/kG/Hr IV Continuous <Continuous>  sodium chloride 0.9% lock flush 10 milliLiter(s) IV Push every 1 hour PRN      chlorhexidine 2% Cloths 1 Application(s) Topical daily  mupirocin 2% Nasal 1 Application(s) Both Nostrils two times a day            ICU Vital Signs Last 24 Hrs  T(C): 36.9 (28 Feb 2024 07:00), Max: 37.7 (27 Feb 2024 07:45)  T(F): 98.4 (28 Feb 2024 07:00), Max: 99.9 (27 Feb 2024 07:45)  HR: 109 (28 Feb 2024 07:00) (75 - 131)  BP: 116/92 (28 Feb 2024 07:00) (90/58 - 149/90)  BP(mean): 101 (28 Feb 2024 07:00) (62 - 114)  ABP: --  ABP(mean): --  RR: 19 (28 Feb 2024 07:00) (11 - 27)  SpO2: 93% (28 Feb 2024 07:00) (89% - 100%)    O2 Parameters below as of 28 Feb 2024 04:00  Patient On (Oxygen Delivery Method): nasal cannula, high flow            ABG - ( 28 Feb 2024 05:23 )  pH, Arterial: 7.440 pH, Blood: x     /  pCO2: 51    /  pO2: 80    / HCO3: 35    / Base Excess: 10.4  /  SaO2: 98.4                I&O's Detail    27 Feb 2024 07:01  -  28 Feb 2024 07:00  --------------------------------------------------------  IN:    Albumin 25%  -  50 mL: 100 mL    Enteral Tube Flush: 250 mL    Insulin: 3 mL    IV PiggyBack: 175 mL    Norepinephrine: 33 mL    Sodium Bicarbonate: 1200 mL  Total IN: 1761 mL    OUT:    Drain (mL): 160 mL    Indwelling Catheter - Urethral (mL): 765 mL    Nasogastric/Oral tube (mL): 180 mL    Other (mL): 1000 mL  Total OUT: 2105 mL    Total NET: -344 mL            LABS:                        8.0    14.43 )-----------( 143      ( 28 Feb 2024 02:45 )             23.9     02-28    138  |  95<L>  |  26.7<H>  ----------------------------<  254<H>  4.4   |  30.0<H>  |  3.66<H>    Ca    8.2<L>      28 Feb 2024 02:45  Phos  3.6     02-28  Mg     1.8     02-28    TPro  5.8<L>  /  Alb  3.4  /  TBili  1.8  /  DBili  x   /  AST  22  /  ALT  11  /  AlkPhos  45  02-28          CAPILLARY BLOOD GLUCOSE      POCT Blood Glucose.: 287 mg/dL (28 Feb 2024 05:33)    PT/INR - ( 26 Feb 2024 10:51 )   PT: 11.6 sec;   INR: 1.05 ratio         PTT - ( 26 Feb 2024 10:51 )  PTT:19.6 sec  Urinalysis Basic - ( 28 Feb 2024 02:45 )    Color: x / Appearance: x / SG: x / pH: x  Gluc: 254 mg/dL / Ketone: x  / Bili: x / Urobili: x   Blood: x / Protein: x / Nitrite: x   Leuk Esterase: x / RBC: x / WBC x   Sq Epi: x / Non Sq Epi: x / Bacteria: x      CULTURES:  Culture Results:   No growth at 24 hours (02-26 @ 10:51)  Culture Results:   No growth at 24 hours (02-26 @ 10:42)  Culture Results:   No growth at 48 Hours (02-25 @ 19:33)  Culture Results:   No growth at 48 Hours (02-25 @ 19:23)      Physical Examination:    General: No acute distress.      HEENT: Pupils equal, reactive to light.  Symmetric.    PULM: Coarse lungs sounds bilaterally, no significant sputum production    NECK: Supple, no lymphadenopathy, trachea midline    CVS: Regular rate and rhythm, no murmurs    ABD: Soft, nondistended, mildly tender, normoactive bowel sounds    EXT: +RUE edema, nontender    SKIN: Warm and well perfused, no rashes noted.    NEURO: AAOx2, lethargic, interactive     Patient is a 84y old  Male who presents with a chief complaint of Gastrointestinal hemorrhage     (27 Feb 2024 13:27)      BRIEF HOSPITAL COURSE: 85 yo m pmhx acute sky s/p sky tube (12/22/23), COVID 19, PAF no AC (GIB/FALL), esophagitis, anemia, DM2, HTN, HLD, CKD3-4, BPH, cognitive impairment presented to the ED with leakage of saline around sky tube upon flushing. In the ED, patient had large coffee ground emesis and desaturation, CTA abdomen without acute GI bleed. Admitted to MICU with distributive shock, RON, aspiration pna and hypoxic respiratory failure. Started HD 02/27 for worsening renal function.     Events last 24 hours: Off vasopressors and sedation since yesterday. Currently on HFNC. Pt NPO except meds with single episode of emesis of meds overnight. Pt required ativan overnight for acute agitation. Pt with PAF with HR up to 120's.     PAST MEDICAL & SURGICAL HISTORY:  Diabetes      Hypertension      Prostate disorder      Anxiety      High cholesterol      Ulcer      History of endoscopy            Medications:  piperacillin/tazobactam IVPB.. 3.375 Gram(s) IV Intermittent every 12 hours    dronedarone 400 milliGRAM(s) Oral two times a day  midodrine 15 milliGRAM(s) Oral every 8 hours  norepinephrine Infusion 0.05 MICROgram(s)/kG/Min IV Continuous <Continuous>      acetaminophen     Tablet .. 650 milliGRAM(s) Oral every 6 hours PRN  gabapentin 300 milliGRAM(s) Oral at bedtime  melatonin 3 milliGRAM(s) Oral at bedtime PRN  ondansetron Injectable 4 milliGRAM(s) IV Push every 8 hours PRN        aluminum hydroxide/magnesium hydroxide/simethicone Suspension 30 milliLiter(s) Oral every 4 hours PRN  pantoprazole  Injectable 40 milliGRAM(s) IV Push every 12 hours  senna 2 Tablet(s) Oral at bedtime      dextrose 50% Injectable 12.5 Gram(s) IV Push once  dextrose 50% Injectable 25 Gram(s) IV Push once  dextrose 50% Injectable 25 Gram(s) IV Push once  dextrose Oral Gel 15 Gram(s) Oral once PRN  glucagon  Injectable 1 milliGRAM(s) IntraMuscular once  hydrocortisone sodium succinate Injectable 50 milliGRAM(s) IV Push every 6 hours  insulin lispro (ADMELOG) corrective regimen sliding scale   SubCutaneous every 6 hours  simvastatin 20 milliGRAM(s) Oral at bedtime    dextrose 5%. 1000 milliLiter(s) IV Continuous <Continuous>  dextrose 5%. 1000 milliLiter(s) IV Continuous <Continuous>  ferrous    sulfate 325 milliGRAM(s) Oral daily  sodium bicarbonate  Infusion 0.107 mEq/kG/Hr IV Continuous <Continuous>  sodium chloride 0.9% lock flush 10 milliLiter(s) IV Push every 1 hour PRN      chlorhexidine 2% Cloths 1 Application(s) Topical daily  mupirocin 2% Nasal 1 Application(s) Both Nostrils two times a day            ICU Vital Signs Last 24 Hrs  T(C): 36.9 (28 Feb 2024 07:00), Max: 37.7 (27 Feb 2024 07:45)  T(F): 98.4 (28 Feb 2024 07:00), Max: 99.9 (27 Feb 2024 07:45)  HR: 109 (28 Feb 2024 07:00) (75 - 131)  BP: 116/92 (28 Feb 2024 07:00) (90/58 - 149/90)  BP(mean): 101 (28 Feb 2024 07:00) (62 - 114)  ABP: --  ABP(mean): --  RR: 19 (28 Feb 2024 07:00) (11 - 27)  SpO2: 93% (28 Feb 2024 07:00) (89% - 100%)    O2 Parameters below as of 28 Feb 2024 04:00  Patient On (Oxygen Delivery Method): nasal cannula, high flow            ABG - ( 28 Feb 2024 05:23 )  pH, Arterial: 7.440 pH, Blood: x     /  pCO2: 51    /  pO2: 80    / HCO3: 35    / Base Excess: 10.4  /  SaO2: 98.4                I&O's Detail    27 Feb 2024 07:01  -  28 Feb 2024 07:00  --------------------------------------------------------  IN:    Albumin 25%  -  50 mL: 100 mL    Enteral Tube Flush: 250 mL    Insulin: 3 mL    IV PiggyBack: 175 mL    Norepinephrine: 33 mL    Sodium Bicarbonate: 1200 mL  Total IN: 1761 mL    OUT:    Drain (mL): 160 mL    Indwelling Catheter - Urethral (mL): 765 mL    Nasogastric/Oral tube (mL): 180 mL    Other (mL): 1000 mL  Total OUT: 2105 mL    Total NET: -344 mL            LABS:                        8.0    14.43 )-----------( 143      ( 28 Feb 2024 02:45 )             23.9     02-28    138  |  95<L>  |  26.7<H>  ----------------------------<  254<H>  4.4   |  30.0<H>  |  3.66<H>    Ca    8.2<L>      28 Feb 2024 02:45  Phos  3.6     02-28  Mg     1.8     02-28    TPro  5.8<L>  /  Alb  3.4  /  TBili  1.8  /  DBili  x   /  AST  22  /  ALT  11  /  AlkPhos  45  02-28          CAPILLARY BLOOD GLUCOSE      POCT Blood Glucose.: 287 mg/dL (28 Feb 2024 05:33)    PT/INR - ( 26 Feb 2024 10:51 )   PT: 11.6 sec;   INR: 1.05 ratio         PTT - ( 26 Feb 2024 10:51 )  PTT:19.6 sec  Urinalysis Basic - ( 28 Feb 2024 02:45 )    Color: x / Appearance: x / SG: x / pH: x  Gluc: 254 mg/dL / Ketone: x  / Bili: x / Urobili: x   Blood: x / Protein: x / Nitrite: x   Leuk Esterase: x / RBC: x / WBC x   Sq Epi: x / Non Sq Epi: x / Bacteria: x      CULTURES:  Culture Results:   No growth at 24 hours (02-26 @ 10:51)  Culture Results:   No growth at 24 hours (02-26 @ 10:42)  Culture Results:   No growth at 48 Hours (02-25 @ 19:33)  Culture Results:   No growth at 48 Hours (02-25 @ 19:23)      Physical Examination:    General: No acute distress.      HEENT: Pupils equal, reactive to light.  Symmetric.    PULM: Coarse lungs sounds bilaterally, no significant sputum production    NECK: Supple, no lymphadenopathy, trachea midline    CVS: Regular rate and rhythm, no murmurs    ABD: Soft, nondistended, mildly tender, normoactive bowel sounds    EXT: +RUE edema, nontender    SKIN: Warm and well perfused, no rashes noted.    NEURO: AAOx2, lethargic, interactive

## 2024-02-28 NOTE — PROGRESS NOTE ADULT - ASSESSMENT
RON: ATN post IV contrast and hypotension  +PVC  CKD(IV) +DM, HTN  CT scan: no hydronephrosis  - avoid further potential nephrotoxins  - cont supportive care, pressors  - HD again today--> monitor for signs of renal recovery    Anemia: + multifactorial  - add CINDI  - check Fe stores

## 2024-02-28 NOTE — CONSULT NOTE ADULT - NSCONSULTADDITIONALINFOA_GEN_ALL_CORE
I have personally seen and examined the patient.  I fully participated in the care of this patient.  I have made amendments to the documentation where necessary, and agree with the history, physical exam, and plan as documented by the Resident.    83 yo m pmhx acute sky s/p sky tube (12/22/23), COVID 19, PAF no AC (GIB/FALL), esophagitis, anemia, DM2, HTN, HLD, CKD3-4, BPH, with respiratory failure in the setting of shock, Susana medically optimizing with eventual plan for cholecystectomy. PC to follow - continued GOC discussions

## 2024-02-28 NOTE — CONSULT NOTE ADULT - TIME BILLING
Time spent with review of chart documents, labs, imaging. Direct patient assessment,  formulation of care plan. Discussion with  Interdisciplinary  team Dr. Valverde

## 2024-02-28 NOTE — PROGRESS NOTE ADULT - ASSESSMENT
83 yo m pmhx acute sky s/p sky tube (12/22/23), COVID 19, PAF no AC (GIB/FALL), esophagitis, anemia, DM2, HTN, HLD, CKD3-4, BPH, cognitive impairment presented with leakage around sky tube admitted with distributive shock, RON,hyperglycemica, aspiration pna and hypoxic respiratory failure.      NEURO: hob >30 degrees, aspiration precautions.  gabapentin for pain control, at highest recommended dose for current renal function.  Close ms monitoring, decrease dose/discontinue as needed  CV: distributive shock requiring vasopressor therapy, actively titrating levophed for MAP >65, midodrine as adjunctive therapy. Continue Dronedarone.   RESP: hypoxic respiratory failure, HFNC actively titrating settings for spo2 >92%, chest pt, ngt suctioning as tolerated  RENAL: RON on CKD with MA, avoid nephrotoxic meds, renally dose meds, trend urine output, bun/cr and electrolytes. replace lytes as needed, sodium bicarb gtt,  wills, strict I&Os  GI: NPO except meds   ENDO: hyperglycemia on insulin gtt, poct q1hr  ID: [email or web address] for coverage, cx pending 85 yo m pmhx acute sky s/p sky tube (12/22/23), COVID 19, PAF no AC (GIB/FALL), esophagitis, anemia, DM2, HTN, HLD, CKD3-4, BPH, cognitive impairment presented with leakage around sky tube admitted with distributive shock, RON, hyperglycemia, aspiration pna and hypoxic respiratory failure.      NEURO: hob >30 degrees, aspiration precautions.  gabapentin for pain control, at highest recommended dose for current renal function.  Close ms monitoring, decrease dose/discontinue as needed  CV: distributive shock requiring vasopressor therapy, actively titrating levophed for MAP >65, midodrine as adjunctive therapy. Continue Dronedarone.   RESP: hypoxic respiratory failure, HFNC actively titrating settings for spo2 >92%, chest pt, ngt suctioning as tolerated  RENAL: RON on CKD with MA, avoid nephrotoxic meds, renally dose meds, trend urine output, bun/cr and electrolytes. replace lytes as needed, sodium bicarb gtt,  wills, strict I&Os  GI: NPO except med   ENDO: hyperglycemia on insulin gtt, poct q1hr  ID: [email or web address] for coverage, cx pending 85 yo m pmhx acute sky s/p sky tube (12/22/23), COVID 19, PAF no AC (GIB/FALL), esophagitis, anemia, DM2, HTN, HLD, CKD3-4, BPH, cognitive impairment presented with leakage around sky tube admitted with distributive shock, RON, hyperglycemia, aspiration pna and hypoxic respiratory failure.      NEURO: hob >30 degrees, aspiration precautions.  gabapentin for pain control, at highest recommended dose for current renal function.  Close ms monitoring, decrease dose/discontinue as needed  CV: distributive shock requiring vasopressor therapy, actively titrating levophed for MAP >65, midodrine as adjunctive therapy. Continue Dronedarone. Digoxin 250 mcg stat for pAF.  RESP: hypoxic respiratory failure, HFNC actively titrating settings for spo2 >92%, chest pt, ngt suctioning as tolerated  RENAL: RON on CKD with MA, avoid nephrotoxic meds, renally dose meds, trend urine output, bun/cr and electrolytes. replace lytes as needed, sodium bicarb gtt,  wills, strict I&Os  GI: NPO except med   ENDO: hyperglycemia on insulin gtt, poct q1hr, will add basal lantus 10U   ID: zosyn for coverage, cx pending 83 yo m pmhx acute sky s/p sky tube (12/22/23), COVID 19, PAF no AC (GIB/FALL), esophagitis, anemia, DM2, HTN, HLD, CKD3-4, BPH, cognitive impairment presented with leakage around sky tube admitted with distributive shock, RON, hyperglycemia, aspiration pna and hypoxic respiratory failure.      NEURO: hob >30 degrees, aspiration precautions.  gabapentin for pain control, at highest recommended dose for current renal function.  Close ms monitoring, decrease dose/discontinue as needed  CV: distributive shock requiring vasopressor therapy, actively titrating levophed for MAP >65, midodrine as adjunctive therapy. Continue Dronedarone. Digoxin 250 mcg stat for pAF.  RESP: hypoxic respiratory failure, HFNC actively titrating settings for spo2 >92%, chest pt, ngt suctioning as tolerated  RENAL: RON on CKD with MA, avoid nephrotoxic meds, renally dose meds, trend urine output, bun/cr and electrolytes. replace lytes as needed, sodium bicarb gtt,  wills, strict I&Os  GI: NPO except med. Cholecystectomy planned pending medical optimization per surgery.    ENDO: hyperglycemia on insulin gtt, poct q1hr, will add basal lantus 10U   ID: zosyn for coverage, cx pending

## 2024-02-28 NOTE — PROGRESS NOTE ADULT - SUBJECTIVE AND OBJECTIVE BOX
INTERVAL HPI/OVERNIGHT EVENTS:    Patient evaluated at bedside. No acute distress. No acute events overnight.  Remains on HFNC, tachypnic, moderate respiratory distress, off vasopressors.      MEDICATIONS  (STANDING):  chlorhexidine 2% Cloths 1 Application(s) Topical daily  dextrose 5%. 1000 milliLiter(s) (50 mL/Hr) IV Continuous <Continuous>  dextrose 5%. 1000 milliLiter(s) (100 mL/Hr) IV Continuous <Continuous>  dextrose 50% Injectable 12.5 Gram(s) IV Push once  dextrose 50% Injectable 25 Gram(s) IV Push once  dextrose 50% Injectable 25 Gram(s) IV Push once  dronedarone 400 milliGRAM(s) Oral two times a day  ferrous    sulfate 325 milliGRAM(s) Oral daily  gabapentin 300 milliGRAM(s) Oral at bedtime  glucagon  Injectable 1 milliGRAM(s) IntraMuscular once  hydrocortisone sodium succinate Injectable 50 milliGRAM(s) IV Push every 6 hours  insulin lispro (ADMELOG) corrective regimen sliding scale   SubCutaneous every 6 hours  midodrine 15 milliGRAM(s) Oral every 8 hours  mupirocin 2% Nasal 1 Application(s) Both Nostrils two times a day  norepinephrine Infusion 0.05 MICROgram(s)/kG/Min (6.54 mL/Hr) IV Continuous <Continuous>  pantoprazole  Injectable 40 milliGRAM(s) IV Push every 12 hours  piperacillin/tazobactam IVPB.. 3.375 Gram(s) IV Intermittent every 12 hours  senna 2 Tablet(s) Oral at bedtime  simvastatin 20 milliGRAM(s) Oral at bedtime  sodium bicarbonate  Infusion 0.107 mEq/kG/Hr (50 mL/Hr) IV Continuous <Continuous>    MEDICATIONS  (PRN):  acetaminophen     Tablet .. 650 milliGRAM(s) Oral every 6 hours PRN Temp greater or equal to 38C (100.4F), Mild Pain (1 - 3)  aluminum hydroxide/magnesium hydroxide/simethicone Suspension 30 milliLiter(s) Oral every 4 hours PRN Dyspepsia  dextrose Oral Gel 15 Gram(s) Oral once PRN Blood Glucose LESS THAN 70 milliGRAM(s)/deciliter  melatonin 3 milliGRAM(s) Oral at bedtime PRN Insomnia  ondansetron Injectable 4 milliGRAM(s) IV Push every 8 hours PRN Nausea and/or Vomiting  sodium chloride 0.9% lock flush 10 milliLiter(s) IV Push every 1 hour PRN Pre/post blood products, medications, blood draw, and to maintain line patency      Vital Signs Last 24 Hrs  T(C): 36.9 (28 Feb 2024 06:00), Max: 37.9 (27 Feb 2024 07:00)  T(F): 98.4 (28 Feb 2024 06:00), Max: 100.2 (27 Feb 2024 07:00)  HR: 123 (28 Feb 2024 06:00) (75 - 131)  BP: 97/79 (28 Feb 2024 06:00) (81/57 - 149/90)  BP(mean): 86 (28 Feb 2024 06:00) (62 - 114)  RR: 23 (28 Feb 2024 06:00) (11 - 27)  SpO2: 93% (28 Feb 2024 06:00) (89% - 100%)    Parameters below as of 28 Feb 2024 04:00  Patient On (Oxygen Delivery Method): nasal cannula, high flow        Constitutional: Respiratory distress on HFNC  Respiratory: Tachypnic, moderate respiratory distress   Cardiovascular: RRR  Gastrointestinal: Soft, non-tender, no hepatosplenomegaly, normal bowel sounds, perc sky tube in place draining bile     I&O's Detail    26 Feb 2024 07:01  -  27 Feb 2024 07:00  --------------------------------------------------------  IN:    Albumin 25%  -  50 mL: 100 mL    Enteral Tube Flush: 100 mL    Insulin: 23 mL    IV PiggyBack: 500 mL    Norepinephrine: 123.9 mL    Pantoprazole: 70 mL    Sodium Bicarbonate: 1350 mL    Sodium Bicarbonate: 100 mL  Total IN: 2366.9 mL    OUT:    Drain (mL): 145 mL    Indwelling Catheter - Urethral (mL): 655 mL    Nasogastric/Oral tube (mL): 50 mL  Total OUT: 850 mL    Total NET: 1516.9 mL      27 Feb 2024 07:01  -  28 Feb 2024 06:49  --------------------------------------------------------  IN:    Albumin 25%  -  50 mL: 100 mL    Enteral Tube Flush: 250 mL    Insulin: 3 mL    IV PiggyBack: 175 mL    Norepinephrine: 33 mL    Sodium Bicarbonate: 1200 mL  Total IN: 1761 mL    OUT:    Drain (mL): 160 mL    Indwelling Catheter - Urethral (mL): 765 mL    Nasogastric/Oral tube (mL): 180 mL    Other (mL): 1000 mL  Total OUT: 2105 mL    Total NET: -344 mL          LABS:                        8.0    14.43 )-----------( 143      ( 28 Feb 2024 02:45 )             23.9     02-28    138  |  95<L>  |  26.7<H>  ----------------------------<  254<H>  4.4   |  30.0<H>  |  3.66<H>    Ca    8.2<L>      28 Feb 2024 02:45  Phos  3.6     02-28  Mg     1.8     02-28    TPro  5.8<L>  /  Alb  3.4  /  TBili  1.8  /  DBili  x   /  AST  22  /  ALT  11  /  AlkPhos  45  02-28    PT/INR - ( 26 Feb 2024 10:51 )   PT: 11.6 sec;   INR: 1.05 ratio         PTT - ( 26 Feb 2024 10:51 )  PTT:19.6 sec  Urinalysis Basic - ( 28 Feb 2024 02:45 )    Color: x / Appearance: x / SG: x / pH: x  Gluc: 254 mg/dL / Ketone: x  / Bili: x / Urobili: x   Blood: x / Protein: x / Nitrite: x   Leuk Esterase: x / RBC: x / WBC x   Sq Epi: x / Non Sq Epi: x / Bacteria: x        RADIOLOGY & ADDITIONAL STUDIES:

## 2024-02-29 LAB
ALBUMIN SERPL ELPH-MCNC: 3.6 G/DL — SIGNIFICANT CHANGE UP (ref 3.3–5.2)
ALP SERPL-CCNC: 60 U/L — SIGNIFICANT CHANGE UP (ref 40–120)
ALT FLD-CCNC: 14 U/L — SIGNIFICANT CHANGE UP
ANION GAP SERPL CALC-SCNC: 15 MMOL/L — SIGNIFICANT CHANGE UP (ref 5–17)
AST SERPL-CCNC: 26 U/L — SIGNIFICANT CHANGE UP
BASOPHILS # BLD AUTO: 0.04 K/UL — SIGNIFICANT CHANGE UP (ref 0–0.2)
BASOPHILS NFR BLD AUTO: 0.3 % — SIGNIFICANT CHANGE UP (ref 0–2)
BILIRUB SERPL-MCNC: 1.5 MG/DL — SIGNIFICANT CHANGE UP (ref 0.4–2)
BUN SERPL-MCNC: 29.4 MG/DL — HIGH (ref 8–20)
CALCIUM SERPL-MCNC: 8.9 MG/DL — SIGNIFICANT CHANGE UP (ref 8.4–10.5)
CHLORIDE SERPL-SCNC: 96 MMOL/L — SIGNIFICANT CHANGE UP (ref 96–108)
CO2 SERPL-SCNC: 27 MMOL/L — SIGNIFICANT CHANGE UP (ref 22–29)
CREAT SERPL-MCNC: 3.01 MG/DL — HIGH (ref 0.5–1.3)
DIGOXIN SERPL-MCNC: 0.7 NG/ML — LOW (ref 0.8–2)
EGFR: 20 ML/MIN/1.73M2 — LOW
EOSINOPHIL # BLD AUTO: 0 K/UL — SIGNIFICANT CHANGE UP (ref 0–0.5)
EOSINOPHIL NFR BLD AUTO: 0 % — SIGNIFICANT CHANGE UP (ref 0–6)
FERRITIN SERPL-MCNC: 565 NG/ML — HIGH (ref 30–400)
GLUCOSE BLDC GLUCOMTR-MCNC: 213 MG/DL — HIGH (ref 70–99)
GLUCOSE BLDC GLUCOMTR-MCNC: 228 MG/DL — HIGH (ref 70–99)
GLUCOSE BLDC GLUCOMTR-MCNC: 255 MG/DL — HIGH (ref 70–99)
GLUCOSE SERPL-MCNC: 218 MG/DL — HIGH (ref 70–99)
HCT VFR BLD CALC: 29.3 % — LOW (ref 39–50)
HGB BLD-MCNC: 9.8 G/DL — LOW (ref 13–17)
IMM GRANULOCYTES NFR BLD AUTO: 1.9 % — HIGH (ref 0–0.9)
IRON SATN MFR SERPL: 20 % — SIGNIFICANT CHANGE UP (ref 16–55)
IRON SATN MFR SERPL: 27 UG/DL — LOW (ref 59–158)
LYMPHOCYTES # BLD AUTO: 0.84 K/UL — LOW (ref 1–3.3)
LYMPHOCYTES # BLD AUTO: 5.6 % — LOW (ref 13–44)
MAGNESIUM SERPL-MCNC: 1.9 MG/DL — SIGNIFICANT CHANGE UP (ref 1.8–2.6)
MCHC RBC-ENTMCNC: 29.7 PG — SIGNIFICANT CHANGE UP (ref 27–34)
MCHC RBC-ENTMCNC: 33.4 GM/DL — SIGNIFICANT CHANGE UP (ref 32–36)
MCV RBC AUTO: 88.8 FL — SIGNIFICANT CHANGE UP (ref 80–100)
MONOCYTES # BLD AUTO: 0.55 K/UL — SIGNIFICANT CHANGE UP (ref 0–0.9)
MONOCYTES NFR BLD AUTO: 3.7 % — SIGNIFICANT CHANGE UP (ref 2–14)
NEUTROPHILS # BLD AUTO: 13.16 K/UL — HIGH (ref 1.8–7.4)
NEUTROPHILS NFR BLD AUTO: 88.5 % — HIGH (ref 43–77)
PHOSPHATE SERPL-MCNC: 3 MG/DL — SIGNIFICANT CHANGE UP (ref 2.4–4.7)
PLATELET # BLD AUTO: 177 K/UL — SIGNIFICANT CHANGE UP (ref 150–400)
POTASSIUM SERPL-MCNC: 3.8 MMOL/L — SIGNIFICANT CHANGE UP (ref 3.5–5.3)
POTASSIUM SERPL-SCNC: 3.8 MMOL/L — SIGNIFICANT CHANGE UP (ref 3.5–5.3)
PROCALCITONIN SERPL-MCNC: 24.62 NG/ML — HIGH (ref 0.02–0.1)
PROT SERPL-MCNC: 6.6 G/DL — SIGNIFICANT CHANGE UP (ref 6.6–8.7)
RBC # BLD: 3.3 M/UL — LOW (ref 4.2–5.8)
RBC # FLD: 14.8 % — HIGH (ref 10.3–14.5)
SODIUM SERPL-SCNC: 138 MMOL/L — SIGNIFICANT CHANGE UP (ref 135–145)
TIBC SERPL-MCNC: 136 UG/DL — LOW (ref 220–430)
TRANSFERRIN SERPL-MCNC: 95 MG/DL — LOW (ref 180–329)
WBC # BLD: 14.88 K/UL — HIGH (ref 3.8–10.5)
WBC # FLD AUTO: 14.88 K/UL — HIGH (ref 3.8–10.5)

## 2024-02-29 PROCEDURE — 99233 SBSQ HOSP IP/OBS HIGH 50: CPT

## 2024-02-29 PROCEDURE — 99232 SBSQ HOSP IP/OBS MODERATE 35: CPT

## 2024-02-29 RX ORDER — METOPROLOL TARTRATE 50 MG
25 TABLET ORAL
Refills: 0 | Status: DISCONTINUED | OUTPATIENT
Start: 2024-02-29 | End: 2024-03-01

## 2024-02-29 RX ORDER — OXYCODONE HYDROCHLORIDE 5 MG/1
5 TABLET ORAL ONCE
Refills: 0 | Status: DISCONTINUED | OUTPATIENT
Start: 2024-02-29 | End: 2024-02-29

## 2024-02-29 RX ORDER — AMLODIPINE BESYLATE 2.5 MG/1
2.5 TABLET ORAL DAILY
Refills: 0 | Status: DISCONTINUED | OUTPATIENT
Start: 2024-02-29 | End: 2024-03-01

## 2024-02-29 RX ADMIN — PANTOPRAZOLE SODIUM 40 MILLIGRAM(S): 20 TABLET, DELAYED RELEASE ORAL at 06:11

## 2024-02-29 RX ADMIN — SIMVASTATIN 20 MILLIGRAM(S): 20 TABLET, FILM COATED ORAL at 22:56

## 2024-02-29 RX ADMIN — INSULIN GLARGINE 10 UNIT(S): 100 INJECTION, SOLUTION SUBCUTANEOUS at 11:48

## 2024-02-29 RX ADMIN — Medication 325 MILLIGRAM(S): at 11:48

## 2024-02-29 RX ADMIN — PIPERACILLIN AND TAZOBACTAM 25 GRAM(S): 4; .5 INJECTION, POWDER, LYOPHILIZED, FOR SOLUTION INTRAVENOUS at 06:08

## 2024-02-29 RX ADMIN — MUPIROCIN 1 APPLICATION(S): 20 OINTMENT TOPICAL at 17:48

## 2024-02-29 RX ADMIN — AMLODIPINE BESYLATE 2.5 MILLIGRAM(S): 2.5 TABLET ORAL at 11:48

## 2024-02-29 RX ADMIN — HEPARIN SODIUM 5000 UNIT(S): 5000 INJECTION INTRAVENOUS; SUBCUTANEOUS at 14:31

## 2024-02-29 RX ADMIN — OXYCODONE HYDROCHLORIDE 5 MILLIGRAM(S): 5 TABLET ORAL at 22:56

## 2024-02-29 RX ADMIN — GABAPENTIN 300 MILLIGRAM(S): 400 CAPSULE ORAL at 22:56

## 2024-02-29 RX ADMIN — Medication 3 MILLIGRAM(S): at 22:56

## 2024-02-29 RX ADMIN — SENNA PLUS 2 TABLET(S): 8.6 TABLET ORAL at 22:56

## 2024-02-29 RX ADMIN — PIPERACILLIN AND TAZOBACTAM 25 GRAM(S): 4; .5 INJECTION, POWDER, LYOPHILIZED, FOR SOLUTION INTRAVENOUS at 17:48

## 2024-02-29 RX ADMIN — Medication 25 MILLIGRAM(S): at 17:48

## 2024-02-29 RX ADMIN — Medication 2: at 06:11

## 2024-02-29 RX ADMIN — Medication 2: at 11:47

## 2024-02-29 RX ADMIN — Medication 650 MILLIGRAM(S): at 22:55

## 2024-02-29 RX ADMIN — DRONEDARONE 400 MILLIGRAM(S): 400 TABLET, FILM COATED ORAL at 17:46

## 2024-02-29 RX ADMIN — Medication 50 MILLIGRAM(S): at 06:10

## 2024-02-29 RX ADMIN — HEPARIN SODIUM 5000 UNIT(S): 5000 INJECTION INTRAVENOUS; SUBCUTANEOUS at 22:55

## 2024-02-29 RX ADMIN — HEPARIN SODIUM 5000 UNIT(S): 5000 INJECTION INTRAVENOUS; SUBCUTANEOUS at 06:10

## 2024-02-29 RX ADMIN — Medication 3: at 17:46

## 2024-02-29 RX ADMIN — PANTOPRAZOLE SODIUM 40 MILLIGRAM(S): 20 TABLET, DELAYED RELEASE ORAL at 17:48

## 2024-02-29 RX ADMIN — DRONEDARONE 400 MILLIGRAM(S): 400 TABLET, FILM COATED ORAL at 06:10

## 2024-02-29 RX ADMIN — MUPIROCIN 1 APPLICATION(S): 20 OINTMENT TOPICAL at 06:09

## 2024-02-29 NOTE — PROGRESS NOTE ADULT - ASSESSMENT
Patient is an 84 y M with PMH recent COVID-19, acute cholecystitis s/p cholecystostomy tube (12/22/23), AFib not on A/C 2/2 GIB, and CKD, who presented to [facility] ED on 02/25/24 with fluid draining around the perc sky tube insertion site upon flushing the tube.  In the ED, he  had an episode of coffee ground emesis and GI was consulted. CTA abdomen was done with no evidence of acute GIB. on 02/26/24 patient developed hypotension, respiratory failure and RRT was called. patient was transferred to MICU and was started on pressors. also on high flow O2 with CXR with bilateral infiltrates--likely aspiration pneumonia. placed on zosyn and vancomycin. developed ATN so HD initiated on 2/27/24. weaned off vasopressors on 02/27/28 and transferred out of ICU on 02/29/24. vancomycin has been discontinued. his care was transferred to hospitalist service this morning.    #. acute respiratory failure with hypoxia. due to aspiration pneumonia. on High Flow O2 FiO2 50%. wean O2 as able    #. aspiration pneumonia. blood culture negative. c/w zosyn    #. acute renal failure due to ATN. HD dependent. had 2 sessions of HD 02/27, 02/28. renal following.    #. dysphagia: speech evaluation ordered    #. HTN. BP has increased. start metoprolol and amlodipine    #. A.fib. c/w multaq and restart metoprolol. no AC.    #. Type 2 DM. blood glucose in 200. c/w lantus 10 units and SSI for now.    # delirium. patient very confused as per family at bedside. this is most likely hospital acquired delirium    #. s/p cholecystostomy tube since 12/22/23. need cholecystectomy. family wants it done before he leaves hospital. not medically ready yet    #. possible GIB. HB stable. c/w protonix. GI recommended no endoscopy for now and has signed off    #. septic shock. resolved. dc hydrocortisone.    #DVT prophylaxis: heparin    Patient is an 84 y M with PMH recent COVID-19, acute cholecystitis s/p cholecystostomy tube (12/22/23), AFib not on A/C 2/2 GIB, and CKD, who presented to [facility] ED on 02/25/24 with fluid draining around the perc sky tube insertion site upon flushing the tube.  In the ED, he  had an episode of coffee ground emesis and GI was consulted. CTA abdomen was done with no evidence of acute GIB. on 02/26/24 patient developed hypotension, respiratory failure and RRT was called. patient was transferred to MICU and was started on pressors. also on high flow O2 with CXR with bilateral infiltrates--likely aspiration pneumonia. placed on zosyn and vancomycin. developed ATN so HD initiated on 2/27/24. weaned off vasopressors on 02/27/28 and transferred out of ICU on 02/29/24. vancomycin has been discontinued. his care was transferred to hospitalist service this morning.    #. acute respiratory failure with hypoxia. due to aspiration pneumonia. on High Flow O2 FiO2 50%. wean O2 as able    #. aspiration pneumonia. blood culture negative. c/w zosyn    #. acute renal failure due to ATN on CKD4. HD dependent. had 2 sessions of HD 02/27, 02/28. renal following.    #. dysphagia: speech evaluation ordered    #. HTN. BP has increased. start metoprolol and amlodipine    #. A.fib. c/w multaq and restart metoprolol. no AC.    #. Type 2 DM. blood glucose in 200. c/w lantus 10 units and SSI for now.    # delirium. patient very confused as per family at bedside. this is most likely hospital acquired delirium    #. s/p cholecystostomy tube since 12/22/23. need cholecystectomy. family wants it done before he leaves hospital. not medically ready yet    #. possible GIB. HB stable. c/w protonix. GI recommended no endoscopy for now and has signed off    #. septic shock. resolved. dc hydrocortisone.    #DVT prophylaxis: heparin

## 2024-02-29 NOTE — PROGRESS NOTE ADULT - SUBJECTIVE AND OBJECTIVE BOX
[facility] Division of Hospital Medicine    Chief Complaint:      SUBJECTIVE / OVERNIGHT EVENTS:    Patient is an 84 y M with PMH recent COVID-19, acute cholecystitis s/p cholecystostomy tube (12/22/23), AFib not on A/C 2/2 GIB, and CKD, who presented to [facility] ED on 02/25/24 with fluid draining around the perc sky tube insertion site upon flushing the tube.  In the ED, he  had an episode of coffee ground emesis and GI was consulted. CTA abdomen was done with no evidence of acute GIB. on 02/26/24 patient developed hypotension, respiratory failure and RRT was called. patient was transferred to MICU and was started on pressors. also on high flow O2 with CXR with bilateral infiltrates--likely aspiration pneumonia. placed on zosyn and vancomycin. developed ATN so HD initiated on 2/27/24. weaned off vasopressors on 02/27/28 and transferred out of ICU on 02/29/24. vancomycin has been discontinued. his care was transferred to hospitalist service this morning.  I saw the patient with family at bedside who says patient is very confused; does not have diagnosis of dementia. family very much concerned about the timing of cholecystectomy and want to have it done before he leaves hospital this time. he had acute cholecystitis in Dec 2023 and has cholecystostomy tube since then and has been to hospital for 3 times since then.      MEDICATIONS  (STANDING):  dextrose 5%. 1000 milliLiter(s) (50 mL/Hr) IV Continuous <Continuous>  dextrose 5%. 1000 milliLiter(s) (100 mL/Hr) IV Continuous <Continuous>  dextrose 50% Injectable 12.5 Gram(s) IV Push once  dextrose 50% Injectable 25 Gram(s) IV Push once  dextrose 50% Injectable 25 Gram(s) IV Push once  dronedarone 400 milliGRAM(s) Oral two times a day  epoetin kristal-epbx (RETACRIT) Injectable 98182 Unit(s) IV Push <User Schedule>  ferrous    sulfate 325 milliGRAM(s) Oral daily  gabapentin 300 milliGRAM(s) Oral at bedtime  glucagon  Injectable 1 milliGRAM(s) IntraMuscular once  heparin   Injectable 5000 Unit(s) SubCutaneous every 8 hours  insulin glargine Injectable (LANTUS) 10 Unit(s) SubCutaneous every morning  insulin lispro (ADMELOG) corrective regimen sliding scale   SubCutaneous every 6 hours  mupirocin 2% Nasal 1 Application(s) Both Nostrils two times a day  pantoprazole  Injectable 40 milliGRAM(s) IV Push every 12 hours  piperacillin/tazobactam IVPB.. 3.375 Gram(s) IV Intermittent every 12 hours  senna 2 Tablet(s) Oral at bedtime  simvastatin 20 milliGRAM(s) Oral at bedtime    MEDICATIONS  (PRN):  acetaminophen     Tablet .. 650 milliGRAM(s) Oral every 6 hours PRN Temp greater or equal to 38C (100.4F), Mild Pain (1 - 3)  aluminum hydroxide/magnesium hydroxide/simethicone Suspension 30 milliLiter(s) Oral every 4 hours PRN Dyspepsia  dextrose Oral Gel 15 Gram(s) Oral once PRN Blood Glucose LESS THAN 70 milliGRAM(s)/deciliter  melatonin 3 milliGRAM(s) Oral at bedtime PRN Insomnia  ondansetron Injectable 4 milliGRAM(s) IV Push every 8 hours PRN Nausea and/or Vomiting  sodium chloride 0.9% lock flush 10 milliLiter(s) IV Push every 1 hour PRN Pre/post blood products, medications, blood draw, and to maintain line patency        I&O's Summary    28 Feb 2024 07:01  -  29 Feb 2024 07:00  --------------------------------------------------------  IN: 1335 mL / OUT: 4275 mL / NET: -2940 mL        PHYSICAL EXAM:  Vital Signs Last 24 Hrs  T(C): 36.1 (29 Feb 2024 08:00), Max: 37.4 (28 Feb 2024 10:00)  T(F): 97 (29 Feb 2024 08:00), Max: 99.3 (28 Feb 2024 10:00)  HR: 76 (29 Feb 2024 08:00) (73 - 128)  BP: 169/77 (29 Feb 2024 08:00) (111/65 - 169/77)  BP(mean): 100 (29 Feb 2024 08:00) (78 - 112)  RR: 18 (29 Feb 2024 08:00) (16 - 25)  SpO2: 97% (29 Feb 2024 08:00) (91% - 99%)    Parameters below as of 29 Feb 2024 08:00  Patient On (Oxygen Delivery Method): nasal cannula, high flow  O2 Flow (L/min): 40  O2 Concentration (%): 50        CONSTITUTIONAL: NAD,   ENMT: normocephalic, atraumatic  RESPIRATORY: Normal respiratory effort; lungs are clear to auscultation bilaterally  CARDIOVASCULAR: Regular rate and rhythm, normal S1 and S2, no murmur/rub/gallop  ABDOMEN: slightly tender in right upper quadrant, no rebound/guarding; No hepatosplenomegaly  MUSCULOSKELETAL:  No edema  PSYCH: Awake, confused; affect appropriate  NEUROLOGY: CN 2-12 are intact and symmetric; no gross deficits;   SKIN: No rashes; no palpable lesions    LABS:                        9.8    14.88 )-----------( 177      ( 29 Feb 2024 05:08 )             29.3     02-29    138  |  96  |  29.4<H>  ----------------------------<  218<H>  3.8   |  27.0  |  3.01<H>    Ca    8.9      29 Feb 2024 05:08  Phos  3.0     02-29  Mg     1.9     02-29    TPro  6.6  /  Alb  3.6  /  TBili  1.5  /  DBili  x   /  AST  26  /  ALT  14  /  AlkPhos  60  02-29    PT/INR - ( 28 Feb 2024 08:21 )   PT: 13.8 sec;   INR: 1.25 ratio         PTT - ( 28 Feb 2024 08:21 )  PTT:29.0 sec      Urinalysis Basic - ( 29 Feb 2024 05:08 )    Color: x / Appearance: x / SG: x / pH: x  Gluc: 218 mg/dL / Ketone: x  / Bili: x / Urobili: x   Blood: x / Protein: x / Nitrite: x   Leuk Esterase: x / RBC: x / WBC x   Sq Epi: x / Non Sq Epi: x / Bacteria: x        Culture - Blood (collected 26 Feb 2024 10:51)  Source: .Blood Blood-Peripheral  Preliminary Report (28 Feb 2024 17:01):    No growth at 48 Hours    Culture - Blood (collected 26 Feb 2024 10:42)  Source: .Blood Blood-Peripheral  Preliminary Report (28 Feb 2024 17:01):    No growth at 48 Hours      CAPILLARY BLOOD GLUCOSE      POCT Blood Glucose.: 213 mg/dL (29 Feb 2024 06:07)  POCT Blood Glucose.: 219 mg/dL (28 Feb 2024 23:36)  POCT Blood Glucose.: 145 mg/dL (28 Feb 2024 17:02)  POCT Blood Glucose.: 300 mg/dL (28 Feb 2024 10:47)        RADIOLOGY & ADDITIONAL TESTS:  Results Reviewed:   Imaging Personally Reviewed:  Electrocardiogram Personally Reviewed:

## 2024-02-29 NOTE — PROGRESS NOTE ADULT - SUBJECTIVE AND OBJECTIVE BOX
NEPHROLOGY INTERVAL HPI/OVERNIGHT EVENTS:  pt more awake, alert  no acute overnight events noted  still O2 dependent  tolerated HD yesterday  family member at bedside    MEDICATIONS  (STANDING):  amLODIPine   Tablet 2.5 milliGRAM(s) Oral daily  dextrose 5%. 1000 milliLiter(s) (50 mL/Hr) IV Continuous <Continuous>  dextrose 5%. 1000 milliLiter(s) (100 mL/Hr) IV Continuous <Continuous>  dextrose 50% Injectable 12.5 Gram(s) IV Push once  dextrose 50% Injectable 25 Gram(s) IV Push once  dextrose 50% Injectable 25 Gram(s) IV Push once  dronedarone 400 milliGRAM(s) Oral two times a day  epoetin kristal-epbx (RETACRIT) Injectable 48079 Unit(s) IV Push <User Schedule>  ferrous    sulfate 325 milliGRAM(s) Oral daily  gabapentin 300 milliGRAM(s) Oral at bedtime  glucagon  Injectable 1 milliGRAM(s) IntraMuscular once  heparin   Injectable 5000 Unit(s) SubCutaneous every 8 hours  insulin glargine Injectable (LANTUS) 10 Unit(s) SubCutaneous every morning  insulin lispro (ADMELOG) corrective regimen sliding scale   SubCutaneous every 6 hours  metoprolol tartrate 25 milliGRAM(s) Enteral Tube two times a day  mupirocin 2% Nasal 1 Application(s) Both Nostrils two times a day  pantoprazole  Injectable 40 milliGRAM(s) IV Push every 12 hours  piperacillin/tazobactam IVPB.. 3.375 Gram(s) IV Intermittent every 12 hours  senna 2 Tablet(s) Oral at bedtime  simvastatin 20 milliGRAM(s) Oral at bedtime    MEDICATIONS  (PRN):  acetaminophen     Tablet .. 650 milliGRAM(s) Oral every 6 hours PRN Temp greater or equal to 38C (100.4F), Mild Pain (1 - 3)  aluminum hydroxide/magnesium hydroxide/simethicone Suspension 30 milliLiter(s) Oral every 4 hours PRN Dyspepsia  dextrose Oral Gel 15 Gram(s) Oral once PRN Blood Glucose LESS THAN 70 milliGRAM(s)/deciliter  melatonin 3 milliGRAM(s) Oral at bedtime PRN Insomnia  ondansetron Injectable 4 milliGRAM(s) IV Push every 8 hours PRN Nausea and/or Vomiting  sodium chloride 0.9% lock flush 10 milliLiter(s) IV Push every 1 hour PRN Pre/post blood products, medications, blood draw, and to maintain line patency      Allergies    No Known Allergies        Vital Signs Last 24 Hrs  T(C): 36.1 (29 Feb 2024 08:00), Max: 37.1 (28 Feb 2024 11:00)  T(F): 97 (29 Feb 2024 08:00), Max: 98.8 (28 Feb 2024 11:00)  HR: 76 (29 Feb 2024 08:00) (73 - 128)  BP: 169/77 (29 Feb 2024 08:00) (111/65 - 169/77)  BP(mean): 100 (29 Feb 2024 08:00) (78 - 112)  RR: 18 (29 Feb 2024 08:00) (16 - 24)  SpO2: 97% (29 Feb 2024 08:00) (91% - 99%)    Parameters below as of 29 Feb 2024 08:00  Patient On (Oxygen Delivery Method): nasal cannula, high flow  O2 Flow (L/min): 40  O2 Concentration (%): 50    PHYSICAL EXAM:  GENERAL: Appears acutely ill  HEENMT: No periorbital edema  NECK: Supple, no JVD  NERVOUS SYSTEM:  Awake, alert  CHEST/LUNG: Diminished BS  HEART: Regular rate and rhythm; No rub  ABDOMEN: Soft, +BS  EXTREMITIES: + dependent edema  SKIN: No rashes    LABS:                        9.8    14.88 )-----------( 177      ( 29 Feb 2024 05:08 )             29.3     02-29    138  |  96  |  29.4<H>  ----------------------------<  218<H>  3.8   |  27.0  |  3.01<H>    Ca    8.9      29 Feb 2024 05:08  Phos  3.0     02-29  Mg     1.9     02-29    TPro  6.6  /  Alb  3.6  /  TBili  1.5  /  DBili  x   /  AST  26  /  ALT  14  /  AlkPhos  60  02-29    PT/INR - ( 28 Feb 2024 08:21 )   PT: 13.8 sec;   INR: 1.25 ratio         PTT - ( 28 Feb 2024 08:21 )  PTT:29.0 sec  Urinalysis Basic - ( 29 Feb 2024 05:08 )    Color: x / Appearance: x / SG: x / pH: x  Gluc: 218 mg/dL / Ketone: x  / Bili: x / Urobili: x   Blood: x / Protein: x / Nitrite: x   Leuk Esterase: x / RBC: x / WBC x   Sq Epi: x / Non Sq Epi: x / Bacteria: x      Magnesium: 1.9 mg/dL (02-29 @ 05:08)  Phosphorus: 3.0 mg/dL (02-29 @ 05:08)      RADIOLOGY & ADDITIONAL TESTS:  < from: Xray Chest 1 View- PORTABLE-Urgent (Xray Chest 1 View- PORTABLE-Urgent .) (02.27.24 @ 17:58) >  ACC: [id number] EXAM:  XR CHEST PORTABLE URGENT 1V   ORDERED BY: JALEN MACKAY     PROCEDURE DATE:  02/27/2024          INTERPRETATION:  Portable AP chest radiographs    COMPARISON: 2/27/2024 chest x-ray.    CLINICAL INFORMATION: Hypoxia.    FINDINGS:  CATHETERS AND TUBES: RIGHT IJ catheter tip in SVC.  NG tube tip beyond GE junction.    PULMONARY: RIGHT upper zone and LEFT lower zone ill-defined airspace   opacities/infiltrates.  No significant change.    HEART/VASCULAR: The  heart is enlargedin transverse diameter. .    BONES: The visualized osseous thorax is intact.    IMPRESSION:    No significant change.    < end of copied text >

## 2024-02-29 NOTE — PROGRESS NOTE ADULT - SUBJECTIVE AND OBJECTIVE BOX
INTERVAL HPI/OVERNIGHT EVENTS:    Patient evaluated at bedside. NAOE. Patient denies V/CP/SOB, no subjective fevers or chills. On HFNC downgraded to stepdown level of care.    MEDICATIONS  (STANDING):  dextrose 5%. 1000 milliLiter(s) (100 mL/Hr) IV Continuous <Continuous>  dextrose 5%. 1000 milliLiter(s) (50 mL/Hr) IV Continuous <Continuous>  dextrose 50% Injectable 12.5 Gram(s) IV Push once  dextrose 50% Injectable 25 Gram(s) IV Push once  dextrose 50% Injectable 25 Gram(s) IV Push once  dronedarone 400 milliGRAM(s) Oral two times a day  epoetin kristal-epbx (RETACRIT) Injectable 37523 Unit(s) IV Push <User Schedule>  ferrous    sulfate 325 milliGRAM(s) Oral daily  gabapentin 300 milliGRAM(s) Oral at bedtime  glucagon  Injectable 1 milliGRAM(s) IntraMuscular once  heparin   Injectable 5000 Unit(s) SubCutaneous every 8 hours  hydrocortisone sodium succinate Injectable 50 milliGRAM(s) IV Push every 6 hours  insulin glargine Injectable (LANTUS) 10 Unit(s) SubCutaneous every morning  insulin lispro (ADMELOG) corrective regimen sliding scale   SubCutaneous every 6 hours  mupirocin 2% Nasal 1 Application(s) Both Nostrils two times a day  pantoprazole  Injectable 40 milliGRAM(s) IV Push every 12 hours  piperacillin/tazobactam IVPB.. 3.375 Gram(s) IV Intermittent every 12 hours  senna 2 Tablet(s) Oral at bedtime  simvastatin 20 milliGRAM(s) Oral at bedtime    MEDICATIONS  (PRN):  acetaminophen     Tablet .. 650 milliGRAM(s) Oral every 6 hours PRN Temp greater or equal to 38C (100.4F), Mild Pain (1 - 3)  aluminum hydroxide/magnesium hydroxide/simethicone Suspension 30 milliLiter(s) Oral every 4 hours PRN Dyspepsia  dextrose Oral Gel 15 Gram(s) Oral once PRN Blood Glucose LESS THAN 70 milliGRAM(s)/deciliter  melatonin 3 milliGRAM(s) Oral at bedtime PRN Insomnia  ondansetron Injectable 4 milliGRAM(s) IV Push every 8 hours PRN Nausea and/or Vomiting  sodium chloride 0.9% lock flush 10 milliLiter(s) IV Push every 1 hour PRN Pre/post blood products, medications, blood draw, and to maintain line patency      Vital Signs Last 24 Hrs  T(C): 36.2 (29 Feb 2024 04:01), Max: 37.4 (28 Feb 2024 10:00)  T(F): 97.2 (29 Feb 2024 04:01), Max: 99.3 (28 Feb 2024 10:00)  HR: 73 (29 Feb 2024 04:01) (73 - 128)  BP: 158/79 (29 Feb 2024 04:01) (111/65 - 162/71)  BP(mean): 99 (29 Feb 2024 04:01) (78 - 114)  RR: 18 (29 Feb 2024 04:01) (16 - 25)  SpO2: 97% (29 Feb 2024 04:01) (90% - 99%)    Parameters below as of 29 Feb 2024 04:01  Patient On (Oxygen Delivery Method): nasal cannula, high flow  O2 Flow (L/min): 40  O2 Concentration (%): 50    Constitutional: NAD  HEENT: PERRLA, no drainage or redness  Respiratory: respirations even, unlabored on HFNC  Cardiovascular: RRR  Gastrointestinal: Soft, non-tender, non-distended  Neurological: A&O x 3; no gross deficits  Psychiatric: Normal mood, normal affect  Musculoskeletal: No joint pain, swelling or deformity; no limitation of movement  Skin: No rashes      I&O's Detail    28 Feb 2024 07:01  -  29 Feb 2024 07:00  --------------------------------------------------------  IN:    Albumin 25%  -  50 mL: 50 mL    Enteral Tube Flush: 260 mL    IV PiggyBack: 100 mL    IV PiggyBack: 125 mL    Other (mL): 800 mL  Total IN: 1335 mL    OUT:    Drain (mL): 70 mL    Indwelling Catheter - Urethral (mL): 1905 mL    Norepinephrine: 0 mL    Other (mL): 2300 mL  Total OUT: 4275 mL    Total NET: -2940 mL          LABS:                        9.8    14.88 )-----------( 177      ( 29 Feb 2024 05:08 )             29.3     02-29    138  |  96  |  29.4<H>  ----------------------------<  218<H>  3.8   |  27.0  |  3.01<H>    Ca    8.9      29 Feb 2024 05:08  Phos  3.0     02-29  Mg     1.9     02-29    TPro  6.6  /  Alb  3.6  /  TBili  1.5  /  DBili  x   /  AST  26  /  ALT  14  /  AlkPhos  60  02-29    PT/INR - ( 28 Feb 2024 08:21 )   PT: 13.8 sec;   INR: 1.25 ratio         PTT - ( 28 Feb 2024 08:21 )  PTT:29.0 sec  Urinalysis Basic - ( 29 Feb 2024 05:08 )    Color: x / Appearance: x / SG: x / pH: x  Gluc: 218 mg/dL / Ketone: x  / Bili: x / Urobili: x   Blood: x / Protein: x / Nitrite: x   Leuk Esterase: x / RBC: x / WBC x   Sq Epi: x / Non Sq Epi: x / Bacteria: x

## 2024-02-29 NOTE — PROGRESS NOTE ADULT - ASSESSMENT
RON: ATN post IV contrast and hypotension  +PVC  CKD(IV) +DM, HTN  CT scan: no hydronephrosis  - avoid further potential nephrotoxins  - cont to monitor for signs of renal recovery  - no need for HD today; check labs in AM tomorrow and re-assess    Anemia: + multifactorial  - added CINDI  - await Fe stores

## 2024-02-29 NOTE — PROGRESS NOTE ADULT - ASSESSMENT
84M s/p cholecystomy tube 12/22/23 admitted for perc sky tube evaluation flushed at bedside yesterday. Pt with coffee ground emesis with drop in hgb 9.9 from 11, now 10.3 and leukocytosis 22.88)21.29. CT a/p negative for active GIB. Rapid response called, patient transferred to MICU for critical care management of hypotension and respiratory distress, was on vasopressors, now off and downgraded to stepdown level of care. Still on HFNC.    Plan:   - Surgical plan pending medical optimization, patient not ready for surgery at this time, will continue to follow  - Continue to monitor labs   - monitor fever curve   - rest of care for primary team

## 2024-03-01 LAB
ANION GAP SERPL CALC-SCNC: 15 MMOL/L — SIGNIFICANT CHANGE UP (ref 5–17)
BUN SERPL-MCNC: 51.7 MG/DL — HIGH (ref 8–20)
CALCIUM SERPL-MCNC: 8.2 MG/DL — LOW (ref 8.4–10.5)
CHLORIDE SERPL-SCNC: 98 MMOL/L — SIGNIFICANT CHANGE UP (ref 96–108)
CO2 SERPL-SCNC: 27 MMOL/L — SIGNIFICANT CHANGE UP (ref 22–29)
CREAT SERPL-MCNC: 4.3 MG/DL — HIGH (ref 0.5–1.3)
EGFR: 13 ML/MIN/1.73M2 — LOW
GLUCOSE BLDC GLUCOMTR-MCNC: 106 MG/DL — HIGH (ref 70–99)
GLUCOSE BLDC GLUCOMTR-MCNC: 168 MG/DL — HIGH (ref 70–99)
GLUCOSE BLDC GLUCOMTR-MCNC: 206 MG/DL — HIGH (ref 70–99)
GLUCOSE BLDC GLUCOMTR-MCNC: 230 MG/DL — HIGH (ref 70–99)
GLUCOSE BLDC GLUCOMTR-MCNC: 98 MG/DL — SIGNIFICANT CHANGE UP (ref 70–99)
GLUCOSE SERPL-MCNC: 115 MG/DL — HIGH (ref 70–99)
HCT VFR BLD CALC: 30.1 % — LOW (ref 39–50)
HGB BLD-MCNC: 9.9 G/DL — LOW (ref 13–17)
MAGNESIUM SERPL-MCNC: 1.9 MG/DL — SIGNIFICANT CHANGE UP (ref 1.6–2.6)
MCHC RBC-ENTMCNC: 29.5 PG — SIGNIFICANT CHANGE UP (ref 27–34)
MCHC RBC-ENTMCNC: 32.9 GM/DL — SIGNIFICANT CHANGE UP (ref 32–36)
MCV RBC AUTO: 89.6 FL — SIGNIFICANT CHANGE UP (ref 80–100)
PLATELET # BLD AUTO: 171 K/UL — SIGNIFICANT CHANGE UP (ref 150–400)
POTASSIUM SERPL-MCNC: 3 MMOL/L — LOW (ref 3.5–5.3)
POTASSIUM SERPL-SCNC: 3 MMOL/L — LOW (ref 3.5–5.3)
RAPID RVP RESULT: SIGNIFICANT CHANGE UP
RBC # BLD: 3.36 M/UL — LOW (ref 4.2–5.8)
RBC # FLD: 14.6 % — HIGH (ref 10.3–14.5)
SARS-COV-2 RNA SPEC QL NAA+PROBE: SIGNIFICANT CHANGE UP
SODIUM SERPL-SCNC: 140 MMOL/L — SIGNIFICANT CHANGE UP (ref 135–145)
WBC # BLD: 14.52 K/UL — HIGH (ref 3.8–10.5)
WBC # FLD AUTO: 14.52 K/UL — HIGH (ref 3.8–10.5)

## 2024-03-01 PROCEDURE — 99232 SBSQ HOSP IP/OBS MODERATE 35: CPT

## 2024-03-01 PROCEDURE — 93010 ELECTROCARDIOGRAM REPORT: CPT

## 2024-03-01 PROCEDURE — 99233 SBSQ HOSP IP/OBS HIGH 50: CPT

## 2024-03-01 RX ORDER — INSULIN LISPRO 100/ML
VIAL (ML) SUBCUTANEOUS
Refills: 0 | Status: DISCONTINUED | OUTPATIENT
Start: 2024-03-01 | End: 2024-03-03

## 2024-03-01 RX ORDER — GABAPENTIN 400 MG/1
300 CAPSULE ORAL AT BEDTIME
Refills: 0 | Status: DISCONTINUED | OUTPATIENT
Start: 2024-03-01 | End: 2024-03-03

## 2024-03-01 RX ORDER — METOPROLOL TARTRATE 50 MG
25 TABLET ORAL
Refills: 0 | Status: DISCONTINUED | OUTPATIENT
Start: 2024-03-01 | End: 2024-03-03

## 2024-03-01 RX ORDER — ACETAMINOPHEN 500 MG
650 TABLET ORAL EVERY 6 HOURS
Refills: 0 | Status: DISCONTINUED | OUTPATIENT
Start: 2024-03-01 | End: 2024-03-03

## 2024-03-01 RX ORDER — SIMVASTATIN 20 MG/1
20 TABLET, FILM COATED ORAL AT BEDTIME
Refills: 0 | Status: DISCONTINUED | OUTPATIENT
Start: 2024-03-01 | End: 2024-03-03

## 2024-03-01 RX ORDER — DRONEDARONE 400 MG/1
400 TABLET, FILM COATED ORAL
Refills: 0 | Status: DISCONTINUED | OUTPATIENT
Start: 2024-03-01 | End: 2024-03-03

## 2024-03-01 RX ORDER — AMLODIPINE BESYLATE 2.5 MG/1
2.5 TABLET ORAL DAILY
Refills: 0 | Status: DISCONTINUED | OUTPATIENT
Start: 2024-03-01 | End: 2024-03-03

## 2024-03-01 RX ORDER — POTASSIUM CHLORIDE 20 MEQ
40 PACKET (EA) ORAL ONCE
Refills: 0 | Status: COMPLETED | OUTPATIENT
Start: 2024-03-01 | End: 2024-03-01

## 2024-03-01 RX ADMIN — Medication 2: at 17:37

## 2024-03-01 RX ADMIN — SENNA PLUS 2 TABLET(S): 8.6 TABLET ORAL at 21:19

## 2024-03-01 RX ADMIN — PANTOPRAZOLE SODIUM 40 MILLIGRAM(S): 20 TABLET, DELAYED RELEASE ORAL at 06:27

## 2024-03-01 RX ADMIN — PIPERACILLIN AND TAZOBACTAM 25 GRAM(S): 4; .5 INJECTION, POWDER, LYOPHILIZED, FOR SOLUTION INTRAVENOUS at 06:28

## 2024-03-01 RX ADMIN — OXYCODONE HYDROCHLORIDE 5 MILLIGRAM(S): 5 TABLET ORAL at 00:03

## 2024-03-01 RX ADMIN — Medication 25 MILLIGRAM(S): at 06:28

## 2024-03-01 RX ADMIN — GABAPENTIN 300 MILLIGRAM(S): 400 CAPSULE ORAL at 21:19

## 2024-03-01 RX ADMIN — HEPARIN SODIUM 5000 UNIT(S): 5000 INJECTION INTRAVENOUS; SUBCUTANEOUS at 06:27

## 2024-03-01 RX ADMIN — DRONEDARONE 400 MILLIGRAM(S): 400 TABLET, FILM COATED ORAL at 17:34

## 2024-03-01 RX ADMIN — Medication 650 MILLIGRAM(S): at 17:00

## 2024-03-01 RX ADMIN — Medication 650 MILLIGRAM(S): at 16:34

## 2024-03-01 RX ADMIN — SIMVASTATIN 20 MILLIGRAM(S): 20 TABLET, FILM COATED ORAL at 21:19

## 2024-03-01 RX ADMIN — Medication 650 MILLIGRAM(S): at 00:00

## 2024-03-01 RX ADMIN — AMLODIPINE BESYLATE 2.5 MILLIGRAM(S): 2.5 TABLET ORAL at 06:28

## 2024-03-01 RX ADMIN — Medication 2: at 21:20

## 2024-03-01 RX ADMIN — DRONEDARONE 400 MILLIGRAM(S): 400 TABLET, FILM COATED ORAL at 06:28

## 2024-03-01 RX ADMIN — HEPARIN SODIUM 5000 UNIT(S): 5000 INJECTION INTRAVENOUS; SUBCUTANEOUS at 14:26

## 2024-03-01 RX ADMIN — PIPERACILLIN AND TAZOBACTAM 25 GRAM(S): 4; .5 INJECTION, POWDER, LYOPHILIZED, FOR SOLUTION INTRAVENOUS at 17:33

## 2024-03-01 RX ADMIN — MUPIROCIN 1 APPLICATION(S): 20 OINTMENT TOPICAL at 06:28

## 2024-03-01 RX ADMIN — HEPARIN SODIUM 5000 UNIT(S): 5000 INJECTION INTRAVENOUS; SUBCUTANEOUS at 21:18

## 2024-03-01 RX ADMIN — Medication 40 MILLIEQUIVALENT(S): at 14:25

## 2024-03-01 RX ADMIN — PANTOPRAZOLE SODIUM 40 MILLIGRAM(S): 20 TABLET, DELAYED RELEASE ORAL at 17:33

## 2024-03-01 RX ADMIN — ERYTHROPOIETIN 10000 UNIT(S): 10000 INJECTION, SOLUTION INTRAVENOUS; SUBCUTANEOUS at 10:15

## 2024-03-01 RX ADMIN — MUPIROCIN 1 APPLICATION(S): 20 OINTMENT TOPICAL at 17:34

## 2024-03-01 RX ADMIN — Medication 1: at 00:09

## 2024-03-01 NOTE — PROGRESS NOTE ADULT - SUBJECTIVE AND OBJECTIVE BOX
Hospitalist Daily Progress Note    Chief Complaint:  Patient is a 84y old  Male who presents with a chief complaint of Gastrointestinal hemorrhage     (27 Feb 2024 13:27)      SUBJECTIVE / OVERNIGHT EVENTS:  Patient was seen and examined at bedside.   Mumbles incoherntly  States name  Unable to do further ros.     MEDICATIONS  (STANDING):  amLODIPine   Tablet 2.5 milliGRAM(s) Oral daily  dextrose 5%. 1000 milliLiter(s) (50 mL/Hr) IV Continuous <Continuous>  dextrose 5%. 1000 milliLiter(s) (100 mL/Hr) IV Continuous <Continuous>  dextrose 50% Injectable 12.5 Gram(s) IV Push once  dextrose 50% Injectable 25 Gram(s) IV Push once  dextrose 50% Injectable 25 Gram(s) IV Push once  dronedarone 400 milliGRAM(s) Oral two times a day  epoetin kristal-epbx (RETACRIT) Injectable 64173 Unit(s) IV Push <User Schedule>  ferrous    sulfate 325 milliGRAM(s) Oral daily  gabapentin 300 milliGRAM(s) Oral at bedtime  glucagon  Injectable 1 milliGRAM(s) IntraMuscular once  heparin   Injectable 5000 Unit(s) SubCutaneous every 8 hours  insulin glargine Injectable (LANTUS) 10 Unit(s) SubCutaneous every morning  insulin lispro (ADMELOG) corrective regimen sliding scale   SubCutaneous every 6 hours  metoprolol tartrate 25 milliGRAM(s) Oral two times a day  mupirocin 2% Nasal 1 Application(s) Both Nostrils two times a day  pantoprazole  Injectable 40 milliGRAM(s) IV Push every 12 hours  piperacillin/tazobactam IVPB.. 3.375 Gram(s) IV Intermittent every 12 hours  senna 2 Tablet(s) Oral at bedtime  simvastatin 20 milliGRAM(s) Oral at bedtime    MEDICATIONS  (PRN):  acetaminophen     Tablet .. 650 milliGRAM(s) Oral every 6 hours PRN Temp greater or equal to 38C (100.4F), Mild Pain (1 - 3)  aluminum hydroxide/magnesium hydroxide/simethicone Suspension 30 milliLiter(s) Oral every 4 hours PRN Dyspepsia  dextrose Oral Gel 15 Gram(s) Oral once PRN Blood Glucose LESS THAN 70 milliGRAM(s)/deciliter  melatonin 3 milliGRAM(s) Oral at bedtime PRN Insomnia  ondansetron Injectable 4 milliGRAM(s) IV Push every 8 hours PRN Nausea and/or Vomiting  sodium chloride 0.9% lock flush 10 milliLiter(s) IV Push every 1 hour PRN Pre/post blood products, medications, blood draw, and to maintain line patency        I&O's Summary    29 Feb 2024 07:01  -  01 Mar 2024 07:00  --------------------------------------------------------  IN: 50 mL / OUT: 350 mL / NET: -300 mL    01 Mar 2024 07:01  -  01 Mar 2024 15:58  --------------------------------------------------------  IN: 0 mL / OUT: 700 mL / NET: -700 mL        PHYSICAL EXAM:  Vital Signs Last 24 Hrs  T(C): 36.6 (01 Mar 2024 12:30), Max: 37 (29 Feb 2024 19:30)  T(F): 97.9 (01 Mar 2024 12:30), Max: 98.6 (29 Feb 2024 19:30)  HR: 83 (01 Mar 2024 14:00) (62 - 88)  BP: 115/55 (01 Mar 2024 14:00) (95/51 - 150/55)  BP(mean): 70 (01 Mar 2024 14:00) (66 - 81)  RR: 22 (01 Mar 2024 12:05) (17 - 22)  SpO2: 97% (01 Mar 2024 14:00) (94% - 100%)    Parameters below as of 01 Mar 2024 14:00  Patient On (Oxygen Delivery Method): nasal cannula, high flow  O2 Flow (L/min): 40  O2 Concentration (%): 60      Constitutional: Chronically ill appearing male on HFNC  ENT: Supple, No JVD, Right IJ Temp HD CATH placed  Lungs: Coarse breath sounds b/l  Cardio: RRR, S1/S2, No murmur  Abdomen: Soft, Nontender, Per choley drain in place  Extremities: No calf tenderness, No pitting edema  Musculoskeletal:   No joint swelling  Psych: Restless  Neuro: Awake and alert, oriented to name, unaware of rest, mumblers incoherently, does not follow commands  Skin: No rashes; no palpable lesions    LABS:                        9.9    14.52 )-----------( 171      ( 01 Mar 2024 04:44 )             30.1     03-01    140  |  98  |  51.7<H>  ----------------------------<  115<H>  3.0<L>   |  27.0  |  4.30<H>    Ca    8.2<L>      01 Mar 2024 04:44  Phos  3.0     02-29  Mg     1.9     03-01    TPro  6.6  /  Alb  3.6  /  TBili  1.5  /  DBili  x   /  AST  26  /  ALT  14  /  AlkPhos  60  02-29          Urinalysis Basic - ( 01 Mar 2024 04:44 )    Color: x / Appearance: x / SG: x / pH: x  Gluc: 115 mg/dL / Ketone: x  / Bili: x / Urobili: x   Blood: x / Protein: x / Nitrite: x   Leuk Esterase: x / RBC: x / WBC x   Sq Epi: x / Non Sq Epi: x / Bacteria: x        CAPILLARY BLOOD GLUCOSE      POCT Blood Glucose.: 106 mg/dL (01 Mar 2024 12:51)  POCT Blood Glucose.: 98 mg/dL (01 Mar 2024 06:38)  POCT Blood Glucose.: 168 mg/dL (01 Mar 2024 00:04)  POCT Blood Glucose.: 255 mg/dL (29 Feb 2024 16:58)        RADIOLOGY REVIEWED

## 2024-03-01 NOTE — PROGRESS NOTE ADULT - SUBJECTIVE AND OBJECTIVE BOX
INTERVAL HPI/OVERNIGHT EVENTS:    Patient evaluated at bedside. Remains on HFNC 40L 50%, intermittent mild hypotension overnight. OK'd for moderately thick diet yesterday, NGT remains in place. Resting comfortably.    MEDICATIONS  (STANDING):  amLODIPine   Tablet 2.5 milliGRAM(s) Oral daily  dextrose 5%. 1000 milliLiter(s) (50 mL/Hr) IV Continuous <Continuous>  dextrose 5%. 1000 milliLiter(s) (100 mL/Hr) IV Continuous <Continuous>  dextrose 50% Injectable 12.5 Gram(s) IV Push once  dextrose 50% Injectable 25 Gram(s) IV Push once  dextrose 50% Injectable 25 Gram(s) IV Push once  dronedarone 400 milliGRAM(s) Oral two times a day  epoetin kristal-epbx (RETACRIT) Injectable 58119 Unit(s) IV Push <User Schedule>  ferrous    sulfate 325 milliGRAM(s) Oral daily  gabapentin 300 milliGRAM(s) Oral at bedtime  glucagon  Injectable 1 milliGRAM(s) IntraMuscular once  heparin   Injectable 5000 Unit(s) SubCutaneous every 8 hours  insulin glargine Injectable (LANTUS) 10 Unit(s) SubCutaneous every morning  insulin lispro (ADMELOG) corrective regimen sliding scale   SubCutaneous every 6 hours  metoprolol tartrate 25 milliGRAM(s) Enteral Tube two times a day  mupirocin 2% Nasal 1 Application(s) Both Nostrils two times a day  pantoprazole  Injectable 40 milliGRAM(s) IV Push every 12 hours  piperacillin/tazobactam IVPB.. 3.375 Gram(s) IV Intermittent every 12 hours  potassium chloride   Powder 40 milliEquivalent(s) Oral once  senna 2 Tablet(s) Oral at bedtime  simvastatin 20 milliGRAM(s) Oral at bedtime    MEDICATIONS  (PRN):  acetaminophen     Tablet .. 650 milliGRAM(s) Oral every 6 hours PRN Temp greater or equal to 38C (100.4F), Mild Pain (1 - 3)  aluminum hydroxide/magnesium hydroxide/simethicone Suspension 30 milliLiter(s) Oral every 4 hours PRN Dyspepsia  dextrose Oral Gel 15 Gram(s) Oral once PRN Blood Glucose LESS THAN 70 milliGRAM(s)/deciliter  melatonin 3 milliGRAM(s) Oral at bedtime PRN Insomnia  ondansetron Injectable 4 milliGRAM(s) IV Push every 8 hours PRN Nausea and/or Vomiting  sodium chloride 0.9% lock flush 10 milliLiter(s) IV Push every 1 hour PRN Pre/post blood products, medications, blood draw, and to maintain line patency      Vital Signs Last 24 Hrs  T(C): 36.8 (01 Mar 2024 08:00), Max: 37 (29 Feb 2024 19:30)  T(F): 98.2 (01 Mar 2024 08:00), Max: 98.6 (29 Feb 2024 19:30)  HR: 81 (01 Mar 2024 06:00) (71 - 88)  BP: 122/57 (01 Mar 2024 06:00) (95/51 - 158/75)  BP(mean): 74 (01 Mar 2024 06:00) (66 - 94)  RR: 22 (01 Mar 2024 06:10) (17 - 22)  SpO2: 95% (01 Mar 2024 06:10) (95% - 98%)    Parameters below as of 01 Mar 2024 06:10  Patient On (Oxygen Delivery Method): nasal cannula, high flow  O2 Flow (L/min): 40  O2 Concentration (%): 50    Constitutional: NAD  HEENT: PERRLA, no drainage or redness  Respiratory: respirations even, unlabored on HFNC 40L 50%  Cardiovascular: RRR  Gastrointestinal: Soft, non-tender, non-distended; perc syk tube in place functioning appropriately  Neurological: confused, delirious      I&O's Detail    29 Feb 2024 07:01  -  01 Mar 2024 07:00  --------------------------------------------------------  IN:    IV PiggyBack: 50 mL  Total IN: 50 mL    OUT:    Indwelling Catheter - Urethral (mL): 350 mL  Total OUT: 350 mL    Total NET: -300 mL          LABS:                        9.9    14.52 )-----------( 171      ( 01 Mar 2024 04:44 )             30.1     03-01    140  |  98  |  51.7<H>  ----------------------------<  115<H>  3.0<L>   |  27.0  |  4.30<H>    Ca    8.2<L>      01 Mar 2024 04:44  Phos  3.0     02-29  Mg     1.9     03-01    TPro  6.6  /  Alb  3.6  /  TBili  1.5  /  DBili  x   /  AST  26  /  ALT  14  /  AlkPhos  60  02-29      Urinalysis Basic - ( 01 Mar 2024 04:44 )    Color: x / Appearance: x / SG: x / pH: x  Gluc: 115 mg/dL / Ketone: x  / Bili: x / Urobili: x   Blood: x / Protein: x / Nitrite: x   Leuk Esterase: x / RBC: x / WBC x   Sq Epi: x / Non Sq Epi: x / Bacteria: x

## 2024-03-01 NOTE — PROGRESS NOTE ADULT - ASSESSMENT
84 y M with PMH recent COVID-19, acute cholecystitis s/p cholecystostomy tube (12/22/23), AFib not on A/C 2/2 GIB, and CKD, who presented to [facility] ED on 02/25/24 with fluid draining around the perc sky tube insertion site upon flushing the tube.  In the ED, he  had an episode of coffee ground emesis and GI was consulted. CTA abdomen was done with no evidence of acute GIB. on 02/26/24 patient developed hypotension, respiratory failure and RRT was called. patient was transferred to MICU and was started on pressors. also on high flow O2 with CXR with bilateral infiltrates--likely aspiration pneumonia. placed on zosyn and vancomycin. developed ATN so HD initiated on 2/27/24. weaned off vasopressors on 02/27/28 and transferred out of ICU on 02/29/24. vancomycin has been discontinued. his care was transferred to hospitalist service this morning.    #Acute hypoxic respiratory failure  #Aspiration PNA  #Septic shock  SDU  c/w HFNC - WEAN as tolerated  Hydrocortisone stopped   Placed on zosyn  MRSA nares positive  Was on Vanco but now stopped  Sputum cx ordered  Blood cx with NGTD     #Acute renal failure due  to ATN  Now started on HD  Temp catheter placed  Monitor for renal recovery  HD per renal    #Dysphagia  Passed speech and swallow  Dysphagia diet    # A. fib  C/w Dronedarone  No AC    #DM  Lantus/ISS    #Delirium  Likely hospital acquired  Monitor for improvement  Can consider starting seroquel if worsens    #Cholecystitis  Perc choley in place  General surgery recs apprecaited  Family concerned and wants cholecystectomy during admission    #?GI bleed   CT scan without active beel  PPI  GI recs appreciated  No plan for procedure by GI  H/H stable    #HTN  Norvasc    #HLD  Simvastatin    #DVT prophylaxis: heparin

## 2024-03-01 NOTE — PROGRESS NOTE ADULT - ASSESSMENT
84M s/p cholecystomy tube 12/22/23 admitted for perc sky tube evaluation flushed at bedside yesterday. Pt with coffee ground emesis with drop in hgb 9.9 from 11, now 10.3 and leukocytosis 22.88)21.29. CT a/p negative for active GIB. Rapid response called, patient transferred to MICU for critical care management of hypotension and respiratory distress, was on vasopressors, now off and downgraded to stepdown level of care. Still on HFNC.    Plan:   - wean HFNC as tolerated  - perc cholecystostomy tube functioning well, monitor output  - appreciate medical optimization per primary  - no urgent surgical intervention  - plan for OR for interval cholecystectomy when clinical status improved 84M s/p cholecystomy tube 12/22/23 admitted for perc sky tube evaluation flushed at bedside yesterday. Pt with coffee ground emesis with drop in hgb 9.9 from 11, now 10.3 and leukocytosis 22.88)21.29. CT a/p negative for active GIB. Rapid response called, patient transferred to MICU for critical care management of hypotension and respiratory distress, was on vasopressors, now off and downgraded to stepdown level of care. Still on HFNC.    Plan:   - wean HFNC as tolerated  - perc cholecystostomy tube functioning well, monitor output  - appreciate medical optimization per primary  - no urgent surgical intervention  - no plan for cholecystectomy  - Surgery team will sign off, please reconsult us as needed

## 2024-03-02 LAB
AMORPH CRY # UR COMP ASSIST: PRESENT
ANION GAP SERPL CALC-SCNC: 13 MMOL/L — SIGNIFICANT CHANGE UP (ref 5–17)
ANISOCYTOSIS BLD QL: SLIGHT — SIGNIFICANT CHANGE UP
APPEARANCE UR: ABNORMAL
BACTERIA # UR AUTO: ABNORMAL /HPF
BASOPHILS # BLD AUTO: 0 K/UL — SIGNIFICANT CHANGE UP (ref 0–0.2)
BASOPHILS NFR BLD AUTO: 0 % — SIGNIFICANT CHANGE UP (ref 0–2)
BILIRUB UR-MCNC: NEGATIVE — SIGNIFICANT CHANGE UP
BUN SERPL-MCNC: 36.3 MG/DL — HIGH (ref 8–20)
BURR CELLS BLD QL SMEAR: PRESENT — SIGNIFICANT CHANGE UP
CALCIUM SERPL-MCNC: 8.2 MG/DL — LOW (ref 8.4–10.5)
CAST: 17 /LPF — HIGH (ref 0–4)
CHLORIDE SERPL-SCNC: 99 MMOL/L — SIGNIFICANT CHANGE UP (ref 96–108)
CO2 SERPL-SCNC: 25 MMOL/L — SIGNIFICANT CHANGE UP (ref 22–29)
COARSE GRAN CASTS #/AREA URNS AUTO: PRESENT
COLOR SPEC: YELLOW — SIGNIFICANT CHANGE UP
CREAT SERPL-MCNC: 4.24 MG/DL — HIGH (ref 0.5–1.3)
CULTURE RESULTS: SIGNIFICANT CHANGE UP
DIFF PNL FLD: ABNORMAL
EGFR: 13 ML/MIN/1.73M2 — LOW
EOSINOPHIL # BLD AUTO: 0.14 K/UL — SIGNIFICANT CHANGE UP (ref 0–0.5)
EOSINOPHIL NFR BLD AUTO: 0.9 % — SIGNIFICANT CHANGE UP (ref 0–6)
GIANT PLATELETS BLD QL SMEAR: PRESENT — SIGNIFICANT CHANGE UP
GLUCOSE BLDC GLUCOMTR-MCNC: 181 MG/DL — HIGH (ref 70–99)
GLUCOSE BLDC GLUCOMTR-MCNC: 188 MG/DL — HIGH (ref 70–99)
GLUCOSE BLDC GLUCOMTR-MCNC: 221 MG/DL — HIGH (ref 70–99)
GLUCOSE BLDC GLUCOMTR-MCNC: 226 MG/DL — HIGH (ref 70–99)
GLUCOSE SERPL-MCNC: 183 MG/DL — HIGH (ref 70–99)
GLUCOSE UR QL: NEGATIVE MG/DL — SIGNIFICANT CHANGE UP
HCT VFR BLD CALC: 30.9 % — LOW (ref 39–50)
HGB BLD-MCNC: 10.1 G/DL — LOW (ref 13–17)
KETONES UR-MCNC: ABNORMAL MG/DL
LEUKOCYTE ESTERASE UR-ACNC: ABNORMAL
LYMPHOCYTES # BLD AUTO: 1.11 K/UL — SIGNIFICANT CHANGE UP (ref 1–3.3)
LYMPHOCYTES # BLD AUTO: 6.9 % — LOW (ref 13–44)
MACROCYTES BLD QL: SLIGHT — SIGNIFICANT CHANGE UP
MANUAL SMEAR VERIFICATION: SIGNIFICANT CHANGE UP
MCHC RBC-ENTMCNC: 29.4 PG — SIGNIFICANT CHANGE UP (ref 27–34)
MCHC RBC-ENTMCNC: 32.7 GM/DL — SIGNIFICANT CHANGE UP (ref 32–36)
MCV RBC AUTO: 90.1 FL — SIGNIFICANT CHANGE UP (ref 80–100)
MICROCYTES BLD QL: SLIGHT — SIGNIFICANT CHANGE UP
MONOCYTES # BLD AUTO: 0.84 K/UL — SIGNIFICANT CHANGE UP (ref 0–0.9)
MONOCYTES NFR BLD AUTO: 5.2 % — SIGNIFICANT CHANGE UP (ref 2–14)
MYELOCYTES NFR BLD: 3.5 % — HIGH (ref 0–0)
NEUTROPHILS # BLD AUTO: 12.99 K/UL — HIGH (ref 1.8–7.4)
NEUTROPHILS NFR BLD AUTO: 80 % — HIGH (ref 43–77)
NEUTS BAND # BLD: 0.9 % — SIGNIFICANT CHANGE UP (ref 0–8)
NITRITE UR-MCNC: NEGATIVE — SIGNIFICANT CHANGE UP
PH UR: 6 — SIGNIFICANT CHANGE UP (ref 5–8)
PLAT MORPH BLD: NORMAL — SIGNIFICANT CHANGE UP
PLATELET # BLD AUTO: 182 K/UL — SIGNIFICANT CHANGE UP (ref 150–400)
POIKILOCYTOSIS BLD QL AUTO: SLIGHT — SIGNIFICANT CHANGE UP
POLYCHROMASIA BLD QL SMEAR: SLIGHT — SIGNIFICANT CHANGE UP
POTASSIUM SERPL-MCNC: 4.1 MMOL/L — SIGNIFICANT CHANGE UP (ref 3.5–5.3)
POTASSIUM SERPL-SCNC: 4.1 MMOL/L — SIGNIFICANT CHANGE UP (ref 3.5–5.3)
PROT UR-MCNC: 300 MG/DL
RBC # BLD: 3.43 M/UL — LOW (ref 4.2–5.8)
RBC # FLD: 14.9 % — HIGH (ref 10.3–14.5)
RBC BLD AUTO: ABNORMAL
RBC CASTS # UR COMP ASSIST: 164 /HPF — HIGH (ref 0–4)
SODIUM SERPL-SCNC: 137 MMOL/L — SIGNIFICANT CHANGE UP (ref 135–145)
SP GR SPEC: 1.02 — SIGNIFICANT CHANGE UP (ref 1–1.03)
SPECIMEN SOURCE: SIGNIFICANT CHANGE UP
SQUAMOUS # UR AUTO: 8 /HPF — HIGH (ref 0–5)
UROBILINOGEN FLD QL: 1 MG/DL — SIGNIFICANT CHANGE UP (ref 0.2–1)
VARIANT LYMPHS # BLD: 2.6 % — SIGNIFICANT CHANGE UP (ref 0–6)
WBC # BLD: 16.06 K/UL — HIGH (ref 3.8–10.5)
WBC # FLD AUTO: 16.06 K/UL — HIGH (ref 3.8–10.5)
WBC UR QL: 31 /HPF — HIGH (ref 0–5)

## 2024-03-02 PROCEDURE — 99223 1ST HOSP IP/OBS HIGH 75: CPT

## 2024-03-02 PROCEDURE — 74176 CT ABD & PELVIS W/O CONTRAST: CPT | Mod: 26

## 2024-03-02 PROCEDURE — 71250 CT THORAX DX C-: CPT | Mod: 26

## 2024-03-02 PROCEDURE — 71045 X-RAY EXAM CHEST 1 VIEW: CPT | Mod: 26

## 2024-03-02 PROCEDURE — 99233 SBSQ HOSP IP/OBS HIGH 50: CPT

## 2024-03-02 RX ADMIN — Medication 1: at 13:09

## 2024-03-02 RX ADMIN — SENNA PLUS 2 TABLET(S): 8.6 TABLET ORAL at 21:40

## 2024-03-02 RX ADMIN — Medication 650 MILLIGRAM(S): at 05:26

## 2024-03-02 RX ADMIN — Medication 25 MILLIGRAM(S): at 05:19

## 2024-03-02 RX ADMIN — DRONEDARONE 400 MILLIGRAM(S): 400 TABLET, FILM COATED ORAL at 18:25

## 2024-03-02 RX ADMIN — PANTOPRAZOLE SODIUM 40 MILLIGRAM(S): 20 TABLET, DELAYED RELEASE ORAL at 18:25

## 2024-03-02 RX ADMIN — Medication 650 MILLIGRAM(S): at 22:40

## 2024-03-02 RX ADMIN — Medication 2: at 21:40

## 2024-03-02 RX ADMIN — HEPARIN SODIUM 5000 UNIT(S): 5000 INJECTION INTRAVENOUS; SUBCUTANEOUS at 05:19

## 2024-03-02 RX ADMIN — HEPARIN SODIUM 5000 UNIT(S): 5000 INJECTION INTRAVENOUS; SUBCUTANEOUS at 14:24

## 2024-03-02 RX ADMIN — HEPARIN SODIUM 5000 UNIT(S): 5000 INJECTION INTRAVENOUS; SUBCUTANEOUS at 21:39

## 2024-03-02 RX ADMIN — PIPERACILLIN AND TAZOBACTAM 25 GRAM(S): 4; .5 INJECTION, POWDER, LYOPHILIZED, FOR SOLUTION INTRAVENOUS at 05:18

## 2024-03-02 RX ADMIN — Medication 650 MILLIGRAM(S): at 21:40

## 2024-03-02 RX ADMIN — Medication 2: at 18:26

## 2024-03-02 RX ADMIN — PANTOPRAZOLE SODIUM 40 MILLIGRAM(S): 20 TABLET, DELAYED RELEASE ORAL at 05:19

## 2024-03-02 RX ADMIN — Medication 1: at 09:36

## 2024-03-02 RX ADMIN — Medication 325 MILLIGRAM(S): at 18:24

## 2024-03-02 RX ADMIN — GABAPENTIN 300 MILLIGRAM(S): 400 CAPSULE ORAL at 21:39

## 2024-03-02 RX ADMIN — Medication 25 MILLIGRAM(S): at 18:24

## 2024-03-02 RX ADMIN — AMLODIPINE BESYLATE 2.5 MILLIGRAM(S): 2.5 TABLET ORAL at 05:20

## 2024-03-02 RX ADMIN — Medication 650 MILLIGRAM(S): at 06:12

## 2024-03-02 RX ADMIN — DRONEDARONE 400 MILLIGRAM(S): 400 TABLET, FILM COATED ORAL at 05:20

## 2024-03-02 RX ADMIN — SIMVASTATIN 20 MILLIGRAM(S): 20 TABLET, FILM COATED ORAL at 21:40

## 2024-03-02 RX ADMIN — INSULIN GLARGINE 10 UNIT(S): 100 INJECTION, SOLUTION SUBCUTANEOUS at 09:36

## 2024-03-02 RX ADMIN — PIPERACILLIN AND TAZOBACTAM 25 GRAM(S): 4; .5 INJECTION, POWDER, LYOPHILIZED, FOR SOLUTION INTRAVENOUS at 18:24

## 2024-03-02 NOTE — PROGRESS NOTE ADULT - ASSESSMENT
84M s/p cholecystomy tube 12/22/23 admitted for perc sky tube evaluation.  Pt with coffee ground emesis with drop in hgb. CT a/p negative for active GIB. Rapid response called, patient transferred to MICU for critical care management of hypotension and respiratory distress, was on vasopressors, now off and downgraded to stepdown level of care. developed ATN so HD initiated on 2/27/24.     RON: ATN post IV contrast and hypotension  Acute hypoxic respiratory failure  +PVC  CKD(IV) +DM, HTN  CT scan: no hydronephrosis  - avoid further potential nephrotoxins  - cont to monitor for signs of renal recovery  - Had dialysis yest 3/1 tolerated ok  - No need for HD today; cont to watch for renal recovery    Anemia: + multifactorial  - cont CINDI  - Fe stores noted    Will follow

## 2024-03-02 NOTE — PROGRESS NOTE ADULT - ASSESSMENT
84 y M with PMH recent COVID-19, acute cholecystitis s/p cholecystostomy tube (12/22/23), AFib not on A/C 2/2 GIB, and CKD, who presented to [facility] ED on 02/25/24 with fluid draining around the perc sky tube insertion site upon flushing the tube.  In the ED, he  had an episode of coffee ground emesis and GI was consulted. CTA abdomen was done with no evidence of acute GIB. on 02/26/24 patient developed hypotension, respiratory failure and RRT was called. patient was transferred to MICU and was started on pressors. also on high flow O2 with CXR with bilateral infiltrates--likely aspiration pneumonia. placed on zosyn and vancomycin. developed ATN so HD initiated on 2/27/24. weaned off vasopressors on 02/27/28 and transferred out of ICU on 02/29/24. vancomycin has been discontinued. his care was transferred to hospitalist service this morning.    # Ongoing Fever  # s/p Septic shock  # creeping up WBC  cannot be explained by 3 days of stress dose steroids  concern for ESBL since fevers ongoing despite being on antibiotics  per. bld c/s x 2, UA from a new wills, sent today  UA appears likely +ve for UTI. U c/s ordered STAT  CT chest, A/P ordered with oral contrast (pt came in with per sky drain leakage, had coffee grounds)  ID consulted.     #Acute hypoxic respiratory failure  #Aspiration PNA- coffee ground emesis  SDU  c/w HFNC - WEAN as tolerated  Hydrocortisone stopped (received only 3 days)   On Zosyn  MRSA nares positive  Was on Vanco but now stopped  Sputum cx ordered  Blood cx with NGTD on admission    #RON sec to Vanc tox + GILLIAN + shock= ATN superimposed on CKD stg 4  Now started on HD  right IJ catheter sara.  Renal following. Plan hold of on further CRRT and monitor for recovery  strict i/os  has a wills in place. output poor today. was dialyzed yesterday. d/w renal. cont to monitor for now.     #Dysphagia  Passed speech and swallow  Dysphagia diet    # A. fib rate controlled  C/w Dronedarone  No AC sec to prior GIb and this admission cofee grounds    #DM  Lantus/ ISS    #Delirium  Likely hospital acquired  Monitor for improvement  Can consider starting Seroquel if worsens    #Cholecystitis  Perc choley in place. cont care  Family concerned and wants cholecystectomy during admission  Gen surg- pt not stable for OR now. Recs interval cholecystectomy o/p    Heparin    Per Palliative discussions- FULL CODE.

## 2024-03-02 NOTE — CONSULT NOTE ADULT - SUBJECTIVE AND OBJECTIVE BOX
INFECTIOUS DISEASES AND INTERNAL MEDICINE at [place]  =======================================================  Claudio Hester MD  Diplomates [employer] Board of Internal Medicine and Infectious Diseases  Telephone [phone]  Fax            [phone]  =======================================================    BERNADINE [id number]      HPI:  Patient seen and examined before midnight.     83 y/o male with PMH of Acute Cholecystitis s/p Cholecystomy Tube (12/22/23), COVID-19, PAF not on AC due to GIB + Fall Risk, Esophagitis, Anemia, DM 2, HTN, HLD, CKD III/IV, BPH and Cognitive Impairment  came to the ED complaining of leakage around sky tube. As per wife at bed side, family noticed fluid coming out on the skin when they attempted to flush the sky tube. Wife said patient has been doing well since discharged otherwise. No fever, chills, abdominal pain, change in bowel/urinary habit, nausea, vomiting noted.   In the ED, patient was seen by surgery team, no acute surgical intervention. As per ED, patient vomited large amount of coffee ground emesis, looked very pale, holding abdomen; CT angio abdomen done: no acute GI bleed. Repeat CBC: Hb: 11.3---->9.3. Also, he desaturated concerning for aspiration PNA, antibiotic and oxygen therapy.     AS ABOVE HX OF CHOLECYSTOTOMY PLACEMENT 12/22 CAME TO ER WITH ? LEAKAGE   PT WITH HYPOTENSION WITH ? ASPIRATION PNEUMONIA WAS IN ICU AND TREATED WITH PRESSORS  IV ABX     WAS PLACED ON HD FOR RENAL FAILURE  NOW OUT OF ICU WITH INCREASING WBC  ASKED TO EVALUATE FROM ID STANDPOINT       PAST MEDICAL & SURGICAL HISTORY:  Diabetes      Hypertension      Prostate disorder      Anxiety      High cholesterol      Ulcer      History of endoscopy          ANTIBIOTICS  piperacillin/tazobactam IVPB.. 3.375 Gram(s) IV Intermittent every 12 hours      Allergies    No Known Allergies    Intolerances        SOCIAL HISTORY:     FAMILY HX   FAMILY HISTORY:  Family history of stomach cancer  Father    Family history of emphysema  Mother        Vital Signs Last 24 Hrs  T(C): 37.2 (02 Mar 2024 12:00), Max: 38.5 (02 Mar 2024 05:30)  T(F): 99 (02 Mar 2024 12:00), Max: 101.3 (02 Mar 2024 05:30)  HR: 67 (02 Mar 2024 12:00) (63 - 83)  BP: 124/46 (02 Mar 2024 12:00) (90/61 - 124/46)  BP(mean): 66 (02 Mar 2024 12:00) (53 - 72)  RR: 16 (02 Mar 2024 12:00) (16 - 20)  SpO2: 95% (02 Mar 2024 12:00) (95% - 99%)    Parameters below as of 02 Mar 2024 12:00  Patient On (Oxygen Delivery Method): nasal cannula, high flow      Drug Dosing Weight  Height (cm): 167.6 (26 Feb 2024 12:00)  Weight (kg): 80.8 (26 Feb 2024 12:00)  BMI (kg/m2): 28.8 (26 Feb 2024 12:00)  BSA (m2): 1.9 (26 Feb 2024 12:00)      REVIEW OF SYSTEMS:    CONSTITUTIONAL:  As per HPI.    HEENT:  Eyes:  No diplopia or blurred vision. ENT:  No earache, sore throat or runny nose.    CARDIOVASCULAR:  No pressure, squeezing, strangling, tightness, heaviness or aching about the chest, neck, axilla or epigastrium.    RESPIRATORY:  No cough, shortness of breath, PND or orthopnea.    GASTROINTESTINAL:  No nausea, vomiting or diarrhea.    GENITOURINARY:  No dysuria, frequency or urgency.    MUSCULOSKELETAL:  As per HPI.    SKIN:  No change in skin, hair or nails.    NEUROLOGIC:  No paresthesias, fasciculations, seizures or weakness.                  PHYSICAL EXAMINATION:    GENERAL: The patient is a _____in no apparent distress. ___     VITAL SIGNS: T(C): 37.2 (03-02-24 @ 12:00), Max: 38.5 (03-02-24 @ 05:30)  HR: 67 (03-02-24 @ 12:00) (63 - 83)  BP: 124/46 (03-02-24 @ 12:00) (90/61 - 124/46)  RR: 16 (03-02-24 @ 12:00) (16 - 20)  SpO2: 95% (03-02-24 @ 12:00) (95% - 99%)  Wt(kg): --    HEENT: Head is normocephalic and atraumatic.  ANICTERIC  NECK: Supple. No carotid bruits.  No lymphadenopathy or thyromegaly.    LUNGS:COARSE BREATH SOUNDS    HEART: Regular rate and rhythm without murmur.    ABDOMEN: Soft, nontender, and nondistended.  Positive bowel sounds.  No hepatosplenomegaly was noted. NO REBOUND NO GUARDING    EXTREMITIES: NO EDEMA NO ERYTHEMA    NEUROLOGIC: NON FOCAL      SKIN: No ulceration or induration present. NO RASH        BLOOD CULTURES  Culture Results:   No growth at 4 days (02-26 @ 10:51)  Culture Results:   No growth at 4 days (02-26 @ 10:42)  Culture Results:   No growth at 5 days (02-25 @ 19:33)  Culture Results:   No growth at 5 days (02-25 @ 19:23)       URINE CX          LABS:                        10.1   16.06 )-----------( 182      ( 02 Mar 2024 06:45 )             30.9     03-02    137  |  99  |  36.3<H>  ----------------------------<  183<H>  4.1   |  25.0  |  4.24<H>    Ca    8.2<L>      02 Mar 2024 06:45  Mg     1.9     03-01        Urinalysis Basic - ( 02 Mar 2024 06:45 )    Color: x / Appearance: x / SG: x / pH: x  Gluc: 183 mg/dL / Ketone: x  / Bili: x / Urobili: x   Blood: x / Protein: x / Nitrite: x   Leuk Esterase: x / RBC: x / WBC x   Sq Epi: x / Non Sq Epi: x / Bacteria: x        RADIOLOGY & ADDITIONAL STUDIES:      ASSESSMENT/PLAN  83 y/o male with PMH of Acute Cholecystitis s/p Cholecystomy Tube (12/22/23), COVID-19, PAF not on AC due to GIB + Fall Risk, Esophagitis, Anemia, DM 2, HTN, HLD, CKD III/IV, BPH and Cognitive Impairment  came to the ED complaining of leakage around sky tube. As per wife at bed side, family noticed fluid coming out on the skin when they attempted to flush the sky tube. Wife said patient has been doing well since discharged otherwise. No fever, chills, abdominal pain, change in bowel/urinary habit, nausea, vomiting noted.   In the ED, patient was seen by surgery team, no acute surgical intervention. As per ED, patient vomited large amount of coffee ground emesis, looked very pale, holding abdomen; CT angio abdomen done: no acute GI bleed. Repeat CBC: Hb: 11.3---->9.3. Also, he desaturated concerning for aspiration PNA, antibiotic and oxygen therapy.     AS ABOVE HX OF CHOLECYSTOTOMY PLACEMENT 12/22 CAME TO ER WITH ? LEAKAGE   PT WITH HYPOTENSION WITH ? ASPIRATION PNEUMONIA WAS IN ICU AND TREATED WITH PRESSORS  IV ABX     WAS PLACED ON HD FOR RENAL FAILURE  NOW OUT OF ICU WITH INCREASING WBC  PT AWAKE ANSWERS SIMPLE QUESTIONS  INCREASING WBC CONCERNING  AGREE WITH REPEAT CULTURES   AND CT CHEST ABD PELVIS TO LOOK FOR SOURCE   WILL FOLLOW UP CULTURES AND IMAGING  SPOKE TO HOSPITALIST                  BENIGNO BRAVO MD

## 2024-03-02 NOTE — PROGRESS NOTE ADULT - SUBJECTIVE AND OBJECTIVE BOX
NEPHROLOGY INTERVAL HPI/OVERNIGHT EVENTS:    Examined  laying flat no dyspnea  Frail eldely  Low BP  Confused wearing mittens  Dtr at bedside    MEDICATIONS  (STANDING):  amLODIPine   Tablet 2.5 milliGRAM(s) Oral daily  dextrose 5%. 1000 milliLiter(s) (50 mL/Hr) IV Continuous <Continuous>  dextrose 5%. 1000 milliLiter(s) (100 mL/Hr) IV Continuous <Continuous>  dextrose 50% Injectable 12.5 Gram(s) IV Push once  dextrose 50% Injectable 25 Gram(s) IV Push once  dextrose 50% Injectable 25 Gram(s) IV Push once  dronedarone 400 milliGRAM(s) Oral two times a day  epoetin kristal-epbx (RETACRIT) Injectable 73980 Unit(s) IV Push <User Schedule>  ferrous    sulfate 325 milliGRAM(s) Oral daily  gabapentin 300 milliGRAM(s) Oral at bedtime  glucagon  Injectable 1 milliGRAM(s) IntraMuscular once  heparin   Injectable 5000 Unit(s) SubCutaneous every 8 hours  insulin glargine Injectable (LANTUS) 10 Unit(s) SubCutaneous every morning  insulin lispro (ADMELOG) corrective regimen sliding scale   SubCutaneous Before meals and at bedtime  metoprolol tartrate 25 milliGRAM(s) Oral two times a day  pantoprazole  Injectable 40 milliGRAM(s) IV Push every 12 hours  piperacillin/tazobactam IVPB.. 3.375 Gram(s) IV Intermittent every 12 hours  senna 2 Tablet(s) Oral at bedtime  simvastatin 20 milliGRAM(s) Oral at bedtime    MEDICATIONS  (PRN):  acetaminophen     Tablet .. 650 milliGRAM(s) Oral every 6 hours PRN Temp greater or equal to 38C (100.4F), Mild Pain (1 - 3)  aluminum hydroxide/magnesium hydroxide/simethicone Suspension 30 milliLiter(s) Oral every 4 hours PRN Dyspepsia  dextrose Oral Gel 15 Gram(s) Oral once PRN Blood Glucose LESS THAN 70 milliGRAM(s)/deciliter  melatonin 3 milliGRAM(s) Oral at bedtime PRN Insomnia  ondansetron Injectable 4 milliGRAM(s) IV Push every 8 hours PRN Nausea and/or Vomiting  sodium chloride 0.9% lock flush 10 milliLiter(s) IV Push every 1 hour PRN Pre/post blood products, medications, blood draw, and to maintain line patency      Allergies    No Known Allergies    Intolerances        Vital Signs Last 24 Hrs  T(C): 37.7 (02 Mar 2024 08:00), Max: 38.5 (02 Mar 2024 05:30)  T(F): 99.9 (02 Mar 2024 08:00), Max: 101.3 (02 Mar 2024 05:30)  HR: 63 (02 Mar 2024 08:00) (63 - 83)  BP: 107/49 (02 Mar 2024 08:00) (90/61 - 150/55)  BP(mean): 62 (02 Mar 2024 08:00) (53 - 81)  RR: 20 (02 Mar 2024 08:00) (17 - 22)  SpO2: 98% (02 Mar 2024 08:00) (95% - 100%)    Parameters below as of 02 Mar 2024 08:00  Patient On (Oxygen Delivery Method): nasal cannula, high flow  O2 Flow (L/min): 40  O2 Concentration (%): 30  Daily     Daily Weight in k.2 (01 Mar 2024 12:05)    PHYSICAL EXAM:  GENERAL: Appears chronically ill  HEENMT: No periorbital edema  NECK: Supple, no JVD, dialysis catheter neck  NERVOUS SYSTEM:  Awake, alert  CHEST/LUNG: Diminished BS  HEART: Regular rate and rhythm; No rub  ABDOMEN: Soft, +BS  EXTREMITIES: + dependent edema    LABS:                        10.1   16.06 )-----------( 182      ( 02 Mar 2024 06:45 )             30.9     03-02    137  |  99  |  36.3<H>  ----------------------------<  183<H>  4.1   |  25.0  |  4.24<H>    Ca    8.2<L>      02 Mar 2024 06:45  Mg     1.9     03-01        Urinalysis Basic - ( 02 Mar 2024 06:45 )    Color: x / Appearance: x / SG: x / pH: x  Gluc: 183 mg/dL / Ketone: x  / Bili: x / Urobili: x   Blood: x / Protein: x / Nitrite: x   Leuk Esterase: x / RBC: x / WBC x   Sq Epi: x / Non Sq Epi: x / Bacteria: x              RADIOLOGY & ADDITIONAL TESTS:

## 2024-03-02 NOTE — PROGRESS NOTE ADULT - SUBJECTIVE AND OBJECTIVE BOX
HEALTH ISSUES - PROBLEM Dx:    fevers while on antibiotics  leucocytosis  asp pna  encephalopathy  known sky with per sky drain  ATn with new HD now    INTERVAL HPI/ OVERNIGHT EVENTS:    lying in bed  asleep  but able to be awakened  no distress  answers simple questions, mostly confused and pulling at lines    REVIEW OF SYSTEMS:    as above    Vital Signs Last 24 Hrs  T(C): 37.8 (02 Mar 2024 15:16), Max: 38.5 (02 Mar 2024 05:30)  T(F): 100 (02 Mar 2024 15:16), Max: 101.3 (02 Mar 2024 05:30)  HR: 72 (02 Mar 2024 14:00) (63 - 83)  BP: 127/52 (02 Mar 2024 14:00) (92/43 - 127/52)  BP(mean): 70 (02 Mar 2024 14:00) (53 - 70)  RR: 18 (02 Mar 2024 14:00) (16 - 20)  SpO2: 99% (02 Mar 2024 14:00) (95% - 99%)    Parameters below as of 02 Mar 2024 14:00  Patient On (Oxygen Delivery Method): nasal cannula, high flow  O2 Flow (L/min): 40  O2 Concentration (%): 55    PHYSICAL EXAM-  GENERAL: comfortable, confused, no distress, with HFNC at 55% FiO2  HEAD:  Atraumatic, Normocephalic  EYES: PERRLA, conjunctiva and sclera clear  ENMT:  Moist mucous membranes, No lesions  NECK: No JVD,; right IJ HD access +  NERVOUS SYSTEM:  Alert & Oriented X 1, Moving extremities  CHEST/LUNG: CTA bilaterally antrly; No rales, rhonchi, wheezing, or rubs  HEART: Regular rate and rhythm; No murmurs, rubs, or gallops  ABDOMEN: Soft, Nontender, Nondistended; Bowel sounds present: Perc sky drain with green bilious dranage minimal output; Horta +  EXTREMITIES:  2+ Peripheral Pulses, No clubbing, cyanosis, or edema      MEDICATIONS  (STANDING):  amLODIPine   Tablet 2.5 milliGRAM(s) Oral daily  dextrose 5%. 1000 milliLiter(s) (50 mL/Hr) IV Continuous <Continuous>  dextrose 5%. 1000 milliLiter(s) (100 mL/Hr) IV Continuous <Continuous>  dextrose 50% Injectable 12.5 Gram(s) IV Push once  dextrose 50% Injectable 25 Gram(s) IV Push once  dextrose 50% Injectable 25 Gram(s) IV Push once  dronedarone 400 milliGRAM(s) Oral two times a day  epoetin kristal-epbx (RETACRIT) Injectable 70787 Unit(s) IV Push <User Schedule>  ferrous    sulfate 325 milliGRAM(s) Oral daily  gabapentin 300 milliGRAM(s) Oral at bedtime  glucagon  Injectable 1 milliGRAM(s) IntraMuscular once  heparin   Injectable 5000 Unit(s) SubCutaneous every 8 hours  insulin glargine Injectable (LANTUS) 10 Unit(s) SubCutaneous every morning  insulin lispro (ADMELOG) corrective regimen sliding scale   SubCutaneous Before meals and at bedtime  metoprolol tartrate 25 milliGRAM(s) Oral two times a day  pantoprazole  Injectable 40 milliGRAM(s) IV Push every 12 hours  piperacillin/tazobactam IVPB.. 3.375 Gram(s) IV Intermittent every 12 hours  senna 2 Tablet(s) Oral at bedtime  simvastatin 20 milliGRAM(s) Oral at bedtime    MEDICATIONS  (PRN):  acetaminophen     Tablet .. 650 milliGRAM(s) Oral every 6 hours PRN Temp greater or equal to 38C (100.4F), Mild Pain (1 - 3)  aluminum hydroxide/magnesium hydroxide/simethicone Suspension 30 milliLiter(s) Oral every 4 hours PRN Dyspepsia  dextrose Oral Gel 15 Gram(s) Oral once PRN Blood Glucose LESS THAN 70 milliGRAM(s)/deciliter  melatonin 3 milliGRAM(s) Oral at bedtime PRN Insomnia  ondansetron Injectable 4 milliGRAM(s) IV Push every 8 hours PRN Nausea and/or Vomiting  sodium chloride 0.9% lock flush 10 milliLiter(s) IV Push every 1 hour PRN Pre/post blood products, medications, blood draw, and to maintain line patency      LABS:                        10.1   16.06 )-----------( 182      ( 02 Mar 2024 06:45 )             30.9     03-02    137  |  99  |  36.3<H>  ----------------------------<  183<H>  4.1   |  25.0  |  4.24<H>    Ca    8.2<L>      02 Mar 2024 06:45  Mg     1.9     03-01        Urinalysis Basic - ( 02 Mar 2024 13:00 )    Color: Yellow / Appearance: Cloudy / S.018 / pH: x  Gluc: x / Ketone: Trace mg/dL  / Bili: Negative / Urobili: 1.0 mg/dL   Blood: x / Protein: 300 mg/dL / Nitrite: Negative   Leuk Esterase: Moderate / RBC: 164 /HPF / WBC 31 /HPF   Sq Epi: x / Non Sq Epi: 8 /HPF / Bacteria: Few /HPF

## 2024-03-03 LAB
ALBUMIN SERPL ELPH-MCNC: 2.9 G/DL — LOW (ref 3.3–5.2)
ALP SERPL-CCNC: 90 U/L — SIGNIFICANT CHANGE UP (ref 40–120)
ALT FLD-CCNC: 17 U/L — SIGNIFICANT CHANGE UP
ANION GAP SERPL CALC-SCNC: 18 MMOL/L — HIGH (ref 5–17)
AST SERPL-CCNC: 23 U/L — SIGNIFICANT CHANGE UP
BASE EXCESS BLDA CALC-SCNC: 2.5 MMOL/L — SIGNIFICANT CHANGE UP (ref -2–3)
BASOPHILS # BLD AUTO: 0.06 K/UL — SIGNIFICANT CHANGE UP (ref 0–0.2)
BASOPHILS NFR BLD AUTO: 0.3 % — SIGNIFICANT CHANGE UP (ref 0–2)
BILIRUB SERPL-MCNC: 1.1 MG/DL — SIGNIFICANT CHANGE UP (ref 0.4–2)
BLOOD GAS COMMENTS ARTERIAL: SIGNIFICANT CHANGE UP
BUN SERPL-MCNC: 53.9 MG/DL — HIGH (ref 8–20)
CALCIUM SERPL-MCNC: 8.3 MG/DL — LOW (ref 8.4–10.5)
CHLORIDE SERPL-SCNC: 93 MMOL/L — LOW (ref 96–108)
CO2 SERPL-SCNC: 24 MMOL/L — SIGNIFICANT CHANGE UP (ref 22–29)
CREAT SERPL-MCNC: 5.83 MG/DL — HIGH (ref 0.5–1.3)
EGFR: 9 ML/MIN/1.73M2 — LOW
EOSINOPHIL # BLD AUTO: 0.33 K/UL — SIGNIFICANT CHANGE UP (ref 0–0.5)
EOSINOPHIL NFR BLD AUTO: 1.4 % — SIGNIFICANT CHANGE UP (ref 0–6)
GAS PNL BLDA: SIGNIFICANT CHANGE UP
GLUCOSE BLDC GLUCOMTR-MCNC: 171 MG/DL — HIGH (ref 70–99)
GLUCOSE BLDC GLUCOMTR-MCNC: 175 MG/DL — HIGH (ref 70–99)
GLUCOSE BLDC GLUCOMTR-MCNC: 179 MG/DL — HIGH (ref 70–99)
GLUCOSE SERPL-MCNC: 179 MG/DL — HIGH (ref 70–99)
HCO3 BLDA-SCNC: 27 MMOL/L — SIGNIFICANT CHANGE UP (ref 21–28)
HCT VFR BLD CALC: 28.9 % — LOW (ref 39–50)
HGB BLD-MCNC: 9.3 G/DL — LOW (ref 13–17)
HOROWITZ INDEX BLDA+IHG-RTO: SIGNIFICANT CHANGE UP
IMM GRANULOCYTES NFR BLD AUTO: 5.6 % — HIGH (ref 0–0.9)
LYMPHOCYTES # BLD AUTO: 1.85 K/UL — SIGNIFICANT CHANGE UP (ref 1–3.3)
LYMPHOCYTES # BLD AUTO: 7.8 % — LOW (ref 13–44)
MAGNESIUM SERPL-MCNC: 1.9 MG/DL — SIGNIFICANT CHANGE UP (ref 1.6–2.6)
MCHC RBC-ENTMCNC: 29.2 PG — SIGNIFICANT CHANGE UP (ref 27–34)
MCHC RBC-ENTMCNC: 32.2 GM/DL — SIGNIFICANT CHANGE UP (ref 32–36)
MCV RBC AUTO: 90.9 FL — SIGNIFICANT CHANGE UP (ref 80–100)
MONOCYTES # BLD AUTO: 1.23 K/UL — HIGH (ref 0–0.9)
MONOCYTES NFR BLD AUTO: 5.2 % — SIGNIFICANT CHANGE UP (ref 2–14)
NEUTROPHILS # BLD AUTO: 18.89 K/UL — HIGH (ref 1.8–7.4)
NEUTROPHILS NFR BLD AUTO: 79.7 % — HIGH (ref 43–77)
PCO2 BLDA: 44 MMHG — SIGNIFICANT CHANGE UP (ref 35–48)
PH BLDA: 7.4 — SIGNIFICANT CHANGE UP (ref 7.35–7.45)
PHOSPHATE SERPL-MCNC: 3.6 MG/DL — SIGNIFICANT CHANGE UP (ref 2.4–4.7)
PLATELET # BLD AUTO: 213 K/UL — SIGNIFICANT CHANGE UP (ref 150–400)
PO2 BLDA: 85 MMHG — SIGNIFICANT CHANGE UP (ref 83–108)
POTASSIUM SERPL-MCNC: 4.1 MMOL/L — SIGNIFICANT CHANGE UP (ref 3.5–5.3)
POTASSIUM SERPL-SCNC: 4.1 MMOL/L — SIGNIFICANT CHANGE UP (ref 3.5–5.3)
PROT SERPL-MCNC: 5.9 G/DL — LOW (ref 6.6–8.7)
RAPID RVP RESULT: SIGNIFICANT CHANGE UP
RBC # BLD: 3.18 M/UL — LOW (ref 4.2–5.8)
RBC # FLD: 15.3 % — HIGH (ref 10.3–14.5)
SAO2 % BLDA: 98.8 % — HIGH (ref 94–98)
SARS-COV-2 RNA SPEC QL NAA+PROBE: SIGNIFICANT CHANGE UP
SODIUM SERPL-SCNC: 135 MMOL/L — SIGNIFICANT CHANGE UP (ref 135–145)
WBC # BLD: 23.69 K/UL — HIGH (ref 3.8–10.5)
WBC # FLD AUTO: 23.69 K/UL — HIGH (ref 3.8–10.5)

## 2024-03-03 PROCEDURE — 71045 X-RAY EXAM CHEST 1 VIEW: CPT | Mod: 26

## 2024-03-03 PROCEDURE — 99291 CRITICAL CARE FIRST HOUR: CPT | Mod: 25

## 2024-03-03 PROCEDURE — 71045 X-RAY EXAM CHEST 1 VIEW: CPT | Mod: 26,77

## 2024-03-03 PROCEDURE — 43753 TX GASTRO INTUB W/ASP: CPT | Mod: GC

## 2024-03-03 PROCEDURE — 31500 INSERT EMERGENCY AIRWAY: CPT | Mod: GC

## 2024-03-03 PROCEDURE — 99292 CRITICAL CARE ADDL 30 MIN: CPT | Mod: 25

## 2024-03-03 RX ORDER — CHLORHEXIDINE GLUCONATE 213 G/1000ML
15 SOLUTION TOPICAL EVERY 12 HOURS
Refills: 0 | Status: DISCONTINUED | OUTPATIENT
Start: 2024-03-03 | End: 2024-03-04

## 2024-03-03 RX ORDER — CHLORHEXIDINE GLUCONATE 213 G/1000ML
1 SOLUTION TOPICAL
Refills: 0 | Status: DISCONTINUED | OUTPATIENT
Start: 2024-03-03 | End: 2024-03-21

## 2024-03-03 RX ORDER — IPRATROPIUM/ALBUTEROL SULFATE 18-103MCG
3 AEROSOL WITH ADAPTER (GRAM) INHALATION EVERY 6 HOURS
Refills: 0 | Status: DISCONTINUED | OUTPATIENT
Start: 2024-03-03 | End: 2024-03-13

## 2024-03-03 RX ORDER — DEXMEDETOMIDINE HYDROCHLORIDE IN 0.9% SODIUM CHLORIDE 4 UG/ML
0.02 INJECTION INTRAVENOUS
Qty: 400 | Refills: 0 | Status: DISCONTINUED | OUTPATIENT
Start: 2024-03-03 | End: 2024-03-05

## 2024-03-03 RX ORDER — VANCOMYCIN HCL 1 G
1250 VIAL (EA) INTRAVENOUS ONCE
Refills: 0 | Status: COMPLETED | OUTPATIENT
Start: 2024-03-03 | End: 2024-03-03

## 2024-03-03 RX ORDER — FUROSEMIDE 40 MG
40 TABLET ORAL ONCE
Refills: 0 | Status: DISCONTINUED | OUTPATIENT
Start: 2024-03-03 | End: 2024-03-03

## 2024-03-03 RX ORDER — ACETAMINOPHEN 500 MG
650 TABLET ORAL EVERY 6 HOURS
Refills: 0 | Status: DISCONTINUED | OUTPATIENT
Start: 2024-03-04 | End: 2024-03-21

## 2024-03-03 RX ORDER — METOPROLOL TARTRATE 50 MG
25 TABLET ORAL
Refills: 0 | Status: DISCONTINUED | OUTPATIENT
Start: 2024-03-04 | End: 2024-03-04

## 2024-03-03 RX ORDER — ACETAMINOPHEN 500 MG
1000 TABLET ORAL ONCE
Refills: 0 | Status: COMPLETED | OUTPATIENT
Start: 2024-03-03 | End: 2024-03-03

## 2024-03-03 RX ORDER — SIMVASTATIN 20 MG/1
20 TABLET, FILM COATED ORAL AT BEDTIME
Refills: 0 | Status: DISCONTINUED | OUTPATIENT
Start: 2024-03-04 | End: 2024-03-21

## 2024-03-03 RX ORDER — ACETYLCYSTEINE 200 MG/ML
4 VIAL (ML) MISCELLANEOUS EVERY 6 HOURS
Refills: 0 | Status: DISCONTINUED | OUTPATIENT
Start: 2024-03-03 | End: 2024-03-08

## 2024-03-03 RX ORDER — AMLODIPINE BESYLATE 2.5 MG/1
2.5 TABLET ORAL DAILY
Refills: 0 | Status: DISCONTINUED | OUTPATIENT
Start: 2024-03-04 | End: 2024-03-04

## 2024-03-03 RX ORDER — PROPOFOL 10 MG/ML
10 INJECTION, EMULSION INTRAVENOUS
Qty: 1000 | Refills: 0 | Status: DISCONTINUED | OUTPATIENT
Start: 2024-03-03 | End: 2024-03-05

## 2024-03-03 RX ORDER — INSULIN LISPRO 100/ML
VIAL (ML) SUBCUTANEOUS EVERY 6 HOURS
Refills: 0 | Status: DISCONTINUED | OUTPATIENT
Start: 2024-03-03 | End: 2024-03-07

## 2024-03-03 RX ORDER — GABAPENTIN 400 MG/1
300 CAPSULE ORAL AT BEDTIME
Refills: 0 | Status: DISCONTINUED | OUTPATIENT
Start: 2024-03-04 | End: 2024-03-08

## 2024-03-03 RX ADMIN — DRONEDARONE 400 MILLIGRAM(S): 400 TABLET, FILM COATED ORAL at 18:02

## 2024-03-03 RX ADMIN — Medication 166.67 MILLIGRAM(S): at 07:13

## 2024-03-03 RX ADMIN — HEPARIN SODIUM 5000 UNIT(S): 5000 INJECTION INTRAVENOUS; SUBCUTANEOUS at 05:20

## 2024-03-03 RX ADMIN — Medication 4 MILLILITER(S): at 10:00

## 2024-03-03 RX ADMIN — Medication 4 MILLILITER(S): at 14:39

## 2024-03-03 RX ADMIN — Medication 1: at 07:19

## 2024-03-03 RX ADMIN — Medication 1000 MILLIGRAM(S): at 02:36

## 2024-03-03 RX ADMIN — Medication 3 MILLILITER(S): at 14:39

## 2024-03-03 RX ADMIN — SENNA PLUS 2 TABLET(S): 8.6 TABLET ORAL at 21:54

## 2024-03-03 RX ADMIN — Medication 3 MILLILITER(S): at 10:00

## 2024-03-03 RX ADMIN — PANTOPRAZOLE SODIUM 40 MILLIGRAM(S): 20 TABLET, DELAYED RELEASE ORAL at 05:20

## 2024-03-03 RX ADMIN — HEPARIN SODIUM 5000 UNIT(S): 5000 INJECTION INTRAVENOUS; SUBCUTANEOUS at 13:03

## 2024-03-03 RX ADMIN — Medication 4 MILLILITER(S): at 20:15

## 2024-03-03 RX ADMIN — PIPERACILLIN AND TAZOBACTAM 25 GRAM(S): 4; .5 INJECTION, POWDER, LYOPHILIZED, FOR SOLUTION INTRAVENOUS at 05:20

## 2024-03-03 RX ADMIN — DEXMEDETOMIDINE HYDROCHLORIDE IN 0.9% SODIUM CHLORIDE 0.4 MICROGRAM(S)/KG/HR: 4 INJECTION INTRAVENOUS at 21:53

## 2024-03-03 RX ADMIN — CHLORHEXIDINE GLUCONATE 15 MILLILITER(S): 213 SOLUTION TOPICAL at 18:01

## 2024-03-03 RX ADMIN — PROPOFOL 4.85 MICROGRAM(S)/KG/MIN: 10 INJECTION, EMULSION INTRAVENOUS at 18:01

## 2024-03-03 RX ADMIN — HEPARIN SODIUM 5000 UNIT(S): 5000 INJECTION INTRAVENOUS; SUBCUTANEOUS at 21:54

## 2024-03-03 RX ADMIN — Medication 400 MILLIGRAM(S): at 13:02

## 2024-03-03 RX ADMIN — Medication 1000 MILLIGRAM(S): at 13:37

## 2024-03-03 RX ADMIN — INSULIN GLARGINE 10 UNIT(S): 100 INJECTION, SOLUTION SUBCUTANEOUS at 09:00

## 2024-03-03 RX ADMIN — CHLORHEXIDINE GLUCONATE 1 APPLICATION(S): 213 SOLUTION TOPICAL at 07:14

## 2024-03-03 RX ADMIN — Medication 400 MILLIGRAM(S): at 01:36

## 2024-03-03 RX ADMIN — Medication 1: at 18:02

## 2024-03-03 RX ADMIN — Medication 3 MILLILITER(S): at 20:15

## 2024-03-03 RX ADMIN — Medication 1: at 11:31

## 2024-03-03 RX ADMIN — PIPERACILLIN AND TAZOBACTAM 25 GRAM(S): 4; .5 INJECTION, POWDER, LYOPHILIZED, FOR SOLUTION INTRAVENOUS at 18:02

## 2024-03-03 RX ADMIN — PANTOPRAZOLE SODIUM 40 MILLIGRAM(S): 20 TABLET, DELAYED RELEASE ORAL at 18:01

## 2024-03-03 NOTE — CONSULT NOTE ADULT - NS PANP COMMENT GEN_ALL_CORE FT
84M with hx of acute cholecystitis s/p cholecystectomy (12/2023), pAF not on AC due to GI bleed/fall risk, esophagitis, DM, HTN, HLD, CKD, BPH, admitted 1/29-2/1 with transaminitis and was treated for cholangitis and underwent tube study which was negative for obstruction presented 2/25 after family noted leakage around the perc sky tube with flushing. He was noted to be hypoxic on presentation and started on empiric aspiration coverage. He had reported hematemesis in the ED with initial drop in Hgb 11->9 and underwent CT Abd/pelvis with contrast negative for active bleed which showed perc sky in place. 84M with hx of acute cholecystitis s/p cholecystectomy (12/2023), pAF not on AC due to GI bleed/fall risk, esophagitis, DM, HTN, HLD, CKD, BPH, admitted 1/29-2/1 with transaminitis and was treated for cholangitis and underwent tube study which was negative for obstruction presented 2/25 after family noted leakage around the perc sky tube with flushing. He was noted to be hypoxic on presentation and started on empiric aspiration coverage. He had reported hematemesis in the ED with initial drop in Hgb 11->9 and underwent CT Abd/pelvis with contrast negative for active bleed which showed perc sky in place. Seen by GI with conservative management. RRT called 2/26 for hypoxia and hypotension requiring upgrade to the MICU for septic shock in the setting of pneumonia and was started on high flow nasal cannula. Pt developed progressive renal failure felt secondary to GILLIAN/ATN and initiated on HD 2/27 and last dialyzed 3/1. Pt also had issues with hospital acquired delirium in ICU now improved per daughter and passed swallow evaluation. Pt was initially planned for cholecystectomy but given decompensation, planned to be done electively when optimized. Pt had intermittent fevers over the last 2 days. CT chest showed RUL opacities and bibasilar atelectasis with pleural effusions. Pt was maintaining on high flow 55% 40L but had persistent cough per daughter but unable to expectorate much. Overnight developed hypoxia to the high 60s after an episode with saturation only improving to 80s despite max high flow so transitioned to BIPAP 14/7 100% with sats improving to 90s. CXR with interval worsening infiltrate vs effusion. Pt fatigued appearing on exam but arousable and follows simple commands/answers simple questions. Denies chest pain. Biliary drain with appropriate output. Has had pain around the drain with frequent coughing. Discussed goals of care with daughter at the bedside advising that he has evidence of worsening PNA and is on maximum non-invasive support and would need intubation if fails if in line with goals of care. Daughter reports that they want to continue all aggressive measures at this time maintaining full code consistent with prior goals of care discussions. Will upgrade to MICU and monitor closely on BIPAP for now. Repeat ABG in AM. Will start nebs and mucomyst with aggressive chest PT. Given additional dose of vanc due to positive nares and will need dosing by level. Contine empiric zosyn. Repeat RVP neg. F/u ID recs. Maintain NPO/aspiration precautions. F/u repeat blood cultures. HD as per nephrology. No significant B lines on u/s and no large pleural effusion. Prognosis remains very guarded.

## 2024-03-03 NOTE — PROGRESS NOTE ADULT - SUBJECTIVE AND OBJECTIVE BOX
NEPHROLOGY INTERVAL HPI/OVERNIGHT EVENTS:    Examined  On Bipap  Dtr at bedside    MEDICATIONS  (STANDING):  acetylcysteine 10%  Inhalation 4 milliLiter(s) Inhalation every 6 hours  albuterol/ipratropium for Nebulization 3 milliLiter(s) Nebulizer every 6 hours  amLODIPine   Tablet 2.5 milliGRAM(s) Oral daily  chlorhexidine 2% Cloths 1 Application(s) Topical <User Schedule>  dextrose 5%. 1000 milliLiter(s) (50 mL/Hr) IV Continuous <Continuous>  dextrose 5%. 1000 milliLiter(s) (100 mL/Hr) IV Continuous <Continuous>  dextrose 50% Injectable 25 Gram(s) IV Push once  dextrose 50% Injectable 12.5 Gram(s) IV Push once  dextrose 50% Injectable 25 Gram(s) IV Push once  dronedarone 400 milliGRAM(s) Oral two times a day  epoetin kristal-epbx (RETACRIT) Injectable 65159 Unit(s) IV Push <User Schedule>  ferrous    sulfate 325 milliGRAM(s) Oral daily  gabapentin 300 milliGRAM(s) Oral at bedtime  glucagon  Injectable 1 milliGRAM(s) IntraMuscular once  heparin   Injectable 5000 Unit(s) SubCutaneous every 8 hours  insulin glargine Injectable (LANTUS) 10 Unit(s) SubCutaneous every morning  insulin lispro (ADMELOG) corrective regimen sliding scale   SubCutaneous every 6 hours  metoprolol tartrate 25 milliGRAM(s) Oral two times a day  pantoprazole  Injectable 40 milliGRAM(s) IV Push every 12 hours  piperacillin/tazobactam IVPB.. 3.375 Gram(s) IV Intermittent every 12 hours  senna 2 Tablet(s) Oral at bedtime  simvastatin 20 milliGRAM(s) Oral at bedtime    MEDICATIONS  (PRN):  acetaminophen     Tablet .. 650 milliGRAM(s) Oral every 6 hours PRN Temp greater or equal to 38C (100.4F), Mild Pain (1 - 3)  aluminum hydroxide/magnesium hydroxide/simethicone Suspension 30 milliLiter(s) Oral every 4 hours PRN Dyspepsia  dextrose Oral Gel 15 Gram(s) Oral once PRN Blood Glucose LESS THAN 70 milliGRAM(s)/deciliter  melatonin 3 milliGRAM(s) Oral at bedtime PRN Insomnia  ondansetron Injectable 4 milliGRAM(s) IV Push every 8 hours PRN Nausea and/or Vomiting  sodium chloride 0.9% lock flush 10 milliLiter(s) IV Push every 1 hour PRN Pre/post blood products, medications, blood draw, and to maintain line patency      Allergies    No Known Allergies    Intolerances        Vital Signs Last 24 Hrs  T(C): 36.1 (03 Mar 2024 09:00), Max: 37.8 (02 Mar 2024 15:16)  T(F): 97 (03 Mar 2024 09:00), Max: 100 (02 Mar 2024 15:16)  HR: 64 (03 Mar 2024 09:00) (58 - 75)  BP: 112/49 (03 Mar 2024 09:00) (108/48 - 140/51)  BP(mean): 69 (03 Mar 2024 09:00) (58 - 83)  RR: 17 (03 Mar 2024 09:00) (16 - 20)  SpO2: 99% (03 Mar 2024 09:00) (68% - 100%)    Parameters below as of 03 Mar 2024 08:00  Patient On (Oxygen Delivery Method): BiPAP/CPAP    O2 Concentration (%): 80  Daily     Daily     PHYSICAL EXAM:  GENERAL: Appears chronically ill  HEENMT: No periorbital edema  NECK: Supple, no JVD, dialysis catheter neck  NERVOUS SYSTEM:  Awake, confused  CHEST/LUNG: Diminished BS  HEART: Regular rate and rhythm; No rub  ABDOMEN: Soft, +BS  EXTREMITIES: + dependent edema      LABS:                        9.3    23.69 )-----------( 213      ( 03 Mar 2024 07:50 )             28.9     03-03    135  |  93<L>  |  53.9<H>  ----------------------------<  179<H>  4.1   |  24.0  |  5.83<H>    Ca    8.3<L>      03 Mar 2024 07:50  Phos  3.6     03-03  Mg     1.9     03-03    TPro  5.9<L>  /  Alb  2.9<L>  /  TBili  1.1  /  DBili  x   /  AST  23  /  ALT  17  /  AlkPhos  90  03-03      Urinalysis Basic - ( 03 Mar 2024 07:50 )    Color: x / Appearance: x / SG: x / pH: x  Gluc: 179 mg/dL / Ketone: x  / Bili: x / Urobili: x   Blood: x / Protein: x / Nitrite: x   Leuk Esterase: x / RBC: x / WBC x   Sq Epi: x / Non Sq Epi: x / Bacteria: x      Phosphorus: 3.6 mg/dL (03-03 @ 07:50)  Magnesium: 1.9 mg/dL (03-03 @ 07:50)    ABG - ( 03 Mar 2024 04:32 )  pH, Arterial: 7.380 pH, Blood: x     /  pCO2: 47    /  pO2: 64    / HCO3: 28    / Base Excess: 2.7   /  SaO2: 94.0                  RADIOLOGY & ADDITIONAL TESTS:

## 2024-03-03 NOTE — PROGRESS NOTE ADULT - ASSESSMENT
85 yo female with hx of acute cholecystitis requiring perc cholesystostomy tube placement on 12/22/23, pAF not on AC due to GI bleeding, esophagitis, DM, HTN, HLD, CKD, BPH, presented initially to the ED on 2/25 due to leaking from around the site of his perc sky tube.  In the ED patient had an episode of coffee ground emesis, acute blood loss anemia, and a probable aspiration event.  Patient was transferred to the ICU service on 2/26 due to hypotension/shock requiring pressors, along with RON requiring initiation of dialysis.  Patient was transferred out of the ICU on 2/29.  His delirium improved, he passed a swallow evaluation and was eventually started on a puree diet with thickened liquids.    During the early morning hours of 3/3 patient developed worsening hypoxemic respiratory failure, initially requiring high flow then increased to NIV and transferred to the MICU.  His CXR shows bilat (L>R) airspace opacities.    Suspect recurrent aspiration as the cause of his decompensation.  Will continue abx, try to wean down off NIV, repeat swallow eval after he stabilizes.  Would prefer to avoid prolonged NIV in setting of pneumonia.   Has some degree of increased perihepatic ascites on CT from 3/2, will perform bedside US to see if there is fluid to sample.    Updated daughter at bedside. Prognosis guarded.

## 2024-03-03 NOTE — CONSULT NOTE ADULT - ASSESSMENT
85 y/o M with a h/o cholecystitis s/p cholecystostomy tube (12/22/23), AFib (not on A/C 2/2 GIB), CKD, HTN, HLD, DM, with:    # Acute hypoxemic respiratory failure  # Aspiration pneumonia  # RON  # Delirium    Transfer to MICU.    Concern for recurrent aspiration. Worsening opacification of his left lung field on stat CXR. Constant coughing fits through the night as per his daughter at the bedside.     - failed HFNC, escalated to BiPAP by medicine team, FiO2 increased to 100%  - SpO2 gradually improving, maintain > 92%  - monitor closely, will proceed with a short trial of NIPPV with careful airway and mental status monitoring, very low threshold for intubation  - unable to nasotracheal suction due to NIPPV dependence  - elevate HOB > 30 degrees, strictly NPO  - continue empiric Zosyn, will give dose of vancomycin  - hemodialysis as per nephrology  - avoid sedatives as best as possible to optimize mental status  - monitor hemodynamics closely, maintain a MAP > 65, high risk for further life threatening decompensation    Case discussed in detail with the patient's daughter, Adrienne, at the bedside. Diagnosis, prognosis, and management plan outlined. All questions answered and concerns addressed. She states that her and her mother are Mr. Figueroa's medical decision makers and that they do not wish for any limitations on his care. They would be okay with intubation/invasive mechanical ventilation and cardiopulmonary resuscitation if necessary.    Case discussed with MICU physician, Dr. Velez. 85 y/o M with a h/o cholecystitis s/p cholecystostomy tube (12/22/23), AFib (not on A/C 2/2 GIB), CKD, HTN, HLD, DM, with:    # Acute hypoxemic respiratory failure  # Aspiration pneumonia  # RON  # Delirium  # Cholecystitis    Transfer to MICU.    Concern for recurrent aspiration. Worsening opacification of his left lung field on stat CXR. Constant coughing fits through the night as per his daughter at the bedside.     - failed HFNC, escalated to BiPAP by medicine team, FiO2 increased to 100%  - SpO2 gradually improving, maintain > 92%  - monitor closely, will proceed with a short trial of NIPPV with careful airway and mental status monitoring, very low threshold for intubation  - unable to nasotracheal suction due to NIPPV dependence  - elevate HOB > 30 degrees, strictly NPO  - continue empiric Zosyn, will give dose of vancomycin  - hemodialysis as per nephrology  - avoid sedatives as best as possible to optimize mental status  - monitor hemodynamics closely, maintain a MAP > 65, high risk for further life threatening decompensation  - surgery input appreciated from 3/1 (have since signed off), perc sky has been functioning properly as per documentation, no plan for cholecystectomy at this time, plan for outpatient follow up    Case discussed in detail with the patient's daughter, Adrienne, at the bedside. Diagnosis, prognosis, and management plan outlined. All questions answered and concerns addressed. She states that her and her mother are Mr. Figueroa's medical decision makers and that they do not wish for any limitations on his care. They would be okay with intubation/invasive mechanical ventilation and cardiopulmonary resuscitation if necessary.    Case discussed with MICU physician, Dr. Velez.

## 2024-03-03 NOTE — PROGRESS NOTE ADULT - SUBJECTIVE AND OBJECTIVE BOX
Interval HPI:  Transferred to MICU overnight  Switched from high flow to bipap this AM    Exam:  Daughter at bedside  Seems comfortable on bipap, spo2 100% on 100%fio2 - titrating down  bilat air entry  reg rhythm  abd soft, drain in place, no pus from entry site, green biliary drainage   no edema  skin warm and well perfused     Radiology: cxr - left sided airspace disease  CT chest 3/2 - bibasilar consolidation/atelectasis   CT abdomen- decompressed gallbladder, increased perihepatic ascites     On Admission  02-25-24 (7d)  HPI:  Patient seen and examined before midnight.     85 y/o male with PMH of Acute Cholecystitis s/p Cholecystomy Tube (12/22/23), COVID-19, PAF not on AC due to GIB + Fall Risk, Esophagitis, Anemia, DM 2, HTN, HLD, CKD III/IV, BPH and Cognitive Impairment  came to the ED complaining of leakage around sky tube. As per wife at bed side, family noticed fluid coming out on the skin when they attempted to flush the sky tube. Wife said patient has been doing well since discharged otherwise. No fever, chills, abdominal pain, change in bowel/urinary habit, nausea, vomiting noted.   In the ED, patient was seen by surgery team, no acute surgical intervention. As per ED, patient vomited large amount of coffee ground emesis, looked very pale, holding abdomen; CT angio abdomen done: no acute GI bleed. Repeat CBC: Hb: 11.3---->9.3. Also, he desaturated concerning for aspiration PNA, antibiotic and oxygen therapy.  (25 Feb 2024 22:52)    PAST MEDICAL & SURGICAL HISTORY:  Diabetes      Hypertension      Prostate disorder      Anxiety      High cholesterol      Ulcer      History of endoscopy          Antimicrobial:  piperacillin/tazobactam IVPB.. 3.375 Gram(s) IV Intermittent every 12 hours    Cardiovascular:  amLODIPine   Tablet 2.5 milliGRAM(s) Oral daily  dronedarone 400 milliGRAM(s) Oral two times a day  metoprolol tartrate 25 milliGRAM(s) Oral two times a day    Pulmonary:  acetylcysteine 10%  Inhalation 4 milliLiter(s) Inhalation every 6 hours  albuterol/ipratropium for Nebulization 3 milliLiter(s) Nebulizer every 6 hours    Hematalogic:  heparin   Injectable 5000 Unit(s) SubCutaneous every 8 hours    Other:  acetaminophen     Tablet .. 650 milliGRAM(s) Oral every 6 hours PRN  aluminum hydroxide/magnesium hydroxide/simethicone Suspension 30 milliLiter(s) Oral every 4 hours PRN  chlorhexidine 2% Cloths 1 Application(s) Topical <User Schedule>  dextrose 5%. 1000 milliLiter(s) IV Continuous <Continuous>  dextrose 5%. 1000 milliLiter(s) IV Continuous <Continuous>  dextrose 50% Injectable 12.5 Gram(s) IV Push once  dextrose 50% Injectable 25 Gram(s) IV Push once  dextrose 50% Injectable 25 Gram(s) IV Push once  dextrose Oral Gel 15 Gram(s) Oral once PRN  epoetin kristal-epbx (RETACRIT) Injectable 45614 Unit(s) IV Push <User Schedule>  ferrous    sulfate 325 milliGRAM(s) Oral daily  gabapentin 300 milliGRAM(s) Oral at bedtime  glucagon  Injectable 1 milliGRAM(s) IntraMuscular once  insulin glargine Injectable (LANTUS) 10 Unit(s) SubCutaneous every morning  insulin lispro (ADMELOG) corrective regimen sliding scale   SubCutaneous every 6 hours  melatonin 3 milliGRAM(s) Oral at bedtime PRN  ondansetron Injectable 4 milliGRAM(s) IV Push every 8 hours PRN  pantoprazole  Injectable 40 milliGRAM(s) IV Push every 12 hours  senna 2 Tablet(s) Oral at bedtime  simvastatin 20 milliGRAM(s) Oral at bedtime  sodium chloride 0.9% lock flush 10 milliLiter(s) IV Push every 1 hour PRN      Drug Dosing Weight  Height (cm): 167.6 (26 Feb 2024 12:00)  Weight (kg): 80.8 (26 Feb 2024 12:00)  BMI (kg/m2): 28.8 (26 Feb 2024 12:00)  BSA (m2): 1.9 (26 Feb 2024 12:00)    T(C): 36.4 (03-03-24 @ 10:00), Max: 37.8 (03-02-24 @ 15:16)  HR: 64 (03-03-24 @ 10:08)  BP: 131/58 (03-03-24 @ 10:00)  BP(mean): 81 (03-03-24 @ 10:00)  ABP: --  ABP(mean): --  RR: 18 (03-03-24 @ 10:00)  SpO2: 100% (03-03-24 @ 10:08)    ABG - ( 03 Mar 2024 10:17 )  pH, Arterial: 7.400 pH, Blood: x     /  pCO2: 44    /  pO2: 85    / HCO3: 27    / Base Excess: 2.5   /  SaO2: 98.8                  03-02 @ 07:01  -  03-03 @ 07:00  --------------------------------------------------------  IN: 0 mL / OUT: 295 mL / NET: -295 mL              LABS:  CBC Full  -  ( 03 Mar 2024 07:50 )  WBC Count : 23.69 K/uL  RBC Count : 3.18 M/uL  Hemoglobin : 9.3 g/dL  Hematocrit : 28.9 %  Platelet Count - Automated : 213 K/uL  Mean Cell Volume : 90.9 fl  Mean Cell Hemoglobin : 29.2 pg  Mean Cell Hemoglobin Concentration : 32.2 gm/dL  Auto Neutrophil # : 18.89 K/uL  Auto Lymphocyte # : 1.85 K/uL  Auto Monocyte # : 1.23 K/uL  Auto Eosinophil # : 0.33 K/uL  Auto Basophil # : 0.06 K/uL  Auto Neutrophil % : 79.7 %  Auto Lymphocyte % : 7.8 %  Auto Monocyte % : 5.2 %  Auto Eosinophil % : 1.4 %  Auto Basophil % : 0.3 %    03-03    135  |  93<L>  |  53.9<H>  ----------------------------<  179<H>  4.1   |  24.0  |  5.83<H>    Ca    8.3<L>      03 Mar 2024 07:50  Phos  3.6     03-03  Mg     1.9     03-03    TPro  5.9<L>  /  Alb  2.9<L>  /  TBili  1.1  /  DBili  x   /  AST  23  /  ALT  17  /  AlkPhos  90  03-03      Urinalysis Basic - ( 03 Mar 2024 07:50 )    Color: x / Appearance: x / SG: x / pH: x  Gluc: 179 mg/dL / Ketone: x  / Bili: x / Urobili: x   Blood: x / Protein: x / Nitrite: x   Leuk Esterase: x / RBC: x / WBC x   Sq Epi: x / Non Sq Epi: x / Bacteria: x        ____________________________________________________________________________________________________

## 2024-03-03 NOTE — PROVIDER CONTACT NOTE (CHANGE IN STATUS NOTIFICATION) - ASSESSMENT
Pt desaturation to 68 on HFNC 40/55. Restpiratory therapist Mecca at bedside increased HFNC to 100, pt O2 at 75. Nonrebreather added on top of HFNC and O2 at 80. Pt desaturation to 68 on HFNC 40/55. Respiratory therapist Mecca at bedside increased HFNC to 100, pt O2 at 75. Nonrebreather added on top of HFNC and O2 at 80.

## 2024-03-03 NOTE — PROVIDER CONTACT NOTE (CHANGE IN STATUS NOTIFICATION) - SITUATION
Pt desaturation to 68 on HFNC 40/55. Restpiratory therapist Mecca at bedside increased HFNC to 100, pt O2 at 75. Pt desaturation to 68 on HFNC 40/55. Respiratory therapist Mecca at bedside increased HFNC to 100, pt O2 at 75.

## 2024-03-03 NOTE — CONSULT NOTE ADULT - SUBJECTIVE AND OBJECTIVE BOX
Patient is a 84y old  Male who presents with a chief complaint of Gastrointestinal hemorrhage     (2024 13:27)      BRIEF HOSPITAL COURSE: 85 y/o M with a h/o cholecystitis s/p cholecystostomy tube (23), AFib (not on A/C 2/2 GIB), CKD, HTN, HLD, DM, admitted on 24 with complaints of fluid drainage from aound the perc sky tube insertion site upon flushing the tube.  In the ED, he  had an episode of coffee ground emesis and GI was consulted. CTA abdomen was done with no evidence of acute GIB. On 24 patient developed hypotension, respiratory failure and RRT was called. Patient was transferred to MICU and was started on vasopressors and high flow O2 secondary to aspiration pneumonia. Hospital course RON/ATN requiring new HD on 24. Weaned off vasopressors on 28 and transferred out of ICU on 24.       Events last 24 hours: MICU eval requested as patient developed worsening hypoxemia this morning requiring escalation to NIPPV @ 100% FiO2. He is tachypneic and somnolent. His daughter at the bedside, Adrienne, reports that he has had many coughing fits throughout the night which causes him pain in his RLQ. CXR reveals worsening left lung opacification. On/off low grade fevers.      PAST MEDICAL & SURGICAL HISTORY:  Diabetes      Hypertension      Prostate disorder      Anxiety      High cholesterol      Ulcer      History of endoscopy        Allergies    No Known Allergies    Intolerances      FAMILY HISTORY:  Family history of stomach cancer  Father    Family history of emphysema  Mother        Review of Systems:  Unable to obtain secondary to somnolence and NIPPV.          Medications:  piperacillin/tazobactam IVPB.. 3.375 Gram(s) IV Intermittent every 12 hours    amLODIPine   Tablet 2.5 milliGRAM(s) Oral daily  dronedarone 400 milliGRAM(s) Oral two times a day  metoprolol tartrate 25 milliGRAM(s) Oral two times a day      acetaminophen     Tablet .. 650 milliGRAM(s) Oral every 6 hours PRN  gabapentin 300 milliGRAM(s) Oral at bedtime  melatonin 3 milliGRAM(s) Oral at bedtime PRN  ondansetron Injectable 4 milliGRAM(s) IV Push every 8 hours PRN      heparin   Injectable 5000 Unit(s) SubCutaneous every 8 hours    aluminum hydroxide/magnesium hydroxide/simethicone Suspension 30 milliLiter(s) Oral every 4 hours PRN  pantoprazole  Injectable 40 milliGRAM(s) IV Push every 12 hours  senna 2 Tablet(s) Oral at bedtime      dextrose 50% Injectable 12.5 Gram(s) IV Push once  dextrose 50% Injectable 25 Gram(s) IV Push once  dextrose 50% Injectable 25 Gram(s) IV Push once  dextrose Oral Gel 15 Gram(s) Oral once PRN  glucagon  Injectable 1 milliGRAM(s) IntraMuscular once  insulin glargine Injectable (LANTUS) 10 Unit(s) SubCutaneous every morning  insulin lispro (ADMELOG) corrective regimen sliding scale   SubCutaneous Before meals and at bedtime  simvastatin 20 milliGRAM(s) Oral at bedtime    dextrose 5%. 1000 milliLiter(s) IV Continuous <Continuous>  dextrose 5%. 1000 milliLiter(s) IV Continuous <Continuous>  ferrous    sulfate 325 milliGRAM(s) Oral daily  sodium chloride 0.9% lock flush 10 milliLiter(s) IV Push every 1 hour PRN    epoetin kristal-epbx (RETACRIT) Injectable 80030 Unit(s) IV Push <User Schedule>              ICU Vital Signs Last 24 Hrs  T(C): 36.7 (03 Mar 2024 02:00), Max: 38.5 (02 Mar 2024 05:30)  T(F): 98.1 (03 Mar 2024 02:00), Max: 101.3 (02 Mar 2024 05:30)  HR: 71 (03 Mar 2024 04:45) (58 - 75)  BP: 113/49 (03 Mar 2024 04:00) (93/45 - 140/51)  BP(mean): 62 (03 Mar 2024 04:00) (53 - 74)  ABP: --  ABP(mean): --  RR: 18 (03 Mar 2024 04:00) (16 - 20)  SpO2: 90% (03 Mar 2024 04:45) (90% - 99%)    O2 Parameters below as of 03 Mar 2024 04:00  Patient On (Oxygen Delivery Method): nasal cannula, high flow  O2 Flow (L/min): 40  O2 Concentration (%): 55      Vital Signs Last 24 Hrs  T(C): 36.7 (03 Mar 2024 02:00), Max: 38.5 (02 Mar 2024 05:30)  T(F): 98.1 (03 Mar 2024 02:00), Max: 101.3 (02 Mar 2024 05:30)  HR: 71 (03 Mar 2024 04:45) (58 - 75)  BP: 113/49 (03 Mar 2024 04:00) (93/45 - 140/51)  BP(mean): 62 (03 Mar 2024 04:00) (53 - 74)  RR: 18 (03 Mar 2024 04:00) (16 - 20)  SpO2: 90% (03 Mar 2024 04:45) (90% - 99%)    Parameters below as of 03 Mar 2024 04:00  Patient On (Oxygen Delivery Method): nasal cannula, high flow  O2 Flow (L/min): 40  O2 Concentration (%): 55    ABG - ( 03 Mar 2024 04:32 )  pH, Arterial: 7.380 pH, Blood: x     /  pCO2: 47    /  pO2: 64    / HCO3: 28    / Base Excess: 2.7   /  SaO2: 94.0                I&O's Detail    01 Mar 2024 07:01  -  02 Mar 2024 07:00  --------------------------------------------------------  IN:  Total IN: 0 mL    OUT:    Drain (mL): 0 mL    Indwelling Catheter - Urethral (mL): 1025 mL    Other (mL): 700 mL  Total OUT: 1725 mL    Total NET: -1725 mL      02 Mar 2024 07:01  -  03 Mar 2024 05:27  --------------------------------------------------------  IN:  Total IN: 0 mL    OUT:    Drain (mL): 10 mL    Indwelling Catheter - Urethral (mL): 285 mL  Total OUT: 295 mL    Total NET: -295 mL            LABS:                        10.1   16.06 )-----------( 182      ( 02 Mar 2024 06:45 )             30.9     03-02    137  |  99  |  36.3<H>  ----------------------------<  183<H>  4.1   |  25.0  |  4.24<H>    Ca    8.2<L>      02 Mar 2024 06:45            CAPILLARY BLOOD GLUCOSE      POCT Blood Glucose.: 226 mg/dL (02 Mar 2024 21:17)      Urinalysis Basic - ( 02 Mar 2024 13:00 )    Color: Yellow / Appearance: Cloudy / S.018 / pH: x  Gluc: x / Ketone: Trace mg/dL  / Bili: Negative / Urobili: 1.0 mg/dL   Blood: x / Protein: 300 mg/dL / Nitrite: Negative   Leuk Esterase: Moderate / RBC: 164 /HPF / WBC 31 /HPF   Sq Epi: x / Non Sq Epi: 8 /HPF / Bacteria: Few /HPF      CULTURES:  Culture Results:   No growth at 5 days ( @ 10:51)  Culture Results:   No growth at 5 days ( @ 10:42)  Culture Results:   No growth at 5 days ( @ 19:33)  Culture Results:   No growth at 5 days ( @ 19:23)        Physical Examination:    General: somnolent, slumped to the right in bed, NIPPV mask on, tachypneic    HEENT: Pupils equal, reactive to light.  Symmetric.    PULM: scattered rhonchi bilaterally, diminished breath sounds of the left, no significant sputum production    CVS: Regular rate and rhythm, no murmurs, rubs, or gallops    ABD: Soft, nondistended, nontender, normoactive bowel sounds, perc sky draining bilious fluid, no leakage from insertion site    EXT: No edema, nontender    SKIN: Warm and well perfused, no rashes noted.    NEURO: somnolent, mumbles in response to some questions, follows simple commands      RADIOLOGY:     < from: CT Abdomen and Pelvis w/ Oral Cont (24 @ 23:11) >  FINDINGS:  CHEST:  LUNGS AND LARGE AIRWAYS: Patent central airways. Right upper lobe   alveolar opacities suspicious for pneumonia. Bibasilar atelectasis and   effusions  PLEURA: Bibasilar effusions  VESSELS: Right internal jugular venous catheter with tip in right atrium  HEART: Heart size is normal. Trace pericardial effusion  MEDIASTINUM AND MARIANELA: No lymphadenopathy.  CHEST WALL AND LOWER NECK: Within normal limits.    ABDOMEN AND PELVIS:  LIVER: Within normal limits.  BILE DUCTS: Normal caliber.  GALLBLADDER: Contains a cholecystostomy drainage catheter exiting through   the right chest wall and a gallstone and is mostly decompressed  SPLEEN: Within normal limits.  PANCREAS: Within normal limits.  ADRENALS: Within normal limits.  KIDNEYS/URETERS: Within normal limits.    BLADDER: Contains a Horta catheter  REPRODUCTIVE ORGANS: Prostate within normal limits.    BOWEL: No bowel obstruction. Appendix is not visualized. No evidence of   inflammation in the pericecal region.  PERITONEUM: Significantly increased perihepatic ascites.  VESSELS: Within normal limits.  RETROPERITONEUM/LYMPH NODES: No lymphadenopathy.  ABDOMINAL WALL: Within normal limits.  BONES: Within normal limits.    IMPRESSION:  Right upper lobe alveolar opacities suspicious for pneumonia. Bibasilar   atelectasis and effusions.    Cholecystostomy drainage catheter and gallstone, gallbladder ismostly   decompressed    Significantly increased perihepatic ascites.            CRITICAL CARE TIME SPENT: 65 mins  Time spent evaluating/treating patient with medical issues that pose a high risk for life threatening deterioration and/or end-organ damage, reviewing data/labs/imaging, discussing case with multidisciplinary team, discussing plan/goals of care with patient/family. Non-inclusive of procedure time. The date of entry of this note reflects the date of services rendered.

## 2024-03-03 NOTE — PROGRESS NOTE ADULT - ASSESSMENT
84M s/p cholecystomy tube 12/22/23 admitted for perc sky tube evaluation.  Pt with coffee ground emesis with drop in hgb. CT a/p negative for active GIB. Rapid response called, patient transferred to MICU for critical care management of hypotension and respiratory distress, was on vasopressors, now off and downgraded to stepdown level of care. developed ATN so HD initiated on 2/27/24.     RON: ATN post IV contrast and hypotension  Acute hypoxic respiratory failure  +PVC  CKD(IV) +DM, HTN  CT scan: no hydronephrosis  - avoid further potential nephrotoxins  - cont to monitor for signs of renal recovery  - Had dialysis 3/1 tolerated ok --> plans for HD tomorrow  - No signs of renal recovery noted    Anemia: + multifactorial  - cont CINDI  - Fe stores noted    Will follow

## 2024-03-03 NOTE — PROCEDURE NOTE - NSTRACHPOSTINTU_RESP_A_CORE
Breath sounds bilateral/Breath sounds equal/Chest excursion noted/Chest X-Ray Breath sounds bilateral/Breath sounds equal/Chest excursion noted/Chest X-Ray/Positive end tidal Co2 noted

## 2024-03-04 LAB
ALBUMIN SERPL ELPH-MCNC: 2.8 G/DL — LOW (ref 3.3–5.2)
ALP SERPL-CCNC: 113 U/L — SIGNIFICANT CHANGE UP (ref 40–120)
ALT FLD-CCNC: 16 U/L — SIGNIFICANT CHANGE UP
ANION GAP SERPL CALC-SCNC: 21 MMOL/L — HIGH (ref 5–17)
AST SERPL-CCNC: 19 U/L — SIGNIFICANT CHANGE UP
BASOPHILS # BLD AUTO: 0.1 K/UL — SIGNIFICANT CHANGE UP (ref 0–0.2)
BASOPHILS NFR BLD AUTO: 0.5 % — SIGNIFICANT CHANGE UP (ref 0–2)
BILIRUB SERPL-MCNC: 0.9 MG/DL — SIGNIFICANT CHANGE UP (ref 0.4–2)
BUN SERPL-MCNC: 60.6 MG/DL — HIGH (ref 8–20)
CALCIUM SERPL-MCNC: 8.2 MG/DL — LOW (ref 8.4–10.5)
CHLORIDE SERPL-SCNC: 94 MMOL/L — LOW (ref 96–108)
CO2 SERPL-SCNC: 21 MMOL/L — LOW (ref 22–29)
CREAT SERPL-MCNC: 6.54 MG/DL — HIGH (ref 0.5–1.3)
EGFR: 8 ML/MIN/1.73M2 — LOW
EOSINOPHIL # BLD AUTO: 0.43 K/UL — SIGNIFICANT CHANGE UP (ref 0–0.5)
EOSINOPHIL NFR BLD AUTO: 2.2 % — SIGNIFICANT CHANGE UP (ref 0–6)
GLUCOSE BLDC GLUCOMTR-MCNC: 104 MG/DL — HIGH (ref 70–99)
GLUCOSE BLDC GLUCOMTR-MCNC: 136 MG/DL — HIGH (ref 70–99)
GLUCOSE BLDC GLUCOMTR-MCNC: 139 MG/DL — HIGH (ref 70–99)
GLUCOSE BLDC GLUCOMTR-MCNC: 171 MG/DL — HIGH (ref 70–99)
GLUCOSE BLDC GLUCOMTR-MCNC: 172 MG/DL — HIGH (ref 70–99)
GLUCOSE SERPL-MCNC: 167 MG/DL — HIGH (ref 70–99)
HCT VFR BLD CALC: 31.5 % — LOW (ref 39–50)
HGB BLD-MCNC: 10.6 G/DL — LOW (ref 13–17)
IMM GRANULOCYTES NFR BLD AUTO: 8.1 % — HIGH (ref 0–0.9)
LYMPHOCYTES # BLD AUTO: 1.81 K/UL — SIGNIFICANT CHANGE UP (ref 1–3.3)
LYMPHOCYTES # BLD AUTO: 9.2 % — LOW (ref 13–44)
MAGNESIUM SERPL-MCNC: 1.9 MG/DL — SIGNIFICANT CHANGE UP (ref 1.6–2.6)
MCHC RBC-ENTMCNC: 29.3 PG — SIGNIFICANT CHANGE UP (ref 27–34)
MCHC RBC-ENTMCNC: 33.7 GM/DL — SIGNIFICANT CHANGE UP (ref 32–36)
MCV RBC AUTO: 87 FL — SIGNIFICANT CHANGE UP (ref 80–100)
MONOCYTES # BLD AUTO: 1 K/UL — HIGH (ref 0–0.9)
MONOCYTES NFR BLD AUTO: 5.1 % — SIGNIFICANT CHANGE UP (ref 2–14)
NEUTROPHILS # BLD AUTO: 14.65 K/UL — HIGH (ref 1.8–7.4)
NEUTROPHILS NFR BLD AUTO: 74.9 % — SIGNIFICANT CHANGE UP (ref 43–77)
PHOSPHATE SERPL-MCNC: 4.3 MG/DL — SIGNIFICANT CHANGE UP (ref 2.4–4.7)
PLATELET # BLD AUTO: 226 K/UL — SIGNIFICANT CHANGE UP (ref 150–400)
POTASSIUM SERPL-MCNC: 3.8 MMOL/L — SIGNIFICANT CHANGE UP (ref 3.5–5.3)
POTASSIUM SERPL-SCNC: 3.8 MMOL/L — SIGNIFICANT CHANGE UP (ref 3.5–5.3)
PROT SERPL-MCNC: 6.2 G/DL — LOW (ref 6.6–8.7)
RBC # BLD: 3.62 M/UL — LOW (ref 4.2–5.8)
RBC # FLD: 15.1 % — HIGH (ref 10.3–14.5)
SODIUM SERPL-SCNC: 136 MMOL/L — SIGNIFICANT CHANGE UP (ref 135–145)
WBC # BLD: 19.58 K/UL — HIGH (ref 3.8–10.5)
WBC # FLD AUTO: 19.58 K/UL — HIGH (ref 3.8–10.5)

## 2024-03-04 PROCEDURE — 99291 CRITICAL CARE FIRST HOUR: CPT | Mod: GC

## 2024-03-04 PROCEDURE — 99233 SBSQ HOSP IP/OBS HIGH 50: CPT

## 2024-03-04 RX ORDER — MIDODRINE HYDROCHLORIDE 2.5 MG/1
10 TABLET ORAL EVERY 8 HOURS
Refills: 0 | Status: DISCONTINUED | OUTPATIENT
Start: 2024-03-04 | End: 2024-03-05

## 2024-03-04 RX ORDER — NOREPINEPHRINE BITARTRATE/D5W 8 MG/250ML
0.05 PLASTIC BAG, INJECTION (ML) INTRAVENOUS
Qty: 8 | Refills: 0 | Status: DISCONTINUED | OUTPATIENT
Start: 2024-03-04 | End: 2024-03-05

## 2024-03-04 RX ADMIN — Medication 3 MILLILITER(S): at 09:20

## 2024-03-04 RX ADMIN — Medication 3 MILLILITER(S): at 15:48

## 2024-03-04 RX ADMIN — Medication 325 MILLIGRAM(S): at 12:09

## 2024-03-04 RX ADMIN — Medication 7.58 MICROGRAM(S)/KG/MIN: at 22:40

## 2024-03-04 RX ADMIN — Medication 4 MILLILITER(S): at 04:01

## 2024-03-04 RX ADMIN — HEPARIN SODIUM 5000 UNIT(S): 5000 INJECTION INTRAVENOUS; SUBCUTANEOUS at 21:08

## 2024-03-04 RX ADMIN — GABAPENTIN 300 MILLIGRAM(S): 400 CAPSULE ORAL at 21:08

## 2024-03-04 RX ADMIN — CHLORHEXIDINE GLUCONATE 15 MILLILITER(S): 213 SOLUTION TOPICAL at 17:06

## 2024-03-04 RX ADMIN — Medication 1: at 23:17

## 2024-03-04 RX ADMIN — Medication 4 MILLILITER(S): at 09:20

## 2024-03-04 RX ADMIN — PANTOPRAZOLE SODIUM 40 MILLIGRAM(S): 20 TABLET, DELAYED RELEASE ORAL at 17:05

## 2024-03-04 RX ADMIN — Medication 4 MILLILITER(S): at 21:53

## 2024-03-04 RX ADMIN — HEPARIN SODIUM 5000 UNIT(S): 5000 INJECTION INTRAVENOUS; SUBCUTANEOUS at 15:00

## 2024-03-04 RX ADMIN — MIDODRINE HYDROCHLORIDE 10 MILLIGRAM(S): 2.5 TABLET ORAL at 05:27

## 2024-03-04 RX ADMIN — Medication 3 MILLILITER(S): at 04:01

## 2024-03-04 RX ADMIN — PANTOPRAZOLE SODIUM 40 MILLIGRAM(S): 20 TABLET, DELAYED RELEASE ORAL at 05:27

## 2024-03-04 RX ADMIN — Medication 4 MILLILITER(S): at 15:49

## 2024-03-04 RX ADMIN — CHLORHEXIDINE GLUCONATE 1 APPLICATION(S): 213 SOLUTION TOPICAL at 05:27

## 2024-03-04 RX ADMIN — MIDODRINE HYDROCHLORIDE 10 MILLIGRAM(S): 2.5 TABLET ORAL at 17:06

## 2024-03-04 RX ADMIN — AMLODIPINE BESYLATE 2.5 MILLIGRAM(S): 2.5 TABLET ORAL at 05:26

## 2024-03-04 RX ADMIN — INSULIN GLARGINE 10 UNIT(S): 100 INJECTION, SOLUTION SUBCUTANEOUS at 09:45

## 2024-03-04 RX ADMIN — Medication 3 MILLILITER(S): at 21:53

## 2024-03-04 RX ADMIN — DEXMEDETOMIDINE HYDROCHLORIDE IN 0.9% SODIUM CHLORIDE 0.4 MICROGRAM(S)/KG/HR: 4 INJECTION INTRAVENOUS at 12:13

## 2024-03-04 RX ADMIN — Medication 25 MILLIGRAM(S): at 05:28

## 2024-03-04 RX ADMIN — Medication 1: at 06:23

## 2024-03-04 RX ADMIN — CHLORHEXIDINE GLUCONATE 15 MILLILITER(S): 213 SOLUTION TOPICAL at 05:26

## 2024-03-04 RX ADMIN — ERYTHROPOIETIN 10000 UNIT(S): 10000 INJECTION, SOLUTION INTRAVENOUS; SUBCUTANEOUS at 12:17

## 2024-03-04 RX ADMIN — MIDODRINE HYDROCHLORIDE 10 MILLIGRAM(S): 2.5 TABLET ORAL at 21:08

## 2024-03-04 RX ADMIN — PIPERACILLIN AND TAZOBACTAM 25 GRAM(S): 4; .5 INJECTION, POWDER, LYOPHILIZED, FOR SOLUTION INTRAVENOUS at 05:27

## 2024-03-04 RX ADMIN — SENNA PLUS 2 TABLET(S): 8.6 TABLET ORAL at 21:08

## 2024-03-04 RX ADMIN — SIMVASTATIN 20 MILLIGRAM(S): 20 TABLET, FILM COATED ORAL at 21:08

## 2024-03-04 RX ADMIN — HEPARIN SODIUM 5000 UNIT(S): 5000 INJECTION INTRAVENOUS; SUBCUTANEOUS at 05:26

## 2024-03-04 NOTE — PROGRESS NOTE ADULT - CRITICAL CARE ATTENDING COMMENT
see above and rrt note
Critical care time spent titrating IV sedatives, vasoactive medications, adjusting mechanical ventilator settings, interpreting blood gases and chest imaging.

## 2024-03-04 NOTE — CONSULT NOTE ADULT - SUBJECTIVE AND OBJECTIVE BOX
Vascular Attending:  Suzanne STODDARD    Vascular Surgery HPI:  Admission HPI reviewed below  multiple comorbidities, multiorgan failure including renal failure  residing in the MICU  Hd currently via right IJ sara, renal team plan for longer term HD, requesting more permanent access   patient under sedation /intubated, unable to assist with HPI     HPI:  Patient seen and examined before midnight.     83 y/o male with PMH of Acute Cholecystitis s/p Cholecystomy Tube (12/22/23), COVID-19, PAF not on AC due to GIB + Fall Risk, Esophagitis, Anemia, DM 2, HTN, HLD, CKD III/IV, BPH and Cognitive Impairment  came to the ED complaining of leakage around sky tube. As per wife at bed side, family noticed fluid coming out on the skin when they attempted to flush the sky tube. Wife said patient has been doing well since discharged otherwise. No fever, chills, abdominal pain, change in bowel/urinary habit, nausea, vomiting noted.   In the ED, patient was seen by surgery team, no acute surgical intervention. As per ED, patient vomited large amount of coffee ground emesis, looked very pale, holding abdomen; CT angio abdomen done: no acute GI bleed. Repeat CBC: Hb: 11.3---->9.3. Also, he desaturated concerning for aspiration PNA, antibiotic and oxygen therapy.  (25 Feb 2024 22:52)      PAST MEDICAL & SURGICAL HISTORY:  Diabetes      Hypertension      Prostate disorder      Anxiety      High cholesterol      Ulcer      History of endoscopy          REVIEW OF SYSTEMS : see HPI       General:	    Skin/Breast:  	  Ophthalmologic:  	  ENMT:	    Respiratory and Thorax:  	  Cardiovascular:	    Gastrointestinal:	    Genitourinary:	    Musculoskeletal:	    Neurological:	    Psychiatric:	    Hematology/Lymphatics:	    Endocrine:	    Allergic/Immunologic:	    MEDICATIONS  (STANDING):  acetylcysteine 10%  Inhalation 4 milliLiter(s) Inhalation every 6 hours  albuterol/ipratropium for Nebulization 3 milliLiter(s) Nebulizer every 6 hours  chlorhexidine 0.12% Liquid 15 milliLiter(s) Oral Mucosa every 12 hours  chlorhexidine 2% Cloths 1 Application(s) Topical <User Schedule>  dexMEDEtomidine Infusion 0.02 MICROgram(s)/kG/Hr (0.4 mL/Hr) IV Continuous <Continuous>  dextrose 5%. 1000 milliLiter(s) (50 mL/Hr) IV Continuous <Continuous>  dextrose 5%. 1000 milliLiter(s) (100 mL/Hr) IV Continuous <Continuous>  dextrose 50% Injectable 12.5 Gram(s) IV Push once  dextrose 50% Injectable 25 Gram(s) IV Push once  dextrose 50% Injectable 25 Gram(s) IV Push once  epoetin kristal-epbx (RETACRIT) Injectable 72816 Unit(s) IV Push <User Schedule>  ferrous    sulfate 325 milliGRAM(s) Oral daily  gabapentin 300 milliGRAM(s) Oral at bedtime  glucagon  Injectable 1 milliGRAM(s) IntraMuscular once  heparin   Injectable 5000 Unit(s) SubCutaneous every 8 hours  insulin glargine Injectable (LANTUS) 10 Unit(s) SubCutaneous every morning  insulin lispro (ADMELOG) corrective regimen sliding scale   SubCutaneous every 6 hours  midodrine 10 milliGRAM(s) Oral every 8 hours  norepinephrine Infusion 0.05 MICROgram(s)/kG/Min (7.58 mL/Hr) IV Continuous <Continuous>  pantoprazole  Injectable 40 milliGRAM(s) IV Push every 12 hours  propofol Infusion 10 MICROgram(s)/kG/Min (4.85 mL/Hr) IV Continuous <Continuous>  senna 2 Tablet(s) Oral at bedtime  simvastatin 20 milliGRAM(s) Oral at bedtime    MEDICATIONS  (PRN):  acetaminophen     Tablet .. 650 milliGRAM(s) Oral every 6 hours PRN Temp greater or equal to 38C (100.4F), Mild Pain (1 - 3)  aluminum hydroxide/magnesium hydroxide/simethicone Suspension 30 milliLiter(s) Oral every 4 hours PRN Dyspepsia  dextrose Oral Gel 15 Gram(s) Oral once PRN Blood Glucose LESS THAN 70 milliGRAM(s)/deciliter  melatonin 3 milliGRAM(s) Oral at bedtime PRN Insomnia  ondansetron Injectable 4 milliGRAM(s) IV Push every 8 hours PRN Nausea and/or Vomiting  sodium chloride 0.9% lock flush 10 milliLiter(s) IV Push every 1 hour PRN Pre/post blood products, medications, blood draw, and to maintain line patency      Allergies    No Known Allergies    Intolerances        SOCIAL HISTORY: unable to ilicit       Vital Signs Last 24 Hrs  T(C): 37 (04 Mar 2024 13:05), Max: 37.7 (04 Mar 2024 03:00)  T(F): 98.6 (04 Mar 2024 13:05), Max: 99.9 (04 Mar 2024 03:00)  HR: 67 (04 Mar 2024 13:05) (57 - 88)  BP: 102/58 (04 Mar 2024 13:05) (56/41 - 159/73)  BP(mean): 65 (04 Mar 2024 12:15) (48 - 99)  RR: 19 (04 Mar 2024 13:05) (0 - 31)  SpO2: 98% (04 Mar 2024 13:05) (94% - 100%)    Parameters below as of 04 Mar 2024 09:20  Patient On (Oxygen Delivery Method): ventilator        PHYSICAL EXAM:      Constitutional: intubated, ill appearing     Eyes: no scleral icterus     ENMT: intubated, gastric tube     Neck: right IJ non tunneled HD catheter present, clean dressing       Respiratory: mechanically vented     Cardiovascular: sinus rhythm on monitor     Gastrointestinal: soft     Genitourinary: wills, with tyron urine     Rectal: not examined     Extremities: warm     Vascular: no gross perfusion deficits     Neurological: unable to adequately assess                 LABS:                        10.6   19.58 )-----------( 226      ( 04 Mar 2024 03:20 )             31.5     03-04    136  |  94<L>  |  60.6<H>  ----------------------------<  167<H>  3.8   |  21.0<L>  |  6.54<H>    Ca    8.2<L>      04 Mar 2024 03:20  Phos  4.3     03-04  Mg     1.9     03-04    TPro  6.2<L>  /  Alb  2.8<L>  /  TBili  0.9  /  DBili  x   /  AST  19  /  ALT  16  /  AlkPhos  113  03-04      Urinalysis Basic - ( 04 Mar 2024 03:20 )    Color: x / Appearance: x / SG: x / pH: x  Gluc: 167 mg/dL / Ketone: x  / Bili: x / Urobili: x   Blood: x / Protein: x / Nitrite: x   Leuk Esterase: x / RBC: x / WBC x   Sq Epi: x / Non Sq Epi: x / Bacteria: x        RADIOLOGY & ADDITIONAL STUDIES    Impression and Plan:    Multiple organ failure  ESRD, HD currently via non tunneled HD catheter   Need for continued HD, will need Tunnelled HD catheter and possibly AVF    -- Consult IR for permacath eval   - obtain vein mapping of upper ext (hemodialysis access duplex), once patient is extubated and better optimized   - will peripherally follow, and if and AVF is needed will likely perform in the outpatient setting

## 2024-03-04 NOTE — PROGRESS NOTE ADULT - ASSESSMENT
84M s/p cholecystomy tube 12/22/23 admitted for perc sky tube evaluation.  Pt with coffee ground emesis with drop in hgb. CT a/p negative for active GIB. Rapid response called, patient transferred to MICU for critical care management of hypotension and respiratory distress, was on vasopressors, now off and downgraded to stepdown level of care. developed ATN so HD initiated on 2/27/24.     RON: ATN post IV contrast and hypotension  Acute hypoxic respiratory failure  +PVC  CKD(IV) +DM, HTN  CT scan: no hydronephrosis  - avoid further potential nephrotoxins  - cont to monitor for signs of renal recovery  - Had dialysis 3/1 tolerated ok --> HD today  - HD catheter placed 2/27--> pt need permcath will notify vascular team  - No signs of renal recovery noted will likely need long term HD  - will need to find out pt chair in the community for ongoing dialysis    Anemia: + multifactorial  - cont CINDI  - Fe stores noted    Will follow 84M s/p cholecystomy tube 12/22/23 admitted for perc sky tube evaluation.  Pt with coffee ground emesis with drop in hgb. CT a/p negative for active GIB. Rapid response called, patient transferred to MICU for critical care management of hypotension and respiratory distress, was on vasopressors, now off and downgraded to stepdown level of care. developed ATN so HD initiated on 2/27/24.     RON: ATN post IV contrast and hypotension  Acute hypoxic respiratory failure  +PVC  CKD(IV) +DM, HTN  CT scan: no hydronephrosis  - avoid further potential nephrotoxins  - cont to monitor for signs of renal recovery  - Had dialysis 3/1 tolerated ok --> HD today --> will cont [facility] MWF schedule  - HD catheter placed 2/27--> pt need permcath will notify vascular team  - No signs of renal recovery noted will likely need long term HD  - will need to find out pt chair in the community for ongoing dialysis    Anemia: + multifactorial  - cont CINDI  - Fe stores noted    Will follow

## 2024-03-04 NOTE — AIRWAY REMOVAL NOTE  ADULT & PEDS - O2 FLOW (L/MIN)
31y Male s/p surgical resection of multiple hidradenitis suppurativa lesions on bilateral buttocks (more extensive on right side) and right inner thigh on 2/21.      Plan:  - Diet: regular   - Activity: ambulate with assistance  - dressing change after bowel movement by RN  - Pain medication: tylenol, PCA  - f/u OR culture   - c/w abx  - Dispo planning    Plastic Surgery  [id number]   [id number]

## 2024-03-04 NOTE — PHARMACOTHERAPY INTERVENTION NOTE - COMMENTS
Outpatient medication list reviewed and updated, referencing Azra fill history.    Outpatient Medication Review updated.    HOME MEDICATIONS:  dronedarone 400 mg oral tablet: 1 tab(s) orally 2 times a day (27 Feb 2024 08:59)  gabapentin 300 mg oral capsule: 1 cap(s) orally once a day (at bedtime) (27 Feb 2024 08:59)  insulin glargine 100 units/mL subcutaneous solution: 30 unit(s) subcutaneous once a day before dinner (27 Feb 2024 12:30)  insulin lispro 100 units/mL injectable solution: 4 international unit(s) subcutaneous 3 times a day (before meals) hold if FS &lt;100 (27 Feb 2024 08:59)  metoprolol tartrate 25 mg oral tablet: 1 tab(s) orally 2 times a day (27 Feb 2024 08:59)  omeprazole 40 mg oral delayed release capsule: 1 cap(s) orally once a day (27 Feb 2024 08:59)  Renal Vitamin oral tablet: 1 tab(s) orally once a day (27 Feb 2024 12:30)  simvastatin 20 mg oral tablet: 1 tab(s) orally once a day (at bedtime) (27 Feb 2024 08:59)  sodium bicarbonate 650 mg oral tablet: 1 tab(s) orally 2 times a day (27 Feb 2024 08:59)  tamsulosin 0.4 mg oral capsule: 1 cap(s) orally once a day (at bedtime) (27 Feb 2024 08:59)  
hold while increasing levophed requirements

## 2024-03-04 NOTE — AIRWAY REMOVAL NOTE  ADULT & PEDS - ARTIFICAL AIRWAY REMOVAL COMMENTS
pt extubated to high flow as per MICU MD and pt tolerating extubation well HR 86 SaO2-99% on 50% HFNC

## 2024-03-04 NOTE — PROGRESS NOTE ADULT - ASSESSMENT
Assessment:  Mr. Figueroa is a 83 YO M w/pmhx of cholecystitis w/cholecystostomy tube that was placed on 12/23/2023, afib not on AC 2/2 GIB, CKD now requiring HD this hospital stay, HTN, HLD, DM admitted on 02/25/2024 2/2 fluid leakage from cholecystostomy tube. Patient noted to have coffee ground emesis concerning for acute GIB, CTA abdomen/pelvis negative for acute GIB. Patient was brought to the ICU for distributive shock 2/2 pneumonia, suspect aspiration, 1st HD 02/27/2024. Patient improved and was downgraded off ICU service 02/29/2024. Patient now back in MICU as of 03/03/2024 for acute hypoxic respiratory failure. Patient was intubated on 03/03/2024 for worsening hypoxic respiratory failure    Problem List:  1. Acute hypoxic respiratory failure 2/2  2. Pneumonia  3. Shock, distributive   4. ATN on CKD, now requiring HD   5. Cholecystitis s/p perc cholecystostomy 12/23/2023    Plan:  Neuro: Change sedation to Precedex, targeting RASS 0 to -1. Monitor mental status, avoid deliriogenic medications. Pain & fever control as needed    CV: Shock, distributive, likely 2/2 sedation. Actively titrating Levophed to maintain MAP>65mmHg. Add Midodrine. Cannot give Dronedarone via NGT, cannot be crushed. Hold anti-HTN agents in setting of shock     Pulm: Patient currently on Full vent support. Titrate settings to maintain SaO2 >90%, or pH >7.25. Consider low tidal volume ventilation strategy w/ goal Tv 4-6 cc/kg of ideal body weight. Plateau pressure goal <30. Peridex oral care. Aggressive pulmonary toilet. Daily sedation vacation with spontaneous breathing trial if clinical condition warrants, discuss with respiratory therapy    Renal: ATN, likely 2/2 acute infectious process, now requiring HD, was CKD. HD as per nephrology. Strict I/Os, goal UOP >0.5cc/kg/hr. Trend renal function and electrolytes, replete as needed. Avoid nephrotoxic agents    GI: Tube feeds started. PPI. Has cholecystostomy tube for decompression of GB, recent CT abd/plv shows mostly decompressed GB. Surgery signed off as not stable right now for definitive cholecystectomy     ID: Zosyn for pneumonia. Ordered sputum culture. Trend WBC/fevers/procalcitonin     Heme: HSQ for DVT prophylaxis     Endo: Goal blood glucose <180. ISS and Lantus     Discussed case w/MICU attending, Dr. San     CRITICAL CARE TIME SPENT: 40 minutes assessing presenting problems of acute illness, which pose high probability of life threatening deterioration or end organ damage/dysfunction, as well as medical decision making including initiating plan of care, reviewing data, reviewing radiologic exams, discussing with multidisciplinary team,  discussing goals of care with patient/family, and writing this note.  Non-inclusive of procedures performed  Date of entry of this note is equal to the date of services rendered

## 2024-03-04 NOTE — PROGRESS NOTE ADULT - ASSESSMENT
Mr. Figueroa is a 85 YO M w/pmhx of cholecystitis w/cholecystostomy tube that was placed on 12/23/2023, afib not on AC 2/2 GIB, CKD now requiring HD this hospital stay, HTN, HLD, DM admitted on 02/25/2024 2/2 fluid leakage from cholecystostomy tube. Patient noted to have coffee ground emesis concerning for acute GIB, CTA abdomen/pelvis negative for acute GIB. Patient was brought to the ICU for distributive shock 2/2 pneumonia, suspect aspiration, 1st HD 02/27/2024. Patient improved and was downgraded off ICU service 02/29/2024. Patient now back in MICU as of 03/03/2024 for acute hypoxic respiratory failure. Patient was intubated on 03/03/2024 for worsening hypoxic respiratory failure    Problem List:  1. Acute hypoxic respiratory failure 2/2  2. Pneumonia  3. Shock, distributive   4. ATN on CKD, now requiring HD   5. Cholecystitis s/p perc cholecystostomy 12/23/2023    Neuro: Changed sedation to Precedex overnight, targeting RASS 0 to -1. Monitor mental status, avoid deliriogenic medications. Pain & fever control as needed    CV: Shock, distributive, likely 2/2 sedation. Actively titrating Levophed to maintain MAP>65mmHg. Added Midodrine overnight. Cannot give Dronedarone via NGT, cannot be crushed. Hold anti-HTN agents in setting of shock     Pulm: Patient currently on Full vent support. Titrate settings to maintain SaO2 >90%, or pH >7.25. Consider low tidal volume ventilation strategy w/ goal Tv 4-6 cc/kg of ideal body weight. Plateau pressure goal <30. Peridex oral care. Aggressive pulmonary toilet. Daily sedation vacation with spontaneous breathing trial if clinical condition warrants, discuss with respiratory therapy    Renal: ATN, likely 2/2 acute infectious process, now requiring HD, was CKD. HD as per nephrology. Strict I/Os, goal UOP >0.5cc/kg/hr. Trend renal function and electrolytes, replete as needed. Avoid nephrotoxic agents    GI: Tube feeds started overnight. PPI. Has cholecystostomy tube for decompression of GB, recent CT abd/plv shows mostly decompressed GB. Surgery signed off as not stable right now for definitive cholecystectomy     ID: Zosyn for pneumonia. Pending sputum culture. Trend WBC/fevers/procalcitonin     Heme: HSQ for DVT prophylaxis     Endo: Goal blood glucose <180. ISS and Lantus

## 2024-03-04 NOTE — PROGRESS NOTE ADULT - NSPROGADDITIONALINFOA_GEN_ALL_CORE
50min Time spent with review of chart documents, labs, imaging. Direct patient assessment,  formulation of care plan. Discussion with  Interdisciplinary  team

## 2024-03-04 NOTE — PROGRESS NOTE ADULT - SUBJECTIVE AND OBJECTIVE BOX
Date of entry of this note is equal to the date of services rendered   Patient is a 84y old  Male who presents with a chief complaint of Gastrointestinal hemorrhage     (27 Feb 2024 13:27)    BRIEF HOSPITAL COURSE:   Mr. Figueroa is a 85 YO M w/pmhx of cholecystitis w/cholecystostomy tube that was placed on 12/23/2023, afib not on AC 2/2 GIB, CKD now requiring HD this hospital stay, HTN, HLD, DM admitted on 02/25/2024 2/2 fluid leakage from cholecystostomy tube. Patient noted to have coffee ground emesis concerning for acute GIB, CTA abdomen/pelvis negative for acute GIB. Patient was brought to the ICU for distributive shock 2/2 pneumonia, suspect aspiration, 1st HD 02/27/2024. Patient improved and was downgraded off ICU service 02/29/2024. Patient now back in MICU as of 03/03/2024 for acute hypoxic respiratory failure. Patient was intubated on 03/03/2024 for worsening hypoxic respiratory failure      Events last 24 hours:   - Switched propofol to Precedex  - Remains on very low dose Levophed  - Add midodrine   - Started tube feeds     Review of Systems:  Unable to assess given clinical condition     PAST MEDICAL & SURGICAL HISTORY:  Diabetes  Hypertension  Prostate disorder  Anxiety  High cholesterol  Ulcer  History of endoscopy    Medications:  piperacillin/tazobactam IVPB.. 3.375 Gram(s) IV Intermittent every 12 hours  amLODIPine   Tablet 2.5 milliGRAM(s) Oral daily  metoprolol tartrate 25 milliGRAM(s) Oral two times a day  acetylcysteine 10%  Inhalation 4 milliLiter(s) Inhalation every 6 hours  albuterol/ipratropium for Nebulization 3 milliLiter(s) Nebulizer every 6 hours  acetaminophen     Tablet .. 650 milliGRAM(s) Oral every 6 hours PRN  dexMEDEtomidine Infusion 0.02 MICROgram(s)/kG/Hr IV Continuous <Continuous>  gabapentin 300 milliGRAM(s) Oral at bedtime  melatonin 3 milliGRAM(s) Oral at bedtime PRN  ondansetron Injectable 4 milliGRAM(s) IV Push every 8 hours PRN  propofol Infusion 10 MICROgram(s)/kG/Min IV Continuous <Continuous>  heparin   Injectable 5000 Unit(s) SubCutaneous every 8 hours  aluminum hydroxide/magnesium hydroxide/simethicone Suspension 30 milliLiter(s) Oral every 4 hours PRN  pantoprazole  Injectable 40 milliGRAM(s) IV Push every 12 hours  senna 2 Tablet(s) Oral at bedtime  dextrose 50% Injectable 12.5 Gram(s) IV Push once  dextrose 50% Injectable 25 Gram(s) IV Push once  dextrose 50% Injectable 25 Gram(s) IV Push once  dextrose Oral Gel 15 Gram(s) Oral once PRN  glucagon  Injectable 1 milliGRAM(s) IntraMuscular once  insulin glargine Injectable (LANTUS) 10 Unit(s) SubCutaneous every morning  insulin lispro (ADMELOG) corrective regimen sliding scale   SubCutaneous every 6 hours  simvastatin 20 milliGRAM(s) Oral at bedtime  dextrose 5%. 1000 milliLiter(s) IV Continuous <Continuous>  dextrose 5%. 1000 milliLiter(s) IV Continuous <Continuous>  ferrous    sulfate 325 milliGRAM(s) Oral daily  sodium chloride 0.9% lock flush 10 milliLiter(s) IV Push every 1 hour PRN  epoetin kristal-epbx (RETACRIT) Injectable 67069 Unit(s) IV Push <User Schedule>  chlorhexidine 0.12% Liquid 15 milliLiter(s) Oral Mucosa every 12 hours  chlorhexidine 2% Cloths 1 Application(s) Topical <User Schedule>    Mode: AC/ CMV (Assist Control/ Continuous Mandatory Ventilation)  RR (machine): 20  TV (machine): 400  FiO2: 60  PEEP: 6  ITime: 1  MAP: 11  PIP: 27    ICU Vital Signs Last 24 Hrs  T(C): 37.2 (03 Mar 2024 22:45), Max: 37.2 (03 Mar 2024 22:15)  T(F): 99 (03 Mar 2024 22:45), Max: 99 (03 Mar 2024 22:15)  HR: 70 (04 Mar 2024 00:22) (58 - 88)  BP: 134/60 (03 Mar 2024 22:45) (70/46 - 159/73)  BP(mean): 81 (03 Mar 2024 22:45) (55 - 99)  RR: 20 (03 Mar 2024 22:45) (12 - 25)  SpO2: 100% (04 Mar 2024 00:22) (68% - 100%)  O2 Parameters below as of 03 Mar 2024 20:15  Patient On (Oxygen Delivery Method): ventilator    ABG - ( 03 Mar 2024 16:44 )  pH, Arterial: 7.420 pH, Blood: x     /  pCO2: 39    /  pO2: 157   / HCO3: 25    / Base Excess: 0.8   /  SaO2: 100.0     I&O's Detail    02 Mar 2024 07:01  -  03 Mar 2024 07:00  --------------------------------------------------------  IN:  Total IN: 0 mL    OUT:    Drain (mL): 10 mL    Indwelling Catheter - Urethral (mL): 285 mL  Total OUT: 295 mL    Total NET: -295 mL    03 Mar 2024 07:01  -  04 Mar 2024 01:34  --------------------------------------------------------  IN:    Enteral Tube Flush: 50 mL    IV PiggyBack: 250 mL    IV PiggyBack: 200 mL    Nepro: 20 mL    Propofol: 22.1 mL  Total IN: 542.1 mL    OUT:    Drain (mL): 0 mL    Indwelling Catheter - Urethral (mL): 300 mL  Total OUT: 300 mL    Total NET: 242.1 mL    LABS:                        9.3    23.69 )-----------( 213      ( 03 Mar 2024 07:50 )             28.9     03-03    135  |  93<L>  |  53.9<H>  ----------------------------<  179<H>  4.1   |  24.0  |  5.83<H>    Ca    8.3<L>      03 Mar 2024 07:50  Phos  3.6     03-03  Mg     1.9     03-03    TPro  5.9<L>  /  Alb  2.9<L>  /  TBili  1.1  /  DBili  x   /  AST  23  /  ALT  17  /  AlkPhos  90  03-03    POCT Blood Glucose.: 139 mg/dL (03 Mar 2024 23:59)    Urinalysis Basic - ( 03 Mar 2024 07:50 )  Color: x / Appearance: x / SG: x / pH: x  Gluc: 179 mg/dL / Ketone: x  / Bili: x / Urobili: x   Blood: x / Protein: x / Nitrite: x   Leuk Esterase: x / RBC: x / WBC x   Sq Epi: x / Non Sq Epi: x / Bacteria: x    CULTURES:  Rapid RVP Result: NotDetec (03-03 @ 04:34)  Culture Results:   No growth at 24 hours (03-02 @ 12:50)  Culture Results:   No growth at 24 hours (03-02 @ 12:43)  Rapid RVP Result: NotDetec (03-01 @ 17:52)  Culture Results:   No growth at 5 days (02-26 @ 10:51)  Culture Results:   No growth at 5 days (02-26 @ 10:42)    Physical Examination:  General: No acute distress   HEENT: Pupils equal, reactive to light.  Symmetric.  PULM: Diminished breath sounds b/l  NECK: Supple, no lymphadenopathy, trachea midline  CVS: Regular rate and rhythm, no murmurs, rubs, or gallops  ABD: Soft, nondistended, nontender, normoactive bowel sounds, no masses  SKIN: Warm and well perfused,  NEURO: Intubated and sedated  RADIOLOGY:   < from: CT Abdomen and Pelvis w/ Oral Cont (03.02.24 @ 23:11) >    ACC: [id number] EXAM:  CT CHEST   ORDERED BY: BERKLEY MENDOZA     ACC: [id number] EXAM:  CT ABDOMEN AND PELVIS OC   ORDERED BY: BERKLEY MENDOZA     PROCEDURE DATE:  03/02/2024      INTERPRETATION:  CLINICAL INFORMATION: Has percutaneous cholecystostomy,   on antibiotics for aspiration, ongoing fever    COMPARISON: 2/25/2024    CONTRAST/COMPLICATIONS:  IV Contrast: NONE  Oral Contrast: [facility] (accession [id number]), NONE (accession [id number])  Complications: None reported at time of study completion    PROCEDURE:  CT of the Chest, Abdomen and Pelvis was performed.  Sagittal and coronal reformats were performed.    FINDINGS:  CHEST:  LUNGS AND LARGE AIRWAYS: Patent central airways. Right upper lobe   alveolar opacities suspicious for pneumonia. Bibasilar atelectasis and   effusions  PLEURA: Bibasilar effusions  VESSELS: Right internal jugular venous catheter with tip in right atrium  HEART: Heart size is normal. Trace pericardial effusion  MEDIASTINUM AND MARIANELA: No lymphadenopathy.  CHEST WALL AND LOWER NECK: Within normal limits.    ABDOMEN AND PELVIS:  LIVER: Within normal limits.  BILE DUCTS: Normal caliber.  GALLBLADDER: Contains a cholecystostomy drainage catheter exiting through   the right chest wall and a gallstone and is mostly decompressed  SPLEEN: Within normal limits.  PANCREAS: Within normal limits.  ADRENALS: Within normal limits.  KIDNEYS/URETERS: Within normal limits.    BLADDER: Contains a Horta catheter  REPRODUCTIVE ORGANS: Prostate within normal limits.    BOWEL: No bowel obstruction. Appendix is not visualized. No evidence of   inflammation in the pericecal region.  PERITONEUM: Significantly increased perihepatic ascites.  VESSELS: Within normal limits.  RETROPERITONEUM/LYMPH NODES: No lymphadenopathy.  ABDOMINAL WALL: Within normal limits.  BONES: Within normal limits.    IMPRESSION:  Right upper lobe alveolar opacities suspicious for pneumonia. Bibasilar   atelectasis and effusions.    Cholecystostomy drainage catheter and gallstone, gallbladder ismostly   decompressed    Significantly increased perihepatic ascites.    --- End of Report ---    CARLEEN LOVE MD; Attending Radiologist  This document has been electronically signed. Mar  3 2024  1:02AM    < end of copied text >

## 2024-03-04 NOTE — PROGRESS NOTE ADULT - SUBJECTIVE AND OBJECTIVE BOX
Patient is a 84y old  Male who presents with a chief complaint of Gastrointestinal hemorrhage     (27 Feb 2024 13:27)      BRIEF HOSPITAL COURSE:85 y/o male with PMH of Acute Cholecystitis s/p Cholecystomy Tube (12/22/23), COVID-19, PAF not on AC due to GIB + Fall Risk, Esophagitis, Anemia, DM 2, HTN, HLD, CKD III/IV, BPH and Cognitive Impairment  came to the ED complaining of leakage around sky tube. As per wife at bed side, family noticed fluid coming out on the skin when they attempted to flush the sky tube. Wife said patient has been doing well since discharged otherwise. No fever, chills, abdominal pain, change in bowel/urinary habit, nausea, vomiting noted.   In the ED, patient was seen by surgery team, no acute surgical intervention. As per ED, patient vomited large amount of coffee ground emesis, looked very pale, holding abdomen; CT angio abdomen done: no acute GI bleed. Repeat CBC: Hb: 11.3---->9.3. Also, he desaturated concerning for aspiration PNA, antibiotic and oxygen therapy.    Events last 24 hours: Overnight, switched propofol to Precedex, remains on very low dose Levophed, added midodrine and started tube feeds. Pt pulling at lines this AM. Pt scheduled for HD today.     PAST MEDICAL & SURGICAL HISTORY:  Diabetes      Hypertension      Prostate disorder      Anxiety      High cholesterol      Ulcer      History of endoscopy            Medications:    amLODIPine   Tablet 2.5 milliGRAM(s) Oral daily  metoprolol tartrate 25 milliGRAM(s) Oral two times a day  midodrine 10 milliGRAM(s) Oral every 8 hours  norepinephrine Infusion 0.05 MICROgram(s)/kG/Min IV Continuous <Continuous>    acetylcysteine 10%  Inhalation 4 milliLiter(s) Inhalation every 6 hours  albuterol/ipratropium for Nebulization 3 milliLiter(s) Nebulizer every 6 hours    acetaminophen     Tablet .. 650 milliGRAM(s) Oral every 6 hours PRN  dexMEDEtomidine Infusion 0.02 MICROgram(s)/kG/Hr IV Continuous <Continuous>  gabapentin 300 milliGRAM(s) Oral at bedtime  melatonin 3 milliGRAM(s) Oral at bedtime PRN  ondansetron Injectable 4 milliGRAM(s) IV Push every 8 hours PRN  propofol Infusion 10 MICROgram(s)/kG/Min IV Continuous <Continuous>      heparin   Injectable 5000 Unit(s) SubCutaneous every 8 hours    aluminum hydroxide/magnesium hydroxide/simethicone Suspension 30 milliLiter(s) Oral every 4 hours PRN  pantoprazole  Injectable 40 milliGRAM(s) IV Push every 12 hours  senna 2 Tablet(s) Oral at bedtime      dextrose 50% Injectable 12.5 Gram(s) IV Push once  dextrose 50% Injectable 25 Gram(s) IV Push once  dextrose 50% Injectable 25 Gram(s) IV Push once  dextrose Oral Gel 15 Gram(s) Oral once PRN  glucagon  Injectable 1 milliGRAM(s) IntraMuscular once  insulin glargine Injectable (LANTUS) 10 Unit(s) SubCutaneous every morning  insulin lispro (ADMELOG) corrective regimen sliding scale   SubCutaneous every 6 hours  simvastatin 20 milliGRAM(s) Oral at bedtime    dextrose 5%. 1000 milliLiter(s) IV Continuous <Continuous>  dextrose 5%. 1000 milliLiter(s) IV Continuous <Continuous>  ferrous    sulfate 325 milliGRAM(s) Oral daily  sodium chloride 0.9% lock flush 10 milliLiter(s) IV Push every 1 hour PRN    epoetin kristal-epbx (RETACRIT) Injectable 34197 Unit(s) IV Push <User Schedule>    chlorhexidine 0.12% Liquid 15 milliLiter(s) Oral Mucosa every 12 hours  chlorhexidine 2% Cloths 1 Application(s) Topical <User Schedule>        Mode: CPAP with PS  FiO2: 40  PEEP: 6  PS: 10  MAP: 10  PIP: 17      ICU Vital Signs Last 24 Hrs  T(C): 37.2 (04 Mar 2024 11:00), Max: 37.7 (04 Mar 2024 03:00)  T(F): 99 (04 Mar 2024 11:00), Max: 99.9 (04 Mar 2024 03:00)  HR: 57 (04 Mar 2024 10:15) (57 - 88)  BP: 138/58 (04 Mar 2024 10:15) (56/41 - 159/73)  BP(mean): 80 (04 Mar 2024 10:15) (48 - 99)  ABP: --  ABP(mean): --  RR: 16 (04 Mar 2024 10:15) (0 - 31)  SpO2: 98% (04 Mar 2024 10:15) (85% - 100%)    O2 Parameters below as of 04 Mar 2024 09:20  Patient On (Oxygen Delivery Method): ventilator            ABG - ( 03 Mar 2024 16:44 )  pH, Arterial: 7.420 pH, Blood: x     /  pCO2: 39    /  pO2: 157   / HCO3: 25    / Base Excess: 0.8   /  SaO2: 100.0               I&O's Detail    03 Mar 2024 07:01  -  04 Mar 2024 07:00  --------------------------------------------------------  IN:    Dexmedetomidine: 29 mL    Enteral Tube Flush: 50 mL    IV PiggyBack: 250 mL    IV PiggyBack: 200 mL    Nepro: 120 mL    Norepinephrine: 78 mL    Propofol: 22.1 mL  Total IN: 749.1 mL    OUT:    Drain (mL): 0 mL    Indwelling Catheter - Urethral (mL): 350 mL  Total OUT: 350 mL    Total NET: 399.1 mL            LABS:                        10.6   19.58 )-----------( 226      ( 04 Mar 2024 03:20 )             31.5     03-04    136  |  94<L>  |  60.6<H>  ----------------------------<  167<H>  3.8   |  21.0<L>  |  6.54<H>    Ca    8.2<L>      04 Mar 2024 03:20  Phos  4.3     03-04  Mg     1.9     03-04    TPro  6.2<L>  /  Alb  2.8<L>  /  TBili  0.9  /  DBili  x   /  AST  19  /  ALT  16  /  AlkPhos  113  03-04          CAPILLARY BLOOD GLUCOSE      POCT Blood Glucose.: 104 mg/dL (04 Mar 2024 12:07)      Urinalysis Basic - ( 04 Mar 2024 03:20 )    Color: x / Appearance: x / SG: x / pH: x  Gluc: 167 mg/dL / Ketone: x  / Bili: x / Urobili: x   Blood: x / Protein: x / Nitrite: x   Leuk Esterase: x / RBC: x / WBC x   Sq Epi: x / Non Sq Epi: x / Bacteria: x      CULTURES:  Rapid RVP Result: NotDetec (03-03 @ 04:34)  Culture Results:   No growth at 24 hours (03-02 @ 12:50)  Culture Results:   No growth at 24 hours (03-02 @ 12:43)  Rapid RVP Result: NotDetec (03-01 @ 17:52)      Physical Examination:  General: Intubated and sedated  HEENT: Pupils equal, reactive to light.  Symmetric.  PULM: Clear to auscultation bilaterally, no significant sputum production  CVS: Regular rate and rhythm, no murmurs, rubs, or gallops  ABD: Soft, nondistended, drain in place, no pus from entry site, green biliary drainage  EXT: No edema  SKIN: Warm and well perfused  NEURO: Arousable, moving extremities

## 2024-03-04 NOTE — PROGRESS NOTE ADULT - SUBJECTIVE AND OBJECTIVE BOX
NEPHROLOGY INTERVAL HPI/OVERNIGHT EVENTS:    Examined earlier on HD tolerating ok  Family at bedside    MEDICATIONS  (STANDING):  acetylcysteine 10%  Inhalation 4 milliLiter(s) Inhalation every 6 hours  albuterol/ipratropium for Nebulization 3 milliLiter(s) Nebulizer every 6 hours  chlorhexidine 0.12% Liquid 15 milliLiter(s) Oral Mucosa every 12 hours  chlorhexidine 2% Cloths 1 Application(s) Topical <User Schedule>  dexMEDEtomidine Infusion 0.02 MICROgram(s)/kG/Hr (0.4 mL/Hr) IV Continuous <Continuous>  dextrose 5%. 1000 milliLiter(s) (100 mL/Hr) IV Continuous <Continuous>  dextrose 5%. 1000 milliLiter(s) (50 mL/Hr) IV Continuous <Continuous>  dextrose 50% Injectable 12.5 Gram(s) IV Push once  dextrose 50% Injectable 25 Gram(s) IV Push once  dextrose 50% Injectable 25 Gram(s) IV Push once  epoetin kristal-epbx (RETACRIT) Injectable 34716 Unit(s) IV Push <User Schedule>  ferrous    sulfate 325 milliGRAM(s) Oral daily  gabapentin 300 milliGRAM(s) Oral at bedtime  glucagon  Injectable 1 milliGRAM(s) IntraMuscular once  heparin   Injectable 5000 Unit(s) SubCutaneous every 8 hours  insulin glargine Injectable (LANTUS) 10 Unit(s) SubCutaneous every morning  insulin lispro (ADMELOG) corrective regimen sliding scale   SubCutaneous every 6 hours  midodrine 10 milliGRAM(s) Oral every 8 hours  norepinephrine Infusion 0.05 MICROgram(s)/kG/Min (7.58 mL/Hr) IV Continuous <Continuous>  pantoprazole  Injectable 40 milliGRAM(s) IV Push every 12 hours  propofol Infusion 10 MICROgram(s)/kG/Min (4.85 mL/Hr) IV Continuous <Continuous>  senna 2 Tablet(s) Oral at bedtime  simvastatin 20 milliGRAM(s) Oral at bedtime    MEDICATIONS  (PRN):  acetaminophen     Tablet .. 650 milliGRAM(s) Oral every 6 hours PRN Temp greater or equal to 38C (100.4F), Mild Pain (1 - 3)  aluminum hydroxide/magnesium hydroxide/simethicone Suspension 30 milliLiter(s) Oral every 4 hours PRN Dyspepsia  dextrose Oral Gel 15 Gram(s) Oral once PRN Blood Glucose LESS THAN 70 milliGRAM(s)/deciliter  melatonin 3 milliGRAM(s) Oral at bedtime PRN Insomnia  ondansetron Injectable 4 milliGRAM(s) IV Push every 8 hours PRN Nausea and/or Vomiting  sodium chloride 0.9% lock flush 10 milliLiter(s) IV Push every 1 hour PRN Pre/post blood products, medications, blood draw, and to maintain line patency      Allergies    No Known Allergies    Intolerances        Vital Signs Last 24 Hrs  T(C): 37 (04 Mar 2024 13:05), Max: 37.7 (04 Mar 2024 03:00)  T(F): 98.6 (04 Mar 2024 13:05), Max: 99.9 (04 Mar 2024 03:00)  HR: 67 (04 Mar 2024 13:05) (57 - 88)  BP: 102/58 (04 Mar 2024 13:05) (56/41 - 159/73)  BP(mean): 65 (04 Mar 2024 12:15) (48 - 99)  RR: 19 (04 Mar 2024 13:05) (0 - 31)  SpO2: 98% (04 Mar 2024 13:05) (85% - 100%)    Parameters below as of 04 Mar 2024 09:20  Patient On (Oxygen Delivery Method): ventilator      Daily     Daily Weight in k.4 (04 Mar 2024 13:05)    PHYSICAL EXAM:  GENERAL: Appears chronically ill  HEENMT: No periorbital edema  NECK: Supple, no JVD, dialysis catheter neck  NERVOUS SYSTEM:  Awake, confused  CHEST/LUNG: Diminished BS  HEART: Regular rate and rhythm; No rub  ABDOMEN: Soft, +BS  EXTREMITIES: + dependent edema    LABS:                        10.6   19.58 )-----------( 226      ( 04 Mar 2024 03:20 )             31.5     03-04    136  |  94<L>  |  60.6<H>  ----------------------------<  167<H>  3.8   |  21.0<L>  |  6.54<H>    Ca    8.2<L>      04 Mar 2024 03:20  Phos  4.3     03-04  Mg     1.9     03-04    TPro  6.2<L>  /  Alb  2.8<L>  /  TBili  0.9  /  DBili  x   /  AST  19  /  ALT  16  /  AlkPhos  113  03-04      Urinalysis Basic - ( 04 Mar 2024 03:20 )    Color: x / Appearance: x / SG: x / pH: x  Gluc: 167 mg/dL / Ketone: x  / Bili: x / Urobili: x   Blood: x / Protein: x / Nitrite: x   Leuk Esterase: x / RBC: x / WBC x   Sq Epi: x / Non Sq Epi: x / Bacteria: x      Magnesium: 1.9 mg/dL ( @ 03:20)  Phosphorus: 4.3 mg/dL ( @ 03:20)    ABG - ( 03 Mar 2024 16:44 )  pH, Arterial: 7.420 pH, Blood: x     /  pCO2: 39    /  pO2: 157   / HCO3: 25    / Base Excess: 0.8   /  SaO2: 100.0                 RADIOLOGY & ADDITIONAL TESTS:

## 2024-03-04 NOTE — PROGRESS NOTE ADULT - SUBJECTIVE AND OBJECTIVE BOX
CC:  Follow up GOC , Symptoms    OVERNIGHT EVENTS:  worsened respiratory status - Intubated  on HD    Present Symptoms:   Dyspnea:  No  on vent   Nausea/Vomiting:  No  Anxiety:  No    Depression:    Unable  Fatigue:  Yes   No   Unable  Loss of appetite:  No Yes    Constipation: Not Reported   Pain: No   No Signs            Location            Duration            Character            Severity            Factors            Effect    Pain AD Score:  [email or web address]/cog/painad.pdf (press ctrl + left click to view)    Review of Systems: Reviewed as above  All others negative  Further ROS unable to  obtain due to poor mentation historian      MEDICATIONS  (STANDING):  acetylcysteine 10%  Inhalation 4 milliLiter(s) Inhalation every 6 hours  albuterol/ipratropium for Nebulization 3 milliLiter(s) Nebulizer every 6 hours  amLODIPine   Tablet 2.5 milliGRAM(s) Oral daily  chlorhexidine 0.12% Liquid 15 milliLiter(s) Oral Mucosa every 12 hours  chlorhexidine 2% Cloths 1 Application(s) Topical <User Schedule>  dexMEDEtomidine Infusion 0.02 MICROgram(s)/kG/Hr (0.4 mL/Hr) IV Continuous <Continuous>  dextrose 5%. 1000 milliLiter(s) (50 mL/Hr) IV Continuous <Continuous>  dextrose 5%. 1000 milliLiter(s) (100 mL/Hr) IV Continuous <Continuous>  dextrose 50% Injectable 12.5 Gram(s) IV Push once  dextrose 50% Injectable 25 Gram(s) IV Push once  dextrose 50% Injectable 25 Gram(s) IV Push once  epoetin kristal-epbx (RETACRIT) Injectable 23352 Unit(s) IV Push <User Schedule>  ferrous    sulfate 325 milliGRAM(s) Oral daily  gabapentin 300 milliGRAM(s) Oral at bedtime  glucagon  Injectable 1 milliGRAM(s) IntraMuscular once  heparin   Injectable 5000 Unit(s) SubCutaneous every 8 hours  insulin glargine Injectable (LANTUS) 10 Unit(s) SubCutaneous every morning  insulin lispro (ADMELOG) corrective regimen sliding scale   SubCutaneous every 6 hours  metoprolol tartrate 25 milliGRAM(s) Oral two times a day  midodrine 10 milliGRAM(s) Oral every 8 hours  norepinephrine Infusion 0.05 MICROgram(s)/kG/Min (7.58 mL/Hr) IV Continuous <Continuous>  pantoprazole  Injectable 40 milliGRAM(s) IV Push every 12 hours  propofol Infusion 10 MICROgram(s)/kG/Min (4.85 mL/Hr) IV Continuous <Continuous>  senna 2 Tablet(s) Oral at bedtime  simvastatin 20 milliGRAM(s) Oral at bedtime    MEDICATIONS  (PRN):  acetaminophen     Tablet .. 650 milliGRAM(s) Oral every 6 hours PRN Temp greater or equal to 38C (100.4F), Mild Pain (1 - 3)  aluminum hydroxide/magnesium hydroxide/simethicone Suspension 30 milliLiter(s) Oral every 4 hours PRN Dyspepsia  dextrose Oral Gel 15 Gram(s) Oral once PRN Blood Glucose LESS THAN 70 milliGRAM(s)/deciliter  melatonin 3 milliGRAM(s) Oral at bedtime PRN Insomnia  ondansetron Injectable 4 milliGRAM(s) IV Push every 8 hours PRN Nausea and/or Vomiting  sodium chloride 0.9% lock flush 10 milliLiter(s) IV Push every 1 hour PRN Pre/post blood products, medications, blood draw, and to maintain line patency      PHYSICAL EXAM:    Vital Signs Last 24 Hrs  T(C): 37 (04 Mar 2024 13:05), Max: 37.7 (04 Mar 2024 03:00)  T(F): 98.6 (04 Mar 2024 13:05), Max: 99.9 (04 Mar 2024 03:00)  HR: 67 (04 Mar 2024 13:05) (57 - 88)  BP: 102/58 (04 Mar 2024 13:05) (56/41 - 159/73)  BP(mean): 65 (04 Mar 2024 12:15) (48 - 99)  RR: 19 (04 Mar 2024 13:05) (0 - 31)  SpO2: 98% (04 Mar 2024 13:05) (85% - 100%)    Parameters below as of 04 Mar 2024 09:20  Patient On (Oxygen Delivery Method): ventilator    Karnofsky:  1-%  General:  Elderly man NAD          HEENT:  NCAT   (x  )  ET tube     Lungs: comfortable  non labored  CV:  RR  MSK: bedbound  Skin:  warm/dry  Neuro  sedated      LABS:                          10.6   19.58 )-----------( 226      ( 04 Mar 2024 03:20 )             31.5     03-04    136  |  94<L>  |  60.6<H>  ----------------------------<  167<H>  3.8   |  21.0<L>  |  6.54<H>    Ca    8.2<L>      04 Mar 2024 03:20  Phos  4.3     03-04  Mg     1.9     03-04    TPro  6.2<L>  /  Alb  2.8<L>  /  TBili  0.9  /  DBili  x   /  AST  19  /  ALT  16  /  AlkPhos  113  03-04      Urinalysis Basic - ( 04 Mar 2024 03:20 )    Color: x / Appearance: x / SG: x / pH: x  Gluc: 167 mg/dL / Ketone: x  / Bili: x / Urobili: x   Blood: x / Protein: x / Nitrite: x   Leuk Esterase: x / RBC: x / WBC x   Sq Epi: x / Non Sq Epi: x / Bacteria: x      I&O's Summary    03 Mar 2024 07:01  -  04 Mar 2024 07:00  --------------------------------------------------------  IN: 749.1 mL / OUT: 350 mL / NET: 399.1 mL    04 Mar 2024 07:01  -  04 Mar 2024 14:07  --------------------------------------------------------  IN: 0 mL / OUT: 1000 mL / NET: -1000 mL        RADIOLOGY & ADDITIONAL STUDIES:  Imaging Reviewed  ( x  )     < from: CT Abdomen and Pelvis w/ Oral Cont (03.02.24 @ 23:11) >  INTERPRETATION:  CLINICAL INFORMATION: Has percutaneous cholecystostomy,   on antibiotics for aspiration, ongoing fever    COMPARISON: 2/25/2024    CONTRAST/COMPLICATIONS:  IV Contrast: NONE  Oral Contrast: Gastroview (accession [id number]), NONE (accession [id number])  Complications: None reported at time of study completion    PROCEDURE:  CT of the Chest, Abdomen and Pelvis was performed.  Sagittal and coronal reformats were performed.    FINDINGS:  CHEST:  LUNGS AND LARGE AIRWAYS: Patent central airways. Right upper lobe   alveolar opacities suspicious for pneumonia. Bibasilar atelectasis and   effusions  PLEURA: Bibasilar effusions  VESSELS: Right internal jugular venous catheter with tip in right atrium  HEART: Heart size is normal. Trace pericardial effusion  MEDIASTINUM AND MARIANELA: No lymphadenopathy.  CHEST WALL AND LOWER NECK: Within normal limits.    ABDOMEN AND PELVIS:  LIVER: Within normal limits.  BILE DUCTS: Normal caliber.  GALLBLADDER: Contains a cholecystostomy drainage catheter exiting through   the right chest wall and a gallstone and is mostly decompressed  SPLEEN: Within normal limits.  PANCREAS: Within normal limits.  ADRENALS: Within normal limits.  KIDNEYS/URETERS: Within normal limits.    BLADDER: Contains a Horta catheter  REPRODUCTIVE ORGANS: Prostate within normal limits.    BOWEL: No bowel obstruction. Appendix is not visualized. No evidence of   inflammation in the pericecal region.  PERITONEUM: Significantly increased perihepatic ascites.  VESSELS: Within normal limits.  RETROPERITONEUM/LYMPH NODES: No lymphadenopathy.  ABDOMINAL WALL: Within normal limits.  BONES: Within normal limits.    IMPRESSION:  Right upper lobe alveolar opacities suspicious for pneumonia. Bibasilar   atelectasis and effusions.    Cholecystostomy drainage catheter and gallstone, gallbladder ismostly   decompressed    Significantly increased perihepatic ascites.        --- End of Report ---            CARLEEN LOVE MD; Attending Radiologist  This document has been electronically signed. Mar  3 2024  1:02AM    < end of copied text >    < from: CT Head No Cont (02.26.24 @ 11:15) >  ACC: [id number] EXAM:  CT BRAIN   ORDERED BY: KE SMITH     PROCEDURE DATE:  02/26/2024          INTERPRETATION:  EXAM: CT HEAD WITHOUT INTRAVENOUS CONTRAST    HISTORY: Altered mental status    TECHNIQUE: Multiple axial images were obtained from the skull base to the   vertex. Sagittal and coronal reformatted images were obtained from the   axial data set. The images were reviewed in brain and bone windows.    COMPARISON: CT of the head January 12, 2024    FINDINGS:    No acute intracranial hemorrhage. Areas of decreased attenuation   throughout the deep and periventricular white matter, compatible with   chronic small vessel disease. 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 Parenchymal   volume loss resulting in a mild ex vacuo dilatation appearance of the   bilateral ventricles. The extra-axial spaces and basal cisterns are   within normal limits. No midline shift or mass effect present.    The cranial cervical junction is within normal limits. The sella is not   expanded. No depressed calvarial fracture. Mild mucosal thickening in the   ethmoid air cells. Bilateral mastoid effusions. The visualized orbits are   within normal limits.    IMPRESSION:    1.  No evidence of acute intracranial hemorrhage or midline shift.  2.  Chronic ischemic changes as discussed above. If there is continued   concern for acute neurologic compromise, recommend MRI of the brain for   further evaluation.    --- End of Report ---        < end of copied text >            ADVANCE DIRECTIVE PREFERENCES    Full Code     CC:  Follow up GOC , Symptoms    OVERNIGHT EVENTS:  worsened respiratory status - Intubated  on HD    Present Symptoms:   Dyspnea:  No  on vent   Nausea/Vomiting:  No  Anxiety:  No    Depression:    Unable  Fatigue:  Yes   No   Unable  Loss of appetite:  No Yes    Constipation: Not Reported   Pain:   No Signs            Location            Duration            Character            Severity            Factors            Effect    Pain AD Score:  [email or web address].[email or web address]/cog/painad.pdf (press ctrl + left click to view)    Review of Systems: Reviewed as above  All others negative  Further ROS unable to  obtain due to poor mentation historian      MEDICATIONS  (STANDING):  acetylcysteine 10%  Inhalation 4 milliLiter(s) Inhalation every 6 hours  albuterol/ipratropium for Nebulization 3 milliLiter(s) Nebulizer every 6 hours  amLODIPine   Tablet 2.5 milliGRAM(s) Oral daily  chlorhexidine 0.12% Liquid 15 milliLiter(s) Oral Mucosa every 12 hours  chlorhexidine 2% Cloths 1 Application(s) Topical <User Schedule>  dexMEDEtomidine Infusion 0.02 MICROgram(s)/kG/Hr (0.4 mL/Hr) IV Continuous <Continuous>  dextrose 5%. 1000 milliLiter(s) (50 mL/Hr) IV Continuous <Continuous>  dextrose 5%. 1000 milliLiter(s) (100 mL/Hr) IV Continuous <Continuous>  dextrose 50% Injectable 12.5 Gram(s) IV Push once  dextrose 50% Injectable 25 Gram(s) IV Push once  dextrose 50% Injectable 25 Gram(s) IV Push once  epoetin kristal-epbx (RETACRIT) Injectable 62207 Unit(s) IV Push <User Schedule>  ferrous    sulfate 325 milliGRAM(s) Oral daily  gabapentin 300 milliGRAM(s) Oral at bedtime  glucagon  Injectable 1 milliGRAM(s) IntraMuscular once  heparin   Injectable 5000 Unit(s) SubCutaneous every 8 hours  insulin glargine Injectable (LANTUS) 10 Unit(s) SubCutaneous every morning  insulin lispro (ADMELOG) corrective regimen sliding scale   SubCutaneous every 6 hours  metoprolol tartrate 25 milliGRAM(s) Oral two times a day  midodrine 10 milliGRAM(s) Oral every 8 hours  norepinephrine Infusion 0.05 MICROgram(s)/kG/Min (7.58 mL/Hr) IV Continuous <Continuous>  pantoprazole  Injectable 40 milliGRAM(s) IV Push every 12 hours  propofol Infusion 10 MICROgram(s)/kG/Min (4.85 mL/Hr) IV Continuous <Continuous>  senna 2 Tablet(s) Oral at bedtime  simvastatin 20 milliGRAM(s) Oral at bedtime    MEDICATIONS  (PRN):  acetaminophen     Tablet .. 650 milliGRAM(s) Oral every 6 hours PRN Temp greater or equal to 38C (100.4F), Mild Pain (1 - 3)  aluminum hydroxide/magnesium hydroxide/simethicone Suspension 30 milliLiter(s) Oral every 4 hours PRN Dyspepsia  dextrose Oral Gel 15 Gram(s) Oral once PRN Blood Glucose LESS THAN 70 milliGRAM(s)/deciliter  melatonin 3 milliGRAM(s) Oral at bedtime PRN Insomnia  ondansetron Injectable 4 milliGRAM(s) IV Push every 8 hours PRN Nausea and/or Vomiting  sodium chloride 0.9% lock flush 10 milliLiter(s) IV Push every 1 hour PRN Pre/post blood products, medications, blood draw, and to maintain line patency      PHYSICAL EXAM:    Vital Signs Last 24 Hrs  T(C): 37 (04 Mar 2024 13:05), Max: 37.7 (04 Mar 2024 03:00)  T(F): 98.6 (04 Mar 2024 13:05), Max: 99.9 (04 Mar 2024 03:00)  HR: 67 (04 Mar 2024 13:05) (57 - 88)  BP: 102/58 (04 Mar 2024 13:05) (56/41 - 159/73)  BP(mean): 65 (04 Mar 2024 12:15) (48 - 99)  RR: 19 (04 Mar 2024 13:05) (0 - 31)  SpO2: 98% (04 Mar 2024 13:05) (85% - 100%)    Parameters below as of 04 Mar 2024 09:20  Patient On (Oxygen Delivery Method): ventilator    Karnofsky:  1-%  General:  Elderly man NAD          HEENT:  NCAT   (x  )  ET tube     Lungs: comfortable  non labored  CV:  RR  MSK: bedbound  Skin:  warm/dry  Neuro  sedated      LABS:                          10.6   19.58 )-----------( 226      ( 04 Mar 2024 03:20 )             31.5     03-04    136  |  94<L>  |  60.6<H>  ----------------------------<  167<H>  3.8   |  21.0<L>  |  6.54<H>    Ca    8.2<L>      04 Mar 2024 03:20  Phos  4.3     03-04  Mg     1.9     03-04    TPro  6.2<L>  /  Alb  2.8<L>  /  TBili  0.9  /  DBili  x   /  AST  19  /  ALT  16  /  AlkPhos  113  03-04      Urinalysis Basic - ( 04 Mar 2024 03:20 )    Color: x / Appearance: x / SG: x / pH: x  Gluc: 167 mg/dL / Ketone: x  / Bili: x / Urobili: x   Blood: x / Protein: x / Nitrite: x   Leuk Esterase: x / RBC: x / WBC x   Sq Epi: x / Non Sq Epi: x / Bacteria: x      I&O's Summary    03 Mar 2024 07:01  -  04 Mar 2024 07:00  --------------------------------------------------------  IN: 749.1 mL / OUT: 350 mL / NET: 399.1 mL    04 Mar 2024 07:01  -  04 Mar 2024 14:07  --------------------------------------------------------  IN: 0 mL / OUT: 1000 mL / NET: -1000 mL        RADIOLOGY & ADDITIONAL STUDIES:  Imaging Reviewed  ( x  )     < from: CT Abdomen and Pelvis w/ Oral Cont (03.02.24 @ 23:11) >  INTERPRETATION:  CLINICAL INFORMATION: Has percutaneous cholecystostomy,   on antibiotics for aspiration, ongoing fever    COMPARISON: 2/25/2024    CONTRAST/COMPLICATIONS:  IV Contrast: NONE  Oral Contrast: Gastroview (accession [id number]), NONE (accession [id number])  Complications: None reported at time of study completion    PROCEDURE:  CT of the Chest, Abdomen and Pelvis was performed.  Sagittal and coronal reformats were performed.    FINDINGS:  CHEST:  LUNGS AND LARGE AIRWAYS: Patent central airways. Right upper lobe   alveolar opacities suspicious for pneumonia. Bibasilar atelectasis and   effusions  PLEURA: Bibasilar effusions  VESSELS: Right internal jugular venous catheter with tip in right atrium  HEART: Heart size is normal. Trace pericardial effusion  MEDIASTINUM AND MARIANELA: No lymphadenopathy.  CHEST WALL AND LOWER NECK: Within normal limits.    ABDOMEN AND PELVIS:  LIVER: Within normal limits.  BILE DUCTS: Normal caliber.  GALLBLADDER: Contains a cholecystostomy drainage catheter exiting through   the right chest wall and a gallstone and is mostly decompressed  SPLEEN: Within normal limits.  PANCREAS: Within normal limits.  ADRENALS: Within normal limits.  KIDNEYS/URETERS: Within normal limits.    BLADDER: Contains a Horta catheter  REPRODUCTIVE ORGANS: Prostate within normal limits.    BOWEL: No bowel obstruction. Appendix is not visualized. No evidence of   inflammation in the pericecal region.  PERITONEUM: Significantly increased perihepatic ascites.  VESSELS: Within normal limits.  RETROPERITONEUM/LYMPH NODES: No lymphadenopathy.  ABDOMINAL WALL: Within normal limits.  BONES: Within normal limits.    IMPRESSION:  Right upper lobe alveolar opacities suspicious for pneumonia. Bibasilar   atelectasis and effusions.    Cholecystostomy drainage catheter and gallstone, gallbladder ismostly   decompressed    Significantly increased perihepatic ascites.        --- End of Report ---            CARLEEN LOVE MD; Attending Radiologist  This document has been electronically signed. Mar  3 2024  1:02AM    < end of copied text >    < from: CT Head No Cont (02.26.24 @ 11:15) >  ACC: [id number] EXAM:  CT BRAIN   ORDERED BY: KE SMTIH     PROCEDURE DATE:  02/26/2024          INTERPRETATION:  EXAM: CT HEAD WITHOUT INTRAVENOUS CONTRAST    HISTORY: Altered mental status    TECHNIQUE: Multiple axial images were obtained from the skull base to the   vertex. Sagittal and coronal reformatted images were obtained from the   axial data set. The images were reviewed in brain and bone windows.    COMPARISON: CT of the head January 12, 2024    FINDINGS:    No acute intracranial hemorrhage. Areas of decreased attenuation   throughout the deep and periventricular white matter, compatible with   chronic small vessel disease. 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 Parenchymal   volume loss resulting in a mild ex vacuo dilatation appearance of the   bilateral ventricles. The extra-axial spaces and basal cisterns are   within normal limits. No midline shift or mass effect present.    The cranial cervical junction is within normal limits. The sella is not   expanded. No depressed calvarial fracture. Mild mucosal thickening in the   ethmoid air cells. Bilateral mastoid effusions. The visualized orbits are   within normal limits.    IMPRESSION:    1.  No evidence of acute intracranial hemorrhage or midline shift.  2.  Chronic ischemic changes as discussed above. If there is continued   concern for acute neurologic compromise, recommend MRI of the brain for   further evaluation.    --- End of Report ---        < end of copied text >            ADVANCE DIRECTIVE PREFERENCES    Full Code

## 2024-03-04 NOTE — PROGRESS NOTE ADULT - ASSESSMENT
83 yo m pmhx acute sky s/p sky tube (12/22/23), COVID 19, PAF no AC (GIB/FALL), esophagitis, anemia, DM2, HTN, HLD, CKD3-4, BPH, cognitive impairment presented with leakage around sky tube admitted with distributive shock, GI bleed, RON, aspiration pna and hypoxic respiratory failure.      Acute Hypoxic Respiratory Failure  3/3- Intubated    RON   on HD  avoid nephrotoxic agents    Pneumonia  s/p Zosyn    Cholecystitis  Currently not stable for surge   IN PROGRESS 83 yo m pmhx acute sky s/p sky tube (12/22/23), COVID 19, PAF no AC (GIB/FALL), esophagitis, anemia, DM2, HTN, HLD, CKD3-4, BPH, cognitive impairment presented with leakage around sky tube admitted with distributive shock, GI bleed, RON, aspiration pna and hypoxic respiratory failure.      Acute Hypoxic Respiratory Failure  3/3- Intubated  wean per MICU  monitor O2    RON   on HD  Nephrology following  avoid nephrotoxic agents    Pneumonia  s/p Zosyn    Cholecystitis  Currently not stable for surgery     Encounter for Palliative Care  Palliative Care consulted to assist with goal of care  Wife Jailene and daughter Elkin present. Elkin states father generally independent prior to hospital admission.  Daughter reports all events started in December because of his gallbladder.  She expressed how she wished father had his cholecystectomy prior to leaving during his last hospitalization  Family states patient is a fighter - would want cholecystectomy done prior to any discharge  Psychosocial support.  Continued GOC discussions.

## 2024-03-05 LAB
ALBUMIN SERPL ELPH-MCNC: 2.5 G/DL — LOW (ref 3.3–5.2)
ALP SERPL-CCNC: 163 U/L — HIGH (ref 40–120)
ALT FLD-CCNC: 19 U/L — SIGNIFICANT CHANGE UP
ANION GAP SERPL CALC-SCNC: 19 MMOL/L — HIGH (ref 5–17)
ANISOCYTOSIS BLD QL: SLIGHT — SIGNIFICANT CHANGE UP
AST SERPL-CCNC: 26 U/L — SIGNIFICANT CHANGE UP
BASOPHILS # BLD AUTO: 0.15 K/UL — SIGNIFICANT CHANGE UP (ref 0–0.2)
BASOPHILS NFR BLD AUTO: 0.9 % — SIGNIFICANT CHANGE UP (ref 0–2)
BILIRUB SERPL-MCNC: 0.7 MG/DL — SIGNIFICANT CHANGE UP (ref 0.4–2)
BUN SERPL-MCNC: 38.4 MG/DL — HIGH (ref 8–20)
CALCIUM SERPL-MCNC: 8.5 MG/DL — SIGNIFICANT CHANGE UP (ref 8.4–10.5)
CHLORIDE SERPL-SCNC: 99 MMOL/L — SIGNIFICANT CHANGE UP (ref 96–108)
CO2 SERPL-SCNC: 21 MMOL/L — LOW (ref 22–29)
CREAT SERPL-MCNC: 4.8 MG/DL — HIGH (ref 0.5–1.3)
EGFR: 11 ML/MIN/1.73M2 — LOW
EOSINOPHIL # BLD AUTO: 0.75 K/UL — HIGH (ref 0–0.5)
EOSINOPHIL NFR BLD AUTO: 4.4 % — SIGNIFICANT CHANGE UP (ref 0–6)
GAS PNL BLDA: SIGNIFICANT CHANGE UP
GIANT PLATELETS BLD QL SMEAR: PRESENT — SIGNIFICANT CHANGE UP
GLUCOSE BLDC GLUCOMTR-MCNC: 112 MG/DL — HIGH (ref 70–99)
GLUCOSE BLDC GLUCOMTR-MCNC: 115 MG/DL — HIGH (ref 70–99)
GLUCOSE BLDC GLUCOMTR-MCNC: 124 MG/DL — HIGH (ref 70–99)
GLUCOSE BLDC GLUCOMTR-MCNC: 149 MG/DL — HIGH (ref 70–99)
GLUCOSE BLDC GLUCOMTR-MCNC: 154 MG/DL — HIGH (ref 70–99)
GLUCOSE SERPL-MCNC: 120 MG/DL — HIGH (ref 70–99)
GRAM STN FLD: ABNORMAL
HCT VFR BLD CALC: 32.3 % — LOW (ref 39–50)
HGB BLD-MCNC: 10.9 G/DL — LOW (ref 13–17)
LYMPHOCYTES # BLD AUTO: 1.04 K/UL — SIGNIFICANT CHANGE UP (ref 1–3.3)
LYMPHOCYTES # BLD AUTO: 6.1 % — LOW (ref 13–44)
MACROCYTES BLD QL: SLIGHT — SIGNIFICANT CHANGE UP
MAGNESIUM SERPL-MCNC: 1.9 MG/DL — SIGNIFICANT CHANGE UP (ref 1.6–2.6)
MANUAL SMEAR VERIFICATION: SIGNIFICANT CHANGE UP
MCHC RBC-ENTMCNC: 29.7 PG — SIGNIFICANT CHANGE UP (ref 27–34)
MCHC RBC-ENTMCNC: 33.7 GM/DL — SIGNIFICANT CHANGE UP (ref 32–36)
MCV RBC AUTO: 88 FL — SIGNIFICANT CHANGE UP (ref 80–100)
METAMYELOCYTES # FLD: 1.7 % — HIGH (ref 0–0)
MONOCYTES # BLD AUTO: 1.32 K/UL — HIGH (ref 0–0.9)
MONOCYTES NFR BLD AUTO: 7.8 % — SIGNIFICANT CHANGE UP (ref 2–14)
MYELOCYTES NFR BLD: 4.4 % — HIGH (ref 0–0)
NEUTROPHILS # BLD AUTO: 12.4 K/UL — HIGH (ref 1.8–7.4)
NEUTROPHILS NFR BLD AUTO: 73 % — SIGNIFICANT CHANGE UP (ref 43–77)
NRBC # BLD: 1 /100 WBCS — HIGH (ref 0–0)
OVALOCYTES BLD QL SMEAR: SLIGHT — SIGNIFICANT CHANGE UP
PHOSPHATE SERPL-MCNC: 4.6 MG/DL — SIGNIFICANT CHANGE UP (ref 2.4–4.7)
PLAT MORPH BLD: NORMAL — SIGNIFICANT CHANGE UP
PLATELET # BLD AUTO: 269 K/UL — SIGNIFICANT CHANGE UP (ref 150–400)
POIKILOCYTOSIS BLD QL AUTO: SLIGHT — SIGNIFICANT CHANGE UP
POLYCHROMASIA BLD QL SMEAR: SLIGHT — SIGNIFICANT CHANGE UP
POTASSIUM SERPL-MCNC: 4.1 MMOL/L — SIGNIFICANT CHANGE UP (ref 3.5–5.3)
POTASSIUM SERPL-SCNC: 4.1 MMOL/L — SIGNIFICANT CHANGE UP (ref 3.5–5.3)
PROT SERPL-MCNC: 6.4 G/DL — LOW (ref 6.6–8.7)
RBC # BLD: 3.67 M/UL — LOW (ref 4.2–5.8)
RBC # FLD: 15.5 % — HIGH (ref 10.3–14.5)
RBC BLD AUTO: ABNORMAL
SODIUM SERPL-SCNC: 139 MMOL/L — SIGNIFICANT CHANGE UP (ref 135–145)
SPECIMEN SOURCE: SIGNIFICANT CHANGE UP
TARGETS BLD QL SMEAR: SLIGHT — SIGNIFICANT CHANGE UP
VARIANT LYMPHS # BLD: 1.7 % — SIGNIFICANT CHANGE UP (ref 0–6)
WBC # BLD: 16.98 K/UL — HIGH (ref 3.8–10.5)
WBC # FLD AUTO: 16.98 K/UL — HIGH (ref 3.8–10.5)

## 2024-03-05 PROCEDURE — 99233 SBSQ HOSP IP/OBS HIGH 50: CPT

## 2024-03-05 PROCEDURE — 99497 ADVNCD CARE PLAN 30 MIN: CPT | Mod: 25

## 2024-03-05 RX ORDER — BUDESONIDE, MICRONIZED 100 %
0.5 POWDER (GRAM) MISCELLANEOUS EVERY 12 HOURS
Refills: 0 | Status: DISCONTINUED | OUTPATIENT
Start: 2024-03-05 | End: 2024-03-21

## 2024-03-05 RX ORDER — FUROSEMIDE 40 MG
40 TABLET ORAL ONCE
Refills: 0 | Status: COMPLETED | OUTPATIENT
Start: 2024-03-05 | End: 2024-03-05

## 2024-03-05 RX ORDER — MIDODRINE HYDROCHLORIDE 2.5 MG/1
15 TABLET ORAL EVERY 8 HOURS
Refills: 0 | Status: DISCONTINUED | OUTPATIENT
Start: 2024-03-05 | End: 2024-03-14

## 2024-03-05 RX ORDER — MAGNESIUM SULFATE 500 MG/ML
1 VIAL (ML) INJECTION ONCE
Refills: 0 | Status: COMPLETED | OUTPATIENT
Start: 2024-03-05 | End: 2024-03-05

## 2024-03-05 RX ADMIN — Medication 3 MILLILITER(S): at 04:17

## 2024-03-05 RX ADMIN — Medication 4 MILLILITER(S): at 08:15

## 2024-03-05 RX ADMIN — Medication 4 MILLILITER(S): at 20:57

## 2024-03-05 RX ADMIN — Medication 1: at 11:35

## 2024-03-05 RX ADMIN — MIDODRINE HYDROCHLORIDE 15 MILLIGRAM(S): 2.5 TABLET ORAL at 21:55

## 2024-03-05 RX ADMIN — Medication 325 MILLIGRAM(S): at 11:35

## 2024-03-05 RX ADMIN — GABAPENTIN 300 MILLIGRAM(S): 400 CAPSULE ORAL at 21:54

## 2024-03-05 RX ADMIN — MIDODRINE HYDROCHLORIDE 15 MILLIGRAM(S): 2.5 TABLET ORAL at 13:48

## 2024-03-05 RX ADMIN — PANTOPRAZOLE SODIUM 40 MILLIGRAM(S): 20 TABLET, DELAYED RELEASE ORAL at 05:28

## 2024-03-05 RX ADMIN — INSULIN GLARGINE 10 UNIT(S): 100 INJECTION, SOLUTION SUBCUTANEOUS at 08:16

## 2024-03-05 RX ADMIN — SIMVASTATIN 20 MILLIGRAM(S): 20 TABLET, FILM COATED ORAL at 21:55

## 2024-03-05 RX ADMIN — Medication 0.5 MILLIGRAM(S): at 21:02

## 2024-03-05 RX ADMIN — Medication 100 GRAM(S): at 10:15

## 2024-03-05 RX ADMIN — Medication 40 MILLIGRAM(S): at 10:15

## 2024-03-05 RX ADMIN — HEPARIN SODIUM 5000 UNIT(S): 5000 INJECTION INTRAVENOUS; SUBCUTANEOUS at 13:48

## 2024-03-05 RX ADMIN — Medication 3 MILLILITER(S): at 15:38

## 2024-03-05 RX ADMIN — Medication 3 MILLILITER(S): at 08:16

## 2024-03-05 RX ADMIN — Medication 3 MILLILITER(S): at 20:57

## 2024-03-05 RX ADMIN — CHLORHEXIDINE GLUCONATE 1 APPLICATION(S): 213 SOLUTION TOPICAL at 05:27

## 2024-03-05 RX ADMIN — Medication 4 MILLILITER(S): at 15:38

## 2024-03-05 RX ADMIN — HEPARIN SODIUM 5000 UNIT(S): 5000 INJECTION INTRAVENOUS; SUBCUTANEOUS at 05:28

## 2024-03-05 RX ADMIN — SENNA PLUS 2 TABLET(S): 8.6 TABLET ORAL at 21:55

## 2024-03-05 RX ADMIN — PANTOPRAZOLE SODIUM 40 MILLIGRAM(S): 20 TABLET, DELAYED RELEASE ORAL at 18:19

## 2024-03-05 RX ADMIN — Medication 4 MILLILITER(S): at 04:17

## 2024-03-05 RX ADMIN — HEPARIN SODIUM 5000 UNIT(S): 5000 INJECTION INTRAVENOUS; SUBCUTANEOUS at 21:55

## 2024-03-05 RX ADMIN — MIDODRINE HYDROCHLORIDE 15 MILLIGRAM(S): 2.5 TABLET ORAL at 05:28

## 2024-03-05 NOTE — PROGRESS NOTE ADULT - SUBJECTIVE AND OBJECTIVE BOX
Interval HPI:  remains extubated  lethargic today but answers questions  denies any complaints    Exam:  arousable but sleepy, nad  bilat air entry, on high flow 40% fio2  reg rhythm  abd soft  trace edema  minimal urine output    Radiology: cxr  - yesterday - no large infiltrates or effusions    On Admission  02-25-24 (9d)  HPI:  Patient seen and examined before midnight.     85 y/o male with PMH of Acute Cholecystitis s/p Cholecystomy Tube (12/22/23), COVID-19, PAF not on AC due to GIB + Fall Risk, Esophagitis, Anemia, DM 2, HTN, HLD, CKD III/IV, BPH and Cognitive Impairment  came to the ED complaining of leakage around sky tube. As per wife at bed side, family noticed fluid coming out on the skin when they attempted to flush the sky tube. Wife said patient has been doing well since discharged otherwise. No fever, chills, abdominal pain, change in bowel/urinary habit, nausea, vomiting noted.   In the ED, patient was seen by surgery team, no acute surgical intervention. As per ED, patient vomited large amount of coffee ground emesis, looked very pale, holding abdomen; CT angio abdomen done: no acute GI bleed. Repeat CBC: Hb: 11.3---->9.3. Also, he desaturated concerning for aspiration PNA, antibiotic and oxygen therapy.  (25 Feb 2024 22:52)    PAST MEDICAL & SURGICAL HISTORY:  Diabetes      Hypertension      Prostate disorder      Anxiety      High cholesterol      Ulcer      History of endoscopy          Antimicrobial:    Cardiovascular:  midodrine 15 milliGRAM(s) Oral every 8 hours    Pulmonary:  acetylcysteine 10%  Inhalation 4 milliLiter(s) Inhalation every 6 hours  albuterol/ipratropium for Nebulization 3 milliLiter(s) Nebulizer every 6 hours  buDESOnide    Inhalation Suspension 0.5 milliGRAM(s) Inhalation every 12 hours    Hematalogic:  heparin   Injectable 5000 Unit(s) SubCutaneous every 8 hours    Other:  acetaminophen     Tablet .. 650 milliGRAM(s) Oral every 6 hours PRN  aluminum hydroxide/magnesium hydroxide/simethicone Suspension 30 milliLiter(s) Oral every 4 hours PRN  chlorhexidine 2% Cloths 1 Application(s) Topical <User Schedule>  dextrose 5%. 1000 milliLiter(s) IV Continuous <Continuous>  dextrose 5%. 1000 milliLiter(s) IV Continuous <Continuous>  dextrose 50% Injectable 12.5 Gram(s) IV Push once  dextrose 50% Injectable 25 Gram(s) IV Push once  dextrose 50% Injectable 25 Gram(s) IV Push once  dextrose Oral Gel 15 Gram(s) Oral once PRN  epoetin kristal-epbx (RETACRIT) Injectable 57665 Unit(s) IV Push <User Schedule>  ferrous    sulfate 325 milliGRAM(s) Oral daily  gabapentin 300 milliGRAM(s) Oral at bedtime  insulin glargine Injectable (LANTUS) 10 Unit(s) SubCutaneous every morning  insulin lispro (ADMELOG) corrective regimen sliding scale   SubCutaneous every 6 hours  melatonin 3 milliGRAM(s) Oral at bedtime PRN  ondansetron Injectable 4 milliGRAM(s) IV Push every 8 hours PRN  pantoprazole  Injectable 40 milliGRAM(s) IV Push every 12 hours  senna 2 Tablet(s) Oral at bedtime  simvastatin 20 milliGRAM(s) Oral at bedtime  sodium chloride 0.9% lock flush 10 milliLiter(s) IV Push every 1 hour PRN      Drug Dosing Weight  Height (cm): 167.6 (26 Feb 2024 12:00)  Weight (kg): 80.8 (26 Feb 2024 12:00)  BMI (kg/m2): 28.8 (26 Feb 2024 12:00)  BSA (m2): 1.9 (26 Feb 2024 12:00)    T(C): 36.6 (03-05-24 @ 11:50), Max: 37.6 (03-04-24 @ 17:30)  HR: 76 (03-05-24 @ 12:00)  BP: 107/55 (03-05-24 @ 12:00)  BP(mean): 70 (03-05-24 @ 12:00)  ABP: --  ABP(mean): --  RR: 13 (03-05-24 @ 12:00)  SpO2: 97% (03-05-24 @ 12:00)    ABG - ( 05 Mar 2024 10:25 )  pH, Arterial: 7.430 pH, Blood: x     /  pCO2: 38    /  pO2: 103   / HCO3: 25    / Base Excess: 0.9   /  SaO2: 99.8                  03-04 @ 07:01  -  03-05 @ 07:00  --------------------------------------------------------  IN: 218.9 mL / OUT: 1215 mL / NET: -996.1 mL        Mode: CPAP with PS  FiO2: 40  PEEP: 6  PS: 10  MAP: 10        LABS:  CBC Full  -  ( 05 Mar 2024 03:50 )  WBC Count : 16.98 K/uL  RBC Count : 3.67 M/uL  Hemoglobin : 10.9 g/dL  Hematocrit : 32.3 %  Platelet Count - Automated : 269 K/uL  Mean Cell Volume : 88.0 fl  Mean Cell Hemoglobin : 29.7 pg  Mean Cell Hemoglobin Concentration : 33.7 gm/dL  Auto Neutrophil # : 12.40 K/uL  Auto Lymphocyte # : 1.04 K/uL  Auto Monocyte # : 1.32 K/uL  Auto Eosinophil # : 0.75 K/uL  Auto Basophil # : 0.15 K/uL  Auto Neutrophil % : 73.0 %  Auto Lymphocyte % : 6.1 %  Auto Monocyte % : 7.8 %  Auto Eosinophil % : 4.4 %  Auto Basophil % : 0.9 %    03-05    139  |  99  |  38.4<H>  ----------------------------<  120<H>  4.1   |  21.0<L>  |  4.80<H>    Ca    8.5      05 Mar 2024 03:50  Phos  4.6     03-05  Mg     1.9     03-05    TPro  6.4<L>  /  Alb  2.5<L>  /  TBili  0.7  /  DBili  x   /  AST  26  /  ALT  19  /  AlkPhos  163<H>  03-05      Urinalysis Basic - ( 05 Mar 2024 03:50 )    Color: x / Appearance: x / SG: x / pH: x  Gluc: 120 mg/dL / Ketone: x  / Bili: x / Urobili: x   Blood: x / Protein: x / Nitrite: x   Leuk Esterase: x / RBC: x / WBC x   Sq Epi: x / Non Sq Epi: x / Bacteria: x      Culture Results:   No growth at 48 Hours (03-02 @ 12:50)  Culture Results:   No growth at 48 Hours (03-02 @ 12:43)    ____________________________________________________________________________________________________

## 2024-03-05 NOTE — PHYSICAL THERAPY INITIAL EVALUATION ADULT - IMPAIRED TRANSFERS: BED/CHAIR, REHAB EVAL
assist needed for weight shifting and sequencing transfer./impaired balance/impaired coordination/impaired postural control/decreased strength

## 2024-03-05 NOTE — SWALLOW BEDSIDE ASSESSMENT ADULT - SLP PERTINENT HISTORY OF CURRENT PROBLEM
As per MD note, "85 yo m pmhx acute sky s/p sky tube (12/22/23), COVID 19, PAF no AC (GIB/FALL), esophagitis, anemia, DM2, HTN, HLD, CKD3-4, BPH, cognitive impairment presented with leakage around sky tube admitted with distributive shock, GI bleed, RON, aspiration pna and hypoxic respiratory failure".
As per MD note, "Patient is an 84 y M with PMH recent COVID-19, acute cholecystitis s/p cholecystostomy tube (12/22/23), AFib not on A/C 2/2 GIB, and CKD, who presented to [facility] ED on 02/25/24 with fluid draining around the perc sky tube insertion site upon flushing the tube.  In the ED, he  had an episode of coffee ground emesis and GI was consulted. CTA abdomen was done with no evidence of acute GIB. on 02/26/24 patient developed hypotension, respiratory failure and RRT was called. patient was transferred to MICU and was started on pressors. also on high flow O2 with CXR with bilateral infiltrates--likely aspiration pneumonia. placed on zosyn and vancomycin. developed ATN so HD initiated on 2/27/24. weaned off vasopressors on 02/27/28 and transferred out of ICU on 02/29/24. vancomycin has been discontinued. his care was transferred to hospitalist service this morning".

## 2024-03-05 NOTE — PHYSICAL THERAPY INITIAL EVALUATION ADULT - ADDITIONAL COMMENTS
Pt is a poor historian, unable to provide social hx and no family at bedside. Per CCC assessment: Pt lives with his spouse, daughter and grandchildren, 3-5 steps to enter and no steps inside. Pt owns a shower chair, RW and SAC.

## 2024-03-05 NOTE — PROGRESS NOTE ADULT - SUBJECTIVE AND OBJECTIVE BOX
NEPHROLOGY INTERVAL HPI/OVERNIGHT EVENTS:    Examined   Feeling better  Sitting in chair    MEDICATIONS  (STANDING):  acetylcysteine 10%  Inhalation 4 milliLiter(s) Inhalation every 6 hours  albuterol/ipratropium for Nebulization 3 milliLiter(s) Nebulizer every 6 hours  buDESOnide    Inhalation Suspension 0.5 milliGRAM(s) Inhalation every 12 hours  chlorhexidine 2% Cloths 1 Application(s) Topical <User Schedule>  dextrose 5%. 1000 milliLiter(s) (50 mL/Hr) IV Continuous <Continuous>  dextrose 5%. 1000 milliLiter(s) (100 mL/Hr) IV Continuous <Continuous>  dextrose 50% Injectable 12.5 Gram(s) IV Push once  dextrose 50% Injectable 25 Gram(s) IV Push once  dextrose 50% Injectable 25 Gram(s) IV Push once  epoetin kristal-epbx (RETACRIT) Injectable 85880 Unit(s) IV Push <User Schedule>  ferrous    sulfate 325 milliGRAM(s) Oral daily  gabapentin 300 milliGRAM(s) Oral at bedtime  heparin   Injectable 5000 Unit(s) SubCutaneous every 8 hours  insulin glargine Injectable (LANTUS) 10 Unit(s) SubCutaneous every morning  insulin lispro (ADMELOG) corrective regimen sliding scale   SubCutaneous every 6 hours  midodrine 15 milliGRAM(s) Oral every 8 hours  pantoprazole  Injectable 40 milliGRAM(s) IV Push every 12 hours  senna 2 Tablet(s) Oral at bedtime  simvastatin 20 milliGRAM(s) Oral at bedtime    MEDICATIONS  (PRN):  acetaminophen     Tablet .. 650 milliGRAM(s) Oral every 6 hours PRN Temp greater or equal to 38C (100.4F), Mild Pain (1 - 3)  aluminum hydroxide/magnesium hydroxide/simethicone Suspension 30 milliLiter(s) Oral every 4 hours PRN Dyspepsia  dextrose Oral Gel 15 Gram(s) Oral once PRN Blood Glucose LESS THAN 70 milliGRAM(s)/deciliter  melatonin 3 milliGRAM(s) Oral at bedtime PRN Insomnia  ondansetron Injectable 4 milliGRAM(s) IV Push every 8 hours PRN Nausea and/or Vomiting  sodium chloride 0.9% lock flush 10 milliLiter(s) IV Push every 1 hour PRN Pre/post blood products, medications, blood draw, and to maintain line patency      Allergies    No Known Allergies    Intolerances        Vital Signs Last 24 Hrs  T(C): 36.6 (05 Mar 2024 11:50), Max: 37.6 (04 Mar 2024 17:30)  T(F): 97.8 (05 Mar 2024 11:50), Max: 99.7 (04 Mar 2024 17:30)  HR: 76 (05 Mar 2024 12:00) (67 - 86)  BP: 107/55 (05 Mar 2024 12:00) (89/48 - 134/57)  BP(mean): 70 (05 Mar 2024 12:00) (54 - 80)  RR: 13 (05 Mar 2024 12:00) (9 - 33)  SpO2: 97% (05 Mar 2024 12:00) (94% - 100%)    Parameters below as of 05 Mar 2024 12:00  Patient On (Oxygen Delivery Method): nasal cannula, high flow  O2 Flow (L/min): 40    Daily     Daily Weight in k.6 (05 Mar 2024 01:00)    PHYSICAL EXAM:  GENERAL: Appears chronically ill  HEENMT: No periorbital edema  NECK: Supple, no JVD, dialysis catheter neck  NERVOUS SYSTEM:  Awake, confused  CHEST/LUNG: Diminished BS  HEART: Regular rate and rhythm; No rub  ABDOMEN: Soft, +BS  EXTREMITIES: + dependent edema      LABS:                        10.9   16.98 )-----------( 269      ( 05 Mar 2024 03:50 )             32.3     03-05    139  |  99  |  38.4<H>  ----------------------------<  120<H>  4.1   |  21.0<L>  |  4.80<H>    Ca    8.5      05 Mar 2024 03:50  Phos  4.6     03-05  Mg     1.9     03-05    TPro  6.4<L>  /  Alb  2.5<L>  /  TBili  0.7  /  DBili  x   /  AST  26  /  ALT  19  /  AlkPhos  163<H>  03-05      Urinalysis Basic - ( 05 Mar 2024 03:50 )    Color: x / Appearance: x / SG: x / pH: x  Gluc: 120 mg/dL / Ketone: x  / Bili: x / Urobili: x   Blood: x / Protein: x / Nitrite: x   Leuk Esterase: x / RBC: x / WBC x   Sq Epi: x / Non Sq Epi: x / Bacteria: x      Magnesium: 1.9 mg/dL ( @ 03:50)  Phosphorus: 4.6 mg/dL ( @ 03:50)    ABG - ( 05 Mar 2024 10:25 )  pH, Arterial: 7.430 pH, Blood: x     /  pCO2: 38    /  pO2: 103   / HCO3: 25    / Base Excess: 0.9   /  SaO2: 99.8                  RADIOLOGY & ADDITIONAL TESTS:

## 2024-03-05 NOTE — SWALLOW BEDSIDE ASSESSMENT ADULT - SWALLOW EVAL: RECOMMENDED DIET
Puree w/moderately thick liquids
NPO w/continuation of short term non oral alternative means of nutrition/hydration as per Pt/family wishes

## 2024-03-05 NOTE — SWALLOW BEDSIDE ASSESSMENT ADULT - SLP GENERAL OBSERVATIONS
Recd lethargic OOB to chair, A&A Ox1, reduced cognition, +HFNC, +NGT, 0/10 pain pre/post, baseline wheeze noted upon receipt
Recd awake/upright in bed, A&A Ox1, reduced cognition, +Bulgarian speaking daughter present for encounter providing translation, 0/10 pain  pre/post, +HFNC SpO2 95% throughout

## 2024-03-05 NOTE — PHYSICAL THERAPY INITIAL EVALUATION ADULT - COORDINATION ASSESSED, REHAB EVAL
pt follows command x1 with LUE when demonstration given. Difficulty finding target with RUE./finger to nose

## 2024-03-05 NOTE — PROGRESS NOTE ADULT - ASSESSMENT
85 yo m pmhx acute sky s/p sky tube (12/22/23), COVID 19, PAF no AC (GIB/FALL), esophagitis, anemia, DM2, HTN, HLD, CKD3-4, BPH, cognitive impairment presented with leakage around sky tube admitted with distributive shock, GI bleed, RON, aspiration pna and hypoxic respiratory failure.      Acute Hypoxic Respiratory Failure  3/3- Intubated  3/4 - Extubated  on HF   monitor O2    RON   on HD  Nephrology input noted- likely will need long term  avoid nephrotoxic agents    Dysphagia  failed SLP  current mental status precludes po  f/u eval when more awake    Pneumonia  s/p Zosyn    Cholecystitis  Currently not stable for surgery     Encounter for Palliative Care  Spoke with wife. Offered  - Declined.  See above for details  Wife discussed  likely need for long term HD.  Also discussed issue of dysphagia, reintubation/CPR.  Wife agreeable to all interventions.  Continued GOC discussions. PC to monitor hospital course

## 2024-03-05 NOTE — PROGRESS NOTE ADULT - ASSESSMENT
Brief Hospital Course:    83 yo female with hx of acute cholecystitis requiring perc cholecystostomy tube placement on 12/22/23, pAF not on AC due to GI bleeding, esophagitis, DM, HTN, HLD, CKD, BPH, presented initially to the ED on 2/25 due to leaking from around the site of his perc sky tube.  In the ED patient had an episode of coffee ground emesis, acute blood loss anemia, and a probable aspiration event.  Patient was transferred to the ICU service on 2/26 due to hypotension/shock requiring pressors, along with RON requiring initiation of dialysis.  Patient was transferred out of the ICU on 2/29.  His delirium improved, he passed a swallow evaluation and was eventually started on a puree diet with thickened liquids.  On 3/3 patient transferred back to the ICU with worsening respiratory failure, likely secondary to recurrent aspiration pneumonitis, intubated later that day. He was extubated to high flow on 3/4.    Plan    Recurrent aspiration pneumonia/ acute respiratory failure  - completed course of zosyn, leukocytosis improving  - swallow eval, may need MBS  - on high flow    Shock  - weaning off pressors    RON requiring HD  - dialysis per nephrology  - unlikely to have significant renal recovery  - still makes urine will give trial of lasix.    DVT and GI prophylaxis  With recurrent aspiration, long term prognosis is guarded.

## 2024-03-05 NOTE — PHYSICAL THERAPY INITIAL EVALUATION ADULT - GENERAL OBSERVATIONS, REHAB EVAL
Pt received supine in bed + telemetry//BP + Right IJ line + Venous Compression Boots + wills + high flow O2nc 30% + NGT + sky drain. Pt minimally verbal, groaning mostly but successful responding to some inquiry. Pt c/o 0/10 pain, pt agreeable to PT

## 2024-03-05 NOTE — SWALLOW BEDSIDE ASSESSMENT ADULT - COMMENTS
Pt known to this service from current admission. Seen for bedside swallow eval prior to re-intubation w/diet rxs made for puree w/moderately thick liquids. Pt now downgraded to NPO following intubation, reconsult placed to this service for swallow eval

## 2024-03-05 NOTE — PROGRESS NOTE ADULT - CONVERSATION DETAILS
Spoke to wife. Offered  - declined. Informed wife  is lethargic, thus NPO.  Discussed his debility, at risk for aspiration. Discussed Reintubation CPR  and its limited benefit given 's medical issues, advance age.  She states if it has to be done that's okay. Informed wife, there are choices in this matter with option to have a natural passing should his heart stop. Informed her likely need for long term HD.  Wife states that's okay as well, believes  would be okay with it

## 2024-03-05 NOTE — PROGRESS NOTE ADULT - ASSESSMENT
84M s/p cholecystomy tube 12/22/23 admitted for perc sky tube evaluation.  Pt with coffee ground emesis with drop in hgb. CT a/p negative for active GIB. Rapid response called, patient transferred to MICU for critical care management of hypotension and respiratory distress, was on vasopressors, now off and downgraded to stepdown level of care. developed ATN so HD initiated on 2/27/24.     RON: ATN post IV contrast and hypotension  Acute hypoxic respiratory failure  +PVC  CKD(IV) +DM, HTN  CT scan: no hydronephrosis  - avoid further potential nephrotoxins  - cont to monitor for signs of renal recovery  - Had dialysis 3/1 tolerated ok --> HD today --> will cont HD on MWF schedule, HD srikanth  - HD catheter placed 2/27--> pt needs permcath vascular team notified  - No signs of renal recovery noted will likely need long term HD  - will need to find out pt chair in the community for ongoing dialysis    Anemia: + multifactorial  - cont CINDI  - Fe stores noted    Will follow

## 2024-03-05 NOTE — SWALLOW BEDSIDE ASSESSMENT ADULT - SWALLOW EVAL: DIAGNOSIS
Min oral dysphagia notable for suspected posterior loss of mildly thick liquids, otherwise unremarkable. Pharyngeal dysphagia suspected w/soft & bite-sized & mildly thick liquids, delayed cough post intake. No overt s/s penetration or aspiration demonstrated w/puree or moderately thick fluids.
Overall swallow profile impacted upon by lethargy. Min oral dysphagia notable for reduced oral grading via utensil w/delayed oral transfer of puree following. Pharyngeal dysphagia suspected w/single most consistency trialed puree, Pt with subtle cough noted intermittently throughout trials.

## 2024-03-05 NOTE — PHYSICAL THERAPY INITIAL EVALUATION ADULT - PERTINENT HX OF CURRENT PROBLEM, REHAB EVAL
83 y/o male with PMH of Acute Cholecystitis s/p Cholecystomy Tube (12/22/23), COVID-19, PAF not on AC due to GIB + Fall Risk, Esophagitis, Anemia, DM 2, HTN, HLD, CKD III/IV, BPH and Cognitive Impairment  came to the ED complaining of leakage around sky tube. In the ED, patient was seen by surgery team, no acute surgical intervention indicated. As per ED, patient vomited large amount of coffee ground emesis, looked very pale, holding abdomen; CT angio abdomen done shows no acute GI bleed. Hb: 11.3---->9.3. Patient was brought to the ICU for distributive shock 2/2 pneumonia, suspect aspiration. Patient improved and was downgraded off ICU service 02/29/2024. Patient now back in MICU as of 03/03/2024 for acute hypoxic respiratory failure. Patient was intubated on 03/03/2024 for worsening hypoxic respiratory failure, extubated 3/4. Pt remains in MICU.

## 2024-03-05 NOTE — PHYSICAL THERAPY INITIAL EVALUATION ADULT - CRITERIA FOR SKILLED THERAPEUTIC INTERVENTIONS
NITHIN/impairments found/anticipated equipment needs at discharge/anticipated discharge recommendation

## 2024-03-05 NOTE — PROGRESS NOTE ADULT - SUBJECTIVE AND OBJECTIVE BOX
CC:  Follow up GOC , Symptoms    OVERNIGHT EVENTS:  Extubated  Failed SLP eval    Present Symptoms:   Dyspnea:  No   HF  Nausea/Vomiting:  No    Anxiety:  No    Depression:  Unable  Fatigue:Unable  Loss of appetite:    NA  - NPO  Constipation: Not Reported      Pain:   No Signs            Location            Duration            Character            Severity            Factors            Effect    Pain AD Score:  [email or web address] (press ctrl + left click to view)    Review of Systems: Reviewed as above  Further ROS unable to  obtain due to poor  historian      MEDICATIONS  (STANDING):  acetylcysteine 10%  Inhalation 4 milliLiter(s) Inhalation every 6 hours  albuterol/ipratropium for Nebulization 3 milliLiter(s) Nebulizer every 6 hours  buDESOnide    Inhalation Suspension 0.5 milliGRAM(s) Inhalation every 12 hours  chlorhexidine 2% Cloths 1 Application(s) Topical <User Schedule>  dextrose 5%. 1000 milliLiter(s) (100 mL/Hr) IV Continuous <Continuous>  dextrose 5%. 1000 milliLiter(s) (50 mL/Hr) IV Continuous <Continuous>  dextrose 50% Injectable 12.5 Gram(s) IV Push once  dextrose 50% Injectable 25 Gram(s) IV Push once  dextrose 50% Injectable 25 Gram(s) IV Push once  epoetin kristal-epbx (RETACRIT) Injectable 71687 Unit(s) IV Push <User Schedule>  ferrous    sulfate 325 milliGRAM(s) Oral daily  gabapentin 300 milliGRAM(s) Oral at bedtime  heparin   Injectable 5000 Unit(s) SubCutaneous every 8 hours  insulin glargine Injectable (LANTUS) 10 Unit(s) SubCutaneous every morning  insulin lispro (ADMELOG) corrective regimen sliding scale   SubCutaneous every 6 hours  midodrine 15 milliGRAM(s) Oral every 8 hours  pantoprazole  Injectable 40 milliGRAM(s) IV Push every 12 hours  senna 2 Tablet(s) Oral at bedtime  simvastatin 20 milliGRAM(s) Oral at bedtime    MEDICATIONS  (PRN):  acetaminophen     Tablet .. 650 milliGRAM(s) Oral every 6 hours PRN Temp greater or equal to 38C (100.4F), Mild Pain (1 - 3)  aluminum hydroxide/magnesium hydroxide/simethicone Suspension 30 milliLiter(s) Oral every 4 hours PRN Dyspepsia  dextrose Oral Gel 15 Gram(s) Oral once PRN Blood Glucose LESS THAN 70 milliGRAM(s)/deciliter  melatonin 3 milliGRAM(s) Oral at bedtime PRN Insomnia  ondansetron Injectable 4 milliGRAM(s) IV Push every 8 hours PRN Nausea and/or Vomiting  sodium chloride 0.9% lock flush 10 milliLiter(s) IV Push every 1 hour PRN Pre/post blood products, medications, blood draw, and to maintain line patency      PHYSICAL EXAM:    Vital Signs Last 24 Hrs  T(C): 36.6 (05 Mar 2024 11:50), Max: 37.6 (04 Mar 2024 17:30)  T(F): 97.8 (05 Mar 2024 11:50), Max: 99.7 (04 Mar 2024 17:30)  HR: 76 (05 Mar 2024 12:00) (64 - 86)  BP: 107/55 (05 Mar 2024 12:00) (89/48 - 134/57)  BP(mean): 70 (05 Mar 2024 12:00) (54 - 80)  RR: 13 (05 Mar 2024 12:00) (9 - 33)  SpO2: 97% (05 Mar 2024 12:00) (94% - 100%)    Parameters below as of 05 Mar 2024 12:00  Patient On (Oxygen Delivery Method): nasal cannula, high flow  O2 Flow (L/min): 40    Karnofsky: 20 %  General:  Elderly man lethargic       HEENT:  NCAT    Lungs: comfortable  non labored  CV:  RR  GI:  soft NTND  MSK: weakness  Skin:  warm/dry  Neuro  lethargic    LABS:                          10.9   16.98 )-----------( 269      ( 05 Mar 2024 03:50 )             32.3     03-05    139  |  99  |  38.4<H>  ----------------------------<  120<H>  4.1   |  21.0<L>  |  4.80<H>    Ca    8.5      05 Mar 2024 03:50  Phos  4.6     03-05  Mg     1.9     03-05    TPro  6.4<L>  /  Alb  2.5<L>  /  TBili  0.7  /  DBili  x   /  AST  26  /  ALT  19  /  AlkPhos  163<H>  03-05      Urinalysis Basic - ( 05 Mar 2024 03:50 )    Color: x / Appearance: x / SG: x / pH: x  Gluc: 120 mg/dL / Ketone: x  / Bili: x / Urobili: x   Blood: x / Protein: x / Nitrite: x   Leuk Esterase: x / RBC: x / WBC x   Sq Epi: x / Non Sq Epi: x / Bacteria: x      I&O's Summary    04 Mar 2024 07:01  -  05 Mar 2024 07:00  --------------------------------------------------------  IN: 218.9 mL / OUT: 1215 mL / NET: -996.1 mL    05 Mar 2024 07:01  -  05 Mar 2024 14:13  --------------------------------------------------------  IN: 150 mL / OUT: 170 mL / NET: -20 mL        RADIOLOGY & ADDITIONAL STUDIES:  Imaging Reviewed  ( x  )   < from: Xray Chest 1 View- PORTABLE-Urgent (Xray Chest 1 View- PORTABLE-Urgent .) (03.03.24 @ 17:40) >    ACC: [id number] EXAM:  XR CHEST PORTABLE URGENT 1V   ORDERED BY: IAN CHUN     PROCEDURE DATE:  03/03/2024          INTERPRETATION:  AP semierect chest on March 3, 2024 4:34 PM. 2 images.   Patient was intubated.    Heart magnified by technique. Large right jugular line remains.    There are persistent significant atelectatic infiltrates particularly of   the left lower hilum and to lesser degree off the right lower hilum.    These infiltrates a roughly similar to March 3 earlier study.    Present film shows endotracheal tube and nasogastric tube inserted.    IMPRESSION: Tubes inserted. Persistent lower lung field infiltrates left   greater than right.    --- End of Report ---            ALBERT GARCIA MD; Attending Radiologist  This document has been electronically signed. Mar  4 2024  1:07PM    < end of copied text >            ADVANCE DIRECTIVE PREFERENCES    Full Code

## 2024-03-06 DIAGNOSIS — J69.0 PNEUMONITIS DUE TO INHALATION OF FOOD AND VOMIT: ICD-10-CM

## 2024-03-06 DIAGNOSIS — N17.9 ACUTE KIDNEY FAILURE, UNSPECIFIED: ICD-10-CM

## 2024-03-06 DIAGNOSIS — A41.9 SEPSIS, UNSPECIFIED ORGANISM: ICD-10-CM

## 2024-03-06 DIAGNOSIS — J96.01 ACUTE RESPIRATORY FAILURE WITH HYPOXIA: ICD-10-CM

## 2024-03-06 DIAGNOSIS — Z43.4 ENCOUNTER FOR ATTENTION TO OTHER ARTIFICIAL OPENINGS OF DIGESTIVE TRACT: ICD-10-CM

## 2024-03-06 DIAGNOSIS — E11.9 TYPE 2 DIABETES MELLITUS WITHOUT COMPLICATIONS: ICD-10-CM

## 2024-03-06 DIAGNOSIS — I48.20 CHRONIC ATRIAL FIBRILLATION, UNSPECIFIED: ICD-10-CM

## 2024-03-06 LAB
ACANTHOCYTES BLD QL SMEAR: SLIGHT — SIGNIFICANT CHANGE UP
ALBUMIN SERPL ELPH-MCNC: 2.7 G/DL — LOW (ref 3.3–5.2)
ALP SERPL-CCNC: 197 U/L — HIGH (ref 40–120)
ALT FLD-CCNC: 21 U/L — SIGNIFICANT CHANGE UP
ANION GAP SERPL CALC-SCNC: 20 MMOL/L — HIGH (ref 5–17)
ANISOCYTOSIS BLD QL: SLIGHT — SIGNIFICANT CHANGE UP
AST SERPL-CCNC: 27 U/L — SIGNIFICANT CHANGE UP
BASOPHILS # BLD AUTO: 0 K/UL — SIGNIFICANT CHANGE UP (ref 0–0.2)
BASOPHILS NFR BLD AUTO: 0 % — SIGNIFICANT CHANGE UP (ref 0–2)
BILIRUB SERPL-MCNC: 0.5 MG/DL — SIGNIFICANT CHANGE UP (ref 0.4–2)
BUN SERPL-MCNC: 51.8 MG/DL — HIGH (ref 8–20)
BURR CELLS BLD QL SMEAR: PRESENT — SIGNIFICANT CHANGE UP
CALCIUM SERPL-MCNC: 8.4 MG/DL — SIGNIFICANT CHANGE UP (ref 8.4–10.5)
CHLORIDE SERPL-SCNC: 95 MMOL/L — LOW (ref 96–108)
CO2 SERPL-SCNC: 23 MMOL/L — SIGNIFICANT CHANGE UP (ref 22–29)
CREAT SERPL-MCNC: 6.21 MG/DL — HIGH (ref 0.5–1.3)
CULTURE RESULTS: ABNORMAL
EGFR: 8 ML/MIN/1.73M2 — LOW
EOSINOPHIL # BLD AUTO: 0.14 K/UL — SIGNIFICANT CHANGE UP (ref 0–0.5)
EOSINOPHIL NFR BLD AUTO: 0.9 % — SIGNIFICANT CHANGE UP (ref 0–6)
GIANT PLATELETS BLD QL SMEAR: PRESENT — SIGNIFICANT CHANGE UP
GLUCOSE BLDC GLUCOMTR-MCNC: 147 MG/DL — HIGH (ref 70–99)
GLUCOSE BLDC GLUCOMTR-MCNC: 183 MG/DL — HIGH (ref 70–99)
GLUCOSE BLDC GLUCOMTR-MCNC: 184 MG/DL — HIGH (ref 70–99)
GLUCOSE BLDC GLUCOMTR-MCNC: 200 MG/DL — HIGH (ref 70–99)
GLUCOSE BLDC GLUCOMTR-MCNC: 214 MG/DL — HIGH (ref 70–99)
GLUCOSE SERPL-MCNC: 130 MG/DL — HIGH (ref 70–99)
HCT VFR BLD CALC: 29.7 % — LOW (ref 39–50)
HGB BLD-MCNC: 10 G/DL — LOW (ref 13–17)
LYMPHOCYTES # BLD AUTO: 0.7 K/UL — LOW (ref 1–3.3)
LYMPHOCYTES # BLD AUTO: 4.4 % — LOW (ref 13–44)
MACROCYTES BLD QL: SLIGHT — SIGNIFICANT CHANGE UP
MAGNESIUM SERPL-MCNC: 2.3 MG/DL — SIGNIFICANT CHANGE UP (ref 1.6–2.6)
MANUAL SMEAR VERIFICATION: SIGNIFICANT CHANGE UP
MCHC RBC-ENTMCNC: 29.5 PG — SIGNIFICANT CHANGE UP (ref 27–34)
MCHC RBC-ENTMCNC: 33.7 GM/DL — SIGNIFICANT CHANGE UP (ref 32–36)
MCV RBC AUTO: 87.6 FL — SIGNIFICANT CHANGE UP (ref 80–100)
METAMYELOCYTES # FLD: 2.6 % — HIGH (ref 0–0)
MICROCYTES BLD QL: SLIGHT — SIGNIFICANT CHANGE UP
MONOCYTES # BLD AUTO: 1.38 K/UL — HIGH (ref 0–0.9)
MONOCYTES NFR BLD AUTO: 8.7 % — SIGNIFICANT CHANGE UP (ref 2–14)
MYELOCYTES NFR BLD: 0.9 % — HIGH (ref 0–0)
NEUTROPHILS # BLD AUTO: 12.95 K/UL — HIGH (ref 1.8–7.4)
NEUTROPHILS NFR BLD AUTO: 80.7 % — HIGH (ref 43–77)
NEUTS BAND # BLD: 0.9 % — SIGNIFICANT CHANGE UP (ref 0–8)
PHOSPHATE SERPL-MCNC: 5.3 MG/DL — HIGH (ref 2.4–4.7)
PLAT MORPH BLD: NORMAL — SIGNIFICANT CHANGE UP
PLATELET # BLD AUTO: 317 K/UL — SIGNIFICANT CHANGE UP (ref 150–400)
POIKILOCYTOSIS BLD QL AUTO: SLIGHT — SIGNIFICANT CHANGE UP
POLYCHROMASIA BLD QL SMEAR: SLIGHT — SIGNIFICANT CHANGE UP
POTASSIUM SERPL-MCNC: 3.9 MMOL/L — SIGNIFICANT CHANGE UP (ref 3.5–5.3)
POTASSIUM SERPL-SCNC: 3.9 MMOL/L — SIGNIFICANT CHANGE UP (ref 3.5–5.3)
PROT SERPL-MCNC: 6.3 G/DL — LOW (ref 6.6–8.7)
RBC # BLD: 3.39 M/UL — LOW (ref 4.2–5.8)
RBC # FLD: 15.4 % — HIGH (ref 10.3–14.5)
RBC BLD AUTO: ABNORMAL
SODIUM SERPL-SCNC: 138 MMOL/L — SIGNIFICANT CHANGE UP (ref 135–145)
SPECIMEN SOURCE: SIGNIFICANT CHANGE UP
VARIANT LYMPHS # BLD: 0.9 % — SIGNIFICANT CHANGE UP (ref 0–6)
WBC # BLD: 15.87 K/UL — HIGH (ref 3.8–10.5)
WBC # FLD AUTO: 15.87 K/UL — HIGH (ref 3.8–10.5)

## 2024-03-06 PROCEDURE — 99233 SBSQ HOSP IP/OBS HIGH 50: CPT

## 2024-03-06 PROCEDURE — 99232 SBSQ HOSP IP/OBS MODERATE 35: CPT

## 2024-03-06 RX ORDER — DRONEDARONE 400 MG/1
400 TABLET, FILM COATED ORAL
Refills: 0 | Status: DISCONTINUED | OUTPATIENT
Start: 2024-03-06 | End: 2024-03-21

## 2024-03-06 RX ADMIN — HEPARIN SODIUM 5000 UNIT(S): 5000 INJECTION INTRAVENOUS; SUBCUTANEOUS at 05:10

## 2024-03-06 RX ADMIN — Medication 3 MILLILITER(S): at 21:28

## 2024-03-06 RX ADMIN — PANTOPRAZOLE SODIUM 40 MILLIGRAM(S): 20 TABLET, DELAYED RELEASE ORAL at 18:24

## 2024-03-06 RX ADMIN — Medication 1: at 12:10

## 2024-03-06 RX ADMIN — GABAPENTIN 300 MILLIGRAM(S): 400 CAPSULE ORAL at 22:21

## 2024-03-06 RX ADMIN — DRONEDARONE 400 MILLIGRAM(S): 400 TABLET, FILM COATED ORAL at 18:43

## 2024-03-06 RX ADMIN — Medication 0.5 MILLIGRAM(S): at 08:28

## 2024-03-06 RX ADMIN — Medication 1: at 23:28

## 2024-03-06 RX ADMIN — Medication 3 MILLILITER(S): at 02:59

## 2024-03-06 RX ADMIN — Medication 4 MILLILITER(S): at 08:28

## 2024-03-06 RX ADMIN — Medication 0.5 MILLIGRAM(S): at 21:28

## 2024-03-06 RX ADMIN — CHLORHEXIDINE GLUCONATE 1 APPLICATION(S): 213 SOLUTION TOPICAL at 05:10

## 2024-03-06 RX ADMIN — Medication 650 MILLIGRAM(S): at 19:53

## 2024-03-06 RX ADMIN — MIDODRINE HYDROCHLORIDE 15 MILLIGRAM(S): 2.5 TABLET ORAL at 18:23

## 2024-03-06 RX ADMIN — INSULIN GLARGINE 10 UNIT(S): 100 INJECTION, SOLUTION SUBCUTANEOUS at 08:30

## 2024-03-06 RX ADMIN — SIMVASTATIN 20 MILLIGRAM(S): 20 TABLET, FILM COATED ORAL at 22:21

## 2024-03-06 RX ADMIN — Medication 3 MILLILITER(S): at 08:28

## 2024-03-06 RX ADMIN — PANTOPRAZOLE SODIUM 40 MILLIGRAM(S): 20 TABLET, DELAYED RELEASE ORAL at 05:09

## 2024-03-06 RX ADMIN — HEPARIN SODIUM 5000 UNIT(S): 5000 INJECTION INTRAVENOUS; SUBCUTANEOUS at 22:21

## 2024-03-06 RX ADMIN — Medication 4 MILLILITER(S): at 03:00

## 2024-03-06 RX ADMIN — Medication 4 MILLILITER(S): at 21:29

## 2024-03-06 RX ADMIN — Medication 650 MILLIGRAM(S): at 18:23

## 2024-03-06 RX ADMIN — MIDODRINE HYDROCHLORIDE 15 MILLIGRAM(S): 2.5 TABLET ORAL at 22:20

## 2024-03-06 RX ADMIN — ERYTHROPOIETIN 10000 UNIT(S): 10000 INJECTION, SOLUTION INTRAVENOUS; SUBCUTANEOUS at 15:26

## 2024-03-06 RX ADMIN — Medication 2: at 18:24

## 2024-03-06 RX ADMIN — Medication 325 MILLIGRAM(S): at 12:10

## 2024-03-06 NOTE — PROGRESS NOTE ADULT - ASSESSMENT
# Respiratory Failure  # Aspiration Pneumonia  -   - Supplemental O2, wean as tolerated  - Aspiration precautions  - Albuterol/Ipratropium nebulizations  - Acetylcysteine nebulization    # Anemia  # RON on CKD 4    - HD as per renal    # T2DM  - Blood glucose monitoring with sliding scale Insulin   84 y M with PMH recent COVID-19, acute cholecystitis s/p cholecystostomy tube (12/22/23), AFib not on A/C 2/2 GIB, and CKD, who presented to [facility] ED on 02/25/24 with fluid draining around the perc sky tube insertion site upon flushing the tube.  In the ED, he  had an episode of coffee ground emesis and GI was consulted. CTA abdomen was done with no evidence of acute GIB. on 02/26/24 patient developed hypotension, respiratory failure and RRT was called. patient was transferred to MICU and was started on pressors. also on high flow O2 with CXR with bilateral infiltrates--likely aspiration pneumonia. placed on zosyn and vancomycin. developed ATN so HD initiated on 2/27/24. weaned off vasopressors on 02/27/28 and transferred out of ICU on 02/29/24. vancomycin has been discontinued. Returned to MICU due to respiratory failure requiring intubation and shock requiring vasopressor attributed to aspiration pneumonia. Extubated and stable on HFNC >24 hours; now being downgraded     # Respiratory Failure  # Aspiration Pneumonia  # Septic Shock  Received Hydrocortisone and Piperacillin-Tazabactam  -   - Supplemental O2, wean as tolerated  - Aspiration precautions  - Albuterol/Ipratropium nebulizations  - Acetylcysteine nebulization  - Wean off Midodrine    # Anemia, multifactorial  CTA negative for GI bleed  - CINDI, Iron supplements  - PPI q12  - monitor Hgb    # Encephalopathy  - Delirium precautions    # RON on CKD 4    - HD as per renal    # Atrial Fibrillation  - Resume Dronedarone  - No anticoagulation due to GIB    # T2DM  - Glargine 10U  - Blood glucose monitoring with sliding scale Insulin    # Cholecystotomy tube  Drain well  - Monitor output    VTE prophylaxis - Heparin  Guarded Prognosis given advanced aged, multiorgan involvement, prolonged hospital stay  Palliative Follow up 84 y M with PMH recent COVID-19, acute cholecystitis s/p cholecystostomy tube (12/22/23), AFib not on A/C 2/2 GIB, and CKD, who presented to [facility] ED on 02/25/24 with fluid draining around the perc sky tube insertion site upon flushing the tube.  In the ED, he  had an episode of coffee ground emesis and GI was consulted. CTA abdomen was done with no evidence of acute GIB. on 02/26/24 patient developed hypotension, respiratory failure and RRT was called. patient was transferred to MICU and was started on pressors. also on high flow O2 with CXR with bilateral infiltrates--likely aspiration pneumonia. placed on zosyn and vancomycin. developed ATN so HD initiated on 2/27/24. weaned off vasopressors on 02/27/28 and transferred out of ICU on 02/29/24. vancomycin has been discontinued. Returned to MICU due to respiratory failure requiring intubation and shock requiring vasopressor attributed to aspiration pneumonia. Extubated and stable on HFNC >24 hours; now being downgraded     # Respiratory Failure  # Aspiration Pneumonia  # Septic Shock  Received Hydrocortisone and Piperacillin - Tazobactam - completed  -   - Supplemental O2, wean as tolerated  - Aspiration precautions  - Albuterol/Ipratropium nebulizations  - Acetylcysteine nebulization  - Wean off Midodrine    # Anemia, multifactorial  CTA negative for GI bleed  - CINDI, Iron supplements  - PPI q12  - monitor Hgb    # Encephalopathy  - Delirium precautions    # RON/ATN on CKD 4    - HD as per renal  - Vascular follow up    # Atrial Fibrillation  - Resume Dronedarone once on PO  - No anticoagulation due to GIB    # T2DM  - Glargine 10U  - Blood glucose monitoring with sliding scale Insulin    # Cholecystotomy tube  Drain well  - Monitor output    # Dysphagia  - SLP follow up    VTE prophylaxis - Heparin  Mobilize OOB, PT follow up  Guarded Prognosis given advanced aged, multiorgan involvement, prolonged hospital stay  Palliative Follow up

## 2024-03-06 NOTE — PROGRESS NOTE ADULT - ASSESSMENT
84M s/p cholecystomy tube 12/22/23 admitted for perc sky tube evaluation.  Pt with coffee ground emesis with drop in hgb. CT a/p negative for active GIB. Rapid response called, patient transferred to MICU for critical care management of hypotension and respiratory distress, was on vasopressors, now off and downgraded to stepdown level of care. developed ATN so HD initiated on 2/27/24.     RON: ATN post IV contrast and hypotension  Acute hypoxic respiratory failure  +PVC  CKD(IV) +DM, HTN  Hepatitis B and C negative   CT scan: no hydronephrosis  - avoid further potential nephrotoxins  - cont to monitor for signs of renal recovery  - Had dialysis 3/1 tolerated ok --> HD today --> will cont HD on MWF schedule, HD today ( see orders )   - HD catheter placed 2/27--> pt needs permacath vascular team notified ( will need ID clearance )   - No signs of renal recovery noted will likely need long term HD  - will need to find out pt chair in the community for ongoing dialysis    Anemia: + multifactorial  - cont CINDI  - Fe stores noted    Will follow

## 2024-03-06 NOTE — PROGRESS NOTE ADULT - SUBJECTIVE AND OBJECTIVE BOX
CC:  Follow up GOC , Symptoms    OVERNIGHT EVENTS:  failed SLP      Present Symptoms:   Dyspnea:  No   Nausea/Vomiting:  No  Anxiety:  No      Depression:    Unable  Fatigue:  Yes    Loss of appetite:   NA   Constipation: Not Reported      Pain: No Signs            Location            Duration            Character            Severity            Factors            Effect    Pain AD Score:  [email or web address] (press ctrl + left click to view)    Review of Systems: Reviewed as above  Further ROS unable to  obtain due to poor mentation       MEDICATIONS  (STANDING):  acetylcysteine 10%  Inhalation 4 milliLiter(s) Inhalation every 6 hours  albuterol/ipratropium for Nebulization 3 milliLiter(s) Nebulizer every 6 hours  buDESOnide    Inhalation Suspension 0.5 milliGRAM(s) Inhalation every 12 hours  chlorhexidine 2% Cloths 1 Application(s) Topical <User Schedule>  dextrose 5%. 1000 milliLiter(s) (100 mL/Hr) IV Continuous <Continuous>  dextrose 5%. 1000 milliLiter(s) (50 mL/Hr) IV Continuous <Continuous>  dextrose 50% Injectable 25 Gram(s) IV Push once  dextrose 50% Injectable 12.5 Gram(s) IV Push once  dextrose 50% Injectable 25 Gram(s) IV Push once  dronedarone 400 milliGRAM(s) Oral two times a day  epoetin kristal-epbx (RETACRIT) Injectable 24092 Unit(s) IV Push <User Schedule>  ferrous    sulfate 325 milliGRAM(s) Oral daily  gabapentin 300 milliGRAM(s) Oral at bedtime  heparin   Injectable 5000 Unit(s) SubCutaneous every 8 hours  insulin glargine Injectable (LANTUS) 10 Unit(s) SubCutaneous every morning  insulin lispro (ADMELOG) corrective regimen sliding scale   SubCutaneous every 6 hours  midodrine 15 milliGRAM(s) Oral every 8 hours  pantoprazole  Injectable 40 milliGRAM(s) IV Push every 12 hours  senna 2 Tablet(s) Oral at bedtime  simvastatin 20 milliGRAM(s) Oral at bedtime    MEDICATIONS  (PRN):  acetaminophen     Tablet .. 650 milliGRAM(s) Oral every 6 hours PRN Temp greater or equal to 38C (100.4F), Mild Pain (1 - 3)  aluminum hydroxide/magnesium hydroxide/simethicone Suspension 30 milliLiter(s) Oral every 4 hours PRN Dyspepsia  dextrose Oral Gel 15 Gram(s) Oral once PRN Blood Glucose LESS THAN 70 milliGRAM(s)/deciliter  melatonin 3 milliGRAM(s) Oral at bedtime PRN Insomnia  ondansetron Injectable 4 milliGRAM(s) IV Push every 8 hours PRN Nausea and/or Vomiting  sodium chloride 0.9% lock flush 10 milliLiter(s) IV Push every 1 hour PRN Pre/post blood products, medications, blood draw, and to maintain line patency      PHYSICAL EXAM:    Vital Signs Last 24 Hrs  T(C): 36.6 (06 Mar 2024 10:03), Max: 37.1 (05 Mar 2024 20:00)  T(F): 97.9 (06 Mar 2024 10:03), Max: 98.8 (05 Mar 2024 20:00)  HR: 76 (06 Mar 2024 10:03) (61 - 76)  BP: 150/72 (06 Mar 2024 10:03) (109/54 - 150/72)  BP(mean): 85 (06 Mar 2024 09:00) (70 - 87)  RR: 19 (06 Mar 2024 10:03) (12 - 22)  SpO2: 93% (06 Mar 2024 10:03) (93% - 99%)    Parameters below as of 06 Mar 2024 10:03  Patient On (Oxygen Delivery Method): nasal cannula  O2 Flow (L/min): 4      Karnofsky: 30 %  General:  Elderly man  NAD        HEENT:  NCAT  ( x )  NGT  Lungs: comfortable  non labored  CV:  RR  GI:   soft NTND  MSK: weakness  Skin:  warm/dry  Neuro  sleepy, arouses to tactile stimuli, non communicative  Psych  calm    LABS:                          10.0   15.87 )-----------( 317      ( 06 Mar 2024 03:50 )             29.7     03-06    138  |  95<L>  |  51.8<H>  ----------------------------<  130<H>  3.9   |  23.0  |  6.21<H>    Ca    8.4      06 Mar 2024 03:50  Phos  5.3     03-06  Mg     2.3     03-06    TPro  6.3<L>  /  Alb  2.7<L>  /  TBili  0.5  /  DBili  x   /  AST  27  /  ALT  21  /  AlkPhos  197<H>  03-06      Urinalysis Basic - ( 06 Mar 2024 03:50 )    Color: x / Appearance: x / SG: x / pH: x  Gluc: 130 mg/dL / Ketone: x  / Bili: x / Urobili: x   Blood: x / Protein: x / Nitrite: x   Leuk Esterase: x / RBC: x / WBC x   Sq Epi: x / Non Sq Epi: x / Bacteria: x      I&O's Summary    05 Mar 2024 07:01  -  06 Mar 2024 07:00  --------------------------------------------------------  IN: 150 mL / OUT: 395 mL / NET: -245 mL          ADVANCE DIRECTIVE PREFERENCES    Full Code

## 2024-03-06 NOTE — PROGRESS NOTE ADULT - SUBJECTIVE AND OBJECTIVE BOX
INFECTIOUS DISEASES AND INTERNAL MEDICINE at [place]  =======================================================  Claudio Hester MD  Diplomates American Board of Internal Medicine and Infectious Diseases  Telephone [phone]  Fax            [phone]  =======================================================    BERNADINE ANDERSON [id number]    Follow up:LEUKOCYTOSIS    Allergies:  No Known Allergies      Medications:  acetaminophen     Tablet .. 650 milliGRAM(s) Oral every 6 hours PRN  acetylcysteine 10%  Inhalation 4 milliLiter(s) Inhalation every 6 hours  albuterol/ipratropium for Nebulization 3 milliLiter(s) Nebulizer every 6 hours  aluminum hydroxide/magnesium hydroxide/simethicone Suspension 30 milliLiter(s) Oral every 4 hours PRN  buDESOnide    Inhalation Suspension 0.5 milliGRAM(s) Inhalation every 12 hours  chlorhexidine 2% Cloths 1 Application(s) Topical <User Schedule>  dextrose 5%. 1000 milliLiter(s) IV Continuous <Continuous>  dextrose 5%. 1000 milliLiter(s) IV Continuous <Continuous>  dextrose 50% Injectable 12.5 Gram(s) IV Push once  dextrose 50% Injectable 25 Gram(s) IV Push once  dextrose 50% Injectable 25 Gram(s) IV Push once  dextrose Oral Gel 15 Gram(s) Oral once PRN  epoetin kristal-epbx (RETACRIT) Injectable 07600 Unit(s) IV Push <User Schedule>  ferrous    sulfate 325 milliGRAM(s) Oral daily  gabapentin 300 milliGRAM(s) Oral at bedtime  heparin   Injectable 5000 Unit(s) SubCutaneous every 8 hours  insulin glargine Injectable (LANTUS) 10 Unit(s) SubCutaneous every morning  insulin lispro (ADMELOG) corrective regimen sliding scale   SubCutaneous every 6 hours  melatonin 3 milliGRAM(s) Oral at bedtime PRN  midodrine 15 milliGRAM(s) Oral every 8 hours  ondansetron Injectable 4 milliGRAM(s) IV Push every 8 hours PRN  pantoprazole  Injectable 40 milliGRAM(s) IV Push every 12 hours  senna 2 Tablet(s) Oral at bedtime  simvastatin 20 milliGRAM(s) Oral at bedtime  sodium chloride 0.9% lock flush 10 milliLiter(s) IV Push every 1 hour PRN    SOCIAL       FAMILY   FAMILY HISTORY:  Family history of stomach cancer  Father    Family history of emphysema  Mother      REVIEW OF SYSTEMS:  UNABLE TO OBTAIN           Physical Exam:  ICU Vital Signs Last 24 Hrs  T(C): 36.7 (06 Mar 2024 03:00), Max: 37.1 (05 Mar 2024 20:00)  T(F): 98 (06 Mar 2024 03:00), Max: 98.8 (05 Mar 2024 20:00)  HR: 64 (06 Mar 2024 05:00) (61 - 83)  BP: 140/53 (06 Mar 2024 05:00) (89/48 - 142/53)  BP(mean): 77 (06 Mar 2024 05:00) (60 - 85)  ABP: --  ABP(mean): --  RR: 19 (06 Mar 2024 05:00) (12 - 33)  SpO2: 98% (06 Mar 2024 05:00) (94% - 99%)    O2 Parameters below as of 06 Mar 2024 03:01  Patient On (Oxygen Delivery Method): nasal cannula, high flow          GEN: NAD,  LETHARGIC  HEENT: normocephalic and atraumatic. EOMI. ALTHEA.    NECK: Supple. No carotid bruits.  No lymphadenopathy or thyromegaly.  LUNGS: Clear to auscultation.  HEART: Regular rate and rhythm without murmur.  ABDOMEN: Soft, nontender, and nondistended.  Positive bowel sounds.    : No CVA tenderness  EXTREMITIES: Without any cyanosis, clubbing, rash, lesions or edema.  MSK: no joint swelling  NEUROLOGIC:  AWAKE LETHARGIC FOLLOWS COMMANDS      Labs:  Vitals:  ============  T(F): 98 (06 Mar 2024 03:00), Max: 98.8 (05 Mar 2024 20:00)  HR: 64 (06 Mar 2024 05:00)  BP: 140/53 (06 Mar 2024 05:00)  RR: 19 (06 Mar 2024 05:00)  SpO2: 98% (06 Mar 2024 05:00) (94% - 99%)  temp max in last 48H T(F): , Max: 99.9 (03-04-24 @ 08:00)    =======================================================  Current Antibiotics:    Other medications:  acetylcysteine 10%  Inhalation 4 milliLiter(s) Inhalation every 6 hours  albuterol/ipratropium for Nebulization 3 milliLiter(s) Nebulizer every 6 hours  buDESOnide    Inhalation Suspension 0.5 milliGRAM(s) Inhalation every 12 hours  chlorhexidine 2% Cloths 1 Application(s) Topical <User Schedule>  dextrose 5%. 1000 milliLiter(s) IV Continuous <Continuous>  dextrose 5%. 1000 milliLiter(s) IV Continuous <Continuous>  dextrose 50% Injectable 12.5 Gram(s) IV Push once  dextrose 50% Injectable 25 Gram(s) IV Push once  dextrose 50% Injectable 25 Gram(s) IV Push once  epoetin kristal-epbx (RETACRIT) Injectable 93807 Unit(s) IV Push <User Schedule>  ferrous    sulfate 325 milliGRAM(s) Oral daily  gabapentin 300 milliGRAM(s) Oral at bedtime  heparin   Injectable 5000 Unit(s) SubCutaneous every 8 hours  insulin glargine Injectable (LANTUS) 10 Unit(s) SubCutaneous every morning  insulin lispro (ADMELOG) corrective regimen sliding scale   SubCutaneous every 6 hours  midodrine 15 milliGRAM(s) Oral every 8 hours  pantoprazole  Injectable 40 milliGRAM(s) IV Push every 12 hours  senna 2 Tablet(s) Oral at bedtime  simvastatin 20 milliGRAM(s) Oral at bedtime      =======================================================  Labs:                        10.0   15.87 )-----------( 317      ( 06 Mar 2024 03:50 )             29.7     03-06    138  |  95<L>  |  51.8<H>  ----------------------------<  130<H>  3.9   |  23.0  |  6.21<H>    Ca    8.4      06 Mar 2024 03:50  Phos  5.3     03-06  Mg     2.3     03-06    TPro  6.3<L>  /  Alb  2.7<L>  /  TBili  0.5  /  DBili  x   /  AST  27  /  ALT  21  /  AlkPhos  197<H>  03-06      Culture - Sputum (collected 03-04-24 @ 13:07)  Source: .Sputum Sputum  Gram Stain (03-05-24 @ 00:34):    No polymorphonuclear leukocytes per low power field    No Squamous epithelial cells per low power field    Rare Gram Negative Rods per oil power field    Rare Gram positive cocci in pairs per oil power field  Final Report (03-06-24 @ 00:20):    Normal Respiratory Allyson present    Culture - Blood (collected 03-02-24 @ 12:50)  Source: .Blood Blood  Preliminary Report (03-05-24 @ 23:01):    No growth at 72 Hours    Culture - Blood (collected 03-02-24 @ 12:43)  Source: .Blood Blood  Preliminary Report (03-05-24 @ 23:01):    No growth at 72 Hours    Culture - Blood (collected 02-26-24 @ 10:51)  Source: .Blood Blood-Peripheral  Final Report (03-02-24 @ 17:01):    No growth at 5 days    Culture - Blood (collected 02-26-24 @ 10:42)  Source: .Blood Blood-Peripheral  Final Report (03-02-24 @ 17:01):    No growth at 5 days    Culture - Blood (collected 02-25-24 @ 19:33)  Source: .Blood Blood-Peripheral  Final Report (03-02-24 @ 03:00):    No growth at 5 days    Culture - Blood (collected 02-25-24 @ 19:23)  Source: .Blood Blood-Peripheral  Final Report (03-02-24 @ 03:00):    No growth at 5 days    Culture - Urine (collected 01-29-24 @ 05:54)  Source: Clean Catch Clean Catch (Midstream)  Final Report (01-30-24 @ 10:38):    <10,000 CFU/mL Normal Urogenital Allyson    Culture - Blood (collected 01-29-24 @ 01:20)  Source: .Blood Blood-Venous  Final Report (02-03-24 @ 09:00):    No growth at 5 days    Culture - Blood (collected 01-29-24 @ 01:13)  Source: .Blood Blood-Venous  Final Report (02-03-24 @ 09:00):    No growth at 5 days    Culture - Blood (collected 01-07-24 @ 08:05)  Source: .Blood Blood-Peripheral  Final Report (01-13-24 @ 02:00):    No growth at 5 days    Culture - Blood (collected 01-07-24 @ 07:55)  Source: .Blood Blood-Peripheral  Final Report (01-13-24 @ 02:00):    No growth at 5 days    Culture - Urine (collected 01-06-24 @ 23:56)  Source: Clean Catch Clean Catch (Midstream)  Final Report (01-08-24 @ 07:21):    <10,000 CFU/mL Normal Urogenital Allyson    Culture - Blood (collected 01-02-24 @ 09:05)  Source: .Blood Blood-Peripheral  Final Report (01-07-24 @ 17:00):    No growth at 5 days    Culture - Blood (collected 01-02-24 @ 08:57)  Source: .Blood Blood-Peripheral  Final Report (01-07-24 @ 17:00):    No growth at 5 days    Culture - Body Fluid with Gram Stain (collected 12-22-23 @ 14:40)  Source: Bile Bile Fluid  Gram Stain (12-23-23 @ 01:41):    No polymorphonuclear leukocytes seen    Gram Negative Rods seen    Gram positive cocci in pairs seen    by cytocentrifuge  Final Report (12-27-23 @ 16:52):    Moderate Escherichia coli    Few Streptococcus gallolyticus "Susceptibilities not performed"  Organism: Escherichia coli (12-27-23 @ 16:52)  Organism: Escherichia coli (12-27-23 @ 16:52)    Sensitivities:      -  Levofloxacin: S <=0.5      -  Tobramycin: S <=2      -  Aztreonam: S <=4      -  Gentamicin: S <=2      -  Cefazolin: S <=2      -  Cefepime: S <=2      -  Piperacillin/Tazobactam: S <=8      -  Ciprofloxacin: S <=0.25      -  Imipenem: S <=1      -  Ceftriaxone: S <=1      -  Ampicillin: R >16 These ampicillin results predict results for amoxicillin      Method Type: LUIS ENRIQUE      -  Meropenem: S <=1      -  Ampicillin/Sulbactam: R >16/8      -  Cefoxitin: S <=8      -  Amoxicillin/Clavulanic Acid: S <=8/4      -  Trimethoprim/Sulfamethoxazole: R >2/38      -  Ertapenem: S <=0.5    Culture - Blood (collected 12-21-23 @ 23:05)  Source: .Blood Blood  Final Report (12-27-23 @ 07:01):    No growth at 5 days    Culture - Blood (collected 12-21-23 @ 22:55)  Source: .Blood Blood  Final Report (12-27-23 @ 07:01):    No growth at 5 days    Culture - Urine (collected 12-21-23 @ 04:45)  Source: Clean Catch Clean Catch (Midstream)  Final Report (12-22-23 @ 08:51):    <10,000 CFU/mL Normal Urogenital Allyson    Culture - Blood (collected 12-21-23 @ 04:30)  Source: .Blood Blood-Peripheral  Final Report (12-26-23 @ 09:00):    No growth at 5 days    Culture - Blood (collected 12-21-23 @ 04:20)  Source: .Blood Blood-Peripheral  Final Report (12-26-23 @ 09:00):    No growth at 5 days      Creatinine: 6.21 mg/dL (03-06-24 @ 03:50)  Creatinine: 4.80 mg/dL (03-05-24 @ 03:50)  Creatinine: 6.54 mg/dL (03-04-24 @ 03:20)  Creatinine: 5.83 mg/dL (03-03-24 @ 07:50)  Creatinine: 4.24 mg/dL (03-02-24 @ 06:45)    Procalcitonin, Serum: 24.62 ng/mL (02-29-24 @ 05:08)  Procalcitonin, Serum: 18.92 ng/mL (02-26-24 @ 10:51)      Ferritin: 565 ng/mL (02-29-24 @ 05:08)      WBC Count: 15.87 K/uL (03-06-24 @ 03:50)  WBC Count: 16.98 K/uL (03-05-24 @ 03:50)  WBC Count: 19.58 K/uL (03-04-24 @ 03:20)  WBC Count: 23.69 K/uL (03-03-24 @ 07:50)  WBC Count: 16.06 K/uL (03-02-24 @ 06:45)    SARS-CoV-2: NotDetec (03-03-24 @ 04:34)  SARS-CoV-2: NotDetec (03-01-24 @ 17:52)      Alkaline Phosphatase: 197 U/L (03-06-24 @ 03:50)  Alkaline Phosphatase: 163 U/L (03-05-24 @ 03:50)  Alkaline Phosphatase: 113 U/L (03-04-24 @ 03:20)  Alkaline Phosphatase: 90 U/L (03-03-24 @ 07:50)  Alanine Aminotransferase (ALT/SGPT): 21 U/L (03-06-24 @ 03:50)  Alanine Aminotransferase (ALT/SGPT): 19 U/L (03-05-24 @ 03:50)  Alanine Aminotransferase (ALT/SGPT): 16 U/L (03-04-24 @ 03:20)  Alanine Aminotransferase (ALT/SGPT): 17 U/L (03-03-24 @ 07:50)  Aspartate Aminotransferase (AST/SGOT): 27 U/L (03-06-24 @ 03:50)  Aspartate Aminotransferase (AST/SGOT): 26 U/L (03-05-24 @ 03:50)  Aspartate Aminotransferase (AST/SGOT): 19 U/L (03-04-24 @ 03:20)  Aspartate Aminotransferase (AST/SGOT): 23 U/L (03-03-24 @ 07:50)  Bilirubin Total: 0.5 mg/dL (03-06-24 @ 03:50)  Bilirubin Total: 0.7 mg/dL (03-05-24 @ 03:50)  Bilirubin Total: 0.9 mg/dL (03-04-24 @ 03:20)  Bilirubin Total: 1.1 mg/dL (03-03-24 @ 07:50)

## 2024-03-06 NOTE — PROGRESS NOTE ADULT - SUBJECTIVE AND OBJECTIVE BOX
HOSPITALIST PROGRESS NOTE    BERNADINE ANDERSON  [id number]  84yMale    Patient is a 84y old  Male who presents with a chief complaint of Gastrointestinal hemorrhage     (27 Feb 2024 13:27)      SUBJECTIVE:   Chart reviewed since last visit.    85 y/o male with PMH of Acute Cholecystitis s/p Cholecystomy Tube (12/22/23), COVID-19, PAF not on AC due to GIB + Fall Risk, Esophagitis, Anemia, DM 2, HTN, HLD, CKD III/IV, BPH and Cognitive Impairment  came to the ED complaining of leakage around sky tube. As per wife at bed side, family noticed fluid coming out on the skin when they attempted to flush the sky tube. Wife said patient has been doing well since discharged otherwise. No fever, chills, abdominal pain, change in bowel/urinary habit, nausea, vomiting noted.   In the ED, patient was seen by surgery team, no acute surgical intervention. As per ED, patient vomited large amount of coffee ground emesis, looked very pale, holding abdomen; CT angio abdomen done: no acute GI bleed. Repeat CBC: Hb: 11.3---->9.3. Also, he desaturated concerning for aspiration PNA, antibiotic and oxygen therapy.  (25 Feb 2024 22:52)    Transferred to MICU for respiratory failure, requiring intubation and pressor support.  Has been on HFNC >24 hours and now being downgraded        Patient seen and examined at bedside for respiratory failure, aspiration pneumonia, ESRD  Very weak, barely responds.  Denies any dyspnea, chest pain, abdominal pain, nausea, vomiting.      OBJECTIVE:  Vital Signs Last 24 Hrs  T(C): 36.5 (06 Mar 2024 07:20), Max: 37.1 (05 Mar 2024 20:00)  T(F): 97.7 (06 Mar 2024 07:20), Max: 98.8 (05 Mar 2024 20:00)  HR: 75 (06 Mar 2024 09:00) (61 - 81)  BP: 140/62 (06 Mar 2024 09:00) (89/48 - 143/61)  BP(mean): 85 (06 Mar 2024 09:00) (60 - 87)  RR: 15 (06 Mar 2024 09:00) (12 - 22)  SpO2: 98% (06 Mar 2024 09:00) (94% - 99%)    Parameters below as of 06 Mar 2024 08:28  Patient On (Oxygen Delivery Method): nasal cannula, high flow        PHYSICAL EXAMINATION  General: Elderly male lying on bed, appears comfortable  HEENT:  HFNC(+) NGT (+)  NECK:  Flat neck vein  CVS: regular rate and rhythm S1 S2  RESP:  Poor efforts, mild crackles  GI:  RUQ drain with bilious drainage soft nontender nondistended BS+  : Condom catheter  MSK:  Raise arms and legs off bed  CNS:  Awake, minimally verbal. Follows commands, moves all extremities  INTEG:  Right neck TLC catheter  PSYCH:  withdrawn    MONITOR:  CAPILLARY BLOOD GLUCOSE      POCT Blood Glucose.: 184 mg/dL (06 Mar 2024 08:28)  POCT Blood Glucose.: 147 mg/dL (06 Mar 2024 05:33)  POCT Blood Glucose.: 124 mg/dL (05 Mar 2024 23:30)  POCT Blood Glucose.: 149 mg/dL (05 Mar 2024 17:25)  POCT Blood Glucose.: 154 mg/dL (05 Mar 2024 11:25)        I&O's Summary    05 Mar 2024 07:01  -  06 Mar 2024 07:00  --------------------------------------------------------  IN: 150 mL / OUT: 395 mL / NET: -245 mL                            10.0   15.87 )-----------( 317      ( 06 Mar 2024 03:50 )             29.7       03-06    138  |  95<L>  |  51.8<H>  ----------------------------<  130<H>  3.9   |  23.0  |  6.21<H>    Ca    8.4      06 Mar 2024 03:50  Phos  5.3     03-06  Mg     2.3     03-06    TPro  6.3<L>  /  Alb  2.7<L>  /  TBili  0.5  /  DBili  x   /  AST  27  /  ALT  21  /  AlkPhos  197<H>  03-06        Urinalysis Basic - ( 06 Mar 2024 03:50 )    Color: x / Appearance: x / SG: x / pH: x  Gluc: 130 mg/dL / Ketone: x  / Bili: x / Urobili: x   Blood: x / Protein: x / Nitrite: x   Leuk Esterase: x / RBC: x / WBC x   Sq Epi: x / Non Sq Epi: x / Bacteria: x        Culture:    Culture - Sputum . (03.04.24 @ 13:07)   Gram Stain:   No polymorphonuclear leukocytes per low power field   No Squamous epithelial cells per low power field   Rare Gram Negative Rods per oil power field   Rare Gram positive cocci in pairs per oil power field  Specimen Source: .Sputum Sputum  Culture Results:   Normal Respiratory Allyson present    Culture - Blood (03.02.24 @ 12:50)   Specimen Source: .Blood Blood  Culture Results:   No growth at 72 Hours    Culture - Blood (03.02.24 @ 12:43)   Specimen Source: .Blood Blood  Culture Results:   No growth at 72 Hours      TTE:  < from: TTE W or WO Ultrasound Enhancing Agent (12.22.23 @ 19:49) >  CONCLUSIONS:      1. Left ventricular cavity is normal. Left ventricular systolic function is normal with an ejection fraction visually estimated at 55 to 60 %.   2. Normal left ventricular diastolic function.   3. Normal right ventricular cavity size and systolic function.   4. The left atrium is normal.   5. The right atrium is normal in size.   6. Fibrocalcific aortic valve sclerosis without stenosis.   7. Estimated pulmonary artery systolic pressure is 34 mmHg, normal pulmonary artery pressure.    < end of copied text >    RADIOLOGY      < from: Xray Chest 1 View- PORTABLE-Urgent (Xray Chest 1 View- PORTABLE-Urgent .) (03.03.24 @ 17:40) >    Heart magnified by technique. Large right jugular line remains.    There are persistent significant atelectatic infiltrates particularly of   the left lower hilum and to lesser degree off the right lower hilum.    These infiltrates a roughly similar to March 3 earlier study.    Present film shows endotracheal tube and nasogastric tube inserted.    IMPRESSION: Tubes inserted. Persistent lower lung field infiltrates left   greater than right.    < end of copied text >        < from: Xray Chest 1 View- PORTABLE-Urgent (Xray Chest 1 View- PORTABLE-Urgent .) (03.03.24 @ 05:12) >  FINDINGS:    Single frontal view of the chest demonstrates improved bilateral   infiltrates. Small left-sided pleural effusion. The cardiomediastinal   silhouette is enlarged. No acute osseous abnormalities. Overlying EKG   leads and wires are noted. Right-sided dialysis catheter tip in right   atrium.    IMPRESSION: Improved bilateral infiltrates. Small left-sided pleural   effusion. Cardiomegaly.    < end of copied text >        < from: CT Abdomen and Pelvis w/ Oral Cont (03.02.24 @ 23:11) >  PROCEDURE:  CT of the Chest, Abdomen and Pelvis was performed.  Sagittal and coronal reformats were performed.    FINDINGS:  CHEST:  LUNGS AND LARGE AIRWAYS: Patent central airways. Right upper lobe   alveolar opacities suspicious for pneumonia. Bibasilar atelectasis and   effusions  PLEURA: Bibasilar effusions  VESSELS: Right internal jugular venous catheter with tip in right atrium  HEART: Heart size is normal. Trace pericardial effusion  MEDIASTINUM AND MARIANELA: No lymphadenopathy.  CHEST WALL AND LOWER NECK: Within normal limits.    ABDOMEN AND PELVIS:  LIVER: Within normal limits.  BILE DUCTS: Normal caliber.  GALLBLADDER: Contains a cholecystostomy drainage catheter exiting through   the right chest wall and a gallstone and is mostly decompressed  SPLEEN: Within normal limits.  PANCREAS: Within normal limits.  ADRENALS: Within normal limits.  KIDNEYS/URETERS: Within normal limits.    BLADDER: Contains a Horta catheter  REPRODUCTIVE ORGANS: Prostate within normal limits.    BOWEL: No bowel obstruction. Appendix is not visualized. No evidence of   inflammation in the pericecal region.  PERITONEUM: Significantly increased perihepatic ascites.  VESSELS: Within normal limits.  RETROPERITONEUM/LYMPH NODES: No lymphadenopathy.  ABDOMINAL WALL: Within normal limits.  BONES: Within normal limits.    IMPRESSION:  Right upper lobe alveolar opacities suspicious for pneumonia. Bibasilar   atelectasis and effusions.    Cholecystostomy drainage catheter and gallstone, gallbladder ismostly   decompressed    Significantly increased perihepatic ascites.    < end of copied text >      < from: US Duplex Venous Upper Ext Ltd, Right (02.26.24 @ 15:40) >  IMPRESSION:  No evidence of right upper extremity deep venous thrombosis.    < end of copied text >      MEDICATIONS  (STANDING):  acetylcysteine 10%  Inhalation 4 milliLiter(s) Inhalation every 6 hours  albuterol/ipratropium for Nebulization 3 milliLiter(s) Nebulizer every 6 hours  buDESOnide    Inhalation Suspension 0.5 milliGRAM(s) Inhalation every 12 hours  chlorhexidine 2% Cloths 1 Application(s) Topical <User Schedule>  dextrose 5%. 1000 milliLiter(s) (50 mL/Hr) IV Continuous <Continuous>  dextrose 5%. 1000 milliLiter(s) (100 mL/Hr) IV Continuous <Continuous>  dextrose 50% Injectable 25 Gram(s) IV Push once  dextrose 50% Injectable 12.5 Gram(s) IV Push once  dextrose 50% Injectable 25 Gram(s) IV Push once  epoetin kristal-epbx (RETACRIT) Injectable 93542 Unit(s) IV Push <User Schedule>  ferrous    sulfate 325 milliGRAM(s) Oral daily  gabapentin 300 milliGRAM(s) Oral at bedtime  heparin   Injectable 5000 Unit(s) SubCutaneous every 8 hours  insulin glargine Injectable (LANTUS) 10 Unit(s) SubCutaneous every morning  insulin lispro (ADMELOG) corrective regimen sliding scale   SubCutaneous every 6 hours  midodrine 15 milliGRAM(s) Oral every 8 hours  pantoprazole  Injectable 40 milliGRAM(s) IV Push every 12 hours  senna 2 Tablet(s) Oral at bedtime  simvastatin 20 milliGRAM(s) Oral at bedtime      MEDICATIONS  (PRN):  acetaminophen     Tablet .. 650 milliGRAM(s) Oral every 6 hours PRN Temp greater or equal to 38C (100.4F), Mild Pain (1 - 3)  aluminum hydroxide/magnesium hydroxide/simethicone Suspension 30 milliLiter(s) Oral every 4 hours PRN Dyspepsia  dextrose Oral Gel 15 Gram(s) Oral once PRN Blood Glucose LESS THAN 70 milliGRAM(s)/deciliter  melatonin 3 milliGRAM(s) Oral at bedtime PRN Insomnia  ondansetron Injectable 4 milliGRAM(s) IV Push every 8 hours PRN Nausea and/or Vomiting  sodium chloride 0.9% lock flush 10 milliLiter(s) IV Push every 1 hour PRN Pre/post blood products, medications, blood draw, and to maintain line patency

## 2024-03-06 NOTE — PROGRESS NOTE ADULT - SUBJECTIVE AND OBJECTIVE BOX
NEPHROLOGY INTERVAL HPI/OVERNIGHT EVENTS:     Transferred out of ICU   Getting repeat swallow evaluation   + RIREX Montoya     MEDICATIONS  (STANDING):  acetylcysteine 10%  Inhalation 4 milliLiter(s) Inhalation every 6 hours  albuterol/ipratropium for Nebulization 3 milliLiter(s) Nebulizer every 6 hours  buDESOnide    Inhalation Suspension 0.5 milliGRAM(s) Inhalation every 12 hours  chlorhexidine 2% Cloths 1 Application(s) Topical <User Schedule>  dextrose 5%. 1000 milliLiter(s) (50 mL/Hr) IV Continuous <Continuous>  dextrose 5%. 1000 milliLiter(s) (100 mL/Hr) IV Continuous <Continuous>  dextrose 50% Injectable 12.5 Gram(s) IV Push once  dextrose 50% Injectable 25 Gram(s) IV Push once  dextrose 50% Injectable 25 Gram(s) IV Push once  dronedarone 400 milliGRAM(s) Oral two times a day  epoetin kristal-epbx (RETACRIT) Injectable 09494 Unit(s) IV Push <User Schedule>  ferrous    sulfate 325 milliGRAM(s) Oral daily  gabapentin 300 milliGRAM(s) Oral at bedtime  heparin   Injectable 5000 Unit(s) SubCutaneous every 8 hours  insulin glargine Injectable (LANTUS) 10 Unit(s) SubCutaneous every morning  insulin lispro (ADMELOG) corrective regimen sliding scale   SubCutaneous every 6 hours  midodrine 15 milliGRAM(s) Oral every 8 hours  pantoprazole  Injectable 40 milliGRAM(s) IV Push every 12 hours  senna 2 Tablet(s) Oral at bedtime  simvastatin 20 milliGRAM(s) Oral at bedtime    MEDICATIONS  (PRN):  acetaminophen     Tablet .. 650 milliGRAM(s) Oral every 6 hours PRN Temp greater or equal to 38C (100.4F), Mild Pain (1 - 3)  aluminum hydroxide/magnesium hydroxide/simethicone Suspension 30 milliLiter(s) Oral every 4 hours PRN Dyspepsia  dextrose Oral Gel 15 Gram(s) Oral once PRN Blood Glucose LESS THAN 70 milliGRAM(s)/deciliter  melatonin 3 milliGRAM(s) Oral at bedtime PRN Insomnia  ondansetron Injectable 4 milliGRAM(s) IV Push every 8 hours PRN Nausea and/or Vomiting  sodium chloride 0.9% lock flush 10 milliLiter(s) IV Push every 1 hour PRN Pre/post blood products, medications, blood draw, and to maintain line patency      Allergies    No Known Allergies    Intolerances      Vital Signs Last 24 Hrs  T(C): 36.6 (06 Mar 2024 10:03), Max: 37.1 (05 Mar 2024 20:00)  T(F): 97.9 (06 Mar 2024 10:03), Max: 98.8 (05 Mar 2024 20:00)  HR: 76 (06 Mar 2024 10:) (61 - 76)  BP: 150/72 (06 Mar 2024 10:03) (109/54 - 150/72)  BP(mean): 85 (06 Mar 2024 09:00) (70 - 87)  RR: 19 (06 Mar 2024 10:) (12 - 22)  SpO2: 93% (06 Mar 2024 10:) (93% - 99%)    Parameters below as of 06 Mar 2024 10:03  Patient On (Oxygen Delivery Method): nasal cannula  O2 Flow (L/min): 4    Daily     Daily Weight in k.4 (06 Mar 2024 03:00)  I&O's Detail    05 Mar 2024 07:01  -  06 Mar 2024 07:00  --------------------------------------------------------  IN:    Enteral Tube Flush: 50 mL    IV PiggyBack: 100 mL  Total IN: 150 mL    OUT:    Indwelling Catheter - Urethral (mL): 395 mL  Total OUT: 395 mL    Total NET: -245 mL        I&O's Summary    05 Mar 2024 07:01  -  06 Mar 2024 07:00  --------------------------------------------------------  IN: 150 mL / OUT: 395 mL / NET: -245 mL        PHYSICAL EXAM:      PHYSICAL EXAM:  GENERAL: Appears chronically ill  HEENMT: No periorbital edema  NECK: Supple, no JVD, dialysis catheter neck ( JOE Montoya )   NERVOUS SYSTEM:  Awake, confused  CHEST/LUNG: Diminished BS  HEART: Regular rate and rhythm; No rub  ABDOMEN: Soft, +BS , + sky drain   EXTREMITIES: + dependent edema  LABS:                        10.0   15.87 )-----------( 317      ( 06 Mar 2024 03:50 )             29.7     03-    138  |  95<L>  |  51.8<H>  ----------------------------<  130<H>  3.9   |  23.0  |  6.21<H>    Ca    8.4      06 Mar 2024 03:50  Phos  5.3     -  Mg     2.3     -06    TPro  6.3<L>  /  Alb  2.7<L>  /  TBili  0.5  /  DBili  x   /  AST  27  /  ALT  21  /  AlkPhos  197<H>  03-      Urinalysis Basic - ( 06 Mar 2024 03:50 )    Color: x / Appearance: x / SG: x / pH: x  Gluc: 130 mg/dL / Ketone: x  / Bili: x / Urobili: x   Blood: x / Protein: x / Nitrite: x   Leuk Esterase: x / RBC: x / WBC x   Sq Epi: x / Non Sq Epi: x / Bacteria: x      Magnesium: 2.3 mg/dL ( @ 03:50)  Phosphorus: 5.3 mg/dL ( @ 03:50)    ABG - ( 05 Mar 2024 10:25 )  pH, Arterial: 7.430 pH, Blood: x     /  pCO2: 38    /  pO2: 103   / HCO3: 25    / Base Excess: 0.9   /  SaO2: 99.8                  RADIOLOGY & ADDITIONAL TESTS:

## 2024-03-06 NOTE — PROGRESS NOTE ADULT - ASSESSMENT
83 yo m pmhx acute sky s/p sky tube (12/22/23), COVID 19, PAF no AC (GIB/FALL), esophagitis, anemia, DM2, HTN, HLD, CKD3-4, BPH, cognitive impairment presented with leakage around sky tube admitted with distributive shock, GI bleed, RON, aspiration pna and hypoxic respiratory failure.      Acute Hypoxic Respiratory Failure  3/3- Intubated  3/4 - Extubated  on nc  monitor O2    RON   on HD  Nephrology input noted- likely will need long term  avoid nephrotoxic agents    Dysphagia  failed SLP again  current mental status precludes po  f/u eval when more awake    Pneumonia  s/p Zosyn    Cholecystitis  Currently not stable for surgery     Encounter for Palliative Care  Failed SLP - will need eventual repeat  Family agreeable to continued HD  FULL Code

## 2024-03-06 NOTE — PROGRESS NOTE ADULT - ASSESSMENT
85 y/o male with PMH of Acute Cholecystitis s/p Cholecystomy Tube (12/22/23), COVID-19, PAF not on AC due to GIB + Fall Risk, Esophagitis, Anemia, DM 2, HTN, HLD, CKD III/IV, BPH and Cognitive Impairment  came to the ED complaining of leakage around sky tube. As per wife at bed side, family noticed fluid coming out on the skin when they attempted to flush the sky tube. Wife said patient has been doing well since discharged otherwise. No fever, chills, abdominal pain, change in bowel/urinary habit, nausea, vomiting noted.   In the ED, patient was seen by surgery team, no acute surgical intervention. As per ED, patient vomited large amount of coffee ground emesis, looked very pale, holding abdomen; CT angio abdomen done: no acute GI bleed. Repeat CBC: Hb: 11.3---->9.3. Also, he desaturated concerning for aspiration PNA, antibiotic and oxygen therapy.     AS ABOVE HX OF CHOLECYSTOTOMY PLACEMENT 12/22 CAME TO ER WITH ? LEAKAGE   PT WITH HYPOTENSION WITH ? ASPIRATION PNEUMONIA WAS IN ICU AND TREATED WITH PRESSORS  IV ABX     WAS PLACED ON HD FOR RENAL FAILURE  RETURNED TO   ICU WITH INCREASING WBC ADN SOB  PT INTUBATED NOW EXTUBATED  PT LETHARGIC ON HFNC    WBC 15K     REPEAT CULTURES NEGATIVE   PT COMPLETED COURSE OF IV ZOSYN  OBSERVE OFF ABX   WILL FOLLOWP

## 2024-03-07 LAB
ANION GAP SERPL CALC-SCNC: 14 MMOL/L — SIGNIFICANT CHANGE UP (ref 5–17)
BUN SERPL-MCNC: 28.8 MG/DL — HIGH (ref 8–20)
CALCIUM SERPL-MCNC: 8.3 MG/DL — LOW (ref 8.4–10.5)
CHLORIDE SERPL-SCNC: 96 MMOL/L — SIGNIFICANT CHANGE UP (ref 96–108)
CO2 SERPL-SCNC: 24 MMOL/L — SIGNIFICANT CHANGE UP (ref 22–29)
CREAT SERPL-MCNC: 4.16 MG/DL — HIGH (ref 0.5–1.3)
CULTURE RESULTS: SIGNIFICANT CHANGE UP
CULTURE RESULTS: SIGNIFICANT CHANGE UP
EGFR: 13 ML/MIN/1.73M2 — LOW
GLUCOSE BLDC GLUCOMTR-MCNC: 183 MG/DL — HIGH (ref 70–99)
GLUCOSE BLDC GLUCOMTR-MCNC: 288 MG/DL — HIGH (ref 70–99)
GLUCOSE BLDC GLUCOMTR-MCNC: 330 MG/DL — HIGH (ref 70–99)
GLUCOSE BLDC GLUCOMTR-MCNC: 341 MG/DL — HIGH (ref 70–99)
GLUCOSE BLDC GLUCOMTR-MCNC: 401 MG/DL — HIGH (ref 70–99)
GLUCOSE SERPL-MCNC: 284 MG/DL — HIGH (ref 70–99)
HCT VFR BLD CALC: 30.4 % — LOW (ref 39–50)
HGB BLD-MCNC: 10 G/DL — LOW (ref 13–17)
MCHC RBC-ENTMCNC: 29.6 PG — SIGNIFICANT CHANGE UP (ref 27–34)
MCHC RBC-ENTMCNC: 32.9 GM/DL — SIGNIFICANT CHANGE UP (ref 32–36)
MCV RBC AUTO: 89.9 FL — SIGNIFICANT CHANGE UP (ref 80–100)
PHOSPHATE SERPL-MCNC: 2.9 MG/DL — SIGNIFICANT CHANGE UP (ref 2.4–4.7)
PLATELET # BLD AUTO: 360 K/UL — SIGNIFICANT CHANGE UP (ref 150–400)
POTASSIUM SERPL-MCNC: 4.2 MMOL/L — SIGNIFICANT CHANGE UP (ref 3.5–5.3)
POTASSIUM SERPL-SCNC: 4.2 MMOL/L — SIGNIFICANT CHANGE UP (ref 3.5–5.3)
RBC # BLD: 3.38 M/UL — LOW (ref 4.2–5.8)
RBC # FLD: 15.7 % — HIGH (ref 10.3–14.5)
SODIUM SERPL-SCNC: 134 MMOL/L — LOW (ref 135–145)
SPECIMEN SOURCE: SIGNIFICANT CHANGE UP
SPECIMEN SOURCE: SIGNIFICANT CHANGE UP
WBC # BLD: 12.74 K/UL — HIGH (ref 3.8–10.5)
WBC # FLD AUTO: 12.74 K/UL — HIGH (ref 3.8–10.5)

## 2024-03-07 PROCEDURE — 99232 SBSQ HOSP IP/OBS MODERATE 35: CPT

## 2024-03-07 PROCEDURE — 99233 SBSQ HOSP IP/OBS HIGH 50: CPT

## 2024-03-07 PROCEDURE — 99221 1ST HOSP IP/OBS SF/LOW 40: CPT

## 2024-03-07 RX ORDER — INSULIN LISPRO 100/ML
3 VIAL (ML) SUBCUTANEOUS
Refills: 0 | Status: DISCONTINUED | OUTPATIENT
Start: 2024-03-07 | End: 2024-03-17

## 2024-03-07 RX ORDER — INSULIN LISPRO 100/ML
VIAL (ML) SUBCUTANEOUS
Refills: 0 | Status: DISCONTINUED | OUTPATIENT
Start: 2024-03-07 | End: 2024-03-21

## 2024-03-07 RX ORDER — TUBERCULIN PURIFIED PROTEIN DERIVATIVE 5 [IU]/.1ML
5 INJECTION, SOLUTION INTRADERMAL ONCE
Refills: 0 | Status: COMPLETED | OUTPATIENT
Start: 2024-03-07 | End: 2024-03-07

## 2024-03-07 RX ORDER — GLUCAGON INJECTION, SOLUTION 0.5 MG/.1ML
1 INJECTION, SOLUTION SUBCUTANEOUS ONCE
Refills: 0 | Status: DISCONTINUED | OUTPATIENT
Start: 2024-03-07 | End: 2024-03-21

## 2024-03-07 RX ORDER — INSULIN LISPRO 100/ML
VIAL (ML) SUBCUTANEOUS
Refills: 0 | Status: DISCONTINUED | OUTPATIENT
Start: 2024-03-07 | End: 2024-03-07

## 2024-03-07 RX ORDER — INSULIN GLARGINE 100 [IU]/ML
10 INJECTION, SOLUTION SUBCUTANEOUS
Refills: 0 | Status: DISCONTINUED | OUTPATIENT
Start: 2024-03-07 | End: 2024-03-08

## 2024-03-07 RX ORDER — HEPARIN SODIUM 5000 [USP'U]/ML
5000 INJECTION INTRAVENOUS; SUBCUTANEOUS EVERY 12 HOURS
Refills: 0 | Status: DISCONTINUED | OUTPATIENT
Start: 2024-03-07 | End: 2024-03-12

## 2024-03-07 RX ORDER — INSULIN LISPRO 100/ML
VIAL (ML) SUBCUTANEOUS AT BEDTIME
Refills: 0 | Status: DISCONTINUED | OUTPATIENT
Start: 2024-03-07 | End: 2024-03-21

## 2024-03-07 RX ADMIN — INSULIN GLARGINE 10 UNIT(S): 100 INJECTION, SOLUTION SUBCUTANEOUS at 23:14

## 2024-03-07 RX ADMIN — MIDODRINE HYDROCHLORIDE 15 MILLIGRAM(S): 2.5 TABLET ORAL at 15:53

## 2024-03-07 RX ADMIN — Medication 3 MILLILITER(S): at 20:34

## 2024-03-07 RX ADMIN — Medication 4: at 12:28

## 2024-03-07 RX ADMIN — Medication 3 UNIT(S): at 17:34

## 2024-03-07 RX ADMIN — DRONEDARONE 400 MILLIGRAM(S): 400 TABLET, FILM COATED ORAL at 19:09

## 2024-03-07 RX ADMIN — DRONEDARONE 400 MILLIGRAM(S): 400 TABLET, FILM COATED ORAL at 05:26

## 2024-03-07 RX ADMIN — SIMVASTATIN 20 MILLIGRAM(S): 20 TABLET, FILM COATED ORAL at 22:41

## 2024-03-07 RX ADMIN — Medication 4 MILLILITER(S): at 20:34

## 2024-03-07 RX ADMIN — Medication 3 MILLILITER(S): at 16:17

## 2024-03-07 RX ADMIN — Medication 325 MILLIGRAM(S): at 15:46

## 2024-03-07 RX ADMIN — INSULIN GLARGINE 10 UNIT(S): 100 INJECTION, SOLUTION SUBCUTANEOUS at 08:16

## 2024-03-07 RX ADMIN — HEPARIN SODIUM 5000 UNIT(S): 5000 INJECTION INTRAVENOUS; SUBCUTANEOUS at 05:26

## 2024-03-07 RX ADMIN — TUBERCULIN PURIFIED PROTEIN DERIVATIVE 5 UNIT(S): 5 INJECTION, SOLUTION INTRADERMAL at 15:45

## 2024-03-07 RX ADMIN — Medication 3 MILLILITER(S): at 03:42

## 2024-03-07 RX ADMIN — Medication 12: at 17:34

## 2024-03-07 RX ADMIN — MIDODRINE HYDROCHLORIDE 15 MILLIGRAM(S): 2.5 TABLET ORAL at 22:44

## 2024-03-07 RX ADMIN — PANTOPRAZOLE SODIUM 40 MILLIGRAM(S): 20 TABLET, DELAYED RELEASE ORAL at 05:25

## 2024-03-07 RX ADMIN — Medication 3 MILLILITER(S): at 09:20

## 2024-03-07 RX ADMIN — Medication 4 MILLILITER(S): at 03:42

## 2024-03-07 RX ADMIN — Medication 4 MILLILITER(S): at 16:16

## 2024-03-07 RX ADMIN — Medication 3: at 05:27

## 2024-03-07 RX ADMIN — CHLORHEXIDINE GLUCONATE 1 APPLICATION(S): 213 SOLUTION TOPICAL at 05:26

## 2024-03-07 RX ADMIN — GABAPENTIN 300 MILLIGRAM(S): 400 CAPSULE ORAL at 22:41

## 2024-03-07 RX ADMIN — SENNA PLUS 2 TABLET(S): 8.6 TABLET ORAL at 22:43

## 2024-03-07 RX ADMIN — Medication 0.5 MILLIGRAM(S): at 20:34

## 2024-03-07 RX ADMIN — HEPARIN SODIUM 5000 UNIT(S): 5000 INJECTION INTRAVENOUS; SUBCUTANEOUS at 15:47

## 2024-03-07 RX ADMIN — PANTOPRAZOLE SODIUM 40 MILLIGRAM(S): 20 TABLET, DELAYED RELEASE ORAL at 17:33

## 2024-03-07 RX ADMIN — MIDODRINE HYDROCHLORIDE 15 MILLIGRAM(S): 2.5 TABLET ORAL at 05:26

## 2024-03-07 RX ADMIN — Medication 4 MILLILITER(S): at 09:20

## 2024-03-07 NOTE — PROGRESS NOTE ADULT - SUBJECTIVE AND OBJECTIVE BOX
CC:  Follow up GOC , Symptoms    OVERNIGHT EVENTS:  none reported      Present Symptoms:   Dyspnea:  No    Nausea/Vomiting:  No  Anxiety:  No     Depression:    Unable  Fatigue:  Yes     Loss of appetite:   NA- NPO   Constipation: Not Reported    Pain: No               Location            Duration            Character            Severity            Factors            Effect    Pain AD Score:  [email or web address] (press ctrl + left click to view)    Review of Systems: Reviewed as above  All others negative      MEDICATIONS  (STANDING):  acetylcysteine 10%  Inhalation 4 milliLiter(s) Inhalation every 6 hours  albuterol/ipratropium for Nebulization 3 milliLiter(s) Nebulizer every 6 hours  buDESOnide    Inhalation Suspension 0.5 milliGRAM(s) Inhalation every 12 hours  chlorhexidine 2% Cloths 1 Application(s) Topical <User Schedule>  dextrose 5%. 1000 milliLiter(s) (50 mL/Hr) IV Continuous <Continuous>  dextrose 5%. 1000 milliLiter(s) (100 mL/Hr) IV Continuous <Continuous>  dextrose 50% Injectable 25 Gram(s) IV Push once  dextrose 50% Injectable 12.5 Gram(s) IV Push once  dextrose 50% Injectable 25 Gram(s) IV Push once  dronedarone 400 milliGRAM(s) Oral two times a day  epoetin kristal-epbx (RETACRIT) Injectable 15196 Unit(s) IV Push <User Schedule>  ferrous    sulfate 325 milliGRAM(s) Oral daily  gabapentin 300 milliGRAM(s) Oral at bedtime  heparin   Injectable 5000 Unit(s) SubCutaneous every 8 hours  insulin glargine Injectable (LANTUS) 10 Unit(s) SubCutaneous every morning  insulin lispro (ADMELOG) corrective regimen sliding scale   SubCutaneous every 6 hours  midodrine 15 milliGRAM(s) Oral every 8 hours  pantoprazole  Injectable 40 milliGRAM(s) IV Push every 12 hours  PPD  5 Tuberculin Unit(s) Injectable 5 Unit(s) IntraDermal once  senna 2 Tablet(s) Oral at bedtime  simvastatin 20 milliGRAM(s) Oral at bedtime    MEDICATIONS  (PRN):  acetaminophen     Tablet .. 650 milliGRAM(s) Oral every 6 hours PRN Temp greater or equal to 38C (100.4F), Mild Pain (1 - 3)  aluminum hydroxide/magnesium hydroxide/simethicone Suspension 30 milliLiter(s) Oral every 4 hours PRN Dyspepsia  dextrose Oral Gel 15 Gram(s) Oral once PRN Blood Glucose LESS THAN 70 milliGRAM(s)/deciliter  melatonin 3 milliGRAM(s) Oral at bedtime PRN Insomnia  ondansetron Injectable 4 milliGRAM(s) IV Push every 8 hours PRN Nausea and/or Vomiting  sodium chloride 0.9% lock flush 10 milliLiter(s) IV Push every 1 hour PRN Pre/post blood products, medications, blood draw, and to maintain line patency      PHYSICAL EXAM:    Vital Signs Last 24 Hrs  T(C): 36.5 (07 Mar 2024 04:36), Max: 36.6 (06 Mar 2024 13:26)  T(F): 97.7 (07 Mar 2024 04:36), Max: 97.9 (07 Mar 2024 00:11)  HR: 80 (07 Mar 2024 04:36) (68 - 88)  BP: 113/62 (07 Mar 2024 04:36) (113/62 - 138/68)  BP(mean): --  RR: 18 (07 Mar 2024 04:36) (16 - 18)  SpO2: 94% (07 Mar 2024 04:36) (93% - 100%)    Parameters below as of 07 Mar 2024 04:36  Patient On (Oxygen Delivery Method): nasal cannula  O2 Flow (L/min): 2      Karnofsky: 30  %  General:  M awake alert NAD       HEENT:  NCAT        (  x)  NGT  Lungs: comfortable  non labored  CV:  RR  GI:  soft NTND  MSK: weakness  Skin:  warm/dry  Neuro  alert, verbal- short responses  Psych  calm     LABS:                          10.0   12.74 )-----------( 360      ( 07 Mar 2024 06:08 )             30.4     03-07    134<L>  |  96  |  28.8<H>  ----------------------------<  284<H>  4.2   |  24.0  |  4.16<H>    Ca    8.3<L>      07 Mar 2024 06:08  Phos  2.9     03-07  Mg     2.3     03-06    TPro  6.3<L>  /  Alb  2.7<L>  /  TBili  0.5  /  DBili  x   /  AST  27  /  ALT  21  /  AlkPhos  197<H>  03-06      Urinalysis Basic - ( 07 Mar 2024 06:08 )    Color: x / Appearance: x / SG: x / pH: x  Gluc: 284 mg/dL / Ketone: x  / Bili: x / Urobili: x   Blood: x / Protein: x / Nitrite: x   Leuk Esterase: x / RBC: x / WBC x   Sq Epi: x / Non Sq Epi: x / Bacteria: x      I&O's Summary    06 Mar 2024 07:01  -  07 Mar 2024 07:00  --------------------------------------------------------  IN: 1550 mL / OUT: 2160 mL / NET: -610 mL          ADVANCE DIRECTIVE PREFERENCES    Full Code

## 2024-03-07 NOTE — PROGRESS NOTE ADULT - ASSESSMENT
83 yo m pmhx acute sky s/p sky tube (12/22/23), COVID 19, PAF no AC (GIB/FALL), esophagitis, anemia, DM2, HTN, HLD, CKD3-4, BPH, cognitive impairment presented with leakage around sky tube admitted with distributive shock, GI bleed, RON, aspiration pna and hypoxic respiratory failure.      Acute Hypoxic Respiratory Failure  3/3- Intubated  3/4 - Extubated  on nc  monitor O2    RON   on HD  Nephrology input noted- likely will need long term  avoid nephrotoxic agents    Dysphagia  failed SLP again  repeat SLP - more awake today    Pneumonia  s/p Zosyn    Cholecystitis  Plan for sx?     Encounter for Palliative Care  Patient appears little more alert today.  SLP eval  Family has been discussed advance directives- FULL Code, agreeable to HD

## 2024-03-07 NOTE — PROGRESS NOTE ADULT - SUBJECTIVE AND OBJECTIVE BOX
CHIEF COMPLAINT/INTERVAL HISTORY:    Patient is a 84y old  Male who presents with a chief complaint of Gastrointestinal hemorrhage     (27 Feb 2024 13:27)    SUBJECTIVE & OBJECTIVE: Pt seen and examined at bedside. No overnight events. AAO x 2 today and following commands. Denies any acute complaints. HD tomorrow; needs permacath. PPD placed.    ROS: Denies chest pain or SOB; limited due to cognition    ICU Vital Signs Last 24 Hrs  T(C): 36.9 (07 Mar 2024 16:18), Max: 36.9 (07 Mar 2024 16:18)  T(F): 98.5 (07 Mar 2024 16:18), Max: 98.5 (07 Mar 2024 16:18)  HR: 69 (07 Mar 2024 16:18) (65 - 80)  BP: 119/60 (07 Mar 2024 16:18) (110/56 - 138/68)  RR: 18 (07 Mar 2024 16:18) (16 - 18)  SpO2: 96% (07 Mar 2024 16:18) (93% - 97%)    O2 Parameters below as of 07 Mar 2024 16:17  Patient On (Oxygen Delivery Method): nasal cannula, 4L      MEDICATIONS  (STANDING):  acetylcysteine 10%  Inhalation 4 milliLiter(s) Inhalation every 6 hours  albuterol/ipratropium for Nebulization 3 milliLiter(s) Nebulizer every 6 hours  buDESOnide    Inhalation Suspension 0.5 milliGRAM(s) Inhalation every 12 hours  chlorhexidine 2% Cloths 1 Application(s) Topical <User Schedule>  dextrose 5%. 1000 milliLiter(s) (50 mL/Hr) IV Continuous <Continuous>  dextrose 5%. 1000 milliLiter(s) (100 mL/Hr) IV Continuous <Continuous>  dextrose 50% Injectable 25 Gram(s) IV Push once  dextrose 50% Injectable 12.5 Gram(s) IV Push once  dextrose 50% Injectable 25 Gram(s) IV Push once  dronedarone 400 milliGRAM(s) Oral two times a day  epoetin kristal-epbx (RETACRIT) Injectable 73617 Unit(s) IV Push <User Schedule>  ferrous    sulfate 325 milliGRAM(s) Oral daily  gabapentin 300 milliGRAM(s) Oral at bedtime  glucagon  Injectable 1 milliGRAM(s) IntraMuscular once  heparin   Injectable 5000 Unit(s) SubCutaneous every 8 hours  insulin glargine Injectable (LANTUS) 10 Unit(s) SubCutaneous two times a day  insulin lispro (ADMELOG) corrective regimen sliding scale   SubCutaneous three times a day before meals  insulin lispro (ADMELOG) corrective regimen sliding scale   SubCutaneous at bedtime  insulin lispro Injectable (ADMELOG) 3 Unit(s) SubCutaneous three times a day before meals  midodrine 15 milliGRAM(s) Oral every 8 hours  pantoprazole  Injectable 40 milliGRAM(s) IV Push every 12 hours  senna 2 Tablet(s) Oral at bedtime  simvastatin 20 milliGRAM(s) Oral at bedtime    MEDICATIONS  (PRN):  acetaminophen     Tablet .. 650 milliGRAM(s) Oral every 6 hours PRN Temp greater or equal to 38C (100.4F), Mild Pain (1 - 3)  aluminum hydroxide/magnesium hydroxide/simethicone Suspension 30 milliLiter(s) Oral every 4 hours PRN Dyspepsia  dextrose Oral Gel 15 Gram(s) Oral once PRN Blood Glucose LESS THAN 70 milliGRAM(s)/deciliter  melatonin 3 milliGRAM(s) Oral at bedtime PRN Insomnia  ondansetron Injectable 4 milliGRAM(s) IV Push every 8 hours PRN Nausea and/or Vomiting  sodium chloride 0.9% lock flush 10 milliLiter(s) IV Push every 1 hour PRN Pre/post blood products, medications, blood draw, and to maintain line patency      LABS:                        10.0   12.74 )-----------( 360      ( 07 Mar 2024 06:08 )             30.4     03-07    134<L>  |  96  |  28.8<H>  ----------------------------<  284<H>  4.2   |  24.0  |  4.16<H>    Ca    8.3<L>      07 Mar 2024 06:08  Phos  2.9     03-07  Mg     2.3     03-06    TPro  6.3<L>  /  Alb  2.7<L>  /  TBili  0.5  /  DBili  x   /  AST  27  /  ALT  21  /  AlkPhos  197<H>  03-06      Urinalysis Basic - ( 07 Mar 2024 06:08 )    Color: x / Appearance: x / SG: x / pH: x  Gluc: 284 mg/dL / Ketone: x  / Bili: x / Urobili: x   Blood: x / Protein: x / Nitrite: x   Leuk Esterase: x / RBC: x / WBC x   Sq Epi: x / Non Sq Epi: x / Bacteria: x        CAPILLARY BLOOD GLUCOSE      POCT Blood Glucose.: 401 mg/dL (07 Mar 2024 17:19)  POCT Blood Glucose.: 330 mg/dL (07 Mar 2024 12:25)  POCT Blood Glucose.: 341 mg/dL (07 Mar 2024 12:24)  POCT Blood Glucose.: 288 mg/dL (07 Mar 2024 05:24)  POCT Blood Glucose.: 200 mg/dL (06 Mar 2024 23:19)  POCT Blood Glucose.: 214 mg/dL (06 Mar 2024 18:20)

## 2024-03-07 NOTE — PROGRESS NOTE ADULT - SUBJECTIVE AND OBJECTIVE BOX
NEPHROLOGY INTERVAL HPI/OVERNIGHT EVENTS:    More alert   No events on HD 3/6   Meds noted     MEDICATIONS  (STANDING):  acetylcysteine 10%  Inhalation 4 milliLiter(s) Inhalation every 6 hours  albuterol/ipratropium for Nebulization 3 milliLiter(s) Nebulizer every 6 hours  buDESOnide    Inhalation Suspension 0.5 milliGRAM(s) Inhalation every 12 hours  chlorhexidine 2% Cloths 1 Application(s) Topical <User Schedule>  dextrose 5%. 1000 milliLiter(s) (50 mL/Hr) IV Continuous <Continuous>  dextrose 5%. 1000 milliLiter(s) (100 mL/Hr) IV Continuous <Continuous>  dextrose 50% Injectable 25 Gram(s) IV Push once  dextrose 50% Injectable 12.5 Gram(s) IV Push once  dextrose 50% Injectable 25 Gram(s) IV Push once  dronedarone 400 milliGRAM(s) Oral two times a day  epoetin kristal-epbx (RETACRIT) Injectable 65570 Unit(s) IV Push <User Schedule>  ferrous    sulfate 325 milliGRAM(s) Oral daily  gabapentin 300 milliGRAM(s) Oral at bedtime  heparin   Injectable 5000 Unit(s) SubCutaneous every 8 hours  insulin glargine Injectable (LANTUS) 10 Unit(s) SubCutaneous every morning  insulin lispro (ADMELOG) corrective regimen sliding scale   SubCutaneous every 6 hours  midodrine 15 milliGRAM(s) Oral every 8 hours  pantoprazole  Injectable 40 milliGRAM(s) IV Push every 12 hours  PPD  5 Tuberculin Unit(s) Injectable 5 Unit(s) IntraDermal once  senna 2 Tablet(s) Oral at bedtime  simvastatin 20 milliGRAM(s) Oral at bedtime    MEDICATIONS  (PRN):  acetaminophen     Tablet .. 650 milliGRAM(s) Oral every 6 hours PRN Temp greater or equal to 38C (100.4F), Mild Pain (1 - 3)  aluminum hydroxide/magnesium hydroxide/simethicone Suspension 30 milliLiter(s) Oral every 4 hours PRN Dyspepsia  dextrose Oral Gel 15 Gram(s) Oral once PRN Blood Glucose LESS THAN 70 milliGRAM(s)/deciliter  melatonin 3 milliGRAM(s) Oral at bedtime PRN Insomnia  ondansetron Injectable 4 milliGRAM(s) IV Push every 8 hours PRN Nausea and/or Vomiting  sodium chloride 0.9% lock flush 10 milliLiter(s) IV Push every 1 hour PRN Pre/post blood products, medications, blood draw, and to maintain line patency      Allergies    No Known Allergies    Intolerances                Vital Signs Last 24 Hrs  T(C): 36.6 (07 Mar 2024 11:02), Max: 36.6 (06 Mar 2024 16:35)  T(F): 97.9 (07 Mar 2024 11:02), Max: 97.9 (07 Mar 2024 00:11)  HR: 74 (07 Mar 2024 11:02) (68 - 88)  BP: 110/56 (07 Mar 2024 11:02) (110/56 - 138/68)  BP(mean): --  RR: 18 (07 Mar 2024 11:02) (16 - 18)  SpO2: 94% (07 Mar 2024 11:02) (93% - 96%)    Parameters below as of 07 Mar 2024 04:36  Patient On (Oxygen Delivery Method): nasal cannula  O2 Flow (L/min): 2    Daily     Daily Weight in k.1 (06 Mar 2024 16:35)  I&O's Detail    06 Mar 2024 07:01  -  07 Mar 2024 07:00  --------------------------------------------------------  IN:    Enteral Tube Flush: 100 mL    Nepro: 650 mL    Other (mL): 800 mL  Total IN: 1550 mL    OUT:    Drain (mL): 10 mL    Other (mL): 1300 mL    Voided (mL): 850 mL  Total OUT: 2160 mL    Total NET: -610 mL        I&O's Summary    06 Mar 2024 07:01  -  07 Mar 2024 07:00  --------------------------------------------------------  IN: 1550 mL / OUT: 2160 mL / NET: -610 mL        PHYSICAL EXAM:      PHYSICAL EXAM:  GENERAL: Appears chronically ill  HEENMT: No periorbital edema  NECK: Supple, no JVD, dialysis catheter neck ( [facility] )   NERVOUS SYSTEM:  Awake, confused  CHEST/LUNG: Diminished BS  HEART: Regular rate and rhythm; No rub  ABDOMEN: Soft, +BS , + sky drain   EXTREMITIES: + dependent edema    LABS:                        10.0   12.74 )-----------( 360      ( 07 Mar 2024 06:08 )             30.4     03-07    134<L>  |  96  |  28.8<H>  ----------------------------<  284<H>  4.2   |  24.0  |  4.16<H>    Ca    8.3<L>      07 Mar 2024 06:08  Phos  2.9     -  Mg     2.3     -    TPro  6.3<L>  /  Alb  2.7<L>  /  TBili  0.5  /  DBili  x   /  AST  27  /  ALT  21  /  AlkPhos  197<H>  03-      Urinalysis Basic - ( 07 Mar 2024 06:08 )    Color: x / Appearance: x / SG: x / pH: x  Gluc: 284 mg/dL / Ketone: x  / Bili: x / Urobili: x   Blood: x / Protein: x / Nitrite: x   Leuk Esterase: x / RBC: x / WBC x   Sq Epi: x / Non Sq Epi: x / Bacteria: x      Phosphorus: 2.9 mg/dL ( @ 06:08)          RADIOLOGY & ADDITIONAL TESTS:

## 2024-03-07 NOTE — CONSULT NOTE ADULT - ASSESSMENT
Patient is a 84y old  Male who presents admitted for GIB. Seen in consultation for TDC placement. Due to scheduling unable to accommodate catheter placement in AM.  per primary team patient is not yet ready for discharge. Please reconsult IR once patient being prepped for discharge to have catheter placed. recommend contacting vascular surgery to exchange temporary HD cath as needed.    Please call extension [phone] with any questions, concerns or issues regarding above.

## 2024-03-07 NOTE — PROGRESS NOTE ADULT - ASSESSMENT
Patient is a 84 year old male with PMH recent COVID-19, acute cholecystitis s/p cholecystostomy tube (12/22/23), AFib not on A/C 2/2 GIB, and CKD, who presented to [facility] ED on 02/25/24 with fluid draining around the perc sky tube insertion site upon flushing the tube.  In the ED, he  had an episode of coffee ground emesis and GI was consulted. CTA abdomen was done with no evidence of acute GIB. on 02/26/24 patient developed hypotension, respiratory failure and RRT was called. patient was transferred to MICU and was started on pressors. also on high flow O2 with CXR with bilateral infiltrates--likely aspiration pneumonia. placed on zosyn and vancomycin. developed ATN so HD initiated on 2/27/24. weaned off vasopressors on 02/27/28 and transferred out of ICU on 02/29/24. vancomycin has been discontinued. Returned to MICU due to respiratory failure requiring intubation and shock requiring vasopressor attributed to aspiration pneumonia. Extubated and stable on HFNC >24 hours. Downgraded to medicine; now on NC and more awake/alert. Diet advanced.     # Acute Hypoxic Respiratory Failure due to Aspiration Pneumonia  -s/p extubation  -remains hypoxic on 2 liters NC  -blood cultures negative  -completed course of zosyn  -continue nebs and chest PT  -CXR reviewed  -RVP negative  -wean O2 as tolerated  -SLP follow up today; diet advanced to pureed to moderately thickened liquids  -aspiration precautions    # Septic Shock secondary to Aspiration PNA  -shock resolved  -completed abx  -Wean off Midodrine    # Anemia, multifactorial  -Hb stable; iron studies reviewed  -continue EPO  -continue ferrous sulfate  -CTA negative for GI bleed  -monitor CBC closely    #Acute Metabolic Encephalopathy likely due to infection  - mentation improved today; AAO x 2   - unclear baseline mentation  - NGT removed; diet advanced  - UA with pyuria; but no urine culture collected  - check B12, TSH, ammonia levels  - CT Head negative for acute pathology   - Delirium precautions    # RON on CKD 4 secondary to ATN; now on HD  - continue HD MWF via [facility]  - UF goal per renal  - discussed with vascular - will not remove shiley (can remain in place for 14 days per team)   - IR consulted for permacath and recommended   - strict I/Os, daily weights  - hep panel negative; check PPD  - will need outpatient HD placement  - renal recs appreciated    # Atrial Fibrillation  - Resume Dronedarone once on PO  - No anticoagulation due to GIB    # T2DM  - Glargine 10U  - Blood glucose monitoring with sliding scale Insulin    # Cholecystotomy tube  Drain well  - Monitor output    # Dysphagia  - SLP follow up    VTE prophylaxis - Heparin  Mobilize OOB, PT follow up  Guarded Prognosis given advanced aged, multiorgan involvement, prolonged hospital stay  Palliative Follow up Patient is a 84 year old male with PMH recent COVID-19, acute cholecystitis s/p cholecystostomy tube (12/22/23), AFib not on A/C 2/2 GIB, and CKD, who presented to [facility] ED on 02/25/24 with fluid draining around the perc sky tube insertion site upon flushing the tube.  In the ED, he  had an episode of coffee ground emesis and GI was consulted. CTA abdomen was done with no evidence of acute GIB. on 02/26/24 patient developed hypotension, respiratory failure and RRT was called. patient was transferred to MICU and was started on pressors. also on high flow O2 with CXR with bilateral infiltrates--likely aspiration pneumonia. placed on zosyn and vancomycin. developed ATN so HD initiated on 2/27/24. weaned off vasopressors on 02/27/28 and transferred out of ICU on 02/29/24. vancomycin has been discontinued. Returned to MICU due to respiratory failure requiring intubation and shock requiring vasopressor attributed to aspiration pneumonia. Extubated and stable on HFNC >24 hours. Downgraded to medicine; now on NC and more awake/alert. Diet advanced.     # Acute Hypoxic Respiratory Failure due to Aspiration Pneumonia  -s/p extubation  -remains hypoxic on 2 liters NC  -blood cultures negative  -completed course of zosyn  -continue nebs and chest PT  -CXR reviewed  -RVP negative  -wean O2 as tolerated  -SLP follow up today; diet advanced to pureed to moderately thickened liquids  -aspiration precautions    # Septic Shock secondary to Aspiration PNA  -shock resolved  -completed abx  -Wean off Midodrine    # Anemia, multifactorial  -Hb stable; iron studies reviewed  -continue EPO  -continue ferrous sulfate  -CTA negative for GI bleed  -monitor CBC closely    #Acute Metabolic Encephalopathy likely due to infection and Hospital Acquired Delirium  - mentation improved today; AAO x 2   - unclear baseline mentation  - NGT removed; diet advanced  - UA with pyuria; but no urine culture collected  - check B12, TSH, ammonia levels  - CT Head negative for acute pathology   - Delirium precautions    # RON on CKD 4 secondary to ATN; now on HD  - continue HD MWF via [facility]  - UF goal per renal  - discussed with vascular - will not remove shiley (can remain in place for 14 days per team)   - IR consulted for permacath and recommended   - strict I/Os, daily weights  - hep panel negative; check PPD  - will need outpatient HD placement  - renal recs appreciated    # Atrial Fibrillation  - Continue Dronedarone    - No anticoagulation due to history of GIB    # T2DM  - sugars uncontrolled since diet has been advanced  - increase lantus to 10 units BID  - add premeal insulin  - change ISS to moderate coverage  - ADA diet    # History of Cholecystitis with Cholecystotomy tube since Dec 2023  - drain in place; no indication to remove  - to follow up electively as outpatient for cholecystectomy  - general surgery recs reviewed     #HLD  -continue statin    #Neuropathy  -continue gabapentin  DVT prophylaxis - Heparin SC    #Coffee ground emesis  -resolved  -Hb stable  -CTA negative for GI bleed  -continue PPI  -GI recs appreciated - no plans for endoscopic evaluation    Dispo- Remains acute; continue HD. Needs outpatient HD placement. Eventual discharge to [facility].    Plan discussed with patient, Dr. Alejo, RN, SW, IR PA, medicine PA

## 2024-03-07 NOTE — PROGRESS NOTE ADULT - ASSESSMENT
84M s/p cholecystomy tube 12/22/23 admitted for perc sky tube evaluation.  Pt with coffee ground emesis with drop in hgb. CT a/p negative for active GIB. Rapid response called, patient transferred to MICU for critical care management of hypotension and respiratory distress, was on vasopressors, now off and downgraded to stepdown level of care. developed ATN so HD initiated on 2/27/24.     RON: ATN post IV contrast and hypotension  Acute hypoxic respiratory failure  +PVC  CKD(IV) +DM, HTN  Hepatitis B and C negative   CT scan: no hydronephrosis  - avoid further potential nephrotoxins  - cont to monitor for signs of renal recovery  - Had dialysis 3/1 tolerated ok --> HD today --> will cont HD on MWF schedule, HD today ( see orders )   - HD catheter placed 2/27--> pt needs permacath vascular team notified ( will need ID clearance )   - No signs of renal recovery noted will likely need long term HD  - HD tomorrow     Anemia: + multifactorial  - cont CINDI  - Fe stores noted    Will follow

## 2024-03-07 NOTE — PROGRESS NOTE ADULT - SUBJECTIVE AND OBJECTIVE BOX
INFECTIOUS DISEASES AND INTERNAL MEDICINE at [place]  =======================================================  Claudio Hester MD  Diplomates American Board of Internal Medicine and Infectious Diseases  Telephone [phone]  Fax            [phone]  =======================================================    BERNADINE ANDERSON [id number]    Follow up /PNEUMONIA    Allergies:  No Known Allergies      Medications:  acetaminophen     Tablet .. 650 milliGRAM(s) Oral every 6 hours PRN  acetylcysteine 10%  Inhalation 4 milliLiter(s) Inhalation every 6 hours  albuterol/ipratropium for Nebulization 3 milliLiter(s) Nebulizer every 6 hours  aluminum hydroxide/magnesium hydroxide/simethicone Suspension 30 milliLiter(s) Oral every 4 hours PRN  buDESOnide    Inhalation Suspension 0.5 milliGRAM(s) Inhalation every 12 hours  chlorhexidine 2% Cloths 1 Application(s) Topical <User Schedule>  dextrose 5%. 1000 milliLiter(s) IV Continuous <Continuous>  dextrose 5%. 1000 milliLiter(s) IV Continuous <Continuous>  dextrose 50% Injectable 25 Gram(s) IV Push once  dextrose 50% Injectable 12.5 Gram(s) IV Push once  dextrose 50% Injectable 25 Gram(s) IV Push once  dextrose Oral Gel 15 Gram(s) Oral once PRN  dronedarone 400 milliGRAM(s) Oral two times a day  epoetin kristal-epbx (RETACRIT) Injectable 10070 Unit(s) IV Push <User Schedule>  ferrous    sulfate 325 milliGRAM(s) Oral daily  gabapentin 300 milliGRAM(s) Oral at bedtime  heparin   Injectable 5000 Unit(s) SubCutaneous every 8 hours  insulin glargine Injectable (LANTUS) 10 Unit(s) SubCutaneous every morning  insulin lispro (ADMELOG) corrective regimen sliding scale   SubCutaneous every 6 hours  melatonin 3 milliGRAM(s) Oral at bedtime PRN  midodrine 15 milliGRAM(s) Oral every 8 hours  ondansetron Injectable 4 milliGRAM(s) IV Push every 8 hours PRN  pantoprazole  Injectable 40 milliGRAM(s) IV Push every 12 hours  PPD  5 Tuberculin Unit(s) Injectable 5 Unit(s) IntraDermal once  senna 2 Tablet(s) Oral at bedtime  simvastatin 20 milliGRAM(s) Oral at bedtime  sodium chloride 0.9% lock flush 10 milliLiter(s) IV Push every 1 hour PRN    SOCIAL       FAMILY   FAMILY HISTORY:  Family history of stomach cancer  Father    Family history of emphysema  Mother      REVIEW OF SYSTEMS:  UNABLE TO OBTAIN           Physical Exam:  ICU Vital Signs Last 24 Hrs  T(C): 36.6 (07 Mar 2024 11:02), Max: 36.6 (06 Mar 2024 16:35)  T(F): 97.9 (07 Mar 2024 11:02), Max: 97.9 (07 Mar 2024 00:11)  HR: 74 (07 Mar 2024 11:02) (68 - 88)  BP: 110/56 (07 Mar 2024 11:02) (110/56 - 138/68)  BP(mean): --  ABP: --  ABP(mean): --  RR: 18 (07 Mar 2024 11:02) (16 - 18)  SpO2: 94% (07 Mar 2024 11:02) (93% - 96%)    O2 Parameters below as of 07 Mar 2024 04:36  Patient On (Oxygen Delivery Method): nasal cannula  O2 Flow (L/min): 2        GEN: NAD,   HEENT: normocephalic and atraumatic. EOMI. ALTHEA.    NECK: Supple. No carotid bruits.  No lymphadenopathy or thyromegaly. LEFT SIDE CATHETER IN NECK  LUNGS: Clear to auscultation.  HEART: Regular rate and rhythm without murmur.  ABDOMEN: Soft, nontender, and nondistended.  Positive bowel sounds.    : No CVA tenderness  EXTREMITIES: Without any cyanosis, clubbing, rash, lesions or edema.  MSK: no joint swelling  NEUROLOGIC:  MORE AWAKE TODAY        Labs:  Vitals:  ============  T(F): 97.9 (07 Mar 2024 11:02), Max: 97.9 (07 Mar 2024 00:11)  HR: 74 (07 Mar 2024 11:02)  BP: 110/56 (07 Mar 2024 11:02)  RR: 18 (07 Mar 2024 11:02)  SpO2: 94% (07 Mar 2024 11:02) (93% - 96%)  temp max in last 48H T(F): , Max: 98.8 (03-05-24 @ 20:00)    =======================================================  Current Antibiotics:    Other medications:  acetylcysteine 10%  Inhalation 4 milliLiter(s) Inhalation every 6 hours  albuterol/ipratropium for Nebulization 3 milliLiter(s) Nebulizer every 6 hours  buDESOnide    Inhalation Suspension 0.5 milliGRAM(s) Inhalation every 12 hours  chlorhexidine 2% Cloths 1 Application(s) Topical <User Schedule>  dextrose 5%. 1000 milliLiter(s) IV Continuous <Continuous>  dextrose 5%. 1000 milliLiter(s) IV Continuous <Continuous>  dextrose 50% Injectable 25 Gram(s) IV Push once  dextrose 50% Injectable 12.5 Gram(s) IV Push once  dextrose 50% Injectable 25 Gram(s) IV Push once  dronedarone 400 milliGRAM(s) Oral two times a day  epoetin kristal-epbx (RETACRIT) Injectable 29569 Unit(s) IV Push <User Schedule>  ferrous    sulfate 325 milliGRAM(s) Oral daily  gabapentin 300 milliGRAM(s) Oral at bedtime  heparin   Injectable 5000 Unit(s) SubCutaneous every 8 hours  insulin glargine Injectable (LANTUS) 10 Unit(s) SubCutaneous every morning  insulin lispro (ADMELOG) corrective regimen sliding scale   SubCutaneous every 6 hours  midodrine 15 milliGRAM(s) Oral every 8 hours  pantoprazole  Injectable 40 milliGRAM(s) IV Push every 12 hours  PPD  5 Tuberculin Unit(s) Injectable 5 Unit(s) IntraDermal once  senna 2 Tablet(s) Oral at bedtime  simvastatin 20 milliGRAM(s) Oral at bedtime      =======================================================  Labs:                        10.0   12.74 )-----------( 360      ( 07 Mar 2024 06:08 )             30.4     03-07    134<L>  |  96  |  28.8<H>  ----------------------------<  284<H>  4.2   |  24.0  |  4.16<H>    Ca    8.3<L>      07 Mar 2024 06:08  Phos  2.9     03-07  Mg     2.3     03-06    TPro  6.3<L>  /  Alb  2.7<L>  /  TBili  0.5  /  DBili  x   /  AST  27  /  ALT  21  /  AlkPhos  197<H>  03-06      Culture - Sputum (collected 03-04-24 @ 13:07)  Source: .Sputum Sputum  Gram Stain (03-05-24 @ 00:34):    No polymorphonuclear leukocytes per low power field    No Squamous epithelial cells per low power field    Rare Gram Negative Rods per oil power field    Rare Gram positive cocci in pairs per oil power field  Final Report (03-06-24 @ 00:20):    Normal Respiratory Allyson present    Culture - Blood (collected 03-02-24 @ 12:50)  Source: .Blood Blood  Preliminary Report (03-06-24 @ 23:00):    No growth at 4 days    Culture - Blood (collected 03-02-24 @ 12:43)  Source: .Blood Blood  Preliminary Report (03-06-24 @ 23:00):    No growth at 4 days    Culture - Blood (collected 02-26-24 @ 10:51)  Source: .Blood Blood-Peripheral  Final Report (03-02-24 @ 17:01):    No growth at 5 days    Culture - Blood (collected 02-26-24 @ 10:42)  Source: .Blood Blood-Peripheral  Final Report (03-02-24 @ 17:01):    No growth at 5 days    Culture - Blood (collected 02-25-24 @ 19:33)  Source: .Blood Blood-Peripheral  Final Report (03-02-24 @ 03:00):    No growth at 5 days    Culture - Blood (collected 02-25-24 @ 19:23)  Source: .Blood Blood-Peripheral  Final Report (03-02-24 @ 03:00):    No growth at 5 days    Culture - Urine (collected 01-29-24 @ 05:54)  Source: Clean Catch Clean Catch (Midstream)  Final Report (01-30-24 @ 10:38):    <10,000 CFU/mL Normal Urogenital Allyson    Culture - Blood (collected 01-29-24 @ 01:20)  Source: .Blood Blood-Venous  Final Report (02-03-24 @ 09:00):    No growth at 5 days    Culture - Blood (collected 01-29-24 @ 01:13)  Source: .Blood Blood-Venous  Final Report (02-03-24 @ 09:00):    No growth at 5 days    Culture - Blood (collected 01-07-24 @ 08:05)  Source: .Blood Blood-Peripheral  Final Report (01-13-24 @ 02:00):    No growth at 5 days    Culture - Blood (collected 01-07-24 @ 07:55)  Source: .Blood Blood-Peripheral  Final Report (01-13-24 @ 02:00):    No growth at 5 days    Culture - Urine (collected 01-06-24 @ 23:56)  Source: Clean Catch Clean Catch (Midstream)  Final Report (01-08-24 @ 07:21):    <10,000 CFU/mL Normal Urogenital Allyson    Culture - Blood (collected 01-02-24 @ 09:05)  Source: .Blood Blood-Peripheral  Final Report (01-07-24 @ 17:00):    No growth at 5 days    Culture - Blood (collected 01-02-24 @ 08:57)  Source: .Blood Blood-Peripheral  Final Report (01-07-24 @ 17:00):    No growth at 5 days    Culture - Body Fluid with Gram Stain (collected 12-22-23 @ 14:40)  Source: Bile Bile Fluid  Gram Stain (12-23-23 @ 01:41):    No polymorphonuclear leukocytes seen    Gram Negative Rods seen    Gram positive cocci in pairs seen    by cytocentrifuge  Final Report (12-27-23 @ 16:52):    Moderate Escherichia coli    Few Streptococcus gallolyticus "Susceptibilities not performed"  Organism: Escherichia coli (12-27-23 @ 16:52)  Organism: Escherichia coli (12-27-23 @ 16:52)    Sensitivities:      Method Type: LUIS ENRIQUE      -  Amoxicillin/Clavulanic Acid: S <=8/4      -  Ampicillin: R >16 These ampicillin results predict results for amoxicillin      -  Ampicillin/Sulbactam: R >16/8      -  Aztreonam: S <=4      -  Cefazolin: S <=2      -  Cefepime: S <=2      -  Cefoxitin: S <=8      -  Ceftriaxone: S <=1      -  Ciprofloxacin: S <=0.25      -  Ertapenem: S <=0.5      -  Gentamicin: S <=2      -  Imipenem: S <=1      -  Levofloxacin: S <=0.5      -  Meropenem: S <=1      -  Piperacillin/Tazobactam: S <=8      -  Tobramycin: S <=2      -  Trimethoprim/Sulfamethoxazole: R >2/38    Culture - Blood (collected 12-21-23 @ 23:05)  Source: .Blood Blood  Final Report (12-27-23 @ 07:01):    No growth at 5 days    Culture - Blood (collected 12-21-23 @ 22:55)  Source: .Blood Blood  Final Report (12-27-23 @ 07:01):    No growth at 5 days    Culture - Urine (collected 12-21-23 @ 04:45)  Source: Clean Catch Clean Catch (Midstream)  Final Report (12-22-23 @ 08:51):    <10,000 CFU/mL Normal Urogenital Allyson    Culture - Blood (collected 12-21-23 @ 04:30)  Source: .Blood Blood-Peripheral  Final Report (12-26-23 @ 09:00):    No growth at 5 days    Culture - Blood (collected 12-21-23 @ 04:20)  Source: .Blood Blood-Peripheral  Final Report (12-26-23 @ 09:00):    No growth at 5 days      Creatinine: 4.16 mg/dL (03-07-24 @ 06:08)  Creatinine: 6.21 mg/dL (03-06-24 @ 03:50)  Creatinine: 4.80 mg/dL (03-05-24 @ 03:50)  Creatinine: 6.54 mg/dL (03-04-24 @ 03:20)  Creatinine: 5.83 mg/dL (03-03-24 @ 07:50)    Procalcitonin, Serum: 24.62 ng/mL (02-29-24 @ 05:08)  Procalcitonin, Serum: 18.92 ng/mL (02-26-24 @ 10:51)      Ferritin: 565 ng/mL (02-29-24 @ 05:08)      WBC Count: 12.74 K/uL (03-07-24 @ 06:08)  WBC Count: 15.87 K/uL (03-06-24 @ 03:50)  WBC Count: 16.98 K/uL (03-05-24 @ 03:50)  WBC Count: 19.58 K/uL (03-04-24 @ 03:20)  WBC Count: 23.69 K/uL (03-03-24 @ 07:50)    SARS-CoV-2: NotDetec (03-03-24 @ 04:34)  SARS-CoV-2: NotDetec (03-01-24 @ 17:52)      Alkaline Phosphatase: 197 U/L (03-06-24 @ 03:50)  Alkaline Phosphatase: 163 U/L (03-05-24 @ 03:50)  Alkaline Phosphatase: 113 U/L (03-04-24 @ 03:20)  Alanine Aminotransferase (ALT/SGPT): 21 U/L (03-06-24 @ 03:50)  Alanine Aminotransferase (ALT/SGPT): 19 U/L (03-05-24 @ 03:50)  Alanine Aminotransferase (ALT/SGPT): 16 U/L (03-04-24 @ 03:20)  Aspartate Aminotransferase (AST/SGOT): 27 U/L (03-06-24 @ 03:50)  Aspartate Aminotransferase (AST/SGOT): 26 U/L (03-05-24 @ 03:50)  Aspartate Aminotransferase (AST/SGOT): 19 U/L (03-04-24 @ 03:20)  Bilirubin Total: 0.5 mg/dL (03-06-24 @ 03:50)  Bilirubin Total: 0.7 mg/dL (03-05-24 @ 03:50)  Bilirubin Total: 0.9 mg/dL (03-04-24 @ 03:20)

## 2024-03-07 NOTE — PROGRESS NOTE ADULT - ASSESSMENT
85 y/o male with PMH of Acute Cholecystitis s/p Cholecystomy Tube (12/22/23), COVID-19, PAF not on AC due to GIB + Fall Risk, Esophagitis, Anemia, DM 2, HTN, HLD, CKD III/IV, BPH and Cognitive Impairment  came to the ED complaining of leakage around sky tube. As per wife at bed side, family noticed fluid coming out on the skin when they attempted to flush the sky tube. Wife said patient has been doing well since discharged otherwise. No fever, chills, abdominal pain, change in bowel/urinary habit, nausea, vomiting noted.   In the ED, patient was seen by surgery team, no acute surgical intervention. As per ED, patient vomited large amount of coffee ground emesis, looked very pale, holding abdomen; CT angio abdomen done: no acute GI bleed. Repeat CBC: Hb: 11.3---->9.3. Also, he desaturated concerning for aspiration PNA, antibiotic and oxygen therapy.     AS ABOVE HX OF CHOLECYSTOTOMY PLACEMENT 12/22 CAME TO ER WITH ? LEAKAGE   PT WITH HYPOTENSION WITH ? ASPIRATION PNEUMONIA WAS IN ICU AND TREATED WITH PRESSORS  IV ABX     WAS PLACED ON HD FOR RENAL FAILURE  RETURNED TO   ICU WITH INCREASING WBC ADN SOB   PT MORE AWAKE     WBC  DOWN TO 12K     REPEAT CULTURES NEGATIVE   PT COMPLETED COURSE OF IV ZOSYN  OBSERVE OFF ABX   NO CONTRAINDICATION FOR PERMACATH PLACEMENT FORM ID STANDPOINT                 85 y/o male with PMH of Acute Cholecystitis s/p Cholecystomy Tube (12/22/23), COVID-19, PAF not on AC due to GIB + Fall Risk, Esophagitis, Anemia, DM 2, HTN, HLD, CKD III/IV, BPH and Cognitive Impairment  came to the ED complaining of leakage around sky tube. As per wife at bed side, family noticed fluid coming out on the skin when they attempted to flush the sky tube. Wife said patient has been doing well since discharged otherwise. No fever, chills, abdominal pain, change in bowel/urinary habit, nausea, vomiting noted.   In the ED, patient was seen by surgery team, no acute surgical intervention. As per ED, patient vomited large amount of coffee ground emesis, looked very pale, holding abdomen; CT angio abdomen done: no acute GI bleed. Repeat CBC: Hb: 11.3---->9.3. Also, he desaturated concerning for aspiration PNA, antibiotic and oxygen therapy.     AS ABOVE HX OF CHOLECYSTOTOMY PLACEMENT 12/22 CAME TO ER WITH ? LEAKAGE   PT WITH HYPOTENSION WITH ? ASPIRATION PNEUMONIA WAS IN ICU AND TREATED WITH PRESSORS  IV ABX     WAS PLACED ON HD FOR RENAL FAILURE  RETURNED TO   ICU WITH INCREASING WBC ADN SOB   PT MORE AWAKE     WBC  DOWN TO 12K     REPEAT CULTURES NEGATIVE   PT COMPLETED COURSE OF IV ZOSYN  OBSERVE OFF ABX   NO CONTRAINDICATION FOR PERMACATH PLACEMENT From  ID STANDPOINT  WILL FOLLOWUP AS NEEDED PLEASE CALL IF QUESTIONS

## 2024-03-07 NOTE — CONSULT NOTE ADULT - SUBJECTIVE AND OBJECTIVE BOX
HPI:  84 year-old male with a history of acute cholecystitis s/p cholecystostomy tube (12/22/23), AFib, and CKD,  presented to [facility] ED with leaking sky tube. hospital course complicated by MICU upgrade for septic shock in the setting of acute respiratory failure requiring pressor support. patient DG on 3/6. IR consulted for tunneled catheter placement. vascular following for eventual OP AVF creation. ing intubation and shock requiring vasopressor attributed to aspiration pneumonia. Extubated and stable on HFNC >24 hours; now being downgraded     Interval history: patient cleared by ID for TDC placement.       ============================================================================  Medications:  Home Medications:  insulin glargine 100 units/mL subcutaneous solution: 30 unit(s) subcutaneous once a day before dinner (27 Feb 2024 12:30)  insulin lispro 100 units/mL injectable solution: 4 international unit(s) subcutaneous 3 times a day (before meals) hold if FS &lt;100 (27 Feb 2024 08:59)  omeprazole 40 mg oral delayed release capsule: 1 cap(s) orally once a day (27 Feb 2024 08:59)  Renal Vitamin oral tablet: 1 tab(s) orally once a day (27 Feb 2024 12:30)  simvastatin 20 mg oral tablet: 1 tab(s) orally once a day (at bedtime) (27 Feb 2024 08:59)    MEDICATIONS  (STANDING):  acetylcysteine 10%  Inhalation 4 milliLiter(s) Inhalation every 6 hours  albuterol/ipratropium for Nebulization 3 milliLiter(s) Nebulizer every 6 hours  buDESOnide    Inhalation Suspension 0.5 milliGRAM(s) Inhalation every 12 hours  chlorhexidine 2% Cloths 1 Application(s) Topical <User Schedule>  dextrose 5%. 1000 milliLiter(s) (50 mL/Hr) IV Continuous <Continuous>  dextrose 5%. 1000 milliLiter(s) (100 mL/Hr) IV Continuous <Continuous>  dextrose 50% Injectable 25 Gram(s) IV Push once  dextrose 50% Injectable 12.5 Gram(s) IV Push once  dextrose 50% Injectable 25 Gram(s) IV Push once  dronedarone 400 milliGRAM(s) Oral two times a day  epoetin kristal-epbx (RETACRIT) Injectable 73562 Unit(s) IV Push <User Schedule>  ferrous    sulfate 325 milliGRAM(s) Oral daily  gabapentin 300 milliGRAM(s) Oral at bedtime  glucagon  Injectable 1 milliGRAM(s) IntraMuscular once  heparin   Injectable 5000 Unit(s) SubCutaneous every 8 hours  insulin glargine Injectable (LANTUS) 10 Unit(s) SubCutaneous every morning  insulin lispro (ADMELOG) corrective regimen sliding scale   SubCutaneous three times a day before meals  insulin lispro (ADMELOG) corrective regimen sliding scale   SubCutaneous at bedtime  midodrine 15 milliGRAM(s) Oral every 8 hours  pantoprazole  Injectable 40 milliGRAM(s) IV Push every 12 hours  senna 2 Tablet(s) Oral at bedtime  simvastatin 20 milliGRAM(s) Oral at bedtime    MEDICATIONS  (PRN):  acetaminophen     Tablet .. 650 milliGRAM(s) Oral every 6 hours PRN Temp greater or equal to 38C (100.4F), Mild Pain (1 - 3)  aluminum hydroxide/magnesium hydroxide/simethicone Suspension 30 milliLiter(s) Oral every 4 hours PRN Dyspepsia  dextrose Oral Gel 15 Gram(s) Oral once PRN Blood Glucose LESS THAN 70 milliGRAM(s)/deciliter  melatonin 3 milliGRAM(s) Oral at bedtime PRN Insomnia  ondansetron Injectable 4 milliGRAM(s) IV Push every 8 hours PRN Nausea and/or Vomiting  sodium chloride 0.9% lock flush 10 milliLiter(s) IV Push every 1 hour PRN Pre/post blood products, medications, blood draw, and to maintain line patency      Allergies:   No Known Allergies    ============================================================================  PAST MEDICAL & SURGICAL HISTORY:  Diabetes      Hypertension      Prostate disorder      Anxiety      High cholesterol      Ulcer      History of endoscopy          FAMILY HISTORY:  Family history of stomach cancer  Father    Family history of emphysema  Mother        Social History:  No cigarette, alcohol or illicit drug use. , lives with family. (25 Feb 2024 22:52)      ============================================================================  Vitals:  Vital Signs Last 24 Hrs  T(C): 36.6 (07 Mar 2024 11:02), Max: 36.6 (06 Mar 2024 16:35)  T(F): 97.9 (07 Mar 2024 11:02), Max: 97.9 (07 Mar 2024 00:11)  HR: 74 (07 Mar 2024 11:02) (68 - 88)  BP: 110/56 (07 Mar 2024 11:02) (110/56 - 138/68)  BP(mean): --  RR: 18 (07 Mar 2024 11:02) (16 - 18)  SpO2: 94% (07 Mar 2024 11:02) (93% - 96%)    Parameters below as of 07 Mar 2024 04:36  Patient On (Oxygen Delivery Method): nasal cannula  O2 Flow (L/min): 2    Labs:                        10.0   12.74 )-----------( 360      ( 07 Mar 2024 06:08 )             30.4     03-07    134<L>  |  96  |  28.8<H>  ----------------------------<  284<H>  4.2   |  24.0  |  4.16<H>    Ca    8.3<L>      07 Mar 2024 06:08  Phos  2.9     03-07  Mg     2.3     03-06    TPro  6.3<L>  /  Alb  2.7<L>  /  TBili  0.5  /  DBili  x   /  AST  27  /  ALT  21  /  AlkPhos  197<H>  03-06        Imaging:   Pertinent Imaging Reviewed.    ============================================================================

## 2024-03-08 LAB
A1C WITH ESTIMATED AVERAGE GLUCOSE RESULT: 7.7 % — HIGH (ref 4–5.6)
AMMONIA BLD-MCNC: 27 UMOL/L — SIGNIFICANT CHANGE UP (ref 11–55)
ANION GAP SERPL CALC-SCNC: 14 MMOL/L — SIGNIFICANT CHANGE UP (ref 5–17)
BASOPHILS # BLD AUTO: 0.13 K/UL — SIGNIFICANT CHANGE UP (ref 0–0.2)
BASOPHILS NFR BLD AUTO: 0.9 % — SIGNIFICANT CHANGE UP (ref 0–2)
BUN SERPL-MCNC: 38.2 MG/DL — HIGH (ref 8–20)
CALCIUM SERPL-MCNC: 8.5 MG/DL — SIGNIFICANT CHANGE UP (ref 8.4–10.5)
CHLORIDE SERPL-SCNC: 97 MMOL/L — SIGNIFICANT CHANGE UP (ref 96–108)
CO2 SERPL-SCNC: 26 MMOL/L — SIGNIFICANT CHANGE UP (ref 22–29)
CREAT SERPL-MCNC: 5.4 MG/DL — HIGH (ref 0.5–1.3)
EGFR: 10 ML/MIN/1.73M2 — LOW
EOSINOPHIL # BLD AUTO: 0.1 K/UL — SIGNIFICANT CHANGE UP (ref 0–0.5)
EOSINOPHIL NFR BLD AUTO: 0.7 % — SIGNIFICANT CHANGE UP (ref 0–6)
ESTIMATED AVERAGE GLUCOSE: 174 MG/DL — HIGH (ref 68–114)
GLUCOSE BLDC GLUCOMTR-MCNC: 116 MG/DL — HIGH (ref 70–99)
GLUCOSE BLDC GLUCOMTR-MCNC: 141 MG/DL — HIGH (ref 70–99)
GLUCOSE BLDC GLUCOMTR-MCNC: 170 MG/DL — HIGH (ref 70–99)
GLUCOSE BLDC GLUCOMTR-MCNC: 171 MG/DL — HIGH (ref 70–99)
GLUCOSE BLDC GLUCOMTR-MCNC: 177 MG/DL — HIGH (ref 70–99)
GLUCOSE SERPL-MCNC: 192 MG/DL — HIGH (ref 70–99)
HCT VFR BLD CALC: 29 % — LOW (ref 39–50)
HGB BLD-MCNC: 9.5 G/DL — LOW (ref 13–17)
IMM GRANULOCYTES NFR BLD AUTO: 4.6 % — HIGH (ref 0–0.9)
LYMPHOCYTES # BLD AUTO: 1.81 K/UL — SIGNIFICANT CHANGE UP (ref 1–3.3)
LYMPHOCYTES # BLD AUTO: 12.2 % — LOW (ref 13–44)
MAGNESIUM SERPL-MCNC: 2.1 MG/DL — SIGNIFICANT CHANGE UP (ref 1.6–2.6)
MCHC RBC-ENTMCNC: 29.4 PG — SIGNIFICANT CHANGE UP (ref 27–34)
MCHC RBC-ENTMCNC: 32.8 GM/DL — SIGNIFICANT CHANGE UP (ref 32–36)
MCV RBC AUTO: 89.8 FL — SIGNIFICANT CHANGE UP (ref 80–100)
MONOCYTES # BLD AUTO: 1.3 K/UL — HIGH (ref 0–0.9)
MONOCYTES NFR BLD AUTO: 8.7 % — SIGNIFICANT CHANGE UP (ref 2–14)
NEUTROPHILS # BLD AUTO: 10.83 K/UL — HIGH (ref 1.8–7.4)
NEUTROPHILS NFR BLD AUTO: 72.9 % — SIGNIFICANT CHANGE UP (ref 43–77)
PHOSPHATE SERPL-MCNC: 3.9 MG/DL — SIGNIFICANT CHANGE UP (ref 2.4–4.7)
PLATELET # BLD AUTO: 383 K/UL — SIGNIFICANT CHANGE UP (ref 150–400)
POTASSIUM SERPL-MCNC: 4 MMOL/L — SIGNIFICANT CHANGE UP (ref 3.5–5.3)
POTASSIUM SERPL-SCNC: 4 MMOL/L — SIGNIFICANT CHANGE UP (ref 3.5–5.3)
RBC # BLD: 3.23 M/UL — LOW (ref 4.2–5.8)
RBC # FLD: 15.7 % — HIGH (ref 10.3–14.5)
SODIUM SERPL-SCNC: 137 MMOL/L — SIGNIFICANT CHANGE UP (ref 135–145)
TSH SERPL-MCNC: 4.06 UIU/ML — SIGNIFICANT CHANGE UP (ref 0.27–4.2)
VIT B12 SERPL-MCNC: 987 PG/ML — SIGNIFICANT CHANGE UP (ref 232–1245)
WBC # BLD: 14.86 K/UL — HIGH (ref 3.8–10.5)
WBC # FLD AUTO: 14.86 K/UL — HIGH (ref 3.8–10.5)

## 2024-03-08 PROCEDURE — 99233 SBSQ HOSP IP/OBS HIGH 50: CPT

## 2024-03-08 RX ORDER — INSULIN GLARGINE 100 [IU]/ML
10 INJECTION, SOLUTION SUBCUTANEOUS EVERY MORNING
Refills: 0 | Status: DISCONTINUED | OUTPATIENT
Start: 2024-03-09 | End: 2024-03-16

## 2024-03-08 RX ADMIN — MIDODRINE HYDROCHLORIDE 15 MILLIGRAM(S): 2.5 TABLET ORAL at 06:12

## 2024-03-08 RX ADMIN — SIMVASTATIN 20 MILLIGRAM(S): 20 TABLET, FILM COATED ORAL at 22:32

## 2024-03-08 RX ADMIN — Medication 3 MILLILITER(S): at 03:57

## 2024-03-08 RX ADMIN — DRONEDARONE 400 MILLIGRAM(S): 400 TABLET, FILM COATED ORAL at 06:12

## 2024-03-08 RX ADMIN — CHLORHEXIDINE GLUCONATE 1 APPLICATION(S): 213 SOLUTION TOPICAL at 06:24

## 2024-03-08 RX ADMIN — PANTOPRAZOLE SODIUM 40 MILLIGRAM(S): 20 TABLET, DELAYED RELEASE ORAL at 06:13

## 2024-03-08 RX ADMIN — Medication 325 MILLIGRAM(S): at 12:50

## 2024-03-08 RX ADMIN — PANTOPRAZOLE SODIUM 40 MILLIGRAM(S): 20 TABLET, DELAYED RELEASE ORAL at 17:42

## 2024-03-08 RX ADMIN — Medication 2: at 17:40

## 2024-03-08 RX ADMIN — DRONEDARONE 400 MILLIGRAM(S): 400 TABLET, FILM COATED ORAL at 17:41

## 2024-03-08 RX ADMIN — Medication 0.5 MILLIGRAM(S): at 21:33

## 2024-03-08 RX ADMIN — Medication 3 UNIT(S): at 12:05

## 2024-03-08 RX ADMIN — Medication 4 MILLILITER(S): at 03:57

## 2024-03-08 RX ADMIN — HEPARIN SODIUM 5000 UNIT(S): 5000 INJECTION INTRAVENOUS; SUBCUTANEOUS at 06:13

## 2024-03-08 RX ADMIN — Medication 3 MILLILITER(S): at 21:34

## 2024-03-08 RX ADMIN — Medication 3 MILLILITER(S): at 14:55

## 2024-03-08 RX ADMIN — MIDODRINE HYDROCHLORIDE 15 MILLIGRAM(S): 2.5 TABLET ORAL at 22:32

## 2024-03-08 RX ADMIN — HEPARIN SODIUM 5000 UNIT(S): 5000 INJECTION INTRAVENOUS; SUBCUTANEOUS at 17:41

## 2024-03-08 RX ADMIN — ERYTHROPOIETIN 10000 UNIT(S): 10000 INJECTION, SOLUTION INTRAVENOUS; SUBCUTANEOUS at 10:33

## 2024-03-08 RX ADMIN — SENNA PLUS 2 TABLET(S): 8.6 TABLET ORAL at 22:32

## 2024-03-08 RX ADMIN — MIDODRINE HYDROCHLORIDE 15 MILLIGRAM(S): 2.5 TABLET ORAL at 12:49

## 2024-03-08 NOTE — PROGRESS NOTE ADULT - SUBJECTIVE AND OBJECTIVE BOX
CHIEF COMPLAINT/INTERVAL HISTORY:    Patient is a 84y old  Male who presents with a chief complaint of SOB (07 Mar 2024 17:38)    SUBJECTIVE & OBJECTIVE: Pt seen and examined at bedside. No overnight events. Tired after HD but arousible and ate lunch.    ROS: Unobtainable    ICU Vital Signs Last 24 Hrs  T(C): 36.9 (08 Mar 2024 11:52), Max: 37.2 (07 Mar 2024 22:00)  T(F): 98.5 (08 Mar 2024 11:52), Max: 98.9 (07 Mar 2024 22:00)  HR: 76 (08 Mar 2024 11:52) (65 - 87)  BP: 122/75 (08 Mar 2024 11:52) (101/63 - 135/61)  RR: 18 (08 Mar 2024 11:00) (18 - 18)  SpO2: 96% (08 Mar 2024 11:52) (96% - 100%)    O2 Parameters below as of 08 Mar 2024 11:00  Patient On (Oxygen Delivery Method): nasal cannula    MEDICATIONS  (STANDING):  albuterol/ipratropium for Nebulization 3 milliLiter(s) Nebulizer every 6 hours  buDESOnide    Inhalation Suspension 0.5 milliGRAM(s) Inhalation every 12 hours  chlorhexidine 2% Cloths 1 Application(s) Topical <User Schedule>  dextrose 5%. 1000 milliLiter(s) (50 mL/Hr) IV Continuous <Continuous>  dextrose 5%. 1000 milliLiter(s) (100 mL/Hr) IV Continuous <Continuous>  dextrose 50% Injectable 25 Gram(s) IV Push once  dextrose 50% Injectable 12.5 Gram(s) IV Push once  dextrose 50% Injectable 25 Gram(s) IV Push once  dronedarone 400 milliGRAM(s) Oral two times a day  epoetin kristal-epbx (RETACRIT) Injectable 55903 Unit(s) IV Push <User Schedule>  ferrous    sulfate 325 milliGRAM(s) Oral daily  gabapentin 300 milliGRAM(s) Oral at bedtime  glucagon  Injectable 1 milliGRAM(s) IntraMuscular once  heparin   Injectable 5000 Unit(s) SubCutaneous every 12 hours  insulin lispro (ADMELOG) corrective regimen sliding scale   SubCutaneous at bedtime  insulin lispro (ADMELOG) corrective regimen sliding scale   SubCutaneous three times a day before meals  insulin lispro Injectable (ADMELOG) 3 Unit(s) SubCutaneous three times a day before meals  midodrine 15 milliGRAM(s) Oral every 8 hours  pantoprazole  Injectable 40 milliGRAM(s) IV Push every 12 hours  senna 2 Tablet(s) Oral at bedtime  simvastatin 20 milliGRAM(s) Oral at bedtime    MEDICATIONS  (PRN):  acetaminophen     Tablet .. 650 milliGRAM(s) Oral every 6 hours PRN Temp greater or equal to 38C (100.4F), Mild Pain (1 - 3)  aluminum hydroxide/magnesium hydroxide/simethicone Suspension 30 milliLiter(s) Oral every 4 hours PRN Dyspepsia  dextrose Oral Gel 15 Gram(s) Oral once PRN Blood Glucose LESS THAN 70 milliGRAM(s)/deciliter  melatonin 3 milliGRAM(s) Oral at bedtime PRN Insomnia  ondansetron Injectable 4 milliGRAM(s) IV Push every 8 hours PRN Nausea and/or Vomiting  sodium chloride 0.9% lock flush 10 milliLiter(s) IV Push every 1 hour PRN Pre/post blood products, medications, blood draw, and to maintain line patency      LABS:                        9.5    14.86 )-----------( 383      ( 08 Mar 2024 06:40 )             29.0     03-08    137  |  97  |  38.2<H>  ----------------------------<  192<H>  4.0   |  26.0  |  5.40<H>    Ca    8.5      08 Mar 2024 06:40  Phos  3.9     03-08  Mg     2.1     03-08        Urinalysis Basic - ( 08 Mar 2024 06:40 )    Color: x / Appearance: x / SG: x / pH: x  Gluc: 192 mg/dL / Ketone: x  / Bili: x / Urobili: x   Blood: x / Protein: x / Nitrite: x   Leuk Esterase: x / RBC: x / WBC x   Sq Epi: x / Non Sq Epi: x / Bacteria: x        CAPILLARY BLOOD GLUCOSE      POCT Blood Glucose.: 141 mg/dL (08 Mar 2024 12:04)  POCT Blood Glucose.: 116 mg/dL (08 Mar 2024 10:47)  POCT Blood Glucose.: 170 mg/dL (08 Mar 2024 07:51)  POCT Blood Glucose.: 183 mg/dL (07 Mar 2024 23:10)  POCT Blood Glucose.: 401 mg/dL (07 Mar 2024 17:19)      RECENT CULTURES:      RADIOLOGY & ADDITIONAL TESTS:      PHYSICAL EXAM:    GENERAL:   HEAD:  Atraumatic, Normocephalic  EYES: EOMI, PERRLA, conjunctiva and sclera clear  ENMT: Moist mucous membranes  NECK: Supple   NERVOUS SYSTEM:  drowsy but arousible  CHEST/LUNG: coarse breath sounds  HEART: Regular rate and rhythm; + S1/S2  ABDOMEN: Soft, Nontender, obese; Bowel sounds present  EXTREMITIES:  no pedal edema

## 2024-03-08 NOTE — PROGRESS NOTE ADULT - SUBJECTIVE AND OBJECTIVE BOX
NEPHROLOGY INTERVAL HPI/OVERNIGHT EVENTS:  pt clinically stable  no acute distress noted  tolerating HD now    MEDICATIONS  (STANDING):  albuterol/ipratropium for Nebulization 3 milliLiter(s) Nebulizer every 6 hours  buDESOnide    Inhalation Suspension 0.5 milliGRAM(s) Inhalation every 12 hours  chlorhexidine 2% Cloths 1 Application(s) Topical <User Schedule>  dextrose 5%. 1000 milliLiter(s) (50 mL/Hr) IV Continuous <Continuous>  dextrose 5%. 1000 milliLiter(s) (100 mL/Hr) IV Continuous <Continuous>  dextrose 50% Injectable 25 Gram(s) IV Push once  dextrose 50% Injectable 12.5 Gram(s) IV Push once  dextrose 50% Injectable 25 Gram(s) IV Push once  dronedarone 400 milliGRAM(s) Oral two times a day  epoetin kristal-epbx (RETACRIT) Injectable 18847 Unit(s) IV Push <User Schedule>  ferrous    sulfate 325 milliGRAM(s) Oral daily  gabapentin 300 milliGRAM(s) Oral at bedtime  glucagon  Injectable 1 milliGRAM(s) IntraMuscular once  heparin   Injectable 5000 Unit(s) SubCutaneous every 12 hours  insulin glargine Injectable (LANTUS) 10 Unit(s) SubCutaneous two times a day  insulin lispro (ADMELOG) corrective regimen sliding scale   SubCutaneous three times a day before meals  insulin lispro (ADMELOG) corrective regimen sliding scale   SubCutaneous at bedtime  insulin lispro Injectable (ADMELOG) 3 Unit(s) SubCutaneous three times a day before meals  midodrine 15 milliGRAM(s) Oral every 8 hours  pantoprazole  Injectable 40 milliGRAM(s) IV Push every 12 hours  senna 2 Tablet(s) Oral at bedtime  simvastatin 20 milliGRAM(s) Oral at bedtime    MEDICATIONS  (PRN):  acetaminophen     Tablet .. 650 milliGRAM(s) Oral every 6 hours PRN Temp greater or equal to 38C (100.4F), Mild Pain (1 - 3)  aluminum hydroxide/magnesium hydroxide/simethicone Suspension 30 milliLiter(s) Oral every 4 hours PRN Dyspepsia  dextrose Oral Gel 15 Gram(s) Oral once PRN Blood Glucose LESS THAN 70 milliGRAM(s)/deciliter  melatonin 3 milliGRAM(s) Oral at bedtime PRN Insomnia  ondansetron Injectable 4 milliGRAM(s) IV Push every 8 hours PRN Nausea and/or Vomiting  sodium chloride 0.9% lock flush 10 milliLiter(s) IV Push every 1 hour PRN Pre/post blood products, medications, blood draw, and to maintain line patency      Allergies    No Known Allergies    Vital Signs Last 24 Hrs  T(C): 36.6 (08 Mar 2024 07:45), Max: 37.2 (07 Mar 2024 22:00)  T(F): 97.8 (08 Mar 2024 07:45), Max: 98.9 (07 Mar 2024 22:00)  HR: 79 (08 Mar 2024 07:45) (65 - 87)  BP: 131/69 (08 Mar 2024 07:45) (101/63 - 131/69)  BP(mean): --  RR: 18 (08 Mar 2024 07:45) (18 - 18)  SpO2: 98% (08 Mar 2024 07:45) (94% - 98%)    Parameters below as of 08 Mar 2024 07:45  Patient On (Oxygen Delivery Method): nasal cannula        PHYSICAL EXAM:  GENERAL: Weak, debilitated  HEENT: No periorbital edema  NECK: Supple, no JVD  NERVOUS SYSTEM:  Awake, alert  CHEST/LUNG: Diminished BS but clear  HEART: No rub  ABDOMEN: Soft, +BS  EXTREMITIES: + dependent edema  SKIN: No rashes      LABS:                        9.5    14.86 )-----------( 383      ( 08 Mar 2024 06:40 )             29.0     03-08    137  |  97  |  38.2<H>  ----------------------------<  192<H>  4.0   |  26.0  |  5.40<H>    Ca    8.5      08 Mar 2024 06:40  Phos  3.9     03-08  Mg     2.1     03-08        Urinalysis Basic - ( 08 Mar 2024 06:40 )    Color: x / Appearance: x / SG: x / pH: x  Gluc: 192 mg/dL / Ketone: x  / Bili: x / Urobili: x   Blood: x / Protein: x / Nitrite: x   Leuk Esterase: x / RBC: x / WBC x   Sq Epi: x / Non Sq Epi: x / Bacteria: x      Phosphorus: 3.9 mg/dL (03-08 @ 06:40)  Magnesium: 2.1 mg/dL (03-08 @ 06:40)      RADIOLOGY & ADDITIONAL TESTS:

## 2024-03-08 NOTE — PROGRESS NOTE ADULT - ASSESSMENT
Patient is a 84 year old male with PMH recent COVID-19, acute cholecystitis s/p cholecystostomy tube (12/22/23), AFib not on A/C 2/2 GIB, and CKD, who presented to [facility] ED on 02/25/24 with fluid draining around the perc sky tube insertion site upon flushing the tube.  In the ED, he  had an episode of coffee ground emesis and GI was consulted. CTA abdomen was done with no evidence of acute GIB. on 02/26/24 patient developed hypotension, respiratory failure and RRT was called. patient was transferred to MICU and was started on pressors. also on high flow O2 with CXR with bilateral infiltrates--likely aspiration pneumonia. placed on zosyn and vancomycin. developed ATN so HD initiated on 2/27/24. weaned off vasopressors on 02/27/28 and transferred out of ICU on 02/29/24. vancomycin has been discontinued. Returned to MICU due to respiratory failure requiring intubation and shock requiring vasopressor attributed to aspiration pneumonia. Extubated and stable on HFNC >24 hours. Downgraded to medicine; now on NC and more awake/alert. Diet advanced.     # Acute Hypoxic Respiratory Failure due to Aspiration Pneumonia  -s/p extubation  -remains hypoxic on 2 liters NC  -blood cultures negative  -completed course of zosyn  -continue nebs and chest PT  -CXR reviewed  -RVP negative  -wean O2 as tolerated  -SLP follow up today; diet advanced to pureed to moderately thickened liquids  -aspiration precautions    # Septic Shock secondary to Aspiration PNA  -shock resolved  -completed abx  -Wean off Midodrine    # Anemia, multifactorial  -Hb stable; iron studies reviewed  -continue EPO  -continue ferrous sulfate  -CTA negative for GI bleed  -monitor CBC closely    #Acute Metabolic Encephalopathy likely due to infection and Hospital Acquired Delirium  - mentation improved today; AAO x 2   - unclear baseline mentation  - NGT removed; diet advanced  - UA with pyuria; but no urine culture collected  - check B12, TSH, ammonia levels  - CT Head negative for acute pathology   - Delirium precautions    # RON on CKD 4 secondary to ATN; now on HD  - continue HD MWF via [facility]  - UF goal per renal  - discussed with vascular - will not remove shiley (can remain in place for 14 days per team)   - IR consulted for permacath and recommended   - strict I/Os, daily weights  - hep panel negative; check PPD  - will need outpatient HD placement  - renal recs appreciated    # Atrial Fibrillation  - Continue Dronedarone    - No anticoagulation due to history of GIB    # T2DM  - sugars uncontrolled since diet has been advanced  - increase lantus to 10 units BID  - add premeal insulin  - change ISS to moderate coverage  - ADA diet    # History of Cholecystitis with Cholecystotomy tube since Dec 2023  - drain in place; no indication to remove  - to follow up electively as outpatient for cholecystectomy  - general surgery recs reviewed     #HLD  -continue statin    #Neuropathy  -continue gabapentin  DVT prophylaxis - Heparin SC    #Coffee ground emesis  -resolved  -Hb stable  -CTA negative for GI bleed  -continue PPI  -GI recs appreciated - no plans for endoscopic evaluation    Dispo- Remains acute; continue HD. Needs outpatient HD placement. Eventual discharge to [facility].    Plan discussed with patient, Dr. Alejo, RN, SW, IR PA, medicine PA     Patient is a 84 year old male with PMH recent COVID-19, acute cholecystitis s/p cholecystostomy tube (12/22/23), AFib not on A/C 2/2 GIB, and CKD, who presented to [facility] ED on 02/25/24 with fluid draining around the perc sky tube insertion site upon flushing the tube.  In the ED, he  had an episode of coffee ground emesis and GI was consulted. CTA abdomen was done with no evidence of acute GIB. on 02/26/24 patient developed hypotension, respiratory failure and RRT was called. patient was transferred to MICU and was started on pressors. also on high flow O2 with CXR with bilateral infiltrates--likely aspiration pneumonia. placed on zosyn and vancomycin. developed ATN so HD initiated on 2/27/24. weaned off vasopressors on 02/27/28 and transferred out of ICU on 02/29/24. vancomycin has been discontinued. Returned to MICU due to respiratory failure requiring intubation and shock requiring vasopressor attributed to aspiration pneumonia. Extubated and stable on HFNC >24 hours. Downgraded to medicine; now on NC and more awake/alert. Diet advanced.     # Acute Hypoxic Respiratory Failure due to Aspiration Pneumonia  -s/p extubation and hiflo  -remains hypoxic on 2 liters NC  -blood cultures negative  -completed course of zosyn  -continue nebs and chest PT  -CXR reviewed  -RVP negative  -wean O2 as tolerated  -SLP follow up on 3/7; diet advanced to pureed to moderately thickened liquids  -aspiration precautions    # Septic Shock secondary to Aspiration PNA  -shock resolved  -completed abx  -Wean off Midodrine    # Anemia, multifactorial  -Hb stable; iron studies reviewed  -continue EPO  -continue ferrous sulfate  -CTA negative for GI bleed  -monitor CBC closely    #Acute Metabolic Encephalopathy likely due to infection and Hospital Acquired Delirium  - mentation improved to AAO x 2 but tired after HD today  - family reports patient is cognitively intact at baseline  - NGT removed on 3/7 given improvement in mentation  - UA with pyuria; but no urine culture collected at that time   - Repeat UA  - B12, TSH, ammonia levels WNL  - CT Head negative for acute pathology   - Delirium precautions    # RON on CKD 4 secondary to ATN; now on HD  - continue HD MWF via [facility] Day 8  - UF goal per renal  - discussed with vascular - will not remove sara (can remain in place for 14 days per team)   - IR consulted for permacath; probably next Tuesday  - strict I/Os, daily weights  - hep panel negative; check PPD  - will need outpatient HD placement  - renal recs appreciated    # Atrial Fibrillation  - Continue Dronedarone    - No anticoagulation due to history of GIB    # T2DM  - sugars better controlled  - decrease lantus to 10 units daily  - continue premeal insulin  - change ISS to moderate coverage  - ADA diet    # History of Cholecystitis with Cholecystotomy tube since Dec 2023  - drain in place; no indication to remove  - to follow up electively as outpatient for cholecystectomy  - general surgery recs reviewed; no inpatient surgery planned  - family requesting surgery prior to discharge; Dr. Bills notified      #HLD  -continue statin    #Neuropathy  -hold gabapentin due to sleepiness today    #Coffee ground emesis  -resolved  -Hb stable  -CTA negative for GI bleed  -continue PPI  -GI recs appreciated - no plans for endoscopic evaluation    DVT ppx - Heparin SC    Dispo- Remains acute; continue HD. IR to place permacath early next week. Eventual discharge to [facility].    Plan discussed with patient, family at bedside (Daughter Elkin [phone]), RN, IR PA

## 2024-03-08 NOTE — PROGRESS NOTE ADULT - ASSESSMENT
RON: ATN post IV contrast and hypotension--> no signs of recovery noted  CKD(IV) +DM, HTN  Hepatitis B and C negative   CT scan: no hydronephrosis  - avoid further potential nephrotoxins  - HD today  - will need tunneled PC prior to discharge  - eventual AVF    Anemia: + multifactorial  - cont CINDI  - trend H/H    RO:  - low phos diet  - monitor off binders

## 2024-03-09 LAB
ANION GAP SERPL CALC-SCNC: 15 MMOL/L — SIGNIFICANT CHANGE UP (ref 5–17)
ANISOCYTOSIS BLD QL: SLIGHT — SIGNIFICANT CHANGE UP
BASOPHILS # BLD AUTO: 0.12 K/UL — SIGNIFICANT CHANGE UP (ref 0–0.2)
BASOPHILS NFR BLD AUTO: 0.9 % — SIGNIFICANT CHANGE UP (ref 0–2)
BUN SERPL-MCNC: 25.2 MG/DL — HIGH (ref 8–20)
CALCIUM SERPL-MCNC: 8.4 MG/DL — SIGNIFICANT CHANGE UP (ref 8.4–10.5)
CHLORIDE SERPL-SCNC: 94 MMOL/L — LOW (ref 96–108)
CO2 SERPL-SCNC: 25 MMOL/L — SIGNIFICANT CHANGE UP (ref 22–29)
CREAT SERPL-MCNC: 4.37 MG/DL — HIGH (ref 0.5–1.3)
EGFR: 13 ML/MIN/1.73M2 — LOW
EOSINOPHIL # BLD AUTO: 0.12 K/UL — SIGNIFICANT CHANGE UP (ref 0–0.5)
EOSINOPHIL NFR BLD AUTO: 0.9 % — SIGNIFICANT CHANGE UP (ref 0–6)
GIANT PLATELETS BLD QL SMEAR: PRESENT — SIGNIFICANT CHANGE UP
GLUCOSE BLDC GLUCOMTR-MCNC: 180 MG/DL — HIGH (ref 70–99)
GLUCOSE BLDC GLUCOMTR-MCNC: 218 MG/DL — HIGH (ref 70–99)
GLUCOSE BLDC GLUCOMTR-MCNC: 223 MG/DL — HIGH (ref 70–99)
GLUCOSE BLDC GLUCOMTR-MCNC: 253 MG/DL — HIGH (ref 70–99)
GLUCOSE SERPL-MCNC: 231 MG/DL — HIGH (ref 70–99)
HCT VFR BLD CALC: 30.4 % — LOW (ref 39–50)
HGB BLD-MCNC: 9.6 G/DL — LOW (ref 13–17)
LYMPHOCYTES # BLD AUTO: 1.51 K/UL — SIGNIFICANT CHANGE UP (ref 1–3.3)
LYMPHOCYTES # BLD AUTO: 11.5 % — LOW (ref 13–44)
MACROCYTES BLD QL: SLIGHT — SIGNIFICANT CHANGE UP
MAGNESIUM SERPL-MCNC: 1.9 MG/DL — SIGNIFICANT CHANGE UP (ref 1.6–2.6)
MANUAL SMEAR VERIFICATION: SIGNIFICANT CHANGE UP
MCHC RBC-ENTMCNC: 29 PG — SIGNIFICANT CHANGE UP (ref 27–34)
MCHC RBC-ENTMCNC: 31.6 GM/DL — LOW (ref 32–36)
MCV RBC AUTO: 91.8 FL — SIGNIFICANT CHANGE UP (ref 80–100)
MONOCYTES # BLD AUTO: 0.93 K/UL — HIGH (ref 0–0.9)
MONOCYTES NFR BLD AUTO: 7.1 % — SIGNIFICANT CHANGE UP (ref 2–14)
MYELOCYTES NFR BLD: 3.5 % — HIGH (ref 0–0)
NEUTROPHILS # BLD AUTO: 9.84 K/UL — HIGH (ref 1.8–7.4)
NEUTROPHILS NFR BLD AUTO: 75.2 % — SIGNIFICANT CHANGE UP (ref 43–77)
OVALOCYTES BLD QL SMEAR: SLIGHT — SIGNIFICANT CHANGE UP
PHOSPHATE SERPL-MCNC: 4.1 MG/DL — SIGNIFICANT CHANGE UP (ref 2.4–4.7)
PLAT MORPH BLD: NORMAL — SIGNIFICANT CHANGE UP
PLATELET # BLD AUTO: 379 K/UL — SIGNIFICANT CHANGE UP (ref 150–400)
POIKILOCYTOSIS BLD QL AUTO: SLIGHT — SIGNIFICANT CHANGE UP
POLYCHROMASIA BLD QL SMEAR: SIGNIFICANT CHANGE UP
POTASSIUM SERPL-MCNC: 4.4 MMOL/L — SIGNIFICANT CHANGE UP (ref 3.5–5.3)
POTASSIUM SERPL-SCNC: 4.4 MMOL/L — SIGNIFICANT CHANGE UP (ref 3.5–5.3)
RBC # BLD: 3.31 M/UL — LOW (ref 4.2–5.8)
RBC # FLD: 15.9 % — HIGH (ref 10.3–14.5)
RBC BLD AUTO: ABNORMAL
SODIUM SERPL-SCNC: 134 MMOL/L — LOW (ref 135–145)
VARIANT LYMPHS # BLD: 0.9 % — SIGNIFICANT CHANGE UP (ref 0–6)
WBC # BLD: 13.09 K/UL — HIGH (ref 3.8–10.5)
WBC # FLD AUTO: 13.09 K/UL — HIGH (ref 3.8–10.5)

## 2024-03-09 PROCEDURE — 99233 SBSQ HOSP IP/OBS HIGH 50: CPT

## 2024-03-09 PROCEDURE — 99232 SBSQ HOSP IP/OBS MODERATE 35: CPT

## 2024-03-09 RX ADMIN — PANTOPRAZOLE SODIUM 40 MILLIGRAM(S): 20 TABLET, DELAYED RELEASE ORAL at 06:16

## 2024-03-09 RX ADMIN — Medication 3 UNIT(S): at 08:10

## 2024-03-09 RX ADMIN — DRONEDARONE 400 MILLIGRAM(S): 400 TABLET, FILM COATED ORAL at 16:55

## 2024-03-09 RX ADMIN — Medication 3 UNIT(S): at 12:09

## 2024-03-09 RX ADMIN — MIDODRINE HYDROCHLORIDE 15 MILLIGRAM(S): 2.5 TABLET ORAL at 12:10

## 2024-03-09 RX ADMIN — Medication 0.5 MILLIGRAM(S): at 21:06

## 2024-03-09 RX ADMIN — PANTOPRAZOLE SODIUM 40 MILLIGRAM(S): 20 TABLET, DELAYED RELEASE ORAL at 16:54

## 2024-03-09 RX ADMIN — CHLORHEXIDINE GLUCONATE 1 APPLICATION(S): 213 SOLUTION TOPICAL at 06:15

## 2024-03-09 RX ADMIN — INSULIN GLARGINE 10 UNIT(S): 100 INJECTION, SOLUTION SUBCUTANEOUS at 08:15

## 2024-03-09 RX ADMIN — Medication 0.5 MILLIGRAM(S): at 08:24

## 2024-03-09 RX ADMIN — MIDODRINE HYDROCHLORIDE 15 MILLIGRAM(S): 2.5 TABLET ORAL at 06:16

## 2024-03-09 RX ADMIN — Medication 4: at 12:09

## 2024-03-09 RX ADMIN — SIMVASTATIN 20 MILLIGRAM(S): 20 TABLET, FILM COATED ORAL at 22:24

## 2024-03-09 RX ADMIN — Medication 325 MILLIGRAM(S): at 12:10

## 2024-03-09 RX ADMIN — DRONEDARONE 400 MILLIGRAM(S): 400 TABLET, FILM COATED ORAL at 06:15

## 2024-03-09 RX ADMIN — Medication 3 MILLILITER(S): at 14:28

## 2024-03-09 RX ADMIN — Medication 4: at 16:54

## 2024-03-09 RX ADMIN — Medication 6: at 08:11

## 2024-03-09 RX ADMIN — Medication 3 UNIT(S): at 16:54

## 2024-03-09 RX ADMIN — HEPARIN SODIUM 5000 UNIT(S): 5000 INJECTION INTRAVENOUS; SUBCUTANEOUS at 16:55

## 2024-03-09 RX ADMIN — Medication 3 MILLILITER(S): at 08:24

## 2024-03-09 RX ADMIN — Medication 3 MILLILITER(S): at 21:06

## 2024-03-09 RX ADMIN — HEPARIN SODIUM 5000 UNIT(S): 5000 INJECTION INTRAVENOUS; SUBCUTANEOUS at 06:16

## 2024-03-09 RX ADMIN — MIDODRINE HYDROCHLORIDE 15 MILLIGRAM(S): 2.5 TABLET ORAL at 22:24

## 2024-03-09 RX ADMIN — Medication 3 MILLILITER(S): at 04:03

## 2024-03-09 NOTE — PROGRESS NOTE ADULT - ASSESSMENT
Patient is a 84 year old male with PMH recent COVID-19, acute cholecystitis s/p cholecystostomy tube (12/22/23), AFib not on A/C 2/2 GIB, and CKD, who presented to [facility] ED on 02/25/24 with fluid draining around the perc sky tube insertion site upon flushing the tube.  In the ED, he  had an episode of coffee ground emesis and GI was consulted. CTA abdomen was done with no evidence of acute GIB. on 02/26/24 patient developed hypotension, respiratory failure and RRT was called. patient was transferred to MICU and was started on pressors. also on high flow O2 with CXR with bilateral infiltrates--likely aspiration pneumonia. placed on zosyn and vancomycin. developed ATN so HD initiated on 2/27/24. weaned off vasopressors on 02/27/28 and transferred out of ICU on 02/29/24. vancomycin has been discontinued. Returned to MICU due to respiratory failure requiring intubation and shock requiring vasopressor attributed to aspiration pneumonia. Extubated and stable on HFNC >24 hours. Downgraded to medicine; now on NC and more awake/alert. Diet advanced.     # Acute Hypoxic Respiratory Failure due to Aspiration Pneumonia  -s/p extubation and hiflo  -currently on 2l/min  -blood cultures negative  -completed course of zosyn  -continue nebs and chest PT  -CXR reviewed  -RVP negative  -wean O2 as tolerated  -SLP follow up on 3/7; diet advanced to pureed to moderately thickened liquids  -aspiration precautions    # Septic Shock secondary to Aspiration PNA  -shock resolved  -completed abx  -Continue Midodrine    # Anemia, multifactorial  -Hb stable  -continue EPO  -continue ferrous sulfate  -CTA negative for GI bleed  -monitor CBC closely    #Acute Metabolic Encephalopathy likely due to infection and Hospital Acquired Delirium  - monitor mental status  - family reports patient is cognitively intact at baseline  - NGT removed on 3/7 given improvement in mentation  - UA with pyuria; but no urine culture collected at that time   - Repeat UA  - B12, TSH, ammonia levels WNL  - CT Head negative for acute pathology   - Delirium precautions    # RON on CKD 4 secondary to ATN; now on HD  - continue HD MWF via [facility] Day 8  - UF goal per renal  - discussed with vascular - will not remove zenyley (can remain in place for 14 days per team)   - IR consulted for permacath; probably next Tuesday  - strict I/Os, daily weights  - hep panel negative; check PPD  - will need outpatient HD placement  - renal following    # Atrial Fibrillation  - Continue Dronedarone    - No anticoagulation due to history of GIB    # T2DM  - monitor FS  - continue lantus to 10 units daily  - continue premeal insulin  - ADA diet    # History of Cholecystitis with Cholecystotomy tube since Dec 2023  - drain in place; no indication to remove. no drainage noted  - to follow up electively as outpatient for cholecystectomy  - general surgery recs reviewed; no inpatient surgery planned  -     #HLD  -continue statin    #Neuropathy  -hold gabapentin for now    #Coffee ground emesis  -resolved  -Hb stable  -CTA negative for GI bleed  -continue PPI  -GI recs appreciated - no plans for endoscopic evaluation    DVT ppx - Heparin SC    Discussed with patient's wife at bedside.

## 2024-03-09 NOTE — PROGRESS NOTE ADULT - SUBJECTIVE AND OBJECTIVE BOX
NEPHROLOGY INTERVAL HPI/OVERNIGHT EVENTS:  pt stable overnight  no acute issues noted  tolerated HD yesterday    MEDICATIONS  (STANDING):  albuterol/ipratropium for Nebulization 3 milliLiter(s) Nebulizer every 6 hours  buDESOnide    Inhalation Suspension 0.5 milliGRAM(s) Inhalation every 12 hours  chlorhexidine 2% Cloths 1 Application(s) Topical <User Schedule>  dextrose 5%. 1000 milliLiter(s) (50 mL/Hr) IV Continuous <Continuous>  dextrose 5%. 1000 milliLiter(s) (100 mL/Hr) IV Continuous <Continuous>  dextrose 50% Injectable 25 Gram(s) IV Push once  dextrose 50% Injectable 12.5 Gram(s) IV Push once  dextrose 50% Injectable 25 Gram(s) IV Push once  dronedarone 400 milliGRAM(s) Oral two times a day  epoetin kristal-epbx (RETACRIT) Injectable 06926 Unit(s) IV Push <User Schedule>  ferrous    sulfate 325 milliGRAM(s) Oral daily  glucagon  Injectable 1 milliGRAM(s) IntraMuscular once  heparin   Injectable 5000 Unit(s) SubCutaneous every 12 hours  insulin glargine Injectable (LANTUS) 10 Unit(s) SubCutaneous every morning  insulin lispro (ADMELOG) corrective regimen sliding scale   SubCutaneous at bedtime  insulin lispro (ADMELOG) corrective regimen sliding scale   SubCutaneous three times a day before meals  insulin lispro Injectable (ADMELOG) 3 Unit(s) SubCutaneous three times a day before meals  midodrine 15 milliGRAM(s) Oral every 8 hours  pantoprazole  Injectable 40 milliGRAM(s) IV Push every 12 hours  senna 2 Tablet(s) Oral at bedtime  simvastatin 20 milliGRAM(s) Oral at bedtime    MEDICATIONS  (PRN):  acetaminophen     Tablet .. 650 milliGRAM(s) Oral every 6 hours PRN Temp greater or equal to 38C (100.4F), Mild Pain (1 - 3)  aluminum hydroxide/magnesium hydroxide/simethicone Suspension 30 milliLiter(s) Oral every 4 hours PRN Dyspepsia  dextrose Oral Gel 15 Gram(s) Oral once PRN Blood Glucose LESS THAN 70 milliGRAM(s)/deciliter  melatonin 3 milliGRAM(s) Oral at bedtime PRN Insomnia  ondansetron Injectable 4 milliGRAM(s) IV Push every 8 hours PRN Nausea and/or Vomiting  sodium chloride 0.9% lock flush 10 milliLiter(s) IV Push every 1 hour PRN Pre/post blood products, medications, blood draw, and to maintain line patency      Allergies    No Known Allergies          Vital Signs Last 24 Hrs  T(C): 37.1 (09 Mar 2024 04:37), Max: 37.2 (08 Mar 2024 11:00)  T(F): 98.7 (09 Mar 2024 04:37), Max: 98.9 (08 Mar 2024 11:00)  HR: 95 (09 Mar 2024 04:37) (71 - 97)  BP: 123/61 (09 Mar 2024 04:37) (98/61 - 135/61)  BP(mean): --  RR: 18 (09 Mar 2024 04:37) (18 - 18)  SpO2: 94% (09 Mar 2024 04:37) (94% - 100%)    Parameters below as of 09 Mar 2024 04:37  Patient On (Oxygen Delivery Method): nasal cannula  O2 Flow (L/min): 2      PHYSICAL EXAM:  GENERAL: Fatigued, comfortable  HEENT: Moist MMs  NECK: Supple, no JVD  NERVOUS SYSTEM:  Awake, alert  CHEST/LUNG: Diminished BS but clear  HEART: No rub  ABDOMEN: Soft, +BS  EXTREMITIES: + dependent edema better  SKIN: No rashes    LABS:                        9.5    14.86 )-----------( 383      ( 08 Mar 2024 06:40 )             29.0     03-08    137  |  97  |  38.2<H>  ----------------------------<  192<H>  4.0   |  26.0  |  5.40<H>    Ca    8.5      08 Mar 2024 06:40  Phos  3.9     03-08  Mg     2.1     03-08        Urinalysis Basic - ( 08 Mar 2024 06:40 )    Color: x / Appearance: x / SG: x / pH: x  Gluc: 192 mg/dL / Ketone: x  / Bili: x / Urobili: x   Blood: x / Protein: x / Nitrite: x   Leuk Esterase: x / RBC: x / WBC x   Sq Epi: x / Non Sq Epi: x / Bacteria: x      Phosphorus: 3.9 mg/dL (03-08 @ 06:40)  Magnesium: 2.1 mg/dL (03-08 @ 06:40)      RADIOLOGY & ADDITIONAL TESTS:   NEPHROLOGY INTERVAL HPI/OVERNIGHT EVENTS:  pt stable overnight  no acute issues noted  tolerated HD yesterday    MEDICATIONS  (STANDING):  albuterol/ipratropium for Nebulization 3 milliLiter(s) Nebulizer every 6 hours  buDESOnide    Inhalation Suspension 0.5 milliGRAM(s) Inhalation every 12 hours  chlorhexidine 2% Cloths 1 Application(s) Topical <User Schedule>  dextrose 5%. 1000 milliLiter(s) (50 mL/Hr) IV Continuous <Continuous>  dextrose 5%. 1000 milliLiter(s) (100 mL/Hr) IV Continuous <Continuous>  dextrose 50% Injectable 25 Gram(s) IV Push once  dextrose 50% Injectable 12.5 Gram(s) IV Push once  dextrose 50% Injectable 25 Gram(s) IV Push once  dronedarone 400 milliGRAM(s) Oral two times a day  epoetin kristal-epbx (RETACRIT) Injectable 00092 Unit(s) IV Push <User Schedule>  ferrous    sulfate 325 milliGRAM(s) Oral daily  glucagon  Injectable 1 milliGRAM(s) IntraMuscular once  heparin   Injectable 5000 Unit(s) SubCutaneous every 12 hours  insulin glargine Injectable (LANTUS) 10 Unit(s) SubCutaneous every morning  insulin lispro (ADMELOG) corrective regimen sliding scale   SubCutaneous at bedtime  insulin lispro (ADMELOG) corrective regimen sliding scale   SubCutaneous three times a day before meals  insulin lispro Injectable (ADMELOG) 3 Unit(s) SubCutaneous three times a day before meals  midodrine 15 milliGRAM(s) Oral every 8 hours  pantoprazole  Injectable 40 milliGRAM(s) IV Push every 12 hours  senna 2 Tablet(s) Oral at bedtime  simvastatin 20 milliGRAM(s) Oral at bedtime    MEDICATIONS  (PRN):  acetaminophen     Tablet .. 650 milliGRAM(s) Oral every 6 hours PRN Temp greater or equal to 38C (100.4F), Mild Pain (1 - 3)  aluminum hydroxide/magnesium hydroxide/simethicone Suspension 30 milliLiter(s) Oral every 4 hours PRN Dyspepsia  dextrose Oral Gel 15 Gram(s) Oral once PRN Blood Glucose LESS THAN 70 milliGRAM(s)/deciliter  melatonin 3 milliGRAM(s) Oral at bedtime PRN Insomnia  ondansetron Injectable 4 milliGRAM(s) IV Push every 8 hours PRN Nausea and/or Vomiting  sodium chloride 0.9% lock flush 10 milliLiter(s) IV Push every 1 hour PRN Pre/post blood products, medications, blood draw, and to maintain line patency      Allergies    No Known Allergies          Vital Signs Last 24 Hrs  T(C): 37.1 (09 Mar 2024 04:37), Max: 37.2 (08 Mar 2024 11:00)  T(F): 98.7 (09 Mar 2024 04:37), Max: 98.9 (08 Mar 2024 11:00)  HR: 95 (09 Mar 2024 04:37) (71 - 97)  BP: 123/61 (09 Mar 2024 04:37) (98/61 - 135/61)  BP(mean): --  RR: 18 (09 Mar 2024 04:37) (18 - 18)  SpO2: 94% (09 Mar 2024 04:37) (94% - 100%)    Parameters below as of 09 Mar 2024 04:37  Patient On (Oxygen Delivery Method): nasal cannula  O2 Flow (L/min): 2      PHYSICAL EXAM:  GENERAL: Fatigued, comfortable  HEENT: Moist MMs  NECK: Supple, no JVD; R IJ catheter  NERVOUS SYSTEM:  Awake, alert  CHEST/LUNG: Diminished BS but clear  HEART: No rub  ABDOMEN: Soft, +BS  EXTREMITIES: + dependent edema better  SKIN: No rashes    LABS:                        9.5    14.86 )-----------( 383      ( 08 Mar 2024 06:40 )             29.0     03-08    137  |  97  |  38.2<H>  ----------------------------<  192<H>  4.0   |  26.0  |  5.40<H>    Ca    8.5      08 Mar 2024 06:40  Phos  3.9     03-08  Mg     2.1     03-08        Urinalysis Basic - ( 08 Mar 2024 06:40 )    Color: x / Appearance: x / SG: x / pH: x  Gluc: 192 mg/dL / Ketone: x  / Bili: x / Urobili: x   Blood: x / Protein: x / Nitrite: x   Leuk Esterase: x / RBC: x / WBC x   Sq Epi: x / Non Sq Epi: x / Bacteria: x      Phosphorus: 3.9 mg/dL (03-08 @ 06:40)  Magnesium: 2.1 mg/dL (03-08 @ 06:40)      RADIOLOGY & ADDITIONAL TESTS:

## 2024-03-09 NOTE — PROGRESS NOTE ADULT - ASSESSMENT
CKD(IV) +DM, HTN  RON: ATN post IV contrast and hypotension--> no recovery noted and remains HD dependent now  CT scan: no hydronephrosis  - cont to avoid further potential nephrotoxins  - HD MWF; cont to watch for signs of renal recovery  -  tunneled PC prior to discharge and eventual AVF    Anemia: + multifactorial  - cont CINDI  - trend H/H    RO:  - low phos diet  - monitor off binders CKD(IV) +DM, HTN  RON: ATN post IV contrast and hypotension--> no recovery noted and remains HD dependent now  CT scan: no hydronephrosis  - cont to avoid further potential nephrotoxins  - HD MWF; cont to watch for signs of renal recovery  -  tunneled PC prior to discharge and eventual AVF    Anemia: + multifactorial  - cont CINDI  - trend H/H    RO:  - low phos diet  - monitor off binders    I spoke to niece who was at bedside

## 2024-03-09 NOTE — PROVIDER CONTACT NOTE (OTHER) - SITUATION
When turning patient, bad broke off from tubing, no leakage. Provider will contact IR about follow up.

## 2024-03-10 LAB
ANION GAP SERPL CALC-SCNC: 15 MMOL/L — SIGNIFICANT CHANGE UP (ref 5–17)
APPEARANCE UR: CLEAR — SIGNIFICANT CHANGE UP
BACTERIA # UR AUTO: ABNORMAL /HPF
BASOPHILS # BLD AUTO: 0.05 K/UL — SIGNIFICANT CHANGE UP (ref 0–0.2)
BASOPHILS NFR BLD AUTO: 0.3 % — SIGNIFICANT CHANGE UP (ref 0–2)
BILIRUB UR-MCNC: NEGATIVE — SIGNIFICANT CHANGE UP
BUN SERPL-MCNC: 31.6 MG/DL — HIGH (ref 8–20)
CALCIUM SERPL-MCNC: 8.6 MG/DL — SIGNIFICANT CHANGE UP (ref 8.4–10.5)
CAST: 4 /LPF — SIGNIFICANT CHANGE UP (ref 0–4)
CHLORIDE SERPL-SCNC: 97 MMOL/L — SIGNIFICANT CHANGE UP (ref 96–108)
CO2 SERPL-SCNC: 24 MMOL/L — SIGNIFICANT CHANGE UP (ref 22–29)
COLOR SPEC: YELLOW — SIGNIFICANT CHANGE UP
CREAT SERPL-MCNC: 5.73 MG/DL — HIGH (ref 0.5–1.3)
DIFF PNL FLD: ABNORMAL
EGFR: 9 ML/MIN/1.73M2 — LOW
EOSINOPHIL # BLD AUTO: 0.11 K/UL — SIGNIFICANT CHANGE UP (ref 0–0.5)
EOSINOPHIL NFR BLD AUTO: 0.8 % — SIGNIFICANT CHANGE UP (ref 0–6)
GLUCOSE BLDC GLUCOMTR-MCNC: 145 MG/DL — HIGH (ref 70–99)
GLUCOSE BLDC GLUCOMTR-MCNC: 210 MG/DL — HIGH (ref 70–99)
GLUCOSE BLDC GLUCOMTR-MCNC: 237 MG/DL — HIGH (ref 70–99)
GLUCOSE BLDC GLUCOMTR-MCNC: 277 MG/DL — HIGH (ref 70–99)
GLUCOSE SERPL-MCNC: 214 MG/DL — HIGH (ref 70–99)
GLUCOSE UR QL: 100 MG/DL
HCT VFR BLD CALC: 29.4 % — LOW (ref 39–50)
HGB BLD-MCNC: 9.5 G/DL — LOW (ref 13–17)
IMM GRANULOCYTES NFR BLD AUTO: 2.8 % — HIGH (ref 0–0.9)
KETONES UR-MCNC: NEGATIVE MG/DL — SIGNIFICANT CHANGE UP
LEUKOCYTE ESTERASE UR-ACNC: NEGATIVE — SIGNIFICANT CHANGE UP
LYMPHOCYTES # BLD AUTO: 1.49 K/UL — SIGNIFICANT CHANGE UP (ref 1–3.3)
LYMPHOCYTES # BLD AUTO: 10.4 % — LOW (ref 13–44)
MAGNESIUM SERPL-MCNC: 1.9 MG/DL — SIGNIFICANT CHANGE UP (ref 1.6–2.6)
MCHC RBC-ENTMCNC: 29.2 PG — SIGNIFICANT CHANGE UP (ref 27–34)
MCHC RBC-ENTMCNC: 32.3 GM/DL — SIGNIFICANT CHANGE UP (ref 32–36)
MCV RBC AUTO: 90.5 FL — SIGNIFICANT CHANGE UP (ref 80–100)
MONOCYTES # BLD AUTO: 0.96 K/UL — HIGH (ref 0–0.9)
MONOCYTES NFR BLD AUTO: 6.7 % — SIGNIFICANT CHANGE UP (ref 2–14)
NEUTROPHILS # BLD AUTO: 11.34 K/UL — HIGH (ref 1.8–7.4)
NEUTROPHILS NFR BLD AUTO: 79 % — HIGH (ref 43–77)
NITRITE UR-MCNC: NEGATIVE — SIGNIFICANT CHANGE UP
PH UR: 6.5 — SIGNIFICANT CHANGE UP (ref 5–8)
PHOSPHATE SERPL-MCNC: 4.2 MG/DL — SIGNIFICANT CHANGE UP (ref 2.4–4.7)
PLATELET # BLD AUTO: 402 K/UL — HIGH (ref 150–400)
POTASSIUM SERPL-MCNC: 3.9 MMOL/L — SIGNIFICANT CHANGE UP (ref 3.5–5.3)
POTASSIUM SERPL-SCNC: 3.9 MMOL/L — SIGNIFICANT CHANGE UP (ref 3.5–5.3)
PROT UR-MCNC: 100 MG/DL
RBC # BLD: 3.25 M/UL — LOW (ref 4.2–5.8)
RBC # FLD: 15.8 % — HIGH (ref 10.3–14.5)
RBC CASTS # UR COMP ASSIST: 1 /HPF — SIGNIFICANT CHANGE UP (ref 0–4)
SODIUM SERPL-SCNC: 135 MMOL/L — SIGNIFICANT CHANGE UP (ref 135–145)
SP GR SPEC: 1.01 — SIGNIFICANT CHANGE UP (ref 1–1.03)
SQUAMOUS # UR AUTO: 2 /HPF — SIGNIFICANT CHANGE UP (ref 0–5)
UROBILINOGEN FLD QL: 1 MG/DL — SIGNIFICANT CHANGE UP (ref 0.2–1)
WBC # BLD: 14.35 K/UL — HIGH (ref 3.8–10.5)
WBC # FLD AUTO: 14.35 K/UL — HIGH (ref 3.8–10.5)
WBC UR QL: 2 /HPF — SIGNIFICANT CHANGE UP (ref 0–5)

## 2024-03-10 PROCEDURE — 70450 CT HEAD/BRAIN W/O DYE: CPT | Mod: 26

## 2024-03-10 PROCEDURE — 99233 SBSQ HOSP IP/OBS HIGH 50: CPT

## 2024-03-10 PROCEDURE — 71250 CT THORAX DX C-: CPT | Mod: 26

## 2024-03-10 PROCEDURE — 74176 CT ABD & PELVIS W/O CONTRAST: CPT | Mod: 26

## 2024-03-10 PROCEDURE — 99232 SBSQ HOSP IP/OBS MODERATE 35: CPT

## 2024-03-10 RX ADMIN — DRONEDARONE 400 MILLIGRAM(S): 400 TABLET, FILM COATED ORAL at 05:36

## 2024-03-10 RX ADMIN — Medication 3 MILLILITER(S): at 08:22

## 2024-03-10 RX ADMIN — PANTOPRAZOLE SODIUM 40 MILLIGRAM(S): 20 TABLET, DELAYED RELEASE ORAL at 17:27

## 2024-03-10 RX ADMIN — Medication 3 MILLILITER(S): at 14:04

## 2024-03-10 RX ADMIN — Medication 3 UNIT(S): at 17:26

## 2024-03-10 RX ADMIN — HEPARIN SODIUM 5000 UNIT(S): 5000 INJECTION INTRAVENOUS; SUBCUTANEOUS at 05:36

## 2024-03-10 RX ADMIN — Medication 325 MILLIGRAM(S): at 12:04

## 2024-03-10 RX ADMIN — MIDODRINE HYDROCHLORIDE 15 MILLIGRAM(S): 2.5 TABLET ORAL at 05:35

## 2024-03-10 RX ADMIN — Medication 4: at 17:27

## 2024-03-10 RX ADMIN — SIMVASTATIN 20 MILLIGRAM(S): 20 TABLET, FILM COATED ORAL at 22:06

## 2024-03-10 RX ADMIN — HEPARIN SODIUM 5000 UNIT(S): 5000 INJECTION INTRAVENOUS; SUBCUTANEOUS at 17:27

## 2024-03-10 RX ADMIN — Medication 3 MILLILITER(S): at 20:13

## 2024-03-10 RX ADMIN — CHLORHEXIDINE GLUCONATE 1 APPLICATION(S): 213 SOLUTION TOPICAL at 05:37

## 2024-03-10 RX ADMIN — DRONEDARONE 400 MILLIGRAM(S): 400 TABLET, FILM COATED ORAL at 17:26

## 2024-03-10 RX ADMIN — MIDODRINE HYDROCHLORIDE 15 MILLIGRAM(S): 2.5 TABLET ORAL at 12:04

## 2024-03-10 RX ADMIN — Medication 3 UNIT(S): at 12:04

## 2024-03-10 RX ADMIN — INSULIN GLARGINE 10 UNIT(S): 100 INJECTION, SOLUTION SUBCUTANEOUS at 08:18

## 2024-03-10 RX ADMIN — TUBERCULIN PURIFIED PROTEIN DERIVATIVE 5 UNIT(S): 5 INJECTION, SOLUTION INTRADERMAL at 11:22

## 2024-03-10 RX ADMIN — SENNA PLUS 2 TABLET(S): 8.6 TABLET ORAL at 21:57

## 2024-03-10 RX ADMIN — Medication 1: at 22:07

## 2024-03-10 RX ADMIN — Medication 0.5 MILLIGRAM(S): at 08:22

## 2024-03-10 RX ADMIN — MIDODRINE HYDROCHLORIDE 15 MILLIGRAM(S): 2.5 TABLET ORAL at 21:55

## 2024-03-10 RX ADMIN — Medication 3 UNIT(S): at 08:17

## 2024-03-10 RX ADMIN — PANTOPRAZOLE SODIUM 40 MILLIGRAM(S): 20 TABLET, DELAYED RELEASE ORAL at 05:36

## 2024-03-10 RX ADMIN — Medication 3 MILLILITER(S): at 04:03

## 2024-03-10 RX ADMIN — Medication 0.5 MILLIGRAM(S): at 20:13

## 2024-03-10 RX ADMIN — Medication 4: at 08:17

## 2024-03-10 NOTE — PROGRESS NOTE ADULT - ASSESSMENT
CKD(IV) +DM, HTN  RON: ATN post IV contrast and hypotension  CT scan: no hydronephrosis  - cont to avoid further potential nephrotoxins  - HD tomorrow  - cont to monitor for signs of renal recovery  - if no renal recovery noted would have IR place tunneled PC this week for ongoing HD    Anemia: + multifactorial  - cont CINDI  - trend H/H  - target Hgb > 10.0    RO:  - low phos diet  - con to monitor off binder

## 2024-03-10 NOTE — PROGRESS NOTE ADULT - SUBJECTIVE AND OBJECTIVE BOX
NEPHROLOGY INTERVAL HPI/OVERNIGHT EVENTS:  pt comfortable  no overnight events documented    MEDICATIONS  (STANDING):  albuterol/ipratropium for Nebulization 3 milliLiter(s) Nebulizer every 6 hours  buDESOnide    Inhalation Suspension 0.5 milliGRAM(s) Inhalation every 12 hours  chlorhexidine 2% Cloths 1 Application(s) Topical <User Schedule>  dextrose 5%. 1000 milliLiter(s) (50 mL/Hr) IV Continuous <Continuous>  dextrose 5%. 1000 milliLiter(s) (100 mL/Hr) IV Continuous <Continuous>  dextrose 50% Injectable 25 Gram(s) IV Push once  dextrose 50% Injectable 12.5 Gram(s) IV Push once  dextrose 50% Injectable 25 Gram(s) IV Push once  dronedarone 400 milliGRAM(s) Oral two times a day  epoetin kristal-epbx (RETACRIT) Injectable 99742 Unit(s) IV Push <User Schedule>  ferrous    sulfate 325 milliGRAM(s) Oral daily  glucagon  Injectable 1 milliGRAM(s) IntraMuscular once  heparin   Injectable 5000 Unit(s) SubCutaneous every 12 hours  insulin glargine Injectable (LANTUS) 10 Unit(s) SubCutaneous every morning  insulin lispro (ADMELOG) corrective regimen sliding scale   SubCutaneous at bedtime  insulin lispro (ADMELOG) corrective regimen sliding scale   SubCutaneous three times a day before meals  insulin lispro Injectable (ADMELOG) 3 Unit(s) SubCutaneous three times a day before meals  midodrine 15 milliGRAM(s) Oral every 8 hours  pantoprazole  Injectable 40 milliGRAM(s) IV Push every 12 hours  senna 2 Tablet(s) Oral at bedtime  simvastatin 20 milliGRAM(s) Oral at bedtime    MEDICATIONS  (PRN):  acetaminophen     Tablet .. 650 milliGRAM(s) Oral every 6 hours PRN Temp greater or equal to 38C (100.4F), Mild Pain (1 - 3)  aluminum hydroxide/magnesium hydroxide/simethicone Suspension 30 milliLiter(s) Oral every 4 hours PRN Dyspepsia  dextrose Oral Gel 15 Gram(s) Oral once PRN Blood Glucose LESS THAN 70 milliGRAM(s)/deciliter  melatonin 3 milliGRAM(s) Oral at bedtime PRN Insomnia  ondansetron Injectable 4 milliGRAM(s) IV Push every 8 hours PRN Nausea and/or Vomiting  sodium chloride 0.9% lock flush 10 milliLiter(s) IV Push every 1 hour PRN Pre/post blood products, medications, blood draw, and to maintain line patency      Allergies    No Known Allergies    Intolerances              Vital Signs Last 24 Hrs  T(C): 37.1 (10 Mar 2024 04:44), Max: 37.1 (10 Mar 2024 04:44)  T(F): 98.7 (10 Mar 2024 04:44), Max: 98.7 (10 Mar 2024 04:44)  HR: 76 (10 Mar 2024 04:44) (68 - 88)  BP: 131/70 (10 Mar 2024 04:44) (116/76 - 149/67)  BP(mean): --  RR: 18 (09 Mar 2024 22:35) (18 - 18)  SpO2: 96% (10 Mar 2024 04:44) (95% - 97%)    Parameters below as of 10 Mar 2024 04:44  Patient On (Oxygen Delivery Method): nasal cannula        PHYSICAL EXAM:  GENERAL: Weak, languid  HEENT: No periorbital edema  NECK: Supple, no JVD; R IJ catheter  NERVOUS SYSTEM:  Awake, alert  CHEST/LUNG: Clear, decreased BS  HEART: No rub  ABDOMEN: Soft, +BS  EXTREMITIES: + improved dependent edema   SKIN: No rashes      LABS:                        9.6    13.09 )-----------( 379      ( 09 Mar 2024 06:30 )             30.4     03-09    134<L>  |  94<L>  |  25.2<H>  ----------------------------<  231<H>  4.4   |  25.0  |  4.37<H>    Ca    8.4      09 Mar 2024 06:30  Phos  4.1     03-09  Mg     1.9     03-09        Urinalysis Basic - ( 09 Mar 2024 06:30 )    Color: x / Appearance: x / SG: x / pH: x  Gluc: 231 mg/dL / Ketone: x  / Bili: x / Urobili: x   Blood: x / Protein: x / Nitrite: x   Leuk Esterase: x / RBC: x / WBC x   Sq Epi: x / Non Sq Epi: x / Bacteria: x          RADIOLOGY & ADDITIONAL TESTS:   NEPHROLOGY INTERVAL HPI/OVERNIGHT EVENTS:  pt comfortable  no overnight events documented  family member at bedside    MEDICATIONS  (STANDING):  albuterol/ipratropium for Nebulization 3 milliLiter(s) Nebulizer every 6 hours  buDESOnide    Inhalation Suspension 0.5 milliGRAM(s) Inhalation every 12 hours  chlorhexidine 2% Cloths 1 Application(s) Topical <User Schedule>  dextrose 5%. 1000 milliLiter(s) (50 mL/Hr) IV Continuous <Continuous>  dextrose 5%. 1000 milliLiter(s) (100 mL/Hr) IV Continuous <Continuous>  dextrose 50% Injectable 25 Gram(s) IV Push once  dextrose 50% Injectable 12.5 Gram(s) IV Push once  dextrose 50% Injectable 25 Gram(s) IV Push once  dronedarone 400 milliGRAM(s) Oral two times a day  epoetin kristal-epbx (RETACRIT) Injectable 57456 Unit(s) IV Push <User Schedule>  ferrous    sulfate 325 milliGRAM(s) Oral daily  glucagon  Injectable 1 milliGRAM(s) IntraMuscular once  heparin   Injectable 5000 Unit(s) SubCutaneous every 12 hours  insulin glargine Injectable (LANTUS) 10 Unit(s) SubCutaneous every morning  insulin lispro (ADMELOG) corrective regimen sliding scale   SubCutaneous at bedtime  insulin lispro (ADMELOG) corrective regimen sliding scale   SubCutaneous three times a day before meals  insulin lispro Injectable (ADMELOG) 3 Unit(s) SubCutaneous three times a day before meals  midodrine 15 milliGRAM(s) Oral every 8 hours  pantoprazole  Injectable 40 milliGRAM(s) IV Push every 12 hours  senna 2 Tablet(s) Oral at bedtime  simvastatin 20 milliGRAM(s) Oral at bedtime    MEDICATIONS  (PRN):  acetaminophen     Tablet .. 650 milliGRAM(s) Oral every 6 hours PRN Temp greater or equal to 38C (100.4F), Mild Pain (1 - 3)  aluminum hydroxide/magnesium hydroxide/simethicone Suspension 30 milliLiter(s) Oral every 4 hours PRN Dyspepsia  dextrose Oral Gel 15 Gram(s) Oral once PRN Blood Glucose LESS THAN 70 milliGRAM(s)/deciliter  melatonin 3 milliGRAM(s) Oral at bedtime PRN Insomnia  ondansetron Injectable 4 milliGRAM(s) IV Push every 8 hours PRN Nausea and/or Vomiting  sodium chloride 0.9% lock flush 10 milliLiter(s) IV Push every 1 hour PRN Pre/post blood products, medications, blood draw, and to maintain line patency      Allergies    No Known Allergies    Intolerances              Vital Signs Last 24 Hrs  T(C): 37.1 (10 Mar 2024 04:44), Max: 37.1 (10 Mar 2024 04:44)  T(F): 98.7 (10 Mar 2024 04:44), Max: 98.7 (10 Mar 2024 04:44)  HR: 76 (10 Mar 2024 04:44) (68 - 88)  BP: 131/70 (10 Mar 2024 04:44) (116/76 - 149/67)  BP(mean): --  RR: 18 (09 Mar 2024 22:35) (18 - 18)  SpO2: 96% (10 Mar 2024 04:44) (95% - 97%)    Parameters below as of 10 Mar 2024 04:44  Patient On (Oxygen Delivery Method): nasal cannula        PHYSICAL EXAM:  GENERAL: Weak, languid  HEENT: No periorbital edema  NECK: Supple, no JVD; R IJ catheter  NERVOUS SYSTEM:  Awake, alert  CHEST/LUNG: Clear, decreased BS  HEART: No rub  ABDOMEN: Soft, +BS  EXTREMITIES: + improved dependent edema   SKIN: No rashes      LABS:                        9.6    13.09 )-----------( 379      ( 09 Mar 2024 06:30 )             30.4     03-09    134<L>  |  94<L>  |  25.2<H>  ----------------------------<  231<H>  4.4   |  25.0  |  4.37<H>    Ca    8.4      09 Mar 2024 06:30  Phos  4.1     03-09  Mg     1.9     03-09        Urinalysis Basic - ( 09 Mar 2024 06:30 )    Color: x / Appearance: x / SG: x / pH: x  Gluc: 231 mg/dL / Ketone: x  / Bili: x / Urobili: x   Blood: x / Protein: x / Nitrite: x   Leuk Esterase: x / RBC: x / WBC x   Sq Epi: x / Non Sq Epi: x / Bacteria: x          RADIOLOGY & ADDITIONAL TESTS:

## 2024-03-10 NOTE — PROGRESS NOTE ADULT - ASSESSMENT
84 yr old male with paroxysmal atrial fibrillation not on AC secondary to GI bleed, esophagitis, anemia, diabetes mellitus, hypertension, hyperlipidemia, CKD stage 3-4, BPH, cognitive impairment, recent COVID, acute cholecystitis in Dec 2023 s/p cholecystostomy tube placement on 12/22/23 presented to the ED with complaints of leaking around cholecystostomy tube and difficulty flushing the tube. A CT abdomen/pelvis with contrast was done in ED, tube noted to be in place, he was evaluated by surgery, tube was flushed without difficulty. While in ED, he was noted to have coffee ground emesis and pallor with some abdominal pain and transient drop in Hb. GI was consulted, he had recently had an EGD in January 2024 with grade A esophagitis. Hb improved, hence no additional work up recommended by GI. Course complicated by hypotension and hypoxia on 2/26, RRT was called and he was transferred to ICU for need for pressors, [place] oxygen requirement, likely septic shock from aspiration. Cultures were sent, empiric antibiotics were initiated. Also received steroids. Patient noted to have worsening renal function and was anuric, nephrology was consulted and HD was initiated on 2/27/24. HD was continued. He was weaned off Levophed and was transferred to medical floor on 2/29. Surgery follow up was done, recommended cholecystectomy once medically stable. While on medical floor, he was noted to have worsening leucocytosis, ID consultation was requested, cultures repeated, Zosyn was initiated. On 3/3, he was noted to have worsening hypoxia, ICU consult was requested, NIV was placed, CXR with left lung field opacification. He failed trial of BiPAP and was intubated on 3/3 and required Precedex and low dose of Levophed. His cultures remained negative. Palliative care was consulted, goals of care were discussed with family, they wished patient to be full code. Patient had waxing and waning mental status, initially required tube feeds. He was extubated on 3/4. HD was continued. He was transferred back to medical floor on 3/6/24. IR consulted for PermCath placement. He was evaluated by speech and swallow and diet was advanced. His cholecystostomy tubing broke on 3/9, IR consulted for tube exhange.     1. Acute metabolic encephalopathy sec recurrent sepsis, underlying RON:  Long and complicated course attributing to mental status  continue HD per schedule  CT head on 2/26 without acute changes  will repeat CT head and CT chest abdomen  monitor WBC  repeat cultures if febrile  aspiration precautions    2. RON sec ATN with underlying CKD stage 4, now on HD:  Continue HD per schedule.  nephrology following  continue Epogen for anemia sec underlying CKD  IR eval for PermCath placement.     3. S/p septic shock:  Continue Midodrine  wean as able  previously on Levophed and stress dose steroids.    4. Atrial fibrillation:  Not on AC sec GI bleed  on Dronedarone.     5. H/o acute cholecystitis s/p cholecystostomy tube placement:  Broken tubing, now attached to Horta  discussed with IR on call yesterday, scheduled for tube exchange in am  surgical follow up once more stable to assess for inpatient cholecystectomy    6. Hypertension, hyperlipidemia:  Not on anti hypertensives, BP stable  recent shock  continue statin    7. Diabetes mellitus:  A1C 7.7  Continue Lantus 10 units  pre meal insulin 3 units  monitor FS  sliding scale coverage.    8. Grade A esophagitis, anemia:  Hb stable  GI recs from early this admission noted  continue PPI  monitor CBC  continue Epogen    9. DVT ppx:  Heparin    Discussed with RN.  Guarded prognosis.  Eventual NITHIN with likely need for HD.   84 yr old male with paroxysmal atrial fibrillation not on AC secondary to GI bleed, esophagitis, anemia, diabetes mellitus, hypertension, hyperlipidemia, CKD stage 3-4, BPH, cognitive impairment, recent COVID, acute cholecystitis in Dec 2023 s/p cholecystostomy tube placement on 12/22/23 presented to the ED with complaints of leaking around cholecystostomy tube and difficulty flushing the tube. A CT abdomen/pelvis with contrast was done in ED, tube noted to be in place, he was evaluated by surgery, tube was flushed without difficulty. While in ED, he was noted to have coffee ground emesis and pallor with some abdominal pain and transient drop in Hb. GI was consulted, he had recently had an EGD in January 2024 with grade A esophagitis. Hb improved, hence no additional work up recommended by GI. Course complicated by hypotension and hypoxia on 2/26, RRT was called and he was transferred to ICU for need for pressors, [place] oxygen requirement, likely septic shock from aspiration. Cultures were sent, empiric antibiotics were initiated. Also received steroids. Patient noted to have worsening renal function and was anuric, nephrology was consulted and HD was initiated on 2/27/24. HD was continued. He was weaned off Levophed and was transferred to medical floor on 2/29. Surgery follow up was done, recommended cholecystectomy once medically stable. While on medical floor, he was noted to have worsening leucocytosis, ID consultation was requested, cultures repeated, Zosyn was initiated. On 3/3, he was noted to have worsening hypoxia, ICU consult was requested, NIV was placed, CXR with left lung field opacification. He failed trial of BiPAP and was intubated on 3/3 and required Precedex and low dose of Levophed. His cultures remained negative. Palliative care was consulted, goals of care were discussed with family, they wished patient to be full code. Patient had waxing and waning mental status, initially required tube feeds. He was extubated on 3/4. HD was continued. He was transferred back to medical floor on 3/6/24. IR consulted for PermCath placement. He was evaluated by speech and swallow and diet was advanced. His cholecystostomy tubing broke on 3/9, IR consulted for tube exhange.     1. Acute metabolic encephalopathy sec recurrent sepsis, underlying RON:  Long and complicated course attributing to mental status  continue HD per schedule  CT head on 2/26 without acute changes  will repeat CT head and CT chest abdomen  monitor WBC  completed course of Zosyn.  repeat cultures if febrile  aspiration precautions    2. RON sec ATN with underlying CKD stage 4, now on HD:  Continue HD per schedule.  nephrology following  continue Epogen for anemia sec underlying CKD  IR eval for PermCath placement.     3. S/p septic shock:  Continue Midodrine  wean as able  previously on Levophed and stress dose steroids.    4. Atrial fibrillation:  Not on AC sec GI bleed  on Dronedarone.     5. H/o acute cholecystitis s/p cholecystostomy tube placement:  Broken tubing, now attached to Horta  discussed with IR on call yesterday, scheduled for tube exchange in am  surgical follow up once more stable to assess for inpatient cholecystectomy    6. Hypertension, hyperlipidemia:  Not on anti hypertensives, BP stable  recent shock  continue statin    7. Diabetes mellitus:  A1C 7.7  Continue Lantus 10 units  pre meal insulin 3 units  monitor FS  sliding scale coverage.    8. Grade A esophagitis, anemia:  Hb stable  GI recs from early this admission noted  continue PPI  monitor CBC  continue Epogen    9. DVT ppx:  Heparin    Discussed with RN.  Guarded prognosis.  Eventual NITHIN with likely need for HD.  updated wife and niece at bedside.

## 2024-03-10 NOTE — PROGRESS NOTE ADULT - SUBJECTIVE AND OBJECTIVE BOX
INTERVAL HPI/OVERNIGHT EVENTS:    CC: RON sec ATN now on HD, metabolic encephalopathy, cholecystostomy tube present, a fib      Lethargic but arousable this am  does not want to eat  per RN was attempting to pull HD catheter  no fever  tubing for cholecystostomy broke last night    Vital Signs Last 24 Hrs  T(C): 36.6 (10 Mar 2024 11:00), Max: 37.3 (10 Mar 2024 06:30)  T(F): 97.8 (10 Mar 2024 11:00), Max: 99.1 (10 Mar 2024 06:30)  HR: 69 (10 Mar 2024 11:00) (68 - 88)  BP: 133/64 (10 Mar 2024 11:00) (116/76 - 149/67)  BP(mean): --  RR: 18 (10 Mar 2024 11:00) (18 - 18)  SpO2: 97% (10 Mar 2024 11:00) (95% - 97%)    Parameters below as of 10 Mar 2024 11:00  Patient On (Oxygen Delivery Method): room air        PHYSICAL EXAM:    GENERAL: drowsy but arousable, not in distress, moves all extermities  CHEST/LUNG: b/l air entry  HEART: reg  ABDOMEN: soft, non tender, bs+, cholecystostomy drain present, connected to Horta bag now  EXTREMITIES:  no edema, tenderness    MEDICATIONS  (STANDING):  albuterol/ipratropium for Nebulization 3 milliLiter(s) Nebulizer every 6 hours  buDESOnide    Inhalation Suspension 0.5 milliGRAM(s) Inhalation every 12 hours  chlorhexidine 2% Cloths 1 Application(s) Topical <User Schedule>  dextrose 5%. 1000 milliLiter(s) (50 mL/Hr) IV Continuous <Continuous>  dextrose 5%. 1000 milliLiter(s) (100 mL/Hr) IV Continuous <Continuous>  dextrose 50% Injectable 25 Gram(s) IV Push once  dextrose 50% Injectable 12.5 Gram(s) IV Push once  dextrose 50% Injectable 25 Gram(s) IV Push once  dronedarone 400 milliGRAM(s) Oral two times a day  epoetin kristal-epbx (RETACRIT) Injectable 16480 Unit(s) IV Push <User Schedule>  ferrous    sulfate 325 milliGRAM(s) Oral daily  glucagon  Injectable 1 milliGRAM(s) IntraMuscular once  heparin   Injectable 5000 Unit(s) SubCutaneous every 12 hours  insulin glargine Injectable (LANTUS) 10 Unit(s) SubCutaneous every morning  insulin lispro (ADMELOG) corrective regimen sliding scale   SubCutaneous at bedtime  insulin lispro (ADMELOG) corrective regimen sliding scale   SubCutaneous three times a day before meals  insulin lispro Injectable (ADMELOG) 3 Unit(s) SubCutaneous three times a day before meals  midodrine 15 milliGRAM(s) Oral every 8 hours  pantoprazole  Injectable 40 milliGRAM(s) IV Push every 12 hours  senna 2 Tablet(s) Oral at bedtime  simvastatin 20 milliGRAM(s) Oral at bedtime    MEDICATIONS  (PRN):  acetaminophen     Tablet .. 650 milliGRAM(s) Oral every 6 hours PRN Temp greater or equal to 38C (100.4F), Mild Pain (1 - 3)  aluminum hydroxide/magnesium hydroxide/simethicone Suspension 30 milliLiter(s) Oral every 4 hours PRN Dyspepsia  dextrose Oral Gel 15 Gram(s) Oral once PRN Blood Glucose LESS THAN 70 milliGRAM(s)/deciliter  melatonin 3 milliGRAM(s) Oral at bedtime PRN Insomnia  ondansetron Injectable 4 milliGRAM(s) IV Push every 8 hours PRN Nausea and/or Vomiting  sodium chloride 0.9% lock flush 10 milliLiter(s) IV Push every 1 hour PRN Pre/post blood products, medications, blood draw, and to maintain line patency      Allergies    No Known Allergies    Intolerances          LABS:                          9.5    14.35 )-----------( 402      ( 10 Mar 2024 06:56 )             29.4     03-10    135  |  97  |  31.6<H>  ----------------------------<  214<H>  3.9   |  24.0  |  5.73<H>    Ca    8.6      10 Mar 2024 06:56  Phos  4.2     03-10  Mg     1.9     03-10        Urinalysis Basic - ( 10 Mar 2024 06:56 )    Color: x / Appearance: x / SG: x / pH: x  Gluc: 214 mg/dL / Ketone: x  / Bili: x / Urobili: x   Blood: x / Protein: x / Nitrite: x   Leuk Esterase: x / RBC: x / WBC x   Sq Epi: x / Non Sq Epi: x / Bacteria: x        RADIOLOGY & ADDITIONAL TESTS:

## 2024-03-11 LAB
ALBUMIN SERPL ELPH-MCNC: 2.8 G/DL — LOW (ref 3.3–5.2)
ALP SERPL-CCNC: 157 U/L — HIGH (ref 40–120)
ALT FLD-CCNC: 19 U/L — SIGNIFICANT CHANGE UP
ANION GAP SERPL CALC-SCNC: 16 MMOL/L — SIGNIFICANT CHANGE UP (ref 5–17)
AST SERPL-CCNC: 24 U/L — SIGNIFICANT CHANGE UP
BILIRUB SERPL-MCNC: 0.5 MG/DL — SIGNIFICANT CHANGE UP (ref 0.4–2)
BUN SERPL-MCNC: 39 MG/DL — HIGH (ref 8–20)
CALCIUM SERPL-MCNC: 8.4 MG/DL — SIGNIFICANT CHANGE UP (ref 8.4–10.5)
CHLORIDE SERPL-SCNC: 96 MMOL/L — SIGNIFICANT CHANGE UP (ref 96–108)
CO2 SERPL-SCNC: 24 MMOL/L — SIGNIFICANT CHANGE UP (ref 22–29)
CREAT SERPL-MCNC: 6.24 MG/DL — HIGH (ref 0.5–1.3)
EGFR: 8 ML/MIN/1.73M2 — LOW
GLUCOSE BLDC GLUCOMTR-MCNC: 172 MG/DL — HIGH (ref 70–99)
GLUCOSE BLDC GLUCOMTR-MCNC: 204 MG/DL — HIGH (ref 70–99)
GLUCOSE BLDC GLUCOMTR-MCNC: 260 MG/DL — HIGH (ref 70–99)
GLUCOSE BLDC GLUCOMTR-MCNC: 286 MG/DL — HIGH (ref 70–99)
GLUCOSE SERPL-MCNC: 254 MG/DL — HIGH (ref 70–99)
HCT VFR BLD CALC: 29.2 % — LOW (ref 39–50)
HGB BLD-MCNC: 9.4 G/DL — LOW (ref 13–17)
MCHC RBC-ENTMCNC: 29.1 PG — SIGNIFICANT CHANGE UP (ref 27–34)
MCHC RBC-ENTMCNC: 32.2 GM/DL — SIGNIFICANT CHANGE UP (ref 32–36)
MCV RBC AUTO: 90.4 FL — SIGNIFICANT CHANGE UP (ref 80–100)
PHOSPHATE SERPL-MCNC: 4.6 MG/DL — SIGNIFICANT CHANGE UP (ref 2.4–4.7)
PLATELET # BLD AUTO: 423 K/UL — HIGH (ref 150–400)
POTASSIUM SERPL-MCNC: 4 MMOL/L — SIGNIFICANT CHANGE UP (ref 3.5–5.3)
POTASSIUM SERPL-SCNC: 4 MMOL/L — SIGNIFICANT CHANGE UP (ref 3.5–5.3)
PROT SERPL-MCNC: 6.8 G/DL — SIGNIFICANT CHANGE UP (ref 6.6–8.7)
RBC # BLD: 3.23 M/UL — LOW (ref 4.2–5.8)
RBC # FLD: 15.7 % — HIGH (ref 10.3–14.5)
SODIUM SERPL-SCNC: 136 MMOL/L — SIGNIFICANT CHANGE UP (ref 135–145)
WBC # BLD: 12.03 K/UL — HIGH (ref 3.8–10.5)
WBC # FLD AUTO: 12.03 K/UL — HIGH (ref 3.8–10.5)

## 2024-03-11 PROCEDURE — 99233 SBSQ HOSP IP/OBS HIGH 50: CPT

## 2024-03-11 PROCEDURE — 99221 1ST HOSP IP/OBS SF/LOW 40: CPT

## 2024-03-11 PROCEDURE — 47536 EXCHANGE BILIARY DRG CATH: CPT

## 2024-03-11 PROCEDURE — 99232 SBSQ HOSP IP/OBS MODERATE 35: CPT

## 2024-03-11 RX ORDER — HYDROMORPHONE HYDROCHLORIDE 2 MG/ML
1 INJECTION INTRAMUSCULAR; INTRAVENOUS; SUBCUTANEOUS ONCE
Refills: 0 | Status: DISCONTINUED | OUTPATIENT
Start: 2024-03-11 | End: 2024-03-11

## 2024-03-11 RX ADMIN — Medication 3 MILLILITER(S): at 02:52

## 2024-03-11 RX ADMIN — PANTOPRAZOLE SODIUM 40 MILLIGRAM(S): 20 TABLET, DELAYED RELEASE ORAL at 05:26

## 2024-03-11 RX ADMIN — Medication 3 MILLILITER(S): at 20:12

## 2024-03-11 RX ADMIN — Medication 6: at 17:18

## 2024-03-11 RX ADMIN — DRONEDARONE 400 MILLIGRAM(S): 400 TABLET, FILM COATED ORAL at 14:41

## 2024-03-11 RX ADMIN — Medication 650 MILLIGRAM(S): at 22:20

## 2024-03-11 RX ADMIN — ERYTHROPOIETIN 10000 UNIT(S): 10000 INJECTION, SOLUTION INTRAVENOUS; SUBCUTANEOUS at 11:30

## 2024-03-11 RX ADMIN — Medication 650 MILLIGRAM(S): at 21:18

## 2024-03-11 RX ADMIN — Medication 2: at 12:21

## 2024-03-11 RX ADMIN — Medication 325 MILLIGRAM(S): at 12:22

## 2024-03-11 RX ADMIN — Medication 0.5 MILLIGRAM(S): at 20:12

## 2024-03-11 RX ADMIN — HYDROMORPHONE HYDROCHLORIDE 1 MILLIGRAM(S): 2 INJECTION INTRAMUSCULAR; INTRAVENOUS; SUBCUTANEOUS at 23:02

## 2024-03-11 RX ADMIN — Medication 3 UNIT(S): at 12:21

## 2024-03-11 RX ADMIN — SENNA PLUS 2 TABLET(S): 8.6 TABLET ORAL at 21:18

## 2024-03-11 RX ADMIN — SIMVASTATIN 20 MILLIGRAM(S): 20 TABLET, FILM COATED ORAL at 21:18

## 2024-03-11 RX ADMIN — INSULIN GLARGINE 10 UNIT(S): 100 INJECTION, SOLUTION SUBCUTANEOUS at 12:19

## 2024-03-11 RX ADMIN — CHLORHEXIDINE GLUCONATE 1 APPLICATION(S): 213 SOLUTION TOPICAL at 05:30

## 2024-03-11 RX ADMIN — HEPARIN SODIUM 5000 UNIT(S): 5000 INJECTION INTRAVENOUS; SUBCUTANEOUS at 21:17

## 2024-03-11 RX ADMIN — Medication 3 MILLILITER(S): at 15:21

## 2024-03-11 RX ADMIN — PANTOPRAZOLE SODIUM 40 MILLIGRAM(S): 20 TABLET, DELAYED RELEASE ORAL at 17:17

## 2024-03-11 NOTE — PROGRESS NOTE ADULT - ASSESSMENT
CKD(IV) +DM, HTN  RON: ATN post IV contrast and hypotension--> remains HD dependent  CT scan: no hydronephrosis  - cont to avoid further potential nephrotoxins  - HD today  - will need tunnelled PC placement (if no signs of renal recovery)     Anemia: + multifactorial  - cont CINDI  - trend H/H  - target Hgb > 10.0    RO:  - low phos diet  - con to monitor off binders  - trend serum phos CKD(IV) +DM, HTN  RON: ATN post IV contrast and hypotension/sepsis--> remains HD dependent  CT scan: no hydronephrosis  - cont to avoid further potential nephrotoxins  - HD today  - will need tunnelled PC placement (if no signs of renal recovery)   - scheduled for biliary tube change today    Anemia: + multifactorial  - cont CINDI  - trend H/H  - target Hgb > 10.0    RO:  - low phos diet  - con to monitor off binders  - trend serum phos

## 2024-03-11 NOTE — PROCEDURE NOTE - PLAN
ACS called to evaluate patient due to GB defected noted on sky tube exchange exam. D/w primary team     no culture sent per infectious disease attending Dr. Ramos

## 2024-03-11 NOTE — CONSULT NOTE ADULT - SUBJECTIVE AND OBJECTIVE BOX
SUBJECTIVE: 85yo M w/ hx of afib, esophagitis, anemia, DM, HTN, HL, CKD, and acute cholecystitis s/p per sky tube placement in 12/2023 presenting with leakage around tube. Pt initially presented due to leakage but in ED had an episode of coffee ground emesis and aspiration. Of note pt with known hx of esophagitis. On admission pt then admitted to MICU with aspiration PNA on pressors. Pt now downgraded to medical floor and has been stable. Today, pt underwent tube study with IR due to leakage around tube as well as evidence of biloma seen on CT scan. Perc sky tube exchanged and biloma drained. Following tube exchnage, tube stude showed some extravasation of contrast at gallbladder neck suggesting leakage. Cystic duct open. Pt resting comfortably on medical floor, hemodynamically stable and off antibiotics at this time. Surgery consulted for recommendations regarding perc sky tube and possible surgical planning.     Vitals:  T(C): 36.4 (03-11-24 @ 21:11), Max: 36.8 (03-11-24 @ 05:12)  T(F): 97.6 (03-11-24 @ 21:11), Max: 98.3 (03-11-24 @ 08:25)  HR: 78 (03-11-24 @ 21:32) (67 - 86)  BP: 143/69 (03-11-24 @ 21:11) (116/65 - 148/72)  ABP: --  ABP(mean): --  RR: 16 (03-11-24 @ 21:11) (16 - 19)  SpO2: 92% (03-11-24 @ 21:32) (91% - 100%)    LABS:  cret                        9.4    12.03 )-----------( 423      ( 11 Mar 2024 06:29 )             29.2     03-11    136  |  96  |  39.0<H>  ----------------------------<  254<H>  4.0   |  24.0  |  6.24<H>    Ca    8.4      11 Mar 2024 06:29  Phos  4.6     03-11  Mg     1.9     03-10    TPro  6.8  /  Alb  2.8<L>  /  TBili  0.5  /  DBili  x   /  AST  24  /  ALT  19  /  AlkPhos  157<H>  03-11      GENERAL: NAD, lying in bed comfortably  HEAD:  Atraumatic, normocephalic  NECK: Supple, no JVD  HEART: RRR  LUNGS: Unlabored respirations. No conversational dyspnea.   ABDOMEN: Soft, RUQ tenderness to palpation, IR drains in place functioning appropriately.   EXTREMITIES: No clubbing, cyanosis, or edema  SKIN: No rashes or lesions

## 2024-03-11 NOTE — PROGRESS NOTE ADULT - TIME BILLING
coordinating care with MICU PA/resident, MICU RN, reviewing labs and prior records, personally reviewing and interpreting imaging, documenting findings and assessment in the medical record.
Critical care time spent adjusting noninvasive ventilator settings, adjusting oxygen, interpreting blood gases and chest imagin, assessing neurologic status and ability to protect airway.
Time spent with review of chart documents, labs, imaging. Direct patient assessment,  formulation of care plan. Discussion with  Interdisciplinary  team
Time spent with review of chart documents, labs, imaging. Direct patient assessment,  formulation of care plan. Discussion with  Interdisciplinary  team
Time spent with review of chart documents, labs, imaging. Direct patient assessment,  formulation of care plan. Discussion with  Interdisciplinary  team Including ACP 16    minutes
reviewing labs, notes, orders, radiographic studies, as well as counseling and coordinating care with the relevant multidisciplinary team, including with the primary and consulting providers.

## 2024-03-11 NOTE — PROGRESS NOTE ADULT - SUBJECTIVE AND OBJECTIVE BOX
INFECTIOUS DISEASES AND INTERNAL MEDICINE at [facility]  =======================================================  Claudio Hester MD  Diplomates American Board of Internal Medicine and Infectious Diseases  Telephone [phone]  Fax            [phone]  =======================================================    BERNADINE ANDERSON [id number]    Follow up /PNEUMONIA    Allergies:  No Known Allergies      Medications:  acetaminophen     Tablet .. 650 milliGRAM(s) Oral every 6 hours PRN  acetylcysteine 10%  Inhalation 4 milliLiter(s) Inhalation every 6 hours  albuterol/ipratropium for Nebulization 3 milliLiter(s) Nebulizer every 6 hours  aluminum hydroxide/magnesium hydroxide/simethicone Suspension 30 milliLiter(s) Oral every 4 hours PRN  buDESOnide    Inhalation Suspension 0.5 milliGRAM(s) Inhalation every 12 hours  chlorhexidine 2% Cloths 1 Application(s) Topical <User Schedule>  dextrose 5%. 1000 milliLiter(s) IV Continuous <Continuous>  dextrose 5%. 1000 milliLiter(s) IV Continuous <Continuous>  dextrose 50% Injectable 25 Gram(s) IV Push once  dextrose 50% Injectable 12.5 Gram(s) IV Push once  dextrose 50% Injectable 25 Gram(s) IV Push once  dextrose Oral Gel 15 Gram(s) Oral once PRN  dronedarone 400 milliGRAM(s) Oral two times a day  epoetin kristal-epbx (RETACRIT) Injectable 21365 Unit(s) IV Push <User Schedule>  ferrous    sulfate 325 milliGRAM(s) Oral daily  gabapentin 300 milliGRAM(s) Oral at bedtime  heparin   Injectable 5000 Unit(s) SubCutaneous every 8 hours  insulin glargine Injectable (LANTUS) 10 Unit(s) SubCutaneous every morning  insulin lispro (ADMELOG) corrective regimen sliding scale   SubCutaneous every 6 hours  melatonin 3 milliGRAM(s) Oral at bedtime PRN  midodrine 15 milliGRAM(s) Oral every 8 hours  ondansetron Injectable 4 milliGRAM(s) IV Push every 8 hours PRN  pantoprazole  Injectable 40 milliGRAM(s) IV Push every 12 hours  PPD  5 Tuberculin Unit(s) Injectable 5 Unit(s) IntraDermal once  senna 2 Tablet(s) Oral at bedtime  simvastatin 20 milliGRAM(s) Oral at bedtime  sodium chloride 0.9% lock flush 10 milliLiter(s) IV Push every 1 hour PRN    SOCIAL       FAMILY   FAMILY HISTORY:  Family history of stomach cancer  Father    Family history of emphysema  Mother      REVIEW OF SYSTEMS:  UNABLE TO OBTAIN           Physical Exam:   Vital Signs Last 24 Hrs  T(C): 36.6 (11 Mar 2024 11:10), Max: 37.1 (10 Mar 2024 16:53)  T(F): 97.9 (11 Mar 2024 11:10), Max: 98.7 (10 Mar 2024 16:53)  HR: 67 (11 Mar 2024 11:10) (66 - 86)  BP: 148/72 (11 Mar 2024 11:10) (116/65 - 148/72)  BP(mean): --  RR: 18 (11 Mar 2024 11:10) (18 - 18)  SpO2: 95% (11 Mar 2024 11:10) (95% - 100%)    Parameters below as of 11 Mar 2024 16:17  Patient On (Oxygen Delivery Method): nasal cannula            GEN: NAD,   HEENT: normocephalic and atraumatic. EOMI. ALTHEA.    NECK: Supple. No carotid bruits.  No lymphadenopathy or thyromegaly. LEFT SIDE CATHETER IN NECK  LUNGS: Clear to auscultation.  HEART: Regular rate and rhythm without murmur.  ABDOMEN: Soft, nontender, and nondistended.  Positive bowel sounds.   CHOLECYSTOTOMY TUBE  IN PLACE BROKEN   : No CVA tenderness  EXTREMITIES: Without any cyanosis, clubbing, rash, lesions or edema.  MSK: no joint swelling  NEUROLOGIC:  MORE AWAKE TODAY        Labs:  Vitals:  ====     =======================================================  Current Antibiotics:     Other medications:  acetylcysteine 10%  Inhalation 4 milliLiter(s) Inhalation every 6 hours  albuterol/ipratropium for Nebulization 3 milliLiter(s) Nebulizer every 6 hours  buDESOnide    Inhalation Suspension 0.5 milliGRAM(s) Inhalation every 12 hours  chlorhexidine 2% Cloths 1 Application(s) Topical <User Schedule>  dextrose 5%. 1000 milliLiter(s) IV Continuous <Continuous>  dextrose 5%. 1000 milliLiter(s) IV Continuous <Continuous>  dextrose 50% Injectable 25 Gram(s) IV Push once  dextrose 50% Injectable 12.5 Gram(s) IV Push once  dextrose 50% Injectable 25 Gram(s) IV Push once  dronedarone 400 milliGRAM(s) Oral two times a day  epoetin kristal-epbx (RETACRIT) Injectable 44622 Unit(s) IV Push <User Schedule>  ferrous    sulfate 325 milliGRAM(s) Oral daily  gabapentin 300 milliGRAM(s) Oral at bedtime  heparin   Injectable 5000 Unit(s) SubCutaneous every 8 hours  insulin glargine Injectable (LANTUS) 10 Unit(s) SubCutaneous every morning  insulin lispro (ADMELOG) corrective regimen sliding scale   SubCutaneous every 6 hours  midodrine 15 milliGRAM(s) Oral every 8 hours  pantoprazole  Injectable 40 milliGRAM(s) IV Push every 12 hours  PPD  5 Tuberculin Unit(s) Injectable 5 Unit(s) IntraDermal once  senna 2 Tablet(s) Oral at bedtime  simvastatin 20 milliGRAM(s) Oral at bedtime      =======================================================  Labs:                                           9.4    12.03 )-----------( 423      ( 11 Mar 2024 06:29 )             29.2   03-11    136  |  96  |  39.0<H>  ----------------------------<  254<H>  4.0   |  24.0  |  6.24<H>    Ca    8.4      11 Mar 2024 06:29  Phos  4.6     03-11  Mg     1.9     03-10    TPro  6.8  /  Alb  2.8<L>  /  TBili  0.5  /  DBili  x   /  AST  24  /  ALT  19  /  AlkPhos  157<H>  03-11        Culture - Sputum (collected 03-04-24 @ 13:07)  Source: .Sputum Sputum  Gram Stain (03-05-24 @ 00:34):    No polymorphonuclear leukocytes per low power field    No Squamous epithelial cells per low power field    Rare Gram Negative Rods per oil power field    Rare Gram positive cocci in pairs per oil power field  Final Report (03-06-24 @ 00:20):    Normal Respiratory Allyson present    Culture - Blood (collected 03-02-24 @ 12:50)  Source: .Blood Blood  Preliminary Report (03-06-24 @ 23:00):    No growth at 4 days    Culture - Blood (collected 03-02-24 @ 12:43)  Source: .Blood Blood  Preliminary Report (03-06-24 @ 23:00):    No growth at 4 days    Culture - Blood (collected 02-26-24 @ 10:51)  Source: .Blood Blood-Peripheral  Final Report (03-02-24 @ 17:01):    No growth at 5 days    Culture - Blood (collected 02-26-24 @ 10:42)  Source: .Blood Blood-Peripheral  Final Report (03-02-24 @ 17:01):    No growth at 5 days    Culture - Blood (collected 02-25-24 @ 19:33)  Source: .Blood Blood-Peripheral  Final Report (03-02-24 @ 03:00):    No growth at 5 days    Culture - Blood (collected 02-25-24 @ 19:23)  Source: .Blood Blood-Peripheral  Final Report (03-02-24 @ 03:00):    No growth at 5 days    Culture - Urine (collected 01-29-24 @ 05:54)  Source: Clean Catch Clean Catch (Midstream)  Final Report (01-30-24 @ 10:38):    <10,000 CFU/mL Normal Urogenital Allyson    Culture - Blood (collected 01-29-24 @ 01:20)  Source: .Blood Blood-Venous  Final Report (02-03-24 @ 09:00):    No growth at 5 days    Culture - Blood (collected 01-29-24 @ 01:13)  Source: .Blood Blood-Venous  Final Report (02-03-24 @ 09:00):    No growth at 5 days    Culture - Blood (collected 01-07-24 @ 08:05)  Source: .Blood Blood-Peripheral  Final Report (01-13-24 @ 02:00):    No growth at 5 days    Culture - Blood (collected 01-07-24 @ 07:55)  Source: .Blood Blood-Peripheral  Final Report (01-13-24 @ 02:00):    No growth at 5 days    Culture - Urine (collected 01-06-24 @ 23:56)  Source: Clean Catch Clean Catch (Midstream)  Final Report (01-08-24 @ 07:21):    <10,000 CFU/mL Normal Urogenital Allyson    Culture - Blood (collected 01-02-24 @ 09:05)  Source: .Blood Blood-Peripheral  Final Report (01-07-24 @ 17:00):    No growth at 5 days    Culture - Blood (collected 01-02-24 @ 08:57)  Source: .Blood Blood-Peripheral  Final Report (01-07-24 @ 17:00):    No growth at 5 days    Culture - Body Fluid with Gram Stain (collected 12-22-23 @ 14:40)  Source: Bile Bile Fluid  Gram Stain (12-23-23 @ 01:41):    No polymorphonuclear leukocytes seen    Gram Negative Rods seen    Gram positive cocci in pairs seen    by cytocentrifuge  Final Report (12-27-23 @ 16:52):    Moderate Escherichia coli    Few Streptococcus gallolyticus "Susceptibilities not performed"  Organism: Escherichia coli (12-27-23 @ 16:52)  Organism: Escherichia coli (12-27-23 @ 16:52)    Sensitivities:      Method Type: LUIS ENRIQUE      -  Amoxicillin/Clavulanic Acid: S <=8/4      -  Ampicillin: R >16 These ampicillin results predict results for amoxicillin      -  Ampicillin/Sulbactam: R >16/8      -  Aztreonam: S <=4      -  Cefazolin: S <=2      -  Cefepime: S <=2      -  Cefoxitin: S <=8      -  Ceftriaxone: S <=1      -  Ciprofloxacin: S <=0.25      -  Ertapenem: S <=0.5      -  Gentamicin: S <=2      -  Imipenem: S <=1      -  Levofloxacin: S <=0.5      -  Meropenem: S <=1      -  Piperacillin/Tazobactam: S <=8      -  Tobramycin: S <=2      -  Trimethoprim/Sulfamethoxazole: R >2/38    Culture - Blood (collected 12-21-23 @ 23:05)  Source: .Blood Blood  Final Report (12-27-23 @ 07:01):    No growth at 5 days    Culture - Blood (collected 12-21-23 @ 22:55)  Source: .Blood Blood  Final Report (12-27-23 @ 07:01):    No growth at 5 days    Culture - Urine (collected 12-21-23 @ 04:45)  Source: Clean Catch Clean Catch (Midstream)  Final Report (12-22-23 @ 08:51):    <10,000 CFU/mL Normal Urogenital Allyson    Culture - Blood (collected 12-21-23 @ 04:30)  Source: .Blood Blood-Peripheral  Final Report (12-26-23 @ 09:00):    No growth at 5 days    Culture - Blood (collected 12-21-23 @ 04:20)  Source: .Blood Blood-Peripheral  Final Report (12-26-23 @ 09:00):    No growth at 5 days      Creatinine: 4.16 mg/dL (03-07-24 @ 06:08)  Creatinine: 6.21 mg/dL (03-06-24 @ 03:50)  Creatinine: 4.80 mg/dL (03-05-24 @ 03:50)  Creatinine: 6.54 mg/dL (03-04-24 @ 03:20)  Creatinine: 5.83 mg/dL (03-03-24 @ 07:50)    Procalcitonin, Serum: 24.62 ng/mL (02-29-24 @ 05:08)  Procalcitonin, Serum: 18.92 ng/mL (02-26-24 @ 10:51)      Ferritin: 565 ng/mL (02-29-24 @ 05:08)      WBC Count: 12.74 K/uL (03-07-24 @ 06:08)  WBC Count: 15.87 K/uL (03-06-24 @ 03:50)  WBC Count: 16.98 K/uL (03-05-24 @ 03:50)  WBC Count: 19.58 K/uL (03-04-24 @ 03:20)  WBC Count: 23.69 K/uL (03-03-24 @ 07:50)    SARS-CoV-2: NotDetec (03-03-24 @ 04:34)  SARS-CoV-2: NotDete (03-01-24 @ 17:52)      Alkaline Phosphatase: 197 U/L (03-06-24 @ 03:50)  Alkaline Phosphatase: 163 U/L (03-05-24 @ 03:50)  Alkaline Phosphatase: 113 U/L (03-04-24 @ 03:20)  Alanine Aminotransferase (ALT/SGPT): 21 U/L (03-06-24 @ 03:50)  Alanine Aminotransferase (ALT/SGPT): 19 U/L (03-05-24 @ 03:50)  Alanine Aminotransferase (ALT/SGPT): 16 U/L (03-04-24 @ 03:20)  Aspartate Aminotransferase (AST/SGOT): 27 U/L (03-06-24 @ 03:50)  Aspartate Aminotransferase (AST/SGOT): 26 U/L (03-05-24 @ 03:50)  Aspartate Aminotransferase (AST/SGOT): 19 U/L (03-04-24 @ 03:20)  Bilirubin Total: 0.5 mg/dL (03-06-24 @ 03:50)  Bilirubin Total: 0.7 mg/dL (03-05-24 @ 03:50)  Bilirubin Total: 0.9 mg/dL (03-04-24 @ 03:20)

## 2024-03-11 NOTE — CONSULT NOTE ADULT - ASSESSMENT
ASSESSMENT: 83yo M w/ hx of perc sky tube placement in 12/2023 with interval cholecystectomy delayed due to multiple medical comorbidities. Perc sky tube leaking on admission, now s/p exchange by IR and drainage of biloma, tube study showing evidence of leakage at gallbladder neck and open cystic duct. Pt likely with perforation of gallbladder around neck. Given biloma formation and recent MICU admission, it is not optimal to pursue surgical intervention at the current time.     PLAN:  - no immediate surgical intervention, recommend ~6weeks for resolution of inflammatory changes and patient optimization prior to considering cholecystectomy vs. perc sky discontinuation alone  - recommend GI eval for possible stent placement for leakage at gallbladder neck  - continue biloma drain  - monitor perc sky drain, flush as needed   - continue to monitor abdominal exam  - rest of plan per primary team  - surgery to follow    Pt seen and plan discussed with attending, Dr. Maurice   ASSESSMENT: 83yo M w/ hx of perc sky tube placement in 12/2023 with interval cholecystectomy delayed due to multiple medical comorbidities. Perc sky tube leaking on admission, now s/p exchange by IR and drainage of biloma, tube study showing evidence of leakage at gallbladder neck and open cystic duct. Pt likely with perforation of gallbladder around neck. Given biloma formation and recent MICU admission, it is not optimal to pursue surgical intervention at the current time.     PLAN:  - no immediate surgical intervention, recommend ~6weeks for resolution of inflammatory changes and patient optimization prior to considering cholecystectomy vs. perc sky discontinuation alone  - recommend GI eval for possible CBD stent placement for leakage at gallbladder neck (ie prevent flow into cystic duct)  - continue biloma drain  - monitor perc sky drain, flush as needed   - continue to monitor abdominal exam  - rest of plan per primary team  - surgery to follow    Pt seen and plan discussed with attending, Dr. Maurice

## 2024-03-11 NOTE — PROGRESS NOTE ADULT - SUBJECTIVE AND OBJECTIVE BOX
CC:  Follow up GOC , Symptoms    OVERNIGHT EVENTS:  passed SLP eval    Present Symptoms:   Dyspnea:  No    Nausea/Vomiting:  No    Anxiety:  No     Depression:  No    Fatigue:  Yes   Loss of appetite:  No ate most of meal  Constipation: Not Reported       Pain: No               Location            Duration            Character            Severity            Factors            Effect    Pain AD Score:  [email or web address] (press ctrl + left click to view)    Review of Systems: Reviewed as above  Limited ROS unable to  obtain due to poorhistorian      MEDICATIONS  (STANDING):  albuterol/ipratropium for Nebulization 3 milliLiter(s) Nebulizer every 6 hours  buDESOnide    Inhalation Suspension 0.5 milliGRAM(s) Inhalation every 12 hours  chlorhexidine 2% Cloths 1 Application(s) Topical <User Schedule>  dextrose 5%. 1000 milliLiter(s) (100 mL/Hr) IV Continuous <Continuous>  dextrose 5%. 1000 milliLiter(s) (50 mL/Hr) IV Continuous <Continuous>  dextrose 50% Injectable 12.5 Gram(s) IV Push once  dextrose 50% Injectable 25 Gram(s) IV Push once  dextrose 50% Injectable 25 Gram(s) IV Push once  dronedarone 400 milliGRAM(s) Oral two times a day  epoetin kristal-epbx (RETACRIT) Injectable 79104 Unit(s) IV Push <User Schedule>  ferrous    sulfate 325 milliGRAM(s) Oral daily  glucagon  Injectable 1 milliGRAM(s) IntraMuscular once  heparin   Injectable 5000 Unit(s) SubCutaneous every 12 hours  insulin glargine Injectable (LANTUS) 10 Unit(s) SubCutaneous every morning  insulin lispro (ADMELOG) corrective regimen sliding scale   SubCutaneous at bedtime  insulin lispro (ADMELOG) corrective regimen sliding scale   SubCutaneous three times a day before meals  insulin lispro Injectable (ADMELOG) 3 Unit(s) SubCutaneous three times a day before meals  midodrine 15 milliGRAM(s) Oral every 8 hours  pantoprazole  Injectable 40 milliGRAM(s) IV Push every 12 hours  senna 2 Tablet(s) Oral at bedtime  simvastatin 20 milliGRAM(s) Oral at bedtime    MEDICATIONS  (PRN):  acetaminophen     Tablet .. 650 milliGRAM(s) Oral every 6 hours PRN Temp greater or equal to 38C (100.4F), Mild Pain (1 - 3)  aluminum hydroxide/magnesium hydroxide/simethicone Suspension 30 milliLiter(s) Oral every 4 hours PRN Dyspepsia  dextrose Oral Gel 15 Gram(s) Oral once PRN Blood Glucose LESS THAN 70 milliGRAM(s)/deciliter  melatonin 3 milliGRAM(s) Oral at bedtime PRN Insomnia  ondansetron Injectable 4 milliGRAM(s) IV Push every 8 hours PRN Nausea and/or Vomiting  sodium chloride 0.9% lock flush 10 milliLiter(s) IV Push every 1 hour PRN Pre/post blood products, medications, blood draw, and to maintain line patency      PHYSICAL EXAM:    Vital Signs Last 24 Hrs  T(C): 36.6 (11 Mar 2024 11:10), Max: 37.1 (10 Mar 2024 16:53)  T(F): 97.9 (11 Mar 2024 11:10), Max: 98.7 (10 Mar 2024 16:53)  HR: 67 (11 Mar 2024 11:10) (66 - 86)  BP: 148/72 (11 Mar 2024 11:10) (116/65 - 148/72)  BP(mean): --  RR: 18 (11 Mar 2024 11:10) (18 - 18)  SpO2: 95% (11 Mar 2024 11:10) (95% - 100%)    Parameters below as of 11 Mar 2024 11:10  Patient On (Oxygen Delivery Method): nasal cannula  O2 Flow (L/min): 4      Karnofsky: 40 %  General:  Elderly man awake alert NAD       HEENT:  NCAT   Lungs: comfortable  non labored  CV:  RR  GI:  soft NTND  MSK: weakness  Skin:  warm/dry  Neuro  awake alert no focal deficits  Psych  calm, smiling    LABS:                          9.4    12.03 )-----------( 423      ( 11 Mar 2024 06:29 )             29.2     03-11    136  |  96  |  39.0<H>  ----------------------------<  254<H>  4.0   |  24.0  |  6.24<H>    Ca    8.4      11 Mar 2024 06:29  Phos  4.6     03-11  Mg     1.9     03-10    TPro  6.8  /  Alb  2.8<L>  /  TBili  0.5  /  DBili  x   /  AST  24  /  ALT  19  /  AlkPhos  157<H>  03-11      Urinalysis Basic - ( 11 Mar 2024 06:29 )    Color: x / Appearance: x / SG: x / pH: x  Gluc: 254 mg/dL / Ketone: x  / Bili: x / Urobili: x   Blood: x / Protein: x / Nitrite: x   Leuk Esterase: x / RBC: x / WBC x   Sq Epi: x / Non Sq Epi: x / Bacteria: x      I&O's Summary      RADIOLOGY & ADDITIONAL STUDIES:  Imaging Reviewed  (  x )   < from: CT Abdomen and Pelvis No Cont (03.10.24 @ 17:50) >  FINDINGS:  CHEST:  LUNGS AND LARGE AIRWAYS: Patent central airways. Stable near complete   atelectatic collapse of the right lower lobe. Moderate right middle lobe   atelectasis. Moderately improved left lower lobe aeration with residual   dependent atelectasis.  PLEURA: Slight increase in size of a mild layering right pleural effusion.  VESSELS: Right internal jugular dual-lumen catheter. Within normal limits.  HEART: Heart size is normal. No pericardial effusion.  MEDIASTINUM AND MARIANELA: No lymphadenopathy.  CHEST WALL AND LOWER NECK: Within normal limits.    ABDOMEN AND PELVIS:  LIVER: Within normal limits.  BILEDUCTS: Normal caliber.  GALLBLADDER: Percutaneous cholecystostomy tube in place. Gallstone in the   gallbladder neck. There is persistent loculated subcapsular fluid   tracking along the right liver margin, suspicious for biloma.  SPLEEN: Within normal limits.  PANCREAS: Within normal limits.  ADRENALS: Within normal limits.  KIDNEYS/URETERS: 2 mm nonobstructing right renal upper pole stone.   Bilateral renal cysts. Mild bilateral perinephric stranding. Otherwise,   within normal limits.    BLADDER: Distended and smooth in contour.  REPRODUCTIVE ORGANS: The prostate is not enlarged.    BOWEL: Sigmoid diverticulosis. No bowel obstruction. Appendix is normal.  PERITONEUM: No ascites.  VESSELS: Moderate atherosclerotic calcification of the nonaneurysmal   abdominal aorta.  RETROPERITONEUM/LYMPH NODES: No lymphadenopathy.  ABDOMINAL WALL: Within normal limits.  BONES: Within normal limits.    IMPRESSION: Persistent subcapsular fluid collections along the right   lateral liver, suspicious for developing biloma. Improved left lower lobe   aeration.    --- End of Report ---            TENZIN REHMAN MD; Attending Radiologist  This document has been electronically signed. Mar 10 2024  6:55PM    < end of copied text >            ADVANCE DIRECTIVE PREFERENCES    Full Code

## 2024-03-11 NOTE — PROGRESS NOTE ADULT - SUBJECTIVE AND OBJECTIVE BOX
NEPHROLOGY INTERVAL HPI/OVERNIGHT EVENTS:  no adverse overnight events documented  no acute distress noted    MEDICATIONS  (STANDING):  albuterol/ipratropium for Nebulization 3 milliLiter(s) Nebulizer every 6 hours  buDESOnide    Inhalation Suspension 0.5 milliGRAM(s) Inhalation every 12 hours  chlorhexidine 2% Cloths 1 Application(s) Topical <User Schedule>  dextrose 5%. 1000 milliLiter(s) (50 mL/Hr) IV Continuous <Continuous>  dextrose 5%. 1000 milliLiter(s) (100 mL/Hr) IV Continuous <Continuous>  dextrose 50% Injectable 12.5 Gram(s) IV Push once  dextrose 50% Injectable 25 Gram(s) IV Push once  dextrose 50% Injectable 25 Gram(s) IV Push once  dronedarone 400 milliGRAM(s) Oral two times a day  epoetin kristal-epbx (RETACRIT) Injectable 29912 Unit(s) IV Push <User Schedule>  ferrous    sulfate 325 milliGRAM(s) Oral daily  glucagon  Injectable 1 milliGRAM(s) IntraMuscular once  heparin   Injectable 5000 Unit(s) SubCutaneous every 12 hours  insulin glargine Injectable (LANTUS) 10 Unit(s) SubCutaneous every morning  insulin lispro (ADMELOG) corrective regimen sliding scale   SubCutaneous at bedtime  insulin lispro (ADMELOG) corrective regimen sliding scale   SubCutaneous three times a day before meals  insulin lispro Injectable (ADMELOG) 3 Unit(s) SubCutaneous three times a day before meals  midodrine 15 milliGRAM(s) Oral every 8 hours  pantoprazole  Injectable 40 milliGRAM(s) IV Push every 12 hours  senna 2 Tablet(s) Oral at bedtime  simvastatin 20 milliGRAM(s) Oral at bedtime    MEDICATIONS  (PRN):  acetaminophen     Tablet .. 650 milliGRAM(s) Oral every 6 hours PRN Temp greater or equal to 38C (100.4F), Mild Pain (1 - 3)  aluminum hydroxide/magnesium hydroxide/simethicone Suspension 30 milliLiter(s) Oral every 4 hours PRN Dyspepsia  dextrose Oral Gel 15 Gram(s) Oral once PRN Blood Glucose LESS THAN 70 milliGRAM(s)/deciliter  melatonin 3 milliGRAM(s) Oral at bedtime PRN Insomnia  ondansetron Injectable 4 milliGRAM(s) IV Push every 8 hours PRN Nausea and/or Vomiting  sodium chloride 0.9% lock flush 10 milliLiter(s) IV Push every 1 hour PRN Pre/post blood products, medications, blood draw, and to maintain line patency      Allergies    No Known Allergies        Vital Signs Last 24 Hrs  T(C): 36.8 (11 Mar 2024 05:12), Max: 37.1 (10 Mar 2024 16:53)  T(F): 98.2 (11 Mar 2024 05:12), Max: 98.7 (10 Mar 2024 16:53)  HR: 86 (11 Mar 2024 05:12) (66 - 86)  BP: 116/65 (11 Mar 2024 05:12) (116/65 - 140/69)  BP(mean): --  RR: 18 (10 Mar 2024 21:55) (18 - 18)  SpO2: 100% (11 Mar 2024 05:12) (95% - 100%)    Parameters below as of 11 Mar 2024 05:12  Patient On (Oxygen Delivery Method): nasal cannula        PHYSICAL EXAM:  GENERAL: Torpid  HEENT: Moist MMs  NECK: Supple, no JVD; R IJ catheter  NERVOUS SYSTEM: Lethargic, slow to respond  CHEST/LUNG: Clear, decreased BS  HEART: No rub  ABDOMEN: Soft, +BS  EXTREMITIES: + improved dependent edema   SKIN: No rashes      LABS:                        9.5    14.35 )-----------( 402      ( 10 Mar 2024 06:56 )             29.4     03-10    135  |  97  |  31.6<H>  ----------------------------<  214<H>  3.9   |  24.0  |  5.73<H>    Ca    8.6      10 Mar 2024 06:56  Phos  4.2     03-10  Mg     1.9     03-10        Urinalysis Basic - ( 10 Mar 2024 11:00 )    Color: Yellow / Appearance: Clear / S.013 / pH: x  Gluc: x / Ketone: Negative mg/dL  / Bili: Negative / Urobili: 1.0 mg/dL   Blood: x / Protein: 100 mg/dL / Nitrite: Negative   Leuk Esterase: Negative / RBC: 1 /HPF / WBC 2 /HPF   Sq Epi: x / Non Sq Epi: 2 /HPF / Bacteria: Occasional /HPF      Magnesium: 1.9 mg/dL (03-10 @ 06:56)  Phosphorus: 4.2 mg/dL (03-10 @ 06:56)      RADIOLOGY & ADDITIONAL TESTS:  < from: CT Abdomen and Pelvis No Cont (03.10.24 @ 17:50) >    ACC: [id number] EXAM:  CT CHEST   ORDERED BY: RANDA HOLLOWAY     ACC: [id number] EXAM:  CT ABDOMEN AND PELVIS   ORDERED BY: RANDA HOLLOWAY     PROCEDURE DATE:  03/10/2024          INTERPRETATION:  CLINICAL INFORMATION: Confusion. Cholecystostomy drain.    COMPARISON: Noncontrast CT of the abdomen and pelvis 2024, CT   angiogram of the abdomen and pelvis 2024    CONTRAST/COMPLICATIONS:  IV Contrast: NONE  Oral Contrast: NONE  Complications: None reported at time of study completion    PROCEDURE:  CT of the Chest, Abdomen and Pelvis was performed.  Sagittal and coronal reformats were performed.    FINDINGS:  CHEST:  LUNGS AND LARGE AIRWAYS: Patent central airways. Stable near complete   atelectatic collapse of the right lower lobe. Moderate right middle lobe   atelectasis. Moderately improved left lower lobe aeration with residual   dependent atelectasis.  PLEURA: Slight increase in size of a mild layering right pleural effusion.  VESSELS: Right internal jugular dual-lumen catheter. Within normal limits.  HEART: Heart size is normal. No pericardial effusion.  MEDIASTINUM AND MARIANELA: No lymphadenopathy.  CHEST WALL AND LOWER NECK: Within normal limits.    ABDOMEN AND PELVIS:  LIVER: Within normal limits.  BILEDUCTS: Normal caliber.  GALLBLADDER: Percutaneous cholecystostomy tube in place. Gallstone in the   gallbladder neck. There is persistent loculated subcapsular fluid   tracking along the right liver margin, suspicious for biloma.  SPLEEN: Within normal limits.  PANCREAS: Within normal limits.  ADRENALS: Within normal limits.  KIDNEYS/URETERS: 2 mm nonobstructing right renal upper pole stone.   Bilateral renal cysts. Mild bilateral perinephric stranding. Otherwise,   within normal limits.    BLADDER: Distended and smooth in contour.  REPRODUCTIVE ORGANS: The prostate is not enlarged.    BOWEL: Sigmoid diverticulosis. No bowel obstruction. Appendix is normal.  PERITONEUM: No ascites.  VESSELS: Moderate atherosclerotic calcification of the nonaneurysmal   abdominal aorta.  RETROPERITONEUM/LYMPH NODES: No lymphadenopathy.  ABDOMINAL WALL: Within normal limits.  BONES: Within normal limits.    IMPRESSION: Persistent subcapsular fluid collections along the right   lateral liver, suspicious for developing biloma. Improved left lower lobe   aeration.    < end of copied text >   NEPHROLOGY INTERVAL HPI/OVERNIGHT EVENTS:  no adverse overnight events documented  no acute distress noted  family member at bedside    MEDICATIONS  (STANDING):  albuterol/ipratropium for Nebulization 3 milliLiter(s) Nebulizer every 6 hours  buDESOnide    Inhalation Suspension 0.5 milliGRAM(s) Inhalation every 12 hours  chlorhexidine 2% Cloths 1 Application(s) Topical <User Schedule>  dextrose 5%. 1000 milliLiter(s) (50 mL/Hr) IV Continuous <Continuous>  dextrose 5%. 1000 milliLiter(s) (100 mL/Hr) IV Continuous <Continuous>  dextrose 50% Injectable 12.5 Gram(s) IV Push once  dextrose 50% Injectable 25 Gram(s) IV Push once  dextrose 50% Injectable 25 Gram(s) IV Push once  dronedarone 400 milliGRAM(s) Oral two times a day  epoetin kristal-epbx (RETACRIT) Injectable 86063 Unit(s) IV Push <User Schedule>  ferrous    sulfate 325 milliGRAM(s) Oral daily  glucagon  Injectable 1 milliGRAM(s) IntraMuscular once  heparin   Injectable 5000 Unit(s) SubCutaneous every 12 hours  insulin glargine Injectable (LANTUS) 10 Unit(s) SubCutaneous every morning  insulin lispro (ADMELOG) corrective regimen sliding scale   SubCutaneous at bedtime  insulin lispro (ADMELOG) corrective regimen sliding scale   SubCutaneous three times a day before meals  insulin lispro Injectable (ADMELOG) 3 Unit(s) SubCutaneous three times a day before meals  midodrine 15 milliGRAM(s) Oral every 8 hours  pantoprazole  Injectable 40 milliGRAM(s) IV Push every 12 hours  senna 2 Tablet(s) Oral at bedtime  simvastatin 20 milliGRAM(s) Oral at bedtime    MEDICATIONS  (PRN):  acetaminophen     Tablet .. 650 milliGRAM(s) Oral every 6 hours PRN Temp greater or equal to 38C (100.4F), Mild Pain (1 - 3)  aluminum hydroxide/magnesium hydroxide/simethicone Suspension 30 milliLiter(s) Oral every 4 hours PRN Dyspepsia  dextrose Oral Gel 15 Gram(s) Oral once PRN Blood Glucose LESS THAN 70 milliGRAM(s)/deciliter  melatonin 3 milliGRAM(s) Oral at bedtime PRN Insomnia  ondansetron Injectable 4 milliGRAM(s) IV Push every 8 hours PRN Nausea and/or Vomiting  sodium chloride 0.9% lock flush 10 milliLiter(s) IV Push every 1 hour PRN Pre/post blood products, medications, blood draw, and to maintain line patency      Allergies    No Known Allergies        Vital Signs Last 24 Hrs  T(C): 36.8 (11 Mar 2024 05:12), Max: 37.1 (10 Mar 2024 16:53)  T(F): 98.2 (11 Mar 2024 05:12), Max: 98.7 (10 Mar 2024 16:53)  HR: 86 (11 Mar 2024 05:12) (66 - 86)  BP: 116/65 (11 Mar 2024 05:12) (116/65 - 140/69)  BP(mean): --  RR: 18 (10 Mar 2024 21:55) (18 - 18)  SpO2: 100% (11 Mar 2024 05:12) (95% - 100%)    Parameters below as of 11 Mar 2024 05:12  Patient On (Oxygen Delivery Method): nasal cannula        PHYSICAL EXAM:  GENERAL: Torpid  HEENT: Moist MMs  NECK: Supple, no JVD; R IJ catheter  NERVOUS SYSTEM: More awake, alert  CHEST/LUNG: Clear, decreased BS  HEART: No rub  ABDOMEN: Soft, +BS  EXTREMITIES: + improved dependent edema   SKIN: No rashes      LABS:                        9.5    14.35 )-----------( 402      ( 10 Mar 2024 06:56 )             29.4     03-10    135  |  97  |  31.6<H>  ----------------------------<  214<H>  3.9   |  24.0  |  5.73<H>    Ca    8.6      10 Mar 2024 06:56  Phos  4.2     03-10  Mg     1.9     03-10        Urinalysis Basic - ( 10 Mar 2024 11:00 )    Color: Yellow / Appearance: Clear / S.013 / pH: x  Gluc: x / Ketone: Negative mg/dL  / Bili: Negative / Urobili: 1.0 mg/dL   Blood: x / Protein: 100 mg/dL / Nitrite: Negative   Leuk Esterase: Negative / RBC: 1 /HPF / WBC 2 /HPF   Sq Epi: x / Non Sq Epi: 2 /HPF / Bacteria: Occasional /HPF      Magnesium: 1.9 mg/dL (03-10 @ 06:56)  Phosphorus: 4.2 mg/dL (03-10 @ 06:56)      RADIOLOGY & ADDITIONAL TESTS:  < from: CT Abdomen and Pelvis No Cont (03.10.24 @ 17:50) >    ACC: [id number] EXAM:  CT CHEST   ORDERED BY: RANDA HOLLOWAY     ACC: [id number] EXAM:  CT ABDOMEN AND PELVIS   ORDERED BY: RANDA HOLLOWAY     PROCEDURE DATE:  03/10/2024          INTERPRETATION:  CLINICAL INFORMATION: Confusion. Cholecystostomy drain.    COMPARISON: Noncontrast CT of the abdomen and pelvis 2024, CT   angiogram of the abdomen and pelvis 2024    CONTRAST/COMPLICATIONS:  IV Contrast: NONE  Oral Contrast: NONE  Complications: None reported at time of study completion    PROCEDURE:  CT of the Chest, Abdomen and Pelvis was performed.  Sagittal and coronal reformats were performed.    FINDINGS:  CHEST:  LUNGS AND LARGE AIRWAYS: Patent central airways. Stable near complete   atelectatic collapse of the right lower lobe. Moderate right middle lobe   atelectasis. Moderately improved left lower lobe aeration with residual   dependent atelectasis.  PLEURA: Slight increase in size of a mild layering right pleural effusion.  VESSELS: Right internal jugular dual-lumen catheter. Within normal limits.  HEART: Heart size is normal. No pericardial effusion.  MEDIASTINUM AND MARIANELA: No lymphadenopathy.  CHEST WALL AND LOWER NECK: Within normal limits.    ABDOMEN AND PELVIS:  LIVER: Within normal limits.  BILEDUCTS: Normal caliber.  GALLBLADDER: Percutaneous cholecystostomy tube in place. Gallstone in the   gallbladder neck. There is persistent loculated subcapsular fluid   tracking along the right liver margin, suspicious for biloma.  SPLEEN: Within normal limits.  PANCREAS: Within normal limits.  ADRENALS: Within normal limits.  KIDNEYS/URETERS: 2 mm nonobstructing right renal upper pole stone.   Bilateral renal cysts. Mild bilateral perinephric stranding. Otherwise,   within normal limits.    BLADDER: Distended and smooth in contour.  REPRODUCTIVE ORGANS: The prostate is not enlarged.    BOWEL: Sigmoid diverticulosis. No bowel obstruction. Appendix is normal.  PERITONEUM: No ascites.  VESSELS: Moderate atherosclerotic calcification of the nonaneurysmal   abdominal aorta.  RETROPERITONEUM/LYMPH NODES: No lymphadenopathy.  ABDOMINAL WALL: Within normal limits.  BONES: Within normal limits.    IMPRESSION: Persistent subcapsular fluid collections along the right   lateral liver, suspicious for developing biloma. Improved left lower lobe   aeration.    < end of copied text >

## 2024-03-11 NOTE — PROGRESS NOTE ADULT - ASSESSMENT
85 y/o male with PMH of Acute Cholecystitis s/p Cholecystomy Tube (12/22/23), COVID-19, PAF not on AC due to GIB + Fall Risk, Esophagitis, Anemia, DM 2, HTN, HLD, CKD III/IV, BPH and Cognitive Impairment  came to the ED complaining of leakage around sky tube. As per wife at bed side, family noticed fluid coming out on the skin when they attempted to flush the sky tube. Wife said patient has been doing well since discharged otherwise. No fever, chills, abdominal pain, change in bowel/urinary habit, nausea, vomiting noted.   In the ED, patient was seen by surgery team, no acute surgical intervention. As per ED, patient vomited large amount of coffee ground emesis, looked very pale, holding abdomen; CT angio abdomen done: no acute GI bleed. Repeat CBC: Hb: 11.3---->9.3. Also, he desaturated concerning for aspiration PNA, antibiotic and oxygen therapy.     AS ABOVE HX OF CHOLECYSTOTOMY PLACEMENT 12/22 CAME TO ER WITH ? LEAKAGE   PT WITH HYPOTENSION WITH ? ASPIRATION PNEUMONIA WAS IN ICU AND TREATED WITH PRESSORS  IV ABX     WAS PLACED ON HD FOR RENAL FAILURE  RETURNED TO   ICU WITH INCREASING WBC ADN SOB   PT MORE AWAKE     WBC  DOWN TO 12K     REPEAT CULTURES NEGATIVE   PT COMPLETED COURSE OF IV ZOSYN  OBSERVE OFF ABX   PLAN FOR   PERMA CATH PLACEMENT   FOR CHANGE OF CHOLECYSTOTOMY TUBE BY IR   CT SCAN WITH POSSIBLE   BILOMA  WILL REVIEW WITH RADIOLOGY DEFER ABX FOR NOW

## 2024-03-11 NOTE — PROGRESS NOTE ADULT - SUBJECTIVE AND OBJECTIVE BOX
INTERVAL HPI/OVERNIGHT EVENTS:    CC:  RON sec ATN now on HD, metabolic encephalopathy, cholecystostomy tube present, a fib    Seen during HD  no overnight events  more alert, denies pain  no fever.     Vital Signs Last 24 Hrs  T(C): 36.6 (11 Mar 2024 11:10), Max: 37.1 (10 Mar 2024 16:53)  T(F): 97.9 (11 Mar 2024 11:10), Max: 98.7 (10 Mar 2024 16:53)  HR: 67 (11 Mar 2024 11:10) (66 - 86)  BP: 148/72 (11 Mar 2024 11:10) (116/65 - 148/72)  BP(mean): --  RR: 18 (11 Mar 2024 11:10) (18 - 18)  SpO2: 95% (11 Mar 2024 11:10) (95% - 100%)    Parameters below as of 11 Mar 2024 11:10  Patient On (Oxygen Delivery Method): nasal cannula  O2 Flow (L/min): 4      PHYSICAL EXAM:    GENERAL: more alert, oriented x 2  CHEST/LUNG: b/l air entry  HEART: reg  ABDOMEN: soft, non tender, bs+  EXTREMITIES: no edema, tenderness     MEDICATIONS  (STANDING):  albuterol/ipratropium for Nebulization 3 milliLiter(s) Nebulizer every 6 hours  buDESOnide    Inhalation Suspension 0.5 milliGRAM(s) Inhalation every 12 hours  chlorhexidine 2% Cloths 1 Application(s) Topical <User Schedule>  dextrose 5%. 1000 milliLiter(s) (100 mL/Hr) IV Continuous <Continuous>  dextrose 5%. 1000 milliLiter(s) (50 mL/Hr) IV Continuous <Continuous>  dextrose 50% Injectable 25 Gram(s) IV Push once  dextrose 50% Injectable 12.5 Gram(s) IV Push once  dextrose 50% Injectable 25 Gram(s) IV Push once  dronedarone 400 milliGRAM(s) Oral two times a day  epoetin kristal-epbx (RETACRIT) Injectable 61000 Unit(s) IV Push <User Schedule>  ferrous    sulfate 325 milliGRAM(s) Oral daily  glucagon  Injectable 1 milliGRAM(s) IntraMuscular once  heparin   Injectable 5000 Unit(s) SubCutaneous every 12 hours  insulin glargine Injectable (LANTUS) 10 Unit(s) SubCutaneous every morning  insulin lispro (ADMELOG) corrective regimen sliding scale   SubCutaneous at bedtime  insulin lispro (ADMELOG) corrective regimen sliding scale   SubCutaneous three times a day before meals  insulin lispro Injectable (ADMELOG) 3 Unit(s) SubCutaneous three times a day before meals  midodrine 15 milliGRAM(s) Oral every 8 hours  pantoprazole  Injectable 40 milliGRAM(s) IV Push every 12 hours  senna 2 Tablet(s) Oral at bedtime  simvastatin 20 milliGRAM(s) Oral at bedtime    MEDICATIONS  (PRN):  acetaminophen     Tablet .. 650 milliGRAM(s) Oral every 6 hours PRN Temp greater or equal to 38C (100.4F), Mild Pain (1 - 3)  aluminum hydroxide/magnesium hydroxide/simethicone Suspension 30 milliLiter(s) Oral every 4 hours PRN Dyspepsia  dextrose Oral Gel 15 Gram(s) Oral once PRN Blood Glucose LESS THAN 70 milliGRAM(s)/deciliter  melatonin 3 milliGRAM(s) Oral at bedtime PRN Insomnia  ondansetron Injectable 4 milliGRAM(s) IV Push every 8 hours PRN Nausea and/or Vomiting  sodium chloride 0.9% lock flush 10 milliLiter(s) IV Push every 1 hour PRN Pre/post blood products, medications, blood draw, and to maintain line patency      Allergies    No Known Allergies    Intolerances          LABS:                          9.4    12.03 )-----------( 423      ( 11 Mar 2024 06:29 )             29.2     03-11    136  |  96  |  39.0<H>  ----------------------------<  254<H>  4.0   |  24.0  |  6.24<H>    Ca    8.4      11 Mar 2024 06:29  Phos  4.6     03-11  Mg     1.9     03-10    TPro  6.8  /  Alb  2.8<L>  /  TBili  0.5  /  DBili  x   /  AST  24  /  ALT  19  /  AlkPhos  157<H>  03-11      Urinalysis Basic - ( 11 Mar 2024 06:29 )    Color: x / Appearance: x / SG: x / pH: x  Gluc: 254 mg/dL / Ketone: x  / Bili: x / Urobili: x   Blood: x / Protein: x / Nitrite: x   Leuk Esterase: x / RBC: x / WBC x   Sq Epi: x / Non Sq Epi: x / Bacteria: x        RADIOLOGY & ADDITIONAL TESTS:

## 2024-03-11 NOTE — PROGRESS NOTE ADULT - ASSESSMENT
84 yr old male with paroxysmal atrial fibrillation not on AC secondary to GI bleed, esophagitis, anemia, diabetes mellitus, hypertension, hyperlipidemia, CKD stage 3-4, BPH, cognitive impairment, recent COVID, acute cholecystitis in Dec 2023 s/p cholecystostomy tube placement on 12/22/23 presented to the ED with complaints of leaking around cholecystostomy tube and difficulty flushing the tube. A CT abdomen/pelvis with contrast was done in ED, tube noted to be in place, he was evaluated by surgery, tube was flushed without difficulty. While in ED, he was noted to have coffee ground emesis and pallor with some abdominal pain and transient drop in Hb. GI was consulted, he had recently had an EGD in January 2024 with grade A esophagitis. Hb improved, hence no additional work up recommended by GI. Course complicated by hypotension and hypoxia on 2/26, RRT was called and he was transferred to ICU for need for pressors, [place] oxygen requirement, likely septic shock from aspiration. Cultures were sent, empiric antibiotics were initiated. Also received steroids. Patient noted to have worsening renal function and was anuric, nephrology was consulted and HD was initiated on 2/27/24. HD was continued. He was weaned off Levophed and was transferred to medical floor on 2/29. Surgery follow up was done, recommended cholecystectomy once medically stable. While on medical floor, he was noted to have worsening leucocytosis, ID consultation was requested, cultures repeated, Zosyn was initiated. On 3/3, he was noted to have worsening hypoxia, ICU consult was requested, NIV was placed, CXR with left lung field opacification. He failed trial of BiPAP and was intubated on 3/3 and required Precedex and low dose of Levophed. His cultures remained negative. Palliative care was consulted, goals of care were discussed with family, they wished patient to be full code. Patient had waxing and waning mental status, initially required tube feeds. He was extubated on 3/4. HD was continued. He was transferred back to medical floor on 3/6/24. IR consulted for PermCath placement. He was evaluated by speech and swallow and diet was advanced. His cholecystostomy tubing broke on 3/9, IR consulted for tube exhange. CT head was done, no acute changes noted. CT chest and abdomen was done, Persistent subcapsular fluid collections along the right lateral liver, suspicious for developing biloma. Improved left lower lobe aeration.      1. Acute metabolic encephalopathy sec recurrent sepsis, underlying RON:  Long and complicated course attributing to mental status  continue HD per schedule  CT head repeated, no acute changes noted  monitor WBC  completed course of Zosyn.  repeat cultures if febrile  aspiration precautions    2. RON sec ATN with underlying CKD stage 4, now on HD:  Continue HD per schedule.  nephrology following  continue Epogen for anemia sec underlying CKD  IR eval for PermCath placement.     3. S/p septic shock:  Continue Midodrine  wean as able  previously on Levophed and stress dose steroids.    4. Atrial fibrillation:  Not on AC sec GI bleed  on Dronedarone.     5. H/o acute cholecystitis s/p cholecystostomy tube placement, now with biloma:  Broken tubing, now attached to Horta, scheduled for tube exchange today.  IR requested to review imaging to assess need for any intervention.   surgical follow up once more stable to assess for inpatient cholecystectomy    6. Hypertension, hyperlipidemia:  Not on anti hypertensives, BP stable  recent shock  continue statin    7. Diabetes mellitus:  A1C 7.7  Continue Lantus 10 units  pre meal insulin 3 units  monitor FS  sliding scale coverage.    8. Grade A esophagitis, anemia:  Hb stable  GI recs from early this admission noted  continue PPI  monitor CBC  continue Epogen    9. DVT ppx:  Heparin    Discussed with RN.  Guarded prognosis.  Eventual NITHIN with likely need for HD.

## 2024-03-11 NOTE — PROCEDURE NOTE - PROCEDURE FINDINGS AND DETAILS
Under a ultrasound and fluoroscopy guidance a 12F resolve catheter placed into biloma    under fluoroscopy guidance, the broken sky tube was removed over wire for a new 8.5F resolve catheter was exchanged over wire into the GB.    cholangiogram noted for abnormality near gallbladder neck, concerning for GB defect possible cause of biloma.    Formal report to follow.

## 2024-03-11 NOTE — PROGRESS NOTE ADULT - ASSESSMENT
85 yo m pmhx acute sky s/p sky tube (12/22/23), COVID 19, PAF no AC (GIB/FALL), esophagitis, anemia, DM2, HTN, HLD, CKD3-4, BPH, cognitive impairment presented with leakage around sky tube admitted with distributive shock, GI bleed, RON, aspiration pna and hypoxic respiratory failure.      Acute Hypoxic Respiratory Failure  3/3- Intubated  3/4 - Extubated  on nc  monitor O2    RON   on HD  Nephrology input noted- likely will need long term  avoid nephrotoxic agents    Dysphagia  passed SLP eval - on modified diet  cont aspiration precautions    Pneumonia  s/p Zosyn    Cholecystitis  reported tube broke  plan per sx     Encounter for Palliative Care  Patient passed SLP eval   continues to be on HD  Per [facility] discussion - Full Code, family would be inclined to continue with HD  cont care per primary team  Palliative Care to sign off. Please reconsult if needed

## 2024-03-12 LAB
GLUCOSE BLDC GLUCOMTR-MCNC: 186 MG/DL — HIGH (ref 70–99)
GLUCOSE BLDC GLUCOMTR-MCNC: 199 MG/DL — HIGH (ref 70–99)
GLUCOSE BLDC GLUCOMTR-MCNC: 235 MG/DL — HIGH (ref 70–99)
GLUCOSE BLDC GLUCOMTR-MCNC: 248 MG/DL — HIGH (ref 70–99)

## 2024-03-12 PROCEDURE — 36556 INSERT NON-TUNNEL CV CATH: CPT | Mod: RT

## 2024-03-12 PROCEDURE — 99232 SBSQ HOSP IP/OBS MODERATE 35: CPT

## 2024-03-12 PROCEDURE — 99233 SBSQ HOSP IP/OBS HIGH 50: CPT

## 2024-03-12 PROCEDURE — 77001 FLUOROGUIDE FOR VEIN DEVICE: CPT | Mod: 26

## 2024-03-12 PROCEDURE — 76937 US GUIDE VASCULAR ACCESS: CPT | Mod: 26

## 2024-03-12 RX ORDER — ERTAPENEM SODIUM 1 G/1
1000 INJECTION, POWDER, LYOPHILIZED, FOR SOLUTION INTRAMUSCULAR; INTRAVENOUS ONCE
Refills: 0 | Status: DISCONTINUED | OUTPATIENT
Start: 2024-03-13 | End: 2024-03-13

## 2024-03-12 RX ORDER — SODIUM CHLORIDE 9 MG/ML
1000 INJECTION, SOLUTION INTRAVENOUS
Refills: 0 | Status: DISCONTINUED | OUTPATIENT
Start: 2024-03-12 | End: 2024-03-12

## 2024-03-12 RX ORDER — POLYETHYLENE GLYCOL 3350 17 G/17G
17 POWDER, FOR SOLUTION ORAL ONCE
Refills: 0 | Status: COMPLETED | OUTPATIENT
Start: 2024-03-12 | End: 2024-03-12

## 2024-03-12 RX ORDER — INDOMETHACIN 50 MG
100 CAPSULE ORAL ONCE
Refills: 0 | Status: DISCONTINUED | OUTPATIENT
Start: 2024-03-13 | End: 2024-03-13

## 2024-03-12 RX ORDER — INDOMETHACIN 50 MG
100 CAPSULE ORAL ONCE
Refills: 0 | Status: DISCONTINUED | OUTPATIENT
Start: 2024-03-12 | End: 2024-03-12

## 2024-03-12 RX ORDER — ERTAPENEM SODIUM 1 G/1
1000 INJECTION, POWDER, LYOPHILIZED, FOR SOLUTION INTRAMUSCULAR; INTRAVENOUS ONCE
Refills: 0 | Status: DISCONTINUED | OUTPATIENT
Start: 2024-03-12 | End: 2024-03-12

## 2024-03-12 RX ADMIN — Medication 3 UNIT(S): at 13:20

## 2024-03-12 RX ADMIN — DRONEDARONE 400 MILLIGRAM(S): 400 TABLET, FILM COATED ORAL at 22:21

## 2024-03-12 RX ADMIN — Medication 0.5 MILLIGRAM(S): at 22:54

## 2024-03-12 RX ADMIN — SIMVASTATIN 20 MILLIGRAM(S): 20 TABLET, FILM COATED ORAL at 22:21

## 2024-03-12 RX ADMIN — Medication 3 MILLILITER(S): at 14:35

## 2024-03-12 RX ADMIN — DRONEDARONE 400 MILLIGRAM(S): 400 TABLET, FILM COATED ORAL at 00:51

## 2024-03-12 RX ADMIN — DRONEDARONE 400 MILLIGRAM(S): 400 TABLET, FILM COATED ORAL at 10:19

## 2024-03-12 RX ADMIN — Medication 0.5 MILLIGRAM(S): at 09:00

## 2024-03-12 RX ADMIN — Medication 3 UNIT(S): at 17:34

## 2024-03-12 RX ADMIN — SENNA PLUS 2 TABLET(S): 8.6 TABLET ORAL at 22:21

## 2024-03-12 RX ADMIN — Medication 3 MILLIGRAM(S): at 00:51

## 2024-03-12 RX ADMIN — Medication 2: at 17:34

## 2024-03-12 RX ADMIN — Medication 3 MILLILITER(S): at 22:54

## 2024-03-12 RX ADMIN — Medication 3 MILLILITER(S): at 08:59

## 2024-03-12 RX ADMIN — Medication 325 MILLIGRAM(S): at 13:21

## 2024-03-12 RX ADMIN — POLYETHYLENE GLYCOL 3350 17 GRAM(S): 17 POWDER, FOR SOLUTION ORAL at 22:21

## 2024-03-12 RX ADMIN — SODIUM CHLORIDE 50 MILLILITER(S): 9 INJECTION, SOLUTION INTRAVENOUS at 14:19

## 2024-03-12 RX ADMIN — INSULIN GLARGINE 10 UNIT(S): 100 INJECTION, SOLUTION SUBCUTANEOUS at 13:20

## 2024-03-12 RX ADMIN — PANTOPRAZOLE SODIUM 40 MILLIGRAM(S): 20 TABLET, DELAYED RELEASE ORAL at 06:17

## 2024-03-12 RX ADMIN — HYDROMORPHONE HYDROCHLORIDE 1 MILLIGRAM(S): 2 INJECTION INTRAMUSCULAR; INTRAVENOUS; SUBCUTANEOUS at 00:04

## 2024-03-12 RX ADMIN — CHLORHEXIDINE GLUCONATE 1 APPLICATION(S): 213 SOLUTION TOPICAL at 06:17

## 2024-03-12 RX ADMIN — Medication 4: at 13:21

## 2024-03-12 RX ADMIN — Medication 4: at 07:54

## 2024-03-12 RX ADMIN — PANTOPRAZOLE SODIUM 40 MILLIGRAM(S): 20 TABLET, DELAYED RELEASE ORAL at 17:34

## 2024-03-12 NOTE — PROGRESS NOTE ADULT - ASSESSMENT
84 yr old male with paroxysmal atrial fibrillation not on AC secondary to GI bleed, esophagitis, anemia, diabetes mellitus, hypertension, hyperlipidemia, CKD stage 3-4, BPH, cognitive impairment, recent COVID, acute cholecystitis in Dec 2023 s/p cholecystostomy tube placement on 12/22/23 presented to the ED with complaints of leaking around cholecystostomy tube and difficulty flushing the tube. A CT abdomen/pelvis with contrast was done in ED, tube noted to be in place, he was evaluated by surgery, tube was flushed without difficulty. While in ED, he was noted to have coffee ground emesis and pallor with some abdominal pain and transient drop in Hb. GI was consulted, he had recently had an EGD in January 2024 with grade A esophagitis. Hb improved, hence no additional work up recommended by GI. Course complicated by hypotension and hypoxia on 2/26, RRT was called and he was transferred to ICU for need for pressors, [place] oxygen requirement, likely septic shock from aspiration. Cultures were sent, empiric antibiotics were initiated. Also received steroids. Patient noted to have worsening renal function and was anuric, nephrology was consulted and HD was initiated on 2/27/24. HD was continued. He was weaned off Levophed and was transferred to medical floor on 2/29. Surgery follow up was done, recommended cholecystectomy once medically stable. While on medical floor, he was noted to have worsening leucocytosis, ID consultation was requested, cultures repeated, Zosyn was initiated. On 3/3, he was noted to have worsening hypoxia, ICU consult was requested, NIV was placed, CXR with left lung field opacification. He failed trial of BiPAP and was intubated on 3/3 and required Precedex and low dose of Levophed. His cultures remained negative. Palliative care was consulted, goals of care were discussed with family, they wished patient to be full code. Patient had waxing and waning mental status, initially required tube feeds. He was extubated on 3/4. HD was continued. He was transferred back to medical floor on 3/6/24. IR consulted for PermCath placement. He was evaluated by speech and swallow and diet was advanced. His cholecystostomy tubing broke on 3/9, IR consulted for tube exhange. CT head was done, no acute changes noted. CT chest and abdomen was done, Persistent subcapsular fluid collections along the right lateral liver, suspicious for developing biloma. Improved left lower lobe aeration.  IR evaluation requested for biloma, he underwent cholecystostomy tube exchange and a cholangiogram was done noted for abnormality near gallbladder neck, concerning for GB defect possible cause of biloma, a 12 F catheter was placed into the biloma. Surgery follow up requested to assess for cholecystectomy Surgery recommended GI evaluation for possible CBD stent placement to control leakage from GB neck.       1. Acute metabolic encephalopathy sec recurrent sepsis, underlying RON:  Long and complicated course attributing to mental status  continue HD per schedule  CT head repeated, no acute changes noted  monitor WBC  completed course of Zosyn.  repeat cultures if febrile  aspiration precautions    2. RON sec ATN with underlying CKD stage 4, now on HD:  Continue HD per schedule.  nephrology following  continue Epogen for anemia sec underlying CKD  IR eval for PermCath placement, scheduled for today.    3. S/p septic shock:  Continue Midodrine  wean as able  previously on Levophed and stress dose steroids.    4. Atrial fibrillation:  Not on AC sec GI bleed  on Dronedarone.     5. H/o acute cholecystitis s/p cholecystostomy tube placement, now with biloma sec GB neck leak s/p drain placement:  IR intervention appreciated, s/p tube exchange and drain placement for biloma  surgery recommendations noted, cholecystectomy deferred for now.  GI to assess if CBD placement feasible.     6. Hypertension, hyperlipidemia:  Not on anti hypertensives, BP stable  recent shock  continue statin    7. Diabetes mellitus:  A1C 7.7  Continue Lantus 10 units  pre meal insulin 3 units  monitor FS  sliding scale coverage.    8. Grade A esophagitis, anemia:  Hb stable  GI recs from early this admission noted  continue PPI  monitor CBC  continue Epogen    9. DVT ppx:  Heparin    Discussed with RN.  Guarded prognosis.  Discussed with daughter at bedside.

## 2024-03-12 NOTE — PROGRESS NOTE ADULT - SUBJECTIVE AND OBJECTIVE BOX
----- Message from Shantel Trimble MD sent at 1/25/2020  8:33 AM CST -----  Cholesterol level is slightly elevated.  Keep working on diet and increased exercise.  Other labs look good including thyroid--I refilled this.  Add a multivitamin daily and VIt D 58958 IU daily to see if this helps with thinning hair.  Stress is most common cause of hair loss and this starts generally 3-6 months after stressor and can last 3-6 months. We could certainly try Rogaine as well to see if this helps hair regrowth to start.   SUBJECTIVE / 24H EVENTS: Pt seen and examined at bedside. Subjective exam is limited, however pt does report R sided abdominal pain when asked. Has been tolerating a diet but has been NPO p MN for tunneled dialysis cath placement today. No fevers overnight.     MEDICATIONS  (STANDING):  albuterol/ipratropium for Nebulization 3 milliLiter(s) Nebulizer every 6 hours  buDESOnide    Inhalation Suspension 0.5 milliGRAM(s) Inhalation every 12 hours  chlorhexidine 2% Cloths 1 Application(s) Topical <User Schedule>  dextrose 5%. 1000 milliLiter(s) (100 mL/Hr) IV Continuous <Continuous>  dextrose 5%. 1000 milliLiter(s) (50 mL/Hr) IV Continuous <Continuous>  dextrose 50% Injectable 25 Gram(s) IV Push once  dextrose 50% Injectable 12.5 Gram(s) IV Push once  dextrose 50% Injectable 25 Gram(s) IV Push once  dronedarone 400 milliGRAM(s) Oral two times a day  epoetin kristal-epbx (RETACRIT) Injectable 16198 Unit(s) IV Push <User Schedule>  ferrous    sulfate 325 milliGRAM(s) Oral daily  glucagon  Injectable 1 milliGRAM(s) IntraMuscular once  heparin   Injectable 5000 Unit(s) SubCutaneous every 12 hours  insulin glargine Injectable (LANTUS) 10 Unit(s) SubCutaneous every morning  insulin lispro (ADMELOG) corrective regimen sliding scale   SubCutaneous at bedtime  insulin lispro (ADMELOG) corrective regimen sliding scale   SubCutaneous three times a day before meals  insulin lispro Injectable (ADMELOG) 3 Unit(s) SubCutaneous three times a day before meals  midodrine 15 milliGRAM(s) Oral every 8 hours  pantoprazole  Injectable 40 milliGRAM(s) IV Push every 12 hours  senna 2 Tablet(s) Oral at bedtime  simvastatin 20 milliGRAM(s) Oral at bedtime    MEDICATIONS  (PRN):  acetaminophen     Tablet .. 650 milliGRAM(s) Oral every 6 hours PRN Temp greater or equal to 38C (100.4F), Mild Pain (1 - 3)  aluminum hydroxide/magnesium hydroxide/simethicone Suspension 30 milliLiter(s) Oral every 4 hours PRN Dyspepsia  artificial  tears Solution 1 Drop(s) Both EYES three times a day PRN Dry Eyes  dextrose Oral Gel 15 Gram(s) Oral once PRN Blood Glucose LESS THAN 70 milliGRAM(s)/deciliter  melatonin 3 milliGRAM(s) Oral at bedtime PRN Insomnia  ondansetron Injectable 4 milliGRAM(s) IV Push every 8 hours PRN Nausea and/or Vomiting  sodium chloride 0.9% lock flush 10 milliLiter(s) IV Push every 1 hour PRN Pre/post blood products, medications, blood draw, and to maintain line patency      Vital Signs Last 24 Hrs  T(C): 36.6 (12 Mar 2024 10:22), Max: 36.6 (12 Mar 2024 06:03)  T(F): 97.8 (12 Mar 2024 10:22), Max: 97.8 (12 Mar 2024 06:03)  HR: 90 (12 Mar 2024 10:22) (69 - 93)  BP: 134/67 (12 Mar 2024 10:22) (134/67 - 152/73)  BP(mean): --  RR: 20 (12 Mar 2024 10:22) (14 - 20)  SpO2: 93% (12 Mar 2024 10:22) (91% - 95%)    Parameters below as of 12 Mar 2024 10:22  Patient On (Oxygen Delivery Method): nasal cannula  O2 Flow (L/min): 2      Constitutional: patient appears comfortable sitting up in bed, in no apparent distress  Respiratory: respirations appear unlabored, no accessory muscle use, on suppl O2 via NC  Cardiovascular: regular rate & rhythm  Gastrointestinal: abdomen is soft & non-distended, mild RUQ TTP with no rebound / guarding. IR drains x2 in place. Radha drain w/ minimal bilious drainage, some serosang drainage in tubing. 12f Biloma drain w/ moderate volume bilious drainage. Dressing over insertion site is clean & dry.  Neurological: oriented to person only  Skin: mucous membranes moist, no diaphoresis, pallor, cyanosis or jaundice      I&O's Detail    11 Mar 2024 07:01  -  12 Mar 2024 07:00  --------------------------------------------------------  IN:  Total IN: 0 mL    OUT:    Drain (mL): 130 mL    Voided (mL): 350 mL  Total OUT: 480 mL    Total NET: -480 mL          LABS:                        9.4    12.03 )-----------( 423      ( 11 Mar 2024 06:29 )             29.2     03-11    136  |  96  |  39.0<H>  ----------------------------<  254<H>  4.0   |  24.0  |  6.24<H>    Ca    8.4      11 Mar 2024 06:29  Phos  4.6     03-11    TPro  6.8  /  Alb  2.8<L>  /  TBili  0.5  /  DBili  x   /  AST  24  /  ALT  19  /  AlkPhos  157<H>  03-11      Urinalysis Basic - ( 11 Mar 2024 06:29 )    Color: x / Appearance: x / SG: x / pH: x  Gluc: 254 mg/dL / Ketone: x  / Bili: x / Urobili: x   Blood: x / Protein: x / Nitrite: x   Leuk Esterase: x / RBC: x / WBC x   Sq Epi: x / Non Sq Epi: x / Bacteria: x

## 2024-03-12 NOTE — PROGRESS NOTE ADULT - ASSESSMENT
85 y/o male with PMH of Acute Cholecystitis s/p Cholecystomy Tube (12/22/23), COVID-19, PAF not on AC due to GIB + Fall Risk, Esophagitis, Anemia, DM 2, HTN, HLD, CKD III/IV, BPH and Cognitive Impairment  came to the ED complaining of leakage around sky tube. As per wife at bed side, family noticed fluid coming out on the skin when they attempted to flush the sky tube. Wife said patient has been doing well since discharged otherwise. No fever, chills, abdominal pain, change in bowel/urinary habit, nausea, vomiting noted.   In the ED, patient was seen by surgery team, no acute surgical intervention. As per ED, patient vomited large amount of coffee ground emesis, looked very pale, holding abdomen; CT angio abdomen done: no acute GI bleed. Repeat CBC: Hb: 11.3---->9.3. Also, he desaturated concerning for aspiration PNA, antibiotic and oxygen therapy.     AS ABOVE HX OF CHOLECYSTOTOMY PLACEMENT 12/22 CAME TO ER WITH ? LEAKAGE   PT WITH HYPOTENSION WITH ? ASPIRATION PNEUMONIA WAS IN ICU AND TREATED WITH PRESSORS  IV ABX     WAS PLACED ON HD FOR RENAL FAILURE  RETURNED TO   ICU WITH INCREASING WBC ADN SOB   PT MORE AWAKE     WBC  DOWN TO 12K     REPEAT CULTURES NEGATIVE   PT COMPLETED COURSE OF IV ZOSYN    OFF ABX   PLAN FOR   PERMA CATH PLACEMENT   S/P  CHANGE OF CHOLECYSTOTOMY TUBE BY IR  ADN BILOMA DRAINAGE    DEFER ABX   OK FOR PERMACATH FORM ID STANDPOINT  WILL FOLLOWUP AS NEEDED PLEASE CALL IF QUESTIONS

## 2024-03-12 NOTE — CONSULT NOTE ADULT - CONSULT REQUESTED BY NAME
Dr. Biswas
Karyn Daigle
DR MENDOZA
Dr. Daigle
pt
Dr. OSWALDO Daigle
ED
renal
Gracy APONTE
medicine MD
Dr. Eisenberg

## 2024-03-12 NOTE — PROGRESS NOTE ADULT - SUBJECTIVE AND OBJECTIVE BOX
NEPHROLOGY INTERVAL HPI/OVERNIGHT EVENTS:  pt clinically unchanged  no overnight issues documented  tolerated HD yesterday    MEDICATIONS  (STANDING):  albuterol/ipratropium for Nebulization 3 milliLiter(s) Nebulizer every 6 hours  buDESOnide    Inhalation Suspension 0.5 milliGRAM(s) Inhalation every 12 hours  chlorhexidine 2% Cloths 1 Application(s) Topical <User Schedule>  dextrose 5%. 1000 milliLiter(s) (100 mL/Hr) IV Continuous <Continuous>  dextrose 5%. 1000 milliLiter(s) (50 mL/Hr) IV Continuous <Continuous>  dextrose 50% Injectable 12.5 Gram(s) IV Push once  dextrose 50% Injectable 25 Gram(s) IV Push once  dextrose 50% Injectable 25 Gram(s) IV Push once  dronedarone 400 milliGRAM(s) Oral two times a day  epoetin kristal-epbx (RETACRIT) Injectable 10297 Unit(s) IV Push <User Schedule>  ferrous    sulfate 325 milliGRAM(s) Oral daily  glucagon  Injectable 1 milliGRAM(s) IntraMuscular once  heparin   Injectable 5000 Unit(s) SubCutaneous every 12 hours  insulin glargine Injectable (LANTUS) 10 Unit(s) SubCutaneous every morning  insulin lispro (ADMELOG) corrective regimen sliding scale   SubCutaneous at bedtime  insulin lispro (ADMELOG) corrective regimen sliding scale   SubCutaneous three times a day before meals  insulin lispro Injectable (ADMELOG) 3 Unit(s) SubCutaneous three times a day before meals  midodrine 15 milliGRAM(s) Oral every 8 hours  pantoprazole  Injectable 40 milliGRAM(s) IV Push every 12 hours  senna 2 Tablet(s) Oral at bedtime  simvastatin 20 milliGRAM(s) Oral at bedtime    MEDICATIONS  (PRN):  acetaminophen     Tablet .. 650 milliGRAM(s) Oral every 6 hours PRN Temp greater or equal to 38C (100.4F), Mild Pain (1 - 3)  aluminum hydroxide/magnesium hydroxide/simethicone Suspension 30 milliLiter(s) Oral every 4 hours PRN Dyspepsia  artificial  tears Solution 1 Drop(s) Both EYES three times a day PRN Dry Eyes  dextrose Oral Gel 15 Gram(s) Oral once PRN Blood Glucose LESS THAN 70 milliGRAM(s)/deciliter  melatonin 3 milliGRAM(s) Oral at bedtime PRN Insomnia  ondansetron Injectable 4 milliGRAM(s) IV Push every 8 hours PRN Nausea and/or Vomiting  sodium chloride 0.9% lock flush 10 milliLiter(s) IV Push every 1 hour PRN Pre/post blood products, medications, blood draw, and to maintain line patency      Allergies    No Known Allergies          Vital Signs Last 24 Hrs  T(C): 36.6 (12 Mar 2024 06:03), Max: 36.8 (11 Mar 2024 08:25)  T(F): 97.8 (12 Mar 2024 06:03), Max: 98.3 (11 Mar 2024 08:25)  HR: 92 (12 Mar 2024 06:03) (67 - 93)  BP: 138/73 (12 Mar 2024 06:03) (137/70 - 152/73)  BP(mean): --  RR: 14 (12 Mar 2024 06:03) (14 - 19)  SpO2: 93% (12 Mar 2024 06:03) (91% - 96%)    Parameters below as of 12 Mar 2024 06:03  Patient On (Oxygen Delivery Method): nasal cannula  O2 Flow (L/min): 2      PHYSICAL EXAM:  GENERAL: Weak, tired  HEENT: Moist MMs  NECK: Supple, no JVD; R IJ catheter  NERVOUS SYSTEM: More awake, alert  CHEST/LUNG: Clear, decreased BS  HEART: No rub  ABDOMEN: Soft, +BS; +biliary drain  EXTREMITIES: + improved dependent edema   SKIN: No rashes    LABS:                        9.4    12.03 )-----------( 423      ( 11 Mar 2024 06:29 )             29.2     03-11    136  |  96  |  39.0<H>  ----------------------------<  254<H>  4.0   |  24.0  |  6.24<H>    Ca    8.4      11 Mar 2024 06:29  Phos  4.6     03-11  Mg     1.9     03-10    TPro  6.8  /  Alb  2.8<L>  /  TBili  0.5  /  DBili  x   /  AST  24  /  ALT  19  /  AlkPhos  157<H>  03-11      Urinalysis Basic - ( 11 Mar 2024 06:29 )    Color: x / Appearance: x / SG: x / pH: x  Gluc: 254 mg/dL / Ketone: x  / Bili: x / Urobili: x   Blood: x / Protein: x / Nitrite: x   Leuk Esterase: x / RBC: x / WBC x   Sq Epi: x / Non Sq Epi: x / Bacteria: x          RADIOLOGY & ADDITIONAL TESTS:  OCEDURE:   · Procedure Name	Interventional Radiology  · Procedure Name	Biloma drainage, and cholecystostomy exchange  · TIME OUT	Patient's first and last name, , procedure, and correct site confirmed prior to the start of procedure.  · Procedure Date/Time	11-Mar-2024 19:05  · Informed Consent	Benefits, risks, and possible complications of procedure explained to patient/caregiver who verbalized understanding and gave written consent.  · Procedure Performed By	Myself  · Procedure Findings and Details	Under a ultrasound and fluoroscopy guidance a 12F resolve catheter placed into biloma    under fluoroscopy guidance, the broken sky tube was removed over wire for a new 8.5F resolve catheter was exchanged over wire into the GB.    cholangiogram noted for abnormality near gallbladder neck, concerning for GB defect possible cause of biloma.    Formal report to follow.  · Patient Condition/Disposition	Back to floor  · Plan	ACS called to evaluate patient due to GB defected noted on sky tube exchange exam. D/w primary team     no culture sent per infectious disease attending Dr. Ramos

## 2024-03-12 NOTE — CONSULT NOTE ADULT - PROVIDER SPECIALTY LIST ADULT
Intervent Radiology
MICU
Critical Care
Palliative Care
Surgery
Vascular Surgery
Surgery
Infectious Disease
Gastroenterology
Gastroenterology
Nephrology

## 2024-03-12 NOTE — CONSULT NOTE ADULT - ASSESSMENT
83yo M w/ hx of afib, esophagitis, anemia, DM, HTN, HL, CKD, and acute cholecystitis s/p per sky tube placement in 12/2023 presenting with CC of leakage around cholecystotomy tube. Hospital course complicated by coffee ground emesis and aspiration pna (initially requring pressors in MICU), now stepped down to RMF. Advanced GI consulted in setting of cystic duct leak and potential stent placement.      #Cystic duct leak/ Biloma  s/p cholecystostomy tube placement on 12/22/23 presented to the ED with complaints of leaking around cholecystostomy tube and difficulty flushing the tube. Underwent tube study with IR due to leakage and biloma noted on CT scan,  Perc sky tube exchanged and biloma drained on 3/11. Following tube exchange, tube study showed some extravasation of contrast at gallbladder neck suggesting leakage. Case discussed with surgery  -will plan for ERCP with plastic stent placement in CBD. Explained to family and surgery, given anatomy of cystic duct stent will not be able to be placed directly over site of leak. Rather stent, will be placed proximally in CBD with the goal of increasing drainage through CBD and decreasing cholecystotomy outpt. This may not help with bile leak. Definitive management would still be surgical.   85yo M w/ hx of afib, esophagitis, anemia, DM, HTN, HL, CKD, and acute cholecystitis s/p per sky tube placement in 12/2023 presenting with CC of leakage around cholecystotomy tube. Hospital course complicated by coffee ground emesis and aspiration pna (initially requring pressors in MICU), now stepped down to RMF. Advanced GI consulted in setting of cystic duct leak and potential stent placement.      #Cystic duct leak/ Biloma  s/p cholecystostomy tube placement on 12/22/23 presented to the ED with complaints of leaking around cholecystostomy tube and difficulty flushing the tube. Underwent tube study with IR due to leakage and biloma noted on CT scan,  Perc sky tube exchanged and biloma drained on 3/11. Following tube exchange, tube study showed some extravasation of contrast at gallbladder neck suggesting leakage. Case discussed with surgery  -will plan for ERCP with plastic stent placement in CBD. Explained to family and surgery, given anatomy of cystic duct stent will not be able to be placed directly over site of leak. Rather stent, will be placed proximally in CBD with the goal of increasing drainage through CBD and decreasing cholecystotomy outpt. This may not help with bile leak. Definitive management would still be surgical.  -Patient family would like more time to think about the procedure. Will discuss ERCP again tomorrow

## 2024-03-12 NOTE — CONSULT NOTE ADULT - SUBJECTIVE AND OBJECTIVE BOX
85yo M w/ hx of afib, esophagitis, anemia, DM, HTN, HL, CKD, and acute cholecystitis s/p per sky tube placement in 12/2023 presenting with CC of leakage around cholecystotomy tube. Hospital course complicated by coffee ground emesis and aspiration pna (initially requring pressors in MICU), now stepped down to RMF. Advanced GI consulted in setting of cystic duct leak and potential stent placement.      Patient seen at bedside in no acute distress. Patient denies abdominal pain, N/V, fever, chills, melena, hematochezia, gerd, dysphagia.     T(C): 36.6 (03-12-24 @ 10:22), Max: 36.6 (03-12-24 @ 06:03)  HR: 85 (03-12-24 @ 13:35) (69 - 93)  BP: 137/68 (03-12-24 @ 13:35) (134/67 - 152/73)  RR: 20 (03-12-24 @ 10:22) (14 - 20)  SpO2: 93% (03-12-24 @ 10:22) (91% - 95%)  Wt(kg): --Vital Signs Last 24 Hrs    Parameters below as of 12 Mar 2024 10:22  Patient On (Oxygen Delivery Method): nasal cannula  O2 Flow (L/min): 2      Review of Systems:  -All other ROS negative, except those noted in HPI    PHYSICAL EXAM:  GENERAL: NAD, well-groomed, Ethiopian speaking   HEAD:  Atraumatic, Normocephalic  EYES: EOMI, PERRLA, conjunctiva and sclera clear  ENMT: Moist mucous membranes, poor dentition  NECK: Supple,   NERVOUS SYSTEM:  tremor at rest    CHEST/LUNG: no accessory muscle use, on NC  HEART: Regular rate and rhythm;   ABDOMEN: Soft, Nontender, Nondistended; cholecystotomy tube noted with serous output noted.   EXTREMITIES:  No clubbing, cyanosis, or edema  SKIN: No rashes or lesions    acetaminophen     Tablet .. 650 milliGRAM(s) Oral every 6 hours PRN  albuterol/ipratropium for Nebulization 3 milliLiter(s) Nebulizer every 6 hours  aluminum hydroxide/magnesium hydroxide/simethicone Suspension 30 milliLiter(s) Oral every 4 hours PRN  artificial  tears Solution 1 Drop(s) Both EYES three times a day PRN  buDESOnide    Inhalation Suspension 0.5 milliGRAM(s) Inhalation every 12 hours  chlorhexidine 2% Cloths 1 Application(s) Topical <User Schedule>  dextrose 5%. 1000 milliLiter(s) IV Continuous <Continuous>  dextrose 5%. 1000 milliLiter(s) IV Continuous <Continuous>  dextrose 50% Injectable 25 Gram(s) IV Push once  dextrose 50% Injectable 12.5 Gram(s) IV Push once  dextrose 50% Injectable 25 Gram(s) IV Push once  dextrose Oral Gel 15 Gram(s) Oral once PRN  dronedarone 400 milliGRAM(s) Oral two times a day  epoetin kristal-epbx (RETACRIT) Injectable 92108 Unit(s) IV Push <User Schedule>  ferrous    sulfate 325 milliGRAM(s) Oral daily  glucagon  Injectable 1 milliGRAM(s) IntraMuscular once  heparin   Injectable 5000 Unit(s) SubCutaneous every 12 hours  insulin glargine Injectable (LANTUS) 10 Unit(s) SubCutaneous every morning  insulin lispro (ADMELOG) corrective regimen sliding scale   SubCutaneous at bedtime  insulin lispro (ADMELOG) corrective regimen sliding scale   SubCutaneous three times a day before meals  insulin lispro Injectable (ADMELOG) 3 Unit(s) SubCutaneous three times a day before meals  melatonin 3 milliGRAM(s) Oral at bedtime PRN  midodrine 15 milliGRAM(s) Oral every 8 hours  ondansetron Injectable 4 milliGRAM(s) IV Push every 8 hours PRN  pantoprazole  Injectable 40 milliGRAM(s) IV Push every 12 hours  senna 2 Tablet(s) Oral at bedtime  simvastatin 20 milliGRAM(s) Oral at bedtime  sodium chloride 0.9% lock flush 10 milliLiter(s) IV Push every 1 hour PRN      LABS:                        9.4    12.03 )-----------( 423      ( 11 Mar 2024 06:29 )             29.2     03-11    136  |  96  |  39.0<H>  ----------------------------<  254<H>  4.0   |  24.0  |  6.24<H>    Ca    8.4      11 Mar 2024 06:29  Phos  4.6     03-11    TPro  6.8  /  Alb  2.8<L>  /  TBili  0.5  /  DBili  x   /  AST  24  /  ALT  19  /  AlkPhos  157<H>  03-11      Urinalysis Basic - ( 11 Mar 2024 06:29 )    Color: x / Appearance: x / SG: x / pH: x  Gluc: 254 mg/dL / Ketone: x  / Bili: x / Urobili: x   Blood: x / Protein: x / Nitrite: x   Leuk Esterase: x / RBC: x / WBC x   Sq Epi: x / Non Sq Epi: x / Bacteria: x      CAPILLARY BLOOD GLUCOSE      POCT Blood Glucose.: 235 mg/dL (12 Mar 2024 13:15)  POCT Blood Glucose.: 248 mg/dL (12 Mar 2024 07:47)  POCT Blood Glucose.: 204 mg/dL (11 Mar 2024 21:15)  POCT Blood Glucose.: 260 mg/dL (11 Mar 2024 17:13)        Urinalysis Basic - ( 11 Mar 2024 06:29 )    Color: x / Appearance: x / SG: x / pH: x  Gluc: 254 mg/dL / Ketone: x  / Bili: x / Urobili: x   Blood: x / Protein: x / Nitrite: x   Leuk Esterase: x / RBC: x / WBC x   Sq Epi: x / Non Sq Epi: x / Bacteria: x

## 2024-03-12 NOTE — CONSULT NOTE ADULT - CONSULT REASON
Hd access
AMS, hypotension
LEUKOCYTOSIS
gallbladder neck leakage
ARF
Acute hypoxemic respiratory failure
Coffee ground emesis
bile leak
Goals of Care
Perc sky tube
Tunneled dialysis catheter placement

## 2024-03-12 NOTE — CONSULT NOTE ADULT - ATTENDING COMMENTS
84 year old male with pmhx of DM, HTN, HLD, CKD, pAfib, GI bleed, esophagitis, and cholecystitis s/p c-tube presents to ED with c-tube malfunction and found to have possible GI bleed with hematemesis, upper airway secretions, and new oxygen requirement     - C-tube flushed and now appears to be draining well   - Abdomen soft, non tender   - Previously, patient was evaluated for possible drain removal vs cholecystectomy and was in the process of risk stratification and optimization  - Will also need medical/respiratory optimization and GI/IR consultation for GI bleed evaluation, c-tube evaluation, and possible SPY glass procedure   - While patient's family would ideally prefer c-tube removal, patient does not appear optimized for cholecystectomy at this time.
Mr. Figueroa is a 84 year old gentleman with history of acute cholecystitis s/p percutaneous cholecystostomy tube further complicated by bile leak / biloma s/p drainage with suspicion for persistent bile leak at the level of the gallbladder neck, GI consulted regarding potential biliary stent placement in an attempt to diminish output to allow for healing prior to eventual definitive surgical management. Interpreted images and reviewed updated labs in detail. Discussed plan of care with patient, family, surgery team and covering provider. Family apprehensive regarding ERCP at this time. They requested time to discuss amongst each other before determining next course of action. There is no emergent need for intervention acutely at this time. Therefore, will defer intervention pending additional discussions, potentially later this week followed by interval surgery. Will discuss again tomorrow regarding potential options. We will continue to follow along with you.
Patient w/ possible GB leak leading to biloma; now w/ replaced perc sky tube.   --No surgical intervention at this time; would allow adequate drainage of GB, biloma, and resolution of likely inflammatory changes (~6 weeks). Patient would then have to be optimized for possible cholecystectomy vs further perc sky tube management (continuation vs removal - pending repeat tube study).   --GI assessment for possible CBD stent to control leakage from GB neck; at this time unable to tell if path of least resistance will be perc sky tube or drainage from GB neck (ie. difficulty controlling biloma).   --ACS will continue to follow for now.

## 2024-03-12 NOTE — PROGRESS NOTE ADULT - SUBJECTIVE AND OBJECTIVE BOX
INTERVAL HPI/OVERNIGHT EVENTS:    CC: RON sec ATN now on HD, metabolic encephalopathy, cholecystostomy tube present, a fib, biloma, GB neck leak      Events noted  underwent tube exchange and drain placement for biloma by IR  more alert, tolerated PO last night  NPO for PC placement today  daughter at bedside, requesting inpatient cholecystectomy      Vital Signs Last 24 Hrs  T(C): 36.6 (12 Mar 2024 10:22), Max: 36.6 (12 Mar 2024 06:03)  T(F): 97.8 (12 Mar 2024 10:22), Max: 97.8 (12 Mar 2024 06:03)  HR: 90 (12 Mar 2024 10:22) (69 - 93)  BP: 134/67 (12 Mar 2024 10:22) (134/67 - 152/73)  BP(mean): --  RR: 20 (12 Mar 2024 10:22) (14 - 20)  SpO2: 93% (12 Mar 2024 10:22) (91% - 95%)    Parameters below as of 12 Mar 2024 10:22  Patient On (Oxygen Delivery Method): nasal cannula  O2 Flow (L/min): 2      PHYSICAL EXAM:    GENERAL: alert, comfortable, not in distress  CHEST/LUNG: b/l air entry  HEART: reg  ABDOMEN: biloma drain and cholecystostomy tube present, non tender, bs+  EXTREMITIES:  no edema, tenderness    MEDICATIONS  (STANDING):  albuterol/ipratropium for Nebulization 3 milliLiter(s) Nebulizer every 6 hours  buDESOnide    Inhalation Suspension 0.5 milliGRAM(s) Inhalation every 12 hours  chlorhexidine 2% Cloths 1 Application(s) Topical <User Schedule>  dextrose 5%. 1000 milliLiter(s) (50 mL/Hr) IV Continuous <Continuous>  dextrose 5%. 1000 milliLiter(s) (100 mL/Hr) IV Continuous <Continuous>  dextrose 50% Injectable 12.5 Gram(s) IV Push once  dextrose 50% Injectable 25 Gram(s) IV Push once  dextrose 50% Injectable 25 Gram(s) IV Push once  dronedarone 400 milliGRAM(s) Oral two times a day  epoetin kristal-epbx (RETACRIT) Injectable 13950 Unit(s) IV Push <User Schedule>  ferrous    sulfate 325 milliGRAM(s) Oral daily  glucagon  Injectable 1 milliGRAM(s) IntraMuscular once  heparin   Injectable 5000 Unit(s) SubCutaneous every 12 hours  insulin glargine Injectable (LANTUS) 10 Unit(s) SubCutaneous every morning  insulin lispro (ADMELOG) corrective regimen sliding scale   SubCutaneous three times a day before meals  insulin lispro (ADMELOG) corrective regimen sliding scale   SubCutaneous at bedtime  insulin lispro Injectable (ADMELOG) 3 Unit(s) SubCutaneous three times a day before meals  midodrine 15 milliGRAM(s) Oral every 8 hours  pantoprazole  Injectable 40 milliGRAM(s) IV Push every 12 hours  senna 2 Tablet(s) Oral at bedtime  simvastatin 20 milliGRAM(s) Oral at bedtime    MEDICATIONS  (PRN):  acetaminophen     Tablet .. 650 milliGRAM(s) Oral every 6 hours PRN Temp greater or equal to 38C (100.4F), Mild Pain (1 - 3)  aluminum hydroxide/magnesium hydroxide/simethicone Suspension 30 milliLiter(s) Oral every 4 hours PRN Dyspepsia  artificial  tears Solution 1 Drop(s) Both EYES three times a day PRN Dry Eyes  dextrose Oral Gel 15 Gram(s) Oral once PRN Blood Glucose LESS THAN 70 milliGRAM(s)/deciliter  melatonin 3 milliGRAM(s) Oral at bedtime PRN Insomnia  ondansetron Injectable 4 milliGRAM(s) IV Push every 8 hours PRN Nausea and/or Vomiting  sodium chloride 0.9% lock flush 10 milliLiter(s) IV Push every 1 hour PRN Pre/post blood products, medications, blood draw, and to maintain line patency      Allergies    No Known Allergies    Intolerances          LABS:                          9.4    12.03 )-----------( 423      ( 11 Mar 2024 06:29 )             29.2     03-11    136  |  96  |  39.0<H>  ----------------------------<  254<H>  4.0   |  24.0  |  6.24<H>    Ca    8.4      11 Mar 2024 06:29  Phos  4.6     03-11    TPro  6.8  /  Alb  2.8<L>  /  TBili  0.5  /  DBili  x   /  AST  24  /  ALT  19  /  AlkPhos  157<H>  03-11      Urinalysis Basic - ( 11 Mar 2024 06:29 )    Color: x / Appearance: x / SG: x / pH: x  Gluc: 254 mg/dL / Ketone: x  / Bili: x / Urobili: x   Blood: x / Protein: x / Nitrite: x   Leuk Esterase: x / RBC: x / WBC x   Sq Epi: x / Non Sq Epi: x / Bacteria: x        RADIOLOGY & ADDITIONAL TESTS:

## 2024-03-12 NOTE — PROGRESS NOTE ADULT - SUBJECTIVE AND OBJECTIVE BOX
IR Post Procedure Note    Diagnosis: Long Term HD Access Required    Procedure: Tunnelled HD Catheter Placement    : Levi Blanton MD    Contrast: None    Anesthesia: 1% Lidocaine Subcutaneous, Sedation Administered by Anesthesiology    Estimated Blood Loss: Less than 10cc    Specimens: None    Complications: No Immediate Complications    Anticoagulation: Resume in 24 Hours    Findings & Plan: 19cm Tunnelled HD Catheter placed into Right/ Left IJV under US and fluoroscopic guidance. Flushed w heparin. Tip at cavoatrial junction, OK to use.      Please call Interventional Radiology with any questions, concerns, or issues.

## 2024-03-12 NOTE — PROGRESS NOTE ADULT - ASSESSMENT
CKD(IV) +DM, HTN  RON: ATN post IV contrast and hypotension/sepsis--> may have now progressed to ESRD  Biliary tube leak  - cont to avoid further potential nephrotoxins  - HD MWF (still no indication of renal recovery)  - would pursue tunnelled PC placement by IR for ongoing HD  - surgery noted--> will eventually require cholecystectomy    Anemia: + multifactorial  - cont CINDI  - trend H/H  - target Hgb > 10.0    RO:  - low phos diet  - con to monitor off binders  - trend serum phos

## 2024-03-12 NOTE — PROGRESS NOTE ADULT - SUBJECTIVE AND OBJECTIVE BOX
INFECTIOUS DISEASES AND INTERNAL MEDICINE at [facility]  =======================================================  Claudio Hester MD  Diplomates American Board of Internal Medicine and Infectious Diseases  Telephone [phone]  Fax            [phone]  =======================================================    BERNADINE ANDERSON [id number]    Follow up /PNEUMONIA  RESOLVED      Allergies:  No Known Allergies      Medications:  acetaminophen     Tablet .. 650 milliGRAM(s) Oral every 6 hours PRN  acetylcysteine 10%  Inhalation 4 milliLiter(s) Inhalation every 6 hours  albuterol/ipratropium for Nebulization 3 milliLiter(s) Nebulizer every 6 hours  aluminum hydroxide/magnesium hydroxide/simethicone Suspension 30 milliLiter(s) Oral every 4 hours PRN  buDESOnide    Inhalation Suspension 0.5 milliGRAM(s) Inhalation every 12 hours  chlorhexidine 2% Cloths 1 Application(s) Topical <User Schedule>  dextrose 5%. 1000 milliLiter(s) IV Continuous <Continuous>  dextrose 5%. 1000 milliLiter(s) IV Continuous <Continuous>  dextrose 50% Injectable 25 Gram(s) IV Push once  dextrose 50% Injectable 12.5 Gram(s) IV Push once  dextrose 50% Injectable 25 Gram(s) IV Push once  dextrose Oral Gel 15 Gram(s) Oral once PRN  dronedarone 400 milliGRAM(s) Oral two times a day  epoetin kristal-epbx (RETACRIT) Injectable 63190 Unit(s) IV Push <User Schedule>  ferrous    sulfate 325 milliGRAM(s) Oral daily  gabapentin 300 milliGRAM(s) Oral at bedtime  heparin   Injectable 5000 Unit(s) SubCutaneous every 8 hours  insulin glargine Injectable (LANTUS) 10 Unit(s) SubCutaneous every morning  insulin lispro (ADMELOG) corrective regimen sliding scale   SubCutaneous every 6 hours  melatonin 3 milliGRAM(s) Oral at bedtime PRN  midodrine 15 milliGRAM(s) Oral every 8 hours  ondansetron Injectable 4 milliGRAM(s) IV Push every 8 hours PRN  pantoprazole  Injectable 40 milliGRAM(s) IV Push every 12 hours  PPD  5 Tuberculin Unit(s) Injectable 5 Unit(s) IntraDermal once  senna 2 Tablet(s) Oral at bedtime  simvastatin 20 milliGRAM(s) Oral at bedtime  sodium chloride 0.9% lock flush 10 milliLiter(s) IV Push every 1 hour PRN    SOCIAL       FAMILY   FAMILY HISTORY:  Family history of stomach cancer  Father    Family history of emphysema  Mother      REVIEW OF SYSTEMS:  UNABLE TO OBTAIN           Physical Exam:    Vital Signs Last 24 Hrs  T(C): 36.6 (12 Mar 2024 06:03), Max: 36.6 (11 Mar 2024 11:10)  T(F): 97.8 (12 Mar 2024 06:03), Max: 97.9 (11 Mar 2024 11:10)  HR: 92 (12 Mar 2024 06:03) (67 - 93)  BP: 138/73 (12 Mar 2024 06:03) (138/73 - 152/73)  BP(mean): --  RR: 14 (12 Mar 2024 06:03) (14 - 19)  SpO2: 93% (12 Mar 2024 06:03) (91% - 95%)    Parameters below as of 12 Mar 2024 06:03  Patient On (Oxygen Delivery Method): nasal cannula  O2 Flow (L/min): 2              GEN: NAD,   HEENT: normocephalic and atraumatic. EOMI. ALTHEA.    NECK: Supple. No carotid bruits.  No lymphadenopathy or thyromegaly. LEFT SIDE CATHETER IN NECK  LUNGS: Clear to auscultation.  HEART: Regular rate and rhythm without murmur.  ABDOMEN: Soft, nontender, and nondistended.  Positive bowel sounds.   CHOLECYSTOTOMY TUBE  IN PLACE    : No CVA tenderness  EXTREMITIES: Without any cyanosis, clubbing, rash, lesions or edema.  MSK: no joint swelling  NEUROLOGIC:  MORE AWAKE TODAY        Labs:  Vitals:  ====     =======================================================  Current Antibiotics:     Other medications:  acetylcysteine 10%  Inhalation 4 milliLiter(s) Inhalation every 6 hours  albuterol/ipratropium for Nebulization 3 milliLiter(s) Nebulizer every 6 hours  buDESOnide    Inhalation Suspension 0.5 milliGRAM(s) Inhalation every 12 hours  chlorhexidine 2% Cloths 1 Application(s) Topical <User Schedule>  dextrose 5%. 1000 milliLiter(s) IV Continuous <Continuous>  dextrose 5%. 1000 milliLiter(s) IV Continuous <Continuous>  dextrose 50% Injectable 25 Gram(s) IV Push once  dextrose 50% Injectable 12.5 Gram(s) IV Push once  dextrose 50% Injectable 25 Gram(s) IV Push once  dronedarone 400 milliGRAM(s) Oral two times a day  epoetin kristal-epbx (RETACRIT) Injectable 85740 Unit(s) IV Push <User Schedule>  ferrous    sulfate 325 milliGRAM(s) Oral daily  gabapentin 300 milliGRAM(s) Oral at bedtime  heparin   Injectable 5000 Unit(s) SubCutaneous every 8 hours  insulin glargine Injectable (LANTUS) 10 Unit(s) SubCutaneous every morning  insulin lispro (ADMELOG) corrective regimen sliding scale   SubCutaneous every 6 hours  midodrine 15 milliGRAM(s) Oral every 8 hours  pantoprazole  Injectable 40 milliGRAM(s) IV Push every 12 hours  PPD  5 Tuberculin Unit(s) Injectable 5 Unit(s) IntraDermal once  senna 2 Tablet(s) Oral at bedtime  simvastatin 20 milliGRAM(s) Oral at bedtime      =======================================================  Labs:                                                                 9.4    12.03 )-----------( 423      ( 11 Mar 2024 06:29 )             29.2   03-11    136  |  96  |  39.0<H>  ----------------------------<  254<H>  4.0   |  24.0  |  6.24<H>    Ca    8.4      11 Mar 2024 06:29  Phos  4.6     03-11    TPro  6.8  /  Alb  2.8<L>  /  TBili  0.5  /  DBili  x   /  AST  24  /  ALT  19  /  AlkPhos  157<H>  03-11            Culture - Sputum (collected 03-04-24 @ 13:07)  Source: .Sputum Sputum  Gram Stain (03-05-24 @ 00:34):    No polymorphonuclear leukocytes per low power field    No Squamous epithelial cells per low power field    Rare Gram Negative Rods per oil power field    Rare Gram positive cocci in pairs per oil power field  Final Report (03-06-24 @ 00:20):    Normal Respiratory Allyson present    Culture - Blood (collected 03-02-24 @ 12:50)  Source: .Blood Blood  Preliminary Report (03-06-24 @ 23:00):    No growth at 4 days    Culture - Blood (collected 03-02-24 @ 12:43)  Source: .Blood Blood  Preliminary Report (03-06-24 @ 23:00):    No growth at 4 days    Culture - Blood (collected 02-26-24 @ 10:51)  Source: .Blood Blood-Peripheral  Final Report (03-02-24 @ 17:01):    No growth at 5 days    Culture - Blood (collected 02-26-24 @ 10:42)  Source: .Blood Blood-Peripheral  Final Report (03-02-24 @ 17:01):    No growth at 5 days    Culture - Blood (collected 02-25-24 @ 19:33)  Source: .Blood Blood-Peripheral  Final Report (03-02-24 @ 03:00):    No growth at 5 days    Culture - Blood (collected 02-25-24 @ 19:23)  Source: .Blood Blood-Peripheral  Final Report (03-02-24 @ 03:00):    No growth at 5 days    Culture - Urine (collected 01-29-24 @ 05:54)  Source: Clean Catch Clean Catch (Midstream)  Final Report (01-30-24 @ 10:38):    <10,000 CFU/mL Normal Urogenital Allyson    Culture - Blood (collected 01-29-24 @ 01:20)  Source: .Blood Blood-Venous  Final Report (02-03-24 @ 09:00):    No growth at 5 days    Culture - Blood (collected 01-29-24 @ 01:13)  Source: .Blood Blood-Venous  Final Report (02-03-24 @ 09:00):    No growth at 5 days    Culture - Blood (collected 01-07-24 @ 08:05)  Source: .Blood Blood-Peripheral  Final Report (01-13-24 @ 02:00):    No growth at 5 days    Culture - Blood (collected 01-07-24 @ 07:55)  Source: .Blood Blood-Peripheral  Final Report (01-13-24 @ 02:00):    No growth at 5 days    Culture - Urine (collected 01-06-24 @ 23:56)  Source: Clean Catch Clean Catch (Midstream)  Final Report (01-08-24 @ 07:21):    <10,000 CFU/mL Normal Urogenital Allyson    Culture - Blood (collected 01-02-24 @ 09:05)  Source: .Blood Blood-Peripheral  Final Report (01-07-24 @ 17:00):    No growth at 5 days    Culture - Blood (collected 01-02-24 @ 08:57)  Source: .Blood Blood-Peripheral  Final Report (01-07-24 @ 17:00):    No growth at 5 days    Culture - Body Fluid with Gram Stain (collected 12-22-23 @ 14:40)  Source: Bile Bile Fluid  Gram Stain (12-23-23 @ 01:41):    No polymorphonuclear leukocytes seen    Gram Negative Rods seen    Gram positive cocci in pairs seen    by cytocentrifuge  Final Report (12-27-23 @ 16:52):    Moderate Escherichia coli    Few Streptococcus gallolyticus "Susceptibilities not performed"  Organism: Escherichia coli (12-27-23 @ 16:52)  Organism: Escherichia coli (12-27-23 @ 16:52)    Sensitivities:      Method Type: LUIS ENRIQUE      -  Amoxicillin/Clavulanic Acid: S <=8/4      -  Ampicillin: R >16 These ampicillin results predict results for amoxicillin      -  Ampicillin/Sulbactam: R >16/8      -  Aztreonam: S <=4      -  Cefazolin: S <=2      -  Cefepime: S <=2      -  Cefoxitin: S <=8      -  Ceftriaxone: S <=1      -  Ciprofloxacin: S <=0.25      -  Ertapenem: S <=0.5      -  Gentamicin: S <=2      -  Imipenem: S <=1      -  Levofloxacin: S <=0.5      -  Meropenem: S <=1      -  Piperacillin/Tazobactam: S <=8      -  Tobramycin: S <=2      -  Trimethoprim/Sulfamethoxazole: R >2/38    Culture - Blood (collected 12-21-23 @ 23:05)  Source: .Blood Blood  Final Report (12-27-23 @ 07:01):    No growth at 5 days    Culture - Blood (collected 12-21-23 @ 22:55)  Source: .Blood Blood  Final Report (12-27-23 @ 07:01):    No growth at 5 days    Culture - Urine (collected 12-21-23 @ 04:45)  Source: Clean Catch Clean Catch (Midstream)  Final Report (12-22-23 @ 08:51):    <10,000 CFU/mL Normal Urogenital Allyson    Culture - Blood (collected 12-21-23 @ 04:30)  Source: .Blood Blood-Peripheral  Final Report (12-26-23 @ 09:00):    No growth at 5 days    Culture - Blood (collected 12-21-23 @ 04:20)  Source: .Blood Blood-Peripheral  Final Report (12-26-23 @ 09:00):    No growth at 5 days      Creatinine: 4.16 mg/dL (03-07-24 @ 06:08)  Creatinine: 6.21 mg/dL (03-06-24 @ 03:50)  Creatinine: 4.80 mg/dL (03-05-24 @ 03:50)  Creatinine: 6.54 mg/dL (03-04-24 @ 03:20)  Creatinine: 5.83 mg/dL (03-03-24 @ 07:50)    Procalcitonin, Serum: 24.62 ng/mL (02-29-24 @ 05:08)  Procalcitonin, Serum: 18.92 ng/mL (02-26-24 @ 10:51)      Ferritin: 565 ng/mL (02-29-24 @ 05:08)      WBC Count: 12.74 K/uL (03-07-24 @ 06:08)  WBC Count: 15.87 K/uL (03-06-24 @ 03:50)  WBC Count: 16.98 K/uL (03-05-24 @ 03:50)  WBC Count: 19.58 K/uL (03-04-24 @ 03:20)  WBC Count: 23.69 K/uL (03-03-24 @ 07:50)    SARS-CoV-2: NotDetec (03-03-24 @ 04:34)  SARS-CoV-2: NotDetec (03-01-24 @ 17:52)      Alkaline Phosphatase: 197 U/L (03-06-24 @ 03:50)  Alkaline Phosphatase: 163 U/L (03-05-24 @ 03:50)  Alkaline Phosphatase: 113 U/L (03-04-24 @ 03:20)  Alanine Aminotransferase (ALT/SGPT): 21 U/L (03-06-24 @ 03:50)  Alanine Aminotransferase (ALT/SGPT): 19 U/L (03-05-24 @ 03:50)  Alanine Aminotransferase (ALT/SGPT): 16 U/L (03-04-24 @ 03:20)  Aspartate Aminotransferase (AST/SGOT): 27 U/L (03-06-24 @ 03:50)  Aspartate Aminotransferase (AST/SGOT): 26 U/L (03-05-24 @ 03:50)  Aspartate Aminotransferase (AST/SGOT): 19 U/L (03-04-24 @ 03:20)  Bilirubin Total: 0.5 mg/dL (03-06-24 @ 03:50)  Bilirubin Total: 0.7 mg/dL (03-05-24 @ 03:50)  Bilirubin Total: 0.9 mg/dL (03-04-24 @ 03:20)

## 2024-03-12 NOTE — DIETITIAN NUTRITION RISK NOTIFICATION - ADDITIONAL COMMENTS/DIETITIAN RECOMMENDATIONS
1) Liberalize to pureed diet/mod thick 2/2 limited menu options with altered consistency diet  2) Add Nepro BID   3) Continue SLP intervention  4) Rx Nephro-jania daily   5) Monitor weights daily for trend/accuracy   6) Encourage HBV protein sources

## 2024-03-12 NOTE — PROGRESS NOTE ADULT - NS ATTEND AMEND GEN_ALL_CORE FT
84 year old male with pmhx of DM, HTN, HLD, CKD, pAfib, GI bleed, esophagitis, and cholecystitis s/p c-tube presents to ED with c-tube malfunction and found to have possible GI bleed with episode of hematemesis, acute kidney injury now on HD, and acute respiratory insuffiencey with course also notable for ctube dislodgement and biloma s/p IR drainage     - Cholecystostomy tube functioning  - IR drain with scant bilious drainage   - Risks, benefits, and alternatives for cholecystectomy discussed at length with patient's daughter. Will continue with c-tube and IR drainage for now and follow for interval cholecystectomy outpatient   - Continue medical optimization

## 2024-03-12 NOTE — PROGRESS NOTE ADULT - ASSESSMENT
Pt is an 83 y/o male who had a sky tube placed on 12/22/23. IR placed new sky tube yesterday, as well as a 12f drain in biloma.  During procedure, cholangiogram noted an abnormality near gallbladder neck, concerning for GB defect possible cause of biloma. Surgery reconsulted to consider operative intervention  - Case discussed at length w/ attending surgeon, pt's daughter and hospitalist. Currently, the recommendation remains that patient should follow-up in approx 6 weeks to discuss interval cholecystectomy. Will discuss further with rest of the surgical team.   - Discussed w/ GI attending this morning, as well, to consider possibility of CBD stent placement to control leakage from gallbladder neck - will f/u their recs  - Sky tube & IR drain to remain in place  - Can continue diet as tolerated  - Surgery team will continue to follow

## 2024-03-12 NOTE — DIETITIAN NUTRITION RISK NOTIFICATION - TREATMENT: THE FOLLOWING DIET HAS BEEN RECOMMENDED
Diet, Pureed:   Consistent Carbohydrate {Evening Snack} (CSTCHOSN)  Moderately Thick Liquids (MODTHICKLIQS)  For patients receiving Renal Replacement - No Protein Restr, No Conc K, No Conc Phos, Low  Sodium (RENAL) (03-07-24 @ 17:23) [Active]

## 2024-03-12 NOTE — CONSULT NOTE ADULT - CONSULT REQUESTED DATE/TIME
02-Mar-2024
25-Feb-2024 22:08
12-Mar-2024 13:53
26-Feb-2024 07:21
04-Mar-2024 15:45
03-Mar-2024 05:00
07-Mar-2024 10:35
11-Mar-2024 22:59
27-Feb-2024 07:54
28-Feb-2024 14:55
26-Feb-2024 10:30

## 2024-03-13 ENCOUNTER — TRANSCRIPTION ENCOUNTER (OUTPATIENT)
Age: 84
End: 2024-03-13

## 2024-03-13 LAB
ALBUMIN SERPL ELPH-MCNC: 2.9 G/DL — LOW (ref 3.3–5.2)
ALP SERPL-CCNC: 138 U/L — HIGH (ref 40–120)
ALT FLD-CCNC: 19 U/L — SIGNIFICANT CHANGE UP
ANION GAP SERPL CALC-SCNC: 14 MMOL/L — SIGNIFICANT CHANGE UP (ref 5–17)
AST SERPL-CCNC: 23 U/L — SIGNIFICANT CHANGE UP
BASOPHILS # BLD AUTO: 0.04 K/UL — SIGNIFICANT CHANGE UP (ref 0–0.2)
BASOPHILS NFR BLD AUTO: 0.4 % — SIGNIFICANT CHANGE UP (ref 0–2)
BILIRUB SERPL-MCNC: 0.5 MG/DL — SIGNIFICANT CHANGE UP (ref 0.4–2)
BLD GP AB SCN SERPL QL: SIGNIFICANT CHANGE UP
BUN SERPL-MCNC: 28 MG/DL — HIGH (ref 8–20)
CALCIUM SERPL-MCNC: 8.7 MG/DL — SIGNIFICANT CHANGE UP (ref 8.4–10.5)
CHLORIDE SERPL-SCNC: 99 MMOL/L — SIGNIFICANT CHANGE UP (ref 96–108)
CO2 SERPL-SCNC: 24 MMOL/L — SIGNIFICANT CHANGE UP (ref 22–29)
CREAT SERPL-MCNC: 4.65 MG/DL — HIGH (ref 0.5–1.3)
EGFR: 12 ML/MIN/1.73M2 — LOW
EOSINOPHIL # BLD AUTO: 0.07 K/UL — SIGNIFICANT CHANGE UP (ref 0–0.5)
EOSINOPHIL NFR BLD AUTO: 0.7 % — SIGNIFICANT CHANGE UP (ref 0–6)
GLUCOSE BLDC GLUCOMTR-MCNC: 133 MG/DL — HIGH (ref 70–99)
GLUCOSE BLDC GLUCOMTR-MCNC: 151 MG/DL — HIGH (ref 70–99)
GLUCOSE BLDC GLUCOMTR-MCNC: 153 MG/DL — HIGH (ref 70–99)
GLUCOSE BLDC GLUCOMTR-MCNC: 158 MG/DL — HIGH (ref 70–99)
GLUCOSE BLDC GLUCOMTR-MCNC: 188 MG/DL — HIGH (ref 70–99)
GLUCOSE SERPL-MCNC: 205 MG/DL — HIGH (ref 70–99)
HCT VFR BLD CALC: 28.8 % — LOW (ref 39–50)
HGB BLD-MCNC: 9.3 G/DL — LOW (ref 13–17)
IMM GRANULOCYTES NFR BLD AUTO: 0.9 % — SIGNIFICANT CHANGE UP (ref 0–0.9)
INR BLD: 1.09 RATIO — SIGNIFICANT CHANGE UP (ref 0.85–1.18)
LYMPHOCYTES # BLD AUTO: 1.04 K/UL — SIGNIFICANT CHANGE UP (ref 1–3.3)
LYMPHOCYTES # BLD AUTO: 10.3 % — LOW (ref 13–44)
MAGNESIUM SERPL-MCNC: 1.8 MG/DL — SIGNIFICANT CHANGE UP (ref 1.6–2.6)
MCHC RBC-ENTMCNC: 29.2 PG — SIGNIFICANT CHANGE UP (ref 27–34)
MCHC RBC-ENTMCNC: 32.3 GM/DL — SIGNIFICANT CHANGE UP (ref 32–36)
MCV RBC AUTO: 90.6 FL — SIGNIFICANT CHANGE UP (ref 80–100)
MONOCYTES # BLD AUTO: 0.87 K/UL — SIGNIFICANT CHANGE UP (ref 0–0.9)
MONOCYTES NFR BLD AUTO: 8.7 % — SIGNIFICANT CHANGE UP (ref 2–14)
NEUTROPHILS # BLD AUTO: 7.94 K/UL — HIGH (ref 1.8–7.4)
NEUTROPHILS NFR BLD AUTO: 79 % — HIGH (ref 43–77)
PLATELET # BLD AUTO: 379 K/UL — SIGNIFICANT CHANGE UP (ref 150–400)
POTASSIUM SERPL-MCNC: 4.1 MMOL/L — SIGNIFICANT CHANGE UP (ref 3.5–5.3)
POTASSIUM SERPL-SCNC: 4.1 MMOL/L — SIGNIFICANT CHANGE UP (ref 3.5–5.3)
PROT SERPL-MCNC: 6.9 G/DL — SIGNIFICANT CHANGE UP (ref 6.6–8.7)
PROTHROM AB SERPL-ACNC: 12.1 SEC — SIGNIFICANT CHANGE UP (ref 9.5–13)
RBC # BLD: 3.18 M/UL — LOW (ref 4.2–5.8)
RBC # FLD: 15.5 % — HIGH (ref 10.3–14.5)
SODIUM SERPL-SCNC: 137 MMOL/L — SIGNIFICANT CHANGE UP (ref 135–145)
WBC # BLD: 10.05 K/UL — SIGNIFICANT CHANGE UP (ref 3.8–10.5)
WBC # FLD AUTO: 10.05 K/UL — SIGNIFICANT CHANGE UP (ref 3.8–10.5)

## 2024-03-13 PROCEDURE — 99233 SBSQ HOSP IP/OBS HIGH 50: CPT

## 2024-03-13 PROCEDURE — 99232 SBSQ HOSP IP/OBS MODERATE 35: CPT

## 2024-03-13 RX ORDER — ERTAPENEM SODIUM 1 G/1
1000 INJECTION, POWDER, LYOPHILIZED, FOR SOLUTION INTRAMUSCULAR; INTRAVENOUS ONCE
Refills: 0 | Status: COMPLETED | OUTPATIENT
Start: 2024-03-14 | End: 2024-03-14

## 2024-03-13 RX ORDER — IPRATROPIUM/ALBUTEROL SULFATE 18-103MCG
3 AEROSOL WITH ADAPTER (GRAM) INHALATION EVERY 6 HOURS
Refills: 0 | Status: DISCONTINUED | OUTPATIENT
Start: 2024-03-13 | End: 2024-03-21

## 2024-03-13 RX ORDER — INDOMETHACIN 50 MG
100 CAPSULE ORAL ONCE
Refills: 0 | Status: COMPLETED | OUTPATIENT
Start: 2024-03-14 | End: 2024-03-14

## 2024-03-13 RX ADMIN — PANTOPRAZOLE SODIUM 40 MILLIGRAM(S): 20 TABLET, DELAYED RELEASE ORAL at 05:12

## 2024-03-13 RX ADMIN — Medication 3 UNIT(S): at 17:11

## 2024-03-13 RX ADMIN — Medication 3 MILLIGRAM(S): at 22:23

## 2024-03-13 RX ADMIN — SENNA PLUS 2 TABLET(S): 8.6 TABLET ORAL at 21:21

## 2024-03-13 RX ADMIN — CHLORHEXIDINE GLUCONATE 1 APPLICATION(S): 213 SOLUTION TOPICAL at 05:05

## 2024-03-13 RX ADMIN — DRONEDARONE 400 MILLIGRAM(S): 400 TABLET, FILM COATED ORAL at 05:23

## 2024-03-13 RX ADMIN — PANTOPRAZOLE SODIUM 40 MILLIGRAM(S): 20 TABLET, DELAYED RELEASE ORAL at 17:11

## 2024-03-13 RX ADMIN — Medication 0.5 MILLIGRAM(S): at 21:30

## 2024-03-13 RX ADMIN — Medication 2: at 17:11

## 2024-03-13 RX ADMIN — Medication 325 MILLIGRAM(S): at 12:03

## 2024-03-13 RX ADMIN — SIMVASTATIN 20 MILLIGRAM(S): 20 TABLET, FILM COATED ORAL at 21:21

## 2024-03-13 RX ADMIN — DRONEDARONE 400 MILLIGRAM(S): 400 TABLET, FILM COATED ORAL at 17:13

## 2024-03-13 RX ADMIN — Medication 0.5 MILLIGRAM(S): at 17:13

## 2024-03-13 RX ADMIN — ERYTHROPOIETIN 10000 UNIT(S): 10000 INJECTION, SOLUTION INTRAVENOUS; SUBCUTANEOUS at 11:06

## 2024-03-13 RX ADMIN — Medication 3 UNIT(S): at 12:03

## 2024-03-13 NOTE — PROGRESS NOTE ADULT - ASSESSMENT
83yo M w/ hx of afib, esophagitis, anemia, DM, HTN, HL, CKD, and acute cholecystitis s/p per sky tube placement in 12/2023 presenting with CC of leakage around cholecystotomy tube.    CKD(IV) +DM, HTN  RON: ATN post IV contrast and hypotension/sepsis--> may have now progressed to ESRD  Biliary tube leak  - cont to avoid further potential nephrotoxins  - HD MWF (still no indication of renal recovery) --> HD today  - would pursue tunnelled PC placement by IR for ongoing HD  - surgery noted--> will eventually require cholecystectomy, ut poor surgical candidate    Anemia: + multifactorial  - cont CINDI  - trend H/H  - target Hgb > 10.0    RO:  - low phos diet  - con to monitor off binders  - trend serum phos    Will follow

## 2024-03-13 NOTE — PROGRESS NOTE ADULT - SUBJECTIVE AND OBJECTIVE BOX
INTERVAL HPI/OVERNIGHT EVENTS:    Patient evaluated at bedside. NAOE. Patient denies N/V/CP/SOB, no subjective fevers or chills. Endorses R-sided abd pain when prompted. Tolerating diet. Now s/p PC placement by IR.    MEDICATIONS  (STANDING):  albuterol/ipratropium for Nebulization 3 milliLiter(s) Nebulizer every 6 hours  buDESOnide    Inhalation Suspension 0.5 milliGRAM(s) Inhalation every 12 hours  chlorhexidine 2% Cloths 1 Application(s) Topical <User Schedule>  dextrose 5%. 1000 milliLiter(s) (100 mL/Hr) IV Continuous <Continuous>  dextrose 5%. 1000 milliLiter(s) (50 mL/Hr) IV Continuous <Continuous>  dextrose 50% Injectable 12.5 Gram(s) IV Push once  dextrose 50% Injectable 25 Gram(s) IV Push once  dextrose 50% Injectable 25 Gram(s) IV Push once  dronedarone 400 milliGRAM(s) Oral two times a day  epoetin kristal-epbx (RETACRIT) Injectable 51986 Unit(s) IV Push <User Schedule>  ertapenem  IVPB 1000 milliGRAM(s) IV Intermittent once  ferrous    sulfate 325 milliGRAM(s) Oral daily  glucagon  Injectable 1 milliGRAM(s) IntraMuscular once  indomethacin Suppository 100 milliGRAM(s) Rectal once  insulin glargine Injectable (LANTUS) 10 Unit(s) SubCutaneous every morning  insulin lispro (ADMELOG) corrective regimen sliding scale   SubCutaneous at bedtime  insulin lispro (ADMELOG) corrective regimen sliding scale   SubCutaneous three times a day before meals  insulin lispro Injectable (ADMELOG) 3 Unit(s) SubCutaneous three times a day before meals  midodrine 15 milliGRAM(s) Oral every 8 hours  pantoprazole  Injectable 40 milliGRAM(s) IV Push every 12 hours  senna 2 Tablet(s) Oral at bedtime  simvastatin 20 milliGRAM(s) Oral at bedtime    MEDICATIONS  (PRN):  acetaminophen     Tablet .. 650 milliGRAM(s) Oral every 6 hours PRN Temp greater or equal to 38C (100.4F), Mild Pain (1 - 3)  aluminum hydroxide/magnesium hydroxide/simethicone Suspension 30 milliLiter(s) Oral every 4 hours PRN Dyspepsia  artificial  tears Solution 1 Drop(s) Both EYES three times a day PRN Dry Eyes  dextrose Oral Gel 15 Gram(s) Oral once PRN Blood Glucose LESS THAN 70 milliGRAM(s)/deciliter  melatonin 3 milliGRAM(s) Oral at bedtime PRN Insomnia  ondansetron Injectable 4 milliGRAM(s) IV Push every 8 hours PRN Nausea and/or Vomiting  sodium chloride 0.9% lock flush 10 milliLiter(s) IV Push every 1 hour PRN Pre/post blood products, medications, blood draw, and to maintain line patency      Vital Signs Last 24 Hrs  T(C): 36.6 (13 Mar 2024 05:02), Max: 37.1 (12 Mar 2024 21:52)  T(F): 97.8 (13 Mar 2024 05:02), Max: 98.8 (12 Mar 2024 21:52)  HR: 97 (13 Mar 2024 05:02) (78 - 97)  BP: 163/77 (13 Mar 2024 05:02) (120/64 - 163/77)  BP(mean): --  RR: 18 (13 Mar 2024 05:02) (18 - 20)  SpO2: 97% (13 Mar 2024 05:02) (93% - 97%)    Parameters below as of 13 Mar 2024 05:02  Patient On (Oxygen Delivery Method): nasal cannula      Constitutional: NAD  Respiratory: respirations even, unlabored on NC  Cardiovascular: RRR  Gastrointestinal: abdomen is soft, non-distended, mild RUQ TTP w/o rebound or guarding. IR drains x2 in place  Neurological: alert, oriented to person only  Skin: mucous membranes moist, no diaphoresis, pallor, cyanosis or jaundice      I&O's Detail    12 Mar 2024 07:01  -  13 Mar 2024 07:00  --------------------------------------------------------  IN:  Total IN: 0 mL    OUT:    Drain (mL): 15 mL    Drain (mL): 5 mL    Voided (mL): 700 mL  Total OUT: 720 mL    Total NET: -720 mL          LABS:                        9.3    10.05 )-----------( 379      ( 13 Mar 2024 06:06 )             28.8     03-13    137  |  99  |  28.0<H>  ----------------------------<  205<H>  4.1   |  24.0  |  4.65<H>    Ca    8.7      13 Mar 2024 06:06  Mg     1.8     03-13    TPro  6.9  /  Alb  2.9<L>  /  TBili  0.5  /  DBili  x   /  AST  23  /  ALT  19  /  AlkPhos  138<H>  03-13    PT/INR - ( 13 Mar 2024 06:06 )   PT: 12.1 sec;   INR: 1.09 ratio           Urinalysis Basic - ( 13 Mar 2024 06:06 )    Color: x / Appearance: x / SG: x / pH: x  Gluc: 205 mg/dL / Ketone: x  / Bili: x / Urobili: x   Blood: x / Protein: x / Nitrite: x   Leuk Esterase: x / RBC: x / WBC x   Sq Epi: x / Non Sq Epi: x / Bacteria: x

## 2024-03-13 NOTE — PROGRESS NOTE ADULT - ASSESSMENT
84M who had a sky tube placed on 12/22/23. IR placed new sky tube 3/11, as well as a 12f drain in biloma. During procedure, cholangiogram noted an abnormality near gallbladder neck, concerning for GB defect possible cause of biloma. Surgery reconsulted to consider operative intervention    - patient remains poor surgical candidate at this point, no plan for surgical intervention at this time  - possible ERCP with GI pending discussion with family  - Sky tube & IR drain to remain in place  - Can continue diet as tolerated  - Surgery team will continue to follow

## 2024-03-13 NOTE — PROGRESS NOTE ADULT - SUBJECTIVE AND OBJECTIVE BOX
NEPHROLOGY INTERVAL HPI/OVERNIGHT EVENTS:    Examined  No distress  Frail lethargic    MEDICATIONS  (STANDING):  buDESOnide    Inhalation Suspension 0.5 milliGRAM(s) Inhalation every 12 hours  chlorhexidine 2% Cloths 1 Application(s) Topical <User Schedule>  dextrose 5%. 1000 milliLiter(s) (50 mL/Hr) IV Continuous <Continuous>  dextrose 5%. 1000 milliLiter(s) (100 mL/Hr) IV Continuous <Continuous>  dextrose 50% Injectable 12.5 Gram(s) IV Push once  dextrose 50% Injectable 25 Gram(s) IV Push once  dextrose 50% Injectable 25 Gram(s) IV Push once  dronedarone 400 milliGRAM(s) Oral two times a day  epoetin kristal-epbx (RETACRIT) Injectable 63132 Unit(s) IV Push <User Schedule>  ferrous    sulfate 325 milliGRAM(s) Oral daily  glucagon  Injectable 1 milliGRAM(s) IntraMuscular once  insulin glargine Injectable (LANTUS) 10 Unit(s) SubCutaneous every morning  insulin lispro (ADMELOG) corrective regimen sliding scale   SubCutaneous three times a day before meals  insulin lispro (ADMELOG) corrective regimen sliding scale   SubCutaneous at bedtime  insulin lispro Injectable (ADMELOG) 3 Unit(s) SubCutaneous three times a day before meals  midodrine 15 milliGRAM(s) Oral every 8 hours  pantoprazole  Injectable 40 milliGRAM(s) IV Push every 12 hours  senna 2 Tablet(s) Oral at bedtime  simvastatin 20 milliGRAM(s) Oral at bedtime    MEDICATIONS  (PRN):  acetaminophen     Tablet .. 650 milliGRAM(s) Oral every 6 hours PRN Temp greater or equal to 38C (100.4F), Mild Pain (1 - 3)  albuterol/ipratropium for Nebulization 3 milliLiter(s) Nebulizer every 6 hours PRN wheezing or SOB  aluminum hydroxide/magnesium hydroxide/simethicone Suspension 30 milliLiter(s) Oral every 4 hours PRN Dyspepsia  artificial  tears Solution 1 Drop(s) Both EYES three times a day PRN Dry Eyes  dextrose Oral Gel 15 Gram(s) Oral once PRN Blood Glucose LESS THAN 70 milliGRAM(s)/deciliter  melatonin 3 milliGRAM(s) Oral at bedtime PRN Insomnia  ondansetron Injectable 4 milliGRAM(s) IV Push every 8 hours PRN Nausea and/or Vomiting  sodium chloride 0.9% lock flush 10 milliLiter(s) IV Push every 1 hour PRN Pre/post blood products, medications, blood draw, and to maintain line patency      Allergies    No Known Allergies    Intolerances        Vital Signs Last 24 Hrs  T(C): 36.3 (13 Mar 2024 11:37), Max: 37.1 (12 Mar 2024 21:52)  T(F): 97.3 (13 Mar 2024 11:37), Max: 98.8 (12 Mar 2024 21:52)  HR: 95 (13 Mar 2024 11:37) (78 - 97)  BP: 136/75 (13 Mar 2024 11:37) (130/71 - 163/77)  BP(mean): --  RR: 19 (13 Mar 2024 11:20) (18 - 20)  SpO2: 94% (13 Mar 2024 11:37) (94% - 98%)    Parameters below as of 13 Mar 2024 11:37  Patient On (Oxygen Delivery Method): nasal cannula      PHYSICAL EXAM:  GENERAL: Weak, tired  HEENT: Moist MMs  NECK: Supple, no JVD; R IJ catheter  NERVOUS SYSTEM: More awake, alert  CHEST/LUNG: Clear, decreased BS  HEART: No rub  ABDOMEN: Soft, +BS; +biliary drain  EXTREMITIES: + improved dependent edema     LABS:                        9.3    10.05 )-----------( 379      ( 13 Mar 2024 06:06 )             28.8     03-13    137  |  99  |  28.0<H>  ----------------------------<  205<H>  4.1   |  24.0  |  4.65<H>    Ca    8.7      13 Mar 2024 06:06  Mg     1.8     03-13    TPro  6.9  /  Alb  2.9<L>  /  TBili  0.5  /  DBili  x   /  AST  23  /  ALT  19  /  AlkPhos  138<H>  03-13    PT/INR - ( 13 Mar 2024 06:06 )   PT: 12.1 sec;   INR: 1.09 ratio           Urinalysis Basic - ( 13 Mar 2024 06:06 )    Color: x / Appearance: x / SG: x / pH: x  Gluc: 205 mg/dL / Ketone: x  / Bili: x / Urobili: x   Blood: x / Protein: x / Nitrite: x   Leuk Esterase: x / RBC: x / WBC x   Sq Epi: x / Non Sq Epi: x / Bacteria: x      Magnesium: 1.8 mg/dL (03-13 @ 06:06)          RADIOLOGY & ADDITIONAL TESTS:

## 2024-03-13 NOTE — PROGRESS NOTE ADULT - NS ATTEND AMEND GEN_ALL_CORE FT
Patient seen and examined. Patient with cystic duct leak and now requires ERCP with stent placement. Will plan for tomorrow if medically stable. Body Location Override (Optional - Billing Will Still Be Based On Selected Body Map Location If Applicable): left lower vermillion border Detail Level: Detailed Depth Of Biopsy: dermis Was A Bandage Applied: Yes Size Of Lesion In Cm: 0.5 X Size Of Lesion In Cm: 0 Biopsy Type: H and E Biopsy Method: Dermablade Anesthesia Type: 1% lidocaine with epinephrine Hemostasis: Drysol Wound Care: Petrolatum Dressing: bandage Destruction After The Procedure: No Type Of Destruction Used: Curettage Curettage Text: The wound bed was treated with curettage after the biopsy was performed. Cryotherapy Text: The wound bed was treated with cryotherapy after the biopsy was performed. Electrodesiccation Text: The wound bed was treated with electrodesiccation after the biopsy was performed. Electrodesiccation And Curettage Text: The wound bed was treated with electrodesiccation and curettage after the biopsy was performed. Silver Nitrate Text: The wound bed was treated with silver nitrate after the biopsy was performed. Lab: [id number] Lab Facility: [id number] consent was obtained and risks were reviewed including but not limited to scarring, infection, bleeding, scabbing, incomplete removal, nerve damage and allergy to anesthesia. Post-Care Instructions: I reviewed with the patient in detail post-care instructions. Patient is to keep the biopsy site dry overnight, and then apply bacitracin twice daily until healed. Patient may apply hydrogen peroxide soaks to remove any crusting. Notification Instructions: Patient will be notified of biopsy results. However, patient instructed to call the office if not contacted within 2 weeks. Billing Type: Third-Party Bill Information: Selecting Yes will display possible errors in your note based on the variables you have selected. This validation is only offered as a suggestion for you. PLEASE NOTE THAT THE VALIDATION TEXT WILL BE REMOVED WHEN YOU FINALIZE YOUR NOTE. IF YOU WANT TO FAX A PRELIMINARY NOTE YOU WILL NEED TO TOGGLE THIS TO 'NO' IF YOU DO NOT WANT IT IN YOUR FAXED NOTE.

## 2024-03-13 NOTE — PROGRESS NOTE ADULT - ASSESSMENT
83yo M w/ hx of afib, esophagitis, anemia, DM, HTN, HL, CKD, and acute cholecystitis s/p per sky tube placement in 12/2023 presenting with CC of leakage around cholecystotomy tube. Hospital course complicated by coffee ground emesis and aspiration pna (initially requring pressors in MICU), now stepped down to [facility]. [facility] consulted in setting of cystic duct leak and potential stent placement.      #Cystic duct leak/ Biloma  s/p cholecystostomy tube placement on 12/22/23 presented to the ED with complaints of leaking around cholecystostomy tube and difficulty flushing the tube.   Tube study with IR due to leakage and biloma noted on CT scan,  Perc sky tube exchanged and biloma drained on 3/11. Following tube exchange, tube study showed some extravasation of contrast at gallbladder neck suggesting leakage.   Will plan for ERCP with plastic stent placement (CBD stent to increasing drainage through CBD and decreasing cholecystotomy output) tomorrow  Please keep NPO after midnight for procedure  Hold Sq Lovenox/ Heparin for procedure.   Active type and screen,  INR <1.5, platelet count >50 for procedure  Eventual cholecystectomy per surgery   Cont further care per primary team

## 2024-03-13 NOTE — PROGRESS NOTE ADULT - SUBJECTIVE AND OBJECTIVE BOX
Chief Complaint: This is a 84y old man patient being seen in follow-up consultation for bile leak, biloma    Interval HPI / 24H events:  Patient seen and examined, family (wife and daughter) at the bedside. Patient denies abdominal pain, nausea, vomiting. Cholecystostomy tube x 2 noted.     Review of Systems:  . Constitutional: No fever, chills  . HEENT: Negative  · Respiratory and Thorax: No shortness of breath, no cough  · Cardiovascular: No chest pain, palpitation, no dizziness  · Gastrointestinal: see above  · Genitourinary: No hematuria  · Musculoskeletal: Negative  · Neurological: negative  · Psychiatric: no agitation, no anxiety      PAST MEDICAL/SURGICAL HISTORY:  Diabetes    Hypertension    Prostate disorder    Anxiety    High cholesterol    Ulcer    History of endoscopy      MEDICATIONS  (STANDING):  buDESOnide    Inhalation Suspension 0.5 milliGRAM(s) Inhalation every 12 hours  chlorhexidine 2% Cloths 1 Application(s) Topical <User Schedule>  dextrose 5%. 1000 milliLiter(s) (50 mL/Hr) IV Continuous <Continuous>  dextrose 5%. 1000 milliLiter(s) (100 mL/Hr) IV Continuous <Continuous>  dextrose 50% Injectable 12.5 Gram(s) IV Push once  dextrose 50% Injectable 25 Gram(s) IV Push once  dextrose 50% Injectable 25 Gram(s) IV Push once  dronedarone 400 milliGRAM(s) Oral two times a day  epoetin kristal-epbx (RETACRIT) Injectable 30228 Unit(s) IV Push <User Schedule>  ferrous    sulfate 325 milliGRAM(s) Oral daily  glucagon  Injectable 1 milliGRAM(s) IntraMuscular once  insulin glargine Injectable (LANTUS) 10 Unit(s) SubCutaneous every morning  insulin lispro (ADMELOG) corrective regimen sliding scale   SubCutaneous three times a day before meals  insulin lispro (ADMELOG) corrective regimen sliding scale   SubCutaneous at bedtime  insulin lispro Injectable (ADMELOG) 3 Unit(s) SubCutaneous three times a day before meals  midodrine 15 milliGRAM(s) Oral every 8 hours  pantoprazole  Injectable 40 milliGRAM(s) IV Push every 12 hours  senna 2 Tablet(s) Oral at bedtime  simvastatin 20 milliGRAM(s) Oral at bedtime    MEDICATIONS  (PRN):  acetaminophen     Tablet .. 650 milliGRAM(s) Oral every 6 hours PRN Temp greater or equal to 38C (100.4F), Mild Pain (1 - 3)  albuterol/ipratropium for Nebulization 3 milliLiter(s) Nebulizer every 6 hours PRN wheezing or SOB  aluminum hydroxide/magnesium hydroxide/simethicone Suspension 30 milliLiter(s) Oral every 4 hours PRN Dyspepsia  artificial  tears Solution 1 Drop(s) Both EYES three times a day PRN Dry Eyes  dextrose Oral Gel 15 Gram(s) Oral once PRN Blood Glucose LESS THAN 70 milliGRAM(s)/deciliter  melatonin 3 milliGRAM(s) Oral at bedtime PRN Insomnia  ondansetron Injectable 4 milliGRAM(s) IV Push every 8 hours PRN Nausea and/or Vomiting  sodium chloride 0.9% lock flush 10 milliLiter(s) IV Push every 1 hour PRN Pre/post blood products, medications, blood draw, and to maintain line patency    No Known Allergies    T(C): 36.3 (03-13-24 @ 11:37), Max: 37.1 (03-12-24 @ 21:52)  HR: 95 (03-13-24 @ 11:37) (78 - 97)  BP: 136/75 (03-13-24 @ 11:37) (120/64 - 163/77)  RR: 19 (03-13-24 @ 11:20) (18 - 20)  SpO2: 94% (03-13-24 @ 11:37) (94% - 98%)    I&O's Summary    12 Mar 2024 07:01  -  13 Mar 2024 07:00  --------------------------------------------------------  IN: 0 mL / OUT: 720 mL / NET: -720 mL    13 Mar 2024 07:01  -  13 Mar 2024 15:29  --------------------------------------------------------  IN: 0 mL / OUT: 175 mL / NET: -175 mL      PHYSICAL EXAM:    Constitutional: No acute distress  Neuro: Awake alert,  HEENT: anicteric sclerae  CV: regular rate, regular rhythm  Pulm/chest: lung sounds diminished bilaterally, no accessory muscle use noted  Abd: soft, nontender, nondistended +bowel sounds. No rigidity, rebound tenderness, or guarding. +cholecystomy tube x2   Ext: no edema  Skin: warm, no jaundice   Psych: calm, cooperative      LABS:               9.3    10.05 )-----------( 379      ( 03-13 @ 06:06 )             28.8                9.4    12.03 )-----------( 423      ( 03-11 @ 06:29 )             29.2       03-13    137  |  99  |  28.0<H>  ----------------------------<  205<H>  4.1   |  24.0  |  4.65<H>    Ca    8.7      13 Mar 2024 06:06  Mg     1.8     03-13    TPro  6.9  /  Alb  2.9<L>  /  TBili  0.5  /  DBili  x   /  AST  23  /  ALT  19  /  AlkPhos  138<H>  03-13    LIVER FUNCTIONS - ( 13 Mar 2024 06:06 )  Alb: 2.9 g/dL / Pro: 6.9 g/dL / ALK PHOS: 138 U/L / ALT: 19 U/L / AST: 23 U/L / GGT: x               PT/INR - ( 13 Mar 2024 06:06 )   PT: 12.1 sec;   INR: 1.09 ratio         < from: CT Abdomen and Pelvis No Cont (03.10.24 @ 17:50) >  FINDINGS:  CHEST:  LUNGS AND LARGE AIRWAYS: Patent central airways. Stable near complete   atelectatic collapse of the right lower lobe. Moderate right middle lobe   atelectasis. Moderately improved left lower lobe aeration with residual   dependent atelectasis.  PLEURA: Slight increase in size of a mild layering right pleural effusion.  VESSELS: Right internal jugular dual-lumen catheter. Within normal limits.  HEART: Heart size is normal. No pericardial effusion.  MEDIASTINUM AND MARIANELA: No lymphadenopathy.  CHEST WALL AND LOWER NECK: Within normal limits.    ABDOMEN AND PELVIS:  LIVER: Within normal limits.  BILEDUCTS: Normal caliber.  GALLBLADDER: Percutaneous cholecystostomy tube in place. Gallstone in the   gallbladder neck. There is persistent loculated subcapsular fluid   tracking along the right liver margin, suspicious for biloma.  SPLEEN: Within normal limits.  PANCREAS: Within normal limits.  ADRENALS: Within normal limits.  KIDNEYS/URETERS: 2 mm nonobstructing right renal upper pole stone.   Bilateral renal cysts. Mild bilateral perinephric stranding. Otherwise,   within normal limits.    BLADDER: Distended and smooth in contour.  REPRODUCTIVE ORGANS: The prostate is not enlarged.    BOWEL: Sigmoid diverticulosis. No bowel obstruction. Appendix is normal.  PERITONEUM: No ascites.  VESSELS: Moderate atherosclerotic calcification of the nonaneurysmal   abdominal aorta.  RETROPERITONEUM/LYMPH NODES: No lymphadenopathy.  ABDOMINAL WALL: Within normal limits.  BONES: Within normal limits.    IMPRESSION: Persistent subcapsular fluid collections along the right   lateral liver, suspicious for developing biloma. Improved left lower lobe   aeration.    < end of copied text >

## 2024-03-13 NOTE — PROGRESS NOTE ADULT - ASSESSMENT
84 yr old male with paroxysmal atrial fibrillation not on AC secondary to GI bleed, esophagitis, anemia, diabetes mellitus, hypertension, hyperlipidemia, CKD stage 3-4, BPH, cognitive impairment, recent COVID, acute cholecystitis in Dec 2023 s/p cholecystostomy tube placement on 12/22/23 presented to the ED with complaints of leaking around cholecystostomy tube and difficulty flushing the tube. A CT abdomen/pelvis with contrast was done in ED, tube noted to be in place, he was evaluated by surgery, tube was flushed without difficulty. While in ED, he was noted to have coffee ground emesis and pallor with some abdominal pain and transient drop in Hb. GI was consulted, he had recently had an EGD in January 2024 with grade A esophagitis. Hb improved, hence no additional work up recommended by GI. Course complicated by hypotension and hypoxia on 2/26, RRT was called and he was transferred to ICU for need for pressors, Hi Ar[place]do oxygen requirement, likely septic shock from aspiration. Cultures were sent, empiric antibiotics were initiated. Also received steroids. Patient noted to have worsening renal function and was anuric, nephrology was consulted and HD was initiated on 2/27/24. HD was continued. He was weaned off Levophed and was transferred to medical floor on 2/29. Surgery follow up was done, recommended cholecystectomy once medically stable. While on medical floor, he was noted to have worsening leucocytosis, ID consultation was requested, cultures repeated, Zosyn was initiated. On 3/3, he was noted to have worsening hypoxia, ICU consult was requested, NIV was placed, CXR with left lung field opacification. He failed trial of BiPAP and was intubated on 3/3 and required Precedex and low dose of Levophed. His cultures remained negative. Palliative care was consulted, goals of care were discussed with family, they wished patient to be full code. Patient had waxing and waning mental status, initially required tube feeds. He was extubated on 3/4. HD was continued. He was transferred back to medical floor on 3/6/24. IR consulted for PermCath placement. He was evaluated by speech and swallow and diet was advanced. His cholecystostomy tubing broke on 3/9, IR consulted for tube exhange. CT head was done, no acute changes noted. CT chest and abdomen was done, Persistent subcapsular fluid collections along the right lateral liver, suspicious for developing biloma. Improved left lower lobe aeration.  IR evaluation requested for biloma, he underwent cholecystostomy tube exchange and a cholangiogram was done noted for abnormality near gallbladder neck, concerning for GB defect possible cause of biloma, a 12 F catheter was placed into the biloma. Surgery follow up requested to assess for cholecystectomy Surgery recommended GI evaluation for possible CBD stent placement to control leakage from GB neck. ERCP was scheduled by GI on 3/12, but family requested additional time before proceeding with intervention.       1. Acute metabolic encephalopathy sec recurrent sepsis, underlying RON:  Long and complicated course attributing to mental status  continue HD per schedule  CT head repeated, no acute changes noted  monitor WBC  completed course of Zosyn.  repeat cultures if febrile  aspiration precautions    2. RON sec ATN with underlying CKD stage 4, now on HD:  Continue HD per schedule.  nephrology following  continue Epogen for anemia sec underlying CKD  now s/p PermCath placement by IR on 3/12.    3. S/p septic shock:  Continue Midodrine  wean as able  previously on Levophed and stress dose steroids.    4. Atrial fibrillation:  Not on AC sec GI bleed  on Dronedarone.     5. H/o acute cholecystitis s/p cholecystostomy tube placement, now with biloma sec GB neck leak s/p drain placement:  IR intervention appreciated, s/p tube exchange and drain placement for biloma  surgery recommendations noted, cholecystectomy deferred for now.  GI was consulted, recommended ERCP, which on hold at this time, until family decides to proceed.      6. Hypertension, hyperlipidemia:  Not on anti hypertensives, BP stable  recent shock  continue statin    7. Diabetes mellitus:  A1C 7.7  Continue Lantus 10 units  pre meal insulin 3 units  monitor FS  sliding scale coverage.    8. Grade A esophagitis, anemia:  Hb stable  GI recs from early this admission noted  continue PPI  monitor CBC  continue Epogen    9. DVT ppx:  Heparin    Discussed with RN.  Guarded prognosis.     84 yr old male with paroxysmal atrial fibrillation not on AC secondary to GI bleed, esophagitis, anemia, diabetes mellitus, hypertension, hyperlipidemia, CKD stage 3-4, BPH, cognitive impairment, recent COVID, acute cholecystitis in Dec 2023 s/p cholecystostomy tube placement on 12/22/23 presented to the ED with complaints of leaking around cholecystostomy tube and difficulty flushing the tube. A CT abdomen/pelvis with contrast was done in ED, tube noted to be in place, he was evaluated by surgery, tube was flushed without difficulty. While in ED, he was noted to have coffee ground emesis and pallor with some abdominal pain and transient drop in Hb. GI was consulted, he had recently had an EGD in January 2024 with grade A esophagitis. Hb improved, hence no additional work up recommended by GI. Course complicated by hypotension and hypoxia on 2/26, RRT was called and he was transferred to ICU for need for pressors, [place] oxygen requirement, likely septic shock from aspiration. Cultures were sent, empiric antibiotics were initiated. Also received steroids. Patient noted to have worsening renal function and was anuric, nephrology was consulted and HD was initiated on 2/27/24. HD was continued. He was weaned off Levophed and was transferred to medical floor on 2/29. Surgery follow up was done, recommended cholecystectomy once medically stable. While on medical floor, he was noted to have worsening leucocytosis, ID consultation was requested, cultures repeated, Zosyn was initiated. On 3/3, he was noted to have worsening hypoxia, ICU consult was requested, NIV was placed, CXR with left lung field opacification. He failed trial of BiPAP and was intubated on 3/3 and required Precedex and low dose of Levophed. His cultures remained negative. Palliative care was consulted, goals of care were discussed with family, they wished patient to be full code. Patient had waxing and waning mental status, initially required tube feeds. He was extubated on 3/4. HD was continued. He was transferred back to medical floor on 3/6/24. IR consulted for PermCath placement. He was evaluated by speech and swallow and diet was advanced. His cholecystostomy tubing broke on 3/9, IR consulted for tube exhange. CT head was done, no acute changes noted. CT chest and abdomen was done, Persistent subcapsular fluid collections along the right lateral liver, suspicious for developing biloma. Improved left lower lobe aeration.  IR evaluation requested for biloma, he underwent cholecystostomy tube exchange and a cholangiogram was done noted for abnormality near gallbladder neck, concerning for GB defect possible cause of biloma, a 12 F catheter was placed into the biloma. Surgery follow up requested to assess for cholecystectomy Surgery recommended GI evaluation for possible CBD stent placement to control leakage from GB neck. ERCP was scheduled by GI on 3/12, but family requested additional time before proceeding with intervention.       1. Acute metabolic encephalopathy sec recurrent sepsis, underlying RON:  Long and complicated course attributing to mental status  continue HD per schedule  CT head repeated, no acute changes noted  monitor WBC  completed course of Zosyn.  repeat cultures if febrile  aspiration precautions    2. RON sec ATN with underlying CKD stage 4, now on HD:  Continue HD per schedule.  nephrology following  continue Epogen for anemia sec underlying CKD  now s/p PermCath placement by IR on 3/12.    3. S/p septic shock:  Continue Midodrine  wean as able  previously on Levophed and stress dose steroids.    4. Atrial fibrillation:  Not on AC sec GI bleed  on Dronedarone.     5. H/o acute cholecystitis s/p cholecystostomy tube placement, now with biloma sec GB neck leak s/p drain placement:  IR intervention appreciated, s/p tube exchange and drain placement for biloma  surgery recommendations noted, cholecystectomy deferred for now.  GI was consulted, recommended ERCP, scheduled for tomorrow.      6. Hypertension, hyperlipidemia:  Not on anti hypertensives, BP stable  recent shock  continue statin    7. Diabetes mellitus:  A1C 7.7  Continue Lantus 10 units  pre meal insulin 3 units  monitor FS  sliding scale coverage.    8. Grade A esophagitis, anemia:  Hb stable  GI recs from early this admission noted  continue PPI  monitor CBC  continue Epogen    9. DVT ppx:  Heparin, resume post ERCP once cleared by GI.    Discussed with RN.  Guarded prognosis.

## 2024-03-13 NOTE — PROGRESS NOTE ADULT - SUBJECTIVE AND OBJECTIVE BOX
INTERVAL HPI/OVERNIGHT EVENTS:    CC: RON sec ATN now on HD, metabolic encephalopathy, cholecystostomy tube present, a fib, biloma, GB neck leak    Seen during HD  no overnight events  ERCP deferred by family yesterday  restless during HD, denies pain    Vital Signs Last 24 Hrs  T(C): 36.3 (13 Mar 2024 11:37), Max: 37.1 (12 Mar 2024 21:52)  T(F): 97.3 (13 Mar 2024 11:37), Max: 98.8 (12 Mar 2024 21:52)  HR: 95 (13 Mar 2024 11:37) (78 - 97)  BP: 136/75 (13 Mar 2024 11:37) (120/64 - 163/77)  BP(mean): --  RR: 19 (13 Mar 2024 11:20) (18 - 20)  SpO2: 94% (13 Mar 2024 11:37) (94% - 98%)    Parameters below as of 13 Mar 2024 11:37  Patient On (Oxygen Delivery Method): nasal cannula        PHYSICAL EXAM:    GENERAL: alert, restless, not in distress   CHEST/LUNG: b/l air entry  HEART: reg  ABDOMEN: soft, bs+, non tender, 2 drains in place  EXTREMITIES:  no edema, tenderness    MEDICATIONS  (STANDING):  buDESOnide    Inhalation Suspension 0.5 milliGRAM(s) Inhalation every 12 hours  chlorhexidine 2% Cloths 1 Application(s) Topical <User Schedule>  dextrose 5%. 1000 milliLiter(s) (50 mL/Hr) IV Continuous <Continuous>  dextrose 5%. 1000 milliLiter(s) (100 mL/Hr) IV Continuous <Continuous>  dextrose 50% Injectable 12.5 Gram(s) IV Push once  dextrose 50% Injectable 25 Gram(s) IV Push once  dextrose 50% Injectable 25 Gram(s) IV Push once  dronedarone 400 milliGRAM(s) Oral two times a day  epoetin kristal-epbx (RETACRIT) Injectable 72531 Unit(s) IV Push <User Schedule>  ferrous    sulfate 325 milliGRAM(s) Oral daily  glucagon  Injectable 1 milliGRAM(s) IntraMuscular once  insulin glargine Injectable (LANTUS) 10 Unit(s) SubCutaneous every morning  insulin lispro (ADMELOG) corrective regimen sliding scale   SubCutaneous three times a day before meals  insulin lispro (ADMELOG) corrective regimen sliding scale   SubCutaneous at bedtime  insulin lispro Injectable (ADMELOG) 3 Unit(s) SubCutaneous three times a day before meals  midodrine 15 milliGRAM(s) Oral every 8 hours  pantoprazole  Injectable 40 milliGRAM(s) IV Push every 12 hours  senna 2 Tablet(s) Oral at bedtime  simvastatin 20 milliGRAM(s) Oral at bedtime    MEDICATIONS  (PRN):  acetaminophen     Tablet .. 650 milliGRAM(s) Oral every 6 hours PRN Temp greater or equal to 38C (100.4F), Mild Pain (1 - 3)  albuterol/ipratropium for Nebulization 3 milliLiter(s) Nebulizer every 6 hours PRN wheezing or SOB  aluminum hydroxide/magnesium hydroxide/simethicone Suspension 30 milliLiter(s) Oral every 4 hours PRN Dyspepsia  artificial  tears Solution 1 Drop(s) Both EYES three times a day PRN Dry Eyes  dextrose Oral Gel 15 Gram(s) Oral once PRN Blood Glucose LESS THAN 70 milliGRAM(s)/deciliter  melatonin 3 milliGRAM(s) Oral at bedtime PRN Insomnia  ondansetron Injectable 4 milliGRAM(s) IV Push every 8 hours PRN Nausea and/or Vomiting  sodium chloride 0.9% lock flush 10 milliLiter(s) IV Push every 1 hour PRN Pre/post blood products, medications, blood draw, and to maintain line patency      Allergies    No Known Allergies    Intolerances          LABS:                          9.3    10.05 )-----------( 379      ( 13 Mar 2024 06:06 )             28.8     03-13    137  |  99  |  28.0<H>  ----------------------------<  205<H>  4.1   |  24.0  |  4.65<H>    Ca    8.7      13 Mar 2024 06:06  Mg     1.8     03-13    TPro  6.9  /  Alb  2.9<L>  /  TBili  0.5  /  DBili  x   /  AST  23  /  ALT  19  /  AlkPhos  138<H>  03-13    PT/INR - ( 13 Mar 2024 06:06 )   PT: 12.1 sec;   INR: 1.09 ratio           Urinalysis Basic - ( 13 Mar 2024 06:06 )    Color: x / Appearance: x / SG: x / pH: x  Gluc: 205 mg/dL / Ketone: x  / Bili: x / Urobili: x   Blood: x / Protein: x / Nitrite: x   Leuk Esterase: x / RBC: x / WBC x   Sq Epi: x / Non Sq Epi: x / Bacteria: x        RADIOLOGY & ADDITIONAL TESTS:

## 2024-03-14 LAB
ANION GAP SERPL CALC-SCNC: 12 MMOL/L — SIGNIFICANT CHANGE UP (ref 5–17)
BASOPHILS # BLD AUTO: 0.04 K/UL — SIGNIFICANT CHANGE UP (ref 0–0.2)
BASOPHILS NFR BLD AUTO: 0.5 % — SIGNIFICANT CHANGE UP (ref 0–2)
BUN SERPL-MCNC: 18.6 MG/DL — SIGNIFICANT CHANGE UP (ref 8–20)
CALCIUM SERPL-MCNC: 8.4 MG/DL — SIGNIFICANT CHANGE UP (ref 8.4–10.5)
CHLORIDE SERPL-SCNC: 99 MMOL/L — SIGNIFICANT CHANGE UP (ref 96–108)
CO2 SERPL-SCNC: 25 MMOL/L — SIGNIFICANT CHANGE UP (ref 22–29)
CREAT SERPL-MCNC: 3.41 MG/DL — HIGH (ref 0.5–1.3)
EGFR: 17 ML/MIN/1.73M2 — LOW
EOSINOPHIL # BLD AUTO: 0.09 K/UL — SIGNIFICANT CHANGE UP (ref 0–0.5)
EOSINOPHIL NFR BLD AUTO: 1.1 % — SIGNIFICANT CHANGE UP (ref 0–6)
GLUCOSE BLDC GLUCOMTR-MCNC: 123 MG/DL — HIGH (ref 70–99)
GLUCOSE BLDC GLUCOMTR-MCNC: 172 MG/DL — HIGH (ref 70–99)
GLUCOSE BLDC GLUCOMTR-MCNC: 181 MG/DL — HIGH (ref 70–99)
GLUCOSE SERPL-MCNC: 155 MG/DL — HIGH (ref 70–99)
HCT VFR BLD CALC: 29.7 % — LOW (ref 39–50)
HGB BLD-MCNC: 9.4 G/DL — LOW (ref 13–17)
IMM GRANULOCYTES NFR BLD AUTO: 0.7 % — SIGNIFICANT CHANGE UP (ref 0–0.9)
INR BLD: 1.1 RATIO — SIGNIFICANT CHANGE UP (ref 0.85–1.18)
LYMPHOCYTES # BLD AUTO: 1.46 K/UL — SIGNIFICANT CHANGE UP (ref 1–3.3)
LYMPHOCYTES # BLD AUTO: 17.4 % — SIGNIFICANT CHANGE UP (ref 13–44)
MAGNESIUM SERPL-MCNC: 1.5 MG/DL — LOW (ref 1.6–2.6)
MCHC RBC-ENTMCNC: 28.7 PG — SIGNIFICANT CHANGE UP (ref 27–34)
MCHC RBC-ENTMCNC: 31.6 GM/DL — LOW (ref 32–36)
MCV RBC AUTO: 90.8 FL — SIGNIFICANT CHANGE UP (ref 80–100)
MONOCYTES # BLD AUTO: 0.89 K/UL — SIGNIFICANT CHANGE UP (ref 0–0.9)
MONOCYTES NFR BLD AUTO: 10.6 % — SIGNIFICANT CHANGE UP (ref 2–14)
NEUTROPHILS # BLD AUTO: 5.85 K/UL — SIGNIFICANT CHANGE UP (ref 1.8–7.4)
NEUTROPHILS NFR BLD AUTO: 69.7 % — SIGNIFICANT CHANGE UP (ref 43–77)
PHOSPHATE SERPL-MCNC: 2.4 MG/DL — SIGNIFICANT CHANGE UP (ref 2.4–4.7)
PLATELET # BLD AUTO: 349 K/UL — SIGNIFICANT CHANGE UP (ref 150–400)
POTASSIUM SERPL-MCNC: 3.8 MMOL/L — SIGNIFICANT CHANGE UP (ref 3.5–5.3)
POTASSIUM SERPL-SCNC: 3.8 MMOL/L — SIGNIFICANT CHANGE UP (ref 3.5–5.3)
PROTHROM AB SERPL-ACNC: 12.2 SEC — SIGNIFICANT CHANGE UP (ref 9.5–13)
RBC # BLD: 3.27 M/UL — LOW (ref 4.2–5.8)
RBC # FLD: 15.2 % — HIGH (ref 10.3–14.5)
SODIUM SERPL-SCNC: 136 MMOL/L — SIGNIFICANT CHANGE UP (ref 135–145)
WBC # BLD: 8.39 K/UL — SIGNIFICANT CHANGE UP (ref 3.8–10.5)
WBC # FLD AUTO: 8.39 K/UL — SIGNIFICANT CHANGE UP (ref 3.8–10.5)

## 2024-03-14 PROCEDURE — 99232 SBSQ HOSP IP/OBS MODERATE 35: CPT

## 2024-03-14 PROCEDURE — 93010 ELECTROCARDIOGRAM REPORT: CPT

## 2024-03-14 PROCEDURE — 43260 ERCP W/SPECIMEN COLLECTION: CPT | Mod: 53,59

## 2024-03-14 PROCEDURE — 43235 EGD DIAGNOSTIC BRUSH WASH: CPT

## 2024-03-14 DEVICE — CANNULA CONTOUR ERCP: Type: IMPLANTABLE DEVICE | Status: FUNCTIONAL

## 2024-03-14 DEVICE — CATH BLLN BIL RX 9-12CM: Type: IMPLANTABLE DEVICE | Status: FUNCTIONAL

## 2024-03-14 DEVICE — GWIRE JAGTOME REVOLUTION RX 260CM/0.025IN: Type: IMPLANTABLE DEVICE | Status: FUNCTIONAL

## 2024-03-14 DEVICE — GUIDEWIRE NOVAGOLD .018INX260: Type: IMPLANTABLE DEVICE | Status: FUNCTIONAL

## 2024-03-14 RX ORDER — OLANZAPINE 15 MG/1
2.5 TABLET, FILM COATED ORAL ONCE
Refills: 0 | Status: COMPLETED | OUTPATIENT
Start: 2024-03-14 | End: 2024-03-14

## 2024-03-14 RX ORDER — MAGNESIUM SULFATE 500 MG/ML
2 VIAL (ML) INJECTION ONCE
Refills: 0 | Status: COMPLETED | OUTPATIENT
Start: 2024-03-14 | End: 2024-03-14

## 2024-03-14 RX ORDER — QUETIAPINE FUMARATE 200 MG/1
12.5 TABLET, FILM COATED ORAL AT BEDTIME
Refills: 0 | Status: DISCONTINUED | OUTPATIENT
Start: 2024-03-14 | End: 2024-03-16

## 2024-03-14 RX ADMIN — SIMVASTATIN 20 MILLIGRAM(S): 20 TABLET, FILM COATED ORAL at 22:04

## 2024-03-14 RX ADMIN — DRONEDARONE 400 MILLIGRAM(S): 400 TABLET, FILM COATED ORAL at 05:26

## 2024-03-14 RX ADMIN — INSULIN GLARGINE 10 UNIT(S): 100 INJECTION, SOLUTION SUBCUTANEOUS at 09:27

## 2024-03-14 RX ADMIN — Medication 100 MILLIGRAM(S): at 16:17

## 2024-03-14 RX ADMIN — QUETIAPINE FUMARATE 12.5 MILLIGRAM(S): 200 TABLET, FILM COATED ORAL at 22:04

## 2024-03-14 RX ADMIN — OLANZAPINE 2.5 MILLIGRAM(S): 15 TABLET, FILM COATED ORAL at 02:27

## 2024-03-14 RX ADMIN — Medication 25 GRAM(S): at 09:17

## 2024-03-14 RX ADMIN — Medication 0.5 MILLIGRAM(S): at 09:17

## 2024-03-14 RX ADMIN — PANTOPRAZOLE SODIUM 40 MILLIGRAM(S): 20 TABLET, DELAYED RELEASE ORAL at 05:26

## 2024-03-14 RX ADMIN — CHLORHEXIDINE GLUCONATE 1 APPLICATION(S): 213 SOLUTION TOPICAL at 05:29

## 2024-03-14 RX ADMIN — ERTAPENEM SODIUM 120 MILLIGRAM(S): 1 INJECTION, POWDER, LYOPHILIZED, FOR SOLUTION INTRAMUSCULAR; INTRAVENOUS at 16:16

## 2024-03-14 NOTE — PROGRESS NOTE ADULT - SUBJECTIVE AND OBJECTIVE BOX
NEPHROLOGY INTERVAL HPI/OVERNIGHT EVENTS:    Examined earlier  Dtr visiting  No distress  Frail lethargic      MEDICATIONS  (STANDING):  buDESOnide    Inhalation Suspension 0.5 milliGRAM(s) Inhalation every 12 hours  chlorhexidine 2% Cloths 1 Application(s) Topical <User Schedule>  dextrose 5%. 1000 milliLiter(s) (100 mL/Hr) IV Continuous <Continuous>  dextrose 5%. 1000 milliLiter(s) (50 mL/Hr) IV Continuous <Continuous>  dextrose 50% Injectable 12.5 Gram(s) IV Push once  dextrose 50% Injectable 25 Gram(s) IV Push once  dextrose 50% Injectable 25 Gram(s) IV Push once  dronedarone 400 milliGRAM(s) Oral two times a day  epoetin kristal-epbx (RETACRIT) Injectable 47529 Unit(s) IV Push <User Schedule>  ertapenem  IVPB 1000 milliGRAM(s) IV Intermittent once  ferrous    sulfate 325 milliGRAM(s) Oral daily  glucagon  Injectable 1 milliGRAM(s) IntraMuscular once  indomethacin Suppository 100 milliGRAM(s) Rectal once  insulin glargine Injectable (LANTUS) 10 Unit(s) SubCutaneous every morning  insulin lispro (ADMELOG) corrective regimen sliding scale   SubCutaneous three times a day before meals  insulin lispro (ADMELOG) corrective regimen sliding scale   SubCutaneous at bedtime  insulin lispro Injectable (ADMELOG) 3 Unit(s) SubCutaneous three times a day before meals  pantoprazole  Injectable 40 milliGRAM(s) IV Push every 12 hours  QUEtiapine 12.5 milliGRAM(s) Oral at bedtime  senna 2 Tablet(s) Oral at bedtime  simvastatin 20 milliGRAM(s) Oral at bedtime    MEDICATIONS  (PRN):  acetaminophen     Tablet .. 650 milliGRAM(s) Oral every 6 hours PRN Temp greater or equal to 38C (100.4F), Mild Pain (1 - 3)  albuterol/ipratropium for Nebulization 3 milliLiter(s) Nebulizer every 6 hours PRN wheezing or SOB  aluminum hydroxide/magnesium hydroxide/simethicone Suspension 30 milliLiter(s) Oral every 4 hours PRN Dyspepsia  artificial  tears Solution 1 Drop(s) Both EYES three times a day PRN Dry Eyes  dextrose Oral Gel 15 Gram(s) Oral once PRN Blood Glucose LESS THAN 70 milliGRAM(s)/deciliter  melatonin 3 milliGRAM(s) Oral at bedtime PRN Insomnia  ondansetron Injectable 4 milliGRAM(s) IV Push every 8 hours PRN Nausea and/or Vomiting  sodium chloride 0.9% lock flush 10 milliLiter(s) IV Push every 1 hour PRN Pre/post blood products, medications, blood draw, and to maintain line patency      Allergies    No Known Allergies    Intolerances        Vital Signs Last 24 Hrs  T(C): 36.6 (14 Mar 2024 11:00), Max: 36.7 (13 Mar 2024 17:04)  T(F): 97.8 (14 Mar 2024 11:00), Max: 98 (13 Mar 2024 17:04)  HR: 92 (14 Mar 2024 11:00) (86 - 92)  BP: 151/78 (14 Mar 2024 15:31) (70/46 - 152/62)  BP(mean): --  RR: 18 (14 Mar 2024 11:00) (18 - 18)  SpO2: 95% (14 Mar 2024 11:00) (94% - 97%)    Parameters below as of 14 Mar 2024 11:00  Patient On (Oxygen Delivery Method): nasal cannula      Daily     Daily     PHYSICAL EXAM:  GENERAL: Weak, tired  HEENT: Moist MMs  NECK: Supple, no JVD; R IJ catheter  NERVOUS SYSTEM: More awake, alert  CHEST/LUNG: Clear, decreased BS  HEART: No rub  ABDOMEN: Soft, +BS; +biliary drain  EXTREMITIES: + improved dependent edema     LABS:                        9.4    8.39  )-----------( 349      ( 14 Mar 2024 05:40 )             29.7     03-14    136  |  99  |  18.6  ----------------------------<  155<H>  3.8   |  25.0  |  3.41<H>    Ca    8.4      14 Mar 2024 05:40  Phos  2.4     03-14  Mg     1.5     03-14    TPro  6.9  /  Alb  2.9<L>  /  TBili  0.5  /  DBili  x   /  AST  23  /  ALT  19  /  AlkPhos  138<H>  03-13    PT/INR - ( 14 Mar 2024 05:40 )   PT: 12.2 sec;   INR: 1.10 ratio           Urinalysis Basic - ( 14 Mar 2024 05:40 )    Color: x / Appearance: x / SG: x / pH: x  Gluc: 155 mg/dL / Ketone: x  / Bili: x / Urobili: x   Blood: x / Protein: x / Nitrite: x   Leuk Esterase: x / RBC: x / WBC x   Sq Epi: x / Non Sq Epi: x / Bacteria: x      Magnesium: 1.5 mg/dL (03-14 @ 05:40)  Phosphorus: 2.4 mg/dL (03-14 @ 05:40)          RADIOLOGY & ADDITIONAL TESTS:

## 2024-03-14 NOTE — PROGRESS NOTE ADULT - ASSESSMENT
85yo M w/ hx of afib, esophagitis, anemia, DM, HTN, HL, CKD, and acute cholecystitis s/p per sky tube placement in 12/2023 presenting with CC of leakage around cholecystotomy tube.    CKD(IV) +DM, HTN  RON: ATN post IV contrast and hypotension/sepsis--> has now progressed to ESRD  Biliary tube leak  - cont to avoid further potential nephrotoxins  - HD MWF (still no indication of renal recovery) --> HD tomorrow  - PC placement by IR 3/12  - surgery noted--> will eventually require cholecystectomy, poor surgical candidate    Anemia: + multifactorial  - cont CINDI  - trend H/H  - target Hgb > 10.0    RO:  - low phos diet  - con to monitor off binders  - trend serum phos    Will follow

## 2024-03-14 NOTE — PROGRESS NOTE ADULT - ASSESSMENT
84y/oM PMH paroxysmal atrial fibrillation not on AC secondary to GI bleed, esophagitis, anemia, diabetes mellitus, hypertension, hyperlipidemia, CKD stage 3-4, BPH, cognitive impairment, recent COVID, acute cholecystitis in Dec 2023 s/p cholecystostomy tube placement on 12/22/23 presented to the ED with complaints of leaking around cholecystostomy tube and difficulty flushing the tube. A CT abdomen/pelvis with contrast was done in ED, tube noted to be in place, he was evaluated by surgery, tube was flushed without difficulty. While in ED, he was noted to have coffee ground emesis and pallor with some abdominal pain and transient drop in Hb. GI was consulted, he had recently had an EGD in January 2024 with grade A esophagitis. Hb improved, hence no additional work up recommended by GI. Course complicated by hypotension and hypoxia on 2/26, RRT was called and he was transferred to ICU for need for pressors, [place] oxygen requirement, likely septic shock from aspiration. Cultures were sent, empiric antibiotics were initiated. Also received steroids. Patient noted to have worsening renal function and was anuric, nephrology was consulted and HD was initiated on 2/27/24. HD was continued. He was weaned off Levophed and was transferred to medical floor on 2/29. Surgery follow up was done, recommended cholecystectomy once medically stable. While on medical floor, he was noted to have worsening leucocytosis, ID consultation was requested, cultures repeated, Zosyn was initiated. On 3/3, he was noted to have worsening hypoxia, ICU consult was requested, NIV was placed, CXR with left lung field opacification. He failed trial of BiPAP and was intubated on 3/3 and required Precedex and low dose of Levophed. His cultures remained negative. Palliative care was consulted, goals of care were discussed with family, they wished patient to be full code. Patient had waxing and waning mental status, initially required tube feeds. He was extubated on 3/4. HD was continued. He was transferred back to medical floor on 3/6/24. IR consulted for PermCath placement. He was evaluated by speech and swallow and diet was advanced. His cholecystostomy tubing broke on 3/9, IR consulted for tube exhange. CT head was done, no acute changes noted. CT chest and abdomen was done, Persistent subcapsular fluid collections along the right lateral liver, suspicious for developing biloma. Improved left lower lobe aeration.  IR evaluation requested for biloma, he underwent cholecystostomy tube exchange and a cholangiogram was done noted for abnormality near gallbladder neck, concerning for GB defect possible cause of biloma, a 12 F catheter was placed into the biloma. Surgery follow up requested to assess for cholecystectomy Surgery recommended GI evaluation for possible CBD stent placement to control leakage from GB neck. ERCP was scheduled by GI on 3/12 but family requested additional time before proceeding with intervention, now agreeable       Acute metabolic encephalopathy 2/2 recurrent sepsis, underlying RON  -Long and complicated course attributing to mental status  -continue HD per schedule  -CT head repeated, no acute changes noted  -monitor WBC  -completed course of Zosyn  -repeat cultures if febrile  -aspiration precautions    RON 2/2 ATN with underlying CKD stage 4, now on HD  -Continue HD per schedule  -nephrology following  -continue Epogen for anemia sec underlying CKD  -now s/p PermCath placement by IR on 3/12    S/p septic shock  -s/p Midodrine  -previously on Levophed and stress dose steroids    Atrial fibrillation  -Not on AC sec GI bleed  -on Dronedarone    H/o acute cholecystitis s/p cholecystostomy tube placement, now with biloma sec GB neck leak s/p drain placement  -IR intervention appreciated, s/p tube exchange and drain placement for biloma  -surgery recommendations noted, cholecystectomy deferred for now  -GI was consulted, recommended ERCP, scheduled for today 3/14    Hypertension, hyperlipidemia  -Not on anti hypertensives, BP stable  -recent shock  -continue statin    Diabetes mellitus  -A1C 7.7  -Continue Lantus 10 units  -pre meal insulin 3 units  -ISS     Grade A esophagitis, anemia  -Hb stable  -GI recs from early this admission noted  -continue PPI  -monitor CBC  -continue Epogen    DVT ppx- Heparin sq, resume post ERCP once cleared by GI

## 2024-03-14 NOTE — PROGRESS NOTE ADULT - SUBJECTIVE AND OBJECTIVE BOX
BERNADINE MONICA    [id number]    84y      Male    CC: gb neck leak     INTERVAL HPI/OVERNIGHT EVENTS: Pt seen and examined. no acute events reported o/n . plan for ercp today     REVIEW OF SYSTEMS:    RESPIRATORY: No cough, wheezing, hemoptysis; No shortness of breath  CARDIOVASCULAR: No chest pain, palpitations  GASTROINTESTINAL: No abdominal or epigastric pain. No nausea, vomiting    Vital Signs Last 24 Hrs  T(C): 36.6 (14 Mar 2024 11:00), Max: 36.7 (13 Mar 2024 17:04)  T(F): 97.8 (14 Mar 2024 11:00), Max: 98 (13 Mar 2024 17:04)  HR: 92 (14 Mar 2024 11:00) (86 - 92)  BP: 152/62 (14 Mar 2024 04:11) (143/73 - 152/62)  BP(mean): --  RR: 18 (14 Mar 2024 11:00) (18 - 18)  SpO2: 95% (14 Mar 2024 11:00) (94% - 97%)    Parameters below as of 14 Mar 2024 11:00  Patient On (Oxygen Delivery Method): nasal cannula        PHYSICAL EXAM:    GENERAL: NAD  CHEST/LUNG: Clear to auscultation bilaterally  HEART: S1S2+, Regular rate and rhythm  ABDOMEN: Soft, Nontender, Nondistended; Bowel sounds present  SKIN: warm, dry   NEURO: Awake, alert     LABS:                        9.4    8.39  )-----------( 349      ( 14 Mar 2024 05:40 )             29.7     03-14    136  |  99  |  18.6  ----------------------------<  155<H>  3.8   |  25.0  |  3.41<H>    Ca    8.4      14 Mar 2024 05:40  Phos  2.4     03-14  Mg     1.5     03-14    TPro  6.9  /  Alb  2.9<L>  /  TBili  0.5  /  DBili  x   /  AST  23  /  ALT  19  /  AlkPhos  138<H>  03-13    PT/INR - ( 14 Mar 2024 05:40 )   PT: 12.2 sec;   INR: 1.10 ratio           Urinalysis Basic - ( 14 Mar 2024 05:40 )    Color: x / Appearance: x / SG: x / pH: x  Gluc: 155 mg/dL / Ketone: x  / Bili: x / Urobili: x   Blood: x / Protein: x / Nitrite: x   Leuk Esterase: x / RBC: x / WBC x   Sq Epi: x / Non Sq Epi: x / Bacteria: x          MEDICATIONS  (STANDING):  buDESOnide    Inhalation Suspension 0.5 milliGRAM(s) Inhalation every 12 hours  chlorhexidine 2% Cloths 1 Application(s) Topical <User Schedule>  dextrose 5%. 1000 milliLiter(s) (50 mL/Hr) IV Continuous <Continuous>  dextrose 5%. 1000 milliLiter(s) (100 mL/Hr) IV Continuous <Continuous>  dextrose 50% Injectable 12.5 Gram(s) IV Push once  dextrose 50% Injectable 25 Gram(s) IV Push once  dextrose 50% Injectable 25 Gram(s) IV Push once  dronedarone 400 milliGRAM(s) Oral two times a day  epoetin kristal-epbx (RETACRIT) Injectable 23184 Unit(s) IV Push <User Schedule>  ertapenem  IVPB 1000 milliGRAM(s) IV Intermittent once  ferrous    sulfate 325 milliGRAM(s) Oral daily  glucagon  Injectable 1 milliGRAM(s) IntraMuscular once  indomethacin Suppository 100 milliGRAM(s) Rectal once  insulin glargine Injectable (LANTUS) 10 Unit(s) SubCutaneous every morning  insulin lispro (ADMELOG) corrective regimen sliding scale   SubCutaneous three times a day before meals  insulin lispro (ADMELOG) corrective regimen sliding scale   SubCutaneous at bedtime  insulin lispro Injectable (ADMELOG) 3 Unit(s) SubCutaneous three times a day before meals  pantoprazole  Injectable 40 milliGRAM(s) IV Push every 12 hours  senna 2 Tablet(s) Oral at bedtime  simvastatin 20 milliGRAM(s) Oral at bedtime    MEDICATIONS  (PRN):  acetaminophen     Tablet .. 650 milliGRAM(s) Oral every 6 hours PRN Temp greater or equal to 38C (100.4F), Mild Pain (1 - 3)  albuterol/ipratropium for Nebulization 3 milliLiter(s) Nebulizer every 6 hours PRN wheezing or SOB  aluminum hydroxide/magnesium hydroxide/simethicone Suspension 30 milliLiter(s) Oral every 4 hours PRN Dyspepsia  artificial  tears Solution 1 Drop(s) Both EYES three times a day PRN Dry Eyes  dextrose Oral Gel 15 Gram(s) Oral once PRN Blood Glucose LESS THAN 70 milliGRAM(s)/deciliter  melatonin 3 milliGRAM(s) Oral at bedtime PRN Insomnia  ondansetron Injectable 4 milliGRAM(s) IV Push every 8 hours PRN Nausea and/or Vomiting  sodium chloride 0.9% lock flush 10 milliLiter(s) IV Push every 1 hour PRN Pre/post blood products, medications, blood draw, and to maintain line patency      RADIOLOGY & ADDITIONAL TESTS:

## 2024-03-15 DIAGNOSIS — K83.8 OTHER SPECIFIED DISEASES OF BILIARY TRACT: ICD-10-CM

## 2024-03-15 LAB
ANION GAP SERPL CALC-SCNC: 15 MMOL/L — SIGNIFICANT CHANGE UP (ref 5–17)
BASOPHILS # BLD AUTO: 0.04 K/UL — SIGNIFICANT CHANGE UP (ref 0–0.2)
BASOPHILS NFR BLD AUTO: 0.5 % — SIGNIFICANT CHANGE UP (ref 0–2)
BUN SERPL-MCNC: 24 MG/DL — HIGH (ref 8–20)
CALCIUM SERPL-MCNC: 8.8 MG/DL — SIGNIFICANT CHANGE UP (ref 8.4–10.5)
CHLORIDE SERPL-SCNC: 101 MMOL/L — SIGNIFICANT CHANGE UP (ref 96–108)
CO2 SERPL-SCNC: 24 MMOL/L — SIGNIFICANT CHANGE UP (ref 22–29)
CREAT SERPL-MCNC: 4.1 MG/DL — HIGH (ref 0.5–1.3)
EGFR: 14 ML/MIN/1.73M2 — LOW
EOSINOPHIL # BLD AUTO: 0.16 K/UL — SIGNIFICANT CHANGE UP (ref 0–0.5)
EOSINOPHIL NFR BLD AUTO: 1.8 % — SIGNIFICANT CHANGE UP (ref 0–6)
GLUCOSE BLDC GLUCOMTR-MCNC: 109 MG/DL — HIGH (ref 70–99)
GLUCOSE BLDC GLUCOMTR-MCNC: 135 MG/DL — HIGH (ref 70–99)
GLUCOSE BLDC GLUCOMTR-MCNC: 83 MG/DL — SIGNIFICANT CHANGE UP (ref 70–99)
GLUCOSE BLDC GLUCOMTR-MCNC: 87 MG/DL — SIGNIFICANT CHANGE UP (ref 70–99)
GLUCOSE BLDC GLUCOMTR-MCNC: 92 MG/DL — SIGNIFICANT CHANGE UP (ref 70–99)
GLUCOSE SERPL-MCNC: 84 MG/DL — SIGNIFICANT CHANGE UP (ref 70–99)
HCT VFR BLD CALC: 29.5 % — LOW (ref 39–50)
HGB BLD-MCNC: 9.2 G/DL — LOW (ref 13–17)
IMM GRANULOCYTES NFR BLD AUTO: 0.9 % — SIGNIFICANT CHANGE UP (ref 0–0.9)
LYMPHOCYTES # BLD AUTO: 1.18 K/UL — SIGNIFICANT CHANGE UP (ref 1–3.3)
LYMPHOCYTES # BLD AUTO: 13.4 % — SIGNIFICANT CHANGE UP (ref 13–44)
MAGNESIUM SERPL-MCNC: 2.1 MG/DL — SIGNIFICANT CHANGE UP (ref 1.6–2.6)
MCHC RBC-ENTMCNC: 28.8 PG — SIGNIFICANT CHANGE UP (ref 27–34)
MCHC RBC-ENTMCNC: 31.2 GM/DL — LOW (ref 32–36)
MCV RBC AUTO: 92.2 FL — SIGNIFICANT CHANGE UP (ref 80–100)
MONOCYTES # BLD AUTO: 0.78 K/UL — SIGNIFICANT CHANGE UP (ref 0–0.9)
MONOCYTES NFR BLD AUTO: 8.9 % — SIGNIFICANT CHANGE UP (ref 2–14)
NEUTROPHILS # BLD AUTO: 6.56 K/UL — SIGNIFICANT CHANGE UP (ref 1.8–7.4)
NEUTROPHILS NFR BLD AUTO: 74.5 % — SIGNIFICANT CHANGE UP (ref 43–77)
PHOSPHATE SERPL-MCNC: 3.6 MG/DL — SIGNIFICANT CHANGE UP (ref 2.4–4.7)
PLATELET # BLD AUTO: 382 K/UL — SIGNIFICANT CHANGE UP (ref 150–400)
POTASSIUM SERPL-MCNC: 3.7 MMOL/L — SIGNIFICANT CHANGE UP (ref 3.5–5.3)
POTASSIUM SERPL-SCNC: 3.7 MMOL/L — SIGNIFICANT CHANGE UP (ref 3.5–5.3)
RBC # BLD: 3.2 M/UL — LOW (ref 4.2–5.8)
RBC # FLD: 15.4 % — HIGH (ref 10.3–14.5)
SODIUM SERPL-SCNC: 140 MMOL/L — SIGNIFICANT CHANGE UP (ref 135–145)
WBC # BLD: 8.8 K/UL — SIGNIFICANT CHANGE UP (ref 3.8–10.5)
WBC # FLD AUTO: 8.8 K/UL — SIGNIFICANT CHANGE UP (ref 3.8–10.5)

## 2024-03-15 PROCEDURE — 99232 SBSQ HOSP IP/OBS MODERATE 35: CPT

## 2024-03-15 RX ADMIN — INSULIN GLARGINE 10 UNIT(S): 100 INJECTION, SOLUTION SUBCUTANEOUS at 12:36

## 2024-03-15 RX ADMIN — DRONEDARONE 400 MILLIGRAM(S): 400 TABLET, FILM COATED ORAL at 05:42

## 2024-03-15 RX ADMIN — SIMVASTATIN 20 MILLIGRAM(S): 20 TABLET, FILM COATED ORAL at 23:37

## 2024-03-15 RX ADMIN — DRONEDARONE 400 MILLIGRAM(S): 400 TABLET, FILM COATED ORAL at 17:19

## 2024-03-15 RX ADMIN — Medication 0.5 MILLIGRAM(S): at 19:48

## 2024-03-15 RX ADMIN — CHLORHEXIDINE GLUCONATE 1 APPLICATION(S): 213 SOLUTION TOPICAL at 05:42

## 2024-03-15 RX ADMIN — PANTOPRAZOLE SODIUM 40 MILLIGRAM(S): 20 TABLET, DELAYED RELEASE ORAL at 05:42

## 2024-03-15 RX ADMIN — Medication 3 UNIT(S): at 12:55

## 2024-03-15 RX ADMIN — SENNA PLUS 2 TABLET(S): 8.6 TABLET ORAL at 23:37

## 2024-03-15 RX ADMIN — Medication 325 MILLIGRAM(S): at 12:36

## 2024-03-15 RX ADMIN — PANTOPRAZOLE SODIUM 40 MILLIGRAM(S): 20 TABLET, DELAYED RELEASE ORAL at 17:19

## 2024-03-15 RX ADMIN — ERYTHROPOIETIN 10000 UNIT(S): 10000 INJECTION, SOLUTION INTRAVENOUS; SUBCUTANEOUS at 09:28

## 2024-03-15 RX ADMIN — QUETIAPINE FUMARATE 12.5 MILLIGRAM(S): 200 TABLET, FILM COATED ORAL at 23:38

## 2024-03-15 NOTE — PROGRESS NOTE ADULT - PROBLEM SELECTOR PLAN 1
- leak from proximal gall bladder  Pt with biloma drained by IR, and cholecystostomy tube   Unable to place cannulate CBD . no stent placed during the ERCP  NO need for urgent surgery as per surgical note  Will not actively follow.   care as per medicine, surgery team

## 2024-03-15 NOTE — PROGRESS NOTE ADULT - NS ATTEST RISK PROBLEM GEN_ALL_CORE FT
acute respiratory failure with hypoxia, aspiration pneumonia, delirium, ATN
Daughter and wife at bedside. We discussed the ERCP procedure and surgery plan for conservative treatment   I reviewed the surgery note- no Surgery at this time.   wbc 8.8 today. CBC ordered for tomorrow

## 2024-03-15 NOTE — PROGRESS NOTE ADULT - SUBJECTIVE AND OBJECTIVE BOX
INTERVAL HPI/OVERNIGHT EVENTS:    Patient evaluated at bedside. No acute distress. No acute events overnight.  He underwent a ERCP but the endoscopist was unable to cannulate the CBD.      MEDICATIONS  (STANDING):  buDESOnide    Inhalation Suspension 0.5 milliGRAM(s) Inhalation every 12 hours  chlorhexidine 2% Cloths 1 Application(s) Topical <User Schedule>  dextrose 5%. 1000 milliLiter(s) (50 mL/Hr) IV Continuous <Continuous>  dextrose 5%. 1000 milliLiter(s) (100 mL/Hr) IV Continuous <Continuous>  dextrose 50% Injectable 12.5 Gram(s) IV Push once  dextrose 50% Injectable 25 Gram(s) IV Push once  dextrose 50% Injectable 25 Gram(s) IV Push once  dronedarone 400 milliGRAM(s) Oral two times a day  epoetin kristal-epbx (RETACRIT) Injectable 12322 Unit(s) IV Push <User Schedule>  ferrous    sulfate 325 milliGRAM(s) Oral daily  glucagon  Injectable 1 milliGRAM(s) IntraMuscular once  insulin glargine Injectable (LANTUS) 10 Unit(s) SubCutaneous every morning  insulin lispro (ADMELOG) corrective regimen sliding scale   SubCutaneous at bedtime  insulin lispro (ADMELOG) corrective regimen sliding scale   SubCutaneous three times a day before meals  insulin lispro Injectable (ADMELOG) 3 Unit(s) SubCutaneous three times a day before meals  pantoprazole  Injectable 40 milliGRAM(s) IV Push every 12 hours  QUEtiapine 12.5 milliGRAM(s) Oral at bedtime  senna 2 Tablet(s) Oral at bedtime  simvastatin 20 milliGRAM(s) Oral at bedtime    MEDICATIONS  (PRN):  acetaminophen     Tablet .. 650 milliGRAM(s) Oral every 6 hours PRN Temp greater or equal to 38C (100.4F), Mild Pain (1 - 3)  albuterol/ipratropium for Nebulization 3 milliLiter(s) Nebulizer every 6 hours PRN wheezing or SOB  aluminum hydroxide/magnesium hydroxide/simethicone Suspension 30 milliLiter(s) Oral every 4 hours PRN Dyspepsia  artificial  tears Solution 1 Drop(s) Both EYES three times a day PRN Dry Eyes  dextrose Oral Gel 15 Gram(s) Oral once PRN Blood Glucose LESS THAN 70 milliGRAM(s)/deciliter  melatonin 3 milliGRAM(s) Oral at bedtime PRN Insomnia  ondansetron Injectable 4 milliGRAM(s) IV Push every 8 hours PRN Nausea and/or Vomiting  sodium chloride 0.9% lock flush 10 milliLiter(s) IV Push every 1 hour PRN Pre/post blood products, medications, blood draw, and to maintain line patency      Vital Signs Last 24 Hrs  T(C): 36.7 (15 Mar 2024 04:39), Max: 36.8 (14 Mar 2024 19:50)  T(F): 98 (15 Mar 2024 04:39), Max: 98.2 (14 Mar 2024 19:50)  HR: 84 (15 Mar 2024 04:39) (79 - 92)  BP: 151/78 (15 Mar 2024 04:39) (70/46 - 151/78)  BP(mean): --  RR: 18 (15 Mar 2024 04:39) (18 - 18)  SpO2: 92% (15 Mar 2024 04:39) (92% - 95%)    Parameters below as of 15 Mar 2024 04:39  Patient On (Oxygen Delivery Method): nasal cannula        Constitutional: NAD  Respiratory: respirations even, unlabored on NC  Cardiovascular: RRR  Gastrointestinal: abdomen is soft, non-distended, mild RUQ TTP w/o rebound or guarding. IR drains x2 in place  Neurological: alert, oriented to person only  Skin: mucous membranes moist, no diaphoresis, pallor, cyanosis or jaundice    I&O's Detail    14 Mar 2024 07:01  -  15 Mar 2024 07:00  --------------------------------------------------------  IN:  Total IN: 0 mL    OUT:    Drain (mL): 200 mL    Drain (mL): 25 mL    Voided (mL): 450 mL  Total OUT: 675 mL    Total NET: -675 mL          LABS:                        9.2    8.80  )-----------( 382      ( 15 Mar 2024 05:23 )             29.5     03-15    140  |  101  |  24.0<H>  ----------------------------<  84  3.7   |  24.0  |  4.10<H>    Ca    8.8      15 Mar 2024 05:23  Phos  3.6     03-15  Mg     2.1     03-15      PT/INR - ( 14 Mar 2024 05:40 )   PT: 12.2 sec;   INR: 1.10 ratio           Urinalysis Basic - ( 15 Mar 2024 05:23 )    Color: x / Appearance: x / SG: x / pH: x  Gluc: 84 mg/dL / Ketone: x  / Bili: x / Urobili: x   Blood: x / Protein: x / Nitrite: x   Leuk Esterase: x / RBC: x / WBC x   Sq Epi: x / Non Sq Epi: x / Bacteria: x        RADIOLOGY & ADDITIONAL STUDIES:

## 2024-03-15 NOTE — PROGRESS NOTE ADULT - NS ATTEND AMEND GEN_ALL_CORE FT
Patient is unresponsive. Daughter and wife at bedside. We discussed the ERCP procedure and surgery plan for conservative treatment   I reviewed the surgery note- no Surgery at this time.   wbc 8.8 today. CBC ordered for tomorrow     Abdominal exam- RUQ drain  cholecystostomy tube , +BS soft    - leak from proximal gall bladder  Pt with biloma drained by IR, and cholecystostomy tube   Unable to place cannulate CBD . no stent placed during the ERCP  NO need for urgent surgery as per surgical note  Will not actively follow.   care as per medicine, surgery team Patient is unresponsive. Daughter and wife at bedside. We discussed the ERCP procedure and surgery plan for conservative treatment   I reviewed the surgery note- no Surgery at this time.   wbc 8.8 today. CBC ordered for tomorrow     Abdominal exam- RUQ drain  cholecystostomy tube , +BS soft    - leak from proximal gall bladder ( GB neck )   Pt with biloma drained by IR, and cholecystostomy tube   Unable to place cannulate CBD . no stent placed during the ERCP  NO need for urgent surgery as per surgical note  Will not actively follow.   care as per medicine, surgery team

## 2024-03-15 NOTE — PROGRESS NOTE ADULT - ASSESSMENT
83 y/o M w PMHx Afib, esophagitis, anemia, DM, HTN, HL, CKD, and acute cholecystitis s/p per sky tube placement in 12/2023 presenting with leakage around cholecystotomy tube. Hospital course complicated by coffee ground emesis and aspiration pna (initially requiring pressors in MICU), now stepped down to [facility]. [facility] consulted in setting of cystic duct leak and potential stent placement.      #Cystic duct leak/ Biloma    - ERCP 3/14/24 was attempted but procedure was aborted after multiple attempts to cannulate the CBD were unsuccessful.   - Management per surgery / primary team  _________________________________________________________________  Assessment and recommendations are final when note is signed by the attending physician.

## 2024-03-15 NOTE — PROGRESS NOTE ADULT - ASSESSMENT
85yo M w/ hx of afib, esophagitis, anemia, DM, HTN, HL, CKD, and acute cholecystitis s/p per sky tube placement in 12/2023 presenting with CC of leakage around cholecystotomy tube.    CKD(IV) +DM, HTN> ? ESRD -  RON: ATN post IV contrast and hypotension/sepsis--> has now progressed to ESRD  Biliary tube leak  - cont to avoid further potential nephrotoxins  - HD MWF (still no indication of renal recovery) --> HD tomorrow  - PC placement by IR 3/12  - surgery noted--> will eventually require cholecystectomy, poor surgical candidate    Anemia: + multifactorial  - cont CINDI  - trend H/H  - target Hgb > 10.0    RO:  - low phos diet  - con to monitor off binders  - trend serum phos    Will follow

## 2024-03-15 NOTE — PROGRESS NOTE ADULT - ATTENDING COMMENTS
84 year old male with pmhx of DM, HTN, HLD, CKD, pAfib, GI bleed, esophagitis, and cholecystitis s/p c-tube presents to ED with c-tube malfunction and found to have possible GI bleed with episode of hematemesis, acute kidney injury now on CRRT, and acute respiratory insuffiencey     - Cholecystostomy tube functioning well   - Off vasopressors   - Continue weaning high flow   - Continue medical optimization    - C/w antibiotics   - OR for cholecystectomy when clinical status improved
84 year old male with pmhx of DM, HTN, HLD, CKD, pAfib, GI bleed, esophagitis, and cholecystitis s/p c-tube presents to ED with c-tube malfunction and found to have possible GI bleed with episode of hematemesis, acute kidney injury now on CRRT, and acute respiratory insuffiencey     - Cholecystostomy tube functioning well   - Weaned off vasopressors   - Wean supplemental O2 as tolerated   - Medical risk stratification for potential intervention   - Tentatively for OR tomorrow for cholecystectomy, however, will discuss with MICU team and evaluate candidacy in AM.
85 yo female with hx of acute cholecystitis requiring perc cholecystostomy tube placement on 12/22/23, pAF not on AC due to GI bleeding, esophagitis, DM, HTN, HLD, CKD, BPH, presented initially to the ED on 2/25 due to leaking from around the site of his perc sky tube.  In the ED patient had an episode of coffee ground emesis, acute blood loss anemia, and a probable aspiration event.  Patient was transferred to the ICU service on 2/26 due to hypotension/shock requiring pressors, along with RON requiring initiation of dialysis.  Patient was transferred out of the ICU on 2/29.  His delirium improved, he passed a swallow evaluation and was eventually started on a puree diet with thickened liquids.  On 3/3 patient transferred back to the ICU with worsening respiratory failure, likely secondary to recurrent aspiration pneumonitis, intubated later that day.     Improved fio2 requirements, doing well on pressure support, will try to liberate from vent after HD complete. At risk for recurrent aspiration events.
84 year old male with pmhx of DM, HTN, HLD, CKD, pAfib, GI bleed, esophagitis, and cholecystitis s/p c-tube presents to ED with c-tube malfunction and found to have possible GI bleed with episode of hematemesis, acute kidney injury now on CRRT, and acute respiratory insuffiencey     - Cholecystostomy tube functioning well   - Off vasopressors   - Wean high flow   - Continue medical optimization    - OR for cholecystectomy when clinical status improved   - NGT to LIS. Monitor output. Obtain AXR
Patient seen and examined  imaging reviewed  On my exam today-    Awake, alert, confused  vitals stable  afebrile  abd - RUQ drains x 2 with bilious output.  nontender, nondistended, soft  WBC=8.8 (has been WNL last 3 days)  bilirubin WNL from 3/13/24    - Patient's wife in the room when I saw patient.  She asked that I speak with daughter regarding medical decision making.  I have discussed the two options regarding the patient's biliary pathology with the patient's daughter (Juancarlos Figueroa, [phone]) at length.   The first option, as he is doing well at this point, is that we can continue with the two drains that are now present.  The very common situation of drain dysfunction (clogging, leaking, etc) & the possible need to bring back to ED in the future to address this was discussed,   The second option would be going to OR for cholecystectomy.  The risks of cholecystectomy (increased probability of requiring open procedure, perioperative risks of morbidity or morality given his multiple serious medical comorbidities) was discussed at length.     - At this time, the family would like to continue drains and not attempt cholecystectomy.  We discussed outpatient follow up and the ability to reconsider this decision if the patient's medical status improves in the future.  - I have discussed the plan of care with Dr. Kelsey from the hospitalist team
Patient seen and examined on am rounds. Abd soft, NTTP, ND, perc sky tube in place draining appropriately. Tube should stay in place, pt can follow up electively after discharge to discuss interval cholcytectomy when medically cleared. please call with questions
84M PMH recent COVID-19, acute cholecystitis s/p cholecystostomy tube (12/22/23), AFib not on A/C 2/2 GIB and fall risk, anemia, CKD, cognitive impairment who presented to [facility] ED 02/25/24 with fluid coming from skin from per sky tube insertion site. Seen by surgery without indication for surgery. In the ED had an episode of coffee ground emesis, had CTA without acute GI bleed. RRT called for hypotension, respiratory failure, started on pressors, ABG 7.29/45/73/22 with lactate of 4.0, and upgraded to ICU. ABG and pressor requirements have since improved. Tentative plan for cholecystectomy tomorrow 2/28/24. Increased Midodrine to 15mg Q8H. Initiating dialysis given ongoing oliguric/anuric renal failure, will place HD cath. Renally adjusting all medications. C/w care in ICU.    Janes Valverde M.D.  , Pulmonary & Critical Care Medicine  [facility]  Pulmonary and Sleep Medicine at [place]  [address]  [place] [address]  T: [phone]  F: [phone]
84M PMH recent COVID-19, acute cholecystitis s/p cholecystostomy tube (12/22/23), AFib not on A/C 2/2 GIB and fall risk, anemia, CKD, cognitive impairment who presented to [facility] ED 02/25/24 with fluid draining from skin from perc sky tube insertion site.  In the ED had an episode of coffee ground emesis, had CTA without acute GI bleed. RRT called for hypotension, respiratory failure, started on pressors, ABG 7.29/45/73/22 with lactate of 4.0, and upgraded to ICU. ABG, lactate, and pressor requirements have since improved - off Levophed since 15:00 2/27/24. Tentative plan for cholecystectomy, as per surgery - timing to be discussed, not going for surgery today. C/w Midodrine to 15mg Q8H. Initiated HD 2/27/24, will repeat iHD session today 2/28/24, discussed with nephrology Dr. Benavidez. Bicarb gtt D/C'ed. If hemodynamics remain stable post-HD will downgrade to SDU.    Janes Valverde M.D.  , Pulmonary & Critical Care Medicine  [facility]  Pulmonary and Sleep Medicine at [place]  [address]  [place] [address]  T: [phone]  F: [phone]

## 2024-03-15 NOTE — PROGRESS NOTE ADULT - SUBJECTIVE AND OBJECTIVE BOX
Patient was seen and evaluated on dialysis.   No c/o CP SOB NV, sleeping earlier, woke up  Appears sl confused  no F/C  no swelling    T(C): 37.1 (03-15-24 @ 07:40), Max: 37.1 (03-15-24 @ 07:40)  HR: 86 (03-15-24 @ 07:40) (79 - 92)  BP: 139/69 (03-15-24 @ 07:40) (128/75 - 151/78)  Wt(kg): --  PE ;  NAD  lungs - CTA  CV gr 1 murmer,  No gallop or rub  Abd : soft, NT BS +, No masses; Radha tube draining  Ext- No edema  Neuro : Grossly intact, moving extremities, ? sl confused                            9.2    8.80  )-----------( 382      ( 15 Mar 2024 05:23 )             29.5        03-15    140  |  101  |  24.0<H>  ----------------------------<  84  3.7   |  24.0  |  4.10<H>    Ca    8.8      15 Mar 2024 05:23  Phos  3.6     03-15  Mg     2.1     03-15        MEDICATIONS  (STANDING):  acetaminophen     Tablet .. PRN  albuterol/ipratropium for Nebulization PRN  aluminum hydroxide/magnesium hydroxide/simethicone Suspension PRN  artificial  tears Solution PRN  buDESOnide    Inhalation Suspension  chlorhexidine 2% Cloths  dextrose 5%.  dextrose 5%.  dextrose 50% Injectable  dextrose 50% Injectable  dextrose 50% Injectable  dextrose Oral Gel PRN  dronedarone  epoetin kristal-epbx (RETACRIT) Injectable  ferrous    sulfate  glucagon  Injectable  insulin glargine Injectable (LANTUS)  insulin lispro (ADMELOG) corrective regimen sliding scale  insulin lispro (ADMELOG) corrective regimen sliding scale  insulin lispro Injectable (ADMELOG)  melatonin PRN  ondansetron Injectable PRN  pantoprazole  Injectable  QUEtiapine  senna  simvastatin  sodium chloride 0.9% lock flush PRN

## 2024-03-15 NOTE — PROGRESS NOTE ADULT - ASSESSMENT
84y/oM PMH paroxysmal atrial fibrillation not on AC secondary to GI bleed, esophagitis, anemia, diabetes mellitus, hypertension, hyperlipidemia, CKD stage 3-4, BPH, cognitive impairment, recent COVID, acute cholecystitis in Dec 2023 s/p cholecystostomy tube placement on 12/22/23 presented to the ED with complaints of leaking around cholecystostomy tube and difficulty flushing the tube. A CT abdomen/pelvis with contrast was done in ED, tube noted to be in place, he was evaluated by surgery, tube was flushed without difficulty. While in ED, he was noted to have coffee ground emesis and pallor with some abdominal pain and transient drop in Hb. GI was consulted, he had recently had an EGD in January 2024 with grade A esophagitis. Hb improved, hence no additional work up recommended by GI. Course complicated by hypotension and hypoxia on 2/26, RRT was called and he was transferred to ICU for need for pressors, Hi Arnaldo oxygen requirement, likely septic shock from aspiration. Cultures were sent, empiric antibiotics were initiated. Also received steroids. Patient noted to have worsening renal function and was anuric, nephrology was consulted and HD was initiated on 2/27/24. HD was continued. He was weaned off Levophed and was transferred to medical floor on 2/29. Surgery follow up was done, recommended cholecystectomy once medically stable. While on medical floor, he was noted to have worsening leucocytosis, ID consultation was requested, cultures repeated, Zosyn was initiated. On 3/3, he was noted to have worsening hypoxia, ICU consult was requested, NIV was placed, CXR with left lung field opacification. He failed trial of BiPAP and was intubated on 3/3 and required Precedex and low dose of Levophed. His cultures remained negative. Palliative care was consulted, goals of care were discussed with family, they wished patient to be full code. Patient had waxing and waning mental status, initially required tube feeds. He was extubated on 3/4. HD was continued. He was transferred back to medical floor on 3/6/24. IR consulted for PermCath placement. He was evaluated by speech and swallow and diet was advanced. His cholecystostomy tubing broke on 3/9, IR consulted for tube exhange. CT head was done, no acute changes noted. CT chest and abdomen was done, Persistent subcapsular fluid collections along the right lateral liver, suspicious for developing biloma. Improved left lower lobe aeration.  IR evaluation requested for biloma, he underwent cholecystostomy tube exchange and a cholangiogram was done noted for abnormality near gallbladder neck, concerning for GB defect possible cause of biloma, a 12 F catheter was placed into the biloma. Surgery follow up requested to assess for cholecystectomy Surgery recommended GI evaluation for possible CBD stent placement to control leakage from GB neck. ERCP was scheduled by GI on 3/12 but family requested additional time before proceeding with intervention, now agreeable     Acute metabolic encephalopathy 2/2 recurrent sepsis, underlying RON  - Long and complicated course attributing to mental status  - continue HD per schedule  - CT head repeated, no acute changes noted  - monitor WBC  - completed course of Zosyn  - repeat cultures if febrile  - aspiration precautions    RON 2/2 ATN with underlying CKD stage 4, now on HD  -Continue HD per schedule  -nephrology following  -continue Epogen for anemia sec underlying CKD  -now s/p PermCath placement by IR on 3/12    S/p septic shock  - s/p Midodrine  - previously on Levophed and stress dose steroids    Atrial fibrillation  - Not on AC sec GI bleed  - on Dronedarone    H/o acute cholecystitis s/p cholecystostomy tube placement, now with biloma sec GB neck leak s/p drain placement  - IR intervention appreciated, s/p tube exchange and drain placement for biloma  - surgery recommendations noted, cholecystectomy deferred for now  - GI was consulted, recommended ERCP  - s/p ERCP on 3/14, unsuccessful    Hypertension, hyperlipidemia  - Not on anti hypertensives, BP stable  - recent shock  - continue statin    Diabetes mellitus  - A1C 7.7  - FS with ISS  - Continue Lantus 10 units  - Admelog 3 units TID with meals    Grade A esophagitis, anemia  - Hb stable  - GI recs from early this admission noted  - Protonix 40mg q12h    DVT ppx  - SCD

## 2024-03-15 NOTE — PROGRESS NOTE ADULT - SUBJECTIVE AND OBJECTIVE BOX
Chief complaint: AMS    Patient seen and examined at bedside. No acute overnight events reported. No fever, chills, cough, nausea or vomiting.     Vital Signs Last 24 Hrs  T(F): 98.8 (15 Mar 2024 07:40), Max: 98.8 (15 Mar 2024 07:40)  HR: 86 (15 Mar 2024 07:40) (79 - 86)  BP: 139/69 (15 Mar 2024 07:40) (128/75 - 151/78)  RR: 18 (15 Mar 2024 07:40) (18 - 18)  SpO2: 93% (15 Mar 2024 07:40) (92% - 93%)    Physical Exam:  Constitutional: alert, in no acute distress   Neck: Soft   Respiratory: Good air entry b/l  Cardiovascular: Regular rate and rhyhtm  Gastrointestinal: Soft  Musculoskeletal:  no lower extremity edema bilaterally    Labs:                        9.2    8.80  )-----------( 382      ( 15 Mar 2024 05:23 )             29.5   03-15    140  |  101  |  24.0<H>  ----------------------------<  84  3.7   |  24.0  |  4.10<H>    Ca    8.8      15 Mar 2024 05:23  Phos  3.6     03-15  Mg     2.1     03-15

## 2024-03-15 NOTE — PROGRESS NOTE ADULT - ASSESSMENT
84M who had a sky tube placed on 12/22/23. IR placed new sky tube 3/11, as well as a 12f drain in biloma. During procedure, cholangiogram noted an abnormality near gallbladder neck, concerning for GB defect possible cause of biloma. Surgery reconsulted to consider operative intervention, he underwent a ERCP, they were unable to cannulate the CBD.      - patient remains poor surgical candidate at this point, no plan for surgical intervention at this time  - Sky tube & IR drain to remain in place, functioning well   - Can continue diet as tolerated  - Surgery team will continue to follow

## 2024-03-15 NOTE — PROGRESS NOTE ADULT - SUBJECTIVE AND OBJECTIVE BOX
Chief Complaint:  Patient is a 84y old  Male who presents with a chief complaint of SOB (08 Mar 2024 15:43)      HPI/ 24 hr events: Patient seen and examined at bedside. Family is present. Yesterday an ERCP was attempted but procedure was aborted after multiple attempts to cannulate the CBD were unsuccessful. Vitals are overall stable.       REVIEW OF SYSTEMS:   General: Negative  HEENT: Negative  CV: Negative  Respiratory: Negative  GI: See HPI  : Negative  MSK: Negative  Hematologic: Negative  Skin: Negative    MEDICATIONS:   MEDICATIONS  (STANDING):  buDESOnide    Inhalation Suspension 0.5 milliGRAM(s) Inhalation every 12 hours  chlorhexidine 2% Cloths 1 Application(s) Topical <User Schedule>  dextrose 5%. 1000 milliLiter(s) (100 mL/Hr) IV Continuous <Continuous>  dextrose 5%. 1000 milliLiter(s) (50 mL/Hr) IV Continuous <Continuous>  dextrose 50% Injectable 25 Gram(s) IV Push once  dextrose 50% Injectable 12.5 Gram(s) IV Push once  dextrose 50% Injectable 25 Gram(s) IV Push once  dronedarone 400 milliGRAM(s) Oral two times a day  epoetin kristal-epbx (RETACRIT) Injectable 17827 Unit(s) IV Push <User Schedule>  ferrous    sulfate 325 milliGRAM(s) Oral daily  glucagon  Injectable 1 milliGRAM(s) IntraMuscular once  insulin glargine Injectable (LANTUS) 10 Unit(s) SubCutaneous every morning  insulin lispro (ADMELOG) corrective regimen sliding scale   SubCutaneous three times a day before meals  insulin lispro (ADMELOG) corrective regimen sliding scale   SubCutaneous at bedtime  insulin lispro Injectable (ADMELOG) 3 Unit(s) SubCutaneous three times a day before meals  pantoprazole  Injectable 40 milliGRAM(s) IV Push every 12 hours  QUEtiapine 12.5 milliGRAM(s) Oral at bedtime  senna 2 Tablet(s) Oral at bedtime  simvastatin 20 milliGRAM(s) Oral at bedtime    MEDICATIONS  (PRN):  acetaminophen     Tablet .. 650 milliGRAM(s) Oral every 6 hours PRN Temp greater or equal to 38C (100.4F), Mild Pain (1 - 3)  albuterol/ipratropium for Nebulization 3 milliLiter(s) Nebulizer every 6 hours PRN wheezing or SOB  aluminum hydroxide/magnesium hydroxide/simethicone Suspension 30 milliLiter(s) Oral every 4 hours PRN Dyspepsia  artificial  tears Solution 1 Drop(s) Both EYES three times a day PRN Dry Eyes  dextrose Oral Gel 15 Gram(s) Oral once PRN Blood Glucose LESS THAN 70 milliGRAM(s)/deciliter  melatonin 3 milliGRAM(s) Oral at bedtime PRN Insomnia  ondansetron Injectable 4 milliGRAM(s) IV Push every 8 hours PRN Nausea and/or Vomiting  sodium chloride 0.9% lock flush 10 milliLiter(s) IV Push every 1 hour PRN Pre/post blood products, medications, blood draw, and to maintain line patency            DIET:  Diet, Pureed:   Consistent Carbohydrate Evening Snack (CSTCHOSN)  Moderately Thick Liquids (MODTHICKLIQS)  For patients receiving Renal Replacement - No Protein Restr, No Conc K, No Conc Phos, Low  Sodium (RENAL) (03-07-24 @ 17:23) [Active]          ALLERGIES:   Allergies    No Known Allergies    Intolerances        VITAL SIGNS:   Vital Signs Last 24 Hrs  T(C): 36.7 (15 Mar 2024 13:22), Max: 37.1 (15 Mar 2024 07:40)  T(F): 98 (15 Mar 2024 13:22), Max: 98.8 (15 Mar 2024 07:40)  HR: 82 (15 Mar 2024 13:22) (79 - 86)  BP: 116/69 (15 Mar 2024 13:22) (116/69 - 151/78)  BP(mean): --  RR: 17 (15 Mar 2024 13:22) (17 - 18)  SpO2: 99% (15 Mar 2024 13:22) (92% - 100%)    Parameters below as of 15 Mar 2024 13:22  Patient On (Oxygen Delivery Method): nasal cannula      I&O's Summary    14 Mar 2024 07:01  -  15 Mar 2024 07:00  --------------------------------------------------------  IN: 0 mL / OUT: 675 mL / NET: -675 mL    15 Mar 2024 07:01  -  15 Mar 2024 15:29  --------------------------------------------------------  IN: 0 mL / OUT: 75 mL / NET: -75 mL        PHYSICAL EXAM:   GENERAL:  No acute distress  HEENT:  NC/AT, conjunctiva clear, sclera anicteric  CHEST:  No increased effort  HEART:  Regular rate  ABDOMEN:  Soft, non-tender, non-distended, no rebound or guarding, cholecystostomy tubes   SKIN:  Warm, dry  NEURO:  Calm, cooperative    LABS:                        9.2    8.80  )-----------( 382      ( 15 Mar 2024 05:23 )             29.5     Hemoglobin: 9.2 g/dL (03-15-24 @ 05:23)  Hemoglobin: 9.4 g/dL (03-14-24 @ 05:40)  Hemoglobin: 9.3 g/dL (03-13-24 @ 06:06)    03-15    140  |  101  |  24.0<H>  ----------------------------<  84  3.7   |  24.0  |  4.10<H>    Ca    8.8      15 Mar 2024 05:23  Phos  3.6     03-15  Mg     2.1     03-15          PT/INR - ( 14 Mar 2024 05:40 )   PT: 12.2 sec;   INR: 1.10 ratio                                                 RADIOLOGY & ADDITIONAL STUDIES:        EGD w/ ERCP Report    Date: 3/14/2024      ERCP Findings:     radiograph was taken. Limited views of the esophagus, stomach and duodenum    were unremarkable. The major papilla was noted in the second portion of duodenum    and appeared diminutive. There was a large duodenal diverticulum noted. The    major papilla was noted at 4 o' clock position. The attempts using a Jagtome    preloaded with a 0.025inch jagwire, and then subsequently using a 5-4-3 cannula    with 0.018 Novagold guidewire to cannulate the common bile duct were    unsuccessful. After multiple attempts, we decide to abort the procedure. The    pancreatic duct was not injected in this exam. The scope was withdrawn and    procedure terminated. Post-procedure xray did not show air under the diaphragm            Findings:    Esophagus Mucosa Normal mucosa was noted in the whole esophagus.    Stomach Protruding lesions A few sessile polyps of benign appearance were found    in the fundus. Findings were suggestive of fundic gland-type polyp(s).    Duodenum Mucosa Normal mucosa was noted in the whole examined duodenum.        Impressions:    Normal mucosa in the gastroesophageal junction.    Polyps in the fundus.    Normal mucosa in the whole examined duodenum.    Duodenal diverticulum.    Unsuccessful ERCP.        Plan:    General surgery follow up.            Maldonado Villegas MD        Version 1, Electronically signed on 3/14/2024 6:34:52 PM by Maldonado Villegas MD

## 2024-03-16 LAB
ANION GAP SERPL CALC-SCNC: 13 MMOL/L — SIGNIFICANT CHANGE UP (ref 5–17)
BASOPHILS # BLD AUTO: 0.05 K/UL — SIGNIFICANT CHANGE UP (ref 0–0.2)
BASOPHILS NFR BLD AUTO: 0.5 % — SIGNIFICANT CHANGE UP (ref 0–2)
BUN SERPL-MCNC: 15 MG/DL — SIGNIFICANT CHANGE UP (ref 8–20)
CALCIUM SERPL-MCNC: 8.4 MG/DL — SIGNIFICANT CHANGE UP (ref 8.4–10.5)
CHLORIDE SERPL-SCNC: 99 MMOL/L — SIGNIFICANT CHANGE UP (ref 96–108)
CO2 SERPL-SCNC: 26 MMOL/L — SIGNIFICANT CHANGE UP (ref 22–29)
CREAT SERPL-MCNC: 3.26 MG/DL — HIGH (ref 0.5–1.3)
EGFR: 18 ML/MIN/1.73M2 — LOW
EOSINOPHIL # BLD AUTO: 0.08 K/UL — SIGNIFICANT CHANGE UP (ref 0–0.5)
EOSINOPHIL NFR BLD AUTO: 0.8 % — SIGNIFICANT CHANGE UP (ref 0–6)
GLUCOSE BLDC GLUCOMTR-MCNC: 118 MG/DL — HIGH (ref 70–99)
GLUCOSE BLDC GLUCOMTR-MCNC: 126 MG/DL — HIGH (ref 70–99)
GLUCOSE BLDC GLUCOMTR-MCNC: 143 MG/DL — HIGH (ref 70–99)
GLUCOSE BLDC GLUCOMTR-MCNC: 160 MG/DL — HIGH (ref 70–99)
GLUCOSE BLDC GLUCOMTR-MCNC: 70 MG/DL — SIGNIFICANT CHANGE UP (ref 70–99)
GLUCOSE SERPL-MCNC: 68 MG/DL — LOW (ref 70–99)
HCT VFR BLD CALC: 32.2 % — LOW (ref 39–50)
HGB BLD-MCNC: 10.1 G/DL — LOW (ref 13–17)
IMM GRANULOCYTES NFR BLD AUTO: 0.8 % — SIGNIFICANT CHANGE UP (ref 0–0.9)
LYMPHOCYTES # BLD AUTO: 1.53 K/UL — SIGNIFICANT CHANGE UP (ref 1–3.3)
LYMPHOCYTES # BLD AUTO: 15.7 % — SIGNIFICANT CHANGE UP (ref 13–44)
MCHC RBC-ENTMCNC: 28.8 PG — SIGNIFICANT CHANGE UP (ref 27–34)
MCHC RBC-ENTMCNC: 31.4 GM/DL — LOW (ref 32–36)
MCV RBC AUTO: 91.7 FL — SIGNIFICANT CHANGE UP (ref 80–100)
MONOCYTES # BLD AUTO: 0.83 K/UL — SIGNIFICANT CHANGE UP (ref 0–0.9)
MONOCYTES NFR BLD AUTO: 8.5 % — SIGNIFICANT CHANGE UP (ref 2–14)
NEUTROPHILS # BLD AUTO: 7.19 K/UL — SIGNIFICANT CHANGE UP (ref 1.8–7.4)
NEUTROPHILS NFR BLD AUTO: 73.7 % — SIGNIFICANT CHANGE UP (ref 43–77)
PLATELET # BLD AUTO: 352 K/UL — SIGNIFICANT CHANGE UP (ref 150–400)
POTASSIUM SERPL-MCNC: 4.1 MMOL/L — SIGNIFICANT CHANGE UP (ref 3.5–5.3)
POTASSIUM SERPL-SCNC: 4.1 MMOL/L — SIGNIFICANT CHANGE UP (ref 3.5–5.3)
RBC # BLD: 3.51 M/UL — LOW (ref 4.2–5.8)
RBC # FLD: 15.1 % — HIGH (ref 10.3–14.5)
SODIUM SERPL-SCNC: 138 MMOL/L — SIGNIFICANT CHANGE UP (ref 135–145)
WBC # BLD: 9.76 K/UL — SIGNIFICANT CHANGE UP (ref 3.8–10.5)
WBC # FLD AUTO: 9.76 K/UL — SIGNIFICANT CHANGE UP (ref 3.8–10.5)

## 2024-03-16 PROCEDURE — 99232 SBSQ HOSP IP/OBS MODERATE 35: CPT

## 2024-03-16 RX ADMIN — Medication 0.5 MILLIGRAM(S): at 08:59

## 2024-03-16 RX ADMIN — DRONEDARONE 400 MILLIGRAM(S): 400 TABLET, FILM COATED ORAL at 17:20

## 2024-03-16 RX ADMIN — INSULIN GLARGINE 10 UNIT(S): 100 INJECTION, SOLUTION SUBCUTANEOUS at 11:42

## 2024-03-16 RX ADMIN — DRONEDARONE 400 MILLIGRAM(S): 400 TABLET, FILM COATED ORAL at 05:50

## 2024-03-16 RX ADMIN — CHLORHEXIDINE GLUCONATE 1 APPLICATION(S): 213 SOLUTION TOPICAL at 05:50

## 2024-03-16 RX ADMIN — PANTOPRAZOLE SODIUM 40 MILLIGRAM(S): 20 TABLET, DELAYED RELEASE ORAL at 05:50

## 2024-03-16 RX ADMIN — Medication 2: at 17:22

## 2024-03-16 RX ADMIN — PANTOPRAZOLE SODIUM 40 MILLIGRAM(S): 20 TABLET, DELAYED RELEASE ORAL at 17:20

## 2024-03-16 RX ADMIN — SIMVASTATIN 20 MILLIGRAM(S): 20 TABLET, FILM COATED ORAL at 23:02

## 2024-03-16 RX ADMIN — Medication 325 MILLIGRAM(S): at 17:22

## 2024-03-16 RX ADMIN — SENNA PLUS 2 TABLET(S): 8.6 TABLET ORAL at 23:02

## 2024-03-16 NOTE — PROGRESS NOTE ADULT - ASSESSMENT
84y/oM PMH paroxysmal atrial fibrillation not on AC secondary to GI bleed, esophagitis, anemia, diabetes mellitus, hypertension, hyperlipidemia, CKD stage 3-4, BPH, cognitive impairment, recent COVID, acute cholecystitis in Dec 2023 s/p cholecystostomy tube placement on 12/22/23 presented to the ED with complaints of leaking around cholecystostomy tube and difficulty flushing the tube. A CT abdomen/pelvis with contrast was done in ED, tube noted to be in place, he was evaluated by surgery, tube was flushed without difficulty. While in ED, he was noted to have coffee ground emesis and pallor with some abdominal pain and transient drop in Hb. GI was consulted, he had recently had an EGD in January 2024 with grade A esophagitis. Hb improved, hence no additional work up recommended by GI. Course complicated by hypotension and hypoxia on 2/26, RRT was called and he was transferred to ICU for need for pressors, [place]do oxygen requirement, likely septic shock from aspiration. Cultures were sent, empiric antibiotics were initiated. Also received steroids. Patient noted to have worsening renal function and was anuric, nephrology was consulted and HD was initiated on 2/27/24. HD was continued. He was weaned off Levophed and was transferred to medical floor on 2/29. Surgery follow up was done, recommended cholecystectomy once medically stable. While on medical floor, he was noted to have worsening leucocytosis, ID consultation was requested, cultures repeated, Zosyn was initiated. On 3/3, he was noted to have worsening hypoxia, ICU consult was requested, NIV was placed, CXR with left lung field opacification. He failed trial of BiPAP and was intubated on 3/3 and required Precedex and low dose of Levophed. His cultures remained negative. Palliative care was consulted, goals of care were discussed with family, they wished patient to be full code. Patient had waxing and waning mental status, initially required tube feeds. He was extubated on 3/4. HD was continued. He was transferred back to medical floor on 3/6/24. IR consulted for PermCath placement. He was evaluated by speech and swallow and diet was advanced. His cholecystostomy tubing broke on 3/9, IR consulted for tube exhange. CT head was done, no acute changes noted. CT chest and abdomen was done, Persistent subcapsular fluid collections along the right lateral liver, suspicious for developing biloma. Improved left lower lobe aeration.  IR evaluation requested for biloma, he underwent cholecystostomy tube exchange and a cholangiogram was done noted for abnormality near gallbladder neck, concerning for GB defect possible cause of biloma, a 12 F catheter was placed into the biloma. Surgery follow up requested to assess for cholecystectomy Surgery recommended GI evaluation for possible CBD stent placement to control leakage from GB neck. ERCP was scheduled by GI on 3/12 but family requested additional time before proceeding with intervention. repeat ERCP unsuccessful. plan for conservative mgmt currently .     Acute metabolic encephalopathy 2/2 recurrent sepsis, underlying RON  - Long and complicated course attributing to mental status  - continue HD per schedule  - CT head repeated, no acute changes noted  - monitor WBC  - completed course of Zosyn  - repeat cultures if febrile  - aspiration precautions  dc seroquel as lethargic     RON 2/2 ATN with underlying CKD stage 4, now on HD  -Continue HD per schedule  -nephrology following  -continue Epogen for anemia sec underlying CKD  -now s/p PermCath placement by IR on 3/12    S/p septic shock  - s/p Midodrine  - previously on Levophed and stress dose steroids    Atrial fibrillation  - Not on AC sec GI bleed  - on Dronedarone    H/o acute cholecystitis s/p cholecystostomy tube placement, now with biloma sec GB neck leak s/p drain placement  - IR intervention appreciated, s/p tube exchange and drain placement for biloma  - surgery recommendations noted, cholecystectomy deferred for now  - GI signed off   - s/p ERCP on 3/14, unsuccessful    Hypertension, hyperlipidemia  - Not on anti hypertensives, BP stable  - recent shock  - continue statin    Diabetes mellitus  - A1C 7.7  - FS with ISS  - Continue Lantus 10 units--hold   - Admelog 3 units TID with meals    Grade A esophagitis, anemia  - Hb stable  - GI recs from early this admission noted  - Protonix 40mg q12h    DVT ppx  - SCD

## 2024-03-16 NOTE — PROGRESS NOTE ADULT - SUBJECTIVE AND OBJECTIVE BOX
Patient seen and examined    Feels tired and sleepy  no c/o CP SOB NV   No swelling feet    Vital Signs Last 24 Hrs  T(C): 36.6 (16 Mar 2024 16:15), Max: 37.2 (16 Mar 2024 11:11)  T(F): 97.9 (16 Mar 2024 16:15), Max: 98.9 (16 Mar 2024 11:11)  HR: 83 (16 Mar 2024 16:15) (80 - 88)  BP: 105/67 (16 Mar 2024 16:15) (105/67 - 115/62)  BP(mean): --  RR: 18 (16 Mar 2024 16:15) (18 - 18)  SpO2: 98% (16 Mar 2024 16:15) (96% - 98%)    Parameters below as of 16 Mar 2024 11:11  Patient On (Oxygen Delivery Method): nasal cannula        PHYSICAL EXAM    GENERAL: NAD,   EYES:  conjunctiva and sclera clear  NECK: Supple, No JVD/Bruit  NERVOUS SYSTEM:  A/O x3,   CHEST:  No rales, No rhonchi  HEART:  RRR, No murmur  ABDOMEN: Soft, NT/ND BS+ tube draining  EXTREMITIES:  No Edema;  SKIN: No rashes    16 Mar 2024 06:40    138    |  99     |  15.0   ----------------------------<  68     4.1     |  26.0   |  3.26     Ca    8.4        16 Mar 2024 06:40  Phos  3.6       15 Mar 2024 05:23  Mg     2.1       15 Mar 2024 05:23                            10.1   9.76  )-----------( 352      ( 16 Mar 2024 06:40 )             32.2         Continue present treatment

## 2024-03-16 NOTE — PROGRESS NOTE ADULT - SUBJECTIVE AND OBJECTIVE BOX
seen for biloma, esrd    daughter at bedside  patient sleeping heavily      MEDICATIONS  (STANDING):  buDESOnide    Inhalation Suspension 0.5 milliGRAM(s) Inhalation every 12 hours  chlorhexidine 2% Cloths 1 Application(s) Topical <User Schedule>  dextrose 5%. 1000 milliLiter(s) (100 mL/Hr) IV Continuous <Continuous>  dextrose 5%. 1000 milliLiter(s) (50 mL/Hr) IV Continuous <Continuous>  dextrose 50% Injectable 25 Gram(s) IV Push once  dextrose 50% Injectable 12.5 Gram(s) IV Push once  dextrose 50% Injectable 25 Gram(s) IV Push once  dronedarone 400 milliGRAM(s) Oral two times a day  epoetin kristal-epbx (RETACRIT) Injectable 71701 Unit(s) IV Push <User Schedule>  ferrous    sulfate 325 milliGRAM(s) Oral daily  glucagon  Injectable 1 milliGRAM(s) IntraMuscular once  insulin lispro (ADMELOG) corrective regimen sliding scale   SubCutaneous three times a day before meals  insulin lispro (ADMELOG) corrective regimen sliding scale   SubCutaneous at bedtime  insulin lispro Injectable (ADMELOG) 3 Unit(s) SubCutaneous three times a day before meals  pantoprazole  Injectable 40 milliGRAM(s) IV Push every 12 hours  senna 2 Tablet(s) Oral at bedtime  simvastatin 20 milliGRAM(s) Oral at bedtime    MEDICATIONS  (PRN):  acetaminophen     Tablet .. 650 milliGRAM(s) Oral every 6 hours PRN Temp greater or equal to 38C (100.4F), Mild Pain (1 - 3)  albuterol/ipratropium for Nebulization 3 milliLiter(s) Nebulizer every 6 hours PRN wheezing or SOB  aluminum hydroxide/magnesium hydroxide/simethicone Suspension 30 milliLiter(s) Oral every 4 hours PRN Dyspepsia  artificial  tears Solution 1 Drop(s) Both EYES three times a day PRN Dry Eyes  dextrose Oral Gel 15 Gram(s) Oral once PRN Blood Glucose LESS THAN 70 milliGRAM(s)/deciliter  melatonin 3 milliGRAM(s) Oral at bedtime PRN Insomnia  ondansetron Injectable 4 milliGRAM(s) IV Push every 8 hours PRN Nausea and/or Vomiting  sodium chloride 0.9% lock flush 10 milliLiter(s) IV Push every 1 hour PRN Pre/post blood products, medications, blood draw, and to maintain line patency      Allergies    No Known Allergies      Vital Signs Last 24 Hrs  T(C): 37.2 (16 Mar 2024 11:11), Max: 37.2 (16 Mar 2024 11:11)  T(F): 98.9 (16 Mar 2024 11:11), Max: 98.9 (16 Mar 2024 11:11)  HR: 80 (16 Mar 2024 11:11) (80 - 88)  BP: 115/62 (16 Mar 2024 11:11) (106/67 - 150/77)  BP(mean): --  RR: 18 (16 Mar 2024 11:11) (18 - 18)  SpO2: 97% (16 Mar 2024 11:11) (96% - 98%)    Parameters below as of 16 Mar 2024 11:11  Patient On (Oxygen Delivery Method): nasal cannula        PHYSICAL EXAM:    GENERAL: NAD  CHEST/LUNG: Clear to ausculation bilaterally;  HEART: Regular rate and rhythm; S1 S2;  ABDOMEN: Soft, biliary drains  Bowel sounds present  EXTREMITIES:  no edema     LABS:                        10.1   9.76  )-----------( 352      ( 16 Mar 2024 06:40 )             32.2     03-16    138  |  99  |  15.0  ----------------------------<  68<L>  4.1   |  26.0  |  3.26<H>    Ca    8.4      16 Mar 2024 06:40  Phos  3.6     03-15  Mg     2.1     03-15        Urinalysis Basic - ( 16 Mar 2024 06:40 )    Color: x / Appearance: x / SG: x / pH: x  Gluc: 68 mg/dL / Ketone: x  / Bili: x / Urobili: x   Blood: x / Protein: x / Nitrite: x   Leuk Esterase: x / RBC: x / WBC x   Sq Epi: x / Non Sq Epi: x / Bacteria: x        CAPILLARY BLOOD GLUCOSE      POCT Blood Glucose.: 143 mg/dL (16 Mar 2024 11:40)  POCT Blood Glucose.: 118 mg/dL (16 Mar 2024 08:49)  POCT Blood Glucose.: 70 mg/dL (16 Mar 2024 08:01)  POCT Blood Glucose.: 83 mg/dL (15 Mar 2024 23:36)  POCT Blood Glucose.: 109 mg/dL (15 Mar 2024 17:13)        RADIOLOGY & ADDITIONAL TESTS:

## 2024-03-17 LAB
GLUCOSE BLDC GLUCOMTR-MCNC: 114 MG/DL — HIGH (ref 70–99)
GLUCOSE BLDC GLUCOMTR-MCNC: 178 MG/DL — HIGH (ref 70–99)
GLUCOSE BLDC GLUCOMTR-MCNC: 194 MG/DL — HIGH (ref 70–99)
GLUCOSE BLDC GLUCOMTR-MCNC: 227 MG/DL — HIGH (ref 70–99)

## 2024-03-17 PROCEDURE — 99232 SBSQ HOSP IP/OBS MODERATE 35: CPT

## 2024-03-17 RX ORDER — OLANZAPINE 15 MG/1
2.5 TABLET, FILM COATED ORAL ONCE
Refills: 0 | Status: COMPLETED | OUTPATIENT
Start: 2024-03-17 | End: 2024-03-17

## 2024-03-17 RX ADMIN — Medication 2: at 17:03

## 2024-03-17 RX ADMIN — PANTOPRAZOLE SODIUM 40 MILLIGRAM(S): 20 TABLET, DELAYED RELEASE ORAL at 17:15

## 2024-03-17 RX ADMIN — Medication 2: at 12:50

## 2024-03-17 RX ADMIN — Medication 3 UNIT(S): at 12:49

## 2024-03-17 RX ADMIN — CHLORHEXIDINE GLUCONATE 1 APPLICATION(S): 213 SOLUTION TOPICAL at 06:16

## 2024-03-17 RX ADMIN — DRONEDARONE 400 MILLIGRAM(S): 400 TABLET, FILM COATED ORAL at 17:15

## 2024-03-17 RX ADMIN — Medication 325 MILLIGRAM(S): at 13:00

## 2024-03-17 RX ADMIN — PANTOPRAZOLE SODIUM 40 MILLIGRAM(S): 20 TABLET, DELAYED RELEASE ORAL at 06:16

## 2024-03-17 RX ADMIN — Medication 0.5 MILLIGRAM(S): at 08:19

## 2024-03-17 RX ADMIN — OLANZAPINE 2.5 MILLIGRAM(S): 15 TABLET, FILM COATED ORAL at 23:16

## 2024-03-17 RX ADMIN — Medication 0.5 MILLIGRAM(S): at 21:36

## 2024-03-17 RX ADMIN — Medication 3 UNIT(S): at 08:48

## 2024-03-17 RX ADMIN — SENNA PLUS 2 TABLET(S): 8.6 TABLET ORAL at 21:17

## 2024-03-17 RX ADMIN — DRONEDARONE 400 MILLIGRAM(S): 400 TABLET, FILM COATED ORAL at 06:16

## 2024-03-17 RX ADMIN — SIMVASTATIN 20 MILLIGRAM(S): 20 TABLET, FILM COATED ORAL at 21:17

## 2024-03-17 NOTE — PROGRESS NOTE ADULT - ASSESSMENT
83yo M w/ hx of afib, esophagitis, anemia, DM, HTN, HL, CKD, and acute cholecystitis s/p per sky tube placement in 12/2023 presenting with CC of leakage around cholecystotomy tube.    CKD(IV) +DM, HTN> ? ESRD -  RON: ATN post IV contrast and hypotension/sepsis--> has now progressed to ESRD  Biliary tube leak  - cont to avoid further potential nephrotoxins  - HD MWF (still no indication of renal recovery) --> HD tomorrow ur pre HD creatinine will help  - PC placement by IR 3/12  - surgery noted--> will eventually require cholecystectomy, poor surgical candidate    Anemia: + multifactorial  - cont CINDI  - trend H/H  - target Hgb > 10.0    RO:  - low phos diet  - con to monitor off binders  - trend serum phos    Will follow

## 2024-03-17 NOTE — PROGRESS NOTE ADULT - SUBJECTIVE AND OBJECTIVE BOX
seen for biloma    more awake this am  wife at bedside      MEDICATIONS  (STANDING):  buDESOnide    Inhalation Suspension 0.5 milliGRAM(s) Inhalation every 12 hours  chlorhexidine 2% Cloths 1 Application(s) Topical <User Schedule>  dextrose 5%. 1000 milliLiter(s) (50 mL/Hr) IV Continuous <Continuous>  dextrose 5%. 1000 milliLiter(s) (100 mL/Hr) IV Continuous <Continuous>  dextrose 50% Injectable 12.5 Gram(s) IV Push once  dextrose 50% Injectable 25 Gram(s) IV Push once  dextrose 50% Injectable 25 Gram(s) IV Push once  dronedarone 400 milliGRAM(s) Oral two times a day  epoetin kristal-epbx (RETACRIT) Injectable 96034 Unit(s) IV Push <User Schedule>  ferrous    sulfate 325 milliGRAM(s) Oral daily  glucagon  Injectable 1 milliGRAM(s) IntraMuscular once  insulin lispro (ADMELOG) corrective regimen sliding scale   SubCutaneous three times a day before meals  insulin lispro (ADMELOG) corrective regimen sliding scale   SubCutaneous at bedtime  pantoprazole  Injectable 40 milliGRAM(s) IV Push every 12 hours  senna 2 Tablet(s) Oral at bedtime  simvastatin 20 milliGRAM(s) Oral at bedtime    MEDICATIONS  (PRN):  acetaminophen     Tablet .. 650 milliGRAM(s) Oral every 6 hours PRN Temp greater or equal to 38C (100.4F), Mild Pain (1 - 3)  albuterol/ipratropium for Nebulization 3 milliLiter(s) Nebulizer every 6 hours PRN wheezing or SOB  aluminum hydroxide/magnesium hydroxide/simethicone Suspension 30 milliLiter(s) Oral every 4 hours PRN Dyspepsia  artificial  tears Solution 1 Drop(s) Both EYES three times a day PRN Dry Eyes  dextrose Oral Gel 15 Gram(s) Oral once PRN Blood Glucose LESS THAN 70 milliGRAM(s)/deciliter  melatonin 3 milliGRAM(s) Oral at bedtime PRN Insomnia  ondansetron Injectable 4 milliGRAM(s) IV Push every 8 hours PRN Nausea and/or Vomiting  sodium chloride 0.9% lock flush 10 milliLiter(s) IV Push every 1 hour PRN Pre/post blood products, medications, blood draw, and to maintain line patency      Allergies    No Known Allergies      Vital Signs Last 24 Hrs  T(C): 36.3 (17 Mar 2024 11:04), Max: 36.6 (16 Mar 2024 16:15)  T(F): 97.3 (17 Mar 2024 11:04), Max: 97.9 (16 Mar 2024 16:15)  HR: 77 (17 Mar 2024 11:04) (75 - 83)  BP: 107/63 (17 Mar 2024 11:04) (105/67 - 121/73)  BP(mean): --  RR: 20 (17 Mar 2024 11:04) (18 - 20)  SpO2: 100% (17 Mar 2024 11:04) (95% - 100%)    Parameters below as of 17 Mar 2024 11:04  Patient On (Oxygen Delivery Method): nasal cannula        PHYSICAL EXAM:    GENERAL: NAD,  CHEST/LUNG: Clear to ausculation bilaterally  HEART: Regular rate and rhythm; S1 S2  ABDOMEN: Soft, , biliary drain x2 Bowel sounds present  EXTREMITIES:  no edema       LABS:                        10.1   9.76  )-----------( 352      ( 16 Mar 2024 06:40 )             32.2     03-16    138  |  99  |  15.0  ----------------------------<  68<L>  4.1   |  26.0  |  3.26<H>    Ca    8.4      16 Mar 2024 06:40        Urinalysis Basic - ( 16 Mar 2024 06:40 )    Color: x / Appearance: x / SG: x / pH: x  Gluc: 68 mg/dL / Ketone: x  / Bili: x / Urobili: x   Blood: x / Protein: x / Nitrite: x   Leuk Esterase: x / RBC: x / WBC x   Sq Epi: x / Non Sq Epi: x / Bacteria: x        CAPILLARY BLOOD GLUCOSE      POCT Blood Glucose.: 178 mg/dL (17 Mar 2024 12:48)  POCT Blood Glucose.: 114 mg/dL (17 Mar 2024 08:45)  POCT Blood Glucose.: 126 mg/dL (16 Mar 2024 23:01)  POCT Blood Glucose.: 160 mg/dL (16 Mar 2024 17:19)        RADIOLOGY & ADDITIONAL TESTS:

## 2024-03-17 NOTE — PROGRESS NOTE ADULT - ASSESSMENT
84y/oM PMH paroxysmal atrial fibrillation not on AC secondary to GI bleed, esophagitis, anemia, diabetes mellitus, hypertension, hyperlipidemia, CKD stage 3-4, BPH, cognitive impairment, recent COVID, acute cholecystitis in Dec 2023 s/p cholecystostomy tube placement on 12/22/23 presented to the ED with complaints of leaking around cholecystostomy tube and difficulty flushing the tube. A CT abdomen/pelvis with contrast was done in ED, tube noted to be in place, he was evaluated by surgery, tube was flushed without difficulty. While in ED, he was noted to have coffee ground emesis and pallor with some abdominal pain and transient drop in Hb. GI was consulted, he had recently had an EGD in January 2024 with grade A esophagitis. Hb improved, hence no additional work up recommended by GI. Course complicated by hypotension and hypoxia on 2/26, RRT was called and he was transferred to ICU for need for pressors, Hi Arnaldo oxygen requirement, likely septic shock from aspiration. Cultures were sent, empiric antibiotics were initiated. Also received steroids. Patient noted to have worsening renal function and was anuric, nephrology was consulted and HD was initiated on 2/27/24. HD was continued. He was weaned off Levophed and was transferred to medical floor on 2/29. Surgery follow up was done, recommended cholecystectomy once medically stable. While on medical floor, he was noted to have worsening leucocytosis, ID consultation was requested, cultures repeated, Zosyn was initiated. On 3/3, he was noted to have worsening hypoxia, ICU consult was requested, NIV was placed, CXR with left lung field opacification. He failed trial of BiPAP and was intubated on 3/3 and required Precedex and low dose of Levophed. His cultures remained negative. Palliative care was consulted, goals of care were discussed with family, they wished patient to be full code. Patient had waxing and waning mental status, initially required tube feeds. He was extubated on 3/4. HD was continued. He was transferred back to medical floor on 3/6/24. IR consulted for PermCath placement. He was evaluated by speech and swallow and diet was advanced. His cholecystostomy tubing broke on 3/9, IR consulted for tube exhange. CT head was done, no acute changes noted. CT chest and abdomen was done, Persistent subcapsular fluid collections along the right lateral liver, suspicious for developing biloma. Improved left lower lobe aeration.  IR evaluation requested for biloma, he underwent cholecystostomy tube exchange and a cholangiogram was done noted for abnormality near gallbladder neck, concerning for GB defect possible cause of biloma, a 12 F catheter was placed into the biloma. Surgery follow up requested to assess for cholecystectomy Surgery recommended GI evaluation for possible CBD stent placement to control leakage from GB neck. ERCP was scheduled by GI on 3/12 but family requested additional time before proceeding with intervention. repeat ERCP unsuccessful. plan for conservative mgmt currently .     Acute metabolic encephalopathy 2/2 recurrent sepsis, underlying RON  - Long and complicated course attributing to mental status  - continue HD per schedule  - CT head repeated, no acute changes noted  - monitor WBC  - completed course of Zosyn  - repeat cultures if febrile  - aspiration precautions  dc seroquel as lethargic     RON 2/2 ATN with underlying CKD stage 4, now on HD  -Continue HD per schedule  -nephrology following  -continue Epogen for anemia sec underlying CKD  -now s/p PermCath placement by IR on 3/12    S/p septic shock  - s/p Midodrine  - previously on Levophed and stress dose steroids    Atrial fibrillation  - Not on AC sec GI bleed  - on Dronedarone    H/o acute cholecystitis s/p cholecystostomy tube placement, now with biloma sec GB neck leak s/p drain placement  - IR intervention appreciated, s/p tube exchange and drain placement for biloma  - surgery recommendations noted, cholecystectomy deferred for now  - GI signed off   - s/p ERCP on 3/14, unsuccessful    Hypertension, hyperlipidemia  - Not on anti hypertensives, BP stable  - recent shock  - continue statin    Diabetes mellitus  - A1C 7.7  - FS with ISS  - Continue Lantus 10 units--hold   - Admelog 3 units TID with meals--hold     Grade A esophagitis, anemia  - Hb stable  - GI recs from early this admission noted  - Protonix 40mg q12h    DVT ppx  - SCD    PT eval--NITHIN  dc planning

## 2024-03-17 NOTE — PROGRESS NOTE ADULT - SUBJECTIVE AND OBJECTIVE BOX
Patient seen and examined    I&O's Summary    17 Mar 2024 07:01  -  17 Mar 2024 19:25  --------------------------------------------------------  IN: 0 mL / OUT: 425 mL / NET: -425 mL        REVIEW OF SYSTEMS:    CONSTITUTIONAL: No F/C  lying quietly    Vital Signs Last 24 Hrs  T(C): 36.7 (17 Mar 2024 16:07), Max: 36.7 (17 Mar 2024 16:07)  T(F): 98 (17 Mar 2024 16:07), Max: 98 (17 Mar 2024 16:07)  HR: 83 (17 Mar 2024 16:07) (75 - 83)  BP: 121/76 (17 Mar 2024 16:07) (107/63 - 121/76)  BP(mean): --  RR: 19 (17 Mar 2024 16:07) (18 - 20)  SpO2: 95% (17 Mar 2024 16:07) (95% - 100%)    Parameters below as of 17 Mar 2024 16:07  Patient On (Oxygen Delivery Method): nasal cannula        PHYSICAL EXAM:    GENERAL: NAD,   EYES:  conjunctiva and sclera clear  NECK: Supple, No JVD/Bruit  NERVOUS SYSTEM:  sleepy/leth,   CHEST:  No rales or rhonchi  HEART:  RRR, No murmur  ABDOMEN: Soft, NT/ND BS+Radha tube draining  EXTREMITIES:  No Edema;  SKIN: No rashes    LABS:                          10.1   9.76  )-----------( 352      ( 16 Mar 2024 06:40 )             32.2     03-16    138  |  99  |  15.0  ----------------------------<  68<L>  4.1   |  26.0  |  3.26<H>    Ca    8.4      16 Mar 2024 06:40        MEDICATIONS  (STANDING):  acetaminophen     Tablet .. PRN  albuterol/ipratropium for Nebulization PRN  aluminum hydroxide/magnesium hydroxide/simethicone Suspension PRN  artificial  tears Solution PRN  buDESOnide    Inhalation Suspension  chlorhexidine 2% Cloths  dextrose 5%.  dextrose 5%.  dextrose 50% Injectable  dextrose 50% Injectable  dextrose 50% Injectable  dextrose Oral Gel PRN  dronedarone  epoetin kristal-epbx (RETACRIT) Injectable  ferrous    sulfate  glucagon  Injectable  insulin lispro (ADMELOG) corrective regimen sliding scale  insulin lispro (ADMELOG) corrective regimen sliding scale  melatonin PRN  ondansetron Injectable PRN  pantoprazole  Injectable  senna  simvastatin  sodium chloride 0.9% lock flush PRN

## 2024-03-18 ENCOUNTER — TRANSCRIPTION ENCOUNTER (OUTPATIENT)
Age: 84
End: 2024-03-18

## 2024-03-18 LAB
ALBUMIN SERPL ELPH-MCNC: 3 G/DL — LOW (ref 3.3–5.2)
ALP SERPL-CCNC: 141 U/L — HIGH (ref 40–120)
ALT FLD-CCNC: 14 U/L — SIGNIFICANT CHANGE UP
ANION GAP SERPL CALC-SCNC: 16 MMOL/L — SIGNIFICANT CHANGE UP (ref 5–17)
AST SERPL-CCNC: 19 U/L — SIGNIFICANT CHANGE UP
BILIRUB SERPL-MCNC: 0.3 MG/DL — LOW (ref 0.4–2)
BUN SERPL-MCNC: 33.3 MG/DL — HIGH (ref 8–20)
CALCIUM SERPL-MCNC: 8.6 MG/DL — SIGNIFICANT CHANGE UP (ref 8.4–10.5)
CHLORIDE SERPL-SCNC: 98 MMOL/L — SIGNIFICANT CHANGE UP (ref 96–108)
CO2 SERPL-SCNC: 23 MMOL/L — SIGNIFICANT CHANGE UP (ref 22–29)
CREAT SERPL-MCNC: 5.56 MG/DL — HIGH (ref 0.5–1.3)
EGFR: 9 ML/MIN/1.73M2 — LOW
GLUCOSE BLDC GLUCOMTR-MCNC: 141 MG/DL — HIGH (ref 70–99)
GLUCOSE BLDC GLUCOMTR-MCNC: 184 MG/DL — HIGH (ref 70–99)
GLUCOSE BLDC GLUCOMTR-MCNC: 186 MG/DL — HIGH (ref 70–99)
GLUCOSE BLDC GLUCOMTR-MCNC: 246 MG/DL — HIGH (ref 70–99)
GLUCOSE SERPL-MCNC: 189 MG/DL — HIGH (ref 70–99)
HCT VFR BLD CALC: 32.5 % — LOW (ref 39–50)
HGB BLD-MCNC: 10.2 G/DL — LOW (ref 13–17)
MAGNESIUM SERPL-MCNC: 2 MG/DL — SIGNIFICANT CHANGE UP (ref 1.6–2.6)
MCHC RBC-ENTMCNC: 28.4 PG — SIGNIFICANT CHANGE UP (ref 27–34)
MCHC RBC-ENTMCNC: 31.4 GM/DL — LOW (ref 32–36)
MCV RBC AUTO: 90.5 FL — SIGNIFICANT CHANGE UP (ref 80–100)
PHOSPHATE SERPL-MCNC: 3.6 MG/DL — SIGNIFICANT CHANGE UP (ref 2.4–4.7)
PLATELET # BLD AUTO: 413 K/UL — HIGH (ref 150–400)
POTASSIUM SERPL-MCNC: 3.8 MMOL/L — SIGNIFICANT CHANGE UP (ref 3.5–5.3)
POTASSIUM SERPL-SCNC: 3.8 MMOL/L — SIGNIFICANT CHANGE UP (ref 3.5–5.3)
PROT SERPL-MCNC: 6.8 G/DL — SIGNIFICANT CHANGE UP (ref 6.6–8.7)
RBC # BLD: 3.59 M/UL — LOW (ref 4.2–5.8)
RBC # FLD: 14.6 % — HIGH (ref 10.3–14.5)
SODIUM SERPL-SCNC: 137 MMOL/L — SIGNIFICANT CHANGE UP (ref 135–145)
WBC # BLD: 7.84 K/UL — SIGNIFICANT CHANGE UP (ref 3.8–10.5)
WBC # FLD AUTO: 7.84 K/UL — SIGNIFICANT CHANGE UP (ref 3.8–10.5)

## 2024-03-18 PROCEDURE — 99231 SBSQ HOSP IP/OBS SF/LOW 25: CPT

## 2024-03-18 PROCEDURE — 99232 SBSQ HOSP IP/OBS MODERATE 35: CPT

## 2024-03-18 RX ORDER — OLANZAPINE 15 MG/1
5 TABLET, FILM COATED ORAL ONCE
Refills: 0 | Status: COMPLETED | OUTPATIENT
Start: 2024-03-18 | End: 2024-03-18

## 2024-03-18 RX ADMIN — DRONEDARONE 400 MILLIGRAM(S): 400 TABLET, FILM COATED ORAL at 05:42

## 2024-03-18 RX ADMIN — Medication 4: at 11:23

## 2024-03-18 RX ADMIN — Medication 0.5 MILLIGRAM(S): at 20:56

## 2024-03-18 RX ADMIN — PANTOPRAZOLE SODIUM 40 MILLIGRAM(S): 20 TABLET, DELAYED RELEASE ORAL at 05:41

## 2024-03-18 RX ADMIN — DRONEDARONE 400 MILLIGRAM(S): 400 TABLET, FILM COATED ORAL at 22:13

## 2024-03-18 RX ADMIN — CHLORHEXIDINE GLUCONATE 1 APPLICATION(S): 213 SOLUTION TOPICAL at 05:42

## 2024-03-18 RX ADMIN — Medication 2: at 08:11

## 2024-03-18 RX ADMIN — Medication 325 MILLIGRAM(S): at 11:24

## 2024-03-18 RX ADMIN — ERYTHROPOIETIN 10000 UNIT(S): 10000 INJECTION, SOLUTION INTRAVENOUS; SUBCUTANEOUS at 16:48

## 2024-03-18 RX ADMIN — PANTOPRAZOLE SODIUM 40 MILLIGRAM(S): 20 TABLET, DELAYED RELEASE ORAL at 22:13

## 2024-03-18 RX ADMIN — SIMVASTATIN 20 MILLIGRAM(S): 20 TABLET, FILM COATED ORAL at 22:13

## 2024-03-18 RX ADMIN — Medication 3 MILLILITER(S): at 08:45

## 2024-03-18 RX ADMIN — SENNA PLUS 2 TABLET(S): 8.6 TABLET ORAL at 22:13

## 2024-03-18 RX ADMIN — OLANZAPINE 5 MILLIGRAM(S): 15 TABLET, FILM COATED ORAL at 03:08

## 2024-03-18 RX ADMIN — Medication 0.5 MILLIGRAM(S): at 08:45

## 2024-03-18 NOTE — PROGRESS NOTE ADULT - ASSESSMENT
84 year-old male s/p biloma drain and replacement of dysfunction sky tube   - Continue global management per primary team  - Monitor output/VS/Labs  -  sky tube can be capped. can be attached to bag drainage if patient develops fevers or abdominal pain.  - both drains can be flushed with 10cc NS daily to prevent occlusion  - Change dressing q3 days or when dressing is saturated  - Optimal time for drain check is when output is <10cc/24hours  -  For outpatient follow-up/questions and scheduling please call [phone]  - patient to follow up with surgery as outpatient for further discussion on cholecystectomy vs drain management.    -they will benefit from home care services to help with drainage catheter care; they should continue same drainage catheter care as an outpatient.     Please call IR at extension [phone] with any questions, concerns, or issues regarding above.    84 year-old male s/p biloma drain and replacement of dysfunction sky tube   - Continue global management per primary team  - Monitor output/VS/Labs  -  sky tube and biloma can be attached to bag drainage.  - both drains can be flushed with 10cc NS daily to prevent occlusion  - Change dressing q3 days or when dressing is saturated  - Optimal time for biloma drain check is when output is <10cc/24hours  -  For outpatient follow-up/questions and scheduling please call [phone]  - patient to follow up with IR in 3 month for sky exchange.    - surgery as outpatient for further discussion on cholecystectomy.   - They will benefit from home care services to help with drainage catheter care; they should continue same drainage catheter care as an outpatient.     Please call IR at extension [phone] with any questions, concerns, or issues regarding above.

## 2024-03-18 NOTE — DISCHARGE NOTE PROVIDER - PROVIDER TOKENS
PROVIDER:[TOKEN:[[id number]],SCHEDULEDAPPT:[04/10/2024]],PROVIDER:[TOKEN:[[id number]],FOLLOWUP:[2 weeks]],PROVIDER:[TOKEN:[[id number]],FOLLOWUP:[2 weeks]] PROVIDER:[TOKEN:[[id number]],SCHEDULEDAPPT:[04/10/2024]],PROVIDER:[TOKEN:[[id number]],FOLLOWUP:[2 weeks]],PROVIDER:[TOKEN:[[id number]],FOLLOWUP:[2 weeks]],PROVIDER:[TOKEN:[[id number]]]

## 2024-03-18 NOTE — DISCHARGE NOTE PROVIDER - HOSPITAL COURSE
84y/oM PMH paroxysmal atrial fibrillation not on AC secondary to GI bleed, esophagitis, anemia, diabetes mellitus, hypertension, hyperlipidemia, CKD stage 3-4, BPH, cognitive impairment, recent COVID, acute cholecystitis in Dec 2023 s/p cholecystostomy tube placement on 12/22/23 presented to the ED with complaints of leaking around cholecystostomy tube and difficulty flushing the tube. A CT abdomen/pelvis with contrast was done in ED, tube noted to be in place, he was evaluated by surgery, tube was flushed without difficulty. While in ED, he was noted to have coffee ground emesis and pallor with some abdominal pain and transient drop in Hb. GI was consulted, he had recently had an EGD in January 2024 with grade A esophagitis. Hb improved, hence no additional work up recommended by GI. Course complicated by hypotension and hypoxia on 2/26, RRT was called and he was transferred to ICU for need for pressors, Hi Arnaldo oxygen requirement, likely septic shock from aspiration. Cultures were sent, empiric antibiotics were initiated. Also received steroids. Patient noted to have worsening renal function and was anuric, nephrology was consulted and HD was initiated on 2/27/24. HD was continued. He was weaned off Levophed and was transferred to medical floor on 2/29. Surgery follow up was done, recommended cholecystectomy once medically stable. While on medical floor, he was noted to have worsening leucocytosis, ID consultation was requested, cultures repeated, Zosyn was initiated. On 3/3, he was noted to have worsening hypoxia, ICU consult was requested, NIV was placed, CXR with left lung field opacification. He failed trial of BiPAP and was intubated on 3/3 and required Precedex and low dose of Levophed. His cultures remained negative. Palliative care was consulted, goals of care were discussed with family, they wished patient to be full code. Patient had waxing and waning mental status, initially required tube feeds. He was extubated on 3/4. HD was continued. He was transferred back to medical floor on 3/6/24. IR consulted for PermCath placement. He was evaluated by speech and swallow and diet was advanced. His cholecystostomy tubing broke on 3/9, IR consulted for tube exhange. CT head was done, no acute changes noted. CT chest and abdomen was done, Persistent subcapsular fluid collections along the right lateral liver, suspicious for developing biloma. Improved left lower lobe aeration.  IR evaluation requested for biloma, he underwent cholecystostomy tube exchange and a cholangiogram was done noted for abnormality near gallbladder neck, concerning for GB defect possible cause of biloma, a 12 F catheter was placed into the biloma. Surgery follow up requested to assess for cholecystectomy Surgery recommended GI evaluation for possible CBD stent placement to control leakage from GB neck. ERCP was scheduled by GI on 3/12 but family requested additional time before proceeding with intervention. repeat ERCP unsuccessful. plan for conservative mgmt currently. Patient is medically stable for discharge to [facility]. 84y/oM PMH paroxysmal atrial fibrillation not on AC secondary to GI bleed, esophagitis, anemia, diabetes mellitus, hypertension, hyperlipidemia, esrd, BPH, cognitive impairment, recent COVID, acute cholecystitis in Dec 2023 s/p cholecystostomy tube placement on 12/22/23 presented to the ED with complaints of leaking around cholecystostomy tube and difficulty flushing the tube. A CT abdomen/pelvis with contrast was done in ED, tube noted to be in place, he was evaluated by surgery, tube was flushed without difficulty. While in ED, he was noted to have coffee ground emesis and pallor with some abdominal pain and transient drop in Hb. GI was consulted, he had recently had an EGD in January 2024 with grade A esophagitis. Hb improved, hence no additional work up recommended by GI. Course complicated by hypotension and hypoxia on 2/26, RRT was called and he was transferred to ICU for need for pressors, Hi Arnaldo oxygen requirement, likely septic shock from aspiration. Cultures were sent, empiric antibiotics were initiated. Also received steroids. Patient noted to have worsening renal function and was anuric, nephrology was consulted and HD was initiated on 2/27/24. HD was continued. He was weaned off Levophed and was transferred to medical floor on 2/29. Surgery follow up was done, recommended cholecystectomy once medically stable. While on medical floor, he was noted to have worsening leucocytosis, ID consultation was requested, cultures repeated, Zosyn was initiated. On 3/3, he was noted to have worsening hypoxia, ICU consult was requested, NIV was placed, CXR with left lung field opacification. He failed trial of BiPAP and was intubated on 3/3 and required Precedex and low dose of Levophed. His cultures remained negative. Palliative care was consulted, goals of care were discussed with family, they wished patient to be full code. Patient had waxing and waning mental status, initially required tube feeds. He was extubated on 3/4. HD was continued. He was transferred back to medical floor on 3/6/24. IR consulted for PermCath placement. He was evaluated by speech and swallow and diet was advanced. His cholecystostomy tubing broke on 3/9, IR consulted for tube exhange. CT head was done, no acute changes noted. CT chest and abdomen was done, Persistent subcapsular fluid collections along the right lateral liver, suspicious for developing biloma. Improved left lower lobe aeration.  IR evaluation requested for biloma, he underwent cholecystostomy tube exchange and a cholangiogram was done noted for abnormality near gallbladder neck, concerning for GB defect possible cause of biloma, a 12 F catheter was placed into the biloma. Surgery follow up requested to assess for cholecystectomy Surgery recommended GI evaluation for possible CBD stent placement to control leakage from GB neck. ERCP was scheduled by GI on 3/12 but family requested additional time before proceeding with intervention. repeat ERCP unsuccessful. plan for conservative mgmt currently. Patient is medically stable for discharge to [facility].

## 2024-03-18 NOTE — DISCHARGE NOTE PROVIDER - NSDCACTIVITY_GEN_ALL_CORE
ambulate as tolerated ambulate as tolerated/Do not drive or operate machinery/Do not make important decisions/No heavy lifting/straining

## 2024-03-18 NOTE — PROGRESS NOTE ADULT - SUBJECTIVE AND OBJECTIVE BOX
Patient seen and examined    I&O's Summary    17 Mar 2024 07:01  -  18 Mar 2024 07:00  --------------------------------------------------------  IN: 0 mL / OUT: 425 mL / NET: -425 mL    18 Mar 2024 07:01  -  18 Mar 2024 14:26  --------------------------------------------------------  IN: 280 mL / OUT: 500 mL / NET: -220 mL    Better, family present    REVIEW OF SYSTEMS:    CONSTITUTIONAL: No F/C  RESPIRATORY: No cough,  No SOB  CARDIOVASCULAR: No CP/palpitations,    GASTROINTESTINAL: No abdominal pain  or NVD   GENITOURINARY: No UTI sx  NEUROLOGICAL: No headaches, NO wk/numbness  MUSCULOSKELETAL:   EXTREMITIES : no swelling,    Vital Signs Last 24 Hrs  T(C): 36.4 (18 Mar 2024 11:03), Max: 36.7 (17 Mar 2024 16:07)  T(F): 97.6 (18 Mar 2024 11:03), Max: 98 (17 Mar 2024 16:07)  HR: 89 (18 Mar 2024 11:03) (72 - 89)  BP: 117/60 (18 Mar 2024 11:03) (117/60 - 146/82)  BP(mean): --  RR: 18 (18 Mar 2024 11:03) (18 - 19)  SpO2: 98% (18 Mar 2024 11:03) (95% - 100%)    Parameters below as of 18 Mar 2024 11:03  Patient On (Oxygen Delivery Method): nasal cannula, 3L        PHYSICAL EXAM:    GENERAL: NAD,   EYES:  conjunctiva and sclera clear  NECK: Supple, No JVD/Bruit  NERVOUS SYSTEM:  A/O x3,   CHEST:  No rales or rhonchi  HEART:  RRR, No murmur  ABDOMEN: Soft, NT/ND BS+Radha tube noted  EXTREMITIES:  No Edema;  SKIN: No rashes    LABS:                          10.2   7.84  )-----------( 413      ( 18 Mar 2024 04:58 )             32.5     03-18    137  |  98  |  33.3<H>  ----------------------------<  189<H>  3.8   |  23.0  |  5.56<H>    Ca    8.6      18 Mar 2024 04:58  Phos  3.6     03-18  Mg     2.0     03-18    TPro  6.8  /  Alb  3.0<L>  /  TBili  0.3<L>  /  DBili  x   /  AST  19  /  ALT  14  /  AlkPhos  141<H>  03-18      MEDICATIONS  (STANDING):  acetaminophen     Tablet .. PRN  albuterol/ipratropium for Nebulization PRN  aluminum hydroxide/magnesium hydroxide/simethicone Suspension PRN  artificial  tears Solution PRN  buDESOnide    Inhalation Suspension  chlorhexidine 2% Cloths  dextrose 5%.  dextrose 5%.  dextrose 50% Injectable  dextrose 50% Injectable  dextrose 50% Injectable  dextrose Oral Gel PRN  dronedarone  epoetin kristal-epbx (RETACRIT) Injectable  ferrous    sulfate  glucagon  Injectable  insulin lispro (ADMELOG) corrective regimen sliding scale  insulin lispro (ADMELOG) corrective regimen sliding scale  melatonin PRN  ondansetron Injectable PRN  pantoprazole  Injectable  senna  simvastatin  sodium chloride 0.9% lock flush PRN

## 2024-03-18 NOTE — PROGRESS NOTE ADULT - ASSESSMENT
83yo M w/ hx of afib, esophagitis, anemia, DM, HTN, HL, CKD, and acute cholecystitis s/p per sky tube placement in 12/2023 presenting with CC of leakage around cholecystotomy tube.    CKD(IV) +DM, HTN> ? ESRD -  RON: ATN post IV contrast and hypotension/sepsis--> has now likely progressed to ESRD - creatinie still high  Biliary tube leak  - cont to avoid further potential nephrotoxins  - HD MWF (still no indication of renal recovery) --> HD today pre HD creatinine 5.56  - PC placement by IR 3/12  - surgery noted--> will eventually require cholecystectomy, poor surgical candidate    Anemia: + multifactorial  - cont CINDI  - trend H/H  - target Hgb > 10.0    RO:  - low phos diet  - con to monitor off binders  - trend serum phos    Will follow

## 2024-03-18 NOTE — DISCHARGE NOTE PROVIDER - DETAILS OF MALNUTRITION DIAGNOSIS/DIAGNOSES
This patient has been assessed with a concern for Malnutrition and was treated during this hospitalization for the following Nutrition diagnosis/diagnoses:     -  03/12/2024: Moderate protein-calorie malnutrition

## 2024-03-18 NOTE — DISCHARGE NOTE PROVIDER - DISCHARGE DIET
DASH Diet/Consistent Carbohydrate Diabetic Diets/Renal Diets (for dialysis)/Moderately Thick Liquids

## 2024-03-18 NOTE — DISCHARGE NOTE PROVIDER - CARE PROVIDER_API CALL
Annmarie Paige  NP in Family Health  [address], [address]  [place] [address]  Phone: [phone]  Fax: [phone]  Scheduled Appointment: 04/10/2024    Mai Roberts  Gastroenterology  [address], [address]  [place] [address]  Phone: [phone]  Fax: [phone]  Follow Up Time: 2 weeks    Nelson Foss  Nephrology  [address] [place] [address] [place] [address]  Phone: [phone]  Fax: [phone]  Follow Up Time: 2 weeks   Annmarie Paige  NP in [facility]  [address] [facility] [facility], [address]  [place] [address]  Phone: [phone]  Fax: [phone]  Scheduled Appointment: 04/10/2024    Mai Roberts  Gastroenterology  [address] [facility], [address]  [place] [address]  Phone: [phone]  Fax: [phone]  Follow Up Time: 2 weeks    Nelson Foss  Nephrology  [address] [place] [address], [facility] [place] [address]  Phone: [phone]  Fax: [phone]  Follow Up Time: 2 weeks    Reji Glaser  Surgical Critical Care  [address] [place] [address]  Phone: [phone]  Fax: [phone]  Follow Up Time:

## 2024-03-18 NOTE — DISCHARGE NOTE PROVIDER - NSDCCPCAREPLAN_GEN_ALL_CORE_FT
PRINCIPAL DISCHARGE DIAGNOSIS  Diagnosis: Upper GI bleed  Assessment and Plan of Treatment: Resolved   Continue to monitor for signs of bleeding  Follow-up with PCP and GI      SECONDARY DISCHARGE DIAGNOSES  Diagnosis: Biloma  Assessment and Plan of Treatment: Follow-up with PCP and GI    Diagnosis: RON (acute kidney injury)  Assessment and Plan of Treatment: Resolved   Follow-up with PCP and nephrologist    Diagnosis: Chronic atrial fibrillation  Assessment and Plan of Treatment: Continue with medications as prescribed   Follow-up with PCP    Diagnosis: Hypertension  Assessment and Plan of Treatment: Continue with medications as prescribed   DASH diet with low sodium. Avoid processed foods  Follow-up with PCP    Diagnosis: Hyperlipidemia  Assessment and Plan of Treatment: Continue with medications as prescribed   DASH diet with low fat and cholesterol. Avoid processed foods  Follow-up with PCP    Diagnosis: Diabetes mellitus  Assessment and Plan of Treatment: Continue with medications as prescribed   Low carbohydrate diet. Avoid sugary foods and drinks   Follow-up with PCP    Diagnosis: [place] grade A esophagitis  Assessment and Plan of Treatment: Continue with medications as prescribed   Follow-up with PCP and GI     PRINCIPAL DISCHARGE DIAGNOSIS  Diagnosis: Biloma  Assessment and Plan of Treatment: Follow-up with PCP and GI  -  sky tube and biloma can be attached to bag drainage.  - both drains can be flushed with 10cc NS daily to prevent occlusion  - Change dressing q3 days or when dressing is saturated  - Optimal time for biloma drain check is when output is <10cc/24hours  -  For outpatient follow-up/questions and scheduling please call [phone]  - patient to follow up with IR in 3 month for sky exchange.    - surgery as outpatient for further discussion on cholecystectomy.   - They will benefit from home care services to help with drainage catheter care; they should continue same drainage catheter care as an outpatient.      SECONDARY DISCHARGE DIAGNOSES  Diagnosis: Chronic atrial fibrillation  Assessment and Plan of Treatment: Continue with medications as prescribed   Follow-up with PCP    Diagnosis: Hypertension  Assessment and Plan of Treatment: Continue with medications as prescribed   DASH diet with low sodium. Avoid processed foods  Follow-up with PCP    Diagnosis: Hyperlipidemia  Assessment and Plan of Treatment: Continue with medications as prescribed   DASH diet with low fat and cholesterol. Avoid processed foods  Follow-up with PCP    Diagnosis: Diabetes mellitus  Assessment and Plan of Treatment: Continue with medications as prescribed   Low carbohydrate diet. Avoid sugary foods and drinks   Follow-up with PCP    Diagnosis: [place] grade A esophagitis  Assessment and Plan of Treatment: Continue with medications as prescribed   Follow-up with PCP and GI     PRINCIPAL DISCHARGE DIAGNOSIS  Diagnosis: Biloma  Assessment and Plan of Treatment: Follow-up with PCP and GI  -  sky tube and biloma can be attached to bag drainage.  - both drains can be flushed with 10cc NS daily to prevent occlusion  - Change dressing q3 days or when dressing is saturated  - Optimal time for biloma drain check is when output is <10cc/24hours  -  For outpatient follow-up/questions and scheduling please call [phone]  - patient to follow up with IR in 3 month for sky exchange.    - surgery as outpatient for further discussion on cholecystectomy.   - They will benefit from home care services to help with drainage catheter care; they should continue same drainage catheter care as an outpatient.      SECONDARY DISCHARGE DIAGNOSES  Diagnosis: Chronic atrial fibrillation  Assessment and Plan of Treatment: Continue with medications as prescribed   Follow-up with PCP    Diagnosis: Hypertension  Assessment and Plan of Treatment: Continue with medications as prescribed   DASH diet with low sodium. Avoid processed foods  Follow-up with PCP    Diagnosis: Hyperlipidemia  Assessment and Plan of Treatment: Continue with medications as prescribed   DASH diet with low fat and cholesterol. Avoid processed foods  Follow-up with PCP    Diagnosis: Diabetes mellitus  Assessment and Plan of Treatment: Continue with medications as prescribed   Low carbohydrate diet. Avoid sugary foods and drinks   Follow-up with PCP    Diagnosis: [place] grade A esophagitis  Assessment and Plan of Treatment: Continue with medications as prescribed   Follow-up with PCP and GI    Diagnosis: ESRD on dialysis  Assessment and Plan of Treatment: continue dialysis  follow up with nephrology

## 2024-03-18 NOTE — PROGRESS NOTE ADULT - ASSESSMENT
84y/oM PMH paroxysmal atrial fibrillation not on AC secondary to GI bleed, esophagitis, anemia, diabetes mellitus, hypertension, hyperlipidemia, CKD stage 3-4, BPH, cognitive impairment, recent COVID, acute cholecystitis in Dec 2023 s/p cholecystostomy tube placement on 12/22/23 presented to the ED with complaints of leaking around cholecystostomy tube and difficulty flushing the tube. A CT abdomen/pelvis with contrast was done in ED, tube noted to be in place, he was evaluated by surgery, tube was flushed without difficulty. While in ED, he was noted to have coffee ground emesis and pallor with some abdominal pain and transient drop in Hb. GI was consulted, he had recently had an EGD in January 2024 with grade A esophagitis. Hb improved, hence no additional work up recommended by GI. Course complicated by hypotension and hypoxia on 2/26, RRT was called and he was transferred to ICU for need for pressors, [place] oxygen requirement, likely septic shock from aspiration. Cultures were sent, empiric antibiotics were initiated. Also received steroids. Patient noted to have worsening renal function and was anuric, nephrology was consulted and HD was initiated on 2/27/24. HD was continued. He was weaned off Levophed and was transferred to medical floor on 2/29. Surgery follow up was done, recommended cholecystectomy once medically stable. While on medical floor, he was noted to have worsening leucocytosis, ID consultation was requested, cultures repeated, Zosyn was initiated. On 3/3, he was noted to have worsening hypoxia, ICU consult was requested, NIV was placed, CXR with left lung field opacification. He failed trial of BiPAP and was intubated on 3/3 and required Precedex and low dose of Levophed. His cultures remained negative. Palliative care was consulted, goals of care were discussed with family, they wished patient to be full code. Patient had waxing and waning mental status, initially required tube feeds. He was extubated on 3/4. HD was continued. He was transferred back to medical floor on 3/6/24. IR consulted for PermCath placement. He was evaluated by speech and swallow and diet was advanced. His cholecystostomy tubing broke on 3/9, IR consulted for tube exhange. CT head was done, no acute changes noted. CT chest and abdomen was done, Persistent subcapsular fluid collections along the right lateral liver, suspicious for developing biloma. Improved left lower lobe aeration.  IR evaluation requested for biloma, he underwent cholecystostomy tube exchange and a cholangiogram was done noted for abnormality near gallbladder neck, concerning for GB defect possible cause of biloma, a 12 F catheter was placed into the biloma. Surgery follow up requested to assess for cholecystectomy Surgery recommended GI evaluation for possible CBD stent placement to control leakage from GB neck. ERCP was scheduled by GI on 3/12 but family requested additional time before proceeding with intervention. repeat ERCP unsuccessful. plan for conservative mgmt currently .     Acute metabolic encephalopathy 2/2 recurrent sepsis, underlying RON  - Long and complicated course attributing to mental status  - continue HD per schedule  - CT head repeated, no acute changes noted  - monitor WBC  - completed course of Zosyn  - repeat cultures if febrile  - aspiration precautions  dc seroquel as lethargic     RON 2/2 ATN with underlying CKD stage 4, now on HD  -Continue HD per schedule  -nephrology following  -continue Epogen for anemia sec underlying CKD  -now s/p PermCath placement by IR on 3/12    S/p septic shock  - s/p Midodrine  - previously on Levophed and stress dose steroids    Atrial fibrillation  - Not on AC sec GI bleed  - on Dronedarone    H/o acute cholecystitis s/p cholecystostomy tube placement, now with biloma sec GB neck leak s/p drain placement  - IR intervention appreciated, s/p tube exchange and drain placement for biloma  - surgery recommendations noted, cholecystectomy deferred for now  - GI signed off   - s/p ERCP on 3/14, unsuccessful    Hypertension, hyperlipidemia  - Not on anti hypertensives, BP stable  - recent shock  - continue statin    Diabetes mellitus  - A1C 7.7  - FS with ISS  - Continue Lantus 10 units--hold   - Admelog 3 units TID with meals--hold     Grade A esophagitis, anemia  - Hb stable  - GI recs from early this admission noted  - Protonix 40mg q12h    DVT ppx  - SCD    PT eval--NITHIN  dc planning   family considering taking home...advised Sw/Cm

## 2024-03-18 NOTE — DISCHARGE NOTE PROVIDER - NSDCMRMEDTOKEN_GEN_ALL_CORE_FT
dronedarone 400 mg oral tablet: 1 tab(s) orally 2 times a day  gabapentin 300 mg oral capsule: 1 cap(s) orally once a day (at bedtime)  insulin glargine 100 units/mL subcutaneous solution: 30 unit(s) subcutaneous once a day before dinner  insulin lispro 100 units/mL injectable solution: 4 international unit(s) subcutaneous 3 times a day (before meals) hold if FS &lt;100  metoprolol tartrate 25 mg oral tablet: 1 tab(s) orally 2 times a day  omeprazole 40 mg oral delayed release capsule: 1 cap(s) orally once a day  Renal Vitamin oral tablet: 1 tab(s) orally once a day  simvastatin 20 mg oral tablet: 1 tab(s) orally once a day (at bedtime)  sodium bicarbonate 650 mg oral tablet: 1 tab(s) orally 2 times a day  tamsulosin 0.4 mg oral capsule: 1 cap(s) orally once a day (at bedtime)   acetaminophen 325 mg oral tablet: 2 tab(s) orally every 6 hours As needed Temp greater or equal to 38C (100.4F), Mild Pain (1 - 3)  dronedarone 400 mg oral tablet: 1 tab(s) orally 2 times a day  ferrous sulfate 325 mg (65 mg elemental iron) oral tablet: 1 tab(s) orally once a day  gabapentin 300 mg oral capsule: 1 cap(s) orally once a day (at bedtime)  insulin glargine 100 units/mL subcutaneous solution: 10 unit(s) subcutaneous once a day (at bedtime)  insulin lispro 100 units/mL injectable solution: 4 international unit(s) subcutaneous 3 times a day (before meals) hold if FS &lt;100  ipratropium-albuterol 0.5 mg-2.5 mg/3 mL inhalation solution: 3 milliliter(s) inhaled every 6 hours As needed wheezing or SOB  omeprazole 40 mg oral delayed release capsule: 1 cap(s) orally once a day  Renal Vitamin oral tablet: 1 tab(s) orally once a day  senna leaf extract oral tablet: 2 tab(s) orally once a day (at bedtime)  simvastatin 20 mg oral tablet: 1 tab(s) orally once a day (at bedtime)  tamsulosin 0.4 mg oral capsule: 1 cap(s) orally once a day (at bedtime)

## 2024-03-18 NOTE — PROGRESS NOTE ADULT - SUBJECTIVE AND OBJECTIVE BOX
seen for sky    no acute complaints  in chair  ros negative  family at bedside     MEDICATIONS  (STANDING):  buDESOnide    Inhalation Suspension 0.5 milliGRAM(s) Inhalation every 12 hours  chlorhexidine 2% Cloths 1 Application(s) Topical <User Schedule>  dextrose 5%. 1000 milliLiter(s) (50 mL/Hr) IV Continuous <Continuous>  dextrose 5%. 1000 milliLiter(s) (100 mL/Hr) IV Continuous <Continuous>  dextrose 50% Injectable 12.5 Gram(s) IV Push once  dextrose 50% Injectable 25 Gram(s) IV Push once  dextrose 50% Injectable 25 Gram(s) IV Push once  dronedarone 400 milliGRAM(s) Oral two times a day  epoetin kristal-epbx (RETACRIT) Injectable 98513 Unit(s) IV Push <User Schedule>  ferrous    sulfate 325 milliGRAM(s) Oral daily  glucagon  Injectable 1 milliGRAM(s) IntraMuscular once  insulin lispro (ADMELOG) corrective regimen sliding scale   SubCutaneous three times a day before meals  insulin lispro (ADMELOG) corrective regimen sliding scale   SubCutaneous at bedtime  pantoprazole  Injectable 40 milliGRAM(s) IV Push every 12 hours  senna 2 Tablet(s) Oral at bedtime  simvastatin 20 milliGRAM(s) Oral at bedtime    MEDICATIONS  (PRN):  acetaminophen     Tablet .. 650 milliGRAM(s) Oral every 6 hours PRN Temp greater or equal to 38C (100.4F), Mild Pain (1 - 3)  albuterol/ipratropium for Nebulization 3 milliLiter(s) Nebulizer every 6 hours PRN wheezing or SOB  aluminum hydroxide/magnesium hydroxide/simethicone Suspension 30 milliLiter(s) Oral every 4 hours PRN Dyspepsia  artificial  tears Solution 1 Drop(s) Both EYES three times a day PRN Dry Eyes  dextrose Oral Gel 15 Gram(s) Oral once PRN Blood Glucose LESS THAN 70 milliGRAM(s)/deciliter  melatonin 3 milliGRAM(s) Oral at bedtime PRN Insomnia  ondansetron Injectable 4 milliGRAM(s) IV Push every 8 hours PRN Nausea and/or Vomiting  sodium chloride 0.9% lock flush 10 milliLiter(s) IV Push every 1 hour PRN Pre/post blood products, medications, blood draw, and to maintain line patency      Allergies    No Known Allergies      Vital Signs Last 24 Hrs  T(C): 36.4 (18 Mar 2024 11:03), Max: 36.7 (17 Mar 2024 16:07)  T(F): 97.6 (18 Mar 2024 11:03), Max: 98 (17 Mar 2024 16:07)  HR: 89 (18 Mar 2024 11:03) (72 - 89)  BP: 117/60 (18 Mar 2024 11:03) (117/60 - 146/82)  BP(mean): --  RR: 18 (18 Mar 2024 11:03) (18 - 19)  SpO2: 98% (18 Mar 2024 11:03) (95% - 100%)    Parameters below as of 18 Mar 2024 11:03  Patient On (Oxygen Delivery Method): nasal cannula, 3L        PHYSICAL EXAM:    GENERAL: NAD  CHEST/LUNG: Clear to ausculation bilaterally;  HEART: Regular rate and rhythm; S1 S2;   ABDOMEN: Soft, Nontender, RUQ drain x2 Bowel sounds present  EXTREMITIES:  no edema   NERVOUS SYSTEM:  Alert & Oriented X3, gen weakness    LABS:                        10.2   7.84  )-----------( 413      ( 18 Mar 2024 04:58 )             32.5     03-18    137  |  98  |  33.3<H>  ----------------------------<  189<H>  3.8   |  23.0  |  5.56<H>    Ca    8.6      18 Mar 2024 04:58  Phos  3.6     03-18  Mg     2.0     03-18    TPro  6.8  /  Alb  3.0<L>  /  TBili  0.3<L>  /  DBili  x   /  AST  19  /  ALT  14  /  AlkPhos  141<H>  03-18      Urinalysis Basic - ( 18 Mar 2024 04:58 )    Color: x / Appearance: x / SG: x / pH: x  Gluc: 189 mg/dL / Ketone: x  / Bili: x / Urobili: x   Blood: x / Protein: x / Nitrite: x   Leuk Esterase: x / RBC: x / WBC x   Sq Epi: x / Non Sq Epi: x / Bacteria: x        CAPILLARY BLOOD GLUCOSE      POCT Blood Glucose.: 246 mg/dL (18 Mar 2024 11:22)  POCT Blood Glucose.: 186 mg/dL (18 Mar 2024 08:10)  POCT Blood Glucose.: 227 mg/dL (17 Mar 2024 21:16)  POCT Blood Glucose.: 194 mg/dL (17 Mar 2024 17:00)        RADIOLOGY & ADDITIONAL TESTS:

## 2024-03-18 NOTE — DISCHARGE NOTE PROVIDER - NSDCFUSCHEDAPPT_GEN_ALL_CORE_FT
Annmarie Paige  [facility]  [address]STR[address]  Scheduled Appointment: 04/10/2024

## 2024-03-18 NOTE — DISCHARGE NOTE PROVIDER - CARE PROVIDERS DIRECT ADDRESSES
,[email or web address],DirectAddress_Unknown [email or web address],DirectAddress_Unknown,[email or web address]

## 2024-03-18 NOTE — PROGRESS NOTE ADULT - SUBJECTIVE AND OBJECTIVE BOX
Interventional Radiology Follow-Up Note    This is a 84y Male s/p biloma drain and exchange of sky drain on 3/11  in Interventional Radiology    Medication:  MEDICATIONS  (STANDING):  buDESOnide    Inhalation Suspension 0.5 milliGRAM(s) Inhalation every 12 hours  chlorhexidine 2% Cloths 1 Application(s) Topical <User Schedule>  dextrose 5%. 1000 milliLiter(s) (50 mL/Hr) IV Continuous <Continuous>  dextrose 5%. 1000 milliLiter(s) (100 mL/Hr) IV Continuous <Continuous>  dextrose 50% Injectable 12.5 Gram(s) IV Push once  dextrose 50% Injectable 25 Gram(s) IV Push once  dextrose 50% Injectable 25 Gram(s) IV Push once  dronedarone 400 milliGRAM(s) Oral two times a day  epoetin kristal-epbx (RETACRIT) Injectable 99883 Unit(s) IV Push <User Schedule>  ferrous    sulfate 325 milliGRAM(s) Oral daily  glucagon  Injectable 1 milliGRAM(s) IntraMuscular once  insulin lispro (ADMELOG) corrective regimen sliding scale   SubCutaneous three times a day before meals  insulin lispro (ADMELOG) corrective regimen sliding scale   SubCutaneous at bedtime  pantoprazole  Injectable 40 milliGRAM(s) IV Push every 12 hours  senna 2 Tablet(s) Oral at bedtime  simvastatin 20 milliGRAM(s) Oral at bedtime    MEDICATIONS  (PRN):  acetaminophen     Tablet .. 650 milliGRAM(s) Oral every 6 hours PRN Temp greater or equal to 38C (100.4F), Mild Pain (1 - 3)  albuterol/ipratropium for Nebulization 3 milliLiter(s) Nebulizer every 6 hours PRN wheezing or SOB  aluminum hydroxide/magnesium hydroxide/simethicone Suspension 30 milliLiter(s) Oral every 4 hours PRN Dyspepsia  artificial  tears Solution 1 Drop(s) Both EYES three times a day PRN Dry Eyes  dextrose Oral Gel 15 Gram(s) Oral once PRN Blood Glucose LESS THAN 70 milliGRAM(s)/deciliter  melatonin 3 milliGRAM(s) Oral at bedtime PRN Insomnia  ondansetron Injectable 4 milliGRAM(s) IV Push every 8 hours PRN Nausea and/or Vomiting  sodium chloride 0.9% lock flush 10 milliLiter(s) IV Push every 1 hour PRN Pre/post blood products, medications, blood draw, and to maintain line patency      Vitals:  ICU Vital Signs Last 24 Hrs  T(C): 36.4 (18 Mar 2024 11:03), Max: 36.7 (17 Mar 2024 16:07)  T(F): 97.6 (18 Mar 2024 11:03), Max: 98 (17 Mar 2024 16:07)  HR: 89 (18 Mar 2024 11:03) (72 - 89)  BP: 117/60 (18 Mar 2024 11:03) (117/60 - 146/82)  BP(mean): --  ABP: --  ABP(mean): --  RR: 18 (18 Mar 2024 11:03) (18 - 19)  SpO2: 98% (18 Mar 2024 11:03) (95% - 100%)    O2 Parameters below as of 18 Mar 2024 11:03  Patient On (Oxygen Delivery Method): nasal cannula, 3L    I&O's Detail    17 Mar 2024 07:01  -  18 Mar 2024 07:00  --------------------------------------------------------  IN:  Total IN: 0 mL    OUT:    Drain (mL): 15 mL    Drain (mL): 110 mL    Voided (mL): 300 mL  Total OUT: 425 mL    Total NET: -425 mL      18 Mar 2024 07:01  -  18 Mar 2024 14:29  --------------------------------------------------------  IN:    Oral Fluid: 280 mL  Total IN: 280 mL    OUT:    Voided (mL): 500 mL  Total OUT: 500 mL    Total NET: -220 mL          LABS:                        10.2   7.84  )-----------( 413      ( 18 Mar 2024 04:58 )             32.5     03-18    137  |  98  |  33.3<H>  ----------------------------<  189<H>  3.8   |  23.0  |  5.56<H>    Ca    8.6      18 Mar 2024 04:58  Phos  3.6     03-18  Mg     2.0     03-18    TPro  6.8  /  Alb  3.0<L>  /  TBili  0.3<L>  /  DBili  x   /  AST  19  /  ALT  14  /  AlkPhos  141<H>  03-18      Urinalysis Basic - ( 18 Mar 2024 04:58 )    Color: x / Appearance: x / SG: x / pH: x  Gluc: 189 mg/dL / Ketone: x  / Bili: x / Urobili: x   Blood: x / Protein: x / Nitrite: x   Leuk Esterase: x / RBC: x / WBC x   Sq Epi: x / Non Sq Epi: x / Bacteria: x

## 2024-03-19 LAB
GLUCOSE BLDC GLUCOMTR-MCNC: 199 MG/DL — HIGH (ref 70–99)
GLUCOSE BLDC GLUCOMTR-MCNC: 213 MG/DL — HIGH (ref 70–99)
GLUCOSE BLDC GLUCOMTR-MCNC: 256 MG/DL — HIGH (ref 70–99)
GLUCOSE BLDC GLUCOMTR-MCNC: 259 MG/DL — HIGH (ref 70–99)

## 2024-03-19 PROCEDURE — 99232 SBSQ HOSP IP/OBS MODERATE 35: CPT

## 2024-03-19 RX ORDER — INSULIN GLARGINE 100 [IU]/ML
10 INJECTION, SOLUTION SUBCUTANEOUS AT BEDTIME
Refills: 0 | Status: DISCONTINUED | OUTPATIENT
Start: 2024-03-19 | End: 2024-03-21

## 2024-03-19 RX ORDER — OLANZAPINE 15 MG/1
5 TABLET, FILM COATED ORAL ONCE
Refills: 0 | Status: COMPLETED | OUTPATIENT
Start: 2024-03-19 | End: 2024-03-19

## 2024-03-19 RX ORDER — PANTOPRAZOLE SODIUM 20 MG/1
40 TABLET, DELAYED RELEASE ORAL
Refills: 0 | Status: DISCONTINUED | OUTPATIENT
Start: 2024-03-19 | End: 2024-03-21

## 2024-03-19 RX ORDER — QUETIAPINE FUMARATE 200 MG/1
12.5 TABLET, FILM COATED ORAL DAILY
Refills: 0 | Status: DISCONTINUED | OUTPATIENT
Start: 2024-03-19 | End: 2024-03-21

## 2024-03-19 RX ADMIN — Medication 6: at 16:55

## 2024-03-19 RX ADMIN — Medication 0.5 MILLIGRAM(S): at 08:47

## 2024-03-19 RX ADMIN — Medication 6: at 10:50

## 2024-03-19 RX ADMIN — SIMVASTATIN 20 MILLIGRAM(S): 20 TABLET, FILM COATED ORAL at 21:08

## 2024-03-19 RX ADMIN — PANTOPRAZOLE SODIUM 40 MILLIGRAM(S): 20 TABLET, DELAYED RELEASE ORAL at 05:45

## 2024-03-19 RX ADMIN — Medication 2: at 07:51

## 2024-03-19 RX ADMIN — DRONEDARONE 400 MILLIGRAM(S): 400 TABLET, FILM COATED ORAL at 05:46

## 2024-03-19 RX ADMIN — Medication 325 MILLIGRAM(S): at 10:51

## 2024-03-19 RX ADMIN — CHLORHEXIDINE GLUCONATE 1 APPLICATION(S): 213 SOLUTION TOPICAL at 05:46

## 2024-03-19 RX ADMIN — DRONEDARONE 400 MILLIGRAM(S): 400 TABLET, FILM COATED ORAL at 16:56

## 2024-03-19 RX ADMIN — Medication 3 MILLILITER(S): at 08:47

## 2024-03-19 RX ADMIN — Medication 0.5 MILLIGRAM(S): at 21:10

## 2024-03-19 RX ADMIN — INSULIN GLARGINE 10 UNIT(S): 100 INJECTION, SOLUTION SUBCUTANEOUS at 21:08

## 2024-03-19 RX ADMIN — SENNA PLUS 2 TABLET(S): 8.6 TABLET ORAL at 21:08

## 2024-03-19 NOTE — CHART NOTE - NSCHARTNOTEFT_GEN_A_CORE
Called to evaluate perc sky tube because daughter was unable to flush tube which prompted her fathers presentation.  He was seen by the chief resident, denied abdominal pain, tube was flushed without issue.      We were recontacted because the patient began to have coffee ground emesis.    ICU Vital Signs Last 24 Hrs  T(C): 37.1 (25 Feb 2024 18:30), Max: 37.1 (25 Feb 2024 18:30)  T(F): 98.8 (25 Feb 2024 18:30), Max: 98.8 (25 Feb 2024 18:30)  HR: 74 (25 Feb 2024 18:30) (63 - 74)  BP: 179/88 (25 Feb 2024 18:30) (99/57 - 179/88)  BP(mean): --  ABP: --  ABP(mean): --  RR: 18 (25 Feb 2024 18:30) (18 - 20)  SpO2: 97% (25 Feb 2024 18:30) (97% - 99%)    O2 Parameters below as of 25 Feb 2024 18:30  Patient On (Oxygen Delivery Method): nasal cannula  O2 Flow (L/min): 2      Abd: Soft, NT, ND, RUQ perc sky tube in place, bilious drainage     .  LABS:                         9.9    15.42 )-----------( 251      ( 25 Feb 2024 17:58 )             31.5     02-25    136  |  102  |  29.5<H>  ----------------------------<  221<H>  5.5<H>   |  23.0  |  2.52<H>    Ca    8.8      25 Feb 2024 15:20    TPro  6.9  /  Alb  3.4  /  TBili  0.3<L>  /  DBili  x   /  AST  35  /  ALT  24  /  AlkPhos  144<H>  02-25      Urinalysis Basic - ( 25 Feb 2024 15:20 )    Color: x / Appearance: x / SG: x / pH: x  Gluc: 221 mg/dL / Ketone: x  / Bili: x / Urobili: x   Blood: x / Protein: x / Nitrite: x   Leuk Esterase: x / RBC: x / WBC x   Sq Epi: x / Non Sq Epi: x / Bacteria: x            RADIOLOGY, EKG & ADDITIONAL TESTS: Reviewed.    FINDINGS:  LOWER CHEST: Old right rib fractures.    LIVER: Within normal limits.  BILE DUCTS: Normal caliber.  GALLBLADDER: Cholecystostomy tube is present within the gallbladder,   which contains stones and intraluminal air. Mild inflammation surrounding   the gallbladder.  SPLEEN: Within normal limits.  PANCREAS: Within normal limits.  ADRENALS: Within normal limits.  KIDNEYS/URETERS:Within normal limits.    BLADDER: Within normal limits.  REPRODUCTIVE ORGANS: Prostate within normal limits.    BOWEL: Small hiatal hernia. No bowel obstruction. Colonic diverticulosis.   No contrast extravasation to suggest site of active bleed.  PERITONEUM: No ascites.  VESSELS: Atherosclerotic changes.  RETROPERITONEUM/LYMPH NODES: No lymphadenopathy.  ABDOMINAL WALL: Within normal limits.  BONES: Degenerative changes.    IMPRESSION:  No contrast extravasation to suggest site of active bleed.    Assessment and Plan:     Recommending tube study by IR   GI evaluation and medicine eval for possible upper GI bleed with drop in H and H   No acute surgical intervention at this time, CT findings similar to previous study in January   D/w attending on call
Source: Patient [ ]  Family [ ]   other [x] EMR, staff and ID rounds; Pt sleeping soundly OOB to chair    Current Diet:   Diet, Pureed:   Consistent Carbohydrate {Evening Snack} (CSTCHOSN)  Moderately Thick Liquids (MODTHICKLIQS)  For patients receiving Renal Replacement - No Protein Restr, No Conc K, No Conc Phos, Low  Sodium (RENAL) (03-07-24 @ 17:23)    Patient reports [ ] nausea  [ ] vomiting [ ] diarrhea [ ] constipation  [ x]chewing problems [x ] swallowing issues  [ ] other:     PO intake:  < 50% [ ]   50-75%  [ x]   %  [ ]  other :    Source for PO intake [ ] Patient [ ] family [ x] chart [ x] staff [ ] other    Current Weight:   (3/12)  156 lbs RD bed scale weight   (3/11) 149.9 lbs  (3/8)   148.8 lbs   (3/6)  150.1 lbs   (3/5) 153.4 lbs  (3/4) 168.4 lbs  (3/1) 170.1 lbs   (2/28) 163.3 lbs   (2/27) 164.9 lbs     % Weight Change: No recent weight documented     Pertinent Medications: MEDICATIONS  (STANDING):  buDESOnide    Inhalation Suspension 0.5 milliGRAM(s) Inhalation every 12 hours  chlorhexidine 2% Cloths 1 Application(s) Topical <User Schedule>  dextrose 5%. 1000 milliLiter(s) (50 mL/Hr) IV Continuous <Continuous>  dextrose 5%. 1000 milliLiter(s) (100 mL/Hr) IV Continuous <Continuous>  dextrose 50% Injectable 12.5 Gram(s) IV Push once  dextrose 50% Injectable 25 Gram(s) IV Push once  dextrose 50% Injectable 25 Gram(s) IV Push once  dronedarone 400 milliGRAM(s) Oral two times a day  epoetin kristal-epbx (RETACRIT) Injectable 93917 Unit(s) IV Push <User Schedule>  ferrous    sulfate 325 milliGRAM(s) Oral daily  glucagon  Injectable 1 milliGRAM(s) IntraMuscular once  insulin lispro (ADMELOG) corrective regimen sliding scale   SubCutaneous three times a day before meals  insulin lispro (ADMELOG) corrective regimen sliding scale   SubCutaneous at bedtime  pantoprazole  Injectable 40 milliGRAM(s) IV Push every 12 hours  senna 2 Tablet(s) Oral at bedtime  simvastatin 20 milliGRAM(s) Oral at bedtime    MEDICATIONS  (PRN):  acetaminophen     Tablet .. 650 milliGRAM(s) Oral every 6 hours PRN Temp greater or equal to 38C (100.4F), Mild Pain (1 - 3)  albuterol/ipratropium for Nebulization 3 milliLiter(s) Nebulizer every 6 hours PRN wheezing or SOB  aluminum hydroxide/magnesium hydroxide/simethicone Suspension 30 milliLiter(s) Oral every 4 hours PRN Dyspepsia  artificial  tears Solution 1 Drop(s) Both EYES three times a day PRN Dry Eyes  dextrose Oral Gel 15 Gram(s) Oral once PRN Blood Glucose LESS THAN 70 milliGRAM(s)/deciliter  melatonin 3 milliGRAM(s) Oral at bedtime PRN Insomnia  ondansetron Injectable 4 milliGRAM(s) IV Push every 8 hours PRN Nausea and/or Vomiting  sodium chloride 0.9% lock flush 10 milliLiter(s) IV Push every 1 hour PRN Pre/post blood products, medications, blood draw, and to maintain line patency    Pertinent Labs: CBC Full  -  ( 18 Mar 2024 04:58 )  WBC Count : 7.84 K/uL  RBC Count : 3.59 M/uL  Hemoglobin : 10.2 g/dL  Hematocrit : 32.5 %  Platelet Count - Automated : 413 K/uL  Mean Cell Volume : 90.5 fl  Mean Cell Hemoglobin : 28.4 pg  Mean Cell Hemoglobin Concentration : 31.4 gm/dL  03-18 Na137 mmol/L Glu 189 mg/dL<H> K+ 3.8 mmol/L Cr  5.56 mg/dL<H> BUN 33.3 mg/dL<H> Phos 3.6 mg/dL Alb 3.0 g/dL<L> PAB n/a       Skin: No skin breakdown/edema noted     Nutrition focused physical exam conducted - found signs of malnutrition [ ]absent [x ]present    Subcutaneous fat loss: [x ] Orbital fat pads region, [ x]Buccal fat region, [ ]Triceps region,  [ ]Ribs region    Muscle wasting: [x]Temples region, [x ]Clavicle region, [ ]Shoulder region, [ ]Scapula region, [ ]Interosseous region,  [ ]thigh region, [ ]Calf region    Estimated Needs:   [x] no change since previous assessment  [ ] recalculated:     Current Nutrition Diagnosis: Pt presents at high nutrition risk secondary to malnutrition (moderate acute) related to inability to meet sufficient protein-energy needs in setting of PNA, dysphagia, renal insufficiency requiring HD, advanced age as evidenced by meeting <75% EER x7 days, mild temporal wasting, weight loss PTA.   Pt s/p permacath placement (3/12), ERCP attempted (3/14) though unsuccessful 2/2 unable to cannulate CBD. SLP evals attempted though Pt off the floor at time of attempts. Pt remains on pureed/mod thick diet, tolerating completing 50-75% of meals. Diet order pending MD approval to liberalize 2/2 altered consistency. Last documented BM 3/17, fecal incontinence noted. BUN/creat remain elevated, BG 70-227mg/dL range x2 days, will continue to monitor.     Recommendations:    1) Liberalize to pureed diet, mod thick fluids 2/2 limited menu options  2) Add Nepro BID   3) Rx Nephro-jania   4) Encourage HBV protein sources   5) Monitor weights daily for trend/accuracy   6) Continue SLP intervention     Monitoring and Evaluation:   [ ] PO intake [ ] Tolerance to diet prescription [X] Weights  [X] Follow up per protocol [X] Labs:
Source: Patient [ ]  Family [ x]   other [x] EMR, staff and ID rounds     Current Diet:   Diet, Pureed:   Consistent Carbohydrate {Evening Snack} (CSTCHOSN)  Moderately Thick Liquids (MODTHICKLIQS)  For patients receiving Renal Replacement - No Protein Restr, No Conc K, No Conc Phos, Low  Sodium (RENAL) (03-07-24 @ 17:23)    Patient reports [ ] nausea  [ ] vomiting [ ] diarrhea [ ] constipation  [ ]chewing problems [ ] swallowing issues  [ ] other:     PO intake:  < 50% [ ]   50-75%  [x]   %  [ ]  other :    Source for PO intake [ ] Patient [ x] family [ x] chart [x ] staff [ ] other    Current Weight:   (3/12)  156 lbs RD bed scale weight   (3/11) 149.9 lbs  (3/8)   148.8 lbs   (3/6)  150.1 lbs   (3/5) 153.4 lbs  (3/4) 168.4 lbs  (3/1) 170.1 lbs   (2/28) 163.3 lbs   (2/27) 164.9 lbs     % Weight Change: Weights trending down since admission, will continue to monitor     Pertinent Medications: MEDICATIONS  (STANDING):  albuterol/ipratropium for Nebulization 3 milliLiter(s) Nebulizer every 6 hours  buDESOnide    Inhalation Suspension 0.5 milliGRAM(s) Inhalation every 12 hours  dextrose 50% Injectable 25 Gram(s) IV Push once  dronedarone 400 milliGRAM(s) Oral two times a day  epoetin kristal-epbx (RETACRIT) Injectable 40562 Unit(s) IV Push <User Schedule>  ertapenem  IVPB 1000 milliGRAM(s) IV Intermittent once  ferrous    sulfate 325 milliGRAM(s) Oral daily  glucagon  Injectable 1 milliGRAM(s) IntraMuscular once  indomethacin Suppository 100 milliGRAM(s) Rectal once  insulin glargine Injectable (LANTUS) 10 Unit(s) SubCutaneous every morning  insulin lispro (ADMELOG) corrective regimen sliding scale   SubCutaneous three times a day before meals  insulin lispro (ADMELOG) corrective regimen sliding scale   SubCutaneous at bedtime  insulin lispro Injectable (ADMELOG) 3 Unit(s) SubCutaneous three times a day before meals  midodrine 15 milliGRAM(s) Oral every 8 hours  pantoprazole  Injectable 40 milliGRAM(s) IV Push every 12 hours  senna 2 Tablet(s) Oral at bedtime  simvastatin 20 milliGRAM(s) Oral at bedtime    MEDICATIONS  (PRN):  acetaminophen     Tablet .. 650 milliGRAM(s) Oral every 6 hours PRN Temp greater or equal to 38C (100.4F), Mild Pain (1 - 3)  aluminum hydroxide/magnesium hydroxide/simethicone Suspension 30 milliLiter(s) Oral every 4 hours PRN Dyspepsia  artificial  tears Solution 1 Drop(s) Both EYES three times a day PRN Dry Eyes  dextrose Oral Gel 15 Gram(s) Oral once PRN Blood Glucose LESS THAN 70 milliGRAM(s)/deciliter  melatonin 3 milliGRAM(s) Oral at bedtime PRN Insomnia  ondansetron Injectable 4 milliGRAM(s) IV Push every 8 hours PRN Nausea and/or Vomiting  sodium chloride 0.9% lock flush 10 milliLiter(s) IV Push every 1 hour PRN Pre/post blood products, medications, blood draw, and to maintain line patency    Pertinent Labs: CBC Full  -  ( 11 Mar 2024 06:29 )  WBC Count : 12.03 K/uL  RBC Count : 3.23 M/uL  Hemoglobin : 9.4 g/dL  Hematocrit : 29.2 %  Platelet Count - Automated : 423 K/uL  Mean Cell Volume : 90.4 fl  Mean Cell Hemoglobin : 29.1 pg  Mean Cell Hemoglobin Concentration : 32.2 gm/dL    Skin: No skin breakdown/edema noted     Nutrition focused physical exam conducted - found signs of malnutrition [ ]absent [x ]present    Subcutaneous fat loss: [x ] Orbital fat pads region, [ x]Buccal fat region, [ ]Triceps region,  [ ]Ribs region    Muscle wasting: [x]Temples region, [x ]Clavicle region, [ ]Shoulder region, [ ]Scapula region, [ ]Interosseous region,  [ ]thigh region, [ ]Calf region    Estimated Needs:   [x] no change since previous assessment  [ ] recalculated:     Current Nutrition Diagnosis: Pt presents at high nutrition risk secondary to malnutrition (moderate acute) related to inability to meet sufficient protein-energy needs in setting of PNA, dysphagia, renal insufficiency requiring HD, advanced age as evidenced by meeting <75% EER x7 days, mild temporal wasting, weight loss PTA.   Pt lethargic on breathing treatment, Pts wife at bedside states UBW ~175-180lbs, feels that patient has lost weight, undetermined amount. RD bed scale weight 156lbs noted, difficulty trending weights 2/2 ongoing HD/fluid retention though some weight loss is evident through findings of NFPE.   Pt extubated 3/4, several SLP evals completed most recent with recommendations for puree/mod thick (3/7). Several re-attempt made by SLP however unable to complete eval 2/2 Pt off the floor or too lethargic to follow commands/unsafe for PO. Pts wife reports Pt is tolerating consistency and thickened fluids well. Aware Pt was NPO 2/2 permacath placement, will continue on HD upon d/c.     Recommendations:   1) Liberalize to pureed diet, mod thick fluids 2/2 limited menu options  2) Add Nepro BID   3) Rx Nephro-jania   4) Encourage HBV protein sources   5) Monitor weights daily for trend/accuracy   6) Continue SLP intervention     Monitoring and Evaluation:   [x] PO intake [x] Tolerance to diet prescription [X] Weights  [X] Follow up per protocol [X] Labs:
Spoke with ID, IR and the Vascular team regarding R IJ and Permacath placement. ID clears Pt for Permacath placement but due to scheduling, IR cannot place permacath. D/w Yossi from the Vascular team. Informed Writer that Shileys can remain in place for up to 14 days and Vascular team will not exchange the shiley at this time and to await scheduling for Permacath with IR. MD aware. Nursing management aware as well.
Medicine PA-  Pt confused and pulling at IV and tubing despite redirecting;  Pt needs unsec b/l mitts ordered for safety.  Hospitalist aware.
Medicine PA-  Pt was agitated and was touching permacath / drains.  Both permacath and drains remained clean and intact.  Zyprexa 5mg IM ordered.  Continue to monitor and redirect pt, will order restraints PRN.
Patient was desaturating this morning despite NIV at 100%, diminished breath sounds, suspect copious secretions which he cannot mobilize.    Decision made to intubate, notified family, placed on mechanical ventilation.   Propofol for sedation.  Additional CC time 55 min, total cumulative time 105 min.  Exclusive of procedures.  Critical care time spent titrating IV sedatives, vasoactive medications, adjusting mechanical ventilator settings, interpreting blood gases and chest imaging.
Received pt on BIPAP 14/7/100% from 3T[facility], pt tolerating settings well. Will continue to monitor.
Received pt on vent AC/VC 20/400/80%/6, Pt is intubated with ETT 8.0, 23 cm @ lip. Pt tolerating settings well. Will continue to monitor.  Weaned FiO2 to 60% as per provider's order @ 2018 pm
Source: Patient [ ]  Family [ ]   other [x] EMR    Current Diet: Diet, NPO with Tube Feed:   Tube Feeding Modality: Nasogastric  Nepro (NEPRORTH)  Total Volume for 24 Hours (mL): 1200  Continuous  Starting Tube Feed Rate {mL per Hour}: 10  Increase Tube Feed Rate by (mL): 10     Every 6 hours  Until Goal Tube Feed Rate (mL per Hour): 50  Tube Feed Duration (in Hours): 24  Tube Feed Start Time: 21:00  Free Water Flush   Total Volume per Flush (mL): 50   Frequency: Every 6 Hours (03-03-24 @ 20:21)    Enteral /Parenteral Nutrition: Nepro @ 50 cc/hr x 24 hours = 1200 ml, 2160 kcals, 97 g pro, 876 ml free water; meeting ~100% est energy needs and ~100% lower end of est protein needs  Additional 50 ml water flushes q 6 hrs = 200 ml additional free water per day    Current Weight:   3/4 77.4 kg  2/27 74.8 kg  2/25 69.8 kg  +1 periorbital, generalized edema noted. no new weights, continue to monitor trends    Pertinent Medications: MEDICATIONS  (STANDING):  dexMEDEtomidine Infusion 0.02 MICROgram(s)/kG/Hr (0.4 mL/Hr) IV Continuous <Continuous>  dextrose 5%. 1000 milliLiter(s) (50 mL/Hr) IV Continuous <Continuous>  epoetin kristal-epbx (RETACRIT) Injectable 65321 Unit(s) IV Push <User Schedule>  ferrous    sulfate 325 milliGRAM(s) Oral daily  glucagon  Injectable 1 milliGRAM(s) IntraMuscular once  insulin glargine Injectable (LANTUS) 10 Unit(s) SubCutaneous every morning  insulin lispro (ADMELOG) corrective regimen sliding scale   SubCutaneous every 6 hours  norepinephrine Infusion 0.05 MICROgram(s)/kG/Min (7.58 mL/Hr) IV Continuous <Continuous>  pantoprazole  Injectable 40 milliGRAM(s) IV Push every 12 hours  propofol Infusion 10 MICROgram(s)/kG/Min (4.85 mL/Hr) IV Continuous <Continuous>  senna 2 Tablet(s) Oral at bedtime    MEDICATIONS  (PRN):  aluminum hydroxide/magnesium hydroxide/simethicone Suspension 30 milliLiter(s) Oral every 4 hours PRN Dyspepsia  ondansetron Injectable 4 milliGRAM(s) IV Push every 8 hours PRN Nausea and/or Vomiting    Pertinent Labs: 03-04 Na136 mmol/L Glu 167 mg/dL<H> K+ 3.8 mmol/L Cr  6.54 mg/dL<H> BUN 60.6 mg/dL<H> Phos 4.3 mg/dL Alb 2.8 g/dL<L>    Skin: no breakdown per documentation     Estimated Needs:   [x] recalculated:  8098-7100 kcals/day (based on 30-35 kcal/kg IBW 65 kg)   g pro/day (based on 1.5-1.8 g/kg IBW 65 kg)    Clinical Course: 85 y/o male with PMH of Acute Cholecystitis s/p Cholecystomy Tube (12/22/23), COVID-19, PAF not on AC due to GIB + Fall Risk, Esophagitis, Anemia, DM 2, HTN, HLD, CKD III/IV, BPH and Cognitive Impairment  came to the ED complaining of leakage around sky tube. As per wife at bed side, family noticed fluid coming out on the skin when they attempted to flush the sky tube. In the ED, patient was seen by surgery team, no acute surgical intervention. As per ED, patient vomited large amount of coffee ground emesis, looked very pale, holding abdomen; CT angio abdomen done: no acute GI bleed. Repeat CBC: Hb: 11.3---->9.3. Also, he desaturated concerning for aspiration PNA, antibiotic and oxygen therapy. Downgraded to medicine 2/29, now back in ICU. Intubated secondary to acute hypoxic respiratory failure. Pt pulling at lines. Planned for HD today.    Current Nutrition Diagnosis: Pt remains a high nutrition risk secondary to inadequate oral intake related to critical illness as evidenced by pt NPO, NGT in place, previously on puree diet with moderately thick liquids and poor-fair po intake.    Chart reviewed. Pt was previously in ICU and downgraded on 2/29, now back in ICU since 3/3, intubated/sedated secondary to acute hypoxic respiratory failure. Propofol @ 4.85 ml/hr x 24 hrs providing 128 kcals/day. HD started on 2/2; TF Nepro was started yesterday @ 10 cc/hr via NGT, when running @ goal will provide ~100% est nutrient needs. Unable to obtain hx from pt at this time. Last admission weight 12/23/23 175 lbs, this admission 171 lbs, weight fluctuations with edema/HD. Last documented BM 2/2, receiving senna.   Prior to upgrade to MICU pt was on puree diet, moderately thick liquids from 2/29-3/3 with fluctuating PO intake <50-75%. At risk for acute malnutrition, continue to monitor parameters.  RD remains available. Recommendations below:    Recommendations:   1. Consider changing TF order to Nepro goal rate 65 cc/hr x 18 hrs per ICU protocol (1170 ml, 2160 kcals, 95 g pro, 854 ml free water); additional free water per medical teams discretion   2. Rx: Nephro-jania daily to replete losses from HD  3. Monitor BMs, continue bowel regimen as needed  4. Monitor kcals from propofol and TG levels   5. Trend weights    Monitoring and Evaluation:   [ ] PO intake [x] Tolerance to diet prescription [X] Weights  [X] Follow up per protocol [X] Labs: chem 8, phos, mg BGM

## 2024-03-19 NOTE — PROGRESS NOTE ADULT - ASSESSMENT
85yo M w/ hx of afib, esophagitis, anemia, DM, HTN, HL, CKD, and acute cholecystitis s/p per sky tube placement in 12/2023 presenting with CC of leakage around cholecystotomy tube.    CKD(IV) +DM, HTN> ? ESRD -  RON: ATN post IV contrast and hypotension/sepsis--> has now likely progressed to ESRD - creatinie still high  Biliary tube leak  - cont to avoid further potential nephrotoxins  - HD MWF (still no indication of renal recovery) --> HD today pre HD creatinine 5.56  - PC placement by IR 3/12  - surgery noted--> will eventually require cholecystectomy, poor surgical candidate    Anemia: + multifactorial  - cont CINDI  - trend H/H  - target Hgb > 10.0    RO:  - low phos diet  - con to monitor off binders  - trend serum phos    Will follow

## 2024-03-19 NOTE — PROGRESS NOTE ADULT - ASSESSMENT
84y/oM PMH paroxysmal atrial fibrillation not on AC secondary to GI bleed, esophagitis, anemia, diabetes mellitus, hypertension, hyperlipidemia, CKD stage 3-4, BPH, cognitive impairment, recent COVID, acute cholecystitis in Dec 2023 s/p cholecystostomy tube placement on 12/22/23 presented to the ED with complaints of leaking around cholecystostomy tube and difficulty flushing the tube. A CT abdomen/pelvis with contrast was done in ED, tube noted to be in place, he was evaluated by surgery, tube was flushed without difficulty. While in ED, he was noted to have coffee ground emesis and pallor with some abdominal pain and transient drop in Hb. GI was consulted, he had recently had an EGD in January 2024 with grade A esophagitis. Hb improved, hence no additional work up recommended by GI. Course complicated by hypotension and hypoxia on 2/26, RRT was called and he was transferred to ICU for need for pressors, [place] oxygen requirement, likely septic shock from aspiration. Cultures were sent, empiric antibiotics were initiated. Also received steroids. Patient noted to have worsening renal function and was anuric, nephrology was consulted and HD was initiated on 2/27/24. HD was continued. He was weaned off Levophed and was transferred to medical floor on 2/29. Surgery follow up was done, recommended cholecystectomy once medically stable. While on medical floor, he was noted to have worsening leucocytosis, ID consultation was requested, cultures repeated, Zosyn was initiated. On 3/3, he was noted to have worsening hypoxia, ICU consult was requested, NIV was placed, CXR with left lung field opacification. He failed trial of BiPAP and was intubated on 3/3 and required Precedex and low dose of Levophed. His cultures remained negative. Palliative care was consulted, goals of care were discussed with family, they wished patient to be full code. Patient had waxing and waning mental status, initially required tube feeds. He was extubated on 3/4. HD was continued. He was transferred back to medical floor on 3/6/24. IR consulted for PermCath placement. He was evaluated by speech and swallow and diet was advanced. His cholecystostomy tubing broke on 3/9, IR consulted for tube exhange. CT head was done, no acute changes noted. CT chest and abdomen was done, Persistent subcapsular fluid collections along the right lateral liver, suspicious for developing biloma. Improved left lower lobe aeration.  IR evaluation requested for biloma, he underwent cholecystostomy tube exchange and a cholangiogram was done noted for abnormality near gallbladder neck, concerning for GB defect possible cause of biloma, a 12 F catheter was placed into the biloma. Surgery follow up requested to assess for cholecystectomy Surgery recommended GI evaluation for possible CBD stent placement to control leakage from GB neck. ERCP was scheduled by GI on 3/12 but family requested additional time before proceeding with intervention. repeat ERCP unsuccessful. plan for conservative mgmt currently .     Acute metabolic encephalopathy 2/2 recurrent sepsis, underlying RON  - Long and complicated course attributing to mental status  - continue HD per schedule  - CT head repeated, no acute changes noted  - monitor WBC  - completed course of Zosyn  - repeat cultures if febrile  - aspiration precautions   prn seroquel but better to redirect patient when "agitated"    RON 2/2 ATN with underlying CKD stage 4, now on HD  -Continue HD per schedule  -nephrology following  -continue Epogen for anemia sec underlying CKD  -now s/p PermCath placement by IR on 3/12    S/p septic shock  - s/p Midodrine  - previously on Levophed and stress dose steroids    Atrial fibrillation  - Not on AC sec GI bleed  - on Dronedarone    H/o acute cholecystitis s/p cholecystostomy tube placement, now with biloma sec GB neck leak s/p drain placement  - IR intervention appreciated, s/p tube exchange and drain placement for biloma  - surgery recommendations noted, cholecystectomy deferred for now  - GI signed off   - s/p ERCP on 3/14, unsuccessful    Hypertension, hyperlipidemia  - Not on anti hypertensives, BP stable  - recent shock  - continue statin    Diabetes mellitus  - A1C 7.7  - FS with ISS  - Continue Lantus 10 units   - Admelog 3 units TID with meals--hold     Grade A esophagitis, anemia  - Hb stable  - GI recs from early this admission noted  - Protonix 40mg qd    DVT ppx  - SCD    PT eval--NITHIN  dc planning

## 2024-03-19 NOTE — CHART NOTE - NSCHARTNOTESELECT_GEN_ALL_CORE
Event Note
Nutrition Services
RC
RC
Event Note
Nutrition Services
Nutrition Services
Surgery Perc Radha Tube Evaluation/Event Note

## 2024-03-19 NOTE — PROGRESS NOTE ADULT - SUBJECTIVE AND OBJECTIVE BOX
Patient seen and examined    Feels fine  no c/o CP SOB NV   No swelling feet    Vital Signs Last 24 Hrs  T(C): 36.7 (19 Mar 2024 20:02), Max: 36.8 (19 Mar 2024 05:08)  T(F): 98 (19 Mar 2024 20:02), Max: 98.3 (19 Mar 2024 05:08)  HR: 83 (19 Mar 2024 20:02) (82 - 98)  BP: 147/78 (19 Mar 2024 20:02) (104/64 - 147/78)  BP(mean): --  RR: 18 (19 Mar 2024 20:02) (18 - 19)  SpO2: 96% (19 Mar 2024 20:02) (92% - 96%)    Parameters below as of 19 Mar 2024 20:02  Patient On (Oxygen Delivery Method): nasal cannula  O2 Flow (L/min): 2      PHYSICAL EXAM    GENERAL: NAD,   EYES:  conjunctiva and sclera clear  NECK: Supple, No JVD/Bruit  NERVOUS SYSTEM:  A/O x3,   CHEST:  No rales, No rhonchi  HEART:  RRR, No murmur  ABDOMEN: Soft, NT/ND BS+  EXTREMITIES:  No Edema;  SKIN: No rashes    18 Mar 2024 04:58    137    |  98     |  33.3   ----------------------------<  189    3.8     |  23.0   |  5.56     Ca    8.6        18 Mar 2024 04:58  Phos  3.6       18 Mar 2024 04:58  Mg     2.0       18 Mar 2024 04:58    TPro  6.8    /  Alb  3.0    /  TBili  0.3    /  DBili  x      /  AST  19     /  ALT  14     /  AlkPhos  141    18 Mar 2024 04:58                          10.2   7.84  )-----------( 413      ( 18 Mar 2024 04:58 )             32.5         Continue present treatment

## 2024-03-19 NOTE — PROGRESS NOTE ADULT - SUBJECTIVE AND OBJECTIVE BOX
seen for sky drains    no acute complaints  wife at bedside  more awake/ interactive today  agitated overnight, did not receive zyprexa as was redirected     MEDICATIONS  (STANDING):  buDESOnide    Inhalation Suspension 0.5 milliGRAM(s) Inhalation every 12 hours  chlorhexidine 2% Cloths 1 Application(s) Topical <User Schedule>  dextrose 5%. 1000 milliLiter(s) (50 mL/Hr) IV Continuous <Continuous>  dextrose 5%. 1000 milliLiter(s) (100 mL/Hr) IV Continuous <Continuous>  dextrose 50% Injectable 25 Gram(s) IV Push once  dextrose 50% Injectable 12.5 Gram(s) IV Push once  dextrose 50% Injectable 25 Gram(s) IV Push once  dronedarone 400 milliGRAM(s) Oral two times a day  epoetin kristal-epbx (RETACRIT) Injectable 42963 Unit(s) IV Push <User Schedule>  ferrous    sulfate 325 milliGRAM(s) Oral daily  glucagon  Injectable 1 milliGRAM(s) IntraMuscular once  insulin glargine Injectable (LANTUS) 10 Unit(s) SubCutaneous at bedtime  insulin lispro (ADMELOG) corrective regimen sliding scale   SubCutaneous at bedtime  insulin lispro (ADMELOG) corrective regimen sliding scale   SubCutaneous three times a day before meals  pantoprazole    Tablet 40 milliGRAM(s) Oral before breakfast  senna 2 Tablet(s) Oral at bedtime  simvastatin 20 milliGRAM(s) Oral at bedtime    MEDICATIONS  (PRN):  acetaminophen     Tablet .. 650 milliGRAM(s) Oral every 6 hours PRN Temp greater or equal to 38C (100.4F), Mild Pain (1 - 3)  albuterol/ipratropium for Nebulization 3 milliLiter(s) Nebulizer every 6 hours PRN wheezing or SOB  aluminum hydroxide/magnesium hydroxide/simethicone Suspension 30 milliLiter(s) Oral every 4 hours PRN Dyspepsia  artificial  tears Solution 1 Drop(s) Both EYES three times a day PRN Dry Eyes  dextrose Oral Gel 15 Gram(s) Oral once PRN Blood Glucose LESS THAN 70 milliGRAM(s)/deciliter  melatonin 3 milliGRAM(s) Oral at bedtime PRN Insomnia  ondansetron Injectable 4 milliGRAM(s) IV Push every 8 hours PRN Nausea and/or Vomiting  QUEtiapine 12.5 milliGRAM(s) Oral daily PRN agitation  sodium chloride 0.9% lock flush 10 milliLiter(s) IV Push every 1 hour PRN Pre/post blood products, medications, blood draw, and to maintain line patency      Allergies    No Known Allergies      Vital Signs Last 24 Hrs  T(C): 36.7 (19 Mar 2024 10:44), Max: 36.8 (19 Mar 2024 05:08)  T(F): 98.1 (19 Mar 2024 10:44), Max: 98.3 (19 Mar 2024 05:08)  HR: 89 (19 Mar 2024 10:44) (82 - 98)  BP: 104/64 (19 Mar 2024 10:44) (104/64 - 131/79)  BP(mean): --  RR: 19 (19 Mar 2024 10:44) (18 - 19)  SpO2: 92% (19 Mar 2024 10:44) (92% - 97%)    Parameters below as of 19 Mar 2024 10:44  Patient On (Oxygen Delivery Method): nasal cannula        PHYSICAL EXAM:    GENERAL: NAD  CHEST/LUNG: Clear to ausculation bilaterally;  HEART: Regular rate and rhythm; S1 S2;  ABDOMEN: Soft, , drain x2 ; Bowel sounds present  EXTREMITIES:  no edema   NERVOUS SYSTEM:  Alert & Oriented X2 nonfocal focal gen weakness .    LABS:                        10.2   7.84  )-----------( 413      ( 18 Mar 2024 04:58 )             32.5     03-18    137  |  98  |  33.3<H>  ----------------------------<  189<H>  3.8   |  23.0  |  5.56<H>    Ca    8.6      18 Mar 2024 04:58  Phos  3.6     03-18  Mg     2.0     03-18    TPro  6.8  /  Alb  3.0<L>  /  TBili  0.3<L>  /  DBili  x   /  AST  19  /  ALT  14  /  AlkPhos  141<H>  03-18      Urinalysis Basic - ( 18 Mar 2024 04:58 )    Color: x / Appearance: x / SG: x / pH: x  Gluc: 189 mg/dL / Ketone: x  / Bili: x / Urobili: x   Blood: x / Protein: x / Nitrite: x   Leuk Esterase: x / RBC: x / WBC x   Sq Epi: x / Non Sq Epi: x / Bacteria: x        CAPILLARY BLOOD GLUCOSE      POCT Blood Glucose.: 256 mg/dL (19 Mar 2024 10:49)  POCT Blood Glucose.: 199 mg/dL (19 Mar 2024 07:46)  POCT Blood Glucose.: 184 mg/dL (18 Mar 2024 22:12)  POCT Blood Glucose.: 141 mg/dL (18 Mar 2024 17:12)        RADIOLOGY & ADDITIONAL TESTS:

## 2024-03-20 LAB
ANION GAP SERPL CALC-SCNC: 13 MMOL/L — SIGNIFICANT CHANGE UP (ref 5–17)
BUN SERPL-MCNC: 28.9 MG/DL — HIGH (ref 8–20)
CALCIUM SERPL-MCNC: 8.5 MG/DL — SIGNIFICANT CHANGE UP (ref 8.4–10.5)
CHLORIDE SERPL-SCNC: 101 MMOL/L — SIGNIFICANT CHANGE UP (ref 96–108)
CO2 SERPL-SCNC: 27 MMOL/L — SIGNIFICANT CHANGE UP (ref 22–29)
CREAT SERPL-MCNC: 4.22 MG/DL — HIGH (ref 0.5–1.3)
EGFR: 13 ML/MIN/1.73M2 — LOW
GLUCOSE BLDC GLUCOMTR-MCNC: 101 MG/DL — HIGH (ref 70–99)
GLUCOSE BLDC GLUCOMTR-MCNC: 162 MG/DL — HIGH (ref 70–99)
GLUCOSE BLDC GLUCOMTR-MCNC: 163 MG/DL — HIGH (ref 70–99)
GLUCOSE BLDC GLUCOMTR-MCNC: 257 MG/DL — HIGH (ref 70–99)
GLUCOSE SERPL-MCNC: 173 MG/DL — HIGH (ref 70–99)
HCT VFR BLD CALC: 30.8 % — LOW (ref 39–50)
HGB BLD-MCNC: 9.8 G/DL — LOW (ref 13–17)
MCHC RBC-ENTMCNC: 29.2 PG — SIGNIFICANT CHANGE UP (ref 27–34)
MCHC RBC-ENTMCNC: 31.8 GM/DL — LOW (ref 32–36)
MCV RBC AUTO: 91.7 FL — SIGNIFICANT CHANGE UP (ref 80–100)
PLATELET # BLD AUTO: 338 K/UL — SIGNIFICANT CHANGE UP (ref 150–400)
POTASSIUM SERPL-MCNC: 3.9 MMOL/L — SIGNIFICANT CHANGE UP (ref 3.5–5.3)
POTASSIUM SERPL-SCNC: 3.9 MMOL/L — SIGNIFICANT CHANGE UP (ref 3.5–5.3)
RBC # BLD: 3.36 M/UL — LOW (ref 4.2–5.8)
RBC # FLD: 14.8 % — HIGH (ref 10.3–14.5)
SODIUM SERPL-SCNC: 140 MMOL/L — SIGNIFICANT CHANGE UP (ref 135–145)
WBC # BLD: 6.8 K/UL — SIGNIFICANT CHANGE UP (ref 3.8–10.5)
WBC # FLD AUTO: 6.8 K/UL — SIGNIFICANT CHANGE UP (ref 3.8–10.5)

## 2024-03-20 PROCEDURE — 99232 SBSQ HOSP IP/OBS MODERATE 35: CPT

## 2024-03-20 RX ADMIN — Medication 325 MILLIGRAM(S): at 17:41

## 2024-03-20 RX ADMIN — Medication 0.5 MILLIGRAM(S): at 21:17

## 2024-03-20 RX ADMIN — PANTOPRAZOLE SODIUM 40 MILLIGRAM(S): 20 TABLET, DELAYED RELEASE ORAL at 05:26

## 2024-03-20 RX ADMIN — ERYTHROPOIETIN 10000 UNIT(S): 10000 INJECTION, SOLUTION INTRAVENOUS; SUBCUTANEOUS at 10:35

## 2024-03-20 RX ADMIN — DRONEDARONE 400 MILLIGRAM(S): 400 TABLET, FILM COATED ORAL at 05:26

## 2024-03-20 RX ADMIN — DRONEDARONE 400 MILLIGRAM(S): 400 TABLET, FILM COATED ORAL at 17:41

## 2024-03-20 RX ADMIN — Medication 2: at 07:41

## 2024-03-20 RX ADMIN — SENNA PLUS 2 TABLET(S): 8.6 TABLET ORAL at 20:59

## 2024-03-20 RX ADMIN — SIMVASTATIN 20 MILLIGRAM(S): 20 TABLET, FILM COATED ORAL at 20:58

## 2024-03-20 RX ADMIN — INSULIN GLARGINE 10 UNIT(S): 100 INJECTION, SOLUTION SUBCUTANEOUS at 20:59

## 2024-03-20 RX ADMIN — Medication 650 MILLIGRAM(S): at 20:59

## 2024-03-20 RX ADMIN — Medication 6: at 16:29

## 2024-03-20 RX ADMIN — CHLORHEXIDINE GLUCONATE 1 APPLICATION(S): 213 SOLUTION TOPICAL at 05:26

## 2024-03-20 NOTE — PROGRESS NOTE ADULT - SUBJECTIVE AND OBJECTIVE BOX
seen for biloma    no events  seen at HD  tolerating hd      MEDICATIONS  (STANDING):  buDESOnide    Inhalation Suspension 0.5 milliGRAM(s) Inhalation every 12 hours  chlorhexidine 2% Cloths 1 Application(s) Topical <User Schedule>  dextrose 5%. 1000 milliLiter(s) (50 mL/Hr) IV Continuous <Continuous>  dextrose 5%. 1000 milliLiter(s) (100 mL/Hr) IV Continuous <Continuous>  dextrose 50% Injectable 25 Gram(s) IV Push once  dextrose 50% Injectable 12.5 Gram(s) IV Push once  dextrose 50% Injectable 25 Gram(s) IV Push once  dronedarone 400 milliGRAM(s) Oral two times a day  epoetin kristal-epbx (RETACRIT) Injectable 83225 Unit(s) IV Push <User Schedule>  ferrous    sulfate 325 milliGRAM(s) Oral daily  glucagon  Injectable 1 milliGRAM(s) IntraMuscular once  insulin glargine Injectable (LANTUS) 10 Unit(s) SubCutaneous at bedtime  insulin lispro (ADMELOG) corrective regimen sliding scale   SubCutaneous at bedtime  insulin lispro (ADMELOG) corrective regimen sliding scale   SubCutaneous three times a day before meals  pantoprazole    Tablet 40 milliGRAM(s) Oral before breakfast  senna 2 Tablet(s) Oral at bedtime  simvastatin 20 milliGRAM(s) Oral at bedtime    MEDICATIONS  (PRN):  acetaminophen     Tablet .. 650 milliGRAM(s) Oral every 6 hours PRN Temp greater or equal to 38C (100.4F), Mild Pain (1 - 3)  albuterol/ipratropium for Nebulization 3 milliLiter(s) Nebulizer every 6 hours PRN wheezing or SOB  aluminum hydroxide/magnesium hydroxide/simethicone Suspension 30 milliLiter(s) Oral every 4 hours PRN Dyspepsia  artificial  tears Solution 1 Drop(s) Both EYES three times a day PRN Dry Eyes  dextrose Oral Gel 15 Gram(s) Oral once PRN Blood Glucose LESS THAN 70 milliGRAM(s)/deciliter  melatonin 3 milliGRAM(s) Oral at bedtime PRN Insomnia  ondansetron Injectable 4 milliGRAM(s) IV Push every 8 hours PRN Nausea and/or Vomiting  QUEtiapine 12.5 milliGRAM(s) Oral daily PRN agitation  sodium chloride 0.9% lock flush 10 milliLiter(s) IV Push every 1 hour PRN Pre/post blood products, medications, blood draw, and to maintain line patency      Allergies    No Known Allergies    Vital Signs Last 24 Hrs  T(C): 36.8 (20 Mar 2024 11:35), Max: 37.1 (20 Mar 2024 08:00)  T(F): 98.2 (20 Mar 2024 11:35), Max: 98.7 (20 Mar 2024 08:00)  HR: 87 (20 Mar 2024 11:35) (81 - 88)  BP: 105/65 (20 Mar 2024 11:35) (105/65 - 148/81)  BP(mean): --  RR: 18 (20 Mar 2024 11:35) (18 - 18)  SpO2: 99% (20 Mar 2024 11:35) (95% - 100%)    Parameters below as of 20 Mar 2024 11:35  Patient On (Oxygen Delivery Method): nasal cannula  O2 Flow (L/min): 2      PHYSICAL EXAM:    GENERAL: NAD  CHEST/LUNG: Clear to ausculation bilaterally  HEART: Regular rate and rhythm; S1 S2  ABDOMEN: Soft, Nontender, Nondistended; Bowel sounds present   + drain x2  EXTREMITIES: no edema       LABS:                        9.8    6.80  )-----------( 338      ( 20 Mar 2024 08:37 )             30.8     03-20    140  |  101  |  28.9<H>  ----------------------------<  173<H>  3.9   |  27.0  |  4.22<H>    Ca    8.5      20 Mar 2024 08:37        Urinalysis Basic - ( 20 Mar 2024 08:37 )    Color: x / Appearance: x / SG: x / pH: x  Gluc: 173 mg/dL / Ketone: x  / Bili: x / Urobili: x   Blood: x / Protein: x / Nitrite: x   Leuk Esterase: x / RBC: x / WBC x   Sq Epi: x / Non Sq Epi: x / Bacteria: x        CAPILLARY BLOOD GLUCOSE      POCT Blood Glucose.: 101 mg/dL (20 Mar 2024 11:15)  POCT Blood Glucose.: 163 mg/dL (20 Mar 2024 07:38)  POCT Blood Glucose.: 213 mg/dL (19 Mar 2024 20:42)  POCT Blood Glucose.: 259 mg/dL (19 Mar 2024 16:53)        RADIOLOGY & ADDITIONAL TESTS:

## 2024-03-20 NOTE — PROGRESS NOTE ADULT - ASSESSMENT
84y/oM PMH paroxysmal atrial fibrillation not on AC secondary to GI bleed, esophagitis, anemia, diabetes mellitus, hypertension, hyperlipidemia, CKD stage 3-4, BPH, cognitive impairment, recent COVID, acute cholecystitis in Dec 2023 s/p cholecystostomy tube placement on 12/22/23 presented to the ED with complaints of leaking around cholecystostomy tube and difficulty flushing the tube. A CT abdomen/pelvis with contrast was done in ED, tube noted to be in place, he was evaluated by surgery, tube was flushed without difficulty. While in ED, he was noted to have coffee ground emesis and pallor with some abdominal pain and transient drop in Hb. GI was consulted, he had recently had an EGD in January 2024 with grade A esophagitis. Hb improved, hence no additional work up recommended by GI. Course complicated by hypotension and hypoxia on 2/26, RRT was called and he was transferred to ICU for need for pressors, [place] oxygen requirement, likely septic shock from aspiration. Cultures were sent, empiric antibiotics were initiated. Also received steroids. Patient noted to have worsening renal function and was anuric, nephrology was consulted and HD was initiated on 2/27/24. HD was continued. He was weaned off Levophed and was transferred to medical floor on 2/29. Surgery follow up was done, recommended cholecystectomy once medically stable. While on medical floor, he was noted to have worsening leucocytosis, ID consultation was requested, cultures repeated, Zosyn was initiated. On 3/3, he was noted to have worsening hypoxia, ICU consult was requested, NIV was placed, CXR with left lung field opacification. He failed trial of BiPAP and was intubated on 3/3 and required Precedex and low dose of Levophed. His cultures remained negative. Palliative care was consulted, goals of care were discussed with family, they wished patient to be full code. Patient had waxing and waning mental status, initially required tube feeds. He was extubated on 3/4. HD was continued. He was transferred back to medical floor on 3/6/24. IR consulted for PermCath placement. He was evaluated by speech and swallow and diet was advanced. His cholecystostomy tubing broke on 3/9, IR consulted for tube exhange. CT head was done, no acute changes noted. CT chest and abdomen was done, Persistent subcapsular fluid collections along the right lateral liver, suspicious for developing biloma. Improved left lower lobe aeration.  IR evaluation requested for biloma, he underwent cholecystostomy tube exchange and a cholangiogram was done noted for abnormality near gallbladder neck, concerning for GB defect possible cause of biloma, a 12 F catheter was placed into the biloma. Surgery follow up requested to assess for cholecystectomy Surgery recommended GI evaluation for possible CBD stent placement to control leakage from GB neck. ERCP was scheduled by GI on 3/12 but family requested additional time before proceeding with intervention. repeat ERCP unsuccessful. plan for conservative mgmt currently .     Acute metabolic encephalopathy 2/2 recurrent sepsis, underlying RON  - Long and complicated course attributing to mental status  - continue HD per schedule  - CT head repeated, no acute changes noted  - monitor WBC  - completed course of Zosyn  - repeat cultures if febrile  - aspiration precautions   prn seroquel to better redirect patient when "agitated"    RON 2/2 ATN with underlying CKD stage 4, now on HD  -Continue HD per schedule  -nephrology following  -continue Epogen for anemia sec underlying CKD  -now s/p PermCath placement by IR on 3/12    S/p septic shock  - s/p Midodrine  - previously on Levophed and stress dose steroids    Atrial fibrillation  - Not on AC sec GI bleed  - on Dronedarone    H/o acute cholecystitis s/p cholecystostomy tube placement, now with biloma sec GB neck leak s/p drain placement  - IR intervention appreciated, s/p tube exchange and drain placement for biloma  - surgery recommendations noted, cholecystectomy deferred for now  - GI signed off   - s/p ERCP on 3/14, unsuccessful    Hypertension, hyperlipidemia  - Not on anti hypertensives, BP stable  - recent shock  - continue statin    Diabetes mellitus  - A1C 7.7  - FS with ISS  - Continue Lantus 10 units   - Admelog 3 units TID with meals--hold     Grade A esophagitis, anemia  - Hb stable  - GI recs from early this admission noted  - Protonix 40mg qd    DVT ppx  - SCD    PT eval--NITHIN  dc planning

## 2024-03-20 NOTE — PROGRESS NOTE ADULT - SUBJECTIVE AND OBJECTIVE BOX
NEPHROLOGY INTERVAL HPI/OVERNIGHT EVENTS:    no new events.    MEDICATIONS  (STANDING):  buDESOnide    Inhalation Suspension 0.5 milliGRAM(s) Inhalation every 12 hours  chlorhexidine 2% Cloths 1 Application(s) Topical <User Schedule>  dextrose 5%. 1000 milliLiter(s) (50 mL/Hr) IV Continuous <Continuous>  dextrose 5%. 1000 milliLiter(s) (100 mL/Hr) IV Continuous <Continuous>  dextrose 50% Injectable 25 Gram(s) IV Push once  dextrose 50% Injectable 12.5 Gram(s) IV Push once  dextrose 50% Injectable 25 Gram(s) IV Push once  dronedarone 400 milliGRAM(s) Oral two times a day  epoetin kristal-epbx (RETACRIT) Injectable 62509 Unit(s) IV Push <User Schedule>  ferrous    sulfate 325 milliGRAM(s) Oral daily  glucagon  Injectable 1 milliGRAM(s) IntraMuscular once  insulin glargine Injectable (LANTUS) 10 Unit(s) SubCutaneous at bedtime  insulin lispro (ADMELOG) corrective regimen sliding scale   SubCutaneous at bedtime  insulin lispro (ADMELOG) corrective regimen sliding scale   SubCutaneous three times a day before meals  pantoprazole    Tablet 40 milliGRAM(s) Oral before breakfast  senna 2 Tablet(s) Oral at bedtime  simvastatin 20 milliGRAM(s) Oral at bedtime    MEDICATIONS  (PRN):  acetaminophen     Tablet .. 650 milliGRAM(s) Oral every 6 hours PRN Temp greater or equal to 38C (100.4F), Mild Pain (1 - 3)  albuterol/ipratropium for Nebulization 3 milliLiter(s) Nebulizer every 6 hours PRN wheezing or SOB  aluminum hydroxide/magnesium hydroxide/simethicone Suspension 30 milliLiter(s) Oral every 4 hours PRN Dyspepsia  artificial  tears Solution 1 Drop(s) Both EYES three times a day PRN Dry Eyes  dextrose Oral Gel 15 Gram(s) Oral once PRN Blood Glucose LESS THAN 70 milliGRAM(s)/deciliter  melatonin 3 milliGRAM(s) Oral at bedtime PRN Insomnia  ondansetron Injectable 4 milliGRAM(s) IV Push every 8 hours PRN Nausea and/or Vomiting  QUEtiapine 12.5 milliGRAM(s) Oral daily PRN agitation  sodium chloride 0.9% lock flush 10 milliLiter(s) IV Push every 1 hour PRN Pre/post blood products, medications, blood draw, and to maintain line patency      Allergies    No Known Allergies        Vital Signs Last 24 Hrs  T(C): 37.1 (20 Mar 2024 08:00), Max: 37.1 (20 Mar 2024 08:00)  T(F): 98.7 (20 Mar 2024 08:00), Max: 98.7 (20 Mar 2024 08:00)  HR: 82 (20 Mar 2024 08:00) (81 - 89)  BP: 128/67 (20 Mar 2024 08:00) (104/64 - 148/81)  BP(mean): --  RR: 18 (20 Mar 2024 08:00) (18 - 19)  SpO2: 100% (20 Mar 2024 08:00) (92% - 100%)    Parameters below as of 20 Mar 2024 08:00  Patient On (Oxygen Delivery Method): nasal cannula        PHYSICAL EXAM:    GENERAL: appears  chronically ill in  dialysis  bed  HEAD:  wnl  EYES: open  ENMT:   NECK: right  sided  permacath  with clean  dressing  NERVOUS SYSTEM:  awake, verbal, appears  confused at times  CHEST/LUNG: No 02, no  wheezes  HEART: no  rub, rate reg  ABDOMEN: drainage  site  same  EXTREMITIES:  diffuse  muscle  wasting, no leg  edema  LYMPH:   SKIN: pale    LABS:                        9.8    6.80  )-----------( 338      ( 20 Mar 2024 08:37 )             30.8     03-20    140  |  101  |  28.9<H>  ----------------------------<  173<H>  3.9   |  27.0  |  4.22<H>    Ca    8.5      20 Mar 2024 08:37        Urinalysis Basic - ( 20 Mar 2024 08:37 )    Color: x / Appearance: x / SG: x / pH: x  Gluc: 173 mg/dL / Ketone: x  / Bili: x / Urobili: x   Blood: x / Protein: x / Nitrite: x   Leuk Esterase: x / RBC: x / WBC x   Sq Epi: x / Non Sq Epi: x / Bacteria: x              RADIOLOGY & ADDITIONAL TESTS:   normal...

## 2024-03-21 ENCOUNTER — TRANSCRIPTION ENCOUNTER (OUTPATIENT)
Age: 84
End: 2024-03-21

## 2024-03-21 VITALS
RESPIRATION RATE: 18 BRPM | SYSTOLIC BLOOD PRESSURE: 131 MMHG | OXYGEN SATURATION: 96 % | DIASTOLIC BLOOD PRESSURE: 78 MMHG | TEMPERATURE: 98 F | HEART RATE: 87 BPM

## 2024-03-21 LAB
GLUCOSE BLDC GLUCOMTR-MCNC: 159 MG/DL — HIGH (ref 70–99)
GLUCOSE BLDC GLUCOMTR-MCNC: 220 MG/DL — HIGH (ref 70–99)
GLUCOSE BLDC GLUCOMTR-MCNC: 262 MG/DL — HIGH (ref 70–99)

## 2024-03-21 PROCEDURE — 86922 COMPATIBILITY TEST ANTIGLOB: CPT

## 2024-03-21 PROCEDURE — 86704 HEP B CORE ANTIBODY TOTAL: CPT

## 2024-03-21 PROCEDURE — 80048 BASIC METABOLIC PNL TOTAL CA: CPT

## 2024-03-21 PROCEDURE — 96374 THER/PROPH/DIAG INJ IV PUSH: CPT

## 2024-03-21 PROCEDURE — C1889: CPT

## 2024-03-21 PROCEDURE — C9399: CPT

## 2024-03-21 PROCEDURE — 70450 CT HEAD/BRAIN W/O DYE: CPT | Mod: MC

## 2024-03-21 PROCEDURE — 83540 ASSAY OF IRON: CPT

## 2024-03-21 PROCEDURE — 94640 AIRWAY INHALATION TREATMENT: CPT

## 2024-03-21 PROCEDURE — 97116 GAIT TRAINING THERAPY: CPT

## 2024-03-21 PROCEDURE — 74178 CT ABD&PLV WO CNTR FLWD CNTR: CPT | Mod: MC

## 2024-03-21 PROCEDURE — 93971 EXTREMITY STUDY: CPT

## 2024-03-21 PROCEDURE — 85027 COMPLETE CBC AUTOMATED: CPT

## 2024-03-21 PROCEDURE — 84295 ASSAY OF SERUM SODIUM: CPT

## 2024-03-21 PROCEDURE — 96375 TX/PRO/DX INJ NEW DRUG ADDON: CPT

## 2024-03-21 PROCEDURE — 76942 ECHO GUIDE FOR BIOPSY: CPT

## 2024-03-21 PROCEDURE — 87070 CULTURE OTHR SPECIMN AEROBIC: CPT

## 2024-03-21 PROCEDURE — 86880 COOMBS TEST DIRECT: CPT

## 2024-03-21 PROCEDURE — 82728 ASSAY OF FERRITIN: CPT

## 2024-03-21 PROCEDURE — 82803 BLOOD GASES ANY COMBINATION: CPT

## 2024-03-21 PROCEDURE — C1729: CPT

## 2024-03-21 PROCEDURE — 83690 ASSAY OF LIPASE: CPT

## 2024-03-21 PROCEDURE — 36415 COLL VENOUS BLD VENIPUNCTURE: CPT

## 2024-03-21 PROCEDURE — 86901 BLOOD TYPING SEROLOGIC RH(D): CPT

## 2024-03-21 PROCEDURE — 86803 HEPATITIS C AB TEST: CPT

## 2024-03-21 PROCEDURE — 83036 HEMOGLOBIN GLYCOSYLATED A1C: CPT

## 2024-03-21 PROCEDURE — 99285 EMERGENCY DEPT VISIT HI MDM: CPT

## 2024-03-21 PROCEDURE — 87640 STAPH A DNA AMP PROBE: CPT

## 2024-03-21 PROCEDURE — 94760 N-INVAS EAR/PLS OXIMETRY 1: CPT

## 2024-03-21 PROCEDURE — 71045 X-RAY EXAM CHEST 1 VIEW: CPT

## 2024-03-21 PROCEDURE — 82435 ASSAY OF BLOOD CHLORIDE: CPT

## 2024-03-21 PROCEDURE — 83735 ASSAY OF MAGNESIUM: CPT

## 2024-03-21 PROCEDURE — 85018 HEMOGLOBIN: CPT

## 2024-03-21 PROCEDURE — P9047: CPT

## 2024-03-21 PROCEDURE — 87641 MR-STAPH DNA AMP PROBE: CPT

## 2024-03-21 PROCEDURE — 81001 URINALYSIS AUTO W/SCOPE: CPT

## 2024-03-21 PROCEDURE — 74330 X-RAY BILE/PANC ENDOSCOPY: CPT

## 2024-03-21 PROCEDURE — 86870 RBC ANTIBODY IDENTIFICATION: CPT

## 2024-03-21 PROCEDURE — 94003 VENT MGMT INPAT SUBQ DAY: CPT

## 2024-03-21 PROCEDURE — 82607 VITAMIN B-12: CPT

## 2024-03-21 PROCEDURE — 92526 ORAL FUNCTION THERAPY: CPT

## 2024-03-21 PROCEDURE — 99261: CPT

## 2024-03-21 PROCEDURE — C1773: CPT

## 2024-03-21 PROCEDURE — 94660 CPAP INITIATION&MGMT: CPT

## 2024-03-21 PROCEDURE — 83880 ASSAY OF NATRIURETIC PEPTIDE: CPT

## 2024-03-21 PROCEDURE — 84466 ASSAY OF TRANSFERRIN: CPT

## 2024-03-21 PROCEDURE — C1769: CPT

## 2024-03-21 PROCEDURE — 87040 BLOOD CULTURE FOR BACTERIA: CPT

## 2024-03-21 PROCEDURE — 86850 RBC ANTIBODY SCREEN: CPT

## 2024-03-21 PROCEDURE — 74176 CT ABD & PELVIS W/O CONTRAST: CPT | Mod: MC

## 2024-03-21 PROCEDURE — 84145 PROCALCITONIN (PCT): CPT

## 2024-03-21 PROCEDURE — 0225U NFCT DS DNA&RNA 21 SARSCOV2: CPT

## 2024-03-21 PROCEDURE — 84132 ASSAY OF SERUM POTASSIUM: CPT

## 2024-03-21 PROCEDURE — 83550 IRON BINDING TEST: CPT

## 2024-03-21 PROCEDURE — 85014 HEMATOCRIT: CPT

## 2024-03-21 PROCEDURE — 85730 THROMBOPLASTIN TIME PARTIAL: CPT

## 2024-03-21 PROCEDURE — 77001 FLUOROGUIDE FOR VEIN DEVICE: CPT

## 2024-03-21 PROCEDURE — 82962 GLUCOSE BLOOD TEST: CPT

## 2024-03-21 PROCEDURE — 86905 BLOOD TYPING RBC ANTIGENS: CPT

## 2024-03-21 PROCEDURE — 36600 WITHDRAWAL OF ARTERIAL BLOOD: CPT

## 2024-03-21 PROCEDURE — 97163 PT EVAL HIGH COMPLEX 45 MIN: CPT

## 2024-03-21 PROCEDURE — 80053 COMPREHEN METABOLIC PANEL: CPT

## 2024-03-21 PROCEDURE — 93005 ELECTROCARDIOGRAM TRACING: CPT

## 2024-03-21 PROCEDURE — 82947 ASSAY GLUCOSE BLOOD QUANT: CPT

## 2024-03-21 PROCEDURE — 84443 ASSAY THYROID STIM HORMONE: CPT

## 2024-03-21 PROCEDURE — 85610 PROTHROMBIN TIME: CPT

## 2024-03-21 PROCEDURE — 82140 ASSAY OF AMMONIA: CPT

## 2024-03-21 PROCEDURE — 86706 HEP B SURFACE ANTIBODY: CPT

## 2024-03-21 PROCEDURE — 96376 TX/PRO/DX INJ SAME DRUG ADON: CPT

## 2024-03-21 PROCEDURE — 85025 COMPLETE CBC W/AUTO DIFF WBC: CPT

## 2024-03-21 PROCEDURE — 80162 ASSAY OF DIGOXIN TOTAL: CPT

## 2024-03-21 PROCEDURE — 87340 HEPATITIS B SURFACE AG IA: CPT

## 2024-03-21 PROCEDURE — 82330 ASSAY OF CALCIUM: CPT

## 2024-03-21 PROCEDURE — 83605 ASSAY OF LACTIC ACID: CPT

## 2024-03-21 PROCEDURE — 97530 THERAPEUTIC ACTIVITIES: CPT

## 2024-03-21 PROCEDURE — 84100 ASSAY OF PHOSPHORUS: CPT

## 2024-03-21 PROCEDURE — 99239 HOSP IP/OBS DSCHRG MGMT >30: CPT

## 2024-03-21 PROCEDURE — 86900 BLOOD TYPING SEROLOGIC ABO: CPT

## 2024-03-21 PROCEDURE — 76000 FLUOROSCOPY <1 HR PHYS/QHP: CPT

## 2024-03-21 PROCEDURE — 71250 CT THORAX DX C-: CPT | Mod: MC

## 2024-03-21 PROCEDURE — 94002 VENT MGMT INPAT INIT DAY: CPT

## 2024-03-21 RX ORDER — IPRATROPIUM/ALBUTEROL SULFATE 18-103MCG
3 AEROSOL WITH ADAPTER (GRAM) INHALATION
Qty: 0 | Refills: 0 | DISCHARGE
Start: 2024-03-21

## 2024-03-21 RX ORDER — SENNA PLUS 8.6 MG/1
2 TABLET ORAL
Qty: 0 | Refills: 0 | DISCHARGE
Start: 2024-03-21

## 2024-03-21 RX ORDER — INSULIN GLARGINE 100 [IU]/ML
30 INJECTION, SOLUTION SUBCUTANEOUS
Refills: 0 | DISCHARGE

## 2024-03-21 RX ORDER — FERROUS SULFATE 325(65) MG
1 TABLET ORAL
Qty: 0 | Refills: 0 | DISCHARGE
Start: 2024-03-21

## 2024-03-21 RX ORDER — INSULIN GLARGINE 100 [IU]/ML
10 INJECTION, SOLUTION SUBCUTANEOUS
Qty: 0 | Refills: 0 | DISCHARGE
Start: 2024-03-21

## 2024-03-21 RX ORDER — ACETAMINOPHEN 500 MG
2 TABLET ORAL
Qty: 0 | Refills: 0 | DISCHARGE
Start: 2024-03-21

## 2024-03-21 RX ADMIN — Medication 3 MILLILITER(S): at 08:42

## 2024-03-21 RX ADMIN — Medication 0.5 MILLIGRAM(S): at 08:41

## 2024-03-21 RX ADMIN — Medication 6: at 12:44

## 2024-03-21 RX ADMIN — PANTOPRAZOLE SODIUM 40 MILLIGRAM(S): 20 TABLET, DELAYED RELEASE ORAL at 05:22

## 2024-03-21 RX ADMIN — Medication 325 MILLIGRAM(S): at 13:32

## 2024-03-21 RX ADMIN — Medication 4: at 16:07

## 2024-03-21 RX ADMIN — Medication 2: at 08:01

## 2024-03-21 RX ADMIN — CHLORHEXIDINE GLUCONATE 1 APPLICATION(S): 213 SOLUTION TOPICAL at 05:19

## 2024-03-21 RX ADMIN — DRONEDARONE 400 MILLIGRAM(S): 400 TABLET, FILM COATED ORAL at 05:20

## 2024-03-21 NOTE — PROGRESS NOTE ADULT - NUTRITIONAL ASSESSMENT
This patient has been assessed with a concern for Malnutrition and has been determined to have a diagnosis/diagnoses of Moderate protein-calorie malnutrition.    This patient is being managed with:   Diet Pureed-  Consistent Carbohydrate {No Snacks} (CSTCHO)  Moderately Thick Liquids (MODTHICKLIQS)  Supplement Feeding Modality:  Oral  Nepro Cans or Servings Per Day:  1       Frequency:  Two Times a day  Entered: Mar 18 2024 12:17PM  
This patient has been assessed with a concern for Malnutrition and has been determined to have a diagnosis/diagnoses of Moderate protein-calorie malnutrition.    This patient is being managed with:   Diet NPO after Midnight-     NPO Start Date: 13-Mar-2024   NPO Start Time: 23:59  Entered: Mar 13 2024  3:51PM    Diet NPO after Midnight-     NPO Start Date: 13-Mar-2024   NPO Start Time: 23:59  Except Medications  Entered: Mar 13 2024  3:43PM    Diet Pureed-  Consistent Carbohydrate {Evening Snack} (CSTCHOSN)  Moderately Thick Liquids (MODTHICKLIQS)  For patients receiving Renal Replacement - No Protein Restr No Conc K No Conc Phos Low  Sodium (RENAL)  Entered: Mar  7 2024  5:24PM  
This patient has been assessed with a concern for Malnutrition and has been determined to have a diagnosis/diagnoses of Moderate protein-calorie malnutrition.    This patient is being managed with:   Diet Pureed-  Consistent Carbohydrate {Evening Snack} (CSTCHOSN)  Moderately Thick Liquids (MODTHICKLIQS)  For patients receiving Renal Replacement - No Protein Restr No Conc K No Conc Phos Low  Sodium (RENAL)  Entered: Mar  7 2024  5:24PM  
This patient has been assessed with a concern for Malnutrition and has been determined to have a diagnosis/diagnoses of Moderate protein-calorie malnutrition.    This patient is being managed with:   Diet Pureed-  Consistent Carbohydrate {No Snacks} (CSTCHO)  Moderately Thick Liquids (MODTHICKLIQS)  Supplement Feeding Modality:  Oral  Nepro Cans or Servings Per Day:  1       Frequency:  Two Times a day  Entered: Mar 18 2024 12:17PM  
This patient has been assessed with a concern for Malnutrition and has been determined to have a diagnosis/diagnoses of Moderate protein-calorie malnutrition.    This patient is being managed with:   Diet NPO after Midnight-     NPO Start Date: 13-Mar-2024   NPO Start Time: 23:59  Entered: Mar 13 2024  3:51PM    Diet NPO after Midnight-     NPO Start Date: 13-Mar-2024   NPO Start Time: 23:59  Except Medications  Entered: Mar 13 2024  3:43PM    Diet Pureed-  Consistent Carbohydrate {Evening Snack} (CSTCHOSN)  Moderately Thick Liquids (MODTHICKLIQS)  For patients receiving Renal Replacement - No Protein Restr No Conc K No Conc Phos Low  Sodium (RENAL)  Entered: Mar  7 2024  5:24PM  
This patient has been assessed with a concern for Malnutrition and has been determined to have a diagnosis/diagnoses of Moderate protein-calorie malnutrition.    This patient is being managed with:   Diet NPO after Midnight-     NPO Start Date: 13-Mar-2024   NPO Start Time: 23:59  Entered: Mar 13 2024  3:51PM    Diet NPO after Midnight-     NPO Start Date: 13-Mar-2024   NPO Start Time: 23:59  Except Medications  Entered: Mar 13 2024  3:43PM    Diet Pureed-  Consistent Carbohydrate {Evening Snack} (CSTCHOSN)  Moderately Thick Liquids (MODTHICKLIQS)  For patients receiving Renal Replacement - No Protein Restr No Conc K No Conc Phos Low  Sodium (RENAL)  Entered: Mar  7 2024  5:24PM  
This patient has been assessed with a concern for Malnutrition and has been determined to have a diagnosis/diagnoses of Moderate protein-calorie malnutrition.    This patient is being managed with:   Diet Pureed-  Consistent Carbohydrate {Evening Snack} (CSTCHOSN)  Moderately Thick Liquids (MODTHICKLIQS)  For patients receiving Renal Replacement - No Protein Restr No Conc K No Conc Phos Low  Sodium (RENAL)  Entered: Mar  7 2024  5:24PM  
This patient has been assessed with a concern for Malnutrition and has been determined to have a diagnosis/diagnoses of Moderate protein-calorie malnutrition.    This patient is being managed with:   Diet Pureed-  Consistent Carbohydrate {No Snacks} (CSTCHO)  Moderately Thick Liquids (MODTHICKLIQS)  Supplement Feeding Modality:  Oral  Nepro Cans or Servings Per Day:  1       Frequency:  Two Times a day  Entered: Mar 18 2024 12:17PM

## 2024-03-21 NOTE — DISCHARGE NOTE NURSING/CASE MANAGEMENT/SOCIAL WORK - NSDCPEFALRISK_GEN_ALL_CORE
For information on Fall & Injury Prevention, visit: [email or web address] OR  [email or web address] OR  https://www.cdc.gov/steadi/patient.html

## 2024-03-21 NOTE — PROGRESS NOTE ADULT - PROVIDER SPECIALTY LIST ADULT
Critical Care
Critical Care
Hospitalist
Infectious Disease
Internal Medicine
Internal Medicine
Intervent Radiology
MICU
Nephrology
Palliative Care
Surgery
Gastroenterology
Hospitalist
Infectious Disease
Internal Medicine
MICU
Nephrology
Palliative Care
Surgery
Gastroenterology
Gastroenterology
Hospitalist
Infectious Disease
Infectious Disease
Internal Medicine
MICU
Nephrology
Surgery
Surgery
Internal Medicine
Internal Medicine
Intervent Radiology
Surgery

## 2024-03-21 NOTE — PROGRESS NOTE ADULT - SUBJECTIVE AND OBJECTIVE BOX
NEPHROLOGY INTERVAL HPI/OVERNIGHT EVENTS:    No new events.    MEDICATIONS  (STANDING):  buDESOnide    Inhalation Suspension 0.5 milliGRAM(s) Inhalation every 12 hours  chlorhexidine 2% Cloths 1 Application(s) Topical <User Schedule>  dextrose 5%. 1000 milliLiter(s) (50 mL/Hr) IV Continuous <Continuous>  dextrose 5%. 1000 milliLiter(s) (100 mL/Hr) IV Continuous <Continuous>  dextrose 50% Injectable 25 Gram(s) IV Push once  dextrose 50% Injectable 12.5 Gram(s) IV Push once  dextrose 50% Injectable 25 Gram(s) IV Push once  dronedarone 400 milliGRAM(s) Oral two times a day  epoetin kristal-epbx (RETACRIT) Injectable 55831 Unit(s) IV Push <User Schedule>  ferrous    sulfate 325 milliGRAM(s) Oral daily  glucagon  Injectable 1 milliGRAM(s) IntraMuscular once  insulin glargine Injectable (LANTUS) 10 Unit(s) SubCutaneous at bedtime  insulin lispro (ADMELOG) corrective regimen sliding scale   SubCutaneous at bedtime  insulin lispro (ADMELOG) corrective regimen sliding scale   SubCutaneous three times a day before meals  pantoprazole    Tablet 40 milliGRAM(s) Oral before breakfast  senna 2 Tablet(s) Oral at bedtime  simvastatin 20 milliGRAM(s) Oral at bedtime    MEDICATIONS  (PRN):  acetaminophen     Tablet .. 650 milliGRAM(s) Oral every 6 hours PRN Temp greater or equal to 38C (100.4F), Mild Pain (1 - 3)  albuterol/ipratropium for Nebulization 3 milliLiter(s) Nebulizer every 6 hours PRN wheezing or SOB  aluminum hydroxide/magnesium hydroxide/simethicone Suspension 30 milliLiter(s) Oral every 4 hours PRN Dyspepsia  artificial  tears Solution 1 Drop(s) Both EYES three times a day PRN Dry Eyes  dextrose Oral Gel 15 Gram(s) Oral once PRN Blood Glucose LESS THAN 70 milliGRAM(s)/deciliter  melatonin 3 milliGRAM(s) Oral at bedtime PRN Insomnia  ondansetron Injectable 4 milliGRAM(s) IV Push every 8 hours PRN Nausea and/or Vomiting  QUEtiapine 12.5 milliGRAM(s) Oral daily PRN agitation  sodium chloride 0.9% lock flush 10 milliLiter(s) IV Push every 1 hour PRN Pre/post blood products, medications, blood draw, and to maintain line patency      Allergies    No Known Allergies        Vital Signs Last 24 Hrs  T(C): 36.5 (21 Mar 2024 04:56), Max: 36.9 (20 Mar 2024 16:53)  T(F): 97.7 (21 Mar 2024 04:56), Max: 98.5 (20 Mar 2024 16:53)  HR: 81 (21 Mar 2024 08:42) (80 - 94)  BP: 99/58 (21 Mar 2024 04:56) (99/58 - 110/74)  BP(mean): --  RR: 18 (21 Mar 2024 04:56) (18 - 18)  SpO2: 86% (21 Mar 2024 08:42) (86% - 99%)    Parameters below as of 21 Mar 2024 08:42  Patient On (Oxygen Delivery Method): room air      T(C): 37.1 (20 Mar 2024 08:00), Max: 37.1 (20 Mar 2024 08:00)  T(F): 98.7 (20 Mar 2024 08:00), Max: 98.7 (20 Mar 2024 08:00)  HR: 82 (20 Mar 2024 08:00) (81 - 89)  BP: 128/67 (20 Mar 2024 08:00) (104/64 - 148/81)  BP(mean): --  RR: 18 (20 Mar 2024 08:00) (18 - 19)  SpO2: 100% (20 Mar 2024 08:00) (92% - 100%)    Parameters below as of 20 Mar 2024 08:00  Patient On (Oxygen Delivery Method): nasal cannula        PHYSICAL EXAM:    GENERAL: appears  chronically ill in bed supine  HEAD:  wnl  EYES: open  ENMT:   NECK: right  sided  permacath  with clean  dressing  NERVOUS SYSTEM:  awake, verbal, appears  confused at times  CHEST/LUNG: No 02, no  wheezes  HEART: no  rub, rate reg  ABDOMEN: drainage tube  with yellow fluid  EXTREMITIES:  diffuse  muscle  wasting, no leg  edema  LYMPH:   SKIN: pale    LABS:     03-20    140  |  101  |  28.9<H>  ----------------------------<  173<H>  3.9   |  27.0  |  4.22<H>    Ca    8.5      20 Mar 2024 08:37        Urinalysis Basic - ( 20 Mar 2024 08:37 )    Color: x / Appearance: x / SG: x / pH: x  Gluc: 173 mg/dL / Ketone: x  / Bili: x / Urobili: x   Blood: x / Protein: x / Nitrite: x   Leuk Esterase: x / RBC: x / WBC x   Sq Epi: x / Non Sq Epi: x / Bacteria: x              RADIOLOGY & ADDITIONAL TESTS:

## 2024-03-21 NOTE — DISCHARGE NOTE NURSING/CASE MANAGEMENT/SOCIAL WORK - PATIENT PORTAL LINK FT
You can access the FollowMyHealth Patient Portal offered by [facility] by registering at the following website: [email or web address]. By joining [facility]’s FollowMyHealth portal, you will also be able to view your health information using other applications (apps) compatible with our system.

## 2024-04-10 ENCOUNTER — APPOINTMENT (OUTPATIENT)
Dept: GASTROENTEROLOGY | Facility: CLINIC | Age: 84
End: 2024-04-10

## 2024-06-05 ENCOUNTER — EMERGENCY (EMERGENCY)
Facility: HOSPITAL | Age: 84
LOS: 1 days | Discharge: HOME | End: 2024-06-05
Attending: EMERGENCY MEDICINE
Payer: MEDICARE

## 2024-06-05 VITALS
TEMPERATURE: 98 F | SYSTOLIC BLOOD PRESSURE: 122 MMHG | WEIGHT: 119.93 LBS | OXYGEN SATURATION: 100 % | DIASTOLIC BLOOD PRESSURE: 70 MMHG | HEIGHT: 66 IN | HEART RATE: 80 BPM | RESPIRATION RATE: 16 BRPM

## 2024-06-05 VITALS
TEMPERATURE: 98 F | SYSTOLIC BLOOD PRESSURE: 162 MMHG | OXYGEN SATURATION: 94 % | RESPIRATION RATE: 18 BRPM | DIASTOLIC BLOOD PRESSURE: 90 MMHG | HEART RATE: 90 BPM

## 2024-06-05 DIAGNOSIS — Z98.890 OTHER SPECIFIED POSTPROCEDURAL STATES: Chronic | ICD-10-CM

## 2024-06-05 LAB
ALBUMIN SERPL ELPH-MCNC: 3.3 G/DL — SIGNIFICANT CHANGE UP (ref 3.3–5.2)
ALP SERPL-CCNC: 156 U/L — HIGH (ref 40–120)
ALT FLD-CCNC: 22 U/L — SIGNIFICANT CHANGE UP
ANION GAP SERPL CALC-SCNC: 13 MMOL/L — SIGNIFICANT CHANGE UP (ref 5–17)
APTT BLD: 29.7 SEC — SIGNIFICANT CHANGE UP (ref 24.5–35.6)
AST SERPL-CCNC: 38 U/L — SIGNIFICANT CHANGE UP
BASOPHILS # BLD AUTO: 0.02 K/UL — SIGNIFICANT CHANGE UP (ref 0–0.2)
BASOPHILS NFR BLD AUTO: 0.3 % — SIGNIFICANT CHANGE UP (ref 0–2)
BILIRUB DIRECT SERPL-MCNC: 0.1 MG/DL — SIGNIFICANT CHANGE UP (ref 0–0.3)
BILIRUB INDIRECT FLD-MCNC: 0.2 MG/DL — SIGNIFICANT CHANGE UP (ref 0.2–1)
BILIRUB SERPL-MCNC: 0.3 MG/DL — LOW (ref 0.4–2)
BILIRUB SERPL-MCNC: 0.3 MG/DL — LOW (ref 0.4–2)
BUN SERPL-MCNC: 45.2 MG/DL — HIGH (ref 8–20)
CALCIUM SERPL-MCNC: 9.4 MG/DL — SIGNIFICANT CHANGE UP (ref 8.4–10.5)
CHLORIDE SERPL-SCNC: 103 MMOL/L — SIGNIFICANT CHANGE UP (ref 96–108)
CO2 SERPL-SCNC: 21 MMOL/L — LOW (ref 22–29)
CREAT SERPL-MCNC: 1.75 MG/DL — HIGH (ref 0.5–1.3)
EGFR: 38 ML/MIN/1.73M2 — LOW
EOSINOPHIL # BLD AUTO: 0.32 K/UL — SIGNIFICANT CHANGE UP (ref 0–0.5)
EOSINOPHIL NFR BLD AUTO: 4.8 % — SIGNIFICANT CHANGE UP (ref 0–6)
GLUCOSE SERPL-MCNC: 166 MG/DL — HIGH (ref 70–99)
HCT VFR BLD CALC: 32.3 % — LOW (ref 39–50)
HGB BLD-MCNC: 10 G/DL — LOW (ref 13–17)
IMM GRANULOCYTES NFR BLD AUTO: 0.3 % — SIGNIFICANT CHANGE UP (ref 0–0.9)
INR BLD: 0.96 RATIO — SIGNIFICANT CHANGE UP (ref 0.85–1.18)
LIDOCAIN IGE QN: 23 U/L — SIGNIFICANT CHANGE UP (ref 22–51)
LYMPHOCYTES # BLD AUTO: 1.59 K/UL — SIGNIFICANT CHANGE UP (ref 1–3.3)
LYMPHOCYTES # BLD AUTO: 23.8 % — SIGNIFICANT CHANGE UP (ref 13–44)
MCHC RBC-ENTMCNC: 28.2 PG — SIGNIFICANT CHANGE UP (ref 27–34)
MCHC RBC-ENTMCNC: 31 GM/DL — LOW (ref 32–36)
MCV RBC AUTO: 91.2 FL — SIGNIFICANT CHANGE UP (ref 80–100)
MONOCYTES # BLD AUTO: 0.62 K/UL — SIGNIFICANT CHANGE UP (ref 0–0.9)
MONOCYTES NFR BLD AUTO: 9.3 % — SIGNIFICANT CHANGE UP (ref 2–14)
NEUTROPHILS # BLD AUTO: 4.12 K/UL — SIGNIFICANT CHANGE UP (ref 1.8–7.4)
NEUTROPHILS NFR BLD AUTO: 61.5 % — SIGNIFICANT CHANGE UP (ref 43–77)
PLATELET # BLD AUTO: 266 K/UL — SIGNIFICANT CHANGE UP (ref 150–400)
POTASSIUM SERPL-MCNC: 5.7 MMOL/L — HIGH (ref 3.5–5.3)
POTASSIUM SERPL-SCNC: 5.7 MMOL/L — HIGH (ref 3.5–5.3)
PROT SERPL-MCNC: 7.2 G/DL — SIGNIFICANT CHANGE UP (ref 6.6–8.7)
PROTHROM AB SERPL-ACNC: 10.7 SEC — SIGNIFICANT CHANGE UP (ref 9.5–13)
RBC # BLD: 3.54 M/UL — LOW (ref 4.2–5.8)
RBC # FLD: 16.1 % — HIGH (ref 10.3–14.5)
SODIUM SERPL-SCNC: 137 MMOL/L — SIGNIFICANT CHANGE UP (ref 135–145)
WBC # BLD: 6.69 K/UL — SIGNIFICANT CHANGE UP (ref 3.8–10.5)
WBC # FLD AUTO: 6.69 K/UL — SIGNIFICANT CHANGE UP (ref 3.8–10.5)

## 2024-06-05 PROCEDURE — 47536 EXCHANGE BILIARY DRG CATH: CPT

## 2024-06-05 PROCEDURE — 99285 EMERGENCY DEPT VISIT HI MDM: CPT

## 2024-06-05 PROCEDURE — 75984 XRAY CONTROL CATHETER CHANGE: CPT

## 2024-06-05 PROCEDURE — 85610 PROTHROMBIN TIME: CPT

## 2024-06-05 PROCEDURE — 83690 ASSAY OF LIPASE: CPT

## 2024-06-05 PROCEDURE — 85025 COMPLETE CBC W/AUTO DIFF WBC: CPT

## 2024-06-05 PROCEDURE — 80053 COMPREHEN METABOLIC PANEL: CPT

## 2024-06-05 PROCEDURE — 82247 BILIRUBIN TOTAL: CPT

## 2024-06-05 PROCEDURE — C1729: CPT

## 2024-06-05 PROCEDURE — 85730 THROMBOPLASTIN TIME PARTIAL: CPT

## 2024-06-05 PROCEDURE — 36415 COLL VENOUS BLD VENIPUNCTURE: CPT

## 2024-06-05 PROCEDURE — 99284 EMERGENCY DEPT VISIT MOD MDM: CPT

## 2024-06-05 PROCEDURE — C1769: CPT

## 2024-06-05 PROCEDURE — 82248 BILIRUBIN DIRECT: CPT

## 2024-06-05 NOTE — ED PROVIDER NOTE - OBJECTIVE STATEMENT
84-year-old male past medical history of CKD, dementia, diabetes, hypertension, cholecystitis presents with drain dislodgment.  Patient was treated nonoperatively for acute cholecystitis with a cholecystostomy tube, subsequently had a biliary leak and biloma status post IR drain placement.  Patient reports that today his biliary drain dislodged slightly, the suture broke through the skin and came out approximately 6 cm.  He reports no abdominal pain, nausea, fever, chills, and states he feels well.

## 2024-06-05 NOTE — ED ADULT NURSE NOTE - OBJECTIVE STATEMENT
Pt c/o dislodgement of sky tube. As per daughter pt had gallbladder infection recently and 2 jonathan tubes were placed for drainage. Denies fevers, chills, cp, sob, N/V/D

## 2024-06-05 NOTE — PROCEDURE NOTE - PROCEDURE FINDINGS AND DETAILS
The patient was brought to the  IR Dept for dislodged biloma tube, patient also due for sky tube exchange.  Under flouroscopy, biloma tube was injected with contrast which showed resolution of biloma with small residual fluid around pigtail catheter. this drain was cut and removed. Sky tube was injected with contrast and was in proper position, large gallstone noted again. Sky tube exchanged over wire for a new 10F catheter which was secured to skin with a suture.

## 2024-06-05 NOTE — ED PROVIDER NOTE - ABDOMINAL EXAM
+peg tube in place, +cholecystostomy tube in place with mild amount of drainage, +biliary drain to the RUQ quadrant with suture  from skin, disloadged approximately 6 cm, scant drainage in the bag/soft/nontender.../nondistended

## 2024-06-05 NOTE — ED ADULT TRIAGE NOTE - MODE OF ARRIVAL
Subjective:     Luis Handley is a 9 y.o. male here with mother. Patient brought in for Well Child and Sore Throat       History was provided by the mother.    Luis Handley is a 9 y.o. male who is brought in for this well-child visit.    Current Issues:  Current concerns include: sore throat today.  Currently menstruating? not applicable  Does patient snore? no     Review of Nutrition:  Current diet: picky but has been trying some things here and there  Balanced diet? yes    Social Screening:  Sibling relations: sister Ana Cristina as well as grown half sibs  Discipline concerns? no  Concerns regarding behavior with peers? no  School performance: doing well; no concerns  Secondhand smoke exposure? no     Screening Questions:  Risk factors for anemia: no  Risk factors for tuberculosis: no  Risk factors for dyslipidemia: no    Review of Systems   Constitutional: Negative for activity change, appetite change and fever.   HENT: Negative for congestion and sore throat.    Eyes: Negative for discharge and redness.   Respiratory: Negative for cough and wheezing.    Cardiovascular: Negative for chest pain and palpitations.   Gastrointestinal: Negative for constipation, diarrhea and vomiting.   Genitourinary: Negative for difficulty urinating, enuresis and hematuria.   Skin: Negative for rash and wound.   Neurological: Negative for syncope and headaches.   Psychiatric/Behavioral: Negative for behavioral problems and sleep disturbance.         Objective:     Physical Exam   Constitutional: He appears well-developed and well-nourished. He is active. No distress.   HENT:   Right Ear: Tympanic membrane normal.   Left Ear: Tympanic membrane normal.   Nose: Nose normal. No nasal discharge.   Mouth/Throat: Mucous membranes are moist. No tonsillar exudate. Oropharynx is clear. Pharynx is normal.   Eyes: Pupils are equal, round, and reactive to light. Conjunctivae and EOM are normal.   Neck: Normal range of motion. No neck adenopathy.    Cardiovascular: Normal rate and regular rhythm.   No murmur heard.  Pulmonary/Chest: Effort normal and breath sounds normal. There is normal air entry. No respiratory distress.   Abdominal: Soft. Bowel sounds are normal. He exhibits no distension. There is no hepatosplenomegaly. There is no tenderness.   Musculoskeletal: Normal range of motion. He exhibits no edema or deformity.   Neurological: He is alert. No cranial nerve deficit. He exhibits normal muscle tone. Coordination normal.   Skin: Skin is warm. No rash noted. No cyanosis.   Vitals reviewed.      Assessment:      Healthy 9 y.o. male child.      Plan:      1. Anticipatory guidance discussed.  Gave handout on well-child issues at this age.    2.  Weight management:  The patient was counseled regarding nutrition, physical activity  3. Immunizations today: per orders.   Discussed starting back on zyrtec.   [facility]

## 2024-06-05 NOTE — ED PROVIDER NOTE - CLINICAL SUMMARY MEDICAL DECISION MAKING FREE TEXT BOX
patient is well-appearing and asymptomatic, abdominal exam is soft and nontender, left-sided acute pathology and no systemic signs of illness.  IR study was performed, gallbladder drain was replaced, biloma has resolved and biliary drain was no longer needed and removed.   discussed case with nursing supervision at [facility] rehab, they are agreeable to take patient back, medically stable for discharge

## 2024-06-05 NOTE — PROCEDURE NOTE - PLAN
patient to follow up with IR in 3 month for routine exchanges, needs surgical follow up re: cholecystectomy.     drain to be flushed twice a day with 5-10 NS

## 2024-06-09 DIAGNOSIS — T85.520A DISPLACEMENT OF BILE DUCT PROSTHESIS, INITIAL ENCOUNTER: ICD-10-CM

## 2024-06-09 DIAGNOSIS — F03.90 UNSPECIFIED DEMENTIA, UNSPECIFIED SEVERITY, WITHOUT BEHAVIORAL DISTURBANCE, PSYCHOTIC DISTURBANCE, MOOD DISTURBANCE, AND ANXIETY: ICD-10-CM

## 2024-06-09 DIAGNOSIS — I12.9 HYPERTENSIVE CHRONIC KIDNEY DISEASE WITH STAGE 1 THROUGH STAGE 4 CHRONIC KIDNEY DISEASE, OR UNSPECIFIED CHRONIC KIDNEY DISEASE: ICD-10-CM

## 2024-06-09 DIAGNOSIS — Z87.19 PERSONAL HISTORY OF OTHER DISEASES OF THE DIGESTIVE SYSTEM: ICD-10-CM

## 2024-06-09 DIAGNOSIS — E11.22 TYPE 2 DIABETES MELLITUS WITH DIABETIC CHRONIC KIDNEY DISEASE: ICD-10-CM

## 2024-06-09 DIAGNOSIS — N18.9 CHRONIC KIDNEY DISEASE, UNSPECIFIED: ICD-10-CM

## 2024-06-18 NOTE — DISCHARGE NOTE NURSING/CASE MANAGEMENT/SOCIAL WORK - CAREGIVER NAME
Per other encounter it was approved  
Prior authorization received for wegovy. Requesting BMI and last notes. Faxed with confirmation  
Elkin Figueroa

## 2024-07-06 NOTE — PROGRESS NOTE ADULT - ASSESSMENT
82 y/o M with PMHx DM, HTN, CKD stage III, who presented from rehab after episode of coffee ground emesis, abdominal pain    # Coffee ground emesis  # Dilated esophagus on CT  CT AP: No bowel obstruction. Markedly dilated and fluid-filled lower esophagus, new compared to prior. Duodenal diverticulum.  EGD 1/10/24- normal duodenum, esophageal hiatal hernia, two sessile polyps (5mm) in cardia (bx), esophagitis    - IV PPI BID  - Trend Hgb and transfuse as needed  - Await pathology results  - Maintain aspiration precautions, keep head of bed elevated  - Outpatient motility studies, can follow up with Dr. Jemima Escalante, motility specialist   [phone]  [facility] [place] [address] / [address] [place] [address], [place] [address]   _________________________________________________________________  Assessment and recommendations are final when note is signed by the attending physician.    82 y/o M with PMHx DM, HTN, CKD stage III, who presented from rehab after episode of coffee ground emesis, abdominal pain    # Coffee ground emesis  # Dilated esophagus on CT  CT AP: No bowel obstruction. Markedly dilated and fluid-filled lower esophagus, new compared to prior. Duodenal diverticulum.  EGD 1/10/24- normal duodenum, esophageal hiatal hernia, two sessile polyps (5mm) in cardia (bx), esophagitis    - IV PPI BID  - Trend Hgb and transfuse as needed  - Await pathology results  - Maintain aspiration precautions, keep head of bed elevated  - Outpatient motility studies, can follow up with Dr. Jemima Escalante, motility specialist   [phone]  [facility] [place] [address] / [address] [place] [address], [place] [address]   _________________________________________________________________  Assessment and recommendations are final when note is signed by the attending physician.    Adult

## 2024-08-12 ENCOUNTER — EMERGENCY (EMERGENCY)
Facility: HOSPITAL | Age: 84
LOS: 1 days | Discharge: HOME | End: 2024-08-12
Attending: STUDENT IN AN ORGANIZED HEALTH CARE EDUCATION/TRAINING PROGRAM
Payer: MEDICARE

## 2024-08-12 VITALS
HEIGHT: 63 IN | WEIGHT: 160.06 LBS | RESPIRATION RATE: 20 BRPM | OXYGEN SATURATION: 98 % | DIASTOLIC BLOOD PRESSURE: 79 MMHG | TEMPERATURE: 98 F | HEART RATE: 73 BPM | SYSTOLIC BLOOD PRESSURE: 139 MMHG

## 2024-08-12 DIAGNOSIS — Z98.49 CATARACT EXTRACTION STATUS, UNSPECIFIED EYE: Chronic | ICD-10-CM

## 2024-08-12 DIAGNOSIS — T85.518A BREAKDOWN (MECHANICAL) OF OTHER GASTROINTESTINAL PROSTHETIC DEVICES, IMPLANTS AND GRAFTS, INITIAL ENCOUNTER: Chronic | ICD-10-CM

## 2024-08-12 DIAGNOSIS — Z98.890 OTHER SPECIFIED POSTPROCEDURAL STATES: Chronic | ICD-10-CM

## 2024-08-12 LAB
ALBUMIN SERPL ELPH-MCNC: 3.5 G/DL — SIGNIFICANT CHANGE UP (ref 3.3–5.2)
ALP SERPL-CCNC: 157 U/L — HIGH (ref 40–120)
ALT FLD-CCNC: 20 U/L — SIGNIFICANT CHANGE UP
ANION GAP SERPL CALC-SCNC: 12 MMOL/L — SIGNIFICANT CHANGE UP (ref 5–17)
APTT BLD: 30.9 SEC — SIGNIFICANT CHANGE UP (ref 24.5–35.6)
AST SERPL-CCNC: 21 U/L — SIGNIFICANT CHANGE UP
BILIRUB DIRECT SERPL-MCNC: 0.1 MG/DL — SIGNIFICANT CHANGE UP (ref 0–0.3)
BILIRUB INDIRECT FLD-MCNC: 0.2 MG/DL — SIGNIFICANT CHANGE UP (ref 0.2–1)
BILIRUB SERPL-MCNC: 0.3 MG/DL — LOW (ref 0.4–2)
BILIRUB SERPL-MCNC: 0.3 MG/DL — LOW (ref 0.4–2)
BUN SERPL-MCNC: 34 MG/DL — HIGH (ref 8–20)
CALCIUM SERPL-MCNC: 8.8 MG/DL — SIGNIFICANT CHANGE UP (ref 8.4–10.5)
CHLORIDE SERPL-SCNC: 108 MMOL/L — SIGNIFICANT CHANGE UP (ref 96–108)
CO2 SERPL-SCNC: 20 MMOL/L — LOW (ref 22–29)
CREAT SERPL-MCNC: 2.3 MG/DL — HIGH (ref 0.5–1.3)
EGFR: 27 ML/MIN/1.73M2 — LOW
GLUCOSE BLDC GLUCOMTR-MCNC: 142 MG/DL — HIGH (ref 70–99)
GLUCOSE SERPL-MCNC: 137 MG/DL — HIGH (ref 70–99)
HCT VFR BLD CALC: 32.5 % — LOW (ref 39–50)
HGB BLD-MCNC: 10.3 G/DL — LOW (ref 13–17)
INR BLD: 0.94 RATIO — SIGNIFICANT CHANGE UP (ref 0.85–1.18)
MCHC RBC-ENTMCNC: 27.3 PG — SIGNIFICANT CHANGE UP (ref 27–34)
MCHC RBC-ENTMCNC: 31.7 GM/DL — LOW (ref 32–36)
MCV RBC AUTO: 86.2 FL — SIGNIFICANT CHANGE UP (ref 80–100)
PLATELET # BLD AUTO: 205 K/UL — SIGNIFICANT CHANGE UP (ref 150–400)
POTASSIUM SERPL-MCNC: 4.4 MMOL/L — SIGNIFICANT CHANGE UP (ref 3.5–5.3)
POTASSIUM SERPL-SCNC: 4.4 MMOL/L — SIGNIFICANT CHANGE UP (ref 3.5–5.3)
PROT SERPL-MCNC: 6.8 G/DL — SIGNIFICANT CHANGE UP (ref 6.6–8.7)
PROTHROM AB SERPL-ACNC: 10.4 SEC — SIGNIFICANT CHANGE UP (ref 9.5–13)
RBC # BLD: 3.77 M/UL — LOW (ref 4.2–5.8)
RBC # FLD: 17.7 % — HIGH (ref 10.3–14.5)
SODIUM SERPL-SCNC: 140 MMOL/L — SIGNIFICANT CHANGE UP (ref 135–145)
WBC # BLD: 7.21 K/UL — SIGNIFICANT CHANGE UP (ref 3.8–10.5)
WBC # FLD AUTO: 7.21 K/UL — SIGNIFICANT CHANGE UP (ref 3.8–10.5)

## 2024-08-12 PROCEDURE — 99223 1ST HOSP IP/OBS HIGH 75: CPT | Mod: FS,25

## 2024-08-12 PROCEDURE — 74176 CT ABD & PELVIS W/O CONTRAST: CPT | Mod: 26,MC

## 2024-08-12 PROCEDURE — 36410 VNPNXR 3YR/> PHY/QHP DX/THER: CPT

## 2024-08-12 RX ORDER — LYSINE HCL 500 MG
500 TABLET ORAL DAILY
Refills: 0 | Status: DISCONTINUED | OUTPATIENT
Start: 2024-08-12 | End: 2024-08-19

## 2024-08-12 RX ORDER — PANTOPRAZOLE SODIUM 20 MG/1
40 TABLET, DELAYED RELEASE ORAL
Refills: 0 | Status: DISCONTINUED | OUTPATIENT
Start: 2024-08-12 | End: 2024-08-13

## 2024-08-12 RX ORDER — DEXTROSE MONOHYDRATE, SODIUM CHLORIDE, SODIUM LACTATE, CALCIUM CHLORIDE, MAGNESIUM CHLORIDE 1.5; 538; 448; 18.4; 5.08 G/100ML; MG/100ML; MG/100ML; MG/100ML; MG/100ML
1000 SOLUTION INTRAPERITONEAL
Refills: 0 | Status: DISCONTINUED | OUTPATIENT
Start: 2024-08-12 | End: 2024-08-19

## 2024-08-12 RX ORDER — GLUCAGON INJECTION, SOLUTION 0.5 MG/.1ML
1 INJECTION, SOLUTION SUBCUTANEOUS ONCE
Refills: 0 | Status: DISCONTINUED | OUTPATIENT
Start: 2024-08-12 | End: 2024-08-19

## 2024-08-12 RX ORDER — INSULIN LISPRO 100/ML
VIAL (ML) SUBCUTANEOUS AT BEDTIME
Refills: 0 | Status: DISCONTINUED | OUTPATIENT
Start: 2024-08-12 | End: 2024-08-19

## 2024-08-12 RX ORDER — INSULIN LISPRO 100/ML
VIAL (ML) SUBCUTANEOUS
Refills: 0 | Status: DISCONTINUED | OUTPATIENT
Start: 2024-08-12 | End: 2024-08-19

## 2024-08-12 RX ORDER — DEXTROSE MONOHYDRATE, SODIUM CHLORIDE, SODIUM LACTATE, CALCIUM CHLORIDE, MAGNESIUM CHLORIDE 1.5; 538; 448; 18.4; 5.08 G/100ML; MG/100ML; MG/100ML; MG/100ML; MG/100ML
500 SOLUTION INTRAPERITONEAL
Refills: 0 | Status: COMPLETED | OUTPATIENT
Start: 2024-08-12 | End: 2024-08-13

## 2024-08-12 RX ORDER — SUCRALFATE 1 G/1
1 TABLET ORAL ONCE
Refills: 0 | Status: COMPLETED | OUTPATIENT
Start: 2024-08-12 | End: 2024-08-12

## 2024-08-12 RX ORDER — DEXTROSE 4 G
25 TABLET,CHEWABLE ORAL ONCE
Refills: 0 | Status: DISCONTINUED | OUTPATIENT
Start: 2024-08-12 | End: 2024-08-19

## 2024-08-12 RX ORDER — DEXTROSE MONOHYDRATE, SODIUM CHLORIDE, SODIUM LACTATE, CALCIUM CHLORIDE, MAGNESIUM CHLORIDE 1.5; 538; 448; 18.4; 5.08 G/100ML; MG/100ML; MG/100ML; MG/100ML; MG/100ML
500 SOLUTION INTRAPERITONEAL
Refills: 0 | Status: DISCONTINUED | OUTPATIENT
Start: 2024-08-12 | End: 2024-08-12

## 2024-08-12 RX ORDER — GABAPENTIN 400 MG/1
100 CAPSULE ORAL ONCE
Refills: 0 | Status: COMPLETED | OUTPATIENT
Start: 2024-08-12 | End: 2024-08-12

## 2024-08-12 RX ORDER — DEXTROSE 4 G
12.5 TABLET,CHEWABLE ORAL ONCE
Refills: 0 | Status: DISCONTINUED | OUTPATIENT
Start: 2024-08-12 | End: 2024-08-19

## 2024-08-12 RX ORDER — DEXTROSE 4 G
15 TABLET,CHEWABLE ORAL ONCE
Refills: 0 | Status: DISCONTINUED | OUTPATIENT
Start: 2024-08-12 | End: 2024-08-19

## 2024-08-12 RX ORDER — DEXTROSE MONOHYDRATE, SODIUM CHLORIDE, SODIUM LACTATE, CALCIUM CHLORIDE, MAGNESIUM CHLORIDE 1.5; 538; 448; 18.4; 5.08 G/100ML; MG/100ML; MG/100ML; MG/100ML; MG/100ML
500 SOLUTION INTRAPERITONEAL ONCE
Refills: 0 | Status: COMPLETED | OUTPATIENT
Start: 2024-08-12 | End: 2024-08-12

## 2024-08-12 RX ADMIN — GABAPENTIN 100 MILLIGRAM(S): 400 CAPSULE ORAL at 16:57

## 2024-08-12 RX ADMIN — DEXTROSE MONOHYDRATE, SODIUM CHLORIDE, SODIUM LACTATE, CALCIUM CHLORIDE, MAGNESIUM CHLORIDE 500 MILLILITER(S): 1.5; 538; 448; 18.4; 5.08 SOLUTION INTRAPERITONEAL at 16:59

## 2024-08-12 RX ADMIN — Medication 500 MILLIGRAM(S): at 16:56

## 2024-08-12 RX ADMIN — SUCRALFATE 1 GRAM(S): 1 TABLET ORAL at 16:56

## 2024-08-12 NOTE — ED PROVIDER NOTE - NSICDXPASTMEDICALHX_GEN_ALL_CORE_FT
PAST MEDICAL HISTORY:  Anxiety     Diabetes     High cholesterol     Hypertension     Prostate disorder     Ulcer      PAST MEDICAL HISTORY:  Acute cholecystitis     Anxiety     Diabetes     High cholesterol     Hypertension     Prostate disorder     Ulcer

## 2024-08-12 NOTE — ED CDU PROVIDER INITIAL DAY NOTE - NSICDXPASTSURGICALHX_GEN_ALL_CORE_FT
PAST SURGICAL HISTORY:  Cholecystostomy tube dysfunction     History of endoscopy     S/P cataract surgery

## 2024-08-12 NOTE — ED PROVIDER NOTE - ATTENDING CONTRIBUTION TO CARE
84M history of percutaneous cholecystostomy tube with diminished output.  Patient nontoxic at this time.  On exam dark bilious output, abdomen soft, nondistended, no delirium.  CT abdomen pelvis ordered rule out tube dislodgment, labs ordered, signed out to oncoming team.

## 2024-08-12 NOTE — ED PROVIDER NOTE - OBJECTIVE STATEMENT
84-year-old male with past medical history of CKD dementia type 2 diabetes hypertension cholecystitis and cholecystectomy with a gallbladder bladder drain presents to the ED due to gallbladder drain malfunction.  Patient had his gallbladder removed in December had a tube placed but was here in June for malfunction.Family member states that today the tube was not draining and normally drains 300 mL. She noted some oozing around the site and and decided to bring him to the ED. He has no fever no abdominal pain no shortness of breath no change in bowel movements. Family member was asking if the tube could be removed because the patient has been doing well 84-year-old male with past medical history of CKD, dementia, type 2 diabetes, hypertension, cholecystitis with a percutaneous gallbladder bladder drain presents to the ED due to gallbladder drain malfunction.  Patient had his gallbladder removed in December had a tube placed but was here in June for malfunction. Family member states that today the tube was not draining and normally drains 300 mL. She noted some oozing around the site and and decided to bring him to the ED. He has no fever no abdominal pain no shortness of breath no change in bowel movements. Family member was asking if the tube could be removed because the patient has been doing well

## 2024-08-12 NOTE — ED CDU PROVIDER INITIAL DAY NOTE - CLINICAL SUMMARY MEDICAL DECISION MAKING FREE TEXT BOX
83 yo male PMHx CKD, dementia (A&Ox2 baseline), IDDM2, HTN, cholecystitis s/p percutaneous gallbladder drain, biloma presents to the ED due to leaking from site with flushing. Here in June for similar. CT demonstrating partial withdrawal of tube. Patient placed in CDU for IR evaluation. Of note, creatinine above baseline, follows with nephrologist. Plan to hydrate and recheck in AM.

## 2024-08-12 NOTE — ED PROVIDER NOTE - CLINICAL SUMMARY MEDICAL DECISION MAKING FREE TEXT BOX
Patient is here for gallbladder tube malfunction, patient still has gallbladder and one gallbladder tube in place. given the acute change in drainage today, will order CT abdomen pelvis w/ IV contrast, and CBC, CMP, Total and indirect bili, PTT/PT INR.

## 2024-08-12 NOTE — ED PROVIDER NOTE - PHYSICAL EXAMINATION
Gen: well appearing, no acute distress  Head: normocephalic, atraumatic  EENT: EOMI, moist mucous membranes, no scleral icterus, no JVD  Lung: no increased work of breathing, clear to auscultation bilaterally, no wheezing, rales, rhonchi, speaking in full sentences  CV: regular rate, regular rhythm, normal s1/s2, 2+ radial pulses bilaterally  Abd: soft, non-tender, non-distended,  no CVA tenderness, cholecystomy tube with scant drainage, some oozing from site  MSK: No edema, no visible deformities, full range of motion in all 4 extremities  Neuro: Awake, alert, no focal neurologic deficits  Skin: No obvious rash, no jaundice  Psych: normal affect, normal speech

## 2024-08-12 NOTE — ED PROVIDER NOTE - NSICDXPASTSURGICALHX_GEN_ALL_CORE_FT
PAST SURGICAL HISTORY:  History of endoscopy      PAST SURGICAL HISTORY:  Cholecystostomy tube dysfunction     History of endoscopy

## 2024-08-12 NOTE — ED CDU PROVIDER INITIAL DAY NOTE - PHYSICAL EXAMINATION
Gen: well appearing, no acute distress  Head: normocephalic, atraumatic  EENT: EOMI, moist mucous membranes, no scleral icterus, no JVD  Lung: no increased work of breathing, clear to auscultation bilaterally, no wheezing, rales, rhonchi, speaking in full sentences  CV: regular rate, regular rhythm, normal s1/s2, 2+ radial pulses bilaterally  Abd: soft, non-tender, non-distended,  no CVA tenderness, cholecystomy tube with scant drainage, some oozing from site  MSK: No edema, no visible deformities, full range of motion in all 4 extremities  Neuro: A&Ox2 (person, place) at baseline, no focal neurologic deficits  Skin: No obvious rash, no jaundice  Psych: normal affect, normal speech

## 2024-08-12 NOTE — ED CDU PROVIDER INITIAL DAY NOTE - OBJECTIVE STATEMENT
83 yo male PMHx CKD, dementia (A&Ox2 baseline), IDDM2, HTN, cholecystitis s/p percutaneous gallbladder drain, biloma presents to the ED due to gallbladder drain malfunction. Daughter states with flushing of tube, fluid is leaking from site. Here in June for similar. He has no fever no abdominal pain no shortness of breath no change in bowel movements. No further complaints at this time.

## 2024-08-12 NOTE — ED ADULT NURSE NOTE - NSICDXPASTMEDICALHX_GEN_ALL_CORE_FT
PAST MEDICAL HISTORY:  Acute cholecystitis     Anxiety     Diabetes     High cholesterol     Hypertension     Prostate disorder     Ulcer

## 2024-08-12 NOTE — ED CDU PROVIDER INITIAL DAY NOTE - NS ED ATTENDING STATEMENT MOD
I have seen and examined this patient and fully participated in the care of this patient as the teaching attending.  The service was shared with the AMANDA.  I reviewed and verified the documentation.

## 2024-08-13 VITALS
TEMPERATURE: 98 F | DIASTOLIC BLOOD PRESSURE: 79 MMHG | HEART RATE: 74 BPM | RESPIRATION RATE: 18 BRPM | OXYGEN SATURATION: 96 % | SYSTOLIC BLOOD PRESSURE: 151 MMHG

## 2024-08-13 LAB
ALLERGY+IMMUNOLOGY DIAG STUDY NOTE: SIGNIFICANT CHANGE UP
ALLERGY+IMMUNOLOGY DIAG STUDY NOTE: SIGNIFICANT CHANGE UP
ANION GAP SERPL CALC-SCNC: 12 MMOL/L — SIGNIFICANT CHANGE UP (ref 5–17)
BLD GP AB SCN SERPL QL: SIGNIFICANT CHANGE UP
BUN SERPL-MCNC: 30.8 MG/DL — HIGH (ref 8–20)
CALCIUM SERPL-MCNC: 9.2 MG/DL — SIGNIFICANT CHANGE UP (ref 8.4–10.5)
CHLORIDE SERPL-SCNC: 106 MMOL/L — SIGNIFICANT CHANGE UP (ref 96–108)
CO2 SERPL-SCNC: 22 MMOL/L — SIGNIFICANT CHANGE UP (ref 22–29)
CREAT SERPL-MCNC: 2.04 MG/DL — HIGH (ref 0.5–1.3)
DAT C3-SP REAG RBC QL: SIGNIFICANT CHANGE UP
DAT IGG-SP REAG RBC-IMP: ABNORMAL
DIR ANTIGLOB POLYSPECIFIC INTERPRETATION: ABNORMAL
EGFR: 31 ML/MIN/1.73M2 — LOW
GLUCOSE BLDC GLUCOMTR-MCNC: 118 MG/DL — HIGH (ref 70–99)
GLUCOSE BLDC GLUCOMTR-MCNC: 70 MG/DL — SIGNIFICANT CHANGE UP (ref 70–99)
GLUCOSE BLDC GLUCOMTR-MCNC: 94 MG/DL — SIGNIFICANT CHANGE UP (ref 70–99)
GLUCOSE BLDC GLUCOMTR-MCNC: 99 MG/DL — SIGNIFICANT CHANGE UP (ref 70–99)
GLUCOSE SERPL-MCNC: 160 MG/DL — HIGH (ref 70–99)
POTASSIUM SERPL-MCNC: 4.4 MMOL/L — SIGNIFICANT CHANGE UP (ref 3.5–5.3)
POTASSIUM SERPL-SCNC: 4.4 MMOL/L — SIGNIFICANT CHANGE UP (ref 3.5–5.3)
SODIUM SERPL-SCNC: 140 MMOL/L — SIGNIFICANT CHANGE UP (ref 135–145)

## 2024-08-13 PROCEDURE — 80048 BASIC METABOLIC PNL TOTAL CA: CPT

## 2024-08-13 PROCEDURE — 82962 GLUCOSE BLOOD TEST: CPT

## 2024-08-13 PROCEDURE — 96374 THER/PROPH/DIAG INJ IV PUSH: CPT | Mod: XU

## 2024-08-13 PROCEDURE — 75984 XRAY CONTROL CATHETER CHANGE: CPT

## 2024-08-13 PROCEDURE — 85610 PROTHROMBIN TIME: CPT

## 2024-08-13 PROCEDURE — C1769: CPT

## 2024-08-13 PROCEDURE — 47536 EXCHANGE BILIARY DRG CATH: CPT

## 2024-08-13 PROCEDURE — 99284 EMERGENCY DEPT VISIT MOD MDM: CPT | Mod: 25

## 2024-08-13 PROCEDURE — 36415 COLL VENOUS BLD VENIPUNCTURE: CPT

## 2024-08-13 PROCEDURE — 86880 COOMBS TEST DIRECT: CPT

## 2024-08-13 PROCEDURE — 86850 RBC ANTIBODY SCREEN: CPT

## 2024-08-13 PROCEDURE — C1729: CPT

## 2024-08-13 PROCEDURE — G0378: CPT

## 2024-08-13 PROCEDURE — 86077 PHYS BLOOD BANK SERV XMATCH: CPT

## 2024-08-13 PROCEDURE — 86870 RBC ANTIBODY IDENTIFICATION: CPT

## 2024-08-13 PROCEDURE — 86901 BLOOD TYPING SEROLOGIC RH(D): CPT

## 2024-08-13 PROCEDURE — 80053 COMPREHEN METABOLIC PANEL: CPT

## 2024-08-13 PROCEDURE — 99239 HOSP IP/OBS DSCHRG MGMT >30: CPT

## 2024-08-13 PROCEDURE — 82248 BILIRUBIN DIRECT: CPT

## 2024-08-13 PROCEDURE — 85730 THROMBOPLASTIN TIME PARTIAL: CPT

## 2024-08-13 PROCEDURE — 74176 CT ABD & PELVIS W/O CONTRAST: CPT | Mod: MC

## 2024-08-13 PROCEDURE — 85027 COMPLETE CBC AUTOMATED: CPT

## 2024-08-13 PROCEDURE — 82247 BILIRUBIN TOTAL: CPT

## 2024-08-13 PROCEDURE — 86900 BLOOD TYPING SEROLOGIC ABO: CPT

## 2024-08-13 RX ORDER — DEXTROSE MONOHYDRATE, SODIUM CHLORIDE, SODIUM LACTATE, CALCIUM CHLORIDE, MAGNESIUM CHLORIDE 1.5; 538; 448; 18.4; 5.08 G/100ML; MG/100ML; MG/100ML; MG/100ML; MG/100ML
500 SOLUTION INTRAPERITONEAL
Refills: 0 | Status: DISCONTINUED | OUTPATIENT
Start: 2024-08-13 | End: 2024-08-19

## 2024-08-13 RX ORDER — GABAPENTIN 400 MG/1
100 CAPSULE ORAL EVERY 6 HOURS
Refills: 0 | Status: DISCONTINUED | OUTPATIENT
Start: 2024-08-13 | End: 2024-08-13

## 2024-08-13 RX ORDER — PANTOPRAZOLE SODIUM 20 MG/1
40 TABLET, DELAYED RELEASE ORAL ONCE
Refills: 0 | Status: COMPLETED | OUTPATIENT
Start: 2024-08-13 | End: 2024-08-13

## 2024-08-13 RX ORDER — GABAPENTIN 400 MG/1
100 CAPSULE ORAL EVERY 8 HOURS
Refills: 0 | Status: DISCONTINUED | OUTPATIENT
Start: 2024-08-13 | End: 2024-08-19

## 2024-08-13 RX ORDER — SUCRALFATE 1 G/1
1 TABLET ORAL EVERY 6 HOURS
Refills: 0 | Status: DISCONTINUED | OUTPATIENT
Start: 2024-08-13 | End: 2024-08-19

## 2024-08-13 RX ADMIN — GABAPENTIN 100 MILLIGRAM(S): 400 CAPSULE ORAL at 11:07

## 2024-08-13 RX ADMIN — PANTOPRAZOLE SODIUM 40 MILLIGRAM(S): 20 TABLET, DELAYED RELEASE ORAL at 11:06

## 2024-08-13 RX ADMIN — Medication 500 MILLIGRAM(S): at 11:06

## 2024-08-13 RX ADMIN — DEXTROSE MONOHYDRATE, SODIUM CHLORIDE, SODIUM LACTATE, CALCIUM CHLORIDE, MAGNESIUM CHLORIDE 125 MILLILITER(S): 1.5; 538; 448; 18.4; 5.08 SOLUTION INTRAPERITONEAL at 11:06

## 2024-08-13 RX ADMIN — DEXTROSE MONOHYDRATE, SODIUM CHLORIDE, SODIUM LACTATE, CALCIUM CHLORIDE, MAGNESIUM CHLORIDE 100 MILLILITER(S): 1.5; 538; 448; 18.4; 5.08 SOLUTION INTRAPERITONEAL at 06:58

## 2024-08-13 NOTE — ED CDU PROVIDER SUBSEQUENT DAY NOTE - PROGRESS NOTE DETAILS
Called neuro IR PA review spoke with him placed a consult for tube placement again Patient family at the bedside concerned that patient did not take his PPI and gabapentin. pantoprazole added through the- IV fluid with dextrose added for the patient. call back the IR team. as per IR patient will be get IR procedure before 4 PM made aware of the patient wife.

## 2024-08-13 NOTE — CHART NOTE - NSCHARTNOTEFT_GEN_A_CORE
CM following for dc needs.  Pt already set up w/ [facility] / [facility] prior to visit.  Wife at bs confirms same.  States dtr is very involved with Drain care.  No further CM needs identified.

## 2024-08-13 NOTE — ED CDU PROVIDER DISPOSITION NOTE - CARE PROVIDER_API CALL
Victorino Wheatley  Urology  [address], [address]  [place] [address]  Phone: [phone]  Fax: [phone]  Follow Up Time: Routine

## 2024-08-13 NOTE — ED CDU PROVIDER DISPOSITION NOTE - ATTENDING CONTRIBUTION TO CARE
I have personally performed a history and physical examination of the patient and discussed management with the AMANDA as well as the patient.  I reviewed the AMANDA's note and agree with the documented findings and plan of care.  I have authored and modified critical sections of the Provider Note, including but not limited to HPI, Physical Exam and MDM.    85 yo male PMHx CKD, dementia (A&Ox2 baseline), IDDM2, HTN, cholecystitis s/p percutaneous gallbladder drain, biloma presents to the ED due to gallbladder drain malfunction. Daughter states with flushing of tube, fluid is leaking from site. Here in June for similar. He has no fever no abdominal pain no shortness of breath no change in bowel movements.  CT scan with possible withdrawal from cholecystectomy tube . Cholecystectomy Tube Replaced by IR. Checked at bedside, is draining through the bag and yellow in Color without any blood. History for CKD Creatinine improved from 2.3 to 2.04 after IV Fluid Hydration. Results reviewed and discussed with the patient. Patient verbalized understanding as well as outpatient treatment and follow up plan, including the importance of timely outpatient follow up. The patient was given verbal and written discharge instructions, including instructions when to return to the ED and when to seek follow-up from their pcp. Any specialty follow-up was discussed, including how to make a appointment.  Instructions were discussed in simple, plain language and understanding verbalized by the patient. The patient understands that should their symptoms worsen or any new symptoms arise they should return to the ED immediately for further evaluation. All patient's questions were answered. Patient verbalizes understanding and agreement with outpatient treatment and follow up plan. Patient is medically cleared for discharge at this time.

## 2024-08-13 NOTE — ED ADULT NURSE REASSESSMENT NOTE - NS ED NURSE REASSESS COMMENT FT1
A&O x 1 to name, currently awaiting IR. Resting comfortably in bed. Breathing is spontaneous even and unlabored. Reports "I feel good". Currently awaiting IR procedure. Has BS 99. Bed is in lowest position and side rails up. Safety precautions maintained.
Assumed care of pt from JEROMY Samaniego RN at 1930. Pt is resting comfortably in stretcher. NAD. Pt is A&Ox2 to person and place, per daughter this is pt baseline, hx of dementia. Respirations are even and unlabored. Pt awaiting CT scan results. Plan on going.
Patient A&O x2, no complaints of pain, no complaints of CP/SOB, no acute distress noted. Patient lying comfortably in bed, bed locked and in lowest position. Drain placed at 18 to the skin, skin around area clean, dry, and intact. Patient updated on plan of care.
Patient cleaned and bedding changed, now resting comfortably. Still awaiting IR. No current issues.
Pt is resting in stretcher comfortably at this time. NAD. VSS. Respirations even and unlabored. Pt safety maintained. Pt NPO at this time. Plan of care on going.
Pt resting comfortably in stretcher. respirations even and unlabored. Perineal care performed on pt. Pt repositioned and boosted in stretcher.

## 2024-08-13 NOTE — ED CDU PROVIDER DISPOSITION NOTE - CLINICAL COURSE
83 yo male PMHx CKD, dementia (A&Ox2 baseline), IDDM2, HTN, cholecystitis s/p percutaneous gallbladder drain, biloma presents to the ED due to gallbladder drain malfunction. Daughter states with flushing of tube, fluid is leaking from site. Here in June for similar. He has no fever no abdominal pain no shortness of breath no change in bowel movements.  CT scan with possible withdrawal from cholecystectomy tube .  patient Was Exertion N.P.O.  Cholecystectomy Tube Replaced by the IR Checked at the Bedside Is Draining through the Bag Yellow in Color without Any Blood. History for CKD Creatinine Improved from 2.3 to 2.04 after IV Fluid Hydration.

## 2024-08-13 NOTE — ED PEDIATRIC NURSE REASSESSMENT NOTE - NS ED NURSE REASSESS COMMENT FT2
Assumed care of patient from RN . Patient A&O x2, no complaints of pain, no complaints of CP/SOB, no acute distress noted. Patient lying comfortably in bed, bed locked and in lowest position. Respirations even and unlabored. Patient updated on plan of care, awaiting IR.

## 2024-08-13 NOTE — ED CDU PROVIDER DISPOSITION NOTE - PATIENT PORTAL LINK FT
You can access the FollowMyHealth Patient Portal offered by [facility] by registering at the following website: [email or web address]. By joining [facility]’s FollowMyHealth portal, you will also be able to view your health information using other applications (apps) compatible with our system.

## 2024-08-13 NOTE — PROCEDURE NOTE - PROCEDURE FINDINGS AND DETAILS
10F sky tube exchange over wire. Bilious fluid draining. Continue gravity drainage.    Please call Interventional Radiology with any questions, concerns, or issues. No

## 2024-08-13 NOTE — ED CDU PROVIDER DISPOSITION NOTE - NSFOLLOWUPINSTRUCTIONS_ED_ALL_ED_FT
*continue Home medications     *Cholecystitis tube : Continue keep in lower part gravity drainage.    *: Follow-up with your primary care doctor within 2 to 3 days for further evaluation and repeat the kidney function again. make sure Enough drink fluids during the day     - Other Finding on CAT Scan Including 3 Mm Stones with the Kidney Can Follow-Up with the Urology for Further Evaluation    * come back to the emergency room if  the tube does not draining to bag , blood in the tubes or bag , fever, severe abdominal pain, nausea or vomiting, or any new concerns

## 2024-08-27 ENCOUNTER — INPATIENT (INPATIENT)
Facility: HOSPITAL | Age: 84
LOS: 12 days | Discharge: NURSING, REHAB OR LONG-TERM CARE | DRG: 536 | End: 2024-09-09
Attending: STUDENT IN AN ORGANIZED HEALTH CARE EDUCATION/TRAINING PROGRAM | Admitting: INTERNAL MEDICINE
Payer: MEDICARE

## 2024-08-27 VITALS
DIASTOLIC BLOOD PRESSURE: 89 MMHG | HEART RATE: 79 BPM | SYSTOLIC BLOOD PRESSURE: 173 MMHG | RESPIRATION RATE: 16 BRPM | WEIGHT: 134.92 LBS | OXYGEN SATURATION: 99 % | TEMPERATURE: 98 F | HEIGHT: 63 IN

## 2024-08-27 DIAGNOSIS — Z98.49 CATARACT EXTRACTION STATUS, UNSPECIFIED EYE: Chronic | ICD-10-CM

## 2024-08-27 DIAGNOSIS — Z98.890 OTHER SPECIFIED POSTPROCEDURAL STATES: Chronic | ICD-10-CM

## 2024-08-27 DIAGNOSIS — T85.518A BREAKDOWN (MECHANICAL) OF OTHER GASTROINTESTINAL PROSTHETIC DEVICES, IMPLANTS AND GRAFTS, INITIAL ENCOUNTER: Chronic | ICD-10-CM

## 2024-08-27 PROBLEM — K81.0 ACUTE CHOLECYSTITIS: Chronic | Status: ACTIVE | Noted: 2024-08-12

## 2024-08-27 LAB
ALBUMIN SERPL ELPH-MCNC: 3.6 G/DL — SIGNIFICANT CHANGE UP (ref 3.3–5.2)
ALLERGY+IMMUNOLOGY DIAG STUDY NOTE: SIGNIFICANT CHANGE UP
ALP SERPL-CCNC: 147 U/L — HIGH (ref 40–120)
ALT FLD-CCNC: 33 U/L — SIGNIFICANT CHANGE UP
ANION GAP SERPL CALC-SCNC: 18 MMOL/L — HIGH (ref 5–17)
APPEARANCE UR: CLEAR — SIGNIFICANT CHANGE UP
APTT BLD: 31.5 SEC — SIGNIFICANT CHANGE UP (ref 24.5–35.6)
AST SERPL-CCNC: 28 U/L — SIGNIFICANT CHANGE UP
BACTERIA # UR AUTO: NEGATIVE /HPF — SIGNIFICANT CHANGE UP
BASOPHILS # BLD AUTO: 0.02 K/UL — SIGNIFICANT CHANGE UP (ref 0–0.2)
BASOPHILS NFR BLD AUTO: 0.1 % — SIGNIFICANT CHANGE UP (ref 0–2)
BILIRUB SERPL-MCNC: 0.6 MG/DL — SIGNIFICANT CHANGE UP (ref 0.4–2)
BILIRUB UR-MCNC: NEGATIVE — SIGNIFICANT CHANGE UP
BLD GP AB SCN SERPL QL: SIGNIFICANT CHANGE UP
BUN SERPL-MCNC: 33.6 MG/DL — HIGH (ref 8–20)
CALCIUM SERPL-MCNC: 8.8 MG/DL — SIGNIFICANT CHANGE UP (ref 8.4–10.5)
CAST: 0 /LPF — SIGNIFICANT CHANGE UP (ref 0–4)
CHLORIDE SERPL-SCNC: 100 MMOL/L — SIGNIFICANT CHANGE UP (ref 96–108)
CK SERPL-CCNC: 53 U/L — SIGNIFICANT CHANGE UP (ref 30–200)
CO2 SERPL-SCNC: 17 MMOL/L — LOW (ref 22–29)
COLOR SPEC: YELLOW — SIGNIFICANT CHANGE UP
CREAT SERPL-MCNC: 2.03 MG/DL — HIGH (ref 0.5–1.3)
DIFF PNL FLD: NEGATIVE — SIGNIFICANT CHANGE UP
DIR ANTIGLOB POLYSPECIFIC INTERPRETATION: SIGNIFICANT CHANGE UP
EGFR: 32 ML/MIN/1.73M2 — LOW
EOSINOPHIL # BLD AUTO: 0.07 K/UL — SIGNIFICANT CHANGE UP (ref 0–0.5)
EOSINOPHIL NFR BLD AUTO: 0.5 % — SIGNIFICANT CHANGE UP (ref 0–6)
GLUCOSE SERPL-MCNC: 241 MG/DL — HIGH (ref 70–99)
GLUCOSE UR QL: 100 MG/DL
HCT VFR BLD CALC: 33.8 % — LOW (ref 39–50)
HGB BLD-MCNC: 10.7 G/DL — LOW (ref 13–17)
IMM GRANULOCYTES NFR BLD AUTO: 0.7 % — SIGNIFICANT CHANGE UP (ref 0–0.9)
INR BLD: 1.05 RATIO — SIGNIFICANT CHANGE UP (ref 0.85–1.18)
KETONES UR-MCNC: NEGATIVE MG/DL — SIGNIFICANT CHANGE UP
LEUKOCYTE ESTERASE UR-ACNC: NEGATIVE — SIGNIFICANT CHANGE UP
LYMPHOCYTES # BLD AUTO: 1.42 K/UL — SIGNIFICANT CHANGE UP (ref 1–3.3)
LYMPHOCYTES # BLD AUTO: 10.4 % — LOW (ref 13–44)
MCHC RBC-ENTMCNC: 27.3 PG — SIGNIFICANT CHANGE UP (ref 27–34)
MCHC RBC-ENTMCNC: 31.7 GM/DL — LOW (ref 32–36)
MCV RBC AUTO: 86.2 FL — SIGNIFICANT CHANGE UP (ref 80–100)
MONOCYTES # BLD AUTO: 0.72 K/UL — SIGNIFICANT CHANGE UP (ref 0–0.9)
MONOCYTES NFR BLD AUTO: 5.3 % — SIGNIFICANT CHANGE UP (ref 2–14)
NEUTROPHILS # BLD AUTO: 11.35 K/UL — HIGH (ref 1.8–7.4)
NEUTROPHILS NFR BLD AUTO: 83 % — HIGH (ref 43–77)
NITRITE UR-MCNC: NEGATIVE — SIGNIFICANT CHANGE UP
PH UR: 7.5 — SIGNIFICANT CHANGE UP (ref 5–8)
PLATELET # BLD AUTO: 217 K/UL — SIGNIFICANT CHANGE UP (ref 150–400)
POTASSIUM SERPL-MCNC: 4.2 MMOL/L — SIGNIFICANT CHANGE UP (ref 3.5–5.3)
POTASSIUM SERPL-SCNC: 4.2 MMOL/L — SIGNIFICANT CHANGE UP (ref 3.5–5.3)
PROT SERPL-MCNC: 6.9 G/DL — SIGNIFICANT CHANGE UP (ref 6.6–8.7)
PROT UR-MCNC: 100 MG/DL
PROTHROM AB SERPL-ACNC: 11.6 SEC — SIGNIFICANT CHANGE UP (ref 9.5–13)
RBC # BLD: 3.92 M/UL — LOW (ref 4.2–5.8)
RBC # FLD: 17.1 % — HIGH (ref 10.3–14.5)
RBC CASTS # UR COMP ASSIST: 1 /HPF — SIGNIFICANT CHANGE UP (ref 0–4)
SODIUM SERPL-SCNC: 135 MMOL/L — SIGNIFICANT CHANGE UP (ref 135–145)
SP GR SPEC: 1.01 — SIGNIFICANT CHANGE UP (ref 1–1.03)
SQUAMOUS # UR AUTO: 0 /HPF — SIGNIFICANT CHANGE UP (ref 0–5)
UROBILINOGEN FLD QL: 0.2 MG/DL — SIGNIFICANT CHANGE UP (ref 0.2–1)
WBC # BLD: 13.68 K/UL — HIGH (ref 3.8–10.5)
WBC # FLD AUTO: 13.68 K/UL — HIGH (ref 3.8–10.5)
WBC UR QL: 4 /HPF — SIGNIFICANT CHANGE UP (ref 0–5)

## 2024-08-27 PROCEDURE — 74176 CT ABD & PELVIS W/O CONTRAST: CPT | Mod: 26,MC

## 2024-08-27 PROCEDURE — 73552 X-RAY EXAM OF FEMUR 2/>: CPT | Mod: 26,LT

## 2024-08-27 PROCEDURE — 93010 ELECTROCARDIOGRAM REPORT: CPT

## 2024-08-27 PROCEDURE — 71250 CT THORAX DX C-: CPT | Mod: 26,MC

## 2024-08-27 PROCEDURE — 73562 X-RAY EXAM OF KNEE 3: CPT | Mod: 26,LT

## 2024-08-27 PROCEDURE — 71045 X-RAY EXAM CHEST 1 VIEW: CPT | Mod: 26

## 2024-08-27 PROCEDURE — 72131 CT LUMBAR SPINE W/O DYE: CPT | Mod: 26,MC

## 2024-08-27 PROCEDURE — 72125 CT NECK SPINE W/O DYE: CPT | Mod: 26,MC

## 2024-08-27 PROCEDURE — 72128 CT CHEST SPINE W/O DYE: CPT | Mod: 26,MC

## 2024-08-27 PROCEDURE — 70450 CT HEAD/BRAIN W/O DYE: CPT | Mod: 26,MC

## 2024-08-27 PROCEDURE — 99285 EMERGENCY DEPT VISIT HI MDM: CPT | Mod: GC

## 2024-08-27 RX ORDER — ONDANSETRON 2 MG/ML
4 INJECTION, SOLUTION INTRAMUSCULAR; INTRAVENOUS ONCE
Refills: 0 | Status: COMPLETED | OUTPATIENT
Start: 2024-08-27 | End: 2024-08-27

## 2024-08-27 RX ORDER — MUPIROCIN 2 %
1 OINTMENT (GRAM) TOPICAL
Refills: 0 | Status: COMPLETED | OUTPATIENT
Start: 2024-08-27 | End: 2024-09-01

## 2024-08-27 RX ORDER — ACETAMINOPHEN 325 MG/1
1000 TABLET ORAL ONCE
Refills: 0 | Status: COMPLETED | OUTPATIENT
Start: 2024-08-27 | End: 2024-08-27

## 2024-08-27 RX ORDER — OXYCODONE HYDROCHLORIDE 5 MG/1
10 TABLET ORAL EVERY 4 HOURS
Refills: 0 | Status: DISCONTINUED | OUTPATIENT
Start: 2024-08-27 | End: 2024-09-03

## 2024-08-27 RX ORDER — ONDANSETRON 2 MG/ML
4 INJECTION, SOLUTION INTRAMUSCULAR; INTRAVENOUS EVERY 6 HOURS
Refills: 0 | Status: DISCONTINUED | OUTPATIENT
Start: 2024-08-27 | End: 2024-09-09

## 2024-08-27 RX ORDER — OXYCODONE HYDROCHLORIDE 5 MG/1
5 TABLET ORAL EVERY 4 HOURS
Refills: 0 | Status: DISCONTINUED | OUTPATIENT
Start: 2024-08-27 | End: 2024-09-03

## 2024-08-27 RX ORDER — PANTOPRAZOLE SODIUM 40 MG
80 TABLET, DELAYED RELEASE (ENTERIC COATED) ORAL ONCE
Refills: 0 | Status: COMPLETED | OUTPATIENT
Start: 2024-08-27 | End: 2024-08-27

## 2024-08-27 RX ORDER — MAGNESIUM, ALUMINUM HYDROXIDE 200-225/5
30 SUSPENSION, ORAL (FINAL DOSE FORM) ORAL
Refills: 0 | Status: DISCONTINUED | OUTPATIENT
Start: 2024-08-27 | End: 2024-09-09

## 2024-08-27 RX ORDER — CHLORHEXIDINE GLUCONATE 40 MG/ML
1 SOLUTION TOPICAL
Refills: 0 | Status: DISCONTINUED | OUTPATIENT
Start: 2024-08-27 | End: 2024-09-09

## 2024-08-27 RX ADMIN — ACETAMINOPHEN 400 MILLIGRAM(S): 325 TABLET ORAL at 17:11

## 2024-08-27 RX ADMIN — Medication 80 MILLIGRAM(S): at 22:00

## 2024-08-27 RX ADMIN — Medication 4 MILLIGRAM(S): at 20:14

## 2024-08-27 RX ADMIN — Medication 4 MILLIGRAM(S): at 19:44

## 2024-08-27 RX ADMIN — Medication 1 APPLICATION(S): at 19:24

## 2024-08-27 RX ADMIN — ONDANSETRON 4 MILLIGRAM(S): 2 INJECTION, SOLUTION INTRAMUSCULAR; INTRAVENOUS at 22:00

## 2024-08-27 RX ADMIN — ACETAMINOPHEN 1000 MILLIGRAM(S): 325 TABLET ORAL at 17:41

## 2024-08-27 RX ADMIN — Medication 4 MILLIGRAM(S): at 23:12

## 2024-08-27 RX ADMIN — Medication 220 MILLIGRAM(S): at 18:11

## 2024-08-27 RX ADMIN — ONDANSETRON 4 MILLIGRAM(S): 2 INJECTION, SOLUTION INTRAMUSCULAR; INTRAVENOUS at 19:44

## 2024-08-27 RX ADMIN — Medication 4 MILLIGRAM(S): at 23:42

## 2024-08-27 NOTE — ED PROVIDER NOTE - ATTENDING CONTRIBUTION TO CARE
84-year-old female history of CKD, dementia, type 2 diabetes, hypertension, cholecystitis with percutaneous drain presents with left hip pain s/p trip and fall backwards.  Unsure if head injury.  Patient found to have pain to the left hip with externally rotated left leg.  X-ray show left femoral neck fracture.  Ortho consulted, will need surgical repair.  Patient signed out pending CT  head, cervical spine, thoracic and lumbar spine, chest abdomen pelvis to rule out other traumatic injury.  Will need admission for left hip fracture.

## 2024-08-27 NOTE — ED PROVIDER NOTE - NS ED MD DISPO DIVISION
Discharge Summary       PATIENT ID: Sheryl Green  MRN: [id number]   YOB: 1964    DATE OF ADMISSION: 10/13/2023  3:30 PM    DATE OF DISCHARGE: 10/23/2023   PRIMARY CARE PROVIDER: Lasha Conklin MD     ATTENDING PHYSICIAN: Dr Jovanny Kasper  DISCHARGING PROVIDER: Jovanny Kasper MD    To contact this individual call [phone] and ask the  to page. If unavailable ask to be transferred the Adult Hospitalist Department. CONSULTATIONS: IP CONSULT TO NEUROSURGERY  IP CONSULT TO NEUROINTERVENTIONAL SURGERY  IP CONSULT TO CASE MANAGEMENT    PROCEDURES/SURGERIES: * No surgery found *    ADMITTING DIAGNOSES & HOSPITAL COURSE:   Subarachnoid Hemorrhage due basilar anuersym  S/P angiogram and coiling on 10/13/23  Left arm drift     - Neurochecks q 4hr continued   - Continue nimodipine x 21 days.  Day 10 today (10/23/23)  - PT/OT following, recommending HH  - DCA  demonstrated stable coiling and no vasospasm (10/20)  - Repeat CT head (10/20): no acute process  - continue Aspirin  - Keppra stopped  - keep SBP < 160  -Appreciate discussion with OZZIE ok to discharge with outpatient follow up     Polyuria c/f DI  - Continue fludrocortisone, discontinue after today  - Follow daily lytes, replete PRN     Hypothyroidism  - Continue home levothyroxine     PENDING TEST RESULTS:   At the time of discharge the following test results are still pending: none    FOLLOW UP APPOINTMENTS:    PCP  [facility]    ADDITIONAL CARE RECOMMENDATIONS: as above    DIET: cardiac diet    ACTIVITY: activity as tolerated    DISCHARGE MEDICATIONS:     Medication List        START taking these medications      aspirin 81 MG chewable tablet  Take 1 tablet by mouth daily  Start taking on: October 24, 2023     nicotine 14 MG/24HR  Commonly known as: [address] [place] 1 patch onto the skin daily  Start taking on: October 24, 2023     niMODipine 30 MG capsule  Commonly known as: NIMOTOP  Take 2 capsules by mouth every 4 hours for 66 doses
Patient/Caregiver provided printed discharge information.
[facility]

## 2024-08-27 NOTE — ED PROVIDER NOTE - OBJECTIVE STATEMENT
84-year-old male with past medical history of CKD, dementia, type 2 diabetes, hypertension, cholecystitis with a percutaneous gallbladder bladder drain presents s/p trip and fall backward. Pt is AOOx2 and no family at bedside. Unsure if he hit his head, complains of pain to left leg. Denies pain anywhere else.

## 2024-08-27 NOTE — ED ADULT NURSE NOTE - OBJECTIVE STATEMENT
Pt comes in complaining of fall backwards witnessed by wife. Complaining of arm pain. A&Ox2 at baseline. Presents with biliary drain. Pt complaining of inability to urinate.

## 2024-08-27 NOTE — ED PROVIDER NOTE - PHYSICAL EXAMINATION
Const: Awake, alert and oriented. In no acute distress. Well appearing.  HEENT: NC/AT. No raccoon eyes, no perez sign, no epistaxis, no septal hematoma, no intraoral lacerations or bleeding, no tongue lacerations or abrasions.  Eyes: No scleral icterus. EOMI.  Neck:. Cervical collar in place. No midline TTP. No palpable step offs.  Chest: Chest wall stable, no flail chest, no crepitus on palpation.  Cardiac: Regular rate and regular rhythm. +S1/S2. No murmurs. 2+ Central pulses and symmetric. Peripheral pulses 2+ and symmetric. No LE edema.  Resp: Speaking in full sentences. No evidence of respiratory distress. No wheezes, rales or rhonchi.  Abd: Soft, non-tender, non-distended. Normal bowel sounds in all 4 quadrants. No guarding or rebound.  MSK: Pelvis stable. No obvious deformity. TTP left leg, along upper femur.  Back: Spine midline and non-tender. No palpable step offs.  Skin: No rashes, abrasions or lacerations.  Neuro: Awake, alert & oriented x 2, baseline. GCS 15. Follows commands. 5/5 strength symmetrically all extremities.

## 2024-08-27 NOTE — ED ADULT TRIAGE NOTE - CHIEF COMPLAINT QUOTE
Pt BIBA for a trip and fall, witnessed fall by wife, pt wife called EMS, pt fell and injured his left arm, left leg, no rotation, shortening noted, pt is dementia and is at his baseline of A&O2.

## 2024-08-27 NOTE — CONSULT NOTE ADULT - SUBJECTIVE AND OBJECTIVE BOX
Pt Name: BERNADINE ANDERSON    MRN: [id number]       Language line  used: 84-year-old male with past medical history of CKD, dementia, type 2 diabetes, hypertension, cholecystitis with a percutaneous gallbladder bladder drain presents s/p trip and fall backward. Pt is AOOx2 and no family at bedside. Unsure if he hit his head, complains of pain to left leg. Denies pain anywhere else.  Denies any other orthopedic complaint.       REVIEW OF SYSTEMS    General: Alert, responsive, in NAD    Skin: No rashes, no pruritis     Musculoskeletal: SEE HPI.    Neurological: No sensory or motor changes.         PAST MEDICAL & SURGICAL HISTORY:  PAST MEDICAL & SURGICAL HISTORY:  Diabetes      Hypertension      Prostate disorder      Anxiety      High cholesterol      Ulcer      Acute cholecystitis      History of endoscopy      Cholecystostomy tube dysfunction      S/P cataract surgery          Allergies: No Known Allergies      Medications:     FAMILY HISTORY:  Family history of stomach cancer  Father    Family history of emphysema  Mother    : non-contributory    Social History:     Ambulation: Walking independently [ ] With Cane [ ] With Walker [X]  Bedbound [ ]                 Vital Signs Last 24 Hrs  T(C): 36.7 (27 Aug 2024 14:52), Max: 36.7 (27 Aug 2024 14:52)  T(F): 98.1 (27 Aug 2024 14:52), Max: 98.1 (27 Aug 2024 14:52)  HR: 79 (27 Aug 2024 14:52) (79 - 79)  BP: 173/89 (27 Aug 2024 14:52) (173/89 - 173/89)  BP(mean): --  RR: 16 (27 Aug 2024 14:52) (16 - 16)  SpO2: 99% (27 Aug 2024 14:52) (99% - 99%)    Parameters below as of 27 Aug 2024 14:52  Patient On (Oxygen Delivery Method): room air        Daily Height in cm: 160.02 (27 Aug 2024 14:52)    Daily       PHYSICAL EXAM:      Appearance: Alert, responsive, in no acute distress.    Neurological: Sensation is grossly intact to light touch. No focal deficits or weaknesses found.    Skin: no rash on visible skin. Skin is clean, dry and intact. No bleeding. No abrasions. No ulcerations.    Vascular: 2+ distal pulses. Cap refill < 2 sec. No signs of venous insufficiency or stasis. No extremity ulcerations. No cyanosis.    Musculoskeletal:         Left Upper Extremity: Clavicle: NTTP. Shoulder: NTTP, FROM. Abduction/adduction/flexion/extension intact. Elbow: NTTP, FROM. EE/EF intact. Wrist: NTTP, FROM. WE/WF intact. Hand: NTTP throughout, FROM. Abduction/adduction/flexion/extension of digits 1-5 intact. Compartments soft compressible. Sensation intact to light touch.  Brisk capillary refill, distal pulses +       Right Upper Extremity: Clavicle: NTTP. Shoulder: NTTP, FROM. Abduction/adduction/flexion/extension intact. Elbow: NTTP, FROM. EE/EF intact. Wrist: NTTP, FROM. WE/WF intact. Hand: NTTP throughout, FROM. Abduction/adduction/flexion/extension of digits 1-5 intact. Compartments soft compressible. Sensation intact to light touch. Brisk capillary refill, distal pulses +       Left Lower Extremity: Hip: TTP, positive log roll, limited ROM due to pain. Knee: NTTP, FROM. KE/KF intact. Ankle: NTTP, FROM. DF/PF/EHL/FHL intact. Compartments soft compressible. Sensation intact to light touch.  Brisk capillary refill, distal pulses +       Right Lower Extremity: Hip: NTTP, FROM. HF/HE intact. Knee: NTTP, FROM. KE/KF intact. Ankle: NTTP, FROM. DF/PF/EHL/FHL intact. Compartments soft compressible. Sensation intact to light touch.  Brisk capillary refill, distal pulses +    Imaging Studies:    Preliminary PA read of left femur XR: Left femoral neck fracture     A/P:  Pt is a  84y Male with left femoral neck fracture     PLAN:   * NPO at midnight for OR tomorrow  * IV fluids ordered and to start once NPO  * Pre-operative ABX ordered  * Routine daily anticoagulation held for OR  * Single dose anticoagulation ordered  * Anesthesia consulted  * Bed rest

## 2024-08-27 NOTE — CONSULT NOTE ADULT - SUBJECTIVE AND OBJECTIVE BOX
84-year-old male with past medical history of CKD, dementia, type 2 diabetes, hypertension, cholecystitis with a percutaneous gallbladder bladder drain (exchanged on 8/13/24), presents s/p trip and fall backward.    ECHO: 12/23    1. Left ventricular cavity is normal. Left ventricular systolic function is normal with an ejection fraction visually estimated at 55 to 60 %.   2. Normal left ventricular diastolic function.   3. Normal right ventricular cavity size and systolic function.   4. The left atrium is normal.   5. The right atrium is normal in size.   6. Fibrocalcific aortic valve sclerosis without stenosis.   7. Estimated pulmonary artery systolic pressure is 34 mmHg, normal pulmonary artery pressure.    Plan:   NPO after midnight.  OR tomorrow for ORIF hip fx.  Pending CT scans for trauma injuries. IF no other injuries, patient can proceed to OR.

## 2024-08-27 NOTE — ED PROVIDER NOTE - CLINICAL SUMMARY MEDICAL DECISION MAKING FREE TEXT BOX
84-year-old male with past medical history of CKD, dementia, type 2 diabetes, hypertension, cholecystitis with a percutaneous gallbladder bladder drain presents s/p trip and fall backward.    Will obtain trauma scans to r/o injuries. Will obtain labs, as well as give ofirmev for pain. Will reassess.

## 2024-08-27 NOTE — ED ADULT NURSE REASSESSMENT NOTE - NS ED NURSE REASSESS COMMENT FT1
Assumed care of patient at 1930, resting on stretcher, endorsing pain to left hip.  Pt A&Ox2, at baseline per daughters present at bedside.  Pt noted with episode of vomiting while at CT scan and treated by CC RN and MD.  Pt admitted to telemetry under medicine service for left hip fracture with plan for OR 8/28.  Pt to be NPO after midnight.

## 2024-08-27 NOTE — ED PROVIDER NOTE - PROGRESS NOTE DETAILS
Bryant: Pt received in signout from Dr. Damon. Ortho recs noted. Of note, pt had episode of dark vomitus prior to CT scan -- does not appear like coffee-ground emesis, no gross hematemesis. CTs reviewed. Admitted to medicine.

## 2024-08-27 NOTE — ED PROVIDER NOTE - WR ORDER DATE AND TIME 1
"-- DO NOT REPLY / DO NOT REPLY ALL --  -- Message is from the [facility]--    COVID-19 Universal Screening: N/A - Not about scheduling    General Patient Message      Reason for Call: Patient's medication for the lisinopril-hydroCHLOROthiazide (ZESTORETIC) 10-12.5 MG per tablet is not available at the pharmacy, the medication is on back order. The patient would need to get a similar prescription as the pharmacy does not have this medication in stock. Caller Information       Type Contact Phone    07/15/2020 05:21 PM Phone (Incoming) Lisa Jimenez (Self) [phone] (M)          Alternative phone number: none    Turnaround time given to caller: ""This message will be sent to [facility] Provider's name]. The clinical team will fulfill your request as soon as they review your message. \""    "
27-Aug-2024 17:47

## 2024-08-27 NOTE — CONSULT NOTE ADULT - NS ATTEND AMEND GEN_ALL_CORE FT
Orthopaedic Trauma Surgeon Addendum:    I have personally performed a face-to-face diagnostic evaluation on this patient.  I have reviewed the physician assistant note and agree with the history, exam, and plan of care, except as noted.    Orthopedic Surgery is ready to proceed with surgery pending medical optimization and adequate Operating Room availability. Risks of surgical delay discussed with other providers and staff. Please call with any questions or concerns.     Janes Kam MD  Orthopaedic Trauma Surgeon  [facility] [place]

## 2024-08-27 NOTE — ED ADULT NURSE NOTE - ED STAT RN HANDOFF DETAILS
Patient noted with episode of spo2 <90% on RA, admitting MD made aware and pt placed on O2 via n/c.  Report given to 3T and pt transferred without incident.

## 2024-08-28 DIAGNOSIS — Z01.810 ENCOUNTER FOR PREPROCEDURAL CARDIOVASCULAR EXAMINATION: ICD-10-CM

## 2024-08-28 DIAGNOSIS — S72.002A FRACTURE OF UNSPECIFIED PART OF NECK OF LEFT FEMUR, INITIAL ENCOUNTER FOR CLOSED FRACTURE: ICD-10-CM

## 2024-08-28 LAB
ANION GAP SERPL CALC-SCNC: 11 MMOL/L — SIGNIFICANT CHANGE UP (ref 5–17)
BUN SERPL-MCNC: 32.8 MG/DL — HIGH (ref 8–20)
CALCIUM SERPL-MCNC: 8.6 MG/DL — SIGNIFICANT CHANGE UP (ref 8.4–10.5)
CHLORIDE SERPL-SCNC: 103 MMOL/L — SIGNIFICANT CHANGE UP (ref 96–108)
CO2 SERPL-SCNC: 22 MMOL/L — SIGNIFICANT CHANGE UP (ref 22–29)
CREAT SERPL-MCNC: 2.11 MG/DL — HIGH (ref 0.5–1.3)
EGFR: 30 ML/MIN/1.73M2 — LOW
GLUCOSE BLDC GLUCOMTR-MCNC: 116 MG/DL — HIGH (ref 70–99)
GLUCOSE BLDC GLUCOMTR-MCNC: 120 MG/DL — HIGH (ref 70–99)
GLUCOSE BLDC GLUCOMTR-MCNC: 143 MG/DL — HIGH (ref 70–99)
GLUCOSE BLDC GLUCOMTR-MCNC: 161 MG/DL — HIGH (ref 70–99)
GLUCOSE BLDC GLUCOMTR-MCNC: 171 MG/DL — HIGH (ref 70–99)
GLUCOSE SERPL-MCNC: 214 MG/DL — HIGH (ref 70–99)
HCT VFR BLD CALC: 33.7 % — LOW (ref 39–50)
HGB BLD-MCNC: 10.4 G/DL — LOW (ref 13–17)
MCHC RBC-ENTMCNC: 27.4 PG — SIGNIFICANT CHANGE UP (ref 27–34)
MCHC RBC-ENTMCNC: 30.9 GM/DL — LOW (ref 32–36)
MCV RBC AUTO: 88.7 FL — SIGNIFICANT CHANGE UP (ref 80–100)
MRSA PCR RESULT.: SIGNIFICANT CHANGE UP
PLATELET # BLD AUTO: 182 K/UL — SIGNIFICANT CHANGE UP (ref 150–400)
POTASSIUM SERPL-MCNC: 4.6 MMOL/L — SIGNIFICANT CHANGE UP (ref 3.5–5.3)
POTASSIUM SERPL-SCNC: 4.6 MMOL/L — SIGNIFICANT CHANGE UP (ref 3.5–5.3)
RBC # BLD: 3.8 M/UL — LOW (ref 4.2–5.8)
RBC # FLD: 17.5 % — HIGH (ref 10.3–14.5)
S AUREUS DNA NOSE QL NAA+PROBE: SIGNIFICANT CHANGE UP
SODIUM SERPL-SCNC: 136 MMOL/L — SIGNIFICANT CHANGE UP (ref 135–145)
TROPONIN T, HIGH SENSITIVITY RESULT: 50 NG/L — SIGNIFICANT CHANGE UP (ref 0–51)
WBC # BLD: 10.43 K/UL — SIGNIFICANT CHANGE UP (ref 3.8–10.5)
WBC # FLD AUTO: 10.43 K/UL — SIGNIFICANT CHANGE UP (ref 3.8–10.5)

## 2024-08-28 PROCEDURE — 93010 ELECTROCARDIOGRAM REPORT: CPT | Mod: 77

## 2024-08-28 PROCEDURE — 93010 ELECTROCARDIOGRAM REPORT: CPT

## 2024-08-28 PROCEDURE — 99497 ADVNCD CARE PLAN 30 MIN: CPT | Mod: 25

## 2024-08-28 PROCEDURE — 99223 1ST HOSP IP/OBS HIGH 75: CPT

## 2024-08-28 PROCEDURE — 99222 1ST HOSP IP/OBS MODERATE 55: CPT

## 2024-08-28 RX ORDER — DEXTROSE 15 G/33 G
15 GEL IN PACKET (GRAM) ORAL ONCE
Refills: 0 | Status: DISCONTINUED | OUTPATIENT
Start: 2024-08-28 | End: 2024-09-09

## 2024-08-28 RX ORDER — SUCRALFATE 1 G/1
1 TABLET ORAL
Refills: 0 | DISCHARGE

## 2024-08-28 RX ORDER — SUCRALFATE 1 G/10ML
1 SUSPENSION ORAL
Refills: 0 | Status: DISCONTINUED | OUTPATIENT
Start: 2024-08-28 | End: 2024-09-09

## 2024-08-28 RX ORDER — GLUCAGON INJECTION, SOLUTION 1 MG/.2ML
1 INJECTION, SOLUTION SUBCUTANEOUS ONCE
Refills: 0 | Status: DISCONTINUED | OUTPATIENT
Start: 2024-08-28 | End: 2024-09-09

## 2024-08-28 RX ORDER — METOCLOPRAMIDE HCL 5 MG
10 TABLET ORAL ONCE
Refills: 0 | Status: COMPLETED | OUTPATIENT
Start: 2024-08-28 | End: 2024-08-28

## 2024-08-28 RX ORDER — GABAPENTIN 100 MG
100 CAPSULE ORAL
Refills: 0 | Status: DISCONTINUED | OUTPATIENT
Start: 2024-08-28 | End: 2024-09-03

## 2024-08-28 RX ORDER — INSULIN GLARGINE 100 [IU]/ML
6 INJECTION, SOLUTION SUBCUTANEOUS EVERY MORNING
Refills: 0 | Status: DISCONTINUED | OUTPATIENT
Start: 2024-08-28 | End: 2024-09-09

## 2024-08-28 RX ORDER — ACETAMINOPHEN 325 MG/1
1000 TABLET ORAL ONCE
Refills: 0 | Status: COMPLETED | OUTPATIENT
Start: 2024-08-28 | End: 2024-08-28

## 2024-08-28 RX ORDER — DEXTROSE 15 G/33 G
25 GEL IN PACKET (GRAM) ORAL ONCE
Refills: 0 | Status: DISCONTINUED | OUTPATIENT
Start: 2024-08-28 | End: 2024-09-09

## 2024-08-28 RX ORDER — ACETAMINOPHEN 325 MG/1
650 TABLET ORAL EVERY 6 HOURS
Refills: 0 | Status: DISCONTINUED | OUTPATIENT
Start: 2024-08-28 | End: 2024-09-09

## 2024-08-28 RX ORDER — PANTOPRAZOLE SODIUM 40 MG
40 TABLET, DELAYED RELEASE (ENTERIC COATED) ORAL
Refills: 0 | Status: DISCONTINUED | OUTPATIENT
Start: 2024-08-28 | End: 2024-09-03

## 2024-08-28 RX ORDER — GABAPENTIN 400 MG/1
1 CAPSULE ORAL
Refills: 0 | DISCHARGE

## 2024-08-28 RX ORDER — METOPROLOL TARTRATE 100 MG/1
5 TABLET ORAL ONCE
Refills: 0 | Status: COMPLETED | OUTPATIENT
Start: 2024-08-28 | End: 2024-08-28

## 2024-08-28 RX ORDER — DEXTROSE 15 G/33 G
12.5 GEL IN PACKET (GRAM) ORAL ONCE
Refills: 0 | Status: DISCONTINUED | OUTPATIENT
Start: 2024-08-28 | End: 2024-09-09

## 2024-08-28 RX ADMIN — ACETAMINOPHEN 650 MILLIGRAM(S): 325 TABLET ORAL at 05:19

## 2024-08-28 RX ADMIN — SUCRALFATE 1 GRAM(S): 1 SUSPENSION ORAL at 06:23

## 2024-08-28 RX ADMIN — CHLORHEXIDINE GLUCONATE 1 APPLICATION(S): 40 SOLUTION TOPICAL at 08:17

## 2024-08-28 RX ADMIN — Medication 1000 MILLILITER(S): at 00:51

## 2024-08-28 RX ADMIN — ACETAMINOPHEN 1000 MILLIGRAM(S): 325 TABLET ORAL at 12:36

## 2024-08-28 RX ADMIN — Medication 10 MILLIGRAM(S): at 11:58

## 2024-08-28 RX ADMIN — ACETAMINOPHEN 400 MILLIGRAM(S): 325 TABLET ORAL at 11:57

## 2024-08-28 RX ADMIN — ACETAMINOPHEN 650 MILLIGRAM(S): 325 TABLET ORAL at 22:53

## 2024-08-28 RX ADMIN — Medication 100 MILLIGRAM(S): at 14:19

## 2024-08-28 RX ADMIN — METOPROLOL TARTRATE 5 MILLIGRAM(S): 100 TABLET ORAL at 11:57

## 2024-08-28 RX ADMIN — Medication 1 APPLICATION(S): at 06:23

## 2024-08-28 RX ADMIN — ONDANSETRON 4 MILLIGRAM(S): 2 INJECTION, SOLUTION INTRAMUSCULAR; INTRAVENOUS at 00:02

## 2024-08-28 RX ADMIN — Medication 40 MILLIGRAM(S): at 05:19

## 2024-08-28 RX ADMIN — Medication 100 MILLIGRAM(S): at 18:45

## 2024-08-28 RX ADMIN — Medication 3 MILLIGRAM(S): at 21:53

## 2024-08-28 RX ADMIN — SUCRALFATE 1 GRAM(S): 1 SUSPENSION ORAL at 18:46

## 2024-08-28 RX ADMIN — Medication 100 MILLIGRAM(S): at 08:17

## 2024-08-28 RX ADMIN — ACETAMINOPHEN 650 MILLIGRAM(S): 325 TABLET ORAL at 21:53

## 2024-08-28 RX ADMIN — ACETAMINOPHEN 650 MILLIGRAM(S): 325 TABLET ORAL at 06:19

## 2024-08-28 RX ADMIN — INSULIN GLARGINE 6 UNIT(S): 100 INJECTION, SOLUTION SUBCUTANEOUS at 08:38

## 2024-08-28 RX ADMIN — Medication 1 APPLICATION(S): at 18:46

## 2024-08-28 RX ADMIN — Medication 2: at 08:38

## 2024-08-28 NOTE — H&P ADULT - HISTORY OF PRESENT ILLNESS
84-year-old male with past medical history of CKD, dementia (AAOx2 at baseline, not oriented to time), Insulin dependent type 2 diabetes, hypertension, cholecystitis with a percutaneous gallbladder bladder drain since 12/2023 presents after trip and fall backward. Spoke with daughter, Elkin, for collateral. She states that patient fell onto his left hip and then hit his head afterward, though, major impact was on his hip. He did not lose consciousness at any time during the event. Patient had 2 episodes of vomiting in the ED, appearing like coffee grounds. Daughter states that whenever he vomits, it appears that way because he has severe esophagitis. Additionally, she states that he usually has these vomiting episodes when something like a fall occurs.

## 2024-08-28 NOTE — H&P ADULT - VTE RISK ASSESSMENT
Indication: A Fib; CHADSVASC = 4 (HTN,Age,DM,female)  Goal INR: 2.0-2.5  Duration: long term  Order Expiration Date: 01/14/21    Pertinent History: Patient was admitted to [facility] 10/2015 for massive hemoptysis, which started with a cough and this was associated with some respiratory distress. No active bleeding or endobronchial lesions were visualized. It was thought that she had bronchiectasis and the mucosa was inflamed and  to bleeding. Her hemoptysis had resolved prior to the bronchoscopy, as her anticoagulation was reversed and it has improved. Patient to continue warfarin but with goal INR range of 2.0-2.5.  04/11-Patient received new home meter and resumed self testing today.        Dialysis 5 days/wk    Assessment  High-Risk Medications: Patient takes levothyroxine, allopurinol,  escitalopram, and nephro vitamins .  According to micromedex there may be an increased risk of bleeding with each medication when taken concurrently with warfarin.    Significant Findings: No  Hospitalizations / Procedures:   Vitamin K goal: 2 cups/wk     Plan  4mg tablet    INR Result: 2.3  The INR result for today is therapeutic based on the INR goal 2.0-3.0  Recommended Dose: continue present management: 6mgMWF;4mgROW (34mg/wk)  Etio:   Follow-Up: 2 weeks: 04/27/20 (self test)  Comments:     Counseling  Stressed importance of compliance with exact recommended dosage regimen  Instructed to notify clinic about medication changes or health status changes  Instructed to notify clinic about scheduled procedures    Patient (or family/caregiver) verbalized understanding and agreement with plan.   VTE Assessment already completed for this visit

## 2024-08-28 NOTE — H&P ADULT - ASSESSMENT
84-year-old male with past medical history of CKD, dementia, type 2 diabetes, hypertension, cholecystitis with a percutaneous gallbladder bladder drain presents after trip and fall backward.     Left Femoral Neck Fracture 2/2 mechanical fall  -Admit to medicine   -Ortho following, plan for OR tomorrow  -Keep NPO, bedrest  -EKG NSR @ 91 with PVCs, LVH; grossly unchanged from prior  -CT chest showing BL Lower lobe atelectasis; Cts all negative for traumatic injury other than L femoral neck fx  -PLAZA 0.8 % ZUNILDA risk  -RCRI 2; Class III risk  -Evaluated by anesthesia, who states patient can proceed to OR in am  -Patient is optimized for the moderate risk procedure without modifiable risk factors     Leukocytosis, Hypoxia  -May be reactive from fall  -Pan CT without signs of infection  -Patient now hypoxic requiring 2L NC. no consolidation on CT chest other than atelectasis   -Possible aspiration while vomiting as patient has had this in the past per daughter, but mild hypoxia may be in the setting of pain meds\  -Monitor off antibiotics for now  -Supplemental O2 to maintain SpO2 > 92%, titrate as tolerated    Cholecystitis s/p perc sky, with drain still in place  -Drain in place with few exchanges since 12/2023  -Ultimate plan was to remove gallbladder, but patient has been sick on and off that has hindered that per daughter  -Monitor output  -Most recently exchanged by IR earlier this month    IDDM  -Hold premeal insulin while NPO, resume 4 unites at meal times when eating again  -Continue long acting insulin at 50% decreased dose while NPO(6 units daily), but increase back to home dose (12 units daily) once patient tolerating PO  -Accuchecks/ISS/A1c    CKD3  -Avoid nephrotoxins  -Required HD temporarily in the past, but now urinates normally and no need for HD    Dementia   -Patient currently at baseline of AAOx2 per daughter    DVTppx: On hold for OR  Dispo: Pending OR   GOC: DNR/Trial of Intubation 84-year-old male with past medical history of CKD, dementia, type 2 diabetes, hypertension, cholecystitis with a percutaneous gallbladder bladder drain presents after trip and fall backward.     Left Femoral Neck Fracture 2/2 mechanical fall  -Admit to medicine   -Ortho following, plan for OR tomorrow  -Keep NPO, bedrest  -EKG NSR @ 91 with PVCs, LVH; grossly unchanged from prior  -CT chest showing BL Lower lobe atelectasis; Cts all negative for traumatic injury other than L femoral neck fx  -PLAZA 0.8 % ZUNILDA risk  -RCRI 2; Class III risk  -Evaluated by anesthesia, who states patient can proceed to OR in am  -Patient is optimized for the moderate risk procedure without modifiable risk factors     Leukocytosis, Hypoxia  -May be reactive from fall  -Pan CT without signs of infection  -Patient now hypoxic requiring 2L NC. no consolidation on CT chest other than atelectasis   -Possible aspiration while vomiting as patient has had this in the past per daughter, but mild hypoxia may be in the setting of pain meds\  -Monitor off antibiotics for now  -Supplemental O2 to maintain SpO2 > 92%, titrate as tolerated    Cholecystitis s/p perc sky, with drain still in place  -Drain in place with few exchanges since 12/2023  -Ultimate plan was to remove gallbladder, but patient has been sick on and off that has hindered that per daughter  -Monitor output  -Most recently exchanged by IR earlier this month    IDDM  -Hold premeal insulin while NPO, resume 4 unites at meal times when eating again  -Continue long acting insulin at 50% decreased dose while NPO(6 units daily), but increase back to home dose (12 units daily) once patient tolerating PO  -Accuchecks/ISS/A1c    CKD3  -Avoid nephrotoxins  -Required HD temporarily in the past, but now urinates normally and no need for HD    Dementia   -Patient currently at baseline of AAOx2 per daughter    Severe Esophagitis  -Continue sucralfate QID  -Continue PPI    DVTppx: On hold for OR  Dispo: Pending OR   GOC: DNR/Trial of Intubation

## 2024-08-28 NOTE — CONSULT NOTE ADULT - SUBJECTIVE AND OBJECTIVE BOX
[facility]                                              CARDIOLOGY AT [place] [address]                                             Telephone: [phone]. Fax:[phone]                                                       CARDIOLOGY CONSULTATION NOTE                                                                                             History obtained by: Patient and medical record  Community Cardiologist: n/a   obtained: Yes [  ] No [x  ]  Reason for Consultation: pre-op clearance  Available out pt records reviewed: Yes [ x ] No [  ]    Chief complaint:    Patient is a 84y old  Male who presents with a chief complaint of L hip fx (28 Aug 2024 18:10)      HPI:  This is 83 y/o male with hx of dementia (AAO x2 at baseline), CKD3, IDDM2, HTN, pAfib (not on AC due to BI bleeds), cholecystitis with percutaneous gallbladder drain who presented s/p mechanical fall. Pt was found to have left hip fracture. He was ready to go to the OR when he became tachycardic to 130's and shivering with temp 100.1F. Surgery was cancelled and cardiology was asked to evaluate.    CARDIAC TESTING   ECHO:  < from: TTE W or WO Ultrasound Enhancing Agent (12.22.23 @ 19:49) >     CONCLUSIONS:      1. Left ventricular cavity is normal. Left ventricular systolic function is normal with an ejection fraction visually estimated at 55 to 60 %.   2. Normal left ventricular diastolic function.   3. Normal right ventricular cavity size and systolic function.   4. The left atrium is normal.   5. The right atrium is normal in size.   6. Fibrocalcific aortic valve sclerosis without stenosis.   7. Estimated pulmonary artery systolic pressure is 34 mmHg, normal pulmonary artery pressure.    < end of copied text >      STRESS: n/a    CATH: n/a    ELECTROPHYSIOLOGY: n/a    PAST MEDICAL HISTORY  Diabetes    Hypertension    Prostate disorder    Anxiety    High cholesterol    Ulcer    Acute cholecystitis        PAST SURGICAL HISTORY  No significant past surgical history    History of endoscopy    Cholecystostomy tube dysfunction    S/P cataract surgery        SOCIAL HISTORY:    CIGARETTES:     ALCOHOL:  DRUGS:    FAMILY HISTORY:  Family history of stomach cancer  Father    Family history of emphysema  Mother      Family History of Cardiovascular Disease:  Yes [  ] No [  ]  Coronary Artery Disease in first degree relative: Yes [  ] No [  ]  Sudden Cardiac Death in First degree relative: Yes [  ] No [  ]    HOME MEDICATIONS:  gabapentin 100 mg oral capsule: 1 cap(s) orally 4 times a day (28 Aug 2024 03:09)  insulin glargine 100 units/mL subcutaneous solution: 12 unit(s) subcutaneous once a day (28 Aug 2024 03:10)  insulin lispro 100 units/mL injectable solution: 4 international unit(s) subcutaneous 3 times a day (before meals) hold if FS &lt;100 (28 Aug 2024 03:11)  omeprazole 40 mg oral delayed release capsule: 1 cap(s) orally once a day (28 Aug 2024 03:11)  sucralfate 1 g oral tablet: 1 tab(s) orally 4 times a day (28 Aug 2024 03:11)  Simvastatin 20 mg  Flomax 0.4 mg  ASA 81 mg        CURRENT OTHER MEDICATIONS:  acetaminophen     Tablet .. 650 milliGRAM(s) Oral every 6 hours PRN Temp greater or equal to 38C (100.4F), Mild Pain (1 - 3)  gabapentin 100 milliGRAM(s) Oral four times a day  melatonin 3 milliGRAM(s) Oral at bedtime PRN Insomnia  ondansetron Injectable 4 milliGRAM(s) IV Push every 6 hours PRN Nausea and/or Vomiting  oxyCODONE    IR 10 milliGRAM(s) Oral every 4 hours PRN Moderate Pain (4 - 6)  oxyCODONE    IR 5 milliGRAM(s) Oral every 4 hours PRN Mild Pain (1 - 3)  aluminum hydroxide/magnesium hydroxide/simethicone Suspension 30 milliLiter(s) Oral four times a day PRN Indigestion  pantoprazole    Tablet 40 milliGRAM(s) Oral before breakfast  sucralfate 1 Gram(s) Oral four times a day  chlorhexidine 2% Cloths 1 Application(s) Topical <User Schedule>  dextrose 5%. 1000 milliLiter(s) (50 mL/Hr) IV Continuous <Continuous>  dextrose 5%. 1000 milliLiter(s) (100 mL/Hr) IV Continuous <Continuous>  dextrose 50% Injectable 12.5 Gram(s) IV Push once, Stop order after: 1 Doses  dextrose 50% Injectable 25 Gram(s) IV Push once, Stop order after: 1 Doses  dextrose 50% Injectable 25 Gram(s) IV Push once, Stop order after: 1 Doses  dextrose Oral Gel 15 Gram(s) Oral once, Stop order after: 1 Doses PRN Blood Glucose LESS THAN 70 milliGRAM(s)/deciliter  glucagon  Injectable 1 milliGRAM(s) IntraMuscular once, Stop order after: 1 Doses  insulin glargine Injectable (LANTUS) 6 Unit(s) SubCutaneous every morning  insulin lispro (ADMELOG) corrective regimen sliding scale   SubCutaneous every 6 hours  mupirocin 2% Ointment 1 Application(s) Both Nostrils two times a day, Stop order after: 5 Days      ALLERGIES:   No Known Allergies      REVIEW OF SYMPTOMS: unable to obtain due to pt's dementia    VITAL SIGNS:  T(C): 37.1 (08-28-24 @ 16:23), Max: 37.8 (08-28-24 @ 11:52)  T(F): 98.8 (08-28-24 @ 16:23), Max: 100.1 (08-28-24 @ 11:52)  HR: 109 (08-28-24 @ 16:23) (90 - 132)  BP: 105/67 (08-28-24 @ 16:23) (105/67 - 165/100)  RR: 18 (08-28-24 @ 16:23) (16 - 20)  SpO2: 94% (08-28-24 @ 16:23) (81% - 99%)    INTAKE AND OUTPUT:     08-27 @ 07:01  -  08-28 @ 07:00  --------------------------------------------------------  IN: 0 mL / OUT: 200 mL / NET: -200 mL    08-28 @ 07:01  -  08-28 @ 20:35  --------------------------------------------------------  IN: 0 mL / OUT: 850 mL / NET: -850 mL        PHYSICAL EXAM:  Constitutional: Comfortable . No acute distress.   HEENT: Atraumatic and normocephalic , neck is supple . no JVD. No carotid bruit.  CNS: A&Ox3. No focal deficits.   Respiratory: CTAB, unlabored   Cardiovascular: tachycardic, RRR normal s1 s2. No murmurs  Gastrointestinal: Soft, non-tender. +Bowel sounds, RUQ drain in place  Extremities: 2+ Peripheral Pulses, No clubbing, cyanosis, or edema  Psychiatric: Calm . no agitation.   Skin: Warm and dry, no ulcers on extremities     LABS:                            10.4   10.43 )-----------( 182      ( 28 Aug 2024 06:36 )             33.7     08-28    136  |  103  |  32.8<H>  ----------------------------<  214<H>  4.6   |  22.0  |  2.11<H>    Ca    8.6      28 Aug 2024 06:36    TPro  6.9  /  Alb  3.6  /  TBili  0.6  /  DBili  x   /  AST  28  /  ALT  33  /  AlkPhos  147<H>  08-27    PT/INR - ( 27 Aug 2024 17:40 )   PT: 11.6 sec;   INR: 1.05 ratio         PTT - ( 27 Aug 2024 17:40 )  PTT:31.5 sec  Urinalysis Basic - ( 28 Aug 2024 06:36 )    Color: x / Appearance: x / SG: x / pH: x  Gluc: 214 mg/dL / Ketone: x  / Bili: x / Urobili: x   Blood: x / Protein: x / Nitrite: x   Leuk Esterase: x / RBC: x / WBC x   Sq Epi: x / Non Sq Epi: x / Bacteria: x        INTERPRETATION OF TELEMETRY: 's    ECG:  bpm with PVCs, LAD, nonspecific T-wave abnormality  Prior ECG: Yes [ x ] No [  ]    RADIOLOGY & ADDITIONAL STUDIES:    X-ray:    CT scan:   < from: CT Chest No Cont (08.27.24 @ 22:26) >  IMPRESSION:  Acute impacted left femoral neck fracture with mild comminution. Soft   tissue contusion along the left lateral hip.    No evidence of acute intrathoracic or intra-abdominal/pelvic traumatic   injury within the confines of this exam; lack of intravenous contrast   limits evaluation for solid abdominal organ and vascular injury and study   is further degraded by motion and patient positioning.    No evidence of acute thoracic or lumbarspine fracture or traumatic   malalignment.    Small hiatus hernia. Mild fluid distention of the esophagus.    Cholecystostomy tube within the gallbladder. Gallbladder under distended.      --- End of Report ---      RIVKA PAUL DO; Attending Radiologist  This document has been electronically signed. Aug 27 2024 11:12PM    < end of copied text >    MRI:   US:

## 2024-08-28 NOTE — CONSULT NOTE ADULT - NS ATTEND AMEND GEN_ALL_CORE FT
84M hx dementia (AAO x2 at baseline), CKD3, IDDM2, HTN, PAFb (not candidate for longterm AC due to BI bleeds and as per family   decision), cholecystitis with percutaneous gallbladder drain who presented s/p mechanical fall. Pt was found to have left hip fracture. He was ready to go to the OR when he became tachycardic to 130's and shivering with temp 100.1F. Surgery was cancelled and cardiology was asked to evaluate.    Pre-operative cardiovascular examination.   - telemetry monitoring  - EKG during episode shows  bpm with PVCs, pt still tachycardic in low 100's, no ectopy  - repeat EKG  - sepsis workup  - pt was found hypoxic on room air, continue supplemental O2 with nasal cannula  - echocardiogram in 12/2023 showed normal LVEF and no significant valvular abnormalities, will repeat echo in am  - check troponin  - D Dimers, Leg doppler  - will follow and reevaluate when workup complete. 84M hx dementia (AAO x2 at baseline), CKD3, IDDM2, HTN, PAFb (not candidate for longterm AC due to BI bleeds and as per family   decision), cholecystitis with percutaneous gallbladder drain who presented s/p mechanical fall. Pt was found to have left hip fracture. He was ready to go to the OR when he became tachycardic to 130's and shivering with temp 100.1F. Surgery was cancelled and cardiology was asked to evaluate.    Pre-operative cardiovascular examination.   - telemetry monitoring  - EKG during episode shows  bpm with PVCs, pt still tachycardic in low 100's, no ectopy  - repeat EKG  - sepsis workup  - pt was found hypoxic on room air, continue supplemental O2 with nasal cannula  - echocardiogram in 12/2023 showed normal LVEF and no significant valvular abnormalities, will repeat echo in am  - check troponin  - D Dimers, Leg doppler  - will follow and reevaluate when workup complete.    ADDENDUM 8/29/2024  Pre-operative cardiovascular examination.   TTE LVEF 65 to 70 %. Normal RV.  Leg Doppler negative for DVT  Pt doesn't have absolute cardiac contraindication for the planned surgery from cardiac standpoint.

## 2024-08-28 NOTE — CONSULT NOTE ADULT - PROBLEM SELECTOR RECOMMENDATION 9
-telemetry monitoring  -EKG during episode shows  bpm with PVCs, pt still tachycardic in low 100's, no ectopy  -repeat EKG  -sepsis workup  -pt was found hypoxic on room air, continue supplemental O2 with nasal cannula  -echocardiogram in 12/2023 showed normal LVEF and no significant valvular abnormalities, will repeat echo in am  -check troponin  -will follow and reevaluate when workup complete

## 2024-08-28 NOTE — H&P ADULT - CONVERSATION DETAILS
Discussed via phone with Elkin, patients daughter/HCP. She states that after his prolonged hospital course, the patient decided that he would not want resuscitation in the event of cardiac arrest. However, he is amenable to intubation if needed for hypoxic respiratory failure. MOLST has not yet been completed. Advised Elkin that I would complete MOLST with her verbal consent and she agreed.

## 2024-08-28 NOTE — CONSULT NOTE ADULT - ASSESSMENT
This is 85 y/o male with hx of dementia (AAO x2 at baseline), CKD3, IDDM2, HTN, pAfib (not on AC due to BI bleeds), cholecystitis with percutaneous gallbladder drain who presented s/p mechanical fall. Pt was found to have left hip fracture. He was ready to go to the OR when he became tachycardic to 130's and shivering with temp 100.1F. Surgery was cancelled and cardiology was asked to evaluate.

## 2024-08-28 NOTE — PROGRESS NOTE ADULT - SUBJECTIVE AND OBJECTIVE BOX
Patient was set to go to OR today. While in holding, patient became tachycardic and shivering. As per nurse, patient was responsive before and then suddenly was not responding to commands. EKG was done which showed sinus tachy and PVCs. Patient also had an episode of vomiting. OR cancelled today for further workup.

## 2024-08-28 NOTE — CONSULT NOTE ADULT - NS ATTEND OPT1 GEN_ALL_CORE
· Home regimen losartan 50 mg daily, Coreg 3 125 mg twice daily  · Losartan held in the setting of rising Cr
I attest my time as attending is greater than 50% of the total combined time spent on qualifying patient care activities by the PA/NP and attending.
I independently performed the documented:

## 2024-08-28 NOTE — ASU PREOP CHECKLIST - MUPIRONCIN COMMENTS
[] : Resident [>50% of Time Spent on Counseling for ____] : Greater than 50% of the encounter time was spent on counseling for [unfilled] [Time Spent: ___ minutes] : I have spent [unfilled] minutes of face to face time with the patient 3 tower did skin prep

## 2024-08-28 NOTE — PROGRESS NOTE ADULT - ASSESSMENT
84-year-old male with past medical history of CKD, dementia, type 2 diabetes, hypertension, cholecystitis with a percutaneous gallbladder bladder drain presents after trip and fall backward.     Left Femoral Neck Fracture 2/2 mechanical fall  -Ortho following, plan for OR tomorrow- no further shivering no fever - has dementia baseline   -EKG NSR @ 91 with PVCs, LVH; grossly unchanged from prior  -CT chest showing BL Lower lobe atelectasis; Cts all negative for traumatic injury other than L femoral neck fx  -PLAZA 0.8 % ZUNILDA risk  -RCRI 2; Class III risk  -Evaluated by anesthesia,-patient became tachycardic and shivering. As per nurse, patient was responsive before and then suddenly was not responding to commands. EKG was done which showed sinus tachy and PVCs- and OR was cancelled - cardiac clearance requested [facility] cardiology on call to evaluate   -Patient is moderate risk procedure without modifiable risk factors     Leukocytosis, Hypoxia- stable   -Pan CT without signs of infection  -cont on 2L NC. no consolidation on CT chest other than atelectasis   -Possible aspiration while vomiting as patient has had this in the past per daughter, but mild hypoxia may be in the setting of pain meds\  -Monitor off antibiotics for now  -Supplemental O2 to maintain SpO2 > 92%, titrate as tolerated    Cholecystitis s/p perc sky, with drain still in place  -Drain in place with few exchanges since 12/2023  -Ultimate plan was to remove gallbladder, but patient has been sick on and off that has hindered that per daughter  -Monitor output  -Most recently exchanged by IR earlier this month    IDDM  -Hold premeal insulin while NPO, resume 4 unites at meal times when eating again  -Continue long acting insulin at 50% decreased dose while NPO(6 units daily), but increase back to home dose (12 units daily) once patient tolerating PO  -Accuchecks/ISS/A1c    CKD3  -Avoid nephrotoxins  -Required HD temporarily in the past, but now urinates normally and no need for HD    Dementia   -Patient currently at baseline of AAOx2 per daughter    Severe Esophagitis  -Continue sucralfate QID  -Continue PPI    DVTppx: On hold for OR  Dispo: Pending OR   GOC: DNR/Trial of Intubation

## 2024-08-28 NOTE — PROGRESS NOTE ADULT - SUBJECTIVE AND OBJECTIVE BOX
BERNADINE ANDERSON Patient is a 84y old  Male who presents with a chief complaint of L hip fx (28 Aug 2024 11:48)     HPI:  84-year-old male with past medical history of CKD, dementia (AAOx2 at baseline, not oriented to time), Insulin dependent type 2 diabetes, hypertension, cholecystitis with a percutaneous gallbladder bladder drain since 12/2023 presents after trip and fall backward. Spoke with daughter, Elkin, for collateral. She states that patient fell onto his left hip and then hit his head afterward, though, major impact was on his hip. He did not lose consciousness at any time during the event. Patient had 2 episodes of vomiting in the ED, appearing like coffee grounds. Daughter states that whenever he vomits, it appears that way because he has severe esophagitis. Additionally, she states that he usually has these vomiting episodes when something like a fall occurs.  (28 Aug 2024 00:31)    The patient was seen and evaluated offers no new complaints states is "OK"  The patient is in no acute distress.  Denied any fever chest pain, palpitations, shortness of breath, abdominal pain,       Height (cm): 160 (08-28 @ 09:06)  Weight (kg): 61.2 (08-28 @ 09:06)  BMI (kg/m2): 23.9 (08-28 @ 09:06)  BSA (m2): 1.64 (08-28 @ 09:06)I&O's Summary    27 Aug 2024 07:01  -  28 Aug 2024 07:00  --------------------------------------------------------  IN: 0 mL / OUT: 200 mL / NET: -200 mL    28 Aug 2024 07:01  -  28 Aug 2024 18:10  --------------------------------------------------------  IN: 0 mL / OUT: 300 mL / NET: -300 mL      Allergies    No Known Allergies    Intolerances      HEALTH ISSUES - PROBLEM Dx:        PAST MEDICAL & SURGICAL HISTORY:  Diabetes      Hypertension      Prostate disorder      Anxiety      High cholesterol      Ulcer      Acute cholecystitis      History of endoscopy      Cholecystostomy tube dysfunction      S/P cataract surgery              Vital Signs Last 24 Hrs  T(C): 37.1 (28 Aug 2024 16:23), Max: 37.8 (28 Aug 2024 11:52)  T(F): 98.8 (28 Aug 2024 16:23), Max: 100.1 (28 Aug 2024 11:52)  HR: 109 (28 Aug 2024 16:23) (90 - 132)  BP: 105/67 (28 Aug 2024 16:23) (105/67 - 211/95)  BP(mean): 104 (28 Aug 2024 11:52) (90 - 165)  RR: 18 (28 Aug 2024 16:23) (16 - 20)  SpO2: 94% (28 Aug 2024 16:23) (81% - 100%)    Parameters below as of 28 Aug 2024 16:23  Patient On (Oxygen Delivery Method): nasal cannula  O2 Flow (L/min): 4  T(C): 37.1 (08-28-24 @ 16:23), Max: 37.8 (08-28-24 @ 11:52)  HR: 109 (08-28-24 @ 16:23) (90 - 132)  BP: 105/67 (08-28-24 @ 16:23) (105/67 - 211/95)  RR: 18 (08-28-24 @ 16:23) (16 - 20)  SpO2: 94% (08-28-24 @ 16:23) (81% - 100%)  Wt(kg): --    PHYSICAL EXAM:    GENERAL: NAD frail elderly   HEAD:  Atraumatic, Normocephalic  EYES:  conjunctiva and sclera clear  ENMT:  Moist mucous membranes,   NECK: Supple,   NERVOUS SYSTEM: awake pleasant oriented times 2 limited Moves  lower extremities;  CHEST/LUNG: Clear to auscultation bilaterally; No rales, rhonchi, wheezing,   HEART: Regular rate and rhythm; No murmurs,   ABDOMEN: Soft, Nontender, Nondistended; Bowel sounds present  EXTREMITIES:  Peripheral Pulses, No  cyanosis, or edema  psychiatry- mood and affect pleasantt     acetaminophen     Tablet .. 650 milliGRAM(s) Oral every 6 hours PRN  aluminum hydroxide/magnesium hydroxide/simethicone Suspension 30 milliLiter(s) Oral four times a day PRN  chlorhexidine 2% Cloths 1 Application(s) Topical <User Schedule>  dextrose 5%. 1000 milliLiter(s) IV Continuous <Continuous>  dextrose 5%. 1000 milliLiter(s) IV Continuous <Continuous>  dextrose 50% Injectable 25 Gram(s) IV Push once  dextrose 50% Injectable 12.5 Gram(s) IV Push once  dextrose 50% Injectable 25 Gram(s) IV Push once  dextrose Oral Gel 15 Gram(s) Oral once PRN  gabapentin 100 milliGRAM(s) Oral four times a day  glucagon  Injectable 1 milliGRAM(s) IntraMuscular once  insulin glargine Injectable (LANTUS) 6 Unit(s) SubCutaneous every morning  insulin lispro (ADMELOG) corrective regimen sliding scale   SubCutaneous every 6 hours  melatonin 3 milliGRAM(s) Oral at bedtime PRN  mupirocin 2% Ointment 1 Application(s) Both Nostrils two times a day  ondansetron Injectable 4 milliGRAM(s) IV Push every 6 hours PRN  oxyCODONE    IR 5 milliGRAM(s) Oral every 4 hours PRN  oxyCODONE    IR 10 milliGRAM(s) Oral every 4 hours PRN  pantoprazole    Tablet 40 milliGRAM(s) Oral before breakfast  sucralfate 1 Gram(s) Oral four times a day      LABS:                          10.4   10.43 )-----------( 182      ( 28 Aug 2024 06:36 )             33.7     08-28    136  |  103  |  32.8<H>  ----------------------------<  214<H>  4.6   |  22.0  |  2.11<H>    Ca    8.6      28 Aug 2024 06:36    TPro  6.9  /  Alb  3.6  /  TBili  0.6  /  DBili  x   /  AST  28  /  ALT  33  /  AlkPhos  147<H>  08-27    LIVER FUNCTIONS - ( 27 Aug 2024 17:40 )  Alb: 3.6 g/dL / Pro: 6.9 g/dL / ALK PHOS: 147 U/L / ALT: 33 U/L / AST: 28 U/L / GGT: x           PT/INR - ( 27 Aug 2024 17:40 )   PT: 11.6 sec;   INR: 1.05 ratio         PTT - ( 27 Aug 2024 17:40 )  PTT:31.5 sec      Urinalysis Basic - ( 28 Aug 2024 06:36 )    Color: x / Appearance: x / SG: x / pH: x  Gluc: 214 mg/dL / Ketone: x  / Bili: x / Urobili: x   Blood: x / Protein: x / Nitrite: x   Leuk Esterase: x / RBC: x / WBC x   Sq Epi: x / Non Sq Epi: x / Bacteria: x      CAPILLARY BLOOD GLUCOSE      POCT Blood Glucose.: 116 mg/dL (28 Aug 2024 12:40)  POCT Blood Glucose.: 120 mg/dL (28 Aug 2024 11:39)  POCT Blood Glucose.: 171 mg/dL (28 Aug 2024 08:31)      RADIOLOGY & ADDITIONAL TESTS:      Consultant notes reviewed  I independently reviwed all images/ labarotory results /cultures/ telemetry/EKG and my findings are indicated above  and discussed care plan with consultants/nursing/ family /patient

## 2024-08-28 NOTE — H&P ADULT - NSHPPHYSICALEXAM_GEN_ALL_CORE
Vital Signs Last 24 Hrs  T(C): 36.4 (28 Aug 2024 01:14), Max: 36.7 (27 Aug 2024 14:52)  T(F): 97.5 (28 Aug 2024 01:14), Max: 98.1 (27 Aug 2024 14:52)  HR: 98 (28 Aug 2024 01:20) (79 - 104)  BP: 126/72 (28 Aug 2024 01:20) (109/65 - 211/95)  BP(mean): 90 (28 Aug 2024 01:20) (90 - 90)  RR: 20 (28 Aug 2024 01:20) (16 - 20)  SpO2: 97% (28 Aug 2024 01:20) (81% - 100%)    Parameters below as of 28 Aug 2024 01:20  Patient On (Oxygen Delivery Method): nasal cannula  O2 Flow (L/min): 2    General: Age-appearing, in no acute distress  Head: Normocephalic, atraumatic  ENMT: EOMI, neck supple  Cardiovascular: +S1, S2; Regular rate and rhythm, no murmurs, rubs, gallops  Respiratory: CTA BL, no wheezes, rales, rhonchi  Gastrointestinal: Abdomen soft, non-tender, +BS in all 4 quadrants  Extremities: No clubbing, cyanosis, or edema; TTP at left hip  Vascular: 2+ pulses, cap refill < 2 seconds  Neuro: Non-focal, AAOx2, sensation intact BL  Musculoskeletal: Normal tone, no deformities  Skin: Warm, dry; no acute rash seen  Psych: Calm, cooperative

## 2024-08-28 NOTE — H&P ADULT - NSHPLABSRESULTS_GEN_ALL_CORE
10.7   13.68 )-----------( 217      ( 27 Aug 2024 17:40 )             33.8     27 Aug 2024 17:40    135    |  100    |  33.6   ----------------------------<  241    4.2     |  17.0   |  2.03     Ca    8.8        27 Aug 2024 17:40    TPro  6.9    /  Alb  3.6    /  TBili  0.6    /  DBili  x      /  AST  28     /  ALT  33     /  AlkPhos  147    27 Aug 2024 17:40    PT/INR - ( 27 Aug 2024 17:40 )   PT: 11.6 sec;   INR: 1.05 ratio         PTT - ( 27 Aug 2024 17:40 )  PTT:31.5 sec  CAPILLARY BLOOD GLUCOSE        LIVER FUNCTIONS - ( 27 Aug 2024 17:40 )  Alb: 3.6 g/dL / Pro: 6.9 g/dL / ALK PHOS: 147 U/L / ALT: 33 U/L / AST: 28 U/L / GGT: x           Urinalysis Basic - ( 27 Aug 2024 17:40 )    Color: x / Appearance: x / SG: x / pH: x  Gluc: 241 mg/dL / Ketone: x  / Bili: x / Urobili: x   Blood: x / Protein: x / Nitrite: x   Leuk Esterase: x / RBC: x / WBC x   Sq Epi: x / Non Sq Epi: x / Bacteria: x        < from: CT Thoracic Spine Reform No Cont (08.27.24 @ 23:27) >    IMPRESSION:  Acute impacted left femoral neck fracture with mild comminution. Soft   tissue contusion along the left lateral hip.    No evidence of acute intrathoracic or intra-abdominal/pelvic traumatic   injury within the confines of this exam; lack of intravenous contrast   limits evaluation for solid abdominal organ and vascular injury and study   is further degraded by motion and patient positioning.    No evidence of acute thoracic or lumbarspine fracture or traumatic   malalignment.    Small hiatus hernia. Mild fluid distention of the esophagus.    Cholecystostomy tube within the gallbladder. Gallbladder under distended.        --- End of Report ---    < end of copied text >

## 2024-08-29 ENCOUNTER — TRANSCRIPTION ENCOUNTER (OUTPATIENT)
Age: 84
End: 2024-08-29

## 2024-08-29 ENCOUNTER — RESULT REVIEW (OUTPATIENT)
Age: 84
End: 2024-08-29

## 2024-08-29 LAB
A1C WITH ESTIMATED AVERAGE GLUCOSE RESULT: 7.1 % — HIGH (ref 4–5.6)
ALBUMIN SERPL ELPH-MCNC: 2.9 G/DL — LOW (ref 3.3–5.2)
ALP SERPL-CCNC: 76 U/L — SIGNIFICANT CHANGE UP (ref 40–120)
ALT FLD-CCNC: 19 U/L — SIGNIFICANT CHANGE UP
ANION GAP SERPL CALC-SCNC: 13 MMOL/L — SIGNIFICANT CHANGE UP (ref 5–17)
ANION GAP SERPL CALC-SCNC: 17 MMOL/L — SIGNIFICANT CHANGE UP (ref 5–17)
APPEARANCE UR: ABNORMAL
AST SERPL-CCNC: 27 U/L — SIGNIFICANT CHANGE UP
BACTERIA # UR AUTO: ABNORMAL /HPF
BILIRUB SERPL-MCNC: 1.7 MG/DL — SIGNIFICANT CHANGE UP (ref 0.4–2)
BILIRUB UR-MCNC: ABNORMAL
BUN SERPL-MCNC: 46.1 MG/DL — HIGH (ref 8–20)
BUN SERPL-MCNC: 49.1 MG/DL — HIGH (ref 8–20)
CALCIUM SERPL-MCNC: 8.3 MG/DL — LOW (ref 8.4–10.5)
CALCIUM SERPL-MCNC: 8.4 MG/DL — SIGNIFICANT CHANGE UP (ref 8.4–10.5)
CAST: 2 /LPF — SIGNIFICANT CHANGE UP (ref 0–4)
CHLORIDE SERPL-SCNC: 102 MMOL/L — SIGNIFICANT CHANGE UP (ref 96–108)
CHLORIDE SERPL-SCNC: 99 MMOL/L — SIGNIFICANT CHANGE UP (ref 96–108)
CO2 SERPL-SCNC: 18 MMOL/L — LOW (ref 22–29)
CO2 SERPL-SCNC: 20 MMOL/L — LOW (ref 22–29)
COLOR SPEC: ABNORMAL
CREAT SERPL-MCNC: 3.56 MG/DL — HIGH (ref 0.5–1.3)
CREAT SERPL-MCNC: 3.91 MG/DL — HIGH (ref 0.5–1.3)
CULTURE RESULTS: NO GROWTH — SIGNIFICANT CHANGE UP
DIFF PNL FLD: ABNORMAL
EGFR: 14 ML/MIN/1.73M2 — LOW
EGFR: 16 ML/MIN/1.73M2 — LOW
ESTIMATED AVERAGE GLUCOSE: 157 MG/DL — HIGH (ref 68–114)
GLUCOSE BLDC GLUCOMTR-MCNC: 125 MG/DL — HIGH (ref 70–99)
GLUCOSE BLDC GLUCOMTR-MCNC: 144 MG/DL — HIGH (ref 70–99)
GLUCOSE BLDC GLUCOMTR-MCNC: 148 MG/DL — HIGH (ref 70–99)
GLUCOSE BLDC GLUCOMTR-MCNC: 149 MG/DL — HIGH (ref 70–99)
GLUCOSE BLDC GLUCOMTR-MCNC: 150 MG/DL — HIGH (ref 70–99)
GLUCOSE BLDC GLUCOMTR-MCNC: 181 MG/DL — HIGH (ref 70–99)
GLUCOSE SERPL-MCNC: 148 MG/DL — HIGH (ref 70–99)
GLUCOSE SERPL-MCNC: 152 MG/DL — HIGH (ref 70–99)
GLUCOSE UR QL: NEGATIVE MG/DL — SIGNIFICANT CHANGE UP
HCT VFR BLD CALC: 33.1 % — LOW (ref 39–50)
HGB BLD-MCNC: 10.3 G/DL — LOW (ref 13–17)
KETONES UR-MCNC: ABNORMAL MG/DL
LACTATE SERPL-SCNC: 2.3 MMOL/L — HIGH (ref 0.5–2)
LEUKOCYTE ESTERASE UR-ACNC: ABNORMAL
MCHC RBC-ENTMCNC: 27.9 PG — SIGNIFICANT CHANGE UP (ref 27–34)
MCHC RBC-ENTMCNC: 31.1 GM/DL — LOW (ref 32–36)
MCV RBC AUTO: 89.7 FL — SIGNIFICANT CHANGE UP (ref 80–100)
NITRITE UR-MCNC: NEGATIVE — SIGNIFICANT CHANGE UP
PH UR: 5 — SIGNIFICANT CHANGE UP (ref 5–8)
PLATELET # BLD AUTO: 167 K/UL — SIGNIFICANT CHANGE UP (ref 150–400)
POTASSIUM SERPL-MCNC: 5.2 MMOL/L — SIGNIFICANT CHANGE UP (ref 3.5–5.3)
POTASSIUM SERPL-MCNC: 5.8 MMOL/L — HIGH (ref 3.5–5.3)
POTASSIUM SERPL-SCNC: 5.2 MMOL/L — SIGNIFICANT CHANGE UP (ref 3.5–5.3)
POTASSIUM SERPL-SCNC: 5.8 MMOL/L — HIGH (ref 3.5–5.3)
PROT SERPL-MCNC: 6.1 G/DL — LOW (ref 6.6–8.7)
PROT UR-MCNC: 100 MG/DL
RBC # BLD: 3.69 M/UL — LOW (ref 4.2–5.8)
RBC # FLD: 18.2 % — HIGH (ref 10.3–14.5)
RBC CASTS # UR COMP ASSIST: 20 /HPF — HIGH (ref 0–4)
SODIUM SERPL-SCNC: 134 MMOL/L — LOW (ref 135–145)
SODIUM SERPL-SCNC: 135 MMOL/L — SIGNIFICANT CHANGE UP (ref 135–145)
SP GR SPEC: 1.02 — SIGNIFICANT CHANGE UP (ref 1–1.03)
SPECIMEN SOURCE: SIGNIFICANT CHANGE UP
SQUAMOUS # UR AUTO: 0 /HPF — SIGNIFICANT CHANGE UP (ref 0–5)
UROBILINOGEN FLD QL: 1 MG/DL — SIGNIFICANT CHANGE UP (ref 0.2–1)
WBC # BLD: 21.74 K/UL — HIGH (ref 3.8–10.5)
WBC # FLD AUTO: 21.74 K/UL — HIGH (ref 3.8–10.5)
WBC UR QL: 1 /HPF — SIGNIFICANT CHANGE UP (ref 0–5)

## 2024-08-29 PROCEDURE — 99232 SBSQ HOSP IP/OBS MODERATE 35: CPT | Mod: 57

## 2024-08-29 PROCEDURE — 93970 EXTREMITY STUDY: CPT | Mod: 26

## 2024-08-29 PROCEDURE — 93306 TTE W/DOPPLER COMPLETE: CPT | Mod: 26

## 2024-08-29 PROCEDURE — 71045 X-RAY EXAM CHEST 1 VIEW: CPT | Mod: 26

## 2024-08-29 PROCEDURE — 99232 SBSQ HOSP IP/OBS MODERATE 35: CPT

## 2024-08-29 RX ORDER — CEFEPIME 2 G/1
INJECTION, POWDER, FOR SOLUTION INTRAVENOUS
Refills: 0 | Status: DISCONTINUED | OUTPATIENT
Start: 2024-08-29 | End: 2024-08-29

## 2024-08-29 RX ORDER — SODIUM CHLORIDE 9 MG/ML
1000 INJECTION INTRAMUSCULAR; INTRAVENOUS; SUBCUTANEOUS ONCE
Refills: 0 | Status: COMPLETED | OUTPATIENT
Start: 2024-08-29 | End: 2024-08-29

## 2024-08-29 RX ORDER — CEFEPIME 2 G/1
1000 INJECTION, POWDER, FOR SOLUTION INTRAVENOUS ONCE
Refills: 0 | Status: COMPLETED | OUTPATIENT
Start: 2024-08-29 | End: 2024-08-29

## 2024-08-29 RX ORDER — SODIUM CHLORIDE 9 MG/ML
1000 INJECTION INTRAMUSCULAR; INTRAVENOUS; SUBCUTANEOUS
Refills: 0 | Status: DISCONTINUED | OUTPATIENT
Start: 2024-08-29 | End: 2024-09-01

## 2024-08-29 RX ORDER — CEFEPIME 2 G/1
INJECTION, POWDER, FOR SOLUTION INTRAVENOUS
Refills: 0 | Status: DISCONTINUED | OUTPATIENT
Start: 2024-08-29 | End: 2024-09-03

## 2024-08-29 RX ORDER — CEFEPIME 2 G/1
1000 INJECTION, POWDER, FOR SOLUTION INTRAVENOUS EVERY 24 HOURS
Refills: 0 | Status: DISCONTINUED | OUTPATIENT
Start: 2024-08-30 | End: 2024-09-03

## 2024-08-29 RX ORDER — HEPARIN SODIUM,BOVINE 1000/ML
5000 VIAL (ML) INJECTION EVERY 8 HOURS
Refills: 0 | Status: DISCONTINUED | OUTPATIENT
Start: 2024-08-29 | End: 2024-08-30

## 2024-08-29 RX ADMIN — SUCRALFATE 1 GRAM(S): 1 SUSPENSION ORAL at 05:20

## 2024-08-29 RX ADMIN — SODIUM CHLORIDE 500 MILLILITER(S): 9 INJECTION INTRAMUSCULAR; INTRAVENOUS; SUBCUTANEOUS at 14:13

## 2024-08-29 RX ADMIN — SUCRALFATE 1 GRAM(S): 1 SUSPENSION ORAL at 05:48

## 2024-08-29 RX ADMIN — CEFEPIME 1000 MILLIGRAM(S): 2 INJECTION, POWDER, FOR SOLUTION INTRAVENOUS at 11:10

## 2024-08-29 RX ADMIN — Medication 1 APPLICATION(S): at 17:21

## 2024-08-29 RX ADMIN — Medication 5000 UNIT(S): at 21:20

## 2024-08-29 RX ADMIN — Medication 40 MILLIGRAM(S): at 05:48

## 2024-08-29 RX ADMIN — SODIUM CHLORIDE 100 MILLILITER(S): 9 INJECTION INTRAMUSCULAR; INTRAVENOUS; SUBCUTANEOUS at 17:53

## 2024-08-29 RX ADMIN — INSULIN GLARGINE 6 UNIT(S): 100 INJECTION, SOLUTION SUBCUTANEOUS at 08:58

## 2024-08-29 RX ADMIN — Medication 100 MILLIGRAM(S): at 05:45

## 2024-08-29 RX ADMIN — Medication 5000 UNIT(S): at 14:19

## 2024-08-29 RX ADMIN — Medication 100 MILLIGRAM(S): at 05:20

## 2024-08-29 RX ADMIN — Medication 1 APPLICATION(S): at 06:00

## 2024-08-29 RX ADMIN — Medication 100 MILLIGRAM(S): at 23:35

## 2024-08-29 RX ADMIN — Medication 2: at 00:09

## 2024-08-29 RX ADMIN — CHLORHEXIDINE GLUCONATE 1 APPLICATION(S): 40 SOLUTION TOPICAL at 05:49

## 2024-08-29 NOTE — PROGRESS NOTE ADULT - SUBJECTIVE AND OBJECTIVE BOX
HPI  Pt sarbjit  85yo M admitted to [facility] for left hip fx.   Pt was seen and examined at bedside with wife and daughter at bedside. Pt noted to be hypoxic now on 4L NC satting 92% also productive coughing. PT failed beside swallowing. Pt had episode of vomit yesterday. Elevated heart rate noted. Started on Cefepime and start IVF.     Vital Signs Last 24 Hrs  T(C): 36.7 (29 Aug 2024 07:58), Max: 37.1 (28 Aug 2024 16:23)  T(F): 98.1 (29 Aug 2024 07:58), Max: 98.8 (28 Aug 2024 16:23)  HR: 109 (29 Aug 2024 07:58) (102 - 109)  BP: 92/54 (29 Aug 2024 07:58) (72/39 - 105/67)  BP(mean): --  RR: 18 (29 Aug 2024 07:58) (18 - 18)  SpO2: 92% (29 Aug 2024 07:58) (92% - 94%)    Parameters below as of 29 Aug 2024 07:58  Patient On (Oxygen Delivery Method): nasal cannula  O2 Flow (L/min): 4      I&O's Summary    28 Aug 2024 07:01  -  29 Aug 2024 07:00  --------------------------------------------------------  IN: 0 mL / OUT: 1600 mL / NET: -1600 mL        CAPILLARY BLOOD GLUCOSE      POCT Blood Glucose.: 150 mg/dL (29 Aug 2024 11:29)  POCT Blood Glucose.: 144 mg/dL (29 Aug 2024 08:57)  POCT Blood Glucose.: 148 mg/dL (29 Aug 2024 05:44)  POCT Blood Glucose.: 181 mg/dL (29 Aug 2024 00:08)  POCT Blood Glucose.: 161 mg/dL (28 Aug 2024 21:50)  POCT Blood Glucose.: 143 mg/dL (28 Aug 2024 18:42)      PHYSICAL EXAM:    Constitutional: NAD,    HEENT: PERR, EOMI,   Neck: Soft and supple,   Respiratory: crakles  noted. on 4L NC   Cardiovascular: S1 and S2, tachycardia   Gastrointestinal: Bowel Sounds present, soft, nontender,   Extremities: No peripheral edema, right hip pain   Vascular: 2+ peripheral pulses  Neurological: A/O x 3,    Musculoskeletal: 5/5 strength b/l upper and lower extremities       MEDICATIONS:  MEDICATIONS  (STANDING):  cefepime  Injectable.      chlorhexidine 2% Cloths 1 Application(s) Topical <User Schedule>  dextrose 5%. 1000 milliLiter(s) (50 mL/Hr) IV Continuous <Continuous>  dextrose 5%. 1000 milliLiter(s) (100 mL/Hr) IV Continuous <Continuous>  dextrose 50% Injectable 25 Gram(s) IV Push once  dextrose 50% Injectable 12.5 Gram(s) IV Push once  dextrose 50% Injectable 25 Gram(s) IV Push once  gabapentin 100 milliGRAM(s) Oral four times a day  glucagon  Injectable 1 milliGRAM(s) IntraMuscular once  insulin glargine Injectable (LANTUS) 6 Unit(s) SubCutaneous every morning  insulin lispro (ADMELOG) corrective regimen sliding scale   SubCutaneous every 6 hours  mupirocin 2% Ointment 1 Application(s) Both Nostrils two times a day  pantoprazole    Tablet 40 milliGRAM(s) Oral before breakfast  sodium chloride 0.9%. 1000 milliLiter(s) (100 mL/Hr) IV Continuous <Continuous>  sucralfate 1 Gram(s) Oral four times a day      LABS: All Labs Reviewed:                        10.3   21.74 )-----------( 167      ( 29 Aug 2024 05:44 )             33.1     08-29    135  |  102  |  49.1<H>  ----------------------------<  152<H>  5.2   |  20.0<L>  |  3.91<H>    Ca    8.4      29 Aug 2024 10:25    TPro  6.1<L>  /  Alb  2.9<L>  /  TBili  1.7  /  DBili  x   /  AST  27  /  ALT  19  /  AlkPhos  76  08-29    PT/INR - ( 27 Aug 2024 17:40 )   PT: 11.6 sec;   INR: 1.05 ratio         PTT - ( 27 Aug 2024 17:40 )  PTT:31.5 sec      Blood Culture: 08-27 @ 15:36  Organism --  Gram Stain Blood -- Gram Stain --  Specimen Source Catheterized Catheterized  Culture-Blood --        RADIOLOGY/EKG:    DVT PPX:    ADVANCED DIRECTIVE:    DISPOSITION:

## 2024-08-29 NOTE — PROGRESS NOTE ADULT - SUBJECTIVE AND OBJECTIVE BOX
ORTHOPEDIC POST-OP PROGRESS NOTE:    Name: BERNADINE ANDERSON    MR #: [id number]      84M is being followed for left femoral neck fracture. Surgery has been on hold as patient is not yet optimized. Patient was seen and evaluated at bedside this AM. Pt comfortable without complaints, pain controlled.              Vital Signs Last 24 Hrs  T(C): 36.7 (08-29-24 @ 07:58), Max: 36.7 (08-29-24 @ 07:58)  T(F): 98.1 (08-29-24 @ 07:58), Max: 98.1 (08-29-24 @ 07:58)  HR: 109 (08-29-24 @ 07:58) (109 - 109)  BP: 92/54 (08-29-24 @ 07:58) (92/54 - 92/54)  BP(mean): --  RR: 18 (08-29-24 @ 07:58) (18 - 18)  SpO2: 92% (08-29-24 @ 07:58) (92% - 92%)      General Exam:    General: Pt Alert, NAD, controlled pain.    Left lower extremity:    Skin intact.    +TTP of hip.     ROM of hip/knee not assessed secondary to fracture.     Pulses: 2+ dorsalis pedis pulse. Cap refill < 2 sec.    Sensation: Grossly intact to light touch without deficit.    Motor: +TA/GSC/EHL/FHL.         A/P: 84y Male being followed for left femoral neck fracture.   - Pain Control PRN.   - DVT per primary team.   - NWB/bedrest.   - Decub precautions.  - OR when medically optimized.  ORTHOPEDIC Pre-op PROGRESS NOTE:    Name: BERNADINE ANDERSON    MR #: [id number]      84M is being followed for left femoral neck fracture. Surgery has been on hold as patient is not yet optimized. Patient was seen and evaluated at bedside this AM. Pt comfortable without complaints, pain controlled.              Vital Signs Last 24 Hrs  T(C): 36.7 (08-29-24 @ 07:58), Max: 36.7 (08-29-24 @ 07:58)  T(F): 98.1 (08-29-24 @ 07:58), Max: 98.1 (08-29-24 @ 07:58)  HR: 109 (08-29-24 @ 07:58) (109 - 109)  BP: 92/54 (08-29-24 @ 07:58) (92/54 - 92/54)  BP(mean): --  RR: 18 (08-29-24 @ 07:58) (18 - 18)  SpO2: 92% (08-29-24 @ 07:58) (92% - 92%)      General Exam:    General: Pt Alert, NAD, controlled pain.    Left lower extremity:    Skin intact.    +TTP of hip.     ROM of hip/knee not assessed secondary to fracture.     Pulses: 2+ dorsalis pedis pulse. Cap refill < 2 sec.    Sensation: Grossly intact to light touch without deficit.    Motor: +TA/GSC/EHL/FHL.         A/P: 84y Male being followed for left femoral neck fracture.   - Pain Control PRN.   - DVT per primary team.   - NWB/bedrest.   - Decub precautions.  - OR when medically optimized.

## 2024-08-29 NOTE — PROGRESS NOTE ADULT - ASSESSMENT
84-year-old male with past medical history of CKD, dementia, type 2 diabetes, hypertension, cholecystitis with a percutaneous gallbladder bladder drain presents after trip and fall backward.     *Left Femoral Neck Fracture 2/2 mechanical fall  Ortho following  OR today cancelled due to sepsis   CT chest showing BL Lower lobe atelectasis; Cts all negative for traumatic injury other than L femoral neck fx  PLAZA 0.8 % ZUNILDA risk  RCRI 2; Class III risk  Evaluated by anesthesia  cardiology consult noted   Patient is moderate risk procedure without modifiable risk factors     *Sepsis secondary to likely aspiration pna   Pan CT without signs of infection  repeat CXR showed pna   failed bedside swallow, COnt NPO with speech eval   Lactate elevated, blood culture ordered  repeat lactate in the am   due to chest congestion seen on CT   will give 1 L IVF bolus   then cont IVF 100cc/hr   currently on 4 L   Supplemental O2 to maintain SpO2 > 92%, titrate as tolerated    *Cholecystitis s/p perc sky, with drain still in place  Drain in place with few exchanges since 12/2023  Ultimate plan was to remove gallbladder, but patient has been sick on and off that has hindered that per daughter  Monitor output  Most recently exchanged by IR earlier this month    *IDDM  Hold premeal insulin while NPO, resume 4 unites at meal times when eating again  Continue long acting insulin at 50% decreased dose while NPO(6 units daily), but increase back to home dose (12 units daily) once patient tolerating PO  Accuchecks/ISS/A1c    *acute on chronic CKD3  Avoid nephrotoxins  Required HD temporarily in the past, but now urinates normally and no need for HD  elevated again likely secondary to sepsis     *Dementia   Patient currently at baseline of AAOx2 per daughter    *Severe Esophagitis  Continue sucralfate QID  Continue PPI    DVTppx: start on heparin sq   Dispo: Pending OR

## 2024-08-30 LAB
ALBUMIN SERPL ELPH-MCNC: 2.8 G/DL — LOW (ref 3.3–5.2)
ALP SERPL-CCNC: 87 U/L — SIGNIFICANT CHANGE UP (ref 40–120)
ALT FLD-CCNC: 15 U/L — SIGNIFICANT CHANGE UP
ANION GAP SERPL CALC-SCNC: 17 MMOL/L — SIGNIFICANT CHANGE UP (ref 5–17)
ANION GAP SERPL CALC-SCNC: 19 MMOL/L — HIGH (ref 5–17)
APTT BLD: 36.8 SEC — HIGH (ref 24.5–35.6)
AST SERPL-CCNC: 21 U/L — SIGNIFICANT CHANGE UP
BILIRUB SERPL-MCNC: 0.8 MG/DL — SIGNIFICANT CHANGE UP (ref 0.4–2)
BUN SERPL-MCNC: 48.7 MG/DL — HIGH (ref 8–20)
BUN SERPL-MCNC: 51.5 MG/DL — HIGH (ref 8–20)
CALCIUM SERPL-MCNC: 8.2 MG/DL — LOW (ref 8.4–10.5)
CALCIUM SERPL-MCNC: 8.4 MG/DL — SIGNIFICANT CHANGE UP (ref 8.4–10.5)
CHLORIDE SERPL-SCNC: 105 MMOL/L — SIGNIFICANT CHANGE UP (ref 96–108)
CHLORIDE SERPL-SCNC: 106 MMOL/L — SIGNIFICANT CHANGE UP (ref 96–108)
CO2 SERPL-SCNC: 17 MMOL/L — LOW (ref 22–29)
CO2 SERPL-SCNC: 18 MMOL/L — LOW (ref 22–29)
CREAT SERPL-MCNC: 2.82 MG/DL — HIGH (ref 0.5–1.3)
CREAT SERPL-MCNC: 3.35 MG/DL — HIGH (ref 0.5–1.3)
EGFR: 17 ML/MIN/1.73M2 — LOW
EGFR: 21 ML/MIN/1.73M2 — LOW
GLUCOSE BLDC GLUCOMTR-MCNC: 109 MG/DL — HIGH (ref 70–99)
GLUCOSE BLDC GLUCOMTR-MCNC: 110 MG/DL — HIGH (ref 70–99)
GLUCOSE BLDC GLUCOMTR-MCNC: 129 MG/DL — HIGH (ref 70–99)
GLUCOSE BLDC GLUCOMTR-MCNC: 135 MG/DL — HIGH (ref 70–99)
GLUCOSE BLDC GLUCOMTR-MCNC: 142 MG/DL — HIGH (ref 70–99)
GLUCOSE BLDC GLUCOMTR-MCNC: 147 MG/DL — HIGH (ref 70–99)
GLUCOSE BLDC GLUCOMTR-MCNC: 258 MG/DL — HIGH (ref 70–99)
GLUCOSE SERPL-MCNC: 147 MG/DL — HIGH (ref 70–99)
GLUCOSE SERPL-MCNC: 207 MG/DL — HIGH (ref 70–99)
HCT VFR BLD CALC: 26.7 % — LOW (ref 39–50)
HCT VFR BLD CALC: 29.3 % — LOW (ref 39–50)
HGB BLD-MCNC: 8.5 G/DL — LOW (ref 13–17)
HGB BLD-MCNC: 9.2 G/DL — LOW (ref 13–17)
INR BLD: 1 RATIO — SIGNIFICANT CHANGE UP (ref 0.85–1.18)
LACTATE SERPL-SCNC: 1.4 MMOL/L — SIGNIFICANT CHANGE UP (ref 0.5–2)
MCHC RBC-ENTMCNC: 28 PG — SIGNIFICANT CHANGE UP (ref 27–34)
MCHC RBC-ENTMCNC: 28.2 PG — SIGNIFICANT CHANGE UP (ref 27–34)
MCHC RBC-ENTMCNC: 31.4 GM/DL — LOW (ref 32–36)
MCHC RBC-ENTMCNC: 31.8 GM/DL — LOW (ref 32–36)
MCV RBC AUTO: 88.7 FL — SIGNIFICANT CHANGE UP (ref 80–100)
MCV RBC AUTO: 89.1 FL — SIGNIFICANT CHANGE UP (ref 80–100)
PLATELET # BLD AUTO: 174 K/UL — SIGNIFICANT CHANGE UP (ref 150–400)
PLATELET # BLD AUTO: 181 K/UL — SIGNIFICANT CHANGE UP (ref 150–400)
POTASSIUM SERPL-MCNC: 4.4 MMOL/L — SIGNIFICANT CHANGE UP (ref 3.5–5.3)
POTASSIUM SERPL-MCNC: 4.6 MMOL/L — SIGNIFICANT CHANGE UP (ref 3.5–5.3)
POTASSIUM SERPL-SCNC: 4.4 MMOL/L — SIGNIFICANT CHANGE UP (ref 3.5–5.3)
POTASSIUM SERPL-SCNC: 4.6 MMOL/L — SIGNIFICANT CHANGE UP (ref 3.5–5.3)
PROT SERPL-MCNC: 6 G/DL — LOW (ref 6.6–8.7)
PROTHROM AB SERPL-ACNC: 11.1 SEC — SIGNIFICANT CHANGE UP (ref 9.5–13)
RBC # BLD: 3.01 M/UL — LOW (ref 4.2–5.8)
RBC # BLD: 3.29 M/UL — LOW (ref 4.2–5.8)
RBC # FLD: 18.1 % — HIGH (ref 10.3–14.5)
RBC # FLD: 18.1 % — HIGH (ref 10.3–14.5)
SODIUM SERPL-SCNC: 140 MMOL/L — SIGNIFICANT CHANGE UP (ref 135–145)
SODIUM SERPL-SCNC: 142 MMOL/L — SIGNIFICANT CHANGE UP (ref 135–145)
WBC # BLD: 17.38 K/UL — HIGH (ref 3.8–10.5)
WBC # BLD: 18.61 K/UL — HIGH (ref 3.8–10.5)
WBC # FLD AUTO: 17.38 K/UL — HIGH (ref 3.8–10.5)
WBC # FLD AUTO: 18.61 K/UL — HIGH (ref 3.8–10.5)

## 2024-08-30 PROCEDURE — 27236 TREAT THIGH FRACTURE: CPT | Mod: LT

## 2024-08-30 PROCEDURE — 73501 X-RAY EXAM HIP UNI 1 VIEW: CPT | Mod: 26,LT

## 2024-08-30 PROCEDURE — 99232 SBSQ HOSP IP/OBS MODERATE 35: CPT

## 2024-08-30 PROCEDURE — 27495 REINFORCE THIGH: CPT | Mod: LT

## 2024-08-30 DEVICE — IMPLANTABLE DEVICE: Type: IMPLANTABLE DEVICE | Site: LEFT | Status: FUNCTIONAL

## 2024-08-30 DEVICE — ASBLY CBL CBL-RDY 1.8X910MM: Type: IMPLANTABLE DEVICE | Site: LEFT | Status: FUNCTIONAL

## 2024-08-30 DEVICE — CUP ACETABULAR BI-P 28MM 49MM: Type: IMPLANTABLE DEVICE | Site: LEFT | Status: FUNCTIONAL

## 2024-08-30 DEVICE — PRO HIP FEM HD COCR 28MM STD: Type: IMPLANTABLE DEVICE | Site: LEFT | Status: FUNCTIONAL

## 2024-08-30 RX ORDER — CEFAZOLIN SODIUM 2 G/100ML
2000 INJECTION, SOLUTION INTRAVENOUS
Refills: 0 | Status: COMPLETED | OUTPATIENT
Start: 2024-08-30 | End: 2024-08-31

## 2024-08-30 RX ORDER — SODIUM CHLORIDE 9 MG/ML
1000 INJECTION INTRAMUSCULAR; INTRAVENOUS; SUBCUTANEOUS
Refills: 0 | Status: DISCONTINUED | OUTPATIENT
Start: 2024-08-30 | End: 2024-08-30

## 2024-08-30 RX ORDER — ACETAMINOPHEN 325 MG/1
1000 TABLET ORAL ONCE
Refills: 0 | Status: COMPLETED | OUTPATIENT
Start: 2024-08-30 | End: 2024-08-30

## 2024-08-30 RX ORDER — FENTANYL CITRATE 50 UG/ML
25 INJECTION INTRAMUSCULAR; INTRAVENOUS
Refills: 0 | Status: DISCONTINUED | OUTPATIENT
Start: 2024-08-30 | End: 2024-08-30

## 2024-08-30 RX ORDER — ONDANSETRON 2 MG/ML
4 INJECTION, SOLUTION INTRAMUSCULAR; INTRAVENOUS ONCE
Refills: 0 | Status: DISCONTINUED | OUTPATIENT
Start: 2024-08-30 | End: 2024-08-30

## 2024-08-30 RX ORDER — NALOXONE HCL 1 MG/ML
0.2 VIAL (ML) INJECTION ONCE
Refills: 0 | Status: COMPLETED | OUTPATIENT
Start: 2024-08-30 | End: 2024-08-30

## 2024-08-30 RX ORDER — HEPARIN SODIUM,BOVINE 1000/ML
5000 VIAL (ML) INJECTION EVERY 8 HOURS
Refills: 0 | Status: DISCONTINUED | OUTPATIENT
Start: 2024-08-31 | End: 2024-09-03

## 2024-08-30 RX ADMIN — CEFAZOLIN SODIUM 2000 MILLIGRAM(S): 2 INJECTION, SOLUTION INTRAVENOUS at 22:13

## 2024-08-30 RX ADMIN — ACETAMINOPHEN 1000 MILLIGRAM(S): 325 TABLET ORAL at 19:41

## 2024-08-30 RX ADMIN — Medication 1 APPLICATION(S): at 06:58

## 2024-08-30 RX ADMIN — Medication 1 APPLICATION(S): at 22:14

## 2024-08-30 RX ADMIN — INSULIN GLARGINE 6 UNIT(S): 100 INJECTION, SOLUTION SUBCUTANEOUS at 08:59

## 2024-08-30 RX ADMIN — Medication 6: at 22:12

## 2024-08-30 RX ADMIN — CEFEPIME 1000 MILLIGRAM(S): 2 INJECTION, POWDER, FOR SOLUTION INTRAVENOUS at 11:09

## 2024-08-30 RX ADMIN — Medication 5000 UNIT(S): at 06:28

## 2024-08-30 RX ADMIN — SODIUM CHLORIDE 100 MILLILITER(S): 9 INJECTION INTRAMUSCULAR; INTRAVENOUS; SUBCUTANEOUS at 11:08

## 2024-08-30 RX ADMIN — SODIUM CHLORIDE 100 MILLILITER(S): 9 INJECTION INTRAMUSCULAR; INTRAVENOUS; SUBCUTANEOUS at 22:12

## 2024-08-30 RX ADMIN — CHLORHEXIDINE GLUCONATE 1 APPLICATION(S): 40 SOLUTION TOPICAL at 06:58

## 2024-08-30 RX ADMIN — Medication 0.2 MILLIGRAM(S): at 19:23

## 2024-08-30 RX ADMIN — ACETAMINOPHEN 400 MILLIGRAM(S): 325 TABLET ORAL at 18:44

## 2024-08-30 NOTE — DISCHARGE NOTE PROVIDER - PROVIDER TOKENS
PROVIDER:[TOKEN:[[id number]]] PROVIDER:[TOKEN:[[id number]]],PROVIDER:[TOKEN:[[id number]],FOLLOWUP:[1 week]],FREE:[LAST:[PCP],PHONE:[(   )    -],FAX:[(   )    -],FOLLOWUP:[1 week]]

## 2024-08-30 NOTE — PROGRESS NOTE ADULT - SUBJECTIVE AND OBJECTIVE BOX
HPI  Pt sarbjit  85yo M admitted to [facility] for left hip fx.   Pt was seen and examined at bedside. Pt is feeling slight better than yesterday.     Vital Signs Last 24 Hrs  T(C): 36.6 (30 Aug 2024 07:22), Max: 37.7 (29 Aug 2024 20:52)  T(F): 97.9 (30 Aug 2024 07:22), Max: 99.9 (29 Aug 2024 20:52)  HR: 103 (30 Aug 2024 07:22) (99 - 117)  BP: 137/68 (30 Aug 2024 07:22) (107/54 - 137/68)  BP(mean): --  RR: 18 (30 Aug 2024 07:22) (18 - 18)  SpO2: 97% (30 Aug 2024 07:22) (93% - 97%)    Parameters below as of 30 Aug 2024 07:22  Patient On (Oxygen Delivery Method): nasal cannula        I&O's Summary    29 Aug 2024 07:01  -  30 Aug 2024 07:00  --------------------------------------------------------  IN: 900 mL / OUT: 400 mL / NET: 500 mL        CAPILLARY BLOOD GLUCOSE      POCT Blood Glucose.: 147 mg/dL (30 Aug 2024 08:36)  POCT Blood Glucose.: 135 mg/dL (30 Aug 2024 07:03)  POCT Blood Glucose.: 149 mg/dL (29 Aug 2024 23:34)  POCT Blood Glucose.: 125 mg/dL (29 Aug 2024 17:19)  POCT Blood Glucose.: 150 mg/dL (29 Aug 2024 11:29)      PHYSICAL EXAM:    Constitutional: NAD,    HEENT: PERR, EOMI,   Neck: Soft and supple,   Respiratory: crakles  noted. on 3.5L NC   Cardiovascular: S1 and S2, tachycardia   Gastrointestinal: Bowel Sounds present, soft, nontender,   Extremities: No peripheral edema, right hip pain   Vascular: 2+ peripheral pulses  Neurological: A/O x 2         MEDICATIONS:  MEDICATIONS  (STANDING):  cefepime  Injectable.      cefepime  Injectable. 1000 milliGRAM(s) IV Push every 24 hours  chlorhexidine 2% Cloths 1 Application(s) Topical <User Schedule>  dextrose 5%. 1000 milliLiter(s) (100 mL/Hr) IV Continuous <Continuous>  dextrose 5%. 1000 milliLiter(s) (50 mL/Hr) IV Continuous <Continuous>  dextrose 50% Injectable 12.5 Gram(s) IV Push once  dextrose 50% Injectable 25 Gram(s) IV Push once  dextrose 50% Injectable 25 Gram(s) IV Push once  gabapentin 100 milliGRAM(s) Oral four times a day  glucagon  Injectable 1 milliGRAM(s) IntraMuscular once  insulin glargine Injectable (LANTUS) 6 Unit(s) SubCutaneous every morning  insulin lispro (ADMELOG) corrective regimen sliding scale   SubCutaneous every 6 hours  mupirocin 2% Ointment 1 Application(s) Both Nostrils two times a day  pantoprazole    Tablet 40 milliGRAM(s) Oral before breakfast  sodium chloride 0.9%. 1000 milliLiter(s) (100 mL/Hr) IV Continuous <Continuous>  sucralfate 1 Gram(s) Oral four times a day      LABS: All Labs Reviewed:                        9.2    18.61 )-----------( 174      ( 30 Aug 2024 06:05 )             29.3     08-30    142  |  105  |  51.5<H>  ----------------------------<  147<H>  4.4   |  18.0<L>  |  3.35<H>    Ca    8.4      30 Aug 2024 06:05    TPro  6.1<L>  /  Alb  2.9<L>  /  TBili  1.7  /  DBili  x   /  AST  27  /  ALT  19  /  AlkPhos  76  08-29          Blood Culture: 08-27 @ 15:36  Organism --  Gram Stain Blood -- Gram Stain --  Specimen Source Catheterized Catheterized  Culture-Blood --        RADIOLOGY/EKG:    DVT PPX:    ADVANCED DIRECTIVE:    DISPOSITION:

## 2024-08-30 NOTE — DISCHARGE NOTE PROVIDER - HOSPITAL COURSE
85 y/o M w/ PMH of CKD, dementia (AAOx2 at baseline), IDDMT2, HTN, chronic cholecystitis s/p percutaneous sky tube (placed 12/2023), CKDIIIa, pAF (not on AC 2/2 prior GIB and fall risk), BPH presented 08/27 to ED after mechanical fall resulting in L-femoral neck fracture and now s/p L hemiarthroplasty 8/30 w/ ortho.  Hospital course complicated by sepsis due to aspiration pneumonia, RON, severe behavioral disturbances, inadvertent removal of perc sky tube.  Hospital course further complicated by hypotension on 09/02 overnight associated w/ lethargy.  Pt was given IVFs and albumin w/ improved BP but pt remained obtunded with snoring respirations and generalized twitching and pt subsequently upgraded to MICU.  Pt requiring supplemental O2 to maintain normal O2 sats and likely from aspiration PNA and started on Zosyn.  CTH negative for acute intracranial pathology.  While in MICU pt went in to AF RVR on 09/03 and cardiology consulted but converted back to NSR.  Pt now awake and alert but NPO given concern for aspiration and SLP following.  Pt went for perc sky tube placement w/ IR on 9/4.  Pt noted to have down trending H/H, s/p 2u PRBC transfused. No concern for bleeding at this time and pt hemodynamically stable.  Pt medically stable for transfer to medicine for continued management. Pt has since been medically optimized, behaviors and mentation improving. Pt is now medically stable for discharge with appropriate outpatient follow up.     All electrolyte abnormalities were monitored carefully and repleted as necessary during this hospitalization. At the time of discharge patient was hemodynamically stable and amenable to all terms of discharge.       Chronic cholecystitis   - s/p perc sky drain placed 12/2023 and pulled out by pt during this admission on 09/01  - s/p drain replacement on 9/4 by IR.  - Monitor drain output daily     IDDM  - A1c 7.1   - c/w basal insulin regimen 6 unit, may need to up titrate based on diet.   - c/w ISS and hypoglycemic protocol   - Titrate insulin to maintain BG <180    Severe Esophagitis  - Cont. sucralfate QID   - c/w PPI IV     Hypokalemia and Hypomagnesemia  - replaced    VTE ppx: heparin sq    Dispo: Remains acute, once behavior controlled, likely 1-2 days. Anticipate NITHIN.     83 y/o M w/ PMH of CKD, dementia (AAOx2 at baseline), IDDMT2, HTN, chronic cholecystitis s/p percutaneous sky tube (placed 12/2023), CKDIIIa, pAF (not on AC 2/2 prior GIB and fall risk), BPH presented 08/27 to ED after mechanical fall resulting in L-femoral neck fracture and now s/p L hemiarthroplasty 8/30 w/ ortho.  Hospital course complicated by sepsis due to aspiration pneumonia, RON, severe behavioral disturbances, inadvertent removal of perc sky tube.  Hospital course further complicated by hypotension on 09/02 overnight associated w/ lethargy.  Pt was given IVFs and albumin w/ improved BP but pt remained obtunded with snoring respirations and generalized twitching and pt subsequently upgraded to MICU.  Pt requiring supplemental O2 to maintain normal O2 sats and likely from aspiration PNA and completed Zosyn.  CTH negative for acute intracranial pathology.  While in MICU pt went in to AF RVR on 09/03 and cardiology consulted but converted back to NSR. Unable to anticoagulate due to history of GIB and falls. Pt now awake and alert but NPO given concern for aspiration and SLP following.  Pt went for perc sky tube placement w/ IR on 9/4.  Pt noted to have down trending H/H, s/p 2u PRBC transfused. No concern for bleeding at this time and pt hemodynamically stable.  Pt medically stable for transfer to medicine for continued management. Course complicated by delirium. Psych consulted, now improved. Pt has since been medically optimized, behaviors and mentation improving. Pt is now medically stable for discharge with appropriate outpatient follow up.     All electrolyte abnormalities were monitored carefully and repleted as necessary during this hospitalization. At the time of discharge patient was hemodynamically stable and amenable to all terms of discharge.       Chronic cholecystitis   - s/p perc sky drain placed 12/2023 and pulled out by pt during this admission on 09/01  - s/p drain replacement on 9/4 by IR.  - Monitor drain output daily     IDDM  - A1c 7.1   - c/w basal insulin regimen 6 unit, may need to up titrate based on diet.   - c/w ISS and hypoglycemic protocol   - Titrate insulin to maintain BG <180    Severe Esophagitis  - Cont. sucralfate QID   - c/w PPI IV     Hypokalemia and Hypomagnesemia  - replaced    VTE ppx: heparin sq

## 2024-08-30 NOTE — DISCHARGE NOTE PROVIDER - CARE PROVIDERS DIRECT ADDRESSES
,[email or web address]

## 2024-08-30 NOTE — DISCHARGE NOTE PROVIDER - NSDCCPCAREPLAN_GEN_ALL_CORE_FT
PRINCIPAL DISCHARGE DIAGNOSIS  Diagnosis: Hip fracture, left  Assessment and Plan of Treatment: - Follow up with Ortho and PCP in 1 week      SECONDARY DISCHARGE DIAGNOSES  Diagnosis: Dementia  Assessment and Plan of Treatment: - Likely component of ICU/hospital delirium and exacerbated by dehydration, sepsis, acute hypoxic resp failure and anemia  - Improving  - Continue medications as directed  - Follow up with PCP in 1 week    Diagnosis: Anemia  Assessment and Plan of Treatment: - Continue medications as directed  - Follow up with PCP in 1 week for repeat CBC    Diagnosis: Afib  Assessment and Plan of Treatment: - Continue medications as directed  - Follow up with PCP and Cardiology in 1 week    Diagnosis: Acute kidney injury superimposed on CKD  Assessment and Plan of Treatment: - Improving   - Follow up with PCP in 1 week for repeat BMP    Diagnosis: Sepsis  Assessment and Plan of Treatment: - Continue antibitoics as directed  - Follow up with PCP in 1 week    Diagnosis: H/O chronic cholecystitis  Assessment and Plan of Treatment: - Tube exchanged 9/4  - Follow up with PCP in 1 week

## 2024-08-30 NOTE — PROGRESS NOTE ADULT - SUBJECTIVE AND OBJECTIVE BOX
Patient seen and examined POD #0 of left hip tracy. Patient with limited exam 2/2 mental status. No complaints at this time.    Vital Signs Last 24 Hrs  T(C): 36.5 (30 Aug 2024 17:10), Max: 37.7 (29 Aug 2024 20:52)  T(F): 97.7 (30 Aug 2024 17:10), Max: 99.9 (29 Aug 2024 20:52)  HR: 88 (30 Aug 2024 17:40) (87 - 112)  BP: 150/74 (30 Aug 2024 17:40) (107/54 - 157/77)  BP(mean): --  RR: 15 (30 Aug 2024 17:40) (14 - 18)  SpO2: 94% (30 Aug 2024 17:40) (93% - 97%)    Parameters below as of 30 Aug 2024 17:40  Patient On (Oxygen Delivery Method): nasal cannula  O2 Flow (L/min): 4      Physical:  Patient unable to respond 2/2 mental status. Awake comfortable    LLE;  Dressing c/d/i  Pulse audible with doppler, also palpable  Motor exam limited 2/2 mental status. Spontaneous DF/PF (limited)  Unable to assess sensory.    Plan:  DVTp  pain control  WBAT  PT/OT  ancef per scip  rest of care as per primary team.

## 2024-08-30 NOTE — CHART NOTE - NSCHARTNOTEFT_GEN_A_CORE
PA NOTE-MEDICINE PA NOTE-MEDICINE    Called by PACU RN due to Pt ? being more confused, sleepy.     84-year-old male with past medical history of CKD, dementia (AAOx2 at baseline, waxes and wanes, not oriented to time), Insulin dependent type 2 diabetes, hypertension, cholecystitis with a percutaneous gallbladder bladder drain since 12/2023 presents after trip and fall backward. DaughterElkin confirmed history. Daughter states that patient fell onto his left hip and then hit his head afterward, though, major impact was on his hip. He did not lose consciousness at any time during the event. Patient had 2 episodes of vomiting in the ED, appearing like coffee grounds. Daughter states that whenever he vomits, it appears that way because he has severe esophagitis. Additionally, she states that he usually has these vomiting episodes when something like a fall occurs.     Pt with Fracture of Left Femoral Neck S/P Hemiarthroplasty of left hip 30-Aug-2024 16:32:02  by Janes Kam with no reported Procedural/Post procedural Events.  Pt received General Anesthesia along with Fentanyl post op for Pain   Pt has CKD with Cr: 3.35    BGFS: 142    Vitals:   BP: 129/111  HR: 85   RR: 18  O2 sat: 98% 2 L nc   T: Afebrile     General: WDWN Dutch speaking Male sitting up in Bed Sleepy/Lethargic (Compared to Floor RN Baseline)  but confused  Pt s/p General Anesthesia/Fentanyl   Cardiac: S1S2 + RRR  Lungs: CTA No R/R/W or Crackles B/L   Abd: NDNT Soft, No Rigidity No Pain response on Palp + BS x 4 Q   Integument: No Pallor Warm/Dry   Ext: No C/C/E x 4 Ext     Neuro;   A & O x 1 (As per history Pt waxes and wanes from A & O x 2 Person/Birthday to A & O x 1 Person) Pt was A & O x 1 to person Earlier today pre Sx   Perrl-OU   KELLER x 4 5/5 x 3 Ext ( LLE Limited 2/2 post op L Hemiarthroplasty ) Wiggles Right/Left Toes/Moves Left Ankle Dorsi/Plantar Flexion with good strength B/L   Sensory Intact throughout   Unable to Obtain full neuro due to confusion   Ordered Narcan 0.2 mg IVP x 1   Pt now more alert Answering questions Still A & O x 1 to Person and slightly confused but following commands-Smiles Sticks tongue out-WNL   Pt's daughter Kate now at Bedside-Daughter states that this is her Dads Normal baseline after anesthesia     Ordered;   CBC   CMP  Coags  Continue to monitor Pt   Pt may go back to Floor   Will Follow Labs  Will sign out to AM Team

## 2024-08-30 NOTE — DISCHARGE NOTE PROVIDER - NSDCFUADDINST_GEN_ALL_CORE_FT
The patient will be seen in the office between 2-3 weeks for wound check. Sutures/Staples/Tape will be removed at that time. Patient may shower after post-op day #5. The dressing is to be removed on day # 5. The patient will contact the office if the wound becomes red, has increasing pain, develops bleeding or discharge, odor or an injury occurs, or has other concerns.  Inflammation to incision can cause pain and you can apply ice to site for 20 mins every 3-4 hours with elevating leg.    ***If there is a need for Physical Therapy this will be arranged for at home therapy.  You may recieve equipment to help assist in your mobility which may include a rolling walker, commode, cane/crutches, and/or raised toilet seat.  ****If you are recommended for Subacute rehab physical therapy will be arranged upon discharge from rehab.  ****The patient will continue HEPARIN for DVTp. The patient will take oxycodone and tylenol for pain control and titrate according to prescription and patient needs.  While on pain medications patient will continue senna to prevent constipation. The patient is FULL weight bearing.  Patient is not to drive until cleared by surgeon. The patient will be seen in the office between 2-3 weeks for wound check. Sutures/Staples/Tape will be removed at that time. Patient may shower after post-op day #5. The dressing is to be removed on day # 5. The patient will contact the office if the wound becomes red, has increasing pain, develops bleeding or discharge, odor or an injury occurs, or has other concerns.  Inflammation to incision can cause pain and you can apply ice to site for 20 mins every 3-4 hours with elevating leg.    ***If there is a need for Physical Therapy this will be arranged for at home therapy.  You may recieve equipment to help assist in your mobility which may include a rolling walker, commode, cane/crutches, and/or raised toilet seat.  ****If you are recommended for Subacute rehab physical therapy will be arranged upon discharge from rehab.  ****The patient will continue HEPARIN or a similar medication to be determined by primary team for DVTp. The patient will take oxycodone and tylenol for pain control and titrate according to prescription and patient needs.  While on pain medications patient will continue senna to prevent constipation. The patient is FULL weight bearing.  Patient is not to drive until cleared by surgeon.

## 2024-08-30 NOTE — PROGRESS NOTE ADULT - ASSESSMENT
84-year-old male with past medical history of CKD, dementia, type 2 diabetes, hypertension, cholecystitis with a percutaneous gallbladder bladder drain presents after trip and fall backward.     *Left Femoral Neck Fracture 2/2 mechanical fall  Ortho followingCts all negative for traumatic injury other than L femoral neck fx  PLAZA 0.8 % ZUNILDA risk  RCRI 2; Class III risk  cardiology consult noted   Patient is moderate risk procedure without modifiable risk factors   pending OR today   Pt blood culture prelim pending, lactate 1.4  Case discussed with Ortho  Pt is cleared for OR today     *Sepsis secondary to likely aspiration pna   Pan CT without signs of infection  repeat CXR showed pna   failed bedside swallow, Cont NPO with speech eval   Lactate elevated, repeat today 1.4  blood culture ordered, result pending   due to chest congestion seen   s/p 1 L IVF bolus   cont IVF 100cc/hr   currently on 3.5 L and satting 98%, wearn O2    Supplemental O2 to maintain SpO2 > 92%, titrate as tolerated    *Cholecystitis s/p perc sky, with drain still in place  Drain in place with few exchanges since 12/2023  Ultimate plan was to remove gallbladder, but patient has been sick on and off that has hindered that per daughter  Monitor output  Most recently exchanged by IR earlier this month    *IDDM  Hold premeal insulin while NPO, resume 4 unites at meal times when eating again  Continue long acting insulin at 50% decreased dose while NPO(6 units daily), but increase back to home dose (12 units daily) once patient tolerating PO  Accuchecks/ISS/A1c    *acute on chronic CKD3  Avoid nephrotoxins  Required HD temporarily in the past, but now urinates normally and no need for HD  elevated again likely secondary to sepsis     *Dementia   Patient currently at baseline of AAOx2 per daughter    *Severe Esophagitis  Continue sucralfate QID  Continue PPI       Dispo: Pending OR today     updated with daughter via phone

## 2024-08-30 NOTE — BRIEF OPERATIVE NOTE - NSICDXBRIEFPROCEDURE_GEN_ALL_CORE_FT
PROCEDURES:  Hemiarthroplasty of left hip 30-Aug-2024 16:32:02  Janes Kam  Prophylactic fixation of left femur 30-Aug-2024 16:32:27  Janes Kam

## 2024-08-30 NOTE — DISCHARGE NOTE PROVIDER - CARE PROVIDER_API CALL
Janes Kam  Orthopaedic Surgery  [address] [place] [id number]  Phone: [phone]  Fax: [phone]  Follow Up Time:    Janes Kam  Orthopaedic Surgery  [address] [place] [address]  Phone: [phone]  Fax: [phone]  Follow Up Time:     Markus Mejia  Cardiovascular Disease  [address] [facility], [address]  [place] [address]  Phone: [phone]  Fax: [phone]  Follow Up Time: 1 week    PCP,   Phone: (   )    -  Fax: (   )    -  Follow Up Time: 1 week

## 2024-08-30 NOTE — BRIEF OPERATIVE NOTE - COMMENTS
Post-op Plan: WBAT, posterior precautions, PT/OT, ancef per protocol, contralateral SCD, LMWH (or equivalent) x 4 weeks post-op, likely f/u prn or telehealth due to age, medical comorbidities and limited functional status.

## 2024-08-30 NOTE — BRIEF OPERATIVE NOTE - OPERATION/FINDINGS
implants: [employer] Echo Size 11 lateral offset pressfit stem, 49/28 bipolar head, +0 neck length, stainless cable

## 2024-08-30 NOTE — DISCHARGE NOTE PROVIDER - NSDCMRMEDTOKEN_GEN_ALL_CORE_FT
gabapentin 100 mg oral capsule: 1 cap(s) orally 4 times a day  insulin glargine 100 units/mL subcutaneous solution: 12 unit(s) subcutaneous once a day  insulin lispro 100 units/mL injectable solution: 4 international unit(s) subcutaneous 3 times a day (before meals) hold if FS &lt;100  omeprazole 40 mg oral delayed release capsule: 1 cap(s) orally once a day  sucralfate 1 g oral tablet: 1 tab(s) orally 4 times a day   bisacodyl 10 mg rectal suppository: 1 suppository(ies) rectal once a day As needed Constipation  insulin glargine 100 units/mL subcutaneous solution: 12 unit(s) subcutaneous once a day  insulin lispro 100 units/mL injectable solution: 4 international unit(s) subcutaneous 3 times a day (before meals) hold if FS &lt;100  metoprolol tartrate 25 mg oral tablet: 1 tab(s) orally 2 times a day  omeprazole 40 mg oral delayed release capsule: 1 cap(s) orally once a day  sucralfate 1 g oral tablet: 1 tab(s) orally 4 times a day  tamsulosin 0.4 mg oral capsule: 2 cap(s) orally once a day (at bedtime)

## 2024-08-31 LAB
ANION GAP SERPL CALC-SCNC: 16 MMOL/L — SIGNIFICANT CHANGE UP (ref 5–17)
BUN SERPL-MCNC: 48.7 MG/DL — HIGH (ref 8–20)
CALCIUM SERPL-MCNC: 8.3 MG/DL — LOW (ref 8.4–10.5)
CHLORIDE SERPL-SCNC: 108 MMOL/L — SIGNIFICANT CHANGE UP (ref 96–108)
CO2 SERPL-SCNC: 18 MMOL/L — LOW (ref 22–29)
CREAT SERPL-MCNC: 2.64 MG/DL — HIGH (ref 0.5–1.3)
EGFR: 23 ML/MIN/1.73M2 — LOW
GLUCOSE BLDC GLUCOMTR-MCNC: 130 MG/DL — HIGH (ref 70–99)
GLUCOSE BLDC GLUCOMTR-MCNC: 139 MG/DL — HIGH (ref 70–99)
GLUCOSE BLDC GLUCOMTR-MCNC: 153 MG/DL — HIGH (ref 70–99)
GLUCOSE BLDC GLUCOMTR-MCNC: 155 MG/DL — HIGH (ref 70–99)
GLUCOSE BLDC GLUCOMTR-MCNC: 178 MG/DL — HIGH (ref 70–99)
GLUCOSE SERPL-MCNC: 194 MG/DL — HIGH (ref 70–99)
HCT VFR BLD CALC: 24.1 % — LOW (ref 39–50)
HGB BLD-MCNC: 7.6 G/DL — LOW (ref 13–17)
MCHC RBC-ENTMCNC: 27.5 PG — SIGNIFICANT CHANGE UP (ref 27–34)
MCHC RBC-ENTMCNC: 31.5 GM/DL — LOW (ref 32–36)
MCV RBC AUTO: 87.3 FL — SIGNIFICANT CHANGE UP (ref 80–100)
PLATELET # BLD AUTO: 189 K/UL — SIGNIFICANT CHANGE UP (ref 150–400)
POTASSIUM SERPL-MCNC: 4.4 MMOL/L — SIGNIFICANT CHANGE UP (ref 3.5–5.3)
POTASSIUM SERPL-SCNC: 4.4 MMOL/L — SIGNIFICANT CHANGE UP (ref 3.5–5.3)
RBC # BLD: 2.76 M/UL — LOW (ref 4.2–5.8)
RBC # FLD: 17.9 % — HIGH (ref 10.3–14.5)
SODIUM SERPL-SCNC: 142 MMOL/L — SIGNIFICANT CHANGE UP (ref 135–145)
WBC # BLD: 10.81 K/UL — HIGH (ref 3.8–10.5)
WBC # FLD AUTO: 10.81 K/UL — HIGH (ref 3.8–10.5)

## 2024-08-31 PROCEDURE — 99232 SBSQ HOSP IP/OBS MODERATE 35: CPT

## 2024-08-31 RX ORDER — ACETAMINOPHEN 325 MG/1
1000 TABLET ORAL ONCE
Refills: 0 | Status: COMPLETED | OUTPATIENT
Start: 2024-08-31 | End: 2024-08-31

## 2024-08-31 RX ORDER — OLANZAPINE 7.5 MG/1
2.5 TABLET ORAL ONCE
Refills: 0 | Status: COMPLETED | OUTPATIENT
Start: 2024-08-31 | End: 2024-08-31

## 2024-08-31 RX ADMIN — ACETAMINOPHEN 400 MILLIGRAM(S): 325 TABLET ORAL at 22:04

## 2024-08-31 RX ADMIN — CHLORHEXIDINE GLUCONATE 1 APPLICATION(S): 40 SOLUTION TOPICAL at 06:10

## 2024-08-31 RX ADMIN — OLANZAPINE 2.5 MILLIGRAM(S): 7.5 TABLET ORAL at 03:23

## 2024-08-31 RX ADMIN — INSULIN GLARGINE 6 UNIT(S): 100 INJECTION, SOLUTION SUBCUTANEOUS at 09:06

## 2024-08-31 RX ADMIN — Medication 2: at 13:37

## 2024-08-31 RX ADMIN — CEFEPIME 1000 MILLIGRAM(S): 2 INJECTION, POWDER, FOR SOLUTION INTRAVENOUS at 11:09

## 2024-08-31 RX ADMIN — Medication 1 APPLICATION(S): at 17:02

## 2024-08-31 RX ADMIN — Medication 75 MILLILITER(S): at 21:31

## 2024-08-31 RX ADMIN — Medication 2: at 06:09

## 2024-08-31 RX ADMIN — Medication 5000 UNIT(S): at 15:00

## 2024-08-31 RX ADMIN — Medication 5000 UNIT(S): at 21:31

## 2024-08-31 RX ADMIN — Medication 2: at 17:56

## 2024-08-31 RX ADMIN — Medication 5000 UNIT(S): at 06:08

## 2024-08-31 RX ADMIN — Medication 75 MILLILITER(S): at 16:58

## 2024-08-31 RX ADMIN — ACETAMINOPHEN 1000 MILLIGRAM(S): 325 TABLET ORAL at 07:50

## 2024-08-31 RX ADMIN — ACETAMINOPHEN 400 MILLIGRAM(S): 325 TABLET ORAL at 03:23

## 2024-08-31 RX ADMIN — Medication 1 APPLICATION(S): at 06:10

## 2024-08-31 RX ADMIN — CEFAZOLIN SODIUM 2000 MILLIGRAM(S): 2 INJECTION, SOLUTION INTRAVENOUS at 06:08

## 2024-08-31 NOTE — PROGRESS NOTE ADULT - SUBJECTIVE AND OBJECTIVE BOX
Patient seen and examined POD #1 of left hip tracy-arthroplasty. Limited exam with patient mental status, pleasantly confused. No orthopedic complaints at this time.   used with PT team at bedside.     ICU Vital Signs Last 24 Hrs  T(C): 36.4 (31 Aug 2024 08:00), Max: 37 (30 Aug 2024 13:12)  T(F): 97.5 (31 Aug 2024 08:00), Max: 98.6 (30 Aug 2024 13:12)  HR: 76 (31 Aug 2024 08:00) (76 - 101)  BP: 124/70 (31 Aug 2024 08:00) (110/80 - 157/77)  BP(mean): 92 (30 Aug 2024 19:00) (92 - 98)  ABP: --  ABP(mean): --  RR: 18 (31 Aug 2024 08:00) (13 - 22)  SpO2: 98% (31 Aug 2024 08:00) (93% - 100%)    O2 Parameters below as of 31 Aug 2024 08:00  Patient On (Oxygen Delivery Method): nasal cannula                              7.6    10.81 )-----------( 189      ( 31 Aug 2024 05:02 )             24.1         Physical: Exam limited, patient not following commands.   pleasantly confused. Awake and comfortable    LLE:  Dressing c/d/i, no staining  Pulse audible with doppler and palpable   moving foot/great toe throughout exam.   limited sensory exam    Plan:  - DVTp as perscribed  - pain control  - PT/OT, WBAT  - rest of care as per primary team

## 2024-08-31 NOTE — PROGRESS NOTE ADULT - ASSESSMENT
84y/oM PMH CKD, dementia, type 2 diabetes, HTN, cholecystitis with a percutaneous gallbladder bladder drain presenting after mechanical fall, admitted with L femoral neck fx     Left Femoral Neck Fracture 2/2 mechanical fall  -Ortho recs appreciated   -imaging reviewed   -cardiology recs appreciated   -s/p L hemiarthroplasty 8/30  -IS   -PT eval rec NITHIN   -cont dvt ppx     Sepsis   2/2 likely aspiration pna   -imaging reviewed    -failed bedside swallow, NPO; SLP following   -bcx ngtd   -wean supplemental O2 as tolerated     Cholecystitis s/p perc sky, with drain still in place  -Drain in place with few exchanges since 12/2023  -Ultimate plan was to remove gallbladder, but patient has been sick on and off that has hindered cholecystectomy, as per daughter  -Monitor output  -most recently exchanged 8/13 with IR     IDDM  -Continue long acting insulin at 50% decreased dose while NPO(6 units daily), but increase back to home dose (12 units daily) once patient tolerating PO  -Hba1c 7.1     RON on CKD3  -Required HD temporarily in the past, but now urinates normally and no need for HD  -elevated again likely secondary to sepsis   -f/u am bmp     Dementia   -baseline reportedly AAOx2, as per daughter    Severe Esophagitis  -Continue sucralfate QID  -Continue PPI    Dispo: acute, pending SLP, diet tolerance; anticipate NITHIN when medically stable      updated family at bedside

## 2024-08-31 NOTE — PROGRESS NOTE ADULT - SUBJECTIVE AND OBJECTIVE BOX
BERNADINE MONICA    [id number]    84y      Male    CC:     INTERVAL HPI/OVERNIGHT EVENTS:    REVIEW OF SYSTEMS:    CONSTITUTIONAL: No fever, weight loss, or fatigue  RESPIRATORY: No cough, wheezing, hemoptysis; No shortness of breath  CARDIOVASCULAR: No chest pain, palpitations  GASTROINTESTINAL: No abdominal or epigastric pain. No nausea, vomiting  NEUROLOGICAL: No headaches, memory loss, loss of strength.    Vital Signs Last 24 Hrs  T(C): 36.4 (31 Aug 2024 08:00), Max: 36.5 (30 Aug 2024 17:10)  T(F): 97.5 (31 Aug 2024 08:00), Max: 97.7 (30 Aug 2024 17:10)  HR: 76 (31 Aug 2024 08:00) (76 - 98)  BP: 124/70 (31 Aug 2024 08:00) (110/80 - 152/64)  BP(mean): 92 (30 Aug 2024 19:00) (92 - 98)  RR: 18 (31 Aug 2024 08:00) (13 - 22)  SpO2: 98% (31 Aug 2024 08:00) (93% - 100%)    Parameters below as of 31 Aug 2024 08:00  Patient On (Oxygen Delivery Method): nasal cannula        PHYSICAL EXAM:    GENERAL: NAD  HEENT: PERRL, +EOMI  NECK: soft, supple  CHEST/LUNG: Clear to auscultation bilaterally; No wheezing  HEART: S1S2+, Regular rate and rhythm; No murmurs, rubs, or gallops  ABDOMEN: Soft, Nontender, Nondistended; Bowel sounds present  SKIN: No rashes or lesions  NEURO: AAOX3, no focal deficits, no motor or sensory loss  PSYCH: normal mood    LABS:                        7.6    10.81 )-----------( 189      ( 31 Aug 2024 05:02 )             24.1     08-31    142  |  108  |  48.7<H>  ----------------------------<  194<H>  4.4   |  18.0<L>  |  2.64<H>    Ca    8.3<L>      31 Aug 2024 05:02    TPro  6.0<L>  /  Alb  2.8<L>  /  TBili  0.8  /  DBili  x   /  AST  21  /  ALT  15  /  AlkPhos  87  08-30    PT/INR - ( 30 Aug 2024 19:45 )   PT: 11.1 sec;   INR: 1.00 ratio         PTT - ( 30 Aug 2024 19:45 )  PTT:36.8 sec  Urinalysis Basic - ( 31 Aug 2024 05:02 )    Color: x / Appearance: x / SG: x / pH: x  Gluc: 194 mg/dL / Ketone: x  / Bili: x / Urobili: x   Blood: x / Protein: x / Nitrite: x   Leuk Esterase: x / RBC: x / WBC x   Sq Epi: x / Non Sq Epi: x / Bacteria: x          MEDICATIONS  (STANDING):  cefepime  Injectable. 1000 milliGRAM(s) IV Push every 24 hours  cefepime  Injectable.      chlorhexidine 2% Cloths 1 Application(s) Topical <User Schedule>  dextrose 5%. 1000 milliLiter(s) (50 mL/Hr) IV Continuous <Continuous>  dextrose 5%. 1000 milliLiter(s) (100 mL/Hr) IV Continuous <Continuous>  dextrose 50% Injectable 25 Gram(s) IV Push once  dextrose 50% Injectable 25 Gram(s) IV Push once  dextrose 50% Injectable 12.5 Gram(s) IV Push once  gabapentin 100 milliGRAM(s) Oral four times a day  glucagon  Injectable 1 milliGRAM(s) IntraMuscular once  heparin   Injectable 5000 Unit(s) SubCutaneous every 8 hours  insulin glargine Injectable (LANTUS) 6 Unit(s) SubCutaneous every morning  insulin lispro (ADMELOG) corrective regimen sliding scale   SubCutaneous every 6 hours  mupirocin 2% Ointment 1 Application(s) Both Nostrils two times a day  pantoprazole    Tablet 40 milliGRAM(s) Oral before breakfast  sodium chloride 0.9%. 1000 milliLiter(s) (100 mL/Hr) IV Continuous <Continuous>  sucralfate 1 Gram(s) Oral four times a day    MEDICATIONS  (PRN):  acetaminophen     Tablet .. 650 milliGRAM(s) Oral every 6 hours PRN Temp greater or equal to 38C (100.4F), Mild Pain (1 - 3)  aluminum hydroxide/magnesium hydroxide/simethicone Suspension 30 milliLiter(s) Oral four times a day PRN Indigestion  dextrose Oral Gel 15 Gram(s) Oral once PRN Blood Glucose LESS THAN 70 milliGRAM(s)/deciliter  melatonin 3 milliGRAM(s) Oral at bedtime PRN Insomnia  ondansetron Injectable 4 milliGRAM(s) IV Push every 6 hours PRN Nausea and/or Vomiting  oxyCODONE    IR 10 milliGRAM(s) Oral every 4 hours PRN Moderate Pain (4 - 6)  oxyCODONE    IR 5 milliGRAM(s) Oral every 4 hours PRN Mild Pain (1 - 3)      RADIOLOGY & ADDITIONAL TESTS:   BERNADINE MONICA    [id number]    84y      Male    CC: L hip fx     INTERVAL HPI/OVERNIGHT EVENTS: Pt seen and examined. seen oob to chair     REVIEW OF SYSTEMS:    CONSTITUTIONAL: No fever, weight loss  RESPIRATORY: No cough, wheezing, hemoptysis; No shortness of breath  CARDIOVASCULAR: No chest pain, palpitations  GASTROINTESTINAL: No abdominal or epigastric pain. No nausea, vomiting  NEUROLOGICAL: No headaches    Vital Signs Last 24 Hrs  T(C): 36.4 (31 Aug 2024 08:00), Max: 36.5 (30 Aug 2024 17:10)  T(F): 97.5 (31 Aug 2024 08:00), Max: 97.7 (30 Aug 2024 17:10)  HR: 76 (31 Aug 2024 08:00) (76 - 98)  BP: 124/70 (31 Aug 2024 08:00) (110/80 - 152/64)  BP(mean): 92 (30 Aug 2024 19:00) (92 - 98)  RR: 18 (31 Aug 2024 08:00) (13 - 22)  SpO2: 98% (31 Aug 2024 08:00) (93% - 100%)    Parameters below as of 31 Aug 2024 08:00  Patient On (Oxygen Delivery Method): nasal cannula        PHYSICAL EXAM:    GENERAL: NAD  CHEST/LUNG: Clear to auscultation bilaterally  HEART: S1S2+, Regular rate and rhythm  ABDOMEN: Soft, Nontender, Nondistended; Bowel sounds present  MSK: L hip dressing c/d/i  SKIN: warm, dry   NEURO: Awake, alert   PSYCH: calm, cooperative     LABS:                        7.6    10.81 )-----------( 189      ( 31 Aug 2024 05:02 )             24.1     08-31    142  |  108  |  48.7<H>  ----------------------------<  194<H>  4.4   |  18.0<L>  |  2.64<H>    Ca    8.3<L>      31 Aug 2024 05:02    TPro  6.0<L>  /  Alb  2.8<L>  /  TBili  0.8  /  DBili  x   /  AST  21  /  ALT  15  /  AlkPhos  87  08-30    PT/INR - ( 30 Aug 2024 19:45 )   PT: 11.1 sec;   INR: 1.00 ratio         PTT - ( 30 Aug 2024 19:45 )  PTT:36.8 sec  Urinalysis Basic - ( 31 Aug 2024 05:02 )    Color: x / Appearance: x / SG: x / pH: x  Gluc: 194 mg/dL / Ketone: x  / Bili: x / Urobili: x   Blood: x / Protein: x / Nitrite: x   Leuk Esterase: x / RBC: x / WBC x   Sq Epi: x / Non Sq Epi: x / Bacteria: x          MEDICATIONS  (STANDING):  cefepime  Injectable. 1000 milliGRAM(s) IV Push every 24 hours  cefepime  Injectable.      chlorhexidine 2% Cloths 1 Application(s) Topical <User Schedule>  dextrose 5%. 1000 milliLiter(s) (50 mL/Hr) IV Continuous <Continuous>  dextrose 5%. 1000 milliLiter(s) (100 mL/Hr) IV Continuous <Continuous>  dextrose 50% Injectable 25 Gram(s) IV Push once  dextrose 50% Injectable 25 Gram(s) IV Push once  dextrose 50% Injectable 12.5 Gram(s) IV Push once  gabapentin 100 milliGRAM(s) Oral four times a day  glucagon  Injectable 1 milliGRAM(s) IntraMuscular once  heparin   Injectable 5000 Unit(s) SubCutaneous every 8 hours  insulin glargine Injectable (LANTUS) 6 Unit(s) SubCutaneous every morning  insulin lispro (ADMELOG) corrective regimen sliding scale   SubCutaneous every 6 hours  mupirocin 2% Ointment 1 Application(s) Both Nostrils two times a day  pantoprazole    Tablet 40 milliGRAM(s) Oral before breakfast  sodium chloride 0.9%. 1000 milliLiter(s) (100 mL/Hr) IV Continuous <Continuous>  sucralfate 1 Gram(s) Oral four times a day    MEDICATIONS  (PRN):  acetaminophen     Tablet .. 650 milliGRAM(s) Oral every 6 hours PRN Temp greater or equal to 38C (100.4F), Mild Pain (1 - 3)  aluminum hydroxide/magnesium hydroxide/simethicone Suspension 30 milliLiter(s) Oral four times a day PRN Indigestion  dextrose Oral Gel 15 Gram(s) Oral once PRN Blood Glucose LESS THAN 70 milliGRAM(s)/deciliter  melatonin 3 milliGRAM(s) Oral at bedtime PRN Insomnia  ondansetron Injectable 4 milliGRAM(s) IV Push every 6 hours PRN Nausea and/or Vomiting  oxyCODONE    IR 10 milliGRAM(s) Oral every 4 hours PRN Moderate Pain (4 - 6)  oxyCODONE    IR 5 milliGRAM(s) Oral every 4 hours PRN Mild Pain (1 - 3)      RADIOLOGY & ADDITIONAL TESTS:

## 2024-08-31 NOTE — PHYSICAL THERAPY INITIAL EVALUATION ADULT - PLANNED THERAPY INTERVENTIONS, PT EVAL
balance training/bed mobility training/gait training/strengthening/stretching/transfer training
Patient has following risk factors- long history of alcohol use disorder, recent separation  She currently denies SI, has supportive family, and is future oriented

## 2024-09-01 LAB
ANION GAP SERPL CALC-SCNC: 12 MMOL/L — SIGNIFICANT CHANGE UP (ref 5–17)
BUN SERPL-MCNC: 44.7 MG/DL — HIGH (ref 8–20)
CALCIUM SERPL-MCNC: 8.7 MG/DL — SIGNIFICANT CHANGE UP (ref 8.4–10.5)
CHLORIDE SERPL-SCNC: 114 MMOL/L — HIGH (ref 96–108)
CO2 SERPL-SCNC: 20 MMOL/L — LOW (ref 22–29)
CREAT SERPL-MCNC: 2.58 MG/DL — HIGH (ref 0.5–1.3)
EGFR: 24 ML/MIN/1.73M2 — LOW
GLUCOSE BLDC GLUCOMTR-MCNC: 113 MG/DL — HIGH (ref 70–99)
GLUCOSE BLDC GLUCOMTR-MCNC: 133 MG/DL — HIGH (ref 70–99)
GLUCOSE BLDC GLUCOMTR-MCNC: 136 MG/DL — HIGH (ref 70–99)
GLUCOSE BLDC GLUCOMTR-MCNC: 189 MG/DL — HIGH (ref 70–99)
GLUCOSE SERPL-MCNC: 122 MG/DL — HIGH (ref 70–99)
HCT VFR BLD CALC: 27.1 % — LOW (ref 39–50)
HGB BLD-MCNC: 8.5 G/DL — LOW (ref 13–17)
LACTATE SERPL-SCNC: 2.5 MMOL/L — HIGH (ref 0.5–2)
MAGNESIUM SERPL-MCNC: 1.9 MG/DL — SIGNIFICANT CHANGE UP (ref 1.6–2.6)
MCHC RBC-ENTMCNC: 27.9 PG — SIGNIFICANT CHANGE UP (ref 27–34)
MCHC RBC-ENTMCNC: 31.4 GM/DL — LOW (ref 32–36)
MCV RBC AUTO: 88.9 FL — SIGNIFICANT CHANGE UP (ref 80–100)
PHOSPHATE SERPL-MCNC: 1.8 MG/DL — LOW (ref 2.4–4.7)
PLATELET # BLD AUTO: 225 K/UL — SIGNIFICANT CHANGE UP (ref 150–400)
POTASSIUM SERPL-MCNC: 3.8 MMOL/L — SIGNIFICANT CHANGE UP (ref 3.5–5.3)
POTASSIUM SERPL-SCNC: 3.8 MMOL/L — SIGNIFICANT CHANGE UP (ref 3.5–5.3)
RBC # BLD: 3.05 M/UL — LOW (ref 4.2–5.8)
RBC # FLD: 18.2 % — HIGH (ref 10.3–14.5)
SODIUM SERPL-SCNC: 145 MMOL/L — SIGNIFICANT CHANGE UP (ref 135–145)
WBC # BLD: 11.47 K/UL — HIGH (ref 3.8–10.5)
WBC # FLD AUTO: 11.47 K/UL — HIGH (ref 3.8–10.5)

## 2024-09-01 PROCEDURE — 99232 SBSQ HOSP IP/OBS MODERATE 35: CPT

## 2024-09-01 RX ORDER — POTASSIUM PHOSPHATE 236; 224 MG/ML; MG/ML
30 INJECTION, SOLUTION INTRAVENOUS ONCE
Refills: 0 | Status: COMPLETED | OUTPATIENT
Start: 2024-09-01 | End: 2024-09-01

## 2024-09-01 RX ORDER — SODIUM CHLORIDE 9 MG/ML
500 INJECTION INTRAMUSCULAR; INTRAVENOUS; SUBCUTANEOUS ONCE
Refills: 0 | Status: DISCONTINUED | OUTPATIENT
Start: 2024-09-01 | End: 2024-09-01

## 2024-09-01 RX ORDER — METOPROLOL TARTRATE 100 MG/1
5 TABLET ORAL ONCE
Refills: 0 | Status: COMPLETED | OUTPATIENT
Start: 2024-09-01 | End: 2024-09-01

## 2024-09-01 RX ORDER — OLANZAPINE 7.5 MG/1
2.5 TABLET ORAL ONCE
Refills: 0 | Status: COMPLETED | OUTPATIENT
Start: 2024-09-01 | End: 2024-09-01

## 2024-09-01 RX ADMIN — CEFEPIME 1000 MILLIGRAM(S): 2 INJECTION, POWDER, FOR SOLUTION INTRAVENOUS at 11:17

## 2024-09-01 RX ADMIN — ACETAMINOPHEN 1000 MILLIGRAM(S): 325 TABLET ORAL at 08:01

## 2024-09-01 RX ADMIN — METOPROLOL TARTRATE 5 MILLIGRAM(S): 100 TABLET ORAL at 12:10

## 2024-09-01 RX ADMIN — Medication 5000 UNIT(S): at 15:03

## 2024-09-01 RX ADMIN — OLANZAPINE 2.5 MILLIGRAM(S): 7.5 TABLET ORAL at 02:12

## 2024-09-01 RX ADMIN — ACETAMINOPHEN 650 MILLIGRAM(S): 325 TABLET ORAL at 22:13

## 2024-09-01 RX ADMIN — Medication 75 MILLILITER(S): at 15:13

## 2024-09-01 RX ADMIN — Medication 5000 UNIT(S): at 06:21

## 2024-09-01 RX ADMIN — Medication 3 MILLIGRAM(S): at 21:13

## 2024-09-01 RX ADMIN — ACETAMINOPHEN 650 MILLIGRAM(S): 325 TABLET ORAL at 21:13

## 2024-09-01 RX ADMIN — Medication 5000 UNIT(S): at 22:09

## 2024-09-01 RX ADMIN — INSULIN GLARGINE 6 UNIT(S): 100 INJECTION, SOLUTION SUBCUTANEOUS at 11:18

## 2024-09-01 RX ADMIN — Medication 1 APPLICATION(S): at 06:22

## 2024-09-01 RX ADMIN — CHLORHEXIDINE GLUCONATE 1 APPLICATION(S): 40 SOLUTION TOPICAL at 06:22

## 2024-09-01 RX ADMIN — POTASSIUM PHOSPHATE 83.33 MILLIMOLE(S): 236; 224 INJECTION, SOLUTION INTRAVENOUS at 09:53

## 2024-09-01 NOTE — PROGRESS NOTE ADULT - SUBJECTIVE AND OBJECTIVE BOX
Patient seen and examined POD #2 of left hip tracy-arthroplasty. Limited exam with patient mental status, pleasantly confused. No orthopedic complaints at this time.    ICU Vital Signs Last 24 Hrs  T(C): 36.6 (01 Sep 2024 04:30), Max: 36.6 (01 Sep 2024 00:00)  T(F): 97.9 (01 Sep 2024 04:30), Max: 97.9 (01 Sep 2024 00:00)  HR: 79 (01 Sep 2024 04:30) (72 - 94)  BP: 160/60 (01 Sep 2024 04:30) (122/60 - 160/60)  BP(mean): --  ABP: --  ABP(mean): --  RR: 18 (01 Sep 2024 04:30) (18 - 18)  SpO2: 94% (01 Sep 2024 04:30) (94% - 99%)    O2 Parameters below as of 01 Sep 2024 04:30  Patient On (Oxygen Delivery Method): nasal cannula                           8.5    11.47 )-----------( 225      ( 01 Sep 2024 05:20 )             27.1       Physical: Exam limited, patient not following commands     pleasantly confused. Awake and pulling at mittens     LLE:  Dressing c/d/i, no staining  Pulse audible with doppler and palpable   moving foot/great toe throughout exam.   limited sensory exam    Plan:  - DVTp as perscribed  - pain control  - PT/OT, WBAT  - rest of care as per primary team

## 2024-09-01 NOTE — CHART NOTE - NSCHARTNOTEFT_GEN_A_CORE
PA NOTE-MEDICINE    Called by RN due to pt who is Baseline Confused/Dementia constantly attempting to Pull out Horta/IV.  VSS   Ordered B/L Unsecured Mittens   Continue to monitor Pt  Recall PA for any changes in Pt Status   Will sign out to AM Team

## 2024-09-01 NOTE — PROGRESS NOTE ADULT - SUBJECTIVE AND OBJECTIVE BOX
BERNADINE MONICA    [id number]    84y      Male    CC: s/p fall     INTERVAL HPI/OVERNIGHT EVENTS: Pt seen and examined. reportedly pulling out wills o/n    REVIEW OF SYSTEMS:    CONSTITUTIONAL: No fever, weight loss  RESPIRATORY: No cough, wheezing, hemoptysis; No shortness of breath  CARDIOVASCULAR: No chest pain, palpitations  GASTROINTESTINAL: No abdominal or epigastric pain. No nausea, vomiting    Vital Signs Last 24 Hrs  T(C): 36.6 (01 Sep 2024 11:18), Max: 36.6 (01 Sep 2024 00:00)  T(F): 97.8 (01 Sep 2024 11:18), Max: 97.9 (01 Sep 2024 00:00)  HR: 92 (01 Sep 2024 11:18) (72 - 94)  BP: 182/75 (01 Sep 2024 11:18) (122/60 - 182/75)  BP(mean): --  RR: 18 (01 Sep 2024 11:18) (18 - 18)  SpO2: 95% (01 Sep 2024 11:18) (94% - 99%)    Parameters below as of 01 Sep 2024 11:18  Patient On (Oxygen Delivery Method): room air        PHYSICAL EXAM:    GENERAL: NAD  CHEST/LUNG: Clear to auscultation bilaterally  HEART: S1S2+, Regular rate and rhythm  ABDOMEN: Soft, Nontender, Nondistended; Bowel sounds present  MSK: L hip dressing c/d/i  SKIN: warm, dry   NEURO: Awake, alert ; confused       LABS:                        8.5    11.47 )-----------( 225      ( 01 Sep 2024 05:20 )             27.1     09-01    145  |  114<H>  |  44.7<H>  ----------------------------<  122<H>  3.8   |  20.0<L>  |  2.58<H>    Ca    8.7      01 Sep 2024 05:20  Phos  1.8     09-01  Mg     1.9     09-01    TPro  6.0<L>  /  Alb  2.8<L>  /  TBili  0.8  /  DBili  x   /  AST  21  /  ALT  15  /  AlkPhos  87  08-30    PT/INR - ( 30 Aug 2024 19:45 )   PT: 11.1 sec;   INR: 1.00 ratio         PTT - ( 30 Aug 2024 19:45 )  PTT:36.8 sec  Urinalysis Basic - ( 01 Sep 2024 05:20 )    Color: x / Appearance: x / SG: x / pH: x  Gluc: 122 mg/dL / Ketone: x  / Bili: x / Urobili: x   Blood: x / Protein: x / Nitrite: x   Leuk Esterase: x / RBC: x / WBC x   Sq Epi: x / Non Sq Epi: x / Bacteria: x          MEDICATIONS  (STANDING):  cefepime  Injectable. 1000 milliGRAM(s) IV Push every 24 hours  cefepime  Injectable.      chlorhexidine 2% Cloths 1 Application(s) Topical <User Schedule>  dextrose 5% + sodium chloride 0.9%. 1000 milliLiter(s) (75 mL/Hr) IV Continuous <Continuous>  dextrose 5%. 1000 milliLiter(s) (50 mL/Hr) IV Continuous <Continuous>  dextrose 5%. 1000 milliLiter(s) (100 mL/Hr) IV Continuous <Continuous>  dextrose 50% Injectable 25 Gram(s) IV Push once  dextrose 50% Injectable 25 Gram(s) IV Push once  dextrose 50% Injectable 12.5 Gram(s) IV Push once  gabapentin 100 milliGRAM(s) Oral four times a day  glucagon  Injectable 1 milliGRAM(s) IntraMuscular once  heparin   Injectable 5000 Unit(s) SubCutaneous every 8 hours  insulin glargine Injectable (LANTUS) 6 Unit(s) SubCutaneous every morning  insulin lispro (ADMELOG) corrective regimen sliding scale   SubCutaneous every 6 hours  pantoprazole    Tablet 40 milliGRAM(s) Oral before breakfast  sodium chloride 0.9%. 1000 milliLiter(s) (100 mL/Hr) IV Continuous <Continuous>  sucralfate 1 Gram(s) Oral four times a day    MEDICATIONS  (PRN):  acetaminophen     Tablet .. 650 milliGRAM(s) Oral every 6 hours PRN Temp greater or equal to 38C (100.4F), Mild Pain (1 - 3)  aluminum hydroxide/magnesium hydroxide/simethicone Suspension 30 milliLiter(s) Oral four times a day PRN Indigestion  dextrose Oral Gel 15 Gram(s) Oral once PRN Blood Glucose LESS THAN 70 milliGRAM(s)/deciliter  melatonin 3 milliGRAM(s) Oral at bedtime PRN Insomnia  ondansetron Injectable 4 milliGRAM(s) IV Push every 6 hours PRN Nausea and/or Vomiting  oxyCODONE    IR 10 milliGRAM(s) Oral every 4 hours PRN Moderate Pain (4 - 6)  oxyCODONE    IR 5 milliGRAM(s) Oral every 4 hours PRN Mild Pain (1 - 3)      RADIOLOGY & ADDITIONAL TESTS:

## 2024-09-01 NOTE — PROGRESS NOTE ADULT - ASSESSMENT
84y/oM PMH CKD, dementia, type 2 diabetes, HTN, cholecystitis with a percutaneous gallbladder bladder drain presenting after mechanical fall, admitted with L femoral neck fx     Left Femoral Neck Fracture 2/2 mechanical fall  -Ortho recs appreciated   -imaging reviewed   -cardiology recs appreciated   -s/p L hemiarthroplasty 8/30  -IS   -PT eval rec NITHIN   -cont dvt ppx     Sepsis   2/2 likely aspiration pna   -imaging reviewed    -failed bedside swallow, NPO; SLP following   -bcx ngtd   -wean supplemental O2 as tolerated     Cholecystitis s/p perc sky, with drain still in place  -Drain in place with few exchanges since 12/2023  -Ultimate plan was to remove gallbladder, but patient has been sick on and off that has hindered cholecystectomy, as per daughter  -Monitor output  -most recently exchanged 8/13 with IR     IDDM  -Continue long acting insulin at 50% decreased dose while NPO(6 units daily), but increase back to home dose (12 units daily) once patient tolerating PO  -Hba1c 7.1     RON on CKD3  Metabolic acidosis   -Required HD temporarily in the past, but now urinates normally and no need for HD  -elevated again likely secondary to sepsis   -f/u am bmp   -pt refused abg   -f/u repeat labs when able   -cont IVF     Dementia   -baseline reportedly AAOx2, as per daughter  -monitor mental status    Severe Esophagitis  -Continue sucralfate QID  -Continue PPI    Dispo: acute, pending SLP, diet tolerance; anticipate NITHIN when medically stable

## 2024-09-02 LAB
ANION GAP SERPL CALC-SCNC: 16 MMOL/L — SIGNIFICANT CHANGE UP (ref 5–17)
ANISOCYTOSIS BLD QL: SLIGHT — SIGNIFICANT CHANGE UP
BASOPHILS # BLD AUTO: 0 K/UL — SIGNIFICANT CHANGE UP (ref 0–0.2)
BASOPHILS # BLD AUTO: 0.05 K/UL — SIGNIFICANT CHANGE UP (ref 0–0.2)
BASOPHILS NFR BLD AUTO: 0 % — SIGNIFICANT CHANGE UP (ref 0–2)
BASOPHILS NFR BLD AUTO: 0.6 % — SIGNIFICANT CHANGE UP (ref 0–2)
BUN SERPL-MCNC: 28.8 MG/DL — HIGH (ref 8–20)
CALCIUM SERPL-MCNC: 8.4 MG/DL — SIGNIFICANT CHANGE UP (ref 8.4–10.5)
CHLORIDE SERPL-SCNC: 106 MMOL/L — SIGNIFICANT CHANGE UP (ref 96–108)
CO2 SERPL-SCNC: 17 MMOL/L — LOW (ref 22–29)
CREAT SERPL-MCNC: 1.75 MG/DL — HIGH (ref 0.5–1.3)
DACRYOCYTES BLD QL SMEAR: SLIGHT — SIGNIFICANT CHANGE UP
EGFR: 38 ML/MIN/1.73M2 — LOW
EOSINOPHIL # BLD AUTO: 0.18 K/UL — SIGNIFICANT CHANGE UP (ref 0–0.5)
EOSINOPHIL # BLD AUTO: 0.3 K/UL — SIGNIFICANT CHANGE UP (ref 0–0.5)
EOSINOPHIL NFR BLD AUTO: 2.3 % — SIGNIFICANT CHANGE UP (ref 0–6)
EOSINOPHIL NFR BLD AUTO: 3.5 % — SIGNIFICANT CHANGE UP (ref 0–6)
GIANT PLATELETS BLD QL SMEAR: PRESENT — SIGNIFICANT CHANGE UP
GLUCOSE BLDC GLUCOMTR-MCNC: 158 MG/DL — HIGH (ref 70–99)
GLUCOSE BLDC GLUCOMTR-MCNC: 159 MG/DL — HIGH (ref 70–99)
GLUCOSE BLDC GLUCOMTR-MCNC: 194 MG/DL — HIGH (ref 70–99)
GLUCOSE BLDC GLUCOMTR-MCNC: 197 MG/DL — HIGH (ref 70–99)
GLUCOSE BLDC GLUCOMTR-MCNC: 208 MG/DL — HIGH (ref 70–99)
GLUCOSE SERPL-MCNC: 159 MG/DL — HIGH (ref 70–99)
HCT VFR BLD CALC: 25.4 % — LOW (ref 39–50)
HCT VFR BLD CALC: 30.2 % — LOW (ref 39–50)
HGB BLD-MCNC: 7.4 G/DL — LOW (ref 13–17)
HGB BLD-MCNC: 9.2 G/DL — LOW (ref 13–17)
IMM GRANULOCYTES NFR BLD AUTO: 8.2 % — HIGH (ref 0–0.9)
LACTATE SERPL-SCNC: 1.5 MMOL/L — SIGNIFICANT CHANGE UP (ref 0.5–2)
LYMPHOCYTES # BLD AUTO: 0.75 K/UL — LOW (ref 1–3.3)
LYMPHOCYTES # BLD AUTO: 1.14 K/UL — SIGNIFICANT CHANGE UP (ref 1–3.3)
LYMPHOCYTES # BLD AUTO: 14.6 % — SIGNIFICANT CHANGE UP (ref 13–44)
LYMPHOCYTES # BLD AUTO: 8.8 % — LOW (ref 13–44)
MACROCYTES BLD QL: SLIGHT — SIGNIFICANT CHANGE UP
MAGNESIUM SERPL-MCNC: 1.6 MG/DL — SIGNIFICANT CHANGE UP (ref 1.6–2.6)
MANUAL SMEAR VERIFICATION: SIGNIFICANT CHANGE UP
MCHC RBC-ENTMCNC: 27.7 PG — SIGNIFICANT CHANGE UP (ref 27–34)
MCHC RBC-ENTMCNC: 27.9 PG — SIGNIFICANT CHANGE UP (ref 27–34)
MCHC RBC-ENTMCNC: 29.1 GM/DL — LOW (ref 32–36)
MCHC RBC-ENTMCNC: 30.5 GM/DL — LOW (ref 32–36)
MCV RBC AUTO: 91.5 FL — SIGNIFICANT CHANGE UP (ref 80–100)
MCV RBC AUTO: 95.1 FL — SIGNIFICANT CHANGE UP (ref 80–100)
MONOCYTES # BLD AUTO: 0.3 K/UL — SIGNIFICANT CHANGE UP (ref 0–0.9)
MONOCYTES # BLD AUTO: 0.78 K/UL — SIGNIFICANT CHANGE UP (ref 0–0.9)
MONOCYTES NFR BLD AUTO: 10 % — SIGNIFICANT CHANGE UP (ref 2–14)
MONOCYTES NFR BLD AUTO: 3.5 % — SIGNIFICANT CHANGE UP (ref 2–14)
NEUTROPHILS # BLD AUTO: 5.01 K/UL — SIGNIFICANT CHANGE UP (ref 1.8–7.4)
NEUTROPHILS # BLD AUTO: 7.14 K/UL — SIGNIFICANT CHANGE UP (ref 1.8–7.4)
NEUTROPHILS NFR BLD AUTO: 64.3 % — SIGNIFICANT CHANGE UP (ref 43–77)
NEUTROPHILS NFR BLD AUTO: 83.3 % — HIGH (ref 43–77)
NEUTS BAND # BLD: 0.9 % — SIGNIFICANT CHANGE UP (ref 0–8)
OVALOCYTES BLD QL SMEAR: SLIGHT — SIGNIFICANT CHANGE UP
PHOSPHATE SERPL-MCNC: 2.4 MG/DL — SIGNIFICANT CHANGE UP (ref 2.4–4.7)
PLAT MORPH BLD: NORMAL — SIGNIFICANT CHANGE UP
PLATELET # BLD AUTO: 107 K/UL — LOW (ref 150–400)
PLATELET # BLD AUTO: 217 K/UL — SIGNIFICANT CHANGE UP (ref 150–400)
POIKILOCYTOSIS BLD QL AUTO: SLIGHT — SIGNIFICANT CHANGE UP
POLYCHROMASIA BLD QL SMEAR: SLIGHT — SIGNIFICANT CHANGE UP
POTASSIUM SERPL-MCNC: 3.7 MMOL/L — SIGNIFICANT CHANGE UP (ref 3.5–5.3)
POTASSIUM SERPL-SCNC: 3.7 MMOL/L — SIGNIFICANT CHANGE UP (ref 3.5–5.3)
RBC # BLD: 2.67 M/UL — LOW (ref 4.2–5.8)
RBC # BLD: 3.3 M/UL — LOW (ref 4.2–5.8)
RBC # FLD: 17.5 % — HIGH (ref 10.3–14.5)
RBC # FLD: 17.6 % — HIGH (ref 10.3–14.5)
RBC BLD AUTO: ABNORMAL
SODIUM SERPL-SCNC: 139 MMOL/L — SIGNIFICANT CHANGE UP (ref 135–145)
TARGETS BLD QL SMEAR: SLIGHT — SIGNIFICANT CHANGE UP
WBC # BLD: 7.8 K/UL — SIGNIFICANT CHANGE UP (ref 3.8–10.5)
WBC # BLD: 8.48 K/UL — SIGNIFICANT CHANGE UP (ref 3.8–10.5)
WBC # FLD AUTO: 7.8 K/UL — SIGNIFICANT CHANGE UP (ref 3.8–10.5)
WBC # FLD AUTO: 8.48 K/UL — SIGNIFICANT CHANGE UP (ref 3.8–10.5)

## 2024-09-02 PROCEDURE — 93010 ELECTROCARDIOGRAM REPORT: CPT

## 2024-09-02 PROCEDURE — 71045 X-RAY EXAM CHEST 1 VIEW: CPT | Mod: 26

## 2024-09-02 PROCEDURE — 99232 SBSQ HOSP IP/OBS MODERATE 35: CPT

## 2024-09-02 RX ORDER — OLANZAPINE 7.5 MG/1
2.5 TABLET ORAL ONCE
Refills: 0 | Status: COMPLETED | OUTPATIENT
Start: 2024-09-02 | End: 2024-09-02

## 2024-09-02 RX ORDER — SODIUM CHLORIDE 9 MG/ML
1000 INJECTION INTRAMUSCULAR; INTRAVENOUS; SUBCUTANEOUS ONCE
Refills: 0 | Status: COMPLETED | OUTPATIENT
Start: 2024-09-02 | End: 2024-09-02

## 2024-09-02 RX ORDER — ALBUMIN (HUMAN) 5 G/20ML
50 SOLUTION INTRAVENOUS ONCE
Refills: 0 | Status: COMPLETED | OUTPATIENT
Start: 2024-09-02 | End: 2024-09-02

## 2024-09-02 RX ADMIN — Medication 12.5 MILLIGRAM(S): at 18:41

## 2024-09-02 RX ADMIN — Medication 100 MILLIGRAM(S): at 18:40

## 2024-09-02 RX ADMIN — SUCRALFATE 1 GRAM(S): 1 SUSPENSION ORAL at 18:42

## 2024-09-02 RX ADMIN — SUCRALFATE 1 GRAM(S): 1 SUSPENSION ORAL at 14:10

## 2024-09-02 RX ADMIN — Medication 2: at 22:04

## 2024-09-02 RX ADMIN — Medication 2: at 18:41

## 2024-09-02 RX ADMIN — Medication 40 MILLIGRAM(S): at 05:13

## 2024-09-02 RX ADMIN — INSULIN GLARGINE 6 UNIT(S): 100 INJECTION, SOLUTION SUBCUTANEOUS at 14:08

## 2024-09-02 RX ADMIN — Medication 75 MILLILITER(S): at 03:05

## 2024-09-02 RX ADMIN — Medication 100 MILLIGRAM(S): at 05:14

## 2024-09-02 RX ADMIN — Medication 2: at 14:08

## 2024-09-02 RX ADMIN — Medication 75 MILLILITER(S): at 14:10

## 2024-09-02 RX ADMIN — Medication 25 GRAM(S): at 14:08

## 2024-09-02 RX ADMIN — Medication 100 MILLIGRAM(S): at 00:37

## 2024-09-02 RX ADMIN — OLANZAPINE 2.5 MILLIGRAM(S): 7.5 TABLET ORAL at 03:30

## 2024-09-02 RX ADMIN — SODIUM CHLORIDE 1000 MILLILITER(S): 9 INJECTION INTRAMUSCULAR; INTRAVENOUS; SUBCUTANEOUS at 20:33

## 2024-09-02 RX ADMIN — ACETAMINOPHEN 650 MILLIGRAM(S): 325 TABLET ORAL at 19:11

## 2024-09-02 RX ADMIN — Medication 100 MILLIGRAM(S): at 14:10

## 2024-09-02 RX ADMIN — CHLORHEXIDINE GLUCONATE 1 APPLICATION(S): 40 SOLUTION TOPICAL at 07:07

## 2024-09-02 RX ADMIN — Medication 5000 UNIT(S): at 14:09

## 2024-09-02 RX ADMIN — Medication 5000 UNIT(S): at 05:14

## 2024-09-02 RX ADMIN — SUCRALFATE 1 GRAM(S): 1 SUSPENSION ORAL at 00:38

## 2024-09-02 RX ADMIN — ACETAMINOPHEN 650 MILLIGRAM(S): 325 TABLET ORAL at 18:41

## 2024-09-02 RX ADMIN — Medication 5000 UNIT(S): at 22:00

## 2024-09-02 RX ADMIN — CEFEPIME 1000 MILLIGRAM(S): 2 INJECTION, POWDER, FOR SOLUTION INTRAVENOUS at 14:07

## 2024-09-02 RX ADMIN — ALBUMIN (HUMAN) 50 MILLILITER(S): 5 SOLUTION INTRAVENOUS at 20:34

## 2024-09-02 RX ADMIN — SODIUM CHLORIDE 1000 MILLILITER(S): 9 INJECTION INTRAMUSCULAR; INTRAVENOUS; SUBCUTANEOUS at 22:27

## 2024-09-02 RX ADMIN — SUCRALFATE 1 GRAM(S): 1 SUSPENSION ORAL at 05:13

## 2024-09-02 NOTE — CHART NOTE - NSCHARTNOTEFT_GEN_A_CORE
Medicine PA-  Pt agitated overnight and he pulled out GB drain.  Wound site traumatic with no bleeding, sterile dressing applied.  Renewed b/l unsecured mitts restraints and zyprexa 2.5mg given.    Vital Signs Last 24 Hrs  T(C): 36.6 (09-02-24 @ 01:17), Max: 36.6 (09-01-24 @ 11:18)  T(F): 97.8 (09-02-24 @ 01:17), Max: 97.9 (09-01-24 @ 15:25)  HR: 91 (09-02-24 @ 01:17) (78 - 92)  BP: 145/76 (09-02-24 @ 01:17) (145/76 - 182/75)  BP(mean): --  RR: 18 (09-02-24 @ 01:17) (18 - 18)  SpO2: 97% (09-02-24 @ 01:17) (95% - 97%)    -continue to monitor  -day team to f/u.

## 2024-09-02 NOTE — PROGRESS NOTE ADULT - ASSESSMENT
84y/oM PMH CKD, dementia, type 2 diabetes, HTN, cholecystitis with a percutaneous gallbladder bladder drain presenting after mechanical fall, admitted with L femoral neck fx     Left Femoral Neck Fracture 2/2 mechanical fall  -Ortho recs appreciated   -imaging reviewed   -cardiology recs appreciated   -s/p L hemiarthroplasty 8/30  -IS   -PT eval rec NITHIN   -cont dvt ppx     Sepsis   2/2 likely aspiration pna   -imaging reviewed    -passed bedside dysphagia 9/1  -aspiration precautions   -f/u SLP   -bcx ngtd   -wean supplemental O2 as tolerated     Cholecystitis s/p perc sky, with drain still in place  -Drain in place with few exchanges since 12/2023  -Ultimate plan was to remove gallbladder, but patient has been sick on and off that has hindered cholecystectomy, as per daughter  -Monitor output  -most recently exchanged 8/13 with IR   -pt removed tube o/n 9/1   -will consult IR in am for replacement     IDDM  -cont ISS, adjust as needed   -Hba1c 7.1     RON on CKD3  Metabolic acidosis   -Required HD temporarily in the past, but now urinates normally and no need for HD  -elevated again likely secondary to sepsis   -f/u am bmp   -pt refused abg   -cont IVF     Dementia with behavioral disturbances  -baseline reportedly AAOx2, as per daughter  -monitor mental status  -s/p zyprexa     Severe Esophagitis  -Continue sucralfate QID  -Continue PPI    Dispo: remains acute, anticipate NITHIN when stable    84y/oM PMH CKD, dementia, type 2 diabetes, HTN, cholecystitis with a percutaneous gallbladder bladder drain presenting after mechanical fall, admitted with L femoral neck fx     Left Femoral Neck Fracture 2/2 mechanical fall  -Ortho recs appreciated   -imaging reviewed   -cardiology recs appreciated   -s/p L hemiarthroplasty 8/30  -IS   -PT eval rec NITHIN   -cont dvt ppx     Sepsis   2/2 likely aspiration pna   -imaging reviewed    -passed bedside dysphagia 9/1  -aspiration precautions   -f/u SLP   -bcx ngtd   -wean supplemental O2 as tolerated     Cholecystitis s/p perc sky, with drain still in place  -Drain in place with few exchanges since 12/2023  -Ultimate plan was to remove gallbladder, but patient has been sick on and off that has hindered cholecystectomy, as per daughter  -Monitor output  -most recently exchanged 8/13 with IR   -pt removed tube o/n 9/1   -will consult IR in am for replacement     IDDM  -cont ISS, adjust as needed   -Hba1c 7.1     RON on CKD3  Metabolic acidosis   -Required HD temporarily in the past, but now urinates normally and no need for HD  -elevated again likely secondary to sepsis   -f/u am bmp   -pt refused abg   -cont IVF     Dementia with behavioral disturbances  -baseline reportedly AAOx2, as per daughter  -monitor mental status  -s/p zyprexa   -start low dose seroquel for bedtime   -f/u am ekg     Severe Esophagitis  -Continue sucralfate QID  -Continue PPI    Dispo: remains acute, anticipate NITHIN when stable

## 2024-09-02 NOTE — PROGRESS NOTE ADULT - SUBJECTIVE AND OBJECTIVE BOX
Patient seen and examined POD #3 of left hip tracy-arthroplasty. Limited exam with patient mental status, pleasantly confused. No orthopedic complaints at this time.    ICU Vital Signs Last 24 Hrs  T(C): 36.6 (02 Sep 2024 01:17), Max: 36.6 (01 Sep 2024 11:18)  T(F): 97.8 (02 Sep 2024 01:17), Max: 97.9 (01 Sep 2024 15:25)  HR: 91 (02 Sep 2024 01:17) (78 - 92)  BP: 145/76 (02 Sep 2024 01:17) (145/76 - 182/75)  BP(mean): --  ABP: --  ABP(mean): --  RR: 18 (02 Sep 2024 01:17) (18 - 18)  SpO2: 97% (02 Sep 2024 01:17) (95% - 97%)    O2 Parameters below as of 02 Sep 2024 01:17  Patient On (Oxygen Delivery Method): room air                            9.2    8.48  )-----------( 217      ( 02 Sep 2024 06:50 )             30.2   09-02    139  |  106  |  28.8<H>  ----------------------------<  159<H>  3.7   |  17.0<L>  |  1.75<H>    Ca    8.4      02 Sep 2024 06:50  Phos  2.4     09-02  Mg     1.6     09-02          Physical: Exam limited, patient not following commands     pleasantly confused. Awake and pulling at mittens     LLE:  Dressing c/d/i, no staining  Pulse audible with doppler and palpable   moving foot/great toe throughout exam.   limited sensory exam    Plan:  - DVTp as perscribed  - pain control  - PT/OT, WBAT  - rest of care as per primary team  - Follow up with Dr Kam outpatient in 2-3 weeks

## 2024-09-02 NOTE — PROGRESS NOTE ADULT - SUBJECTIVE AND OBJECTIVE BOX
BERNADINE ANDERSON    [id number]    84y      Male    CC: L hip fx     INTERVAL HPI/OVERNIGHT EVENTS: Pt seen and examined. o/n pt agitated, pulled out sky tube ; received 2.5mg zyprexa     REVIEW OF SYSTEMS:  unable to obtain     Vital Signs Last 24 Hrs  T(C): 36.6 (02 Sep 2024 11:00), Max: 36.6 (02 Sep 2024 01:17)  T(F): 97.8 (02 Sep 2024 11:00), Max: 97.8 (02 Sep 2024 01:17)  HR: 95 (02 Sep 2024 11:00) (91 - 95)  BP: 148/76 (02 Sep 2024 11:00) (145/76 - 148/76)  BP(mean): --  RR: 18 (02 Sep 2024 11:00) (18 - 18)  SpO2: 98% (02 Sep 2024 11:00) (97% - 98%)    Parameters below as of 02 Sep 2024 11:00  Patient On (Oxygen Delivery Method): room air        PHYSICAL EXAM:    GENERAL: NAD  CHEST/LUNG: respirations unlabored   HEART: S1S2+, Regular rate and rhythm  ABDOMEN: Soft, Nontender  MSK: L hip dressing c/d/i  SKIN: warm, dry   NEURO: awake; confused     LABS:                        9.2    8.48  )-----------( 217      ( 02 Sep 2024 06:50 )             30.2     09-02    139  |  106  |  28.8<H>  ----------------------------<  159<H>  3.7   |  17.0<L>  |  1.75<H>    Ca    8.4      02 Sep 2024 06:50  Phos  2.4     09-02  Mg     1.6     09-02        Urinalysis Basic - ( 02 Sep 2024 06:50 )    Color: x / Appearance: x / SG: x / pH: x  Gluc: 159 mg/dL / Ketone: x  / Bili: x / Urobili: x   Blood: x / Protein: x / Nitrite: x   Leuk Esterase: x / RBC: x / WBC x   Sq Epi: x / Non Sq Epi: x / Bacteria: x          MEDICATIONS  (STANDING):  cefepime  Injectable. 1000 milliGRAM(s) IV Push every 24 hours  cefepime  Injectable.      chlorhexidine 2% Cloths 1 Application(s) Topical <User Schedule>  dextrose 5% + sodium chloride 0.45% 1000 milliLiter(s) (75 mL/Hr) IV Continuous <Continuous>  dextrose 5%. 1000 milliLiter(s) (50 mL/Hr) IV Continuous <Continuous>  dextrose 5%. 1000 milliLiter(s) (100 mL/Hr) IV Continuous <Continuous>  dextrose 50% Injectable 25 Gram(s) IV Push once  dextrose 50% Injectable 25 Gram(s) IV Push once  dextrose 50% Injectable 12.5 Gram(s) IV Push once  gabapentin 100 milliGRAM(s) Oral four times a day  glucagon  Injectable 1 milliGRAM(s) IntraMuscular once  heparin   Injectable 5000 Unit(s) SubCutaneous every 8 hours  insulin glargine Injectable (LANTUS) 6 Unit(s) SubCutaneous every morning  insulin lispro (ADMELOG) corrective regimen sliding scale   SubCutaneous every 6 hours  pantoprazole    Tablet 40 milliGRAM(s) Oral before breakfast  sucralfate 1 Gram(s) Oral four times a day    MEDICATIONS  (PRN):  acetaminophen     Tablet .. 650 milliGRAM(s) Oral every 6 hours PRN Temp greater or equal to 38C (100.4F), Mild Pain (1 - 3)  aluminum hydroxide/magnesium hydroxide/simethicone Suspension 30 milliLiter(s) Oral four times a day PRN Indigestion  dextrose Oral Gel 15 Gram(s) Oral once PRN Blood Glucose LESS THAN 70 milliGRAM(s)/deciliter  melatonin 3 milliGRAM(s) Oral at bedtime PRN Insomnia  ondansetron Injectable 4 milliGRAM(s) IV Push every 6 hours PRN Nausea and/or Vomiting  oxyCODONE    IR 10 milliGRAM(s) Oral every 4 hours PRN Moderate Pain (4 - 6)  oxyCODONE    IR 5 milliGRAM(s) Oral every 4 hours PRN Mild Pain (1 - 3)  QUEtiapine 12.5 milliGRAM(s) Oral daily PRN agitation      RADIOLOGY & ADDITIONAL TESTS:

## 2024-09-03 LAB
ALBUMIN SERPL ELPH-MCNC: 2.4 G/DL — LOW (ref 3.3–5.2)
ALBUMIN SERPL ELPH-MCNC: 2.5 G/DL — LOW (ref 3.3–5.2)
ALBUMIN SERPL ELPH-MCNC: 2.7 G/DL — LOW (ref 3.3–5.2)
ALBUMIN SERPL ELPH-MCNC: 2.8 G/DL — LOW (ref 3.3–5.2)
ALP SERPL-CCNC: 127 U/L — HIGH (ref 40–120)
ALP SERPL-CCNC: 143 U/L — HIGH (ref 40–120)
ALP SERPL-CCNC: 145 U/L — HIGH (ref 40–120)
ALP SERPL-CCNC: 157 U/L — HIGH (ref 40–120)
ALT FLD-CCNC: 19 U/L — SIGNIFICANT CHANGE UP
ALT FLD-CCNC: 23 U/L — SIGNIFICANT CHANGE UP
ALT FLD-CCNC: 23 U/L — SIGNIFICANT CHANGE UP
ALT FLD-CCNC: 26 U/L — SIGNIFICANT CHANGE UP
AMMONIA BLD-MCNC: 22 UMOL/L — SIGNIFICANT CHANGE UP (ref 11–55)
AMMONIA BLD-MCNC: 44 UMOL/L — SIGNIFICANT CHANGE UP (ref 11–55)
ANION GAP SERPL CALC-SCNC: 11 MMOL/L — SIGNIFICANT CHANGE UP (ref 5–17)
ANION GAP SERPL CALC-SCNC: 13 MMOL/L — SIGNIFICANT CHANGE UP (ref 5–17)
ANION GAP SERPL CALC-SCNC: 13 MMOL/L — SIGNIFICANT CHANGE UP (ref 5–17)
ANION GAP SERPL CALC-SCNC: 16 MMOL/L — SIGNIFICANT CHANGE UP (ref 5–17)
ANION GAP SERPL CALC-SCNC: 9 MMOL/L — SIGNIFICANT CHANGE UP (ref 5–17)
ANISOCYTOSIS BLD QL: SLIGHT — SIGNIFICANT CHANGE UP
APPEARANCE UR: ABNORMAL
APTT BLD: 28.6 SEC — SIGNIFICANT CHANGE UP (ref 24.5–35.6)
AST SERPL-CCNC: 34 U/L — SIGNIFICANT CHANGE UP
AST SERPL-CCNC: 46 U/L — HIGH
AST SERPL-CCNC: 51 U/L — HIGH
AST SERPL-CCNC: 52 U/L — HIGH
BACTERIA # UR AUTO: ABNORMAL /HPF
BASE EXCESS BLDA CALC-SCNC: -7.7 MMOL/L — LOW (ref -2–3)
BASOPHILS # BLD AUTO: 0 K/UL — SIGNIFICANT CHANGE UP (ref 0–0.2)
BASOPHILS # BLD AUTO: 0.06 K/UL — SIGNIFICANT CHANGE UP (ref 0–0.2)
BASOPHILS NFR BLD AUTO: 0 % — SIGNIFICANT CHANGE UP (ref 0–2)
BASOPHILS NFR BLD AUTO: 0.6 % — SIGNIFICANT CHANGE UP (ref 0–2)
BILIRUB DIRECT SERPL-MCNC: 0.3 MG/DL — SIGNIFICANT CHANGE UP (ref 0–0.3)
BILIRUB INDIRECT FLD-MCNC: 0.4 MG/DL — SIGNIFICANT CHANGE UP (ref 0.2–1)
BILIRUB SERPL-MCNC: 0.7 MG/DL — SIGNIFICANT CHANGE UP (ref 0.4–2)
BILIRUB SERPL-MCNC: 0.7 MG/DL — SIGNIFICANT CHANGE UP (ref 0.4–2)
BILIRUB SERPL-MCNC: 0.8 MG/DL — SIGNIFICANT CHANGE UP (ref 0.4–2)
BILIRUB SERPL-MCNC: 0.9 MG/DL — SIGNIFICANT CHANGE UP (ref 0.4–2)
BILIRUB UR-MCNC: NEGATIVE — SIGNIFICANT CHANGE UP
BLD GP AB SCN SERPL QL: SIGNIFICANT CHANGE UP
BUN SERPL-MCNC: 28.1 MG/DL — HIGH (ref 8–20)
BUN SERPL-MCNC: 28.2 MG/DL — HIGH (ref 8–20)
BUN SERPL-MCNC: 29 MG/DL — HIGH (ref 8–20)
BUN SERPL-MCNC: 29 MG/DL — HIGH (ref 8–20)
BUN SERPL-MCNC: 29.7 MG/DL — HIGH (ref 8–20)
BURR CELLS BLD QL SMEAR: PRESENT — SIGNIFICANT CHANGE UP
CALCIUM SERPL-MCNC: 7.7 MG/DL — LOW (ref 8.4–10.5)
CALCIUM SERPL-MCNC: 7.7 MG/DL — LOW (ref 8.4–10.5)
CALCIUM SERPL-MCNC: 7.8 MG/DL — LOW (ref 8.4–10.5)
CALCIUM SERPL-MCNC: 7.8 MG/DL — LOW (ref 8.4–10.5)
CALCIUM SERPL-MCNC: 8 MG/DL — LOW (ref 8.4–10.5)
CAST: 7 /LPF — HIGH (ref 0–4)
CHLORIDE SERPL-SCNC: 108 MMOL/L — SIGNIFICANT CHANGE UP (ref 96–108)
CHLORIDE SERPL-SCNC: 109 MMOL/L — HIGH (ref 96–108)
CHLORIDE SERPL-SCNC: 110 MMOL/L — HIGH (ref 96–108)
CHLORIDE SERPL-SCNC: 114 MMOL/L — HIGH (ref 96–108)
CHLORIDE SERPL-SCNC: 115 MMOL/L — HIGH (ref 96–108)
CO2 SERPL-SCNC: 15 MMOL/L — LOW (ref 22–29)
CO2 SERPL-SCNC: 16 MMOL/L — LOW (ref 22–29)
CO2 SERPL-SCNC: 18 MMOL/L — LOW (ref 22–29)
CO2 SERPL-SCNC: 19 MMOL/L — LOW (ref 22–29)
CO2 SERPL-SCNC: 24 MMOL/L — SIGNIFICANT CHANGE UP (ref 22–29)
COLOR SPEC: YELLOW — SIGNIFICANT CHANGE UP
CREAT SERPL-MCNC: 2.07 MG/DL — HIGH (ref 0.5–1.3)
CREAT SERPL-MCNC: 2.11 MG/DL — HIGH (ref 0.5–1.3)
CREAT SERPL-MCNC: 2.24 MG/DL — HIGH (ref 0.5–1.3)
CREAT SERPL-MCNC: 2.36 MG/DL — HIGH (ref 0.5–1.3)
CREAT SERPL-MCNC: 2.4 MG/DL — HIGH (ref 0.5–1.3)
CULTURE RESULTS: SIGNIFICANT CHANGE UP
DACRYOCYTES BLD QL SMEAR: SLIGHT — SIGNIFICANT CHANGE UP
DIFF PNL FLD: ABNORMAL
DIR ANTIGLOB POLYSPECIFIC INTERPRETATION: SIGNIFICANT CHANGE UP
EGFR: 26 ML/MIN/1.73M2 — LOW
EGFR: 26 ML/MIN/1.73M2 — LOW
EGFR: 28 ML/MIN/1.73M2 — LOW
EGFR: 30 ML/MIN/1.73M2 — LOW
EGFR: 31 ML/MIN/1.73M2 — LOW
EOSINOPHIL # BLD AUTO: 0.08 K/UL — SIGNIFICANT CHANGE UP (ref 0–0.5)
EOSINOPHIL # BLD AUTO: 0.52 K/UL — HIGH (ref 0–0.5)
EOSINOPHIL NFR BLD AUTO: 0.9 % — SIGNIFICANT CHANGE UP (ref 0–6)
EOSINOPHIL NFR BLD AUTO: 5.5 % — SIGNIFICANT CHANGE UP (ref 0–6)
GAS PNL BLDA: SIGNIFICANT CHANGE UP
GAS PNL BLDA: SIGNIFICANT CHANGE UP
GIANT PLATELETS BLD QL SMEAR: PRESENT — SIGNIFICANT CHANGE UP
GLUCOSE BLDC GLUCOMTR-MCNC: 127 MG/DL — HIGH (ref 70–99)
GLUCOSE BLDC GLUCOMTR-MCNC: 148 MG/DL — HIGH (ref 70–99)
GLUCOSE BLDC GLUCOMTR-MCNC: 157 MG/DL — HIGH (ref 70–99)
GLUCOSE BLDC GLUCOMTR-MCNC: 208 MG/DL — HIGH (ref 70–99)
GLUCOSE SERPL-MCNC: 121 MG/DL — HIGH (ref 70–99)
GLUCOSE SERPL-MCNC: 125 MG/DL — HIGH (ref 70–99)
GLUCOSE SERPL-MCNC: 125 MG/DL — HIGH (ref 70–99)
GLUCOSE SERPL-MCNC: 143 MG/DL — HIGH (ref 70–99)
GLUCOSE SERPL-MCNC: 172 MG/DL — HIGH (ref 70–99)
GLUCOSE UR QL: NEGATIVE MG/DL — SIGNIFICANT CHANGE UP
HCO3 BLDA-SCNC: 19 MMOL/L — LOW (ref 21–28)
HCT VFR BLD CALC: 23.5 % — LOW (ref 39–50)
HCT VFR BLD CALC: 26.1 % — LOW (ref 39–50)
HCT VFR BLD CALC: 26.8 % — LOW (ref 39–50)
HCT VFR BLD CALC: 30.2 % — LOW (ref 39–50)
HGB BLD-MCNC: 7.3 G/DL — LOW (ref 13–17)
HGB BLD-MCNC: 8.3 G/DL — LOW (ref 13–17)
HGB BLD-MCNC: 8.5 G/DL — LOW (ref 13–17)
HGB BLD-MCNC: 9.1 G/DL — LOW (ref 13–17)
HOROWITZ INDEX BLDA+IHG-RTO: SIGNIFICANT CHANGE UP
IMM GRANULOCYTES NFR BLD AUTO: 9.3 % — HIGH (ref 0–0.9)
INR BLD: 1.03 RATIO — SIGNIFICANT CHANGE UP (ref 0.85–1.18)
KETONES UR-MCNC: ABNORMAL MG/DL
LACTATE SERPL-SCNC: 1 MMOL/L — SIGNIFICANT CHANGE UP (ref 0.5–2)
LACTATE SERPL-SCNC: 1.5 MMOL/L — SIGNIFICANT CHANGE UP (ref 0.5–2)
LEUKOCYTE ESTERASE UR-ACNC: NEGATIVE — SIGNIFICANT CHANGE UP
LYMPHOCYTES # BLD AUTO: 1.15 K/UL — SIGNIFICANT CHANGE UP (ref 1–3.3)
LYMPHOCYTES # BLD AUTO: 1.56 K/UL — SIGNIFICANT CHANGE UP (ref 1–3.3)
LYMPHOCYTES # BLD AUTO: 13 % — SIGNIFICANT CHANGE UP (ref 13–44)
LYMPHOCYTES # BLD AUTO: 16.5 % — SIGNIFICANT CHANGE UP (ref 13–44)
MAGNESIUM SERPL-MCNC: 2 MG/DL — SIGNIFICANT CHANGE UP (ref 1.6–2.6)
MAGNESIUM SERPL-MCNC: 2.2 MG/DL — SIGNIFICANT CHANGE UP (ref 1.6–2.6)
MANUAL SMEAR VERIFICATION: SIGNIFICANT CHANGE UP
MCHC RBC-ENTMCNC: 27.4 PG — SIGNIFICANT CHANGE UP (ref 27–34)
MCHC RBC-ENTMCNC: 27.6 PG — SIGNIFICANT CHANGE UP (ref 27–34)
MCHC RBC-ENTMCNC: 27.9 PG — SIGNIFICANT CHANGE UP (ref 27–34)
MCHC RBC-ENTMCNC: 28 PG — SIGNIFICANT CHANGE UP (ref 27–34)
MCHC RBC-ENTMCNC: 30.1 GM/DL — LOW (ref 32–36)
MCHC RBC-ENTMCNC: 31.1 GM/DL — LOW (ref 32–36)
MCHC RBC-ENTMCNC: 31.7 GM/DL — LOW (ref 32–36)
MCHC RBC-ENTMCNC: 31.8 GM/DL — LOW (ref 32–36)
MCV RBC AUTO: 87.9 FL — SIGNIFICANT CHANGE UP (ref 80–100)
MCV RBC AUTO: 88.2 FL — SIGNIFICANT CHANGE UP (ref 80–100)
MCV RBC AUTO: 88.3 FL — SIGNIFICANT CHANGE UP (ref 80–100)
MCV RBC AUTO: 91.5 FL — SIGNIFICANT CHANGE UP (ref 80–100)
METAMYELOCYTES # FLD: 0.9 % — HIGH (ref 0–0)
MONOCYTES # BLD AUTO: 0.38 K/UL — SIGNIFICANT CHANGE UP (ref 0–0.9)
MONOCYTES # BLD AUTO: 0.65 K/UL — SIGNIFICANT CHANGE UP (ref 0–0.9)
MONOCYTES NFR BLD AUTO: 4.3 % — SIGNIFICANT CHANGE UP (ref 2–14)
MONOCYTES NFR BLD AUTO: 6.9 % — SIGNIFICANT CHANGE UP (ref 2–14)
MRSA PCR RESULT.: SIGNIFICANT CHANGE UP
MYELOCYTES NFR BLD: 4.3 % — HIGH (ref 0–0)
NEUTROPHILS # BLD AUTO: 5.78 K/UL — SIGNIFICANT CHANGE UP (ref 1.8–7.4)
NEUTROPHILS # BLD AUTO: 6.78 K/UL — SIGNIFICANT CHANGE UP (ref 1.8–7.4)
NEUTROPHILS NFR BLD AUTO: 61.2 % — SIGNIFICANT CHANGE UP (ref 43–77)
NEUTROPHILS NFR BLD AUTO: 75.7 % — SIGNIFICANT CHANGE UP (ref 43–77)
NEUTS BAND # BLD: 0.9 % — SIGNIFICANT CHANGE UP (ref 0–8)
NITRITE UR-MCNC: NEGATIVE — SIGNIFICANT CHANGE UP
PCO2 BLDA: 38 MMHG — SIGNIFICANT CHANGE UP (ref 35–48)
PH BLDA: 7.3 — LOW (ref 7.35–7.45)
PH UR: 6 — SIGNIFICANT CHANGE UP (ref 5–8)
PHOSPHATE SERPL-MCNC: 2.6 MG/DL — SIGNIFICANT CHANGE UP (ref 2.4–4.7)
PHOSPHATE SERPL-MCNC: 2.9 MG/DL — SIGNIFICANT CHANGE UP (ref 2.4–4.7)
PHOSPHATE SERPL-MCNC: 2.9 MG/DL — SIGNIFICANT CHANGE UP (ref 2.4–4.7)
PHOSPHATE SERPL-MCNC: 3 MG/DL — SIGNIFICANT CHANGE UP (ref 2.4–4.7)
PLAT MORPH BLD: NORMAL — SIGNIFICANT CHANGE UP
PLATELET # BLD AUTO: 236 K/UL — SIGNIFICANT CHANGE UP (ref 150–400)
PLATELET # BLD AUTO: 246 K/UL — SIGNIFICANT CHANGE UP (ref 150–400)
PLATELET # BLD AUTO: 250 K/UL — SIGNIFICANT CHANGE UP (ref 150–400)
PLATELET # BLD AUTO: 276 K/UL — SIGNIFICANT CHANGE UP (ref 150–400)
PO2 BLDA: 56 MMHG — LOW (ref 83–108)
POIKILOCYTOSIS BLD QL AUTO: SLIGHT — SIGNIFICANT CHANGE UP
POLYCHROMASIA BLD QL SMEAR: SIGNIFICANT CHANGE UP
POTASSIUM SERPL-MCNC: 3.6 MMOL/L — SIGNIFICANT CHANGE UP (ref 3.5–5.3)
POTASSIUM SERPL-MCNC: 3.9 MMOL/L — SIGNIFICANT CHANGE UP (ref 3.5–5.3)
POTASSIUM SERPL-MCNC: 3.9 MMOL/L — SIGNIFICANT CHANGE UP (ref 3.5–5.3)
POTASSIUM SERPL-MCNC: 4.2 MMOL/L — SIGNIFICANT CHANGE UP (ref 3.5–5.3)
POTASSIUM SERPL-MCNC: 5 MMOL/L — SIGNIFICANT CHANGE UP (ref 3.5–5.3)
POTASSIUM SERPL-SCNC: 3.6 MMOL/L — SIGNIFICANT CHANGE UP (ref 3.5–5.3)
POTASSIUM SERPL-SCNC: 3.9 MMOL/L — SIGNIFICANT CHANGE UP (ref 3.5–5.3)
POTASSIUM SERPL-SCNC: 3.9 MMOL/L — SIGNIFICANT CHANGE UP (ref 3.5–5.3)
POTASSIUM SERPL-SCNC: 4.2 MMOL/L — SIGNIFICANT CHANGE UP (ref 3.5–5.3)
POTASSIUM SERPL-SCNC: 5 MMOL/L — SIGNIFICANT CHANGE UP (ref 3.5–5.3)
PROCALCITONIN SERPL-MCNC: 3.22 NG/ML — HIGH (ref 0.02–0.1)
PROLACTIN SERPL-MCNC: 29.7 NG/ML — HIGH (ref 4.1–18.4)
PROT SERPL-MCNC: 5.4 G/DL — LOW (ref 6.6–8.7)
PROT SERPL-MCNC: 5.6 G/DL — LOW (ref 6.6–8.7)
PROT SERPL-MCNC: 5.7 G/DL — LOW (ref 6.6–8.7)
PROT SERPL-MCNC: 6.4 G/DL — LOW (ref 6.6–8.7)
PROT UR-MCNC: 300 MG/DL
PROTHROM AB SERPL-ACNC: 11.4 SEC — SIGNIFICANT CHANGE UP (ref 9.5–13)
RBC # BLD: 2.66 M/UL — LOW (ref 4.2–5.8)
RBC # BLD: 2.96 M/UL — LOW (ref 4.2–5.8)
RBC # BLD: 3.05 M/UL — LOW (ref 4.2–5.8)
RBC # BLD: 3.3 M/UL — LOW (ref 4.2–5.8)
RBC # FLD: 17.3 % — HIGH (ref 10.3–14.5)
RBC # FLD: 17.5 % — HIGH (ref 10.3–14.5)
RBC # FLD: 17.6 % — HIGH (ref 10.3–14.5)
RBC # FLD: 17.6 % — HIGH (ref 10.3–14.5)
RBC BLD AUTO: ABNORMAL
RBC CASTS # UR COMP ASSIST: 7 /HPF — HIGH (ref 0–4)
S AUREUS DNA NOSE QL NAA+PROBE: SIGNIFICANT CHANGE UP
SAO2 % BLDA: 87.8 % — LOW (ref 94–98)
SCHISTOCYTES BLD QL AUTO: SLIGHT — SIGNIFICANT CHANGE UP
SODIUM SERPL-SCNC: 138 MMOL/L — SIGNIFICANT CHANGE UP (ref 135–145)
SODIUM SERPL-SCNC: 138 MMOL/L — SIGNIFICANT CHANGE UP (ref 135–145)
SODIUM SERPL-SCNC: 141 MMOL/L — SIGNIFICANT CHANGE UP (ref 135–145)
SODIUM SERPL-SCNC: 143 MMOL/L — SIGNIFICANT CHANGE UP (ref 135–145)
SODIUM SERPL-SCNC: 149 MMOL/L — HIGH (ref 135–145)
SP GR SPEC: 1.02 — SIGNIFICANT CHANGE UP (ref 1–1.03)
SPECIMEN SOURCE: SIGNIFICANT CHANGE UP
SQUAMOUS # UR AUTO: 2 /HPF — SIGNIFICANT CHANGE UP (ref 0–5)
T3 SERPL-MCNC: 75 NG/DL — LOW (ref 80–200)
T4 AB SER-ACNC: 7 UG/DL — SIGNIFICANT CHANGE UP (ref 4.5–12)
T4 FREE SERPL-MCNC: 1.4 NG/DL — SIGNIFICANT CHANGE UP (ref 0.9–1.8)
TROPONIN T, HIGH SENSITIVITY RESULT: 47 NG/L — SIGNIFICANT CHANGE UP (ref 0–51)
TROPONIN T, HIGH SENSITIVITY RESULT: 49 NG/L — SIGNIFICANT CHANGE UP (ref 0–51)
TROPONIN T, HIGH SENSITIVITY RESULT: 50 NG/L — SIGNIFICANT CHANGE UP (ref 0–51)
TSH SERPL-MCNC: 1.24 UIU/ML — SIGNIFICANT CHANGE UP (ref 0.27–4.2)
UROBILINOGEN FLD QL: 0.2 MG/DL — SIGNIFICANT CHANGE UP (ref 0.2–1)
VIT B12 SERPL-MCNC: 847 PG/ML — SIGNIFICANT CHANGE UP (ref 232–1245)
WBC # BLD: 8.25 K/UL — SIGNIFICANT CHANGE UP (ref 3.8–10.5)
WBC # BLD: 8.85 K/UL — SIGNIFICANT CHANGE UP (ref 3.8–10.5)
WBC # BLD: 9.37 K/UL — SIGNIFICANT CHANGE UP (ref 3.8–10.5)
WBC # BLD: 9.45 K/UL — SIGNIFICANT CHANGE UP (ref 3.8–10.5)
WBC # FLD AUTO: 8.25 K/UL — SIGNIFICANT CHANGE UP (ref 3.8–10.5)
WBC # FLD AUTO: 8.85 K/UL — SIGNIFICANT CHANGE UP (ref 3.8–10.5)
WBC # FLD AUTO: 9.37 K/UL — SIGNIFICANT CHANGE UP (ref 3.8–10.5)
WBC # FLD AUTO: 9.45 K/UL — SIGNIFICANT CHANGE UP (ref 3.8–10.5)
WBC UR QL: 5 /HPF — SIGNIFICANT CHANGE UP (ref 0–5)

## 2024-09-03 PROCEDURE — 93010 ELECTROCARDIOGRAM REPORT: CPT

## 2024-09-03 PROCEDURE — 70450 CT HEAD/BRAIN W/O DYE: CPT | Mod: 26

## 2024-09-03 PROCEDURE — 71250 CT THORAX DX C-: CPT | Mod: 26

## 2024-09-03 PROCEDURE — 95720 EEG PHY/QHP EA INCR W/VEEG: CPT

## 2024-09-03 PROCEDURE — 99291 CRITICAL CARE FIRST HOUR: CPT

## 2024-09-03 PROCEDURE — 71045 X-RAY EXAM CHEST 1 VIEW: CPT | Mod: 26

## 2024-09-03 PROCEDURE — 74176 CT ABD & PELVIS W/O CONTRAST: CPT | Mod: 26

## 2024-09-03 PROCEDURE — 71045 X-RAY EXAM CHEST 1 VIEW: CPT | Mod: 26,77

## 2024-09-03 RX ORDER — METOPROLOL TARTRATE 100 MG/1
2.5 TABLET ORAL ONCE
Refills: 0 | Status: COMPLETED | OUTPATIENT
Start: 2024-09-03 | End: 2024-09-03

## 2024-09-03 RX ORDER — DILTIAZEM HYDROCHLORIDE 5 MG/ML
16 INJECTION INTRAVENOUS ONCE
Refills: 0 | Status: COMPLETED | OUTPATIENT
Start: 2024-09-03 | End: 2024-09-03

## 2024-09-03 RX ORDER — PIPERACILLIN SODIUM AND TAZOBACTAM SODIUM 3; .375 G/15ML; G/15ML
3.38 INJECTION, POWDER, FOR SOLUTION INTRAVENOUS EVERY 12 HOURS
Refills: 0 | Status: DISCONTINUED | OUTPATIENT
Start: 2024-09-03 | End: 2024-09-03

## 2024-09-03 RX ORDER — PIPERACILLIN SODIUM AND TAZOBACTAM SODIUM 3; .375 G/15ML; G/15ML
3.38 INJECTION, POWDER, FOR SOLUTION INTRAVENOUS EVERY 8 HOURS
Refills: 0 | Status: DISCONTINUED | OUTPATIENT
Start: 2024-09-03 | End: 2024-09-09

## 2024-09-03 RX ORDER — DILTIAZEM HYDROCHLORIDE 5 MG/ML
16 INJECTION INTRAVENOUS EVERY 6 HOURS
Refills: 0 | Status: DISCONTINUED | OUTPATIENT
Start: 2024-09-03 | End: 2024-09-04

## 2024-09-03 RX ORDER — PANTOPRAZOLE SODIUM 40 MG
40 TABLET, DELAYED RELEASE (ENTERIC COATED) ORAL DAILY
Refills: 0 | Status: DISCONTINUED | OUTPATIENT
Start: 2024-09-03 | End: 2024-09-09

## 2024-09-03 RX ORDER — SODIUM BICARBONATE 84 MG/ML
50 INJECTION, SOLUTION INTRAVENOUS ONCE
Refills: 0 | Status: COMPLETED | OUTPATIENT
Start: 2024-09-03 | End: 2024-09-03

## 2024-09-03 RX ORDER — DILTIAZEM HYDROCHLORIDE 5 MG/ML
10 INJECTION INTRAVENOUS ONCE
Refills: 0 | Status: COMPLETED | OUTPATIENT
Start: 2024-09-03 | End: 2024-09-03

## 2024-09-03 RX ORDER — METOPROLOL TARTRATE 100 MG/1
5 TABLET ORAL ONCE
Refills: 0 | Status: COMPLETED | OUTPATIENT
Start: 2024-09-03 | End: 2024-09-03

## 2024-09-03 RX ORDER — POTASSIUM CHLORIDE 10 MEQ
10 TABLET, EXT RELEASE, PARTICLES/CRYSTALS ORAL
Refills: 0 | Status: COMPLETED | OUTPATIENT
Start: 2024-09-03 | End: 2024-09-03

## 2024-09-03 RX ORDER — SODIUM CHLORIDE 9 MG/ML
1000 INJECTION INTRAMUSCULAR; INTRAVENOUS; SUBCUTANEOUS ONCE
Refills: 0 | Status: DISCONTINUED | OUTPATIENT
Start: 2024-09-03 | End: 2024-09-03

## 2024-09-03 RX ORDER — SODIUM BICARBONATE 84 MG/ML
0.12 INJECTION, SOLUTION INTRAVENOUS
Qty: 150 | Refills: 0 | Status: DISCONTINUED | OUTPATIENT
Start: 2024-09-03 | End: 2024-09-03

## 2024-09-03 RX ORDER — PIPERACILLIN SODIUM AND TAZOBACTAM SODIUM 3; .375 G/15ML; G/15ML
3.38 INJECTION, POWDER, FOR SOLUTION INTRAVENOUS ONCE
Refills: 0 | Status: COMPLETED | OUTPATIENT
Start: 2024-09-03 | End: 2024-09-03

## 2024-09-03 RX ORDER — PIPERACILLIN SODIUM AND TAZOBACTAM SODIUM 3; .375 G/15ML; G/15ML
3.38 INJECTION, POWDER, FOR SOLUTION INTRAVENOUS ONCE
Refills: 0 | Status: DISCONTINUED | OUTPATIENT
Start: 2024-09-03 | End: 2024-09-03

## 2024-09-03 RX ORDER — SODIUM CHLORIDE 9 MG/ML
1000 INJECTION INTRAMUSCULAR; INTRAVENOUS; SUBCUTANEOUS ONCE
Refills: 0 | Status: COMPLETED | OUTPATIENT
Start: 2024-09-03 | End: 2024-09-03

## 2024-09-03 RX ORDER — HEPARIN SODIUM,BOVINE 1000/ML
5000 VIAL (ML) INJECTION EVERY 8 HOURS
Refills: 0 | Status: DISCONTINUED | OUTPATIENT
Start: 2024-09-03 | End: 2024-09-09

## 2024-09-03 RX ADMIN — Medication 50 MILLIEQUIVALENT(S): at 02:13

## 2024-09-03 RX ADMIN — PIPERACILLIN SODIUM AND TAZOBACTAM SODIUM 25 GRAM(S): 3; .375 INJECTION, POWDER, FOR SOLUTION INTRAVENOUS at 10:43

## 2024-09-03 RX ADMIN — DILTIAZEM HYDROCHLORIDE 16 MILLIGRAM(S): 5 INJECTION INTRAVENOUS at 19:50

## 2024-09-03 RX ADMIN — DILTIAZEM HYDROCHLORIDE 10 MILLIGRAM(S): 5 INJECTION INTRAVENOUS at 17:40

## 2024-09-03 RX ADMIN — INSULIN GLARGINE 6 UNIT(S): 100 INJECTION, SOLUTION SUBCUTANEOUS at 08:54

## 2024-09-03 RX ADMIN — SODIUM BICARBONATE 50 MILLIEQUIVALENT(S): 84 INJECTION, SOLUTION INTRAVENOUS at 15:39

## 2024-09-03 RX ADMIN — CHLORHEXIDINE GLUCONATE 1 APPLICATION(S): 40 SOLUTION TOPICAL at 05:32

## 2024-09-03 RX ADMIN — Medication 4: at 23:02

## 2024-09-03 RX ADMIN — SODIUM CHLORIDE 1000 MILLILITER(S): 9 INJECTION INTRAMUSCULAR; INTRAVENOUS; SUBCUTANEOUS at 15:38

## 2024-09-03 RX ADMIN — METOPROLOL TARTRATE 5 MILLIGRAM(S): 100 TABLET ORAL at 15:30

## 2024-09-03 RX ADMIN — Medication 100 MILLILITER(S): at 20:38

## 2024-09-03 RX ADMIN — PIPERACILLIN SODIUM AND TAZOBACTAM SODIUM 200 GRAM(S): 3; .375 INJECTION, POWDER, FOR SOLUTION INTRAVENOUS at 01:47

## 2024-09-03 RX ADMIN — Medication 2: at 07:05

## 2024-09-03 RX ADMIN — Medication 5000 UNIT(S): at 22:01

## 2024-09-03 RX ADMIN — PIPERACILLIN SODIUM AND TAZOBACTAM SODIUM 25 GRAM(S): 3; .375 INJECTION, POWDER, FOR SOLUTION INTRAVENOUS at 19:49

## 2024-09-03 RX ADMIN — SODIUM BICARBONATE 50 MILLIEQUIVALENT(S): 84 INJECTION, SOLUTION INTRAVENOUS at 15:38

## 2024-09-03 RX ADMIN — Medication 100 GRAM(S): at 16:50

## 2024-09-03 RX ADMIN — Medication 50 MILLIEQUIVALENT(S): at 03:23

## 2024-09-03 RX ADMIN — SODIUM BICARBONATE 50 MEQ/KG/HR: 84 INJECTION, SOLUTION INTRAVENOUS at 16:26

## 2024-09-03 RX ADMIN — Medication 1000 MILLILITER(S): at 15:38

## 2024-09-03 RX ADMIN — METOPROLOL TARTRATE 2.5 MILLIGRAM(S): 100 TABLET ORAL at 15:44

## 2024-09-03 RX ADMIN — Medication 40 MILLIGRAM(S): at 18:11

## 2024-09-03 NOTE — CHART NOTE - NSCHARTNOTEFT_GEN_A_CORE
Brief History:  BERNADINE ANDERSON is a 84M with PMHx paroxysmal Afib (not on AC due to GI bleeds) of cholecystitis s/p perc cholecystectomy (12/2023) with intermittent exchanges most recently 8/13, pAF not on AC due to GI bleed/fall risk, esophagitis, DM, HTN, HLD, CKD previously requiring brief HD, BPH, dementia presented to the ED 8/28 following a trip and fall and was found to have a left femoral neck fracture with operative repair initially postponed as patient developed sepsis/hypoxic respiratory failure felt secondary to aspiration PNA. Now, with acute delirium/toxic metabolic encephalopathy related to quetiapine administration or sepsis.      Alerted by RN staff that patient found to be tachycardic. Constant telemetry shows atrial fibrillation with rapid ventricular response. Patient was asymptomatic during this episode for chest pain, shortness of breath, palpitations, dizziness or lightheadedness.   Heart Rate between 145-155 BPMs with fluctuations as high as 170 BPM. Blood pressure during this episode was systolic between 70-90s. At times mean arterial pressure was below <65. Patient saturating at 99%.    Assessment:  84 year old PMHx as stated above also with history of paroxysmal Afib found to be in decompensated Afib w RVR in the setting of metabolic acidosis, aspiration PNA/pneumoitis recent hip surgery.  Given 1 L NS bolus. 2 amp of Sodium bicarbonate. 2.5 lopressor. Patient with resolution of hypotension with repeat blood pressures >110/70. Patient with irregular rate and rhythm with HR 120s.    FOLLOW UP:    CBC, CMP, Trops, Mg, Phos    EKG      *Can consider bicarbonate drip and additional pushed of 2.5 mg Lopressor (if patient not hypotensive).    Janes Mcneil MD Brief History:  BERNADINE ANDERSON is a 84M with PMHx paroxysmal Afib (not on AC due to GI bleeds) of cholecystitis s/p perc cholecystectomy (12/2023) with intermittent exchanges most recently 8/13, pAF not on AC due to GI bleed/fall risk, esophagitis, DM, HTN, HLD, CKD previously requiring brief HD, BPH, dementia presented to the ED 8/28 following a trip and fall and was found to have a left femoral neck fracture with operative repair initially postponed as patient developed sepsis/hypoxic respiratory failure felt secondary to aspiration PNA. Now, with acute delirium/toxic metabolic encephalopathy related to quetiapine administration or sepsis.      Alerted by RN staff that patient found to be tachycardic. Constant telemetry shows atrial fibrillation with rapid ventricular response. Patient was asymptomatic during this episode for chest pain, shortness of breath, palpitations, dizziness or lightheadedness.   Heart Rate between 145-155 BPMs with fluctuations as high as 170 BPM. Blood pressure during this episode was systolic between 70-90s. At times mean arterial pressure was below <65. Patient saturating at 99%.    Assessment:  84 year old PMHx as stated above also with history of paroxysmal Afib found to be in decompensated Afib w RVR in the setting of metabolic acidosis, cholecystitis, aspiration PNA/pneumoitis, and recent hip surgery.  Given 1 L NS bolus. 2 amp of Sodium bicarbonate. Pushed a total of 10 of IV lopressor. Patient with resolution of hypotension with repeat blood pressures >110/70. Patient with irregular rate and rhythm with HR 120s.    Started bicarbonate drip. Revaluate frequently.     Sent CBC, CMP, Trops, Mg, Phos  EKG      Revaluated patient @ 1723  Patient in Afib RVR HR above 160s. Hypotensive MAPs <65 on multiple blood pressure readings. Pushed 10mg of IV Cardizem. HRs between . MAPs above 75. Likely will need to start infusion of Cardizem if patient does not cardiovert.     Per chart review, during previous admission patient converted into AFIB. Was previously on Dronedarone as treatment for AFIB (supraventricular arrhytmia). Will consider restarting antiarrhythmics during this admission.      Janes Mcneil MD Brief History:  BERNADINE ANDERSON is a 84M with PMHx paroxysmal Afib (not on AC due to GI bleeds) of cholecystitis s/p perc cholecystectomy (12/2023) with intermittent exchanges most recently 8/13, pAF not on AC due to GI bleed/fall risk, esophagitis, DM, HTN, HLD, CKD previously requiring brief HD, BPH, dementia presented to the ED 8/28 following a trip and fall and was found to have a left femoral neck fracture with operative repair initially postponed as patient developed sepsis/hypoxic respiratory failure felt secondary to aspiration PNA. Now, with acute delirium/toxic metabolic encephalopathy related to quetiapine administration or sepsis.      Alerted by RN staff that patient found to be tachycardic. Constant telemetry shows atrial fibrillation with rapid ventricular response. Patient was asymptomatic during this episode for chest pain, shortness of breath, palpitations, dizziness or lightheadedness.   Heart Rate between 145-155 BPMs with fluctuations as high as 170 BPM. Blood pressure during this episode was systolic between 70-90s. At times mean arterial pressure was below <65. Patient saturating at 99%.    Assessment:  84 year old PMHx as stated above also with history of paroxysmal Afib found to be in decompensated Afib w RVR in the setting of metabolic acidosis, cholecystitis, aspiration PNA/pneumoitis, and recent hip surgery.  Given 1 L NS bolus. 2 amp of Sodium bicarbonate. Pushed a total of 10 of IV lopressor. Patient with resolution of hypotension with repeat blood pressures >110/70. Patient with irregular rate and rhythm with HR 120s.    Started bicarbonate drip. Revaluate frequently.     Sent CBC, CMP, Trops, Mg, Phos  EKG      Revaluated patient @ 1723  Patient in Afib RVR HR above 160s. Hypotensive MAPs <65 on multiple blood pressure readings. Pushed 10mg of IV Cardizem. HRs between . MAPs above 75. Receiving bicarb drip. S/p 1 L NS and 1 L LR.   Likely will need to start infusion of Cardizem if patient does not cardiovert.     Per chart review, during previous admission patient converted into AFIB. Was previously on Dronedarone as treatment for AFIB (supraventricular arrhytmia). Will consider restarting antiarrhythmics during this admission.      Janes Mcneil MD

## 2024-09-03 NOTE — PROGRESS NOTE ADULT - ASSESSMENT
ASSESSMENT    BERNADINE ANDERSON is a 84M with hx of cholecystitis s/p perc cholecystectomy (12/2023) with intermittent exchanges most recently 8/13, pAF not on AC due to GI bleed/fall risk, esophagitis, DM, HTN, HLD, CKD previously requiring brief HD, BPH, dementia presented to the ED 8/28 following a trip and fall and was found to have a left femoral neck fracture with operative repair initially postponed as patient developed sepsis/hypoxic respiratory failure felt secondary to aspiration PNA. Now, with acute delirium/toxic metabolic encephalopathy related to quetiapine administration or sepsis. GOC with family today. Palliative care consulted due to chronic stepwise deterioration and for complex medical decisions.    Neuro:  #Hypoactive Delirium - has history of hospital acquired delirium during previous admission     #Possible toxic metabolic encephalopathy related to quetiapine administration or sepsis  Overnight obtunded, non responsive, 1 dose nalaxone no effect  Today patient is responsive to name, at times has incomprehensible speech, AxO 1-2, evaluated with ED   Following simple commands  - cEEG no epileptiform activity  - AH4+ small rise from 22 to 44 otherwise clinically insignificant   - Hold delerium inducing medications/ antipsychotics/benzos/sedatives  - Q4 neuro checks    Cardio:  Hypotensive overnight, responsive to 2L fluid bolus/ 25% albumin  Trop last night 49, repeat trops 50  Hemodynamics are stable since administration of fluids  Constant monitor    Respiratory:  #Asipration PNA  Saturating well on 3L NC  ABG 7.38, CO2 33 Bicarb 20  CXR 9/3 Scattered bibasilar linear atelectases new as compared to CXR on 9/2  repeat ABG unremarkable  Aspiration precautions    GI:  # Chronic Cholecystitis  on 9/1, pulled perc sky drain  most recently exchanged 8/13 with IR   Consulted IR for replacement of drain  -Protonix GI PPx    Renal:  #RON (prerenal?) on CXD  #Metabolic Acidosis, slowly improving  - Cr downtrending 2.11--> 2.24 before recent change in mental status Cr 1.75  - s/p 2L NS bolus and albumin 25%   - Strict I/Os / Replete electrolytes as needed; use caution due to CKD    ID  #Aspiration PNA  Afebrile overnight, WBC wnl,  lactate overnight 0.8, repeat lactate this AM 1  Proca on 9/3 am -> 3.22  - Zosyn Q8  - Pending repeat cultures 9/2  - UA noninfectious    Endo  #IDDM2  -cont ISS, adjust as needed   -Hba1c 7.1     MSK  #L femoral neck fx POD #4 of left hip tracy-arthroplasty  Orthopedics following      Code Status: Full Code  Dispo: Admit to MICU    Case discussed with MICU Attending: Dr. Mccall   ASSESSMENT    BERNADINE ANDERSON is a 84M with PMHx paroxysmal Afib (not on AC due to GI bleeds) of cholecystitis s/p perc cholecystectomy (12/2023) with intermittent exchanges most recently 8/13, pAF not on AC due to GI bleed/fall risk, esophagitis, DM, HTN, HLD, CKD previously requiring brief HD, BPH, dementia presented to the ED 8/28 following a trip and fall and was found to have a left femoral neck fracture with operative repair initially postponed as patient developed sepsis/hypoxic respiratory failure felt secondary to aspiration PNA. Now, with acute delirium/toxic metabolic encephalopathy related to quetiapine administration or sepsis.    Neuro:  #Hypoactive Delirium - has history of hospital acquired delirium during previous admission     #Possible toxic metabolic encephalopathy related to quetiapine administration or sepsis  Overnight obtunded, non responsive, 1 dose nalaxone no effect  Today patient is responsive to name, at times has incomprehensible speech, AxO 1-2, evaluated with ED   Following simple commands  - cEEG no epileptiform activity  - AH4+ small rise from 22 to 44 otherwise clinically insignificant   - Hold delerium inducing medications/ antipsychotics/benzos/sedatives  - Q4 neuro checks    Cardio:  Hypotensive overnight, responsive to 2L fluid bolus/ 25% albumin  Trop last night 49, repeat trops 50  Hemodynamics are stable since administration of fluids  Constant monitor    Respiratory:  #Asipration PNA  Saturating well on 3L NC  ABG 7.38, CO2 33 Bicarb 20  CXR 9/3 Scattered bibasilar linear atelectases new as compared to CXR on 9/2  repeat ABG unremarkable  Aspiration precautions    GI:  # Chronic Cholecystitis  on 9/1, pulled perc sky drain  most recently exchanged 8/13 with IR   Consulted IR for replacement of drain  -Protonix GI PPx    Renal:  #RON (prerenal?) on CXD  #Metabolic Acidosis, slowly improving  - Cr downtrending 2.11--> 2.24 before recent change in mental status Cr 1.75  - s/p 2L NS bolus and albumin 25%   - Strict I/Os / Replete electrolytes as needed; use caution due to CKD    ID  #Aspiration PNA  Afebrile overnight, WBC wnl,  lactate overnight 0.8, repeat lactate this AM 1  Proca on 9/3 am -> 3.22  - Zosyn Q8  - Pending repeat cultures 9/2  - UA noninfectious    Endo  #IDDM2  -cont ISS, adjust as needed   -Hba1c 7.1     MSK  #L femoral neck fx POD #4 of left hip tracy-arthroplasty  Orthopedics following      Code Status: Full Code  Dispo: Admit to MICU    Case discussed with MICU Attending: Dr. Mccall

## 2024-09-03 NOTE — EEG REPORT - NS EEG TEXT BOX
BERNADINE ANDERSON [id number]     Study Date: 09-03-24 04:21-08:17  Duration: 3 hr 30 min  --------------------------------------------------------------------------------------------------  History:  CC/ HPI Patient is a 84y old  Male who presents with a chief complaint of L hip fx (03 Sep 2024 01:25)    MEDICATIONS  (STANDING):  chlorhexidine 2% Cloths 1 Application(s) Topical <User Schedule>  dextrose 5%. 1000 milliLiter(s) (50 mL/Hr) IV Continuous <Continuous>  dextrose 5%. 1000 milliLiter(s) (100 mL/Hr) IV Continuous <Continuous>  dextrose 50% Injectable 25 Gram(s) IV Push once  dextrose 50% Injectable 12.5 Gram(s) IV Push once  dextrose 50% Injectable 25 Gram(s) IV Push once  glucagon  Injectable 1 milliGRAM(s) IntraMuscular once  insulin glargine Injectable (LANTUS) 6 Unit(s) SubCutaneous every morning  insulin lispro (ADMELOG) corrective regimen sliding scale   SubCutaneous every 6 hours  pantoprazole    Tablet 40 milliGRAM(s) Oral before breakfast  piperacillin/tazobactam IVPB.. 3.375 Gram(s) IV Intermittent every 8 hours  sucralfate 1 Gram(s) Oral four times a day    --------------------------------------------------------------------------------------------------  Study Interpretation:    [[[Abbreviation Key:  PDR=alpha rhythm/posterior dominant rhythm. A-P=anterior posterior.  Amplitude: ‘very low’:<20; ‘low’:20-49; ‘medium’:; ‘high’:>150uV.  Persistence for periodic/rhythmic patterns (% of epoch) ‘rare’:<1%; ‘occasional’:1-10%; ‘frequent’:10-50%; ‘abundant’:50-90%; ‘continuous’:>90%.  Persistence for sporadic discharges: ‘rare’:<1/hr; ‘occasional’:1/min-1/hr; ‘frequent’:>1/min; ‘abundant’:>1/10 sec.  RPP=rhythmic and periodic patterns; GRDA=generalized rhythmic delta activity; FIRDA=frontal intermittent GRDA; LRDA=lateralized rhythmic delta activity; TIRDA=temporal intermittent rhythmic delta activity;  LPD=PLED=lateralized periodic discharges; GPD=generalized periodic discharges; BIPDs =bilateral independent periodic discharges; Mf=multifocal; SIRPDs=stimulus induced rhythmic, periodic, or ictal appearing discharges; BIRDs=brief potentially ictal rhythmic discharges >4 Hz, lasting .5-10s; PFA (paroxysmal bursts >13 Hz or =8 Hz <10s).  Modifiers: +F=with fast component; +S=with spike component; +R=with rhythmic component.  S-B=burst suppression pattern.  Max=maximal. N1-drowsy; N2-stage II sleep; N3-slow wave sleep. SSS/BETS=small sharp spikes/benign epileptiform transients of sleep. HV=hyperventilation; PS=photic stimulation]]]    Daily EEG Visual Analysis    FINDINGS:      Background:  Continuity: Continuous  Symmetry: Symmetric  Posterior dominant rhythm (PDR): 7 Hz, reactive to eye closure. Intermittent low-amplitude frontal beta in wakefulness.  Voltage: Normal  Anterior-Posterior Gradient: Present  Other background findings: Frequent diffuse polymorphic delta slowing  Breach: Absent    Background Slowing:  Generalized slowing: As above   Focal slowing: None    State Changes:   Drowsiness is characterized by slowing of the background activity.  Stage 2 sleep is characterized by symmetric K complexes.    Interictal Findings:  None    Electrographic and Electroclinical seizures:  None    Other Clinical Events:  None    Activation Procedures:   Hyperventilation is not performed.    Photic stimulation is not performed.    Artifacts:  Intermittent myogenic and movement artifacts are present.    Single-lead EKG: Regular rhythm, occasional PVCs    EEG Classification / Summary:  Abnormal EEG in the awake, drowsy, and asleep states.   Mild diffuse slowing.  No focal or epileptiform abnormalities are captured.   No seizures.    Clinical Impression:  Mild diffuse cerebral dysfunction is nonspecific in etiology.   No epileptiform abnormalities or seizures.          -------------------------------------------------------------------------------------------------------    Annmarie Huntley MD  Attending Physician, [facility]    -------------------------------------------------------------------------------------------------------    To reach EEG technologist:  Please use the pager number for the appropriate hospital or contact the .  At [facility] - Pager #: [phone]    To reach EEG-reading physician:  EEG Reading Room Phone #: [phone]  Epilepsy Answering Service after 5PM and before 8:30AM: Phone #: [phone]

## 2024-09-03 NOTE — CONSULT NOTE ADULT - ASSESSMENT
83 y/o M with a h/o CKD, DM2, HTN, dementia, HTN, cholecystitis with chronic indwelling percutaneous cholecystostomy (inserted 12/2023), with:    # Altered mental status  # Hypotension  # Hypovolemia  # Acute hypoxemic respiratory failure  # Aspiration pneumonia  # Left femoral neck fracture, s/p hemiarthroplasty 8/30    Transfer to MICU.    Possible toxic metabolic encephalopathy related to quetiapine? This was the only sedative he received overnight. He did not receive any opioids. Urgent CT brain negative for acute pathology. Hypotension primarily related to hypovolemia in the setting of poor PO intake.    - ABG indicates minute ventilation is adequate  - 0.4mg IV naloxone x 1 empirically without effect  - discontinue quetiapine  - start conventional nasal O2, increased to 6L for transient hypoxemia, maintain SpO2 > 92%  - monitor airway/respiratory status closely, at high risk for life threatening decompensation  - will proceed with low threshold to intubate for airway protection  - s/p 2L crystalloid bolus with improvement in BP  - monitor hemodynamics closely, maintain a MAP > 65  - CT chest appears to reveal multifocal aspiration pneumonia  - CT A/P report pending to evaluate GB and surrounding area s/p inadvertent removal of perc sky tube with ongoing drainage  - does not meet sepsis criteria  - change cefepime to Zosyn for anaerobic coverage    The medicine team contacted the patient's daughter in the midst of our evaluation and stabilization efforts and confirmed his DNR advanced directive, however there are no other limitations on his care.    Case discussed with MICU physician, Dr. Velez.

## 2024-09-03 NOTE — CHART NOTE - NSCHARTNOTEFT_GEN_A_CORE
Medicine PA-  Cd @ 2010 for pt with BP-75/50.  Pt admitted s/p fall at home and had L. GEN 8/30.  Hx CKD, dementia, cholecystitis w/ perc drain from 12/23.  Pt pulled drain out yesterday and pending IR for reinsertion.    Vital Signs Last 24 Hrs  T(C): 36.3 (03 Sep 2024 03:48), Max: 36.9 (02 Sep 2024 15:52)  T(F): 97.4 (03 Sep 2024 03:48), Max: 98.5 (02 Sep 2024 15:52)  HR: 69 (03 Sep 2024 03:00) (69 - 95)  BP: 120/54 (03 Sep 2024 03:00) (68/58 - 148/76)  BP(mean): 74 (03 Sep 2024 03:00) (74 - 103)  RR: 15 (03 Sep 2024 03:00) (15 - 18)  SpO2: 100% (03 Sep 2024 03:00) (98% - 100%)    Parameters below as of 03 Sep 2024 03:00  Patient On (Oxygen Delivery Method): nasal cannula  O2 Flow (L/min): 6    PE:  Gen- Pt appears baseline confused, answers yes/ no and denies pain via .  He becomes very agitated when you attempt to examine him, pulling away and swatting at you.  Limited exam 2/2 to above.  S1S2 ausc  Lungs- diminished bases    >Hypotension 2/2 volume depleted  -2L NS bolus and albumin 25% given  -MICU consulted  -Cd and spoke to pt's daughter Elkin who agreed to vasopressors if necessary  -Pt responded to bolus and BP improved 114/62  -Cancelled MICU consult and updated pt's daughter  -Shortly after pt became unresponsive with pinpoint pupils and a [employer] was cd  -Daughter cd and updated, wants "everything done", including intubation and central venous access if needed  -MICU bedside and assuming care of pt

## 2024-09-03 NOTE — CHART NOTE - NSCHARTNOTEFT_GEN_A_CORE
:  Fani Davies AGACNP-BC    Indication: AMS    PROCEDURE:  [X] LIMITED ECHO  [X] LIMITED CHEST  [ ] LIMITED RETROPERITONEAL  [ ] LIMITED ABDOMINAL  [ ] LIMITED DVT  [ ] NEEDLE GUIDANCE VASCULAR  [ ] NEEDLE GUIDANCE THORACENTESIS  [ ] NEEDLE GUIDANCE PARACENTESIS  [ ] NEEDLE GUIDANCE PERICARDIOCENTESIS  [ ] OTHER    FINDINGS:  Chest: Consolidations bilaterally. L>R    ECHO: Normal RV and LV function with no wall motion abnormalities, septal bowing/flattening, or gross valvular pathology . No pericardial effusion

## 2024-09-03 NOTE — DIETITIAN INITIAL EVALUATION ADULT - PERTINENT LABORATORY DATA
09-03    143  |  115<H>  |  29.0<H>  ----------------------------<  121<H>  4.2   |  19.0<L>  |  2.36<H>    Ca    7.8<L>      03 Sep 2024 10:30  Phos  2.9     09-03  Mg     2.2     09-03    TPro  6.4<L>  /  Alb  2.8<L>  /  TBili  0.8  /  DBili  x   /  AST  52<H>  /  ALT  26  /  AlkPhos  157<H>  09-03  POCT Blood Glucose.: 127 mg/dL (09-03-24 @ 08:52)  A1C with Estimated Average Glucose Result: 7.1 % (08-29-24 @ 05:44)  A1C with Estimated Average Glucose Result: 7.7 % (03-08-24 @ 06:40)  A1C with Estimated Average Glucose Result: 8.2 % (12-22-23 @ 02:55)

## 2024-09-03 NOTE — CONSULT NOTE ADULT - SUBJECTIVE AND OBJECTIVE BOX
Patient is a 84y old  Male who presents with a chief complaint of L hip fx (02 Sep 2024 15:51)      BRIEF HOSPITAL COURSE: 83 y/o M with a h/o CKD, DM2, HTN, dementia, HTN, cholecystitis with chronic indwelling percutaneous cholecystostomy (inserted 12/2023), admitted on 8/28 after mechanical fall and left femoral neck fracture, s/p left hip hemiarthroplasty on 8/30. Hospital course complicated by sepsis, aspiration pneumonia, RON, and agitation with inadvertent removal of perc sky tube.    Events last 24 hours: MICU eval requested as patient developed hypotension (SBP 60s/70s) and altered mentation. Hemodynamics were responsive to volume resuscitation, however he remained obtunded with snoring respirations and generalized twitching. CT brain pending...        PAST MEDICAL & SURGICAL HISTORY:  Diabetes      Hypertension      Prostate disorder      Anxiety      High cholesterol      Ulcer      Acute cholecystitis      History of endoscopy      Cholecystostomy tube dysfunction      S/P cataract surgery          Review of Systems:  CONSTITUTIONAL: No fever, chills, or fatigue  EYES: No eye pain, visual disturbances, or discharge  ENMT:  No difficulty hearing, tinnitus, vertigo; No sinus or throat pain  NECK: No pain or stiffness  RESPIRATORY: No cough, wheezing, chills or hemoptysis; No shortness of breath  CARDIOVASCULAR: No chest pain, palpitations, dizziness, or leg swelling  GASTROINTESTINAL: No abdominal or epigastric pain. No nausea, vomiting, or hematemesis; No diarrhea or constipation. No melena or hematochezia.  GENITOURINARY: No dysuria, frequency, hematuria, or incontinence  NEUROLOGICAL: No headaches, memory loss, loss of strength, numbness, or tremors  SKIN: No itching, burning, rashes, or lesions   MUSCULOSKELETAL: No joint pain or swelling; No muscle, back, or extremity pain  PSYCHIATRIC: No depression, anxiety, mood swings, or difficulty sleeping      Medications:  piperacillin/tazobactam IVPB. 3.375 Gram(s) IV Intermittent once  piperacillin/tazobactam IVPB.- 3.375 Gram(s) IV Intermittent once  piperacillin/tazobactam IVPB.. 3.375 Gram(s) IV Intermittent every 12 hours        acetaminophen     Tablet .. 650 milliGRAM(s) Oral every 6 hours PRN  gabapentin 100 milliGRAM(s) Oral four times a day  melatonin 3 milliGRAM(s) Oral at bedtime PRN  ondansetron Injectable 4 milliGRAM(s) IV Push every 6 hours PRN  oxyCODONE    IR 10 milliGRAM(s) Oral every 4 hours PRN  oxyCODONE    IR 5 milliGRAM(s) Oral every 4 hours PRN  QUEtiapine 12.5 milliGRAM(s) Oral daily PRN        aluminum hydroxide/magnesium hydroxide/simethicone Suspension 30 milliLiter(s) Oral four times a day PRN  pantoprazole    Tablet 40 milliGRAM(s) Oral before breakfast  sucralfate 1 Gram(s) Oral four times a day      dextrose 50% Injectable 25 Gram(s) IV Push once  dextrose 50% Injectable 12.5 Gram(s) IV Push once  dextrose 50% Injectable 25 Gram(s) IV Push once  dextrose Oral Gel 15 Gram(s) Oral once PRN  glucagon  Injectable 1 milliGRAM(s) IntraMuscular once  insulin glargine Injectable (LANTUS) 6 Unit(s) SubCutaneous every morning  insulin lispro (ADMELOG) corrective regimen sliding scale   SubCutaneous every 6 hours    dextrose 5% + sodium chloride 0.45% 1000 milliLiter(s) IV Continuous <Continuous>  dextrose 5%. 1000 milliLiter(s) IV Continuous <Continuous>  dextrose 5%. 1000 milliLiter(s) IV Continuous <Continuous>  potassium chloride  10 mEq/50 mL IVPB 10 milliEquivalent(s) IV Intermittent every 1 hour      chlorhexidine 2% Cloths 1 Application(s) Topical <User Schedule>            ICU Vital Signs Last 24 Hrs  T(C): 36.9 (02 Sep 2024 15:52), Max: 36.9 (02 Sep 2024 15:52)  T(F): 98.5 (02 Sep 2024 15:52), Max: 98.5 (02 Sep 2024 15:52)  HR: 85 (02 Sep 2024 15:52) (85 - 95)  BP: 114/68 (02 Sep 2024 22:48) (68/58 - 148/76)  BP(mean): --  ABP: --  ABP(mean): --  RR: 18 (02 Sep 2024 15:52) (18 - 18)  SpO2: 98% (02 Sep 2024 15:52) (98% - 98%)    O2 Parameters below as of 02 Sep 2024 15:52  Patient On (Oxygen Delivery Method): room air            ABG - ( 03 Sep 2024 00:28 )  pH, Arterial: 7.380 pH, Blood: x     /  pCO2: 33    /  pO2: 108   / HCO3: 20    / Base Excess: -5.6  /  SaO2: 97.6                I&O's Detail    01 Sep 2024 07:01  -  02 Sep 2024 07:00  --------------------------------------------------------  IN:    dextrose 5% + sodium chloride 0.45%: 675 mL  Total IN: 675 mL    OUT:    Indwelling Catheter - Urethral (mL): 300 mL  Total OUT: 300 mL    Total NET: 375 mL      02 Sep 2024 07:01  -  03 Sep 2024 01:26  --------------------------------------------------------  IN:  Total IN: 0 mL    OUT:    Indwelling Catheter - Urethral (mL): 600 mL  Total OUT: 600 mL    Total NET: -600 mL            LABS:                        7.4    7.80  )-----------( 107      ( 02 Sep 2024 23:30 )             25.4     09-02    138  |  108  |  29.7<H>  ----------------------------<  172<H>  3.9   |  15.0<L>  |  2.24<H>    Ca    7.8<L>      02 Sep 2024 23:30  Phos  2.4     09-02  Mg     1.6     09-02    TPro  5.7<L>  /  Alb  2.5<L>  /  TBili  0.9  /  DBili  x   /  AST  51<H>  /  ALT  23  /  AlkPhos  143<H>  09-02          CAPILLARY BLOOD GLUCOSE      POCT Blood Glucose.: 158 mg/dL (02 Sep 2024 23:58)      Urinalysis Basic - ( 02 Sep 2024 23:30 )    Color: x / Appearance: x / SG: x / pH: x  Gluc: 172 mg/dL / Ketone: x  / Bili: x / Urobili: x   Blood: x / Protein: x / Nitrite: x   Leuk Esterase: x / RBC: x / WBC x   Sq Epi: x / Non Sq Epi: x / Bacteria: x      CULTURES:  Culture Results:   No growth at 4 days (08-29-24 @ 11:40)  Culture Results:   No growth (08-27-24 @ 15:36)        Physical Examination:    General: No acute distress.  Alert, oriented, interactive, nonfocal    HEENT: Pupils equal, reactive to light.  Symmetric.    PULM: Clear to auscultation bilaterally, no significant sputum production    CVS: Regular rate and rhythm, no murmurs, rubs, or gallops    ABD: Soft, nondistended, nontender, normoactive bowel sounds, no masses    EXT: No edema, nontender    SKIN: Warm and well perfused, no rashes noted.    NEURO: A&Ox3, strength 5/5 all extremities, cranial nerves grossly intact, no focal deficits        RADIOLOGY: ***        CRITICAL CARE TIME SPENT: ***  Time spent evaluating/treating patient with medical issues that pose a high risk for life threatening deterioration and/or end-organ damage, reviewing data/labs/imaging, discussing case with multidisciplinary team, discussing plan/goals of care with patient/family. Non-inclusive of procedure time. The date of entry of this note reflects the date of services rendered.   Patient is a 84y old  Male who presents with a chief complaint of L hip fx (02 Sep 2024 15:51)      BRIEF HOSPITAL COURSE: 83 y/o M with a h/o CKD, DM2, HTN, dementia, HTN, cholecystitis with chronic indwelling percutaneous cholecystostomy (inserted 12/2023), admitted on 8/28 after mechanical fall and left femoral neck fracture, s/p left hip hemiarthroplasty on 8/30. Hospital course complicated by sepsis, aspiration pneumonia, RON, and agitation with inadvertent removal of perc sky tube.    Events last 24 hours: MICU eval requested as patient developed hypotension (SBP 60s/70s) and altered mentation with pinpoint pupils. Hemodynamics were responsive to volume resuscitation, however he remained obtunded with snoring respirations and generalized twitching. CT brain negative for acute intracranial pathology.        PAST MEDICAL & SURGICAL HISTORY:  Diabetes      Hypertension      Prostate disorder      Anxiety      High cholesterol      Ulcer      Acute cholecystitis      History of endoscopy      Cholecystostomy tube dysfunction      S/P cataract surgery          Review of Systems:  Unable to obtain secondary to encephalopathy.        Medications:  piperacillin/tazobactam IVPB. 3.375 Gram(s) IV Intermittent once  piperacillin/tazobactam IVPB.- 3.375 Gram(s) IV Intermittent once  piperacillin/tazobactam IVPB.. 3.375 Gram(s) IV Intermittent every 12 hours        acetaminophen     Tablet .. 650 milliGRAM(s) Oral every 6 hours PRN  gabapentin 100 milliGRAM(s) Oral four times a day  melatonin 3 milliGRAM(s) Oral at bedtime PRN  ondansetron Injectable 4 milliGRAM(s) IV Push every 6 hours PRN  oxyCODONE    IR 10 milliGRAM(s) Oral every 4 hours PRN  oxyCODONE    IR 5 milliGRAM(s) Oral every 4 hours PRN  QUEtiapine 12.5 milliGRAM(s) Oral daily PRN        aluminum hydroxide/magnesium hydroxide/simethicone Suspension 30 milliLiter(s) Oral four times a day PRN  pantoprazole    Tablet 40 milliGRAM(s) Oral before breakfast  sucralfate 1 Gram(s) Oral four times a day      dextrose 50% Injectable 25 Gram(s) IV Push once  dextrose 50% Injectable 12.5 Gram(s) IV Push once  dextrose 50% Injectable 25 Gram(s) IV Push once  dextrose Oral Gel 15 Gram(s) Oral once PRN  glucagon  Injectable 1 milliGRAM(s) IntraMuscular once  insulin glargine Injectable (LANTUS) 6 Unit(s) SubCutaneous every morning  insulin lispro (ADMELOG) corrective regimen sliding scale   SubCutaneous every 6 hours    dextrose 5% + sodium chloride 0.45% 1000 milliLiter(s) IV Continuous <Continuous>  dextrose 5%. 1000 milliLiter(s) IV Continuous <Continuous>  dextrose 5%. 1000 milliLiter(s) IV Continuous <Continuous>  potassium chloride  10 mEq/50 mL IVPB 10 milliEquivalent(s) IV Intermittent every 1 hour      chlorhexidine 2% Cloths 1 Application(s) Topical <User Schedule>            ICU Vital Signs Last 24 Hrs  T(C): 36.9 (02 Sep 2024 15:52), Max: 36.9 (02 Sep 2024 15:52)  T(F): 98.5 (02 Sep 2024 15:52), Max: 98.5 (02 Sep 2024 15:52)  HR: 85 (02 Sep 2024 15:52) (85 - 95)  BP: 114/68 (02 Sep 2024 22:48) (68/58 - 148/76)  BP(mean): --  ABP: --  ABP(mean): --  RR: 18 (02 Sep 2024 15:52) (18 - 18)  SpO2: 98% (02 Sep 2024 15:52) (98% - 98%)    O2 Parameters below as of 02 Sep 2024 15:52  Patient On (Oxygen Delivery Method): room air            ABG - ( 03 Sep 2024 00:28 )  pH, Arterial: 7.380 pH, Blood: x     /  pCO2: 33    /  pO2: 108   / HCO3: 20    / Base Excess: -5.6  /  SaO2: 97.6                I&O's Detail    01 Sep 2024 07:01  -  02 Sep 2024 07:00  --------------------------------------------------------  IN:    dextrose 5% + sodium chloride 0.45%: 675 mL  Total IN: 675 mL    OUT:    Indwelling Catheter - Urethral (mL): 300 mL  Total OUT: 300 mL    Total NET: 375 mL      02 Sep 2024 07:01  -  03 Sep 2024 01:26  --------------------------------------------------------  IN:  Total IN: 0 mL    OUT:    Indwelling Catheter - Urethral (mL): 600 mL  Total OUT: 600 mL    Total NET: -600 mL            LABS:                        7.4    7.80  )-----------( 107      ( 02 Sep 2024 23:30 )             25.4     09-02    138  |  108  |  29.7<H>  ----------------------------<  172<H>  3.9   |  15.0<L>  |  2.24<H>    Ca    7.8<L>      02 Sep 2024 23:30  Phos  2.4     09-02  Mg     1.6     09-02    TPro  5.7<L>  /  Alb  2.5<L>  /  TBili  0.9  /  DBili  x   /  AST  51<H>  /  ALT  23  /  AlkPhos  143<H>  09-02          CAPILLARY BLOOD GLUCOSE      POCT Blood Glucose.: 158 mg/dL (02 Sep 2024 23:58)      Urinalysis Basic - ( 02 Sep 2024 23:30 )    Color: x / Appearance: x / SG: x / pH: x  Gluc: 172 mg/dL / Ketone: x  / Bili: x / Urobili: x   Blood: x / Protein: x / Nitrite: x   Leuk Esterase: x / RBC: x / WBC x   Sq Epi: x / Non Sq Epi: x / Bacteria: x      CULTURES:  Culture Results:   No growth at 4 days (08-29-24 @ 11:40)  Culture Results:   No growth (08-27-24 @ 15:36)        Physical Examination:    General: No acute distress.  obtunded    HEENT: Pupils equal, reactive to light.  Symmetric.    PULM: Clear to auscultation bilaterally, no significant sputum production    CVS: Regular rate and irreg irreg rhythm, no murmurs, rubs, or gallops    ABD: Soft, nondistended, nontender, normoactive bowel sounds, perc sky site with some green drainage on dressing    EXT: No edema, nontender    SKIN: Warm and well perfused, no rashes noted.    NEURO: obtunded, not arousing to sternal rub, not following commands, no purposeful movements        RADIOLOGY:     < from: Xray Chest 1 View- PORTABLE-Routine (Xray Chest 1 View- PORTABLE-Routine in AM.) (09.02.24 @ 05:53) >  Findings:    Support devices: None.    Cardiac/mediastinum/hilum: No significant change    Skeleton/soft tissues: No significant change.    Lung parenchyma/Pleura: Bibasilar opacities, decreased on the right and   new on the left    Impression:  Bibasilar opacities, decreased on the right and new on the left    < end of copied text >          CRITICAL CARE TIME SPENT: 67 mins  Time spent evaluating/treating patient with medical issues that pose a high risk for life threatening deterioration and/or end-organ damage, reviewing data/labs/imaging, discussing case with multidisciplinary team, discussing plan/goals of care with patient/family. Non-inclusive of procedure time. The date of entry of this note reflects the date of services rendered.

## 2024-09-03 NOTE — DIETITIAN INITIAL EVALUATION ADULT - ENTERAL
If pt unable to tolerate oral diet, consider enteral nutrition if within GOC plans  Start Glucerna 1.5 @ 10 cc/hr and increase by 10 cc q 12 hrs until goal rate 60 cc/hr x 18 hrs (1080 ml, 1620 kcals, 89 g pro, 821 ml free water)  Additional free water per medical teams discretion

## 2024-09-03 NOTE — DIETITIAN INITIAL EVALUATION ADULT - ADD RECOMMEND
Monitor initiation/tolerance of nutrition  Follow GOC plans  Monitor nutrition related labs, weight trends, BMs and I/Os

## 2024-09-03 NOTE — PROGRESS NOTE ADULT - SUBJECTIVE AND OBJECTIVE BOX
Patient is a 84y old  Male who presents with a chief complaint of L hip fx (03 Sep 2024 01:25)      BRIEF HOSPITAL COURSE: 84-year-old male with past medical history of CKD, dementia, type 2 diabetes, hypertension, cholecystitis with a percutaneous gallbladder bladder drain presents after trip and fall backward. Now status post Hemiarthroplasty of left hip on . Found to have aspiration PNA complicated by septic shock. Hospital course complicated by self removal of percutaneous cochleostomy drain on .     Interval HPI: MICU eval requested as patient developed hypotension (SBP 60s/70s) and altered mental status/ hypoactive delirium. Hypotensive episode was responsive to volume resuscitation and administration of albumin, today patient with improved mental status. Spending most of the day sleeping. Responds to name being called. Patient following simple commands such as open and close your eyes, squeezing examiners hands, putting up two fingers, and lifting hands over his head.      PAST MEDICAL & SURGICAL HISTORY:  Diabetes      Hypertension      Prostate disorder      Anxiety      High cholesterol      Ulcer      Acute cholecystitis      History of endoscopy      Cholecystostomy tube dysfunction      S/P cataract surgery          Review of Systems:  Unable to obtain due to dementia       Medications:  piperacillin/tazobactam IVPB.. 3.375 Gram(s) IV Intermittent every 8 hours        acetaminophen     Tablet .. 650 milliGRAM(s) Oral every 6 hours PRN  melatonin 3 milliGRAM(s) Oral at bedtime PRN  ondansetron Injectable 4 milliGRAM(s) IV Push every 6 hours PRN      heparin   Injectable 5000 Unit(s) SubCutaneous every 8 hours    aluminum hydroxide/magnesium hydroxide/simethicone Suspension 30 milliLiter(s) Oral four times a day PRN  pantoprazole  Injectable 40 milliGRAM(s) IV Push daily  sucralfate 1 Gram(s) Oral four times a day      dextrose 50% Injectable 25 Gram(s) IV Push once  dextrose 50% Injectable 12.5 Gram(s) IV Push once  dextrose 50% Injectable 25 Gram(s) IV Push once  dextrose Oral Gel 15 Gram(s) Oral once PRN  glucagon  Injectable 1 milliGRAM(s) IntraMuscular once  insulin glargine Injectable (LANTUS) 6 Unit(s) SubCutaneous every morning  insulin lispro (ADMELOG) corrective regimen sliding scale   SubCutaneous every 6 hours    dextrose 5%. 1000 milliLiter(s) IV Continuous <Continuous>  dextrose 5%. 1000 milliLiter(s) IV Continuous <Continuous>      chlorhexidine 2% Cloths 1 Application(s) Topical <User Schedule>            ICU Vital Signs Last 24 Hrs  T(C): 36.4 (03 Sep 2024 11:26), Max: 36.9 (02 Sep 2024 15:52)  T(F): 97.6 (03 Sep 2024 11:26), Max: 98.5 (02 Sep 2024 15:52)  HR: 84 (03 Sep 2024 11:00) (69 - 92)  BP: 121/53 (03 Sep 2024 11:00) (68/58 - 146/56)  BP(mean): 74 (03 Sep 2024 11:00) (73 - 103)  ABP: --  ABP(mean): --  RR: 17 (03 Sep 2024 11:00) (12 - 18)  SpO2: 100% (03 Sep 2024 11:00) (94% - 100%)    O2 Parameters below as of 03 Sep 2024 09:00  Patient On (Oxygen Delivery Method): nasal cannula  O2 Flow (L/min): 4          ABG - ( 03 Sep 2024 11:04 )  pH, Arterial: 7.360 pH, Blood: x     /  pCO2: 33    /  pO2: 103   / HCO3: 19    / Base Excess: -6.8  /  SaO2: 98.6                I&O's Detail    02 Sep 2024 07:01  -  03 Sep 2024 07:00  --------------------------------------------------------  IN:    IV PiggyBack: 100 mL    IV PiggyBack: 100 mL  Total IN: 200 mL    OUT:    Indwelling Catheter - Urethral (mL): 850 mL  Total OUT: 850 mL    Total NET: -650 mL      03 Sep 2024 07:01  -  03 Sep 2024 12:06  --------------------------------------------------------  IN:    IV PiggyBack: 50 mL  Total IN: 50 mL    OUT:    Indwelling Catheter - Urethral (mL): 200 mL  Total OUT: 200 mL    Total NET: -150 mL            LABS:                        8.3    9.37  )-----------( 250      ( 03 Sep 2024 10:30 )             26.1         143  |  115<H>  |  29.0<H>  ----------------------------<  121<H>  4.2   |  19.0<L>  |  2.36<H>    Ca    7.8<L>      03 Sep 2024 10:30  Phos  2.9       Mg     2.2         TPro  6.4<L>  /  Alb  2.8<L>  /  TBili  0.8  /  DBili  x   /  AST  52<H>  /  ALT  26  /  AlkPhos  157<H>            CAPILLARY BLOOD GLUCOSE      POCT Blood Glucose.: 127 mg/dL (03 Sep 2024 08:52)      Urinalysis Basic - ( 03 Sep 2024 10:30 )    Color: Yellow / Appearance: Cloudy / S.018 / pH: x  Gluc: 121 mg/dL / Ketone: Trace mg/dL  / Bili: Negative / Urobili: 0.2 mg/dL   Blood: x / Protein: 300 mg/dL / Nitrite: Negative   Leuk Esterase: Negative / RBC: 7 /HPF / WBC 5 /HPF   Sq Epi: x / Non Sq Epi: 2 /HPF / Bacteria: Moderate /HPF      CULTURES:  Culture Results:   No growth at 4 days ( @ 11:40)  Culture Results:   No growth ( @ 15:36)      Physical Examination:    General: No acute distress. Sleeping in bed  HEENT: Pupils equal 4mm, reactive to light.  Symmetric.  PULM:  course breath sounds b/L lower lobes, no significant sputum production  NECK: Supple, no lymphadenopathy, trachea midline  CVS: Regular rate and rhythm, no murmurs, rubs, or gallops  ABD: Soft, nondistended, nontender, normoactive bowel sounds, no masses, Perc Radha site covered with Tegaderm fluid contained in dressing appearing billious  EXT: No edema, nontender  SKIN: Warm and well perfused, no rashes noted. Dressings to L hip are  dry, nonbloody, intact. No swelling or erythema around surgical site.  NEURO: AxOx1-2, interactive with examiner, following simple commands      DEVICES:     RADIOLOGY: ACC: [id number] EXAM:  CT BRAIN   ORDERED BY: DAYANNA MOE     PROCEDURE DATE:  2024          INTERPRETATION:  EXAM: CT HEAD WITHOUT INTRAVENOUS CONTRAST    CLINICAL INFORMATION: AMS    TECHNIQUE: Noncontrast axial CT images were acquired through the head.   Two-dimensional sagittal and coronal reformats were generated.    COMPARISON STUDY: CT head 2024    FINDINGS:    BRAIN: No apparent acute infarct, hemorrhage or mass. No hydrocephalus.   Chronic appearing white matter hypodensities and volume loss. Small   chronic lacunar infarcts in both thalami similar to prior.    CALVARIUM: No skull fracture or concerning osseous lesions.    EXTRACRANIAL SOFT TISSUES: No acute findings.    VISUALIZED PORTIONS OF THE PARANASAL SINUSES ANDMASTOID AIR CELLS: No   air fluid levels.    IMPRESSION:  No acute intracranial findings.    CRITICAL CARE TIME SPENT: ***   Patient is a 84y old  Male who presents with a chief complaint of L hip fx (03 Sep 2024 01:25)      BRIEF HOSPITAL COURSE:   84M with PMHx paroxysmal Afib (not on AC due to GI bleeds) of cholecystitis s/p perc sky (2023) with intermittent exchanges most recently , esophagitis, DM, HTN, HLD, CKD previously requiring brief HD, BPH, dementia presents after trip and fall backward found to have a left femoral neck fracture. Now status post Hemiarthroplasty of left hip on . Found to have aspiration PNA complicated by septic shock. Hospital course complicated by self removal of percutaneous cochleostomy drain on .     Interval HPI: MICU eval requested as patient developed hypotension (SBP 60s/70s) and altered mental status/ hypoactive delirium. Hypotensive episode was responsive to volume resuscitation and administration of albumin, today patient with improved mental status. Spending most of the day sleeping. Responds to name being called. Patient following simple commands such as open and close your eyes, squeezing examiners hands, putting up two fingers, and lifting hands over his head.      PAST MEDICAL & SURGICAL HISTORY:  Diabetes      Hypertension      Prostate disorder      Anxiety      High cholesterol      Ulcer      Acute cholecystitis      History of endoscopy      Cholecystostomy tube dysfunction      S/P cataract surgery          Review of Systems:  Unable to obtain due to dementia       Medications:  piperacillin/tazobactam IVPB.. 3.375 Gram(s) IV Intermittent every 8 hours        acetaminophen     Tablet .. 650 milliGRAM(s) Oral every 6 hours PRN  melatonin 3 milliGRAM(s) Oral at bedtime PRN  ondansetron Injectable 4 milliGRAM(s) IV Push every 6 hours PRN      heparin   Injectable 5000 Unit(s) SubCutaneous every 8 hours    aluminum hydroxide/magnesium hydroxide/simethicone Suspension 30 milliLiter(s) Oral four times a day PRN  pantoprazole  Injectable 40 milliGRAM(s) IV Push daily  sucralfate 1 Gram(s) Oral four times a day      dextrose 50% Injectable 25 Gram(s) IV Push once  dextrose 50% Injectable 12.5 Gram(s) IV Push once  dextrose 50% Injectable 25 Gram(s) IV Push once  dextrose Oral Gel 15 Gram(s) Oral once PRN  glucagon  Injectable 1 milliGRAM(s) IntraMuscular once  insulin glargine Injectable (LANTUS) 6 Unit(s) SubCutaneous every morning  insulin lispro (ADMELOG) corrective regimen sliding scale   SubCutaneous every 6 hours    dextrose 5%. 1000 milliLiter(s) IV Continuous <Continuous>  dextrose 5%. 1000 milliLiter(s) IV Continuous <Continuous>      chlorhexidine 2% Cloths 1 Application(s) Topical <User Schedule>            ICU Vital Signs Last 24 Hrs  T(C): 36.4 (03 Sep 2024 11:26), Max: 36.9 (02 Sep 2024 15:52)  T(F): 97.6 (03 Sep 2024 11:26), Max: 98.5 (02 Sep 2024 15:52)  HR: 84 (03 Sep 2024 11:00) (69 - 92)  BP: 121/53 (03 Sep 2024 11:00) (68/58 - 146/56)  BP(mean): 74 (03 Sep 2024 11:00) (73 - 103)  ABP: --  ABP(mean): --  RR: 17 (03 Sep 2024 11:00) (12 - 18)  SpO2: 100% (03 Sep 2024 11:00) (94% - 100%)    O2 Parameters below as of 03 Sep 2024 09:00  Patient On (Oxygen Delivery Method): nasal cannula  O2 Flow (L/min): 4          ABG - ( 03 Sep 2024 11:04 )  pH, Arterial: 7.360 pH, Blood: x     /  pCO2: 33    /  pO2: 103   / HCO3: 19    / Base Excess: -6.8  /  SaO2: 98.6                I&O's Detail    02 Sep 2024 07:01  -  03 Sep 2024 07:00  --------------------------------------------------------  IN:    IV PiggyBack: 100 mL    IV PiggyBack: 100 mL  Total IN: 200 mL    OUT:    Indwelling Catheter - Urethral (mL): 850 mL  Total OUT: 850 mL    Total NET: -650 mL      03 Sep 2024 07:01  -  03 Sep 2024 12:06  --------------------------------------------------------  IN:    IV PiggyBack: 50 mL  Total IN: 50 mL    OUT:    Indwelling Catheter - Urethral (mL): 200 mL  Total OUT: 200 mL    Total NET: -150 mL            LABS:                        8.3    9.37  )-----------( 250      ( 03 Sep 2024 10:30 )             26.1         143  |  115<H>  |  29.0<H>  ----------------------------<  121<H>  4.2   |  19.0<L>  |  2.36<H>    Ca    7.8<L>      03 Sep 2024 10:30  Phos  2.9       Mg     2.2         TPro  6.4<L>  /  Alb  2.8<L>  /  TBili  0.8  /  DBili  x   /  AST  52<H>  /  ALT  26  /  AlkPhos  157<H>            CAPILLARY BLOOD GLUCOSE      POCT Blood Glucose.: 127 mg/dL (03 Sep 2024 08:52)      Urinalysis Basic - ( 03 Sep 2024 10:30 )    Color: Yellow / Appearance: Cloudy / S.018 / pH: x  Gluc: 121 mg/dL / Ketone: Trace mg/dL  / Bili: Negative / Urobili: 0.2 mg/dL   Blood: x / Protein: 300 mg/dL / Nitrite: Negative   Leuk Esterase: Negative / RBC: 7 /HPF / WBC 5 /HPF   Sq Epi: x / Non Sq Epi: 2 /HPF / Bacteria: Moderate /HPF      CULTURES:  Culture Results:   No growth at 4 days ( @ 11:40)  Culture Results:   No growth ( @ 15:36)      Physical Examination:    General: No acute distress. Sleeping in bed  HEENT: Pupils equal 4mm, reactive to light.  Symmetric.  PULM:  course breath sounds b/L lower lobes, no significant sputum production  NECK: Supple, no lymphadenopathy, trachea midline  CVS: Regular rate and rhythm, no murmurs, rubs, or gallops  ABD: Soft, nondistended, nontender, normoactive bowel sounds, no masses, Perc Sky site covered with Tegaderm fluid contained in dressing appearing billious  EXT: No edema, nontender  SKIN: Warm and well perfused, no rashes noted. Dressings to L hip are  dry, nonbloody, intact. No swelling or erythema around surgical site.  NEURO: AxOx1-2, interactive with examiner, following simple commands      DEVICES:     RADIOLOGY: ACC: [id number] EXAM:  CT BRAIN   ORDERED BY: DAYANNA MOE     PROCEDURE DATE:  2024          INTERPRETATION:  EXAM: CT HEAD WITHOUT INTRAVENOUS CONTRAST    CLINICAL INFORMATION: AMS    TECHNIQUE: Noncontrast axial CT images were acquired through the head.   Two-dimensional sagittal and coronal reformats were generated.    COMPARISON STUDY: CT head 2024    FINDINGS:    BRAIN: No apparent acute infarct, hemorrhage or mass. No hydrocephalus.   Chronic appearing white matter hypodensities and volume loss. Small   chronic lacunar infarcts in both thalami similar to prior.    CALVARIUM: No skull fracture or concerning osseous lesions.    EXTRACRANIAL SOFT TISSUES: No acute findings.    VISUALIZED PORTIONS OF THE PARANASAL SINUSES ANDMASTOID AIR CELLS: No   air fluid levels.    IMPRESSION:  No acute intracranial findings.    CRITICAL CARE TIME SPENT: ***

## 2024-09-03 NOTE — DIETITIAN INITIAL EVALUATION ADULT - ORAL INTAKE PTA/DIET HISTORY
Chart and events reviewed, pt transferred to ICU secondary to hypotension and AMS / hypoactive delirium, noted to be a&ox1-2. Previously on minced and moist diet requiring total assistance with feeding, now NPO. Current weight 142 lbs. Last admission weight in february of this year was 178 lbs. Pt appears to have had a 36 lb weight loss x 7 months. NFPE deferred to follow up. Pt highly at risk for malnutrition, parameters to be monitored.

## 2024-09-03 NOTE — PATIENT PROFILE ADULT - FALL HARM RISK - HARM RISK INTERVENTIONS

## 2024-09-03 NOTE — DIETITIAN INITIAL EVALUATION ADULT - OTHER INFO
7yo5M with hx of cholecystitis s/p perc cholecystectomy (12/2023) with intermittent exchanges most recently 8/13, pAF not on AC due to GI bleed/fall risk, esophagitis, DM, HTN, HLD, CKD previously requiring brief HD, BPH, dementia presented to the ED 8/28 following a trip and fall and was found to have a left femoral neck fracture with operative repair initially postponed as patient developed sepsis/hypoxic respiratory failure felt secondary to aspiration PNA. Now, with acute delirium/toxic metabolic encephalopathy related to quetiapine administration or sepsis, transferred to ICU. Palliative care consulted due to chronic stepwise deterioration and for complex medical decisions.

## 2024-09-03 NOTE — DIETITIAN INITIAL EVALUATION ADULT - PERTINENT MEDS FT
MEDICATIONS  (STANDING):  dextrose 5%. 1000 milliLiter(s) (50 mL/Hr) IV Continuous <Continuous>  insulin glargine Injectable (LANTUS) 6 Unit(s) SubCutaneous every morning  insulin lispro (ADMELOG) corrective regimen sliding scale   SubCutaneous every 6 hours  pantoprazole  Injectable 40 milliGRAM(s) IV Push daily  sodium chloride 0.9% Bolus 1000 milliLiter(s) IV Bolus once  sucralfate 1 Gram(s) Oral four times a day    MEDICATIONS  (PRN):  ondansetron Injectable 4 milliGRAM(s) IV Push every 6 hours PRN Nausea and/or Vomiting

## 2024-09-03 NOTE — PROGRESS NOTE ADULT - ATTENDING COMMENTS
84-year-old  male with history of dementia CKD stage III insulin-dependent diabetes mellitus type 2 hypertension paroxysmal A-fib not on anticoagulation due to GI bleed cholecystitis with chronic percutaneous cholecystostomy presented with mechanical fall admitted for left hip fracture s/p left-sided hemiarthroplasty on August 30 with subsequent course complicated by hospital-acquired delirium fluctuating between hyperactive and hypoactive associated with oropharyngeal dysphagia and aspiration pneumonitis/pneumonia with overnight complication of  hypotension with low BP responding to 2 ltr IV  crystalloid and 1 dose of IV albumin but remained encephalopathic with combined metabolic and respiratory acidosis as well as hypoxia requiring ICU admission with improved hemodynamics and fluctuating delirium, who was planned to go for replacing percutaneous cholecystostomy (as patient had accidentally removed on September 1 during episode of delirium) course further complicated by rapid A-fib as high as 170s 180s as well as hypotension with systolics in 70s and 80s for which he received 2 L of IV crystalloid as well as 3 A of sodium bicarb push, 5 mg in total of IV Lopressor push with the plan to start sodium bicarb infusion.  At risk of embolic phenomena as he is not on chronic anticoagulation and trying to avoid amiodarone as possible and with worsening creatinine trying to avoid digoxin as possible.  Discussed with family about potential cardioversion who are agreeable for now.  Repeating  test including but not limited to EKG, CBC CMP magnesium phosphorus troponin ABG with lites.  Close monitoring of urine output.  Continue with empiric Zosyn.  If recurrent episodes of hypotension with rapid A-fib, low threshold to start Hussein-Synephrine infusion    Upon my evaluation, this patient had a high probability of imminent or life-threatening deterioration due to critical condition, which required my direct attention, intervention, and personal management. I have personally provided 60 minutes of critical care time exclusive of time spent teaching and/or time spent on separately billable procedures. Time includes review of laboratory data, radiology results, discussion with consultants, and monitoring for potential decompensation. Interventions were performed as documented above.

## 2024-09-03 NOTE — PATIENT PROFILE ADULT - FALL HARM RISK - FALLEN IN PAST
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      Your next 10 appointments already scheduled     Sep 05, 2017  2:00 PM CDT   LAB with OP LAB UR   [facility] [place], Laboratory Services ([facility])    [address] [place] [address].  [facility] [id number] [phone]           Patient must bring picture ID. Patient should be prepared to give a urine specimen  Please do not eat 10-12 hours before your appointment if you are coming in fasting for labs on lipids, cholesterol, or glucose (sugar). Pregnant women should follow their Care Team instructions. Water with medications is okay. Do not drink coffee or other fluids. If you have concerns about taking  your medications, please ask at office or if scheduling via SeatMe, send a message by clicking on Secure Messaging, Message Your Care Team.            Dec 05, 2017  1:30 PM CST   Adult Med Follow UP with Khloe Boyce MD   [facility] ([facility])    [place] [id number]  [address] [place] [address] [id number] [phone]              Future tests that were ordered for you today     Open Standing Orders        Priority Remaining Interval Expires Ordered    Buprenorphine & Metabolite Screen Routine 11/12 [facility] 9/5/2018 9/5/2017    Drug Abuse Screen 8 Urine (UR) Routine 11/12 monthly 9/5/2018 9/5/2017            Who to contact     Please call your clinic at [phone] to:    Ask questions about your health    Make or  cancel appointments    Discuss your medicines    Learn about your test results    Speak to your doctor   If you have compliments or concerns about an experience at your clinic, or if you wish to file a complaint, please contact [facility] Patient Relations at [phone] or email us at [email or web address]         Additional Information About Your Visit        DocVuehart Information     Allegiancet is an electronic gateway that provides easy, online access to your medical records. With [employer]ogy, you can request a clinic appointment, read your test results, renew a prescription or communicate with your care team.     To sign up for Allegiancet visit the website at [email or web address]   You will be asked to enter the access code listed below, as well as some personal information. Please follow the directions to create your username and password.     Your access code is: [email or web address]  Expires: 10/7/2017 10:56 PM     Your access code will  in 90 days. If you need help or a new code, please contact your [facility] or call [phone] for assistance.        Care EveryWhere ID     This is your Care EveryWhere ID. This could be used by other organizations to access your [facility] medical records  [id number]        Your Vitals Were     Pulse                   84            Blood Pressure from Last 3 Encounters:   17 139/84   17 111/83   17 151/90    Weight from Last 3 Encounters:   17 94.7 kg (208 lb 12.8 oz)   17 96.4 kg (212 lb 9.6 oz)   17 97.1 kg (213 lb 15.4 oz)                 Where to get your medicines      Some of these will need a paper prescription and others can be bought over the counter.  Ask your nurse if you have questions.     Bring a paper prescription for each of these medications     buprenorphine 2 MG Subl sublingual tablet          Primary Care Provider Office Phone # Fax #    Deann Crandall,  [phone]       [facility] [address] [address]        Equal Access to Services     HASMUKH ROSE : Lashae uriostegui bc Cee, waumeshda luqyariel, qatommieta kaalmada rani, tal espinoza stephaniesotero romero laSelmachen kline. So [facility] [phone].    ATENCIÓN: Si habla español, tiene a vernon disposición servicios gratuitos de asistencia lingüística. Llame al [phone].    We comply with applicable federal civil rights laws and [place] laws. We do not discriminate on the basis of race, color, national origin, age, disability sex, sexual orientation or gender identity.            Thank you!     Thank you for choosing [facility]  for your care. Our goal is always to provide you with excellent care. Hearing back from our patients is one way we can continue to improve our services. Please take a few minutes to complete the written survey that you may receive in the mail after your visit with us. Thank you!             Your Updated Medication List - Protect others around you: Learn how to safely use, store and throw away your medicines at www.disposemymeds.org.          This list is accurate as of: 9/5/17  1:53 PM.  Always use your most recent med list.                   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        naproxen 500 MG tablet    NAPROSYN     Take 500 mg by mouth 2 times daily (with meals). prn        TRUVADA 200-300 MG per tablet   Generic drug:  emtricitabine-tenofovir      Take 1 tablet by mouth daily.           KACEY

## 2024-09-03 NOTE — CONSULT NOTE ADULT - NS PANP COMMENT GEN_ALL_CORE FT
84M with hx of cholecystitis s/p perc cholecystectomy (12/2023) with intermittent exchanges most recently 8/13, pAF not on AC due to GI bleed/fall risk, esophagitis, DM, HTN, HLD, CKD previously requiring brief HD, BPH, dementia presented to the ED 8/28 following a trip and fall and was found to have a left femoral neck fracture with operative repair initially postponed as patient developed sepsis/hypoxic respiratory failure felt secondary to aspiration PNA. He was seen by SLP and initially recommended NPO status due to aspiration risk but subsequently cleared for PO intake 9/1. Pt ultimately underwent L hip hemiarthroplasty 8/30. Pt has had issues with intermittent agitation/delirium requiring antipsychotic. He received Seroquel 12.5mg yesterday evening for the first time. He pulled out his perc cholecystectomy drain 9/2. Overnight, pt developed episodes of hypotension to SBP70s and received 2L fluids and albumin with improvement. He has reportedly had poor PO intake. Unclear mentation at the beginning of shift but is typically AOX 2 and confused and was reported to be responsive to some questioning. Later in the evening pt was RRT as he was very difficult to arouse with pinpoint pupils. On exam, he was initially only grimacing to painful stimuli but no secretion burden and ABG 7.38/33/108 on 3L. He was taken for pan scan with CT head neg for acute pathology. CT chest/abdomen/pelvis showed scattered GGO and lower lobe consolidation, left chest soft tissue swelling and non-distended gallbladder with no biloma. Pt was transferred to MICU for airway watch given lethargy as family confirmed DNR/trial of intubation status but in MICU after some time, pt awoke agitated/combative with nursing and answering some questions, moving all extremities but not following commands similar to previously noted. Repeat labs showed slight downtrend in Hgb possibly related to fluids, elevated procal and negative lactate and improved HCO3. BP remained stable post bolus and given possible edema on imaging will hold further fluids. Appears lethargy may be secondary to Zyprexa received earlier. Will hold all sedating meds and assess for improvement. Reintroduce gabapentin when more alert and ensure renally dosed. Will check EEG. f/u prolactin. Ammonia neg. Maintain NPO/aspiration precautions. Monitor neuro checks. Monitor end tidal as able. Serial ABG. Continue with empiric zosyn for possible aspiration. Will need IR eval for perc sky replacement. Monitor LFTs. Serial abdominal exam. Monitor FS q6h. Prognosis remains guarded. 84M with hx of cholecystitis s/p perc cholecystectomy (12/2023) with intermittent exchanges most recently 8/13, pAF not on AC due to GI bleed/fall risk, esophagitis, DM, HTN, HLD, CKD previously requiring brief HD, BPH, dementia presented to the ED 8/28 following a trip and fall and was found to have a left femoral neck fracture with operative repair initially postponed as patient developed sepsis/hypoxic respiratory failure felt secondary to aspiration PNA. He was seen by SLP and initially recommended NPO status due to aspiration risk but subsequently cleared for PO intake 9/1. Pt ultimately underwent L hip hemiarthroplasty 8/30. Pt has had issues with intermittent agitation/delirium requiring antipsychotic. He received Seroquel 12.5mg yesterday evening for the first time. He pulled out his perc cholecystectomy drain 9/2. Overnight, pt developed episodes of hypotension to SBP70s and received 2L fluids and albumin with improvement. He has reportedly had poor PO intake. Unclear mentation at the beginning of shift but is typically AOX 2 and confused and was reported to be responsive to some questioning. Later in the evening pt was RRT as he was very difficult to arouse with pinpoint pupils. On exam, he was initially only grimacing to painful stimuli but no secretion burden and ABG 7.38/33/108 on 3L. He was taken for pan scan with CT head neg for acute pathology. CT chest/abdomen/pelvis showed scattered GGO and lower lobe consolidation, left chest soft tissue swelling and non-distended gallbladder with no biloma. Pt was transferred to MICU for airway watch given lethargy as family confirmed DNR/trial of intubation status but in MICU after some time, pt awoke agitated/combative with nursing and answering some questions, moving all extremities but not following commands similar to previously noted. Repeat labs showed slight downtrend in Hgb possibly related to fluids, elevated procal and negative lactate and improved HCO3. BP remained stable post bolus and given possible edema on imaging will hold further fluids. Appears lethargy may be secondary to Zyprexa received earlier. Will hold all sedating meds and assess for improvement. Reintroduce gabapentin when more alert and ensure renally dosed. Will check EEG. f/u prolactin. Ammonia neg. Maintain NPO/aspiration precautions. Monitor neuro checks. Monitor end tidal as able. Serial ABG. Will hold off on intubation at this time given he is protecting airway and appears to be waking up slowly but remains at risk for requiring this. Continue with empiric zosyn for possible aspiration. Will need IR eval for perc sky replacement. Monitor LFTs. Serial abdominal exam. Monitor FS q6h. Prognosis remains guarded.

## 2024-09-04 DIAGNOSIS — I48.91 UNSPECIFIED ATRIAL FIBRILLATION: ICD-10-CM

## 2024-09-04 LAB
ANION GAP SERPL CALC-SCNC: 10 MMOL/L — SIGNIFICANT CHANGE UP (ref 5–17)
BUN SERPL-MCNC: 26.9 MG/DL — HIGH (ref 8–20)
CALCIUM SERPL-MCNC: 7.5 MG/DL — LOW (ref 8.4–10.5)
CHLORIDE SERPL-SCNC: 108 MMOL/L — SIGNIFICANT CHANGE UP (ref 96–108)
CO2 SERPL-SCNC: 24 MMOL/L — SIGNIFICANT CHANGE UP (ref 22–29)
CREAT SERPL-MCNC: 2.18 MG/DL — HIGH (ref 0.5–1.3)
EGFR: 29 ML/MIN/1.73M2 — LOW
GLUCOSE BLDC GLUCOMTR-MCNC: 130 MG/DL — HIGH (ref 70–99)
GLUCOSE BLDC GLUCOMTR-MCNC: 154 MG/DL — HIGH (ref 70–99)
GLUCOSE BLDC GLUCOMTR-MCNC: 200 MG/DL — HIGH (ref 70–99)
GLUCOSE SERPL-MCNC: 154 MG/DL — HIGH (ref 70–99)
HCT VFR BLD CALC: 21.9 % — LOW (ref 39–50)
HCT VFR BLD CALC: 26.7 % — LOW (ref 39–50)
HGB BLD-MCNC: 6.9 G/DL — CRITICAL LOW (ref 13–17)
HGB BLD-MCNC: 8.4 G/DL — LOW (ref 13–17)
MAGNESIUM SERPL-MCNC: 2.4 MG/DL — SIGNIFICANT CHANGE UP (ref 1.6–2.6)
MCHC RBC-ENTMCNC: 27.9 PG — SIGNIFICANT CHANGE UP (ref 27–34)
MCHC RBC-ENTMCNC: 27.9 PG — SIGNIFICANT CHANGE UP (ref 27–34)
MCHC RBC-ENTMCNC: 31.5 GM/DL — LOW (ref 32–36)
MCHC RBC-ENTMCNC: 31.5 GM/DL — LOW (ref 32–36)
MCV RBC AUTO: 88.7 FL — SIGNIFICANT CHANGE UP (ref 80–100)
MCV RBC AUTO: 88.7 FL — SIGNIFICANT CHANGE UP (ref 80–100)
PHOSPHATE SERPL-MCNC: 2.4 MG/DL — SIGNIFICANT CHANGE UP (ref 2.4–4.7)
PLATELET # BLD AUTO: 268 K/UL — SIGNIFICANT CHANGE UP (ref 150–400)
PLATELET # BLD AUTO: 339 K/UL — SIGNIFICANT CHANGE UP (ref 150–400)
POTASSIUM SERPL-MCNC: 3.5 MMOL/L — SIGNIFICANT CHANGE UP (ref 3.5–5.3)
POTASSIUM SERPL-SCNC: 3.5 MMOL/L — SIGNIFICANT CHANGE UP (ref 3.5–5.3)
RBC # BLD: 2.47 M/UL — LOW (ref 4.2–5.8)
RBC # BLD: 3.01 M/UL — LOW (ref 4.2–5.8)
RBC # FLD: 17.4 % — HIGH (ref 10.3–14.5)
RBC # FLD: 17.5 % — HIGH (ref 10.3–14.5)
SODIUM SERPL-SCNC: 141 MMOL/L — SIGNIFICANT CHANGE UP (ref 135–145)
WBC # BLD: 7.94 K/UL — SIGNIFICANT CHANGE UP (ref 3.8–10.5)
WBC # BLD: 8.58 K/UL — SIGNIFICANT CHANGE UP (ref 3.8–10.5)
WBC # FLD AUTO: 7.94 K/UL — SIGNIFICANT CHANGE UP (ref 3.8–10.5)
WBC # FLD AUTO: 8.58 K/UL — SIGNIFICANT CHANGE UP (ref 3.8–10.5)

## 2024-09-04 PROCEDURE — 95813 EEG EXTND MNTR 61-119 MIN: CPT | Mod: 26

## 2024-09-04 PROCEDURE — 99233 SBSQ HOSP IP/OBS HIGH 50: CPT

## 2024-09-04 PROCEDURE — 99234 HOSP IP/OBS SM DT SF/LOW 45: CPT

## 2024-09-04 PROCEDURE — 47536 EXCHANGE BILIARY DRG CATH: CPT

## 2024-09-04 RX ORDER — METOPROLOL TARTRATE 100 MG/1
25 TABLET ORAL
Refills: 0 | Status: DISCONTINUED | OUTPATIENT
Start: 2024-09-04 | End: 2024-09-09

## 2024-09-04 RX ORDER — OLANZAPINE 7.5 MG/1
10 TABLET ORAL ONCE
Refills: 0 | Status: COMPLETED | OUTPATIENT
Start: 2024-09-04 | End: 2024-09-04

## 2024-09-04 RX ADMIN — Medication 2: at 05:06

## 2024-09-04 RX ADMIN — Medication 2: at 18:19

## 2024-09-04 RX ADMIN — PIPERACILLIN SODIUM AND TAZOBACTAM SODIUM 25 GRAM(S): 3; .375 INJECTION, POWDER, FOR SOLUTION INTRAVENOUS at 03:09

## 2024-09-04 RX ADMIN — Medication 5000 UNIT(S): at 22:36

## 2024-09-04 RX ADMIN — CHLORHEXIDINE GLUCONATE 1 APPLICATION(S): 40 SOLUTION TOPICAL at 05:14

## 2024-09-04 RX ADMIN — PIPERACILLIN SODIUM AND TAZOBACTAM SODIUM 25 GRAM(S): 3; .375 INJECTION, POWDER, FOR SOLUTION INTRAVENOUS at 15:00

## 2024-09-04 RX ADMIN — Medication 5000 UNIT(S): at 05:07

## 2024-09-04 RX ADMIN — Medication 55 MILLILITER(S): at 18:19

## 2024-09-04 RX ADMIN — OLANZAPINE 10 MILLIGRAM(S): 7.5 TABLET ORAL at 03:37

## 2024-09-04 RX ADMIN — PIPERACILLIN SODIUM AND TAZOBACTAM SODIUM 25 GRAM(S): 3; .375 INJECTION, POWDER, FOR SOLUTION INTRAVENOUS at 22:36

## 2024-09-04 NOTE — PROGRESS NOTE ADULT - SUBJECTIVE AND OBJECTIVE BOX
[facility]                                                         CARDIOLOGY AT [place] [address]                                                         Telephone: [phone]. Fax:[phone]                                                                             PROGRESS NOTE    Reason for follow up: Afib     Review of symptoms:   Cardiac:  No chest pain. No dyspnea. No palpitations.  Respiratory: no cough. No dyspnea  Gastrointestinal: No diarrhea. No abdominal pain. No bleeding.   Neuro: No focal neuro complaints.    Vitals:  T(C): 36.7 (09-04-24 @ 10:52), Max: 36.7 (09-04-24 @ 08:15)  HR: 80 (09-04-24 @ 12:00) (66 - 152)  BP: 128/46 (09-04-24 @ 12:00) (70/51 - 175/58)  RR: 17 (09-04-24 @ 12:00) (13 - 25)  SpO2: 92% (09-04-24 @ 12:00) (92% - 100%)  Wt(kg): --  I&O's Summary    03 Sep 2024 07:01  -  04 Sep 2024 07:00  --------------------------------------------------------  IN: 3700 mL / OUT: 985 mL / NET: 2715 mL    04 Sep 2024 07:01  -  04 Sep 2024 12:44  --------------------------------------------------------  IN: 240 mL / OUT: 170 mL / NET: 70 mL      Weight (kg): 64.2 (09-03 @ 01:06)    PHYSICAL EXAM:  Appearance: Comfortable. No acute distress  HEENT:  Atraumatic. Normocephalic.  Normal oral mucosa  Neurologic: A & O x 3, no gross focal deficits.  Cardiovascular: RRR S1 S2, No murmur, no rubs/gallops. No JVD  Respiratory: Lungs clear to auscultation, unlabored   Gastrointestinal:  Soft, Non-tender, + BS  Lower Extremities: 2+ Peripheral Pulses, No clubbing, cyanosis, or edema  Psychiatry: Patient is calm. No agitation.   Skin: warm and dry.    CURRENT CARDIAC MEDICATIONS:  metoprolol tartrate 25 milliGRAM(s) Oral two times a day      CURRENT OTHER MEDICATIONS:  piperacillin/tazobactam IVPB.. 3.375 Gram(s) IV Intermittent every 8 hours  acetaminophen     Tablet .. 650 milliGRAM(s) Oral every 6 hours PRN Temp greater or equal to 38C (100.4F), Mild Pain (1 - 3)  melatonin 3 milliGRAM(s) Oral at bedtime PRN Insomnia  ondansetron Injectable 4 milliGRAM(s) IV Push every 6 hours PRN Nausea and/or Vomiting  aluminum hydroxide/magnesium hydroxide/simethicone Suspension 30 milliLiter(s) Oral four times a day PRN Indigestion  bisacodyl Suppository 10 milliGRAM(s) Rectal daily PRN Constipation  pantoprazole  Injectable 40 milliGRAM(s) IV Push daily  sucralfate 1 Gram(s) Oral four times a day  dextrose 50% Injectable 12.5 Gram(s) IV Push once, Stop order after: 1 Doses  dextrose 50% Injectable 25 Gram(s) IV Push once, Stop order after: 1 Doses  dextrose 50% Injectable 25 Gram(s) IV Push once, Stop order after: 1 Doses  dextrose Oral Gel 15 Gram(s) Oral once, Stop order after: 1 Doses PRN Blood Glucose LESS THAN 70 milliGRAM(s)/deciliter  glucagon  Injectable 1 milliGRAM(s) IntraMuscular once, Stop order after: 1 Doses  insulin glargine Injectable (LANTUS) 6 Unit(s) SubCutaneous every morning  insulin lispro (ADMELOG) corrective regimen sliding scale   SubCutaneous every 6 hours  chlorhexidine 2% Cloths 1 Application(s) Topical <User Schedule>  dextrose 5%. 1000 milliLiter(s) (100 mL/Hr) IV Continuous <Continuous>  dextrose 5%. 1000 milliLiter(s) (50 mL/Hr) IV Continuous <Continuous>  heparin   Injectable 5000 Unit(s) SubCutaneous every 8 hours  lactated ringers. 550 milliLiter(s) (50 mL/Hr) IV Continuous <Continuous>, Stop order after: 6 Hours      LABS:	 	                            6.9    8.58  )-----------( 268      ( 04 Sep 2024 01:55 )             21.9     09-04    141  |  108  |  26.9<H>  ----------------------------<  154<H>  3.5   |  24.0  |  2.18<H>    Ca    7.5<L>      04 Sep 2024 01:55  Phos  2.4     09-04  Mg     2.4     09-04    TPro  5.4<L>  /  Alb  2.4<L>  /  TBili  0.7  /  DBili  x   /  AST  34  /  ALT  19  /  AlkPhos  127<H>  09-03    PT/INR/PTT ( 03 Sep 2024 14:36 )                       :                       :      11.4         :       28.6                  .        .                   .              .           .       1.03        .                                       Lipid Profile:   HgA1c:   TSH: Thyroid Stimulating Hormone, Serum: 1.24 uIU/mL                                                                    [facility]                                                         CARDIOLOGY AT [place] [address]                                                         Telephone: [phone]. Fax:[phone]                                                                             PROGRESS NOTE    Reason for follow up: pAfib with RVR     Review of symptoms:   Cardiac:  No chest pain. No dyspnea. No palpitations.  Respiratory: no cough. No dyspnea  Gastrointestinal: No diarrhea. No abdominal pain. No bleeding.   Neuro: No focal neuro complaints.    Vitals:  T(C): 36.7 (09-04-24 @ 10:52), Max: 36.7 (09-04-24 @ 08:15)  HR: 80 (09-04-24 @ 12:00) (66 - 152)  BP: 128/46 (09-04-24 @ 12:00) (70/51 - 175/58)  RR: 17 (09-04-24 @ 12:00) (13 - 25)  SpO2: 92% (09-04-24 @ 12:00) (92% - 100%)  Wt(kg): --  I&O's Summary    03 Sep 2024 07:01  -  04 Sep 2024 07:00  --------------------------------------------------------  IN: 3700 mL / OUT: 985 mL / NET: 2715 mL    04 Sep 2024 07:01  -  04 Sep 2024 12:44  --------------------------------------------------------  IN: 240 mL / OUT: 170 mL / NET: 70 mL      Weight (kg): 64.2 (09-03 @ 01:06)    PHYSICAL EXAM:  Appearance: Comfortable. No acute distress  HEENT:  Atraumatic. Normocephalic.  Normal oral mucosa  Neurologic: A & O x 3, no gross focal deficits.  Cardiovascular: RRR S1 S2, No murmur, no rubs/gallops. No JVD  Respiratory: Lungs clear to auscultation, unlabored   Gastrointestinal:  Soft, Non-tender, + BS  Lower Extremities: 2+ Peripheral Pulses, No clubbing, cyanosis, or edema  Psychiatry: Patient is calm. No agitation.   Skin: warm and dry.    CURRENT CARDIAC MEDICATIONS:  metoprolol tartrate 25 milliGRAM(s) Oral two times a day      CURRENT OTHER MEDICATIONS:  piperacillin/tazobactam IVPB.. 3.375 Gram(s) IV Intermittent every 8 hours  acetaminophen     Tablet .. 650 milliGRAM(s) Oral every 6 hours PRN Temp greater or equal to 38C (100.4F), Mild Pain (1 - 3)  melatonin 3 milliGRAM(s) Oral at bedtime PRN Insomnia  ondansetron Injectable 4 milliGRAM(s) IV Push every 6 hours PRN Nausea and/or Vomiting  aluminum hydroxide/magnesium hydroxide/simethicone Suspension 30 milliLiter(s) Oral four times a day PRN Indigestion  bisacodyl Suppository 10 milliGRAM(s) Rectal daily PRN Constipation  pantoprazole  Injectable 40 milliGRAM(s) IV Push daily  sucralfate 1 Gram(s) Oral four times a day  dextrose 50% Injectable 12.5 Gram(s) IV Push once, Stop order after: 1 Doses  dextrose 50% Injectable 25 Gram(s) IV Push once, Stop order after: 1 Doses  dextrose 50% Injectable 25 Gram(s) IV Push once, Stop order after: 1 Doses  dextrose Oral Gel 15 Gram(s) Oral once, Stop order after: 1 Doses PRN Blood Glucose LESS THAN 70 milliGRAM(s)/deciliter  glucagon  Injectable 1 milliGRAM(s) IntraMuscular once, Stop order after: 1 Doses  insulin glargine Injectable (LANTUS) 6 Unit(s) SubCutaneous every morning  insulin lispro (ADMELOG) corrective regimen sliding scale   SubCutaneous every 6 hours  chlorhexidine 2% Cloths 1 Application(s) Topical <User Schedule>  dextrose 5%. 1000 milliLiter(s) (100 mL/Hr) IV Continuous <Continuous>  dextrose 5%. 1000 milliLiter(s) (50 mL/Hr) IV Continuous <Continuous>  heparin   Injectable 5000 Unit(s) SubCutaneous every 8 hours  lactated ringers. 550 milliLiter(s) (50 mL/Hr) IV Continuous <Continuous>, Stop order after: 6 Hours      LABS:	 	                            6.9    8.58  )-----------( 268      ( 04 Sep 2024 01:55 )             21.9     09-04    141  |  108  |  26.9<H>  ----------------------------<  154<H>  3.5   |  24.0  |  2.18<H>    Ca    7.5<L>      04 Sep 2024 01:55  Phos  2.4     09-04  Mg     2.4     09-04    TPro  5.4<L>  /  Alb  2.4<L>  /  TBili  0.7  /  DBili  x   /  AST  34  /  ALT  19  /  AlkPhos  127<H>  09-03    PT/INR/PTT ( 03 Sep 2024 14:36 )                       :                       :      11.4         :       28.6                  .        .                   .              .           .       1.03        .                                       Lipid Profile:   HgA1c:   TSH: Thyroid Stimulating Hormone, Serum: 1.24 uIU/mL

## 2024-09-04 NOTE — PROGRESS NOTE ADULT - SUBJECTIVE AND OBJECTIVE BOX
MICU Transfer / Hospital course:       SUBJECTIVE / OVERNIGHT EVENTS:  Agitated overnight.  No acute distress at this time.  Pt is a poor historian but offers no acute complaints at this time.       I&O's Summary    03 Sep 2024 07:01  -  04 Sep 2024 07:00  --------------------------------------------------------  IN: 3700 mL / OUT: 985 mL / NET: 2715 mL    04 Sep 2024 07:01  -  04 Sep 2024 12:49  --------------------------------------------------------  IN: 240 mL / OUT: 170 mL / NET: 70 mL          PHYSICAL EXAM:  Vital Signs Last 24 Hrs  T(C): 36.7 (04 Sep 2024 10:52), Max: 36.7 (04 Sep 2024 08:15)  T(F): 98.1 (04 Sep 2024 10:52), Max: 98.1 (04 Sep 2024 10:52)  HR: 80 (04 Sep 2024 12:00) (66 - 152)  BP: 128/46 (04 Sep 2024 12:00) (70/51 - 175/58)  BP(mean): 67 (04 Sep 2024 12:00) (59 - 90)  RR: 17 (04 Sep 2024 12:00) (13 - 25)  SpO2: 92% (04 Sep 2024 12:00) (92% - 100%)    Parameters below as of 04 Sep 2024 12:00  Patient On (Oxygen Delivery Method): nasal cannula  O2 Flow (L/min): 4      GENERAL: pt examined bedside, laying comfortably in bed in NAD  HEENT: NC/AT, dry oral mucosa, clear conjunctiva, sclera nonicteric  RESPIRATORY: poor inspiratory effort, bibasilar fine rales, no wheezing or rhonchi  CARDIOVASCULAR: RRR, normal S1 and S2  ABDOMEN: soft, NT/ND, normoactive bowel sounds, no rebound/guarding  MUSCLOSKELETAL:  mild L-hip pain to palpation   EXTREMITIES: No cynaosis, no clubbing, no lower extremity edema  PSYCH: affect flat but intermittently cooperative  NEUROLOGY: A+O to self and family at bedside, no focal neurologic deficits appreciated   SKIN: L hip surgical incision healing well w/ mild surrounding ecchymosis poor turgur           LABS:                        6.9    8.58  )-----------( 268      ( 04 Sep 2024 01:55 )             21.9     09-04    141  |  108  |  26.9<H>  ----------------------------<  154<H>  3.5   |  24.0  |  2.18<H>    Ca    7.5<L>      04 Sep 2024 01:55  Phos  2.4     09-04  Mg     2.4     09-04    TPro  5.4<L>  /  Alb  2.4<L>  /  TBili  0.7  /  DBili  x   /  AST  34  /  ALT  19  /  AlkPhos  127<H>  09-03    PT/INR - ( 03 Sep 2024 14:36 )   PT: 11.4 sec;   INR: 1.03 ratio         PTT - ( 03 Sep 2024 14:36 )  PTT:28.6 sec      Urinalysis Basic - ( 04 Sep 2024 01:55 )    Color: x / Appearance: x / SG: x / pH: x  Gluc: 154 mg/dL / Ketone: x  / Bili: x / Urobili: x   Blood: x / Protein: x / Nitrite: x   Leuk Esterase: x / RBC: x / WBC x   Sq Epi: x / Non Sq Epi: x / Bacteria: x        Culture - Blood (collected 02 Sep 2024 23:30)  Source: .Blood Blood  Preliminary Report (04 Sep 2024 09:02):    No growth at 24 hours    Culture - Blood (collected 02 Sep 2024 23:25)  Source: .Blood Blood  Preliminary Report (04 Sep 2024 09:02):    No growth at 24 hours      CAPILLARY BLOOD GLUCOSE      POCT Blood Glucose.: 130 mg/dL (04 Sep 2024 08:21)  POCT Blood Glucose.: 200 mg/dL (04 Sep 2024 05:03)  POCT Blood Glucose.: 208 mg/dL (03 Sep 2024 23:00)  POCT Blood Glucose.: 148 mg/dL (03 Sep 2024 17:47)        RADIOLOGY & ADDITIONAL TESTS:    < from: CT Chest No Cont and CT a/p (09.03.24 @ 00:56) >  IMPRESSION:  Interstitial prominence, scattered groundglass opacities, and dependent   consolidative opacities in the bilateral lungs are new. This could   represent pneumonia, edema, or a combination.    Extensive soft tissue swelling with a small amount of soft tissue air   left chest wall. Please correlate for history of recent IV or central   line infiltration. Cellulitis/fasciitis could appear this way.    The cholecystostomy catheter seen on the previous study has been removed.   Gallbladder nondistended with no adjacent inflammation. No evidence of   biloma.    Interval left hip arthroplasty.    Other chronic findings as above.    < end of copied text >      < from: CT Head No Cont (09.03.24 @ 00:55) >  IMPRESSION:  No acute intracranial findings.    < end of copied text >        < from: US Duplex Venous Lower Ext Complete, Bilateral (08.29.24 @ 09:59) >  IMPRESSION:  No evidence of deep venous thrombosis in either lower extremity.    < end of copied text >      < from: TTE W or WO Ultrasound Enhancing Agent (08.29.24 @ 09:19) >  CONCLUSIONS:      1. Left ventricular wall thickness is mildly increased. Left ventricular systolic function is normal with an ejection fraction visually estimated at 65 to 70 %.   2. Normal right ventricular cavity size and normal right ventricular systolic function.   3. Left atrium is normal in size.   4.Trileaflet aortic valve. Fibrocalcific aortic valve sclerosis without stenosis.   5. Mild mitral valve leaflet calcification.   6. Aortic root at the sinuses of Valsalva is dilated, measuring 3.70 cm (indexed 2.29 cm/m²).   7. Pulmonary artery systolic pressure could not be estimated.   8. No pericardial effusion seen.    < end of copied text >        MEDICATIONS  (STANDING):  chlorhexidine 2% Cloths 1 Application(s) Topical <User Schedule>  dextrose 5%. 1000 milliLiter(s) (100 mL/Hr) IV Continuous <Continuous>  dextrose 5%. 1000 milliLiter(s) (50 mL/Hr) IV Continuous <Continuous>  dextrose 50% Injectable 12.5 Gram(s) IV Push once  dextrose 50% Injectable 25 Gram(s) IV Push once  dextrose 50% Injectable 25 Gram(s) IV Push once  glucagon  Injectable 1 milliGRAM(s) IntraMuscular once  heparin   Injectable 5000 Unit(s) SubCutaneous every 8 hours  insulin glargine Injectable (LANTUS) 6 Unit(s) SubCutaneous every morning  insulin lispro (ADMELOG) corrective regimen sliding scale   SubCutaneous every 6 hours  lactated ringers. 550 milliLiter(s) (50 mL/Hr) IV Continuous <Continuous>  metoprolol tartrate 25 milliGRAM(s) Oral two times a day  pantoprazole  Injectable 40 milliGRAM(s) IV Push daily  piperacillin/tazobactam IVPB.. 3.375 Gram(s) IV Intermittent every 8 hours  sucralfate 1 Gram(s) Oral four times a day    MEDICATIONS  (PRN):  acetaminophen     Tablet .. 650 milliGRAM(s) Oral every 6 hours PRN Temp greater or equal to 38C (100.4F), Mild Pain (1 - 3)  aluminum hydroxide/magnesium hydroxide/simethicone Suspension 30 milliLiter(s) Oral four times a day PRN Indigestion  bisacodyl Suppository 10 milliGRAM(s) Rectal daily PRN Constipation  dextrose Oral Gel 15 Gram(s) Oral once PRN Blood Glucose LESS THAN 70 milliGRAM(s)/deciliter  melatonin 3 milliGRAM(s) Oral at bedtime PRN Insomnia  ondansetron Injectable 4 milliGRAM(s) IV Push every 6 hours PRN Nausea and/or Vomiting                                   MICU Transfer / Hospital course:             SUBJECTIVE / OVERNIGHT EVENTS:  Agitated overnight.  No acute distress at this time.  Pt is a poor historian but offers no acute complaints at this time.       I&O's Summary    03 Sep 2024 07:01  -  04 Sep 2024 07:00  --------------------------------------------------------  IN: 3700 mL / OUT: 985 mL / NET: 2715 mL    04 Sep 2024 07:01  -  04 Sep 2024 12:49  --------------------------------------------------------  IN: 240 mL / OUT: 170 mL / NET: 70 mL          PHYSICAL EXAM:  Vital Signs Last 24 Hrs  T(C): 36.7 (04 Sep 2024 10:52), Max: 36.7 (04 Sep 2024 08:15)  T(F): 98.1 (04 Sep 2024 10:52), Max: 98.1 (04 Sep 2024 10:52)  HR: 80 (04 Sep 2024 12:00) (66 - 152)  BP: 128/46 (04 Sep 2024 12:00) (70/51 - 175/58)  BP(mean): 67 (04 Sep 2024 12:00) (59 - 90)  RR: 17 (04 Sep 2024 12:00) (13 - 25)  SpO2: 92% (04 Sep 2024 12:00) (92% - 100%)    Parameters below as of 04 Sep 2024 12:00  Patient On (Oxygen Delivery Method): nasal cannula  O2 Flow (L/min): 4      GENERAL: pt examined bedside, laying comfortably in bed in NAD  HEENT: NC/AT, dry oral mucosa, clear conjunctiva, sclera nonicteric  RESPIRATORY: poor inspiratory effort, bibasilar fine rales, no wheezing or rhonchi  CARDIOVASCULAR: RRR, normal S1 and S2  ABDOMEN: soft, NT/ND, normoactive bowel sounds, no rebound/guarding  MUSCLOSKELETAL:  mild L-hip pain to palpation   EXTREMITIES: No cynaosis, no clubbing, no lower extremity edema  PSYCH: affect flat but intermittently cooperative  NEUROLOGY: A+O to self and family at bedside, no focal neurologic deficits appreciated   SKIN: L hip surgical incision healing well w/ mild surrounding ecchymosis poor turgur           LABS:                        6.9    8.58  )-----------( 268      ( 04 Sep 2024 01:55 )             21.9     09-04    141  |  108  |  26.9<H>  ----------------------------<  154<H>  3.5   |  24.0  |  2.18<H>    Ca    7.5<L>      04 Sep 2024 01:55  Phos  2.4     09-04  Mg     2.4     09-04    TPro  5.4<L>  /  Alb  2.4<L>  /  TBili  0.7  /  DBili  x   /  AST  34  /  ALT  19  /  AlkPhos  127<H>  09-03    PT/INR - ( 03 Sep 2024 14:36 )   PT: 11.4 sec;   INR: 1.03 ratio         PTT - ( 03 Sep 2024 14:36 )  PTT:28.6 sec      Urinalysis Basic - ( 04 Sep 2024 01:55 )    Color: x / Appearance: x / SG: x / pH: x  Gluc: 154 mg/dL / Ketone: x  / Bili: x / Urobili: x   Blood: x / Protein: x / Nitrite: x   Leuk Esterase: x / RBC: x / WBC x   Sq Epi: x / Non Sq Epi: x / Bacteria: x        Culture - Blood (collected 02 Sep 2024 23:30)  Source: .Blood Blood  Preliminary Report (04 Sep 2024 09:02):    No growth at 24 hours    Culture - Blood (collected 02 Sep 2024 23:25)  Source: .Blood Blood  Preliminary Report (04 Sep 2024 09:02):    No growth at 24 hours      CAPILLARY BLOOD GLUCOSE      POCT Blood Glucose.: 130 mg/dL (04 Sep 2024 08:21)  POCT Blood Glucose.: 200 mg/dL (04 Sep 2024 05:03)  POCT Blood Glucose.: 208 mg/dL (03 Sep 2024 23:00)  POCT Blood Glucose.: 148 mg/dL (03 Sep 2024 17:47)        RADIOLOGY & ADDITIONAL TESTS:    < from: CT Chest No Cont and CT a/p (09.03.24 @ 00:56) >  IMPRESSION:  Interstitial prominence, scattered groundglass opacities, and dependent   consolidative opacities in the bilateral lungs are new. This could   represent pneumonia, edema, or a combination.    Extensive soft tissue swelling with a small amount of soft tissue air   left chest wall. Please correlate for history of recent IV or central   line infiltration. Cellulitis/fasciitis could appear this way.    The cholecystostomy catheter seen on the previous study has been removed.   Gallbladder nondistended with no adjacent inflammation. No evidence of   biloma.    Interval left hip arthroplasty.    Other chronic findings as above.    < end of copied text >      < from: CT Head No Cont (09.03.24 @ 00:55) >  IMPRESSION:  No acute intracranial findings.    < end of copied text >        < from: US Duplex Venous Lower Ext Complete, Bilateral (08.29.24 @ 09:59) >  IMPRESSION:  No evidence of deep venous thrombosis in either lower extremity.    < end of copied text >      < from: TTE W or WO Ultrasound Enhancing Agent (08.29.24 @ 09:19) >  CONCLUSIONS:      1. Left ventricular wall thickness is mildly increased. Left ventricular systolic function is normal with an ejection fraction visually estimated at 65 to 70 %.   2. Normal right ventricular cavity size and normal right ventricular systolic function.   3. Left atrium is normal in size.   4.Trileaflet aortic valve. Fibrocalcific aortic valve sclerosis without stenosis.   5. Mild mitral valve leaflet calcification.   6. Aortic root at the sinuses of Valsalva is dilated, measuring 3.70 cm (indexed 2.29 cm/m²).   7. Pulmonary artery systolic pressure could not be estimated.   8. No pericardial effusion seen.    < end of copied text >        MEDICATIONS  (STANDING):  chlorhexidine 2% Cloths 1 Application(s) Topical <User Schedule>  dextrose 5%. 1000 milliLiter(s) (100 mL/Hr) IV Continuous <Continuous>  dextrose 5%. 1000 milliLiter(s) (50 mL/Hr) IV Continuous <Continuous>  dextrose 50% Injectable 12.5 Gram(s) IV Push once  dextrose 50% Injectable 25 Gram(s) IV Push once  dextrose 50% Injectable 25 Gram(s) IV Push once  glucagon  Injectable 1 milliGRAM(s) IntraMuscular once  heparin   Injectable 5000 Unit(s) SubCutaneous every 8 hours  insulin glargine Injectable (LANTUS) 6 Unit(s) SubCutaneous every morning  insulin lispro (ADMELOG) corrective regimen sliding scale   SubCutaneous every 6 hours  lactated ringers. 550 milliLiter(s) (50 mL/Hr) IV Continuous <Continuous>  metoprolol tartrate 25 milliGRAM(s) Oral two times a day  pantoprazole  Injectable 40 milliGRAM(s) IV Push daily  piperacillin/tazobactam IVPB.. 3.375 Gram(s) IV Intermittent every 8 hours  sucralfate 1 Gram(s) Oral four times a day    MEDICATIONS  (PRN):  acetaminophen     Tablet .. 650 milliGRAM(s) Oral every 6 hours PRN Temp greater or equal to 38C (100.4F), Mild Pain (1 - 3)  aluminum hydroxide/magnesium hydroxide/simethicone Suspension 30 milliLiter(s) Oral four times a day PRN Indigestion  bisacodyl Suppository 10 milliGRAM(s) Rectal daily PRN Constipation  dextrose Oral Gel 15 Gram(s) Oral once PRN Blood Glucose LESS THAN 70 milliGRAM(s)/deciliter  melatonin 3 milliGRAM(s) Oral at bedtime PRN Insomnia  ondansetron Injectable 4 milliGRAM(s) IV Push every 6 hours PRN Nausea and/or Vomiting                                   MICU Transfer / Hospital course:   83 y/o M w/ PMH of CKD, dementia (AAOx2 at baseline), IDDMT2, HTN, chronic cholecystitis s/p percutaneous sky tube (placed 12/2023), CKDIIIa, pAF (not on AC 2/2 prior GIB and fall risk), BPH presented 08/27 to ED after mechanical fall resulting in L-femoral neck fracture and now s/p L hemiarthroplasty 8/30 w/ ortho.  Hospital course complicated by sepsis due to aspiration pneumonia, RON, severe behavioral disturbances, inadvertent removal of perc sky tube.  Hospital course further complicated by hypotension on 09/02 overnight associated w/ lethargy.  Pt was given IVFs and albumin w/ improved BP but pt remained obtunded with snoring respirations and generalized twitching and pt subsequently upgraded to MICU.  Pt requiring supplemental O2 to maintain normal O2 sats and likely from aspiration PNA and started on Zosyn.  CTH negative for acute intracranial pathology.  While in MICU pt went in to AF RVR on 09/03 and cardiology consulted but converted back to NSR.  Pt now awake and alert but NPO given concern for aspiration and SLP following.  Pt will go for perc sky tube placement today w/ IR.  Pt noted to have down trending H/H and 2u PRBC transfused today.  No concern for bleeding at this time and pt hemodynamically stable.  Pt medically stable for transfer to medicine for continued management         SUBJECTIVE / OVERNIGHT EVENTS:  Agitated overnight.  No acute distress at this time.  Pt is a poor historian but offers no acute complaints at this time.       I&O's Summary    03 Sep 2024 07:01  -  04 Sep 2024 07:00  --------------------------------------------------------  IN: 3700 mL / OUT: 985 mL / NET: 2715 mL    04 Sep 2024 07:01  -  04 Sep 2024 12:49  --------------------------------------------------------  IN: 240 mL / OUT: 170 mL / NET: 70 mL          PHYSICAL EXAM:  Vital Signs Last 24 Hrs  T(C): 36.7 (04 Sep 2024 10:52), Max: 36.7 (04 Sep 2024 08:15)  T(F): 98.1 (04 Sep 2024 10:52), Max: 98.1 (04 Sep 2024 10:52)  HR: 80 (04 Sep 2024 12:00) (66 - 152)  BP: 128/46 (04 Sep 2024 12:00) (70/51 - 175/58)  BP(mean): 67 (04 Sep 2024 12:00) (59 - 90)  RR: 17 (04 Sep 2024 12:00) (13 - 25)  SpO2: 92% (04 Sep 2024 12:00) (92% - 100%)    Parameters below as of 04 Sep 2024 12:00  Patient On (Oxygen Delivery Method): nasal cannula  O2 Flow (L/min): 4      GENERAL: pt examined bedside, laying comfortably in bed in NAD  HEENT: NC/AT, dry oral mucosa, clear conjunctiva, sclera nonicteric  RESPIRATORY: poor inspiratory effort, bibasilar fine rales, no wheezing or rhonchi  CARDIOVASCULAR: RRR, normal S1 and S2  ABDOMEN: soft, NT/ND, normoactive bowel sounds, no rebound/guarding  MUSCLOSKELETAL:  mild L-hip pain to palpation   EXTREMITIES: No cynaosis, no clubbing, no lower extremity edema  PSYCH: affect flat but intermittently cooperative  NEUROLOGY: A+O to self and family at bedside, no focal neurologic deficits appreciated   SKIN: L hip surgical incision healing well w/ mild surrounding ecchymosis poor turgur           LABS:                        6.9    8.58  )-----------( 268      ( 04 Sep 2024 01:55 )             21.9     09-04    141  |  108  |  26.9<H>  ----------------------------<  154<H>  3.5   |  24.0  |  2.18<H>    Ca    7.5<L>      04 Sep 2024 01:55  Phos  2.4     09-04  Mg     2.4     09-04    TPro  5.4<L>  /  Alb  2.4<L>  /  TBili  0.7  /  DBili  x   /  AST  34  /  ALT  19  /  AlkPhos  127<H>  09-03    PT/INR - ( 03 Sep 2024 14:36 )   PT: 11.4 sec;   INR: 1.03 ratio         PTT - ( 03 Sep 2024 14:36 )  PTT:28.6 sec      Urinalysis Basic - ( 04 Sep 2024 01:55 )    Color: x / Appearance: x / SG: x / pH: x  Gluc: 154 mg/dL / Ketone: x  / Bili: x / Urobili: x   Blood: x / Protein: x / Nitrite: x   Leuk Esterase: x / RBC: x / WBC x   Sq Epi: x / Non Sq Epi: x / Bacteria: x        Culture - Blood (collected 02 Sep 2024 23:30)  Source: .Blood Blood  Preliminary Report (04 Sep 2024 09:02):    No growth at 24 hours    Culture - Blood (collected 02 Sep 2024 23:25)  Source: .Blood Blood  Preliminary Report (04 Sep 2024 09:02):    No growth at 24 hours      CAPILLARY BLOOD GLUCOSE      POCT Blood Glucose.: 130 mg/dL (04 Sep 2024 08:21)  POCT Blood Glucose.: 200 mg/dL (04 Sep 2024 05:03)  POCT Blood Glucose.: 208 mg/dL (03 Sep 2024 23:00)  POCT Blood Glucose.: 148 mg/dL (03 Sep 2024 17:47)        RADIOLOGY & ADDITIONAL TESTS:    < from: CT Chest No Cont and CT a/p (09.03.24 @ 00:56) >  IMPRESSION:  Interstitial prominence, scattered groundglass opacities, and dependent   consolidative opacities in the bilateral lungs are new. This could   represent pneumonia, edema, or a combination.    Extensive soft tissue swelling with a small amount of soft tissue air   left chest wall. Please correlate for history of recent IV or central   line infiltration. Cellulitis/fasciitis could appear this way.    The cholecystostomy catheter seen on the previous study has been removed.   Gallbladder nondistended with no adjacent inflammation. No evidence of   biloma.    Interval left hip arthroplasty.    Other chronic findings as above.    < end of copied text >      < from: CT Head No Cont (09.03.24 @ 00:55) >  IMPRESSION:  No acute intracranial findings.    < end of copied text >        < from: US Duplex Venous Lower Ext Complete, Bilateral (08.29.24 @ 09:59) >  IMPRESSION:  No evidence of deep venous thrombosis in either lower extremity.    < end of copied text >      < from: TTE W or WO Ultrasound Enhancing Agent (08.29.24 @ 09:19) >  CONCLUSIONS:      1. Left ventricular wall thickness is mildly increased. Left ventricular systolic function is normal with an ejection fraction visually estimated at 65 to 70 %.   2. Normal right ventricular cavity size and normal right ventricular systolic function.   3. Left atrium is normal in size.   4.Trileaflet aortic valve. Fibrocalcific aortic valve sclerosis without stenosis.   5. Mild mitral valve leaflet calcification.   6. Aortic root at the sinuses of Valsalva is dilated, measuring 3.70 cm (indexed 2.29 cm/m²).   7. Pulmonary artery systolic pressure could not be estimated.   8. No pericardial effusion seen.    < end of copied text >        MEDICATIONS  (STANDING):  chlorhexidine 2% Cloths 1 Application(s) Topical <User Schedule>  dextrose 5%. 1000 milliLiter(s) (100 mL/Hr) IV Continuous <Continuous>  dextrose 5%. 1000 milliLiter(s) (50 mL/Hr) IV Continuous <Continuous>  dextrose 50% Injectable 12.5 Gram(s) IV Push once  dextrose 50% Injectable 25 Gram(s) IV Push once  dextrose 50% Injectable 25 Gram(s) IV Push once  glucagon  Injectable 1 milliGRAM(s) IntraMuscular once  heparin   Injectable 5000 Unit(s) SubCutaneous every 8 hours  insulin glargine Injectable (LANTUS) 6 Unit(s) SubCutaneous every morning  insulin lispro (ADMELOG) corrective regimen sliding scale   SubCutaneous every 6 hours  lactated ringers. 550 milliLiter(s) (50 mL/Hr) IV Continuous <Continuous>  metoprolol tartrate 25 milliGRAM(s) Oral two times a day  pantoprazole  Injectable 40 milliGRAM(s) IV Push daily  piperacillin/tazobactam IVPB.. 3.375 Gram(s) IV Intermittent every 8 hours  sucralfate 1 Gram(s) Oral four times a day    MEDICATIONS  (PRN):  acetaminophen     Tablet .. 650 milliGRAM(s) Oral every 6 hours PRN Temp greater or equal to 38C (100.4F), Mild Pain (1 - 3)  aluminum hydroxide/magnesium hydroxide/simethicone Suspension 30 milliLiter(s) Oral four times a day PRN Indigestion  bisacodyl Suppository 10 milliGRAM(s) Rectal daily PRN Constipation  dextrose Oral Gel 15 Gram(s) Oral once PRN Blood Glucose LESS THAN 70 milliGRAM(s)/deciliter  melatonin 3 milliGRAM(s) Oral at bedtime PRN Insomnia  ondansetron Injectable 4 milliGRAM(s) IV Push every 6 hours PRN Nausea and/or Vomiting

## 2024-09-04 NOTE — PROGRESS NOTE ADULT - NS ATTEND AMEND GEN_ALL_CORE FT
Orthopaedic Trauma Surgeon Addendum:    I have personally performed a face-to-face diagnostic evaluation on this patient.  I have reviewed the physician assistant note and agree with the history, exam, and plan of care, except as noted.    Discussed with Anesthesia team and per-op staff.  Patient was appearing better earlier to day but looks septic at this time.  Will postpone OR until more stable.     Orthopedic Surgery is ready to proceed with surgery pending medical optimization and adequate Operating Room availability. Risks of surgical delay discussed with other providers and staff. Please call with any questions or concerns.       Janes Kam MD  Orthopaedic Trauma Surgeon  [facility]
Orthopaedic Trauma Surgeon Addendum:    I have reviewed the physician assistant note and agree with the history, exam, and plan of care, except as noted.    Elevated WBC, tachycardic, hypotensive, hypoxic, rising creatinine, elevated lactate.    Will defer surgery at this time.  Recommend continued goals of care discussion with family. Ortho will continue to follow.     Janes Kam MD  Orthopaedic Trauma Surgeon  [facility] [place]
seen with above,    84M history significant for dementia, HTN, HLD, DM2, CKD, pAfib (not on AC due to GI bleed/fall risk), esophagitis, cholecystitis with percutaneous cholecystostomy admitted on 8/28 after mechanical fall with L-femoral fracture s/p hemiarthroplasty course complicated by aspiration pneumonia with septic shock transferred to MICU was on vasopressor since weaned off, self removed per-cholecystostomy drain pending IR replacement and also with recurrent pAfib RVR up to 160s    -currently in sinus 80s, SBP 140s will add metoprolol tartrate 25mg BID, CHADSVasc 4, but not candidate for anticoagulation use given bleeding/fall risk, Hb esteban 6.9 today getting PRBC transfusion, if recurrent sustain Afib RVR with low BP can use Amiodarone to maintain sinus without long term AC use, recent Echo normal LV EF and normal LA size   -reconsult if new cardiac issue arise, need to establish care in the office for long term follow up      Markus Mejia DO, [facility]  Faculty Non-Invasive Cardiologist  [phone]

## 2024-09-04 NOTE — PROGRESS NOTE ADULT - SUBJECTIVE AND OBJECTIVE BOX
Patient is a 84y old  Male who presents with a chief complaint of Fracture of unspecified part of neck of left femur, initial encounter for closed fracture     (03 Sep 2024 14:49)      BRIEF HOSPITAL COURSE: 85 y/o M with a h/o CKD, pAF (no a/c), DM2, HTN, dementia, HTN, cholecystitis with chronic indwelling percutaneous cholecystostomy (inserted 12/2023), admitted on 8/28 after mechanical fall and left femoral neck fracture, s/p left hip hemiarthroplasty on 8/30. Hospital course complicated by sepsis, aspiration pneumonia, RON, and agitation with inadvertent removal of perc sky tube. Transferred to MICU on 9/3 after becoming obtunded with generalized twitching and transient hypotension. CT brain negative for acute intracranial pathology.    Events last 24 hours: Waxing and waning mental status. This morning severely agitated and combative- struck RN. Required IM olanzapine for sedation. cEEG monitoring ongoing. Issues with AF with RVR overnight, however converted s/p CCB.          PAST MEDICAL & SURGICAL HISTORY:  Diabetes      Hypertension      Prostate disorder      Anxiety      High cholesterol      Ulcer      Acute cholecystitis      History of endoscopy      Cholecystostomy tube dysfunction      S/P cataract surgery          Review of Systems:  Unable to obtain secondary to AMS.        Medications:  piperacillin/tazobactam IVPB.. 3.375 Gram(s) IV Intermittent every 8 hours    diltiazem Injectable 16 milliGRAM(s) IV Push every 6 hours PRN      acetaminophen     Tablet .. 650 milliGRAM(s) Oral every 6 hours PRN  melatonin 3 milliGRAM(s) Oral at bedtime PRN  ondansetron Injectable 4 milliGRAM(s) IV Push every 6 hours PRN      heparin   Injectable 5000 Unit(s) SubCutaneous every 8 hours    aluminum hydroxide/magnesium hydroxide/simethicone Suspension 30 milliLiter(s) Oral four times a day PRN  pantoprazole  Injectable 40 milliGRAM(s) IV Push daily  sucralfate 1 Gram(s) Oral four times a day      dextrose 50% Injectable 25 Gram(s) IV Push once  dextrose 50% Injectable 25 Gram(s) IV Push once  dextrose 50% Injectable 12.5 Gram(s) IV Push once  dextrose Oral Gel 15 Gram(s) Oral once PRN  glucagon  Injectable 1 milliGRAM(s) IntraMuscular once  insulin glargine Injectable (LANTUS) 6 Unit(s) SubCutaneous every morning  insulin lispro (ADMELOG) corrective regimen sliding scale   SubCutaneous every 6 hours    dextrose 5%. 1000 milliLiter(s) IV Continuous <Continuous>  dextrose 5%. 1000 milliLiter(s) IV Continuous <Continuous>  dextrose 5%. 1000 milliLiter(s) IV Continuous <Continuous>      chlorhexidine 2% Cloths 1 Application(s) Topical <User Schedule>            ICU Vital Signs Last 24 Hrs  T(C): 36.3 (04 Sep 2024 03:38), Max: 36.6 (03 Sep 2024 07:46)  T(F): 97.3 (04 Sep 2024 03:38), Max: 97.8 (03 Sep 2024 07:46)  HR: 88 (04 Sep 2024 06:00) (66 - 152)  BP: 120/49 (04 Sep 2024 06:00) (70/51 - 146/50)  BP(mean): 64 (04 Sep 2024 06:00) (59 - 90)  ABP: --  ABP(mean): --  RR: 16 (04 Sep 2024 06:00) (13 - 25)  SpO2: 98% (04 Sep 2024 06:00) (96% - 100%)    O2 Parameters below as of 04 Sep 2024 04:00  Patient On (Oxygen Delivery Method): nasal cannula  O2 Flow (L/min): 4          ABG - ( 03 Sep 2024 11:04 )  pH, Arterial: 7.360 pH, Blood: x     /  pCO2: 33    /  pO2: 103   / HCO3: 19    / Base Excess: -6.8  /  SaO2: 98.6                I&O's Detail    02 Sep 2024 07:01  -  03 Sep 2024 07:00  --------------------------------------------------------  IN:    IV PiggyBack: 100 mL    IV PiggyBack: 100 mL  Total IN: 200 mL    OUT:    Indwelling Catheter - Urethral (mL): 850 mL  Total OUT: 850 mL    Total NET: -650 mL      03 Sep 2024 07:01  -  04 Sep 2024 06:07  --------------------------------------------------------  IN:    dextrose 5%: 1100 mL    IV PiggyBack: 300 mL    Lactated Ringers Bolus: 1000 mL    Sodium Bicarbonate: 300 mL    Sodium Chloride 0.9% Bolus: 1000 mL  Total IN: 3700 mL    OUT:    Indwelling Catheter - Urethral (mL): 935 mL  Total OUT: 935 mL    Total NET: 2765 mL            LABS:                        6.9    8.58  )-----------( 268      ( 04 Sep 2024 01:55 )             21.9     09-04    141  |  108  |  26.9<H>  ----------------------------<  154<H>  3.5   |  24.0  |  2.18<H>    Ca    7.5<L>      04 Sep 2024 01:55  Phos  2.4     09-04  Mg     2.4     09-04    TPro  5.4<L>  /  Alb  2.4<L>  /  TBili  0.7  /  DBili  x   /  AST  34  /  ALT  19  /  AlkPhos  127<H>  09-03          CAPILLARY BLOOD GLUCOSE      POCT Blood Glucose.: 200 mg/dL (04 Sep 2024 05:03)    PT/INR - ( 03 Sep 2024 14:36 )   PT: 11.4 sec;   INR: 1.03 ratio         PTT - ( 03 Sep 2024 14:36 )  PTT:28.6 sec  Urinalysis Basic - ( 04 Sep 2024 01:55 )    Color: x / Appearance: x / SG: x / pH: x  Gluc: 154 mg/dL / Ketone: x  / Bili: x / Urobili: x   Blood: x / Protein: x / Nitrite: x   Leuk Esterase: x / RBC: x / WBC x   Sq Epi: x / Non Sq Epi: x / Bacteria: x      CULTURES:  Culture Results:   No growth at 5 days (08-29-24 @ 11:40)        Physical Examination:    General: periodic agitation, more often very lethargic    HEENT: Pupils equal, reactive to light.  Symmetric.    PULM: Clear to auscultation bilaterally, no significant sputum production    CVS: Regular rate and rhythm, no murmurs, rubs, or gallops    ABD: Soft, nondistended, nontender, normoactive bowel sounds, no masses    EXT: No edema, nontender    SKIN: Warm and well perfused, no rashes noted.    NEURO: waxing and waning between obtunded and awake/agitated, moves all extremities        RADIOLOGY:     < from: CT Abdomen and Pelvis No Cont (09.03.24 @ 00:56) >  FINDINGS:  CHEST:  LUNGS AND LARGE AIRWAYS: Patent central airways. Interstitial prominence,   scattered groundglass opacities, and dependent consolidative opacities in   the bilateral lungs are new.  PLEURA: No pleural effusion.  VESSELS: No thoracic aortic aneurysm. Coronary artery atherosclerosis.  HEART: Mild cardiomegaly similar to prior. No pericardial effusion.  MEDIASTINUM AND MARIANELA: No lymphadenopathy.  CHEST WALL AND LOWER NECK: Extensive soft tissue edema in the left chest   wall, extending from above the left shoulder, into the axilla, pectoralis   region, and its the left upper flank. Small amount of air in soft tissues   adjacent to the left approximately veins.    ABDOMEN AND PELVIS:  LIVER: Within normal limits.  BILE DUCTS: Normal caliber.  GALLBLADDER: Cholelithiasis, no evidence cholecystitis. The   cholecystostomy catheter seen on the previous study has been removed,   tract remains visible.  SPLEEN: Within normal limits.  PANCREAS: Within normal limits.  ADRENALS: Within normal limits.  KIDNEYS/URETERS: No acute abnormality. Left greater than right cysts.   Small nonobstructing stone upper pole right kidney.    BLADDER: Decompressed with Horta catheter. Partially obscured by left hip   arthroplasty.  REPRODUCTIVE ORGANS: Mildly enlarged prostate.    BOWEL: Left colonic diverticulosis, no diverticulitis. Appendix not   identified, no evidence of appendicitis. No obstruction or inflammation   of the bowel. Noninflamed duodenal diverticulum.  PERITONEUM/RETROPERITONEUM: Within normal limits.  VESSELS: Atherosclerotic changes. No abdominal aortic aneurysm.  LYMPH NODES: No lymphadenopathy.  ABDOMINAL WALL: Postoperative changes left hip arthroplasty partially   visible.  BONES: Interval left total hip arthroplasty. No acute fracture or   aggressive osseous lesion.    IMPRESSION:  Interstitial prominence, scattered groundglass opacities, and dependent   consolidative opacities in the bilateral lungs are new. This could   represent pneumonia, edema, or a combination.    Extensive soft tissue swelling with a small amount of soft tissue air   left chest wall. Please correlate for history of recent IV or central   line infiltration. Cellulitis/fasciitis could appear this way.    The cholecystostomy catheter seen on the previous study has been removed.   Gallbladder nondistended with no adjacent inflammation. No evidence of   biloma.    Interval left hip arthroplasty.    Other chronic findings as above.    < end of copied text >

## 2024-09-04 NOTE — PROGRESS NOTE ADULT - ASSESSMENT
84M with PMHx of cholecystitis s/p perc cholecystectomy (12/2023) with intermittent exchanges most recently 8/13, pAF not on AC due to GI bleed/fall risk, esophagitis, DM, HTN, HLD, CKD previously requiring brief HD, BPH, dementia presented to the ED 8/28 following a trip and fall, admitted on 8/28 after mechanical fall and left femoral neck fracture, s/p left hip hemiarthroplasty on 8/30.     9/3 - Afib w RVR in the setting of metabolic acidosis, cholecystitis, aspiration pnu and recent hip surgery. Currently in NSR and normotensive, NPO for IR procedure.   9/4 - cardiology consulted for Afib management

## 2024-09-04 NOTE — PROGRESS NOTE ADULT - PROBLEM SELECTOR PLAN 1
- episodes of paroxysmal Afib  - was previously seen by our group on a previous admission for new onset afib which was in the setting of covid and cholecystitis.  - pt was supposed to follow up as outpatient in our office w/ Dr. Mejia but never followed up   - Echo shows EF 65-70%  - rates are driven in setting of metabolic acidosis, cholecystitis, aspiration pnu and recent hip surgery.   - treat underlying causes  - Pt not on AC due to hx of GIB and falls   - continue beta blocker, metoprolol 25mg BID   - treat underlying anemia, hgb is 6.9 consider transfusions

## 2024-09-04 NOTE — EEG REPORT - NS EEG TEXT BOX
BERNADINE ANDERSON [id number]     Study Start Date: 09-03-24 0800  Study End Date: 09- 0800  Duration: 24 hr  --------------------------------------------------------------------------------------------------  History:  CC/ HPI Patient is a 84y old  Male who presents with a chief complaint of L hip fx (03 Sep 2024 01:25)    MEDICATIONS  (STANDING):  chlorhexidine 2% Cloths 1 Application(s) Topical <User Schedule>  dextrose 5%. 1000 milliLiter(s) (50 mL/Hr) IV Continuous <Continuous>  dextrose 5%. 1000 milliLiter(s) (100 mL/Hr) IV Continuous <Continuous>  dextrose 50% Injectable 25 Gram(s) IV Push once  dextrose 50% Injectable 12.5 Gram(s) IV Push once  dextrose 50% Injectable 25 Gram(s) IV Push once  glucagon  Injectable 1 milliGRAM(s) IntraMuscular once  insulin glargine Injectable (LANTUS) 6 Unit(s) SubCutaneous every morning  insulin lispro (ADMELOG) corrective regimen sliding scale   SubCutaneous every 6 hours  pantoprazole    Tablet 40 milliGRAM(s) Oral before breakfast  piperacillin/tazobactam IVPB.. 3.375 Gram(s) IV Intermittent every 8 hours  sucralfate 1 Gram(s) Oral four times a day    --------------------------------------------------------------------------------------------------  Study Interpretation:    [[[Abbreviation Key:  PDR=alpha rhythm/posterior dominant rhythm. A-P=anterior posterior.  Amplitude: ‘very low’:<20; ‘low’:20-49; ‘medium’:; ‘high’:>150uV.  Persistence for periodic/rhythmic patterns (% of epoch) ‘rare’:<1%; ‘occasional’:1-10%; ‘frequent’:10-50%; ‘abundant’:50-90%; ‘continuous’:>90%.  Persistence for sporadic discharges: ‘rare’:<1/hr; ‘occasional’:1/min-1/hr; ‘frequent’:>1/min; ‘abundant’:>1/10 sec.  RPP=rhythmic and periodic patterns; GRDA=generalized rhythmic delta activity; FIRDA=frontal intermittent GRDA; LRDA=lateralized rhythmic delta activity; TIRDA=temporal intermittent rhythmic delta activity;  LPD=PLED=lateralized periodic discharges; GPD=generalized periodic discharges; BIPDs =bilateral independent periodic discharges; Mf=multifocal; SIRPDs=stimulus induced rhythmic, periodic, or ictal appearing discharges; BIRDs=brief potentially ictal rhythmic discharges >4 Hz, lasting .5-10s; PFA (paroxysmal bursts >13 Hz or =8 Hz <10s).  Modifiers: +F=with fast component; +S=with spike component; +R=with rhythmic component.  S-B=burst suppression pattern.  Max=maximal. N1-drowsy; N2-stage II sleep; N3-slow wave sleep. SSS/BETS=small sharp spikes/benign epileptiform transients of sleep. HV=hyperventilation; PS=photic stimulation]]]    Daily EEG Visual Analysis    FINDINGS:      Background:  Continuity: Continuous  Symmetry: Symmetric  Posterior dominant rhythm (PDR): 6-7 Hz, reactive to eye closure. Intermittent low-amplitude frontal beta in wakefulness.  Voltage: Normal  Anterior-Posterior Gradient: Present  Other background findings: Frequent diffuse polymorphic delta slowing  Breach: Absent    Background Slowing:  Generalized slowing: As above   Focal slowing: None    State Changes:   Drowsiness is characterized by slowing of the background activity.  Stage 2 sleep is characterized by symmetric K complexes.    Interictal Findings:  None    Electrographic and Electroclinical seizures:  None    Other Clinical Events:  None    Activation Procedures:   Hyperventilation is not performed.    Photic stimulation is not performed.    Artifacts:  Intermittent myogenic and movement artifacts are present.    Single-lead EKG: Regular rhythm 60-80B PM, occasional PVCs    EEG Classification / Summary:  Abnormal EEG in the awake, drowsy, and asleep states.   Mild diffuse slowing.  No focal or epileptiform abnormalities are captured.   No seizures.    Clinical Impression:  Mild diffuse cerebral dysfunction is nonspecific in etiology.   No epileptiform abnormalities or seizures.   BERNADINE ANDERSON [id number]     Study Start Date: 09-03-24 08:00  Study End Date: 09- 09:08  Note: No recording available for review between 9/4/24 06:53-08:00  Duration: 23 hr 39 min  --------------------------------------------------------------------------------------------------  History:  CC/ HPI Patient is a 84y old  Male who presents with a chief complaint of L hip fx (03 Sep 2024 01:25)    MEDICATIONS  (STANDING):  chlorhexidine 2% Cloths 1 Application(s) Topical <User Schedule>  dextrose 5%. 1000 milliLiter(s) (50 mL/Hr) IV Continuous <Continuous>  dextrose 5%. 1000 milliLiter(s) (100 mL/Hr) IV Continuous <Continuous>  dextrose 50% Injectable 25 Gram(s) IV Push once  dextrose 50% Injectable 12.5 Gram(s) IV Push once  dextrose 50% Injectable 25 Gram(s) IV Push once  glucagon  Injectable 1 milliGRAM(s) IntraMuscular once  insulin glargine Injectable (LANTUS) 6 Unit(s) SubCutaneous every morning  insulin lispro (ADMELOG) corrective regimen sliding scale   SubCutaneous every 6 hours  pantoprazole    Tablet 40 milliGRAM(s) Oral before breakfast  piperacillin/tazobactam IVPB.. 3.375 Gram(s) IV Intermittent every 8 hours  sucralfate 1 Gram(s) Oral four times a day    --------------------------------------------------------------------------------------------------  Study Interpretation:    [[[Abbreviation Key:  PDR=alpha rhythm/posterior dominant rhythm. A-P=anterior posterior.  Amplitude: ‘very low’:<20; ‘low’:20-49; ‘medium’:; ‘high’:>150uV.  Persistence for periodic/rhythmic patterns (% of epoch) ‘rare’:<1%; ‘occasional’:1-10%; ‘frequent’:10-50%; ‘abundant’:50-90%; ‘continuous’:>90%.  Persistence for sporadic discharges: ‘rare’:<1/hr; ‘occasional’:1/min-1/hr; ‘frequent’:>1/min; ‘abundant’:>1/10 sec.  RPP=rhythmic and periodic patterns; GRDA=generalized rhythmic delta activity; FIRDA=frontal intermittent GRDA; LRDA=lateralized rhythmic delta activity; TIRDA=temporal intermittent rhythmic delta activity;  LPD=PLED=lateralized periodic discharges; GPD=generalized periodic discharges; BIPDs =bilateral independent periodic discharges; Mf=multifocal; SIRPDs=stimulus induced rhythmic, periodic, or ictal appearing discharges; BIRDs=brief potentially ictal rhythmic discharges >4 Hz, lasting .5-10s; PFA (paroxysmal bursts >13 Hz or =8 Hz <10s).  Modifiers: +F=with fast component; +S=with spike component; +R=with rhythmic component.  S-B=burst suppression pattern.  Max=maximal. N1-drowsy; N2-stage II sleep; N3-slow wave sleep. SSS/BETS=small sharp spikes/benign epileptiform transients of sleep. HV=hyperventilation; PS=photic stimulation]]]    Daily EEG Visual Analysis    FINDINGS:      Background:  Continuity: Continuous  Symmetry: Symmetric  Posterior dominant rhythm (PDR): Poorly formed, 6.5 Hz, reactive to eye closure. Intermittent low-amplitude frontal beta in wakefulness.  Voltage: Normal  Anterior-Posterior Gradient: Present  Other background findings: Frequent diffuse polymorphic theta slowing  Breach: Absent    Background Slowing:  Generalized slowing: As above   Focal slowing: No persistent focal slowing    State Changes:   Drowsiness is characterized by slowing of the background activity.  Stage 2 sleep is characterized by symmetric K complexes.    Interictal Findings:  None    Electrographic and Electroclinical seizures:  None    Other Clinical Events:  None    Activation Procedures:   Hyperventilation is not performed.    Photic stimulation is not performed.    Artifacts:  Intermittent myogenic and movement artifacts are present. Continuous diffuse 60-Hz artifact.    Single-lead EKG: Irregularly irregular rhythm at times. Regular rhythm at times.    EEG Classification / Summary:  Abnormal EEG in the awake, drowsy, and asleep states.   Mild diffuse slowing.  No epileptiform abnormalities are captured.   No seizures.    Clinical Impression:  Mild diffuse cerebral dysfunction is nonspecific in etiology.   No epileptiform abnormalities or seizures.  Single-lead EKG: Irregularly irregular rhythm at times.       -------------------------------------------------------------------------------------------------------    Best Graves DO  Epilepsy Fellow    Annmarie Huntley MD  Attending Physician, [facility]    -------------------------------------------------------------------------------------------------------    To reach EEG technologist:  Please use the pager number for the appropriate hospital or contact the .  At [facility] - Pager #: [phone]    To reach EEG-reading physician:  EEG Reading Room Phone #: [phone]  Epilepsy Answering Service after 5PM and before 8:30AM: Phone #: [phone]

## 2024-09-04 NOTE — CHART NOTE - NSCHARTNOTEFT_GEN_A_CORE
84M with PMHx of cholecystitis s/p perc cholecystectomy (12/2023) with intermittent exchanges most recently 8/13, pAF not on AC due to GI bleed/fall risk, esophagitis, DM, HTN, HLD, CKD previously requiring brief HD, BPH, dementia presented to the ED 8/28 following a trip and fall, admitted on 8/28 after mechanical fall and left femoral neck fracture, s/p left hip hemiarthroplasty on 8/30.     9/3 Afib w RVR in the setting of metabolic acidosis, cholecystitis, aspiration pnu and recent hip surgery. Currently in NSR and normotensive, NPO for IR procedure.     Neuro:  #Hypoactive Delirium - has history of hospital acquired delirium during previous admission     #Possible toxic metabolic encephalopathy related to quetiapine administration or sepsis  Today patient is responsive to name, Kiswahili speaking, at times has incomprehensible speech, AxO 1-2, evaluated with ED . Following simple commands.  - cEEG no epileptiform activity d/c'd  - 3am 1x 10mg IM Zyprexa given for hyperactive delerium/aggitation   - Q4 neuro checks    Cardio:  #Parox Afib  - 9/3 Parox afib: rec 3 pushes of sodium bicarb w/ gtt, 7.5 mg total IVP lopressor, 2 NS bolus + 1L LR bolus   - Overnight 10mg IV diltiazem x 1, 16mg IV diltiazem x 1 with good effect in HR control and eventual conversion to NSR, no further intv needed, cardiology reconsulted  - BP improved s/p volume resuscitation  - monitor hemodynamics closely, maintain a MAP > 65    Respiratory:  #Asipration PNA  Saturating well on 3L NC  CXR 9/3 Scattered bibasilar linear atelectases   CT chest appears to reveal multifocal aspiration pneumonia  Aspiration precautions    GI:  # Chronic Cholecystitis  #9/1, pt pulled perc sky drain  - CTA/P 9/3 cholecystostomy catheter seen on the previous study has been removed. Gallbladder nondistended with no adjacent inflammation. No evidence of biloma.  - May require NGT for meds/feeds in the near future, if need arises plan to discuss risk vs benefit with family potential to increase aggitation in patient.   - IR 9/4 for replacement of drain, most recently exchanged 8/13 with IR   - Protonix GI PPx, PRN suppository (no BM 8/28)    Renal:  #RON (prerenal?) on CXD  #Metabolic Acidosis, slowly improving  - Cr downtrending 2.18--> 2.4 before recent change in mental status Cr 1.75  - Strict I/Os / Replete electrolytes as needed; use caution due to CKD    ID  #Aspiration PNA  Afebrile overnight, WBC down trending   - Zosyn Q8  - Repeat cultures 9/2 negative (8/29 BCx negative)  - 9/3 UA noninfectious    Heme:  - Hgb with slight downtrend to 6.9 this morning, no evidence of bleeding clinically, plan for transfusion of 1u PRBC, consent obtained 9/4  - 5000U Hep q8h     Endo  #IDDM2  - cont ISS/lantus 6U AM held, adjust as needed   - D/C'd D5W @ 100cc/hr for hypernatremia/dehydration (Na 141 from 149)  -Hba1c 7.1     MSK  #L femoral neck fx POD #5 of left hip tracy-arthroplasty  Orthopedics following    Dispo:  - Pending downgrade to Tele  - IR later this AM/PM for replacement cholecystostomy catheter   - Spoke with daughter, plan of care for the day discussed, blood consent obtained.   - Speech and swallow re-ordered, cardiology re-consulted for paroxysmal a-fib 9/3 to assess need for AC risk vs benefit in the future.      Case discussed with MICU physician, Dr. Mccall 84M with PMHx of cholecystitis s/p perc cholecystectomy (12/2023) with intermittent exchanges most recently 8/13, pAF not on AC due to GI bleed/fall risk, esophagitis, DM, HTN, HLD, CKD previously requiring brief HD, BPH, dementia presented to the ED 8/28 following a trip and fall, admitted on 8/28 after mechanical fall and left femoral neck fracture, s/p left hip hemiarthroplasty on 8/30.     9/3 Afib w RVR in the setting of metabolic acidosis, cholecystitis, aspiration pnu and recent hip surgery. Currently in NSR and normotensive, NPO for IR procedure.     Neuro:  #Hypoactive Delirium - has history of hospital acquired delirium during previous admission     #Possible toxic metabolic encephalopathy related to quetiapine administration or sepsis  Today patient is responsive to name, Estonian speaking, at times has incomprehensible speech, AxO 1-2, evaluated with ED . Following simple commands.  - cEEG no epileptiform activity d/c'd  - 3am 1x 10mg IM Zyprexa given for hyperactive delerium/aggitation   - Q4 neuro checks    Cardio:  #Parox Afib  - 9/3 Parox afib: rec 3 pushes of sodium bicarb w/ gtt, 7.5 mg total IVP lopressor, 2 NS bolus + 1L LR bolus   - Overnight 16 mg IV diltiazem x 1, 16mg IV diltiazem x 1 with good effect in HR control and eventual conversion to NSR, no further intv needed, cardiology reconsulted  - BP improved s/p volume resuscitation  - monitor hemodynamics closely, maintain a MAP > 65    Respiratory:  #Asipration PNA  Saturating well on 3L NC  CXR 9/3 Scattered bibasilar linear atelectases   CT chest appears to reveal multifocal aspiration pneumonia  Aspiration precautions    GI:  # Chronic Cholecystitis  #9/1, pt pulled perc sky drain  - CTA/P 9/3 cholecystostomy catheter seen on the previous study has been removed. Gallbladder nondistended with no adjacent inflammation. No evidence of biloma.  - May require NGT for meds/feeds in the near future, if need arises plan to discuss risk vs benefit with family potential to increase agitation in patient.   - IR 9/4 for replacement of drain, most recently exchanged 8/13 with IR   - Protonix GI PPx, PRN suppository (no BM 8/28)    Renal:  #RON (prerenal?) on CXD  #Metabolic Acidosis, slowly improving  - Cr downtrending 2.18--> 2.4 before recent change in mental status Cr 1.75  - Strict I/Os / Replete electrolytes as needed; use caution due to CKD    ID  #Aspiration PNA  Afebrile overnight, WBC down trending   - Zosyn Q8  - Repeat cultures 9/2 negative (8/29 BCx negative)  - 9/3 UA noninfectious    Heme:  - Hgb with slight downtrend to 6.9 this morning, no evidence of bleeding clinically, plan for transfusion of 1u PRBC, consent obtained 9/4  - 5000U Hep q8h     Endo  #IDDM2  - cont ISS/lantus 6U AM held, adjust as needed   - D/C'd D5W @ 100cc/hr for hypernatremia/dehydration (Na 141 from 149)  -Hba1c 7.1     MSK  #L femoral neck fx POD #5 of left hip tracy-arthroplasty  Orthopedics following    Dispo:  - Pending downgrade to Tele  - IR later this AM/PM for replacement cholecystostomy catheter   - Spoke with daughter, plan of care for the day discussed, blood consent obtained.   - Speech and swallow re-ordered, cardiology re-consulted for paroxysmal a-fib 9/3 to assess need for AC risk vs benefit in the future.      Case discussed with MICU physician, Dr. Mccall

## 2024-09-04 NOTE — CHART NOTE - NSCHARTNOTEFT_GEN_A_CORE
Brief Hospital Course:    84M with PMHx paroxysmal Afib (not on AC due to GI bleeds) of cholecystitis s/p perc sky (12/2023) with intermittent exchanges most recently 8/13, esophagitis, DM, HTN, HLD, CKD previously requiring brief HD, BPH, dementia presents after trip and fall backward found to have a left femoral neck fracture. Now, status post Hemiarthroplasty of left hip on 8/30. Found to have aspiration PNA complicated by septic shock. MICU course never required pressors, hypotension managed with volume resuscitation. Initially with concern for ability to protect airway status due to lethargy/ encephalopathy. Overall mental status has improved, following commands with Waxing and waning hypo/hyper active delirium. Has been on 3L NC since MICU admission. Hospital course complicated by self removal of percutaneous cochleostomy drain on 9/1. 9/3 Afib w RVR in the setting of metabolic acidosis, cholecystitis, aspiration PNA/pneumoitis, and recent hip surgery. Currently in NSR and normotensive.      PLAN:  Neuro:  #Hypoactive Delirium - has history of hospital acquired delirium during previous admission     #Possible toxic metabolic encephalopathy related to quetiapine administration or sepsis  Today patient is responsive to name, Welsh speaking, at times has incomprehensible speech, AxO 1-2, evaluated with ED . Following simple commands.  - cEEG no epileptiform activity d/c'd  - 3am 1x 10mg IM Zyprexa given for hyperactive delerium/aggitation   - Q4 neuro checks    Cardio:  #Parox Afib  - 9/3 Parox afib: rec 3 pushes of sodium bicarb w/ gtt, 7.5 mg total IVP lopressor, 2 NS bolus + 1L LR bolus   - Overnight 10mg IV diltiazem x 1, 16mg IV diltiazem x 1 with good effect in HR control and eventual conversion to NSR, no further intv needed, cardiology reconsulted  - BP improved s/p volume resuscitation  - monitor hemodynamics closely, maintain a MAP > 65    Respiratory:  Saturating well on 3L NC    GI:  # Chronic Cholecystitis  - CTA/P 9/3 cholecystostomy catheter seen on the previous study has been removed. Gallbladder nondistended with no adjacent inflammation. No evidence of biloma.  - May require NGT for meds/feeds in the near future, if need arises plan to discuss risk vs benefit with family potential to increase aggitation in patient.   - IR 9/4 for replacement of drain, most recently exchanged 8/13 with IR       Renal:  #RON (prerenal?) on CXD  #Metabolic Acidosis, slowly improving  - Cr downtrending 2.18--> 2.4 before recent change in mental status Cr 1.75    ID  - Continue Zosyn Q8 for #Aspiration PNA      Heme:  - Hgb with slight downtrend to 6.9 this morning, no evidence of bleeding clinically, plan for transfusion of 1u PRBC, consent obtained 9/4  - Continue 5000U Hep q8h     Endo  #IDDM2  - cont ISS/lantus 6U AM held, adjust as needed   - D/C'd D5W @ 100cc/hr for hypernatremia/dehydration (Na 141 from 149)  -Hba1c 7.1     MSK  #L femoral neck fx POD #5 of left hip tracy-arthroplasty  Orthopedics following    Dispo:  - Downgrade to Tele dicussed case with Dr. White  - IR later this AM/PM for replacement cholecystostomy catheter   - Spoke with daughter, plan of care for the day discussed, blood consent obtained.   - Speech and swallow re-ordered, cardiology re-consulted for paroxysmal a-fib 9/3 to assess need for AC risk vs benefit in the future.      Janes Mcneil MD

## 2024-09-04 NOTE — PROGRESS NOTE ADULT - ASSESSMENT
83 y/o M with a h/o CKD, DM2, HTN, dementia, HTN, cholecystitis with chronic indwelling percutaneous cholecystostomy (inserted 12/2023), with:    # Hospital acquired delirium  # Metabolic encephalopathy  # AF with RVR  # Hypovolemia/hypotension  # Acute hypoxemic respiratory failure  # Aspiration pneumonia  # Hypernatremia  # Severe anemia  # Left femoral neck fracture, s/p hemiarthroplasty 8/30    - 10mg IM olanzapine x 1 for severe agitation and combativeness  - recent EEG report negative for seizures, if this remains true today will discontinue  - conventional nasal O2 to maintain SpO2 > 92%  - monitor airway/respiratory status closely given encephalopathy  - 10mg IV diltiazem x 1, 16mg IV diltiazem x 1 with good effect in HR control and eventual conversion to NSR  - BP improved s/p volume resuscitation  - monitor hemodynamics closely, maintain a MAP > 65  - CT chest appears to reveal multifocal aspiration pneumonia  - CT A/P report pending to evaluate GB and surrounding area s/p inadvertent removal of perc sky tube with ongoing drainage  - does not meet sepsis criteria  - continue empiric Zosyn for now  - Hgb with slight downtrend to 6.9 this morning, no evidence of bleeding clinically, plan for transfusion of 1u PRBC  - start D5W @ 100cc/hr for hypernatremia/dehydration  - may require NGT for meds/feeds in the near future    Case discussed with MICU physician, Dr. Leal.    55 minutes accounts for the total time spent on this patient encounter, which includes assessing and addressing the problems and their associated risks as mentioned above, reviewing the medical record/laboratory data/imaging studies, interviewing and physically examining the patient, as well as coordinating care with the multidisciplinary team. The date of entry of this note reflects the date of services rendered.

## 2024-09-04 NOTE — PROGRESS NOTE ADULT - ASSESSMENT
MICU Transfer / Hospital course:   85 y/o M w/ PMH of CKD, dementia (AAOx2 at baseline), IDDMT2, HTN, chronic cholecystitis s/p percutaneous sky tube (placed 12/2023), CKDIIIa, pAF (not on AC 2/2 prior GIB and fall risk), BPH presented 08/27 to ED after mechanical fall resulting in L-femoral neck fracture and now s/p L hemiarthroplasty 8/30 w/ ortho.  Hospital course complicated by sepsis due to aspiration pneumonia, RON, severe behavioral disturbances, inadvertent removal of perc sky tube.  Hospital course further complicated by hypotension on 09/02 overnight associated w/ lethargy.  Pt was given IVFs and albumin w/ improved BP but pt remained obtunded with snoring respirations and generalized twitching and pt subsequently upgraded to MICU.  Pt requiring supplemental O2 to maintain normal O2 sats and likely from aspiration PNA and started on Zosyn.  CTH negative for acute intracranial pathology.  While in MICU pt went in to AF RVR on 09/03 and cardiology consulted but converted back to NSR.  Pt now awake and alert but NPO given concern for aspiration and SLP following.  Pt will go for perc sky tube placement today w/ IR.  Pt noted to have down trending H/H and 2u PRBC transfused today.  No concern for bleeding at this time and pt hemodynamically stable.  Pt medically stable for transfer to medicine for continued management       Acute delerium in the setting of underlying dementia w/ behavioral disturbances  - Likley component of ICU/hospital delerium and exacerbated by dehydration, sepsis, acute hypoxic resp failure and anemia  - As per family at bedside pt is AAOx2 at baseline   - Currently AAO to self and family but continues to have significant behavioral disturbances and waxiing and waning mentation   - Severe sun downing reported, pt is impulsive pulling at lines and intermittently aggressive   - Ammonia and B12 normal, CTH negative, UA w/out pyuria but w/ ketones likely from starvation ketosis   - Folate and RPR pending   - c/w IVF given pt NPO for concern of aspiration   - Monitor mental status and reorient when possible   - Was on Seroquel and zyprexa but d/c'd per daughters request and now NPO   - [facility] but daughter hesitant to start it at this time    - Fall and aspiration precautions including HOB elevation   - Likely will need safety sitter once on the floors especially if NGT required   - Will consult behavior health       Acute on chronic anemia   - Has hx of GIB but currently no evidence of bleeding and remains hemodynamically stable   - Baseline Hb ranges btw 8-10 and had slow down trend past few days   - Hb 6.9 today and 2u PRBC ordered by MICU today   - Post transfusion H/H ordered for 6pm   - Hold off on iron panel given actively being transfused and will not be accurate but check in 4-6wks  - Iron panel from 02/2024 reviewed and appears to be of chronic dx  - B12 nL and will check folate  - BUN/Cr ratio normal and is on ppi for GI cytoprotection   - Low suspicion for hemolysis but will order smear and hepatic panel and if concerning will order hapto/LDH   - Monitor CBC and transfuse for Hb<8      AF RVR  - 9/3 developed AF RVR but likely precipitated by metabolic acidosis, hypoxia, aspiration PNA, anemia and hypovolemia   - Now back in NSR and rate controlled  - c/w prn IV BB while npo but resume po metoprolol once clered for diet   - TSH normal   - Not on AC due to fall risk and hx of GIB  - c/w VTE ppx dose heparin   - Cardio consulted by MICU       RON on CKD3a likely prerenal azotemia   - Pt NPO for past week due to risk of aspiration w/ waxing and waning mentation    - Renal function improving w/ ongoing IV hydration   - Has required temporary HD in past but currently has good urine output   - Wills in place but unable to give flomax as pt remains npo pending SLP eval  - Will c/w wills for now and once cleared for diet will resume flomax and then do TOV  - Monitor renal function lytes and I/O's  - Avoid nephrotoxic meds or renally dose if needed       Sepsis likely 2/2 aspiration pna   Acute hypoxic respiratory failure 2/2 aspiration PNA/pneumonitis, atelectasis and anemia  - No pressors required while in unit as bp responded to IVF resuscitation and albumin  - Most recent CT c/a/p 09/03 reporting interstitial ground glass opacities and dependent b/l consolidations  - BCxs 08/29 and 08/02 NGTD    - Procal elevated but unreliable in setting of RON/CKD  - On Zosyn, started 09/03/24  - Had originally passed bedside dysphagia 9/1 but given waxing and waning mentation again was made npo  - SLP following for reassessment   - c/w strict aspiration precautions including HOB elevation   - Weaned off O2 while bedside and tolerating   - Suspect PRBC transfusion helped w/ oxygenation   - OOB to chair and encourage incentive spirometer if able to   - Monitor CBC and temperature       Chronic cholecystitis   - s/p perc sky drain placed 12/2023 and pulled out by pt during this admission on 09/01  - Going for IR w/ anesthesia today 09/04 to replace drain   - Pt is not medically optimized for cholecystectomy therefore needs to continue w/ drain   - Monitor drain output daily       IDDM  - A1c 7.1   - c/w bolus regimen   - c/w ISS and hypoglycemic protocol   - Titrate insulin to maintain BG <180      Severe Esophagitis  - Resume sucralfate QID once cleared for PO  - c/w PPI IV         VTE ppx: heparin sq    Dispo: remains acute.  Remains NPO pending SLP reassessment if not may need NGT and daughter/wife both in agreement.  Anticipate NITHIN when stable.          MICU Transfer / Hospital course:   85 y/o M w/ PMH of CKD, dementia (AAOx2 at baseline), IDDMT2, HTN, chronic cholecystitis s/p percutaneous sky tube (placed 12/2023), CKDIIIa, pAF (not on AC 2/2 prior GIB and fall risk), BPH presented 08/27 to ED after mechanical fall resulting in L-femoral neck fracture and now s/p L hemiarthroplasty 8/30 w/ ortho.  Hospital course complicated by sepsis due to aspiration pneumonia, RON, severe behavioral disturbances, inadvertent removal of perc sky tube.  Hospital course further complicated by hypotension on 09/02 overnight associated w/ lethargy.  Pt was given IVFs and albumin w/ improved BP but pt remained obtunded with snoring respirations and generalized twitching and pt subsequently upgraded to MICU.  Pt requiring supplemental O2 to maintain normal O2 sats and likely from aspiration PNA and started on Zosyn.  CTH negative for acute intracranial pathology.  While in MICU pt went in to AF RVR on 09/03 and cardiology consulted but converted back to NSR.  Pt now awake and alert but NPO given concern for aspiration and SLP following.  Pt will go for perc sky tube placement today w/ IR.  Pt noted to have down trending H/H and 2u PRBC transfused today.  No concern for bleeding at this time and pt hemodynamically stable.  Pt medically stable for transfer to medicine for continued management       Acute delirium in the setting of underlying dementia w/ behavioral disturbances  Acute metabolic encephalopathy   - Likely component of ICU/hospital delirium and exacerbated by dehydration, sepsis, acute hypoxic resp failure and anemia  - As per family at bedside pt is AAOx2 at baseline   - Currently AAO to self and family but continues to have significant behavioral disturbances and waxing and waning mentation   - Severe sun downing reported, pt is impulsive pulling at lines and intermittently aggressive   - Ammonia and B12 normal, CTH negative, UA w/out pyuria but w/ ketones likely from starvation ketosis   - Folate and RPR pending   - c/w IVF given pt NPO for concern of aspiration   - Monitor mental status and reorient when possible   - Was on Seroquel and zyprexa but d/c'd per daughters request and now NPO   - [facility] but daughter hesitant to start it at this time    - Fall and aspiration precautions including HOB elevation   - Likely will need safety sitter once on the floors especially if NGT required   - Will consult behavior health       Acute on chronic anemia   - Has hx of GIB but currently no evidence of bleeding and remains hemodynamically stable   - Baseline Hb ranges btw 8-10 and had slow down trend past few days   - Hb 6.9 today and 2u PRBC ordered by MICU today   - Post transfusion H/H ordered for 6pm   - Hold off on iron panel given actively being transfused and will not be accurate but check in 4-6wks  - Iron panel from 02/2024 reviewed and appears to be of chronic dx  - B12 nL and will check folate  - BUN/Cr ratio normal and is on ppi for GI cytoprotection   - Low suspicion for hemolysis but will order smear and hepatic panel and if concerning will order hapto/LDH   - Monitor CBC and transfuse for Hb<8      AF RVR  - 9/3 developed AF RVR but likely precipitated by metabolic acidosis, hypoxia, aspiration PNA, anemia and hypovolemia   - Now back in NSR and rate controlled  - c/w prn IV BB while npo but resume po metoprolol once clered for diet   - TSH normal   - Not on AC due to fall risk and hx of GIB  - c/w VTE ppx dose heparin   - Cardio consulted by MICU       RON on CKD3a likely prerenal azotemia   - Pt NPO for past week due to risk of aspiration w/ waxing and waning mentation    - Renal function improving w/ ongoing IV hydration   - Has required temporary HD in past but currently has good urine output   - Wills in place but unable to give flomax as pt remains npo pending SLP eval  - Will c/w wills for now and once cleared for diet will resume flomax and then do TOV  - Monitor renal function lytes and I/O's  - Avoid nephrotoxic meds or renally dose if needed       Sepsis likely 2/2 aspiration pna   Acute hypoxic respiratory failure 2/2 aspiration PNA/pneumonitis, atelectasis and anemia  - No pressors required while in unit as bp responded to IVF resuscitation and albumin  - Most recent CT c/a/p 09/03 reporting interstitial ground glass opacities and dependent b/l consolidations  - BCxs 08/29 and 08/02 NGTD    - Procal elevated but unreliable in setting of RON/CKD  - On Zosyn, started 09/03/24  - Had originally passed bedside dysphagia 9/1 but given waxing and waning mentation again was made npo  - SLP following for reassessment   - c/w strict aspiration precautions including HOB elevation   - Weaned off O2 while bedside and tolerating   - Suspect PRBC transfusion helped w/ oxygenation   - OOB to chair and encourage incentive spirometer if able to   - Monitor CBC and temperature       Chronic cholecystitis   - s/p perc sky drain placed 12/2023 and pulled out by pt during this admission on 09/01  - Going for IR w/ anesthesia today 09/04 to replace drain   - Pt is not medically optimized for cholecystectomy therefore needs to continue w/ drain   - Monitor drain output daily       IDDM  - A1c 7.1   - c/w basal insulin regimen   - c/w ISS and hypoglycemic protocol   - Titrate insulin to maintain BG <180      Severe Esophagitis  - Resume sucralfate QID once cleared for PO  - c/w PPI IV         VTE ppx: heparin sq    Dispo: remains acute.  Remains NPO pending SLP reassessment if not may need NGT and daughter/wife both in agreement.  Anticipate NITHIN when stable.

## 2024-09-05 DIAGNOSIS — N18.9 CHRONIC KIDNEY DISEASE, UNSPECIFIED: ICD-10-CM

## 2024-09-05 DIAGNOSIS — F03.918 UNSPECIFIED DEMENTIA, UNSPECIFIED SEVERITY, WITH OTHER BEHAVIORAL DISTURBANCE: ICD-10-CM

## 2024-09-05 DIAGNOSIS — R13.10 DYSPHAGIA, UNSPECIFIED: ICD-10-CM

## 2024-09-05 DIAGNOSIS — J69.0 PNEUMONITIS DUE TO INHALATION OF FOOD AND VOMIT: ICD-10-CM

## 2024-09-05 DIAGNOSIS — D64.9 ANEMIA, UNSPECIFIED: ICD-10-CM

## 2024-09-05 DIAGNOSIS — Z51.5 ENCOUNTER FOR PALLIATIVE CARE: ICD-10-CM

## 2024-09-05 LAB
ALBUMIN SERPL ELPH-MCNC: 2.4 G/DL — LOW (ref 3.3–5.2)
ALP SERPL-CCNC: 121 U/L — HIGH (ref 40–120)
ALT FLD-CCNC: 15 U/L — SIGNIFICANT CHANGE UP
ANION GAP SERPL CALC-SCNC: 14 MMOL/L — SIGNIFICANT CHANGE UP (ref 5–17)
ANISOCYTOSIS BLD QL: SLIGHT — SIGNIFICANT CHANGE UP
AST SERPL-CCNC: 25 U/L — SIGNIFICANT CHANGE UP
BASOPHILS # BLD AUTO: 0 K/UL — SIGNIFICANT CHANGE UP (ref 0–0.2)
BASOPHILS NFR BLD AUTO: 0 % — SIGNIFICANT CHANGE UP (ref 0–2)
BILIRUB SERPL-MCNC: 0.7 MG/DL — SIGNIFICANT CHANGE UP (ref 0.4–2)
BUN SERPL-MCNC: 19.8 MG/DL — SIGNIFICANT CHANGE UP (ref 8–20)
CALCIUM SERPL-MCNC: 8 MG/DL — LOW (ref 8.4–10.5)
CHLORIDE SERPL-SCNC: 104 MMOL/L — SIGNIFICANT CHANGE UP (ref 96–108)
CO2 SERPL-SCNC: 22 MMOL/L — SIGNIFICANT CHANGE UP (ref 22–29)
CREAT SERPL-MCNC: 2.13 MG/DL — HIGH (ref 0.5–1.3)
DACRYOCYTES BLD QL SMEAR: SLIGHT — SIGNIFICANT CHANGE UP
EGFR: 30 ML/MIN/1.73M2 — LOW
ELLIPTOCYTES BLD QL SMEAR: SLIGHT — SIGNIFICANT CHANGE UP
EOSINOPHIL # BLD AUTO: 0.34 K/UL — SIGNIFICANT CHANGE UP (ref 0–0.5)
EOSINOPHIL NFR BLD AUTO: 4.3 % — SIGNIFICANT CHANGE UP (ref 0–6)
FOLATE SERPL-MCNC: 19.8 NG/ML — SIGNIFICANT CHANGE UP
GIANT PLATELETS BLD QL SMEAR: PRESENT — SIGNIFICANT CHANGE UP
GLUCOSE BLDC GLUCOMTR-MCNC: 147 MG/DL — HIGH (ref 70–99)
GLUCOSE BLDC GLUCOMTR-MCNC: 147 MG/DL — HIGH (ref 70–99)
GLUCOSE BLDC GLUCOMTR-MCNC: 154 MG/DL — HIGH (ref 70–99)
GLUCOSE BLDC GLUCOMTR-MCNC: 178 MG/DL — HIGH (ref 70–99)
GLUCOSE SERPL-MCNC: 146 MG/DL — HIGH (ref 70–99)
HCT VFR BLD CALC: 22.3 % — LOW (ref 39–50)
HCT VFR BLD CALC: 25.2 % — LOW (ref 39–50)
HGB BLD-MCNC: 7 G/DL — CRITICAL LOW (ref 13–17)
HGB BLD-MCNC: 8.1 G/DL — LOW (ref 13–17)
LYMPHOCYTES # BLD AUTO: 0.76 K/UL — LOW (ref 1–3.3)
LYMPHOCYTES # BLD AUTO: 9.6 % — LOW (ref 13–44)
MAGNESIUM SERPL-MCNC: 2 MG/DL — SIGNIFICANT CHANGE UP (ref 1.8–2.6)
MANUAL SMEAR VERIFICATION: SIGNIFICANT CHANGE UP
MCHC RBC-ENTMCNC: 26.9 PG — LOW (ref 27–34)
MCHC RBC-ENTMCNC: 27.9 PG — SIGNIFICANT CHANGE UP (ref 27–34)
MCHC RBC-ENTMCNC: 31.4 GM/DL — LOW (ref 32–36)
MCHC RBC-ENTMCNC: 32.1 GM/DL — SIGNIFICANT CHANGE UP (ref 32–36)
MCV RBC AUTO: 83.7 FL — SIGNIFICANT CHANGE UP (ref 80–100)
MCV RBC AUTO: 88.8 FL — SIGNIFICANT CHANGE UP (ref 80–100)
METAMYELOCYTES # FLD: 2.6 % — HIGH (ref 0–0)
MICROCYTES BLD QL: SLIGHT — SIGNIFICANT CHANGE UP
MONOCYTES # BLD AUTO: 0.55 K/UL — SIGNIFICANT CHANGE UP (ref 0–0.9)
MONOCYTES NFR BLD AUTO: 7 % — SIGNIFICANT CHANGE UP (ref 2–14)
MYELOCYTES NFR BLD: 1.7 % — HIGH (ref 0–0)
NEUTROPHILS # BLD AUTO: 5.82 K/UL — SIGNIFICANT CHANGE UP (ref 1.8–7.4)
NEUTROPHILS NFR BLD AUTO: 73.9 % — SIGNIFICANT CHANGE UP (ref 43–77)
OVALOCYTES BLD QL SMEAR: SLIGHT — SIGNIFICANT CHANGE UP
PHOSPHATE SERPL-MCNC: 2.9 MG/DL — SIGNIFICANT CHANGE UP (ref 2.4–4.7)
PLAT MORPH BLD: ABNORMAL
PLATELET # BLD AUTO: 318 K/UL — SIGNIFICANT CHANGE UP (ref 150–400)
PLATELET # BLD AUTO: 326 K/UL — SIGNIFICANT CHANGE UP (ref 150–400)
POIKILOCYTOSIS BLD QL AUTO: SLIGHT — SIGNIFICANT CHANGE UP
POLYCHROMASIA BLD QL SMEAR: SIGNIFICANT CHANGE UP
POTASSIUM SERPL-MCNC: 3.8 MMOL/L — SIGNIFICANT CHANGE UP (ref 3.5–5.3)
POTASSIUM SERPL-SCNC: 3.8 MMOL/L — SIGNIFICANT CHANGE UP (ref 3.5–5.3)
PROT SERPL-MCNC: 5.2 G/DL — LOW (ref 6.6–8.7)
RBC # BLD: 2.51 M/UL — LOW (ref 4.2–5.8)
RBC # BLD: 3.01 M/UL — LOW (ref 4.2–5.8)
RBC # FLD: 17.4 % — HIGH (ref 10.3–14.5)
RBC # FLD: 21 % — HIGH (ref 10.3–14.5)
RBC BLD AUTO: ABNORMAL
SODIUM SERPL-SCNC: 140 MMOL/L — SIGNIFICANT CHANGE UP (ref 135–145)
T PALLIDUM AB TITR SER: NEGATIVE — SIGNIFICANT CHANGE UP
TARGETS BLD QL SMEAR: SLIGHT — SIGNIFICANT CHANGE UP
VARIANT LYMPHS # BLD: 0.9 % — SIGNIFICANT CHANGE UP (ref 0–6)
WBC # BLD: 7.87 K/UL — SIGNIFICANT CHANGE UP (ref 3.8–10.5)
WBC # BLD: 7.95 K/UL — SIGNIFICANT CHANGE UP (ref 3.8–10.5)
WBC # FLD AUTO: 7.87 K/UL — SIGNIFICANT CHANGE UP (ref 3.8–10.5)
WBC # FLD AUTO: 7.95 K/UL — SIGNIFICANT CHANGE UP (ref 3.8–10.5)

## 2024-09-05 PROCEDURE — 99233 SBSQ HOSP IP/OBS HIGH 50: CPT

## 2024-09-05 PROCEDURE — 99222 1ST HOSP IP/OBS MODERATE 55: CPT

## 2024-09-05 PROCEDURE — 99223 1ST HOSP IP/OBS HIGH 75: CPT

## 2024-09-05 RX ORDER — VALPROIC ACID 250 MG
125 CAPSULE ORAL THREE TIMES A DAY
Refills: 0 | Status: DISCONTINUED | OUTPATIENT
Start: 2024-09-05 | End: 2024-09-09

## 2024-09-05 RX ORDER — TAMSULOSIN HYDROCHLORIDE 0.4 MG/1
0.8 CAPSULE ORAL AT BEDTIME
Refills: 0 | Status: DISCONTINUED | OUTPATIENT
Start: 2024-09-05 | End: 2024-09-09

## 2024-09-05 RX ADMIN — Medication 2: at 17:24

## 2024-09-05 RX ADMIN — PIPERACILLIN SODIUM AND TAZOBACTAM SODIUM 25 GRAM(S): 3; .375 INJECTION, POWDER, FOR SOLUTION INTRAVENOUS at 06:48

## 2024-09-05 RX ADMIN — PIPERACILLIN SODIUM AND TAZOBACTAM SODIUM 25 GRAM(S): 3; .375 INJECTION, POWDER, FOR SOLUTION INTRAVENOUS at 14:41

## 2024-09-05 RX ADMIN — SUCRALFATE 1 GRAM(S): 1 SUSPENSION ORAL at 17:25

## 2024-09-05 RX ADMIN — TAMSULOSIN HYDROCHLORIDE 0.8 MILLIGRAM(S): 0.4 CAPSULE ORAL at 22:40

## 2024-09-05 RX ADMIN — Medication 125 MILLIGRAM(S): at 22:40

## 2024-09-05 RX ADMIN — INSULIN GLARGINE 6 UNIT(S): 100 INJECTION, SOLUTION SUBCUTANEOUS at 08:59

## 2024-09-05 RX ADMIN — METOPROLOL TARTRATE 25 MILLIGRAM(S): 100 TABLET ORAL at 17:24

## 2024-09-05 RX ADMIN — Medication 40 MILLIGRAM(S): at 11:24

## 2024-09-05 RX ADMIN — PIPERACILLIN SODIUM AND TAZOBACTAM SODIUM 25 GRAM(S): 3; .375 INJECTION, POWDER, FOR SOLUTION INTRAVENOUS at 22:41

## 2024-09-05 RX ADMIN — Medication 5000 UNIT(S): at 14:39

## 2024-09-05 RX ADMIN — Medication 2: at 11:24

## 2024-09-05 NOTE — PROGRESS NOTE ADULT - TIME BILLING
Time spent reviewing the chart documentation, reviewing labs and imaging studies, evaluating the patient, discussing the plan of care with the consultants & medical team, and documenting.
Time spent reviewing the chart documentation, reviewing labs and imaging studies, evaluating the patient, discussing the plan of care with the consultant Psych Dr. Chase & medical team, and documenting.
pAfib RVR, Acute on chronic anemia

## 2024-09-05 NOTE — SWALLOW BEDSIDE ASSESSMENT ADULT - COMMENTS
As per MD note: "83 y/o M w/ PMH of CKD, dementia (AAOx2 at baseline), IDDMT2, HTN, chronic cholecystitis s/p percutaneous sky tube (placed 12/2023), CKDIIIa, pAF (not on AC 2/2 prior GIB and fall risk), BPH presented 08/27 to ED after mechanical fall resulting in L-femoral neck fracture and now s/p L hemiarthroplasty 8/30 w/ ortho.  Hospital course complicated by sepsis due to aspiration pneumonia, RON, severe behavioral disturbances, inadvertent removal of perc sky tube.  Hospital course further complicated by hypotension on 09/02 overnight associated w/ lethargy.  Pt was given IVFs and albumin w/ improved BP but pt remained obtunded with snoring respirations and generalized twitching and pt subsequently upgraded to MICU.  Pt requiring supplemental O2 to maintain normal O2 sats and likely from aspiration PNA and started on Zosyn.  CTH negative for acute intracranial pathology.  While in MICU pt went in to AF RVR on 09/03 and cardiology consulted but converted back to NSR.  Pt now awake and alert but NPO given concern for aspiration and SLP following.  Pt will go for perc sky tube placement today w/ IR.  Pt noted to have down trending H/H and 2u PRBC transfused today.  No concern for bleeding at this time and pt hemodynamically stable.  Pt medically stable for transfer to medicine for continued management"

## 2024-09-05 NOTE — CONSULT NOTE ADULT - TIME BILLING
Time spent with review of chart documents, labs, imaging. Direct patient assessment,  formulation of care plan. Discussion with  Interdisciplinary  team  Dr. Tamayo

## 2024-09-05 NOTE — BH CONSULTATION LIAISON ASSESSMENT NOTE - CURRENT MEDICATION
MEDICATIONS  (STANDING):  chlorhexidine 2% Cloths 1 Application(s) Topical <User Schedule>  dextrose 5%. 1000 milliLiter(s) (100 mL/Hr) IV Continuous <Continuous>  dextrose 5%. 1000 milliLiter(s) (50 mL/Hr) IV Continuous <Continuous>  dextrose 50% Injectable 12.5 Gram(s) IV Push once  dextrose 50% Injectable 25 Gram(s) IV Push once  dextrose 50% Injectable 25 Gram(s) IV Push once  glucagon  Injectable 1 milliGRAM(s) IntraMuscular once  heparin   Injectable 5000 Unit(s) SubCutaneous every 8 hours  insulin glargine Injectable (LANTUS) 6 Unit(s) SubCutaneous every morning  insulin lispro (ADMELOG) corrective regimen sliding scale   SubCutaneous every 6 hours  lactated ringers. 550 milliLiter(s) (50 mL/Hr) IV Continuous <Continuous>  lactated ringers. 1000 milliLiter(s) (55 mL/Hr) IV Continuous <Continuous>  metoprolol tartrate 25 milliGRAM(s) Oral two times a day  pantoprazole  Injectable 40 milliGRAM(s) IV Push daily  piperacillin/tazobactam IVPB.. 3.375 Gram(s) IV Intermittent every 8 hours  sucralfate 1 Gram(s) Oral four times a day    MEDICATIONS  (PRN):  acetaminophen     Tablet .. 650 milliGRAM(s) Oral every 6 hours PRN Temp greater or equal to 38C (100.4F), Mild Pain (1 - 3)  aluminum hydroxide/magnesium hydroxide/simethicone Suspension 30 milliLiter(s) Oral four times a day PRN Indigestion  bisacodyl Suppository 10 milliGRAM(s) Rectal daily PRN Constipation  dextrose Oral Gel 15 Gram(s) Oral once PRN Blood Glucose LESS THAN 70 milliGRAM(s)/deciliter  melatonin 3 milliGRAM(s) Oral at bedtime PRN Insomnia  ondansetron Injectable 4 milliGRAM(s) IV Push every 6 hours PRN Nausea and/or Vomiting

## 2024-09-05 NOTE — BH CONSULTATION LIAISON ASSESSMENT NOTE - NSBHCHARTREVIEWLAB_PSY_A_CORE FT
Bed in lowest position, wheels locked, appropriate side rails in place/Call bell, personal items and telephone in reach/Instruct patient to call for assistance before getting out of bed or chair/Non-slip footwear when patient is out of bed/[place] to call system/Physically safe environment - no spills, clutter or unnecessary equipment/Purposeful Proactive Rounding/Room/bathroom lighting operational, light cord in reach Comprehensive Metabolic Panel in AM (09.05.24 @ 03:18)   Sodium: 140 mmol/L  Potassium: 3.8 mmol/L  Chloride: 104: Chloride reference range changed from ..10/26/2022   . mmol/L  Carbon Dioxide: 22.0 mmol/L  Anion Gap: 14 mmol/L  Blood Urea Nitrogen: 19.8 mg/dL  Creatinine: 2.13 mg/dL  Glucose: 146 mg/dL  Calcium: 8.0 mg/dL  Protein Total: 5.2 g/dL  Albumin: 2.4 g/dL  Bilirubin Total: 0.7 mg/dL  Alkaline Phosphatase: 121 U/L  Aspartate Aminotransferase (AST/SGOT): 25: Hemolyzed. Interpret with caution U/L  Alanine Aminotransferase (ALT/SGPT): 15 U/L  eGFR: 30: The estimated glomerular filtration rate (eGFR) calculation is based on   the 2021 CKD-EPI creatinine equation, which is validated in male and   female population 18 years of age and older (N Engl J Med 2021;   [id number]). mL/min/1.73m2

## 2024-09-05 NOTE — CONSULT NOTE ADULT - CONSULT REASON
Altered mentation, hypotension
Pre operative evaluation for hip fx.
left femoral neck fracture
Goals of Care
pre-op clearance

## 2024-09-05 NOTE — BH CONSULTATION LIAISON ASSESSMENT NOTE - NSBHATTESTAPPBILLTIME_PSY_A_CORE
I attest my time as AMANDA is greater than 50% of the total combined time spent on qualifying patient care activities. I have reviewed and verified the documentation.

## 2024-09-05 NOTE — BH CONSULTATION LIAISON ASSESSMENT NOTE - NSBHDSMAXES_PSY_ALL_CORE
Detail Level: Simple
Additional Notes: Patient consent was obtained to proceed with the visit and recommended plan of care after discussion of all risks and benefits, including the risks of COVID-19 exposure.
See above

## 2024-09-05 NOTE — CONSULT NOTE ADULT - ASSESSMENT
84yr man Hx Dementia, CKD, IDDM, chronic cholecystitis s/p PCT, prior GIB admitted after a fall found to have L femoral neck fx s/p l hemiarthroplasty.  Hospital course complicated by hypotension, hypoxic respiratory failure,  with transfer to MICU, Delirium  aspiration PNA,, AF RVR, anemia needing PRBC.     Sepsis  Aspiration PNA   IV abx   monitor for fevers, WBC  Aspiration precautions    Acute Respiratory Failure  weaned off O2, monitor sats    Anemia  s/p PRBC  monitor Hg    Dysphagia  Aspiration precautions  Modified diet as recommended by SLP    Hx Dementia  Delirium   consulted  Supportive care, reorientation  Unclear baseline.  Patient  currently  verbal, AOx2, conversive. Does not appear to be end stage dementia.      L Hip Arthroplasty  monitor for pain  avoid opioids   Rec  IV Tylenol for severe pain    Debility  Assist with care  Turn/reposition  Safety precautions    CKD  avoid nephrotoxic agents  monitor UO  has required temporary HD in the past    Chronic Cholecystitis  s/p PCT  9/4 by IR  monitor for pain    Encounter for Palliative Care  Palliative Care consulted to assist with goals of care  Patient DNR/I on 8/28.     Patient known to me from previous admission in March 2024. At that time, wife who is the surrogate wished for full interventions, including HD  Incomplete         84yr man Hx Dementia, CKD, IDDM, chronic cholecystitis s/p PCT, prior GIB admitted after a fall found to have L femoral neck fx s/p l hemiarthroplasty.  Hospital course complicated by hypotension, hypoxic respiratory failure,  with transfer to MICU, Delirium  aspiration PNA,, AF RVR, anemia needing PRBC.     Sepsis  Aspiration PNA   IV abx   monitor for fevers, WBC  Aspiration precautions    Acute Respiratory Failure  weaned off O2, monitor sats    Anemia  s/p PRBC  monitor Hg    Dysphagia  Aspiration precautions  Modified diet as recommended by SLP    Hx Dementia  Delirium   consulted  Supportive care, reorientation  Unclear baseline.  Patient  currently  verbal, AOx2, conversive. Does not appear to be end stage dementia.      L Hip Arthroplasty  monitor for pain  avoid opioids   Pain causing agitation?   Added Lidocaine patch  Rec  IV Tylenol for severe pain    Debility  Assist with care  Turn/reposition  Safety precautions    CKD  avoid nephrotoxic agents  monitor UO  has required temporary HD in the past    Chronic Cholecystitis  s/p PCT  9/4 by IR  monitor for pain    Encounter for Palliative Care  Palliative Care consulted to assist with goals of care    Patient known to me from previous admission in March 2024. At that time, wife who is the surrogate wished for full interventions, including HD  Patient now with complicated hospital course, being downgraded from MICU  Patient DNR Intubate on 8/28.   PC to follow hospital course

## 2024-09-05 NOTE — SWALLOW BEDSIDE ASSESSMENT ADULT - SLP PERTINENT HISTORY OF CURRENT PROBLEM
Pt known to this dept with hx of dysphagia from prior admissions. Last seen March 2024 in which a puree diet, moderately  thick fluids via TSPN was RX. Dysphagia hx since that time unknown.

## 2024-09-05 NOTE — CONSULT NOTE ADULT - SUBJECTIVE AND OBJECTIVE BOX
HPI:  84-year-old male with past medical history of CKD, dementia (AAOx2 at baseline, not oriented to time), Insulin dependent type 2 diabetes, hypertension, cholecystitis with a percutaneous gallbladder bladder drain since 12/2023 presents after trip and fall backward. Spoke with daughter, Elkin, for collateral. She states that patient fell onto his left hip and then hit his head afterward, though, major impact was on his hip. He did not lose consciousness at any time during the event. Patient had 2 episodes of vomiting in the ED, appearing like coffee grounds. Daughter states that whenever he vomits, it appears that way because he has severe esophagitis. Additionally, she states that he usually has these vomiting episodes when something like a fall occurs.  (28 Aug 2024 00:31)      PERTINENT PMH REVIEWED: Yes    PAST MEDICAL & SURGICAL HISTORY:  Diabetes      Hypertension      Prostate disorder      Anxiety      High cholesterol      Ulcer      Acute cholecystitis      History of endoscopy      Cholecystostomy tube dysfunction      S/P cataract surgery          SOCIAL HISTORY:   Unable  Limited  to obtain secondary to poor  historian                    Substance history: former smoker                    Admitted from:  home                      Qu[employer]/spirituality: [employer]                    Cultural concerns:    Baseline ADLs (prior to admission):  Dependent                        Surrogate/HCP/Guardian:  wife Jailene    FAMILY HISTORY:  Family history of stomach cancer  Father    Family history of emphysema  Mother        Allergies    No Known Allergies    Intolerances        http://npcrc.org/files/[email or web address]_[email or web address]    Present Symptoms:   Dyspnea:  No   Nausea/Vomiting:  No   Anxiety/ Agitation      Yes   Fatigue:  Unable to assess   Loss of appetite:  No   Constipation:  Not reported      Pain:  No              Location            Character            Duration            Factors            Severity            Effect    Pain AD Score:  [email or web address] (press ctrl + left click to view)    Review of Systems: Reviewed    Unable  Limited  to obtain due to poor    historian      MEDICATIONS  (STANDING):  chlorhexidine 2% Cloths 1 Application(s) Topical <User Schedule>  dextrose 5%. 1000 milliLiter(s) (50 mL/Hr) IV Continuous <Continuous>  dextrose 5%. 1000 milliLiter(s) (100 mL/Hr) IV Continuous <Continuous>  dextrose 50% Injectable 12.5 Gram(s) IV Push once  dextrose 50% Injectable 25 Gram(s) IV Push once  dextrose 50% Injectable 25 Gram(s) IV Push once  glucagon  Injectable 1 milliGRAM(s) IntraMuscular once  heparin   Injectable 5000 Unit(s) SubCutaneous every 8 hours  insulin glargine Injectable (LANTUS) 6 Unit(s) SubCutaneous every morning  insulin lispro (ADMELOG) corrective regimen sliding scale   SubCutaneous every 6 hours  metoprolol tartrate 25 milliGRAM(s) Oral two times a day  pantoprazole  Injectable 40 milliGRAM(s) IV Push daily  piperacillin/tazobactam IVPB.. 3.375 Gram(s) IV Intermittent every 8 hours  sucralfate 1 Gram(s) Oral four times a day  tamsulosin 0.8 milliGRAM(s) Oral at bedtime  valproic  acid Syrup 125 milliGRAM(s) Oral three times a day    MEDICATIONS  (PRN):  acetaminophen     Tablet .. 650 milliGRAM(s) Oral every 6 hours PRN Temp greater or equal to 38C (100.4F), Mild Pain (1 - 3)  aluminum hydroxide/magnesium hydroxide/simethicone Suspension 30 milliLiter(s) Oral four times a day PRN Indigestion  bisacodyl Suppository 10 milliGRAM(s) Rectal daily PRN Constipation  dextrose Oral Gel 15 Gram(s) Oral once PRN Blood Glucose LESS THAN 70 milliGRAM(s)/deciliter  melatonin 3 milliGRAM(s) Oral at bedtime PRN Insomnia  ondansetron Injectable 4 milliGRAM(s) IV Push every 6 hours PRN Nausea and/or Vomiting      PHYSICAL EXAM:    Vital Signs Last 24 Hrs  T(C): 36.9 (05 Sep 2024 11:09), Max: 36.9 (05 Sep 2024 00:00)  T(F): 98.4 (05 Sep 2024 11:09), Max: 98.5 (05 Sep 2024 00:00)  HR: 75 (05 Sep 2024 15:00) (69 - 89)  BP: 139/70 (05 Sep 2024 15:00) (120/54 - 157/63)  BP(mean): 91 (05 Sep 2024 15:00) (74 - 101)  RR: 13 (05 Sep 2024 15:00) (11 - 21)  SpO2: 98% (05 Sep 2024 15:00) (97% - 100%)    Parameters below as of 05 Sep 2024 15:00  Patient On (Oxygen Delivery Method): nasal cannula  O2 Flow (L/min): 2      [place]  30   %  General:  M awake alert NAD  HEENT: NCAT       Lungs: comfortable  CV: RR  GI:  soft NTND  MSK: normal   weak  chair/bedbound  Neuro: AO 2 no focal deficits  Skin: warm dry  Psych: calm    LABS:                        8.1    7.95  )-----------( 326      ( 05 Sep 2024 12:38 )             25.2     09-05    140  |  104  |  19.8  ----------------------------<  146<H>  3.8   |  22.0  |  2.13<H>    Ca    8.0<L>      05 Sep 2024 03:18  Phos  2.9     09-05  Mg     2.0     09-05    TPro  5.2<L>  /  Alb  2.4<L>  /  TBili  0.7  /  DBili  x   /  AST  25  /  ALT  15  /  AlkPhos  121<H>  09-05      Urinalysis Basic - ( 05 Sep 2024 03:18 )    Color: x / Appearance: x / SG: x / pH: x  Gluc: 146 mg/dL / Ketone: x  / Bili: x / Urobili: x   Blood: x / Protein: x / Nitrite: x   Leuk Esterase: x / RBC: x / WBC x   Sq Epi: x / Non Sq Epi: x / Bacteria: x      I&O's Summary    04 Sep 2024 07:01  -  05 Sep 2024 07:00  --------------------------------------------------------  IN: 1070 mL / OUT: 1520 mL / NET: -450 mL    05 Sep 2024 07:01  -  05 Sep 2024 15:25  --------------------------------------------------------  IN: 690 mL / OUT: 715 mL / NET: -25 mL        RADIOLOGY & ADDITIONAL STUDIES:  Imaging reviewed   < from: CT Abdomen and Pelvis No Cont (09.03.24 @ 00:56) >  IMPRESSION:  Interstitial prominence, scattered groundglass opacities, and dependent   consolidative opacities in the bilateral lungs are new. This could   represent pneumonia, edema, or a combination.    Extensive soft tissue swelling with a small amount of soft tissue air   left chest wall. Please correlate for history of recent IV or central   line infiltration. Cellulitis/fasciitis could appear this way.    The cholecystostomy catheter seen on the previous study has been removed.   Gallbladder nondistended with no adjacent inflammation. No evidence of   biloma.    Interval left hip arthroplasty.    Other chronic findings as above.      < end of copied text >          ADVANCE DIRECTIVES/TREATMENT PREFERENCES:  DNR/DNI -  continuing medical treatments

## 2024-09-05 NOTE — BH CONSULTATION LIAISON ASSESSMENT NOTE - DIFFERENTIAL
dementia with behavioral disturbances  delirium secondary to medical condition  delirium superimposed on dementia

## 2024-09-05 NOTE — PROGRESS NOTE ADULT - ASSESSMENT
85 y/o M w/ PMH of CKD, dementia (AAOx2 at baseline), IDDMT2, HTN, chronic cholecystitis s/p percutaneous sky tube (placed 12/2023), CKDIIIa, pAF (not on AC 2/2 prior GIB and fall risk), BPH presented 08/27 to ED after mechanical fall resulting in L-femoral neck fracture and now s/p L hemiarthroplasty 8/30 w/ ortho.  Hospital course complicated by sepsis due to aspiration pneumonia, RON, severe behavioral disturbances, inadvertent removal of perc sky tube.  Hospital course further complicated by hypotension on 09/02 overnight associated w/ lethargy.  Pt was given IVFs and albumin w/ improved BP but pt remained obtunded with snoring respirations and generalized twitching and pt subsequently upgraded to MICU.  Pt requiring supplemental O2 to maintain normal O2 sats and likely from aspiration PNA and started on Zosyn.  CTH negative for acute intracranial pathology.  While in MICU pt went in to AF RVR on 09/03 and cardiology consulted but converted back to NSR.  Pt now awake and alert but NPO given concern for aspiration and SLP following.  Pt will go for perc sky tube placement today w/ IR.  Pt noted to have down trending H/H and 2u PRBC transfused today.  No concern for bleeding at this time and pt hemodynamically stable.  Pt medically stable for transfer to medicine for continued management       Acute delirium in the setting of underlying dementia w/ behavioral disturbances  Acute metabolic encephalopathy   - Likely component of ICU/hospital delirium and exacerbated by dehydration, sepsis, acute hypoxic resp failure and anemia  - As per family at bedside pt is AAOx2 at baseline   - Currently AAO to self only  - Severe sun downing reported, pt is impulsive pulling at lines and intermittently aggressive   - Ammonia and B12 normal, CTH negative, UA w/out pyuria but w/ ketones likely from starvation ketosis   - Monitor mental status and reorient when possible   - Was on Seroquel and zyprexa but d/c'd per daughters request and now NPO   - Fall and aspiration precautions including HOB elevation   - Likely will need safety sitter once on the floors   - Behavior health consult appreciated. Start on Valproic acid 125mg TID    Acute on chronic anemia   - Has hx of GIB but currently no evidence of bleeding and remains hemodynamically stable   - Baseline Hb ranges btw 8-10 and had slow down trend past few days   - Hb 7 this am and received 1 unit RBC. Received 1 unit on Sept 4th as well  - Post transfusion H/H good response  - Hold off on iron panel given actively being transfused and will not be accurate but check in 4-6wks  - Iron panel from 02/2024 reviewed and appears to be of chronic dx  - BUN/Cr ratio normal and is on ppi for GI cytoprotection   - Monitor CBC and transfuse for Hb<8    AF RVR  - 9/3 developed AF RVR but likely precipitated by metabolic acidosis, hypoxia, aspiration PNA, anemia and hypovolemia   - Now back in NSR and rate controlled  - c/w prn IV BB while npo but resume po metoprolol once clered for diet   - TSH normal   - Not on AC due to fall risk and hx of GIB  - c/w VTE ppx dose heparin   - Cardio consult noted    RON on CKD3a likely prerenal azotemia   - Pt NPO for past week due to risk of aspiration w/ waxing and waning mentation    - Renal function improving w/ ongoing IV hydration   - Has required temporary HD in past but currently has good urine output   - Wills in place but unable to give flomax as pt remains npo pending SLP eval  - Will c/w wills for now and Start flomax today and then do TOV  - Monitor renal function lytes and I/O's  - Avoid nephrotoxic meds or renally dose if needed     Sepsis likely 2/2 aspiration pna   Acute hypoxic respiratory failure 2/2 aspiration PNA/pneumonitis, atelectasis and anemia  - Most recent CT c/a/p 09/03 reporting interstitial ground glass opacities and dependent b/l consolidations  - BCxs 08/29 and 08/02 NGTD    - Procal elevated but unreliable in setting of RON/CKD  - On Zosyn, started 09/03/24, will cont  - Had originally passed bedside dysphagia 9/1 but given waxing and waning mentation. Cleared for Diet by SLP on 9/5  - SLP following for reassessment   - c/w strict aspiration precautions including HOB elevation   - Weaned off O2 while bedside and tolerating   - Suspect PRBC transfusion helped w/ oxygenation   - OOB to chair and encourage incentive spirometer if able to   - Monitor CBC and temperature     Chronic cholecystitis   - s/p perc sky drain placed 12/2023 and pulled out by pt during this admission on 09/01  - s/p drain replacement on 9/4 by IR.  - Monitor drain output daily     IDDM  - A1c 7.1   - c/w basal insulin regimen 6 unit, may need to up titrate based on diet.   - c/w ISS and hypoglycemic protocol   - Titrate insulin to maintain BG <180    Severe Esophagitis  - Cont. sucralfate QID   - c/w PPI IV       VTE ppx: heparin sq    Dispo: remains acute. Anticipate NITHIN when stable.      83 y/o M w/ PMH of CKD, dementia (AAOx2 at baseline), IDDMT2, HTN, chronic cholecystitis s/p percutaneous sky tube (placed 12/2023), CKDIIIa, pAF (not on AC 2/2 prior GIB and fall risk), BPH presented 08/27 to ED after mechanical fall resulting in L-femoral neck fracture and now s/p L hemiarthroplasty 8/30 w/ ortho.  Hospital course complicated by sepsis due to aspiration pneumonia, RON, severe behavioral disturbances, inadvertent removal of perc sky tube.  Hospital course further complicated by hypotension on 09/02 overnight associated w/ lethargy.  Pt was given IVFs and albumin w/ improved BP but pt remained obtunded with snoring respirations and generalized twitching and pt subsequently upgraded to MICU.  Pt requiring supplemental O2 to maintain normal O2 sats and likely from aspiration PNA and started on Zosyn.  CTH negative for acute intracranial pathology.  While in MICU pt went in to AF RVR on 09/03 and cardiology consulted but converted back to NSR.  Pt now awake and alert but NPO given concern for aspiration and SLP following.  Pt will go for perc sky tube placement today w/ IR.  Pt noted to have down trending H/H and 2u PRBC transfused today.  No concern for bleeding at this time and pt hemodynamically stable.  Pt medically stable for transfer to medicine for continued management     Acute delirium in the setting of underlying dementia w/ behavioral disturbances  Acute metabolic encephalopathy   - Likely component of ICU/hospital delirium and exacerbated by dehydration, sepsis, acute hypoxic resp failure and anemia  - As per family at bedside pt is AAOx2 at baseline   - Currently AAO to self only  - Severe sun downing reported, pt is impulsive pulling at lines and intermittently aggressive   - Ammonia and B12 normal, CTH negative, UA w/out pyuria but w/ ketones likely from starvation ketosis   - Monitor mental status and reorient when possible   - Was on Seroquel and zyprexa but d/c'd per daughters request and now NPO   - Fall and aspiration precautions including HOB elevation   - Likely will need safety sitter once on the floors   - Behavior health consult appreciated. Start on Valproic acid 125mg TID    Acute on chronic anemia   - Has hx of GIB but currently no evidence of bleeding and remains hemodynamically stable   - Baseline Hb ranges btw 8-10 and had slow down trend past few days   - Hb 7 this am and received 1 unit RBC. Received 1 unit on Sept 4th as well  - Post transfusion H/H good response  - Hold off on iron panel given actively being transfused and will not be accurate but check in 4-6wks  - Iron panel from 02/2024 reviewed and appears to be of chronic dx  - BUN/Cr ratio normal and is on ppi for GI cytoprotection   - Monitor CBC and transfuse for Hb<8  - of hgb continued to down trend will need further work up/imaging     Left Femoral Neck Fracture 2/2 mechanical fall  s/p Hemiarthroplasty of left hip by Ortho  - PT  - Pain control     AF RVR  - 9/3 developed AF RVR but likely precipitated by metabolic acidosis, hypoxia, aspiration PNA, anemia and hypovolemia   - Now back in NSR and rate controlled  - c/w prn IV BB while npo but resume po metoprolol once clered for diet   - TSH normal   - Not on AC due to fall risk and hx of GIB  - c/w VTE ppx dose heparin   - Cardio consult noted    RON on CKD3a likely prerenal azotemia   - Pt NPO for past week due to risk of aspiration w/ waxing and waning mentation    - Renal function improving w/ ongoing IV hydration   - Has required temporary HD in past but currently has good urine output   - Wills in place but unable to give flomax as pt remains npo pending SLP eval  - Will c/w wills for now and Start flomax today and then do TOV  - Monitor renal function lytes and I/O's  - Avoid nephrotoxic meds or renally dose if needed     Sepsis likely 2/2 aspiration pna   Acute hypoxic respiratory failure 2/2 aspiration PNA/pneumonitis, atelectasis and anemia  - Most recent CT c/a/p 09/03 reporting interstitial ground glass opacities and dependent b/l consolidations  - BCxs 08/29 and 08/02 NGTD    - Procal elevated but unreliable in setting of RON/CKD  - On Zosyn, started 09/03/24, will cont  - Had originally passed bedside dysphagia 9/1 but given waxing and waning mentation. Cleared for Diet by SLP on 9/5  - SLP following for reassessment   - c/w strict aspiration precautions including HOB elevation   - Weaned off O2 while bedside and tolerating   - Suspect PRBC transfusion helped w/ oxygenation   - OOB to chair and encourage incentive spirometer if able to   - Monitor CBC and temperature     Chronic cholecystitis   - s/p perc sky drain placed 12/2023 and pulled out by pt during this admission on 09/01  - s/p drain replacement on 9/4 by IR.  - Monitor drain output daily     IDDM  - A1c 7.1   - c/w basal insulin regimen 6 unit, may need to up titrate based on diet.   - c/w ISS and hypoglycemic protocol   - Titrate insulin to maintain BG <180    Severe Esophagitis  - Cont. sucralfate QID   - c/w PPI IV       VTE ppx: heparin sq    Dispo: remains acute. Anticipate NITHIN when stable.

## 2024-09-05 NOTE — SWALLOW BEDSIDE ASSESSMENT ADULT - ADDITIONAL RECOMMENDATIONS
Will follow as schedule permits   If there are concerns with silent aspiration, consider MBS to objectively assess swallow

## 2024-09-05 NOTE — PROGRESS NOTE ADULT - SUBJECTIVE AND OBJECTIVE BOX
[facility] Division of Hospital Medicine    Chief Complaint:  L hip fx    SUBJECTIVE / OVERNIGHT EVENTS: Patient seen and examined. He is alert and oriented by 1. No complaints. Cleared for diet by SLP.     Patient denies chest pain, SOB, abd pain, N/V, fever, chills, dysuria or any other complaints. All remainder ROS negative.     MEDICATIONS  (STANDING):  chlorhexidine 2% Cloths 1 Application(s) Topical <User Schedule>  dextrose 5%. 1000 milliLiter(s) (100 mL/Hr) IV Continuous <Continuous>  dextrose 5%. 1000 milliLiter(s) (50 mL/Hr) IV Continuous <Continuous>  dextrose 50% Injectable 12.5 Gram(s) IV Push once  dextrose 50% Injectable 25 Gram(s) IV Push once  dextrose 50% Injectable 25 Gram(s) IV Push once  glucagon  Injectable 1 milliGRAM(s) IntraMuscular once  heparin   Injectable 5000 Unit(s) SubCutaneous every 8 hours  insulin glargine Injectable (LANTUS) 6 Unit(s) SubCutaneous every morning  insulin lispro (ADMELOG) corrective regimen sliding scale   SubCutaneous every 6 hours  metoprolol tartrate 25 milliGRAM(s) Oral two times a day  pantoprazole  Injectable 40 milliGRAM(s) IV Push daily  piperacillin/tazobactam IVPB.. 3.375 Gram(s) IV Intermittent every 8 hours  sucralfate 1 Gram(s) Oral four times a day    MEDICATIONS  (PRN):  acetaminophen     Tablet .. 650 milliGRAM(s) Oral every 6 hours PRN Temp greater or equal to 38C (100.4F), Mild Pain (1 - 3)  aluminum hydroxide/magnesium hydroxide/simethicone Suspension 30 milliLiter(s) Oral four times a day PRN Indigestion  bisacodyl Suppository 10 milliGRAM(s) Rectal daily PRN Constipation  dextrose Oral Gel 15 Gram(s) Oral once PRN Blood Glucose LESS THAN 70 milliGRAM(s)/deciliter  melatonin 3 milliGRAM(s) Oral at bedtime PRN Insomnia  ondansetron Injectable 4 milliGRAM(s) IV Push every 6 hours PRN Nausea and/or Vomiting        I&O's Summary    04 Sep 2024 07:01  -  05 Sep 2024 07:00  --------------------------------------------------------  IN: 1070 mL / OUT: 1520 mL / NET: -450 mL    05 Sep 2024 07:01  -  05 Sep 2024 15:01  --------------------------------------------------------  IN: 690 mL / OUT: 715 mL / NET: -25 mL        PHYSICAL EXAM:  Vital Signs Last 24 Hrs  T(C): 36.9 (05 Sep 2024 11:09), Max: 36.9 (05 Sep 2024 00:00)  T(F): 98.4 (05 Sep 2024 11:09), Max: 98.5 (05 Sep 2024 00:00)  HR: 89 (05 Sep 2024 14:00) (69 - 89)  BP: 153/74 (05 Sep 2024 14:00) (120/54 - 157/63)  BP(mean): 97 (05 Sep 2024 14:00) (74 - 101)  RR: 21 (05 Sep 2024 14:00) (11 - 21)  SpO2: 97% (05 Sep 2024 14:00) (97% - 100%)    Parameters below as of 05 Sep 2024 14:00  Patient On (Oxygen Delivery Method): nasal cannula  O2 Flow (L/min): 2          CONSTITUTIONAL: NAD, well-developed, well-groomed  ENMT: Moist oral mucosa, no pharyngeal injection or exudates; normal dentition  RESPIRATORY: Normal respiratory effort; lungs are clear to auscultation bilaterally  CARDIOVASCULAR: Regular rate and rhythm, normal S1 and S2, no murmur/rub/gallop; No lower extremity edema; Peripheral pulses are 2+ bilaterally  ABDOMEN: Nontender to palpation, normoactive bowel sounds, no rebound/guarding; No hepatosplenomegaly  MUSCLOSKELETAL:  Normal gait; no clubbing or cyanosis of digits; no joint swelling or tenderness to palpation  PSYCH: A+O to person, place, and time; affect appropriate  NEUROLOGY: CN 2-12 are intact and symmetric; no gross sensory deficits;   SKIN: No rashes; no palpable lesions    LABS:                        8.1    7.95  )-----------( 326      ( 05 Sep 2024 12:38 )             25.2     09-05    140  |  104  |  19.8  ----------------------------<  146<H>  3.8   |  22.0  |  2.13<H>    Ca    8.0<L>      05 Sep 2024 03:18  Phos  2.9     09-05  Mg     2.0     09-05    TPro  5.2<L>  /  Alb  2.4<L>  /  TBili  0.7  /  DBili  x   /  AST  25  /  ALT  15  /  AlkPhos  121<H>  09-05          Urinalysis Basic - ( 05 Sep 2024 03:18 )    Color: x / Appearance: x / SG: x / pH: x  Gluc: 146 mg/dL / Ketone: x  / Bili: x / Urobili: x   Blood: x / Protein: x / Nitrite: x   Leuk Esterase: x / RBC: x / WBC x   Sq Epi: x / Non Sq Epi: x / Bacteria: x        Culture - Blood (collected 02 Sep 2024 23:30)  Source: .Blood Blood  Preliminary Report (05 Sep 2024 09:02):    No growth at 48 Hours    Culture - Blood (collected 02 Sep 2024 23:25)  Source: .Blood Blood  Preliminary Report (05 Sep 2024 09:02):    No growth at 48 Hours      CAPILLARY BLOOD GLUCOSE      POCT Blood Glucose.: 154 mg/dL (05 Sep 2024 11:18)  POCT Blood Glucose.: 147 mg/dL (05 Sep 2024 08:35)  POCT Blood Glucose.: 147 mg/dL (05 Sep 2024 01:02)  POCT Blood Glucose.: 154 mg/dL (04 Sep 2024 18:06)        RADIOLOGY & ADDITIONAL TESTS:  Results Reviewed:   Imaging Personally Reviewed:  Electrocardiogram Personally Reviewed:                                           [facility] Division of Hospital Medicine    Chief Complaint:  L hip fx    SUBJECTIVE / OVERNIGHT EVENTS: Patient seen and examined. He is alert and oriented by 1. No complaints. Cleared for diet by SLP.     Patient denies chest pain, SOB, abd pain, N/V, fever, chills, dysuria or any other complaints. All remainder ROS negative.     MEDICATIONS  (STANDING):  chlorhexidine 2% Cloths 1 Application(s) Topical <User Schedule>  dextrose 5%. 1000 milliLiter(s) (100 mL/Hr) IV Continuous <Continuous>  dextrose 5%. 1000 milliLiter(s) (50 mL/Hr) IV Continuous <Continuous>  dextrose 50% Injectable 12.5 Gram(s) IV Push once  dextrose 50% Injectable 25 Gram(s) IV Push once  dextrose 50% Injectable 25 Gram(s) IV Push once  glucagon  Injectable 1 milliGRAM(s) IntraMuscular once  heparin   Injectable 5000 Unit(s) SubCutaneous every 8 hours  insulin glargine Injectable (LANTUS) 6 Unit(s) SubCutaneous every morning  insulin lispro (ADMELOG) corrective regimen sliding scale   SubCutaneous every 6 hours  metoprolol tartrate 25 milliGRAM(s) Oral two times a day  pantoprazole  Injectable 40 milliGRAM(s) IV Push daily  piperacillin/tazobactam IVPB.. 3.375 Gram(s) IV Intermittent every 8 hours  sucralfate 1 Gram(s) Oral four times a day    MEDICATIONS  (PRN):  acetaminophen     Tablet .. 650 milliGRAM(s) Oral every 6 hours PRN Temp greater or equal to 38C (100.4F), Mild Pain (1 - 3)  aluminum hydroxide/magnesium hydroxide/simethicone Suspension 30 milliLiter(s) Oral four times a day PRN Indigestion  bisacodyl Suppository 10 milliGRAM(s) Rectal daily PRN Constipation  dextrose Oral Gel 15 Gram(s) Oral once PRN Blood Glucose LESS THAN 70 milliGRAM(s)/deciliter  melatonin 3 milliGRAM(s) Oral at bedtime PRN Insomnia  ondansetron Injectable 4 milliGRAM(s) IV Push every 6 hours PRN Nausea and/or Vomiting        I&O's Summary    04 Sep 2024 07:01  -  05 Sep 2024 07:00  --------------------------------------------------------  IN: 1070 mL / OUT: 1520 mL / NET: -450 mL    05 Sep 2024 07:01  -  05 Sep 2024 15:01  --------------------------------------------------------  IN: 690 mL / OUT: 715 mL / NET: -25 mL        PHYSICAL EXAM:  Vital Signs Last 24 Hrs  T(C): 36.9 (05 Sep 2024 11:09), Max: 36.9 (05 Sep 2024 00:00)  T(F): 98.4 (05 Sep 2024 11:09), Max: 98.5 (05 Sep 2024 00:00)  HR: 89 (05 Sep 2024 14:00) (69 - 89)  BP: 153/74 (05 Sep 2024 14:00) (120/54 - 157/63)  BP(mean): 97 (05 Sep 2024 14:00) (74 - 101)  RR: 21 (05 Sep 2024 14:00) (11 - 21)  SpO2: 97% (05 Sep 2024 14:00) (97% - 100%)    Parameters below as of 05 Sep 2024 14:00  Patient On (Oxygen Delivery Method): nasal cannula  O2 Flow (L/min): 2          CONSTITUTIONAL: NAD  ENMT: Moist oral mucosa, no pharyngeal injection or exudates  RESPIRATORY: Normal respiratory effort; lungs are clear to auscultation bilaterally  CARDIOVASCULAR: Regular rate and rhythm, normal S1 and S2, no murmur/rub/gallop; No lower extremity edema  ABDOMEN: Nontender to palpation, normoactive bowel sounds, no rebound/guarding;   MUSCLOSKELETAL no joint swelling or tenderness to palpation  PSYCH: A+O to person, calm  NEUROLOGY: CN 2-12 are intact and symmetric; no gross sensory deficits;   SKIN: No rashes; no palpable lesions    LABS:                        8.1    7.95  )-----------( 326      ( 05 Sep 2024 12:38 )             25.2     09-05    140  |  104  |  19.8  ----------------------------<  146<H>  3.8   |  22.0  |  2.13<H>    Ca    8.0<L>      05 Sep 2024 03:18  Phos  2.9     09-05  Mg     2.0     09-05    TPro  5.2<L>  /  Alb  2.4<L>  /  TBili  0.7  /  DBili  x   /  AST  25  /  ALT  15  /  AlkPhos  121<H>  09-05          Urinalysis Basic - ( 05 Sep 2024 03:18 )    Color: x / Appearance: x / SG: x / pH: x  Gluc: 146 mg/dL / Ketone: x  / Bili: x / Urobili: x   Blood: x / Protein: x / Nitrite: x   Leuk Esterase: x / RBC: x / WBC x   Sq Epi: x / Non Sq Epi: x / Bacteria: x        Culture - Blood (collected 02 Sep 2024 23:30)  Source: .Blood Blood  Preliminary Report (05 Sep 2024 09:02):    No growth at 48 Hours    Culture - Blood (collected 02 Sep 2024 23:25)  Source: .Blood Blood  Preliminary Report (05 Sep 2024 09:02):    No growth at 48 Hours      CAPILLARY BLOOD GLUCOSE      POCT Blood Glucose.: 154 mg/dL (05 Sep 2024 11:18)  POCT Blood Glucose.: 147 mg/dL (05 Sep 2024 08:35)  POCT Blood Glucose.: 147 mg/dL (05 Sep 2024 01:02)  POCT Blood Glucose.: 154 mg/dL (04 Sep 2024 18:06)        RADIOLOGY & ADDITIONAL TESTS:  Results Reviewed:   Imaging Personally Reviewed:  Electrocardiogram Personally Reviewed:

## 2024-09-05 NOTE — BH CONSULTATION LIAISON ASSESSMENT NOTE - NSBHCHARTREVIEWVS_PSY_A_CORE FT
Vital Signs Last 24 Hrs  T(C): 36.9 (05 Sep 2024 11:09), Max: 36.9 (05 Sep 2024 00:00)  T(F): 98.4 (05 Sep 2024 11:09), Max: 98.5 (05 Sep 2024 00:00)  HR: 80 (05 Sep 2024 12:00) (69 - 86)  BP: 134/55 (05 Sep 2024 12:00) (120/54 - 157/63)  BP(mean): 79 (05 Sep 2024 12:00) (74 - 113)  RR: 19 (05 Sep 2024 12:00) (11 - 19)  SpO2: 100% (05 Sep 2024 12:00) (97% - 100%)    Parameters below as of 05 Sep 2024 12:00  Patient On (Oxygen Delivery Method): nasal cannula  O2 Flow (L/min): 2

## 2024-09-05 NOTE — BH CONSULTATION LIAISON ASSESSMENT NOTE - SUMMARY
Patient is an 83 y/o  male, domiciled with wife, with pmhx of cholecysistis, hyperlipidemia, DM, HTN, Afib,  CKD, BPH, no formal pphx, referred for outpatient workup for dementia (not yet complete per daughter), no known hx of inpatient psych hospitalizations or suicide attempts, who was admitted to [facility] s/p fall with left femoral neck fracture, clinical picture complicated by aspiration pneumonia, currently downgraded from ICU.    Psychiatry consulted for agitation.     Patient seen today at bedside.  Patient a/ox1 (self only).  Overall pleasant with staff, but unable to engage in a meaningful manner.  Per collateral from daughter, patient's mentation grossly changed upon admission, which is consistent with past presentations during hospitalization.  Delirium likely multifactorial in nature. Patient moderate risk for violence at nighttime due to ongoing confusion and  pattern during this hospitalization course.  While he would not likely benefit from one to one during day time, would recommend considering upgrade in observation status at night.    Daughter consents to starting depakote.    RECS  delirium   -Maintain delirium precautions   -Avoid anticholinergic agents, benzos, opioid as they can further perpetuate confusion   -Frequent re orientation, Hydration, try to avoid restraints and if possible, mobilize patient and PT involvement   -repeat EKG  -if qtc <500, can utilize zyprexa 5mg IM q6hrs PRN for agitation  -recommend depakote 125mg IV q8hrs for mood stablization - can consider PO if patient able to tolerate Patient is an 85 y/o  male, domiciled with wife, with pmhx of cholecysistis, hyperlipidemia, DM, HTN, Afib,  CKD, BPH, no formal pphx, referred for outpatient workup for dementia (not yet complete per daughter), no known hx of inpatient psych hospitalizations or suicide attempts, who was admitted to [facility] s/p fall with left femoral neck fracture, clinical picture complicated by aspiration pneumonia, currently downgraded from ICU.    Psychiatry consulted for agitation.     Patient seen today at bedside.  Patient a/ox1 (self only).  Overall pleasant with staff, but unable to engage in a meaningful manner.  Per collateral from daughter, patient's mentation grossly changed upon admission, which is consistent with past presentations during hospitalization.  Delirium likely multifactorial in nature. Patient moderate risk for violence at nighttime due to ongoing confusion and  pattern during this hospitalization course.  While he would not likely benefit from one to one during day time, would recommend considering upgrade in supervision during periods of sundowning and agitation as per primary teams discretion.     Daughter consents to starting Depakote.    RECS  delirium   -Maintain delirium precautions   -Avoid anticholinergic agents, benzos, opioid as they can further perpetuate confusion   -Frequent re orientation, Hydration, try to avoid restraints and if possible, mobilize patient and PT involvement   -repeat EKG  -if qtc <500, can utilize zyprexa 5mg IM q6hrs PRN for agitation  -recommend depakote 125mg IV q8hrs for mood stablization - can consider PO if patient able to tolerate

## 2024-09-05 NOTE — SWALLOW BEDSIDE ASSESSMENT ADULT - SLP GENERAL OBSERVATIONS
[address] ED  EMERGENCY DEPARTMENT ENCOUNTER        Pt Name: Wale Morton  MRN: [id number]  [place] 1982  Date of evaluation: 6/3/2021  Provider: Aviva Parra PA-C  PCP: Delilah Calzada  Note Started: 9:31 AM EDT       BART. I have evaluated this patient. My supervising physician was available for consultation. CHIEF COMPLAINT       Chief Complaint   Patient presents with    Flank Pain     pt c/o right side back/flank pain wrapping around to abd, states has had a bladder stimulator placed and been having problems since then, states feels like something is shocking her       HISTORY OF PRESENT ILLNESS   (Location, Timing/Onset, Context/Setting, Quality, Duration, Modifying Factors, Severity, Associated Signs and Symptoms)  Note limiting factors. Wale Morton is a 43 y.o. female who presents with a Chief Complaint of right flank pain. Patient has a past medical history of asthma, anxiety, depression, chronic pain syndrome, hypertension, overactive bladder with recent bladder stimulator placed from urology at [address] [place] [address], Dr. Jeanie Cole. Patient was seen yesterday had a full work-up negative imaging brought back today for the same complaint. Patient is on oxycodone at home for pain. She reports that she believes the pain is coming from her bladder stimulator. States that she cannot get into her surgeon at [address] [place] [address] until June 14. She states that she has not been able to keep her pain medication down this morning. Onset of symptoms started this morning. Duration of symptoms have been persistent since onset. Context includes right-sided flank pain. She also reports generalized right-sided flank pain. Denies bowel or bladder incontinence. Denies numbness or tingling to lower extremities. States that she feels like Jose Alejandro Mederos is being electrocuted from her bladder stimulator\". Denies chest pain or shortness of breath. Denies any fevers or chills.   She states that this pain does feel similar to her prior visits. Otherwise denies any other complaints. No aggravating symptoms. No alleviating symptoms. Nursing Notes were all reviewed and agreed with or any disagreements were addressed in the HPI. REVIEW OF SYSTEMS    (2-9 systems for level 4, 10 or more for level 5)     Review of Systems   Constitutional: Negative. Respiratory: Negative. Cardiovascular: Negative. Gastrointestinal: Positive for abdominal pain, nausea and vomiting. Genitourinary: Negative. Musculoskeletal: Negative. Neurological: Negative. Positives and Pertinent negatives as per HPI. Except as noted above in the ROS, all other systems were reviewed and negative. PAST MEDICAL HISTORY     Past Medical History:   Diagnosis Date    Anxiety 6/11/2010    Asthma     Attention deficit disorder     Depression 6/11/2010    Hypertension, essential     Insomnia 6/17/2010    Migraine headache 9/21/2012    Panic attacks 9/21/2012    Peptic ulcer disease 9/21/2012    Polycystic kidney disease     \"stage 1 kidney failure\"         SURGICAL HISTORY     Past Surgical History:   Procedure Laterality Date    ABDOMINOPLASTY  2007    after 150 lb weight loss    APPENDECTOMY      BLADDER SURGERY  02/18/2021    CHOLECYSTECTOMY      UPPER GASTROINTESTINAL ENDOSCOPY  09/15/2017         CURRENTMEDICATIONS       Previous Medications    ALBUTEROL SULFATE HFA (PROVENTIL HFA) 108 (90 BASE) MCG/ACT INHALER    Inhale 2 puffs into the lungs every 6 hours as needed for Wheezing    AMPHETAMINE-DEXTROAMPHETAMINE (ADDERALL, 20MG,) 20 MG TABLET    Take 20 mg by mouth daily. CYCLOBENZAPRINE (FLEXERIL) 10 MG TABLET    Take 5-10 mg by mouth 3 times daily as needed    DICYCLOMINE (BENTYL) 10 MG/ML INJECTION    Inject 20 mg into the muscle 2 times daily    GABAPENTIN (NEURONTIN) 600 MG TABLET    Take 600 mg by mouth 2 times daily.     LISINOPRIL (PRINIVIL;ZESTRIL) 20 MG TABLET    Take 20 mg by mouth every morning LORAZEPAM (ATIVAN) 1 MG TABLET    Take 1 mg by mouth 3 times daily as needed. NORETHINDRONE-ETHINYL ESTRADIOL (JUNEL ) 1-20 MG-MCG PER TABLET    TAKE 1 TABLET BY MOUTH EVERY DAY    ONDANSETRON (ZOFRAN ODT) 4 MG DISINTEGRATING TABLET    Take 1 tablet by mouth every 8 hours as needed for Nausea    ONDANSETRON (ZOFRAN) 4 MG TABLET    Take 1 tablet by mouth 3 times daily as needed for Nausea or Vomiting    OXYBUTYNIN (DITROPAN-XL) 5 MG EXTENDED RELEASE TABLET    Take 5 mg by mouth daily    OXYCODONE HCL (OXY-IR) 10 MG IMMEDIATE RELEASE TABLET    Take 1 tablet by mouth every 4 hours as needed. PANTOPRAZOLE (PROTONIX) 40 MG TABLET    Take 1 tablet by mouth 2 times daily (before meals)    PROCHLORPERAZINE (COMPAZINE) 10 MG TABLET    Take 1 tablet by mouth every 6 hours as needed (Nausea and vomiting)    SUCRALFATE (CARAFATE) 1 GM TABLET    Take 1 tablet by mouth 4 times daily    VALACYCLOVIR (VALTREX) 1 G TABLET    TAKE 2 TABLETS BY MOUTH TWICE DAILY FOR ONE DAY AT THE ONSET OF AN OUTBREAK         ALLERGIES     Nsaids    FAMILYHISTORY       Family History   Problem Relation Age of Onset    High Blood Pressure Mother     Mental Illness Mother     Cancer Father         colon,      Birth Defects Maternal Grandfather     Birth Defects Paternal Grandmother     Birth Defects Paternal Grandfather     Birth Defects Other           SOCIAL HISTORY       Social History     Tobacco Use    Smoking status: Never Smoker    Smokeless tobacco: Never Used   Vaping Use    Vaping Use: Never used   Substance Use Topics    Alcohol use: Not Currently    Drug use: Not Currently     Types: Marijuana     Comment: Advises she was perscribed medical marijuana which she stopped taking in 2021.        SCREENINGS    [place] Coma Scale  Eye Opening: Spontaneous  Best Verbal Response: Oriented  Best Motor Response: Obeys commands  Ros Coma Scale Score: 15        PHYSICAL EXAM    (up to 7 for level 4, 8 or more for level 5)     ED Triage Vitals   BP Temp Temp src Pulse Resp SpO2 Height Weight   -- -- -- -- -- -- -- --       Physical Exam  Vitals and nursing note reviewed. Constitutional:       General: She is awake. She is not in acute distress. Appearance: Normal appearance. She is well-developed. She is not ill-appearing, toxic-appearing or diaphoretic. HENT:      Head: Normocephalic and atraumatic. Nose: Nose normal.   Eyes:      General:         Right eye: No discharge. Left eye: No discharge. Cardiovascular:      Rate and Rhythm: Normal rate and regular rhythm. Pulses:           Radial pulses are 2+ on the right side and 2+ on the left side. Heart sounds: Normal heart sounds. No murmur heard. No gallop. Pulmonary:      Effort: Pulmonary effort is normal. No respiratory distress. Breath sounds: Normal breath sounds. No decreased breath sounds, wheezing, rhonchi or rales. Chest:      Chest wall: No tenderness. Abdominal:      General: Abdomen is flat. Bowel sounds are normal.      Palpations: Abdomen is soft. Tenderness: There is generalized abdominal tenderness and tenderness in the right lower quadrant, suprapubic area and left lower quadrant. There is no right CVA tenderness, left CVA tenderness, guarding or rebound. Negative signs include Munoz's sign and McBurney's sign. Musculoskeletal:         General: No deformity. Normal range of motion. Cervical back: Normal range of motion and neck supple. Right lower leg: No edema. Left lower leg: No edema. Skin:     General: Skin is warm and dry. Neurological:      General: No focal deficit present. Mental Status: She is alert and oriented to person, place, and time. GCS: GCS eye subscore is 4. GCS verbal subscore is 5. GCS motor subscore is 6. Psychiatric:         Behavior: Behavior normal. Behavior is cooperative.          DIAGNOSTIC RESULTS   LABS:    Labs Reviewed   COMPREHENSIVE METABOLIC PANEL W/ REFLEX TO MG FOR LOW K - Abnormal; Notable for the following components:       Result Value    Glucose 106 (*)     ALT 9 (*)     AST 13 (*)     All other components within normal limits    Narrative:     Performed at:  [facility]  [address],  [place], [place]   Phone [phone]   URINE RT REFLEX TO CULTURE - Abnormal; Notable for the following components:    Clarity, UA SL CLOUDY (*)     Blood, Urine LARGE (*)     Leukocyte Esterase, Urine SMALL (*)     All other components within normal limits    Narrative:     Performed at:  [facility]  [address],  [place], [place]   Phone [phone]   MICROSCOPIC URINALYSIS - Abnormal; Notable for the following components:    Epithelial Cells, UA 6-10 (*)     Bacteria, UA Rare (*)     Yeast, UA Present (*)     All other components within normal limits    Narrative:     Performed at:  [facility]  [address],  [place], [place]   Phone [phone]   CBC WITH AUTO DIFFERENTIAL    Narrative:     Performed at:  [facility]  [address],  [place], [place] [email or web address]   Phone [phone]   LIPASE    Narrative:     Performed at:  [facility]  [address],  [place], [place] [email or web address]   Phone [phone]   HCG, SERUM, QUALITATIVE    Narrative:     Performed at:  [facility]  [address],  [place], [place] [employer]   Phone [phone]       All other labs were within normal range or not returned as of this dictation. EKG: All EKG's are interpreted by the Emergency Department Physician in the absence of a cardiologist.  Please see their note for interpretation of EKG.     RADIOLOGY:   Non-plain film images such as CT, Ultrasound and MRI are read by the radiologist. Plain radiographic images are visualized and preliminarily interpreted by the ED Provider with the below findings:        Interpretation per the Radiologist below, if available at the time of this note:    No orders to display     [address] [facility] Additional Contrast? None    Result Date: 6/2/2021  EXAMINATION: CT OF THE ABDOMEN AND PELVIS WITHOUT CONTRAST 6/2/2021 1:25 pm TECHNIQUE: CT of the abdomen and pelvis was performed without the administration of intravenous contrast. Multiplanar reformatted images are provided for review. Dose modulation, iterative reconstruction, and/or weight based adjustment of the mA/kV was utilized to reduce the radiation dose to as low as reasonably achievable. COMPARISON: 05/08/2021 HISTORY: ORDERING SYSTEM PROVIDED HISTORY: Right flank pain with a history of kidney stones TECHNOLOGIST PROVIDED HISTORY: Additional Contrast?->None Reason for exam:->Right flank pain with a history of kidney stones Decision Support Exception - unselect if not a suspected or confirmed emergency medical condition->Emergency Medical Condition ([place]) Is the patient pregnant?->No Reason for Exam: RT pelvic and flank pain X 6 days, HX of stones Acuity: Acute Type of Exam: Initial FINDINGS: Lower Chest: Minimal dependent atelectasis. Heart is borderline enlarged. Organs: Stable low-density hepatic and splenic lesions. Lesions probably represent a combination of simple cysts and hemangiomas. No worrisome lesion identified on these noncontrast images. Tiny nonobstructing calculi identified in each kidney. Small area of nephrocalcinosis posteriorly in the mid left kidney unchanged. Gallbladder surgically absent. Other abdominal organs appear normal. GI/Bowel: Large amount of ingested food in the stomach. Normal terminal ileum. Appendix surgically absent. No acute GI abnormality. Pelvis: Uterus, ovaries, and urinary bladder appear normal.  No mass, adenopathy, or free fluid present. Right-sided sacral nerve root stimulator in place. No distal ureteral calculus. Peritoneum/Retroperitoneum: No mass, adenopathy, or ascites. Normal aorta. No ureteral calculus. Bones/Soft Tissues: No acute abnormality. Stimulator pack in the lower right back. No acute intra-abnormality identified. Tiny nonobstructing calculi in each kidney. No hydroureteronephrosis or ureteral calculus. Appendix surgically absent. No ovarian cyst or free pelvic fluid. Large amount of ingested food in the stomach. PROCEDURES   Unless otherwise noted below, none     Procedures    CRITICAL CARE TIME   N/A    CONSULTS:  IP CONSULT TO UROLOGY      EMERGENCY DEPARTMENT COURSE and DIFFERENTIAL DIAGNOSIS/MDM:   Vitals:    Vitals:    06/03/21 1000 06/03/21 1058 06/03/21 1103 06/03/21 1210   BP: 122/86   (!) 140/97   Pulse: 70 66  62   Resp: 20   18   Temp:       TempSrc:       SpO2: 100% 100% 100% 99%   Weight:       Height:           Patient was given the following medications:  Medications   ondansetron (ZOFRAN) injection 4 mg (4 mg Intravenous Given 6/3/21 0952)   0.9 % sodium chloride bolus (0 mLs Intravenous Stopped 6/3/21 1032)   morphine sulfate (PF) injection 4 mg (4 mg Intravenous Given 6/3/21 0952)   oxyCODONE-acetaminophen (PERCOCET) 5-325 MG per tablet 1 tablet (1 tablet Oral Given 6/3/21 1021)   oxyCODONE (OXY-IR) immediate release tablet 15 mg (15 mg Oral Given 6/3/21 1213)   fluconazole (DIFLUCAN) tablet 150 mg (150 mg Oral Given 6/3/21 1213)           Patient brought in today by private vehicle with complaints of right-sided flank pain with nonbilious nonbloody nausea and vomiting. On exam she is alert oriented afebrile breathing on room air satting at 100%. Nontoxic. No acute respiratory distress. Old labs records reviewed.   Patient was just seen yesterday had a full work-up with a negative CT scan and was told to follow-up with her urologist.  She states she believes that the pain that she is having is from her recent bladder stimulator which was placed in February of this year.    CBC shows no acute leukocytosis. Hemoglobin of 12.3. Reviewing patient's recent CT scan from June 2 shows no acute abnormality. Tiny nonobstructing calculi in each kidney. No hydroureteronephrosis or ureteral calculus. Appendix surgically absent. No ovarian cyst or free fluid in the pelvis. Large amount of ingested food in the stomach. hCG qualitative is negative. Patient received fluids Zofran and morphine here. No acute electrolyte abnormalities. Kidney function unremarkable. Liver enzymes unremarkable. Lipase of 23. Urine reveals large amount of blood with small leukocytes. I did consult patient's urologist at [facility] and spoke to Dr. Jaci Stacy who works with her urologist Dr. Monica Helms who did not feel as though the bladder stimulator was causing the pain and plan at this time will be to have her follow-up as an outpatient in the next several days. He advised treating her symptomatically here and then they will follow-up with her in the office. He reports that he will have their office call her today to schedule a follow-up appointment. She received fluids nausea meds and pain medication here in the ED. patient p.o. challenged and tolerated p.o. Patient does have home pain medication already. She was given Zofran for home. She was told to keep her scheduled follow-up appointment with her urologist at [facility]. She was told return immediately to the ED if she experience any new or worsening symptoms including but not limited to increased pain, intractable nausea or vomiting or any new or worsening symptoms. She verbalized understanding of this plan was comfortable and stable at time of discharge. I did feel comfortable sending this patient home with close follow-up instructions and strict return precautions. Patient discharged in stable condition. FINAL IMPRESSION      1. Right flank pain    2.  Hematuria, unspecified type          DISPOSITION/PLAN   DISPOSITION        PATIENT REFERRED VANDANAmarisol Her Ariel Marx [phone] [facility]  [phone]    Schedule an appointment as soon as possible for a visit in 1 day  For a recheck in  days    [facility]) [facility]  [address] [address]  [phone]  Schedule an appointment as soon as possible for a visit   As needed, If symptoms worsen      DISCHARGE MEDICATIONS:  New Prescriptions    ONDANSETRON (ZOFRAN ODT) 4 MG DISINTEGRATING TABLET    Take 1 tablet by mouth every 8 hours as needed for Nausea       DISCONTINUED MEDICATIONS:  Discontinued Medications    No medications on file              (Please note that portions of this note were completed with a voice recognition program.  Efforts were made to edit the dictations but occasionally words are mis-transcribed.)    Reyes Frausto PA-C (electronically signed)            Reyes Frausto PA-C  06/03/21 0501 Pt received & seen seated upright in bed, awake/alert, reduced cognition, pleasant/cooperative, 02 NC (sats: 96% t/o, RR 15), 0/10 pain pre/post

## 2024-09-05 NOTE — SWALLOW BEDSIDE ASSESSMENT ADULT - SWALLOW EVAL: DIAGNOSIS
Mild oral dysphagia for regular solids, impacted by reduced cognition, otherwise WFL. Pharyngeal stage of swallow clinically unremarkable for assessed trials with NO overt s/s aspiration observed post intake

## 2024-09-05 NOTE — BH CONSULTATION LIAISON ASSESSMENT NOTE - HPI (INCLUDE ILLNESS QUALITY, SEVERITY, DURATION, TIMING, CONTEXT, MODIFYING FACTORS, ASSOCIATED SIGNS AND SYMPTOMS)
Patient is an 83 y/o  male, domiciled with wife, with pmhx of cholecysistis, hyperlipidemia, DM, HTN, Afib,  CKD, BPH, no formal pphx, referred for outpatient workup for dementia (not yet complete per daughter), no known hx of inpatient psych hospitalizations or suicide attempts, who was admitted to [facility] s/p fall with left femoral neck fracture, clinical picture complicated by aspiration pneumonia, currently downgraded from ICU.    Psychiatry consulted for agitation.     Patient seen today at bedside in ICU.  Noted to be sitting up with blanket over head, responds to name being called by pulling blanket down slowly.  Language line utilized.  Patient unable to engage in evaluation, stating "I don't know" to majority of questions asked.  Able only to provide year he was given.     Spoke with patient's daughter Elkin.  Per daughter, patient has been forgetful at times at home, "but just like anyone else his age. " Daughter reports that patient was sent for workup for dementia eval by PCP, but it was not yet completed.  Patient's daughter reports "He gets confused and agitated any time he is admitted."  She reports at home, prior to hospitalization, "He was back to normal... he was sharp."  She reports at baseline patient is living with wife, does not drive but overall able to function at home care level.  She reports concern that he was given ativan during last admission, which she feels worsened his clinical picture.  Discussed concerns of benzo administration in relation to delirium.  Updated daughter about patient's episode of agitation last night which required IM PRN and recommendation that patient be started on standing medication to prevent acute outbursts.  Patient's daughter in agreement with starting depakote.

## 2024-09-06 LAB
ACANTHOCYTES BLD QL SMEAR: SLIGHT — SIGNIFICANT CHANGE UP
ALBUMIN SERPL ELPH-MCNC: 2.6 G/DL — LOW (ref 3.3–5.2)
ALP SERPL-CCNC: 130 U/L — HIGH (ref 40–120)
ALT FLD-CCNC: 12 U/L — SIGNIFICANT CHANGE UP
ANION GAP SERPL CALC-SCNC: 12 MMOL/L — SIGNIFICANT CHANGE UP (ref 5–17)
ANISOCYTOSIS BLD QL: SLIGHT — SIGNIFICANT CHANGE UP
AST SERPL-CCNC: 17 U/L — SIGNIFICANT CHANGE UP
BASOPHILS # BLD AUTO: 0 K/UL — SIGNIFICANT CHANGE UP (ref 0–0.2)
BASOPHILS NFR BLD AUTO: 0 % — SIGNIFICANT CHANGE UP (ref 0–2)
BILIRUB SERPL-MCNC: 1.1 MG/DL — SIGNIFICANT CHANGE UP (ref 0.4–2)
BUN SERPL-MCNC: 22.4 MG/DL — HIGH (ref 8–20)
CALCIUM SERPL-MCNC: 8.3 MG/DL — LOW (ref 8.4–10.5)
CHLORIDE SERPL-SCNC: 104 MMOL/L — SIGNIFICANT CHANGE UP (ref 96–108)
CO2 SERPL-SCNC: 23 MMOL/L — SIGNIFICANT CHANGE UP (ref 22–29)
CREAT SERPL-MCNC: 2.33 MG/DL — HIGH (ref 0.5–1.3)
DACRYOCYTES BLD QL SMEAR: SLIGHT — SIGNIFICANT CHANGE UP
EGFR: 27 ML/MIN/1.73M2 — LOW
ELLIPTOCYTES BLD QL SMEAR: SLIGHT — SIGNIFICANT CHANGE UP
EOSINOPHIL # BLD AUTO: 0 K/UL — SIGNIFICANT CHANGE UP (ref 0–0.5)
EOSINOPHIL NFR BLD AUTO: 0 % — SIGNIFICANT CHANGE UP (ref 0–6)
GIANT PLATELETS BLD QL SMEAR: PRESENT — SIGNIFICANT CHANGE UP
GLUCOSE BLDC GLUCOMTR-MCNC: 139 MG/DL — HIGH (ref 70–99)
GLUCOSE BLDC GLUCOMTR-MCNC: 151 MG/DL — HIGH (ref 70–99)
GLUCOSE BLDC GLUCOMTR-MCNC: 184 MG/DL — HIGH (ref 70–99)
GLUCOSE BLDC GLUCOMTR-MCNC: 192 MG/DL — HIGH (ref 70–99)
GLUCOSE BLDC GLUCOMTR-MCNC: 207 MG/DL — HIGH (ref 70–99)
GLUCOSE SERPL-MCNC: 126 MG/DL — HIGH (ref 70–99)
HCT VFR BLD CALC: 27.6 % — LOW (ref 39–50)
HGB BLD-MCNC: 8.8 G/DL — LOW (ref 13–17)
LYMPHOCYTES # BLD AUTO: 0.85 K/UL — LOW (ref 1–3.3)
LYMPHOCYTES # BLD AUTO: 10.5 % — LOW (ref 13–44)
MACROCYTES BLD QL: SLIGHT — SIGNIFICANT CHANGE UP
MAGNESIUM SERPL-MCNC: 1.7 MG/DL — LOW (ref 1.8–2.6)
MANUAL SMEAR VERIFICATION: SIGNIFICANT CHANGE UP
MCHC RBC-ENTMCNC: 26.4 PG — LOW (ref 27–34)
MCHC RBC-ENTMCNC: 31.9 GM/DL — LOW (ref 32–36)
MCV RBC AUTO: 82.9 FL — SIGNIFICANT CHANGE UP (ref 80–100)
METAMYELOCYTES # FLD: 1.8 % — HIGH (ref 0–0)
MICROCYTES BLD QL: SLIGHT — SIGNIFICANT CHANGE UP
MONOCYTES # BLD AUTO: 0.36 K/UL — SIGNIFICANT CHANGE UP (ref 0–0.9)
MONOCYTES NFR BLD AUTO: 4.4 % — SIGNIFICANT CHANGE UP (ref 2–14)
NEUTROPHILS # BLD AUTO: 6.76 K/UL — SIGNIFICANT CHANGE UP (ref 1.8–7.4)
NEUTROPHILS NFR BLD AUTO: 83.3 % — HIGH (ref 43–77)
OVALOCYTES BLD QL SMEAR: SLIGHT — SIGNIFICANT CHANGE UP
PLAT MORPH BLD: NORMAL — SIGNIFICANT CHANGE UP
PLATELET # BLD AUTO: 342 K/UL — SIGNIFICANT CHANGE UP (ref 150–400)
POLYCHROMASIA BLD QL SMEAR: SIGNIFICANT CHANGE UP
POTASSIUM SERPL-MCNC: 3.3 MMOL/L — LOW (ref 3.5–5.3)
POTASSIUM SERPL-SCNC: 3.3 MMOL/L — LOW (ref 3.5–5.3)
PROT SERPL-MCNC: 5.5 G/DL — LOW (ref 6.6–8.7)
RBC # BLD: 3.33 M/UL — LOW (ref 4.2–5.8)
RBC # FLD: 21.4 % — HIGH (ref 10.3–14.5)
RBC BLD AUTO: ABNORMAL
SMUDGE CELLS # BLD: PRESENT — SIGNIFICANT CHANGE UP
SODIUM SERPL-SCNC: 139 MMOL/L — SIGNIFICANT CHANGE UP (ref 135–145)
WBC # BLD: 8.12 K/UL — SIGNIFICANT CHANGE UP (ref 3.8–10.5)
WBC # FLD AUTO: 8.12 K/UL — SIGNIFICANT CHANGE UP (ref 3.8–10.5)

## 2024-09-06 PROCEDURE — 99232 SBSQ HOSP IP/OBS MODERATE 35: CPT

## 2024-09-06 PROCEDURE — 93010 ELECTROCARDIOGRAM REPORT: CPT

## 2024-09-06 PROCEDURE — 99231 SBSQ HOSP IP/OBS SF/LOW 25: CPT

## 2024-09-06 RX ORDER — POTASSIUM CHLORIDE 10 MEQ
20 TABLET, EXT RELEASE, PARTICLES/CRYSTALS ORAL ONCE
Refills: 0 | Status: COMPLETED | OUTPATIENT
Start: 2024-09-06 | End: 2024-09-06

## 2024-09-06 RX ORDER — POTASSIUM CHLORIDE 10 MEQ
20 TABLET, EXT RELEASE, PARTICLES/CRYSTALS ORAL ONCE
Refills: 0 | Status: DISCONTINUED | OUTPATIENT
Start: 2024-09-06 | End: 2024-09-06

## 2024-09-06 RX ADMIN — Medication 2: at 12:04

## 2024-09-06 RX ADMIN — METOPROLOL TARTRATE 25 MILLIGRAM(S): 100 TABLET ORAL at 05:46

## 2024-09-06 RX ADMIN — SUCRALFATE 1 GRAM(S): 1 SUSPENSION ORAL at 21:39

## 2024-09-06 RX ADMIN — PIPERACILLIN SODIUM AND TAZOBACTAM SODIUM 25 GRAM(S): 3; .375 INJECTION, POWDER, FOR SOLUTION INTRAVENOUS at 07:18

## 2024-09-06 RX ADMIN — Medication 5000 UNIT(S): at 21:39

## 2024-09-06 RX ADMIN — Medication 4: at 01:20

## 2024-09-06 RX ADMIN — SUCRALFATE 1 GRAM(S): 1 SUSPENSION ORAL at 11:09

## 2024-09-06 RX ADMIN — Medication 125 MILLIGRAM(S): at 05:46

## 2024-09-06 RX ADMIN — Medication 25 GRAM(S): at 11:10

## 2024-09-06 RX ADMIN — Medication 20 MILLIEQUIVALENT(S): at 08:56

## 2024-09-06 RX ADMIN — PIPERACILLIN SODIUM AND TAZOBACTAM SODIUM 25 GRAM(S): 3; .375 INJECTION, POWDER, FOR SOLUTION INTRAVENOUS at 14:01

## 2024-09-06 RX ADMIN — SUCRALFATE 1 GRAM(S): 1 SUSPENSION ORAL at 05:45

## 2024-09-06 RX ADMIN — Medication 3 MILLIGRAM(S): at 21:39

## 2024-09-06 RX ADMIN — Medication 125 MILLIGRAM(S): at 14:01

## 2024-09-06 RX ADMIN — ACETAMINOPHEN 650 MILLIGRAM(S): 325 TABLET ORAL at 21:39

## 2024-09-06 RX ADMIN — Medication 2: at 21:40

## 2024-09-06 RX ADMIN — Medication 2: at 17:37

## 2024-09-06 RX ADMIN — SUCRALFATE 1 GRAM(S): 1 SUSPENSION ORAL at 01:16

## 2024-09-06 RX ADMIN — TAMSULOSIN HYDROCHLORIDE 0.8 MILLIGRAM(S): 0.4 CAPSULE ORAL at 21:40

## 2024-09-06 RX ADMIN — CHLORHEXIDINE GLUCONATE 1 APPLICATION(S): 40 SOLUTION TOPICAL at 05:46

## 2024-09-06 RX ADMIN — Medication 40 MILLIGRAM(S): at 11:10

## 2024-09-06 RX ADMIN — METOPROLOL TARTRATE 25 MILLIGRAM(S): 100 TABLET ORAL at 17:38

## 2024-09-06 RX ADMIN — Medication 5000 UNIT(S): at 05:46

## 2024-09-06 RX ADMIN — Medication 125 MILLIGRAM(S): at 21:41

## 2024-09-06 RX ADMIN — Medication 5000 UNIT(S): at 14:01

## 2024-09-06 RX ADMIN — INSULIN GLARGINE 6 UNIT(S): 100 INJECTION, SOLUTION SUBCUTANEOUS at 08:43

## 2024-09-06 NOTE — BH CONSULTATION LIAISON PROGRESS NOTE - NSBHASSESSMENTFT_PSY_ALL_CORE
Patient is an 83 y/o  male, domiciled with wife, with pmhx of cholecysistis, hyperlipidemia, DM, HTN, Afib,  CKD, BPH, no formal pphx, referred for outpatient workup for dementia (not yet complete per daughter), no known hx of inpatient psych hospitalizations or suicide attempts, who was admitted to [facility] s/p fall with left femoral neck fracture, clinical picture complicated by aspiration pneumonia, currently downgraded from ICU.    Psychiatry consulted for agitation.     Patient seen today at bedside, alert, awake, oriented to self and time. Patient is cooperative, his speech soft, impaired articulation. Mood euthymic. Patient denies audio/visual hallucinations as well as sucidial/homicidal ideation.      Patient's daughter was updated about patient's status. Patient currently doing well on depakote. Patient's daughter in agreement with discontinuing depakote as patient's condition improves    RECS  delirium   -Maintain delirium precautions   -Avoid anticholinergic agents, benzos, opioid as they can further perpetuate confusion   -Frequent re orientation, Hydration, try to avoid restraints and if possible, mobilize patient and PT involvement   -repeat EKG  -if qtc <500, can utilize zyprexa 5mg IM q6hrs PRN for agitation  -continue depakote 125mg IV q8hrs for mood stablization - can consider PO if patient able to tolerate  - discontinue depakote upon discharge and or/as agitation improves     Patient is an 83 y/o  male, domiciled with wife, with pmhx of cholecysistis, hyperlipidemia, DM, HTN, Afib,  CKD, BPH, no formal pphx, referred for outpatient workup for dementia (not yet complete per daughter), no known hx of inpatient psych hospitalizations or suicide attempts, who was admitted to [facility] s/p fall with left femoral neck fracture, clinical picture complicated by aspiration pneumonia, currently downgraded from ICU.    Psychiatry consulted for agitation.     Patient seen today at bedside, alert, awake, oriented to self and time. Patient is cooperative, his speech soft, impaired articulation. Mood euthymic. Patient denies audio/visual hallucinations as well as sucidial/homicidal ideation.      Patient's daughter was updated about patient's status. Patient currently doing well on depakote. Patient's daughter in agreement to continue depakote throughout the weekend    RECS  delirium   -Maintain delirium precautions   -Avoid anticholinergic agents, benzos, opioid as they can further perpetuate confusion   -Frequent re orientation, Hydration, try to avoid restraints and if possible, mobilize patient and PT involvement   -repeat EKG  -if qtc <500, can utilize zyprexa 5mg IM q6hrs PRN for agitation  -continue depakote 125mg IV q8hrs for mood stablization - can consider PO if patient able to tolerate  - discontinue depakote upon discharge and or/as agitation improves

## 2024-09-06 NOTE — PROGRESS NOTE ADULT - SUBJECTIVE AND OBJECTIVE BOX
CC:  Follow up GOC , Symptoms    OVERNIGHT EVENTS:  no new events      Present Symptoms:   Dyspnea:  No    Nausea/Vomiting:  No  Anxiety/ AgitationYes    Depression:  No    Fatigue:  Yes    Loss of appetite:    Fair  Constipation: Not Reported       Pain: No               Location            Duration            Character            Severity            Factors            Effect    Pain AD Score:  [email or web address] (press ctrl + left click to view)    Review of Systems: Reviewed as above  All others negative      MEDICATIONS  (STANDING):  chlorhexidine 2% Cloths 1 Application(s) Topical <User Schedule>  dextrose 5%. 1000 milliLiter(s) (50 mL/Hr) IV Continuous <Continuous>  dextrose 5%. 1000 milliLiter(s) (100 mL/Hr) IV Continuous <Continuous>  dextrose 50% Injectable 25 Gram(s) IV Push once  dextrose 50% Injectable 12.5 Gram(s) IV Push once  dextrose 50% Injectable 25 Gram(s) IV Push once  glucagon  Injectable 1 milliGRAM(s) IntraMuscular once  heparin   Injectable 5000 Unit(s) SubCutaneous every 8 hours  insulin glargine Injectable (LANTUS) 6 Unit(s) SubCutaneous every morning  insulin lispro (ADMELOG) corrective regimen sliding scale   SubCutaneous every 6 hours  metoprolol tartrate 25 milliGRAM(s) Oral two times a day  pantoprazole  Injectable 40 milliGRAM(s) IV Push daily  piperacillin/tazobactam IVPB.. 3.375 Gram(s) IV Intermittent every 8 hours  sucralfate 1 Gram(s) Oral four times a day  tamsulosin 0.8 milliGRAM(s) Oral at bedtime  valproic  acid Syrup 125 milliGRAM(s) Oral three times a day    MEDICATIONS  (PRN):  acetaminophen     Tablet .. 650 milliGRAM(s) Oral every 6 hours PRN Temp greater or equal to 38C (100.4F), Mild Pain (1 - 3)  aluminum hydroxide/magnesium hydroxide/simethicone Suspension 30 milliLiter(s) Oral four times a day PRN Indigestion  bisacodyl Suppository 10 milliGRAM(s) Rectal daily PRN Constipation  dextrose Oral Gel 15 Gram(s) Oral once PRN Blood Glucose LESS THAN 70 milliGRAM(s)/deciliter  melatonin 3 milliGRAM(s) Oral at bedtime PRN Insomnia  ondansetron Injectable 4 milliGRAM(s) IV Push every 6 hours PRN Nausea and/or Vomiting      PHYSICAL EXAM:    Vital Signs Last 24 Hrs  T(C): 36.6 (06 Sep 2024 08:00), Max: 37.3 (05 Sep 2024 21:00)  T(F): 97.8 (06 Sep 2024 08:00), Max: 99.1 (05 Sep 2024 21:00)  HR: 73 (06 Sep 2024 08:00) (73 - 89)  BP: 146/70 (06 Sep 2024 08:00) (125/86 - 172/73)  BP(mean): 88 (05 Sep 2024 20:00) (80 - 106)  RR: 18 (06 Sep 2024 08:00) (13 - 21)  SpO2: 97% (06 Sep 2024 08:00) (96% - 99%)    Parameters below as of 06 Sep 2024 08:00  Patient On (Oxygen Delivery Method): room air    Karnofsky:  40%  General:   M awake alert NAD      HEENT:  NCAT       Lungs: comfortable  non labored  CV:  RR  GI:   soft NTND  MSK: weakness  Skin:  warm/dry  Neuro  no focal deficts  Psych calm, cooperative    LABS:                          8.8    8.12  )-----------( 342      ( 06 Sep 2024 05:47 )             27.6     09-06    139  |  104  |  22.4<H>  ----------------------------<  126<H>  3.3<L>   |  23.0  |  2.33<H>    Ca    8.3<L>      06 Sep 2024 05:47  Phos  2.9     09-05  Mg     1.7     09-06    TPro  5.5<L>  /  Alb  2.6<L>  /  TBili  1.1  /  DBili  x   /  AST  17  /  ALT  12  /  AlkPhos  130<H>  09-06      Urinalysis Basic - ( 06 Sep 2024 05:47 )    Color: x / Appearance: x / SG: x / pH: x  Gluc: 126 mg/dL / Ketone: x  / Bili: x / Urobili: x   Blood: x / Protein: x / Nitrite: x   Leuk Esterase: x / RBC: x / WBC x   Sq Epi: x / Non Sq Epi: x / Bacteria: x      I&O's Summary    05 Sep 2024 07:01  -  06 Sep 2024 07:00  --------------------------------------------------------  IN: 940 mL / OUT: 1820 mL / NET: -880 mL    06 Sep 2024 07:01  -  06 Sep 2024 13:19  --------------------------------------------------------  IN: 240 mL / OUT: 0 mL / NET: 240 mL      ADVANCE DIRECTIVE PREFERENCES    DNR/I  and continuing medical treatments

## 2024-09-06 NOTE — PROGRESS NOTE ADULT - ASSESSMENT
83 y/o M w/ PMH of CKD, dementia (AAOx2 at baseline), IDDMT2, HTN, chronic cholecystitis s/p percutaneous sky tube (placed 12/2023), CKDIIIa, pAF (not on AC 2/2 prior GIB and fall risk), BPH presented 08/27 to ED after mechanical fall resulting in L-femoral neck fracture and now s/p L hemiarthroplasty 8/30 w/ ortho.  Hospital course complicated by sepsis due to aspiration pneumonia, RON, severe behavioral disturbances, inadvertent removal of perc sky tube.  Hospital course further complicated by hypotension on 09/02 overnight associated w/ lethargy.  Pt was given IVFs and albumin w/ improved BP but pt remained obtunded with snoring respirations and generalized twitching and pt subsequently upgraded to MICU.  Pt requiring supplemental O2 to maintain normal O2 sats and likely from aspiration PNA and started on Zosyn.  CTH negative for acute intracranial pathology.  While in MICU pt went in to AF RVR on 09/03 and cardiology consulted but converted back to NSR.  Pt now awake and alert but NPO given concern for aspiration and SLP following.  Pt will go for perc sky tube placement today w/ IR.  Pt noted to have down trending H/H, s/p 2u PRBC transfused. No concern for bleeding at this time and pt hemodynamically stable.  Pt medically stable for transfer to medicine for continued management.      Acute delirium in the setting of underlying dementia w/ behavioral disturbances, improving  Acute metabolic encephalopathy   - Likely component of ICU/hospital delirium and exacerbated by dehydration, sepsis, acute hypoxic resp failure and anemia  - As per family at bedside pt is AAOx2 at baseline   - Severe sun downing reported, pt is impulsive pulling at lines and intermittently aggressive   - Monitor mental status and reorient when possible   - Fall and aspiration precautions including HOB elevation   - Behavior health consult appreciated. Cont. on Valproic acid 125mg TID. Behavior waxes and wanes. May need safetly sitter while medications are taking effect.     Acute on chronic anemia   - Has hx of GIB but currently no evidence of bleeding and remains hemodynamically stable   - Baseline Hb ranges btw 8-10 and had slow down trend past few days   - Received 2 unit RBC on this admission  - Post transfusion H/H good response  - Hold off on iron panel given actively being transfused and will not be accurate but check in 4-6wks  - Iron panel from 02/2024 reviewed and appears to be of chronic dx  - BUN/Cr ratio normal and is on ppi for GI cytoprotection   - Monitor CBC and transfuse for Hb<8    Left Femoral Neck Fracture 2/2 mechanical fall  s/p Hemiarthroplasty of left hip by Ortho  - PT  - Pain control     AF RVR  - 9/3 developed AF RVR but likely precipitated by metabolic acidosis, hypoxia, aspiration PNA, anemia and hypovolemia   - Now back in NSR and rate controlled  - c/w prn IV BB while npo but resume po metoprolol once clered for diet   - TSH normal   - Not on AC due to fall risk and hx of GIB  - c/w VTE ppx dose heparin   - Cardio consult noted    RON on CKD3a likely prerenal azotemia   - Has required temporary HD in past but currently has good urine output   - Will c/w wills for now and Start flomax today and then do TOV when able to mobilze  - Monitor renal function lytes and I/O's  - Avoid nephrotoxic meds or renally dose if needed     Sepsis likely 2/2 aspiration pna   Acute hypoxic respiratory failure 2/2 aspiration PNA/pneumonitis, atelectasis and anemia  - Most recent CT c/a/p 09/03 reporting interstitial ground glass opacities and dependent b/l consolidations  - BCxs 08/29 and 08/02 NGTD    - Procal elevated but unreliable in setting of RON/CKD  - On Zosyn, started 09/03/24, will cont  - Had originally passed bedside dysphagia 9/1 but given waxing and waning mentation. Cleared for Diet by SLP on 9/5  - SLP following for reassessment   - c/w strict aspiration precautions including HOB elevation   - Weaned off O2 while bedside and tolerating   - Suspect PRBC transfusion helped w/ oxygenation   - OOB to chair and encourage incentive spirometer if able to   - Monitor CBC and temperature     Chronic cholecystitis   - s/p perc sky drain placed 12/2023 and pulled out by pt during this admission on 09/01  - s/p drain replacement on 9/4 by IR.  - Monitor drain output daily     IDDM  - A1c 7.1   - c/w basal insulin regimen 6 unit, may need to up titrate based on diet.   - c/w ISS and hypoglycemic protocol   - Titrate insulin to maintain BG <180    Severe Esophagitis  - Cont. sucralfate QID   - c/w PPI IV     Hypokalemia and Hypomagnesemia  - replaced    VTE ppx: heparin sq    Dispo: Remains acute, once behavior controlled, Anticipate NITHIN.

## 2024-09-06 NOTE — PROGRESS NOTE ADULT - SUBJECTIVE AND OBJECTIVE BOX
[employer] Division of Hospital Medicine    Chief Complaint:  L hip fx    SUBJECTIVE / OVERNIGHT EVENTS: Patient seen and examined this morning. Calm, knows he is in the hospital. Does not know the year or date. No complaints.     Patient denies chest pain, SOB, abd pain, N/V, fever, chills, dysuria or any other complaints. All remainder ROS negative.     MEDICATIONS  (STANDING):  chlorhexidine 2% Cloths 1 Application(s) Topical <User Schedule>  dextrose 5%. 1000 milliLiter(s) (50 mL/Hr) IV Continuous <Continuous>  dextrose 5%. 1000 milliLiter(s) (100 mL/Hr) IV Continuous <Continuous>  dextrose 50% Injectable 25 Gram(s) IV Push once  dextrose 50% Injectable 12.5 Gram(s) IV Push once  dextrose 50% Injectable 25 Gram(s) IV Push once  glucagon  Injectable 1 milliGRAM(s) IntraMuscular once  heparin   Injectable 5000 Unit(s) SubCutaneous every 8 hours  insulin glargine Injectable (LANTUS) 6 Unit(s) SubCutaneous every morning  insulin lispro (ADMELOG) corrective regimen sliding scale   SubCutaneous every 6 hours  metoprolol tartrate 25 milliGRAM(s) Oral two times a day  pantoprazole  Injectable 40 milliGRAM(s) IV Push daily  piperacillin/tazobactam IVPB.. 3.375 Gram(s) IV Intermittent every 8 hours  sucralfate 1 Gram(s) Oral four times a day  tamsulosin 0.8 milliGRAM(s) Oral at bedtime  valproic  acid Syrup 125 milliGRAM(s) Oral three times a day    MEDICATIONS  (PRN):  acetaminophen     Tablet .. 650 milliGRAM(s) Oral every 6 hours PRN Temp greater or equal to 38C (100.4F), Mild Pain (1 - 3)  aluminum hydroxide/magnesium hydroxide/simethicone Suspension 30 milliLiter(s) Oral four times a day PRN Indigestion  bisacodyl Suppository 10 milliGRAM(s) Rectal daily PRN Constipation  dextrose Oral Gel 15 Gram(s) Oral once PRN Blood Glucose LESS THAN 70 milliGRAM(s)/deciliter  melatonin 3 milliGRAM(s) Oral at bedtime PRN Insomnia  ondansetron Injectable 4 milliGRAM(s) IV Push every 6 hours PRN Nausea and/or Vomiting        I&O's Summary    05 Sep 2024 07:01  -  06 Sep 2024 07:00  --------------------------------------------------------  IN: 940 mL / OUT: 1820 mL / NET: -880 mL    06 Sep 2024 07:01  -  06 Sep 2024 14:22  --------------------------------------------------------  IN: 240 mL / OUT: 0 mL / NET: 240 mL        PHYSICAL EXAM:  Vital Signs Last 24 Hrs  T(C): 36.6 (06 Sep 2024 08:00), Max: 37.3 (05 Sep 2024 21:00)  T(F): 97.8 (06 Sep 2024 08:00), Max: 99.1 (05 Sep 2024 21:00)  HR: 73 (06 Sep 2024 08:00) (73 - 85)  BP: 146/70 (06 Sep 2024 08:00) (125/86 - 172/73)  BP(mean): 88 (05 Sep 2024 20:00) (80 - 106)  RR: 18 (06 Sep 2024 08:00) (13 - 21)  SpO2: 97% (06 Sep 2024 08:00) (96% - 99%)    Parameters below as of 06 Sep 2024 08:00  Patient On (Oxygen Delivery Method): room air      CONSTITUTIONAL: NAD  ENMT: Moist oral mucosa, no pharyngeal injection or exudates  RESPIRATORY: Normal respiratory effort; lungs are clear to auscultation bilaterally  CARDIOVASCULAR: Regular rate and rhythm, normal S1 and S2, No lower extremity edema  ABDOMEN: Nontender to palpation, normoactive bowel sounds, no rebound/guarding;   MUSCLOSKELETAL no joint swelling or tenderness to palpation  PSYCH: A+O to person and place, calm  NEUROLOGY: CN 2-12 are intact and symmetric; no gross sensory deficits;   SKIN: No rashes; no palpable lesions    LABS:                        8.8    8.12  )-----------( 342      ( 06 Sep 2024 05:47 )             27.6     09-06    139  |  104  |  22.4<H>  ----------------------------<  126<H>  3.3<L>   |  23.0  |  2.33<H>    Ca    8.3<L>      06 Sep 2024 05:47  Phos  2.9     09-05  Mg     1.7     09-06    TPro  5.5<L>  /  Alb  2.6<L>  /  TBili  1.1  /  DBili  x   /  AST  17  /  ALT  12  /  AlkPhos  130<H>  09-06          Urinalysis Basic - ( 06 Sep 2024 05:47 )    Color: x / Appearance: x / SG: x / pH: x  Gluc: 126 mg/dL / Ketone: x  / Bili: x / Urobili: x   Blood: x / Protein: x / Nitrite: x   Leuk Esterase: x / RBC: x / WBC x   Sq Epi: x / Non Sq Epi: x / Bacteria: x        CAPILLARY BLOOD GLUCOSE      POCT Blood Glucose.: 184 mg/dL (06 Sep 2024 11:31)  POCT Blood Glucose.: 139 mg/dL (06 Sep 2024 08:01)  POCT Blood Glucose.: 207 mg/dL (06 Sep 2024 01:18)  POCT Blood Glucose.: 178 mg/dL (05 Sep 2024 17:23)        RADIOLOGY & ADDITIONAL TESTS:  Results Reviewed:   Imaging Personally Reviewed:  Electrocardiogram Personally Reviewed:

## 2024-09-06 NOTE — BH CONSULTATION LIAISON PROGRESS NOTE - NSBHFUPINTERVALHXFT_PSY_A_CORE
Patient is an 85 y/o  male, domiciled with wife, with pmhx of cholecysistis, hyperlipidemia, DM, HTN, Afib,  CKD, BPH, no formal pphx, referred for outpatient workup for dementia (not yet complete per daughter), no known hx of inpatient psych hospitalizations or suicide attempts, who was admitted to [facility] s/p fall with left femoral neck fracture, clinical picture complicated by aspiration pneumonia, currently downgraded from ICU.    Psychiatry consulted for agitation.     No acute events overnight. Patient seen today at bedside.  Noted to be sitting up with blanket over head, responds to name being called by pulling blanket down slowly.  In person  utilized. Patient AAOx 2, "feeling better" than yesterday. Patient able to provide full name, year, and birthday. Patient states "I'm at home" and " I keep calling for my daughter but no one comes." Patient's daughter was not at bedside, but is contact with team. Patient denies audio/visual hallucinations as well as sucidial/homicidal ideation.      Patient's daughter was updated about patient's status. Patient currently doing well on depakote. Patient's daughter in agreement with continuing depakote as patient's condition improves Patient is an 83 y/o  male, domiciled with wife, with pmhx of cholecysistis, hyperlipidemia, DM, HTN, Afib,  CKD, BPH, no formal pphx, referred for outpatient workup for dementia (not yet complete per daughter), no known hx of inpatient psych hospitalizations or suicide attempts, who was admitted to [facility] s/p fall with left femoral neck fracture, clinical picture complicated by aspiration pneumonia, currently downgraded from ICU.    Psychiatry consulted for agitation.     No acute events overnight. Patient seen today at bedside.  Noted to be sitting up with blanket over head, responds to name being called by pulling blanket down slowly.  In person  utilized. Patient AAOx 2, "feeling better" than yesterday. Patient able to provide full name, year, and birthday.  Patient states "I'm at home" and " I keep calling for my daughter but no one comes." Patient's daughter was not at bedside, but is contact with team. Patient reports he "sleep good", is eating. Patient denies audio/visual hallucinations as well as sucidial/homicidal ideation.      Patient's daughter was updated about patient's status. Patient currently doing well on depakote. Patient's daughter in agreement with continuing depakote as patient's condition improves Patient is an 83 y/o  male, domiciled with wife, with pmhx of cholecysistis, hyperlipidemia, DM, HTN, Afib,  CKD, BPH, no formal pphx, referred for outpatient workup for dementia (not yet complete per daughter), no known hx of inpatient psych hospitalizations or suicide attempts, who was admitted to [facility] s/p fall with left femoral neck fracture, clinical picture complicated by aspiration pneumonia, currently downgraded from ICU.    Psychiatry consulted for agitation.     No acute events overnight. Patient seen today at bedside.  Noted to be sitting up with blanket over head, responds to name being called by pulling blanket down slowly.  In person  utilized. Patient AAOx 2, "feeling better" than yesterday. Patient able to provide full name, year, and birthday.  Patient states "I'm at home" and " I keep calling for my daughter but no one comes." Patient's daughter was not at bedside, but is contact with team. Patient reports he "sleep good", is eating. Patient denies audio/visual hallucinations as well as sucidial/homicidal ideation.      Patient's daughter was updated about patient's status. Patient currently doing well on depakote. Patient's daughter in agreement with continuing depakote over the weekend and discontinuing it as patient's condition improves

## 2024-09-06 NOTE — CHART NOTE - NSCHARTNOTEFT_GEN_A_CORE
Source: Patient [ ]  Family [ ]   other [x ] EMR and staff    Current Diet: Diet, Easy to Chew  Consistent Carbohydrate ( Evening Snack)    PO intake:  < 50% [ ]   50-75%  [x ]   %  [ ]  other :    Source for PO intake [ ] Patient [ ] family [x ] chart [x ] staff [ ] other    Current Weight:   9/4- 68.1kg  9/3- 64.2kg  8/30- 61.2kg  ? accuracy of weights, continue to trend and maintain strict Is&Os   No edema noted    Pertinent Medications: MEDICATIONS  (STANDING):  dextrose 5%. 1000 milliLiter(s) (50 mL/Hr) IV Continuous <Continuous>  dextrose 5%. 1000 milliLiter(s) (100 mL/Hr) IV Continuous <Continuous>  dextrose 50% Injectable 25 Gram(s) IV Push once  dextrose 50% Injectable 25 Gram(s) IV Push once  dextrose 50% Injectable 12.5 Gram(s) IV Push once  glucagon  Injectable 1 milliGRAM(s) IntraMuscular once  insulin glargine Injectable (LANTUS) 6 Unit(s) SubCutaneous every morning  insulin lispro (ADMELOG) corrective regimen sliding scale   SubCutaneous every 6 hours  pantoprazole  Injectable 40 milliGRAM(s) IV Push daily  sucralfate 1 Gram(s) Oral four times a day  tamsulosin 0.8 milliGRAM(s) Oral at bedtime      Pertinent Labs: CBC Full  -  ( 06 Sep 2024 05:47 )  WBC Count : 8.12 K/uL  RBC Count : 3.33 M/uL  Hemoglobin : 8.8 g/dL  Hematocrit : 27.6 %  Platelet Count - Automated : 342 K/uL  Mean Cell Volume : 82.9 fl  Mean Cell Hemoglobin : 26.4 pg  Mean Cell Hemoglobin Concentration : 31.9 gm/dL  09-06 Na139 mmol/L Glu 126 mg/dL<H> K+ 3.3 mmol/L<L> Cr  2.33 mg/dL<H> BUN 22.4 mg/dL<H> Phos n/a   Alb 2.6 g/dL<L> PAB n/a         Skin:     Estimated Needs:   [X ] no change since previous assessment  [ ] recalculated:     Hospital Course: 84yoM with hx of cholecystitis s/p perc cholecystectomy (12/2023) with intermittent exchanges most recently 8/13, pAF not on AC due to GI bleed/fall risk, esophagitis, DM, HTN, HLD, CKD previously requiring brief HD, BPH, dementia presented to the ED 8/28 following a trip and fall and was found to have a left femoral neck fracture with operative repair initially postponed as patient developed sepsis/hypoxic respiratory failure felt secondary to aspiration PNA. Now, with acute delirium/toxic metabolic encephalopathy related to quetiapine administration or sepsis, transferred to ICU. Palliative care consulted due to chronic stepwise deterioration and for complex medical decisions. Pt downgraded from MICU 9/4. Pallative signed off due to pt improvement. SLP currently following.      Current Nutrition Diagnosis: Inadequate protein energy intake related to altered mental status as evidenced by pt recent npo status. Pt unavailable at time of visit. Chart reviewed. SLP assessment completed 9/5 with recommendations for easy to chew diet with thin liquids. Per documentation pt with improvement in appetite and PO intake <50-75% today 9/6. Pallative was following for GOC, but recently signed off due to pt downgrade from MICU and pt overall improvement. Pt noted to have variable PO intake throughout admission <0-75%. RD to add Glucerna BID to assist with overall protein intake. Fecal incontinence documented. RD to remain available. Recommendations below.    Recommendations:   1) Rx: MVI daily.   2) Obtain daily weights to monitor trends.  3) Encourage po intake, monitor diet tolerance, and provide assistance at meals as needed.   4) Add glucerna BID to optimize po intake and provide an additional 220kcal, 10g protein per serving.  5) Encourage HBV protein sources.  6) Continue SLP recommendations for diet consistency.    Monitoring and Evaluation:   [x ] PO intake [x ] Tolerance to diet prescription [X] Weights  [X] Follow up per protocol [X] Labs: chem 8, mg, phos, H/H, BGM

## 2024-09-06 NOTE — CHART NOTE - NSCHARTNOTESELECT_GEN_ALL_CORE
Event Note
Nutrition Services
POCUS
Progress Note MICU
Transfer Note
Atrial Fibrillation w RVR/Event Note
Event Note
Event Note

## 2024-09-06 NOTE — PROGRESS NOTE ADULT - ASSESSMENT
84yr man Hx Dementia, CKD, IDDM, chronic cholecystitis s/p PCT, prior GIB admitted after a fall found to have L femoral neck fx s/p l hemiarthroplasty.  Hospital course complicated by hypotension, hypoxic respiratory failure,  with transfer to MICU, Delirium  aspiration PNA,, AF RVR, anemia needing PRBC.     Sepsis  Aspiration PNA   IV abx   monitor for fevers, WBC  Aspiration precautions    Acute Respiratory Failure  weaned off O2, monitor sats    Anemia  s/p PRBC  monitor Hg    Dysphagia  Aspiration precautions  Modified diet as recommended by SLP    Hx Dementia  Agitation   consulted- Depakote. Appears to be working well.    Supportive care, reorientation  Unclear baseline.  Patient  currently  verbal, AOx2, conversive. Does not appear to be end stage dementia.      L Hip Arthroplasty  monitor for pain  avoid opioids   Rec  IV Tylenol for severe pain    Debility  Assist with care  Turn/reposition  Safety precautions    CKD  avoid nephrotoxic agents  monitor UO  has required temporary HD in the past    Chronic Cholecystitis  s/p PCT  9/4 by IR  monitor for pain- denies    Encounter for Palliative Care  Palliative Care consulted to assist with goals of care  Patient  with complicated hospital course, being downgraded from MICU, appears to be improved  Patient DNR Intubate on 8/28.   Patient comfortable, denies pain, agitation seems better  Continue medical management per primary team  Palliative Care to sign off

## 2024-09-07 LAB
ALBUMIN SERPL ELPH-MCNC: 2.8 G/DL — LOW (ref 3.3–5.2)
ALP SERPL-CCNC: 133 U/L — HIGH (ref 40–120)
ALT FLD-CCNC: 11 U/L — SIGNIFICANT CHANGE UP
ANION GAP SERPL CALC-SCNC: 12 MMOL/L — SIGNIFICANT CHANGE UP (ref 5–17)
AST SERPL-CCNC: 18 U/L — SIGNIFICANT CHANGE UP
BASOPHILS # BLD AUTO: 0.04 K/UL — SIGNIFICANT CHANGE UP (ref 0–0.2)
BASOPHILS NFR BLD AUTO: 0.4 % — SIGNIFICANT CHANGE UP (ref 0–2)
BILIRUB SERPL-MCNC: 0.9 MG/DL — SIGNIFICANT CHANGE UP (ref 0.4–2)
BUN SERPL-MCNC: 19.4 MG/DL — SIGNIFICANT CHANGE UP (ref 8–20)
CALCIUM SERPL-MCNC: 8.5 MG/DL — SIGNIFICANT CHANGE UP (ref 8.4–10.5)
CHLORIDE SERPL-SCNC: 103 MMOL/L — SIGNIFICANT CHANGE UP (ref 96–108)
CO2 SERPL-SCNC: 24 MMOL/L — SIGNIFICANT CHANGE UP (ref 22–29)
CREAT SERPL-MCNC: 2.26 MG/DL — HIGH (ref 0.5–1.3)
EGFR: 28 ML/MIN/1.73M2 — LOW
EOSINOPHIL # BLD AUTO: 0.47 K/UL — SIGNIFICANT CHANGE UP (ref 0–0.5)
EOSINOPHIL NFR BLD AUTO: 4.6 % — SIGNIFICANT CHANGE UP (ref 0–6)
GLUCOSE BLDC GLUCOMTR-MCNC: 125 MG/DL — HIGH (ref 70–99)
GLUCOSE BLDC GLUCOMTR-MCNC: 130 MG/DL — HIGH (ref 70–99)
GLUCOSE BLDC GLUCOMTR-MCNC: 171 MG/DL — HIGH (ref 70–99)
GLUCOSE BLDC GLUCOMTR-MCNC: 185 MG/DL — HIGH (ref 70–99)
GLUCOSE BLDC GLUCOMTR-MCNC: 214 MG/DL — HIGH (ref 70–99)
GLUCOSE SERPL-MCNC: 125 MG/DL — HIGH (ref 70–99)
HCT VFR BLD CALC: 28.9 % — LOW (ref 39–50)
HGB BLD-MCNC: 9.1 G/DL — LOW (ref 13–17)
IMM GRANULOCYTES NFR BLD AUTO: 2.6 % — HIGH (ref 0–0.9)
LYMPHOCYTES # BLD AUTO: 1.65 K/UL — SIGNIFICANT CHANGE UP (ref 1–3.3)
LYMPHOCYTES # BLD AUTO: 16.3 % — SIGNIFICANT CHANGE UP (ref 13–44)
MAGNESIUM SERPL-MCNC: 2 MG/DL — SIGNIFICANT CHANGE UP (ref 1.8–2.6)
MCHC RBC-ENTMCNC: 26.6 PG — LOW (ref 27–34)
MCHC RBC-ENTMCNC: 31.5 GM/DL — LOW (ref 32–36)
MCV RBC AUTO: 84.5 FL — SIGNIFICANT CHANGE UP (ref 80–100)
MONOCYTES # BLD AUTO: 0.8 K/UL — SIGNIFICANT CHANGE UP (ref 0–0.9)
MONOCYTES NFR BLD AUTO: 7.9 % — SIGNIFICANT CHANGE UP (ref 2–14)
NEUTROPHILS # BLD AUTO: 6.9 K/UL — SIGNIFICANT CHANGE UP (ref 1.8–7.4)
NEUTROPHILS NFR BLD AUTO: 68.2 % — SIGNIFICANT CHANGE UP (ref 43–77)
PLATELET # BLD AUTO: 382 K/UL — SIGNIFICANT CHANGE UP (ref 150–400)
POTASSIUM SERPL-MCNC: 3.6 MMOL/L — SIGNIFICANT CHANGE UP (ref 3.5–5.3)
POTASSIUM SERPL-SCNC: 3.6 MMOL/L — SIGNIFICANT CHANGE UP (ref 3.5–5.3)
PROT SERPL-MCNC: 5.6 G/DL — LOW (ref 6.6–8.7)
RBC # BLD: 3.42 M/UL — LOW (ref 4.2–5.8)
RBC # FLD: 21.2 % — HIGH (ref 10.3–14.5)
SODIUM SERPL-SCNC: 139 MMOL/L — SIGNIFICANT CHANGE UP (ref 135–145)
WBC # BLD: 10.12 K/UL — SIGNIFICANT CHANGE UP (ref 3.8–10.5)
WBC # FLD AUTO: 10.12 K/UL — SIGNIFICANT CHANGE UP (ref 3.8–10.5)

## 2024-09-07 PROCEDURE — 99232 SBSQ HOSP IP/OBS MODERATE 35: CPT

## 2024-09-07 PROCEDURE — 76937 US GUIDE VASCULAR ACCESS: CPT | Mod: 26

## 2024-09-07 RX ORDER — OLANZAPINE 7.5 MG/1
5 TABLET ORAL ONCE
Refills: 0 | Status: COMPLETED | OUTPATIENT
Start: 2024-09-07 | End: 2024-09-07

## 2024-09-07 RX ADMIN — PIPERACILLIN SODIUM AND TAZOBACTAM SODIUM 25 GRAM(S): 3; .375 INJECTION, POWDER, FOR SOLUTION INTRAVENOUS at 15:42

## 2024-09-07 RX ADMIN — Medication 125 MILLIGRAM(S): at 21:05

## 2024-09-07 RX ADMIN — Medication 2: at 17:50

## 2024-09-07 RX ADMIN — TAMSULOSIN HYDROCHLORIDE 0.8 MILLIGRAM(S): 0.4 CAPSULE ORAL at 21:05

## 2024-09-07 RX ADMIN — METOPROLOL TARTRATE 25 MILLIGRAM(S): 100 TABLET ORAL at 05:38

## 2024-09-07 RX ADMIN — Medication 40 MILLIGRAM(S): at 13:27

## 2024-09-07 RX ADMIN — SUCRALFATE 1 GRAM(S): 1 SUSPENSION ORAL at 13:28

## 2024-09-07 RX ADMIN — ACETAMINOPHEN 650 MILLIGRAM(S): 325 TABLET ORAL at 21:05

## 2024-09-07 RX ADMIN — CHLORHEXIDINE GLUCONATE 1 APPLICATION(S): 40 SOLUTION TOPICAL at 05:38

## 2024-09-07 RX ADMIN — Medication 125 MILLIGRAM(S): at 15:42

## 2024-09-07 RX ADMIN — ACETAMINOPHEN 650 MILLIGRAM(S): 325 TABLET ORAL at 22:05

## 2024-09-07 RX ADMIN — INSULIN GLARGINE 6 UNIT(S): 100 INJECTION, SOLUTION SUBCUTANEOUS at 08:37

## 2024-09-07 RX ADMIN — METOPROLOL TARTRATE 25 MILLIGRAM(S): 100 TABLET ORAL at 17:50

## 2024-09-07 RX ADMIN — Medication 5000 UNIT(S): at 13:28

## 2024-09-07 RX ADMIN — SUCRALFATE 1 GRAM(S): 1 SUSPENSION ORAL at 17:50

## 2024-09-07 RX ADMIN — Medication 125 MILLIGRAM(S): at 05:38

## 2024-09-07 RX ADMIN — Medication 2: at 13:28

## 2024-09-07 RX ADMIN — PIPERACILLIN SODIUM AND TAZOBACTAM SODIUM 25 GRAM(S): 3; .375 INJECTION, POWDER, FOR SOLUTION INTRAVENOUS at 21:06

## 2024-09-07 RX ADMIN — PIPERACILLIN SODIUM AND TAZOBACTAM SODIUM 25 GRAM(S): 3; .375 INJECTION, POWDER, FOR SOLUTION INTRAVENOUS at 05:39

## 2024-09-07 RX ADMIN — Medication 4: at 21:06

## 2024-09-07 RX ADMIN — Medication 5000 UNIT(S): at 21:04

## 2024-09-07 RX ADMIN — OLANZAPINE 5 MILLIGRAM(S): 7.5 TABLET ORAL at 03:34

## 2024-09-07 RX ADMIN — ACETAMINOPHEN 650 MILLIGRAM(S): 325 TABLET ORAL at 04:51

## 2024-09-07 RX ADMIN — Medication 5000 UNIT(S): at 05:38

## 2024-09-07 RX ADMIN — SUCRALFATE 1 GRAM(S): 1 SUSPENSION ORAL at 05:38

## 2024-09-07 NOTE — PROCEDURE NOTE - ADDITIONAL PROCEDURE DETAILS
Patient ripped out IV. Behavioral health following, patient gets agitated at times and recommend zyprexa 5mg IM if needed. Patient hitting and kicking staff. RN and ANM both attempted to get IV's and were unsuccessful. Zyprexa 5mg IM given, waited 1 hour for patient to calm down, medication worked very well, and IV placed by US in upper arm. Tourniquet removed, bed lowered, sharps disposed of. Patient tolerated well.

## 2024-09-07 NOTE — PROGRESS NOTE ADULT - SUBJECTIVE AND OBJECTIVE BOX
[facility] Division of Hospital Medicine  Myles Carrillo MD    Chief Complaint:  Patient is a 84y old  Male who presents with a chief complaint of L hip fx (06 Sep 2024 14:21)      SUBJECTIVE / OVERNIGHT EVENTS:  Patient seen and examined at bedside. No acute events reported overnight. No new complaints. Mentation appears improving.    MEDICATIONS  (STANDING):  chlorhexidine 2% Cloths 1 Application(s) Topical <User Schedule>  dextrose 5%. 1000 milliLiter(s) (100 mL/Hr) IV Continuous <Continuous>  dextrose 5%. 1000 milliLiter(s) (50 mL/Hr) IV Continuous <Continuous>  dextrose 50% Injectable 12.5 Gram(s) IV Push once  dextrose 50% Injectable 25 Gram(s) IV Push once  dextrose 50% Injectable 25 Gram(s) IV Push once  glucagon  Injectable 1 milliGRAM(s) IntraMuscular once  heparin   Injectable 5000 Unit(s) SubCutaneous every 8 hours  insulin glargine Injectable (LANTUS) 6 Unit(s) SubCutaneous every morning  insulin lispro (ADMELOG) corrective regimen sliding scale   SubCutaneous every 6 hours  metoprolol tartrate 25 milliGRAM(s) Oral two times a day  pantoprazole  Injectable 40 milliGRAM(s) IV Push daily  piperacillin/tazobactam IVPB.. 3.375 Gram(s) IV Intermittent every 8 hours  sucralfate 1 Gram(s) Oral four times a day  tamsulosin 0.8 milliGRAM(s) Oral at bedtime  valproic  acid Syrup 125 milliGRAM(s) Oral three times a day    MEDICATIONS  (PRN):  acetaminophen     Tablet .. 650 milliGRAM(s) Oral every 6 hours PRN Temp greater or equal to 38C (100.4F), Mild Pain (1 - 3)  aluminum hydroxide/magnesium hydroxide/simethicone Suspension 30 milliLiter(s) Oral four times a day PRN Indigestion  bisacodyl Suppository 10 milliGRAM(s) Rectal daily PRN Constipation  dextrose Oral Gel 15 Gram(s) Oral once PRN Blood Glucose LESS THAN 70 milliGRAM(s)/deciliter  melatonin 3 milliGRAM(s) Oral at bedtime PRN Insomnia  ondansetron Injectable 4 milliGRAM(s) IV Push every 6 hours PRN Nausea and/or Vomiting        I&O's Summary    06 Sep 2024 07:01  -  07 Sep 2024 07:00  --------------------------------------------------------  IN: 600 mL / OUT: 2450 mL / NET: -1850 mL        PHYSICAL EXAM:  Vital Signs Last 24 Hrs  T(C): 36.6 (07 Sep 2024 08:00), Max: 37.2 (06 Sep 2024 16:00)  T(F): 97.8 (07 Sep 2024 08:00), Max: 98.9 (06 Sep 2024 16:00)  HR: 73 (07 Sep 2024 08:00) (68 - 90)  BP: 135/67 (07 Sep 2024 08:00) (112/62 - 149/86)  BP(mean): --  RR: 18 (07 Sep 2024 08:00) (18 - 18)  SpO2: 97% (07 Sep 2024 08:00) (94% - 100%)    Parameters below as of 07 Sep 2024 08:00  Patient On (Oxygen Delivery Method): room air            CONSTITUTIONAL: NAD  HEENT: NC/AT, PERRL, no JVD  RESPIRATORY: CTA bilaterally, normal effort  CARDIOVASCULAR: RRR, S1/S2+, no m/g/r  ABDOMEN: Nontender to palpation, normoactive bowel sounds, no rebound/guarding  MUSCULOSKELETAL: No edema, cyanosis or deformities.  PSYCH: Calm, affect appropriate.  NEUROLOGY: Awake, alert, no focal neurological deficits.   SKIN: No rashes; no palpable lesions  VASC: Distal pulses palpable    LABS:                        9.1    10.12 )-----------( 382      ( 07 Sep 2024 06:08 )             28.9     09-07    139  |  103  |  19.4  ----------------------------<  125<H>  3.6   |  24.0  |  2.26<H>    Ca    8.5      07 Sep 2024 06:08  Mg     2.0     09-07    TPro  5.6<L>  /  Alb  2.8<L>  /  TBili  0.9  /  DBili  x   /  AST  18  /  ALT  11  /  AlkPhos  133<H>  09-07          Urinalysis Basic - ( 07 Sep 2024 06:08 )    Color: x / Appearance: x / SG: x / pH: x  Gluc: 125 mg/dL / Ketone: x  / Bili: x / Urobili: x   Blood: x / Protein: x / Nitrite: x   Leuk Esterase: x / RBC: x / WBC x   Sq Epi: x / Non Sq Epi: x / Bacteria: x        CAPILLARY BLOOD GLUCOSE      POCT Blood Glucose.: 130 mg/dL (07 Sep 2024 08:36)  POCT Blood Glucose.: 125 mg/dL (07 Sep 2024 05:35)  POCT Blood Glucose.: 151 mg/dL (06 Sep 2024 21:37)  POCT Blood Glucose.: 192 mg/dL (06 Sep 2024 17:04)        RADIOLOGY & ADDITIONAL TESTS:  Results Reviewed:   Imaging Personally Reviewed:  Electrocardiogram Personally Reviewed:

## 2024-09-07 NOTE — PROGRESS NOTE ADULT - ASSESSMENT
85 y/o M w/ PMH of CKD, dementia (AAOx2 at baseline), IDDMT2, HTN, chronic cholecystitis s/p percutaneous sky tube (placed 12/2023), CKDIIIa, pAF (not on AC 2/2 prior GIB and fall risk), BPH presented 08/27 to ED after mechanical fall resulting in L-femoral neck fracture and now s/p L hemiarthroplasty 8/30 w/ ortho.  Hospital course complicated by sepsis due to aspiration pneumonia, RON, severe behavioral disturbances, inadvertent removal of perc sky tube.  Hospital course further complicated by hypotension on 09/02 overnight associated w/ lethargy.  Pt was given IVFs and albumin w/ improved BP but pt remained obtunded with snoring respirations and generalized twitching and pt subsequently upgraded to MICU.  Pt requiring supplemental O2 to maintain normal O2 sats and likely from aspiration PNA and started on Zosyn.  CTH negative for acute intracranial pathology.  While in MICU pt went in to AF RVR on 09/03 and cardiology consulted but converted back to NSR.  Pt now awake and alert but NPO given concern for aspiration and SLP following.  Pt will go for perc sky tube placement today w/ IR.  Pt noted to have down trending H/H, s/p 2u PRBC transfused. No concern for bleeding at this time and pt hemodynamically stable.  Pt medically stable for transfer to medicine for continued management.      Acute delirium in the setting of underlying dementia w/ behavioral disturbances, improving  Acute metabolic encephalopathy   - Likely component of ICU/hospital delirium and exacerbated by dehydration, sepsis, acute hypoxic resp failure and anemia  - As per family at bedside pt is AAOx2 at baseline   - Severe sun downing reported, pt is impulsive pulling at lines and intermittently aggressive   - Monitor mental status and reorient when possible   - Fall and aspiration precautions including HOB elevation   - Behavior health consult appreciated. Cont. on Valproic acid 125mg TID. Behavior waxes and wanes. May need safetly sitter while medications are taking effect.     Acute on chronic anemia   - Has hx of GIB but currently no evidence of bleeding and remains hemodynamically stable   - Baseline Hb ranges btw 8-10 and had slow down trend past few days   - Received 2 unit RBC on this admission  - Post transfusion H/H good response  - Hold off on iron panel given actively being transfused and will not be accurate but check in 4-6wks  - Iron panel from 02/2024 reviewed and appears to be of chronic dx  - BUN/Cr ratio normal and is on ppi for GI cytoprotection   - Monitor CBC and transfuse for Hb<8    Left Femoral Neck Fracture 2/2 mechanical fall  - s/p Hemiarthroplasty of left hip by Ortho  - PT  - Pain control     AF RVR  - 9/3 developed AF RVR but likely precipitated by metabolic acidosis, hypoxia, aspiration PNA, anemia and hypovolemia   - Now back in NSR and rate controlled  - c/w prn IV BB while npo but resume po metoprolol once clered for diet   - TSH normal   - Not on AC due to fall risk and hx of GIB  - c/w VTE ppx dose heparin   - Cardio consult noted    RON on CKD3a likely prerenal azotemia   - Has required temporary HD in past but currently has good urine output   - Continue Flomax  - Will c/w wills for now and then do TOV when able to mobilze  - Monitor renal function lytes and I/O's  - Avoid nephrotoxic meds or renally dose if needed     Sepsis likely 2/2 aspiration pna   Acute hypoxic respiratory failure 2/2 aspiration PNA/pneumonitis, atelectasis and anemia  - Most recent CT c/a/p 09/03 reporting interstitial ground glass opacities and dependent b/l consolidations  - BCxs 08/29 and 08/02 NGTD    - Procal elevated but unreliable in setting of RON/CKD  - On Zosyn, started 09/03/24, will cont  - Had originally passed bedside dysphagia 9/1 but given waxing and waning mentation. Cleared for Diet by SLP on 9/5  - SLP following for reassessment   - c/w strict aspiration precautions including HOB elevation   - Weaned off O2 while bedside and tolerating   - Suspect PRBC transfusion helped w/ oxygenation   - OOB to chair and encourage incentive spirometer if able to   - Monitor CBC and temperature     Chronic cholecystitis   - s/p perc sky drain placed 12/2023 and pulled out by pt during this admission on 09/01  - s/p drain replacement on 9/4 by IR.  - Monitor drain output daily     IDDM  - A1c 7.1   - c/w basal insulin regimen 6 unit, may need to up titrate based on diet.   - c/w ISS and hypoglycemic protocol   - Titrate insulin to maintain BG <180    Severe Esophagitis  - Cont. sucralfate QID   - c/w PPI IV     Hypokalemia and Hypomagnesemia  - replaced    VTE ppx: heparin sq    Dispo: Remains acute, once behavior controlled, likely 2-3 days. Anticipate [place].

## 2024-09-08 LAB
ALBUMIN SERPL ELPH-MCNC: 2.8 G/DL — LOW (ref 3.3–5.2)
ALP SERPL-CCNC: 143 U/L — HIGH (ref 40–120)
ALT FLD-CCNC: 14 U/L — SIGNIFICANT CHANGE UP
ANION GAP SERPL CALC-SCNC: 12 MMOL/L — SIGNIFICANT CHANGE UP (ref 5–17)
AST SERPL-CCNC: 20 U/L — SIGNIFICANT CHANGE UP
BILIRUB SERPL-MCNC: 0.7 MG/DL — SIGNIFICANT CHANGE UP (ref 0.4–2)
BUN SERPL-MCNC: 20.7 MG/DL — HIGH (ref 8–20)
CALCIUM SERPL-MCNC: 8.3 MG/DL — LOW (ref 8.4–10.5)
CHLORIDE SERPL-SCNC: 99 MMOL/L — SIGNIFICANT CHANGE UP (ref 96–108)
CO2 SERPL-SCNC: 24 MMOL/L — SIGNIFICANT CHANGE UP (ref 22–29)
CREAT SERPL-MCNC: 2.19 MG/DL — HIGH (ref 0.5–1.3)
CULTURE RESULTS: SIGNIFICANT CHANGE UP
CULTURE RESULTS: SIGNIFICANT CHANGE UP
EGFR: 29 ML/MIN/1.73M2 — LOW
GLUCOSE BLDC GLUCOMTR-MCNC: 169 MG/DL — HIGH (ref 70–99)
GLUCOSE BLDC GLUCOMTR-MCNC: 177 MG/DL — HIGH (ref 70–99)
GLUCOSE BLDC GLUCOMTR-MCNC: 192 MG/DL — HIGH (ref 70–99)
GLUCOSE BLDC GLUCOMTR-MCNC: 230 MG/DL — HIGH (ref 70–99)
GLUCOSE SERPL-MCNC: 192 MG/DL — HIGH (ref 70–99)
HCT VFR BLD CALC: 27.6 % — LOW (ref 39–50)
HGB BLD-MCNC: 9 G/DL — LOW (ref 13–17)
MCHC RBC-ENTMCNC: 27.4 PG — SIGNIFICANT CHANGE UP (ref 27–34)
MCHC RBC-ENTMCNC: 32.6 GM/DL — SIGNIFICANT CHANGE UP (ref 32–36)
MCV RBC AUTO: 83.9 FL — SIGNIFICANT CHANGE UP (ref 80–100)
PLATELET # BLD AUTO: 390 K/UL — SIGNIFICANT CHANGE UP (ref 150–400)
POTASSIUM SERPL-MCNC: 3.6 MMOL/L — SIGNIFICANT CHANGE UP (ref 3.5–5.3)
POTASSIUM SERPL-SCNC: 3.6 MMOL/L — SIGNIFICANT CHANGE UP (ref 3.5–5.3)
PROT SERPL-MCNC: 5.8 G/DL — LOW (ref 6.6–8.7)
RBC # BLD: 3.29 M/UL — LOW (ref 4.2–5.8)
RBC # FLD: 20.7 % — HIGH (ref 10.3–14.5)
SODIUM SERPL-SCNC: 135 MMOL/L — SIGNIFICANT CHANGE UP (ref 135–145)
SPECIMEN SOURCE: SIGNIFICANT CHANGE UP
SPECIMEN SOURCE: SIGNIFICANT CHANGE UP
WBC # BLD: 11.09 K/UL — HIGH (ref 3.8–10.5)
WBC # FLD AUTO: 11.09 K/UL — HIGH (ref 3.8–10.5)

## 2024-09-08 PROCEDURE — 99232 SBSQ HOSP IP/OBS MODERATE 35: CPT

## 2024-09-08 RX ADMIN — Medication 2: at 10:02

## 2024-09-08 RX ADMIN — Medication 125 MILLIGRAM(S): at 13:24

## 2024-09-08 RX ADMIN — Medication 2: at 22:11

## 2024-09-08 RX ADMIN — Medication 5000 UNIT(S): at 13:24

## 2024-09-08 RX ADMIN — SUCRALFATE 1 GRAM(S): 1 SUSPENSION ORAL at 05:05

## 2024-09-08 RX ADMIN — Medication 40 MILLIGRAM(S): at 13:24

## 2024-09-08 RX ADMIN — Medication 5000 UNIT(S): at 05:05

## 2024-09-08 RX ADMIN — Medication 125 MILLIGRAM(S): at 22:08

## 2024-09-08 RX ADMIN — SUCRALFATE 1 GRAM(S): 1 SUSPENSION ORAL at 13:23

## 2024-09-08 RX ADMIN — ACETAMINOPHEN 650 MILLIGRAM(S): 325 TABLET ORAL at 23:08

## 2024-09-08 RX ADMIN — SUCRALFATE 1 GRAM(S): 1 SUSPENSION ORAL at 17:03

## 2024-09-08 RX ADMIN — INSULIN GLARGINE 6 UNIT(S): 100 INJECTION, SOLUTION SUBCUTANEOUS at 10:02

## 2024-09-08 RX ADMIN — METOPROLOL TARTRATE 25 MILLIGRAM(S): 100 TABLET ORAL at 05:05

## 2024-09-08 RX ADMIN — SUCRALFATE 1 GRAM(S): 1 SUSPENSION ORAL at 23:17

## 2024-09-08 RX ADMIN — ACETAMINOPHEN 650 MILLIGRAM(S): 325 TABLET ORAL at 22:08

## 2024-09-08 RX ADMIN — PIPERACILLIN SODIUM AND TAZOBACTAM SODIUM 25 GRAM(S): 3; .375 INJECTION, POWDER, FOR SOLUTION INTRAVENOUS at 05:05

## 2024-09-08 RX ADMIN — METOPROLOL TARTRATE 25 MILLIGRAM(S): 100 TABLET ORAL at 17:03

## 2024-09-08 RX ADMIN — CHLORHEXIDINE GLUCONATE 1 APPLICATION(S): 40 SOLUTION TOPICAL at 05:06

## 2024-09-08 RX ADMIN — Medication 125 MILLIGRAM(S): at 05:05

## 2024-09-08 RX ADMIN — TAMSULOSIN HYDROCHLORIDE 0.8 MILLIGRAM(S): 0.4 CAPSULE ORAL at 22:08

## 2024-09-08 RX ADMIN — Medication 5000 UNIT(S): at 22:08

## 2024-09-08 RX ADMIN — Medication 4: at 17:03

## 2024-09-08 RX ADMIN — SUCRALFATE 1 GRAM(S): 1 SUSPENSION ORAL at 00:20

## 2024-09-08 RX ADMIN — PIPERACILLIN SODIUM AND TAZOBACTAM SODIUM 25 GRAM(S): 3; .375 INJECTION, POWDER, FOR SOLUTION INTRAVENOUS at 22:13

## 2024-09-08 RX ADMIN — PIPERACILLIN SODIUM AND TAZOBACTAM SODIUM 25 GRAM(S): 3; .375 INJECTION, POWDER, FOR SOLUTION INTRAVENOUS at 14:20

## 2024-09-08 RX ADMIN — Medication 2: at 13:23

## 2024-09-08 NOTE — PROGRESS NOTE ADULT - PROVIDER SPECIALTY LIST ADULT
Hospitalist
Orthopedics
Orthopedics
Palliative Care
Hospitalist
Orthopedics
Orthopedics
Hospitalist
Hospitalist
Orthopedics
Orthopedics
Cardiology
Critical Care
Hospitalist
Hospitalist
MICU
Hospitalist

## 2024-09-08 NOTE — PROGRESS NOTE ADULT - SUBJECTIVE AND OBJECTIVE BOX
[facility] Division of Hospital Medicine  Myles Carrillo MD    Chief Complaint:  Patient is a 84y old  Male who presents with a chief complaint of L hip fx (07 Sep 2024 12:01)      SUBJECTIVE / OVERNIGHT EVENTS:  Patient seen and examined at bedside. No acute events reported overnight. Mentation improving.     MEDICATIONS  (STANDING):  chlorhexidine 2% Cloths 1 Application(s) Topical <User Schedule>  dextrose 5%. 1000 milliLiter(s) (50 mL/Hr) IV Continuous <Continuous>  dextrose 5%. 1000 milliLiter(s) (100 mL/Hr) IV Continuous <Continuous>  dextrose 50% Injectable 25 Gram(s) IV Push once  dextrose 50% Injectable 25 Gram(s) IV Push once  dextrose 50% Injectable 12.5 Gram(s) IV Push once  glucagon  Injectable 1 milliGRAM(s) IntraMuscular once  heparin   Injectable 5000 Unit(s) SubCutaneous every 8 hours  insulin glargine Injectable (LANTUS) 6 Unit(s) SubCutaneous every morning  insulin lispro (ADMELOG) corrective regimen sliding scale   SubCutaneous every 6 hours  metoprolol tartrate 25 milliGRAM(s) Oral two times a day  pantoprazole  Injectable 40 milliGRAM(s) IV Push daily  piperacillin/tazobactam IVPB.. 3.375 Gram(s) IV Intermittent every 8 hours  sucralfate 1 Gram(s) Oral four times a day  tamsulosin 0.8 milliGRAM(s) Oral at bedtime  valproic  acid Syrup 125 milliGRAM(s) Oral three times a day    MEDICATIONS  (PRN):  acetaminophen     Tablet .. 650 milliGRAM(s) Oral every 6 hours PRN Temp greater or equal to 38C (100.4F), Mild Pain (1 - 3)  aluminum hydroxide/magnesium hydroxide/simethicone Suspension 30 milliLiter(s) Oral four times a day PRN Indigestion  bisacodyl Suppository 10 milliGRAM(s) Rectal daily PRN Constipation  dextrose Oral Gel 15 Gram(s) Oral once PRN Blood Glucose LESS THAN 70 milliGRAM(s)/deciliter  melatonin 3 milliGRAM(s) Oral at bedtime PRN Insomnia  ondansetron Injectable 4 milliGRAM(s) IV Push every 6 hours PRN Nausea and/or Vomiting        I&O's Summary    07 Sep 2024 07:01  -  08 Sep 2024 07:00  --------------------------------------------------------  IN: 890 mL / OUT: 2370 mL / NET: -1480 mL    08 Sep 2024 07:01  -  08 Sep 2024 10:12  --------------------------------------------------------  IN: 0 mL / OUT: 350 mL / NET: -350 mL        PHYSICAL EXAM:  Vital Signs Last 24 Hrs  T(C): 36.9 (08 Sep 2024 08:00), Max: 36.9 (08 Sep 2024 08:00)  T(F): 98.5 (08 Sep 2024 08:00), Max: 98.5 (08 Sep 2024 08:00)  HR: 74 (08 Sep 2024 08:00) (74 - 87)  BP: 152/78 (08 Sep 2024 08:00) (123/70 - 152/78)  BP(mean): --  RR: 18 (08 Sep 2024 08:00) (17 - 18)  SpO2: 99% (08 Sep 2024 08:00) (95% - 99%)    Parameters below as of 08 Sep 2024 08:00  Patient On (Oxygen Delivery Method): room air      CONSTITUTIONAL: NAD  ENMT: Moist oral mucosa, no pharyngeal injection or exudates  RESPIRATORY: Normal respiratory effort; lungs are clear to auscultation bilaterally  CARDIOVASCULAR: Regular rate and rhythm, normal S1 and S2, No lower extremity edema  ABDOMEN: Nontender to palpation, normoactive bowel sounds, no rebound/guarding;   MUSCLOSKELETAL no joint swelling or tenderness to palpation  PSYCH: A+O to person and place, calm  NEUROLOGY: CN 2-12 are intact and symmetric; no gross sensory deficits;   SKIN: No rashes; no palpable lesions    LABS:                        9.0    11.09 )-----------( 390      ( 08 Sep 2024 07:15 )             27.6     09-08    135  |  99  |  20.7<H>  ----------------------------<  192<H>  3.6   |  24.0  |  2.19<H>    Ca    8.3<L>      08 Sep 2024 07:15  Mg     2.0     09-07    TPro  5.8<L>  /  Alb  2.8<L>  /  TBili  0.7  /  DBili  x   /  AST  20  /  ALT  14  /  AlkPhos  143<H>  09-08          Urinalysis Basic - ( 08 Sep 2024 07:15 )    Color: x / Appearance: x / SG: x / pH: x  Gluc: 192 mg/dL / Ketone: x  / Bili: x / Urobili: x   Blood: x / Protein: x / Nitrite: x   Leuk Esterase: x / RBC: x / WBC x   Sq Epi: x / Non Sq Epi: x / Bacteria: x        CAPILLARY BLOOD GLUCOSE      POCT Blood Glucose.: 192 mg/dL (08 Sep 2024 10:00)  POCT Blood Glucose.: 214 mg/dL (07 Sep 2024 21:03)  POCT Blood Glucose.: 185 mg/dL (07 Sep 2024 17:47)  POCT Blood Glucose.: 171 mg/dL (07 Sep 2024 13:25)        RADIOLOGY & ADDITIONAL TESTS:  Results Reviewed:   Imaging Personally Reviewed:  Electrocardiogram Personally Reviewed:

## 2024-09-08 NOTE — PROGRESS NOTE ADULT - REASON FOR ADMISSION
L hip fx
Left Hip Fracture, Aspiration PNA, Delirium, Toxic Metabolic Encephalopathy
L hip fx

## 2024-09-08 NOTE — PROGRESS NOTE ADULT - ASSESSMENT
83 y/o M w/ PMH of CKD, dementia (AAOx2 at baseline), IDDMT2, HTN, chronic cholecystitis s/p percutaneous sky tube (placed 12/2023), CKDIIIa, pAF (not on AC 2/2 prior GIB and fall risk), BPH presented 08/27 to ED after mechanical fall resulting in L-femoral neck fracture and now s/p L hemiarthroplasty 8/30 w/ ortho.  Hospital course complicated by sepsis due to aspiration pneumonia, RON, severe behavioral disturbances, inadvertent removal of perc sky tube.  Hospital course further complicated by hypotension on 09/02 overnight associated w/ lethargy.  Pt was given IVFs and albumin w/ improved BP but pt remained obtunded with snoring respirations and generalized twitching and pt subsequently upgraded to MICU.  Pt requiring supplemental O2 to maintain normal O2 sats and likely from aspiration PNA and started on Zosyn.  CTH negative for acute intracranial pathology.  While in MICU pt went in to AF RVR on 09/03 and cardiology consulted but converted back to NSR.  Pt now awake and alert but NPO given concern for aspiration and SLP following.  Pt will go for perc sky tube placement today w/ IR.  Pt noted to have down trending H/H, s/p 2u PRBC transfused. No concern for bleeding at this time and pt hemodynamically stable.  Pt medically stable for transfer to medicine for continued management.      Acute delirium in the setting of underlying dementia w/ behavioral disturbances, improving  Acute metabolic encephalopathy   - Likely component of ICU/hospital delirium and exacerbated by dehydration, sepsis, acute hypoxic resp failure and anemia  - As per family at bedside pt is AAOx2 at baseline   - Severe sun downing reported, pt is impulsive pulling at lines and intermittently aggressive   - Monitor mental status and reorient when possible   - Fall and aspiration precautions including HOB elevation   - Behavior health consult appreciated. Cont. on Valproic acid 125mg TID. Behavior waxes and wanes. May need safetly sitter while medications are taking effect.     Acute on chronic anemia   - Has hx of GIB but currently no evidence of bleeding and remains hemodynamically stable   - Baseline Hb ranges btw 8-10 and had slow down trend past few days   - Received 2 unit RBC on this admission  - Post transfusion H/H good response  - Hold off on iron panel given actively being transfused and will not be accurate but check in 4-6wks  - Iron panel from 02/2024 reviewed and appears to be of chronic dx  - BUN/Cr ratio normal and is on ppi for GI cytoprotection   - Monitor CBC and transfuse for Hb<8    Left Femoral Neck Fracture 2/2 mechanical fall  - s/p Hemiarthroplasty of left hip by Ortho  - PT  - Pain control     AF RVR  - 9/3 developed AF RVR but likely precipitated by metabolic acidosis, hypoxia, aspiration PNA, anemia and hypovolemia   - Now back in NSR and rate controlled  - c/w prn IV BB while npo but resume po metoprolol once clered for diet   - TSH normal   - Not on AC due to fall risk and hx of GIB  - c/w VTE ppx dose heparin   - Cardio consult noted    RON on CKD3a likely prerenal azotemia   - Has required temporary HD in past but currently has good urine output   - Continue Flomax  - Will c/w wills for now and then do TOV when able to mobilze  - Monitor renal function lytes and I/O's  - Avoid nephrotoxic meds or renally dose if needed     Sepsis likely 2/2 aspiration pna   Acute hypoxic respiratory failure 2/2 aspiration PNA/pneumonitis, atelectasis and anemia  - Most recent CT c/a/p 09/03 reporting interstitial ground glass opacities and dependent b/l consolidations  - BCxs 08/29 and 08/02 NGTD    - Procal elevated but unreliable in setting of RON/CKD  - On Zosyn, started 09/03/24, will cont  - Had originally passed bedside dysphagia 9/1 but given waxing and waning mentation. Cleared for Diet by SLP on 9/5  - SLP following for reassessment   - c/w strict aspiration precautions including HOB elevation   - Weaned off O2 while bedside and tolerating   - Suspect PRBC transfusion helped w/ oxygenation   - OOB to chair and encourage incentive spirometer if able to   - Monitor CBC and temperature     Chronic cholecystitis   - s/p perc sky drain placed 12/2023 and pulled out by pt during this admission on 09/01  - s/p drain replacement on 9/4 by IR.  - Monitor drain output daily     IDDM  - A1c 7.1   - c/w basal insulin regimen 6 unit, may need to up titrate based on diet.   - c/w ISS and hypoglycemic protocol   - Titrate insulin to maintain BG <180    Severe Esophagitis  - Cont. sucralfate QID   - c/w PPI IV     Hypokalemia and Hypomagnesemia  - replaced    VTE ppx: heparin sq    Dispo: Remains acute, once behavior controlled, likely 1-2 days. Anticipate [facility].

## 2024-09-09 ENCOUNTER — TRANSCRIPTION ENCOUNTER (OUTPATIENT)
Age: 84
End: 2024-09-09

## 2024-09-09 VITALS
TEMPERATURE: 98 F | RESPIRATION RATE: 18 BRPM | SYSTOLIC BLOOD PRESSURE: 126 MMHG | OXYGEN SATURATION: 98 % | HEART RATE: 86 BPM | DIASTOLIC BLOOD PRESSURE: 76 MMHG

## 2024-09-09 LAB
GLUCOSE BLDC GLUCOMTR-MCNC: 159 MG/DL — HIGH (ref 70–99)
GLUCOSE BLDC GLUCOMTR-MCNC: 169 MG/DL — HIGH (ref 70–99)

## 2024-09-09 PROCEDURE — 99239 HOSP IP/OBS DSCHRG MGMT >30: CPT

## 2024-09-09 PROCEDURE — 99231 SBSQ HOSP IP/OBS SF/LOW 25: CPT

## 2024-09-09 RX ORDER — TAMSULOSIN HYDROCHLORIDE 0.4 MG/1
2 CAPSULE ORAL
Qty: 0 | Refills: 0 | DISCHARGE
Start: 2024-09-09

## 2024-09-09 RX ORDER — METOPROLOL TARTRATE 100 MG/1
1 TABLET ORAL
Qty: 0 | Refills: 0 | DISCHARGE
Start: 2024-09-09

## 2024-09-09 RX ADMIN — Medication 5000 UNIT(S): at 13:28

## 2024-09-09 RX ADMIN — INSULIN GLARGINE 6 UNIT(S): 100 INJECTION, SOLUTION SUBCUTANEOUS at 08:00

## 2024-09-09 RX ADMIN — Medication 5000 UNIT(S): at 05:02

## 2024-09-09 RX ADMIN — SUCRALFATE 1 GRAM(S): 1 SUSPENSION ORAL at 11:19

## 2024-09-09 RX ADMIN — PIPERACILLIN SODIUM AND TAZOBACTAM SODIUM 25 GRAM(S): 3; .375 INJECTION, POWDER, FOR SOLUTION INTRAVENOUS at 13:26

## 2024-09-09 RX ADMIN — SUCRALFATE 1 GRAM(S): 1 SUSPENSION ORAL at 05:02

## 2024-09-09 RX ADMIN — CHLORHEXIDINE GLUCONATE 1 APPLICATION(S): 40 SOLUTION TOPICAL at 05:02

## 2024-09-09 RX ADMIN — Medication 2: at 11:19

## 2024-09-09 RX ADMIN — Medication 125 MILLIGRAM(S): at 05:02

## 2024-09-09 RX ADMIN — Medication 2: at 08:01

## 2024-09-09 RX ADMIN — SUCRALFATE 1 GRAM(S): 1 SUSPENSION ORAL at 15:27

## 2024-09-09 RX ADMIN — Medication 40 MILLIGRAM(S): at 11:19

## 2024-09-09 RX ADMIN — METOPROLOL TARTRATE 25 MILLIGRAM(S): 100 TABLET ORAL at 05:02

## 2024-09-09 RX ADMIN — PIPERACILLIN SODIUM AND TAZOBACTAM SODIUM 25 GRAM(S): 3; .375 INJECTION, POWDER, FOR SOLUTION INTRAVENOUS at 05:02

## 2024-09-09 NOTE — BH CONSULTATION LIAISON PROGRESS NOTE - NSBHCHARTREVIEWVS_PSY_A_CORE FT
Vital Signs Last 24 Hrs  T(C): 36.6 (06 Sep 2024 08:00), Max: 37.3 (05 Sep 2024 21:00)  T(F): 97.8 (06 Sep 2024 08:00), Max: 99.1 (05 Sep 2024 21:00)  HR: 73 (06 Sep 2024 08:00) (73 - 89)  BP: 146/70 (06 Sep 2024 08:00) (125/86 - 172/73)  BP(mean): 88 (05 Sep 2024 20:00) (80 - 106)  RR: 18 (06 Sep 2024 08:00) (13 - 21)  SpO2: 97% (06 Sep 2024 08:00) (96% - 100%)    Parameters below as of 06 Sep 2024 08:00  Patient On (Oxygen Delivery Method): room air    
Vital Signs Last 24 Hrs  T(C): 36.4 (09 Sep 2024 08:00), Max: 37.1 (08 Sep 2024 12:00)  T(F): 97.5 (09 Sep 2024 08:00), Max: 98.7 (08 Sep 2024 12:00)  HR: 89 (09 Sep 2024 08:00) (71 - 89)  BP: 149/78 (09 Sep 2024 08:00) (126/73 - 150/66)  BP(mean): --  RR: 18 (09 Sep 2024 08:00) (18 - 18)  SpO2: 98% (09 Sep 2024 08:00) (95% - 98%)    Parameters below as of 09 Sep 2024 08:00  Patient On (Oxygen Delivery Method): room air

## 2024-09-09 NOTE — DISCHARGE NOTE NURSING/CASE MANAGEMENT/SOCIAL WORK - NSDCPEFALRISK_GEN_ALL_CORE
For information on Fall & Injury Prevention, visit: [email or web address] OR  [email or web address] OR  https://www.cdc.gov/steadi/patient.html

## 2024-09-09 NOTE — DISCHARGE NOTE NURSING/CASE MANAGEMENT/SOCIAL WORK - NSDCCRNAME_GEN_ALL_CORE_FT
[facility]  [facility] [place] [address]

## 2024-09-09 NOTE — BH CONSULTATION LIAISON PROGRESS NOTE - NSBHCONSULTFOLLOWAFTERCARE_PSY_A_CORE FT
no need for psychiatric f/u outpatient, however, if needed in [place], can f/u with psychiatrist there

## 2024-09-09 NOTE — BH CONSULTATION LIAISON PROGRESS NOTE - NSBHCHARTREVIEWLAB_PSY_A_CORE FT
Sodium: 135 mmol/L  Potassium: 3.6 mmol/L  Chloride: 99: Chloride reference range changed from ..10/26/2022 mmol/L  Carbon Dioxide: 24.0 mmol/L  Anion Gap: 12 mmol/L  Blood Urea Nitrogen: 20.7 mg/dL  Creatinine: 2.19 mg/dL  Glucose: 192 mg/dL  Calcium: 8.3 mg/dL  Protein Total: 5.8 g/dL  Albumin: 2.8 g/dL  Bilirubin Total: 0.7 mg/dL  Alkaline Phosphatase: 143 U/L  Aspartate Aminotransferase (AST/SGOT): 20 U/L  Alanine Aminotransferase (ALT/SGPT): 14 U/L  eGFR: 29: The estimated glomerular filtration rate (eGFR) calculation is based on   the 2021 CKD-EPI creatinine equation, which is validated in male and   female population 18 years of age and older (N Engl J Med 2021;   385:5411-7635). mL/min/1.73m2  
Comprehensive Metabolic Panel in AM (09.06.24 @ 05:47)   Sodium: 139 mmol/L  Potassium: 3.3 mmol/L  Chloride: 104: Chloride reference range changed from ..10/26/2022   . mmol/L  Carbon Dioxide: 23.0 mmol/L  Anion Gap: 12 mmol/L  Blood Urea Nitrogen: 22.4 mg/dL  Creatinine: 2.33 mg/dL  Glucose: 126 mg/dL  Calcium: 8.3 mg/dL  Protein Total: 5.5 g/dL  Albumin: 2.6 g/dL  Bilirubin Total: 1.1 mg/dL  Alkaline Phosphatase: 130 U/L  Aspartate Aminotransferase (AST/SGOT): 17 U/L  Alanine Aminotransferase (ALT/SGPT): 12 U/L  eGFR: 27: The estimated glomerular filtration rate (eGFR) calculation is based on   the 2021 CKD-EPI creatinine equation, which is validated in male and   female population 18 years of age and older (N Engl J Med 2021;   [id number]). mL/min/1.73m2

## 2024-09-09 NOTE — BH CONSULTATION LIAISON PROGRESS NOTE - CURRENT MEDICATION
MEDICATIONS  (STANDING):  chlorhexidine 2% Cloths 1 Application(s) Topical <User Schedule>  dextrose 5%. 1000 milliLiter(s) (50 mL/Hr) IV Continuous <Continuous>  dextrose 5%. 1000 milliLiter(s) (100 mL/Hr) IV Continuous <Continuous>  dextrose 50% Injectable 25 Gram(s) IV Push once  dextrose 50% Injectable 25 Gram(s) IV Push once  dextrose 50% Injectable 12.5 Gram(s) IV Push once  glucagon  Injectable 1 milliGRAM(s) IntraMuscular once  heparin   Injectable 5000 Unit(s) SubCutaneous every 8 hours  insulin glargine Injectable (LANTUS) 6 Unit(s) SubCutaneous every morning  insulin lispro (ADMELOG) corrective regimen sliding scale   SubCutaneous every 6 hours  metoprolol tartrate 25 milliGRAM(s) Oral two times a day  pantoprazole  Injectable 40 milliGRAM(s) IV Push daily  piperacillin/tazobactam IVPB.. 3.375 Gram(s) IV Intermittent every 8 hours  sucralfate 1 Gram(s) Oral four times a day  tamsulosin 0.8 milliGRAM(s) Oral at bedtime  valproic  acid Syrup 125 milliGRAM(s) Oral three times a day    MEDICATIONS  (PRN):  acetaminophen     Tablet .. 650 milliGRAM(s) Oral every 6 hours PRN Temp greater or equal to 38C (100.4F), Mild Pain (1 - 3)  aluminum hydroxide/magnesium hydroxide/simethicone Suspension 30 milliLiter(s) Oral four times a day PRN Indigestion  bisacodyl Suppository 10 milliGRAM(s) Rectal daily PRN Constipation  dextrose Oral Gel 15 Gram(s) Oral once PRN Blood Glucose LESS THAN 70 milliGRAM(s)/deciliter  melatonin 3 milliGRAM(s) Oral at bedtime PRN Insomnia  ondansetron Injectable 4 milliGRAM(s) IV Push every 6 hours PRN Nausea and/or Vomiting  
MEDICATIONS  (STANDING):  chlorhexidine 2% Cloths 1 Application(s) Topical <User Schedule>  dextrose 5%. 1000 milliLiter(s) (100 mL/Hr) IV Continuous <Continuous>  dextrose 5%. 1000 milliLiter(s) (50 mL/Hr) IV Continuous <Continuous>  dextrose 50% Injectable 12.5 Gram(s) IV Push once  dextrose 50% Injectable 25 Gram(s) IV Push once  dextrose 50% Injectable 25 Gram(s) IV Push once  glucagon  Injectable 1 milliGRAM(s) IntraMuscular once  heparin   Injectable 5000 Unit(s) SubCutaneous every 8 hours  insulin glargine Injectable (LANTUS) 6 Unit(s) SubCutaneous every morning  insulin lispro (ADMELOG) corrective regimen sliding scale   SubCutaneous every 6 hours  metoprolol tartrate 25 milliGRAM(s) Oral two times a day  pantoprazole  Injectable 40 milliGRAM(s) IV Push daily  piperacillin/tazobactam IVPB.. 3.375 Gram(s) IV Intermittent every 8 hours  sucralfate 1 Gram(s) Oral four times a day  tamsulosin 0.8 milliGRAM(s) Oral at bedtime  valproic  acid Syrup 125 milliGRAM(s) Oral three times a day    MEDICATIONS  (PRN):  acetaminophen     Tablet .. 650 milliGRAM(s) Oral every 6 hours PRN Temp greater or equal to 38C (100.4F), Mild Pain (1 - 3)  aluminum hydroxide/magnesium hydroxide/simethicone Suspension 30 milliLiter(s) Oral four times a day PRN Indigestion  bisacodyl Suppository 10 milliGRAM(s) Rectal daily PRN Constipation  dextrose Oral Gel 15 Gram(s) Oral once PRN Blood Glucose LESS THAN 70 milliGRAM(s)/deciliter  melatonin 3 milliGRAM(s) Oral at bedtime PRN Insomnia  ondansetron Injectable 4 milliGRAM(s) IV Push every 6 hours PRN Nausea and/or Vomiting

## 2024-09-09 NOTE — BH CONSULTATION LIAISON PROGRESS NOTE - NSBHASSESSMENTFT_PSY_ALL_CORE
Patient is an 83 y/o  male, domiciled with wife, with pmhx of cholecysistis, hyperlipidemia, DM, HTN, Afib,  CKD, BPH, no formal pphx, referred for outpatient workup for dementia (not yet complete per daughter), no known hx of inpatient psych hospitalizations or suicide attempts, who was admitted to [facility] s/p fall with left femoral neck fracture, clinical picture complicated by aspiration pneumonia, currently downgraded from ICU.    Psychiatry consulted for agitation.     Patient seen today at bedside, alert, awake, oriented to self, location, and situation. Patient is cooperative, his speech soft, impaired articulation. Mood euthymic. Patient denies audio/visual hallucinations as well as sucidial/homicidal ideation.      Patient's daughter was updated about patient's status. Patient currently doing well on depakote. Patient's daughter in agreement to discontinue depakote as patient continues to improve    RECS  delirium   -Maintain delirium precautions   -Avoid anticholinergic agents, benzos, opioid as they can further perpetuate confusion   -Frequent re orientation, Hydration, try to avoid restraints and if possible, mobilize patient and PT involvement   -repeat EKG  -if qtc <500, can utilize zyprexa 5mg IM q6hrs PRN for agitation  -continue depakote 125mg IV q8hrs for mood stablization - can consider PO if patient able to tolerate  - discontinue depakote upon discharge and or/as agitation improves Patient is an 85 y/o  male, domiciled with wife, with pmhx of cholecysistis, hyperlipidemia, DM, HTN, Afib,  CKD, BPH, no formal pphx, referred for outpatient workup for dementia (not yet complete per daughter), no known hx of inpatient psych hospitalizations or suicide attempts, who was admitted to [facility] s/p fall with left femoral neck fracture, clinical picture complicated by aspiration pneumonia, currently downgraded from ICU.    Psychiatry consulted for agitation.     Patient seen today at bedside, alert, awake, oriented to self, location, and situation. Mood/affect euthymic.  Patient is cooperative, his speech soft, impaired articulation. Mood euthymic. Patient denies audio/visual hallucinations as well as suicidal/homicidal ideation.      Patient's daughter was updated about patient's status. Patient currently doing well on depakote. Patient's daughter in agreement to discontinue depakote as patient continues to improve    RECS  delirium   -Maintain delirium precautions   -Avoid anticholinergic agents, benzos, opioid as they can further perpetuate confusion   -Frequent re orientation, Hydration, try to avoid restraints and if possible, mobilize patient and PT involvement   -repeat EKG  -if qtc <500, can utilize zyprexa 5mg IM q6hrs PRN for agitation  -continue depakote 125mg IV q8hrs for mood stablization - can consider PO if patient able to tolerate  - discontinue depakote upon discharge and or/as agitation improves Patient is an 83 y/o  male, domiciled with wife, with pmhx of cholecysistis, hyperlipidemia, DM, HTN, Afib,  CKD, BPH, no formal pphx, referred for outpatient workup for dementia (not yet complete per daughter), no known hx of inpatient psych hospitalizations or suicide attempts, who was admitted to [facility] s/p fall with left femoral neck fracture, clinical picture complicated by aspiration pneumonia, currently downgraded from ICU.    Psychiatry consulted for agitation.     Patient seen today at bedside, alert, awake, oriented to self, location, and situation. Mood/affect euthymic.  Patient is cooperative, his speech soft, impaired articulation.  Answers only to questions asked.  Thought process disorganized, associations loose.  Unable to assess thought content due to lack of spontaneous responses.  No evidence of psychosis or steven.  Patient denies any SI/HI, intent or plan when asked directly.     Patient's daughter was updated about patient's status. Patient currently doing well on depakote. Patient's daughter in agreement to discontinue depakote as patient continues to improve    RECS  delirium   -Maintain delirium precautions   -Avoid anticholinergic agents, benzos, opioid as they can further perpetuate confusion   -Frequent re orientation, Hydration, try to avoid restraints and if possible, mobilize patient and PT involvement   -repeat EKG  -if qtc <500, can utilize zyprexa 5mg IM q6hrs PRN for agitation  -continue depakote 125mg IV q8hrs for mood stablization - can consider PO if patient able to tolerate  - discontinue depakote upon discharge and or/as agitation improves Patient is an 85 y/o  male, domiciled with wife, with pmhx of cholecysistis, hyperlipidemia, DM, HTN, Afib,  CKD, BPH, no formal pphx, referred for outpatient workup for dementia (not yet complete per daughter), no known hx of inpatient psych hospitalizations or suicide attempts, who was admitted to [facility] s/p fall with left femoral neck fracture, clinical picture complicated by aspiration pneumonia, currently downgraded from ICU.    Psychiatry consulted for agitation.     Patient seen today at bedside, alert, awake, oriented to self, location, and situation. Mood/affect euthymic.  Patient is cooperative, his speech soft, impaired articulation.  Answers only to questions asked.  Thought process disorganized, associations loose.  Unable to assess thought content due to lack of spontaneous responses.  No evidence of psychosis or steven.  Patient denies any SI/HI, intent or plan when asked directly.     Patient's daughter was updated about patient's status. Patient currently doing well on Depakote. Patient's daughter in agreement to discontinue Depakote as patient continues to improve    RECS  delirium   -Maintain delirium precautions   -Avoid anticholinergic agents, benzos, opioid as they can further perpetuate confusion   -Frequent re orientation, Hydration, try to avoid restraints and if possible, mobilize patient and PT involvement   -repeat EKG  -if qtc <500, can utilize zyprexa 5mg IM q6hrs PRN for agitation  -continue Depakote 125mg IV q8hrs for mood stablization - can consider PO if patient able to tolerate  - discontinue depakote upon discharge and or/as agitation improves

## 2024-09-09 NOTE — DISCHARGE NOTE NURSING/CASE MANAGEMENT/SOCIAL WORK - PATIENT PORTAL LINK FT
You can access the FollowMyHealth Patient Portal offered by [facility] by registering at the following website: [email or web address]. By joining [facility]’s FollowMyHealth portal, you will also be able to view your health information using other applications (apps) compatible with our system.

## 2024-09-09 NOTE — BH CONSULTATION LIAISON PROGRESS NOTE - NSBHFUPINTERVALHXFT_PSY_A_CORE
Patient is an 83 y/o  male, domiciled with wife, with pmhx of cholecysistis, hyperlipidemia, DM, HTN, Afib,  CKD, BPH, no formal pphx, referred for outpatient workup for dementia (not yet complete per daughter), no known hx of inpatient psych hospitalizations or suicide attempts, who was admitted to [facility] s/p fall with left femoral neck fracture, clinical picture complicated by aspiration pneumonia, currently downgraded from ICU.    Psychiatry consulted for agitation.     No acute events overnight. Patient seen today at bedside, found to be eating breakfast. In person  utilized. Patient AAOx 2. Patient reports weekend was "ok" and "I feel better". Patient able to provide full name, date of birth, knows he is in the hospital, and reason for current hospitalization. Not oriented to time (date, current president). Patient reports his mood is "good." Patient denies sucidial/homicidal ideation as well as audio/visual hallucinations.  Patient's daughter was not at bedside, but is contact with team.     Patient's daughter was updated about patient's status. Patient currently doing well on depakote. Patient's daughter in agreement with continuing depakote and discontinuing it as patient's condition improves Patient is an 85 y/o  male, domiciled with wife, with pmhx of cholecysistis, hyperlipidemia, DM, HTN, Afib,  CKD, BPH, no formal pphx, referred for outpatient workup for dementia (not yet complete per daughter), no known hx of inpatient psych hospitalizations or suicide attempts, who was admitted to [facility] s/p fall with left femoral neck fracture, clinical picture complicated by aspiration pneumonia, currently downgraded from ICU.    Psychiatry consulted for agitation.     No acute events overnight. Patient seen today at bedside, found to be eating breakfast. In person  utilized. Patient AAOx 2. Patient reports weekend was "ok" and "I feel better". Patient able to provide full name, date of birth, knows he is in the hospital, and reason for current hospitalization. Not oriented to time (date, current president). Patient reports his mood is "good." Patient denies sucidial/homicidal ideation as well as audio/visual hallucinations.  Patient's daughter was not at bedside, but is contact with team.     Patient's daughter was updated about patient's status. Per daughter, patient's normal baseline is orientation to self, place, and situation. Patient has not be oriented to time "for quite awhile." Patient's daughter visited over the weekend, and states "he is doing much better" and is pleased with the care patient has been receiving. Patient currently doing well on depakote. Patient's daughter in agreement with discontinuing depakote  as patient's condition improves Patient is an 85 y/o  male, domiciled with wife, with pmhx of cholecysistis, hyperlipidemia, DM, HTN, Afib,  CKD, BPH, no formal pphx, referred for outpatient workup for dementia (not yet complete per daughter), no known hx of inpatient psych hospitalizations or suicide attempts, who was admitted to [facility] s/p fall with left femoral neck fracture, clinical picture complicated by aspiration pneumonia, currently downgraded from ICU.    Psychiatry consulted for agitation.     No acute events overnight. Patient seen today at bedside, found to be eating breakfast. In person  utilized. Patient AAOx 2. Patient reports weekend was "ok" and "I feel better". Patient able to provide full name, date of birth, knows he is in the hospital, and reason for current hospitalization. Not oriented to time (date, current president). Patient intermittently smiles and responds "I'm good," regardless of questions asked. Patient denies suicidal/homicidal ideation as well as audio/visual hallucinations.  Patient's daughter was not at bedside, but is contact with team.     Patient's daughter was updated about patient's status. Per daughter, patient's normal baseline is orientation to self, place, and situation. Patient has not be oriented to time "for quite awhile." Patient's daughter visited over the weekend, and states "he is doing much better" and is pleased with the care patient has been receiving. Patient currently doing well on depakote. Patient's daughter in agreement with discontinuing depakote  as patient's condition improves

## 2024-09-19 ENCOUNTER — EMERGENCY (EMERGENCY)
Facility: HOSPITAL | Age: 84
LOS: 1 days | Discharge: HOME | End: 2024-09-19
Attending: STUDENT IN AN ORGANIZED HEALTH CARE EDUCATION/TRAINING PROGRAM
Payer: MEDICARE

## 2024-09-19 VITALS
WEIGHT: 139.99 LBS | HEART RATE: 80 BPM | SYSTOLIC BLOOD PRESSURE: 117 MMHG | OXYGEN SATURATION: 98 % | TEMPERATURE: 98 F | HEIGHT: 65 IN | RESPIRATION RATE: 20 BRPM | DIASTOLIC BLOOD PRESSURE: 65 MMHG

## 2024-09-19 DIAGNOSIS — T85.518A BREAKDOWN (MECHANICAL) OF OTHER GASTROINTESTINAL PROSTHETIC DEVICES, IMPLANTS AND GRAFTS, INITIAL ENCOUNTER: Chronic | ICD-10-CM

## 2024-09-19 DIAGNOSIS — Z98.890 OTHER SPECIFIED POSTPROCEDURAL STATES: Chronic | ICD-10-CM

## 2024-09-19 DIAGNOSIS — Z98.49 CATARACT EXTRACTION STATUS, UNSPECIFIED EYE: Chronic | ICD-10-CM

## 2024-09-19 LAB
ALBUMIN SERPL ELPH-MCNC: 3.2 G/DL — LOW (ref 3.3–5.2)
ALLERGY+IMMUNOLOGY DIAG STUDY NOTE: SIGNIFICANT CHANGE UP
ALLERGY+IMMUNOLOGY DIAG STUDY NOTE: SIGNIFICANT CHANGE UP
ALP SERPL-CCNC: 145 U/L — HIGH (ref 40–120)
ALT FLD-CCNC: 12 U/L — SIGNIFICANT CHANGE UP
ANION GAP SERPL CALC-SCNC: 11 MMOL/L — SIGNIFICANT CHANGE UP (ref 5–17)
APTT BLD: 29.9 SEC — SIGNIFICANT CHANGE UP (ref 24.5–35.6)
AST SERPL-CCNC: 22 U/L — SIGNIFICANT CHANGE UP
BILIRUB SERPL-MCNC: 0.4 MG/DL — SIGNIFICANT CHANGE UP (ref 0.4–2)
BLD GP AB SCN SERPL QL: SIGNIFICANT CHANGE UP
BUN SERPL-MCNC: 33.9 MG/DL — HIGH (ref 8–20)
CALCIUM SERPL-MCNC: 8.8 MG/DL — SIGNIFICANT CHANGE UP (ref 8.4–10.5)
CHLORIDE SERPL-SCNC: 104 MMOL/L — SIGNIFICANT CHANGE UP (ref 96–108)
CO2 SERPL-SCNC: 21 MMOL/L — LOW (ref 22–29)
CREAT SERPL-MCNC: 1.92 MG/DL — HIGH (ref 0.5–1.3)
DAT C3-SP REAG RBC QL: SIGNIFICANT CHANGE UP
DAT IGG-SP REAG RBC-IMP: ABNORMAL
DIR ANTIGLOB POLYSPECIFIC INTERPRETATION: ABNORMAL
EGFR: 34 ML/MIN/1.73M2 — LOW
GLUCOSE BLDC GLUCOMTR-MCNC: 179 MG/DL — HIGH (ref 70–99)
GLUCOSE SERPL-MCNC: 130 MG/DL — HIGH (ref 70–99)
HCT VFR BLD CALC: 27.4 % — LOW (ref 39–50)
HGB BLD-MCNC: 8.8 G/DL — LOW (ref 13–17)
INR BLD: 1.04 RATIO — SIGNIFICANT CHANGE UP (ref 0.85–1.18)
MCHC RBC-ENTMCNC: 28 PG — SIGNIFICANT CHANGE UP (ref 27–34)
MCHC RBC-ENTMCNC: 32.1 GM/DL — SIGNIFICANT CHANGE UP (ref 32–36)
MCV RBC AUTO: 87.3 FL — SIGNIFICANT CHANGE UP (ref 80–100)
PLATELET # BLD AUTO: 441 K/UL — HIGH (ref 150–400)
POTASSIUM SERPL-MCNC: 4.8 MMOL/L — SIGNIFICANT CHANGE UP (ref 3.5–5.3)
POTASSIUM SERPL-SCNC: 4.8 MMOL/L — SIGNIFICANT CHANGE UP (ref 3.5–5.3)
PROT SERPL-MCNC: 6.7 G/DL — SIGNIFICANT CHANGE UP (ref 6.6–8.7)
PROTHROM AB SERPL-ACNC: 11.5 SEC — SIGNIFICANT CHANGE UP (ref 9.5–13)
RBC # BLD: 3.14 M/UL — LOW (ref 4.2–5.8)
RBC # FLD: 19.9 % — HIGH (ref 10.3–14.5)
SODIUM SERPL-SCNC: 136 MMOL/L — SIGNIFICANT CHANGE UP (ref 135–145)
WBC # BLD: 6.81 K/UL — SIGNIFICANT CHANGE UP (ref 3.8–10.5)
WBC # FLD AUTO: 6.81 K/UL — SIGNIFICANT CHANGE UP (ref 3.8–10.5)

## 2024-09-19 PROCEDURE — 99223 1ST HOSP IP/OBS HIGH 75: CPT | Mod: FS

## 2024-09-19 PROCEDURE — 86077 PHYS BLOOD BANK SERV XMATCH: CPT

## 2024-09-19 RX ORDER — TAMSULOSIN HYDROCHLORIDE 0.4 MG/1
0.8 CAPSULE ORAL AT BEDTIME
Refills: 0 | Status: ACTIVE | OUTPATIENT
Start: 2024-09-19 | End: 2025-08-18

## 2024-09-19 RX ORDER — DEXTROSE 15 G/33 G
25 GEL IN PACKET (GRAM) ORAL ONCE
Refills: 0 | Status: ACTIVE | OUTPATIENT
Start: 2024-09-19

## 2024-09-19 RX ORDER — SUCRALFATE 1 G/10ML
1 SUSPENSION ORAL
Refills: 0 | Status: DISCONTINUED | OUTPATIENT
Start: 2024-09-19 | End: 2024-09-20

## 2024-09-19 RX ORDER — ACETAMINOPHEN 325 MG/1
650 TABLET ORAL EVERY 6 HOURS
Refills: 0 | Status: ACTIVE | OUTPATIENT
Start: 2024-09-19 | End: 2025-08-18

## 2024-09-19 RX ORDER — GLUCAGON INJECTION, SOLUTION 1 MG/.2ML
1 INJECTION, SOLUTION SUBCUTANEOUS ONCE
Refills: 0 | Status: ACTIVE | OUTPATIENT
Start: 2024-09-19 | End: 2025-08-18

## 2024-09-19 RX ORDER — PANTOPRAZOLE SODIUM 40 MG
40 TABLET, DELAYED RELEASE (ENTERIC COATED) ORAL
Refills: 0 | Status: ACTIVE | OUTPATIENT
Start: 2024-09-19 | End: 2025-08-18

## 2024-09-19 RX ORDER — METOPROLOL TARTRATE 100 MG/1
50 TABLET ORAL
Refills: 0 | Status: ACTIVE | OUTPATIENT
Start: 2024-09-19 | End: 2025-08-18

## 2024-09-19 RX ORDER — DEXTROSE 15 G/33 G
12.5 GEL IN PACKET (GRAM) ORAL ONCE
Refills: 0 | Status: ACTIVE | OUTPATIENT
Start: 2024-09-19

## 2024-09-19 RX ORDER — GLIMEPIRIDE 1 MG/1
1 TABLET ORAL
Refills: 0 | Status: ACTIVE | OUTPATIENT
Start: 2024-09-19 | End: 2025-08-18

## 2024-09-19 RX ORDER — DEXTROSE 15 G/33 G
15 GEL IN PACKET (GRAM) ORAL ONCE
Refills: 0 | Status: ACTIVE | OUTPATIENT
Start: 2024-09-19 | End: 2025-08-18

## 2024-09-19 RX ADMIN — TAMSULOSIN HYDROCHLORIDE 0.8 MILLIGRAM(S): 0.4 CAPSULE ORAL at 22:03

## 2024-09-19 RX ADMIN — Medication 0: at 22:41

## 2024-09-19 RX ADMIN — Medication 1000 MILLILITER(S): at 19:08

## 2024-09-19 NOTE — ED CDU PROVIDER INITIAL DAY NOTE - PHYSICAL EXAMINATION
VITAL SIGNS: I have reviewed nursing notes and confirm.  CONSTITUTIONAL:  in no acute distress.  SKIN: Skin exam is warm and dry, no acute rash.  HEAD: Normocephalic; atraumatic.  EYES: PERRL, EOM intact; conjunctiva and sclera clear.  ENT: No nasal discharge; airway clear. Throat clear.  NECK: Supple; non tender.    CARD: Regular rate and rhythm.  RESP: No wheezes,  no rales or rhonchi.   ABD:  soft; non-distended; non-tender; +gauze over sky tube site  EXT: Normal ROM. No clubbing, cyanosis or edema.  NEURO: No focal deficits.  moves all extremities,  PSYCH: +Demented

## 2024-09-19 NOTE — ED ADULT NURSE NOTE - NSFALLUNIVINTERV_ED_ALL_ED
Bed/Stretcher in lowest position, wheels locked, appropriate side rails in place/Call bell, personal items and telephone in reach/Instruct patient to call for assistance before getting out of bed/chair/stretcher/Non-slip footwear applied when patient is off stretcher/[place] to call system/Physically safe environment - no spills, clutter or unnecessary equipment/Purposeful proactive rounding/Room/bathroom lighting operational, light cord in reach 7

## 2024-09-19 NOTE — ED ADULT NURSE REASSESSMENT NOTE - NSFALLHARMRISKINTERV_ED_ALL_ED
Assistance OOB with selected safe patient handling equipment if applicable/Assistance with ambulation/Communicate risk of Fall with Harm to all staff, patient, and family/Monitor gait and stability/Monitor for mental status changes and reorient to person, place, and time, as needed/Move patient closer to nursing station/within visual sight of ED staff/Provide patient with walking aids/Provide visual cue: red socks, yellow wristband, yellow gown, etc/Reinforce activity limits and safety measures with patient and family/Toileting schedule using arm’s reach rule for commode and bathroom/Use of alarms - bed, stretcher, chair and/or video monitoring/Bed in lowest position, wheels locked, appropriate side rails in place/Call bell, personal items and telephone in reach/Instruct patient to call for assistance before getting out of bed/chair/stretcher/Non-slip footwear applied when patient is off stretcher/[place] to call system/Physically safe environment - no spills, clutter or unnecessary equipment/Purposeful Proactive Rounding/Room/bathroom lighting operational, light cord in reach

## 2024-09-19 NOTE — ED CLERICAL - NS ED CLERK NOTE PRE-ARRIVAL INFORMATION; ADDITIONAL PRE-ARRIVAL INFORMATION
This patient is enrolled in the comprehensive joint replacement (CJR) program and has active care navigation.  This patient can be followed up by the care navigation team within 24 hours. To arrange close follow-up or to obtain additional clinical information about this patient, please call the contact number above. Please call the orthopedic PA on call ([phone]) for ALL patients who may be admitted.

## 2024-09-19 NOTE — ED ADULT NURSE NOTE - OBJECTIVE STATEMENT
Pt A&Ox2, resp wnl. Pt presents from [facility] for choly tube dislodgement. Pt denies CP, SOB, abdominal pain, back pain, n/v/d, HA, lightheadedness, dizziness.  at bedside for assessment. dressing on location of dislodgement.

## 2024-09-19 NOTE — ED CDU PROVIDER INITIAL DAY NOTE - ATTENDING APP SHARED VISIT CONTRIBUTION OF CARE
I, Reji Christianson, performed the initial face to face bedside interview with this patient regarding history of present illness, review of symptoms and relevant past medical, social and family history.  I completed an independent physical examination.  I was the initial provider who evaluated this patient. I have signed out the follow up of any pending tests (i.e. labs, radiological studies) to the ACP.  I have communicated the patient’s plan of care and disposition with the ACP.

## 2024-09-19 NOTE — ED CDU PROVIDER INITIAL DAY NOTE - CLINICAL SUMMARY MEDICAL DECISION MAKING FREE TEXT BOX
Patient seen and examined. Obtaining a history was difficult due to his dementia. Site of the sky tube was examined, and it was dry and not tender. IR was consulted for evaluation and potential perc sky tube placement. IR PA was called and made aware. Patient is planned for procedure tomorrow.

## 2024-09-19 NOTE — ED CDU PROVIDER INITIAL DAY NOTE - PROGRESS NOTE DETAILS
Patient signed out from Day PA. Sky tube placed in 2023 > then exchanged 9-4-24. Presenting to the ED today for sky tube dislodgment from [facility] facility. IR contacted and planning procedure in the am. Case discussed with Attending

## 2024-09-20 VITALS
SYSTOLIC BLOOD PRESSURE: 145 MMHG | HEART RATE: 73 BPM | OXYGEN SATURATION: 98 % | TEMPERATURE: 98 F | DIASTOLIC BLOOD PRESSURE: 78 MMHG | RESPIRATION RATE: 18 BRPM

## 2024-09-20 PROCEDURE — 76705 ECHO EXAM OF ABDOMEN: CPT

## 2024-09-20 PROCEDURE — 74176 CT ABD & PELVIS W/O CONTRAST: CPT | Mod: 26,MC

## 2024-09-20 PROCEDURE — 85610 PROTHROMBIN TIME: CPT

## 2024-09-20 PROCEDURE — 86870 RBC ANTIBODY IDENTIFICATION: CPT

## 2024-09-20 PROCEDURE — 99239 HOSP IP/OBS DSCHRG MGMT >30: CPT

## 2024-09-20 PROCEDURE — 86900 BLOOD TYPING SEROLOGIC ABO: CPT

## 2024-09-20 PROCEDURE — 36415 COLL VENOUS BLD VENIPUNCTURE: CPT

## 2024-09-20 PROCEDURE — 85027 COMPLETE CBC AUTOMATED: CPT

## 2024-09-20 PROCEDURE — 74176 CT ABD & PELVIS W/O CONTRAST: CPT | Mod: MC

## 2024-09-20 PROCEDURE — T1013: CPT

## 2024-09-20 PROCEDURE — 99221 1ST HOSP IP/OBS SF/LOW 40: CPT

## 2024-09-20 PROCEDURE — 82962 GLUCOSE BLOOD TEST: CPT

## 2024-09-20 PROCEDURE — 86901 BLOOD TYPING SEROLOGIC RH(D): CPT

## 2024-09-20 PROCEDURE — 99202 OFFICE O/P NEW SF 15 MIN: CPT

## 2024-09-20 PROCEDURE — 80053 COMPREHEN METABOLIC PANEL: CPT

## 2024-09-20 PROCEDURE — 86850 RBC ANTIBODY SCREEN: CPT

## 2024-09-20 PROCEDURE — 99284 EMERGENCY DEPT VISIT MOD MDM: CPT | Mod: 25

## 2024-09-20 PROCEDURE — G0378: CPT

## 2024-09-20 PROCEDURE — 86880 COOMBS TEST DIRECT: CPT

## 2024-09-20 PROCEDURE — 76705 ECHO EXAM OF ABDOMEN: CPT | Mod: 26

## 2024-09-20 PROCEDURE — 96372 THER/PROPH/DIAG INJ SC/IM: CPT

## 2024-09-20 PROCEDURE — 85730 THROMBOPLASTIN TIME PARTIAL: CPT

## 2024-09-20 RX ORDER — OLANZAPINE 7.5 MG/1
5 TABLET ORAL ONCE
Refills: 0 | Status: COMPLETED | OUTPATIENT
Start: 2024-09-20 | End: 2024-09-20

## 2024-09-20 RX ORDER — HALOPERIDOL 1 MG
2.5 TABLET ORAL ONCE
Refills: 0 | Status: COMPLETED | OUTPATIENT
Start: 2024-09-20 | End: 2024-09-20

## 2024-09-20 RX ORDER — SUCRALFATE 1 G/10ML
1 SUSPENSION ORAL
Refills: 0 | Status: ACTIVE | OUTPATIENT
Start: 2024-09-20 | End: 2025-08-19

## 2024-09-20 RX ADMIN — SUCRALFATE 1 GRAM(S): 1 SUSPENSION ORAL at 17:26

## 2024-09-20 RX ADMIN — METOPROLOL TARTRATE 50 MILLIGRAM(S): 100 TABLET ORAL at 17:29

## 2024-09-20 RX ADMIN — OLANZAPINE 5 MILLIGRAM(S): 7.5 TABLET ORAL at 01:19

## 2024-09-20 RX ADMIN — SUCRALFATE 1 GRAM(S): 1 SUSPENSION ORAL at 00:18

## 2024-09-20 RX ADMIN — SUCRALFATE 1 GRAM(S): 1 SUSPENSION ORAL at 06:30

## 2024-09-20 RX ADMIN — METOPROLOL TARTRATE 50 MILLIGRAM(S): 100 TABLET ORAL at 06:29

## 2024-09-20 RX ADMIN — Medication 40 MILLIGRAM(S): at 07:43

## 2024-09-20 RX ADMIN — Medication 2.5 MILLIGRAM(S): at 03:32

## 2024-09-20 NOTE — ED CDU PROVIDER DISPOSITION NOTE - CLINICAL COURSE
83 yo male hx of CKD Dmentia IDDM HTN chronic choleycystitis with percutaneous sky tube placed in December 2023 that dislodged prompting the return of the pt to the ED, placed in obs for potential IR intervention, at request of IR, Ct ordered to determine if tubes necessity was still warranted. pt without significant leukocytosis fever or tenderness. surgery team consulted who also requested imaging, and although sono not resulted both surgery and IR determine tube not warranted to be replaced at this time. pt to be discharged with further following out pt with pcp.

## 2024-09-20 NOTE — ED CDU PROVIDER SUBSEQUENT DAY NOTE - PHYSICAL EXAMINATION
Gen: No acute distress, non toxic  HENT: NCAT, Mucous membranes moist, Oropharynx without exudates, uvula midline  Eyes: pink conjunctivae, EOMI, PERRL  CV: RRR, nl s1/s2.  Resp: CTAB, normal rate and effort  GI: (+) Gauze over sky tube site, Abdomen soft, NT, ND. No rebound, no guarding  : No CVAT  Neuro: A&O x 3, sensorimotor intact without deficits   MSK: No spine or joint tenderness to palpation, Full ROM ext x 4  Skin: No rashes. intact and perfused

## 2024-09-20 NOTE — CHART NOTE - NSCHARTNOTEFT_GEN_A_CORE
Social Work Note:  Patient is from [facility] and was sent to [facility] ED with dislodged choley tube.  Patient went to IR and choley tube was fixed. Patient is now pending RUQ US.  Spoke with Elisha @ [facility] admissions and she is holding his bed.  Anticipated  return to SNF from ED and no need for admission. SW will follow.

## 2024-09-20 NOTE — ED CDU PROVIDER DISPOSITION NOTE - ATTENDING CONTRIBUTION TO CARE
83 yo male hx of CKD Dmentia IDDM HTN chronic choleycystitis with percutaneous sky tube placed in December 2023 that dislodged prompting the return of the pt to the ED, placed in obs for potential IR intervention, at request of IR, Ct ordered to determine if tubes necessity was still warranted. pt without significant leukocytosis fever or tenderness. surgery team consulted who also requested imaging, and although sono not resulted both surgery and IR determine tube not warranted to be replaced at this time.    IAlba, evaluated the patient with the PA, and completed the key components of the history and physical exam. I then discussed the management plan with the PA.

## 2024-09-20 NOTE — ED CDU PROVIDER SUBSEQUENT DAY NOTE - ATTENDING APP SHARED VISIT CONTRIBUTION OF CARE
I agree with the PA's note and was available for any issues/concerns. I was directly involved in patient care. My brief overall assessment is as follows: required medication for agitation overnight. awaiting IR.

## 2024-09-20 NOTE — ED CDU PROVIDER SUBSEQUENT DAY NOTE - HISTORY
VSS. Patient with baseline dementia, given medication for agitation overnight. No other events. Pending IR in the am Female Pregnancy Counseling Text: Female patients should also be on two forms of birth control while taking this medication and for one month after their last dose.

## 2024-09-20 NOTE — CONSULT NOTE ADULT - ATTENDING COMMENTS
I have reviewed the labs, imaging, and reports.  I have discussed with the resident and participated in this patient’s plan, and I have read and agree with the history, ROS, exam, assessment, and plan as stated above, notable for the followiny man with PMH of multiple medical comorbidities and chronic percutaneous sky tube since 2023 presented in ED from [facility] due to Sky tube dislodgement. The Sky tube was exchanged by IR on 24, at that moment the tube study was showed patent cystic duct. Currently pt is hemodynamically stable, afebrile, tolerates diet. Abdominal exam showed no tenderness and the old tube site with minimal drainage. Lab showed no leukocytosis or elevated LFTs. Clinically pt has no sign of cholecystitis, and no indication for replacement of percutaneous cholecystostomy tube.

## 2024-09-20 NOTE — ED CDU PROVIDER DISPOSITION NOTE - NSFOLLOWUPINSTRUCTIONS_ED_ALL_ED_FT
please follow with primary care clinician   please continue all prescribed medications  new or worsening condition seek re-evaluation immediately

## 2024-09-20 NOTE — CONSULT NOTE ADULT - SUBJECTIVE AND OBJECTIVE BOX
HPI:    Patient is a 84y man with a history of  CKD, dementia (AAOx2 at baseline), IDDMT2, HTN, chronic cholecystitis s/p percutaneous sky tube (placed 12/2023, with multiple exchanges) and recent hip replacement surgery, presented to [facility] ED from nursing home with dislodged sky tube.     IR consulted for possible replacement.     ============================================================================  Medications:  Home Medications:  bisacodyl 10 mg rectal suppository: 1 suppository(ies) rectal once a day As needed Constipation (09 Sep 2024 12:19)  insulin glargine 100 units/mL subcutaneous solution: 12 unit(s) subcutaneous once a day (28 Aug 2024 03:10)  insulin lispro 100 units/mL injectable solution: 4 international unit(s) subcutaneous 3 times a day (before meals) hold if FS &lt;100 (28 Aug 2024 03:11)  metoprolol tartrate 25 mg oral tablet: 1 tab(s) orally 2 times a day (09 Sep 2024 12:19)  omeprazole 40 mg oral delayed release capsule: 1 cap(s) orally once a day (28 Aug 2024 03:11)  sucralfate 1 g oral tablet: 1 tab(s) orally 4 times a day (28 Aug 2024 03:11)  tamsulosin 0.4 mg oral capsule: 2 cap(s) orally once a day (at bedtime) (09 Sep 2024 12:19)    MEDICATIONS  (STANDING):  dextrose 5%. 1000 milliLiter(s) (100 mL/Hr) IV Continuous <Continuous>  dextrose 5%. 1000 milliLiter(s) (50 mL/Hr) IV Continuous <Continuous>  dextrose 50% Injectable 12.5 Gram(s) IV Push once  dextrose 50% Injectable 25 Gram(s) IV Push once  dextrose 50% Injectable 25 Gram(s) IV Push once  glimepiride. 1 milliGRAM(s) Oral with breakfast  glucagon  Injectable 1 milliGRAM(s) IntraMuscular once  insulin lispro (ADMELOG) corrective regimen sliding scale   SubCutaneous at bedtime  metoprolol tartrate 50 milliGRAM(s) Oral two times a day  pantoprazole    Tablet 40 milliGRAM(s) Oral before breakfast  sucralfate suspension 1 Gram(s) Oral four times a day  tamsulosin 0.8 milliGRAM(s) Oral at bedtime    MEDICATIONS  (PRN):  acetaminophen     Tablet .. 650 milliGRAM(s) Oral every 6 hours PRN Mild Pain (1 - 3), Moderate Pain (4 - 6)  dextrose Oral Gel 15 Gram(s) Oral once PRN Blood Glucose LESS THAN 70 milliGRAM(s)/deciliter      Allergies:   No Known Allergies    ============================================================================  PAST MEDICAL & SURGICAL HISTORY:  Diabetes      Hypertension      Prostate disorder      Anxiety      High cholesterol      Ulcer      Acute cholecystitis      History of endoscopy      Cholecystostomy tube dysfunction      S/P cataract surgery          FAMILY HISTORY:  Family history of stomach cancer  Father    Family history of emphysema  Mother        Social History:      ============================================================================  Vitals:  Vital Signs Last 24 Hrs  T(C): 36.6 (20 Sep 2024 10:57), Max: 36.8 (19 Sep 2024 15:37)  T(F): 97.9 (20 Sep 2024 10:57), Max: 98.3 (19 Sep 2024 19:20)  HR: 78 (20 Sep 2024 12:25) (78 - 109)  BP: 156/66 (20 Sep 2024 12:25) (117/65 - 156/66)  BP(mean): --  RR: 18 (20 Sep 2024 12:25) (18 - 20)  SpO2: 97% (20 Sep 2024 12:25) (96% - 100%)    Parameters below as of 20 Sep 2024 12:25  Patient On (Oxygen Delivery Method): room air    Labs:                        8.8    6.81  )-----------( 441      ( 19 Sep 2024 17:15 )             27.4     09-19    136  |  104  |  33.9[H]  ----------------------------<  130[H]  4.8   |  21.0[L]  |  1.92[H]    Ca    8.8      19 Sep 2024 17:15    TPro  6.7  /  Alb  3.2[L]  /  TBili  0.4  /  DBili  x   /  AST  22  /  ALT  12  /  AlkPhos  145[H]  09-19    PT/INR - ( 19 Sep 2024 17:15 )   PT: 11.5 sec;   INR: 1.04 ratio         PTT - ( 19 Sep 2024 17:15 )  PTT:29.9 sec    Imaging:   Pertinent Imaging Reviewed.    ============================================================================
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
## 2024-09-20 NOTE — ED CDU PROVIDER SUBSEQUENT DAY NOTE - CLINICAL SUMMARY MEDICAL DECISION MAKING FREE TEXT BOX
Presenting to the ED today for sky tube dislodgment from [facility] facility. IR contacted and planning procedure in the am.

## 2024-09-20 NOTE — ED CDU PROVIDER DISPOSITION NOTE - PATIENT PORTAL LINK FT
You can access the FollowMyHealth Patient Portal offered by [facility] by registering at the following website: [email or web address]. By joining [facility]’s FollowMyHealth portal, you will also be able to view your health information using other applications (apps) compatible with our system.

## 2024-09-20 NOTE — ED CDU PROVIDER SUBSEQUENT DAY NOTE - PROGRESS NOTE DETAILS
pt without focal tenderness or complaints   pt plesantly demented currently although required medication for agitation in the past.   spoke with IR and surg regarding percutaneous choley tube that was placed in december of 2023. surgery recommending sonogram for eval and to see patient to determine if tube is still necessary at this time.   no focal tenderness on examination

## 2024-09-20 NOTE — CONSULT NOTE ADULT - SUBJECTIVE AND OBJECTIVE BOX
HPI: Patient is a 84y man w/PMH of CKD, dementia (AAOx2 at baseline), IDDMT2, HTN, chronic cholecystitis s/p percutaneous sky tube (placed 12/2023, exchanged 9/4/24), CKDIIIa, pAF (not on AC 2/2 prior GIB and fall risk), and recent hip replacement surgery, presenting to the ED from [facility] due to sky tube dislodgement. Due to the patient's mental status, he often pulls out his tube. Patient denies pain, and the site is not leaking. ACS consulted for the same.    Patient seen and examined at bedside. Does not participate with exam, however does not report any pain.                          8.8    6.81  )-----------( 441      ( 19 Sep 2024 17:15 )             27.4   09-19    136  |  104  |  33.9[H]  ----------------------------<  130[H]  4.8   |  21.0[L]  |  1.92[H]    Ca    8.8      19 Sep 2024 17:15    TPro  6.7  /  Alb  3.2[L]  /  TBili  0.4  /  DBili  x   /  AST  22  /  ALT  12  /  AlkPhos  145[H]  09-19    LOS: 1d    VITALS:   T(C): 36.6 (09-20-24 @ 07:31), Max: 36.8 (09-19-24 @ 15:37)  HR: 79 (09-20-24 @ 07:31) (79 - 109)  BP: 137/66 (09-20-24 @ 07:31) (117/65 - 154/83)  RR: 18 (09-20-24 @ 07:31) (18 - 20)  SpO2: 97% (09-20-24 @ 07:31) (96% - 100%)    GENERAL: NAD, lying in bed comfortably  HEAD:  Atraumatic, Normocephalic  EYES: Conjunctiva and sclera clear  ENT: Moist mucous membranes  NECK: Supple, No JVD  CHEST/LUNG: Unlabored respirations  HEART: Regular rate and rhythm; No murmurs, rubs, or gallops  ABDOMEN: Soft, nontender, nondistended, PCT entry site clean without evidence of infection  EXTREMITIES:  2+ Peripheral Pulses, brisk capillary refill. No clubbing, cyanosis, or edema  NERVOUS SYSTEM:  A&Ox3, no focal deficits   SKIN: No rashes or lesions    HPI: Patient is a 84y man w/PMH of CKD, dementia (AAOx2 at baseline), IDDMT2, HTN, chronic cholecystitis s/p percutaneous sky tube (placed 12/2023, exchanged 9/4/24), CKDIIIa, pAF (not on AC 2/2 prior GIB and fall risk), and recent hip replacement surgery, presenting to the ED from [facility] due to sky tube dislodgement. Due to the patient's mental status, he often pulls out his tube. Patient denies pain, and the site is not leaking. ACS consulted for the same.    Patient seen and examined at bedside. Does not participate with exam, however does not report any pain.                          8.8    6.81  )-----------( 441      ( 19 Sep 2024 17:15 )             27.4   09-19    136  |  104  |  33.9[H]  ----------------------------<  130[H]  4.8   |  21.0[L]  |  1.92[H]    Ca    8.8      19 Sep 2024 17:15    TPro  6.7  /  Alb  3.2[L]  /  TBili  0.4  /  DBili  x   /  AST  22  /  ALT  12  /  AlkPhos  145[H]  09-19    LOS: 1d    VITALS:   T(C): 36.6 (09-20-24 @ 07:31), Max: 36.8 (09-19-24 @ 15:37)  HR: 79 (09-20-24 @ 07:31) (79 - 109)  BP: 137/66 (09-20-24 @ 07:31) (117/65 - 154/83)  RR: 18 (09-20-24 @ 07:31) (18 - 20)  SpO2: 97% (09-20-24 @ 07:31) (96% - 100%)    GENERAL: NAD, lying in bed comfortably  HEAD:  Atraumatic, Normocephalic  EYES: Conjunctiva and sclera clear  ENT: Moist mucous membranes  NECK: Supple, No JVD  CHEST/LUNG: Unlabored respirations  HEART: Regular rate and rhythm; No murmurs, rubs, or gallops  ABDOMEN: Soft, nontender, nondistended, PCT entry site clean without evidence of infection  EXTREMITIES:  2+ Peripheral Pulses, brisk capillary refill. No clubbing, cyanosis, or edema  NERVOUS SYSTEM:  Moving all extremities  SKIN: No rashes or lesions

## 2024-09-20 NOTE — CONSULT NOTE ADULT - ASSESSMENT
Assessment: Patient is an 85 y/o male s/p PCT last December presenting after his tube dislodged yesterday.    Plan:  -Recommend RUQ US, no need for CT at this time   -Outpatient cholangiogram from earlier this month demonstrated patent cystic duct and biliary tree  -Dispo pending imaging    Seen and discussed with attending surgeon Dr. Wallace  Assessment: Patient is an 85 y/o male s/p PCT last December secondary to acute calculous cholecystitis c/b septic shock presenting after his tube dislodged yesterday.    Plan:  -Recommend RUQ US, no need for CT at this time   -Outpatient cholangiogram from earlier this month demonstrated patent cystic duct and biliary tree  -Dispo pending imaging    Seen and discussed with attending surgeon Dr. Wallace

## 2024-09-20 NOTE — CONSULT NOTE ADULT - ASSESSMENT
Patient is a 84 year old seen in ED for dislodged sky tube. IR consulted for replacement. Imaging reviewed by Dr. Snider, GB collapsed with large gallstone still noted. Patient is clinically stable. The risk of attempting to place a new cholecystomy tube in a collapsed GB outweigh the benefit in an asymptomatic patient. Agree with surgery with not replacement the sky tube at this time. IR to sign off.     Please call extension [phone] with any questions, concerns or issues regarding above.

## 2024-09-29 PROCEDURE — 36430 TRANSFUSION BLD/BLD COMPNT: CPT

## 2024-09-29 PROCEDURE — 73552 X-RAY EXAM OF FEMUR 2/>: CPT

## 2024-09-29 PROCEDURE — 87640 STAPH A DNA AMP PROBE: CPT

## 2024-09-29 PROCEDURE — 36415 COLL VENOUS BLD VENIPUNCTURE: CPT

## 2024-09-29 PROCEDURE — C1776: CPT

## 2024-09-29 PROCEDURE — 80076 HEPATIC FUNCTION PANEL: CPT

## 2024-09-29 PROCEDURE — 87040 BLOOD CULTURE FOR BACTERIA: CPT

## 2024-09-29 PROCEDURE — 97110 THERAPEUTIC EXERCISES: CPT

## 2024-09-29 PROCEDURE — 73501 X-RAY EXAM HIP UNI 1 VIEW: CPT

## 2024-09-29 PROCEDURE — 80048 BASIC METABOLIC PNL TOTAL CA: CPT

## 2024-09-29 PROCEDURE — 86922 COMPATIBILITY TEST ANTIGLOB: CPT

## 2024-09-29 PROCEDURE — 83735 ASSAY OF MAGNESIUM: CPT

## 2024-09-29 PROCEDURE — 86880 COOMBS TEST DIRECT: CPT

## 2024-09-29 PROCEDURE — 82947 ASSAY GLUCOSE BLOOD QUANT: CPT

## 2024-09-29 PROCEDURE — 73562 X-RAY EXAM OF KNEE 3: CPT

## 2024-09-29 PROCEDURE — 84443 ASSAY THYROID STIM HORMONE: CPT

## 2024-09-29 PROCEDURE — 83036 HEMOGLOBIN GLYCOSYLATED A1C: CPT

## 2024-09-29 PROCEDURE — P9016: CPT

## 2024-09-29 PROCEDURE — 82746 ASSAY OF FOLIC ACID SERUM: CPT

## 2024-09-29 PROCEDURE — 83605 ASSAY OF LACTIC ACID: CPT

## 2024-09-29 PROCEDURE — 96374 THER/PROPH/DIAG INJ IV PUSH: CPT

## 2024-09-29 PROCEDURE — 84145 PROCALCITONIN (PCT): CPT

## 2024-09-29 PROCEDURE — 96375 TX/PRO/DX INJ NEW DRUG ADDON: CPT

## 2024-09-29 PROCEDURE — 85025 COMPLETE CBC W/AUTO DIFF WBC: CPT

## 2024-09-29 PROCEDURE — 82962 GLUCOSE BLOOD TEST: CPT

## 2024-09-29 PROCEDURE — 72125 CT NECK SPINE W/O DYE: CPT | Mod: MC

## 2024-09-29 PROCEDURE — 93970 EXTREMITY STUDY: CPT

## 2024-09-29 PROCEDURE — 71045 X-RAY EXAM CHEST 1 VIEW: CPT

## 2024-09-29 PROCEDURE — 84100 ASSAY OF PHOSPHORUS: CPT

## 2024-09-29 PROCEDURE — C9399: CPT

## 2024-09-29 PROCEDURE — 82330 ASSAY OF CALCIUM: CPT

## 2024-09-29 PROCEDURE — P9047: CPT

## 2024-09-29 PROCEDURE — C1889: CPT

## 2024-09-29 PROCEDURE — 80053 COMPREHEN METABOLIC PANEL: CPT

## 2024-09-29 PROCEDURE — C1729: CPT

## 2024-09-29 PROCEDURE — 84132 ASSAY OF SERUM POTASSIUM: CPT

## 2024-09-29 PROCEDURE — 85610 PROTHROMBIN TIME: CPT

## 2024-09-29 PROCEDURE — 84436 ASSAY OF TOTAL THYROXINE: CPT

## 2024-09-29 PROCEDURE — 99285 EMERGENCY DEPT VISIT HI MDM: CPT | Mod: 25

## 2024-09-29 PROCEDURE — 82140 ASSAY OF AMMONIA: CPT

## 2024-09-29 PROCEDURE — T1013: CPT

## 2024-09-29 PROCEDURE — 95700 EEG CONT REC W/VID EEG TECH: CPT

## 2024-09-29 PROCEDURE — 93005 ELECTROCARDIOGRAM TRACING: CPT

## 2024-09-29 PROCEDURE — C1769: CPT

## 2024-09-29 PROCEDURE — 82803 BLOOD GASES ANY COMBINATION: CPT

## 2024-09-29 PROCEDURE — 81001 URINALYSIS AUTO W/SCOPE: CPT

## 2024-09-29 PROCEDURE — 86901 BLOOD TYPING SEROLOGIC RH(D): CPT

## 2024-09-29 PROCEDURE — 84295 ASSAY OF SERUM SODIUM: CPT

## 2024-09-29 PROCEDURE — 87641 MR-STAPH DNA AMP PROBE: CPT

## 2024-09-29 PROCEDURE — 85027 COMPLETE CBC AUTOMATED: CPT

## 2024-09-29 PROCEDURE — 85018 HEMOGLOBIN: CPT

## 2024-09-29 PROCEDURE — 96376 TX/PRO/DX INJ SAME DRUG ADON: CPT

## 2024-09-29 PROCEDURE — 84146 ASSAY OF PROLACTIN: CPT

## 2024-09-29 PROCEDURE — 97116 GAIT TRAINING THERAPY: CPT

## 2024-09-29 PROCEDURE — 86900 BLOOD TYPING SEROLOGIC ABO: CPT

## 2024-09-29 PROCEDURE — 86780 TREPONEMA PALLIDUM: CPT

## 2024-09-29 PROCEDURE — 87086 URINE CULTURE/COLONY COUNT: CPT

## 2024-09-29 PROCEDURE — 92610 EVALUATE SWALLOWING FUNCTION: CPT

## 2024-09-29 PROCEDURE — 95714 VEEG EA 12-26 HR UNMNTR: CPT

## 2024-09-29 PROCEDURE — 84480 ASSAY TRIIODOTHYRONINE (T3): CPT

## 2024-09-29 PROCEDURE — 36600 WITHDRAWAL OF ARTERIAL BLOOD: CPT

## 2024-09-29 PROCEDURE — 84439 ASSAY OF FREE THYROXINE: CPT

## 2024-09-29 PROCEDURE — 86870 RBC ANTIBODY IDENTIFICATION: CPT

## 2024-09-29 PROCEDURE — 75984 XRAY CONTROL CATHETER CHANGE: CPT

## 2024-09-29 PROCEDURE — 84484 ASSAY OF TROPONIN QUANT: CPT

## 2024-09-29 PROCEDURE — 85014 HEMATOCRIT: CPT

## 2024-09-29 PROCEDURE — 70450 CT HEAD/BRAIN W/O DYE: CPT | Mod: MC

## 2024-09-29 PROCEDURE — 86850 RBC ANTIBODY SCREEN: CPT

## 2024-09-29 PROCEDURE — 82435 ASSAY OF BLOOD CHLORIDE: CPT

## 2024-09-29 PROCEDURE — 85730 THROMBOPLASTIN TIME PARTIAL: CPT

## 2024-09-29 PROCEDURE — 82550 ASSAY OF CK (CPK): CPT

## 2024-09-29 PROCEDURE — 74176 CT ABD & PELVIS W/O CONTRAST: CPT | Mod: MC

## 2024-09-29 PROCEDURE — 71250 CT THORAX DX C-: CPT | Mod: MC

## 2024-09-29 PROCEDURE — 93306 TTE W/DOPPLER COMPLETE: CPT

## 2024-09-29 PROCEDURE — 95813 EEG EXTND MNTR 61-119 MIN: CPT

## 2024-09-29 PROCEDURE — 82607 VITAMIN B-12: CPT

## 2024-10-18 ENCOUNTER — TRANSCRIPTION ENCOUNTER (OUTPATIENT)
Age: 84
End: 2024-10-18

## 2024-10-24 ENCOUNTER — TRANSCRIPTION ENCOUNTER (OUTPATIENT)
Age: 84
End: 2024-10-24

## 2024-10-25 ENCOUNTER — TRANSCRIPTION ENCOUNTER (OUTPATIENT)
Age: 84
End: 2024-10-25

## 2024-11-12 ENCOUNTER — APPOINTMENT (OUTPATIENT)
Dept: CARDIOLOGY | Facility: CLINIC | Age: 84
End: 2024-11-12
Payer: MEDICARE

## 2024-11-12 ENCOUNTER — NON-APPOINTMENT (OUTPATIENT)
Age: 84
End: 2024-11-12

## 2024-11-12 VITALS
SYSTOLIC BLOOD PRESSURE: 90 MMHG | HEART RATE: 69 BPM | DIASTOLIC BLOOD PRESSURE: 60 MMHG | OXYGEN SATURATION: 99 % | HEIGHT: 60.5 IN

## 2024-11-12 DIAGNOSIS — I10 ESSENTIAL (PRIMARY) HYPERTENSION: ICD-10-CM

## 2024-11-12 DIAGNOSIS — I21.4 NON-ST ELEVATION (NSTEMI) MYOCARDIAL INFARCTION: ICD-10-CM

## 2024-11-12 PROCEDURE — 93000 ELECTROCARDIOGRAM COMPLETE: CPT

## 2024-11-12 PROCEDURE — 99215 OFFICE O/P EST HI 40 MIN: CPT

## 2024-11-12 RX ORDER — FOLIC ACID/VIT B COMPLEX AND C 0.8 MG
0.8 TABLET ORAL
Refills: 0 | Status: ACTIVE | COMMUNITY
Start: 2024-11-12

## 2024-11-12 RX ORDER — IPRATROPIUM BROMIDE AND ALBUTEROL SULFATE 2.5; .5 MG/3ML; MG/3ML
0.5-2.5 (3) SOLUTION RESPIRATORY (INHALATION)
Refills: 0 | Status: ACTIVE | COMMUNITY
Start: 2024-11-12

## 2024-11-12 RX ORDER — TAMSULOSIN HYDROCHLORIDE 0.4 MG/1
0.4 CAPSULE ORAL
Refills: 0 | Status: ACTIVE | COMMUNITY
Start: 2024-11-12

## 2024-11-12 RX ORDER — CHLORHEXIDINE GLUCONATE 4 %
325 (65 FE) LIQUID (ML) TOPICAL DAILY
Refills: 0 | Status: ACTIVE | COMMUNITY
Start: 2024-11-12

## 2024-11-12 RX ORDER — AMLODIPINE BESYLATE 5 MG/1
5 TABLET ORAL
Qty: 30 | Refills: 1 | Status: ACTIVE | COMMUNITY
Start: 2024-11-12

## 2024-11-12 RX ORDER — SUCRALFATE 1 G/1
1 TABLET ORAL 4 TIMES DAILY
Refills: 0 | Status: ACTIVE | COMMUNITY
Start: 2024-11-12

## 2024-11-12 RX ORDER — LACTULOSE 10 G/15ML
10 SOLUTION ORAL DAILY
Refills: 0 | Status: ACTIVE | COMMUNITY
Start: 2024-11-12

## 2024-11-12 RX ORDER — METOPROLOL TARTRATE 25 MG/1
25 TABLET ORAL
Qty: 60 | Refills: 3 | Status: ACTIVE | COMMUNITY
Start: 2024-11-12

## 2025-01-03 ENCOUNTER — APPOINTMENT (OUTPATIENT)
Dept: ORTHOPEDIC SURGERY | Facility: CLINIC | Age: 85
End: 2025-01-03
Payer: MEDICARE

## 2025-01-03 VITALS
HEIGHT: 60 IN | SYSTOLIC BLOOD PRESSURE: 136 MMHG | WEIGHT: 150 LBS | HEART RATE: 52 BPM | BODY MASS INDEX: 29.45 KG/M2 | DIASTOLIC BLOOD PRESSURE: 72 MMHG

## 2025-01-03 DIAGNOSIS — S82.142A DISPLACED BICONDYLAR FRACTURE OF LEFT TIBIA, INITIAL ENCOUNTER FOR CLOSED FRACTURE: ICD-10-CM

## 2025-01-03 PROCEDURE — 99214 OFFICE O/P EST MOD 30 MIN: CPT

## 2025-02-21 ENCOUNTER — APPOINTMENT (OUTPATIENT)
Dept: CARDIOLOGY | Facility: CLINIC | Age: 85
End: 2025-02-21

## 2025-04-03 NOTE — ED ADULT NURSE NOTE - IN ACCORDANCE WITH [place] LAW, WE OFFER EVERY PATIENT A HEPATITIS C TEST. WOULD YOU LIKE TO BE TESTED TODAY?
Attempted to contact patient regarding upcoming Medicare wellness appointment and completion of HRA questionnaire. LVM for patient to please return call at  [phone], mcm sent as well.    
Opt out

## (undated) DEVICE — GOWN IMPERV XL

## (undated) DEVICE — DRSG CURITY GAUZE SPONGE 4 X 4" 12-PLY NON-STERILE

## (undated) DEVICE — SOL IRR POUR NS 0.9% 500ML

## (undated) DEVICE — SUT MONOCRYL 3-0 27" PS-2 UNDYED

## (undated) DEVICE — SYR LUER SLIP TIP 50CC

## (undated) DEVICE — DRAPE HIP W POUCHES 87X115X134"

## (undated) DEVICE — WOUND IRR SURGIPHOR

## (undated) DEVICE — DRSG 2X2

## (undated) DEVICE — PACK TOTAL HIP

## (undated) DEVICE — SYR SLIP 10CC

## (undated) DEVICE — SOL IRR BAG NS 0.9% 3000ML

## (undated) DEVICE — SOL IRR BAG NS 0.9% 1000ML

## (undated) DEVICE — TUBING ALARIS PUMP MODULE NON-DEHP

## (undated) DEVICE — FORCEP BIOPSY ENDOSCOPIC 2.8MM DISP

## (undated) DEVICE — DRSG DERMABOND PRINEO 60CM

## (undated) DEVICE — TUBING IV EXTENSION MACRO W CLAVE 7"

## (undated) DEVICE — ELCTR GROUNDING PAD ADULT COVIDIEN

## (undated) DEVICE — BITE BLOCK ADULT 20 X 27MM (GREEN)

## (undated) DEVICE — SENSOR O2 FINGER ADULT

## (undated) DEVICE — MASK PROCED EARLOOP 50/BX LRC COVID ADD

## (undated) DEVICE — DRAPE 3/4 SHEET 52X76"

## (undated) DEVICE — VENODYNE/SCD SLEEVE CALF MEDIUM

## (undated) DEVICE — SOL IRR BAG H2O 1000ML

## (undated) DEVICE — SUT HEWSON RETRIEVER

## (undated) DEVICE — FORCEP RADIAL JAW 4 W NDL 2.4MM 2.8MM 240CM ORANGE DISP

## (undated) DEVICE — SUT ETHIBOND 5 4-30" CCS

## (undated) DEVICE — PACK IV START WITH CHG

## (undated) DEVICE — SYR LUER LOK 50CC

## (undated) DEVICE — GLV 8 PROTEXIS (WHITE)

## (undated) DEVICE — SYR IV FLUSH SALINE 10ML 30/TY

## (undated) DEVICE — UNDERPAD LINEN SAVER 23 X 36"

## (undated) DEVICE — SUT VICRYL 2-0 27" CT-2 UNDYED

## (undated) DEVICE — SAW BLADE ZIMMER RECIPROCATING SINGLE SIDED 10X70X1.19MM

## (undated) DEVICE — WARMING BLANKET UPPER ADULT

## (undated) DEVICE — DRAPE XL SHEET 77X98"

## (undated) DEVICE — ZIMMER CEMENT MIXING SYSTEM COMPACT VACUUM

## (undated) DEVICE — SOL IRR POUR H2O 1500ML

## (undated) DEVICE — SUT VICRYL PLUS 0 27" CT-2 UNDYED

## (undated) DEVICE — CATH IV SAFE BC 22G X 1" (BLUE)

## (undated) DEVICE — SOL IRR POUR NS 0.9% 1000ML

## (undated) DEVICE — VISITEC 4X4

## (undated) DEVICE — SOL BAG NS 0.9% 1000ML

## (undated) DEVICE — NDL HYPO SAFE 20G X 1.5" (YELLOW)

## (undated) DEVICE — DENTURE CUP PINK

## (undated) DEVICE — SUT VICRYL 0 27" CP-1 UNDYED

## (undated) DEVICE — WARMING BLANKET FULL ADULT

## (undated) DEVICE — DRAPE U LONG (CLEAR) 47 X 70"

## (undated) DEVICE — DRAPE 1/2 SHEET 40X57"

## (undated) DEVICE — DRSG TEGADERM 6"X8"